# Patient Record
Sex: FEMALE | Race: WHITE | NOT HISPANIC OR LATINO | Employment: OTHER | URBAN - METROPOLITAN AREA
[De-identification: names, ages, dates, MRNs, and addresses within clinical notes are randomized per-mention and may not be internally consistent; named-entity substitution may affect disease eponyms.]

---

## 2017-01-19 ENCOUNTER — OFFICE VISIT - HEALTHEAST (OUTPATIENT)
Dept: RHEUMATOLOGY | Facility: CLINIC | Age: 82
End: 2017-01-19

## 2017-01-19 DIAGNOSIS — M13.0 POLYARTHROPATHY OR POLYARTHRITIS, HAND: ICD-10-CM

## 2017-01-19 DIAGNOSIS — M35.00 SICCA SYNDROME (H): ICD-10-CM

## 2017-01-19 DIAGNOSIS — R69 TAKING DRUG FOR CHRONIC DISEASE: ICD-10-CM

## 2017-01-19 ASSESSMENT — MIFFLIN-ST. JEOR: SCORE: 1020.51

## 2017-02-24 ENCOUNTER — COMMUNICATION - HEALTHEAST (OUTPATIENT)
Dept: ADMINISTRATIVE | Facility: CLINIC | Age: 82
End: 2017-02-24

## 2017-04-20 ENCOUNTER — OFFICE VISIT - HEALTHEAST (OUTPATIENT)
Dept: RHEUMATOLOGY | Facility: CLINIC | Age: 82
End: 2017-04-20

## 2017-04-20 DIAGNOSIS — M13.0 POLYARTHROPATHY OR POLYARTHRITIS, HAND: ICD-10-CM

## 2017-04-20 DIAGNOSIS — M35.00 SICCA SYNDROME (H): ICD-10-CM

## 2017-04-20 ASSESSMENT — MIFFLIN-ST. JEOR: SCORE: 1016.43

## 2017-05-31 ENCOUNTER — AMBULATORY - HEALTHEAST (OUTPATIENT)
Dept: CARDIOLOGY | Facility: CLINIC | Age: 82
End: 2017-05-31

## 2017-06-07 ENCOUNTER — OFFICE VISIT - HEALTHEAST (OUTPATIENT)
Dept: CARDIOLOGY | Facility: CLINIC | Age: 82
End: 2017-06-07

## 2017-06-07 ENCOUNTER — AMBULATORY - HEALTHEAST (OUTPATIENT)
Dept: CARDIOLOGY | Facility: CLINIC | Age: 82
End: 2017-06-07

## 2017-06-07 DIAGNOSIS — I25.10 CORONARY ARTERY DISEASE INVOLVING NATIVE CORONARY ARTERY OF NATIVE HEART, ANGINA PRESENCE UNSPECIFIED: ICD-10-CM

## 2017-06-07 DIAGNOSIS — I10 ESSENTIAL HYPERTENSION WITH GOAL BLOOD PRESSURE LESS THAN 130/85: ICD-10-CM

## 2017-06-07 DIAGNOSIS — Z95.2 S/P AVR: ICD-10-CM

## 2017-06-07 ASSESSMENT — MIFFLIN-ST. JEOR: SCORE: 1016.43

## 2017-06-08 ENCOUNTER — HOSPITAL ENCOUNTER (OUTPATIENT)
Dept: NUCLEAR MEDICINE | Facility: CLINIC | Age: 82
Discharge: HOME | End: 2017-06-08
Attending: INTERNAL MEDICINE

## 2017-06-08 ENCOUNTER — HOSPITAL ENCOUNTER (OUTPATIENT)
Dept: CARDIOLOGY | Facility: CLINIC | Age: 82
Discharge: HOME | End: 2017-06-08
Attending: INTERNAL MEDICINE

## 2017-06-08 DIAGNOSIS — I25.10 CORONARY ARTERY DISEASE INVOLVING NATIVE CORONARY ARTERY OF NATIVE HEART, ANGINA PRESENCE UNSPECIFIED: ICD-10-CM

## 2017-06-08 LAB
CV STRESS CURRENT BP HE: NORMAL
CV STRESS CURRENT HR HE: 105
CV STRESS CURRENT HR HE: 105
CV STRESS CURRENT HR HE: 106
CV STRESS CURRENT HR HE: 110
CV STRESS CURRENT HR HE: 117
CV STRESS CURRENT HR HE: 117
CV STRESS CURRENT HR HE: 118
CV STRESS CURRENT HR HE: 120
CV STRESS CURRENT HR HE: 120
CV STRESS CURRENT HR HE: 77
CV STRESS CURRENT HR HE: 77
CV STRESS CURRENT HR HE: 85
CV STRESS CURRENT HR HE: 85
CV STRESS CURRENT HR HE: 92
CV STRESS CURRENT HR HE: 92
CV STRESS CURRENT HR HE: 93
CV STRESS DEVIATION TIME HE: NORMAL
CV STRESS ECHO PERCENT HR HE: NORMAL
CV STRESS EXERCISE STAGE HE: NORMAL
CV STRESS FINAL RESTING BP HE: NORMAL
CV STRESS FINAL RESTING HR HE: 85
CV STRESS MAX HR HE: 121
CV STRESS MAX TREADMILL GRADE HE: 0
CV STRESS MAX TREADMILL SPEED HE: 0
CV STRESS PEAK DIA BP HE: NORMAL
CV STRESS PEAK SYS BP HE: NORMAL
CV STRESS PHASE HE: NORMAL
CV STRESS PROTOCOL HE: NORMAL
CV STRESS RESTING PT POSITION HE: NORMAL
CV STRESS ST DEVIATION AMOUNT HE: NORMAL
CV STRESS ST DEVIATION ELEVATION HE: NORMAL
CV STRESS ST EVELATION AMOUNT HE: NORMAL
CV STRESS TEST TYPE HE: NORMAL
CV STRESS TOTAL STAGE TIME MIN 1 HE: NORMAL
NUC STRESS EJECTION FRACTION: 59 %
STRESS ECHO BASELINE BP: NORMAL
STRESS ECHO BASELINE HR: 76
STRESS ECHO CALCULATED PERCENT HR: 92 %
STRESS ECHO LAST STRESS BP: NORMAL
STRESS ECHO LAST STRESS HR: 117

## 2017-08-24 ENCOUNTER — TRANSFERRED RECORDS (OUTPATIENT)
Dept: HEALTH INFORMATION MANAGEMENT | Facility: CLINIC | Age: 82
End: 2017-08-24

## 2017-08-31 ENCOUNTER — OFFICE VISIT - HEALTHEAST (OUTPATIENT)
Dept: RHEUMATOLOGY | Facility: CLINIC | Age: 82
End: 2017-08-31

## 2017-08-31 DIAGNOSIS — M13.0 POLYARTHROPATHY OR POLYARTHRITIS, HAND: ICD-10-CM

## 2017-08-31 DIAGNOSIS — M35.00 SICCA SYNDROME (H): ICD-10-CM

## 2017-08-31 ASSESSMENT — MIFFLIN-ST. JEOR: SCORE: 1016.43

## 2017-09-08 ENCOUNTER — HOME CARE/HOSPICE - HEALTHEAST (OUTPATIENT)
Dept: HOME HEALTH SERVICES | Facility: HOME HEALTH | Age: 82
End: 2017-09-08

## 2017-09-09 ENCOUNTER — HOME CARE/HOSPICE - HEALTHEAST (OUTPATIENT)
Dept: HOME HEALTH SERVICES | Facility: HOME HEALTH | Age: 82
End: 2017-09-09

## 2017-09-12 ENCOUNTER — HOME CARE/HOSPICE - HEALTHEAST (OUTPATIENT)
Dept: HOME HEALTH SERVICES | Facility: HOME HEALTH | Age: 82
End: 2017-09-12

## 2017-09-13 ENCOUNTER — HOME CARE/HOSPICE - HEALTHEAST (OUTPATIENT)
Dept: HOME HEALTH SERVICES | Facility: HOME HEALTH | Age: 82
End: 2017-09-13

## 2017-09-14 ENCOUNTER — HOME CARE/HOSPICE - HEALTHEAST (OUTPATIENT)
Dept: HOME HEALTH SERVICES | Facility: HOME HEALTH | Age: 82
End: 2017-09-14

## 2017-09-15 ENCOUNTER — HOME CARE/HOSPICE - HEALTHEAST (OUTPATIENT)
Dept: HOME HEALTH SERVICES | Facility: HOME HEALTH | Age: 82
End: 2017-09-15

## 2017-09-19 ENCOUNTER — HOME CARE/HOSPICE - HEALTHEAST (OUTPATIENT)
Dept: HOME HEALTH SERVICES | Facility: HOME HEALTH | Age: 82
End: 2017-09-19

## 2017-09-20 ENCOUNTER — HOME CARE/HOSPICE - HEALTHEAST (OUTPATIENT)
Dept: HOME HEALTH SERVICES | Facility: HOME HEALTH | Age: 82
End: 2017-09-20

## 2017-09-21 ENCOUNTER — HOME CARE/HOSPICE - HEALTHEAST (OUTPATIENT)
Dept: HOME HEALTH SERVICES | Facility: HOME HEALTH | Age: 82
End: 2017-09-21

## 2018-01-08 RX ORDER — FLUOROMETHOLONE 0.1 %
1 SUSPENSION, DROPS(FINAL DOSAGE FORM)(ML) OPHTHALMIC (EYE) DAILY
COMMUNITY

## 2018-01-08 RX ORDER — ALBUTEROL SULFATE 0.83 MG/ML
1 SOLUTION RESPIRATORY (INHALATION) 3 TIMES DAILY PRN
Status: ON HOLD | COMMUNITY
End: 2021-08-13

## 2018-01-08 RX ORDER — IPRATROPIUM BROMIDE AND ALBUTEROL SULFATE 2.5; .5 MG/3ML; MG/3ML
1 SOLUTION RESPIRATORY (INHALATION) 4 TIMES DAILY PRN
COMMUNITY
End: 2021-09-14

## 2018-01-08 RX ORDER — POLYETHYLENE GLYCOL 3350 17 G/17G
1 POWDER, FOR SOLUTION ORAL DAILY
COMMUNITY
End: 2022-12-13

## 2018-01-08 RX ORDER — CYCLOSPORINE 0.5 MG/ML
1 EMULSION OPHTHALMIC 2 TIMES DAILY
COMMUNITY

## 2018-01-08 RX ORDER — TRIAMCINOLONE ACETONIDE 1 MG/G
CREAM TOPICAL 2 TIMES DAILY PRN
Status: ON HOLD | COMMUNITY
End: 2021-08-13

## 2018-01-10 ENCOUNTER — HOSPITAL ENCOUNTER (INPATIENT)
Facility: CLINIC | Age: 83
LOS: 2 days | Discharge: HOME | DRG: 330 | End: 2018-01-12
Attending: COLON & RECTAL SURGERY | Admitting: COLON & RECTAL SURGERY
Payer: COMMERCIAL

## 2018-01-10 ENCOUNTER — SURGERY (OUTPATIENT)
Age: 83
End: 2018-01-10

## 2018-01-10 ENCOUNTER — ANESTHESIA EVENT (OUTPATIENT)
Dept: SURGERY | Facility: CLINIC | Age: 83
DRG: 330 | End: 2018-01-10
Payer: COMMERCIAL

## 2018-01-10 ENCOUNTER — ANESTHESIA (OUTPATIENT)
Dept: SURGERY | Facility: CLINIC | Age: 83
DRG: 330 | End: 2018-01-10
Payer: COMMERCIAL

## 2018-01-10 PROBLEM — K62.3 RECTAL PROLAPSE: Status: ACTIVE | Noted: 2018-01-10

## 2018-01-10 LAB
ANION GAP SERPL CALCULATED.3IONS-SCNC: 13 MMOL/L (ref 3–14)
BUN SERPL-MCNC: 16 MG/DL (ref 7–30)
CALCIUM SERPL-MCNC: 8.6 MG/DL (ref 8.5–10.1)
CHLORIDE SERPL-SCNC: 94 MMOL/L (ref 94–109)
CO2 SERPL-SCNC: 20 MMOL/L (ref 20–32)
CREAT SERPL-MCNC: 0.62 MG/DL (ref 0.52–1.04)
ERYTHROCYTE [DISTWIDTH] IN BLOOD BY AUTOMATED COUNT: 12.8 % (ref 10–15)
GFR SERPL CREATININE-BSD FRML MDRD: >90 ML/MIN/1.7M2
GLUCOSE SERPL-MCNC: 61 MG/DL (ref 70–99)
HCT VFR BLD AUTO: 32.1 % (ref 35–47)
HGB BLD-MCNC: 11.2 G/DL (ref 11.7–15.7)
MCH RBC QN AUTO: 31 PG (ref 26.5–33)
MCHC RBC AUTO-ENTMCNC: 34.9 G/DL (ref 31.5–36.5)
MCV RBC AUTO: 89 FL (ref 78–100)
PLATELET # BLD AUTO: 239 10E9/L (ref 150–450)
POTASSIUM SERPL-SCNC: 3.3 MMOL/L (ref 3.4–5.3)
RBC # BLD AUTO: 3.61 10E12/L (ref 3.8–5.2)
SODIUM SERPL-SCNC: 127 MMOL/L (ref 133–144)
WBC # BLD AUTO: 7.5 10E9/L (ref 4–11)

## 2018-01-10 PROCEDURE — 37000008 ZZH ANESTHESIA TECHNICAL FEE, 1ST 30 MIN: Performed by: COLON & RECTAL SURGERY

## 2018-01-10 PROCEDURE — 80048 BASIC METABOLIC PNL TOTAL CA: CPT | Performed by: COLON & RECTAL SURGERY

## 2018-01-10 PROCEDURE — 25000125 ZZHC RX 250: Performed by: NURSE ANESTHETIST, CERTIFIED REGISTERED

## 2018-01-10 PROCEDURE — 25000128 H RX IP 250 OP 636: Performed by: ANESTHESIOLOGY

## 2018-01-10 PROCEDURE — 71000012 ZZH RECOVERY PHASE 1 LEVEL 1 FIRST HR: Performed by: COLON & RECTAL SURGERY

## 2018-01-10 PROCEDURE — 25000132 ZZH RX MED GY IP 250 OP 250 PS 637: Performed by: COLON & RECTAL SURGERY

## 2018-01-10 PROCEDURE — 25000566 ZZH SEVOFLURANE, EA 15 MIN: Performed by: COLON & RECTAL SURGERY

## 2018-01-10 PROCEDURE — 36000067 ZZH SURGERY LEVEL 5 1ST 30 MIN: Performed by: COLON & RECTAL SURGERY

## 2018-01-10 PROCEDURE — 88307 TISSUE EXAM BY PATHOLOGIST: CPT | Mod: 26 | Performed by: COLON & RECTAL SURGERY

## 2018-01-10 PROCEDURE — 25000128 H RX IP 250 OP 636: Performed by: COLON & RECTAL SURGERY

## 2018-01-10 PROCEDURE — 85027 COMPLETE CBC AUTOMATED: CPT | Performed by: COLON & RECTAL SURGERY

## 2018-01-10 PROCEDURE — 40000169 ZZH STATISTIC PRE-PROCEDURE ASSESSMENT I: Performed by: COLON & RECTAL SURGERY

## 2018-01-10 PROCEDURE — 25800025 ZZH RX 258: Performed by: PHYSICIAN ASSISTANT

## 2018-01-10 PROCEDURE — 25000125 ZZHC RX 250: Performed by: ANESTHESIOLOGY

## 2018-01-10 PROCEDURE — 36000069 ZZH SURGERY LEVEL 5 EA 15 ADDTL MIN: Performed by: COLON & RECTAL SURGERY

## 2018-01-10 PROCEDURE — 0DBN0ZZ EXCISION OF SIGMOID COLON, OPEN APPROACH: ICD-10-PCS | Performed by: COLON & RECTAL SURGERY

## 2018-01-10 PROCEDURE — 25000125 ZZHC RX 250: Performed by: COLON & RECTAL SURGERY

## 2018-01-10 PROCEDURE — 12000007 ZZH R&B INTERMEDIATE

## 2018-01-10 PROCEDURE — 27210794 ZZH OR GENERAL SUPPLY STERILE: Performed by: COLON & RECTAL SURGERY

## 2018-01-10 PROCEDURE — 25000128 H RX IP 250 OP 636: Performed by: NURSE ANESTHETIST, CERTIFIED REGISTERED

## 2018-01-10 PROCEDURE — 0DBP0ZZ EXCISION OF RECTUM, OPEN APPROACH: ICD-10-PCS | Performed by: COLON & RECTAL SURGERY

## 2018-01-10 PROCEDURE — 88307 TISSUE EXAM BY PATHOLOGIST: CPT | Performed by: COLON & RECTAL SURGERY

## 2018-01-10 PROCEDURE — 36415 COLL VENOUS BLD VENIPUNCTURE: CPT | Performed by: COLON & RECTAL SURGERY

## 2018-01-10 PROCEDURE — 27210995 ZZH RX 272: Performed by: COLON & RECTAL SURGERY

## 2018-01-10 PROCEDURE — 25000128 H RX IP 250 OP 636: Performed by: PHYSICIAN ASSISTANT

## 2018-01-10 PROCEDURE — 37000009 ZZH ANESTHESIA TECHNICAL FEE, EACH ADDTL 15 MIN: Performed by: COLON & RECTAL SURGERY

## 2018-01-10 RX ORDER — HYDROMORPHONE HYDROCHLORIDE 1 MG/ML
.3-.5 INJECTION, SOLUTION INTRAMUSCULAR; INTRAVENOUS; SUBCUTANEOUS EVERY 5 MIN PRN
Status: DISCONTINUED | OUTPATIENT
Start: 2018-01-10 | End: 2018-01-10 | Stop reason: HOSPADM

## 2018-01-10 RX ORDER — LIDOCAINE 40 MG/G
CREAM TOPICAL
Status: DISCONTINUED | OUTPATIENT
Start: 2018-01-10 | End: 2018-01-12 | Stop reason: HOSPADM

## 2018-01-10 RX ORDER — FENTANYL CITRATE 50 UG/ML
INJECTION, SOLUTION INTRAMUSCULAR; INTRAVENOUS PRN
Status: DISCONTINUED | OUTPATIENT
Start: 2018-01-10 | End: 2018-01-10

## 2018-01-10 RX ORDER — MAGNESIUM HYDROXIDE 1200 MG/15ML
LIQUID ORAL PRN
Status: DISCONTINUED | OUTPATIENT
Start: 2018-01-10 | End: 2018-01-10 | Stop reason: HOSPADM

## 2018-01-10 RX ORDER — CITALOPRAM HYDROBROMIDE 20 MG/1
20 TABLET ORAL EVERY MORNING
Status: DISCONTINUED | OUTPATIENT
Start: 2018-01-11 | End: 2018-01-12 | Stop reason: HOSPADM

## 2018-01-10 RX ORDER — CYCLOSPORINE 0.5 MG/ML
1 EMULSION OPHTHALMIC 2 TIMES DAILY
Status: DISCONTINUED | OUTPATIENT
Start: 2018-01-10 | End: 2018-01-12 | Stop reason: HOSPADM

## 2018-01-10 RX ORDER — ONDANSETRON 2 MG/ML
4 INJECTION INTRAMUSCULAR; INTRAVENOUS EVERY 30 MIN PRN
Status: DISCONTINUED | OUTPATIENT
Start: 2018-01-10 | End: 2018-01-10 | Stop reason: HOSPADM

## 2018-01-10 RX ORDER — DESLORATADINE 5 MG/1
5 TABLET ORAL DAILY
Status: DISCONTINUED | OUTPATIENT
Start: 2018-01-11 | End: 2018-01-10 | Stop reason: CLARIF

## 2018-01-10 RX ORDER — FENTANYL CITRATE 50 UG/ML
25-50 INJECTION, SOLUTION INTRAMUSCULAR; INTRAVENOUS
Status: DISCONTINUED | OUTPATIENT
Start: 2018-01-10 | End: 2018-01-10 | Stop reason: HOSPADM

## 2018-01-10 RX ORDER — CALCIUM CARBONATE 500 MG/1
2 TABLET, CHEWABLE ORAL DAILY PRN
COMMUNITY
End: 2022-10-21

## 2018-01-10 RX ORDER — TIMOLOL MALEATE 5 MG/ML
1 SOLUTION/ DROPS OPHTHALMIC DAILY
COMMUNITY

## 2018-01-10 RX ORDER — SODIUM CHLORIDE, SODIUM LACTATE, POTASSIUM CHLORIDE, CALCIUM CHLORIDE 600; 310; 30; 20 MG/100ML; MG/100ML; MG/100ML; MG/100ML
INJECTION, SOLUTION INTRAVENOUS CONTINUOUS
Status: DISCONTINUED | OUTPATIENT
Start: 2018-01-10 | End: 2018-01-10 | Stop reason: HOSPADM

## 2018-01-10 RX ORDER — ACETAMINOPHEN 10 MG/ML
1000 INJECTION, SOLUTION INTRAVENOUS EVERY 6 HOURS
Status: COMPLETED | OUTPATIENT
Start: 2018-01-10 | End: 2018-01-11

## 2018-01-10 RX ORDER — LIDOCAINE HYDROCHLORIDE 20 MG/ML
INJECTION, SOLUTION INFILTRATION; PERINEURAL PRN
Status: DISCONTINUED | OUTPATIENT
Start: 2018-01-10 | End: 2018-01-10

## 2018-01-10 RX ORDER — ONDANSETRON 2 MG/ML
4 INJECTION INTRAMUSCULAR; INTRAVENOUS EVERY 6 HOURS PRN
Status: DISCONTINUED | OUTPATIENT
Start: 2018-01-10 | End: 2018-01-12 | Stop reason: HOSPADM

## 2018-01-10 RX ORDER — NALOXONE HYDROCHLORIDE 0.4 MG/ML
.1-.4 INJECTION, SOLUTION INTRAMUSCULAR; INTRAVENOUS; SUBCUTANEOUS
Status: DISCONTINUED | OUTPATIENT
Start: 2018-01-10 | End: 2018-01-10

## 2018-01-10 RX ORDER — DEXAMETHASONE SODIUM PHOSPHATE 4 MG/ML
INJECTION, SOLUTION INTRA-ARTICULAR; INTRALESIONAL; INTRAMUSCULAR; INTRAVENOUS; SOFT TISSUE PRN
Status: DISCONTINUED | OUTPATIENT
Start: 2018-01-10 | End: 2018-01-10

## 2018-01-10 RX ORDER — CIPROFLOXACIN 2 MG/ML
400 INJECTION, SOLUTION INTRAVENOUS SEE ADMIN INSTRUCTIONS
Status: DISCONTINUED | OUTPATIENT
Start: 2018-01-10 | End: 2018-01-10 | Stop reason: HOSPADM

## 2018-01-10 RX ORDER — LORATADINE 10 MG/1
10 TABLET ORAL DAILY
Status: DISCONTINUED | OUTPATIENT
Start: 2018-01-11 | End: 2018-01-12 | Stop reason: HOSPADM

## 2018-01-10 RX ORDER — ACETAMINOPHEN 325 MG/1
975 TABLET ORAL ONCE
Status: COMPLETED | OUTPATIENT
Start: 2018-01-10 | End: 2018-01-10

## 2018-01-10 RX ORDER — NEOSTIGMINE METHYLSULFATE 1 MG/ML
VIAL (ML) INJECTION PRN
Status: DISCONTINUED | OUTPATIENT
Start: 2018-01-10 | End: 2018-01-10

## 2018-01-10 RX ORDER — DESLORATADINE 5 MG/1
5 TABLET ORAL DAILY
Status: ON HOLD | COMMUNITY
End: 2021-08-13

## 2018-01-10 RX ORDER — METOPROLOL TARTRATE 1 MG/ML
5 INJECTION, SOLUTION INTRAVENOUS EVERY 6 HOURS
Status: DISCONTINUED | OUTPATIENT
Start: 2018-01-11 | End: 2018-01-12 | Stop reason: HOSPADM

## 2018-01-10 RX ORDER — GLYCOPYRROLATE 0.2 MG/ML
INJECTION, SOLUTION INTRAMUSCULAR; INTRAVENOUS PRN
Status: DISCONTINUED | OUTPATIENT
Start: 2018-01-10 | End: 2018-01-10

## 2018-01-10 RX ORDER — KETOROLAC TROMETHAMINE 15 MG/ML
15 INJECTION, SOLUTION INTRAMUSCULAR; INTRAVENOUS EVERY 6 HOURS PRN
Status: DISCONTINUED | OUTPATIENT
Start: 2018-01-10 | End: 2018-01-12 | Stop reason: HOSPADM

## 2018-01-10 RX ORDER — ONDANSETRON 2 MG/ML
INJECTION INTRAMUSCULAR; INTRAVENOUS PRN
Status: DISCONTINUED | OUTPATIENT
Start: 2018-01-10 | End: 2018-01-10

## 2018-01-10 RX ORDER — DEXTROSE MONOHYDRATE, SODIUM CHLORIDE, AND POTASSIUM CHLORIDE 50; 1.49; 4.5 G/1000ML; G/1000ML; G/1000ML
INJECTION, SOLUTION INTRAVENOUS CONTINUOUS
Status: DISCONTINUED | OUTPATIENT
Start: 2018-01-10 | End: 2018-01-12

## 2018-01-10 RX ORDER — PROPOFOL 10 MG/ML
INJECTION, EMULSION INTRAVENOUS PRN
Status: DISCONTINUED | OUTPATIENT
Start: 2018-01-10 | End: 2018-01-10

## 2018-01-10 RX ORDER — PROPOFOL 10 MG/ML
INJECTION, EMULSION INTRAVENOUS CONTINUOUS PRN
Status: DISCONTINUED | OUTPATIENT
Start: 2018-01-10 | End: 2018-01-10

## 2018-01-10 RX ORDER — FLUOROMETHOLONE 0.1 %
1 SUSPENSION, DROPS(FINAL DOSAGE FORM)(ML) OPHTHALMIC (EYE) DAILY
Status: DISCONTINUED | OUTPATIENT
Start: 2018-01-10 | End: 2018-01-12 | Stop reason: HOSPADM

## 2018-01-10 RX ORDER — CIPROFLOXACIN 2 MG/ML
400 INJECTION, SOLUTION INTRAVENOUS
Status: DISCONTINUED | OUTPATIENT
Start: 2018-01-10 | End: 2018-01-10 | Stop reason: HOSPADM

## 2018-01-10 RX ORDER — TIMOLOL MALEATE 5 MG/ML
1 SOLUTION/ DROPS OPHTHALMIC DAILY
Status: DISCONTINUED | OUTPATIENT
Start: 2018-01-10 | End: 2018-01-12 | Stop reason: HOSPADM

## 2018-01-10 RX ORDER — NALOXONE HYDROCHLORIDE 0.4 MG/ML
.1-.4 INJECTION, SOLUTION INTRAMUSCULAR; INTRAVENOUS; SUBCUTANEOUS
Status: DISCONTINUED | OUTPATIENT
Start: 2018-01-10 | End: 2018-01-12 | Stop reason: HOSPADM

## 2018-01-10 RX ORDER — EPHEDRINE SULFATE 50 MG/ML
INJECTION, SOLUTION INTRAMUSCULAR; INTRAVENOUS; SUBCUTANEOUS PRN
Status: DISCONTINUED | OUTPATIENT
Start: 2018-01-10 | End: 2018-01-10

## 2018-01-10 RX ORDER — LEVOTHYROXINE SODIUM 75 UG/1
75 TABLET ORAL
Status: DISCONTINUED | OUTPATIENT
Start: 2018-01-11 | End: 2018-01-12 | Stop reason: HOSPADM

## 2018-01-10 RX ORDER — ONDANSETRON 4 MG/1
4 TABLET, ORALLY DISINTEGRATING ORAL EVERY 30 MIN PRN
Status: DISCONTINUED | OUTPATIENT
Start: 2018-01-10 | End: 2018-01-10 | Stop reason: HOSPADM

## 2018-01-10 RX ADMIN — PHENYLEPHRINE HYDROCHLORIDE 50 MCG: 10 INJECTION INTRAVENOUS at 09:43

## 2018-01-10 RX ADMIN — FENTANYL CITRATE 100 MCG: 50 INJECTION, SOLUTION INTRAMUSCULAR; INTRAVENOUS at 07:39

## 2018-01-10 RX ADMIN — FENTANYL CITRATE 25 MCG: 50 INJECTION, SOLUTION INTRAMUSCULAR; INTRAVENOUS at 10:05

## 2018-01-10 RX ADMIN — PHENYLEPHRINE HYDROCHLORIDE 50 MCG: 10 INJECTION INTRAVENOUS at 08:21

## 2018-01-10 RX ADMIN — SODIUM CHLORIDE, POTASSIUM CHLORIDE, SODIUM LACTATE AND CALCIUM CHLORIDE: 600; 310; 30; 20 INJECTION, SOLUTION INTRAVENOUS at 07:04

## 2018-01-10 RX ADMIN — FLUOROMETHOLONE 1 DROP: 1 SUSPENSION/ DROPS OPHTHALMIC at 12:19

## 2018-01-10 RX ADMIN — POTASSIUM CHLORIDE, DEXTROSE MONOHYDRATE AND SODIUM CHLORIDE: 150; 5; 450 INJECTION, SOLUTION INTRAVENOUS at 11:53

## 2018-01-10 RX ADMIN — METRONIDAZOLE 500 MG: 500 INJECTION, SOLUTION INTRAVENOUS at 07:50

## 2018-01-10 RX ADMIN — SODIUM CHLORIDE 1000 ML: 900 IRRIGANT IRRIGATION at 08:39

## 2018-01-10 RX ADMIN — SODIUM CHLORIDE, POTASSIUM CHLORIDE, SODIUM LACTATE AND CALCIUM CHLORIDE: 600; 310; 30; 20 INJECTION, SOLUTION INTRAVENOUS at 08:40

## 2018-01-10 RX ADMIN — GLYCOPYRROLATE 0.6 MG: 0.2 INJECTION, SOLUTION INTRAMUSCULAR; INTRAVENOUS at 10:10

## 2018-01-10 RX ADMIN — LIDOCAINE HYDROCHLORIDE 100 MG: 20 INJECTION, SOLUTION INFILTRATION; PERINEURAL at 07:39

## 2018-01-10 RX ADMIN — PHENYLEPHRINE HYDROCHLORIDE 100 MCG: 10 INJECTION INTRAVENOUS at 08:00

## 2018-01-10 RX ADMIN — FENTANYL CITRATE 25 MCG: 50 INJECTION, SOLUTION INTRAMUSCULAR; INTRAVENOUS at 09:58

## 2018-01-10 RX ADMIN — Medication 2 LOZENGE: at 19:59

## 2018-01-10 RX ADMIN — ROCURONIUM BROMIDE 50 MG: 10 INJECTION INTRAVENOUS at 07:39

## 2018-01-10 RX ADMIN — TIMOLOL MALEATE 1 DROP: 5 SOLUTION/ DROPS OPHTHALMIC at 12:20

## 2018-01-10 RX ADMIN — FENTANYL CITRATE 50 MCG: 50 INJECTION, SOLUTION INTRAMUSCULAR; INTRAVENOUS at 08:55

## 2018-01-10 RX ADMIN — PROPOFOL 30 MCG/KG/MIN: 10 INJECTION, EMULSION INTRAVENOUS at 07:39

## 2018-01-10 RX ADMIN — LIDOCAINE HYDROCHLORIDE 10 ML: 10; .005 INJECTION, SOLUTION EPIDURAL; INFILTRATION; INTRACAUDAL; PERINEURAL at 08:19

## 2018-01-10 RX ADMIN — PHENYLEPHRINE HYDROCHLORIDE 100 MCG: 10 INJECTION INTRAVENOUS at 09:37

## 2018-01-10 RX ADMIN — CIPROFLOXACIN 400 MG: 2 INJECTION INTRAVENOUS at 08:00

## 2018-01-10 RX ADMIN — FENTANYL CITRATE 50 MCG: 50 INJECTION, SOLUTION INTRAMUSCULAR; INTRAVENOUS at 07:42

## 2018-01-10 RX ADMIN — Medication 5 MG: at 08:05

## 2018-01-10 RX ADMIN — PHENYLEPHRINE HYDROCHLORIDE 50 MCG: 10 INJECTION INTRAVENOUS at 08:29

## 2018-01-10 RX ADMIN — PROPOFOL 100 MG: 10 INJECTION, EMULSION INTRAVENOUS at 07:39

## 2018-01-10 RX ADMIN — NEOSTIGMINE METHYLSULFATE 3 MG: 1 INJECTION INTRAMUSCULAR; INTRAVENOUS; SUBCUTANEOUS at 10:10

## 2018-01-10 RX ADMIN — PHENYLEPHRINE HYDROCHLORIDE 50 MCG: 10 INJECTION INTRAVENOUS at 08:15

## 2018-01-10 RX ADMIN — ACETAMINOPHEN 975 MG: 325 TABLET, FILM COATED ORAL at 07:07

## 2018-01-10 RX ADMIN — LIDOCAINE HYDROCHLORIDE 0.2 ML: 10 INJECTION, SOLUTION EPIDURAL; INFILTRATION; INTRACAUDAL; PERINEURAL at 07:05

## 2018-01-10 RX ADMIN — PHENYLEPHRINE HYDROCHLORIDE 100 MCG: 10 INJECTION INTRAVENOUS at 08:06

## 2018-01-10 RX ADMIN — ACETAMINOPHEN 1000 MG: 10 INJECTION, SOLUTION INTRAVENOUS at 13:53

## 2018-01-10 RX ADMIN — DEXAMETHASONE SODIUM PHOSPHATE 4 MG: 4 INJECTION, SOLUTION INTRA-ARTICULAR; INTRALESIONAL; INTRAMUSCULAR; INTRAVENOUS; SOFT TISSUE at 07:39

## 2018-01-10 RX ADMIN — PHENYLEPHRINE HYDROCHLORIDE 50 MCG: 10 INJECTION INTRAVENOUS at 09:32

## 2018-01-10 RX ADMIN — PHENYLEPHRINE HYDROCHLORIDE 50 MCG: 10 INJECTION INTRAVENOUS at 08:47

## 2018-01-10 RX ADMIN — PHENYLEPHRINE HYDROCHLORIDE 50 MCG: 10 INJECTION INTRAVENOUS at 08:42

## 2018-01-10 RX ADMIN — ACETAMINOPHEN 1000 MG: 10 INJECTION, SOLUTION INTRAVENOUS at 19:28

## 2018-01-10 RX ADMIN — PHENYLEPHRINE HYDROCHLORIDE 50 MCG: 10 INJECTION INTRAVENOUS at 08:35

## 2018-01-10 RX ADMIN — PHENYLEPHRINE HYDROCHLORIDE 50 MCG: 10 INJECTION INTRAVENOUS at 08:26

## 2018-01-10 RX ADMIN — Medication 5 MG: at 07:58

## 2018-01-10 RX ADMIN — ONDANSETRON 4 MG: 2 INJECTION INTRAMUSCULAR; INTRAVENOUS at 07:39

## 2018-01-10 ASSESSMENT — COPD QUESTIONNAIRES: COPD: 1

## 2018-01-10 ASSESSMENT — ENCOUNTER SYMPTOMS: SEIZURES: 0

## 2018-01-10 NOTE — IP AVS SNAPSHOT
MRN:[id number]                      After Visit Summary   1/10/2018    Marla Dunn    MRN: [id number]           Thank you!     Thank you for choosing [facility] for your care. Our goal is always to provide you with excellent care. Hearing back from our patients is one way we can continue to improve our services. Please take a few minutes to complete the written survey that you may receive in the mail after you visit with us. Thank you!        Patient Information     Date Of Birth          5/22/1929        Designated Caregiver       Most Recent Value    Caregiver    Will someone help with your care after discharge? yes    Name of designated caregiver justin    Phone number of caregiver [phone]    Caregiver address [address]      About your hospital stay     You were admitted on:  January 10, 2018 You last received care in the:  [place] [address] [facility]    You were discharged on:  January 12, 2018        Reason for your hospital stay       The patient was in the hospital to have surgery for rectal prolapse                  Who to Call     For medical emergencies, please call 911.  For non-urgent questions about your medical care, please call your primary care provider or clinic, [phone]  For questions related to your surgery, please call your surgery clinic        Attending Provider     Provider Candelaria Marrero MD Colon and Rectal Surgery       Primary Care Provider Office Phone # Fax #    Sally Goyal [phone]      After Care Instructions     Activity       Your activity upon discharge: No heavy lifting or straining over 15-20 lbs for 6 weeks. No Driving while taking narcotic pain medications            Diet       Follow this diet upon discharge: Regular                  Follow-up Appointments     Follow-up and recommended labs and tests        Follow up in the office in 3-4 weeks with Dr. Anthony or at your  already scheduled post op visit. Call [phone] to schedule your appointment. Call the office at [phone] if you develop fever, uncontrolled pain, bleeding, nausea, vomiting, or constipation.                  Further instructions from your care team       Discharge Instructions following Abdominal Surgery  [facility]      Bowel Function  After removal of a portion of the intestines, it is normal for bowel movements to be erratic.  They are often looser and more frequent.  This condition generally resolves within a few weeks or it can be controlled with diet or medication.  Diet  In general, you may return to a well-balanced diet upon discharge.  Your doctor will let you know when to add extra fiber to your diet.  Be sure to drink plenty of fluids.  Incision  You should expect some discomfort in the area of your incision, particularly as you increase your activity.  If you notice an area of increasing redness or new drainage, please call your doctor.  You may shower as desired but refrain from tub baths or swimming until the incisions are fully healed.  Activity  Gradually increase your activity each day.  There are generally no restrictions on walking, climbing stairs, or riding in a car.  You can usually drive a car 7-10 days after your leave the hospital.  Do not drive while taking narcotic pain medications.  Ask your doctor regarding resumption of physical sexual activity; in most cases, you can resume sex after a few weeks.  Your physician will advise you about when to return to work.  It is preferable to have somebody stay with you at home until you are fully healed and able to resume a normal level of activity   Medications  You will be discharged with a prescription pain medication and any medications you were taking prior to surgery.  Do not drive while taking narcotic pain medications.  If you need additional medications or a refill, you must call your doctor  "during normal business hours.  It is the policy of [facility] to not refill pain medication after hours or on weekends because your chart is not available.  Follow-up  Typically, you will be asked to schedule a follow-up office visit 1 to 4 weeks after surgery.  If you have any questions related to follow-up, medications, or your condition, please call the office.      Call your surgeon if you have these signs or symptoms:  ([phone])    Problem with the incision, including increasing pain, swelling, redness, or drainage    Increasing abdominal pain    Uncontrolled nausea or vomiting    Fever or chills    Constipation  (no bowel movement for 3 days)    Diarrhea (more than 3 watery stools within 24 hours)    Bleeding from the rectum, wound, or stoma    Any questions or concerns    Pending Results     No orders found from 1/8/2018 to 1/11/2018.            Statement of Approval     Ordered          01/12/18 1012  I have reviewed and agree with all the recommendations and orders detailed in this document.  EFFECTIVE NOW     Approved and electronically signed by:  Kiana Morrison PA-C             Admission Information     Date & Time Provider Department Dept. Phone    1/10/2018 Candelaria Anthony MD [place] [address] [facility] [phone]      Your Vitals Were     Blood Pressure Pulse Temperature Respirations Height Weight    132/55 (BP Location: Right arm) 78 98.2  F (36.8  C) (Oral) 16 1.626 m (5' 4\") 58.3 kg (128 lb 7 oz)    Pulse Oximetry BMI (Body Mass Index)                98% 22.05 kg/m2          [employer] Information     [employer] lets you send messages to your doctor, view your test results, renew your prescriptions, schedule appointments and more. To sign up, go to [email or web address] . Click on \"Log in\" on the left side of the screen, which will take you to the Welcome page. Then click on \"Sign up Now\" on the right side of the page.     You will be " asked to enter the access code listed below, as well as some personal information. Please follow the directions to create your username and password.     Your access code is: [email or web address]  Expires: 2018 11:07 AM     Your access code will  in 90 days. If you need help or a new code, please call your [facility] clinic or [phone].        Care EveryWhere ID     This is your Care EveryWhere ID. This could be used by other organizations to access your [facility] medical records  [id number]        Equal Access to Services     [facility]: Hadii katelynn block hadagneso Sobrittny, waaxda luqadaha, qaybta kaalmada connor, cesar salazar . So [facility] [phone].    ATENCIÓN: Si habla español, tiene a hall disposición servicios gratuitos de asistencia lingüística. David al [phone].    We comply with applicable federal civil rights laws and [place] laws. We do not discriminate on the basis of race, color, national origin, age, disability, sex, sexual orientation, or gender identity.               Review of your medicines      CONTINUE these medicines which have NOT CHANGED        Dose / Directions    albuterol (2.5 MG/3ML) 0.083% neb solution        Dose:  1 vial   Take 1 vial by nebulization 3 times daily as needed   Refills:  0       ARANESP (ALB FREE) SURECLICK IJ        Inject as directed every 21 days -Receives at [facility] in [place]   Refills:  0       ASPIRIN PO        Dose:  81 mg   Take 81 mg by mouth every morning   Refills:  0       calcium carbonate 500 MG chewable tablet   Commonly known as:  TUMS        Dose:  2 chew tab   Take 2 chew tab by mouth daily as needed for heartburn   Refills:  0       CALTRATE 600+D PO        Dose:  1 tablet   Take 1 tablet by mouth daily   Refills:  0       CELEXA PO        Dose:  20 mg   Take 20 mg by mouth every morning   Refills:  0       CITRUCEL PO        Dose:  1 tablet   Take 1 tablet by mouth every morning   Refills:  0       COLACE PO         Dose:  100 mg   Take 100 mg by mouth 2 times daily   Refills:  0       * COMBIVENT RESPIMAT  MCG/ACT inhaler   Generic drug:  Ipratropium-Albuterol        Dose:  2 puff   Inhale 2 puffs into the lungs 4 times daily   Refills:  0       * DUONEB 0.5-2.5 (3) MG/3ML neb solution   Generic drug:  ipratropium - albuterol 0.5 mg/2.5 mg/3 mL        Dose:  1 vial   Take 1 vial by nebulization every 6 hours as needed for shortness of breath / dyspnea or wheezing   Refills:  0       desloratadine 5 MG tablet   Commonly known as:  CLARINEX        Dose:  5 mg   Take 5 mg by mouth daily   Refills:  0       fluorometholone 0.1 % ophthalmic susp   Commonly known as:  FML LIQUIFILM        Dose:  1 drop   Place 1 drop Into the left eye daily   Refills:  0       IBANDRONATE SODIUM PO        Dose:  150 mg   Take 150 mg by mouth every 30 days   Refills:  0       LASIX PO        Dose:  10 mg   Take 10 mg by mouth every morning (0.5 x 20 mg = 10 mg dose)   Refills:  0       LEVOXYL PO        Dose:  75 mcg   Take 75 mcg by mouth daily   Refills:  0       METAMUCIL FIBER PO        Dose:  0.25 tsp.   Take 0.25 tsp. by mouth every morning   Refills:  0       * METOPROLOL TARTRATE PO        Dose:  25 mg   Take 25 mg by mouth daily   Refills:  0       * METOPROLOL TARTRATE PO        Dose:  12.5 mg   Take 12.5 mg by mouth every evening (patient takes 0.5 X 25 mg = 12.5 mg dose)   Refills:  0       MIRALAX powder   Generic drug:  polyethylene glycol        Dose:  1 capful   Take 1 capful by mouth every morning   Refills:  0       NITROSTAT SL        Dose:  0.4 mg   Place 0.4 mg under the tongue daily as needed for chest pain   Refills:  0       NORVASC PO        Dose:  10 mg   Take 10 mg by mouth every morning   Refills:  0       * polyethylene glycol 0.4%- propylene glycol 0.3% 0.4-0.3 % Soln ophthalmic solution   Commonly known as:  SYSTANE ULTRA        Dose:  1 drop   Place 1 drop into both eyes daily   Refills:  0       *  polyethylene glycol 0.4%- propylene glycol 0.3% 0.4-0.3 % Soln ophthalmic solution   Commonly known as:  SYSTANE ULTRA        Dose:  1 drop   Place 1 drop into both eyes 2 times daily as needed for dry eyes   Refills:  0       PRESERVISION AREDS PO        Dose:  1 tablet   Take 1 tablet by mouth 2 times daily   Refills:  0       PROBIOTIC PO        Dose:  1 capsule   Take 1 capsule by mouth every morning   Refills:  0       RESTASIS 0.05 % ophthalmic emulsion   Generic drug:  cycloSPORINE        Dose:  1 drop   1 drop 2 times daily   Refills:  0       SULFACET-R EX        Externally apply topically daily as needed (to face)   Refills:  0       timolol 0.5 % ophthalmic solution   Commonly known as:  TIMOPTIC        Dose:  1 drop   Place 1 drop into the right eye daily   Refills:  0       triamcinolone 0.1 % cream   Commonly known as:  KENALOG        Apply topically 2 times daily as needed for irritation   Refills:  0       TYLENOL PO        Dose:  825 mg   Take 825 mg by mouth 2 times daily as needed for mild pain or fever (1x 325 mg + 1 x 500 mg = 825 mg dose)   Refills:  0       VITAMIN C PO        Dose:  500 mg   Take 500 mg by mouth every morning   Refills:  0       VITAMIN D (CHOLECALCIFEROL) PO        Dose:  2000 Units   Take 2,000 Units by mouth daily   Refills:  0       ZANTAC PO        Dose:  150 mg   Take 150 mg by mouth daily as needed for heartburn   Refills:  0       ZOCOR PO        Dose:  20 mg   Take 20 mg by mouth At Bedtime   Refills:  0       * Notice:  This list has 6 medication(s) that are the same as other medications prescribed for you. Read the directions carefully, and ask your doctor or other care provider to review them with you.             Protect others around you: Learn how to safely use, store and throw away your medicines at www.disposemymeds.org.             Medication List: This is a list of all your medications and when to take them. Check marks below indicate your daily home  schedule. Keep this list as a reference.      Medications           Morning Afternoon Evening Bedtime As Needed    albuterol (2.5 MG/3ML) 0.083% neb solution   Take 1 vial by nebulization 3 times daily as needed                                   ARANESP (ALB FREE) SURECLICK IJ   Inject as directed every 21 days -Receives at [facility] in [place]                                ASPIRIN PO   Take 81 mg by mouth every morning                                   calcium carbonate 500 MG chewable tablet   Commonly known as:  TUMS   Take 2 chew tab by mouth daily as needed for heartburn                                   CALTRATE 600+D PO   Take 1 tablet by mouth daily                                   CELEXA PO   Take 20 mg by mouth every morning   Last time this was given:  20 mg on 1/12/2018  9:48 AM                                   CITRUCEL PO   Take 1 tablet by mouth every morning                                   COLACE PO   Take 100 mg by mouth 2 times daily                                      * COMBIVENT RESPIMAT  MCG/ACT inhaler   Inhale 2 puffs into the lungs 4 times daily   Last time this was given:  2 puffs on 1/12/2018  9:53 AM   Generic drug:  Ipratropium-Albuterol                                            * DUONEB 0.5-2.5 (3) MG/3ML neb solution   Take 1 vial by nebulization every 6 hours as needed for shortness of breath / dyspnea or wheezing   Generic drug:  ipratropium - albuterol 0.5 mg/2.5 mg/3 mL                                   desloratadine 5 MG tablet   Commonly known as:  CLARINEX   Take 5 mg by mouth daily                                   fluorometholone 0.1 % ophthalmic susp   Commonly known as:  FML LIQUIFILM   Place 1 drop Into the left eye daily   Last time this was given:  1 drop on 1/12/2018  9:51 AM                                   IBANDRONATE SODIUM PO   Take 150 mg by mouth every 30 days                                LASIX PO   Take 10 mg by mouth every morning (0.5 x 20 mg =  10 mg dose)                                   LEVOXYL PO   Take 75 mcg by mouth daily   Last time this was given:  75 mcg on 1/12/2018  6:37 AM                                   METAMUCIL FIBER PO   Take 0.25 tsp. by mouth every morning                                   * METOPROLOL TARTRATE PO   Take 25 mg by mouth daily                                   * METOPROLOL TARTRATE PO   Take 12.5 mg by mouth every evening (patient takes 0.5 X 25 mg = 12.5 mg dose)                                   MIRALAX powder   Take 1 capful by mouth every morning   Generic drug:  polyethylene glycol                                   NITROSTAT SL   Place 0.4 mg under the tongue daily as needed for chest pain                                   NORVASC PO   Take 10 mg by mouth every morning                                   * polyethylene glycol 0.4%- propylene glycol 0.3% 0.4-0.3 % Soln ophthalmic solution   Commonly known as:  SYSTANE ULTRA   Place 1 drop into both eyes daily   Last time this was given:  1 drop on 1/12/2018  9:52 AM                                   * polyethylene glycol 0.4%- propylene glycol 0.3% 0.4-0.3 % Soln ophthalmic solution   Commonly known as:  SYSTANE ULTRA   Place 1 drop into both eyes 2 times daily as needed for dry eyes   Last time this was given:  1 drop on 1/12/2018  9:52 AM                                   PRESERVISION AREDS PO   Take 1 tablet by mouth 2 times daily                                      PROBIOTIC PO   Take 1 capsule by mouth every morning                                   RESTASIS 0.05 % ophthalmic emulsion   1 drop 2 times daily   Last time this was given:  1 drop on 1/12/2018  9:47 AM   Generic drug:  cycloSPORINE                                   SULFACET-R EX   Externally apply topically daily as needed (to face)                                   timolol 0.5 % ophthalmic solution   Commonly known as:  TIMOPTIC   Place 1 drop into the right eye daily   Last time this was given:  1  drop on 1/12/2018  9:52 AM                                   triamcinolone 0.1 % cream   Commonly known as:  KENALOG   Apply topically 2 times daily as needed for irritation                                   TYLENOL PO   Take 825 mg by mouth 2 times daily as needed for mild pain or fever (1x 325 mg + 1 x 500 mg = 825 mg dose)   Last time this was given:  975 mg on 1/10/2018  7:07 AM                                      VITAMIN C PO   Take 500 mg by mouth every morning                                   VITAMIN D (CHOLECALCIFEROL) PO   Take 2,000 Units by mouth daily                                   ZANTAC PO   Take 150 mg by mouth daily as needed for heartburn                                   ZOCOR PO   Take 20 mg by mouth At Bedtime                                   * Notice:  This list has 6 medication(s) that are the same as other medications prescribed for you. Read the directions carefully, and ask your doctor or other care provider to review them with you.

## 2018-01-10 NOTE — PROGRESS NOTES
Admission medication history interview status for the 1/10/2018  admission is complete. See EPIC admission navigator for prior to admission medications     Medication history source reliability:Good    Medication history interview source(s):Patient    Medication history resources (including written lists, pill bottles, clinic record):Patient sent in her list prior to surgery    Primary pharmacy.[facility]    Additional medication history information not noted on PTA med list :None    Time spent in this activity: 50 minutes    Prior to Admission medications    Medication Sig Last Dose Taking? Auth Provider   calcium carbonate (TUMS) 500 MG chewable tablet Take 2 chew tab by mouth daily as needed for heartburn 1/8/2018 at PRN Yes Reported, Patient   timolol (TIMOPTIC) 0.5 % ophthalmic solution Place 1 drop into the right eye daily 1/9/2018 at AM Yes Reported, Patient   polyethylene glycol 0.4%- propylene glycol 0.3% (SYSTANE ULTRA) 0.4-0.3 % SOLN ophthalmic solution Place 1 drop into both eyes daily 1/9/2018 at AM Yes Reported, Patient   polyethylene glycol 0.4%- propylene glycol 0.3% (SYSTANE ULTRA) 0.4-0.3 % SOLN ophthalmic solution Place 1 drop into both eyes 2 times daily as needed for dry eyes Past Week at PRN Yes Reported, Patient   desloratadine (CLARINEX) 5 MG tablet Take 5 mg by mouth daily 1/7/2018 at AM Yes Reported, Patient   Acetaminophen (TYLENOL PO) Take 825 mg by mouth 2 times daily as needed for mild pain or fever (1x 325 mg + 1 x 500 mg = 825 mg dose) 1/6/2018 at PM Yes Reported, Patient   albuterol (2.5 MG/3ML) 0.083% neb solution Take 1 vial by nebulization 3 times daily as needed  1/5/2018 at PRN Yes Reported, Patient   AmLODIPine Besylate (NORVASC PO) Take 10 mg by mouth every morning 1/9/2018 at AM Yes Reported, Patient   Ascorbic Acid (VITAMIN C PO) Take 500 mg by mouth every morning 1/7/2018 at AM Yes Reported, Patient   ASPIRIN PO Take 81 mg by mouth every morning  1/3/2018 at AM Yes Reported,  Patient   Citalopram Hydrobromide (CELEXA PO) Take 20 mg by mouth every morning 1/7/2018 at AM Yes Reported, Patient   cycloSPORINE (RESTASIS) 0.05 % ophthalmic emulsion 1 drop 2 times daily 1/9/2018 at PM Yes Reported, Patient   Darbepoetin Joseph-Polysorbate (ARANESP, ALB FREE, SURECLICK IJ) Inject as directed every 21 days -Receives at [facility] in [place] 12/27/2017 at Unknown time Yes Reported, Patient   Docusate Sodium (COLACE PO) Take 100 mg by mouth 2 times daily 1/7/2018 at PM Yes Reported, Patient   fluorometholone (FML LIQUIFILM) 0.1 % ophthalmic susp Place 1 drop Into the left eye daily  1/9/2018 at AM Yes Reported, Patient   Furosemide (LASIX PO) Take 10 mg by mouth every morning (0.5 x 20 mg = 10 mg dose) 1/7/2018 at AM Yes Reported, Patient   IBANDRONATE SODIUM PO Take 150 mg by mouth every 30 days 1/1/2018 at AM Yes Reported, Patient   Ipratropium-Albuterol (COMBIVENT RESPIMAT)  MCG/ACT inhaler Inhale 2 puffs into the lungs 4 times daily 1/9/2018 at AM Yes Reported, Patient   Levothyroxine Sodium (LEVOXYL PO) Take 75 mcg by mouth daily 1/7/2018 at AM Yes Reported, Patient   Methylcellulose, Laxative, (CITRUCEL PO) Take 1 tablet by mouth every morning 1/7/2018 at AM Yes Reported, Patient   ipratropium - albuterol 0.5 mg/2.5 mg/3 mL (DUONEB) 0.5-2.5 (3) MG/3ML neb solution Take 1 vial by nebulization every 6 hours as needed for shortness of breath / dyspnea or wheezing More than a Month at PRN Yes Reported, Patient   METOPROLOL TARTRATE PO Take 25 mg by mouth daily  1/9/2018 at AM Yes Reported, Patient   METOPROLOL TARTRATE PO Take 12.5 mg by mouth every evening (patient takes 0.5 X 25 mg = 12.5 mg dose) 1/9/2018 at PM Yes Reported, Patient   Multiple Vitamins-Minerals (PRESERVISION AREDS PO) Take 1 tablet by mouth 2 times daily 1/8/2018 at PM Yes Reported, Patient   polyethylene glycol (MIRALAX) powder Take 1 capful by mouth every morning 1/9/2018 at AM Yes Reported, Patient   Probiotic  Product (PROBIOTIC PO) Take 1 capsule by mouth every morning 1/7/2018 at AM Yes Reported, Patient   Psyllium (METAMUCIL FIBER PO) Take 0.25 tsp. by mouth every morning 1/7/2018 at AM Yes Reported, Patient   RaNITidine HCl (ZANTAC PO) Take 150 mg by mouth daily as needed for heartburn  More than a month at PRN Yes Reported, Patient   Simvastatin (ZOCOR PO) Take 20 mg by mouth At Bedtime 1/7/2018 at PM Yes Reported, Patient   VITAMIN D, CHOLECALCIFEROL, PO Take 2,000 Units by mouth daily  1/7/2018 at AM Yes Reported, Patient   triamcinolone (KENALOG) 0.1 % cream Apply topically 2 times daily as needed for irritation  More than a month at PRN Yes Reported, Patient   Calcium Carbonate-Vitamin D (CALTRATE 600+D PO) Take 1 tablet by mouth daily  1/7/2018 at AM Yes Reported, Patient   Nitroglycerin (NITROSTAT SL) Place 0.4 mg under the tongue daily as needed for chest pain More than a Month at PRN Yes Reported, Patient   Sulfacetamide Sodium-Sulfur (SULFACET-R EX) Externally apply topically daily as needed (to face)  More than a month at PRN Yes Reported, Patient

## 2018-01-10 NOTE — ANESTHESIA POSTPROCEDURE EVALUATION
Patient: Marla Dunn    Procedure(s):  PERINEAL RECTOSIGMOIDECTOMY  - Wound Class: IV-Dirty or Infected    Diagnosis:recto prolapse   Diagnosis Additional Information: No value filed.    Anesthesia Type:  General, ETT    Note:  Anesthesia Post Evaluation    Patient location during evaluation: PACU  Patient participation: Able to fully participate in evaluation  Level of consciousness: awake and alert  Pain management: satisfactory to patient  Airway patency: patent  Cardiovascular status: hemodynamically stable  Respiratory status: acceptable and unassisted  Hydration status: balanced  PONV: none     Anesthetic complications: None          Last vitals:  Vitals:    01/10/18 1155 01/10/18 1225 01/10/18 1325   BP: 121/54 121/50 114/58   Resp: 16 18 18   Temp:      SpO2: 96% 94% 100%         Electronically Signed By: Hamzah Pulido MD  January 10, 2018  2:24 PM

## 2018-01-10 NOTE — IP AVS SNAPSHOT
[place] [address] [facility]    [address] [place] [address]    Phone:  [phone]                                       After Visit Summary   1/10/2018    Marla Dunn    MRN: [id number]           After Visit Summary Signature Page     I have received my discharge instructions, and my questions have been answered. I have discussed any challenges I see with this plan with the nurse or doctor.    ..........................................................................................................................................  Patient/Patient Representative Signature      ..........................................................................................................................................  Patient Representative Print Name and Relationship to Patient    ..................................................               ................................................  Date                                            Time    ..........................................................................................................................................  Reviewed by Signature/Title    ...................................................              ..............................................  Date                                                            Time

## 2018-01-10 NOTE — OP NOTE
OPERATIVE REPORT      PREOPERATIVE DIAGNOSIS:   rectal prolapse.     POSTOPERATIVE DIAGNOSIS:  rectal prolapse.     OPERATION PERFORMED:  Perineal rectosigmoidectomy.     SURGEON:  Candelaria Anthony M.D.    ASSISTANT:  SANTA Alanis and Jose Becerra M.D.    ANESTHESIA:  General.     INDICATIONS:  The patient is a 59-year-old female with full thickness rectal prolapse.  This has been a problem for years and is worsening.  She has difficult with evacuation and leakage of stool.  She will now undergo a perineal rectosigmoidectomy for rectal prolapse .     OPERATIVE FINDINGS:  The patient's rectum was redundant but had a fairly intact anorectal ring.  Approximately 2-3 cm of rectum would be prolapsed.  The anal sphincter was patulous with moderate tone.  Approximately 27 inches of rectum and redundant sigmoid were removed.  The patient's sigmoid colon was redundant.     PROCEDURE IN DETAIL:  After informed consent was obtained, the patient was brought to the operating room where general anesthesia was induced without difficulty.  She was flipped into the well-padded prone jackknife position with the buttocks taped apart.  A Bush catheter was inserted.  Her perineum and vagina were sterilely prepped and draped in usual fashion.     The rectum was prolapsed out of the anus using multiple Allis clamps for a length of 2 to 3 cm.  A Lone Star retractor was inserted with sharp hooks in the perianal skin just outside the anoderm.  The rectum was infiltrated at its base 1 to 2 cm above the dentate line using 1% lidocaine with epinephrine circumferentially.  The rectum was incised at its base in the area of lidocaine infiltration 1.5 to 2 cm proximal to the dentate line using needle Bovie electrocautery full-thickness circumferentially.  Four quadrant 2-0 chromic sutures were placed into the mucosal cuff on the anoderm and tagged.  Circumferential dissection was then continued to the outside of the anorectal tube.  The  peritoneal cul-de-sac was easily identified and  from the rectum anteriorly.  Careful attention was made not to enter the vagina.  The cul-de-sac was then entered by incising the peritoneum using Bovie electrocautery.  The mesorectum was retracted superiorly.  The mesorectum was taken down and divided using the small hand-held LigaSure device working proximally.  A redundant sigmoid loop was present proximal to the rectum.  The mesorectum and sigmoid colonic mesentery were taken down using the Ligasure device straightening out a large sigmoid loop.  Approximately 27 inches of rectum and sigmoid colon were delivered through the anorectal ring.  The segments of mesorectum and sigmoid colonic mesentery were inspected for hemostasis, which was meticulous.     The peritoneum was closed anteriorly using a running 3-0 Vicryl suture.  A levatorplasty was not performed.     The Lone Star hooks were replaced into the anoderm cuff in preparation for the anastomosis.  The area of demarcation on the sigmoid colon was clearly identified, and the colon was divided proximally at this point in an area where the colon was pink and viable with excellent blood supply.  The colon was divided slowly placing the 4-quadrant 3-0 Vicryl sutures next to the chromic stitches into the rectum as it was divided.  The specimen measured 27 inches in length and was removed from the operative field and submitted for pathological analysis.     The anastomosis was then completed using many interrupted 3-0 Vicryl sutures using the 2-0 chromic 4-quadrant sutures as a guide.  The anastomosis was tension-free with excellent blood supply.  Hemostasis was meticulous.  The Lone Star retractor was removed allowing the anastomosis to retract into the anal canal.  A sterile gauze dressing was applied.    The patient tolerated the procedure well without complications.  Estimated blood loss was 10 mL.  I was present and scrubbed for the entire duration of  the operation.  The patient was returned to the supine position and extubated in the operating room.  She was brought to the recovery room in stable condition.    RIN TITUS MD

## 2018-01-10 NOTE — ANESTHESIA PREPROCEDURE EVALUATION
Anesthesia Evaluation     . Pt has had prior anesthetic.     No history of anesthetic complications          ROS/MED HX    ENT/Pulmonary:     (+)sleep apnea, COPD, uses CPAP , . .    Neurologic:      (-) seizures and CVA   Cardiovascular:     (+) Dyslipidemia, hypertension--CAD, -CABG-date: 2008, . : . . . :. valvular problems/murmurs s/p avr:. Previous cardiac testing date:results:Stress Testdate:6/2017 results:The pharmacologic nuclear stress test is negative for inducible   myocardial ischemia or infarction.    The left ventricular ejection fraction is 59%.    When compared to the images of 4/16/2014, there has been no significant   change. date: results: date: results:          METS/Exercise Tolerance:     Hematologic:         Musculoskeletal:         GI/Hepatic:        (-) GERD and liver disease   Renal/Genitourinary:      (-) renal disease   Endo:     (+) thyroid problem hypothyroidism, .   (-) Type II DM   Psychiatric:         Infectious Disease:         Malignancy:         Other:                     Physical Exam  Normal systems: cardiovascular, pulmonary and dental    Airway   Mallampati: II  TM distance: >3 FB  Neck ROM: full    Dental     Cardiovascular       Pulmonary                     Anesthesia Plan      History & Physical Review  History and physical reviewed and following examination; no interval change.    ASA Status:  3 .    NPO Status:  > 8 hours    Plan for General and ETT with Intravenous induction. Maintenance will be Balanced.    PONV prophylaxis:  Ondansetron (or other 5HT-3) and Dexamethasone or Solumedrol       Postoperative Care  Postoperative pain management:  IV analgesics.      Consents  Anesthetic plan, risks, benefits and alternatives discussed with:  Patient..            Procedure: Procedure(s):  RECTOSIGMOIDECTOMY PERINEAL  Preop diagnosis: recto prolapse     No Known Allergies  Past Medical History:   Diagnosis Date     CAD (coronary artery disease)      Chronic anemia       Chronic edema      COPD (chronic obstructive pulmonary disease) (H)      Depression      Heart murmur      HTN (hypertension)      Hypercholesterolemia      Hypothyroidism      Hypothyroidism      MI (myocardial infarction) 1985     Myelodysplastic syndrome (H)      OLEGARIO (obstructive sleep apnea)      Osteoporosis      Radicular low back pain      Elizabeth Agers syndrome      Sjogren's syndrome (H)      Spinal stenosis      Past Surgical History:   Procedure Laterality Date     aortic valve       APPENDECTOMY       AS TOTAL KNEE ARTHROPLASTY Right      BACK SURGERY  2004    spinal decompression     BONE MARROW BIOPSY  2004     breast biopsy       BREAST SURGERY  2001    biopsy     C CABG, ARTERY-VEIN, FOUR       cardiac angiogram       CARDIAC SURGERY       EYE SURGERY  2008    bilateral cataract repair      KYPHOPLASTY  2003    T12     REPAIR VALVE AORTIC  2009     THORACIC SURGERY  2003    T12 kyphoplasty     Prior to Admission medications    Medication Sig Start Date End Date Taking? Authorizing Provider   calcium carbonate (TUMS) 500 MG chewable tablet Take 2 chew tab by mouth daily as needed for heartburn   Yes Reported, Patient   timolol (TIMOPTIC) 0.5 % ophthalmic solution Place 1 drop into the right eye daily   Yes Reported, Patient   polyethylene glycol 0.4%- propylene glycol 0.3% (SYSTANE ULTRA) 0.4-0.3 % SOLN ophthalmic solution Place 1 drop into both eyes daily   Yes Reported, Patient   polyethylene glycol 0.4%- propylene glycol 0.3% (SYSTANE ULTRA) 0.4-0.3 % SOLN ophthalmic solution Place 1 drop into both eyes 2 times daily as needed for dry eyes   Yes Reported, Patient   desloratadine (CLARINEX) 5 MG tablet Take 5 mg by mouth daily   Yes Reported, Patient   Acetaminophen (TYLENOL PO) Take 825 mg by mouth 2 times daily as needed for mild pain or fever (1x 325 mg + 1 x 500 mg = 825 mg dose)   Yes Reported, Patient   albuterol (2.5 MG/3ML) 0.083% neb solution Take 1 vial by nebulization 3 times daily as needed     Yes Reported, Patient   AmLODIPine Besylate (NORVASC PO) Take 10 mg by mouth every morning   Yes Reported, Patient   Ascorbic Acid (VITAMIN C PO) Take 500 mg by mouth every morning   Yes Reported, Patient   ASPIRIN PO Take 81 mg by mouth every morning    Yes Reported, Patient   Citalopram Hydrobromide (CELEXA PO) Take 20 mg by mouth every morning   Yes Reported, Patient   cycloSPORINE (RESTASIS) 0.05 % ophthalmic emulsion 1 drop 2 times daily   Yes Reported, Patient   Darbepoetin Joseph-Polysorbate (ARANESP, ALB FREE, SURECLICK IJ) Inject as directed every 21 days -Receives at [facility] in [place]   Yes Reported, Patient   Docusate Sodium (COLACE PO) Take 100 mg by mouth 2 times daily   Yes Reported, Patient   fluorometholone (FML LIQUIFILM) 0.1 % ophthalmic susp Place 1 drop Into the left eye daily    Yes Reported, Patient   Furosemide (LASIX PO) Take 10 mg by mouth every morning (0.5 x 20 mg = 10 mg dose)   Yes Reported, Patient   IBANDRONATE SODIUM PO Take 150 mg by mouth every 30 days   Yes Reported, Patient   Ipratropium-Albuterol (COMBIVENT RESPIMAT)  MCG/ACT inhaler Inhale 2 puffs into the lungs 4 times daily   Yes Reported, Patient   Levothyroxine Sodium (LEVOXYL PO) Take 75 mcg by mouth daily   Yes Reported, Patient   Methylcellulose, Laxative, (CITRUCEL PO) Take 1 tablet by mouth every morning   Yes Reported, Patient   ipratropium - albuterol 0.5 mg/2.5 mg/3 mL (DUONEB) 0.5-2.5 (3) MG/3ML neb solution Take 1 vial by nebulization every 6 hours as needed for shortness of breath / dyspnea or wheezing   Yes Reported, Patient   METOPROLOL TARTRATE PO Take 25 mg by mouth daily    Yes Reported, Patient   METOPROLOL TARTRATE PO Take 12.5 mg by mouth every evening (patient takes 0.5 X 25 mg = 12.5 mg dose)   Yes Reported, Patient   Multiple Vitamins-Minerals (PRESERVISION AREDS PO) Take 1 tablet by mouth 2 times daily   Yes Reported, Patient   polyethylene glycol (MIRALAX) powder Take 1 capful by mouth  every morning   Yes Reported, Patient   Probiotic Product (PROBIOTIC PO) Take 1 capsule by mouth every morning   Yes Reported, Patient   Psyllium (METAMUCIL FIBER PO) Take 0.25 tsp. by mouth every morning   Yes Reported, Patient   RaNITidine HCl (ZANTAC PO) Take 150 mg by mouth daily as needed for heartburn    Yes Reported, Patient   Simvastatin (ZOCOR PO) Take 20 mg by mouth At Bedtime   Yes Reported, Patient   VITAMIN D, CHOLECALCIFEROL, PO Take 2,000 Units by mouth daily    Yes Reported, Patient   triamcinolone (KENALOG) 0.1 % cream Apply topically 2 times daily as needed for irritation    Yes Reported, Patient   Calcium Carbonate-Vitamin D (CALTRATE 600+D PO) Take 1 tablet by mouth daily    Yes Reported, Patient   Nitroglycerin (NITROSTAT SL) Place 0.4 mg under the tongue daily as needed for chest pain   Yes Reported, Patient   Sulfacetamide Sodium-Sulfur (SULFACET-R EX) Externally apply topically daily as needed (to face)    Yes Reported, Patient     Current Facility-Administered Medications Ordered in Epic   Medication Dose Route Frequency Last Rate Last Dose     ciprofloxacin (CIPRO) infusion 400 mg  400 mg Intravenous Pre-Op/Pre-procedure x 1 dose         ciprofloxacin (CIPRO) infusion 400 mg  400 mg Intravenous See Admin Instructions         metroNIDAZOLE (FLAGYL) infusion 500 mg  500 mg Intravenous Pre-Op/Pre-procedure x 1 dose         metroNIDAZOLE (FLAGYL) infusion 500 mg  500 mg Intravenous See Admin Instructions         acetaminophen (TYLENOL) tablet 975 mg  975 mg Oral Once         lidocaine 1 % 1 mL  1 mL Other Q1H PRN         lactated ringers infusion   Intravenous Continuous         No current [facility]-ordered outpatient prescriptions on file.     Wt Readings from Last 1 Encounters:   01/10/18 58.3 kg (128 lb 7 oz)     Temp Readings from Last 1 Encounters:   01/10/18 36.6  C (97.9  F) (Temporal)     BP Readings from Last 6 Encounters:   01/10/18 142/59     Pulse Readings from Last 4 Encounters:   No  data found for Pulse     Resp Readings from Last 1 Encounters:   01/10/18 18     SpO2 Readings from Last 1 Encounters:   01/10/18 99%     No results for input(s): NA, POTASSIUM, CHLORIDE, CO2, ANIONGAP, GLC, BUN, CR, PERICO in the last 15796 hours.  No results for input(s): WBC, HGB, PLT in the last 63408 hours.  No results for input(s): INR in the last 05203 hours.    Invalid input(s): APTT   RECENT LABS:   ECG:   ECHO:   CXR:                  .

## 2018-01-10 NOTE — BRIEF OP NOTE
[facility] Brief Operative Note    Pre-operative diagnosis: recto prolapse    Post-operative diagnosis Rectal prolapse   Procedure: Procedure(s):  PERINEAL RECTOSIGMOIDECTOMY  - Wound Class: IV-Dirty or Infected   Surgeon(s): Surgeon(s) and Role:     * Candelaria Anthony MD - Primary     * Kiana Morrison PA-C - Assisting  * Jose Becerra MD Fellow   Estimated blood loss: 10 mL    Specimens:   ID Type Source Tests Collected by Time Destination   A : Rectum and Sigmoid Colon Tissue Large Intestine, Sigmoid SURGICAL PATHOLOGY EXAM Candelaria Anthony MD 1/10/2018  9:45 AM       Findings: 27cm segment of redundant rectum/sigmoid removed. Hand-sewn colo-anal anastomosis.           IVF: 1700  UOP: 150mL  Condition on discharge from OR: Satisfactory    Jose Becerra MD   [facility]   [phone].        ADDENDUM:    PATIENT DATA  Indicate Y or N:  Home O2 No  Hemodialysis  No  Transplant patient  No  Cirrhosis  No  Steroids in last 30 days  No  Immunomodulators in last 30 days  No  Anticoagulation at time of surgery  No   List medication n/a  Prior abdominal surgery  No  Pelvic irradiation  No    Albumin within 30 days if known unknown   Hgb within 30 days if known 11.2   Hemoglobin   Date Value Ref Range Status   01/10/2018 11.2 (L) 11.7 - 15.7 g/dL Final   ]  Cr within 30 days if known 0.62   Creatinine   Date Value Ref Range Status   01/10/2018 0.62 0.52 - 1.04 mg/dL Final   ]  Body mass index is 22.05 kg/(m^2).      OR DATA  Emergent  No   <24 hours  No   <1 week  No  Bowel Prep No  Antibiotics  Yes  DVT prophylaxis    Heparin  Yes   SCD  Yes   None  No  Drain  No  ASA (1,2,3,4) 3  OR time (min) 123min  Stents  No  Transfuse >/= 2U  No  Anastomosis   Stapled  No   Handsewn  Yes  Leak Test    Positive  No   Negative  No   Not done  Yes

## 2018-01-10 NOTE — BRIEF OP NOTE
[facility] Brief Operative Note    Pre-operative diagnosis: Rectal prolapse   Post-operative diagnosis Rectal prolapse   Procedure: Procedure(s):  PERINEAL RECTOSIGMOIDECTOMY  - Wound Class: II-Clean Contaminated   Surgeon(s): Surgeon(s) and Role:     * Candelaria Anthony MD - Primary     * Kiana Morrison PA-C - Assisting   Estimated blood loss: 10 mL    Specimens:   ID Type Source Tests Collected by Time Destination   A : Rectum and Sigmoid Colon Tissue Large Intestine, Sigmoid SURGICAL PATHOLOGY EXAM Candelaria Anthony MD 1/10/2018  9:45 AM       Findings: Redundant sigmoid colon.  Specimen length 27 cm.      Candelaria Anthony MD  [facility]  Pager:  [phone]  Phone: [phone]  Fax: [phone]          ADDENDUM:    PATIENT DATA  Indicate Y or N:  Home O2 No  Hemodialysis  No  Transplant patient  No  Cirrhosis  No  Steroids in last 30 days  No  Immunomodulators in last 30 days  No  Anticoagulation at time of surgery  No   List medication na  Prior abdominal surgery  Yes  Pelvic irradiation  No    Albumin within 30 days if known unknown   Hgb within 30 days if known    Hemoglobin   Date Value Ref Range Status   01/10/2018 11.2 (L) 11.7 - 15.7 g/dL Final   ]  Cr within 30 days if known    Creatinine   Date Value Ref Range Status   01/10/2018 0.62 0.52 - 1.04 mg/dL Final   ]  Body mass index is 22.05 kg/(m^2).      OR DATA  Emergent  No   <24 hours  No   <1 week  No  Bowel Prep Yes  Antibiotics  Yes  DVT prophylaxis    Heparin  No   SCD  Yes   None  No  Drain  No  ASA (1,2,3,4) 2  OR time (min) 165  Stents  No  Transfuse >/= 2U  No  Anastomosis   Stapled  No   Handsewn  Yes  Leak Test    Positive  No   Negative  No   Not done  Yes

## 2018-01-10 NOTE — ANESTHESIA CARE TRANSFER NOTE
Patient: Marla Dunn    Procedure(s):  PERINEAL RECTOSIGMOIDECTOMY  - Wound Class: IV-Dirty or Infected    Diagnosis: recto prolapse   Diagnosis Additional Information: No value filed.    Anesthesia Type:   General, ETT     Note:  Airway :Face Mask and Oral Airway  Patient transferred to:PACU  Handoff Report: Identifed the Patient, Identified the Reponsible Provider, Reviewed the pertinent medical history, Discussed the surgical course, Reviewed Intra-OP anesthesia mangement and issues during anesthesia, Set expectations for post-procedure period and Allowed opportunity for questions and acknowledgement of understanding      Vitals: (Last set prior to Anesthesia Care Transfer)    CRNA VITALS  1/10/2018 0948 - 1/10/2018 1024      1/10/2018             Pulse: 119    SpO2: 99 %    Resp Rate (observed): (!)  5                Electronically Signed By: CASTRO Ji CRNA  January 10, 2018  10:24 AM

## 2018-01-11 LAB
ANION GAP SERPL CALCULATED.3IONS-SCNC: 7 MMOL/L (ref 3–14)
BUN SERPL-MCNC: 8 MG/DL (ref 7–30)
CALCIUM SERPL-MCNC: 8 MG/DL (ref 8.5–10.1)
CHLORIDE SERPL-SCNC: 103 MMOL/L (ref 94–109)
CO2 SERPL-SCNC: 23 MMOL/L (ref 20–32)
COPATH REPORT: NORMAL
CREAT SERPL-MCNC: 0.63 MG/DL (ref 0.52–1.04)
ERYTHROCYTE [DISTWIDTH] IN BLOOD BY AUTOMATED COUNT: 13 % (ref 10–15)
GFR SERPL CREATININE-BSD FRML MDRD: 89 ML/MIN/1.7M2
GLUCOSE BLDC GLUCOMTR-MCNC: 117 MG/DL (ref 70–99)
GLUCOSE SERPL-MCNC: 107 MG/DL (ref 70–99)
HCT VFR BLD AUTO: 29.1 % (ref 35–47)
HGB BLD-MCNC: 10.2 G/DL (ref 11.7–15.7)
MAGNESIUM SERPL-MCNC: 2.1 MG/DL (ref 1.6–2.3)
MCH RBC QN AUTO: 31.3 PG (ref 26.5–33)
MCHC RBC AUTO-ENTMCNC: 35.1 G/DL (ref 31.5–36.5)
MCV RBC AUTO: 89 FL (ref 78–100)
PHOSPHATE SERPL-MCNC: 2.2 MG/DL (ref 2.5–4.5)
PLATELET # BLD AUTO: 189 10E9/L (ref 150–450)
POTASSIUM SERPL-SCNC: 3.4 MMOL/L (ref 3.4–5.3)
RBC # BLD AUTO: 3.26 10E12/L (ref 3.8–5.2)
SODIUM SERPL-SCNC: 133 MMOL/L (ref 133–144)
WBC # BLD AUTO: 9.5 10E9/L (ref 4–11)

## 2018-01-11 PROCEDURE — 80048 BASIC METABOLIC PNL TOTAL CA: CPT | Performed by: PHYSICIAN ASSISTANT

## 2018-01-11 PROCEDURE — 25000132 ZZH RX MED GY IP 250 OP 250 PS 637

## 2018-01-11 PROCEDURE — 25800025 ZZH RX 258: Performed by: PHYSICIAN ASSISTANT

## 2018-01-11 PROCEDURE — 25000125 ZZHC RX 250: Performed by: PHYSICIAN ASSISTANT

## 2018-01-11 PROCEDURE — 25000128 H RX IP 250 OP 636: Performed by: PHYSICIAN ASSISTANT

## 2018-01-11 PROCEDURE — 84100 ASSAY OF PHOSPHORUS: CPT | Performed by: PHYSICIAN ASSISTANT

## 2018-01-11 PROCEDURE — 00000146 ZZHCL STATISTIC GLUCOSE BY METER IP

## 2018-01-11 PROCEDURE — 12000007 ZZH R&B INTERMEDIATE

## 2018-01-11 PROCEDURE — 36415 COLL VENOUS BLD VENIPUNCTURE: CPT | Performed by: PHYSICIAN ASSISTANT

## 2018-01-11 PROCEDURE — 85027 COMPLETE CBC AUTOMATED: CPT | Performed by: PHYSICIAN ASSISTANT

## 2018-01-11 PROCEDURE — 83735 ASSAY OF MAGNESIUM: CPT | Performed by: PHYSICIAN ASSISTANT

## 2018-01-11 PROCEDURE — 25000132 ZZH RX MED GY IP 250 OP 250 PS 637: Performed by: PHYSICIAN ASSISTANT

## 2018-01-11 RX ADMIN — ACETAMINOPHEN 1000 MG: 10 INJECTION, SOLUTION INTRAVENOUS at 13:19

## 2018-01-11 RX ADMIN — ENOXAPARIN SODIUM 40 MG: 40 INJECTION SUBCUTANEOUS at 08:49

## 2018-01-11 RX ADMIN — METOROPROLOL TARTRATE 5 MG: 5 INJECTION, SOLUTION INTRAVENOUS at 13:29

## 2018-01-11 RX ADMIN — CYCLOSPORINE 1 DROP: 0.5 EMULSION OPHTHALMIC at 20:03

## 2018-01-11 RX ADMIN — TIMOLOL MALEATE 1 DROP: 5 SOLUTION/ DROPS OPHTHALMIC at 10:16

## 2018-01-11 RX ADMIN — ACETAMINOPHEN 1000 MG: 10 INJECTION, SOLUTION INTRAVENOUS at 20:04

## 2018-01-11 RX ADMIN — POTASSIUM CHLORIDE, DEXTROSE MONOHYDRATE AND SODIUM CHLORIDE: 150; 5; 450 INJECTION, SOLUTION INTRAVENOUS at 01:31

## 2018-01-11 RX ADMIN — CITALOPRAM HYDROBROMIDE 20 MG: 20 TABLET ORAL at 08:46

## 2018-01-11 RX ADMIN — FLUOROMETHOLONE 1 DROP: 1 SUSPENSION/ DROPS OPHTHALMIC at 09:33

## 2018-01-11 RX ADMIN — METOROPROLOL TARTRATE 5 MG: 5 INJECTION, SOLUTION INTRAVENOUS at 20:05

## 2018-01-11 RX ADMIN — CYCLOSPORINE 1 DROP: 0.5 EMULSION OPHTHALMIC at 08:54

## 2018-01-11 RX ADMIN — ACETAMINOPHEN 1000 MG: 10 INJECTION, SOLUTION INTRAVENOUS at 06:51

## 2018-01-11 RX ADMIN — LORATADINE 10 MG: 10 TABLET ORAL at 08:46

## 2018-01-11 RX ADMIN — ACETAMINOPHEN 1000 MG: 10 INJECTION, SOLUTION INTRAVENOUS at 01:31

## 2018-01-11 RX ADMIN — LEVOTHYROXINE SODIUM 75 MCG: 75 TABLET ORAL at 06:51

## 2018-01-11 NOTE — PLAN OF CARE
Problem: Patient Care Overview  Goal: Plan of Care/Patient Progress Review  Outcome: No Change  A+Ox4, occasionally forgetful. VSS on 2L, capno. Did not want home CPAP on tonight. No rectal drainage noted. Denies pain. Hypo BS. -Gas. Bush D/C'd this AM, DTV. Tolerating clear liquids.

## 2018-01-11 NOTE — PLAN OF CARE
Problem: Patient Care Overview  Goal: Plan of Care/Patient Progress Review  A/OX4, occasional forgetfulness. BP slightly elevated, other VSS.No rectal drainage noted. BS hypo, no gas, babak clear liq diet well. Bush patent with adequate UOP.Denies pain.

## 2018-01-11 NOTE — PROGRESS NOTES
[facility]  PROGRESS NOTE    January 11, 2018  Post-op Day # 1    SUBJECTIVE:  The patient is doing well. She denies any nausea or vomiting. She has tolerating liquids without issues. Minimal to no pain. She denies flatus or bowel movements yet.     OBJECTIVE:  Temp:  [97.8  F (36.6  C)-98.3  F (36.8  C)] 97.8  F (36.6  C)  Pulse:  [70] 70  Heart Rate:  [] 80  Resp:  [16-28] 18  BP: (113-157)/(41-75) 131/53  SpO2:  [92 %-100 %] 97 %    Intake/Output Summary (Last 24 hours) at 01/11/18 0818  Last data filed at 01/11/18 0654   Gross per 24 hour   Intake             3996 ml   Output             3760 ml   Net              236 ml       GENERAL:  Awake, alert, no acute distress,   HEAD - Nomocephalic atraumatic  SCLERA anicteric  EXTREMITIES warm and well perfused  ABDOMEN:  Soft, mild tenderness to palpation, mildly-distended,       LABS:  Lab Results   Component Value Date    WBC 9.5 01/11/2018     Lab Results   Component Value Date    HGB 10.2 01/11/2018     Lab Results   Component Value Date    HCT 29.1 01/11/2018     Lab Results   Component Value Date     01/11/2018     Last Basic Metabolic Panel:  Lab Results   Component Value Date     01/11/2018      Lab Results   Component Value Date    POTASSIUM 3.4 01/11/2018     Lab Results   Component Value Date    CHLORIDE 103 01/11/2018     Lab Results   Component Value Date    PERICO 8.0 01/11/2018     Lab Results   Component Value Date    CO2 23 01/11/2018     Lab Results   Component Value Date    BUN 8 01/11/2018     Lab Results   Component Value Date    CR 0.63 01/11/2018     Lab Results   Component Value Date     01/11/2018       ASSESSMENT/PLAN:POD#1 perineal rectosigmoidectomy  1. Full liquids  2. Decrease IVF  3. Continue IV pain meds until clearly tolerating diet advancement  4. Await voiding  5. OOB, ambulate  6. Await return of bowel function  7. Lovenox for prophylaxis  8. Dispo: Anticipate d/c later today or  tomorrow    Kiana Morrison PA-C  [facility]  Phone: [phone]    January 11, 2018  9:15 a.m.  Patient seen and examined.  Complaining of midepigastric discomfort and burping.  No nausea.  No rectal pain.  Plan:  - as above  - plan for d/c to home on Friday  - discussed progress and plan with patient s daughter    Candelaria TRAY Anthony MD  [facility]  Pager:  [phone]  Phone: [phone]  Fax: [phone]

## 2018-01-11 NOTE — PLAN OF CARE
Problem: Patient Care Overview  Goal: Plan of Care/Patient Progress Review  Outcome: Improving  A/O. VSS on RA; slight HTN at times. Ambulating w/ assist of 1. LS diminished. No rectal drainage. Pain managed with scheduled ofirmev. +BS, -flatus. Tolerating full liquid diet. Voiding, incontinent at times.

## 2018-01-11 NOTE — PLAN OF CARE
Problem: Patient Care Overview  Goal: Plan of Care/Patient Progress Review  Outcome: No Change  Up from PACU at 1125, vs stable, denies nausea, denies pain, no rectal drainage, zayas with good urine output, forgetful at time.

## 2018-01-12 VITALS
HEIGHT: 64 IN | DIASTOLIC BLOOD PRESSURE: 55 MMHG | SYSTOLIC BLOOD PRESSURE: 132 MMHG | BODY MASS INDEX: 21.93 KG/M2 | WEIGHT: 128.44 LBS | RESPIRATION RATE: 16 BRPM | TEMPERATURE: 98.2 F | OXYGEN SATURATION: 98 % | HEART RATE: 78 BPM

## 2018-01-12 LAB — GLUCOSE BLDC GLUCOMTR-MCNC: 101 MG/DL (ref 70–99)

## 2018-01-12 PROCEDURE — 25000128 H RX IP 250 OP 636: Performed by: PHYSICIAN ASSISTANT

## 2018-01-12 PROCEDURE — 25000132 ZZH RX MED GY IP 250 OP 250 PS 637: Performed by: PHYSICIAN ASSISTANT

## 2018-01-12 PROCEDURE — 25000125 ZZHC RX 250: Performed by: PHYSICIAN ASSISTANT

## 2018-01-12 PROCEDURE — 00000146 ZZHCL STATISTIC GLUCOSE BY METER IP

## 2018-01-12 PROCEDURE — 25000132 ZZH RX MED GY IP 250 OP 250 PS 637

## 2018-01-12 RX ADMIN — METOROPROLOL TARTRATE 5 MG: 5 INJECTION, SOLUTION INTRAVENOUS at 09:56

## 2018-01-12 RX ADMIN — LORATADINE 10 MG: 10 TABLET ORAL at 09:48

## 2018-01-12 RX ADMIN — CYCLOSPORINE 1 DROP: 0.5 EMULSION OPHTHALMIC at 09:47

## 2018-01-12 RX ADMIN — TIMOLOL MALEATE 1 DROP: 5 SOLUTION/ DROPS OPHTHALMIC at 09:52

## 2018-01-12 RX ADMIN — METOROPROLOL TARTRATE 5 MG: 5 INJECTION, SOLUTION INTRAVENOUS at 01:29

## 2018-01-12 RX ADMIN — FLUOROMETHOLONE 1 DROP: 1 SUSPENSION/ DROPS OPHTHALMIC at 09:51

## 2018-01-12 RX ADMIN — CITALOPRAM HYDROBROMIDE 20 MG: 20 TABLET ORAL at 09:48

## 2018-01-12 RX ADMIN — LEVOTHYROXINE SODIUM 75 MCG: 75 TABLET ORAL at 06:37

## 2018-01-12 RX ADMIN — ENOXAPARIN SODIUM 40 MG: 40 INJECTION SUBCUTANEOUS at 09:56

## 2018-01-12 NOTE — DISCHARGE INSTRUCTIONS
Discharge Instructions following Abdominal Surgery  [facility]      Bowel Function  After removal of a portion of the intestines, it is normal for bowel movements to be erratic.  They are often looser and more frequent.  This condition generally resolves within a few weeks or it can be controlled with diet or medication.  Diet  In general, you may return to a well-balanced diet upon discharge.  Your doctor will let you know when to add extra fiber to your diet.  Be sure to drink plenty of fluids.  Incision  You should expect some discomfort in the area of your incision, particularly as you increase your activity.  If you notice an area of increasing redness or new drainage, please call your doctor.  You may shower as desired but refrain from tub baths or swimming until the incisions are fully healed.  Activity  Gradually increase your activity each day.  There are generally no restrictions on walking, climbing stairs, or riding in a car.  You can usually drive a car 7-10 days after your leave the hospital.  Do not drive while taking narcotic pain medications.  Ask your doctor regarding resumption of physical sexual activity; in most cases, you can resume sex after a few weeks.  Your physician will advise you about when to return to work.  It is preferable to have somebody stay with you at home until you are fully healed and able to resume a normal level of activity   Medications  You will be discharged with a prescription pain medication and any medications you were taking prior to surgery.  Do not drive while taking narcotic pain medications.  If you need additional medications or a refill, you must call your doctor during normal business hours.  It is the policy of [facility] to not refill pain medication after hours or on weekends because your chart is not available.  Follow-up  Typically, you will be asked to schedule a follow-up office visit 1  to 4 weeks after surgery.  If you have any questions related to follow-up, medications, or your condition, please call the office.      Call your surgeon if you have these signs or symptoms:  ([phone])    Problem with the incision, including increasing pain, swelling, redness, or drainage    Increasing abdominal pain    Uncontrolled nausea or vomiting    Fever or chills    Constipation  (no bowel movement for 3 days)    Diarrhea (more than 3 watery stools within 24 hours)    Bleeding from the rectum, wound, or stoma    Any questions or concerns

## 2018-01-12 NOTE — PROGRESS NOTES
[facility]  PROGRESS NOTE    January 12, 2018  Post-op Day # 2    SUBJECTIVE:  The patient is pass gas but still feels somewhat bloated. No bowel movement yet. She is tolerating full liquids and sick of cream of wheat. Minimal to no pain.     OBJECTIVE:  Temp:  [97.7  F (36.5  C)-98.7  F (37.1  C)] 98.6  F (37  C)  Pulse:  [68-91] 91  Heart Rate:  [67-85] 80  Resp:  [16-18] 16  BP: (125-150)/(48-65) 125/59  SpO2:  [96 %-100 %] 97 %    Intake/Output Summary (Last 24 hours) at 01/12/18 1010  Last data filed at 01/11/18 2000   Gross per 24 hour   Intake              845 ml   Output              100 ml   Net              745 ml       GENERAL:  Awake, alert, no acute distress,   HEAD - Nomocephalic atraumatic  SCLERA anicteric  EXTREMITIES warm and well perfused  ABDOMEN:  Soft, non tender, slightly-distended,     LABS:  Lab Results   Component Value Date    WBC 9.5 01/11/2018     Lab Results   Component Value Date    HGB 10.2 01/11/2018     Lab Results   Component Value Date    HCT 29.1 01/11/2018     Lab Results   Component Value Date     01/11/2018     Last Basic Metabolic Panel:  Lab Results   Component Value Date     01/11/2018      Lab Results   Component Value Date    POTASSIUM 3.4 01/11/2018     Lab Results   Component Value Date    CHLORIDE 103 01/11/2018     Lab Results   Component Value Date    PERICO 8.0 01/11/2018     Lab Results   Component Value Date    CO2 23 01/11/2018     Lab Results   Component Value Date    BUN 8 01/11/2018     Lab Results   Component Value Date    CR 0.63 01/11/2018     Lab Results   Component Value Date     01/11/2018       ASSESSMENT/PLAN:POD#2 perineal rectosigmoidectomy  1. Regular diet  2. D/c IVF  3. PO PRN pain meds  4. OOB, ambulate  5. Lovenox for prophylaxis  6. Dispo: Plan to d/c home this afternoon    Kiana Morrison PA-C  [facility]  Phone: [phone]

## 2018-01-12 NOTE — PLAN OF CARE
Problem: Patient Care Overview  Goal: Plan of Care/Patient Progress Review  A/OX4, occasional forgetfulness.VSS.No rectal drainage noted. +BS,+ gas, babak full liq diet well. Voiding adequately. Denies pain. Pt requesting O2 for comfort, uses CPAP at home. Ambulating in the hallway with A1.

## 2018-01-12 NOTE — PLAN OF CARE
Problem: Patient Care Overview  Goal: Plan of Care/Patient Progress Review  Outcome: No Change  Patient is A&O x4. High SBP (130s-150) but other VSS on 2 LPM O2. IVSL. BS audible and active in all quadrants, pt passing gas. LS clear. Pt voiding adequately in BR. Full liquid diet, tolerating well. Up with A1 with cane. Plan on possible discharge today.

## 2018-01-12 NOTE — PLAN OF CARE
Problem: Surgery Nonspecified (Adult)  Goal: Signs and Symptoms of Listed Potential Problems Will be Absent, Minimized or Managed (Surgery Nonspecified)  Signs and symptoms of listed potential problems will be absent, minimized or managed by discharge/transition of care (reference Surgery Nonspecified (Adult) C[facility]).   Outcome: Adequate for Discharge Date Met: 01/12/18  Pt discharged this afternoon after reviewed discharge instructions with patient. Pt denies pain and tolerating diet and activity. Passing flatus and reports no bowel movement.  Pt reports that a family member will be staying with her tonight. Family here to drive her home.

## 2018-01-12 NOTE — DISCHARGE SUMMARY
[facility] Discharge Summary      Marla Dunn MRN# [id number]   Age: 88 year old YOB: 1929     Date of Admission:  1/10/2018  Date of Discharge::  1/12/2018  2:41 PM  Admitting Physician:  Candelaria Anthony MD  Discharge Physician:  Candelaria Anthony MD     PCP:  Sally Goyal    Disposition: Patient discharged from [facility] to home in stable condition.        Primary Diagnosis:   Rectal prolapse         Discharge Medications:   Discharge Medication List as of 1/12/2018  1:42 PM      CONTINUE these medications which have NOT CHANGED    Details   calcium carbonate (TUMS) 500 MG chewable tablet Take 2 chew tab by mouth daily as needed for heartburn, Historical      timolol (TIMOPTIC) 0.5 % ophthalmic solution Place 1 drop into the right eye daily, Historical      !! polyethylene glycol 0.4%- propylene glycol 0.3% (SYSTANE ULTRA) 0.4-0.3 % SOLN ophthalmic solution Place 1 drop into both eyes daily, Historical      !! polyethylene glycol 0.4%- propylene glycol 0.3% (SYSTANE ULTRA) 0.4-0.3 % SOLN ophthalmic solution Place 1 drop into both eyes 2 times daily as needed for dry eyes, Historical      desloratadine (CLARINEX) 5 MG tablet Take 5 mg by mouth daily, Historical      Acetaminophen (TYLENOL PO) Take 825 mg by mouth 2 times daily as needed for mild pain or fever (1x 325 mg + 1 x 500 mg = 825 mg dose), Historical      albuterol (2.5 MG/3ML) 0.083% neb solution Take 1 vial by nebulization 3 times daily as needed , Historical      AmLODIPine Besylate (NORVASC PO) Take 10 mg by mouth every morning, Historical      Ascorbic Acid (VITAMIN C PO) Take 500 mg by mouth every morning, Historical      ASPIRIN PO Take 81 mg by mouth every morning , Historical      Citalopram Hydrobromide (CELEXA PO) Take 20 mg by mouth every morning, Historical      cycloSPORINE (RESTASIS) 0.05 % ophthalmic emulsion 1 drop 2 times daily, Historical      Darbepoetin Joseph-Polysorbate (ARANESP, ALB  FREE, SURECLICK IJ) Inject as directed every 21 days -Receives at [facility] in [place], Historical      Docusate Sodium (COLACE PO) Take 100 mg by mouth 2 times daily, Historical      fluorometholone (FML LIQUIFILM) 0.1 % ophthalmic susp Place 1 drop Into the left eye daily , Historical      Furosemide (LASIX PO) Take 10 mg by mouth every morning (0.5 x 20 mg = 10 mg dose), Historical      IBANDRONATE SODIUM PO Take 150 mg by mouth every 30 days, Historical      Ipratropium-Albuterol (COMBIVENT RESPIMAT)  MCG/ACT inhaler Inhale 2 puffs into the lungs 4 times daily, Historical      Levothyroxine Sodium (LEVOXYL PO) Take 75 mcg by mouth daily, Historical      Methylcellulose, Laxative, (CITRUCEL PO) Take 1 tablet by mouth every morning, Historical      ipratropium - albuterol 0.5 mg/2.5 mg/3 mL (DUONEB) 0.5-2.5 (3) MG/3ML neb solution Take 1 vial by nebulization every 6 hours as needed for shortness of breath / dyspnea or wheezing, Historical      !! METOPROLOL TARTRATE PO Take 25 mg by mouth daily , Historical      !! METOPROLOL TARTRATE PO Take 12.5 mg by mouth every evening (patient takes 0.5 X 25 mg = 12.5 mg dose), Historical      Multiple Vitamins-Minerals (PRESERVISION AREDS PO) Take 1 tablet by mouth 2 times daily, Historical      polyethylene glycol (MIRALAX) powder Take 1 capful by mouth every morning, Historical      Probiotic Product (PROBIOTIC PO) Take 1 capsule by mouth every morning, Historical      Psyllium (METAMUCIL FIBER PO) Take 0.25 tsp. by mouth every morning, Historical      RaNITidine HCl (ZANTAC PO) Take 150 mg by mouth daily as needed for heartburn , Historical      Simvastatin (ZOCOR PO) Take 20 mg by mouth At Bedtime, Historical      VITAMIN D, CHOLECALCIFEROL, PO Take 2,000 Units by mouth daily , Historical      triamcinolone (KENALOG) 0.1 % cream Apply topically 2 times daily as needed for irritation Historical      Calcium Carbonate-Vitamin D (CALTRATE 600+D PO) Take 1 tablet  by mouth daily , Historical      Nitroglycerin (NITROSTAT SL) Place 0.4 mg under the tongue daily as needed for chest pain, Historical      Sulfacetamide Sodium-Sulfur (SULFACET-R EX) Externally apply topically daily as needed (to face) , Historical       !! - Potential duplicate medications found. Please discuss with provider.                 Follow Up, Special Instructions:   Discharge diet: Regular   Discharge activity: No straining, lifting greater than 15-20lbs, or strenuous exercise for 6 weeks.    Discharge follow-up: Follow up with Dr. Anthony in 3-4 weeks   Wound care: Keep wound clean and dry              Procedures:   Procedure(s): Perineal rectosigmoidectomy       No other procedures performed during this admission            Consultations:   n/a          Brief Hospital Summary:   Patient is a 88 year old woman whom underwent perineal rectosigmoidectomy on 1/10/18 by Dr. Anthony.   There were no immediate complications during this procedure.    Please refer to the full operative summary for details.  The patient's hospital course was unremarkable. Diet was advanced with return of bowel function. Pain medications were transitioned from IV to oral eventually. At the time of discharge, she was voiding freely, tolerating diet, and pain was well controlled with oral pain medications.          Attestation:  I have reviewed today's vital signs, notes, medications, labs and imaging.    Bambi Covarrubias PA-C  [facility]          ADDENDUM:  Length of stay: 1 days  Indicate Y or N for the following:  UTI  No  C diff  No  PNA  No  SSI No  DVT No  PE  No  CVA No  MI No  Enterocutaneous fistula  No  Peripheral nerve injury  No  Abscess (not adjacent to anastomosis)  No  Leak No    Treated with:   Antibiotics N/A   Drain  N/A   Reoperation  N/A  Death within 30 days No  Reintubation  No  Reoperation  No   Procedure     FOR CANCER CASES: n/a

## 2018-09-12 ENCOUNTER — RECORDS - HEALTHEAST (OUTPATIENT)
Dept: ADMINISTRATIVE | Facility: OTHER | Age: 83
End: 2018-09-12

## 2018-09-28 ENCOUNTER — HOSPITAL ENCOUNTER (OUTPATIENT)
Dept: CARDIOLOGY | Facility: CLINIC | Age: 83
Discharge: HOME | End: 2018-09-28
Attending: FAMILY MEDICINE

## 2018-09-28 DIAGNOSIS — R06.00 DYSPNEA: ICD-10-CM

## 2018-09-28 LAB
AORTIC ROOT: 2.7 CM
AORTIC VALVE MEAN VELOCITY: 205 CM/S
AV DIMENSIONLESS INDEX VTI: 0.2
AV MEAN GRADIENT: 19 MMHG
AV PEAK GRADIENT: 32.7 MMHG
AV VALVE AREA: 0.6 CM2
AV VELOCITY RATIO: 0.2
BSA FOR ECHO PROCEDURE: 1.61 M2
CV BLOOD PRESSURE: NORMAL MMHG
CV ECHO HEIGHT: 63 IN
CV ECHO WEIGHT: 129 LBS
DOP CALC AO PEAK VEL: 286 CM/S
DOP CALC AO VTI: 68.4 CM
DOP CALC LVOT AREA: 2.54 CM2
DOP CALC LVOT DIAMETER: 1.8 CM
DOP CALC LVOT PEAK VEL: 69.6 CM/S
DOP CALC LVOT STROKE VOLUME: 40.4 CM3
DOP CALC MV VTI: 45 CM
DOP CALCLVOT PEAK VEL VTI: 15.9 CM
EJECTION FRACTION: 58 % (ref 55–75)
FRACTIONAL SHORTENING: 29.9 % (ref 28–44)
INTERVENTRICULAR SEPTUM IN END DIASTOLE: 1.19 CM (ref 0.6–0.9)
IVS/PW RATIO: 1.3
LA AREA 1: 18 CM2
LA AREA 2: 20.6 CM2
LEFT ATRIUM LENGTH: 5.71 CM
LEFT ATRIUM SIZE: 4.5 CM
LEFT ATRIUM VOLUME INDEX: 34.3 ML/M2
LEFT ATRIUM VOLUME: 55.2 ML
LEFT VENTRICLE CARDIAC INDEX: 2 L/MIN/M2
LEFT VENTRICLE CARDIAC OUTPUT: 3.3 L/MIN
LEFT VENTRICLE DIASTOLIC VOLUME INDEX: 35.4 CM3/M2 (ref 34–74)
LEFT VENTRICLE DIASTOLIC VOLUME: 57 CM3 (ref 46–106)
LEFT VENTRICLE HEART RATE: 81 BPM
LEFT VENTRICLE MASS INDEX: 93.8 G/M2
LEFT VENTRICLE SYSTOLIC VOLUME INDEX: 14.9 CM3/M2 (ref 11–31)
LEFT VENTRICLE SYSTOLIC VOLUME: 24 CM3 (ref 14–42)
LEFT VENTRICULAR INTERNAL DIMENSION IN DIASTOLE: 4.25 CM (ref 3.8–5.2)
LEFT VENTRICULAR INTERNAL DIMENSION IN SYSTOLE: 2.98 CM (ref 2.2–3.5)
LEFT VENTRICULAR MASS: 151.1 G
LEFT VENTRICULAR OUTFLOW TRACT MEAN GRADIENT: 1 MMHG
LEFT VENTRICULAR OUTFLOW TRACT MEAN VELOCITY: 48.3 CM/S
LEFT VENTRICULAR OUTFLOW TRACT PEAK GRADIENT: 2 MMHG
LEFT VENTRICULAR POSTERIOR WALL IN END DIASTOLE: 0.92 CM (ref 0.6–0.9)
LV STROKE VOLUME INDEX: 25.1 ML/M2
MITRAL REGURGITANT VELOCITY TIME INTEGRAL: 221 CM
MITRAL VALVE DECELERATION SLOPE: 4740 MM/S2
MITRAL VALVE E/A RATIO: 0.8
MITRAL VALVE MEAN INFLOW VELOCITY: 95.5 CM/S
MITRAL VALVE PEAK VELOCITY: 157 CM/S
MITRAL VALVE PRESSURE HALF-TIME: 84 MS
MR FLOW: 54 CM3
MR MEAN GRADIENT: 110 MMHG
MR MEAN VELOCITY: 507 CM/S
MR PEAK GRADIENT: 160.8 MMHG
MR PISA EROA: 0.2 CM2
MR PISA RADIUS: 0.8 CM
MR PISA VN NYQUIST: 38.5 CM/S
MV AREA VTI: 0.9 CM2
MV AVERAGE E/E' RATIO: 24.4 CM/S
MV DECELERATION TIME: 285 MS
MV E'TISSUE VEL-LAT: 6.14 CM/S
MV E'TISSUE VEL-MED: 4.78 CM/S
MV LATERAL E/E' RATIO: 21.7
MV MEAN GRADIENT: 4 MMHG
MV MEDIAL E/E' RATIO: 27.8
MV PEAK A VELOCITY: 161 CM/S
MV PEAK E VELOCITY: 133 CM/S
MV PEAK GRADIENT: 9.9 MMHG
MV REGURGITANT VOLUME: 53.9 CC
MV VALVE AREA BY CONTINUITY EQUATION: 0.9 CM2
MV VALVE AREA PRESSURE 1/2 METHOD: 2.6 CM2
NUC REST DIASTOLIC VOLUME INDEX: 2064 LBS
NUC REST SYSTOLIC VOLUME INDEX: 63 IN
PISA MR PEAK VEL: 634 CM/S
TRICUSPID VALVE ANULAR PLANE SYSTOLIC EXCURSION: 1.6 CM

## 2018-09-28 ASSESSMENT — MIFFLIN-ST. JEOR: SCORE: 964.27

## 2019-05-08 ENCOUNTER — AMBULATORY - HEALTHEAST (OUTPATIENT)
Dept: CARDIOLOGY | Facility: CLINIC | Age: 84
End: 2019-05-08

## 2019-05-08 ENCOUNTER — RECORDS - HEALTHEAST (OUTPATIENT)
Dept: ADMINISTRATIVE | Facility: OTHER | Age: 84
End: 2019-05-08

## 2019-05-14 ENCOUNTER — OFFICE VISIT - HEALTHEAST (OUTPATIENT)
Dept: CARDIOLOGY | Facility: CLINIC | Age: 84
End: 2019-05-14

## 2019-05-14 DIAGNOSIS — E78.5 HYPERLIPIDEMIA LDL GOAL <100: ICD-10-CM

## 2019-05-14 DIAGNOSIS — I10 ESSENTIAL HYPERTENSION: ICD-10-CM

## 2019-05-14 DIAGNOSIS — I25.10 ATHEROSCLEROSIS OF NATIVE CORONARY ARTERY OF NATIVE HEART WITHOUT ANGINA PECTORIS: ICD-10-CM

## 2019-05-14 DIAGNOSIS — Z95.2 S/P AVR (AORTIC VALVE REPLACEMENT): ICD-10-CM

## 2019-05-14 ASSESSMENT — MIFFLIN-ST. JEOR: SCORE: 914.37

## 2019-09-26 ENCOUNTER — RECORDS - HEALTHEAST (OUTPATIENT)
Dept: ADMINISTRATIVE | Facility: OTHER | Age: 84
End: 2019-09-26

## 2019-10-02 ENCOUNTER — HOSPITAL ENCOUNTER (OUTPATIENT)
Dept: CARDIOLOGY | Facility: CLINIC | Age: 84
Discharge: HOME | End: 2019-10-02
Attending: FAMILY MEDICINE

## 2019-10-02 DIAGNOSIS — R60.0 PERIPHERAL EDEMA: ICD-10-CM

## 2019-10-02 LAB
AORTIC ROOT: 3.1 CM
AORTIC VALVE MEAN VELOCITY: 205 CM/S
AV DIMENSIONLESS INDEX VTI: 0.3
AV MEAN GRADIENT: 19 MMHG
AV PEAK GRADIENT: 35.3 MMHG
AV VALVE AREA: 0.6 CM2
AV VELOCITY RATIO: 0.2
BSA FOR ECHO PROCEDURE: 1.54 M2
CV BLOOD PRESSURE: ABNORMAL MMHG
CV ECHO HEIGHT: 63 IN
CV ECHO WEIGHT: 118 LBS
DOP CALC AO PEAK VEL: 297 CM/S
DOP CALC AO VTI: 68 CM
DOP CALC LVOT AREA: 2.27 CM2
DOP CALC LVOT DIAMETER: 1.7 CM
DOP CALC LVOT PEAK VEL: 69.3 CM/S
DOP CALC LVOT STROKE VOLUME: 39 CM3
DOP CALC MV VTI: 55.3 CM
DOP CALCLVOT PEAK VEL VTI: 17.2 CM
EJECTION FRACTION: 64 % (ref 55–75)
FRACTIONAL SHORTENING: 31.6 % (ref 28–44)
INTERVENTRICULAR SEPTUM IN END DIASTOLE: 1.5 CM (ref 0.6–0.9)
IVS/PW RATIO: 1.2
LA AREA 1: 29.2 CM2
LA AREA 2: 12.1 CM2
LEFT ATRIUM LENGTH: 4.1 CM
LEFT ATRIUM SIZE: 4.9 CM
LEFT ATRIUM TO AORTIC ROOT RATIO: 1.58 NO UNITS
LEFT ATRIUM VOLUME INDEX: 47.6 ML/M2
LEFT ATRIUM VOLUME: 73.2 ML
LEFT VENTRICLE CARDIAC INDEX: 1.5 L/MIN/M2
LEFT VENTRICLE CARDIAC OUTPUT: 2.3 L/MIN
LEFT VENTRICLE DIASTOLIC VOLUME INDEX: 66.9 CM3/M2 (ref 29–61)
LEFT VENTRICLE DIASTOLIC VOLUME: 103 CM3 (ref 46–106)
LEFT VENTRICLE HEART RATE: 60 BPM
LEFT VENTRICLE MASS INDEX: 122.1 G/M2
LEFT VENTRICLE SYSTOLIC VOLUME INDEX: 24 CM3/M2 (ref 8–24)
LEFT VENTRICLE SYSTOLIC VOLUME: 37 CM3 (ref 14–42)
LEFT VENTRICULAR INTERNAL DIMENSION IN DIASTOLE: 3.73 CM (ref 3.8–5.2)
LEFT VENTRICULAR INTERNAL DIMENSION IN SYSTOLE: 2.55 CM (ref 2.2–3.5)
LEFT VENTRICULAR MASS: 188 G
LEFT VENTRICULAR OUTFLOW TRACT MEAN GRADIENT: 1 MMHG
LEFT VENTRICULAR OUTFLOW TRACT MEAN VELOCITY: 53.3 CM/S
LEFT VENTRICULAR OUTFLOW TRACT PEAK GRADIENT: 2 MMHG
LEFT VENTRICULAR POSTERIOR WALL IN END DIASTOLE: 1.29 CM (ref 0.6–0.9)
LV STROKE VOLUME INDEX: 25.3 ML/M2
MITRAL REGURGITANT VELOCITY TIME INTEGRAL: 158 CM
MITRAL VALVE DECELERATION SLOPE: 9710 MM/S2
MITRAL VALVE E/A RATIO: 2.1
MITRAL VALVE MEAN INFLOW VELOCITY: 98.5 CM/S
MITRAL VALVE PEAK VELOCITY: 210 CM/S
MITRAL VALVE PRESSURE HALF-TIME: 64 MS
MR FLOW: 36 CM3
MR MEAN GRADIENT: 63 MMHG
MR MEAN VELOCITY: 380 CM/S
MR PEAK GRADIENT: 104 MMHG
MR PISA EROA: 0.2 CM2
MR PISA RADIUS: 0.7 CM
MR PISA VN NYQUIST: 38.5 CM/S
MV AREA VTI: 0.71 CM2
MV AVERAGE E/E' RATIO: 27.7 CM/S
MV DECELERATION TIME: 243 MS
MV E'TISSUE VEL-LAT: 6.63 CM/S
MV E'TISSUE VEL-MED: 6.53 CM/S
MV LATERAL E/E' RATIO: 27.5
MV MEAN GRADIENT: 5 MMHG
MV MEDIAL E/E' RATIO: 27.9
MV PEAK A VELOCITY: 87.9 CM/S
MV PEAK E VELOCITY: 182 CM/S
MV PEAK GRADIENT: 17.6 MMHG
MV REGURGITANT VOLUME: 36.7 CC
MV VALVE AREA BY CONTINUITY EQUATION: 0.7 CM2
MV VALVE AREA PRESSURE 1/2 METHOD: 3.4 CM2
NUC REST DIASTOLIC VOLUME INDEX: 1888 LBS
NUC REST SYSTOLIC VOLUME INDEX: 63 IN
PISA MR PEAK VEL: 510 CM/S
PR MAX PG: 4 MMHG
PR PEAK VELOCITY: 96.6 CM/S
TRICUSPID REGURGITATION PEAK PRESSURE GRADIENT: 34.3 MMHG
TRICUSPID VALVE ANULAR PLANE SYSTOLIC EXCURSION: 1.2 CM
TRICUSPID VALVE PEAK REGURGITANT VELOCITY: 293 CM/S

## 2019-10-02 ASSESSMENT — MIFFLIN-ST. JEOR: SCORE: 914.37

## 2020-03-24 ENCOUNTER — OFFICE VISIT - HEALTHEAST (OUTPATIENT)
Dept: GERIATRICS | Facility: CLINIC | Age: 85
End: 2020-03-24

## 2020-03-24 DIAGNOSIS — E87.1 ACUTE HYPONATREMIA: ICD-10-CM

## 2020-03-24 DIAGNOSIS — M13.0 POLYARTHROPATHY OR POLYARTHRITIS, HAND: ICD-10-CM

## 2020-03-24 DIAGNOSIS — F32.A ANXIETY AND DEPRESSION: ICD-10-CM

## 2020-03-24 DIAGNOSIS — M62.81 GENERALIZED MUSCLE WEAKNESS: ICD-10-CM

## 2020-03-24 DIAGNOSIS — F41.9 ANXIETY AND DEPRESSION: ICD-10-CM

## 2020-03-24 DIAGNOSIS — N30.90 CYSTITIS: ICD-10-CM

## 2020-03-24 DIAGNOSIS — J44.9 CHRONIC OBSTRUCTIVE PULMONARY DISEASE, UNSPECIFIED COPD TYPE (H): ICD-10-CM

## 2020-03-25 ENCOUNTER — RECORDS - HEALTHEAST (OUTPATIENT)
Dept: LAB | Facility: CLINIC | Age: 85
End: 2020-03-25

## 2020-03-26 LAB
ANION GAP SERPL CALCULATED.3IONS-SCNC: 9 MMOL/L (ref 5–18)
BUN SERPL-MCNC: 13 MG/DL (ref 8–28)
CALCIUM SERPL-MCNC: 8.8 MG/DL (ref 8.5–10.5)
CHLORIDE BLD-SCNC: 94 MMOL/L (ref 98–107)
CO2 SERPL-SCNC: 24 MMOL/L (ref 22–31)
CREAT SERPL-MCNC: 0.6 MG/DL (ref 0.6–1.1)
GFR SERPL CREATININE-BSD FRML MDRD: >60 ML/MIN/1.73M2
GLUCOSE BLD-MCNC: 87 MG/DL (ref 70–125)
POTASSIUM BLD-SCNC: 4 MMOL/L (ref 3.5–5)
SODIUM SERPL-SCNC: 127 MMOL/L (ref 136–145)

## 2020-03-30 ENCOUNTER — RECORDS - HEALTHEAST (OUTPATIENT)
Dept: LAB | Facility: CLINIC | Age: 85
End: 2020-03-30

## 2020-03-31 ENCOUNTER — COMMUNICATION - HEALTHEAST (OUTPATIENT)
Dept: GERIATRICS | Facility: CLINIC | Age: 85
End: 2020-03-31

## 2020-03-31 LAB
ANION GAP SERPL CALCULATED.3IONS-SCNC: 9 MMOL/L (ref 5–18)
BUN SERPL-MCNC: 19 MG/DL (ref 8–28)
CALCIUM SERPL-MCNC: 8.9 MG/DL (ref 8.5–10.5)
CHLORIDE BLD-SCNC: 97 MMOL/L (ref 98–107)
CO2 SERPL-SCNC: 23 MMOL/L (ref 22–31)
CREAT SERPL-MCNC: 0.67 MG/DL (ref 0.6–1.1)
GFR SERPL CREATININE-BSD FRML MDRD: >60 ML/MIN/1.73M2
GLUCOSE BLD-MCNC: 84 MG/DL (ref 70–125)
POTASSIUM BLD-SCNC: 3.8 MMOL/L (ref 3.5–5)
SODIUM SERPL-SCNC: 129 MMOL/L (ref 136–145)

## 2020-04-06 ENCOUNTER — OFFICE VISIT - HEALTHEAST (OUTPATIENT)
Dept: GERIATRICS | Facility: CLINIC | Age: 85
End: 2020-04-06

## 2020-04-06 ENCOUNTER — RECORDS - HEALTHEAST (OUTPATIENT)
Dept: LAB | Facility: CLINIC | Age: 85
End: 2020-04-06

## 2020-04-06 DIAGNOSIS — F32.A ANXIETY AND DEPRESSION: ICD-10-CM

## 2020-04-06 DIAGNOSIS — F41.9 ANXIETY AND DEPRESSION: ICD-10-CM

## 2020-04-06 DIAGNOSIS — E87.1 ACUTE HYPONATREMIA: ICD-10-CM

## 2020-04-07 LAB
ANION GAP SERPL CALCULATED.3IONS-SCNC: 8 MMOL/L (ref 5–18)
BUN SERPL-MCNC: 14 MG/DL (ref 8–28)
CALCIUM SERPL-MCNC: 8.6 MG/DL (ref 8.5–10.5)
CHLORIDE BLD-SCNC: 100 MMOL/L (ref 98–107)
CO2 SERPL-SCNC: 26 MMOL/L (ref 22–31)
CREAT SERPL-MCNC: 0.66 MG/DL (ref 0.6–1.1)
GFR SERPL CREATININE-BSD FRML MDRD: >60 ML/MIN/1.73M2
GLUCOSE BLD-MCNC: 79 MG/DL (ref 70–125)
POTASSIUM BLD-SCNC: 3.8 MMOL/L (ref 3.5–5)
SODIUM SERPL-SCNC: 134 MMOL/L (ref 136–145)

## 2020-04-08 ENCOUNTER — OFFICE VISIT - HEALTHEAST (OUTPATIENT)
Dept: GERIATRICS | Facility: CLINIC | Age: 85
End: 2020-04-08

## 2020-04-08 DIAGNOSIS — E87.1 ACUTE HYPONATREMIA: ICD-10-CM

## 2020-04-08 DIAGNOSIS — J44.9 CHRONIC OBSTRUCTIVE PULMONARY DISEASE, UNSPECIFIED COPD TYPE (H): ICD-10-CM

## 2020-04-10 ENCOUNTER — AMBULATORY - HEALTHEAST (OUTPATIENT)
Dept: GERIATRICS | Facility: CLINIC | Age: 85
End: 2020-04-10

## 2020-05-14 ENCOUNTER — AMBULATORY - HEALTHEAST (OUTPATIENT)
Dept: OTHER | Facility: CLINIC | Age: 85
End: 2020-05-14

## 2020-05-14 ENCOUNTER — DOCUMENTATION ONLY (OUTPATIENT)
Dept: OTHER | Facility: CLINIC | Age: 85
End: 2020-05-14

## 2020-05-16 ENCOUNTER — RECORDS - HEALTHEAST (OUTPATIENT)
Dept: ADMINISTRATIVE | Facility: OTHER | Age: 85
End: 2020-05-16

## 2020-05-18 ENCOUNTER — HOME CARE/HOSPICE - HEALTHEAST (OUTPATIENT)
Dept: HOME HEALTH SERVICES | Facility: HOME HEALTH | Age: 85
End: 2020-05-18

## 2020-05-21 ENCOUNTER — RECORDS - HEALTHEAST (OUTPATIENT)
Dept: LAB | Facility: CLINIC | Age: 85
End: 2020-05-21

## 2020-05-21 LAB
ANION GAP SERPL CALCULATED.3IONS-SCNC: 8 MMOL/L (ref 5–18)
BUN SERPL-MCNC: 18 MG/DL (ref 8–28)
CALCIUM SERPL-MCNC: 8.9 MG/DL (ref 8.5–10.5)
CHLORIDE BLD-SCNC: 95 MMOL/L (ref 98–107)
CO2 SERPL-SCNC: 25 MMOL/L (ref 22–31)
CREAT SERPL-MCNC: 0.69 MG/DL (ref 0.6–1.1)
GFR SERPL CREATININE-BSD FRML MDRD: >60 ML/MIN/1.73M2
GLUCOSE BLD-MCNC: 96 MG/DL (ref 70–125)
POTASSIUM BLD-SCNC: 4.7 MMOL/L (ref 3.5–5)
SODIUM SERPL-SCNC: 128 MMOL/L (ref 136–145)

## 2020-05-26 ENCOUNTER — AMBULATORY - HEALTHEAST (OUTPATIENT)
Dept: CARDIOLOGY | Facility: CLINIC | Age: 85
End: 2020-05-26

## 2020-05-26 ENCOUNTER — RECORDS - HEALTHEAST (OUTPATIENT)
Dept: ADMINISTRATIVE | Facility: OTHER | Age: 85
End: 2020-05-26

## 2020-05-27 ENCOUNTER — OFFICE VISIT - HEALTHEAST (OUTPATIENT)
Dept: CARDIOLOGY | Facility: CLINIC | Age: 85
End: 2020-05-27

## 2020-05-27 DIAGNOSIS — I25.10 CORONARY ARTERY DISEASE DUE TO LIPID RICH PLAQUE: ICD-10-CM

## 2020-05-27 DIAGNOSIS — I50.31 ACUTE DIASTOLIC HEART FAILURE (H): ICD-10-CM

## 2020-05-27 DIAGNOSIS — I16.1 HYPERTENSIVE EMERGENCY: ICD-10-CM

## 2020-05-27 DIAGNOSIS — Z95.2 S/P AVR (AORTIC VALVE REPLACEMENT): ICD-10-CM

## 2020-05-27 DIAGNOSIS — I25.83 CORONARY ARTERY DISEASE DUE TO LIPID RICH PLAQUE: ICD-10-CM

## 2020-06-04 ENCOUNTER — RECORDS - HEALTHEAST (OUTPATIENT)
Dept: LAB | Facility: CLINIC | Age: 85
End: 2020-06-04

## 2020-06-04 LAB
ANION GAP SERPL CALCULATED.3IONS-SCNC: 7 MMOL/L (ref 5–18)
BUN SERPL-MCNC: 17 MG/DL (ref 8–28)
CALCIUM SERPL-MCNC: 8.9 MG/DL (ref 8.5–10.5)
CHLORIDE BLD-SCNC: 96 MMOL/L (ref 98–107)
CO2 SERPL-SCNC: 27 MMOL/L (ref 22–31)
CREAT SERPL-MCNC: 0.77 MG/DL (ref 0.6–1.1)
GFR SERPL CREATININE-BSD FRML MDRD: >60 ML/MIN/1.73M2
GLUCOSE BLD-MCNC: 89 MG/DL (ref 70–125)
POTASSIUM BLD-SCNC: 4.2 MMOL/L (ref 3.5–5)
SODIUM SERPL-SCNC: 130 MMOL/L (ref 136–145)

## 2020-07-13 ENCOUNTER — RECORDS - HEALTHEAST (OUTPATIENT)
Dept: LAB | Facility: CLINIC | Age: 85
End: 2020-07-13

## 2020-07-15 ENCOUNTER — RECORDS - HEALTHEAST (OUTPATIENT)
Dept: LAB | Facility: CLINIC | Age: 85
End: 2020-07-15

## 2020-07-16 LAB
ANION GAP SERPL CALCULATED.3IONS-SCNC: 8 MMOL/L (ref 5–18)
BUN SERPL-MCNC: 14 MG/DL (ref 8–28)
CALCIUM SERPL-MCNC: 8.4 MG/DL (ref 8.5–10.5)
CHLORIDE BLD-SCNC: 98 MMOL/L (ref 98–107)
CO2 SERPL-SCNC: 25 MMOL/L (ref 22–31)
CREAT SERPL-MCNC: 0.78 MG/DL (ref 0.6–1.1)
GFR SERPL CREATININE-BSD FRML MDRD: >60 ML/MIN/1.73M2
GLUCOSE BLD-MCNC: 93 MG/DL (ref 70–125)
POTASSIUM BLD-SCNC: 3.9 MMOL/L (ref 3.5–5)
SODIUM SERPL-SCNC: 131 MMOL/L (ref 136–145)

## 2020-07-22 LAB
SARS-COV-2 BY NAA - HISTORICAL: NOT DETECTED
SARS-COV-2 SOURCE - HISTORICAL: NORMAL

## 2020-09-10 ENCOUNTER — RECORDS - HEALTHEAST (OUTPATIENT)
Dept: LAB | Facility: CLINIC | Age: 85
End: 2020-09-10

## 2020-09-11 LAB
ANION GAP SERPL CALCULATED.3IONS-SCNC: 8 MMOL/L (ref 5–18)
BUN SERPL-MCNC: 11 MG/DL (ref 8–28)
CALCIUM SERPL-MCNC: 8.6 MG/DL (ref 8.5–10.5)
CHLORIDE BLD-SCNC: 98 MMOL/L (ref 98–107)
CO2 SERPL-SCNC: 25 MMOL/L (ref 22–31)
CREAT SERPL-MCNC: 0.67 MG/DL (ref 0.6–1.1)
GFR SERPL CREATININE-BSD FRML MDRD: >60 ML/MIN/1.73M2
GLUCOSE BLD-MCNC: 106 MG/DL (ref 70–125)
POTASSIUM BLD-SCNC: 3.5 MMOL/L (ref 3.5–5)
SODIUM SERPL-SCNC: 131 MMOL/L (ref 136–145)

## 2020-09-23 ENCOUNTER — RECORDS - HEALTHEAST (OUTPATIENT)
Dept: LAB | Facility: CLINIC | Age: 85
End: 2020-09-23

## 2020-09-24 LAB
ANION GAP SERPL CALCULATED.3IONS-SCNC: 9 MMOL/L (ref 5–18)
BUN SERPL-MCNC: 13 MG/DL (ref 8–28)
CALCIUM SERPL-MCNC: 8.5 MG/DL (ref 8.5–10.5)
CHLORIDE BLD-SCNC: 98 MMOL/L (ref 98–107)
CO2 SERPL-SCNC: 27 MMOL/L (ref 22–31)
CREAT SERPL-MCNC: 0.75 MG/DL (ref 0.6–1.1)
GFR SERPL CREATININE-BSD FRML MDRD: >60 ML/MIN/1.73M2
GLUCOSE BLD-MCNC: 61 MG/DL (ref 70–125)
POTASSIUM BLD-SCNC: 2.9 MMOL/L (ref 3.5–5)
SODIUM SERPL-SCNC: 134 MMOL/L (ref 136–145)

## 2020-09-29 ENCOUNTER — RECORDS - HEALTHEAST (OUTPATIENT)
Dept: ADMINISTRATIVE | Facility: OTHER | Age: 85
End: 2020-09-29

## 2020-10-01 ENCOUNTER — OFFICE VISIT - HEALTHEAST (OUTPATIENT)
Dept: CARDIOLOGY | Facility: CLINIC | Age: 85
End: 2020-10-01

## 2020-10-01 ENCOUNTER — COMMUNICATION - HEALTHEAST (OUTPATIENT)
Dept: TELEHEALTH | Facility: CLINIC | Age: 85
End: 2020-10-01

## 2020-10-01 DIAGNOSIS — I25.10 ATHEROSCLEROSIS OF NATIVE CORONARY ARTERY OF NATIVE HEART, ANGINA PRESENCE UNSPECIFIED: ICD-10-CM

## 2020-10-01 DIAGNOSIS — Z95.2 S/P AVR (AORTIC VALVE REPLACEMENT): ICD-10-CM

## 2020-10-01 DIAGNOSIS — I48.19 PERSISTENT ATRIAL FIBRILLATION (H): ICD-10-CM

## 2020-10-01 DIAGNOSIS — I50.31 ACUTE DIASTOLIC HEART FAILURE (H): ICD-10-CM

## 2020-10-01 DIAGNOSIS — I49.9 IRREGULAR CARDIAC RHYTHM: ICD-10-CM

## 2020-10-01 ASSESSMENT — MIFFLIN-ST. JEOR: SCORE: 934.78

## 2020-10-02 ENCOUNTER — RECORDS - HEALTHEAST (OUTPATIENT)
Dept: CARDIOLOGY | Facility: CLINIC | Age: 85
End: 2020-10-02

## 2020-10-02 ENCOUNTER — COMMUNICATION - HEALTHEAST (OUTPATIENT)
Dept: CARDIOLOGY | Facility: CLINIC | Age: 85
End: 2020-10-02

## 2020-10-02 DIAGNOSIS — I50.31 ACUTE DIASTOLIC HEART FAILURE (H): ICD-10-CM

## 2020-10-02 LAB
ATRIAL RATE - MUSE: 241 BPM
DIASTOLIC BLOOD PRESSURE - MUSE: NORMAL
INTERPRETATION ECG - MUSE: NORMAL
P AXIS - MUSE: NORMAL
PR INTERVAL - MUSE: NORMAL
QRS DURATION - MUSE: 98 MS
QT - MUSE: 354 MS
QTC - MUSE: 449 MS
R AXIS - MUSE: 24 DEGREES
SYSTOLIC BLOOD PRESSURE - MUSE: NORMAL
T AXIS - MUSE: 79 DEGREES
VENTRICULAR RATE- MUSE: 97 BPM

## 2020-10-05 ENCOUNTER — RECORDS - HEALTHEAST (OUTPATIENT)
Dept: LAB | Facility: CLINIC | Age: 85
End: 2020-10-05

## 2020-10-06 LAB
ANION GAP SERPL CALCULATED.3IONS-SCNC: 8 MMOL/L (ref 5–18)
BUN SERPL-MCNC: 14 MG/DL (ref 8–28)
CALCIUM SERPL-MCNC: 8.5 MG/DL (ref 8.5–10.5)
CHLORIDE BLD-SCNC: 97 MMOL/L (ref 98–107)
CO2 SERPL-SCNC: 28 MMOL/L (ref 22–31)
CREAT SERPL-MCNC: 0.78 MG/DL (ref 0.6–1.1)
GFR SERPL CREATININE-BSD FRML MDRD: >60 ML/MIN/1.73M2
GLUCOSE BLD-MCNC: 66 MG/DL (ref 70–125)
POTASSIUM BLD-SCNC: 4.2 MMOL/L (ref 3.5–5)
SODIUM SERPL-SCNC: 133 MMOL/L (ref 136–145)

## 2020-10-08 ENCOUNTER — HOSPITAL ENCOUNTER (OUTPATIENT)
Dept: CARDIOLOGY | Facility: HOSPITAL | Age: 85
Discharge: HOME | End: 2020-10-08
Attending: INTERNAL MEDICINE

## 2020-10-08 DIAGNOSIS — I48.19 PERSISTENT ATRIAL FIBRILLATION (H): ICD-10-CM

## 2020-10-29 ENCOUNTER — COMMUNICATION - HEALTHEAST (OUTPATIENT)
Dept: CARDIOLOGY | Facility: CLINIC | Age: 85
End: 2020-10-29

## 2020-10-30 ENCOUNTER — OFFICE VISIT - HEALTHEAST (OUTPATIENT)
Dept: CARDIOLOGY | Facility: CLINIC | Age: 85
End: 2020-10-30

## 2020-10-30 DIAGNOSIS — Z95.2 S/P AVR: ICD-10-CM

## 2020-10-30 DIAGNOSIS — I25.10 CORONARY ARTERY DISEASE INVOLVING NATIVE CORONARY ARTERY OF NATIVE HEART WITHOUT ANGINA PECTORIS: ICD-10-CM

## 2020-10-30 DIAGNOSIS — I50.32 CHRONIC DIASTOLIC HEART FAILURE (H): ICD-10-CM

## 2020-10-30 DIAGNOSIS — I48.19 PERSISTENT ATRIAL FIBRILLATION (H): ICD-10-CM

## 2020-10-30 DIAGNOSIS — Z95.1 S/P CABG (CORONARY ARTERY BYPASS GRAFT): ICD-10-CM

## 2020-10-30 ASSESSMENT — MIFFLIN-ST. JEOR: SCORE: 884.88

## 2020-11-04 ENCOUNTER — RECORDS - HEALTHEAST (OUTPATIENT)
Dept: LAB | Facility: CLINIC | Age: 85
End: 2020-11-04

## 2020-11-05 LAB
ANION GAP SERPL CALCULATED.3IONS-SCNC: 10 MMOL/L (ref 5–18)
BUN SERPL-MCNC: 29 MG/DL (ref 8–28)
CALCIUM SERPL-MCNC: 8.7 MG/DL (ref 8.5–10.5)
CHLORIDE BLD-SCNC: 95 MMOL/L (ref 98–107)
CO2 SERPL-SCNC: 24 MMOL/L (ref 22–31)
CREAT SERPL-MCNC: 1 MG/DL (ref 0.6–1.1)
GFR SERPL CREATININE-BSD FRML MDRD: 52 ML/MIN/1.73M2
GLUCOSE BLD-MCNC: 106 MG/DL (ref 70–125)
POTASSIUM BLD-SCNC: 4.1 MMOL/L (ref 3.5–5)
SODIUM SERPL-SCNC: 129 MMOL/L (ref 136–145)

## 2020-11-17 ENCOUNTER — RECORDS - HEALTHEAST (OUTPATIENT)
Dept: ADMINISTRATIVE | Facility: OTHER | Age: 85
End: 2020-11-17

## 2020-11-17 ENCOUNTER — COMMUNICATION - HEALTHEAST (OUTPATIENT)
Dept: CARDIOLOGY | Facility: CLINIC | Age: 85
End: 2020-11-17

## 2020-11-17 DIAGNOSIS — E87.1 HYPONATREMIA: ICD-10-CM

## 2020-11-17 DIAGNOSIS — I50.9 CONGESTIVE HEART FAILURE, SEVERE (H): ICD-10-CM

## 2020-11-19 ENCOUNTER — RECORDS - HEALTHEAST (OUTPATIENT)
Dept: ADMINISTRATIVE | Facility: OTHER | Age: 85
End: 2020-11-19

## 2020-12-01 ENCOUNTER — RECORDS - HEALTHEAST (OUTPATIENT)
Dept: ADMINISTRATIVE | Facility: OTHER | Age: 85
End: 2020-12-01

## 2020-12-14 ENCOUNTER — RECORDS - HEALTHEAST (OUTPATIENT)
Dept: LAB | Facility: CLINIC | Age: 85
End: 2020-12-14

## 2020-12-15 LAB
ANION GAP SERPL CALCULATED.3IONS-SCNC: 10 MMOL/L (ref 5–18)
BUN SERPL-MCNC: 24 MG/DL (ref 8–28)
CALCIUM SERPL-MCNC: 8.8 MG/DL (ref 8.5–10.5)
CHLORIDE BLD-SCNC: 91 MMOL/L (ref 98–107)
CO2 SERPL-SCNC: 28 MMOL/L (ref 22–31)
CREAT SERPL-MCNC: 0.8 MG/DL (ref 0.6–1.1)
GFR SERPL CREATININE-BSD FRML MDRD: >60 ML/MIN/1.73M2
GLUCOSE BLD-MCNC: 103 MG/DL (ref 70–125)
POTASSIUM BLD-SCNC: 3.8 MMOL/L (ref 3.5–5)
SODIUM SERPL-SCNC: 129 MMOL/L (ref 136–145)

## 2021-01-12 ENCOUNTER — RECORDS - HEALTHEAST (OUTPATIENT)
Dept: LAB | Facility: CLINIC | Age: 86
End: 2021-01-12

## 2021-01-13 LAB
ANION GAP SERPL CALCULATED.3IONS-SCNC: 10 MMOL/L (ref 5–18)
ANION GAP SERPL CALCULATED.3IONS-SCNC: 9 MMOL/L (ref 5–18)
ANION GAP SERPL CALCULATED.3IONS-SCNC: 9 MMOL/L (ref 5–18)
BUN SERPL-MCNC: 24 MG/DL (ref 8–28)
BUN SERPL-MCNC: 34 MG/DL (ref 8–28)
BUN SERPL-MCNC: 34 MG/DL (ref 8–28)
CALCIUM SERPL-MCNC: 8.8 MG/DL (ref 8.5–10.5)
CALCIUM SERPL-MCNC: 9 MG/DL (ref 8.5–10.5)
CALCIUM SERPL-MCNC: 9 MG/DL (ref 8.5–10.5)
CHLORIDE BLD-SCNC: 95 MMOL/L (ref 98–107)
CHLORIDE SERPLBLD-SCNC: 91 MMOL/L (ref 98–107)
CHLORIDE SERPLBLD-SCNC: 95 MMOL/L (ref 98–107)
CO2 SERPL-SCNC: 27 MMOL/L (ref 22–31)
CO2 SERPL-SCNC: 27 MMOL/L (ref 22–31)
CO2 SERPL-SCNC: 28 MMOL/L (ref 22–31)
CREAT SERPL-MCNC: 0.8 MG/DL (ref 0.6–1.1)
CREAT SERPL-MCNC: 0.98 MG/DL (ref 0.6–1.1)
CREAT SERPL-MCNC: 0.98 MG/DL (ref 0.6–1.1)
GFR SERPL CREATININE-BSD FRML MDRD: 53 ML/MIN/1.73M2
GFR SERPL CREATININE-BSD FRML MDRD: 53 ML/MIN/1.73M2
GFR SERPL CREATININE-BSD FRML MDRD: >60 ML/MIN/1.73M2
GLUCOSE BLD-MCNC: 79 MG/DL (ref 70–125)
GLUCOSE SERPL-MCNC: 103 MG/DL (ref 70–125)
GLUCOSE SERPL-MCNC: 79 MG/DL (ref 70–125)
POTASSIUM BLD-SCNC: 4.1 MMOL/L (ref 3.5–5)
POTASSIUM SERPL-SCNC: 3.8 MMOL/L (ref 3.5–5)
POTASSIUM SERPL-SCNC: 4.1 MMOL/L (ref 3.5–5)
SODIUM SERPL-SCNC: 129 MMOL/L (ref 136–145)
SODIUM SERPL-SCNC: 131 MMOL/L (ref 136–145)
SODIUM SERPL-SCNC: 131 MMOL/L (ref 136–145)

## 2021-02-03 ENCOUNTER — RECORDS - HEALTHEAST (OUTPATIENT)
Dept: LAB | Facility: CLINIC | Age: 86
End: 2021-02-03

## 2021-02-04 LAB
ANION GAP SERPL CALCULATED.3IONS-SCNC: 12 MMOL/L (ref 5–18)
ANION GAP SERPL CALCULATED.3IONS-SCNC: 12 MMOL/L (ref 5–18)
BUN SERPL-MCNC: 31 MG/DL (ref 8–28)
BUN SERPL-MCNC: 31 MG/DL (ref 8–28)
CALCIUM SERPL-MCNC: 8.5 MG/DL (ref 8.5–10.5)
CALCIUM SERPL-MCNC: 8.5 MG/DL (ref 8.5–10.5)
CHLORIDE BLD-SCNC: 92 MMOL/L (ref 98–107)
CHLORIDE SERPLBLD-SCNC: 92 MMOL/L (ref 98–107)
CO2 SERPL-SCNC: 25 MMOL/L (ref 22–31)
CO2 SERPL-SCNC: 25 MMOL/L (ref 22–31)
CREAT SERPL-MCNC: 0.91 MG/DL (ref 0.6–1.1)
CREAT SERPL-MCNC: 0.91 MG/DL (ref 0.6–1.1)
GFR SERPL CREATININE-BSD FRML MDRD: 58 ML/MIN/1.73M2
GFR SERPL CREATININE-BSD FRML MDRD: 58 ML/MIN/1.73M2
GLUCOSE BLD-MCNC: 101 MG/DL (ref 70–125)
GLUCOSE SERPL-MCNC: 101 MG/DL (ref 70–125)
POTASSIUM BLD-SCNC: 4.3 MMOL/L (ref 3.5–5)
POTASSIUM SERPL-SCNC: 4.3 MMOL/L (ref 3.5–5)
SODIUM SERPL-SCNC: 129 MMOL/L (ref 136–145)
SODIUM SERPL-SCNC: 129 MMOL/L (ref 136–145)

## 2021-02-08 ENCOUNTER — RECORDS - HEALTHEAST (OUTPATIENT)
Dept: LAB | Facility: CLINIC | Age: 86
End: 2021-02-08

## 2021-02-10 LAB
ANION GAP SERPL CALCULATED.3IONS-SCNC: 10 MMOL/L (ref 5–18)
BUN SERPL-MCNC: 43 MG/DL (ref 8–28)
CALCIUM SERPL-MCNC: 8.9 MG/DL (ref 8.5–10.5)
CHLORIDE BLD-SCNC: 99 MMOL/L (ref 98–107)
CO2 SERPL-SCNC: 24 MMOL/L (ref 22–31)
CREAT SERPL-MCNC: 1.01 MG/DL (ref 0.6–1.1)
GFR SERPL CREATININE-BSD FRML MDRD: 51 ML/MIN/1.73M2
GLUCOSE BLD-MCNC: 96 MG/DL (ref 70–125)
POTASSIUM BLD-SCNC: 3.8 MMOL/L (ref 3.5–5)
SODIUM SERPL-SCNC: 133 MMOL/L (ref 136–145)

## 2021-03-01 ENCOUNTER — RECORDS - HEALTHEAST (OUTPATIENT)
Dept: LAB | Facility: CLINIC | Age: 86
End: 2021-03-01

## 2021-03-02 LAB
ANION GAP SERPL CALCULATED.3IONS-SCNC: 10 MMOL/L (ref 5–18)
ANION GAP SERPL CALCULATED.3IONS-SCNC: 11 MMOL/L (ref 5–18)
ANION GAP SERPL CALCULATED.3IONS-SCNC: 11 MMOL/L (ref 5–18)
BUN SERPL-MCNC: 27 MG/DL (ref 8–28)
BUN SERPL-MCNC: 27 MG/DL (ref 8–28)
BUN SERPL-MCNC: 43 MG/DL (ref 8–28)
CALCIUM SERPL-MCNC: 8.9 MG/DL (ref 8.5–10.5)
CALCIUM SERPL-MCNC: 9 MG/DL (ref 8.5–10.5)
CALCIUM SERPL-MCNC: 9 MG/DL (ref 8.5–10.5)
CHLORIDE BLD-SCNC: 93 MMOL/L (ref 98–107)
CHLORIDE SERPLBLD-SCNC: 93 MMOL/L (ref 98–107)
CHLORIDE SERPLBLD-SCNC: 99 MMOL/L (ref 98–107)
CO2 SERPL-SCNC: 24 MMOL/L (ref 22–31)
CO2 SERPL-SCNC: 26 MMOL/L (ref 22–31)
CO2 SERPL-SCNC: 26 MMOL/L (ref 22–31)
CREAT SERPL-MCNC: 1 MG/DL (ref 0.6–1.1)
CREAT SERPL-MCNC: 1 MG/DL (ref 0.6–1.1)
CREAT SERPL-MCNC: 1.01 MG/DL (ref 0.6–1.1)
GFR SERPL CREATININE-BSD FRML MDRD: 51 ML/MIN/1.73M2
GFR SERPL CREATININE-BSD FRML MDRD: 52 ML/MIN/1.73M2
GFR SERPL CREATININE-BSD FRML MDRD: 52 ML/MIN/1.73M2
GLUCOSE BLD-MCNC: 89 MG/DL (ref 70–125)
GLUCOSE SERPL-MCNC: 89 MG/DL (ref 70–125)
GLUCOSE SERPL-MCNC: 96 MG/DL (ref 70–125)
HEMOGLOBIN: 10.8 G/DL (ref 11.7–15.7)
HGB BLD-MCNC: 10.8 G/DL (ref 12–16)
POTASSIUM BLD-SCNC: 3.4 MMOL/L (ref 3.5–5)
POTASSIUM SERPL-SCNC: 3.4 MMOL/L (ref 3.5–5)
POTASSIUM SERPL-SCNC: 3.8 MMOL/L (ref 3.5–5)
SODIUM SERPL-SCNC: 130 MMOL/L (ref 136–145)
SODIUM SERPL-SCNC: 130 MMOL/L (ref 136–145)
SODIUM SERPL-SCNC: 133 MMOL/L (ref 136–145)

## 2021-03-04 ENCOUNTER — RECORDS - HEALTHEAST (OUTPATIENT)
Dept: LAB | Facility: CLINIC | Age: 86
End: 2021-03-04

## 2021-03-04 LAB
SARS-COV-2 PCR COMMENT: NORMAL
SARS-COV-2 RNA SPEC QL NAA+PROBE: NEGATIVE
SARS-COV-2 VIRUS SPECIMEN SOURCE: NORMAL

## 2021-03-08 ENCOUNTER — RECORDS - HEALTHEAST (OUTPATIENT)
Dept: LAB | Facility: CLINIC | Age: 86
End: 2021-03-08

## 2021-03-09 ENCOUNTER — TRANSFERRED RECORDS (OUTPATIENT)
Dept: HEALTH INFORMATION MANAGEMENT | Facility: CLINIC | Age: 86
End: 2021-03-09

## 2021-03-09 LAB
ANION GAP SERPL CALCULATED.3IONS-SCNC: 11 MMOL/L (ref 5–18)
BUN SERPL-MCNC: 26 MG/DL (ref 8–28)
CALCIUM SERPL-MCNC: 9 MG/DL (ref 8.5–10.5)
CHLORIDE SERPLBLD-SCNC: 97 MMOL/L (ref 98–107)
CO2 SERPL-SCNC: 23 MMOL/L (ref 22–31)
CREAT SERPL-MCNC: 0.93 MG/DL (ref 0.6–1.1)
GFR SERPL CREATININE-BSD FRML MDRD: 57 ML/MIN/1.73M2
GLUCOSE SERPL-MCNC: 135 MG/DL (ref 70–125)
POTASSIUM SERPL-SCNC: 3.1 MMOL/L (ref 3.5–5)
SODIUM SERPL-SCNC: 131 MMOL/L (ref 136–145)

## 2021-03-10 LAB
ANION GAP SERPL CALCULATED.3IONS-SCNC: 11 MMOL/L (ref 5–18)
BUN SERPL-MCNC: 26 MG/DL (ref 8–28)
CALCIUM SERPL-MCNC: 9 MG/DL (ref 8.5–10.5)
CHLORIDE BLD-SCNC: 97 MMOL/L (ref 98–107)
CO2 SERPL-SCNC: 23 MMOL/L (ref 22–31)
CREAT SERPL-MCNC: 0.93 MG/DL (ref 0.6–1.1)
GFR SERPL CREATININE-BSD FRML MDRD: 57 ML/MIN/1.73M2
GLUCOSE BLD-MCNC: 135 MG/DL (ref 70–125)
POTASSIUM BLD-SCNC: 3.1 MMOL/L (ref 3.5–5)
SODIUM SERPL-SCNC: 131 MMOL/L (ref 136–145)

## 2021-03-23 ENCOUNTER — DOCUMENTATION ONLY (OUTPATIENT)
Dept: OTHER | Facility: CLINIC | Age: 86
End: 2021-03-23

## 2021-03-23 ENCOUNTER — RECORDS - HEALTHEAST (OUTPATIENT)
Dept: ADMINISTRATIVE | Facility: OTHER | Age: 86
End: 2021-03-23

## 2021-03-23 ENCOUNTER — AMBULATORY - HEALTHEAST (OUTPATIENT)
Dept: OTHER | Facility: CLINIC | Age: 86
End: 2021-03-23

## 2021-04-01 ENCOUNTER — RECORDS - HEALTHEAST (OUTPATIENT)
Dept: LAB | Facility: CLINIC | Age: 86
End: 2021-04-01

## 2021-04-29 ENCOUNTER — RECORDS - HEALTHEAST (OUTPATIENT)
Dept: LAB | Facility: CLINIC | Age: 86
End: 2021-04-29

## 2021-04-30 ENCOUNTER — RECORDS - HEALTHEAST (OUTPATIENT)
Dept: LAB | Facility: CLINIC | Age: 86
End: 2021-04-30

## 2021-05-03 LAB
ANION GAP SERPL CALCULATED.3IONS-SCNC: 9 MMOL/L (ref 5–18)
BUN SERPL-MCNC: 34 MG/DL (ref 8–28)
CALCIUM SERPL-MCNC: 9.3 MG/DL (ref 8.5–10.5)
CHLORIDE BLD-SCNC: 98 MMOL/L (ref 98–107)
CO2 SERPL-SCNC: 24 MMOL/L (ref 22–31)
CREAT SERPL-MCNC: 0.82 MG/DL (ref 0.6–1.1)
GFR SERPL CREATININE-BSD FRML MDRD: >60 ML/MIN/1.73M2
GLUCOSE BLD-MCNC: 98 MG/DL (ref 70–125)
POTASSIUM BLD-SCNC: 4.3 MMOL/L (ref 3.5–5)
SODIUM SERPL-SCNC: 131 MMOL/L (ref 136–145)

## 2021-05-27 ENCOUNTER — RECORDS - HEALTHEAST (OUTPATIENT)
Dept: LAB | Facility: CLINIC | Age: 86
End: 2021-05-27

## 2021-05-28 LAB
ANION GAP SERPL CALCULATED.3IONS-SCNC: 11 MMOL/L (ref 5–18)
BUN SERPL-MCNC: 32 MG/DL (ref 8–28)
CALCIUM SERPL-MCNC: 8.7 MG/DL (ref 8.5–10.5)
CHLORIDE BLD-SCNC: 98 MMOL/L (ref 98–107)
CO2 SERPL-SCNC: 23 MMOL/L (ref 22–31)
CREAT SERPL-MCNC: 0.88 MG/DL (ref 0.6–1.1)
GFR SERPL CREATININE-BSD FRML MDRD: 60 ML/MIN/1.73M2
GLUCOSE BLD-MCNC: 80 MG/DL (ref 70–125)
POTASSIUM BLD-SCNC: 3.7 MMOL/L (ref 3.5–5)
SODIUM SERPL-SCNC: 132 MMOL/L (ref 136–145)

## 2021-05-28 NOTE — PATIENT INSTRUCTIONS - HE
1. Continue current medications  2. Follow up in 1 year or sooner if new problems  3. Follow up with Dr. Goyal regarding bowel issues

## 2021-05-30 VITALS — WEIGHT: 137 LBS | HEIGHT: 64 IN | BODY MASS INDEX: 23.39 KG/M2

## 2021-05-30 VITALS — WEIGHT: 137.9 LBS | BODY MASS INDEX: 23.54 KG/M2 | HEIGHT: 64 IN

## 2021-05-31 VITALS — HEIGHT: 64 IN | BODY MASS INDEX: 23.39 KG/M2 | WEIGHT: 137 LBS

## 2021-06-02 VITALS — WEIGHT: 129 LBS | BODY MASS INDEX: 22.86 KG/M2 | HEIGHT: 63 IN

## 2021-06-03 ENCOUNTER — RECORDS - HEALTHEAST (OUTPATIENT)
Dept: ADMINISTRATIVE | Facility: CLINIC | Age: 86
End: 2021-06-03

## 2021-06-03 VITALS — HEIGHT: 63 IN | BODY MASS INDEX: 20.91 KG/M2 | WEIGHT: 118 LBS

## 2021-06-03 VITALS — BODY MASS INDEX: 20.91 KG/M2 | WEIGHT: 118 LBS | HEIGHT: 63 IN

## 2021-06-04 VITALS
TEMPERATURE: 98.1 F | HEART RATE: 68 BPM | BODY MASS INDEX: 19.74 KG/M2 | SYSTOLIC BLOOD PRESSURE: 125 MMHG | DIASTOLIC BLOOD PRESSURE: 67 MMHG | WEIGHT: 115 LBS

## 2021-06-04 VITALS
HEART RATE: 68 BPM | SYSTOLIC BLOOD PRESSURE: 136 MMHG | BODY MASS INDEX: 19.84 KG/M2 | DIASTOLIC BLOOD PRESSURE: 76 MMHG | WEIGHT: 115.6 LBS | TEMPERATURE: 98.9 F

## 2021-06-04 VITALS — BODY MASS INDEX: 17.51 KG/M2 | WEIGHT: 102 LBS

## 2021-06-05 VITALS
BODY MASS INDEX: 18.44 KG/M2 | HEIGHT: 64 IN | OXYGEN SATURATION: 96 % | DIASTOLIC BLOOD PRESSURE: 46 MMHG | SYSTOLIC BLOOD PRESSURE: 116 MMHG | WEIGHT: 108 LBS | HEART RATE: 70 BPM | RESPIRATION RATE: 16 BRPM

## 2021-06-05 VITALS
SYSTOLIC BLOOD PRESSURE: 120 MMHG | HEART RATE: 84 BPM | BODY MASS INDEX: 20.32 KG/M2 | DIASTOLIC BLOOD PRESSURE: 64 MMHG | HEIGHT: 64 IN | WEIGHT: 119 LBS | RESPIRATION RATE: 14 BRPM

## 2021-06-07 NOTE — PROGRESS NOTES
[facility] Admission note      Patient: Marla Dunn  MRN: [id number]  Date of Service: 3/24/2020      [facility] [[id number]]  Reason for Visit     Chief Complaint   Patient presents with     H & P     Follow-up on hospitalization hyponatremia and weight loss  Code Status     DNR    Assessment     -Acute hyponatremia admission sodium 119 discharge improved at 128  -Acute hypokalemia corrected in the hospital  -Abnormal UA cultures negative  -COPD  -History of hypertension on amlodipine and metoprolol  -Hyperlipidemia  -Underlying history of coronary artery disease with there is recent history of myocardial infarction  --History of polyarthritis with Sjogren syndrome  -History of anxiety with depression  -Ongoing history of weight loss with malnutrition concerns.  -Generalized weakness physical deconditioning with falls    Plan     Patient has been admitted to the TCU for strengthening and rehab.  Hyponatremia initially corrected with saline infusion.  Subsequently she has been discharged on salt tablets along with free water restriction.  Discharge sodium was stable at 138 baseline sodium is between 130 and 133.  Nephrology has recommended no work-up unless sodium becomes difficult to manage.  Outpatient follow-up with nephrology.  She had polyuria and suspected UTI antibiotics discontinued when cultures were negative.  Hypertension was managed adequately on medications.  Mood and behaviors have been stable.  No anxiety on Celexa.  She does give a history of profound weakness and increasing debilitation.  She lives alone in independent living apartment and is still driving.    In light of the fact that she has very little psychosocial support she does admit that she is eating poorly as she does not have any support or friends [employer] who could buy her groceries though she is still driving.  She is relying mainly on TV dinner and cereal.  She is eating twice a day only and has  minimal nutritional support  Nursing home has been requested to provide urgent  consult to see if they can help her with that issue.    Her biggest issue remains ability to go and get groceries in order to eat better.  She is not computer savvy at all.  Unfortunately she will not be able to order anything online though she retired from the [employer] she cannot use a smart phone even and does not possess 1.  She admits that she is losing weight because of this issue she does have a niece in the family who may be able to help but is working and is able to provide her limited support  We will monitor her intake and weights closely in the TCU.  In light of recent pandemic concerns to minimize nursing contact with the patient will DC her vitamin C, DC her vitamin D as well as her MVI  Lasix also discontinued prior to discharge  Total time 45 minutes with more than 30 minutes spent face-to-face talking to the patient reviewing discharge planning including concerns about weight loss and malnutrition concerns and poor nutritional support that the patient has.  This was reviewed with the patient in my note above    History     Patient is a very pleasant 89 y.o. female who is admitted to TCU  The hospital with generalized weakness.  She was noted to have significant physical deconditioning with history of falls and has been discharged to the TCU for strengthening and rehab.    Additional work-up revealed profound hyponatremia with admission sodium of 119.  She was admitted and given normal saline  .  Discharge sodium has corrected to 128 .  Nephrology has recommended continuation with her free water restriction with salt tablets twice daily.    She has underlying history of malnutrition with poor oral intake and declining weights.  She lives alone in independent living apartment still driving with very little psychosocial support.  She may need more services.    Mood and behaviors have been stable on her current  regimen of Celexa.  She also has hypertension blood pressures have been stable on her current regimen discharge to the TCU    Past Medical History     Active Ambulatory (Non-Hospital) Problems    Diagnosis     Cystitis     Dyslipidemia     Chronic obstructive pulmonary disease (H)     Generalized muscle weakness     Physical deconditioning     Acute bronchitis due to other specified organisms     Pneumonia     Sepsis (H)     Flu-like symptoms     Rectal prolapse     Hypokalemia     Metabolic acidosis     Generalized weakness     Acute hyponatremia     Pyelonephritis     Fever     Tachycardia     Hyponatremia     Carpal tunnel syndrome, right     Bilateral carpal tunnel syndrome     Sjogren's Syndrome     Osteoarthritis Of The Knee     Polyarthritis Of The Hand     Hyperlipidemia LDL goal <100     Anxiety and depression     Primary hypertension     Coronary Artery Disease     Aortic Stenosis     COPD exacerbation (H)     Past Medical History:   Diagnosis Date     Asthma      Bilateral carpal tunnel syndrome 8/27/2015     Chronic Obstructive Pulmonary Disease      Coronary artery disease      Family history of myocardial infarction      GERD (gastroesophageal reflux disease)      Heart disease      High cholesterol      Hyperlipidemia      Hypertension      Myocardial infarction (H) 1983     Polyarthritis Of The Hand      Rheumatoid arthritis (H)      Sjoegren syndrome      Sleep apnea, obstructive        Past Social History     Reviewed, and she  reports that she has never smoked. She has never used smokeless tobacco. She reports that she does not drink alcohol or use drugs.    Family History     Reviewed, and family history includes Cancer in her mother; Heart disease in her father.    Medication List   Post Discharge Medication Reconciliation Status: discharge medications reconciled and changed, per note/orders (see AVS)   Current Outpatient Medications on File Prior to Visit   Medication Sig Dispense Refill      acetaminophen (TYLENOL) 500 MG tablet Take 500-1,000 mg by mouth every 6 (six) hours as needed for pain.       albuterol (PROVENTIL) 2.5 mg /3 mL (0.083 %) nebulizer solution Take 2.5 mg by nebulization every 6 (six) hours as needed for wheezing or shortness of breath.       amLODIPine (NORVASC) 10 MG tablet Take 10 mg by mouth daily.       amoxicillin (AMOXIL) 500 MG capsule Take 2,000 mg by mouth as needed (prior to dental procedures).        ascorbic acid (ASCORBIC ACID WITH MILTON HIPS) 500 MG tablet Take 500 mg by mouth daily.       ASMANEX TWISTHALER 220 mcg/ actuation (60) inhaler Inhale 1 puff at bedtime.       calcium carbonate-vitamin D3 (CALCIUM 600 + D,3,) 600 mg(1,500mg) -200 unit per tablet Take 1 tablet by mouth daily.       calcium, as carbonate, (TUMS) 200 mg calcium (500 mg) chewable tablet Chew 1-2 tablets 3 (three) times a day as needed for heartburn.       cholecalciferol, vitamin D3, 1,000 unit tablet Take 2,000 Units by mouth daily.        citalopram (CELEXA) 20 MG tablet Take 20 mg by mouth daily.       cycloSPORINE (RESTASIS) 0.05 % ophthalmic emulsion Administer 1 drop into the left eye 2 (two) times a day.        cycloSPORINE (RESTASIS) 0.05 % ophthalmic emulsion Administer 1 drop to the right eye daily.       DARBEPOETIN MITCHELL IN POLYSORBAT (ARANESP, IN POLYSORBATE, INJ) Inject as directed see administration instructions. Gets every 3-4 weeks from [facility].       docusate sodium (COLACE) 100 MG capsule Take 100 mg by mouth 2 (two) times a day.       fluorometholone (FML) 0.1 % ophthalmic suspension Administer 1 drop into the left eye every morning.       ibandronate (BONIVA) 150 mg tablet Take 150 mg by mouth every 30 (thirty) days. Take in AM with glass of water prior to food, don't lie down for 30 minutes.       ipratropium-albuterol (COMBIVENT RESPIMAT)  mcg/actuation Aero inhaler Inhale 2 puffs 4 (four) times a day.        Lactobacillus rhamnosus GG (CULTURELLE) 10-15 Billion  cell capsule Take 1 capsule by mouth daily.       levothyroxine (SYNTHROID, LEVOTHROID) 88 MCG tablet Take 88 mcg by mouth daily.       metoprolol tartrate (LOPRESSOR) 25 MG tablet Take 25 mg by mouth 2 (two) times a day.        multivitamin therapeutic (THERAGRAN) tablet Take 1 tablet by mouth daily.  0     omeprazole (PRILOSEC) 20 MG capsule Take 20 mg by mouth daily before breakfast.       peg 400-propylene glycol (SYSTANE) 0.4-0.3 % Drop Administer 1 drop to both eyes 4 (four) times a day as needed (dry eyes).        polyethylene glycol (MIRALAX) 17 gram packet Take 17 g by mouth daily.        psyllium (METAMUCIL) Pack packet Take by mouth daily. Dose = ~1/2 tsp       sodium chloride 1 gram tablet Take 1 tablet (1 g total) by mouth 2 (two) times a day.  0     timolol maleate (TIMOPTIC) 0.25 % ophthalmic solution Administer 1 drop into the left eye daily before breakfast. Administer 10 minutes after FML drops       triamcinolone (KENALOG) 0.1 % cream Apply 1 application topically 2 (two) times a day as needed (rash).        VIT C/E/ZN/COPPR/LUTEIN/ZEAXAN (PRESERVISION AREDS 2 ORAL) Take 1 tablet by mouth 2 (two) times a day with meals.       [DISCONTINUED] furosemide (LASIX) 20 MG tablet Take 10 mg by mouth daily. (1/2 of 20 mg tab = 10 mg dose)       [DISCONTINUED] loratadine-pseudoephedrine (LORATADINE-PSEUDOEPHEDRINE) 5-120 mg Tb12 Take 1 tablet by mouth daily as needed.       Current Facility-Administered Medications on File Prior to Visit   Medication Dose Route Frequency Provider Last Rate Last Dose     [COMPLETED] potassium chloride packet 40 mEq (KLOR-CON)  40 mEq Oral Q4H Roxanna West MD   40 mEq at 03/23/20 1840     [DISCONTINUED] acetaminophen tablet 500 mg (TYLENOL)  500 mg Oral Q6H PRN Luis Aceves MD         [DISCONTINUED] albuterol inhaler 2 puff (PROAIR HFA;PROVENTIL HFA;VENTOLIN HFA)  2 puff Inhalation QID Luis Aceves MD   2 puff at 03/24/20 0848     [DISCONTINUED] albuterol nebulizer  solution 2.5 mg (PROVENTIL)  2.5 mg Nebulization Q6H PRN Luis Aceves MD         [DISCONTINUED] amLODIPine tablet 10 mg (NORVASC)  10 mg Oral DAILY Luis Aceves MD   10 mg at 03/24/20 0844     [DISCONTINUED] ascorbic acid (vitamin C) tablet 500 mg (VITAMIN C)  500 mg Oral DAILY Luis Aceves MD   500 mg at 03/24/20 0845     [DISCONTINUED] bisacodyL suppository 10 mg (DULCOLAX)  10 mg Rectal Daily PRN Luis Aceves MD         [DISCONTINUED] calcium (as carbonate) chewable tablet 200-400 mg (TUMS)  200-400 mg Oral TID PRN Luis Aceves MD         [DISCONTINUED] citalopram tablet 20 mg (celeXA)  20 mg Oral DAILY Luis Aceves MD   20 mg at 03/24/20 0845     [DISCONTINUED] cycloSPORINE 0.05 % ophthalmic emulsion 1 drop (RESTASIS)  1 drop Left Eye BID Luis Aceves MD   1 drop at 03/24/20 0846     [DISCONTINUED] cycloSPORINE 0.05 % ophthalmic emulsion 1 drop (RESTASIS)  1 drop Right Eye DAILY Luis Aceves MD   1 drop at 03/24/20 0846     [DISCONTINUED] docusate sodium capsule 100 mg (COLACE)  100 mg Oral BID Luis Aceves MD   100 mg at 03/24/20 0845     [DISCONTINUED] enoxaparin ANTICOAGULANT syringe 40 mg (LOVENOX)  40 mg Subcutaneous Q24H Luis Aceves MD   40 mg at 03/24/20 0100     [DISCONTINUED] fluorometholone 0.1 % ophthalmic suspension 1 drop (FML)  1 drop Left Eye QAM Luis Aceves MD   1 drop at 03/24/20 0847     [DISCONTINUED] ipratropium-albuteroL 0.5-2.5 mg/3 mL nebulizer solution 3 mL (DUO-NEB)  3 mL Nebulization Q4H PRN Luis Aceves MD         [DISCONTINUED] Lactobacillus rhamnosus GG 15 Billion cell 1 capsule (CULTURELLE)  1 capsule Oral BID with meals Luis Aceves MD   1 capsule at 03/24/20 0844     [DISCONTINUED] levothyroxine tablet 88 mcg (SYNTHROID, LEVOTHROID)  88 mcg Oral DAILY Luis Aceves MD   88 mcg at 03/24/20 0646     [DISCONTINUED] magnesium hydroxide suspension 30 mL (MILK OF MAG)  30 mL Oral Daily PRN Luis Aceves MD          [DISCONTINUED] metoprolol tartrate tablet 25 mg (LOPRESSOR)  25 mg Oral BID Jennie Almanzar MD   25 mg at 03/24/20 0845     [DISCONTINUED] mometasone 220 mcg/ actuation (14) inhaler 1 puff (ASMANEX)  1 puff Inhalation QHS Luis Aceves MD   1 puff at 03/23/20 2207     [DISCONTINUED] multivitamin therapeutic tablet 1 tablet  1 tablet Oral DAILY Luis Aceves MD   1 tablet at 03/24/20 0845     [DISCONTINUED] omeprazole capsule 20 mg (PriLOSEC)  20 mg Oral QAM (0630) Luis Aceves MD   20 mg at 03/24/20 0646     [DISCONTINUED] ondansetron injection 4 mg (ZOFRAN)  4 mg Intravenous Q4H PRN Luis Aceves MD         [DISCONTINUED] ondansetron tablet 8 mg (ZOFRAN)  8 mg Oral Q8H PRN Luis Aceves MD         [DISCONTINUED] peg 400-propylene glycol ophthalmic solution 1 drop (SYSTANE)  1 drop Both Eyes QID PRN Luis Aceves MD   1 drop at 03/21/20 1921     [DISCONTINUED] polyethylene glycol packet 17 g (MIRALAX)  17 g Oral DAILY Luis Aceves MD   17 g at 03/21/20 1017     [DISCONTINUED] psyllium 3.4 gram packet 1 packet (METAMUCIL)  1 packet Oral DAILY Roxanna West MD         [DISCONTINUED] psyllium packet 1 packet (METAMUCIL)  1 packet Oral DAILY Luis Aceves MD         [DISCONTINUED] sodium chloride flush 2.5 mL (NS)  2.5 mL Intravenous Line Care Luis Aceves MD   2.5 mL at 03/24/20 0104     [DISCONTINUED] sodium chloride tablet 1 g  1 g Oral BID Roxanna West MD   1 g at 03/24/20 0845     [DISCONTINUED] timolol maleate 0.25 % ophthalmic solution 1 drop (TIMOPTIC)  1 drop Left Eye QAM (0630) Luis Aceves MD   1 drop at 03/24/20 0647     [DISCONTINUED] tiotropium inhalation capsule 2 puff (SPIRIVA)  2 puff Inhalation DAILY Luis Aceves MD   2 puff at 03/24/20 0848     [DISCONTINUED] triamcinolone 0.1 % cream 1 application (KENALOG)  1 application Topical BID PRN Lius Aceves MD           Allergies     Allergies   Allergen Reactions     Cigarette Smoke Other (See Comments)      Hard to breathe.     Grass Pollen-Orchardgrass, Standard Other (See Comments)     Shortness of breath when lawn is just cut.       Review of Systems   A comprehensive review of 14 systems was done. Pertinent findings noted here and in history of present illness. All the rest negative.  Constitutional: Negative.  Negative for fever, chills, she hasactivity change, appetite change and fatigue.  reporting poor appetite and weight loss   HENT: Negative for congestion and facial swelling.    Eyes: Negative for photophobia, redness and visual disturbance.   Respiratory: Negative for cough and chest tightness.    Cardiovascular: Negative for chest pain, palpitations and leg swelling.   Gastrointestinal: Negative for nausea, diarrhea, constipation, blood in stool and abdominal distention.   Genitourinary: Negative.    Musculoskeletal: Negative.  Reporting weakness  Skin: Negative.    Neurological: Negative for dizziness, tremors, syncope, weakness, light-headedness and headaches.   Hematological: Does not bruise/bleed easily.   Psychiatric/Behavioral: Negative.        Physical Exam     Recent Vitals 3/24/2020   Height -   Weight -   BSA (m2) -   /67   Pulse 77   Temp 97.1   Temp src 1   SpO2 97   Some recent data might be hidden   WT 115LB    Constitutional: Oriented to person, place, and time and appears well-developed.  Frail and weak  HEENT:  Normocephalic and atraumatic.  Eyes: Conjunctivae and EOM are normal. Pupils are equal, round, and reactive to light. No discharge.  No scleral icterus. Nose normal. Mouth/Throat: Oropharynx is clear and moist. No oropharyngeal exudate.    NECK: Normal range of motion. Neck supple. No JVD present. No tracheal deviation present. No thyromegaly present.   CARDIOVASCULAR: Normal rate, regular rhythm and intact distal pulses.  Exam reveals no gallop and no friction rub.  Systolic murmur present.  PULMONARY: Effort normal and breath sounds normal. No respiratory distress.No  Wheezing or rales.  ABDOMEN: Soft. Bowel sounds are normal. No distension and no mass.  There is no tenderness. There is no rebound and no guarding. No HSM.  MUSCULOSKELETAL: Normal range of motion. No edema and no tenderness. Mild kyphosis, no tenderness.  LYMPH NODES: Has no cervical, supraclavicular, axillary and groin adenopathy.   NEUROLOGICAL: Alert and oriented to person, place, and time. No cranial nerve deficit.  Normal muscle tone. Coordination normal.   GENITOURINARY: Deferred exam.  SKIN: Skin is warm and dry. No rash noted. No erythema. No pallor.   EXTREMITIES: No cyanosis, no clubbing, no edema. No Deformity.  PSYCHIATRIC: Normal mood, affect and behavior.      Lab Results     Recent Results (from the past 240 hour(s))   ECG 12 lead nursing unit performed    Collection Time: 03/20/20  6:24 PM   Result Value Ref Range    SYSTOLIC BLOOD PRESSURE 187 mmHg    DIASTOLIC BLOOD PRESSURE 80 mmHg    VENTRICULAR RATE 83 BPM    ATRIAL RATE 83 BPM    P-R INTERVAL 150 ms    QRS DURATION 98 ms    Q-T INTERVAL 386 ms    QTC CALCULATION (BEZET) 453 ms    P Axis 90 degrees    R AXIS -8 degrees    T AXIS 46 degrees    MUSE DIAGNOSIS       Sinus rhythm with marked sinus arrhythmia  Left ventricular hypertrophy with repolarization abnormality  Abnormal ECG  When compared with ECG of 29-APR-2018 23:32,  Premature atrial complexes are no longer Present  ST more depressed Inferior leads  Confirmed by SEE ED PROVIDER NOTE FOR, ECG INTERPRETATION ([id number]),  ZANA WADSWORTH ([id number]) on 3/21/2020 7:13:12 AM     Comprehensive Metabolic Panel    Collection Time: 03/20/20  6:31 PM   Result Value Ref Range    Sodium 115 (LL) 136 - 145 mmol/L    Potassium 3.6 3.5 - 5.0 mmol/L    Chloride 85 (L) 98 - 107 mmol/L    CO2 21 (L) 22 - 31 mmol/L    Anion Gap, Calculation 9 5 - 18 mmol/L    Glucose 87 70 - 125 mg/dL    BUN 11 8 - 28 mg/dL    Creatinine 0.63 0.60 - 1.10 mg/dL    GFR MDRD Af Amer >60 >60 mL/min/1.73m2    GFR MDRD Non Af Amer  >60 >60 mL/min/1.73m2    Bilirubin, Total 0.7 0.0 - 1.0 mg/dL    Calcium 8.5 8.5 - 10.5 mg/dL    Protein, Total 7.0 6.0 - 8.0 g/dL    Albumin 3.5 3.5 - 5.0 g/dL    Alkaline Phosphatase 60 45 - 120 U/L    AST 25 0 - 40 U/L    ALT 12 0 - 45 U/L   Lipase    Collection Time: 03/20/20  6:31 PM   Result Value Ref Range    Lipase 47 0 - 52 U/L   Troponin I    Collection Time: 03/20/20  6:31 PM   Result Value Ref Range    Troponin I 0.02 0.00 - 0.29 ng/mL   BNP(B-type Natriuretic Peptide)    Collection Time: 03/20/20  6:31 PM   Result Value Ref Range     (H) 0 - 167 pg/mL   HM1 (CBC with Diff)    Collection Time: 03/20/20  6:31 PM   Result Value Ref Range    WBC 6.4 4.0 - 11.0 thou/uL    RBC 2.93 (L) 3.80 - 5.40 mill/uL    Hemoglobin 9.1 (L) 12.0 - 16.0 g/dL    Hematocrit 26.0 (L) 35.0 - 47.0 %    MCV 89 80 - 100 fL    MCH 31.1 27.0 - 34.0 pg    MCHC 35.0 32.0 - 36.0 g/dL    RDW 12.4 11.0 - 14.5 %    Platelets 281 140 - 440 thou/uL    MPV 9.0 8.5 - 12.5 fL    Neutrophils % 71 (H) 50 - 70 %    Lymphocytes % 13 (L) 20 - 40 %    Monocytes % 13 (H) 2 - 10 %    Eosinophils % 2 0 - 6 %    Basophils % 0 0 - 2 %    Neutrophils Absolute 4.6 2.0 - 7.7 thou/uL    Lymphocytes Absolute 0.8 0.8 - 4.4 thou/uL    Monocytes Absolute 0.9 0.0 - 0.9 thou/uL    Eosinophils Absolute 0.1 0.0 - 0.4 thou/uL    Basophils Absolute 0.0 0.0 - 0.2 thou/uL   Lactic Acid    Collection Time: 03/20/20  6:31 PM   Result Value Ref Range    Lactic Acid 0.7 0.5 - 2.2 mmol/L   Culture, Urine    Collection Time: 03/20/20  6:33 PM    Specimen: Urine, Catheter   Result Value Ref Range    Culture No Growth    Urinalysis-UC if Indicated    Collection Time: 03/20/20  6:33 PM   Result Value Ref Range    Color, UA Yellow Colorless, Yellow, Straw, Light Yellow    Clarity, UA Clear Clear    Glucose, UA Negative Negative    Bilirubin, UA Negative Negative    Ketones, UA 25 mg/dL (!) Negative    Specific Gravity, UA 1.005 1.001 - 1.030    Blood, UA Negative Negative     pH, UA 8.0 4.5 - 8.0    Protein, UA Negative Negative mg/dL    Urobilinogen, UA <2.0 E.U./dL <2.0 E.U./dL, 2.0 E.U./dL    Nitrite, UA Negative Negative    Leukocytes, UA Moderate (!) Negative    Bacteria, UA Many (!) None Seen hpf    RBC, UA 0-2 None Seen, 0-2 hpf    WBC, UA  (!) None Seen, 0-5 hpf    Squam Epithel, UA 10-25 (!) None Seen, 0-5 lpf   Influenza A/B Rapid Test    Collection Time: 03/20/20  6:38 PM    Specimen: Nasopharyngeal Swab; Nasopharyngeal (Inpt/ED) or Nasal Mucosa (Outpt)   Result Value Ref Range    Influenza  A, Rapid Antigen No Influenza A antigen detected No Influenza A antigen detected    Influenza B, Rapid Antigen No Influenza B antigen detected No Influenza B antigen detected   Sodium, Random Urine    Collection Time: 03/20/20  7:17 PM   Result Value Ref Range    Sodium, Urine 64 mmol/L   Osmolality, Random, Urine    Collection Time: 03/20/20  7:17 PM   Result Value Ref Range    Osmolality, Urine 308 300 - 900 mOsm/kg   Osmolality    Collection Time: 03/20/20  7:30 PM   Result Value Ref Range    Osmolality, Blood 253 (L) 270 - 300 mOsm/kg   Sodium    Collection Time: 03/20/20 11:31 PM   Result Value Ref Range    Sodium 119 (LL) 136 - 145 mmol/L   Sodium    Collection Time: 03/21/20  4:52 AM   Result Value Ref Range    Sodium 125 (L) 136 - 145 mmol/L   Sodium    Collection Time: 03/21/20  7:53 AM   Result Value Ref Range    Sodium 123 (L) 136 - 145 mmol/L   Sodium    Collection Time: 03/21/20 12:17 PM   Result Value Ref Range    Sodium 123 (L) 136 - 145 mmol/L   Sodium    Collection Time: 03/21/20  2:33 PM   Result Value Ref Range    Sodium 122 (L) 136 - 145 mmol/L   Sodium    Collection Time: 03/21/20  4:37 PM   Result Value Ref Range    Sodium 124 (L) 136 - 145 mmol/L   Sodium    Collection Time: 03/21/20  6:08 PM   Result Value Ref Range    Sodium 122 (L) 136 - 145 mmol/L   Sodium    Collection Time: 03/21/20  8:01 PM   Result Value Ref Range    Sodium 123 (L) 136 - 145 mmol/L    Sodium    Collection Time: 03/21/20 10:23 PM   Result Value Ref Range    Sodium 124 (L) 136 - 145 mmol/L   Sodium    Collection Time: 03/22/20 12:43 AM   Result Value Ref Range    Sodium 122 (L) 136 - 145 mmol/L   Sodium    Collection Time: 03/22/20  2:49 AM   Result Value Ref Range    Sodium 123 (L) 136 - 145 mmol/L   Sodium    Collection Time: 03/22/20  4:49 AM   Result Value Ref Range    Sodium 125 (L) 136 - 145 mmol/L   Platelet Count - every other day x 3    Collection Time: 03/22/20  4:49 AM   Result Value Ref Range    Platelets 281 140 - 440 thou/uL   Sodium    Collection Time: 03/22/20  6:30 AM   Result Value Ref Range    Sodium 126 (L) 136 - 145 mmol/L   TSH    Collection Time: 03/22/20  8:52 AM   Result Value Ref Range    TSH 1.17 0.30 - 5.00 uIU/mL   Sodium    Collection Time: 03/22/20  8:00 PM   Result Value Ref Range    Sodium 124 (L) 136 - 145 mmol/L   Renal Function Profile    Collection Time: 03/23/20  7:46 AM   Result Value Ref Range    Albumin 3.1 (L) 3.5 - 5.0 g/dL    Calcium 8.2 (L) 8.5 - 10.5 mg/dL    Phosphorus 3.5 2.5 - 4.5 mg/dL    Glucose 88 70 - 125 mg/dL    BUN 11 8 - 28 mg/dL    Creatinine 0.64 0.60 - 1.10 mg/dL    Sodium 126 (L) 136 - 145 mmol/L    Potassium 2.9 (L) 3.5 - 5.0 mmol/L    Chloride 93 (L) 98 - 107 mmol/L    CO2 23 22 - 31 mmol/L    Anion Gap, Calculation 10 5 - 18 mmol/L    GFR MDRD Af Amer >60 >60 mL/min/1.73m2    GFR MDRD Non Af Amer >60 >60 mL/min/1.73m2   Sodium    Collection Time: 03/23/20  7:46 AM   Result Value Ref Range    Sodium 126 (L) 136 - 145 mmol/L   Sodium    Collection Time: 03/23/20  7:49 PM   Result Value Ref Range    Sodium 125 (L) 136 - 145 mmol/L   Potassium    Collection Time: 03/23/20  8:55 PM   Result Value Ref Range    Potassium 5.3 (H) 3.5 - 5.0 mmol/L   Basic Metabolic Panel    Collection Time: 03/24/20  7:52 AM   Result Value Ref Range    Sodium 128 (L) 136 - 145 mmol/L    Potassium 4.6 3.5 - 5.0 mmol/L    Chloride 99 98 - 107 mmol/L    CO2 24  22 - 31 mmol/L    Anion Gap, Calculation 5 5 - 18 mmol/L    Glucose 91 70 - 125 mg/dL    Calcium 8.5 8.5 - 10.5 mg/dL    BUN 13 8 - 28 mg/dL    Creatinine 0.67 0.60 - 1.10 mg/dL    GFR MDRD Af Amer >60 >60 mL/min/1.73m2    GFR MDRD Non Af Amer >60 >60 mL/min/1.73m2   Platelet Count - every other day x 3    Collection Time: 03/24/20  7:52 AM   Result Value Ref Range    Platelets 268 140 - 440 thou/uL   Sodium    Collection Time: 03/24/20  7:52 AM   Result Value Ref Range    Sodium 128 (L) 136 - 145 mmol/L            Imaging Results     Xr Chest 1 View Portable    Result Date: 3/20/2020  EXAM: XR CHEST 1 VIEW PORTABLE LOCATION: [facility] DATE/TIME: 3/20/2020 7:05 PM INDICATION: fever COMPARISON: 09/18/2018     Poststernotomy changes with AVR. Mitral annular calcifications. Cardiomegaly has increased in the interval. No signs of failure or pneumonia. Prior vertebroplasty at T12. Severe degenerative changes in the right shoulder.    Ct Abdomen Pelvis Without Oral With Iv Contrast    Result Date: 3/20/2020  EXAM: CT ABDOMEN PELVIS WO ORAL W IV CONTRAST LOCATION: [facility] DATE/TIME: 3/20/2020 7:52 PM INDICATION: Abdominal pain, fever rlq abd pain COMPARISON: 06/17/2019 and older studies, chest CT 4/30/2018 and 11/19/2012 TECHNIQUE: CT scan of the abdomen and pelvis was performed following injection of IV contrast. Multiplanar reformats were obtained. Dose reduction techniques were used. CONTRAST: Iohexol (Omni) 100 mL FINDINGS: LOWER CHEST: A 5 mm slightly lobulated nodule in the right lower lobe is stable. Mitral annular calcifications. Multichamber cardiac enlargement. Mild pectus excavatum deformity. HEPATOBILIARY: Multiple liver cysts. PANCREAS: Normal. SPLEEN: Normal. ADRENAL GLANDS: Normal. KIDNEYS/BLADDER: Bilateral renal cysts. No calculi or obstructive changes. Extrarenal pelves bilaterally. Bladder is moderately distended. BOWEL: Redundant colon with large stool burden. Appendix not  identified. Small bowel loops are unremarkable. Trace ascites in the pelvis. LYMPH NODES: Normal. VASCULATURE: Extensive arterial calcifications. No aneurysm. PELVIC ORGANS: Normal. MUSCULOSKELETAL: Prior vertebroplasty at T12. Extensive degenerative changes throughout the lumbar spine. Mild lumbar rotoscoliosis.     1.  No abnormalities are seen to explain patient's symptoms. No bowel obstruction or inflammatory changes. 2.  Redundant colon with significant stool burden. 3.  Moderate bladder distention. 4.  Sliver of ascites in the pelvis. 5.  Liver and renal cysts noted. 6.  Multichamber cardiac enlargement. 7.  Stable right lower lobe pulmonary nodule.             JUANJO Griffiths

## 2021-06-07 NOTE — PROGRESS NOTES
"  [facility]      NAME:  Marla Dunn             :  1929    MRN: [id number]    CODE STATUS:  DNR    FACILITY: [facility] [[id number]]         CHIEF COMPLAIN/REASON FOR VISIT:  Chief Complaint   Patient presents with     Problem Visit     labs       HISTORY OF PRESENT ILLNESS: Marla Dunn is a 89 y.o. female being seen at the request of the patient. She is very anxious and wished to discuss labs. I explained her NA has actually improved. She comes from [facility] and per her EMR \" with a past medical history of COPD hypertension sjogren's syndrome presented with generalized weakness, who was admitted on 3/20/2020 for hyponatremia, sodium of 119, abnormal urinalysis. she was seen by nephrology.  She has acute on chronic hyponatremia with sodium usually around low 130s.  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  initially treated with gentle normal saline.  later on placed on sodium tablets, fluid restriction 1500 mL/day.  She had improvement in her sodium up to 128 at discharge.  Urine culture returned negative.  Antibiotics discontinued.\"  Her last NA was up to 129 and on NA tab daily. Very anxious, reassurance offered along with update on her labs.      Allergies   Allergen Reactions     Cigarette Smoke Other (See Comments)     Hard to breathe.     Grass Pollen-Orchardgrass, Standard Other (See Comments)     Shortness of breath when lawn is just cut.   :     Current Outpatient Medications   Medication Sig     acetaminophen (TYLENOL) 500 MG tablet Take 500-1,000 mg by mouth every 6 (six) hours as needed for pain.     albuterol (PROVENTIL) 2.5 mg /3 mL (0.083 %) nebulizer solution Take 2.5 mg by nebulization every 6 (six) hours as needed for wheezing or shortness of breath.     amLODIPine (NORVASC) 10 MG tablet Take 10 mg by mouth daily.     amoxicillin (AMOXIL) 500 MG capsule Take 2,000 mg by mouth as needed (prior to " dental procedures).      ascorbic acid (ASCORBIC ACID WITH MILTON HIPS) 500 MG tablet Take 500 mg by mouth daily.     ASMANEX TWISTHALER 220 mcg/ actuation (60) inhaler Inhale 1 puff at bedtime.     calcium carbonate-vitamin D3 (CALCIUM 600 + D,3,) 600 mg(1,500mg) -200 unit per tablet Take 1 tablet by mouth daily.     calcium, as carbonate, (TUMS) 200 mg calcium (500 mg) chewable tablet Chew 1-2 tablets 3 (three) times a day as needed for heartburn.     cholecalciferol, vitamin D3, 1,000 unit tablet Take 2,000 Units by mouth daily.      citalopram (CELEXA) 20 MG tablet Take 20 mg by mouth daily.     cycloSPORINE (RESTASIS) 0.05 % ophthalmic emulsion Administer 1 drop into the left eye 2 (two) times a day.      cycloSPORINE (RESTASIS) 0.05 % ophthalmic emulsion Administer 1 drop to the right eye daily.     DARBEPOETIN MITCHELL IN POLYSORBAT (ARANESP, IN POLYSORBATE, INJ) Inject as directed see administration instructions. Gets every 3-4 weeks from [facility].     docusate sodium (COLACE) 100 MG capsule Take 100 mg by mouth 2 (two) times a day.     fluorometholone (FML) 0.1 % ophthalmic suspension Administer 1 drop into the left eye every morning.     ibandronate (BONIVA) 150 mg tablet Take 150 mg by mouth every 30 (thirty) days. Take in AM with glass of water prior to food, don't lie down for 30 minutes.     ipratropium-albuterol (COMBIVENT RESPIMAT)  mcg/actuation Aero inhaler Inhale 2 puffs 4 (four) times a day.      Lactobacillus rhamnosus GG (CULTURELLE) 10-15 Billion cell capsule Take 1 capsule by mouth daily.     levothyroxine (SYNTHROID, LEVOTHROID) 88 MCG tablet Take 88 mcg by mouth daily.     metoprolol tartrate (LOPRESSOR) 25 MG tablet Take 25 mg by mouth 2 (two) times a day.      multivitamin therapeutic (THERAGRAN) tablet Take 1 tablet by mouth daily.     omeprazole (PRILOSEC) 20 MG capsule Take 20 mg by mouth daily before breakfast.     peg 400-propylene glycol (SYSTANE) 0.4-0.3 % Drop Administer 1 drop  to both eyes 4 (four) times a day as needed (dry eyes).      polyethylene glycol (MIRALAX) 17 gram packet Take 17 g by mouth daily.      psyllium (METAMUCIL) Pack packet Take by mouth daily. Dose = ~1/2 tsp     sodium chloride 1 gram tablet Take 1 tablet (1 g total) by mouth 2 (two) times a day.     timolol maleate (TIMOPTIC) 0.25 % ophthalmic solution Administer 1 drop into the left eye daily before breakfast. Administer 10 minutes after FML drops     triamcinolone (KENALOG) 0.1 % cream Apply 1 application topically 2 (two) times a day as needed (rash).      VIT C/E/ZN/COPPR/LUTEIN/ZEAXAN (PRESERVISION AREDS 2 ORAL) Take 1 tablet by mouth 2 (two) times a day with meals.         REVIEW OF SYSTEMS:    Currently, no fever, chills, or rigors. Does not have any visual or hearing problems. Denies any chest pain, headaches, palpitations, lightheadedness, dizziness, shortness of breath, or cough. Appetite is good. Denies any GERD symptoms. Denies any difficulty with swallowing, nausea, or vomiting.  Denies any abdominal pain, diarrhea or constipation. Denies any urinary symptoms. No insomnia. No active bleeding. No rash.       PHYSICAL EXAMINATION:  Vitals:    04/07/20 1143   BP: 136/76   Pulse: 68   Temp: 98.9  F (37.2  C)   Weight: 115 lb 9.6 oz (52.4 kg)         GENERAL: Awake, Alert, oriented x3, not in any form of acute distress, answers questions appropriately, follows simple commands, conversant  HEENT: Head is normocephalic with normal hair distribution. No evidence of trauma. Ears: No acute purulent discharge. Eyes: Conjunctivae pink with no scleral jaundice. Nose: Normal mucosa and septum. NECK: Supple with no cervical or supraclavicular lymphadenopathy. Trachea is midline.   CHEST: No tenderness or deformity, no crepitus  LUNG: Clear to auscultation with good chest expansion. There are no crackles or wheezes, normal AP diameter.  BACK: No kyphosis of the thoracic spine. Symmetric, no curvature, ROM normal, no  CVA tenderness, no spinal tenderness   CVS: There is good S1  S2, , rhythm is regular.  ABDOMEN: Globular and soft, nontender to palpation, non distended, no masses, no organomegaly, good bowel sounds, no rebound or guarding, no peritoneal signs.   EXTREMITIES: Atraumatic. Full range of motion on both upper and lower extremities, there is no tenderness to palpation, no pedal edema, no cyanosis or clubbing, no calf tenderness, normal cap refill, no joint swelling.  SKIN: Warm and dry, no erythema noted, no rashes or lesions.  NEUROLOGICAL: The patient is oriented to person, place and time. Strength and sensation are grossly intact. Face is symmetric.                    LABS:    Lab Results   Component Value Date    WBC 6.4 03/20/2020    HGB 9.1 (L) 03/20/2020    HCT 26.0 (L) 03/20/2020    MCV 89 03/20/2020     03/24/2020       Results for orders placed or performed in visit on 03/31/20   Basic Metabolic Panel   Result Value Ref Range    Sodium 129 (L) 136 - 145 mmol/L    Potassium 3.8 3.5 - 5.0 mmol/L    Chloride 97 (L) 98 - 107 mmol/L    CO2 23 22 - 31 mmol/L    Anion Gap, Calculation 9 5 - 18 mmol/L    Glucose 84 70 - 125 mg/dL    Calcium 8.9 8.5 - 10.5 mg/dL    BUN 19 8 - 28 mg/dL    Creatinine 0.67 0.60 - 1.10 mg/dL    GFR MDRD Af Amer >60 >60 mL/min/1.73m2    GFR MDRD Non Af Amer >60 >60 mL/min/1.73m2           No results found for: HGBA1C  No results found for: SVXYNEII35ZB  No results found for: CNXTJUWM10    ASSESSMENT/PLAN:  1. Acute hyponatremia    2. Anxiety and depression      1. Hyponatremia; On NA tab daily, NA was 129. We will obtain BMP in am.    2. Anxiety: Reassurance and counseling provided for pt. She is seen making progress in therapies and overall feeling well.      Electronically signed by:  Cherri Carroll CNP  This progress note was completed using Dragon software and there may be grammatical errors.

## 2021-06-07 NOTE — TELEPHONE ENCOUNTER
[facility] Nurse Triage Telephone Note      Provider: DIMITRI Mccloud  Facility: [facility]    Facility Type: TCU    Caller: Sarabjit  Call Back Number:  [phone]    Allergies: Cigarette smoke and Grass pollen-orchardgrass, standard    Reason for call: Nurse reporting BMP results.  Patient is currently on a 1500cc/day total fluid restriction and also receives sodium chloride 1g two times a day.       Verbal Order/Direction given by Provider: No new orders.      Provider giving order: DIMITRI Mccloud    Verbal order given to: Sarabjit Gary RN

## 2021-06-08 NOTE — PATIENT INSTRUCTIONS - HE
1.  Continue current medications as advised at discharge.  2.  Recommend follow-up with hematology in the next week  3.  Recommend follow-up with nephrology regarding salt tablets  4.  Recommend follow-up with me in 1 year or sooner if recurrent shortness of breath.

## 2021-06-08 NOTE — PROGRESS NOTES
ASSESSMENT AND PLAN:  Marla Dunn 87 y.o. female  lives by herself, never had children, has Sjogren's syndrome, manifested by sicca symptoms, positive JUAN, SSA SSB antibodies.  In the past this was on hydroxychloroquine.  That was discontinued because of the macular degeneration on moreover precautionary basis.  She wonders if she can go back on that again since having stopped her she's been more achy.  If possible she rather not take other immunosuppressant such as methotrexate as those would require her to come in regularly for the blood work.  I did speak with Dr. Matteo Carreno.  Will start her on hydroxychloroquine and low-dose 200 mg once daily.  She is also complaining of increasing pain in the left knee.  Like to try an injection therefore.  Pros and cons outlined.  40 mg of Kenalog injected into the left knee anterolateral approach.  In the past she has taken pilocarpine would like to try it again.  I was asked to return for follow-up in 3 months.  Diagnoses and all orders for this visit:    Sjogren's Syndrome  -     hydroxychloroquine (PLAQUENIL) 200 mg tablet; TAKE 1 TABLET (200 MG TOTAL) BY MOUTH DAILY.  Dispense: 90 tablet; Refill: 0  -     pilocarpine (SALAGEN) 5 MG tablet; Take 1 tablet (5 mg total) by mouth 3 (three) times a day.  Dispense: 90 tablet; Refill: 3    Taking drug for chronic disease    Osteoarthritis Of The Knee  -     triamcinolone acetonide 40 mg/mL injection 40 mg (KENALOG-40); Inject 1 mL (40 mg total) into the joint once.    Polyarthropathy or polyarthritis, hand  -     hydroxychloroquine (PLAQUENIL) 200 mg tablet; TAKE 1 TABLET (200 MG TOTAL) BY MOUTH DAILY.  Dispense: 90 tablet; Refill: 0          HISTORY OF PRESENTING ILLNESS:  Marla Dunn 87 y.o. is here for follow up of Sjogren's syndrome, polyarthritis, numbness tingling of her right hand.  She noted that in the pilocarpine while did provide her some relief from dryness of mouth cost her too much of an GI side  effects and she has stopped taking it.  She does not want to take it anymore.  She noted that her right hand has been more tingly and numb.  This is waking her up from sleep.  She ends up having to shake it.  When she is on the phone it also goes numb.  The left eye does not trouble her that way.  Furthermore since she has stopped taking hydroxychloroquine a few months ago she's generally more achy and stiff.  She wonders if she can go back on this.  The reason it was stopped was agitated because it was for preventative purposes as she been developing macular degeneration.  She has noted discomfort at the base of her right thumb.  This is so particularly when she opens up jars.  Further historical information, including ROS and limitation in   activities as noted in the multidimensional health assessment questionnaire scanned in the EMR and in the assessment and plan section.    ALLERGIES:Cigarette smoke and Grass pollen-orchardgrass, standard    PAST MEDICAL/ACTIVE PROBLEMS/MEDICATION/SOCIAL DATA  Past Medical History   Diagnosis Date     Asthma      Bilateral carpal tunnel syndrome 8/27/2015     Chronic Obstructive Pulmonary Disease      Created by Conversion      Coronary artery disease      Heart disease      Hyperlipidemia      Created by Conversion      Polyarthritis Of The Hand      Created by Conversion      Rheumatoid arthritis      Sjoegren syndrome      Sleep apnea      History   Smoking Status     Never Smoker   Smokeless Tobacco     Not on file     Patient Active Problem List   Diagnosis     Hyperlipidemia     Depression     Hypertension     Coronary Artery Disease     Aortic Stenosis     Chronic Obstructive Pulmonary Disease     Sjogren's Syndrome     Osteoarthritis Of The Knee     Polyarthritis Of The Hand     Taking drug for chronic disease     Bilateral carpal tunnel syndrome     Carpal tunnel syndrome, right     Pyelonephritis     Fever     Tachycardia     Hyponatremia     Current Outpatient  Prescriptions   Medication Sig Dispense Refill     acetaminophen (TYLENOL) 325 MG tablet Take 650 mg by mouth every 6 (six) hours as needed for pain.       amLODIPine (NORVASC) 10 MG tablet Take 10 mg by mouth daily.       amoxicillin (AMOXIL) 500 MG capsule 500 mg. Pre denta;       ascorbic acid (ASCORBIC ACID WITH MILTON HIPS) 500 MG tablet Take 500 mg by mouth daily.       aspirin 325 MG tablet Take 325 mg by mouth daily.       calcium carbonate (OS-PERICO) 600 mg (1,500 mg) tablet Take 600 mg by mouth 2 (two) times a day with meals.       cholecalciferol, vitamin D3, 1,000 unit tablet Take 1,000 Units by mouth daily.       citalopram (CELEXA) 20 MG tablet Take 20 mg by mouth daily.       cycloSPORINE (RESTASIS) 0.05 % ophthalmic emulsion Administer 1 drop to both eyes 2 (two) times a day.       desloratadine (CLARINEX) 5 mg tablet Take 5 mg by mouth daily as needed.       docusate sodium (COLACE) 100 MG capsule Take 100 mg by mouth 2 (two) times a day.       fluticasone (FLONASE) 50 mcg/actuation nasal spray 1 spray into each nostril daily as needed.        hydroxychloroquine (PLAQUENIL) 200 mg tablet Take by mouth daily.       hydroxychloroquine (PLAQUENIL) 200 mg tablet TAKE 1 TABLET (200 MG TOTAL) BY MOUTH DAILY. 30 tablet 0     ibandronate (BONIVA) 150 mg tablet Take 150 mg by mouth every 30 (thirty) days. Take in AM with glass of water prior to food, don't lie down for 30 minutes.       ipratropium-albuterol (COMBIVENT RESPIMAT)  mcg/actuation Aero inhaler Inhale 1 puff 4 (four) times a day as needed.        levothyroxine (SYNTHROID, LEVOTHROID) 75 MCG tablet Take 75 mcg by mouth daily.       loteprednol (LOTEMAX) 0.5 % ophthalmic suspension Administer 1 drop into the left eye every morning.        metoprolol tartrate (LOPRESSOR) 25 MG tablet Take 25 mg by mouth every morning. 1/2 tablet bid        metoprolol tartrate (LOPRESSOR) 25 MG tablet Take 12.5 mg by mouth every evening.       multivitamin  "therapeutic (THERAGRAN) tablet Take 1 tablet by mouth daily.  0     nitroglycerin (NITROSTAT) 0.4 MG SL tablet Place 0.4 mg under the tongue every 5 (five) minutes as needed for chest pain.       peg 400-propylene glycol (SYSTANE) 0.4-0.3 % Drop Administer 1 drop to the right eye 4 (four) times a day as needed.       polyethylene glycol (MIRALAX) 17 gram packet Take 17 g by mouth daily as needed.       psyllium (METAMUCIL) Pack packet Take 1 packet by mouth daily as needed.       simvastatin (ZOCOR) 20 MG tablet Take 20 mg by mouth bedtime.       timolol maleate (TIMOPTIC) 0.5 % ophthalmic solution Administer 1 drop into the left eye every morning.        triamcinolone (KENALOG) 0.1 % cream Apply 1 application topically 2 (two) times a day as needed.        No current facility-administered medications for this visit.          DETAILED EXAMINATION:  Vitals:    01/19/17 1341   BP: 110/64   Patient Site: Right Arm   Patient Position: Sitting   Cuff Size: Adult Regular   Pulse: 60   Weight: 137 lb 14.4 oz (62.6 kg)   Height: 5' 4\" (1.626 m)    Comfortable.  Alert oriented.  Eyes are without inflammatory changes.  Examination of both upper and lower extremities is performed for swollen & tender joints, range of motion, rash, weakness, discoloration, warmth, swelling.  The skin examined for nodules. The salient normal / abnormal findings are appended.   No appreciable synovitis in any of the palpable appendicular joints.    no parotid swelling.  She has squaring of the first CMC's bilaterally worse on the right side.  She has TKA on the right side the left knee is warm, JLT.    LAB / IMAGING DATA:  ALT   Date Value Ref Range Status   11/09/2016 19 0 - 45 U/L Final   05/11/2016 15 0 - 45 U/L Final   05/15/2014 21 <46 IU/L Final     ALBUMIN   Date Value Ref Range Status   11/09/2016 3.9 3.5 - 5.0 g/dL Final   05/11/2016 3.3 (L) 3.5 - 5.0 g/dL Final   10/03/2014 3.6 3.5 - 5.0 g/dL Final     CREATININE   Date Value Ref Range " Status   05/14/2016 0.57 (L) 0.60 - 1.10 mg/dL Final   05/13/2016 0.62 0.60 - 1.10 mg/dL Final   05/12/2016 0.67 0.60 - 1.10 mg/dL Final       WBC   Date Value Ref Range Status   05/13/2016 8.2 4.0 - 11.0 thou/uL Final   05/12/2016 11.4 (H) 4.0 - 11.0 thou/uL Final     HEMOGLOBIN   Date Value Ref Range Status   05/13/2016 12.0 12.0 - 16.0 g/dL Final   05/12/2016 11.6 (L) 12.0 - 16.0 g/dL Final   05/11/2016 11.5 (L) 12.0 - 16.0 g/dL Final     PLATELETS   Date Value Ref Range Status   05/13/2016 175 140 - 440 thou/uL Final   05/12/2016 161 140 - 440 thou/uL Final   05/11/2016 158 140 - 440 thou/uL Final       Lab Results   Component Value Date    RF 76 (H) 05/15/2014    SEDRATE 36 (H) 05/11/2016        a NICOLETTE the knee

## 2021-06-10 ENCOUNTER — RECORDS - HEALTHEAST (OUTPATIENT)
Dept: ADMINISTRATIVE | Facility: OTHER | Age: 86
End: 2021-06-10

## 2021-06-10 NOTE — PROGRESS NOTES
"ASSESSMENT AND PLAN:  Marla Dunn 87 y.o. female  lives by herself, never had children, has Sjogren's syndrome, manifested by sicca symptoms, positive JUAN, SSA SSB antibodies.  In the past this was on hydroxychloroquine.  That was discontinued because of the macular degeneration.  She reports that she was seen in ophthalmology after her visit here in January and was once again informed that the hydroxychloroquine is no good for her.  This is somewhat contrary to what my understanding was talking to her ophthalmologist.  The pilocarpine did not suit her she felt \"wacky\".  She does not want to take it.  Hands that as well as hydroxychloroquine is been discontinued.  Fortunately the knee injection worked very well for her.  She is aware of the management options of osteoarthritis.  Return for follow-up in 4 months.    Diagnoses and all orders for this visit:    Sjogren's Syndrome    Polyarthropathy or polyarthritis, hand    Osteoarthritis Of The Knee          HISTORY OF PRESENTING ILLNESS:  Marla Dunn 87 y.o.  is here for follow up of Sjogren's syndrome, polyarthritis,   She noted that in the pilocarpine while did provide her some relief from dryness of mouth cost her too much of an GI side effects plus it made her feel \"wacky\" she has stopped taking it.  She does not want to take it anymore.  She noted that her right hand has been more tingly and numb.  This is waking her up from sleep.  She ends up having to shake it.  When she is on the phone it also goes numb.  The left eye does not trouble her that way.  Furthermore since she has stopped taking hydroxychloroquine a few months ago she's generally more achy and stiff.  She wonders if she can go back on this.  The reason it was stopped was agitated because it was for preventative purposes as she been developing macular degeneration.  She has noted discomfort at the base of her right thumb.  This is so particularly when she opens up jars.  Further " historical information, including ROS and limitation in   activities as noted in the multidimensional health assessment questionnaire scanned in the EMR and in the assessment and plan section.    ALLERGIES:Cigarette smoke and Grass pollen-orchardgrass, standard    PAST MEDICAL/ACTIVE PROBLEMS/MEDICATION/SOCIAL DATA  Past Medical History:   Diagnosis Date     Asthma      Bilateral carpal tunnel syndrome 8/27/2015     Chronic Obstructive Pulmonary Disease     Created by Conversion      Coronary artery disease      Heart disease      Hyperlipidemia     Created by Conversion      Polyarthritis Of The Hand     Created by Conversion      Rheumatoid arthritis      Sjoegren syndrome      Sleep apnea      History   Smoking Status     Never Smoker   Smokeless Tobacco     Not on file     Patient Active Problem List   Diagnosis     Hyperlipidemia     Depression     Hypertension     Coronary Artery Disease     Aortic Stenosis     Chronic Obstructive Pulmonary Disease     Sjogren's Syndrome     Osteoarthritis Of The Knee     Polyarthritis Of The Hand     Taking drug for chronic disease     Bilateral carpal tunnel syndrome     Carpal tunnel syndrome, right     Pyelonephritis     Fever     Tachycardia     Hyponatremia     Current Outpatient Prescriptions   Medication Sig Dispense Refill     acetaminophen (TYLENOL) 325 MG tablet Take 650 mg by mouth every 6 (six) hours as needed for pain.       amLODIPine (NORVASC) 10 MG tablet Take 10 mg by mouth daily.       amoxicillin (AMOXIL) 500 MG capsule 500 mg. Pre denta;       ascorbic acid (ASCORBIC ACID WITH MILTON HIPS) 500 MG tablet Take 500 mg by mouth daily.       aspirin 325 MG tablet Take 325 mg by mouth daily.       calcium carbonate (OS-PERICO) 600 mg (1,500 mg) tablet Take 600 mg by mouth 2 (two) times a day with meals.       cholecalciferol, vitamin D3, 1,000 unit tablet Take 1,000 Units by mouth daily.       citalopram (CELEXA) 20 MG tablet Take 20 mg by mouth daily.        cycloSPORINE (RESTASIS) 0.05 % ophthalmic emulsion Administer 1 drop to both eyes 2 (two) times a day.       desloratadine (CLARINEX) 5 mg tablet Take 5 mg by mouth daily as needed.       docusate sodium (COLACE) 100 MG capsule Take 100 mg by mouth 2 (two) times a day.       fluticasone (FLONASE) 50 mcg/actuation nasal spray 1 spray into each nostril daily as needed.        ibandronate (BONIVA) 150 mg tablet Take 150 mg by mouth every 30 (thirty) days. Take in AM with glass of water prior to food, don't lie down for 30 minutes.       ipratropium-albuterol (COMBIVENT RESPIMAT)  mcg/actuation Aero inhaler Inhale 1 puff 4 (four) times a day as needed.        levothyroxine (SYNTHROID, LEVOTHROID) 75 MCG tablet Take 75 mcg by mouth daily.       loteprednol (LOTEMAX) 0.5 % ophthalmic suspension Administer 1 drop into the left eye every morning.        metoprolol tartrate (LOPRESSOR) 25 MG tablet Take 25 mg by mouth every morning. 1/2 tablet bid        metoprolol tartrate (LOPRESSOR) 25 MG tablet Take 12.5 mg by mouth every evening.       multivitamin therapeutic (THERAGRAN) tablet Take 1 tablet by mouth daily.  0     nitroglycerin (NITROSTAT) 0.4 MG SL tablet Place 0.4 mg under the tongue every 5 (five) minutes as needed for chest pain.       peg 400-propylene glycol (SYSTANE) 0.4-0.3 % Drop Administer 1 drop to the right eye 4 (four) times a day as needed.       polyethylene glycol (MIRALAX) 17 gram packet Take 17 g by mouth daily as needed.       psyllium (METAMUCIL) Pack packet Take 1 packet by mouth daily as needed.       simvastatin (ZOCOR) 20 MG tablet Take 20 mg by mouth bedtime.       timolol maleate (TIMOPTIC) 0.5 % ophthalmic solution Administer 1 drop into the left eye every morning.        triamcinolone (KENALOG) 0.1 % cream Apply 1 application topically 2 (two) times a day as needed.        hydroxychloroquine (PLAQUENIL) 200 mg tablet TAKE 1 TABLET (200 MG TOTAL) BY MOUTH DAILY. 90 tablet 0      "pilocarpine (SALAGEN) 5 MG tablet Take 1 tablet (5 mg total) by mouth 3 (three) times a day. 90 tablet 3     No current facility-administered medications for this visit.          DETAILED EXAMINATION  04/20/17  :  Vitals:    04/20/17 1441   BP: (!) 120/38   Pulse: 82   SpO2: 99%   Weight: 137 lb (62.1 kg)   Height: 5' 4\" (1.626 m)     Alert oriented. Head including the face is examined for malar rash, heliotropes, scarring, lupus pernio. Eyes examined for redness such as in episcleritis/scleritis, periorbital lesions.   Neck examined  for lymph nodes, range of motion Both upper and lower extremities (all four) examined for swollen, warm &/or  tender joints, range of motion, rash, muscle weakness, edema. The salient normal / abnormal findings are appended.   No appreciable synovitis in any of the palpable appendicular joints.    no parotid swelling.  She has squaring of the first CMC's bilaterally worse on the right side.  She has TKA on the right side the left knee is warm, JLT.       LAB / IMAGING DATA:  ALT   Date Value Ref Range Status   11/09/2016 19 0 - 45 U/L Final   05/11/2016 15 0 - 45 U/L Final   05/15/2014 21 <46 IU/L Final     Albumin   Date Value Ref Range Status   11/09/2016 3.9 3.5 - 5.0 g/dL Final   05/11/2016 3.3 (L) 3.5 - 5.0 g/dL Final   10/03/2014 3.6 3.5 - 5.0 g/dL Final     Creatinine   Date Value Ref Range Status   05/14/2016 0.57 (L) 0.60 - 1.10 mg/dL Final   05/13/2016 0.62 0.60 - 1.10 mg/dL Final   05/12/2016 0.67 0.60 - 1.10 mg/dL Final       WBC   Date Value Ref Range Status   05/13/2016 8.2 4.0 - 11.0 thou/uL Final   05/12/2016 11.4 (H) 4.0 - 11.0 thou/uL Final     Hemoglobin   Date Value Ref Range Status   05/13/2016 12.0 12.0 - 16.0 g/dL Final   05/12/2016 11.6 (L) 12.0 - 16.0 g/dL Final   05/11/2016 11.5 (L) 12.0 - 16.0 g/dL Final     Platelets   Date Value Ref Range Status   05/13/2016 175 140 - 440 thou/uL Final   05/12/2016 161 140 - 440 thou/uL Final   05/11/2016 158 140 - 440 " alonsoou/Sandy Final       Lab Results   Component Value Date    RF 76 (H) 05/15/2014    SEDRATE 36 (H) 05/11/2016        a NICOLETTE the knee

## 2021-06-11 NOTE — PATIENT INSTRUCTIONS - HE
1. Increase bumetadine to 1 tablet twice daily for today and tomorrow  2. Begin spironolactone 25 mg daily  3. Set up holter monitor to re-assess heart rates  4. Consider follow up with TRAY Lyn NP to discuss about Watchman device

## 2021-06-12 NOTE — PATIENT INSTRUCTIONS - HE
1. Continue current medications  2. Follow up with the AF NP to discuss Watchman device. If this can be a virtual visit, the patient and family would be appreciative.

## 2021-06-12 NOTE — TELEPHONE ENCOUNTER
----- Message -----  From: Roxi Ivy  Sent: 10/2/2020  10:58 AM CDT  To: Dania Álvarez RN    General phone call:    Caller: Zahiraonofre Deneen   Primary cardiologist: Jose Luis  Detailed reason for call: Nursing home is stating order is not signed for new medications they are needing this sent to them before 11:30 please sent to the pharmacy fax before 11:30 .    New or active symptoms? Patients breathing is really bad   Best phone number: Deneen [phone]  Best time to contact: anytime  Ok to leave a detailed message? Yes  Device? no    Additional Info: fax to pharmacy is [phone]   assisted living [phone]  if this cannot be done via fax to the pharmacy.  Kelvin would like rx sent to a [facility] on [address] in [place].  Please     --------------------------------------------------------------------------------  Signed rx sent again to pharm, and to assisted living.  valentina

## 2021-06-12 NOTE — PROGRESS NOTES
"ASSESSMENT AND PLAN:  Marla Dunn 88 y.o. female  lives by herself, never had children, has Sjogren's syndrome, manifested by sicca symptoms, positive JUAN, SSA SSB antibodies.  She is back on hydroxychloroquine.  She could not tolerate pilocarpine.  Some of the ocular symptoms are recommended follow-up with the ophthalmologist.  She has regular appointments with her dentist.  Recommended continuing hydroxychloroquine.  This is help her generalized achy and stiffness.  She follows up in ophthalmology for macular degeneration.  Management principles of osteoarthritis reviewed.  We will meet here in 4-6 months or sooner.       Diagnoses and all orders for this visit:    Sjogren's Syndrome  -     hydroxychloroquine (PLAQUENIL) 200 mg tablet; Take 1 tablet (200 mg total) by mouth 2 (two) times a day.  Dispense: 60 tablet; Refill: 6    Osteoarthritis Of The Knee    Polyarthritis Of The Hand          HISTORY OF PRESENTING ILLNESS:  Marla Dunn 88 y.o.  is here for follow up of Sjogren's syndrome, polyarthritis, she is back on hydroxychloroquine.  She noted that in the pilocarpine while did provide her some relief from dryness of mouth cost her too much of an GI side effects plus it made her feel \"wacky\" she has stopped taking it.  She does not want to take it anymore.  She noted that her right hand has been more tingly and numb.  This is waking her up from sleep.  She ends up having to shake it.  When she is on the phone it also goes numb.  The left eye does not trouble her that way.  Furthermore since she has stopped taking hydroxychloroquine a few months ago she's generally more achy and stiff.  She wonders if she can go back on this.  The reason it was stopped was agitated because it was for preventative purposes as she been developing macular degeneration.  She has noted discomfort at the base of her right thumb.  This is so particularly when she opens up jars, writing, or if she bumps into " something.  Further historical information, including ROS and limitation in   activities as noted in the multidimensional health assessment questionnaire scanned in the EMR and in the assessment and plan section.    ALLERGIES:Cigarette smoke and Grass pollen-orchardgrass, standard    PAST MEDICAL/ACTIVE PROBLEMS/MEDICATION/SOCIAL DATA  Past Medical History:   Diagnosis Date     Asthma      Bilateral carpal tunnel syndrome 8/27/2015     Chronic Obstructive Pulmonary Disease     Created by Conversion      Coronary artery disease      Family history of myocardial infarction     father 64     GERD (gastroesophageal reflux disease)      Heart disease      High cholesterol     dislipidemia     Hyperlipidemia     Created by Conversion      Hypertension      Myocardial infarction 1983     Polyarthritis Of The Hand     Created by Conversion      Rheumatoid arthritis      Sjoegren syndrome      Sleep apnea      Sleep apnea, obstructive      History   Smoking Status     Never Smoker   Smokeless Tobacco     Not on file     Patient Active Problem List   Diagnosis     Hyperlipidemia     Depression     Hypertension     Coronary Artery Disease     Aortic Stenosis     Chronic Obstructive Pulmonary Disease     Sjogren's Syndrome     Osteoarthritis Of The Knee     Polyarthritis Of The Hand     Bilateral carpal tunnel syndrome     Carpal tunnel syndrome, right     Pyelonephritis     Fever     Tachycardia     Hyponatremia     Current Outpatient Prescriptions   Medication Sig Dispense Refill     acetaminophen (TYLENOL) 325 MG tablet Take 650 mg by mouth every 6 (six) hours as needed for pain.       amLODIPine (NORVASC) 10 MG tablet Take 10 mg by mouth daily.       amoxicillin (AMOXIL) 500 MG capsule 500 mg. Pre denta;       ascorbic acid (ASCORBIC ACID WITH MILTON HIPS) 500 MG tablet Take 500 mg by mouth daily.       aspirin 325 MG tablet Take 325 mg by mouth daily.       calcium carbonate (OS-PERICO) 600 mg (1,500 mg) tablet Take 600 mg by  mouth 2 (two) times a day with meals.       cholecalciferol, vitamin D3, 1,000 unit tablet Take 1,000 Units by mouth daily.       citalopram (CELEXA) 20 MG tablet Take 20 mg by mouth daily.       cycloSPORINE (RESTASIS) 0.05 % ophthalmic emulsion Administer 1 drop to both eyes 2 (two) times a day.       desloratadine (CLARINEX) 5 mg tablet Take 5 mg by mouth daily as needed.       docusate sodium (COLACE) 100 MG capsule Take 100 mg by mouth 2 (two) times a day.       fluticasone (FLONASE) 50 mcg/actuation nasal spray 1 spray into each nostril daily as needed.        ibandronate (BONIVA) 150 mg tablet Take 150 mg by mouth every 30 (thirty) days. Take in AM with glass of water prior to food, don't lie down for 30 minutes.       ipratropium-albuterol (COMBIVENT RESPIMAT)  mcg/actuation Aero inhaler Inhale 1 puff 4 (four) times a day as needed.        levothyroxine (SYNTHROID, LEVOTHROID) 75 MCG tablet Take 75 mcg by mouth daily.       loteprednol (LOTEMAX) 0.5 % ophthalmic suspension Administer 1 drop into the left eye every morning.        metoprolol tartrate (LOPRESSOR) 25 MG tablet Take 12.5 mg by mouth every evening.       multivitamin therapeutic (THERAGRAN) tablet Take 1 tablet by mouth daily.  0     nitroglycerin (NITROSTAT) 0.4 MG SL tablet Place 0.4 mg under the tongue every 5 (five) minutes as needed for chest pain.       peg 400-propylene glycol (SYSTANE) 0.4-0.3 % Drop Administer 1 drop to the right eye 4 (four) times a day as needed.       polyethylene glycol (MIRALAX) 17 gram packet Take 17 g by mouth daily as needed.       psyllium (METAMUCIL) Pack packet Take 1 packet by mouth daily as needed.       simvastatin (ZOCOR) 20 MG tablet Take 20 mg by mouth bedtime.       timolol maleate (TIMOPTIC) 0.5 % ophthalmic solution Administer 1 drop into the left eye every morning.        triamcinolone (KENALOG) 0.1 % cream Apply 1 application topically 2 (two) times a day as needed.        No current  "facility-administered medications for this visit.          DETAILED EXAMINATION  08/31/17  :  Vitals:    08/31/17 0932   BP: 104/58   Patient Site: Right Arm   Patient Position: Sitting   Cuff Size: Adult Regular   Pulse: (!) 56   Weight: 137 lb (62.1 kg)   Height: 5' 4\" (1.626 m)     Alert oriented. Head including the face is examined for malar rash, heliotropes, scarring, lupus pernio. Eyes examined for redness such as in episcleritis/scleritis, periorbital lesions.   Neck examined  for lymph nodes, range of motion Both upper and lower extremities (all four) examined for swollen, warm &/or  tender joints, range of motion, rash, muscle weakness, edema. The salient normal / abnormal findings are appended.   No appreciable synovitis in any of the palpable appendicular joints.    no parotid swelling.  She has squaring of the first CMC's bilaterally worse on the right side.  She has TKA on the right side the left knee is warm, JLT.       LAB / IMAGING DATA:  ALT   Date Value Ref Range Status   11/09/2016 19 0 - 45 U/L Final   05/11/2016 15 0 - 45 U/L Final   05/15/2014 21 <46 IU/L Final     Albumin   Date Value Ref Range Status   11/09/2016 3.9 3.5 - 5.0 g/dL Final   05/11/2016 3.3 (L) 3.5 - 5.0 g/dL Final   10/03/2014 3.6 3.5 - 5.0 g/dL Final     Creatinine   Date Value Ref Range Status   05/14/2016 0.57 (L) 0.60 - 1.10 mg/dL Final   05/13/2016 0.62 0.60 - 1.10 mg/dL Final   05/12/2016 0.67 0.60 - 1.10 mg/dL Final       WBC   Date Value Ref Range Status   05/13/2016 8.2 4.0 - 11.0 thou/uL Final   05/12/2016 11.4 (H) 4.0 - 11.0 thou/uL Final     Hemoglobin   Date Value Ref Range Status   05/13/2016 12.0 12.0 - 16.0 g/dL Final   05/12/2016 11.6 (L) 12.0 - 16.0 g/dL Final   05/11/2016 11.5 (L) 12.0 - 16.0 g/dL Final     Platelets   Date Value Ref Range Status   05/13/2016 175 140 - 440 thou/uL Final   05/12/2016 161 140 - 440 thou/uL Final   05/11/2016 158 140 - 440 thou/uL Final       Lab Results   Component Value Date "    RF 76 (H) 05/15/2014    SEDRATE 36 (H) 05/11/2016        a NICOLETTE the knee

## 2021-06-12 NOTE — TELEPHONE ENCOUNTER
Per 10/1/20 KML note:   Acute on chronic diastolic heart failure, likely precipitated by a new onset atrial fibrillation.  Patient with complaints of increased shortness of breath, orthopnea and lower extremity edema.  At this point, have recommended that we increase her bumetanide to 1 mg twice daily both today and tomorrow, then decrease back to 1 mg daily.  Also recommended addition of spironolactone 25 mg daily as this will help to keep her potassium level higher.  She will be following up with nephrology next week who can recheck her basic metabolic profile.    Prescriptions printed and faxed to pharm, and faxed to assisted living.  -hugo

## 2021-06-12 NOTE — TELEPHONE ENCOUNTER
----- Message from Hortensia Ansari sent at 10/2/2020  9:00 AM CDT -----  Regarding: RADHA PT / RESEND RX REFILL DUE TO MEDICATION CHANGE  General phone call:    Caller: Pt's Deneen devries     Primary cardiologist: RADHA     Detailed reason for call: Deneen states that assisted living place is requesting for the rx refills of pt's medication changes be sent to them and pt's pharmacy. Medications are bumetanide (BUMEX) and spironolactone (ALDACTONE). Assisted living stated pt will not receive these refills if they are submitted later than noon today. Please fax requests to assisted living at [phone] and pt's pharmacy at [phone]. Pt's [facility] is requesting this to be completed asap as pt needs these medications in order to breath.     Best phone number: [phone], please call pt's [facility] for any questions.     Best time to contact: Anytime     Ok to leave a detailed message? Yes     Device? Yes     Additional Info:

## 2021-06-13 NOTE — TELEPHONE ENCOUNTER
"Return call to patient who stated she has been feeling \"pressure/pain in back for past 4-5 days\" that concerns her at night, in addition to feeling increased shortness of breath over past 1-2wks - patient denied wt gain (stable at 105#), BRETT, dizziness, CP, orthopnea.    Patient stated she was told by her physician that sx are not related to back fx and reported that her medications are dispensed by staff at assisted living facility.    Instructed patient to have facility nurse call back with medication list and physical assessment data so Dr. Amaya can be updated - patient verbalized understanding.  mg      "

## 2021-06-13 NOTE — TELEPHONE ENCOUNTER
Rec'd call from patient inquiring if Asst facility nurse had contacted clinic and if Dr. Amaya had provided any recommendation as yet.    Informed patient that update was rec'd from nurse Cheney and forwarded to Dr. Amaya but no new recommendations have been given at this time.    Patient stated her back pain is getting worse as the day progresses, increases with movement and occurs when she takes a deep breath - patient confirmed hx of arthritis and reported that she does PT exercises daily - patient continues to deny HF sx.    Explained to patient that if her sx are getting worse and she is having difficulty breathing, she should be evaluated in ED - patient offered many more questions/concerns before verbalizing understanding that she should contact her facility nurse and niece with update and allow them to decide if she needs to go to ED.  mg

## 2021-06-13 NOTE — TELEPHONE ENCOUNTER
----- Message from Sabina Granda sent at 11/16/2020  4:36 PM CST -----    Caller: Patient  Primary cardiologist: Dr. Amaya    Detailed reason for call: Patient stated that she is having trouble breathing and thinks it's water on the heart. She wants to know if there could be an adjustment in her medication. She seemed out of breath when we were talking    Best phone number: [phone]  Best time to contact: today  Ok to leave a detailed message? yes  Device? No    Additional Info:

## 2021-06-13 NOTE — TELEPHONE ENCOUNTER
Rec'd voicemail from [facility] stating patient is presently not in the building so physical assessment cannot be completed and patient is not on Lasix but is taking Spironolactone 25mg daily at noon and Torsemide 20mg in am - requested fax # for clinic so med list could be sent over.    Left message for Noni with fax # and requesting physical assessment be completed and included with med list when faxed to clinic.  mg

## 2021-06-13 NOTE — TELEPHONE ENCOUNTER
Rec'd faxed med list from [facility], in addition to VS and physical assessment of patient - records updated to reflect patient's present med list does not include Bumex or Lasix - does include Spironolactone and Torsemide.    Please review patient update and faxed update from nurse scanned under media - any new orders at this time?  mg

## 2021-06-13 NOTE — TELEPHONE ENCOUNTER
Phone call to patient - informed her of Dr. Amaya's response/recommendations - patient verbalized understanding that her nurse Connie would also be contacted with recommendations - patient reported that she has f/u phone visit with nephrologist 11-23-20.    Rec'd call from patient's niece Deneen - informed her of recommendations which would also be communicated to [facility] facility nurse - Deneen verbalized understanding, agreed to provide [facility] and provided fax # for nursing station.    Left message for Noni with Dr. Amaya's recommendations and that note would be faxed to [phone].  mg

## 2021-06-13 NOTE — TELEPHONE ENCOUNTER
Have her increase torsemide to 40 mg or 2 tablets daily.  She should be reevaluated if her symptoms do not improve.    RADHA

## 2021-06-13 NOTE — TELEPHONE ENCOUNTER
Rec'd voicemail from [facility] nurse (Noni) stating she was calling per patient's request.    Left message for nurse requesting call back with med list to confirm diuretic doses and physical assessment data to forward to Dr. Amaya.  mg

## 2021-06-13 NOTE — TELEPHONE ENCOUNTER
Rec'd fax form from Connie at [facility] requesting signature from Dr. Amaya for verbal order to increase Torsemide.    Order form signed and successfully faxed back to Connie.  mg

## 2021-06-20 NOTE — LETTER
"Letter by Cherri Carroll CNP at      Author: Cherri Carroll CNP Service: -- Author Type: --    Filed:  Encounter Date: 4/8/2020 Status: (Other)         Patient: Marla Dunn   MR Number: [id number]   YOB: 1929   Date of Visit: 4/8/2020     [facility]   Video Visit    Code Status: DNR    Marla Dunn is a 89 y.o. female who is being evaluated via a billable video visit.      The patient has been notified of following:     \"This video visit will be conducted via a call between you and your physician/provider. We have found that certain health care needs can be provided without the need for an in-person physical exam.  This service lets us provide the care you need with a video conversation.  If a prescription is necessary we can send it to the facility team.  If lab work is needed we can place an order through the facility team to have that test done at a later time.    If during the course of the call the physician/provider feels a video visit is not appropriate, you will not be charged for this service.\"     Physician/provider has received verbal consent for a Video Visit from the patient? Yes      Video Start Time:250pm    Chief Complaint/Reason for Visit:  Chief Complaint   Patient presents with   ? Discharge Summary       HPI:   [place] is a 89 y.o. female who is being sen for a face to face visit for an anticipated dc home on 4/9/20. She will have  services upon dc home. She comes from [facility] and per her EMR \" with a past medical history of COPD hypertension sjogren's syndrome presented with generalized weakness, who was admitted on 3/20/2020 for hyponatremia, sodium of 119, abnormal urinalysis. she was seen by nephrology.  She has acute on chronic hyponatremia with sodium usually around low 130s.  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  initially treated with gentle normal saline.  later on placed on sodium " tablets, fluid restriction 1500 mL/day.  She had improvement in her sodium up to 128 at discharge to 134 leaving the TCU.  Urine culture returned negative.     I have reviewed and updated the patient's Past Medical History, Social History, Family History and Medication List.    ALLERGIES  Cigarette smoke and Grass pollen-orchardgrass, standard    Review of Systems    Vitals:    04/09/20 1849   BP: 125/67   Pulse: 68   Temp: 98.1  F (36.7  C)   Weight: 115 lb (52.2 kg)         MEDICATION LIST:  Current Outpatient Medications   Medication Sig   ? acetaminophen (TYLENOL) 500 MG tablet Take 500-1,000 mg by mouth every 6 (six) hours as needed for pain.   ? albuterol (PROVENTIL) 2.5 mg /3 mL (0.083 %) nebulizer solution Take 2.5 mg by nebulization every 6 (six) hours as needed for wheezing or shortness of breath.   ? amLODIPine (NORVASC) 10 MG tablet Take 10 mg by mouth daily.   ? amoxicillin (AMOXIL) 500 MG capsule Take 2,000 mg by mouth as needed (prior to dental procedures).    ? ascorbic acid (ASCORBIC ACID WITH MILTON HIPS) 500 MG tablet Take 500 mg by mouth daily.   ? ASMANEX TWISTHALER 220 mcg/ actuation (60) inhaler Inhale 1 puff at bedtime.   ? calcium carbonate-vitamin D3 (CALCIUM 600 + D,3,) 600 mg(1,500mg) -200 unit per tablet Take 1 tablet by mouth daily.   ? calcium, as carbonate, (TUMS) 200 mg calcium (500 mg) chewable tablet Chew 1-2 tablets 3 (three) times a day as needed for heartburn.   ? cholecalciferol, vitamin D3, 1,000 unit tablet Take 2,000 Units by mouth daily.    ? citalopram (CELEXA) 20 MG tablet Take 20 mg by mouth daily.   ? cycloSPORINE (RESTASIS) 0.05 % ophthalmic emulsion Administer 1 drop into the left eye 2 (two) times a day.    ? cycloSPORINE (RESTASIS) 0.05 % ophthalmic emulsion Administer 1 drop to the right eye daily.   ? DARBEPOETIN MITCHELL IN POLYSORBAT (ARANESP, IN POLYSORBATE, INJ) Inject as directed see administration instructions. Gets every 3-4 weeks from [facility].   ? docusate  sodium (COLACE) 100 MG capsule Take 100 mg by mouth 2 (two) times a day.   ? fluorometholone (FML) 0.1 % ophthalmic suspension Administer 1 drop into the left eye every morning.   ? ibandronate (BONIVA) 150 mg tablet Take 150 mg by mouth every 30 (thirty) days. Take in AM with glass of water prior to food, don't lie down for 30 minutes.   ? ipratropium-albuterol (COMBIVENT RESPIMAT)  mcg/actuation Aero inhaler Inhale 2 puffs 4 (four) times a day.    ? Lactobacillus rhamnosus GG (CULTURELLE) 10-15 Billion cell capsule Take 1 capsule by mouth daily.   ? levothyroxine (SYNTHROID, LEVOTHROID) 88 MCG tablet Take 88 mcg by mouth daily.   ? metoprolol tartrate (LOPRESSOR) 25 MG tablet Take 25 mg by mouth 2 (two) times a day.    ? multivitamin therapeutic (THERAGRAN) tablet Take 1 tablet by mouth daily.   ? omeprazole (PRILOSEC) 20 MG capsule Take 20 mg by mouth daily before breakfast.   ? peg 400-propylene glycol (SYSTANE) 0.4-0.3 % Drop Administer 1 drop to both eyes 4 (four) times a day as needed (dry eyes).    ? polyethylene glycol (MIRALAX) 17 gram packet Take 17 g by mouth daily.    ? psyllium (METAMUCIL) Pack packet Take by mouth daily. Dose = ~1/2 tsp   ? sodium chloride 1 gram tablet Take 1 tablet (1 g total) by mouth 2 (two) times a day.   ? timolol maleate (TIMOPTIC) 0.25 % ophthalmic solution Administer 1 drop into the left eye daily before breakfast. Administer 10 minutes after FML drops   ? triamcinolone (KENALOG) 0.1 % cream Apply 1 application topically 2 (two) times a day as needed (rash).    ? VIT C/E/ZN/COPPR/LUTEIN/ZEAXAN (PRESERVISION AREDS 2 ORAL) Take 1 tablet by mouth 2 (two) times a day with meals.       Labs: Last NA was at 134, up from 128.      Assessment/Plan:    ICD-10-CM    1. Acute hyponatremia  E87.1    2. Chronic obstructive pulmonary disease, unspecified COPD type (H)  J44.9         MEDICAL EQUIPMENT NEEDS:  N/A    DISCHARGE PLAN/FACE TO FACE: I certify that this patient is under DrElla  Tania's care, seen by the NP, and had a face-to-face encounter that meets the physician face-to-face encounter requirements.  The encounter was in whole, or part related to the primary reason for home health.  The Patient is homebound due to: General deconditioned state due to hyponatremia and CHF and  it is taxing and it will take a considerable amount of effort for patient to leave the home.  She is dependent on others for transportation.  The patient is confined to her home and needs intermittent skilled nursing, PT,OT, RN, and HHA.  The patient has been under the care of Dr. Marin/NP and Dr. Marin  initiated the establishment of the plan of care.        Patient to be followed by home care for physical therapy to eval and treat for strengthening, balance, endurance, and safety with mobility, and ambulation.  Patient to be followed by home care for occupational therapy to eval and treat for strengthening, ADL needs, adaptive equipment, and safety.  Patient to be followed by home care for nursing services for medication set up and teaching, symptom and disease processes monitoring and education.    Patient to be followed by home care for home health aid services for bathing and ADL needs.  Planned discharge.  All therapy goals have been met.  Family will assist with discharge and transportation.        Patient will follow up with PCP within 7- days after discharge for medication mangagment and appropriate lab studies.            Date of Video Encounter: 4/8/20      PCP: Sally Goyal MD   Phone: [phone]   Fax: [phone]    Video-Visit Details    Type of service:  Video Visit    Video End Time (time video stopped): 315pm    Originating Location (pt. Location):[facility] [[id number]]    Distant Location (provider location):  [facility]     Mode of Communication:  Zoom Video Conference    Cherri Carroll CNP

## 2021-06-20 NOTE — LETTER
"Letter by Cherri Carroll CNP at      Author: Cherri Carroll CNP Service: -- Author Type: --    Filed:  Encounter Date: 2020 Status: (Other)         Patient: Marla Dunn   MR Number: [id number]   YOB: 1929   Date of Visit: 2020       [facility]      NAME:  Marla Dunn             :  1929    MRN: [id number]    CODE STATUS:  DNR    FACILITY: [facility] [[id number]]         CHIEF COMPLAIN/REASON FOR VISIT:  Chief Complaint   Patient presents with   ? Problem Visit     labs       HISTORY OF PRESENT ILLNESS: Marla Dunn is a 89 y.o. female being seen at the request of the patient. She is very anxious and wished to discuss labs. I explained her NA has actually improved. She comes from [facility] and per her EMR \" with a past medical history of COPD hypertension sjogren's syndrome presented with generalized weakness, who was admitted on 3/20/2020 for hyponatremia, sodium of 119, abnormal urinalysis. she was seen by nephrology.  She has acute on chronic hyponatremia with sodium usually around low 130s.  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  initially treated with gentle normal saline.  later on placed on sodium tablets, fluid restriction 1500 mL/day.  She had improvement in her sodium up to 128 at discharge.  Urine culture returned negative.  Antibiotics discontinued.\"  Her last NA was up to 129 and on NA tab daily. Very anxious, reassurance offered along with update on her labs.      Allergies   Allergen Reactions   ? Cigarette Smoke Other (See Comments)     Hard to breathe.   ? Grass Pollen-Orchardgrass, Standard Other (See Comments)     Shortness of breath when lawn is just cut.   :     Current Outpatient Medications   Medication Sig   ? acetaminophen (TYLENOL) 500 MG tablet Take 500-1,000 mg by mouth every 6 (six) hours as needed for pain.   ? albuterol (PROVENTIL) 2.5 mg /3 mL (0.083 %) " nebulizer solution Take 2.5 mg by nebulization every 6 (six) hours as needed for wheezing or shortness of breath.   ? amLODIPine (NORVASC) 10 MG tablet Take 10 mg by mouth daily.   ? amoxicillin (AMOXIL) 500 MG capsule Take 2,000 mg by mouth as needed (prior to dental procedures).    ? ascorbic acid (ASCORBIC ACID WITH MILTON HIPS) 500 MG tablet Take 500 mg by mouth daily.   ? ASMANEX TWISTHALER 220 mcg/ actuation (60) inhaler Inhale 1 puff at bedtime.   ? calcium carbonate-vitamin D3 (CALCIUM 600 + D,3,) 600 mg(1,500mg) -200 unit per tablet Take 1 tablet by mouth daily.   ? calcium, as carbonate, (TUMS) 200 mg calcium (500 mg) chewable tablet Chew 1-2 tablets 3 (three) times a day as needed for heartburn.   ? cholecalciferol, vitamin D3, 1,000 unit tablet Take 2,000 Units by mouth daily.    ? citalopram (CELEXA) 20 MG tablet Take 20 mg by mouth daily.   ? cycloSPORINE (RESTASIS) 0.05 % ophthalmic emulsion Administer 1 drop into the left eye 2 (two) times a day.    ? cycloSPORINE (RESTASIS) 0.05 % ophthalmic emulsion Administer 1 drop to the right eye daily.   ? DARBEPOETIN MITCHELL IN POLYSORBAT (ARANESP, IN POLYSORBATE, INJ) Inject as directed see administration instructions. Gets every 3-4 weeks from [facility].   ? docusate sodium (COLACE) 100 MG capsule Take 100 mg by mouth 2 (two) times a day.   ? fluorometholone (FML) 0.1 % ophthalmic suspension Administer 1 drop into the left eye every morning.   ? ibandronate (BONIVA) 150 mg tablet Take 150 mg by mouth every 30 (thirty) days. Take in AM with glass of water prior to food, don't lie down for 30 minutes.   ? ipratropium-albuterol (COMBIVENT RESPIMAT)  mcg/actuation Aero inhaler Inhale 2 puffs 4 (four) times a day.    ? Lactobacillus rhamnosus GG (CULTURELLE) 10-15 Billion cell capsule Take 1 capsule by mouth daily.   ? levothyroxine (SYNTHROID, LEVOTHROID) 88 MCG tablet Take 88 mcg by mouth daily.   ? metoprolol tartrate (LOPRESSOR) 25 MG tablet Take 25 mg by  mouth 2 (two) times a day.    ? multivitamin therapeutic (THERAGRAN) tablet Take 1 tablet by mouth daily.   ? omeprazole (PRILOSEC) 20 MG capsule Take 20 mg by mouth daily before breakfast.   ? peg 400-propylene glycol (SYSTANE) 0.4-0.3 % Drop Administer 1 drop to both eyes 4 (four) times a day as needed (dry eyes).    ? polyethylene glycol (MIRALAX) 17 gram packet Take 17 g by mouth daily.    ? psyllium (METAMUCIL) Pack packet Take by mouth daily. Dose = ~1/2 tsp   ? sodium chloride 1 gram tablet Take 1 tablet (1 g total) by mouth 2 (two) times a day.   ? timolol maleate (TIMOPTIC) 0.25 % ophthalmic solution Administer 1 drop into the left eye daily before breakfast. Administer 10 minutes after FML drops   ? triamcinolone (KENALOG) 0.1 % cream Apply 1 application topically 2 (two) times a day as needed (rash).    ? VIT C/E/ZN/COPPR/LUTEIN/ZEAXAN (PRESERVISION AREDS 2 ORAL) Take 1 tablet by mouth 2 (two) times a day with meals.         REVIEW OF SYSTEMS:    Currently, no fever, chills, or rigors. Does not have any visual or hearing problems. Denies any chest pain, headaches, palpitations, lightheadedness, dizziness, shortness of breath, or cough. Appetite is good. Denies any GERD symptoms. Denies any difficulty with swallowing, nausea, or vomiting.  Denies any abdominal pain, diarrhea or constipation. Denies any urinary symptoms. No insomnia. No active bleeding. No rash.       PHYSICAL EXAMINATION:  Vitals:    04/07/20 1143   BP: 136/76   Pulse: 68   Temp: 98.9  F (37.2  C)   Weight: 115 lb 9.6 oz (52.4 kg)         GENERAL: Awake, Alert, oriented x3, not in any form of acute distress, answers questions appropriately, follows simple commands, conversant  HEENT: Head is normocephalic with normal hair distribution. No evidence of trauma. Ears: No acute purulent discharge. Eyes: Conjunctivae pink with no scleral jaundice. Nose: Normal mucosa and septum. NECK: Supple with no cervical or supraclavicular lymphadenopathy.  Trachea is midline.   CHEST: No tenderness or deformity, no crepitus  LUNG: Clear to auscultation with good chest expansion. There are no crackles or wheezes, normal AP diameter.  BACK: No kyphosis of the thoracic spine. Symmetric, no curvature, ROM normal, no CVA tenderness, no spinal tenderness   CVS: There is good S1  S2, , rhythm is regular.  ABDOMEN: Globular and soft, nontender to palpation, non distended, no masses, no organomegaly, good bowel sounds, no rebound or guarding, no peritoneal signs.   EXTREMITIES: Atraumatic. Full range of motion on both upper and lower extremities, there is no tenderness to palpation, no pedal edema, no cyanosis or clubbing, no calf tenderness, normal cap refill, no joint swelling.  SKIN: Warm and dry, no erythema noted, no rashes or lesions.  NEUROLOGICAL: The patient is oriented to person, place and time. Strength and sensation are grossly intact. Face is symmetric.                    LABS:    Lab Results   Component Value Date    WBC 6.4 03/20/2020    HGB 9.1 (L) 03/20/2020    HCT 26.0 (L) 03/20/2020    MCV 89 03/20/2020     03/24/2020       Results for orders placed or performed in visit on 03/31/20   Basic Metabolic Panel   Result Value Ref Range    Sodium 129 (L) 136 - 145 mmol/L    Potassium 3.8 3.5 - 5.0 mmol/L    Chloride 97 (L) 98 - 107 mmol/L    CO2 23 22 - 31 mmol/L    Anion Gap, Calculation 9 5 - 18 mmol/L    Glucose 84 70 - 125 mg/dL    Calcium 8.9 8.5 - 10.5 mg/dL    BUN 19 8 - 28 mg/dL    Creatinine 0.67 0.60 - 1.10 mg/dL    GFR MDRD Af Amer >60 >60 mL/min/1.73m2    GFR MDRD Non Af Amer >60 >60 mL/min/1.73m2           No results found for: HGBA1C  No results found for: [id number]  No results found for: [id number]    ASSESSMENT/PLAN:  1. Acute hyponatremia    2. Anxiety and depression      1. Hyponatremia; On NA tab daily, NA was 129. We will obtain BMP in am.    2. Anxiety: Reassurance and counseling provided for pt. She is seen making progress in  therapies and overall feeling well.      Electronically signed by:  Cherri Carroll CNP  This progress note was completed using Dragon software and there may be grammatical errors.

## 2021-06-20 NOTE — LETTER
Letter by Ifrah Marin MBBS at      Author: Ifrah Marin MBBS Service: -- Author Type: --    Filed:  Encounter Date: 3/24/2020 Status: (Other)         Patient: Marla Dunn   MR Number: [id number]   YOB: 1929   Date of Visit: 3/24/2020       [facility] Admission note      Patient: Marla Dunn  MRN: [id number]  Date of Service: 3/24/2020      [facility] [[id number]]  Reason for Visit     Chief Complaint   Patient presents with   ? H & P     Follow-up on hospitalization hyponatremia and weight loss  Code Status     DNR    Assessment     -Acute hyponatremia admission sodium 119 discharge improved at 128  -Acute hypokalemia corrected in the hospital  -Abnormal UA cultures negative  -COPD  -History of hypertension on amlodipine and metoprolol  -Hyperlipidemia  -Underlying history of coronary artery disease with there is recent history of myocardial infarction  --History of polyarthritis with Sjogren syndrome  -History of anxiety with depression  -Ongoing history of weight loss with malnutrition concerns.  -Generalized weakness physical deconditioning with falls    Plan     Patient has been admitted to the TCU for strengthening and rehab.  Hyponatremia initially corrected with saline infusion.  Subsequently she has been discharged on salt tablets along with free water restriction.  Discharge sodium was stable at 138 baseline sodium is between 130 and 133.  Nephrology has recommended no work-up unless sodium becomes difficult to manage.  Outpatient follow-up with nephrology.  She had polyuria and suspected UTI antibiotics discontinued when cultures were negative.  Hypertension was managed adequately on medications.  Mood and behaviors have been stable.  No anxiety on Celexa.  She does give a history of profound weakness and increasing debilitation.  She lives alone in independent living apartment and is still driving.    In light of the fact that she has very little  psychosocial support she does admit that she is eating poorly as she does not have any support or friends [place] who could buy her groceries though she is still driving.  She is relying mainly on TV dinner and cereal.  She is eating twice a day only and has minimal nutritional support  Nursing home has been requested to provide urgent  consult to see if they can help her with that issue.    Her biggest issue remains ability to go and get groceries in order to eat better.  She is not computer savvy at all.  Unfortunately she will not be able to order anything online though she retired from the [employer] she cannot use a smart phone even and does not possess 1.  She admits that she is losing weight because of this issue she does have a niece in the family who may be able to help but is working and is able to provide her limited support  We will monitor her intake and weights closely in the TCU.  In light of recent pandemic concerns to minimize nursing contact with the patient will DC her vitamin C, DC her vitamin D as well as her MVI  Lasix also discontinued prior to discharge  Total time 45 minutes with more than 30 minutes spent face-to-face talking to the patient reviewing discharge planning including concerns about weight loss and malnutrition concerns and poor nutritional support that the patient has.  This was reviewed with the patient in my note above    History     Patient is a very pleasant 89 y.o. female who is admitted to TCU  The hospital with generalized weakness.  She was noted to have significant physical deconditioning with history of falls and has been discharged to the TCU for strengthening and rehab.    Additional work-up revealed profound hyponatremia with admission sodium of 119.  She was admitted and given normal saline  .  Discharge sodium has corrected to 128 .  Nephrology has recommended continuation with her free water restriction with salt tablets twice daily.    She has underlying  history of malnutrition with poor oral intake and declining weights.  She lives alone in independent living apartment still driving with very little psychosocial support.  She may need more services.    Mood and behaviors have been stable on her current regimen of Celexa.  She also has hypertension blood pressures have been stable on her current regimen discharge to the TCU    Past Medical History     Active Ambulatory (Non-Hospital) Problems    Diagnosis   ? Cystitis   ? Dyslipidemia   ? Chronic obstructive pulmonary disease (H)   ? Generalized muscle weakness   ? Physical deconditioning   ? Acute bronchitis due to other specified organisms   ? Pneumonia   ? Sepsis (H)   ? Flu-like symptoms   ? Rectal prolapse   ? Hypokalemia   ? Metabolic acidosis   ? Generalized weakness   ? Acute hyponatremia   ? Pyelonephritis   ? Fever   ? Tachycardia   ? Hyponatremia   ? Carpal tunnel syndrome, right   ? Bilateral carpal tunnel syndrome   ? Sjogren's Syndrome   ? Osteoarthritis Of The Knee   ? Polyarthritis Of The Hand   ? Hyperlipidemia LDL goal <100   ? Anxiety and depression   ? Primary hypertension   ? Coronary Artery Disease   ? Aortic Stenosis   ? COPD exacerbation (H)     Past Medical History:   Diagnosis Date   ? Asthma    ? Bilateral carpal tunnel syndrome 8/27/2015   ? Chronic Obstructive Pulmonary Disease    ? Coronary artery disease    ? Family history of myocardial infarction    ? GERD (gastroesophageal reflux disease)    ? Heart disease    ? High cholesterol    ? Hyperlipidemia    ? Hypertension    ? Myocardial infarction (H) 1983   ? Polyarthritis Of The Hand    ? Rheumatoid arthritis (H)    ? Sjoegren syndrome    ? Sleep apnea, obstructive        Past Social History     Reviewed, and she  reports that she has never smoked. She has never used smokeless tobacco. She reports that she does not drink alcohol or use drugs.    Family History     Reviewed, and family history includes Cancer in her mother; Heart  disease in her father.    Medication List   Post Discharge Medication Reconciliation Status: discharge medications reconciled and changed, per note/orders (see AVS)   Current Outpatient Medications on File Prior to Visit   Medication Sig Dispense Refill   ? acetaminophen (TYLENOL) 500 MG tablet Take 500-1,000 mg by mouth every 6 (six) hours as needed for pain.     ? albuterol (PROVENTIL) 2.5 mg /3 mL (0.083 %) nebulizer solution Take 2.5 mg by nebulization every 6 (six) hours as needed for wheezing or shortness of breath.     ? amLODIPine (NORVASC) 10 MG tablet Take 10 mg by mouth daily.     ? amoxicillin (AMOXIL) 500 MG capsule Take 2,000 mg by mouth as needed (prior to dental procedures).      ? ascorbic acid (ASCORBIC ACID WITH MILTON HIPS) 500 MG tablet Take 500 mg by mouth daily.     ? ASMANEX TWISTHALER 220 mcg/ actuation (60) inhaler Inhale 1 puff at bedtime.     ? calcium carbonate-vitamin D3 (CALCIUM 600 + D,3,) 600 mg(1,500mg) -200 unit per tablet Take 1 tablet by mouth daily.     ? calcium, as carbonate, (TUMS) 200 mg calcium (500 mg) chewable tablet Chew 1-2 tablets 3 (three) times a day as needed for heartburn.     ? cholecalciferol, vitamin D3, 1,000 unit tablet Take 2,000 Units by mouth daily.      ? citalopram (CELEXA) 20 MG tablet Take 20 mg by mouth daily.     ? cycloSPORINE (RESTASIS) 0.05 % ophthalmic emulsion Administer 1 drop into the left eye 2 (two) times a day.      ? cycloSPORINE (RESTASIS) 0.05 % ophthalmic emulsion Administer 1 drop to the right eye daily.     ? DARBEPOETIN MITCHELL IN POLYSORBAT (ARANESP, IN POLYSORBATE, INJ) Inject as directed see administration instructions. Gets every 3-4 weeks from [facility].     ? docusate sodium (COLACE) 100 MG capsule Take 100 mg by mouth 2 (two) times a day.     ? fluorometholone (FML) 0.1 % ophthalmic suspension Administer 1 drop into the left eye every morning.     ? ibandronate (BONIVA) 150 mg tablet Take 150 mg by mouth every 30 (thirty) days.  Take in AM with glass of water prior to food, don't lie down for 30 minutes.     ? ipratropium-albuterol (COMBIVENT RESPIMAT)  mcg/actuation Aero inhaler Inhale 2 puffs 4 (four) times a day.      ? Lactobacillus rhamnosus GG (CULTURELLE) 10-15 Billion cell capsule Take 1 capsule by mouth daily.     ? levothyroxine (SYNTHROID, LEVOTHROID) 88 MCG tablet Take 88 mcg by mouth daily.     ? metoprolol tartrate (LOPRESSOR) 25 MG tablet Take 25 mg by mouth 2 (two) times a day.      ? multivitamin therapeutic (THERAGRAN) tablet Take 1 tablet by mouth daily.  0   ? omeprazole (PRILOSEC) 20 MG capsule Take 20 mg by mouth daily before breakfast.     ? peg 400-propylene glycol (SYSTANE) 0.4-0.3 % Drop Administer 1 drop to both eyes 4 (four) times a day as needed (dry eyes).      ? polyethylene glycol (MIRALAX) 17 gram packet Take 17 g by mouth daily.      ? psyllium (METAMUCIL) Pack packet Take by mouth daily. Dose = ~1/2 tsp     ? sodium chloride 1 gram tablet Take 1 tablet (1 g total) by mouth 2 (two) times a day.  0   ? timolol maleate (TIMOPTIC) 0.25 % ophthalmic solution Administer 1 drop into the left eye daily before breakfast. Administer 10 minutes after FML drops     ? triamcinolone (KENALOG) 0.1 % cream Apply 1 application topically 2 (two) times a day as needed (rash).      ? VIT C/E/ZN/COPPR/LUTEIN/ZEAXAN (PRESERVISION AREDS 2 ORAL) Take 1 tablet by mouth 2 (two) times a day with meals.     ? [DISCONTINUED] furosemide (LASIX) 20 MG tablet Take 10 mg by mouth daily. (1/2 of 20 mg tab = 10 mg dose)     ? [DISCONTINUED] loratadine-pseudoephedrine (LORATADINE-PSEUDOEPHEDRINE) 5-120 mg Tb12 Take 1 tablet by mouth daily as needed.       Current Facility-Administered Medications on File Prior to Visit   Medication Dose Route Frequency Provider Last Rate Last Dose   ? [COMPLETED] potassium chloride packet 40 mEq (KLOR-CON)  40 mEq Oral Q4H Roxanna West MD   40 mEq at 03/23/20 1840   ? [DISCONTINUED] acetaminophen  tablet 500 mg (TYLENOL)  500 mg Oral Q6H PRN Luis Aceves MD       ? [DISCONTINUED] albuterol inhaler 2 puff (PROAIR HFA;PROVENTIL HFA;VENTOLIN HFA)  2 puff Inhalation QID Luis Aceves MD   2 puff at 03/24/20 0848   ? [DISCONTINUED] albuterol nebulizer solution 2.5 mg (PROVENTIL)  2.5 mg Nebulization Q6H PRN Luis Aceves MD       ? [DISCONTINUED] amLODIPine tablet 10 mg (NORVASC)  10 mg Oral DAILY Luis Aceves MD   10 mg at 03/24/20 0844   ? [DISCONTINUED] ascorbic acid (vitamin C) tablet 500 mg (VITAMIN C)  500 mg Oral DAILY Luis Aceves MD   500 mg at 03/24/20 0845   ? [DISCONTINUED] bisacodyL suppository 10 mg (DULCOLAX)  10 mg Rectal Daily PRN Luis Aceves MD       ? [DISCONTINUED] calcium (as carbonate) chewable tablet 200-400 mg (TUMS)  200-400 mg Oral TID PRN uLis Aceves MD       ? [DISCONTINUED] citalopram tablet 20 mg (celeXA)  20 mg Oral DAILY Luis Aceves MD   20 mg at 03/24/20 0845   ? [DISCONTINUED] cycloSPORINE 0.05 % ophthalmic emulsion 1 drop (RESTASIS)  1 drop Left Eye BID Luis Aceves MD   1 drop at 03/24/20 0846   ? [DISCONTINUED] cycloSPORINE 0.05 % ophthalmic emulsion 1 drop (RESTASIS)  1 drop Right Eye DAILY Luis Aceves MD   1 drop at 03/24/20 0846   ? [DISCONTINUED] docusate sodium capsule 100 mg (COLACE)  100 mg Oral BID Luis Aceves MD   100 mg at 03/24/20 0845   ? [DISCONTINUED] enoxaparin ANTICOAGULANT syringe 40 mg (LOVENOX)  40 mg Subcutaneous Q24H Luis Aceves MD   40 mg at 03/24/20 0100   ? [DISCONTINUED] fluorometholone 0.1 % ophthalmic suspension 1 drop (FML)  1 drop Left Eye QAM Luis Aceves MD   1 drop at 03/24/20 0847   ? [DISCONTINUED] ipratropium-albuteroL 0.5-2.5 mg/3 mL nebulizer solution 3 mL (DUO-NEB)  3 mL Nebulization Q4H PRN Luis Aceves MD       ? [DISCONTINUED] Lactobacillus rhamnosus GG 15 Billion cell 1 capsule (CULTURELLE)  1 capsule Oral BID with meals Luis Aceves MD   1 capsule at 03/24/20 0844   ?  [DISCONTINUED] levothyroxine tablet 88 mcg (SYNTHROID, LEVOTHROID)  88 mcg Oral DAILY Luis Aceves MD   88 mcg at 03/24/20 0646   ? [DISCONTINUED] magnesium hydroxide suspension 30 mL (MILK OF MAG)  30 mL Oral Daily PRN Luis Aceves MD       ? [DISCONTINUED] metoprolol tartrate tablet 25 mg (LOPRESSOR)  25 mg Oral BID Jennie Almanzar MD   25 mg at 03/24/20 0845   ? [DISCONTINUED] mometasone 220 mcg/ actuation (14) inhaler 1 puff (ASMANEX)  1 puff Inhalation QHS Luis Aceves MD   1 puff at 03/23/20 2207   ? [DISCONTINUED] multivitamin therapeutic tablet 1 tablet  1 tablet Oral DAILY Luis Aceves MD   1 tablet at 03/24/20 0845   ? [DISCONTINUED] omeprazole capsule 20 mg (PriLOSEC)  20 mg Oral QAM (0630) Luis Aceves MD   20 mg at 03/24/20 0646   ? [DISCONTINUED] ondansetron injection 4 mg (ZOFRAN)  4 mg Intravenous Q4H PRN Luis Aceves MD       ? [DISCONTINUED] ondansetron tablet 8 mg (ZOFRAN)  8 mg Oral Q8H PRN Luis Aceves MD       ? [DISCONTINUED] peg 400-propylene glycol ophthalmic solution 1 drop (SYSTANE)  1 drop Both Eyes QID PRN Luis Aceves MD   1 drop at 03/21/20 1921   ? [DISCONTINUED] polyethylene glycol packet 17 g (MIRALAX)  17 g Oral DAILY Luis Aceves MD   17 g at 03/21/20 1017   ? [DISCONTINUED] psyllium 3.4 gram packet 1 packet (METAMUCIL)  1 packet Oral DAILY Roxanna West MD       ? [DISCONTINUED] psyllium packet 1 packet (METAMUCIL)  1 packet Oral DAILY Luis Aceves MD       ? [DISCONTINUED] sodium chloride flush 2.5 mL (NS)  2.5 mL Intravenous Line Care Luis Aceves MD   2.5 mL at 03/24/20 0104   ? [DISCONTINUED] sodium chloride tablet 1 g  1 g Oral BID Roxanna West MD   1 g at 03/24/20 0845   ? [DISCONTINUED] timolol maleate 0.25 % ophthalmic solution 1 drop (TIMOPTIC)  1 drop Left Eye QAM (0630) Luis Aceves MD   1 drop at 03/24/20 0647   ? [DISCONTINUED] tiotropium inhalation capsule 2 puff (SPIRIVA)  2 puff Inhalation DAILY Ketan  MD Luis   2 puff at 03/24/20 0848   ? [DISCONTINUED] triamcinolone 0.1 % cream 1 application (KENALOG)  1 application Topical BID PRN Luis Aceves MD           Allergies     Allergies   Allergen Reactions   ? Cigarette Smoke Other (See Comments)     Hard to breathe.   ? Grass Pollen-Orchardgrass, Standard Other (See Comments)     Shortness of breath when lawn is just cut.       Review of Systems   A comprehensive review of 14 systems was done. Pertinent findings noted here and in history of present illness. All the rest negative.  Constitutional: Negative.  Negative for fever, chills, she hasactivity change, appetite change and fatigue.  reporting poor appetite and weight loss   HENT: Negative for congestion and facial swelling.    Eyes: Negative for photophobia, redness and visual disturbance.   Respiratory: Negative for cough and chest tightness.    Cardiovascular: Negative for chest pain, palpitations and leg swelling.   Gastrointestinal: Negative for nausea, diarrhea, constipation, blood in stool and abdominal distention.   Genitourinary: Negative.    Musculoskeletal: Negative.  Reporting weakness  Skin: Negative.    Neurological: Negative for dizziness, tremors, syncope, weakness, light-headedness and headaches.   Hematological: Does not bruise/bleed easily.   Psychiatric/Behavioral: Negative.        Physical Exam     Recent Vitals 3/24/2020   Height -   Weight -   BSA (m2) -   /67   Pulse 77   Temp 97.1   Temp src 1   SpO2 97   Some recent data might be hidden   WT 115LB    Constitutional: Oriented to person, place, and time and appears well-developed.  Frail and weak  HEENT:  Normocephalic and atraumatic.  Eyes: Conjunctivae and EOM are normal. Pupils are equal, round, and reactive to light. No discharge.  No scleral icterus. Nose normal. Mouth/Throat: Oropharynx is clear and moist. No oropharyngeal exudate.    NECK: Normal range of motion. Neck supple. No JVD present. No tracheal deviation  present. No thyromegaly present.   CARDIOVASCULAR: Normal rate, regular rhythm and intact distal pulses.  Exam reveals no gallop and no friction rub.  Systolic murmur present.  PULMONARY: Effort normal and breath sounds normal. No respiratory distress.No Wheezing or rales.  ABDOMEN: Soft. Bowel sounds are normal. No distension and no mass.  There is no tenderness. There is no rebound and no guarding. No HSM.  MUSCULOSKELETAL: Normal range of motion. No edema and no tenderness. Mild kyphosis, no tenderness.  LYMPH NODES: Has no cervical, supraclavicular, axillary and groin adenopathy.   NEUROLOGICAL: Alert and oriented to person, place, and time. No cranial nerve deficit.  Normal muscle tone. Coordination normal.   GENITOURINARY: Deferred exam.  SKIN: Skin is warm and dry. No rash noted. No erythema. No pallor.   EXTREMITIES: No cyanosis, no clubbing, no edema. No Deformity.  PSYCHIATRIC: Normal mood, affect and behavior.      Lab Results     Recent Results (from the past 240 hour(s))   ECG 12 lead nursing unit performed    Collection Time: 03/20/20  6:24 PM   Result Value Ref Range    SYSTOLIC BLOOD PRESSURE 187 mmHg    DIASTOLIC BLOOD PRESSURE 80 mmHg    VENTRICULAR RATE 83 BPM    ATRIAL RATE 83 BPM    P-R INTERVAL 150 ms    QRS DURATION 98 ms    Q-T INTERVAL 386 ms    QTC CALCULATION (BEZET) 453 ms    P Axis 90 degrees    R AXIS -8 degrees    T AXIS 46 degrees    MUSE DIAGNOSIS       Sinus rhythm with marked sinus arrhythmia  Left ventricular hypertrophy with repolarization abnormality  Abnormal ECG  When compared with ECG of 29-APR-2018 23:32,  Premature atrial complexes are no longer Present  ST more depressed Inferior leads  Confirmed by SEE ED PROVIDER NOTE FOR, ECG INTERPRETATION ([id number]),  ZANA WADSWORTH ([id number]) on 3/21/2020 7:13:12 AM     Comprehensive Metabolic Panel    Collection Time: 03/20/20  6:31 PM   Result Value Ref Range    Sodium 115 (LL) 136 - 145 mmol/L    Potassium 3.6 3.5 - 5.0 mmol/L     Chloride 85 (L) 98 - 107 mmol/L    CO2 21 (L) 22 - 31 mmol/L    Anion Gap, Calculation 9 5 - 18 mmol/L    Glucose 87 70 - 125 mg/dL    BUN 11 8 - 28 mg/dL    Creatinine 0.63 0.60 - 1.10 mg/dL    GFR MDRD Af Amer >60 >60 mL/min/1.73m2    GFR MDRD Non Af Amer >60 >60 mL/min/1.73m2    Bilirubin, Total 0.7 0.0 - 1.0 mg/dL    Calcium 8.5 8.5 - 10.5 mg/dL    Protein, Total 7.0 6.0 - 8.0 g/dL    Albumin 3.5 3.5 - 5.0 g/dL    Alkaline Phosphatase 60 45 - 120 U/L    AST 25 0 - 40 U/L    ALT 12 0 - 45 U/L   Lipase    Collection Time: 03/20/20  6:31 PM   Result Value Ref Range    Lipase 47 0 - 52 U/L   Troponin I    Collection Time: 03/20/20  6:31 PM   Result Value Ref Range    Troponin I 0.02 0.00 - 0.29 ng/mL   BNP(B-type Natriuretic Peptide)    Collection Time: 03/20/20  6:31 PM   Result Value Ref Range     (H) 0 - 167 pg/mL   HM1 (CBC with Diff)    Collection Time: 03/20/20  6:31 PM   Result Value Ref Range    WBC 6.4 4.0 - 11.0 thou/uL    RBC 2.93 (L) 3.80 - 5.40 mill/uL    Hemoglobin 9.1 (L) 12.0 - 16.0 g/dL    Hematocrit 26.0 (L) 35.0 - 47.0 %    MCV 89 80 - 100 fL    MCH 31.1 27.0 - 34.0 pg    MCHC 35.0 32.0 - 36.0 g/dL    RDW 12.4 11.0 - 14.5 %    Platelets 281 140 - 440 thou/uL    MPV 9.0 8.5 - 12.5 fL    Neutrophils % 71 (H) 50 - 70 %    Lymphocytes % 13 (L) 20 - 40 %    Monocytes % 13 (H) 2 - 10 %    Eosinophils % 2 0 - 6 %    Basophils % 0 0 - 2 %    Neutrophils Absolute 4.6 2.0 - 7.7 thou/uL    Lymphocytes Absolute 0.8 0.8 - 4.4 thou/uL    Monocytes Absolute 0.9 0.0 - 0.9 thou/uL    Eosinophils Absolute 0.1 0.0 - 0.4 thou/uL    Basophils Absolute 0.0 0.0 - 0.2 thou/uL   Lactic Acid    Collection Time: 03/20/20  6:31 PM   Result Value Ref Range    Lactic Acid 0.7 0.5 - 2.2 mmol/L   Culture, Urine    Collection Time: 03/20/20  6:33 PM    Specimen: Urine, Catheter   Result Value Ref Range    Culture No Growth    Urinalysis-UC if Indicated    Collection Time: 03/20/20  6:33 PM   Result Value Ref Range     Color, UA Yellow Colorless, Yellow, Straw, Light Yellow    Clarity, UA Clear Clear    Glucose, UA Negative Negative    Bilirubin, UA Negative Negative    Ketones, UA 25 mg/dL (!) Negative    Specific Gravity, UA 1.005 1.001 - 1.030    Blood, UA Negative Negative    pH, UA 8.0 4.5 - 8.0    Protein, UA Negative Negative mg/dL    Urobilinogen, UA <2.0 E.U./dL <2.0 E.U./dL, 2.0 E.U./dL    Nitrite, UA Negative Negative    Leukocytes, UA Moderate (!) Negative    Bacteria, UA Many (!) None Seen hpf    RBC, UA 0-2 None Seen, 0-2 hpf    WBC, UA  (!) None Seen, 0-5 hpf    Squam Epithel, UA 10-25 (!) None Seen, 0-5 lpf   Influenza A/B Rapid Test    Collection Time: 03/20/20  6:38 PM    Specimen: Nasopharyngeal Swab; Nasopharyngeal (Inpt/ED) or Nasal Mucosa (Outpt)   Result Value Ref Range    Influenza  A, Rapid Antigen No Influenza A antigen detected No Influenza A antigen detected    Influenza B, Rapid Antigen No Influenza B antigen detected No Influenza B antigen detected   Sodium, Random Urine    Collection Time: 03/20/20  7:17 PM   Result Value Ref Range    Sodium, Urine 64 mmol/L   Osmolality, Random, Urine    Collection Time: 03/20/20  7:17 PM   Result Value Ref Range    Osmolality, Urine 308 300 - 900 mOsm/kg   Osmolality    Collection Time: 03/20/20  7:30 PM   Result Value Ref Range    Osmolality, Blood 253 (L) 270 - 300 mOsm/kg   Sodium    Collection Time: 03/20/20 11:31 PM   Result Value Ref Range    Sodium 119 (LL) 136 - 145 mmol/L   Sodium    Collection Time: 03/21/20  4:52 AM   Result Value Ref Range    Sodium 125 (L) 136 - 145 mmol/L   Sodium    Collection Time: 03/21/20  7:53 AM   Result Value Ref Range    Sodium 123 (L) 136 - 145 mmol/L   Sodium    Collection Time: 03/21/20 12:17 PM   Result Value Ref Range    Sodium 123 (L) 136 - 145 mmol/L   Sodium    Collection Time: 03/21/20  2:33 PM   Result Value Ref Range    Sodium 122 (L) 136 - 145 mmol/L   Sodium    Collection Time: 03/21/20  4:37 PM   Result  Value Ref Range    Sodium 124 (L) 136 - 145 mmol/L   Sodium    Collection Time: 03/21/20  6:08 PM   Result Value Ref Range    Sodium 122 (L) 136 - 145 mmol/L   Sodium    Collection Time: 03/21/20  8:01 PM   Result Value Ref Range    Sodium 123 (L) 136 - 145 mmol/L   Sodium    Collection Time: 03/21/20 10:23 PM   Result Value Ref Range    Sodium 124 (L) 136 - 145 mmol/L   Sodium    Collection Time: 03/22/20 12:43 AM   Result Value Ref Range    Sodium 122 (L) 136 - 145 mmol/L   Sodium    Collection Time: 03/22/20  2:49 AM   Result Value Ref Range    Sodium 123 (L) 136 - 145 mmol/L   Sodium    Collection Time: 03/22/20  4:49 AM   Result Value Ref Range    Sodium 125 (L) 136 - 145 mmol/L   Platelet Count - every other day x 3    Collection Time: 03/22/20  4:49 AM   Result Value Ref Range    Platelets 281 140 - 440 thou/uL   Sodium    Collection Time: 03/22/20  6:30 AM   Result Value Ref Range    Sodium 126 (L) 136 - 145 mmol/L   TSH    Collection Time: 03/22/20  8:52 AM   Result Value Ref Range    TSH 1.17 0.30 - 5.00 uIU/mL   Sodium    Collection Time: 03/22/20  8:00 PM   Result Value Ref Range    Sodium 124 (L) 136 - 145 mmol/L   Renal Function Profile    Collection Time: 03/23/20  7:46 AM   Result Value Ref Range    Albumin 3.1 (L) 3.5 - 5.0 g/dL    Calcium 8.2 (L) 8.5 - 10.5 mg/dL    Phosphorus 3.5 2.5 - 4.5 mg/dL    Glucose 88 70 - 125 mg/dL    BUN 11 8 - 28 mg/dL    Creatinine 0.64 0.60 - 1.10 mg/dL    Sodium 126 (L) 136 - 145 mmol/L    Potassium 2.9 (L) 3.5 - 5.0 mmol/L    Chloride 93 (L) 98 - 107 mmol/L    CO2 23 22 - 31 mmol/L    Anion Gap, Calculation 10 5 - 18 mmol/L    GFR MDRD Af Amer >60 >60 mL/min/1.73m2    GFR MDRD Non Af Amer >60 >60 mL/min/1.73m2   Sodium    Collection Time: 03/23/20  7:46 AM   Result Value Ref Range    Sodium 126 (L) 136 - 145 mmol/L   Sodium    Collection Time: 03/23/20  7:49 PM   Result Value Ref Range    Sodium 125 (L) 136 - 145 mmol/L   Potassium    Collection Time: 03/23/20  8:55  PM   Result Value Ref Range    Potassium 5.3 (H) 3.5 - 5.0 mmol/L   Basic Metabolic Panel    Collection Time: 03/24/20  7:52 AM   Result Value Ref Range    Sodium 128 (L) 136 - 145 mmol/L    Potassium 4.6 3.5 - 5.0 mmol/L    Chloride 99 98 - 107 mmol/L    CO2 24 22 - 31 mmol/L    Anion Gap, Calculation 5 5 - 18 mmol/L    Glucose 91 70 - 125 mg/dL    Calcium 8.5 8.5 - 10.5 mg/dL    BUN 13 8 - 28 mg/dL    Creatinine 0.67 0.60 - 1.10 mg/dL    GFR MDRD Af Amer >60 >60 mL/min/1.73m2    GFR MDRD Non Af Amer >60 >60 mL/min/1.73m2   Platelet Count - every other day x 3    Collection Time: 03/24/20  7:52 AM   Result Value Ref Range    Platelets 268 140 - 440 thou/uL   Sodium    Collection Time: 03/24/20  7:52 AM   Result Value Ref Range    Sodium 128 (L) 136 - 145 mmol/L            Imaging Results     Xr Chest 1 View Portable    Result Date: 3/20/2020  EXAM: XR CHEST 1 VIEW PORTABLE LOCATION: [facility] DATE/TIME: 3/20/2020 7:05 PM INDICATION: fever COMPARISON: 09/18/2018     Poststernotomy changes with AVR. Mitral annular calcifications. Cardiomegaly has increased in the interval. No signs of failure or pneumonia. Prior vertebroplasty at T12. Severe degenerative changes in the right shoulder.    Ct Abdomen Pelvis Without Oral With Iv Contrast    Result Date: 3/20/2020  EXAM: CT ABDOMEN PELVIS WO ORAL W IV CONTRAST LOCATION: [facility] DATE/TIME: 3/20/2020 7:52 PM INDICATION: Abdominal pain, fever rlq abd pain COMPARISON: 06/17/2019 and older studies, chest CT 4/30/2018 and 11/19/2012 TECHNIQUE: CT scan of the abdomen and pelvis was performed following injection of IV contrast. Multiplanar reformats were obtained. Dose reduction techniques were used. CONTRAST: Iohexol (Omni) 100 mL FINDINGS: LOWER CHEST: A 5 mm slightly lobulated nodule in the right lower lobe is stable. Mitral annular calcifications. Multichamber cardiac enlargement. Mild pectus excavatum deformity. HEPATOBILIARY: Multiple liver cysts.  PANCREAS: Normal. SPLEEN: Normal. ADRENAL GLANDS: Normal. KIDNEYS/BLADDER: Bilateral renal cysts. No calculi or obstructive changes. Extrarenal pelves bilaterally. Bladder is moderately distended. BOWEL: Redundant colon with large stool burden. Appendix not identified. Small bowel loops are unremarkable. Trace ascites in the pelvis. LYMPH NODES: Normal. VASCULATURE: Extensive arterial calcifications. No aneurysm. PELVIC ORGANS: Normal. MUSCULOSKELETAL: Prior vertebroplasty at T12. Extensive degenerative changes throughout the lumbar spine. Mild lumbar rotoscoliosis.     1.  No abnormalities are seen to explain patient's symptoms. No bowel obstruction or inflammatory changes. 2.  Redundant colon with significant stool burden. 3.  Moderate bladder distention. 4.  Sliver of ascites in the pelvis. 5.  Liver and renal cysts noted. 6.  Multichamber cardiac enlargement. 7.  Stable right lower lobe pulmonary nodule.             JUANJO Griffiths

## 2021-06-22 ENCOUNTER — RECORDS - HEALTHEAST (OUTPATIENT)
Dept: ADMINISTRATIVE | Facility: OTHER | Age: 86
End: 2021-06-22

## 2021-06-22 ENCOUNTER — RECORDS - HEALTHEAST (OUTPATIENT)
Dept: CARDIOLOGY | Facility: CLINIC | Age: 86
End: 2021-06-22

## 2021-06-24 ENCOUNTER — OFFICE VISIT - HEALTHEAST (OUTPATIENT)
Dept: CARDIOLOGY | Facility: CLINIC | Age: 86
End: 2021-06-24

## 2021-06-24 DIAGNOSIS — R06.09 DYSPNEA ON EXERTION: ICD-10-CM

## 2021-06-24 DIAGNOSIS — I50.32 CHRONIC DIASTOLIC HEART FAILURE (H): ICD-10-CM

## 2021-06-24 DIAGNOSIS — Z95.2 S/P AVR: ICD-10-CM

## 2021-06-24 DIAGNOSIS — I34.0 NONRHEUMATIC MITRAL VALVE REGURGITATION: ICD-10-CM

## 2021-06-24 DIAGNOSIS — Z95.1 S/P CABG (CORONARY ARTERY BYPASS GRAFT): ICD-10-CM

## 2021-06-24 ASSESSMENT — MIFFLIN-ST. JEOR: SCORE: 891.69

## 2021-06-25 NOTE — PROGRESS NOTES
Progress Notes by Julia Amaya MD at 6/7/2017  9:50 AM     Author: Julia Amaya MD Service: -- Author Type: Physician    Filed: 6/7/2017  3:31 PM Encounter Date: 6/7/2017 Status: Signed    : Julia Amaya MD (Physician)           Click to link to [facility] HEART CARE NOTE    Thank you, Dr. Goyal, for asking the [facility] Care team to see Ms. Marla Dunn to evaluate preoperative risk prior to rectal surgery for prolapse.    Assessment/Recommendations   Assessment:    1.  Coronary artery disease, status post LIMA graft to ramus intermedius branch in 2009.  Patient denies any symptoms of exertional chest discomfort or dyspnea although her activity level is limited given her advanced age and arthritic issues.  I have suggested a nuclear stress study as it has been several years.  If this is unrevealing, patient can proceed with surgery.  2.  Aortic valve stenosis, status post bioprosthetic aortic valve replacement in 2009.  Echocardiogram in December 2016 demonstrate normal bioprosthetic valve function.  3.  Essential hypertension, well controlled  4.  Hyperlipidemia with excellent lipid profile in November 2016.  Continue current statin    Plan:  1.  Set up pharmacologic nuclear stress study.  If this is negative, patient can proceed with surgery as advised  2.  Plan echocardiogram in 1 year  3.  Tentative follow-up in 1 year       History of Present Illness    Ms. Marla Dunn is a 88 y.o. female with history of coronary artery disease and aortic valve stenosis, status post LIMA graft to ramus branch and bioprosthetic aortic valve replacement in 2009 at [facility] in [place], [place] who presents today for follow-up visit as well as preoperative assessment.  She reports no complaints of exertional chest discomfort or exertional dyspnea.  She does get short of breath after eating but believes this is related to some abdominal bloating.  She has been  diagnosed with rectal prolapse and has been advised to undergo surgery.  She does have a history of OLEGARIO and wears CPAP mask nightly without any issues.  She denies orthopnea, PND or lower extremity edema.    ECG (personally reviewed): No EKG today    ECHO (personally reviewed): Echocardiogram in December 2016 demonstrate normal LV systolic performance without wall motion abnormality and normal functioning of her bioprosthetic aortic valve      Physical Examination Review of Systems   Vitals:    06/07/17 0952   BP: 142/72   Pulse: 76   Resp: 18     Body mass index is 23.52 kg/(m^2).  Wt Readings from Last 3 Encounters:   06/07/17 137 lb (62.1 kg)   04/20/17 137 lb (62.1 kg)   01/19/17 137 lb 14.4 oz (62.6 kg)     General Appearance:   Awake, Alert, No acute distress.   HEENT:  No scleral icterus; the mucous membranes were pink and moist.   Neck: No cervical bruits or jugular venous distention    Chest: The spine was straight. The chest was symmetric.   Lungs:   Respirations unlabored; the lungs are clear to auscultation. No wheezing   Cardiovascular:    regular rhythm with occasional ectopic beats.  S1, S2 normal.  2/6 systolic flow murmur heard at the left upper sternal border.  No other murmurs identified.     Abdomen:  No organomegaly, masses, bruits, or tenderness. Bowels sounds are present   Extremities:  no peripheral edema bilaterally    Skin: No xanthelasma. Warm, Dry.   Musculoskeletal: No tenderness.   Neurologic: Mood and affect are appropriate.    General: WNL  Eyes: WNL  Ears/Nose/Throat: WNL  Lungs: WNL  Heart: WNL  Stomach: WNL  Bladder: WNL  Muscle/Joints: WNL  Skin: WNL  Nervous System: WNL  Mental Health: WNL     Blood: WNL     Medical History  Surgical History Family History Social History   Past Medical History:   Diagnosis Date   ? Asthma    ? Bilateral carpal tunnel syndrome 8/27/2015   ? Chronic Obstructive Pulmonary Disease     Created by Conversion    ? Coronary artery disease    ? Heart  disease    ? Hyperlipidemia     Created by Conversion    ? Polyarthritis Of The Hand     Created by Conversion    ? Rheumatoid arthritis    ? Sjoegren syndrome    ? Sleep apnea     Past Surgical History:   Procedure Laterality Date   ? APPENDECTOMY     ? BACK SURGERY      spinal stenosis   ? EYE SURGERY Bilateral     cornea transplant left eye & cataracts   ? [place] CABG, VEIN, SINGLE      Description: CABG (CABG);  Recorded: 03/09/2012;  Comments: Single vessel bypass graft to an obtuse marginal branch in May 2009   ? [place] RPLCMT PROST AORTIC VALVE OPEN XCP HOMOGRF/STENT      Description: Aortic Valve Replacement;  Recorded: 03/09/2012;  Comments: Aortic valve replacement    Family History   Problem Relation Age of Onset   ? Cancer Mother    ? Heart disease Father     Social History     Social History   ? Marital status:      Spouse name: N/A   ? Number of children: N/A   ? Years of education: N/A     Occupational History   ? Not on file.     Social History Main Topics   ? Smoking status: Never Smoker   ? Smokeless tobacco: Not on file   ? Alcohol use No      Comment: occasional glass of wine   ? Drug use: No   ? Sexual activity: Not on file     Other Topics Concern   ? Not on file     Social History Narrative          Medications  Allergies   Current Outpatient Prescriptions   Medication Sig Dispense Refill   ? acetaminophen (TYLENOL) 325 MG tablet Take 650 mg by mouth every 6 (six) hours as needed for pain.     ? amLODIPine (NORVASC) 10 MG tablet Take 10 mg by mouth daily.     ? amoxicillin (AMOXIL) 500 MG capsule 500 mg. Pre denta;     ? ascorbic acid (ASCORBIC ACID WITH MILTON HIPS) 500 MG tablet Take 500 mg by mouth daily.     ? aspirin 325 MG tablet Take 325 mg by mouth daily.     ? calcium carbonate (OS-PERICO) 600 mg (1,500 mg) tablet Take 600 mg by mouth 2 (two) times a day with meals.     ? cholecalciferol, vitamin D3, 1,000 unit tablet Take 1,000 Units by mouth daily.     ? citalopram (CELEXA) 20 MG tablet  Take 20 mg by mouth daily.     ? cycloSPORINE (RESTASIS) 0.05 % ophthalmic emulsion Administer 1 drop to both eyes 2 (two) times a day.     ? desloratadine (CLARINEX) 5 mg tablet Take 5 mg by mouth daily as needed.     ? docusate sodium (COLACE) 100 MG capsule Take 100 mg by mouth 2 (two) times a day.     ? fluticasone (FLONASE) 50 mcg/actuation nasal spray 1 spray into each nostril daily as needed.      ? ibandronate (BONIVA) 150 mg tablet Take 150 mg by mouth every 30 (thirty) days. Take in AM with glass of water prior to food, don't lie down for 30 minutes.     ? ipratropium-albuterol (COMBIVENT RESPIMAT)  mcg/actuation Aero inhaler Inhale 1 puff 4 (four) times a day as needed.      ? levothyroxine (SYNTHROID, LEVOTHROID) 75 MCG tablet Take 75 mcg by mouth daily.     ? loteprednol (LOTEMAX) 0.5 % ophthalmic suspension Administer 1 drop into the left eye every morning.      ? metoprolol tartrate (LOPRESSOR) 25 MG tablet Take 25 mg by mouth every morning. 1/2 tablet bid      ? multivitamin therapeutic (THERAGRAN) tablet Take 1 tablet by mouth daily.  0   ? nitroglycerin (NITROSTAT) 0.4 MG SL tablet Place 0.4 mg under the tongue every 5 (five) minutes as needed for chest pain.     ? peg 400-propylene glycol (SYSTANE) 0.4-0.3 % Drop Administer 1 drop to the right eye 4 (four) times a day as needed.     ? polyethylene glycol (MIRALAX) 17 gram packet Take 17 g by mouth daily as needed.     ? psyllium (METAMUCIL) Pack packet Take 1 packet by mouth daily as needed.     ? simvastatin (ZOCOR) 20 MG tablet Take 20 mg by mouth bedtime.     ? timolol maleate (TIMOPTIC) 0.5 % ophthalmic solution Administer 1 drop into the left eye every morning.      ? triamcinolone (KENALOG) 0.1 % cream Apply 1 application topically 2 (two) times a day as needed.      ? metoprolol tartrate (LOPRESSOR) 25 MG tablet Take 12.5 mg by mouth every evening.       No current facility-administered medications for this visit.       Allergies    Allergen Reactions   ? Cigarette Smoke Other (See Comments)     Hard to breathe.   ? Grass Pollen-Orchardgrass, Standard Other (See Comments)     Shortness of breath when lawn is just cut.         Lab Results    Chemistry/lipid CBC Cardiac Enzymes/BNP/TSH/INR   Lab Results   Component Value Date    CHOL 149 11/09/2016    HDL 55 11/09/2016    LDLCALC 84 11/09/2016    TRIG 48 11/09/2016    CREATININE 0.57 (L) 05/14/2016    BUN 9 05/14/2016    K 4.4 11/09/2016     11/09/2016     11/09/2016    CO2 27 11/09/2016    Lab Results   Component Value Date    WBC 8.2 05/13/2016    HGB 12.0 05/13/2016    HCT 35.6 05/13/2016    MCV 89 05/13/2016     05/13/2016    Lab Results   Component Value Date    CKTOTAL 178 05/15/2014    CKMB 5 06/26/2011    TROPONINI 0.07 05/11/2016     (H) 06/26/2011    TSH 2.48 05/14/2016

## 2021-06-27 NOTE — PROGRESS NOTES
Progress Notes by Julia Amaya MD at 5/14/2019 11:30 AM     Author: Julia Amaya MD Service: -- Author Type: Physician    Filed: 5/14/2019 11:49 AM Encounter Date: 5/14/2019 Status: Signed    : Julia Amaya MD (Physician)           Click to link to [facility] HEART CARE NOTE    Thank you, Dr. Goyal, for asking the [facility] Heart Care team to see Ms. Marla Dunn to follow-up on coronary artery disease and bioprosthetic aortic valve.    Assessment/Recommendations   Assessment:    1.  Coronary artery disease, status post LIMA graft to ramus intermedius branch in 2009.  Patient reports no symptoms of exertional chest discomfort despite walking a mile a day.  Her last stress test in 2017 was negative for ischemia.  At this point, continue with medical management and risk factor modification.  2.  With aortic valve stenosis, status post bioprosthetic aortic valve replacement in 2009.  Echocardiogram in September 2018 showed normal valve function and no change in gradient from her previous study in 2016.  3.  Essential hypertension, well controlled  4.  Hyperlipidemia, well treated.    Plan:  1.  Continue current medications  2.  Follow-up in 1 year or sooner if clinically indicated       History of Present Illness    Ms. Marla Dunn is a 89 y.o. female with history of aortic valve stenosis and coronary artery disease, status post LIMA graft to a ramus intermedius branch and bioprosthetic aortic valve replacement in 2009 at [facility] in [place], [place] who presents today for follow-up visit.  I last saw her nearly 2 years ago for preoperative assessment.  She subsequently underwent a nuclear stress study which was unremarkable and proceeded ahead with surgery without cardiac issues.  Today, she returns to the office telling me she has been doing well from a cardiac standpoint.  She denies any exertional chest discomfort but does have chronic exertional  dyspnea.  She continues to walk a mile a day without a recent decline.  She did undergo an echocardiogram in September 2018 demonstrating normal bioprosthetic aortic valve function.  No orthopnea, PND or lower extremity edema.    ECG (personally reviewed): No ECG today    Cardiac Imaging Studies (personally reviewed): No recent imaging     Physical Examination Review of Systems   Vitals:    05/14/19 1121   BP: 138/70   Pulse: 76   Resp: 16     Body mass index is 20.9 kg/m .  Wt Readings from Last 3 Encounters:   05/14/19 118 lb (53.5 kg)   09/28/18 129 lb (58.5 kg)   05/02/18 129 lb 1 oz (58.5 kg)     General Appearance:   Awake, Alert, No acute distress.   HEENT:  No scleral icterus; the mucous membranes were pink and moist.   Neck: No cervical bruits or jugular venous distention    Chest: The spine was straight. The chest was symmetric.   Lungs:   Respirations unlabored; the lungs are clear to auscultation. No wheezing   Cardiovascular:    Regular rate and rhythm with occasional ectopic beat.  S1, S2 normal.  2/6 mid peaking crescendo decrescendo systolic murmur heard at the left sternal border.   Abdomen:  No organomegaly, masses, bruits, or tenderness. Bowels sounds are present   Extremities:  No peripheral edema, clubbing or cyanosis.   Skin: No xanthelasma. Warm, Dry.   Musculoskeletal: No tenderness.   Neurologic: Mood and affect are appropriate.    General: WNL  Eyes: WNL  Ears/Nose/Throat: WNL  Lungs: WNL  Heart: WNL  Stomach: WNL  Bladder: WNL  Muscle/Joints: WNL  Skin: WNL  Nervous System: WNL  Mental Health: WNL     Blood: WNL     Medical History  Surgical History Family History Social History   Past Medical History:   Diagnosis Date   ? Asthma    ? Bilateral carpal tunnel syndrome 8/27/2015   ? Chronic Obstructive Pulmonary Disease     Created by Conversion    ? Coronary artery disease    ? Family history of myocardial infarction     father 64   ? GERD (gastroesophageal reflux disease)    ? Heart  disease    ? High cholesterol     dislipidemia   ? Hyperlipidemia     Created by Conversion    ? Hypertension    ? Myocardial infarction (H) 1983   ? Polyarthritis Of The Hand     Created by Conversion    ? Rheumatoid arthritis (H)    ? Sjoegren syndrome (H)    ? Sleep apnea, obstructive     Past Surgical History:   Procedure Laterality Date   ? AORTIC VALVE REPLACEMENT  2012   ? APPENDECTOMY     ? BACK SURGERY      spinal stenosis   ? CARDIAC CATHETERIZATION     ? CORONARY ARTERY BYPASS GRAFT  2009    LIMA to ramus intermedius   ? EYE SURGERY Bilateral     cornea transplant left eye & cataracts   ? [place] CABG, VEIN, SINGLE      Description: CABG (CABG);  Recorded: 03/09/2012;  Comments: Single vessel bypass graft to an obtuse marginal branch in May 2009   ? [place] RPLCMT PROST AORTIC VALVE OPEN XCP HOMOGRF/STENT      Description: Aortic Valve Replacement;  Recorded: 03/09/2012;  Comments: Aortic valve replacement   ? Rectosigmoidectomy perineal  01/2018    Family History   Problem Relation Age of Onset   ? Cancer Mother    ? Heart disease Father     Social History     Socioeconomic History   ? Marital status:      Spouse name: Not on file   ? Number of children: Not on file   ? Years of education: Not on file   ? Highest education level: Not on file   Occupational History   ? Not on file   Social Needs   ? Financial resource strain: Not on file   ? Food insecurity:     Worry: Not on file     Inability: Not on file   ? Transportation needs:     Medical: Not on file     Non-medical: Not on file   Tobacco Use   ? Smoking status: Never Smoker   ? Smokeless tobacco: Never Used   Substance and Sexual Activity   ? Alcohol use: No     Alcohol/week: 0.0 oz     Comment: occasional glass of wine   ? Drug use: No   ? Sexual activity: Not on file   Lifestyle   ? Physical activity:     Days per week: Not on file     Minutes per session: Not on file   ? Stress: Not on file   Relationships   ? Social connections:     Talks on  phone: Not on file     Gets together: Not on file     Attends Denominational service: Not on file     Active member of club or organization: Not on file     Attends meetings of clubs or organizations: Not on file     Relationship status: Not on file   ? Intimate partner violence:     Fear of current or ex partner: Not on file     Emotionally abused: Not on file     Physically abused: Not on file     Forced sexual activity: Not on file   Other Topics Concern   ? Not on file   Social History Narrative   ? Not on file          Medications  Allergies   Current Outpatient Medications   Medication Sig Dispense Refill   ? acetaminophen (TYLENOL) 500 MG tablet Take 500-1,000 mg by mouth every 6 (six) hours as needed for pain.     ? albuterol (PROVENTIL) 2.5 mg /3 mL (0.083 %) nebulizer solution Take 2.5 mg by nebulization every 6 (six) hours as needed for wheezing or shortness of breath.     ? amLODIPine (NORVASC) 10 MG tablet Take 10 mg by mouth daily.     ? amoxicillin (AMOXIL) 500 MG capsule Take 2,000 mg by mouth as needed (prior to dental procedures).      ? ascorbic acid (ASCORBIC ACID WITH MILTON HIPS) 500 MG tablet Take 500 mg by mouth daily.     ? aspirin 81 MG EC tablet Take 81 mg by mouth daily.     ? benzonatate (TESSALON) 100 MG capsule Take 1 capsule (100 mg total) by mouth 3 (three) times a day as needed for cough. 20 capsule 0   ? calcium carbonate-vitamin D3 (CALCIUM 600 + D,3,) 600 mg(1,500mg) -200 unit per tablet Take 1 tablet by mouth daily.     ? calcium, as carbonate, (TUMS) 200 mg calcium (500 mg) chewable tablet Chew 1-2 tablets 3 (three) times a day as needed for heartburn.     ? cholecalciferol, vitamin D3, 1,000 unit tablet Take 2,000 Units by mouth daily.      ? citalopram (CELEXA) 20 MG tablet Take 20 mg by mouth daily.     ? cycloSPORINE (RESTASIS) 0.05 % ophthalmic emulsion Administer 1 drop to both eyes 2 (two) times a day.     ? DARBEPOETIN MITCHELL IN POLYSORBAT (ARANESP, IN POLYSORBATE, INJ) Inject  as directed see administration instructions. Gets every 3-4 weeks from [facility].     ? desloratadine (CLARINEX) 5 mg tablet Take 5 mg by mouth daily.      ? docusate sodium (COLACE) 100 MG capsule Take 100 mg by mouth 2 (two) times a day.     ? fluorometholone (FML) 0.1 % ophthalmic suspension Administer 1 drop into the left eye every morning.     ? fluticasone (FLONASE) 50 mcg/actuation nasal spray 1 spray into each nostril daily as needed.      ? guaiFENesin (ROBITUSSIN) 100 mg/5 mL syrup Take 5 mL (100 mg total) by mouth every 6 (six) hours as needed for cough or congestion.  0   ? ibandronate (BONIVA) 150 mg tablet Take 150 mg by mouth every 30 (thirty) days. Take in AM with glass of water prior to food, don't lie down for 30 minutes.     ? ipratropium-albuterol (COMBIVENT RESPIMAT)  mcg/actuation Aero inhaler Inhale 2 puffs 4 (four) times a day as needed.      ? Lactobacillus rhamnosus GG (CULTURELLE) 10-15 Billion cell capsule Take 1 capsule by mouth daily.     ? levothyroxine (SYNTHROID, LEVOTHROID) 75 MCG tablet Take 75 mcg by mouth daily.     ? metoprolol tartrate (LOPRESSOR) 25 MG tablet Take 12.5 mg by mouth every evening. (1/2 of 25 mg tab = 12.5 mg dose)     ? metoprolol tartrate (LOPRESSOR) 25 MG tablet Take 25 mg by mouth every morning.     ? multivitamin therapeutic (THERAGRAN) tablet Take 1 tablet by mouth daily.  0   ? peg 400-propylene glycol (SYSTANE) 0.4-0.3 % Drop Administer 1 drop to both eyes 4 (four) times a day as needed (dry eyes).      ? polyethylene glycol (MIRALAX) 17 gram packet Take 17 g by mouth daily.      ? psyllium (METAMUCIL) Pack packet Take by mouth daily as needed. Dose = ~1/4 tsp     ? timolol maleate (TIMOPTIC) 0.5 % ophthalmic solution Administer 1 drop into the left eye every morning. Administer ~10 minutes after FML drops.     ? triamcinolone (KENALOG) 0.1 % cream Apply 1 application topically 2 (two) times a day as needed (rash).      ? VIT  C/E/ZN/COPPR/LUTEIN/ZEAXAN (PRESERVISION AREDS 2 ORAL) Take 1 tablet by mouth 2 (two) times a day with meals.     ? furosemide (LASIX) 20 MG tablet Take 10 mg by mouth daily. (1/2 of 20 mg tab = 10 mg dose)     ? peg 400-propylene glycol (SYSTANE) 0.4-0.3 % Drop Administer 1 drop to the right eye every morning.     ? predniSONE (DELTASONE) 10 mg tablet Take 3 tab QDay x 3 days then 2 tab QDay x 3 days then 1 tab QDay x 3 days then stop. 18 tablet 0   ? simvastatin (ZOCOR) 20 MG tablet Take 20 mg by mouth bedtime.       No current facility-administered medications for this visit.       Allergies   Allergen Reactions   ? Cigarette Smoke Other (See Comments)     Hard to breathe.   ? Grass Pollen-Orchardgrass, Standard Other (See Comments)     Shortness of breath when lawn is just cut.         Lab Results    Chemistry/lipid CBC Cardiac Enzymes/BNP/TSH/INR   Lab Results   Component Value Date    CHOL 149 11/09/2016    HDL 55 11/09/2016    LDLCALC 84 11/09/2016    TRIG 48 11/09/2016    CREATININE 0.62 05/02/2018    BUN 12 05/02/2018    K 4.0 05/02/2018     05/02/2018     05/02/2018    CO2 25 05/02/2018    Lab Results   Component Value Date    WBC 9.7 04/30/2018    HGB 10.5 (L) 04/30/2018    HCT 31.0 (L) 04/30/2018    MCV 89 04/30/2018     05/02/2018    Lab Results   Component Value Date    CKTOTAL 178 05/15/2014    CKMB 5 06/26/2011    TROPONINI 0.04 04/30/2018     (H) 04/29/2018    TSH 0.56 04/30/2018

## 2021-06-28 NOTE — PROGRESS NOTES
"Progress Notes by Cherri Carroll CNP at 4/8/2020  3:52 PM     Author: Cherri Carroll CNP Service: -- Author Type: Nurse Practitioner    Filed: 4/9/2020  7:12 PM Encounter Date: 4/8/2020 Status: Attested    : Cherri Carroll CNP (Nurse Practitioner) Cosigner: Ifrah Mairn MBBS at 4/10/2020 10:53 AM    Attestation signed by Ifrah Marin MBBS at 4/10/2020 10:53 AM    AGREE WITH [facility]   Video Visit    Code Status: DNR    Marla Dunn is a 89 y.o. female who is being evaluated via a billable video visit.      The patient has been notified of following:     \"This video visit will be conducted via a call between you and your physician/provider. We have found that certain health care needs can be provided without the need for an in-person physical exam.  This service lets us provide the care you need with a video conversation.  If a prescription is necessary we can send it to the facility team.  If lab work is needed we can place an order through the facility team to have that test done at a later time.    If during the course of the call the physician/provider feels a video visit is not appropriate, you will not be charged for this service.\"     Physician/provider has received verbal consent for a Video Visit from the patient? Yes      Video Start Time:250pm    Chief Complaint/Reason for Visit:  Chief Complaint   Patient presents with   ? Discharge Summary       HPI:   [place] is a 89 y.o. female who is being sen for a face to face visit for an anticipated dc home on 4/9/20. She will have  services upon dc home. She comes from [facility] and per her EMR \" with a past medical history of COPD hypertension sjogren's syndrome presented with generalized weakness, who was admitted on 3/20/2020 for hyponatremia, sodium of 119, abnormal urinalysis. she was seen by nephrology.  She has acute on chronic hyponatremia with sodium usually around low 130s.  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  initially treated with gentle normal saline.  later on placed on sodium tablets, fluid restriction 1500 mL/day.  She had improvement in her sodium up to 128 at discharge to 134 leaving the TCU.  Urine culture returned negative.     I have reviewed and updated the patient's Past Medical History, Social History, Family History and Medication List.    ALLERGIES  Cigarette smoke and Grass pollen-orchardgrass, standard    Review of Systems    Vitals:    04/09/20 1849   BP: 125/67   Pulse: 68   Temp: 98.1  F (36.7  C)   Weight: 115 lb (52.2 kg)         MEDICATION LIST:  Current Outpatient Medications   Medication Sig   ? acetaminophen (TYLENOL) 500 MG tablet Take 500-1,000 mg by mouth every 6 (six) hours as needed for pain.   ? albuterol (PROVENTIL) 2.5 mg /3 mL (0.083 %) nebulizer solution Take 2.5 mg by nebulization every 6 (six) hours as needed for wheezing or shortness of breath.   ? amLODIPine (NORVASC) 10 MG tablet Take 10 mg by mouth daily.   ? amoxicillin (AMOXIL) 500 MG capsule Take 2,000 mg by mouth as needed (prior to dental procedures).    ? ascorbic acid (ASCORBIC ACID WITH MILTON HIPS) 500 MG tablet Take 500 mg by mouth daily.   ? ASMANEX TWISTHALER 220 mcg/ actuation (60) inhaler Inhale 1 puff at bedtime.   ? calcium carbonate-vitamin D3 (CALCIUM 600 + D,3,) 600 mg(1,500mg) -200 unit per tablet Take 1 tablet by mouth daily.   ? calcium, as carbonate, (TUMS) 200 mg calcium (500 mg) chewable tablet Chew 1-2 tablets 3 (three) times a day as needed for heartburn.   ? cholecalciferol, vitamin D3, 1,000 unit tablet Take 2,000 Units by mouth daily.    ? citalopram (CELEXA) 20 MG tablet Take 20 mg by mouth daily.   ? cycloSPORINE (RESTASIS) 0.05 % ophthalmic emulsion Administer 1 drop into the left eye 2 (two) times a day.    ? cycloSPORINE (RESTASIS) 0.05 % ophthalmic emulsion Administer 1 drop to the right eye daily.   ? DARBEPOETIN MITCHELL IN  POLYSORBAT (ARANESP, IN POLYSORBATE, INJ) Inject as directed see administration instructions. Gets every 3-4 weeks from [facility].   ? docusate sodium (COLACE) 100 MG capsule Take 100 mg by mouth 2 (two) times a day.   ? fluorometholone (FML) 0.1 % ophthalmic suspension Administer 1 drop into the left eye every morning.   ? ibandronate (BONIVA) 150 mg tablet Take 150 mg by mouth every 30 (thirty) days. Take in AM with glass of water prior to food, don't lie down for 30 minutes.   ? ipratropium-albuterol (COMBIVENT RESPIMAT)  mcg/actuation Aero inhaler Inhale 2 puffs 4 (four) times a day.    ? Lactobacillus rhamnosus GG (CULTURELLE) 10-15 Billion cell capsule Take 1 capsule by mouth daily.   ? levothyroxine (SYNTHROID, LEVOTHROID) 88 MCG tablet Take 88 mcg by mouth daily.   ? metoprolol tartrate (LOPRESSOR) 25 MG tablet Take 25 mg by mouth 2 (two) times a day.    ? multivitamin therapeutic (THERAGRAN) tablet Take 1 tablet by mouth daily.   ? omeprazole (PRILOSEC) 20 MG capsule Take 20 mg by mouth daily before breakfast.   ? peg 400-propylene glycol (SYSTANE) 0.4-0.3 % Drop Administer 1 drop to both eyes 4 (four) times a day as needed (dry eyes).    ? polyethylene glycol (MIRALAX) 17 gram packet Take 17 g by mouth daily.    ? psyllium (METAMUCIL) Pack packet Take by mouth daily. Dose = ~1/2 tsp   ? sodium chloride 1 gram tablet Take 1 tablet (1 g total) by mouth 2 (two) times a day.   ? timolol maleate (TIMOPTIC) 0.25 % ophthalmic solution Administer 1 drop into the left eye daily before breakfast. Administer 10 minutes after FML drops   ? triamcinolone (KENALOG) 0.1 % cream Apply 1 application topically 2 (two) times a day as needed (rash).    ? VIT C/E/ZN/COPPR/LUTEIN/ZEAXAN (PRESERVISION AREDS 2 ORAL) Take 1 tablet by mouth 2 (two) times a day with meals.       Labs: Last NA was at 134, up from 128.      Assessment/Plan:    ICD-10-CM    1. Acute hyponatremia  E87.1    2. Chronic obstructive pulmonary  disease, unspecified COPD type (H)  J44.9         MEDICAL EQUIPMENT NEEDS:  N/A    DISCHARGE PLAN/FACE TO FACE: I certify that this patient is under Dr. Marin's care, seen by the NP, and had a face-to-face encounter that meets the physician face-to-face encounter requirements.  The encounter was in whole, or part related to the primary reason for home health.  The Patient is homebound due to: General deconditioned state due to hyponatremia and CHF and  it is taxing and it will take a considerable amount of effort for patient to leave the home.  She is dependent on others for transportation.  The patient is confined to her home and needs intermittent skilled nursing, PT,OT, RN, and HHA.  The patient has been under the care of Dr. Marin/NP and Dr. Marin  initiated the establishment of the plan of care.        Patient to be followed by home care for physical therapy to eval and treat for strengthening, balance, endurance, and safety with mobility, and ambulation.  Patient to be followed by home care for occupational therapy to eval and treat for strengthening, ADL needs, adaptive equipment, and safety.  Patient to be followed by home care for nursing services for medication set up and teaching, symptom and disease processes monitoring and education.    Patient to be followed by home care for home health aid services for bathing and ADL needs.  Planned discharge.  All therapy goals have been met.  Family will assist with discharge and transportation.        Patient will follow up with PCP within 7- days after discharge for medication mangagment and appropriate lab studies.            Date of Video Encounter: 4/8/20      PCP: Sally Goyal MD   Phone: [phone]   Fax: [phone]    Video-Visit Details    Type of service:  Video Visit    Video End Time (time video stopped): 315pm    Originating Location (pt. Location):[facility] [[id number]]    Distant Location (provider location):  [facility]     Mode of Communication:  Zoom Video Conference    Cherri Carroll, CNP

## 2021-06-29 ENCOUNTER — RECORDS - HEALTHEAST (OUTPATIENT)
Dept: LAB | Facility: CLINIC | Age: 86
End: 2021-06-29

## 2021-06-29 NOTE — PROGRESS NOTES
Progress Notes by Julia Amaya MD at 10/1/2020  3:10 PM     Author: Julia Amaya MD Service: -- Author Type: Physician    Filed: 10/1/2020  5:14 PM Encounter Date: 10/1/2020 Status: Signed    : Julia Amaya MD (Physician)           Thank you, Dr. Goyal, for asking the [facility] team to see Ms. Marla Dunn to follow-up on chronic diastolic heart failure and questionable bradycardia.    Assessment/Recommendations   Assessment:    1.  Acute on chronic diastolic heart failure, likely precipitated by a new onset atrial fibrillation.  Patient with complaints of increased shortness of breath, orthopnea and lower extremity edema.  At this point, have recommended that we increase her bumetanide to 1 mg twice daily both today and tomorrow, then decrease back to 1 mg daily.  Also recommended addition of spironolactone 25 mg daily as this will help to keep her potassium level higher.  She will be following up with nephrology next week who can recheck her basic metabolic profile.  2.  New onset atrial fibrillation, duration unknown.  Her rate today is at the upper limits of normal at 97.  Suspect she may need to have her dose of metoprolol increased back to 25 mg twice daily although given reports of low heart rate recently, will hold off on increasing it until a Holter monitor can be performed.  If that clearly shows elevated heart rates, then we will want to increase her metoprolol.  With regards to her stroke risk, she carries a [id number] score of 5 yielding a risk of 6.7 %/year.  This would favor initiation of anticoagulation although I am hesitant given her low hemoglobin and recent falls.  For that reason, will hold off on initiating anticoagulation therapy.  I did briefly talk with him about a watchman device and if they are interested, we will set up a follow-up visit in the A. fib clinic.  3.  Status post aortic valve replacement in 2009  4.  Coronary artery disease, status post CABG  in 2009.  Patient denying anginal symptoms.    Plan:  1.  Increase Bumex to 1 mg twice daily for the next 2 days then decrease back to usual dose  2.  Begin spironolactone 25 mg daily  3.  Follow-up with nephrology as planned  4.  Holter monitor to reassess heart rate with plan to follow-up in 1 month       History of Present Illness    Ms. Marla Dunn is a 89 y.o. female with history of coronary artery disease status post single-vessel coronary artery bypass grafting in 2009 along with aortic valve replacement, diastolic heart failure, recurrent anemia of unclear etiology and recurrent hyponatremia for which she follows with nephrology.  She was recently seen by her primary physician for symptoms of increased shortness of breath and orthopnea.  There was question of bradycardia although heart rate was documented at 60.  Laboratory studies did demonstrate an elevated BNP of 660.  Reportedly a chest x-ray was done showing evidence of congestive heart failure although I am unable to see the formal report.  No change was made in her diuretic therapy although her metoprolol was decreased from 25 mg twice daily to 12.5 mg twice daily.  She is now here for further recommendation.  She continues to note symptoms of shortness of breath and orthopnea.  Continues to have lower extremity edema which may be slightly worse.  Denies lakshmi PND.  No chest discomfort.    ECG (personally reviewed): ECG today demonstrates atrial fibrillation with ventricular rate of 97.  Possible anteroseptal infarct of unknown age.  When compared to ECG from May 2020, A. fib is new.    Cardiac Imaging Studies (personally reviewed): No new cardiac imaging     Physical Examination Review of Systems   Vitals:    10/01/20 1519   BP: 120/64   Pulse: 84   Resp: 14     Body mass index is 20.43 kg/m .  Wt Readings from Last 3 Encounters:   10/01/20 119 lb (54 kg)   05/27/20 102 lb (46.3 kg)   05/18/20 106 lb 9.6 oz (48.4 kg)     General Appearance:    Awake, Alert, No acute distress.   HEENT:  No scleral icterus; the mucous membranes were pink and moist.   Neck: No cervical bruits or jugular venous distention    Chest: The spine was straight. The chest was symmetric.   Lungs:   Respirations unlabored; the lungs are clear to auscultation. No wheezing   Cardiovascular:    Irregularly irregular rhythm.  S1, S2 normal.  No murmur or gallop.   Abdomen:  No organomegaly, masses, bruits, or tenderness. Bowels sounds are present   Extremities:  Both lower extremities are in Ace wraps today thigh.  2+ pitting edema noted bilaterally.   Skin: No xanthelasma. Warm, Dry.   Musculoskeletal: No tenderness.   Neurologic: Mood and affect are appropriate.                                              Medical History  Surgical History Family History Social History   Past Medical History:   Diagnosis Date   ? Asthma    ? Bilateral carpal tunnel syndrome 8/27/2015   ? Chronic Obstructive Pulmonary Disease     Created by Conversion    ? Congestive heart failure, severe (H) 5/13/2020   ? Coronary artery disease    ? Family history of myocardial infarction     father 64   ? GERD (gastroesophageal reflux disease)    ? Heart disease    ? High cholesterol     dislipidemia   ? Hyperlipidemia     Created by Conversion    ? Hypertension    ? Myocardial infarction (H) 1983   ? Polyarthritis Of The Hand     Created by Conversion    ? Rheumatoid arthritis (H)    ? Sjoegren syndrome    ? Sleep apnea, obstructive     Past Surgical History:   Procedure Laterality Date   ? AORTIC VALVE REPLACEMENT  2012   ? APPENDECTOMY     ? BACK SURGERY      spinal stenosis   ? CARDIAC CATHETERIZATION     ? CORONARY ARTERY BYPASS GRAFT  2009    LIMA to ramus intermedius   ? EYE SURGERY Bilateral     cornea transplant left eye & cataracts   ? OH CABG, VEIN, SINGLE      Description: CABG (CABG);  Recorded: 03/09/2012;  Comments: Single vessel bypass graft to an obtuse marginal branch in May 2009   ? OH RPLCMT PROST  AORTIC VALVE OPEN XCP HOMOGRF/STENT      Description: Aortic Valve Replacement;  Recorded: 03/09/2012;  Comments: Aortic valve replacement   ? Rectosigmoidectomy perineal  01/2018    Family History   Problem Relation Age of Onset   ? Cancer Mother    ? Heart disease Father     Social History     Socioeconomic History   ? Marital status:      Spouse name: Not on file   ? Number of children: Not on file   ? Years of education: Not on file   ? Highest education level: Not on file   Occupational History   ? Not on file   Social Needs   ? Financial resource strain: Not on file   ? Food insecurity     Worry: Not on file     Inability: Not on file   ? Transportation needs     Medical: Not on file     Non-medical: Not on file   Tobacco Use   ? Smoking status: Never Smoker   ? Smokeless tobacco: Never Used   Substance and Sexual Activity   ? Alcohol use: No     Alcohol/week: 0.0 standard drinks     Comment: occasional glass of wine   ? Drug use: No   ? Sexual activity: Not on file   Lifestyle   ? Physical activity     Days per week: Not on file     Minutes per session: Not on file   ? Stress: Not on file   Relationships   ? Social connections     Talks on phone: Not on file     Gets together: Not on file     Attends Taoism service: Not on file     Active member of club or organization: Not on file     Attends meetings of clubs or organizations: Not on file     Relationship status: Not on file   ? Intimate partner violence     Fear of current or ex partner: Not on file     Emotionally abused: Not on file     Physically abused: Not on file     Forced sexual activity: Not on file   Other Topics Concern   ? Not on file   Social History Narrative    .  passed away 20 years ago. No children. Use to volunteer at hospital in [facility]. Retired from HR at [employer]. Was living independently in her apartment in [place] prior to admission. Uses walker at baseline.           Medications  Allergies   Current  Outpatient Medications   Medication Sig Dispense Refill   ? albuterol (PROVENTIL) 2.5 mg /3 mL (0.083 %) nebulizer solution Take 2.5 mg by nebulization every 6 (six) hours as needed for wheezing or shortness of breath.     ? amLODIPine (NORVASC) 10 MG tablet Take 5 mg by mouth daily.      ? amoxicillin (AMOXIL) 500 MG capsule Take 2,000 mg by mouth as needed (prior to dental procedures).      ? ascorbic acid (ASCORBIC ACID WITH MILTON HIPS) 500 MG tablet Take 500 mg by mouth daily.     ? aspirin 81 MG EC tablet Take 81 mg by mouth daily.     ? bumetanide (BUMEX) 1 MG tablet Take 1 mg by mouth daily.     ? calcium carbonate-vitamin D3 (CALCIUM 600 + D,3,) 600 mg(1,500mg) -200 unit per tablet Take 1 tablet by mouth daily.     ? cephalexin (KEFLEX) 500 MG capsule Take 500 mg by mouth 4 (four) times a day.     ? cholecalciferol, vitamin D3, 1,000 unit tablet Take 2,000 Units by mouth daily.      ? citalopram (CELEXA) 20 MG tablet Take 10 mg by mouth daily.      ? cycloSPORINE (RESTASIS) 0.05 % ophthalmic emulsion Administer 1 drop to both eyes 2 (two) times a day.      ? DARBEPOETIN MITCHELL IN POLYSORBAT (ARANESP, IN POLYSORBATE, INJ) Inject as directed see administration instructions. Gets every 3-4 weeks from [facility].     ? docusate sodium (COLACE) 100 MG capsule Take 100 mg by mouth 2 (two) times a day.     ? fluorometholone (FML) 0.1 % ophthalmic suspension Administer 1 drop into the left eye every morning.     ? hydrocortisone 1 % cream Hemorrhoids  Use two times a day 30 g 0   ? ibandronate (BONIVA) 150 mg tablet Take 150 mg by mouth every 30 (thirty) days. Take in AM with glass of water prior to food, don't lie down for 30 minutes.     ? ipratropium-albuterol (COMBIVENT RESPIMAT)  mcg/actuation Aero inhaler Inhale 2 puffs 4 (four) times a day.      ? Lactobacillus rhamnosus GG (CULTURELLE) 10-15 Billion cell capsule Take 1 capsule by mouth daily.     ? levothyroxine (SYNTHROID, LEVOTHROID) 88 MCG tablet Take  88 mcg by mouth daily.     ? loratadine (CLARITIN) 10 mg tablet Take 10 mg by mouth daily.     ? metoprolol tartrate (LOPRESSOR) 25 MG tablet Take 25 mg by mouth 2 (two) times a day.      ? multivit-min-ferrous gluconate 9 mg iron/15 mL Liqd Take by mouth.     ? multivitamin therapeutic (THERAGRAN) tablet Take 1 tablet by mouth daily.  0   ? omeprazole (PRILOSEC) 20 MG capsule Take 20 mg by mouth daily before breakfast.     ? peg 400-propylene glycol (SYSTANE) 0.4-0.3 % Drop Administer 1 drop to both eyes 4 (four) times a day as needed (dry eyes).      ? polyethylene glycol (MIRALAX) 17 gram packet Take 17 g by mouth daily.      ? potassium chloride (K-DUR,KLOR-CON) 20 MEQ tablet 20 mEq 2 (two) times a day.      ? psyllium (METAMUCIL) Pack packet Take by mouth daily. Dose = ~1/4 tsp     ? sodium chloride 1 gram tablet Take 1 tablet (1 g total) by mouth 2 (two) times a day. (Patient taking differently: Take 2 g by mouth 2 (two) times a day. )  0   ? timolol maleate (TIMOPTIC) 0.25 % ophthalmic solution Administer 1 drop into the left eye daily before breakfast. Administer 10 minutes after FML drops     ? triamcinolone (KENALOG) 0.1 % cream Apply 1 application topically 2 (two) times a day as needed (rash).      ? VIT C/E/ZN/COPPR/LUTEIN/ZEAXAN (PRESERVISION AREDS 2 ORAL) Take 1 tablet by mouth 2 (two) times a day with meals.     ? acetaminophen (TYLENOL) 500 MG tablet Take 500-1,000 mg by mouth every 6 (six) hours as needed for pain.     ? albuterol (PROAIR HFA;PROVENTIL HFA;VENTOLIN HFA) 90 mcg/actuation inhaler Inhale 2 puffs every 6 (six) hours as needed for wheezing.     ? ASMANEX TWISTHALER 220 mcg/ actuation (60) inhaler Inhale 1 puff at bedtime.     ? calcium polycarbophiL (FIBERCON) 625 mg tablet Take 1 tablet (625 mg total) by mouth 2 (two) times a day.  0   ? furosemide (LASIX) 20 MG tablet Take 1 tablet (20 mg total) by mouth 2 (two) times a day at 9am and 6pm. 60 tablet 0   ? spironolactone (ALDACTONE) 25  MG tablet Take 1 tablet (25 mg total) by mouth daily. 30 tablet 6     No current facility-administered medications for this visit.       Allergies   Allergen Reactions   ? Cigarette Smoke Other (See Comments)     Hard to breathe.   ? Grass Pollen-Orchardgrass, Standard Other (See Comments)     Shortness of breath when lawn is just cut.         Lab Results    Chemistry/lipid CBC Cardiac Enzymes/BNP/TSH/INR   Lab Results   Component Value Date    CHOL 149 11/09/2016    HDL 55 11/09/2016    LDLCALC 84 11/09/2016    TRIG 48 11/09/2016    CREATININE 0.75 09/24/2020    BUN 13 09/24/2020    K 2.9 (L) 09/24/2020     (L) 09/24/2020    CL 98 09/24/2020    CO2 27 09/24/2020    Lab Results   Component Value Date    WBC 8.4 05/17/2020    HGB 8.8 (L) 05/18/2020    HCT 26.5 (L) 05/17/2020    MCV 91 05/17/2020     05/17/2020    Lab Results   Component Value Date    CKTOTAL 178 05/15/2014    CKMB 5 06/26/2011    TROPONINI 0.05 05/14/2020     (H) 05/13/2020    TSH 1.17 03/22/2020    INR 0.97 05/13/2020

## 2021-06-29 NOTE — PROGRESS NOTES
"Progress Notes by Julia Amaya MD at 5/27/2020 12:50 PM     Author: Julia Amaya MD Service: -- Author Type: Physician    Filed: 5/27/2020  2:58 PM Encounter Date: 5/27/2020 Status: Signed    : Julia Amaya MD (Physician)           The patient has been notified of following:     \"This telephone visit will be conducted via a call between you and your physician/provider. We have found that certain health care needs can be provided without the need for a physical exam.  This service lets us provide the care you need with a phone conversation.  If a prescription is necessary we can send it directly to your pharmacy.  If lab work is needed we can place an order for that and you can then stop by our lab to have the test done at a later time. If during the course of the call the physician/provider feels a telephone visit is not appropriate, you will not be charged for this service.\" Verbal consent has been obtained for this service by care team member:         HEART CARE PHONE ENCOUNTER        The patient has chosen to have the visit conducted as a telephone visit, to reduce risk of exposure given the current status of Coronavirus in our community. This telephone visit is being conducted via a call between the patient and physician/provider. Health care needs are being provided without a physical exam.     Assessment/Recommendations   Assessment:    1. Acute diastolic heart failure, likely secondary to hypertensive emergency.  Patient reports no weight gain since her recent hospitalization.  Denies any current symptoms of orthopnea, PND or lower extremity edema.  She was prescribed to continue on her salt tablets at 1 g twice daily (half her previous dose).  Recommended that she follow-up with nephrology who had prescribed these previously to treat hyponatremia.  2.  History of aortic valve replacement in 2009.  Recent echocardiogram demonstrated normal bioprosthetic valve function.  3.  Coronary artery disease, " status post CABG in 2009 with LIMA to ramus intermedius branch.  Patient currently denying any chest pain.    Plan:  1.  Continue current medications  2.  Follow-up with hematology regarding anemia and nephrology regarding salt tablets given recent episode of hypertensive emergency and acute congestive heart failure.  3.  Follow-up with cardiology in 1 year      I have reviewed the note as documented.  This accurately captures the substance of my conversation with the patient.    Total time of call between patient and provider was 24 minutes   Start Time: 1256  Stop Time: 1320       History of Present Illness/Subjective    Marla Dunn is a 89 y.o. female who is being evaluated via a billable telephone visit.      [place] has a history of coronary artery disease and aortic valve stenosis, status post single-vessel coronary artery bypass grafting and aortic valve replacement in 2009, essential hypertension, hyperlipidemia who was recently hospitalized for acute CHF exacerbation thought due to hypertensive emergency.  She was started on IV diuretics with marked improvement in symptoms.  Blood pressure improved.  She was seen in consultation by my colleague who recommended discontinuation of salt tablets although it appears she was ultimately discharged on half of her previous dosage.  She currently reports no complaints of shortness of breath at rest, orthopnea, PND or lower extremity edema.  She tries to do some walking although feels unsteady on her feet.  Weight has not changed since her discharge home.  She has yet to follow-up with hematology regarding her anemia.  She had been told to set up an appointment within 2 weeks of discharge.  I also encouraged her to follow-up with nephrology regarding the salt tablets given her history of acute congestive heart failure.    I have reviewed and updated the patient's Past Medical History, Social History, Family History and Medication List.     Physical Examination  not performed given phone encounter Review of Systems      Wt  102         ROS: Shortness of breath                                   Medical History  Surgical History Family History Social History   Past Medical History:   Diagnosis Date   ? Asthma    ? Bilateral carpal tunnel syndrome 8/27/2015   ? Chronic Obstructive Pulmonary Disease     Created by Conversion    ? Congestive heart failure, severe (H) 5/13/2020   ? Coronary artery disease    ? Family history of myocardial infarction     father 64   ? GERD (gastroesophageal reflux disease)    ? Heart disease    ? High cholesterol     dislipidemia   ? Hyperlipidemia     Created by Conversion    ? Hypertension    ? Myocardial infarction (H) 1983   ? Polyarthritis Of The Hand     Created by Conversion    ? Rheumatoid arthritis (H)    ? Sjoegren syndrome    ? Sleep apnea, obstructive     Past Surgical History:   Procedure Laterality Date   ? AORTIC VALVE REPLACEMENT  2012   ? APPENDECTOMY     ? BACK SURGERY      spinal stenosis   ? CARDIAC CATHETERIZATION     ? CORONARY ARTERY BYPASS GRAFT  2009    LIMA to ramus intermedius   ? EYE SURGERY Bilateral     cornea transplant left eye & cataracts   ? MO CABG, VEIN, SINGLE      Description: CABG (CABG);  Recorded: 03/09/2012;  Comments: Single vessel bypass graft to an obtuse marginal branch in May 2009   ? MO RPLCMT PROST AORTIC VALVE OPEN XCP HOMOGRF/STENT      Description: Aortic Valve Replacement;  Recorded: 03/09/2012;  Comments: Aortic valve replacement   ? Rectosigmoidectomy perineal  01/2018    Family History   Problem Relation Age of Onset   ? Cancer Mother    ? Heart disease Father     Social History     Socioeconomic History   ? Marital status:      Spouse name: Not on file   ? Number of children: Not on file   ? Years of education: Not on file   ? Highest education level: Not on file   Occupational History   ? Not on file   Social Needs   ? Financial resource strain: Not on file   ? Food insecurity      Worry: Not on file     Inability: Not on file   ? Transportation needs     Medical: Not on file     Non-medical: Not on file   Tobacco Use   ? Smoking status: Never Smoker   ? Smokeless tobacco: Never Used   Substance and Sexual Activity   ? Alcohol use: No     Alcohol/week: 0.0 standard drinks     Comment: occasional glass of wine   ? Drug use: No   ? Sexual activity: Not on file   Lifestyle   ? Physical activity     Days per week: Not on file     Minutes per session: Not on file   ? Stress: Not on file   Relationships   ? Social connections     Talks on phone: Not on file     Gets together: Not on file     Attends [employer] service: Not on file     Active member of club or organization: Not on file     Attends meetings of clubs or organizations: Not on file     Relationship status: Not on file   ? Intimate partner violence     Fear of current or ex partner: Not on file     Emotionally abused: Not on file     Physically abused: Not on file     Forced sexual activity: Not on file   Other Topics Concern   ? Not on file   Social History Narrative    .  passed away 20 years ago. No children. Use to volunteer at hospital in [facility]. Retired from HR at [employer]. Was living independently in her apartment in [place] prior to admission. Uses walker at baseline.           Medications  Allergies   Current Outpatient Medications   Medication Sig Dispense Refill   ? acetaminophen (TYLENOL) 500 MG tablet Take 500-1,000 mg by mouth every 6 (six) hours as needed for pain.     ? albuterol (PROAIR HFA;PROVENTIL HFA;VENTOLIN HFA) 90 mcg/actuation inhaler Inhale 2 puffs every 6 (six) hours as needed for wheezing.     ? albuterol (PROVENTIL) 2.5 mg /3 mL (0.083 %) nebulizer solution Take 2.5 mg by nebulization every 6 (six) hours as needed for wheezing or shortness of breath.     ? amLODIPine (NORVASC) 10 MG tablet Take 5 mg by mouth daily.      ? ascorbic acid (ASCORBIC ACID WITH MILTON HIPS) 500 MG tablet Take 500 mg by  mouth daily.     ? ASMANEX TWISTHALER 220 mcg/ actuation (60) inhaler Inhale 1 puff at bedtime.     ? calcium carbonate-vitamin D3 (CALCIUM 600 + D,3,) 600 mg(1,500mg) -200 unit per tablet Take 1 tablet by mouth daily.     ? calcium polycarbophiL (FIBERCON) 625 mg tablet Take 1 tablet (625 mg total) by mouth 2 (two) times a day.  0   ? cholecalciferol, vitamin D3, 1,000 unit tablet Take 2,000 Units by mouth daily.      ? citalopram (CELEXA) 20 MG tablet Take 20 mg by mouth daily.     ? cycloSPORINE (RESTASIS) 0.05 % ophthalmic emulsion Administer 1 drop to both eyes 2 (two) times a day.      ? DARBEPOETIN MITCHELL IN POLYSORBAT (ARANESP, IN POLYSORBATE, INJ) Inject as directed see administration instructions. Gets every 3-4 weeks from [facility].     ? docusate sodium (COLACE) 100 MG capsule Take 100 mg by mouth 2 (two) times a day.     ? fluorometholone (FML) 0.1 % ophthalmic suspension Administer 1 drop into the left eye every morning.     ? furosemide (LASIX) 20 MG tablet Take 1 tablet (20 mg total) by mouth 2 (two) times a day at 9am and 6pm. 60 tablet 0   ? hydrocortisone 1 % cream Hemorrhoids  Use two times a day 30 g 0   ? ibandronate (BONIVA) 150 mg tablet Take 150 mg by mouth every 30 (thirty) days. Take in AM with glass of water prior to food, don't lie down for 30 minutes.     ? ipratropium-albuterol (COMBIVENT RESPIMAT)  mcg/actuation Aero inhaler Inhale 2 puffs 4 (four) times a day.      ? Lactobacillus rhamnosus GG (CULTURELLE) 10-15 Billion cell capsule Take 1 capsule by mouth daily.     ? levothyroxine (SYNTHROID, LEVOTHROID) 88 MCG tablet Take 88 mcg by mouth daily.     ? metoprolol tartrate (LOPRESSOR) 25 MG tablet Take 25 mg by mouth 2 (two) times a day.      ? multivitamin therapeutic (THERAGRAN) tablet Take 1 tablet by mouth daily.  0   ? omeprazole (PRILOSEC) 20 MG capsule Take 20 mg by mouth daily before breakfast.     ? peg 400-propylene glycol (SYSTANE) 0.4-0.3 % Drop Administer 1 drop to  both eyes 4 (four) times a day as needed (dry eyes).      ? polyethylene glycol (MIRALAX) 17 gram packet Take 17 g by mouth daily.      ? psyllium (METAMUCIL) Pack packet Take by mouth daily. Dose = ~1/4 tsp     ? sodium chloride 1 gram tablet Take 1 tablet (1 g total) by mouth 2 (two) times a day. (Patient taking differently: Take 2 g by mouth 2 (two) times a day. )  0   ? timolol maleate (TIMOPTIC) 0.25 % ophthalmic solution Administer 1 drop into the left eye daily before breakfast. Administer 10 minutes after FML drops     ? triamcinolone (KENALOG) 0.1 % cream Apply 1 application topically 2 (two) times a day as needed (rash).      ? VIT C/E/ZN/COPPR/LUTEIN/ZEAXAN (PRESERVISION AREDS 2 ORAL) Take 1 tablet by mouth 2 (two) times a day with meals.     ? amoxicillin (AMOXIL) 500 MG capsule Take 2,000 mg by mouth as needed (prior to dental procedures).        No current facility-administered medications for this visit.     Allergies   Allergen Reactions   ? Cigarette Smoke Other (See Comments)     Hard to breathe.   ? Grass Pollen-Orchardgrass, Standard Other (See Comments)     Shortness of breath when lawn is just cut.         Lab Results    Chemistry/lipid CBC Cardiac Enzymes/BNP/TSH/INR   Lab Results   Component Value Date    CHOL 149 11/09/2016    HDL 55 11/09/2016    LDLCALC 84 11/09/2016    TRIG 48 11/09/2016    CREATININE 0.69 05/21/2020    BUN 18 05/21/2020    K 4.7 05/21/2020     (L) 05/21/2020    CL 95 (L) 05/21/2020    CO2 25 05/21/2020    Lab Results   Component Value Date    WBC 8.4 05/17/2020    HGB 8.8 (L) 05/18/2020    HCT 26.5 (L) 05/17/2020    MCV 91 05/17/2020     05/17/2020    Lab Results   Component Value Date    CKTOTAL 178 05/15/2014    CKMB 5 06/26/2011    TROPONINI 0.05 05/14/2020     (H) 05/13/2020    TSH 1.17 03/22/2020    INR 0.97 05/13/2020        Julia Amaya

## 2021-06-29 NOTE — PROGRESS NOTES
Progress Notes by Julia Amaya MD at 10/30/2020  3:10 PM     Author: Julia Amaya MD Service: -- Author Type: Physician    Filed: 10/30/2020  5:17 PM Encounter Date: 10/30/2020 Status: Signed    : Julia Amaya MD (Physician)           Thank you, Dr. Goyal, for asking the [facility] team to see Ms. Marla Dunn to follow-up on her chronic diastolic heart failure.    Assessment/Recommendations   Assessment:    1.  Chronic diastolic heart failure, currently well compensated.  When I saw her nearly 4 weeks ago, she was having a mild exacerbation likely precipitated by development of atrial fibrillation.  At that time, I suggested we double her Bumex for 2 days only as well as adding spironolactone 25 mg daily.  This resulted in a 11 pound diuresis with marked improvement in symptoms.  She has since been switched from Bumex to torsemide due to a rash but otherwise reports significant improvement in her breathing and resolution of her edema.  2.  Recent onset atrial fibrillation, duration unknown.  Holter monitor shows good heart rate control.  We elected not to start anticoagulation given her fall risk.  I did again discuss the option of a watchman device.  They are willing to set up an appointment to discuss further with AElla henry nurse practitioner.  3.  Status post AVR in 2009 with concomitant coronary artery bypass grafting.  Patient denies any chest pain although activity significantly limited.    Plan:  1.  Continue current medications  2.  Schedule consultation with TRAY henry nurse practitioner regarding watchman device  3.  Follow-up in 6 months       History of Present Illness    Ms. Marla Dunn is a 89 y.o. female with history of coronary artery disease and aortic valve disease status post coronary artery bypass grafting and aortic valve replacement in 2009, chronic diastolic heart failure with recent exacerbation, recurrent anemia of unclear etiology and recent onset of atrial  fibrillation who presents today for a follow-up visit.  Since I saw her at the beginning of October, she has diuresed nicely 11 pounds with the addition of spironolactone to her regimen.  This is also resulted in improvement in her sodium as well as potassium levels.  She currently states her breathing is markedly improved.  Denies orthopnea, PND.  Edema has significantly resolved.  Denies any lightheadedness or near syncope.    ECG (personally reviewed): No ECG today    Cardiac Imaging Studies (personally reviewed): No echocardiogram or other imaging study     Physical Examination Review of Systems   Vitals:    10/30/20 1522   BP: 116/46   Pulse: 70   Resp: 16   SpO2: 96%     Body mass index is 18.54 kg/m .  Wt Readings from Last 3 Encounters:   10/30/20 108 lb (49 kg)   10/01/20 119 lb (54 kg)   05/27/20 102 lb (46.3 kg)     General Appearance:   Awake, Alert, No acute distress.   HEENT:  No scleral icterus; the mucous membranes were pink and moist.   Neck: No cervical bruits or jugular venous distention    Chest: The spine was straight. The chest was symmetric.   Lungs:   Respirations unlabored; the lungs are clear to auscultation. No wheezing   Cardiovascular:    Irregularly irregular rhythm.  S1, S2 normal.  No murmur or gallop   Abdomen:  No organomegaly, masses, bruits, or tenderness. Bowels sounds are present   Extremities:  No peripheral edema bilaterally.   Skin: No xanthelasma. Warm, Dry.   Musculoskeletal: No tenderness.   Neurologic: Mood and affect are appropriate.    General: Weight Loss  Eyes: WNL  Ears/Nose/Throat: WNL  Lungs: Shortness of Breath  Heart: Shortness of Breath with activity  Stomach: Constipation, Diarrhea  Bladder: WNL  Muscle/Joints: WNL  Skin: Rash  Nervous System: WNL  Mental Health: WNL     Blood: WNL     Medical History  Surgical History Family History Social History   Past Medical History:   Diagnosis Date   ? Asthma    ? Bilateral carpal tunnel syndrome 8/27/2015   ? Chronic  Obstructive Pulmonary Disease     Created by Conversion    ? Congestive heart failure, severe (H) 5/13/2020   ? Coronary artery disease    ? Family history of myocardial infarction     father 64   ? GERD (gastroesophageal reflux disease)    ? Heart disease    ? High cholesterol     dislipidemia   ? Hyperlipidemia     Created by Conversion    ? Hypertension    ? Myocardial infarction (H) 1983   ? Polyarthritis Of The Hand     Created by Conversion    ? Rheumatoid arthritis (H)    ? Sjoegren syndrome    ? Sleep apnea, obstructive     Past Surgical History:   Procedure Laterality Date   ? AORTIC VALVE REPLACEMENT  2012   ? APPENDECTOMY     ? BACK SURGERY      spinal stenosis   ? CARDIAC CATHETERIZATION     ? CORONARY ARTERY BYPASS GRAFT  2009    LIMA to ramus intermedius   ? EYE SURGERY Bilateral     cornea transplant left eye & cataracts   ? MA CABG, VEIN, SINGLE      Description: CABG (CABG);  Recorded: 03/09/2012;  Comments: Single vessel bypass graft to an obtuse marginal branch in May 2009   ? MA RPLCMT PROST AORTIC VALVE OPEN XCP HOMOGRF/STENT      Description: Aortic Valve Replacement;  Recorded: 03/09/2012;  Comments: Aortic valve replacement   ? Rectosigmoidectomy perineal  01/2018    Family History   Problem Relation Age of Onset   ? Cancer Mother    ? Heart disease Father     Social History     Socioeconomic History   ? Marital status:      Spouse name: Not on file   ? Number of children: Not on file   ? Years of education: Not on file   ? Highest education level: Not on file   Occupational History   ? Not on file   Social Needs   ? Financial resource strain: Not on file   ? Food insecurity     Worry: Not on file     Inability: Not on file   ? Transportation needs     Medical: Not on file     Non-medical: Not on file   Tobacco Use   ? Smoking status: Never Smoker   ? Smokeless tobacco: Never Used   Substance and Sexual Activity   ? Alcohol use: No     Alcohol/week: 0.0 standard drinks     Comment:  occasional glass of wine   ? Drug use: No   ? Sexual activity: Not on file   Lifestyle   ? Physical activity     Days per week: Not on file     Minutes per session: Not on file   ? Stress: Not on file   Relationships   ? Social connections     Talks on phone: Not on file     Gets together: Not on file     Attends [employer] service: Not on file     Active member of club or organization: Not on file     Attends meetings of clubs or organizations: Not on file     Relationship status: Not on file   ? Intimate partner violence     Fear of current or ex partner: Not on file     Emotionally abused: Not on file     Physically abused: Not on file     Forced sexual activity: Not on file   Other Topics Concern   ? Not on file   Social History Narrative    .  passed away 20 years ago. No children. 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 Uses walker at baseline.           Medications  Allergies   Current Outpatient Medications   Medication Sig Dispense Refill   ? acetaminophen (TYLENOL) 500 MG tablet Take 500-1,000 mg by mouth every 6 (six) hours as needed for pain.     ? albuterol (PROAIR HFA;PROVENTIL HFA;VENTOLIN HFA) 90 mcg/actuation inhaler Inhale 2 puffs every 6 (six) hours as needed for wheezing.     ? albuterol (PROVENTIL) 2.5 mg /3 mL (0.083 %) nebulizer solution Take 2.5 mg by nebulization every 6 (six) hours as needed for wheezing or shortness of breath.     ? amLODIPine (NORVASC) 10 MG tablet Take 5 mg by mouth daily.      ? amoxicillin (AMOXIL) 500 MG capsule Take 2,000 mg by mouth as needed (prior to dental procedures).      ? ascorbic acid (ASCORBIC ACID WITH MILTON HIPS) 500 MG tablet Take 500 mg by mouth daily.     ? ASMANEX TWISTHALER 220 mcg/ actuation (60) inhaler Inhale 1 puff at bedtime.     ? aspirin 81 MG EC tablet Take 81 mg by mouth daily.     ? calcium carbonate-vitamin D3 (CALCIUM 600 +  D,3,) 600 mg(1,500mg) -200 unit per tablet Take 1 tablet by mouth daily.     ? calcium polycarbophiL (FIBERCON) 625 mg tablet Take 1 tablet (625 mg total) by mouth 2 (two) times a day.  0   ? cholecalciferol, vitamin D3, 1,000 unit tablet Take 2,000 Units by mouth daily.      ? citalopram (CELEXA) 20 MG tablet Take 10 mg by mouth daily.      ? cycloSPORINE (RESTASIS) 0.05 % ophthalmic emulsion Administer 1 drop to both eyes 2 (two) times a day.      ? DARBEPOETIN MITCHELL IN POLYSORBAT (ARANESP, IN POLYSORBATE, INJ) Inject as directed see administration instructions. Gets every 3-4 weeks from [facility].     ? desonide (DESOWEN) 0.05 % cream Apply 1 application topically 2 (two) times a day. Apply to face twice daily.     ? docusate sodium (COLACE) 100 MG capsule Take 100 mg by mouth 2 (two) times a day.     ? fluorometholone (FML) 0.1 % ophthalmic suspension Administer 1 drop into the left eye every morning.     ? furosemide (LASIX) 20 MG tablet Take 1 tablet (20 mg total) by mouth 2 (two) times a day at 9am and 6pm. 60 tablet 0   ? hydrocortisone 1 % cream Hemorrhoids  Use two times a day 30 g 0   ? ibandronate (BONIVA) 150 mg tablet Take 150 mg by mouth every 30 (thirty) days. Take in AM with glass of water prior to food, don't lie down for 30 minutes.     ? ipratropium-albuterol (COMBIVENT RESPIMAT)  mcg/actuation Aero inhaler Inhale 2 puffs 4 (four) times a day.      ? Lactobacillus rhamnosus GG (CULTURELLE) 10-15 Billion cell capsule Take 1 capsule by mouth daily.     ? levothyroxine (SYNTHROID, LEVOTHROID) 88 MCG tablet Take 88 mcg by mouth daily.     ? loratadine (CLARITIN) 10 mg tablet Take 20 mg by mouth daily.      ? metoprolol tartrate (LOPRESSOR) 25 MG tablet Take 25 mg by mouth 2 (two) times a day.      ? multivit-min-ferrous gluconate 9 mg iron/15 mL Liqd Take by mouth.     ? multivitamin therapeutic (THERAGRAN) tablet Take 1 tablet by mouth daily.  0   ? omeprazole (PRILOSEC) 20 MG capsule Take 20  mg by mouth daily before breakfast.     ? peg 400-propylene glycol (SYSTANE) 0.4-0.3 % Drop Administer 1 drop to both eyes 4 (four) times a day as needed (dry eyes).      ? polyethylene glycol (MIRALAX) 17 gram packet Take 17 g by mouth daily.      ? potassium chloride (K-DUR,KLOR-CON) 20 MEQ tablet 20 mEq 2 (two) times a day.      ? psyllium (METAMUCIL) Pack packet Take by mouth daily. Dose = ~1/4 tsp     ? sodium chloride 1 gram tablet Take 1 tablet (1 g total) by mouth 2 (two) times a day. (Patient taking differently: Take 2 g by mouth 2 (two) times a day. )  0   ? spironolactone (ALDACTONE) 25 MG tablet Take 1 tablet (25 mg total) by mouth daily. 30 tablet 6   ? timolol maleate (TIMOPTIC) 0.25 % ophthalmic solution Administer 1 drop into the left eye daily before breakfast. Administer 10 minutes after FML drops     ? torsemide (DEMADEX) 20 MG tablet Take 1 tablet by mouth daily.     ? triamcinolone (KENALOG) 0.1 % cream Apply 1 application topically 2 (two) times a day as needed (rash).      ? VIT C/E/ZN/COPPR/LUTEIN/ZEAXAN (PRESERVISION AREDS 2 ORAL) Take 1 tablet by mouth 2 (two) times a day with meals.     ? bumetanide (BUMEX) 1 MG tablet Take 1 tablet (1 mg total) by mouth 2 (two) times a day at 9am and 6pm for 2 days, THEN 1 tablet (1 mg total) daily. 94 tablet 0   ? cephalexin (KEFLEX) 500 MG capsule Take 500 mg by mouth 4 (four) times a day.       No current facility-administered medications for this visit.       Allergies   Allergen Reactions   ? Cigarette Smoke Other (See Comments)     Hard to breathe.   ? Grass Pollen-Orchardgrass, Standard Other (See Comments)     Shortness of breath when lawn is just cut.         Lab Results    Chemistry/lipid CBC Cardiac Enzymes/BNP/TSH/INR   Lab Results   Component Value Date    CHOL 149 11/09/2016    HDL 55 11/09/2016    LDLCALC 84 11/09/2016    TRIG 48 11/09/2016    CREATININE 0.78 10/06/2020    BUN 14 10/06/2020    K 4.2 10/06/2020     (L) 10/06/2020    CL  97 (L) 10/06/2020    CO2 28 10/06/2020    Lab Results   Component Value Date    WBC 8.4 05/17/2020    HGB 8.8 (L) 05/18/2020    HCT 26.5 (L) 05/17/2020    MCV 91 05/17/2020     05/17/2020    Lab Results   Component Value Date    CKTOTAL 178 05/15/2014    CKMB 5 06/26/2011    TROPONINI 0.05 05/14/2020     (H) 05/13/2020    TSH 1.17 03/22/2020    INR 0.97 05/13/2020                             oral

## 2021-06-30 ENCOUNTER — TRANSFERRED RECORDS (OUTPATIENT)
Dept: HEALTH INFORMATION MANAGEMENT | Facility: CLINIC | Age: 86
End: 2021-06-30

## 2021-06-30 LAB
ANION GAP SERPL CALCULATED.3IONS-SCNC: 10 MMOL/L (ref 5–18)
ANION GAP SERPL CALCULATED.3IONS-SCNC: 10 MMOL/L (ref 5–18)
BUN SERPL-MCNC: 37 MG/DL (ref 8–25)
BUN SERPL-MCNC: 37 MG/DL (ref 8–28)
CALCIUM SERPL-MCNC: 9.6 MG/DL (ref 8.5–10.5)
CALCIUM SERPL-MCNC: 9.6 MG/DL (ref 8.5–10.5)
CHLORIDE BLD-SCNC: 97 MMOL/L (ref 98–107)
CHLORIDE SERPLBLD-SCNC: 97 MMOL/L (ref 98–107)
CO2 SERPL-SCNC: 25 MMOL/L (ref 22–31)
CO2 SERPL-SCNC: 25 MMOL/L (ref 22–31)
CREAT SERPL-MCNC: 0.93 MG/DL (ref 0.6–1.1)
CREAT SERPL-MCNC: 0.93 MG/DL (ref 0.6–1.1)
GFR SERPL CREATININE-BSD FRML MDRD: 56 ML/MIN/1.73M2
GFR SERPL CREATININE-BSD FRML MDRD: 56 ML/MIN/1.73M2
GLUCOSE BLD-MCNC: 80 MG/DL (ref 70–125)
GLUCOSE SERPL-MCNC: 80 MG/DL (ref 70–125)
POTASSIUM BLD-SCNC: 3.8 MMOL/L (ref 3.5–5)
POTASSIUM SERPL-SCNC: 3.8 MMOL/L (ref 3.5–5)
SODIUM SERPL-SCNC: 132 MMOL/L (ref 136–145)
SODIUM SERPL-SCNC: 132 MMOL/L (ref 136–145)

## 2021-07-04 NOTE — PROGRESS NOTES
Progress Notes by Julia Amaya MD at 6/24/2021  3:10 PM     Author: Julia Amaya MD Service: -- Author Type: Physician    Filed: 6/24/2021  5:28 PM Encounter Date: 6/24/2021 Status: Signed    : Julia Amaya MD (Physician)           Thank you, Dr. Goyal, for asking the [facility] team to see Ms. Marla Dunn to follow-up on chronic diastolic heart failure, atrial fibrillation and valvular heart disease.    Assessment/Recommendations   Assessment:    1.  Chronic diastolic heart failure, fairly well compensated.  Weight has remained stable over the last 6 months.  Denies symptoms of orthopnea, PND or lower extremity edema.  We will continue current dose of diuretic.  2.  Chronic atrial fibrillation, rate borderline controlled.  She did wear a Holter monitor 6 months ago which suggested average heart rate of 83 bpm.  No change in AV zev blocking medication made at that time.  3.  History of coronary artery disease status post coronary artery bypass grafting.  She reports symptoms of exertional dyspnea but no accompanying chest discomfort.  I suspect her symptoms of exertional dyspnea are most likely multifactorial although cannot exclude an anginal equivalent.  Brought up the option of pursuing a stress study to further evaluate.  She would be interested in pursuing this to get a better sense as to the cause of her symptoms.  4.  Aortic valve stenosis status post bioprosthetic aortic valve replacement in 2009 along with coronary artery bypass grafting.  Her echocardiogram a year ago did suggest an increased gradient across the aortic valve from the prior study done a year earlier.  She also had evidence of mild to moderate mitral regurgitation.  Recommended an echocardiogram to follow-up on her valvular heart disease.    Plan:  1.  Continue current medications for now  2.  Schedule echocardiogram and pharmacologic nuclear stress test with further recommendations to follow       History  of Present Illness    Ms. Marla Dunn is a 89 y.o. female with history of coronary artery disease and aortic valve stenosis status post bioprosthetic aortic valve replacement and concomitant coronary artery bypass grafting in 2009, chronic diastolic heart failure, chronic atrial fibrillation and essential hypertension who presents to the office today for a follow-up visit.  Over the last 6 months, she has noted progressive symptoms of exertional dyspnea as well as fatigue.  Denies any associated chest, neck or arm discomfort.  Does continue to try to walk the halls of her assisted living building twice a day.  Denies any weight gain, orthopnea, PND or lower extremity edema since her visit 6 months ago.    ECG (personally reviewed): No ECG today    Cardiac Imaging Studies (personally reviewed): No recent imaging studies     Physical Examination Review of Systems   Vitals:    06/24/21 1518   BP: 106/50   Pulse: 84   Resp: 24     Body mass index is 18.8 kg/m .  Wt Readings from Last 3 Encounters:   06/24/21 109 lb 8 oz (49.7 kg)   10/30/20 108 lb (49 kg)   10/01/20 119 lb (54 kg)     General Appearance:   Awake, Alert, No acute distress.   HEENT:  No scleral icterus; the mucous membranes were pink and moist.   Neck: No cervical bruits or jugular venous distention    Chest: The spine was straight. The chest was symmetric.  Moderate kyphosis.   Lungs:   Respirations unlabored; scattered wheezes are noted throughout both lungs.  No rales or rhonchi.   Cardiovascular:    Irregularly irregular rhythm.  S1 normal, S2 prominent.  2/6 mid peaking crescendo decrescendo systolic murmur heard at the left upper sternal border.  2-3/6 holosystolic murmur heard from the lower left sternal border into the axilla.  No S3 gallop.   Abdomen:  No organomegaly, masses, bruits, or tenderness. Bowels sounds are present   Extremities:  No peripheral edema bilaterally.   Skin: No xanthelasma. Warm, Dry.   Musculoskeletal: No  tenderness.   Neurologic: Mood and affect are appropriate.    General: WNL  Eyes: WNL  Ears/Nose/Throat: WNL  Lungs: Shortness of Breath, Wheezing  Heart: Shortness of Breath with activity  Stomach: WNL  Bladder: WNL  Muscle/Joints: WNL  Skin: Rash  Nervous System: Daytime Sleepiness  Mental Health: WNL     Blood: WNL     Medical History  Surgical History Family History Social History   Past Medical History:   Diagnosis Date   ? Asthma    ? Bilateral carpal tunnel syndrome 8/27/2015   ? Chronic Obstructive Pulmonary Disease     Created by Conversion    ? Congestive heart failure, severe (H) 5/13/2020   ? Coronary artery disease    ? Family history of myocardial infarction     father 64   ? GERD (gastroesophageal reflux disease)    ? Heart disease    ? High cholesterol     dislipidemia   ? Hyperlipidemia     Created by Conversion    ? Hypertension    ? Myocardial infarction (H) 1983   ? Polyarthritis Of The Hand     Created by Conversion    ? Rheumatoid arthritis (H)    ? Sjoegren syndrome    ? Sleep apnea, obstructive     Past Surgical History:   Procedure Laterality Date   ? AORTIC VALVE REPLACEMENT  2012   ? APPENDECTOMY     ? BACK SURGERY      spinal stenosis   ? CARDIAC CATHETERIZATION     ? CORONARY ARTERY BYPASS GRAFT  2009    LIMA to ramus intermedius   ? EYE SURGERY Bilateral     cornea transplant left eye & cataracts   ? KS CABG, VEIN, SINGLE      Description: CABG (CABG);  Recorded: 03/09/2012;  Comments: Single vessel bypass graft to an obtuse marginal branch in May 2009   ? KS RPLCMT PROST AORTIC VALVE OPEN XCP HOMOGRF/STENT      Description: Aortic Valve Replacement;  Recorded: 03/09/2012;  Comments: Aortic valve replacement   ? Rectosigmoidectomy perineal  01/2018    Family History   Problem Relation Age of Onset   ? Cancer Mother    ? Heart disease Father     Social History     Socioeconomic History   ? Marital status:      Spouse name: Not on file   ? Number of children: Not on file   ? Years  of education: Not on file   ? Highest education level: Not on file   Occupational History   ? Not on file   Social Needs   ? Financial resource strain: Not on file   ? Food insecurity     Worry: Not on file     Inability: Not on file   ? Transportation needs     Medical: Not on file     Non-medical: Not on file   Tobacco Use   ? Smoking status: Never Smoker   ? Smokeless tobacco: Never Used   Substance and Sexual Activity   ? Alcohol use: No     Alcohol/week: 0.0 standard drinks     Comment: occasional glass of wine   ? Drug use: No   ? Sexual activity: Not on file   Lifestyle   ? Physical activity     Days per week: Not on file     Minutes per session: Not on file   ? Stress: Not on file   Relationships   ? Social connections     Talks on phone: Not on file     Gets together: Not on file     Attends Latter day service: Not on file     Active member of club or organization: Not on file     Attends meetings of clubs or organizations: Not on file     Relationship status: Not on file   ? Intimate partner violence     Fear of current or ex partner: Not on file     Emotionally abused: Not on file     Physically abused: Not on file     Forced sexual activity: Not on file   Other Topics Concern   ? Not on file   Social History Narrative    .  passed away 20 years ago. No children. 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 Was living independently in her apartment in [place] prior to admission. Uses walker at baseline.           Medications  Allergies   Current Outpatient Medications   Medication Sig Dispense Refill   ? acetaminophen (TYLENOL) 500 MG tablet Take 500-1,000 mg by mouth every 6 (six) hours as needed for pain.     ? albuterol (PROAIR HFA;PROVENTIL HFA;VENTOLIN HFA) 90 mcg/actuation inhaler Inhale 2 puffs every 6 (six) hours as needed for wheezing.     ? albuterol (PROVENTIL) 2.5 mg /3 mL (0.083 %) nebulizer solution Take 2.5 mg by nebulization every 6 (six) hours as  needed for wheezing or shortness of breath.     ? amLODIPine (NORVASC) 10 MG tablet Take 5 mg by mouth daily.      ? amoxicillin (AMOXIL) 500 MG capsule Take 2,000 mg by mouth as needed (prior to dental procedures).      ? ascorbic acid (ASCORBIC ACID WITH MILTON HIPS) 500 MG tablet Take 500 mg by mouth daily.     ? aspirin 81 MG EC tablet Take 81 mg by mouth daily.     ? calcium carbonate-vitamin D3 (CALCIUM 600 + D,3,) 600 mg(1,500mg) -200 unit per tablet Take 1 tablet by mouth daily.     ? calcium polycarbophiL (FIBERCON) 625 mg tablet Take 1 tablet (625 mg total) by mouth 2 (two) times a day.  0   ? cholecalciferol, vitD3,/vit K2 (D3 + K2 DOTS ORAL) Take 1 tablet by mouth.     ? cycloSPORINE (RESTASIS) 0.05 % ophthalmic emulsion Administer 1 drop to both eyes 2 (two) times a day.      ? desonide (DESOWEN) 0.05 % cream Apply 1 application topically 2 (two) times a day. Apply to face twice daily.     ? docusate sodium (COLACE) 100 MG capsule Take 100 mg by mouth 2 (two) times a day.     ? fluorometholone (FML) 0.1 % ophthalmic suspension Administer 1 drop into the left eye every morning.     ? hydrocortisone 1 % cream Hemorrhoids  Use two times a day 30 g 0   ? ibandronate (BONIVA) 150 mg tablet Take 150 mg by mouth every 30 (thirty) days. Take in AM with glass of water prior to food, don't lie down for 30 minutes.     ? ipratropium-albuterol (COMBIVENT RESPIMAT)  mcg/actuation Aero inhaler Inhale 2 puffs 4 (four) times a day.      ? ipratropium-albuteroL (COMBIVENT RESPIMAT)  mcg/actuation Mist inhaler Inhale 1 puff 4 (four) times a day.     ? Lactobacillus rhamnosus GG (CULTURELLE) 10-15 Billion cell capsule Take 1 capsule by mouth daily.     ? levothyroxine (SYNTHROID, LEVOTHROID) 88 MCG tablet Take 88 mcg by mouth daily.     ? loratadine (CLARITIN) 10 mg tablet Take 20 mg by mouth daily.      ? magnesium citrate solution Take 296 mL by mouth daily as needed.     ? metoprolol tartrate (LOPRESSOR) 25  MG tablet Take 12.5 mg by mouth 2 (two) times a day.     ? multivitamin therapeutic (THERAGRAN) tablet Take 1 tablet by mouth daily.  0   ? omeprazole (PRILOSEC) 20 MG capsule Take 20 mg by mouth 2 (two) times a day before meals.     ? peg 400-propylene glycol (SYSTANE) 0.4-0.3 % Drop Administer 1 drop to both eyes 4 (four) times a day as needed (dry eyes).      ? polyethylene glycol (MIRALAX) 17 gram packet Take 17 g by mouth daily.      ? psyllium (METAMUCIL) Pack packet Take by mouth daily. Dose = ~1/4 tsp     ? sodium chloride 1 gram tablet Take 1 tablet (1 g total) by mouth daily.  0   ? spironolactone (ALDACTONE) 25 MG tablet Take 1 tablet (25 mg total) by mouth daily. 30 tablet 6   ? timolol maleate (TIMOPTIC) 0.25 % ophthalmic solution Administer 1 drop into the left eye daily before breakfast. Administer 10 minutes after FML drops     ? torsemide (DEMADEX) 20 MG tablet Take 2 tablets (40 mg total) by mouth daily.  0   ? triamcinolone (KENALOG) 0.1 % cream Apply 1 application topically 2 (two) times a day as needed (rash).      ? VIT C/E/ZN/COPPR/LUTEIN/ZEAXAN (PRESERVISION AREDS 2 ORAL) Take 1 tablet by mouth 2 (two) times a day with meals.       No current facility-administered medications for this visit.       Allergies   Allergen Reactions   ? Cigarette Smoke Other (See Comments)     Hard to breathe.   ? Grass Pollen-Orchardgrass, Standard Other (See Comments)     Shortness of breath when lawn is just cut.   ? Grass Pollen-Red Top, Standard Other (See Comments)     Shortness of breath when lawn is just cut.   ? Bumetanide          Lab Results    Chemistry/lipid CBC Cardiac Enzymes/BNP/TSH/INR   Lab Results   Component Value Date    CHOL 149 11/09/2016    HDL 55 11/09/2016    LDLCALC 84 11/09/2016    TRIG 48 11/09/2016    CREATININE 0.88 05/28/2021    BUN 32 (H) 05/28/2021    K 3.7 05/28/2021     (L) 05/28/2021    CL 98 05/28/2021    CO2 23 05/28/2021    Lab Results   Component Value Date    WBC 8.4  05/17/2020    HGB 10.8 (L) 03/02/2021    HCT 26.5 (L) 05/17/2020    MCV 91 05/17/2020     05/17/2020    Lab Results   Component Value Date    CKTOTAL 178 05/15/2014    CKMB 5 06/26/2011    TROPONINI 0.05 05/14/2020     (H) 05/13/2020    TSH 1.17 03/22/2020    INR 0.97 05/13/2020        A total of 56 minutes was spent reviewing patient's medical records, obtaining history and performing examination, as well as discussing diagnoses/ recommendations with patient and answering all questions.

## 2021-07-06 VITALS
HEART RATE: 84 BPM | HEIGHT: 64 IN | RESPIRATION RATE: 24 BRPM | SYSTOLIC BLOOD PRESSURE: 106 MMHG | DIASTOLIC BLOOD PRESSURE: 50 MMHG | BODY MASS INDEX: 18.69 KG/M2 | WEIGHT: 109.5 LBS

## 2021-07-16 ENCOUNTER — HOSPITAL ENCOUNTER (OUTPATIENT)
Dept: NUCLEAR MEDICINE | Facility: CLINIC | Age: 86
End: 2021-07-16
Attending: INTERNAL MEDICINE
Payer: COMMERCIAL

## 2021-07-16 ENCOUNTER — HOSPITAL ENCOUNTER (OUTPATIENT)
Dept: CARDIOLOGY | Facility: CLINIC | Age: 86
End: 2021-07-16
Attending: INTERNAL MEDICINE
Payer: COMMERCIAL

## 2021-07-16 DIAGNOSIS — Z95.2 S/P AVR: ICD-10-CM

## 2021-07-16 DIAGNOSIS — R06.09 DOE (DYSPNEA ON EXERTION): Primary | ICD-10-CM

## 2021-07-16 DIAGNOSIS — I34.0 NONRHEUMATIC MITRAL VALVE REGURGITATION: ICD-10-CM

## 2021-07-16 DIAGNOSIS — I50.32 CHRONIC DIASTOLIC HEART FAILURE (H): ICD-10-CM

## 2021-07-16 DIAGNOSIS — Z95.1 S/P CABG (CORONARY ARTERY BYPASS GRAFT): ICD-10-CM

## 2021-07-16 LAB
CV STRESS CURRENT BP HE: NORMAL
CV STRESS CURRENT HR HE: 102
CV STRESS CURRENT HR HE: 108
CV STRESS CURRENT HR HE: 83
CV STRESS CURRENT HR HE: 84
CV STRESS CURRENT HR HE: 84
CV STRESS CURRENT HR HE: 86
CV STRESS CURRENT HR HE: 87
CV STRESS CURRENT HR HE: 87
CV STRESS CURRENT HR HE: 89
CV STRESS CURRENT HR HE: 90
CV STRESS CURRENT HR HE: 91
CV STRESS CURRENT HR HE: 92
CV STRESS CURRENT HR HE: 93
CV STRESS CURRENT HR HE: 95
CV STRESS DEVIATION TIME HE: NORMAL
CV STRESS ECHO PERCENT HR HE: NORMAL
CV STRESS EXERCISE STAGE HE: NORMAL
CV STRESS FINAL RESTING BP HE: NORMAL
CV STRESS FINAL RESTING HR HE: 87
CV STRESS MAX HR HE: 108
CV STRESS MAX TREADMILL GRADE HE: 0
CV STRESS MAX TREADMILL SPEED HE: 0
CV STRESS PEAK DIA BP HE: NORMAL
CV STRESS PEAK SYS BP HE: NORMAL
CV STRESS PHASE HE: NORMAL
CV STRESS PROTOCOL HE: NORMAL
CV STRESS RESTING PT POSITION HE: NORMAL
CV STRESS ST DEVIATION AMOUNT HE: NORMAL
CV STRESS ST DEVIATION ELEVATION HE: NORMAL
CV STRESS ST EVELATION AMOUNT HE: NORMAL
CV STRESS TEST TYPE HE: NORMAL
CV STRESS TOTAL STAGE TIME MIN 1 HE: NORMAL
LVEF ECHO: NORMAL
NUC STRESS EJECTION FRACTION: 57 %
RATE PRESSURE PRODUCT: NORMAL
STRESS ECHO BASELINE DIASTOLIC HE: 52
STRESS ECHO BASELINE HR: 83
STRESS ECHO BASELINE SYSTOLIC BP: 123
STRESS ECHO CALCULATED PERCENT HR: 84 %
STRESS ECHO LAST STRESS DIASTOLIC BP: 47
STRESS ECHO LAST STRESS HR: 93
STRESS ECHO LAST STRESS SYSTOLIC BP: 108
STRESS ECHO TARGET HR: 128

## 2021-07-16 PROCEDURE — 93306 TTE W/DOPPLER COMPLETE: CPT

## 2021-07-16 PROCEDURE — 250N000011 HC RX IP 250 OP 636: Performed by: INTERNAL MEDICINE

## 2021-07-16 PROCEDURE — 343N000001 HC RX 343: Performed by: INTERNAL MEDICINE

## 2021-07-16 PROCEDURE — 93018 CV STRESS TEST I&R ONLY: CPT | Performed by: INTERNAL MEDICINE

## 2021-07-16 PROCEDURE — 78452 HT MUSCLE IMAGE SPECT MULT: CPT | Mod: 26 | Performed by: INTERNAL MEDICINE

## 2021-07-16 PROCEDURE — A9500 TC99M SESTAMIBI: HCPCS | Performed by: INTERNAL MEDICINE

## 2021-07-16 PROCEDURE — 93017 CV STRESS TEST TRACING ONLY: CPT

## 2021-07-16 PROCEDURE — 78452 HT MUSCLE IMAGE SPECT MULT: CPT

## 2021-07-16 PROCEDURE — 93306 TTE W/DOPPLER COMPLETE: CPT | Mod: 26 | Performed by: GENERAL ACUTE CARE HOSPITAL

## 2021-07-16 RX ORDER — AMINOPHYLLINE 25 MG/ML
50 INJECTION, SOLUTION INTRAVENOUS
Status: DISCONTINUED | OUTPATIENT
Start: 2021-07-16 | End: 2021-07-16 | Stop reason: HOSPADM

## 2021-07-16 RX ORDER — REGADENOSON 0.08 MG/ML
0.4 INJECTION, SOLUTION INTRAVENOUS ONCE
Status: COMPLETED | OUTPATIENT
Start: 2021-07-16 | End: 2021-07-16

## 2021-07-16 RX ADMIN — Medication 8.7 MCI.: at 08:01

## 2021-07-16 RX ADMIN — Medication 31.4 MILLICURIE: at 11:34

## 2021-07-16 RX ADMIN — AMINOPHYLLINE 50 MG: 25 INJECTION, SOLUTION INTRAVENOUS at 08:57

## 2021-07-16 RX ADMIN — REGADENOSON 0.4 MG: 0.08 INJECTION, SOLUTION INTRAVENOUS at 08:53

## 2021-07-16 NOTE — PROGRESS NOTES
Pt here for follow up to CAD ischemic evaluation.  Pt Denies any chest pain at this time.  Increase PAZ with any activity.  Denies CP with PAZ.  Pt states increasing over the last couple of months.   S/P aortic valve replacement echo to follow to evaluate for increase AORTIC STENOSIS. Sitting Lexiscan nuclear stress test to be completed as ordered.  Jazzy De Jesus RN

## 2021-07-20 ENCOUNTER — TELEPHONE (OUTPATIENT)
Dept: CARDIOLOGY | Facility: CLINIC | Age: 86
End: 2021-07-20

## 2021-07-20 NOTE — TELEPHONE ENCOUNTER
Call back received from Deneen - she left a voicemail stating that pt and her discussed valve clinic appt and they are interested in hearing more to help her symptom of shortness of breath. Staff message sent to valve clinic pool to facilitate valve clinic appt. Left a message with Aileen indicating this had been done and to call writer if they don't hear anything by next week. -[facility]

## 2021-07-29 ENCOUNTER — LAB REQUISITION (OUTPATIENT)
Dept: LAB | Facility: CLINIC | Age: 86
End: 2021-07-29
Payer: COMMERCIAL

## 2021-07-29 DIAGNOSIS — E87.1 HYPO-OSMOLALITY AND HYPONATREMIA: ICD-10-CM

## 2021-07-29 DIAGNOSIS — I10 ESSENTIAL (PRIMARY) HYPERTENSION: ICD-10-CM

## 2021-07-30 LAB
ANION GAP SERPL CALCULATED.3IONS-SCNC: 8 MMOL/L (ref 5–18)
BUN SERPL-MCNC: 31 MG/DL (ref 8–28)
CALCIUM SERPL-MCNC: 9.7 MG/DL (ref 8.5–10.5)
CHLORIDE BLD-SCNC: 96 MMOL/L (ref 98–107)
CO2 SERPL-SCNC: 28 MMOL/L (ref 22–31)
CREAT SERPL-MCNC: 1.13 MG/DL (ref 0.6–1.1)
GFR SERPL CREATININE-BSD FRML MDRD: 42 ML/MIN/1.73M2
GLUCOSE BLD-MCNC: 85 MG/DL (ref 70–125)
POTASSIUM BLD-SCNC: 4.2 MMOL/L (ref 3.5–5)
SODIUM SERPL-SCNC: 132 MMOL/L (ref 136–145)

## 2021-07-30 PROCEDURE — P9604 ONE-WAY ALLOW PRORATED TRIP: HCPCS | Mod: ORL | Performed by: INTERNAL MEDICINE

## 2021-07-30 PROCEDURE — 36415 COLL VENOUS BLD VENIPUNCTURE: CPT | Mod: ORL | Performed by: INTERNAL MEDICINE

## 2021-07-30 PROCEDURE — 80048 BASIC METABOLIC PNL TOTAL CA: CPT | Mod: ORL | Performed by: INTERNAL MEDICINE

## 2021-08-12 ENCOUNTER — APPOINTMENT (OUTPATIENT)
Dept: RADIOLOGY | Facility: HOSPITAL | Age: 86
DRG: 481 | End: 2021-08-12
Attending: EMERGENCY MEDICINE
Payer: COMMERCIAL

## 2021-08-12 ENCOUNTER — HOSPITAL ENCOUNTER (INPATIENT)
Facility: HOSPITAL | Age: 86
LOS: 5 days | Discharge: NURSING, REHAB OR LONG-TERM CARE | DRG: 481 | End: 2021-08-17
Attending: EMERGENCY MEDICINE | Admitting: HOSPITALIST
Payer: COMMERCIAL

## 2021-08-12 DIAGNOSIS — I50.32 CHRONIC DIASTOLIC CONGESTIVE HEART FAILURE (H): Primary | ICD-10-CM

## 2021-08-12 DIAGNOSIS — S72.141A CLOSED DISPLACED INTERTROCHANTERIC FRACTURE OF RIGHT FEMUR, INITIAL ENCOUNTER (H): ICD-10-CM

## 2021-08-12 DIAGNOSIS — Z87.09 HISTORY OF ASTHMA: ICD-10-CM

## 2021-08-12 DIAGNOSIS — S72.141A INTERTROCHANTERIC FRACTURE OF RIGHT FEMUR, CLOSED, INITIAL ENCOUNTER (H): ICD-10-CM

## 2021-08-12 LAB
ABO/RH(D): NORMAL
ANION GAP SERPL CALCULATED.3IONS-SCNC: 9 MMOL/L (ref 5–18)
ANTIBODY SCREEN: NEGATIVE
APTT PPP: 33 SECONDS (ref 22–38)
BASOPHILS # BLD AUTO: 0.1 10E3/UL (ref 0–0.2)
BASOPHILS NFR BLD AUTO: 1 %
BUN SERPL-MCNC: 38 MG/DL (ref 8–28)
CALCIUM SERPL-MCNC: 9.3 MG/DL (ref 8.5–10.5)
CHLORIDE BLD-SCNC: 95 MMOL/L (ref 98–107)
CO2 SERPL-SCNC: 26 MMOL/L (ref 22–31)
CREAT SERPL-MCNC: 1.23 MG/DL (ref 0.6–1.1)
EOSINOPHIL # BLD AUTO: 0.3 10E3/UL (ref 0–0.7)
EOSINOPHIL NFR BLD AUTO: 4 %
ERYTHROCYTE [DISTWIDTH] IN BLOOD BY AUTOMATED COUNT: 15.9 % (ref 10–15)
GFR SERPL CREATININE-BSD FRML MDRD: 38 ML/MIN/1.73M2
GLUCOSE BLD-MCNC: 117 MG/DL (ref 70–125)
HCT VFR BLD AUTO: 30.3 % (ref 35–47)
HGB BLD-MCNC: 9.9 G/DL (ref 11.7–15.7)
IMM GRANULOCYTES # BLD: 0 10E3/UL
IMM GRANULOCYTES NFR BLD: 1 %
INR PPP: 1.1 (ref 0.85–1.15)
LYMPHOCYTES # BLD AUTO: 0.9 10E3/UL (ref 0.8–5.3)
LYMPHOCYTES NFR BLD AUTO: 11 %
MCH RBC QN AUTO: 28.9 PG (ref 26.5–33)
MCHC RBC AUTO-ENTMCNC: 32.7 G/DL (ref 31.5–36.5)
MCV RBC AUTO: 88 FL (ref 78–100)
MONOCYTES # BLD AUTO: 1 10E3/UL (ref 0–1.3)
MONOCYTES NFR BLD AUTO: 12 %
NEUTROPHILS # BLD AUTO: 5.8 10E3/UL (ref 1.6–8.3)
NEUTROPHILS NFR BLD AUTO: 71 %
NRBC # BLD AUTO: 0 10E3/UL
NRBC BLD AUTO-RTO: 0 /100
PLATELET # BLD AUTO: 296 10E3/UL (ref 150–450)
POTASSIUM BLD-SCNC: 3.7 MMOL/L (ref 3.5–5)
RBC # BLD AUTO: 3.43 10E6/UL (ref 3.8–5.2)
SODIUM SERPL-SCNC: 130 MMOL/L (ref 136–145)
SPECIMEN EXPIRATION DATE: NORMAL
WBC # BLD AUTO: 8 10E3/UL (ref 4–11)

## 2021-08-12 PROCEDURE — 250N000011 HC RX IP 250 OP 636: Performed by: EMERGENCY MEDICINE

## 2021-08-12 PROCEDURE — 99285 EMERGENCY DEPT VISIT HI MDM: CPT | Mod: 25

## 2021-08-12 PROCEDURE — C9803 HOPD COVID-19 SPEC COLLECT: HCPCS

## 2021-08-12 PROCEDURE — 85025 COMPLETE CBC W/AUTO DIFF WBC: CPT | Performed by: EMERGENCY MEDICINE

## 2021-08-12 PROCEDURE — 73502 X-RAY EXAM HIP UNI 2-3 VIEWS: CPT

## 2021-08-12 PROCEDURE — 80048 BASIC METABOLIC PNL TOTAL CA: CPT | Performed by: EMERGENCY MEDICINE

## 2021-08-12 PROCEDURE — 99223 1ST HOSP IP/OBS HIGH 75: CPT | Mod: AI | Performed by: HOSPITALIST

## 2021-08-12 PROCEDURE — 96374 THER/PROPH/DIAG INJ IV PUSH: CPT

## 2021-08-12 PROCEDURE — 120N000001 HC R&B MED SURG/OB

## 2021-08-12 PROCEDURE — 85730 THROMBOPLASTIN TIME PARTIAL: CPT | Performed by: EMERGENCY MEDICINE

## 2021-08-12 PROCEDURE — 86900 BLOOD TYPING SEROLOGIC ABO: CPT | Performed by: EMERGENCY MEDICINE

## 2021-08-12 PROCEDURE — 85610 PROTHROMBIN TIME: CPT | Performed by: EMERGENCY MEDICINE

## 2021-08-12 PROCEDURE — 96376 TX/PRO/DX INJ SAME DRUG ADON: CPT

## 2021-08-12 PROCEDURE — 87635 SARS-COV-2 COVID-19 AMP PRB: CPT | Performed by: EMERGENCY MEDICINE

## 2021-08-12 PROCEDURE — 36415 COLL VENOUS BLD VENIPUNCTURE: CPT | Performed by: EMERGENCY MEDICINE

## 2021-08-12 RX ORDER — FENTANYL CITRATE 50 UG/ML
50 INJECTION, SOLUTION INTRAMUSCULAR; INTRAVENOUS ONCE
Status: COMPLETED | OUTPATIENT
Start: 2021-08-12 | End: 2021-08-12

## 2021-08-12 RX ORDER — FENTANYL CITRATE 50 UG/ML
25 INJECTION, SOLUTION INTRAMUSCULAR; INTRAVENOUS
Status: DISCONTINUED | OUTPATIENT
Start: 2021-08-12 | End: 2021-08-13

## 2021-08-12 RX ADMIN — FENTANYL CITRATE 50 MCG: 50 INJECTION, SOLUTION INTRAMUSCULAR; INTRAVENOUS at 22:31

## 2021-08-12 RX ADMIN — FENTANYL CITRATE 25 MCG: 50 INJECTION, SOLUTION INTRAMUSCULAR; INTRAVENOUS at 21:17

## 2021-08-12 ASSESSMENT — ENCOUNTER SYMPTOMS
CONFUSION: 0
ABDOMINAL PAIN: 0
HEADACHES: 0
EYE REDNESS: 0
NECK STIFFNESS: 0
FEVER: 0
ARTHRALGIAS: 0
DIFFICULTY URINATING: 0
SHORTNESS OF BREATH: 0
COLOR CHANGE: 0

## 2021-08-13 ENCOUNTER — ANESTHESIA EVENT (OUTPATIENT)
Dept: SURGERY | Facility: HOSPITAL | Age: 86
DRG: 481 | End: 2021-08-13
Payer: COMMERCIAL

## 2021-08-13 ENCOUNTER — APPOINTMENT (OUTPATIENT)
Dept: RADIOLOGY | Facility: HOSPITAL | Age: 86
DRG: 481 | End: 2021-08-13
Attending: ORTHOPAEDIC SURGERY
Payer: COMMERCIAL

## 2021-08-13 ENCOUNTER — ANESTHESIA (OUTPATIENT)
Dept: SURGERY | Facility: HOSPITAL | Age: 86
DRG: 481 | End: 2021-08-13
Payer: COMMERCIAL

## 2021-08-13 PROBLEM — F32.A ANXIETY AND DEPRESSION: Status: ACTIVE | Noted: 2021-08-13

## 2021-08-13 PROBLEM — J96.90 RESPIRATORY FAILURE (H): Status: ACTIVE | Noted: 2020-05-13

## 2021-08-13 PROBLEM — F41.9 ANXIETY AND DEPRESSION: Status: ACTIVE | Noted: 2021-08-13

## 2021-08-13 PROBLEM — N17.9 ACUTE RENAL FAILURE SUPERIMPOSED ON STAGE 3 CHRONIC KIDNEY DISEASE, UNSPECIFIED ACUTE RENAL FAILURE TYPE, UNSPECIFIED WHETHER STAGE 3A OR 3B CKD (H): Status: ACTIVE | Noted: 2021-08-13

## 2021-08-13 PROBLEM — I50.1 PULMONARY EDEMA CARDIAC CAUSE (H): Status: ACTIVE | Noted: 2021-08-13

## 2021-08-13 PROBLEM — I10 HYPERTENSION: Status: ACTIVE | Noted: 2020-04-23

## 2021-08-13 PROBLEM — M13.0 POLYARTHROPATHY OR POLYARTHRITIS, HAND: Status: ACTIVE | Noted: 2021-08-13

## 2021-08-13 PROBLEM — E78.5 DYSLIPIDEMIA: Status: ACTIVE | Noted: 2020-03-20

## 2021-08-13 PROBLEM — E78.5 HYPERLIPIDEMIA LDL GOAL <100: Status: ACTIVE | Noted: 2021-08-13

## 2021-08-13 PROBLEM — J20.8 ACUTE BRONCHITIS DUE TO OTHER SPECIFIED ORGANISMS: Status: ACTIVE | Noted: 2018-05-02

## 2021-08-13 PROBLEM — R53.1 GENERALIZED WEAKNESS: Status: ACTIVE | Noted: 2021-08-13

## 2021-08-13 PROBLEM — R53.81 PHYSICAL DECONDITIONING: Status: ACTIVE | Noted: 2020-03-20

## 2021-08-13 PROBLEM — J44.1 COPD EXACERBATION (H): Status: ACTIVE | Noted: 2020-03-20

## 2021-08-13 PROBLEM — N18.30 ACUTE RENAL FAILURE SUPERIMPOSED ON STAGE 3 CHRONIC KIDNEY DISEASE, UNSPECIFIED ACUTE RENAL FAILURE TYPE, UNSPECIFIED WHETHER STAGE 3A OR 3B CKD (H): Status: ACTIVE | Noted: 2021-08-13

## 2021-08-13 PROBLEM — M62.81 GENERALIZED MUSCLE WEAKNESS: Status: ACTIVE | Noted: 2020-03-20

## 2021-08-13 PROBLEM — R50.9 FEVER: Status: ACTIVE | Noted: 2021-08-13

## 2021-08-13 PROBLEM — K59.00 CONSTIPATION, UNSPECIFIED: Status: ACTIVE | Noted: 2021-08-13

## 2021-08-13 PROBLEM — J18.9 PNEUMONIA: Status: ACTIVE | Noted: 2018-04-30

## 2021-08-13 PROBLEM — R00.0 TACHYCARDIA: Status: ACTIVE | Noted: 2021-08-13

## 2021-08-13 PROBLEM — R15.9 FECAL INCONTINENCE ALTERNATING WITH CONSTIPATION: Status: ACTIVE | Noted: 2021-08-13

## 2021-08-13 PROBLEM — R21 RASH OF BODY: Status: ACTIVE | Noted: 2020-10-08

## 2021-08-13 PROBLEM — E87.20 METABOLIC ACIDOSIS: Status: ACTIVE | Noted: 2021-08-13

## 2021-08-13 PROBLEM — K59.00 FECAL INCONTINENCE ALTERNATING WITH CONSTIPATION: Status: ACTIVE | Noted: 2021-08-13

## 2021-08-13 PROBLEM — A41.9 SEPSIS (H): Status: ACTIVE | Noted: 2021-08-13

## 2021-08-13 PROBLEM — R19.8 IRREGULAR BOWEL HABITS: Status: ACTIVE | Noted: 2021-08-13

## 2021-08-13 PROBLEM — I16.1 HYPERTENSIVE EMERGENCY: Status: ACTIVE | Noted: 2021-08-13

## 2021-08-13 PROBLEM — G47.30 SLEEP APNEA: Status: ACTIVE | Noted: 2021-08-13

## 2021-08-13 PROBLEM — D46.Z: Status: ACTIVE | Noted: 2020-11-23

## 2021-08-13 PROBLEM — R68.89 FLU-LIKE SYMPTOMS: Status: ACTIVE | Noted: 2021-08-13

## 2021-08-13 PROBLEM — I50.30 DIASTOLIC CHF (H): Status: ACTIVE | Noted: 2020-07-01

## 2021-08-13 PROBLEM — M35.00 SJOGREN'S SYNDROME (H): Status: ACTIVE | Noted: 2020-04-23

## 2021-08-13 PROBLEM — I38 HEART VALVE DISEASE: Status: ACTIVE | Noted: 2020-09-02

## 2021-08-13 LAB
ANION GAP SERPL CALCULATED.3IONS-SCNC: 10 MMOL/L (ref 5–18)
BNP SERPL-MCNC: 171 PG/ML (ref 0–167)
BUN SERPL-MCNC: 30 MG/DL (ref 8–28)
CALCIUM SERPL-MCNC: 9.3 MG/DL (ref 8.5–10.5)
CHLORIDE BLD-SCNC: 96 MMOL/L (ref 98–107)
CO2 SERPL-SCNC: 27 MMOL/L (ref 22–31)
CREAT SERPL-MCNC: 1.01 MG/DL (ref 0.6–1.1)
GFR SERPL CREATININE-BSD FRML MDRD: 48 ML/MIN/1.73M2
GLUCOSE BLD-MCNC: 131 MG/DL (ref 70–125)
POTASSIUM BLD-SCNC: 3.8 MMOL/L (ref 3.5–5)
SARS-COV-2 RNA RESP QL NAA+PROBE: NEGATIVE
SODIUM SERPL-SCNC: 133 MMOL/L (ref 136–145)

## 2021-08-13 PROCEDURE — 250N000009 HC RX 250: Performed by: NURSE ANESTHETIST, CERTIFIED REGISTERED

## 2021-08-13 PROCEDURE — 120N000001 HC R&B MED SURG/OB

## 2021-08-13 PROCEDURE — 250N000009 HC RX 250: Performed by: ANESTHESIOLOGY

## 2021-08-13 PROCEDURE — 710N000009 HC RECOVERY PHASE 1, LEVEL 1, PER MIN: Performed by: ORTHOPAEDIC SURGERY

## 2021-08-13 PROCEDURE — 360N000084 HC SURGERY LEVEL 4 W/ FLUORO, PER MIN: Performed by: ORTHOPAEDIC SURGERY

## 2021-08-13 PROCEDURE — 250N000011 HC RX IP 250 OP 636: Performed by: NURSE ANESTHETIST, CERTIFIED REGISTERED

## 2021-08-13 PROCEDURE — 258N000003 HC RX IP 258 OP 636: Performed by: ANESTHESIOLOGY

## 2021-08-13 PROCEDURE — 83880 ASSAY OF NATRIURETIC PEPTIDE: CPT | Performed by: HOSPITALIST

## 2021-08-13 PROCEDURE — 370N000017 HC ANESTHESIA TECHNICAL FEE, PER MIN: Performed by: ORTHOPAEDIC SURGERY

## 2021-08-13 PROCEDURE — 0QS606Z REPOSITION RIGHT UPPER FEMUR WITH INTRAMEDULLARY INTERNAL FIXATION DEVICE, OPEN APPROACH: ICD-10-PCS | Performed by: ORTHOPAEDIC SURGERY

## 2021-08-13 PROCEDURE — 80048 BASIC METABOLIC PNL TOTAL CA: CPT | Performed by: HOSPITALIST

## 2021-08-13 PROCEDURE — 272N000001 HC OR GENERAL SUPPLY STERILE: Performed by: ORTHOPAEDIC SURGERY

## 2021-08-13 PROCEDURE — 250N000009 HC RX 250: Performed by: ORTHOPAEDIC SURGERY

## 2021-08-13 PROCEDURE — C1713 ANCHOR/SCREW BN/BN,TIS/BN: HCPCS | Performed by: ORTHOPAEDIC SURGERY

## 2021-08-13 PROCEDURE — 999N000141 HC STATISTIC PRE-PROCEDURE NURSING ASSESSMENT: Performed by: ORTHOPAEDIC SURGERY

## 2021-08-13 PROCEDURE — 999N000180 XR SURGERY CARM FLUORO LESS THAN 5 MIN: Mod: TC,NC

## 2021-08-13 PROCEDURE — 250N000011 HC RX IP 250 OP 636: Performed by: GENERAL ACUTE CARE HOSPITAL

## 2021-08-13 PROCEDURE — 99232 SBSQ HOSP IP/OBS MODERATE 35: CPT | Performed by: HOSPITALIST

## 2021-08-13 PROCEDURE — 99222 1ST HOSP IP/OBS MODERATE 55: CPT | Performed by: GENERAL ACUTE CARE HOSPITAL

## 2021-08-13 PROCEDURE — 258N000003 HC RX IP 258 OP 636: Performed by: NURSE ANESTHETIST, CERTIFIED REGISTERED

## 2021-08-13 PROCEDURE — 999N000065 XR HIP PORTABLE RIGHT 2-3 VIEWS

## 2021-08-13 PROCEDURE — 250N000013 HC RX MED GY IP 250 OP 250 PS 637: Performed by: GENERAL ACUTE CARE HOSPITAL

## 2021-08-13 PROCEDURE — 250N000011 HC RX IP 250 OP 636: Performed by: PHYSICIAN ASSISTANT

## 2021-08-13 PROCEDURE — 250N000011 HC RX IP 250 OP 636: Performed by: HOSPITALIST

## 2021-08-13 PROCEDURE — 36415 COLL VENOUS BLD VENIPUNCTURE: CPT | Performed by: HOSPITALIST

## 2021-08-13 DEVICE — K-WIRE: Type: IMPLANTABLE DEVICE | Site: HIP | Status: FUNCTIONAL

## 2021-08-13 DEVICE — LAG SCREW, TI
Type: IMPLANTABLE DEVICE | Site: HIP | Status: FUNCTIONAL
Brand: GAMMA

## 2021-08-13 DEVICE — TROCHANTERIC NAIL KIT, TI
Type: IMPLANTABLE DEVICE | Site: HIP | Status: FUNCTIONAL
Brand: GAMMA

## 2021-08-13 DEVICE — K-WIRE, STERILE: Type: IMPLANTABLE DEVICE | Site: HIP | Status: FUNCTIONAL

## 2021-08-13 DEVICE — LOCKING SCREW, FULLY THREADED: Type: IMPLANTABLE DEVICE | Site: HIP | Status: FUNCTIONAL

## 2021-08-13 RX ORDER — MAGNESIUM HYDROXIDE 1200 MG/15ML
LIQUID ORAL PRN
Status: DISCONTINUED | OUTPATIENT
Start: 2021-08-13 | End: 2021-08-13 | Stop reason: HOSPADM

## 2021-08-13 RX ORDER — PROCHLORPERAZINE 25 MG
12.5 SUPPOSITORY, RECTAL RECTAL EVERY 12 HOURS PRN
Status: DISCONTINUED | OUTPATIENT
Start: 2021-08-13 | End: 2021-08-17 | Stop reason: HOSPADM

## 2021-08-13 RX ORDER — NALOXONE HYDROCHLORIDE 0.4 MG/ML
0.4 INJECTION, SOLUTION INTRAMUSCULAR; INTRAVENOUS; SUBCUTANEOUS
Status: DISCONTINUED | OUTPATIENT
Start: 2021-08-13 | End: 2021-08-13

## 2021-08-13 RX ORDER — ACETAMINOPHEN 325 MG/1
975 TABLET ORAL EVERY 8 HOURS
Status: DISCONTINUED | OUTPATIENT
Start: 2021-08-13 | End: 2021-08-13

## 2021-08-13 RX ORDER — ONDANSETRON 4 MG/1
4 TABLET, ORALLY DISINTEGRATING ORAL EVERY 6 HOURS PRN
Status: DISCONTINUED | OUTPATIENT
Start: 2021-08-13 | End: 2021-08-13

## 2021-08-13 RX ORDER — NALOXONE HYDROCHLORIDE 0.4 MG/ML
0.4 INJECTION, SOLUTION INTRAMUSCULAR; INTRAVENOUS; SUBCUTANEOUS
Status: DISCONTINUED | OUTPATIENT
Start: 2021-08-13 | End: 2021-08-17 | Stop reason: HOSPADM

## 2021-08-13 RX ORDER — HYDROMORPHONE HCL IN WATER/PF 6 MG/30 ML
0.2 PATIENT CONTROLLED ANALGESIA SYRINGE INTRAVENOUS
Status: DISCONTINUED | OUTPATIENT
Start: 2021-08-13 | End: 2021-08-13

## 2021-08-13 RX ORDER — ONDANSETRON 4 MG/1
4 TABLET, ORALLY DISINTEGRATING ORAL EVERY 30 MIN PRN
Status: DISCONTINUED | OUTPATIENT
Start: 2021-08-13 | End: 2021-08-13 | Stop reason: HOSPADM

## 2021-08-13 RX ORDER — CEFAZOLIN SODIUM 1 G/3ML
1 INJECTION, POWDER, FOR SOLUTION INTRAMUSCULAR; INTRAVENOUS EVERY 8 HOURS
Status: COMPLETED | OUTPATIENT
Start: 2021-08-14 | End: 2021-08-14

## 2021-08-13 RX ORDER — ASPIRIN 81 MG/1
81 TABLET ORAL 2 TIMES DAILY
Status: DISCONTINUED | OUTPATIENT
Start: 2021-08-14 | End: 2021-08-17 | Stop reason: HOSPADM

## 2021-08-13 RX ORDER — SPIRONOLACTONE 25 MG/1
25 TABLET ORAL DAILY
Status: DISCONTINUED | OUTPATIENT
Start: 2021-08-13 | End: 2021-08-14 | Stop reason: DRUGHIGH

## 2021-08-13 RX ORDER — LIDOCAINE 40 MG/G
CREAM TOPICAL
Status: DISCONTINUED | OUTPATIENT
Start: 2021-08-13 | End: 2021-08-13

## 2021-08-13 RX ORDER — ACETAMINOPHEN 325 MG/1
975 TABLET ORAL EVERY 8 HOURS
Status: DISPENSED | OUTPATIENT
Start: 2021-08-14 | End: 2021-08-16

## 2021-08-13 RX ORDER — FUROSEMIDE 10 MG/ML
40 INJECTION INTRAMUSCULAR; INTRAVENOUS ONCE
Status: COMPLETED | OUTPATIENT
Start: 2021-08-13 | End: 2021-08-13

## 2021-08-13 RX ORDER — SODIUM CHLORIDE, SODIUM LACTATE, POTASSIUM CHLORIDE, CALCIUM CHLORIDE 600; 310; 30; 20 MG/100ML; MG/100ML; MG/100ML; MG/100ML
INJECTION, SOLUTION INTRAVENOUS CONTINUOUS
Status: DISCONTINUED | OUTPATIENT
Start: 2021-08-14 | End: 2021-08-14

## 2021-08-13 RX ORDER — FENTANYL CITRATE 50 UG/ML
25 INJECTION, SOLUTION INTRAMUSCULAR; INTRAVENOUS EVERY 5 MIN PRN
Status: DISCONTINUED | OUTPATIENT
Start: 2021-08-13 | End: 2021-08-13 | Stop reason: HOSPADM

## 2021-08-13 RX ORDER — SUCRALFATE 1 G/1
1 TABLET ORAL 2 TIMES DAILY
Status: ON HOLD | COMMUNITY
End: 2022-09-10

## 2021-08-13 RX ORDER — ACETAMINOPHEN 325 MG/1
650 TABLET ORAL EVERY 4 HOURS PRN
Status: DISCONTINUED | OUTPATIENT
Start: 2021-08-16 | End: 2021-08-17 | Stop reason: HOSPADM

## 2021-08-13 RX ORDER — AMOXICILLIN 250 MG
1 CAPSULE ORAL 2 TIMES DAILY
Status: DISCONTINUED | OUTPATIENT
Start: 2021-08-14 | End: 2021-08-17 | Stop reason: HOSPADM

## 2021-08-13 RX ORDER — SODIUM CHLORIDE, SODIUM LACTATE, POTASSIUM CHLORIDE, CALCIUM CHLORIDE 600; 310; 30; 20 MG/100ML; MG/100ML; MG/100ML; MG/100ML
INJECTION, SOLUTION INTRAVENOUS CONTINUOUS
Status: DISCONTINUED | OUTPATIENT
Start: 2021-08-13 | End: 2021-08-13 | Stop reason: HOSPADM

## 2021-08-13 RX ORDER — LIDOCAINE 40 MG/G
CREAM TOPICAL
Status: DISCONTINUED | OUTPATIENT
Start: 2021-08-13 | End: 2021-08-17 | Stop reason: HOSPADM

## 2021-08-13 RX ORDER — IPRATROPIUM BROMIDE AND ALBUTEROL SULFATE 2.5; .5 MG/3ML; MG/3ML
3 SOLUTION RESPIRATORY (INHALATION) EVERY 4 HOURS PRN
Status: DISCONTINUED | OUTPATIENT
Start: 2021-08-13 | End: 2021-08-17 | Stop reason: HOSPADM

## 2021-08-13 RX ORDER — NALOXONE HYDROCHLORIDE 0.4 MG/ML
0.2 INJECTION, SOLUTION INTRAMUSCULAR; INTRAVENOUS; SUBCUTANEOUS
Status: DISCONTINUED | OUTPATIENT
Start: 2021-08-13 | End: 2021-08-17 | Stop reason: HOSPADM

## 2021-08-13 RX ORDER — NALOXONE HYDROCHLORIDE 0.4 MG/ML
.2-.4 INJECTION, SOLUTION INTRAMUSCULAR; INTRAVENOUS; SUBCUTANEOUS
Status: DISCONTINUED | OUTPATIENT
Start: 2021-08-13 | End: 2021-08-13

## 2021-08-13 RX ORDER — PROPOFOL 10 MG/ML
INJECTION, EMULSION INTRAVENOUS CONTINUOUS PRN
Status: DISCONTINUED | OUTPATIENT
Start: 2021-08-13 | End: 2021-08-13

## 2021-08-13 RX ORDER — ACETAMINOPHEN 325 MG/1
650 TABLET ORAL EVERY 4 HOURS PRN
Status: DISCONTINUED | OUTPATIENT
Start: 2021-08-16 | End: 2021-08-13

## 2021-08-13 RX ORDER — OXYCODONE HYDROCHLORIDE 5 MG/1
5 TABLET ORAL EVERY 4 HOURS PRN
Status: DISCONTINUED | OUTPATIENT
Start: 2021-08-13 | End: 2021-08-17 | Stop reason: HOSPADM

## 2021-08-13 RX ORDER — BUPIVACAINE HYDROCHLORIDE 5 MG/ML
INJECTION, SOLUTION EPIDURAL; INTRACAUDAL
Status: COMPLETED | OUTPATIENT
Start: 2021-08-13 | End: 2021-08-13

## 2021-08-13 RX ORDER — ONDANSETRON 2 MG/ML
4 INJECTION INTRAMUSCULAR; INTRAVENOUS EVERY 6 HOURS PRN
Status: DISCONTINUED | OUTPATIENT
Start: 2021-08-13 | End: 2021-08-17 | Stop reason: HOSPADM

## 2021-08-13 RX ORDER — PROCHLORPERAZINE MALEATE 5 MG
5 TABLET ORAL EVERY 6 HOURS PRN
Status: DISCONTINUED | OUTPATIENT
Start: 2021-08-13 | End: 2021-08-13

## 2021-08-13 RX ORDER — PROCHLORPERAZINE MALEATE 5 MG
5 TABLET ORAL EVERY 6 HOURS PRN
Status: DISCONTINUED | OUTPATIENT
Start: 2021-08-13 | End: 2021-08-17 | Stop reason: HOSPADM

## 2021-08-13 RX ORDER — FAMOTIDINE 20 MG/1
20 TABLET, FILM COATED ORAL
Status: ON HOLD | COMMUNITY
End: 2021-08-17

## 2021-08-13 RX ORDER — HYDROMORPHONE HCL IN WATER/PF 6 MG/30 ML
.2-.4 PATIENT CONTROLLED ANALGESIA SYRINGE INTRAVENOUS
Status: DISCONTINUED | OUTPATIENT
Start: 2021-08-13 | End: 2021-08-17

## 2021-08-13 RX ORDER — ONDANSETRON 4 MG/1
4 TABLET, ORALLY DISINTEGRATING ORAL EVERY 6 HOURS PRN
Status: DISCONTINUED | OUTPATIENT
Start: 2021-08-13 | End: 2021-08-17 | Stop reason: HOSPADM

## 2021-08-13 RX ORDER — NALOXONE HYDROCHLORIDE 0.4 MG/ML
0.2 INJECTION, SOLUTION INTRAMUSCULAR; INTRAVENOUS; SUBCUTANEOUS
Status: DISCONTINUED | OUTPATIENT
Start: 2021-08-13 | End: 2021-08-13

## 2021-08-13 RX ORDER — CEFAZOLIN SODIUM 2 G/100ML
2 INJECTION, SOLUTION INTRAVENOUS SEE ADMIN INSTRUCTIONS
Status: DISCONTINUED | OUTPATIENT
Start: 2021-08-13 | End: 2021-08-13 | Stop reason: HOSPADM

## 2021-08-13 RX ORDER — BISACODYL 10 MG
10 SUPPOSITORY, RECTAL RECTAL DAILY PRN
Status: DISCONTINUED | OUTPATIENT
Start: 2021-08-13 | End: 2021-08-17 | Stop reason: HOSPADM

## 2021-08-13 RX ORDER — NALBUPHINE HYDROCHLORIDE 10 MG/ML
2.5-5 INJECTION, SOLUTION INTRAMUSCULAR; INTRAVENOUS; SUBCUTANEOUS EVERY 6 HOURS PRN
Status: DISCONTINUED | OUTPATIENT
Start: 2021-08-13 | End: 2021-08-17 | Stop reason: HOSPADM

## 2021-08-13 RX ORDER — KETAMINE HYDROCHLORIDE 10 MG/ML
INJECTION INTRAMUSCULAR; INTRAVENOUS PRN
Status: DISCONTINUED | OUTPATIENT
Start: 2021-08-13 | End: 2021-08-13

## 2021-08-13 RX ORDER — ONDANSETRON 2 MG/ML
4 INJECTION INTRAMUSCULAR; INTRAVENOUS EVERY 30 MIN PRN
Status: DISCONTINUED | OUTPATIENT
Start: 2021-08-13 | End: 2021-08-13 | Stop reason: HOSPADM

## 2021-08-13 RX ORDER — LIDOCAINE 40 MG/G
CREAM TOPICAL
Status: DISCONTINUED | OUTPATIENT
Start: 2021-08-13 | End: 2021-08-13 | Stop reason: HOSPADM

## 2021-08-13 RX ORDER — HYDROMORPHONE HCL IN WATER/PF 6 MG/30 ML
0.2 PATIENT CONTROLLED ANALGESIA SYRINGE INTRAVENOUS
Status: DISCONTINUED | OUTPATIENT
Start: 2021-08-13 | End: 2021-08-17 | Stop reason: HOSPADM

## 2021-08-13 RX ORDER — HYDROMORPHONE HCL IN WATER/PF 6 MG/30 ML
0.2 PATIENT CONTROLLED ANALGESIA SYRINGE INTRAVENOUS EVERY 5 MIN PRN
Status: DISCONTINUED | OUTPATIENT
Start: 2021-08-13 | End: 2021-08-13 | Stop reason: HOSPADM

## 2021-08-13 RX ORDER — POLYETHYLENE GLYCOL 3350 17 G/17G
17 POWDER, FOR SOLUTION ORAL DAILY
Status: DISCONTINUED | OUTPATIENT
Start: 2021-08-14 | End: 2021-08-17 | Stop reason: HOSPADM

## 2021-08-13 RX ORDER — CEFAZOLIN SODIUM 2 G/100ML
2 INJECTION, SOLUTION INTRAVENOUS
Status: COMPLETED | OUTPATIENT
Start: 2021-08-13 | End: 2021-08-13

## 2021-08-13 RX ORDER — MIRTAZAPINE 7.5 MG/1
7.5 TABLET, FILM COATED ORAL AT BEDTIME
COMMUNITY
End: 2023-03-20

## 2021-08-13 RX ORDER — ONDANSETRON 2 MG/ML
4 INJECTION INTRAMUSCULAR; INTRAVENOUS EVERY 6 HOURS PRN
Status: DISCONTINUED | OUTPATIENT
Start: 2021-08-13 | End: 2021-08-13

## 2021-08-13 RX ORDER — PROPOFOL 10 MG/ML
INJECTION, EMULSION INTRAVENOUS PRN
Status: DISCONTINUED | OUTPATIENT
Start: 2021-08-13 | End: 2021-08-13

## 2021-08-13 RX ADMIN — HYDROMORPHONE HYDROCHLORIDE 0.2 MG: 0.2 INJECTION, SOLUTION INTRAMUSCULAR; INTRAVENOUS; SUBCUTANEOUS at 01:39

## 2021-08-13 RX ADMIN — PHENYLEPHRINE HYDROCHLORIDE 100 MCG: 10 INJECTION INTRAVENOUS at 21:46

## 2021-08-13 RX ADMIN — Medication 12.5 MG: at 10:00

## 2021-08-13 RX ADMIN — HYDROMORPHONE HYDROCHLORIDE 0.4 MG: 0.2 INJECTION, SOLUTION INTRAMUSCULAR; INTRAVENOUS; SUBCUTANEOUS at 05:41

## 2021-08-13 RX ADMIN — SODIUM CHLORIDE, POTASSIUM CHLORIDE, SODIUM LACTATE AND CALCIUM CHLORIDE 100 ML/HR: 600; 310; 30; 20 INJECTION, SOLUTION INTRAVENOUS at 19:45

## 2021-08-13 RX ADMIN — PHENYLEPHRINE HYDROCHLORIDE 0.2 MCG/KG/MIN: 10 INJECTION INTRAVENOUS at 21:39

## 2021-08-13 RX ADMIN — PHENYLEPHRINE HYDROCHLORIDE 100 MCG: 10 INJECTION INTRAVENOUS at 21:37

## 2021-08-13 RX ADMIN — PHENYLEPHRINE HYDROCHLORIDE 100 MCG: 10 INJECTION INTRAVENOUS at 21:29

## 2021-08-13 RX ADMIN — CEFAZOLIN SODIUM 2 G: 2 INJECTION, SOLUTION INTRAVENOUS at 21:19

## 2021-08-13 RX ADMIN — PHENYLEPHRINE HYDROCHLORIDE 100 MCG: 10 INJECTION INTRAVENOUS at 21:24

## 2021-08-13 RX ADMIN — PHENYLEPHRINE HYDROCHLORIDE 100 MCG: 10 INJECTION INTRAVENOUS at 22:01

## 2021-08-13 RX ADMIN — PHENYLEPHRINE HYDROCHLORIDE 100 MCG: 10 INJECTION INTRAVENOUS at 21:51

## 2021-08-13 RX ADMIN — BUPIVACAINE HYDROCHLORIDE 2.8 ML: 5 INJECTION, SOLUTION EPIDURAL; INTRACAUDAL; PERINEURAL at 21:08

## 2021-08-13 RX ADMIN — PROPOFOL 50 MCG/KG/MIN: 10 INJECTION, EMULSION INTRAVENOUS at 21:20

## 2021-08-13 RX ADMIN — KETAMINE HYDROCHLORIDE 25 MG: 10 INJECTION, SOLUTION INTRAMUSCULAR; INTRAVENOUS at 20:50

## 2021-08-13 RX ADMIN — FUROSEMIDE 40 MG: 10 INJECTION, SOLUTION INTRAVENOUS at 10:01

## 2021-08-13 RX ADMIN — HYDROMORPHONE HYDROCHLORIDE 0.4 MG: 0.2 INJECTION, SOLUTION INTRAMUSCULAR; INTRAVENOUS; SUBCUTANEOUS at 11:59

## 2021-08-13 RX ADMIN — SPIRONOLACTONE 25 MG: 25 TABLET, FILM COATED ORAL at 11:59

## 2021-08-13 RX ADMIN — PROPOFOL 20 MG: 10 INJECTION, EMULSION INTRAVENOUS at 21:16

## 2021-08-13 RX ADMIN — HYDROMORPHONE HYDROCHLORIDE 0.2 MG: 0.2 INJECTION, SOLUTION INTRAMUSCULAR; INTRAVENOUS; SUBCUTANEOUS at 02:25

## 2021-08-13 ASSESSMENT — ACTIVITIES OF DAILY LIVING (ADL)
FALL_HISTORY_WITHIN_LAST_SIX_MONTHS: YES
DIFFICULTY_COMMUNICATING: NO
WALKING_OR_CLIMBING_STAIRS_DIFFICULTY: YES
DOING_ERRANDS_INDEPENDENTLY_DIFFICULTY: YES
DEPENDENT_IADLS:: LAUNDRY;MEAL PREPARATION;MEDICATION MANAGEMENT;TRANSPORTATION
NUMBER_OF_TIMES_PATIENT_HAS_FALLEN_WITHIN_LAST_SIX_MONTHS: 1
DRESSING/BATHING_DIFFICULTY: NO
TOILETING_ISSUES: NO
EQUIPMENT_CURRENTLY_USED_AT_HOME: WALKER, ROLLING
DIFFICULTY_EATING/SWALLOWING: NO
CONCENTRATING,_REMEMBERING_OR_MAKING_DECISIONS_DIFFICULTY: NO
WEAR_GLASSES_OR_BLIND: YES

## 2021-08-13 ASSESSMENT — COPD QUESTIONNAIRES: COPD: 1

## 2021-08-13 NOTE — PROGRESS NOTES
Care Management Initial Consult    General Information  Assessment completed with: Patient, patient and pt's helgaonofre Gallagher  Type of CM/SW Visit: Initial Assessment    Primary Care Provider verified and updated as needed: Yes   Readmission within the last 30 days:           Advance Care Planning: Advance Care Planning Reviewed: present on chart          Communication Assessment  Patient's communication style: spoken language (English or Bilingual)    Hearing Difficulty or Deaf: hearing aides  Wear Glasses or Blind: yes    Cognitive  Cognitive/Neuro/Behavioral: WDL                      Living Environment:   People in home:       Current living Arrangements: assisted living ([facility])  Name of Facility: [facility]   Able to return to prior arrangements:         Family/Social Support:  Care provided by: other (see comments) ([facility] staff)  Provides care for:       Other (specify) (kayce Gallagher)          Description of Support System: Supportive         Current Resources:   Patient receiving home care services:       Community Resources:    Equipment currently used at home: walker, rolling  Supplies currently used at home:      Employment/Financial:  Employment Status:          Financial Concerns:             Lifestyle & Psychosocial Needs:  Social Determinants of Health     Tobacco Use: Low Risk      Smoking Tobacco Use: Never Smoker     Smokeless Tobacco Use: Never Used   Alcohol Use:      Frequency of Alcohol Consumption:      Average Number of Drinks:      Frequency of Binge Drinking:    Financial Resource Strain:      Difficulty of Paying Living Expenses:    Food Insecurity:      Worried About Running Out of Food in the Last Year:      Ran Out of Food in the Last Year:    Transportation Needs:      Lack of Transportation (Medical):      Lack of Transportation (Non-Medical):    Physical Activity:      Days of Exercise per Week:      Minutes of Exercise per  Session:    Stress:      Feeling of Stress :    Social Connections:      Frequency of Communication with Friends and Family:      Frequency of Social Gatherings with Friends and Family:      Attends [employer] Services:      Active Member of Clubs or Organizations:      Attends Club or Organization Meetings:      Marital Status:    Intimate Partner Violence:      Fear of Current or Ex-Partner:      Emotionally Abused:      Physically Abused:      Sexually Abused:    Depression:      PHQ-2 Score:    Housing Stability:      Unable to Pay for Housing in the Last Year:      Number of Places Lived in the Last Year:      Unstable Housing in the Last Year:        Functional Status:  Prior to admission patient needed assistance:   Dependent ADLs:: Bathing (assist with medications, laundry, bathing, meals)  Dependent IADLs:: Laundry, Meal Preparation, Medication Management, Transportation  Assesssment of Functional Status: Not at baseline with ADL Functioning, Not at baseline with mobility, Not at  functional baseline, Needs placement in a SNF/TCF for rehabilitation    Mental Health Status:          Chemical Dependency Status:                Values/Beliefs:  Spiritual, Cultural Beliefs, [employer] Practices, Values that affect care:                 Additional Information:  Met with patient and spoke with kayce Gallagher to introduce care management role, progression of care, and possible services at discharge- including but not limited to: no services, home care, TCU or other options. Patient resides at [facility]. Pt receives assistance with meals, medications, laundry. Shower. DME CPAP, walker, cane, hearing aides. Patient is scheduled for surgery tomorrow. Patient provided TCU list for choice, defers to kayce. Pt's kayce Gallagher requested referral be placed to [facility]. Hx pleasant stay with [facility] in past. Referral made. Discussed transportation options and costs.  Kelvin would like [facility] w/c for transport.    Beti Gambino RN

## 2021-08-13 NOTE — ED PROVIDER NOTES
EMERGENCY DEPARTMENT ENCOUNTER     NAME: Marla Dunn   AGE: 89 year old female   YOB: 1929   MRN: [id number]   EVALUATION DATE & TIME: 8/12/2021  9:02 PM   PCP: Ayana Parker     Chief Complaint   Patient presents with     Hip Pain     Fall   :    FINAL IMPRESSION       1. Closed displaced intertrochanteric fracture of right femur, initial encounter (H)           ED COURSE & MEDICAL DECISION MAKING      Pertinent Labs & Imaging studies reviewed. (See chart for details)   89 year old female  presents to the Emergency Department for evaluation of a mechanical fall where her leg gave out, with right hip pain. Initial Vitals Reviewed. Initial exam notable for uncomfortable appearing elderly female with a GCS of 15 but a shortened and externally rotated right lower extremity with tenderness over the right hip.  DP pulses and capillary refill of toes intact.    Differential i ncludes high suspicion for fracture versus dislocation of the hip.  X-ray confirms an intratrochanteric fracture.  She is needing multiple doses of IV fentanyl which is controlling her pain.  I discussed the case with [facility], who recommended making her n.p.o. at midnight for plans for operative management tomorrow if over time can be secured.  I have ordered a preoperative Covid test, preoperative labs, and discussed the case with hospitalist for admission.    9:11 PM I met with patient for initial interview and exam.   10:52 PM Spoke with Dr. Arvizu, hospitalist, about the case. They accepted the admission.        At the conclusion of the encounter I discussed the results of all of the tests and the disposition. The questions were answered. The patient or family acknowledged understanding and was agreeable with the care plan.         MEDICATIONS GIVEN IN THE EMERGENCY:   Medications   fentaNYL (PF) (SUBLIMAZE) injection 25 mcg (25 mcg Intravenous Given 8/12/21 2117)   fentaNYL (PF) (SUBLIMAZE) injection 50  mcg (50 mcg Intravenous Given 8/12/21 6088)      NEW PRESCRIPTIONS STARTED AT TODAY'S ER VISIT   New Prescriptions    No medications on file     ================================================================   HISTORY OF PRESENT ILLNESS       Patient information was obtained from: Patient    Use of Intrepreter: N/A   Marla Dunn is a 89 year old female with history of CAD, COPD, MI, osteoporosis, CHF, and rheumatoid arthritis who presents for evaluation of right hip and knee pain.    Patient reports she was walking with her walker when her legs gave out and she fell onto her right side. She denies any loss of consciousness, but is endorsing pain to her right knee and hip. She has no other complaints at this time.     ================================================================    REVIEW OF SYSTEMS       Review of Systems   Constitutional: Negative for fever.   HENT: Negative for congestion.    Eyes: Negative for redness.   Respiratory: Negative for shortness of breath.    Cardiovascular: Negative for chest pain.   Gastrointestinal: Negative for abdominal pain.   Genitourinary: Negative for difficulty urinating.   Musculoskeletal: Negative for arthralgias and neck stiffness.        Positive for right hip and knee pain.   Skin: Negative for color change.   Neurological: Negative for headaches.   Psychiatric/Behavioral: Negative for confusion.   All other systems reviewed and are negative.        PAST HISTORY     PAST MEDICAL HISTORY:   Past Medical History:   Diagnosis Date     Asthma      Bilateral carpal tunnel syndrome 8/27/2015     CAD (coronary artery disease)      Chronic airway obstruction, not elsewhere classified     Created by Conversion      Chronic anemia      Chronic edema      Congestive heart failure, severe (H) 5/13/2020     COPD (chronic obstructive pulmonary disease) (H)      Coronary artery disease      Depression      Family history of myocardial infarction     father 64     GERD  (gastroesophageal reflux disease)      Heart disease      Heart murmur      High cholesterol     dislipidemia     HTN (hypertension)      Hypercholesterolemia      Hypertension      Hypothyroidism      Hypothyroidism      MI (myocardial infarction) (H) 1985     Myelodysplastic syndrome (H)      Myocardial infarction (H) 1983     OLEGARIO (obstructive sleep apnea)      Osteoporosis      Other and unspecified hyperlipidemia     Created by Conversion      Radicular low back pain      Rheumatoid arthritis (H)      Elizabeth Agers syndrome      Sjoegren syndrome      Sjogren's syndrome (H)      Sleep apnea, obstructive      Spinal stenosis      Unspecified polyarthropathy or polyarthritis, hand     Created by Conversion       PAST SURGICAL HISTORY:   Past Surgical History:   Procedure Laterality Date     aortic valve       AORTIC VALVE REPLACEMENT  2012     APPENDECTOMY       APPENDECTOMY       AS TOTAL KNEE ARTHROPLASTY Right      BACK SURGERY  2004    spinal decompression     BACK SURGERY      spinal stenosis     BONE MARROW BIOPSY  2004     breast biopsy       BREAST SURGERY  2001    biopsy     BYPASS GRAFT ARTERY CORONARY  2009    LIMA to ramus intermedius     C CABG, ARTERY-VEIN, FOUR       C CABG, VEIN, SINGLE      Description: CABG (CABG);  Recorded: 03/09/2012;  Comments: Single vessel bypass graft to an obtuse marginal branch in May 2009     C REPLACE AORT VALV,PROSTH VALV      Description: Aortic Valve Replacement;  Recorded: 03/09/2012;  Comments: Aortic valve replacement     cardiac angiogram       CARDIAC CATHETERIZATION       CARDIAC SURGERY       EYE SURGERY  2008    bilateral cataract repair      EYE SURGERY Bilateral     cornea transplant left eye & cataracts     KYPHOPLASTY  2003    T12     OTHER SURGICAL HISTORY  01/2018    Rectosigmoidectomy perineal     RECTOSIGMOIDECTOMY PERINEAL N/A 1/10/2018    Procedure: RECTOSIGMOIDECTOMY PERINEAL;  PERINEAL RECTOSIGMOIDECTOMY ;  Surgeon: Candelaria Anthony MD;   Location: [facility]     REPAIR VALVE AORTIC  2009     THORACIC SURGERY  2003    T12 kyphoplasty      CURRENT MEDICATIONS:   Acetaminophen (TYLENOL PO)  albuterol (2.5 MG/3ML) 0.083% neb solution  albuterol (PROAIR HFA/PROVENTIL HFA/VENTOLIN HFA) 108 (90 Base) MCG/ACT inhaler  AmLODIPine Besylate (NORVASC PO)  Ascorbic Acid (VITAMIN C PO)  ASPIRIN PO  Calcium Carb-Cholecalciferol (CALCIUM+D3) 600-800 MG-UNIT TABS  calcium carbonate (TUMS) 500 MG chewable tablet  Calcium Carbonate-Vitamin D (CALTRATE 600+D PO)  cholecalciferol (D3 HIGH POTENCY) 125 MCG (5000 UT) CAPS  Citalopram Hydrobromide (CELEXA PO)  cycloSPORINE (RESTASIS) 0.05 % ophthalmic emulsion  Darbepoetin Joseph-Polysorbate (ARANESP, ALB FREE, SURECLICK IJ)  desloratadine (CLARINEX) 5 MG tablet  Docusate Sodium (COLACE PO)  fluorometholone (FML LIQUIFILM) 0.1 % ophthalmic susp  Furosemide (LASIX PO)  IBANDRONATE SODIUM PO  ipratropium - albuterol 0.5 mg/2.5 mg/3 mL (DUONEB) 0.5-2.5 (3) MG/3ML neb solution  Ipratropium-Albuterol (COMBIVENT RESPIMAT)  MCG/ACT inhaler  Levothyroxine Sodium (LEVOXYL PO)  Lidocaine (LIDOCARE) 4 % Patch  loratadine (CLARITIN) 10 MG tablet  magnesium citrate solution  Methylcellulose, Laxative, (CITRUCEL PO)  METOPROLOL TARTRATE PO  METOPROLOL TARTRATE PO  Multiple Vitamins-Minerals (CEROVITE PO)  Multiple Vitamins-Minerals (PRESERVISION AREDS PO)  Nitroglycerin (NITROSTAT SL)  omeprazole (PRILOSEC) 20 MG DR capsule  polyethylene glycol (MIRALAX) powder  polyethylene glycol 0.4%- propylene glycol 0.3% (SYSTANE ULTRA) 0.4-0.3 % SOLN ophthalmic solution  polyethylene glycol 0.4%- propylene glycol 0.3% (SYSTANE ULTRA) 0.4-0.3 % SOLN ophthalmic solution  Probiotic Product (PROBIOTIC PO)  Psyllium (METAMUCIL FIBER PO)  RaNITidine HCl (ZANTAC PO)  Simvastatin (ZOCOR PO)  sodium chloride 1 GM tablet  spironolactone (ALDACTONE) 50 MG tablet  Sulfacetamide Sodium-Sulfur (SULFACET-R EX)  timolol (TIMOPTIC) 0.5 % ophthalmic  solution  torsemide (DEMADEX) 20 MG tablet  traMADol (ULTRAM) 50 MG tablet  triamcinolone (KENALOG) 0.1 % cream  VITAMIN D, CHOLECALCIFEROL, PO      ALLERGIES:   Allergies   Allergen Reactions     Gramineae Pollens Other (See Comments)     Shortness of breath when lawn is just cut.     Smoke. Other (See Comments)     Hard to breathe.      FAMILY HISTORY:   Family History   Problem Relation Age of Onset     Family History Negative Mother      Cancer Mother      Heart Disease Father       SOCIAL HISTORY:   Social History     Socioeconomic History     Marital status:      Spouse name: Not on file     Number of children: Not on file     Years of education: Not on file     Highest education level: Not on file   Occupational History     Not on file   Tobacco Use     Smoking status: Never Smoker     Smokeless tobacco: Never Used   Substance and Sexual Activity     Alcohol use: No     Alcohol/week: 0.0 standard drinks     Comment: Alcoholic Drinks/day: occasional glass of wine     Drug use: No     Sexual activity: Not on file   Other Topics Concern     Not on file   Social History Narrative    .  passed away 20 years ago. No children. 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 Was living independently in her apartment in [place] prior to admission. Uses walker at baseline.      Social Determinants of Health     Financial Resource Strain:      Difficulty of Paying Living Expenses:    Food Insecurity:      Worried About Running Out of Food in the Last Year:      Ran Out of Food in the Last Year:    Transportation Needs:      Lack of Transportation (Medical):      Lack of Transportation (Non-Medical):    Physical Activity:      Days of Exercise per Week:      Minutes of Exercise per Session:    Stress:      Feeling of Stress :    Social Connections:      Frequency of Communication with Friends and Family:      Frequency of Social Gatherings with Friends and Family:      Attends  Congregation Services:      Active Member of Clubs or Organizations:      Attends Club or Organization Meetings:      Marital Status:    Intimate Partner Violence:      Fear of Current or Ex-Partner:      Emotionally Abused:      Physically Abused:      Sexually Abused:         VITALS  Patient Vitals for the past 24 hrs:   BP Temp Temp src Pulse Resp SpO2 Weight   08/12/21 2106 (!) 150/65 98.5  F (36.9  C) Oral 82 18 97 % 49.9 kg (110 lb)        ================================================================    PHYSICAL EXAM     VITAL SIGNS: BP (!) 150/65   Pulse 82   Temp 98.5  F (36.9  C) (Oral)   Resp 18   Wt 49.9 kg (110 lb)   SpO2 97%   BMI 18.88 kg/m     Constitutional:  Awake, elderly female, nontoxic but uncomfortable appearing  HENT:  Atraumatic, oropharynx without exudate or erythema, membranes moist  Lymph:  No adenopathy  Eyes: EOM intact, PERRL, no injection  Neck: Supple  Respiratory:  Clear to auscultation bilaterally, no wheezes or crackles   Cardiovascular:  Regular rate and rhythm, single S1 and S2   GI:  Soft, nontender, nondistended, no rebound or guarding   Musculoskeletal: Right lower extremity is shortened and externally rotated.  DP pulse and capillary refill of toes intact.  Tender to palpation over the lateral and anterior right hip  Skin:  Warm, dry  Neurologic:  Alert and oriented x3, no focal deficits noted       ================================================================  LAB       All pertinent labs reviewed and interpreted.   Labs Ordered and Resulted from Time of ED Arrival Up to the Time of Departure from the ED   BASIC METABOLIC PANEL - Abnormal; Notable for the following components:       Result Value    Sodium 130 (*)     Chloride 95 (*)     Urea Nitrogen 38 (*)     Creatinine 1.23 (*)     GFR Estimate 38 (*)     All other components within normal limits   CBC WITH PLATELETS AND DIFFERENTIAL - Abnormal; Notable for the following components:    RBC Count 3.43 (*)      Hemoglobin 9.9 (*)     Hematocrit 30.3 (*)     RDW 15.9 (*)     All other components within normal limits   INR - Normal   PARTIAL THROMBOPLASTIN TIME - Normal   CBC WITH PLATELETS & DIFFERENTIAL    Narrative:     The following orders were created for panel order CBC with platelets differential.  Procedure                               Abnormality         Status                     ---------                               -----------         ------                     CBC with platelets and d...[[id number]]  Abnormal            Final result                 Please view results for these tests on the individual orders.   SARS-COV2 (COVID-19) VIRUS RT-PCR   PERIPHERAL IV CATHETER   CALL   TYPE AND SCREEN, ADULT   COVID-19 VIRUS (CORONAVIRUS) BY PCR    Narrative:     The following orders were created for panel order Asymptomatic COVID-19 Virus (Coronavirus) by PCR.  Procedure                               Abnormality         Status                     ---------                               -----------         ------                     SARS-COV2 (COVID-19) Vir...[[id number]]                                                   Please view results for these tests on the individual orders.   ABO/RH TYPE AND SCREEN    Narrative:     The following orders were created for panel order ABO/Rh type and screen.  Procedure                               Abnormality         Status                     ---------                               -----------         ------                     Adult Type and Screen[[id number]]                            In process                   Please view results for these tests on the individual orders.        ===============================================================  RADIOLOGY       Reviewed all pertinent imaging. Please see official radiology report.   XR Pelvis and Hip Right 2 Views   Final Result   IMPRESSION: Acute mildly displaced right intertrochanteric fracture. No widening of the sacroiliac  joints or pubic symphysis. Atherosclerotic vascular calcifications. Moderate discogenic degenerative change of the visualized lumbosacral spine. 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            ================================================================  EKG         I have independently reviewed and interpreted the EKG(s) documented above.     ================================================================  PROCEDURES         I, Mohan Lexy, am serving as a scribe to document services personally performed by Dr. Fernandez based on my observation and the provider's statements to me. I, Trina Fernandez MD attest that Mohan Pugh is acting in a scribe capacity, has observed my performance of the services and has documented them in accordance with my direction.   Trina Fernandez M.D.   Emergency Medicine   [facility] EMERGENCY DEPARTMENT  [facility] [facility] [id number] [phone]  Dept: [phone]       Trina Fernandez MD  08/12/21 0355

## 2021-08-13 NOTE — PHARMACY-ADMISSION MEDICATION HISTORY
Pharmacy Note - Admission Medication History    Pertinent Provider Information:      ______________________________________________________________________    Prior To Admission (PTA) med list completed and updated in EMR.       PTA Med List   Medication Sig Last Dose     AmLODIPine Besylate (NORVASC PO) Take 5 mg by mouth every morning  8/12/2021 at Unknown time     Ascorbic Acid (VITAMIN C PO) Take 500 mg by mouth every morning 8/12/2021 at Unknown time     ASPIRIN PO Take 81 mg by mouth every morning  8/12/2021 at Unknown time     Calcium Carb-Cholecalciferol (CALCIUM+D3) 600-800 MG-UNIT TABS Take 1 tablet by mouth daily 8/12/2021 at Unknown time     cholecalciferol (D3 HIGH POTENCY) 125 MCG (5000 UT) CAPS Take 1 capsule by mouth daily 8/12/2021 at Unknown time     cycloSPORINE (RESTASIS) 0.05 % ophthalmic emulsion Place 1 drop into both eyes 2 times daily  8/12/2021 at Unknown time     fluorometholone (FML LIQUIFILM) 0.1 % ophthalmic susp Place 1 drop Into the left eye daily  8/12/2021 at Unknown time     IBANDRONATE SODIUM PO Take 150 mg by mouth every 30 days 8/2/2021 at Unknown time     Ipratropium-Albuterol (COMBIVENT RESPIMAT)  MCG/ACT inhaler Inhale 2 puffs into the lungs 4 times daily 8/12/2021 at Unknown time     Levothyroxine Sodium (LEVOXYL PO) Take 88 mcg by mouth daily  8/12/2021 at Unknown time     Lidocaine (LIDOCARE) 4 % Patch Place 1 patch onto the skin every 24 hours To prevent lidocaine toxicity, patient should be patch free for 12 hrs daily. Apply to left hip 8/11/2021 at Unknown time     loratadine (CLARITIN) 10 MG tablet Take 20 mg by mouth daily 8/12/2021 at Unknown time     METOPROLOL TARTRATE PO Take 25 mg by mouth 2 times daily  8/12/2021 at Unknown time     mirtazapine (REMERON) 7.5 MG tablet Take 7.5 mg by mouth At Bedtime 8/11/2021 at Unknown time     Multiple Vitamins-Minerals (CEROVITE PO) Take 1 tablet by mouth daily 8/12/2021 at Unknown time     Multiple Vitamins-Minerals  (PRESERVISION AREDS PO) Take 1 tablet by mouth 2 times daily 8/12/2021 at Unknown time     omeprazole (PRILOSEC) 20 MG DR capsule Take 20 mg by mouth 2 times daily 8/12/2021 at Unknown time     polyethylene glycol (MIRALAX) powder Take 1 capful by mouth every morning 8/10/2021     polyethylene glycol 0.4%- propylene glycol 0.3% (SYSTANE ULTRA) 0.4-0.3 % SOLN ophthalmic solution Place 1 drop into both eyes 4 times daily  8/12/2021 at Unknown time     Probiotic Product (PROBIOTIC PO) Take 1 capsule by mouth every morning 8/12/2021 at Unknown time     Psyllium (METAMUCIL FIBER PO) MIX 1 PACKET IN BEVERAGE OF CHOICE AND DRINK TWICE WEEKLY ON TUES AND THURS 8/10/2021 at Unknown time     sodium chloride 1 GM tablet Take 500 mg by mouth every other day  8/11/2021     spironolactone (ALDACTONE) 50 MG tablet Take 50 mg by mouth daily 8/12/2021 at Unknown time     sucralfate (CARAFATE) 1 GM tablet Take 1 g by mouth 2 times daily 8/12/2021 at Unknown time     timolol (TIMOPTIC) 0.5 % ophthalmic solution Place 1 drop Into the left eye daily  8/12/2021 at Unknown time     torsemide (DEMADEX) 20 MG tablet Take 40 mg by mouth daily 8/12/2021 at Unknown time       Information source(s): Facility ([facility]/) medication list/MAR  Method of interview communication: phone    Summary of Changes to PTA Med List  New: mirtazapine, sucralfate  Discontinued: citalopram, calcium duplicate, darbepoetin, desloratadine, furosemide,  simvastatin, nitroglycerin, metoprolol duplicate, sulfacetamide sodium, vitamin D duplicate, triamcinolone, albuterol neb solution  Changed: metoprolol dose and frequency    Patient was asked about OTC/herbal products specifically.  PTA med list reflects this.    In the past week, patient estimated taking medication this percent of the time:  greater than 90%.    Allergies were reviewed, assessed, and updated with the patient.      Patient did not bring any medications to the hospital and can't retrieve from home.  No multi-dose medications are available for use during hospital stay.     The information provided in this note is only as accurate as the sources available at the time of the update(s).    Thank you for the opportunity to participate in the care of this patient.    Bella Ayala [facility]  8/13/2021 1:41 PM

## 2021-08-13 NOTE — PLAN OF CARE
Problem: Adult Inpatient Plan of Care  Goal: Absence of Hospital-Acquired Illness or Injury  Intervention: Prevent Skin Injury  Recent Flowsheet Documentation  Taken 8/13/2021 0225 by Edilia Castro, RN  Body Position: supine   [place] is very painful with any movement. Right let is shortened and externally rotated. Obvious deformity at the hip joint. Able to turn briefly to get on and off bsc, but unable to tolerate laying on side. Hips shifted back and forth. Feet floated on pillows.  Problem: Pain Acute  Goal: Acceptable Pain Control and Functional Ability  Outcome: No Change  Intervention: Develop Pain Management Plan  Recent Flowsheet Documentation  Taken 8/13/2021 0225 by Edilia Castro, RN  Pain Management Interventions: medication (see MAR)  Taken 8/13/2021 0139 by Edilia Castro, RN  Pain Management Interventions: medication (see MAR)   Dilaudid iv x 2. Sleeping on recheck.

## 2021-08-13 NOTE — PLAN OF CARE
Problem: Adult Inpatient Plan of Care  Goal: Optimal Comfort and Wellbeing  Outcome: Improving     Problem: Pain Acute  Goal: Acceptable Pain Control and Functional Ability  Intervention: Develop Pain Management Plan  Recent Flowsheet Documentation  Taken 8/13/2021 1159 by Montez Guthrie RN  Pain Management Interventions: medication (see MAR)     Problem: Adult Inpatient Plan of Care  Goal: Absence of Hospital-Acquired Illness or Injury  Intervention: Identify and Manage Fall Risk  Recent Flowsheet Documentation  Taken 8/13/2021 1200 by Montez Guthrie RN  Safety Promotion/Fall Prevention:   bed alarm on   room door open  Taken 8/13/2021 0800 by Montez Guthrie RN  Safety Promotion/Fall Prevention:   bed alarm on   room door open   Patient received PRN Dilaudid for hip pain, deformity at hip noted. Currently NPO with surgery scheduled for 2130 tonight.      Problem: Pain Acute  Goal: Acceptable Pain Control and Functional Ability  Intervention: Develop Pain Management Plan  Recent Flowsheet Documentation  Taken 8/13/2021 1159 by Montez Guthrie RN  Pain Management Interventions: medication (see MAR)     Problem: Adult Inpatient Plan of Care  Goal: Absence of Hospital-Acquired Illness or Injury  Intervention: Identify and Manage Fall Risk  Recent Flowsheet Documentation  Taken 8/13/2021 1200 by Montez Guthrie RN  Safety Promotion/Fall Prevention:   bed alarm on   room door open  Taken 8/13/2021 0800 by Montez Guthrie RN  Safety Promotion/Fall Prevention:   bed alarm on   room door open     Problem: Adult Inpatient Plan of Care  Goal: Absence of Hospital-Acquired Illness or Injury  Intervention: Identify and Manage Fall Risk  Recent Flowsheet Documentation  Taken 8/13/2021 1200 by Montez Guthrie RN  Safety Promotion/Fall Prevention:   bed alarm on   room door open  Taken 8/13/2021 0800 by Montez Guthrie RN  Safety Promotion/Fall Prevention:   bed alarm on   room door open     Problem: Adult Inpatient  Plan of Care  Goal: Absence of Hospital-Acquired Illness or Injury  Intervention: Prevent Skin Injury  Recent Flowsheet Documentation  Taken 8/13/2021 1200 by Montez Guthrie, RN  Body Position: turned  Taken 8/13/2021 0800 by Montez Guthrie, RN  Body Position: turned     Problem: Pain Acute  Goal: Acceptable Pain Control and Functional Ability  Intervention: Develop Pain Management Plan  Recent Flowsheet Documentation  Taken 8/13/2021 1159 by Montez Guthrie, RN  Pain Management Interventions: medication (see MAR)

## 2021-08-13 NOTE — ED TRIAGE NOTES
"Pt reports she was walking with a walker and \"her legs gave out\". Pt fell backwards to the ground, landing on her R side tailbone/hip. Pt denies striking her head, denies LOC, Denies neck/back pain. Denies taking blood thinners. Pt denies hx of hip surgery/replacement in the past. Pt does have a hx of lumbar pain surgery. No other complaints.   "

## 2021-08-13 NOTE — H&P
[facility]    History and Physical - Hospitalist Service       Date of Admission:  8/12/2021    Assessment & Plan      Marla Dunn is a 89 year old female admitted on 8/12/2021. She lives in an assisted living facility and fell backwards while using her walker.  She has past medical history of coronary artery disease status post CABG in 2009, aortic stenosis status post valve replacement in 2009, moderate to severe mitral regurgitation, diastolic congestive heart failure, chronic atrial fibrillation, essential hypertension, OLEGARIO on CPAP and 3 L oxygen at night, COPD, asthma, Sjogren's.    1.  Acute mildly displaced right intertrochanteric fracture  Bedrest, pain control  SCDs for DVT prophylaxis  Patient is at high risk for perioperative complications due to age and comorbidities, will obtain cardiology consult for further recommendations  Orthopedic consult    2.  Acute kidney injury on chronic kidney disease stage III  Baseline creatinine 0.8-1  Likely related to diuretics  Monitor renal function and avoid nephrotoxins    3.  Coronary artery disease  History of CABG in 2009  Denies angina symptoms  Nuclear stress test negative on July 16, 2021    4.  Moderate severe mitral regurgitation  Echocardiogram showed severe mitral annular calcification with thickening and calcification of the mitral leaflets  Cardiology consulted for further recommendations    5.  Chronic diastolic congestive heart failure  Echocardiogram July 16, 2021 showed EF 60-65%, mildly reduced RV systolic function  No signs or symptoms of acute heart failure  Daily weights, monitor volume status    6.  Chronic hyponatremia  Stable at baseline sodium level  On sodium tablets PTA    7.  Chronic atrial fibrillation  Rate controlled on metoprolol  Onset in 2020 of unknown duration  Not on anticoagulation secondary to fall risk    8.  Hypothyroidism  On levothyroxine    9.  COPD  No symptoms of exacerbation  Continue  inhalers and as needed nebulizers    10.  Essential hypertension  Reconcile PTA medications and monitor blood pressure    11.  OLEGARIO  On CPAP with 3 L oxygen bleed  Keep saturation above 90%    12. Underweight, BMI 18.33  RD consult     Diet: NPO per Anesthesia Guidelines for Procedure/Surgery Except for: Meds    DVT Prophylaxis: Pneumatic Compression Devices  Bush Catheter: Not present  Central Lines: None  Code Status: No CPR- Pre-arrest intubation OK      Clinically Significant Risk Factors Present on Admission         # Hyponatremia: Na = 130 mmol/L (Ref range: 136 - 145 mmol/L) on admission, will monitor as appropriate      # Platelet Defect: home medication list includes an antiplatelet medication      Disposition Plan   Expected discharge: 3 days recommended to transitional care unit once Hip fracture is repaired.     The patient's care was discussed with the Patient.    Wojciech Arvizu MD  [facility]  Securely message with the Vocera Web Console (learn more here)  Text page via [employer] Paging/Directory      ______________________________________________________________________    Chief Complaint   Fall, hip pain    History is obtained from the patient, electronic health record and emergency department physician    History of Present Illness   Marla Dunn is a 89 year old female who was brought to the emergency department from assisted living facility after she fell.  She was using her walker when her right leg gave out and she fell backwards landed on her right buttock.  She denied loss of consciousness or head trauma.  She experienced pain and pushed the call alert button for help.  She had significant pain and required fentanyl for pain control.  Work-up showed a right hip fracture so she was admitted for repair.  Patient denies chest pain, shortness of breath, palpitations, dyspnea on exertion or orthopnea.  She did say gets a little winded when she walks fast using her  walker.  She has been followed at the cardiology clinic and last year atrial fibrillation was detected but she is not on anticoagulation due to fall risk.  She had a stress test in June 2021 that was negative for ischemia.  She also had an echocardiogram that showed moderate severe mitral regurgitation and tells me he has an appointment to discuss a plan about it.  She states her weight has been stable and denies edema.    Review of Systems    The 10 point Review of Systems is negative other than noted in the HPI or here.     Past Medical History    I have reviewed this patient's medical history and updated it with pertinent information if needed.   Past Medical History:   Diagnosis Date     Asthma      Bilateral carpal tunnel syndrome 8/27/2015     CAD (coronary artery disease)      Chronic airway obstruction, not elsewhere classified     Created by Conversion      Chronic anemia      Chronic edema      Congestive heart failure, severe (H) 5/13/2020     COPD (chronic obstructive pulmonary disease) (H)      Coronary artery disease      Depression      Family history of myocardial infarction     father 64     GERD (gastroesophageal reflux disease)      Heart disease      Heart murmur      High cholesterol     dislipidemia     HTN (hypertension)      Hypercholesterolemia      Hypertension      Hypothyroidism      Hypothyroidism      MI (myocardial infarction) (H) 1985     Myelodysplastic syndrome (H)      Myocardial infarction (H) 1983     OLEGARIO (obstructive sleep apnea)      Osteoporosis      Other and unspecified hyperlipidemia     Created by Conversion      Radicular low back pain      Rheumatoid arthritis (H)      Elizabeth Agers syndrome      Sjoegren syndrome      Sjogren's syndrome (H)      Sleep apnea, obstructive      Spinal stenosis      Unspecified polyarthropathy or polyarthritis, hand     Created by Conversion        Past Surgical History   I have reviewed this patient's surgical history and updated it with  pertinent information if needed.  Past Surgical History:   Procedure Laterality Date     aortic valve       AORTIC VALVE REPLACEMENT  2012     APPENDECTOMY       APPENDECTOMY       AS TOTAL KNEE ARTHROPLASTY Right      BACK SURGERY  2004    spinal decompression     BACK SURGERY      spinal stenosis     BONE MARROW BIOPSY  2004     breast biopsy       BREAST SURGERY  2001    biopsy     BYPASS GRAFT ARTERY CORONARY  2009    LIMA to ramus intermedius     C CABG, ARTERY-VEIN, FOUR       C CABG, VEIN, SINGLE      Description: CABG (CABG);  Recorded: 03/09/2012;  Comments: Single vessel bypass graft to an obtuse marginal branch in May 2009     C REPLACE AORT VALV,PROSTH VALV      Description: Aortic Valve Replacement;  Recorded: 03/09/2012;  Comments: Aortic valve replacement     cardiac angiogram       CARDIAC CATHETERIZATION       CARDIAC SURGERY       EYE SURGERY  2008    bilateral cataract repair      EYE SURGERY Bilateral     cornea transplant left eye & cataracts     KYPHOPLASTY  2003    T12     OTHER SURGICAL HISTORY  01/2018    Rectosigmoidectomy perineal     RECTOSIGMOIDECTOMY PERINEAL N/A 1/10/2018    Procedure: RECTOSIGMOIDECTOMY PERINEAL;  PERINEAL RECTOSIGMOIDECTOMY ;  Surgeon: Candelaria Anthony MD;  Location: [facility] OR     REPAIR VALVE AORTIC  2009     THORACIC SURGERY  2003    T12 kyphoplasty       Social History   I have reviewed this patient's social history and updated it with pertinent information if needed.  Social History     Tobacco Use     Smoking status: Never Smoker     Smokeless tobacco: Never Used   Substance Use Topics     Alcohol use: No     Alcohol/week: 0.0 standard drinks     Comment: Alcoholic Drinks/day: occasional glass of wine     Drug use: No       Family History   I have reviewed this patient's family history and updated it with pertinent information if needed.  Family History   Problem Relation Age of Onset     Family History Negative Mother      Cancer Mother      Heart Disease Father         Prior to Admission Medications   Prior to Admission Medications   Prescriptions Last Dose Informant Patient Reported? Taking?   ASPIRIN PO  Self Yes No   Sig: Take 81 mg by mouth every morning    Acetaminophen (TYLENOL PO)  Self Yes No   Sig: Take 1,000 mg by mouth every 6 hours as needed for mild pain    AmLODIPine Besylate (NORVASC PO)  Self Yes No   Sig: Take 5 mg by mouth every morning    Ascorbic Acid (VITAMIN C PO)  Self Yes No   Sig: Take 500 mg by mouth every morning   Calcium Carb-Cholecalciferol (CALCIUM+D3) 600-800 MG-UNIT TABS   Yes No   Sig: Take 1 tablet by mouth daily   Calcium Carbonate-Vitamin D (CALTRATE 600+D PO)  Self Yes No   Sig: Take 1 tablet by mouth daily    Citalopram Hydrobromide (CELEXA PO)  Self Yes No   Sig: Take 20 mg by mouth every morning   Darbepoetin Joseph-Polysorbate (ARANESP, ALB FREE, SURECLICK IJ)  Self Yes No   Sig: Inject as directed every 21 days -Receives at [facility] in [place]   Docusate Sodium (COLACE PO)  Self Yes No   Sig: Take 100 mg by mouth daily as needed    Furosemide (LASIX PO)  Self Yes No   Sig: Take 10 mg by mouth every morning (0.5 x 20 mg = 10 mg dose)   IBANDRONATE SODIUM PO  Self Yes No   Sig: Take 150 mg by mouth every 30 days   Ipratropium-Albuterol (COMBIVENT RESPIMAT)  MCG/ACT inhaler  Self Yes No   Sig: Inhale 2 puffs into the lungs 4 times daily   Levothyroxine Sodium (LEVOXYL PO)  Self Yes No   Sig: Take 88 mcg by mouth daily    Lidocaine (LIDOCARE) 4 % Patch   Yes No   Sig: Place 1 patch onto the skin every 24 hours To prevent lidocaine toxicity, patient should be patch free for 12 hrs daily. Apply to left hip   METOPROLOL TARTRATE PO  Self Yes No   Sig: Take 25 mg by mouth daily    METOPROLOL TARTRATE PO  Self Yes No   Sig: Take 12.5 mg by mouth 2 times daily    Methylcellulose, Laxative, (CITRUCEL PO)  Self Yes No   Sig: Take 1 tablet by mouth every morning   Multiple Vitamins-Minerals (CEROVITE PO)   Yes No   Sig: Take 1 tablet  by mouth daily   Multiple Vitamins-Minerals (PRESERVISION AREDS PO)  Self Yes No   Sig: Take 1 tablet by mouth 2 times daily   Nitroglycerin (NITROSTAT SL)  Self Yes No   Sig: Place 0.4 mg under the tongue daily as needed for chest pain   Probiotic Product (PROBIOTIC PO)  Self Yes No   Sig: Take 1 capsule by mouth every morning   Psyllium (METAMUCIL FIBER PO)  Self Yes No   Sig: MIX 1 PACKET IN BEVERAGE OF CHOICE AND DRINK TWICE WEEKLY ON TUES AND THURS   RaNITidine HCl (ZANTAC PO)  Self Yes No   Sig: Take 150 mg by mouth daily as needed for heartburn    Simvastatin (ZOCOR PO)  Self Yes No   Sig: Take 20 mg by mouth At Bedtime   Sulfacetamide Sodium-Sulfur (SULFACET-R EX)  Self Yes No   Sig: Externally apply topically daily as needed (to face)    VITAMIN D, CHOLECALCIFEROL, PO  Self Yes No   Sig: Take 2,000 Units by mouth daily    albuterol (2.5 MG/3ML) 0.083% neb solution  Self Yes No   Sig: Take 1 vial by nebulization 3 times daily as needed    albuterol (PROAIR HFA/PROVENTIL HFA/VENTOLIN HFA) 108 (90 Base) MCG/ACT inhaler   Yes No   Sig: Inhale 2 puffs into the lungs every 6 hours   calcium carbonate (TUMS) 500 MG chewable tablet  Self Yes No   Sig: Take 2 chew tab by mouth daily as needed for heartburn   cholecalciferol (D3 HIGH POTENCY) 125 MCG (5000 UT) CAPS   Yes No   Sig: Take 1 capsule by mouth daily   cycloSPORINE (RESTASIS) 0.05 % ophthalmic emulsion  Self Yes No   Sig: Place 1 drop into both eyes 2 times daily    desloratadine (CLARINEX) 5 MG tablet  Self Yes No   Sig: Take 5 mg by mouth daily   fluorometholone (FML LIQUIFILM) 0.1 % ophthalmic susp  Self Yes No   Sig: Place 1 drop Into the left eye daily    ipratropium - albuterol 0.5 mg/2.5 mg/3 mL (DUONEB) 0.5-2.5 (3) MG/3ML neb solution  Self Yes No   Sig: Take 1 vial by nebulization 4 times daily as needed for shortness of breath / dyspnea or wheezing    loratadine (CLARITIN) 10 MG tablet   Yes No   Sig: Take 20 mg by mouth daily   magnesium citrate  solution   Yes No   Sig: Take 150 mLs by mouth daily as needed for other   omeprazole (PRILOSEC) 20 MG DR capsule   Yes No   Sig: Take 20 mg by mouth 2 times daily   polyethylene glycol (MIRALAX) powder  Self Yes No   Sig: Take 1 capful by mouth every morning   polyethylene glycol 0.4%- propylene glycol 0.3% (SYSTANE ULTRA) 0.4-0.3 % SOLN ophthalmic solution  Self Yes No   Sig: Place 1 drop into both eyes daily   polyethylene glycol 0.4%- propylene glycol 0.3% (SYSTANE ULTRA) 0.4-0.3 % SOLN ophthalmic solution  Self Yes No   Sig: Place 1 drop into both eyes 4 times daily    sodium chloride 1 GM tablet   Yes No   Sig: Take 500 mg by mouth daily   spironolactone (ALDACTONE) 50 MG tablet   Yes No   Sig: Take 50 mg by mouth daily   timolol (TIMOPTIC) 0.5 % ophthalmic solution  Self Yes No   Sig: Place 1 drop Into the left eye daily    torsemide (DEMADEX) 20 MG tablet   Yes No   Sig: Take 40 mg by mouth daily   traMADol (ULTRAM) 50 MG tablet   Yes No   Sig: Take 25 mg by mouth every 6 hours as needed for severe pain   triamcinolone (KENALOG) 0.1 % cream  Self Yes No   Sig: Apply topically 2 times daily as needed for irritation       Facility-Administered Medications: None     Allergies   Allergies   Allergen Reactions     Gramineae Pollens Other (See Comments)     Shortness of breath when lawn is just cut.     Smoke. Other (See Comments)     Hard to breathe.       Physical Exam   Vital Signs: Temp: 98.1  F (36.7  C) Temp src: Oral BP: (!) 148/92 Pulse: 87   Resp: 18 SpO2: 100 % O2 Device: Nasal cannula with humidification Oxygen Delivery: 3 LPM  Weight: 106 lbs 12.8 oz    General: In no acute distress  HEENT: Normocephalic, atraumatic  Neck: Supraclavicular fat wasting  Heart: Valve click, murmur  Lungs: Bilateral air entry, no wheezing  Abdomen: Bowel sounds present, nondistended, nontender  Extremities: Nontender, no edema, right leg externally rotated  Skin: Warm, dry  Neuro: Awake, alert, oriented,  nonfocal  Psychiatric: Normal affect    Data   Data reviewed today: I reviewed all medications, new labs and imaging results over the last 24 hours. I personally reviewed no images or EKG's today.    Recent Labs   Lab 08/12/21 2122   WBC 8.0   HGB 9.9*   MCV 88      INR 1.10   *   POTASSIUM 3.7   CHLORIDE 95*   CO2 26   BUN 38*   CR 1.23*   ANIONGAP 9   PERICO 9.3        Recent Results (from the past 24 hour(s))   XR Pelvis and Hip Right 2 Views    Narrative    EXAM: XR PELVIS AND HIP RIGHT 2 VIEWS  LOCATION: [facility]  DATE/TIME: 8/12/2021 10:04 PM    INDICATION: Deformity.    COMPARISON: None.      Impression    IMPRESSION: Acute mildly displaced right intertrochanteric fracture. No widening of the sacroiliac joints or pubic symphysis. Atherosclerotic vascular calcifications. Moderate discogenic degenerative change of the visualized lumbosacral spine. 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

## 2021-08-13 NOTE — ED NOTES
Bed: JN-  Expected date: 8/12/21  Expected time: 8:54 PM  Means of arrival: Ambulance  Comments:  [facility]- 89F fall, right hip dislocation

## 2021-08-13 NOTE — CONSULTS
We have been asked to see Marla Dunn at [facility] by Dr. Wojciech Arvizu, for preoperative evaluation.    Impression and Plan     Assessment:  1. Preoperative cardiovascular examination prior to possible right hip surgery. She is at elevated, but not prohibitive, risk of perioperative major adverse cardiac events. She is not having anginal symptoms and has a recent stress test showing no ischemia. She does have significant mitral regurgitation, but regurgitant lesions pose less of a risk than stenotic lesions in the perioperative setting. Her bioprosthetic aortic valve appears to be functioning normally.  2. Chronic congestive heart failure with preserved left ventricular ejection fraction. NYHA class III, may be a bit fluid-overloaded currently.   3. Coronary artery disease status post coronary artery bypass grafting in 2009. Unknown anatomy. Recent nuclear stress test showed no ischemia.  4. Moderate-to-severe degenerative mitral regurgitation.  5. Persistent atrial fibrillation. [id number] score is at least 5, but she has a history of multiple falls.   6. Benign essential hypertension.  7. Hyperlipidemia.  8. Intertrochanteric fracture of the right femur    Plan:    Give 40 mg IV furosemide once, reassess need for additional diuresis postoperatively     No additional cardiac testing or treatment recommended prior to surgery, if the decision is made to proceed with surgical repair of her right hip fracture    Would resume home spironolactone, but can just use 25 mg once daily for now    Would continue her home metoprolol tartrate 12.5 mg twice daily perioperatively    Aspirin 81 mg postoperatively    The decision was previously made to not anticoagulate her for atrial fibrillation, due to multiple falls. A Watchman left atrial appendage closure device was previously discussed and she did express some interest in this    She has also previously expressed interest in being seen in [facility],  to discuss options for her mitral regurgitation      Primary Cardiologist: Dr. Julia Amaya    History of Present Illness      Ms. Marla Dunn is a 89 year old female with a history of persistent atrial fibrillation, HFpEF, severe aortic stenosis s/p bioprosthetic AVR in 2009, CAD s/p CABG in 2009, and moderate-to-severe mitral regurgitation admitted after a mechanical fall, leading to an intertrochanteric fracture of the right femur. She says she often loses her balance when using her walker, but normally can catch herself. She did not have palpitations, lightheadedness, or loss of consciousness.    She has stable mild exertional dyspnea and wears 3L oxygen with her CPAP. She denies chest pain, lower extremity swelling, palpitations, paroxysmal nocturnal dyspnea (PND), or orthopnea.    Review of Systems:  Further review of systems is otherwise negative/noncontributory (based on review of medical record (admission H&P) and 13 point review of systems reviewed. Pertinent positives noted).    Cardiac Diagnostics     ECG 10/1/2020: Personally reviewed and interpreted: atrial fibrillation, ventricular rate 97 bpm, anteroseptal Q waves    Holter monitor 10/8/2020 (report reviewed):  INTERPRETATION DATE:  10/13/2020     TEST DATE:  10/08/2020     INTERPRETATION:  Predominant rhythm is atrial fibrillation, which is present  throughout the monitoring period.  Ventricular response ranged from 50 beats per  minute to 138 beats per minute, averaging 83 beats per minute.  Average resting  ventricular response of 70 beats per minute.  Average ventricular response with  activity was 70 to 115 beats per minute.  There were no significant pauses.  A  nonspecific IVCD was noted throughout the monitoring period.  A moderate number of  ventricular premature beats were noted 1849 or 1.6% of all heartbeats.  There were  45 couplets and a single triplet.  The most common PVC morphology had an atypical  right bundle configuration and  inferior axis, suggesting left ventricular outflow  tract origin.     No diary was submitted.     CONCLUSION:  Persistent atrial fibrillation with controlled ventricular response.  A  moderate number of outflow tract premature ventricular contractions noted.    Most recent:  Echocardiogram 7/16/2021 (results reviewed):   1. Left ventricular size is normal. Moderate concentric increase in wall  thickness. Systolic function is normal. The estimated left ventricular  ejection fraction is 60-65%.  2. Right ventricular size is normal. Systolic function is mildly reduced.  3. Severe left atrial enlargement.  4. There is severe mitral annular calcification with thickening and  calcification of the mitral leaflets. There is moderate-to-severe, eccentric  anteriorly-directed mitral regurgitation. The mechansim of regurgitation is  likely due to fibrocalcific degeneration of the mitral leaflets. No  hemodynamically significant mitral stenosis.  5. A bioprosthetic aortic valve of uknown size or  is present. No  evidence of significant prosthetic stenosis with peak forward velocity 2.8  m/s, mean gradient 18 mm Hg, and dimensionless index 0.28. No significant  regurgitation.  6. There is mild pulmonary hypertension present with an estimated pulmonary  artery systolic pressure of 43 mm Hg.  7. Compared to the prior study dated 5/14/2020, the patient is now in atrial  fibrillation and the degree of mitral regurgitation may have worsened.    Cardiac Stress Testing 7/16/2021 (results reviewed):      The nuclear stress test is negative for inducible myocardial ischemia or infarction.     The left ventricular ejection fraction at stress is 57%.     A prior study was conducted on 6/18/2017.  No interval change noted.      Medical History  Surgical History Family History Social History   Past Medical History:   Diagnosis Date     Asthma      Bilateral carpal tunnel syndrome 8/27/2015     CAD (coronary artery disease)       Chronic airway obstruction, not elsewhere classified     Created by Conversion      Chronic anemia      Chronic edema      Congestive heart failure, severe (H) 5/13/2020     COPD (chronic obstructive pulmonary disease) (H)      Coronary artery disease      Depression      Family history of myocardial infarction     father 64     GERD (gastroesophageal reflux disease)      Heart disease      Heart murmur      High cholesterol     dislipidemia     HTN (hypertension)      Hypercholesterolemia      Hypertension      Hypothyroidism      Hypothyroidism      MI (myocardial infarction) (H) 1985     Myelodysplastic syndrome (H)      Myocardial infarction (H) 1983     OLEGARIO (obstructive sleep apnea)      Osteoporosis      Other and unspecified hyperlipidemia     Created by Conversion      Radicular low back pain      Rheumatoid arthritis (H)      Elizabeth Agers syndrome      Sjoegren syndrome      Sjogren's syndrome (H)      Sleep apnea, obstructive      Spinal stenosis      Unspecified polyarthropathy or polyarthritis, hand     Created by Conversion      Past Surgical History:   Procedure Laterality Date     aortic valve       AORTIC VALVE REPLACEMENT  2012     APPENDECTOMY       APPENDECTOMY       AS TOTAL KNEE ARTHROPLASTY Right      BACK SURGERY  2004    spinal decompression     BACK SURGERY      spinal stenosis     BONE MARROW BIOPSY  2004     breast biopsy       BREAST SURGERY  2001    biopsy     BYPASS GRAFT ARTERY CORONARY  2009    LIMA to ramus intermedius     C CABG, ARTERY-VEIN, FOUR       C CABG, VEIN, SINGLE      Description: CABG (CABG);  Recorded: 03/09/2012;  Comments: Single vessel bypass graft to an obtuse marginal branch in May 2009     C REPLACE AORT VALV,PROSTH VALV      Description: Aortic Valve Replacement;  Recorded: 03/09/2012;  Comments: Aortic valve replacement     cardiac angiogram       CARDIAC CATHETERIZATION       CARDIAC SURGERY       EYE SURGERY  2008    bilateral cataract repair      EYE SURGERY  Bilateral     cornea transplant left eye & cataracts     KYPHOPLASTY  2003    T12     OTHER SURGICAL HISTORY  01/2018    Rectosigmoidectomy perineal     RECTOSIGMOIDECTOMY PERINEAL N/A 1/10/2018    Procedure: RECTOSIGMOIDECTOMY PERINEAL;  PERINEAL RECTOSIGMOIDECTOMY ;  Surgeon: Candelaria Anthony MD;  Location: [facility] OR     REPAIR VALVE AORTIC  2009     THORACIC SURGERY  2003    T12 kyphoplasty     Family History   Problem Relation Age of Onset     Family History Negative Mother      Cancer Mother      Heart Disease Father            Social History     Socioeconomic History     Marital status:      Spouse name: Not on file     Number of children: Not on file     Years of education: Not on file     Highest education level: Not on file   Occupational History     Not on file   Tobacco Use     Smoking status: Never Smoker     Smokeless tobacco: Never Used   Substance and Sexual Activity     Alcohol use: No     Alcohol/week: 0.0 standard drinks     Comment: Alcoholic Drinks/day: occasional glass of wine     Drug use: No     Sexual activity: Not on file   Other Topics Concern     Not on file   Social History Narrative    .  passed away 20 years ago. No children. 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 Was living independently in her apartment in [place] prior to admission. Uses walker at baseline.      Social Determinants of Health     Financial Resource Strain:      Difficulty of Paying Living Expenses:    Food Insecurity:      Worried About Running Out of Food in the Last Year:      Ran Out of Food in the Last Year:    Transportation Needs:      Lack of Transportation (Medical):      Lack of Transportation (Non-Medical):    Physical Activity:      Days of Exercise per Week:      Minutes of Exercise per Session:    Stress:      Feeling of Stress :    Social Connections:      Frequency of Communication with Friends and Family:      Frequency of Social Gatherings with  Friends and Family:      Attends Mosque Services:      Active Member of Clubs or Organizations:      Attends Club or Organization Meetings:      Marital Status:    Intimate Partner Violence:      Fear of Current or Ex-Partner:      Emotionally Abused:      Physically Abused:      Sexually Abused:              Physical Examination   VITALS: /59 (BP Location: Right arm)   Pulse 87   Temp 97.8  F (36.6  C) (Oral)   Resp 18   Wt 48.4 kg (106 lb 12.8 oz)   SpO2 100%   BMI 18.33 kg/m    BMI: Body mass index is 18.33 kg/m .  Wt Readings from Last 3 Encounters:   08/13/21 48.4 kg (106 lb 12.8 oz)   06/24/21 49.7 kg (109 lb 8 oz)   10/30/20 49 kg (108 lb)       No intake or output data in the 24 hours ending 08/13/21 0838    General Appearance:  Alert, cooperative, no distress, appears stated age    Head:  Normocephalic, without obvious abnormality, atraumatic   Eyes:  PERRL, conjunctiva/corneas clear, EOM's intact   Ears:  Normal external ear canals bilaterally   Nose: Nares normal, septum midline, no drainage   Throat: Lips, mucosa, and tongue normal; teeth and gums normal   Neck: Supple, symmetrical, trachea midline, no adenopathy, no carotid bruit. JVP elevated at 12 cm H2O   Back:   Symmetric, no abnormal curvature, ROM normal   Lungs:   Clear to auscultation bilaterally, respirations unlabored   Chest Wall:  No tenderness or deformity   Heart:  Irregularly irregular, S1, S2 normal. 3/6 holosystolic murmur heard loudest at the apex. No rub or gallop.   Abdomen:   Soft, non-tender, bowel sounds active all four quadrants,  no masses, no organomegaly   Extremities: Extremities normal, atraumatic, no cyanosis or edema   Skin: Skin color, texture, turgor normal, no rashes or lesions   Psychiatric: Normal affect, pleasant and cooperative    Neurologic: Alert and oriented X 3, Moves all 4 extremities            Imaging      XR pelvis 8/12/2021 (report reviewed):  EXAM: XR PELVIS AND HIP RIGHT 2 VIEWS  LOCATION:  [facility]  DATE/TIME: 8/12/2021 10:04 PM     INDICATION: Deformity.     COMPARISON: None.                                                                      IMPRESSION: Acute mildly displaced right intertrochanteric fracture. No widening of the sacroiliac joints or pubic symphysis. Atherosclerotic vascular calcifications. Moderate discogenic degenerative change of the visualized lumbosacral spine. 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       Lab Results    Chemistry/lipid CBC Cardiac Enzymes/BNP/TSH/INR   Recent Labs   Lab Test 11/09/16  0952   CHOL 149   HDL 55   LDL 84   TRIG 48     Recent Labs   Lab Test 11/09/16  0952   LDL 84     Recent Labs   Lab Test 08/13/21  0552   *   POTASSIUM 3.8   CHLORIDE 96*   CO2 27   *   BUN 30*   CR 1.01   GFRESTIMATED 48*   PERICO 9.3     Recent Labs   Lab Test 08/13/21  0552 08/12/21 2122 07/30/21  0730   CR 1.01 1.23* 1.13*     Recent Labs   Lab Test 05/13/20  1205   A1C 5.2          Recent Labs   Lab Test 08/12/21 2122   WBC 8.0   HGB 9.9*   HCT 30.3*   MCV 88        Recent Labs   Lab Test 08/12/21 2122 03/02/21  0848 03/02/21  0000   HGB 9.9* 10.8* 10.8*    Recent Labs   Lab Test 05/14/20  0600 05/14/20  0016 05/13/20  1751   TROPONINI 0.05 0.06 0.07     Recent Labs   Lab Test 08/13/21  0552 05/13/20  0446 03/20/20  1831   * 536* 222*     Recent Labs   Lab Test 03/22/20  0852   TSH 1.17     Recent Labs   Lab Test 08/12/21 2122 05/13/20  0446   INR 1.10 0.97           Current Inpatient Scheduled Medications   Scheduled Meds:    acetaminophen  975 mg Oral Q8H     furosemide  40 mg Intravenous Once     sodium chloride (PF)  3 mL Intracatheter Q8H            Medications Prior to Admission   Prior to Admission medications    Medication Sig Start Date End Date Taking? Authorizing Provider   Acetaminophen (TYLENOL PO) Take 1,000 mg by mouth every 6 hours as needed for mild pain     Reported,  Patient   albuterol (2.5 MG/3ML) 0.083% neb solution Take 1 vial by nebulization 3 times daily as needed     Reported, Patient   albuterol (PROAIR HFA/PROVENTIL HFA/VENTOLIN HFA) 108 (90 Base) MCG/ACT inhaler Inhale 2 puffs into the lungs every 6 hours    Reported, Patient   AmLODIPine Besylate (NORVASC PO) Take 5 mg by mouth every morning     Reported, Patient   Ascorbic Acid (VITAMIN C PO) Take 500 mg by mouth every morning    Reported, Patient   ASPIRIN PO Take 81 mg by mouth every morning     Reported, Patient   Calcium Carb-Cholecalciferol (CALCIUM+D3) 600-800 MG-UNIT TABS Take 1 tablet by mouth daily    Reported, Patient   calcium carbonate (TUMS) 500 MG chewable tablet Take 2 chew tab by mouth daily as needed for heartburn    Reported, Patient   Calcium Carbonate-Vitamin D (CALTRATE 600+D PO) Take 1 tablet by mouth daily     Reported, Patient   cholecalciferol (D3 HIGH POTENCY) 125 MCG (5000 UT) CAPS Take 1 capsule by mouth daily    Reported, Patient   Citalopram Hydrobromide (CELEXA PO) Take 20 mg by mouth every morning    Reported, Patient   cycloSPORINE (RESTASIS) 0.05 % ophthalmic emulsion Place 1 drop into both eyes 2 times daily     Reported, Patient   Darbepoetin Joseph-Polysorbate (ARANESP, ALB FREE, SURECLICK IJ) Inject as directed every 21 days -Receives at [facility] in [place]    Reported, Patient   desloratadine (CLARINEX) 5 MG tablet Take 5 mg by mouth daily    Reported, Patient   Docusate Sodium (COLACE PO) Take 100 mg by mouth daily as needed     Reported, Patient   fluorometholone (FML LIQUIFILM) 0.1 % ophthalmic susp Place 1 drop Into the left eye daily     Reported, Patient   Furosemide (LASIX PO) Take 10 mg by mouth every morning (0.5 x 20 mg = 10 mg dose)    Reported, Patient   IBANDRONATE SODIUM PO Take 150 mg by mouth every 30 days    Reported, Patient   ipratropium - albuterol 0.5 mg/2.5 mg/3 mL (DUONEB) 0.5-2.5 (3) MG/3ML neb solution Take 1 vial by nebulization 4 times daily as  needed for shortness of breath / dyspnea or wheezing     Reported, Patient   Ipratropium-Albuterol (COMBIVENT RESPIMAT)  MCG/ACT inhaler Inhale 2 puffs into the lungs 4 times daily    Reported, Patient   Levothyroxine Sodium (LEVOXYL PO) Take 88 mcg by mouth daily     Reported, Patient   Lidocaine (LIDOCARE) 4 % Patch Place 1 patch onto the skin every 24 hours To prevent lidocaine toxicity, patient should be patch free for 12 hrs daily. Apply to left hip    Reported, Patient   loratadine (CLARITIN) 10 MG tablet Take 20 mg by mouth daily    Reported, Patient   magnesium citrate solution Take 150 mLs by mouth daily as needed for other    Reported, Patient   Methylcellulose, Laxative, (CITRUCEL PO) Take 1 tablet by mouth every morning    Reported, Patient   METOPROLOL TARTRATE PO Take 25 mg by mouth daily     Reported, Patient   METOPROLOL TARTRATE PO Take 12.5 mg by mouth 2 times daily     Reported, Patient   Multiple Vitamins-Minerals (CEROVITE PO) Take 1 tablet by mouth daily    Reported, Patient   Multiple Vitamins-Minerals (PRESERVISION AREDS PO) Take 1 tablet by mouth 2 times daily    Reported, Patient   Nitroglycerin (NITROSTAT SL) Place 0.4 mg under the tongue daily as needed for chest pain    Reported, Patient   omeprazole (PRILOSEC) 20 MG DR capsule Take 20 mg by mouth 2 times daily    Reported, Patient   polyethylene glycol (MIRALAX) powder Take 1 capful by mouth every morning    Reported, Patient   polyethylene glycol 0.4%- propylene glycol 0.3% (SYSTANE ULTRA) 0.4-0.3 % SOLN ophthalmic solution Place 1 drop into both eyes daily    Reported, Patient   polyethylene glycol 0.4%- propylene glycol 0.3% (SYSTANE ULTRA) 0.4-0.3 % SOLN ophthalmic solution Place 1 drop into both eyes 4 times daily     Reported, Patient   Probiotic Product (PROBIOTIC PO) Take 1 capsule by mouth every morning    Reported, Patient   Psyllium (METAMUCIL FIBER PO) MIX 1 PACKET IN BEVERAGE OF CHOICE AND DRINK TWICE WEEKLY ON TUES  AND THURS    Reported, Patient   RaNITidine HCl (ZANTAC PO) Take 150 mg by mouth daily as needed for heartburn     Reported, Patient   Simvastatin (ZOCOR PO) Take 20 mg by mouth At Bedtime    Reported, Patient   sodium chloride 1 GM tablet Take 500 mg by mouth daily    Reported, Patient   spironolactone (ALDACTONE) 50 MG tablet Take 50 mg by mouth daily    Reported, Patient   Sulfacetamide Sodium-Sulfur (SULFACET-R EX) Externally apply topically daily as needed (to face)     Reported, Patient   timolol (TIMOPTIC) 0.5 % ophthalmic solution Place 1 drop Into the left eye daily     Reported, Patient   torsemide (DEMADEX) 20 MG tablet Take 40 mg by mouth daily    Reported, Patient   traMADol (ULTRAM) 50 MG tablet Take 25 mg by mouth every 6 hours as needed for severe pain    Reported, Patient   triamcinolone (KENALOG) 0.1 % cream Apply topically 2 times daily as needed for irritation     Reported, Patient   VITAMIN D, CHOLECALCIFEROL, PO Take 2,000 Units by mouth daily     Reported, Patient          Eulalio Monique MD [facility]  Non-Invasive Cardiologist  [facility]  Pager [phone]

## 2021-08-13 NOTE — CONSULTS
ORTHOPEDIC CONSULTATION    Consultation  Marla Dunn,  1929, MRN [id number]    @hospital @  Closed displaced intertrochanteric fracture of right femur, initial encounter (H) [Z46.265Y]    PCP: Ayana Parker, [phone]   Code status:  No CPR- Pre-arrest intubation OK       Extended Emergency Contact Information  Primary Emergency Contact: Aileen Kovacs  Address: [address] [place] [address] [place] [address] [place]  Home Phone: [phone]  Relation: Niece  Secondary Emergency Contact: Esperanza Dunn  Address: [address] [place] [address] [place]  Home Phone: [phone]  Mobile Phone: [phone]  Relation: Sister-in-Law       IMPRESSION:  Right intertrochanteric hip fracture     PLAN:    - Plan for surgical fixation right hip tonight at 2130 by Dr. Castro  - Keep NPO  - Pain control as needed  - Hold all anticoagulation  - Ice to affected area  - Bedrest until fixation  - Cleared by medicine and cardiology    This patient was discussed with Dr. Castro, on-call surgeon for [facility] and they are in agreement with the above plan.     Thank you for including [facility] in the care of Marla Dunn. It has been a pleasure participating in her care.        CHIEF COMPLAINT: <principal problem not specified>    HISTORY OF PRESENT ILLNESS:  The patient is seen in orthopedic consultation at the request of Dr. Wojciech Arvizu.  The patient is a 89 year old female with PMHx significant for CKD, CAD, CHF, hyponatremia, a-fib, COPD, HTN and OLEGARIO.  She presented to the ER status post GLF at home.  She was putting on her robe and her legs went out, falling on the right hip.  Was unable to bear weight after falling.  X-rays show mildly displaced right intertrochanteric hip fracture.  Baseline anticoagulation is Aspirin.  Uses a walker at baseline.      ALLERGIES:   Review of patient's allergies indicates   Allergies   Allergen Reactions      Gramineae Pollens Other (See Comments)     Shortness of breath when lawn is just cut.     Smoke. Other (See Comments)     Hard to breathe.         MEDICATIONS UPON ADMISSION:  Medications were reviewed.  They include:   Medications Prior to Admission   Medication Sig Dispense Refill Last Dose     Acetaminophen (TYLENOL PO) Take 1,000 mg by mouth every 6 hours as needed for mild pain         albuterol (2.5 MG/3ML) 0.083% neb solution Take 1 vial by nebulization 3 times daily as needed         albuterol (PROAIR HFA/PROVENTIL HFA/VENTOLIN HFA) 108 (90 Base) MCG/ACT inhaler Inhale 2 puffs into the lungs every 6 hours        AmLODIPine Besylate (NORVASC PO) Take 5 mg by mouth every morning         Ascorbic Acid (VITAMIN C PO) Take 500 mg by mouth every morning        ASPIRIN PO Take 81 mg by mouth every morning         Calcium Carb-Cholecalciferol (CALCIUM+D3) 600-800 MG-UNIT TABS Take 1 tablet by mouth daily        calcium carbonate (TUMS) 500 MG chewable tablet Take 2 chew tab by mouth daily as needed for heartburn        Calcium Carbonate-Vitamin D (CALTRATE 600+D PO) Take 1 tablet by mouth daily         cholecalciferol (D3 HIGH POTENCY) 125 MCG (5000 UT) CAPS Take 1 capsule by mouth daily        Citalopram Hydrobromide (CELEXA PO) Take 20 mg by mouth every morning        cycloSPORINE (RESTASIS) 0.05 % ophthalmic emulsion Place 1 drop into both eyes 2 times daily         Darbepoetin Joseph-Polysorbate (ARANESP, ALB FREE, SURECLICK IJ) Inject as directed every 21 days -Receives at [facility] in [place]        desloratadine (CLARINEX) 5 MG tablet Take 5 mg by mouth daily        Docusate Sodium (COLACE PO) Take 100 mg by mouth daily as needed         fluorometholone (FML LIQUIFILM) 0.1 % ophthalmic susp Place 1 drop Into the left eye daily         Furosemide (LASIX PO) Take 10 mg by mouth every morning (0.5 x 20 mg = 10 mg dose)        IBANDRONATE SODIUM PO Take 150 mg by mouth every 30 days        ipratropium -  albuterol 0.5 mg/2.5 mg/3 mL (DUONEB) 0.5-2.5 (3) MG/3ML neb solution Take 1 vial by nebulization 4 times daily as needed for shortness of breath / dyspnea or wheezing         Ipratropium-Albuterol (COMBIVENT RESPIMAT)  MCG/ACT inhaler Inhale 2 puffs into the lungs 4 times daily        Levothyroxine Sodium (LEVOXYL PO) Take 88 mcg by mouth daily         Lidocaine (LIDOCARE) 4 % Patch Place 1 patch onto the skin every 24 hours To prevent lidocaine toxicity, patient should be patch free for 12 hrs daily. Apply to left hip        loratadine (CLARITIN) 10 MG tablet Take 20 mg by mouth daily        magnesium citrate solution Take 150 mLs by mouth daily as needed for other        Methylcellulose, Laxative, (CITRUCEL PO) Take 1 tablet by mouth every morning        METOPROLOL TARTRATE PO Take 25 mg by mouth daily         METOPROLOL TARTRATE PO Take 12.5 mg by mouth 2 times daily         Multiple Vitamins-Minerals (CEROVITE PO) Take 1 tablet by mouth daily        Multiple Vitamins-Minerals (PRESERVISION AREDS PO) Take 1 tablet by mouth 2 times daily        Nitroglycerin (NITROSTAT SL) Place 0.4 mg under the tongue daily as needed for chest pain        omeprazole (PRILOSEC) 20 MG DR capsule Take 20 mg by mouth 2 times daily        polyethylene glycol (MIRALAX) powder Take 1 capful by mouth every morning        polyethylene glycol 0.4%- propylene glycol 0.3% (SYSTANE ULTRA) 0.4-0.3 % SOLN ophthalmic solution Place 1 drop into both eyes daily        polyethylene glycol 0.4%- propylene glycol 0.3% (SYSTANE ULTRA) 0.4-0.3 % SOLN ophthalmic solution Place 1 drop into both eyes 4 times daily         Probiotic Product (PROBIOTIC PO) Take 1 capsule by mouth every morning        Psyllium (METAMUCIL FIBER PO) MIX 1 PACKET IN BEVERAGE OF CHOICE AND DRINK TWICE WEEKLY ON TUES AND THURS        RaNITidine HCl (ZANTAC PO) Take 150 mg by mouth daily as needed for heartburn         Simvastatin (ZOCOR PO) Take 20 mg by mouth At Bedtime         sodium chloride 1 GM tablet Take 500 mg by mouth daily        spironolactone (ALDACTONE) 50 MG tablet Take 50 mg by mouth daily        Sulfacetamide Sodium-Sulfur (SULFACET-R EX) Externally apply topically daily as needed (to face)         timolol (TIMOPTIC) 0.5 % ophthalmic solution Place 1 drop Into the left eye daily         torsemide (DEMADEX) 20 MG tablet Take 40 mg by mouth daily        traMADol (ULTRAM) 50 MG tablet Take 25 mg by mouth every 6 hours as needed for severe pain        triamcinolone (KENALOG) 0.1 % cream Apply topically 2 times daily as needed for irritation         VITAMIN D, CHOLECALCIFEROL, PO Take 2,000 Units by mouth daily             SOCIAL HISTORY:   she  reports that she has never smoked. She has never used smokeless tobacco. She reports that she does not drink alcohol and does not use drugs.      FAMILY HISTORY:  family history includes Cancer in her mother; Family History Negative in her mother; Heart Disease in her father.      REVIEW OF SYSTEMS:   Reviewed with patient. See HPI, otherwise negative.      PHYSICAL EXAMINATION:  Vitals: Temp:  [97.8  F (36.6  C)-98.5  F (36.9  C)] 97.8  F (36.6  C)  Pulse:  [77-95] 95  Resp:  [18] 18  BP: (112-150)/(56-92) 144/63  SpO2:  [88 %-100 %] 100 %  General: On examination, the patient is resting comfortably and NAD and oriented to person, place, time, and and general circumstances   Right hip:  No erythema, ecchymosis or swelling. Skin intact.  Hip in external rotation.  Diffuse tenderness throughout palpation.    Able to gently move right knee and wiggles all toes.  Intact ankle plantar/dorsiflexion.   Sensation grossly intact in right lower extremity.   Distal pulses intact and equal bilaterally.   Motor function intact.   Foot warm well perfused. Brisk capillary refill.   Contralateral side= Full range of motion, Negative joint instability findings, 5/5 motor groups about the joint, Non-tender.       RADIOGRAPHIC EVALUATION:    EXAM: XR  PELVIS AND HIP RIGHT 2 VIEWS  LOCATION: [facility]  DATE/TIME: 8/12/2021 10:04 PM     INDICATION: Deformity.     COMPARISON: None.                                                                      IMPRESSION: Acute mildly displaced right intertrochanteric fracture. No widening of the sacroiliac joints or pubic symphysis. Atherosclerotic vascular calcifications. Moderate discogenic degenerative change of the visualized lumbosacral spine. 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      PERTINENT LABS:  Lab Results: personally reviewed.   Lab Results   Component Value Date    WBC 8.0 08/12/2021    WBC 9.5 01/11/2018    HGB 9.9 08/12/2021    HGB 10.8 03/02/2021    HCT 30.3 08/12/2021    HCT 29.1 01/11/2018    MCV 88 08/12/2021    MCV 89 01/11/2018     08/12/2021     01/11/2018         Rita Durham PA-C, FERNANDO  Date: 8/13/2021  Time: 11:09 AM    CC1:   Kristen Pizano MD    CC2:   Ayana Parker

## 2021-08-13 NOTE — PROGRESS NOTES
Hospitalist Progress Note    Assessment/Plan    Active Problems:    Closed displaced intertrochanteric fracture of right femur, initial encounter (H)    Sleep apnea    Diastolic CHF (H)    Hyponatremia    Acute renal failure superimposed on stage 3 chronic kidney disease, unspecified acute renal failure type, unspecified whether stage 3a or 3b CKD (H)    89-year-old female with past medical history of coronary artery disease status post CABG, moderate to severe mitral regurgitation, aortic stenosis status post valve replacement in 2009, diastolic heart failure, chronic A. fib, obstructive sleep apnea on 3 L oxygen at night and CPAP, presented to the hospital for evaluation of right hip pain secondary to fall found to have mildly displaced right intertrochanteric fracture    Fall and displaced right intertrochanteric fracture  Traumatic,  Orthopedic surgery consulted, planning for surgical fixation of the right hip tonight at 9:30, continue pain control ice to the affected area,  Given her complex cardiac history, cardiology consulted for preoperative clearance, recommended to give a dose of Lasix, restart spironolactone, continue metoprolol, aspirin postoperatively, due to multiple falls the decision was made not to anticoagulate for A. fib in the past and cardiology discussed with her about watchman and she expressed interest in diet,     Acute kidney injury with CKD stage III  Slightly elevated creatinine repeat BMP showed improvement of creatinine to her baseline    Coronary artery disease  No chest pain at this time she has history of CABG in 2009 and nuclear stress test was negative last month    Moderate to severe mitral regurgitation  Echo showed severe mitral annular calcification with thickening of the mitral leaflets,  Cardiology discussed with the patient and she will be seen at the valve clinic to discuss options    Chronic A. Fib   on beta-blocker,  Not anticoagulated due to risk of fall cardiology  discussed with her about watchman device and she is interested to that    COPD  Resume inhalers and nebulizers,    OLEGARIO  Uses sleep apnea liters oxygen at night,    Chronic hyponatremia  Sodium level at baseline 133 this morning      Chronic diastolic heart failure  No evidence of volume overload, echo on July 16 showed EF 60-65% with mildly reduced RV function  Cardiology evaluated her ordered a dose of IV Lasix and   recommended to continue spironolactone 25 mg daily    CODE STATUS  No CPR, prearrest intubation okay      Barriers to Discharge: Awaiting surgery    Anticipated discharge date/Disposition: TCU in a few days    Subjective  Patient was seen this morning she was tearful because the surgeon told her she may not be able to get surgery done today and she has a family member who is busy tomorrow and able to come, provided emotional support, she denies chest pain or shortness of breath,    Objective    Vital signs in last 24 hours  Temp:  [97.8  F (36.6  C)-98.5  F (36.9  C)] 97.8  F (36.6  C)  Pulse:  [77-95] 95  Resp:  [18] 18  BP: (112-150)/(56-92) 144/63  SpO2:  [88 %-100 %] 100 % [unfilled] O2 Device: Nasal cannula    Weight:   [unfilled] Weight change:     Intake/Output last 3 shifts  No intake/output data recorded.  Body mass index is 18.33 kg/m .    Physical Exam:  General Appearance: Alert, oriented x3, in mild distress  HEENT: Normocephalic. No scleral icterus, . Mucous membranes moist.  Heart: :Regular rate and rhythm, normal S1 ,S2, No murmurs, no JVD, no pedal edema   Lungs: Diminished breath sounds bilaterally. No wheezing or crackles  Abdomen: Soft, non tender, no rebound or rigidity, non distended, bowel sounds present.  Extremity: Limited mobility of the right lower extremity secondary to pain    Pertinent Labs   Lab Results: personally reviewed.   Recent Labs   Lab 08/13/21 0552 08/12/21 2122   * 130*   CO2 27 26   BUN 30* 38*     Recent Labs   Lab 08/12/21 2122   WBC 8.0   HGB  9.9*   HCT 30.3*        No results for input(s): CKTOTAL, TROPONINI in the last 168 hours.    Invalid input(s): TROPONINT, CKMBINDEX  Invalid input(s): POCGLUFGR    Medications  Drug and lactation database from the [place] National Library of Medicine:  http://toxnet.nlm.nih.gov/cgi-bin/sis/htmlgen?LACT      Pertinent Radiology   Radiology Results: {Review ed x ray       Advanced Care Planning:  Discharge Planning discussed with patient  Total time with this patient is 25 min with 50% of time spent in examining the patient, reviewing records, discussing plan of care and counseling, 50% of time spent in coordination of care.  Care discussed and coordinated with RN,.  Orthopedic surgery team      Kristen Pizano MD  Internal Medicine Hospitalist  8/13/2021

## 2021-08-14 ENCOUNTER — APPOINTMENT (OUTPATIENT)
Dept: PHYSICAL THERAPY | Facility: HOSPITAL | Age: 86
DRG: 481 | End: 2021-08-14
Payer: COMMERCIAL

## 2021-08-14 ENCOUNTER — APPOINTMENT (OUTPATIENT)
Dept: OCCUPATIONAL THERAPY | Facility: HOSPITAL | Age: 86
DRG: 481 | End: 2021-08-14
Payer: COMMERCIAL

## 2021-08-14 LAB
GLUCOSE BLDC GLUCOMTR-MCNC: 103 MG/DL (ref 70–125)
HGB BLD-MCNC: 9 G/DL (ref 11.7–15.7)
HOLD SPECIMEN: NORMAL

## 2021-08-14 PROCEDURE — 250N000013 HC RX MED GY IP 250 OP 250 PS 637: Performed by: ANESTHESIOLOGY

## 2021-08-14 PROCEDURE — 250N000013 HC RX MED GY IP 250 OP 250 PS 637: Performed by: ORTHOPAEDIC SURGERY

## 2021-08-14 PROCEDURE — 97162 PT EVAL MOD COMPLEX 30 MIN: CPT | Mod: GP | Performed by: PHYSICAL THERAPIST

## 2021-08-14 PROCEDURE — 120N000001 HC R&B MED SURG/OB

## 2021-08-14 PROCEDURE — 85018 HEMOGLOBIN: CPT | Performed by: ORTHOPAEDIC SURGERY

## 2021-08-14 PROCEDURE — 250N000013 HC RX MED GY IP 250 OP 250 PS 637: Performed by: INTERNAL MEDICINE

## 2021-08-14 PROCEDURE — 97166 OT EVAL MOD COMPLEX 45 MIN: CPT | Mod: GO

## 2021-08-14 PROCEDURE — 250N000011 HC RX IP 250 OP 636: Performed by: HOSPITALIST

## 2021-08-14 PROCEDURE — 99232 SBSQ HOSP IP/OBS MODERATE 35: CPT | Performed by: GENERAL ACUTE CARE HOSPITAL

## 2021-08-14 PROCEDURE — 94660 CPAP INITIATION&MGMT: CPT

## 2021-08-14 PROCEDURE — 99233 SBSQ HOSP IP/OBS HIGH 50: CPT | Performed by: INTERNAL MEDICINE

## 2021-08-14 PROCEDURE — 97110 THERAPEUTIC EXERCISES: CPT | Mod: GP | Performed by: PHYSICAL THERAPIST

## 2021-08-14 PROCEDURE — 250N000013 HC RX MED GY IP 250 OP 250 PS 637: Performed by: GENERAL ACUTE CARE HOSPITAL

## 2021-08-14 PROCEDURE — 250N000009 HC RX 250: Performed by: INTERNAL MEDICINE

## 2021-08-14 PROCEDURE — 250N000011 HC RX IP 250 OP 636: Performed by: ORTHOPAEDIC SURGERY

## 2021-08-14 PROCEDURE — 258N000003 HC RX IP 258 OP 636: Performed by: ORTHOPAEDIC SURGERY

## 2021-08-14 PROCEDURE — 97535 SELF CARE MNGMENT TRAINING: CPT | Mod: GO

## 2021-08-14 PROCEDURE — 36415 COLL VENOUS BLD VENIPUNCTURE: CPT | Performed by: ORTHOPAEDIC SURGERY

## 2021-08-14 PROCEDURE — 250N000013 HC RX MED GY IP 250 OP 250 PS 637: Performed by: HOSPITALIST

## 2021-08-14 PROCEDURE — 97530 THERAPEUTIC ACTIVITIES: CPT | Mod: GP | Performed by: PHYSICAL THERAPIST

## 2021-08-14 RX ORDER — SPIRONOLACTONE 50 MG/1
50 TABLET, FILM COATED ORAL DAILY
Status: DISCONTINUED | OUTPATIENT
Start: 2021-08-14 | End: 2021-08-17 | Stop reason: HOSPADM

## 2021-08-14 RX ORDER — FLUOROMETHOLONE 0.1 %
1 SUSPENSION, DROPS(FINAL DOSAGE FORM)(ML) OPHTHALMIC (EYE) DAILY
Status: DISCONTINUED | OUTPATIENT
Start: 2021-08-14 | End: 2021-08-17 | Stop reason: HOSPADM

## 2021-08-14 RX ORDER — LEVOTHYROXINE SODIUM 88 UG/1
88 TABLET ORAL DAILY
Status: DISCONTINUED | OUTPATIENT
Start: 2021-08-14 | End: 2021-08-17 | Stop reason: HOSPADM

## 2021-08-14 RX ORDER — LORATADINE 10 MG/1
10 TABLET ORAL DAILY
Status: DISCONTINUED | OUTPATIENT
Start: 2021-08-14 | End: 2021-08-17 | Stop reason: HOSPADM

## 2021-08-14 RX ORDER — CALCIUM CARBONATE 500 MG/1
1000 TABLET, CHEWABLE ORAL DAILY PRN
Status: DISCONTINUED | OUTPATIENT
Start: 2021-08-14 | End: 2021-08-17 | Stop reason: HOSPADM

## 2021-08-14 RX ORDER — SUCRALFATE 1 G/1
1 TABLET ORAL 2 TIMES DAILY
Status: DISCONTINUED | OUTPATIENT
Start: 2021-08-14 | End: 2021-08-17 | Stop reason: HOSPADM

## 2021-08-14 RX ORDER — FUROSEMIDE 20 MG
20 TABLET ORAL DAILY
Status: DISCONTINUED | OUTPATIENT
Start: 2021-08-14 | End: 2021-08-17 | Stop reason: HOSPADM

## 2021-08-14 RX ORDER — ALBUTEROL SULFATE 90 UG/1
2 AEROSOL, METERED RESPIRATORY (INHALATION)
Status: DISCONTINUED | OUTPATIENT
Start: 2021-08-14 | End: 2021-08-14

## 2021-08-14 RX ORDER — CYCLOSPORINE 0.5 MG/ML
1 EMULSION OPHTHALMIC 2 TIMES DAILY
Status: DISCONTINUED | OUTPATIENT
Start: 2021-08-14 | End: 2021-08-17 | Stop reason: HOSPADM

## 2021-08-14 RX ORDER — TIMOLOL MALEATE 5 MG/ML
1 SOLUTION/ DROPS OPHTHALMIC DAILY
Status: DISCONTINUED | OUTPATIENT
Start: 2021-08-14 | End: 2021-08-17 | Stop reason: HOSPADM

## 2021-08-14 RX ADMIN — OXYCODONE HYDROCHLORIDE 5 MG: 5 TABLET ORAL at 07:01

## 2021-08-14 RX ADMIN — ACETAMINOPHEN 975 MG: 325 TABLET ORAL at 00:26

## 2021-08-14 RX ADMIN — ASPIRIN 81 MG: 81 TABLET, COATED ORAL at 08:45

## 2021-08-14 RX ADMIN — DOCUSATE SODIUM 50 MG AND SENNOSIDES 8.6 MG 1 TABLET: 8.6; 5 TABLET, FILM COATED ORAL at 00:26

## 2021-08-14 RX ADMIN — ACETAMINOPHEN 975 MG: 325 TABLET ORAL at 16:17

## 2021-08-14 RX ADMIN — SODIUM CHLORIDE, POTASSIUM CHLORIDE, SODIUM LACTATE AND CALCIUM CHLORIDE: 600; 310; 30; 20 INJECTION, SOLUTION INTRAVENOUS at 00:27

## 2021-08-14 RX ADMIN — DOCUSATE SODIUM 50 MG AND SENNOSIDES 8.6 MG 1 TABLET: 8.6; 5 TABLET, FILM COATED ORAL at 20:57

## 2021-08-14 RX ADMIN — SPIRONOLACTONE 25 MG: 25 TABLET, FILM COATED ORAL at 09:29

## 2021-08-14 RX ADMIN — CEFAZOLIN 1 G: 1 INJECTION, POWDER, FOR SOLUTION INTRAMUSCULAR; INTRAVENOUS at 05:36

## 2021-08-14 RX ADMIN — ASPIRIN 81 MG: 81 TABLET, COATED ORAL at 20:56

## 2021-08-14 RX ADMIN — ACETAMINOPHEN 975 MG: 325 TABLET ORAL at 08:45

## 2021-08-14 RX ADMIN — ALBUTEROL SULFATE 2 PUFF: 90 AEROSOL, METERED RESPIRATORY (INHALATION) at 14:53

## 2021-08-14 RX ADMIN — Medication 125 MCG: at 16:17

## 2021-08-14 RX ADMIN — IPRATROPIUM BROMIDE AND ALBUTEROL 2 PUFF: 20; 100 SPRAY, METERED RESPIRATORY (INHALATION) at 20:59

## 2021-08-14 RX ADMIN — POLYETHYLENE GLYCOL 3350 17 G: 17 POWDER, FOR SOLUTION ORAL at 08:44

## 2021-08-14 RX ADMIN — CYCLOSPORINE 1 DROP: 0.5 EMULSION OPHTHALMIC at 21:00

## 2021-08-14 RX ADMIN — POLYPROPYLENE GLYCOL 400, PROPYLENE GLYCOL 1 DROP: .4; .3 LIQUID OPHTHALMIC at 20:59

## 2021-08-14 RX ADMIN — DOCUSATE SODIUM 50 MG AND SENNOSIDES 8.6 MG 1 TABLET: 8.6; 5 TABLET, FILM COATED ORAL at 08:45

## 2021-08-14 RX ADMIN — SUCRALFATE 1 G: 1 TABLET ORAL at 20:56

## 2021-08-14 RX ADMIN — LORATADINE 10 MG: 10 TABLET ORAL at 18:42

## 2021-08-14 RX ADMIN — SODIUM CHLORIDE, POTASSIUM CHLORIDE, SODIUM LACTATE AND CALCIUM CHLORIDE: 600; 310; 30; 20 INJECTION, SOLUTION INTRAVENOUS at 05:36

## 2021-08-14 RX ADMIN — CEFAZOLIN 1 G: 1 INJECTION, POWDER, FOR SOLUTION INTRAMUSCULAR; INTRAVENOUS at 13:42

## 2021-08-14 RX ADMIN — FUROSEMIDE 20 MG: 20 TABLET ORAL at 13:36

## 2021-08-14 RX ADMIN — Medication 12.5 MG: at 08:45

## 2021-08-14 RX ADMIN — ASPIRIN 81 MG: 81 TABLET, COATED ORAL at 00:26

## 2021-08-14 RX ADMIN — HYDROMORPHONE HYDROCHLORIDE 0.2 MG: 0.2 INJECTION, SOLUTION INTRAMUSCULAR; INTRAVENOUS; SUBCUTANEOUS at 00:43

## 2021-08-14 NOTE — ANESTHESIA PREPROCEDURE EVALUATION
Anesthesia Pre-Procedure Evaluation    Patient: Marla Dunn   MRN: [id number] : 1929        Preoperative Diagnosis: Closed displaced intertrochanteric fracture of right femur, initial encounter (H) [S72.141A]   Procedure : Procedure(s):  INTERNAL FIXATION, FRACTURE, TROCHANTERIC, HIP, USING PINS OR RODS     Past Medical History:   Diagnosis Date     Asthma      Bilateral carpal tunnel syndrome 2015     CAD (coronary artery disease)      Chronic airway obstruction, not elsewhere classified     Created by Conversion      Chronic anemia      Chronic edema      Congestive heart failure, severe (H) 2020     COPD (chronic obstructive pulmonary disease) (H)      Coronary artery disease      Depression      Family history of myocardial infarction     father 64     GERD (gastroesophageal reflux disease)      Heart disease      Heart murmur      High cholesterol     dislipidemia     HTN (hypertension)      Hypercholesterolemia      Hypertension      Hypothyroidism      Hypothyroidism      MI (myocardial infarction) (H)      Myelodysplastic syndrome (H)      Myocardial infarction (H)      OLEGARIO (obstructive sleep apnea)      Osteoporosis      Other and unspecified hyperlipidemia     Created by Conversion      Radicular low back pain      Rheumatoid arthritis (H)      Elizabeth Agers syndrome      Sjoegren syndrome      Sjogren's syndrome (H)      Sleep apnea, obstructive      Spinal stenosis      Unspecified polyarthropathy or polyarthritis, hand     Created by Conversion       Past Surgical History:   Procedure Laterality Date     aortic valve       AORTIC VALVE REPLACEMENT       APPENDECTOMY       APPENDECTOMY       AS TOTAL KNEE ARTHROPLASTY Right      BACK SURGERY  2004    spinal decompression     BACK SURGERY      spinal stenosis     BONE MARROW BIOPSY       breast biopsy       BREAST SURGERY      biopsy     BYPASS GRAFT ARTERY CORONARY  2009    LIMA to ramus intermedius     C CABG,  ARTERY-VEIN, FOUR       C CABG, VEIN, SINGLE      Description: CABG (CABG);  Recorded: 03/09/2012;  Comments: Single vessel bypass graft to an obtuse marginal branch in May 2009     C REPLACE AORT VALV,PROSTH VALV      Description: Aortic Valve Replacement;  Recorded: 03/09/2012;  Comments: Aortic valve replacement     cardiac angiogram       CARDIAC CATHETERIZATION       CARDIAC SURGERY       EYE SURGERY  2008    bilateral cataract repair      EYE SURGERY Bilateral     cornea transplant left eye & cataracts     KYPHOPLASTY  2003    T12     OTHER SURGICAL HISTORY  01/2018    Rectosigmoidectomy perineal     RECTOSIGMOIDECTOMY PERINEAL N/A 1/10/2018    Procedure: RECTOSIGMOIDECTOMY PERINEAL;  PERINEAL RECTOSIGMOIDECTOMY ;  Surgeon: Candelaria Anthony MD;  Location: [facility]     REPAIR VALVE AORTIC  2009     THORACIC SURGERY  2003    T12 kyphoplasty      Allergies   Allergen Reactions     Gramineae Pollens Other (See Comments)     Shortness of breath when lawn is just cut.     Smoke. Other (See Comments)     Hard to breathe.      Social History     Tobacco Use     Smoking status: Never Smoker     Smokeless tobacco: Never Used   Substance Use Topics     Alcohol use: No     Alcohol/week: 0.0 standard drinks     Comment: Alcoholic Drinks/day: occasional glass of wine      Wt Readings from Last 1 Encounters:   08/13/21 48.4 kg (106 lb 12.8 oz)        Anesthesia Evaluation   Pt has had prior anesthetic.     No history of anesthetic complications       ROS/MED HX  ENT/Pulmonary:     (+) sleep apnea, asthma COPD, recent URI,     Neurologic:    (-) no CVA   Cardiovascular: Comment: ASA is only anticoagulant    (+) hypertension--CAD angina---CHF valvular problems/murmurs type: MR mod severe MR.  (-) taking anticoagulants/antiplatelets   METS/Exercise Tolerance:     Hematologic:       Musculoskeletal:       GI/Hepatic:     (+) GERD,     Renal/Genitourinary:     (+) renal disease,     Endo:     (+) thyroid problem,      Psychiatric/Substance Use:       Infectious Disease:       Malignancy:       Other:            Physical Exam    Airway        Mallampati: II   TM distance: > 3 FB   Neck ROM: full     Respiratory Devices and Support         Dental       (+) caps      Cardiovascular          Rhythm and rate: regular and normal     Pulmonary           breath sounds clear to auscultation           OUTSIDE LABS:  CBC:   Lab Results   Component Value Date    WBC 8.0 08/12/2021    WBC 8.4 05/17/2020    HGB 9.9 (L) 08/12/2021    HGB 10.8 (L) 03/02/2021    HCT 30.3 (L) 08/12/2021    HCT 26.5 (L) 05/17/2020     08/12/2021     05/17/2020     BMP:   Lab Results   Component Value Date     (L) 08/13/2021     (L) 08/12/2021    POTASSIUM 3.8 08/13/2021    POTASSIUM 3.7 08/12/2021    CHLORIDE 96 (L) 08/13/2021    CHLORIDE 95 (L) 08/12/2021    CO2 27 08/13/2021    CO2 26 08/12/2021    BUN 30 (H) 08/13/2021    BUN 38 (H) 08/12/2021    CR 1.01 08/13/2021    CR 1.23 (H) 08/12/2021     (H) 08/13/2021     08/12/2021     COAGS:   Lab Results   Component Value Date    PTT 33 08/12/2021    INR 1.10 08/12/2021     POC:   Lab Results   Component Value Date     (H) 01/12/2018     HEPATIC:   Lab Results   Component Value Date    ALBUMIN 3.7 05/13/2020    PROTTOTAL 7.6 05/13/2020    ALT 62 (H) 05/13/2020    AST 81 (H) 05/13/2020    ALKPHOS 84 05/13/2020    BILITOTAL 0.6 05/13/2020     OTHER:   Lab Results   Component Value Date    LACT 1.1 05/13/2020    A1C 5.2 05/13/2020    PERICO 9.3 08/13/2021    PHOS 4.0 05/18/2020    MAG 2.2 05/18/2020    LIPASE 47 03/20/2020    TSH 1.17 03/22/2020       Anesthesia Plan    ASA Status:  3      Anesthesia Type: Spinal.              Consents    Anesthesia Plan(s) and associated risks, benefits, and realistic alternatives discussed. Questions answered and patient/representative(s) expressed understanding.     - Discussed with:  Patient      - Patient is DNR/DNI Status: No          Postoperative Care            Comments:    GA as needed  Previous spine surgey            Irvin Crum MD

## 2021-08-14 NOTE — PLAN OF CARE
Problem: Adult Inpatient Plan of Care  Goal: Readiness for Transition of Care  Outcome: No Change     Problem: Pain Acute  Goal: Acceptable Pain Control and Functional Ability  Outcome: Improving   Patient assist of 2 transfer to chair.  Problem: Adult Inpatient Plan of Care  Goal: Absence of Hospital-Acquired Illness or Injury  Intervention: Prevent and Manage VTE (Venous Thromboembolism) Risk  Recent Flowsheet Documentation  Taken 8/14/2021 0800 by Montez Guthrie, RN  VTE Prevention/Management: pneumatic compression device     Problem: Adult Inpatient Plan of Care  Goal: Absence of Hospital-Acquired Illness or Injury  Intervention: Prevent Skin Injury  Recent Flowsheet Documentation  Taken 8/14/2021 0800 by Montez Guthrie, RN  Body Position: position maintained

## 2021-08-14 NOTE — PROGRESS NOTES
Orthopedic Progress Note      Assessment: 1 Day Post-Op  S/P Procedure(s):  INTERNAL FIXATION, FRACTURE, TROCHANTERIC, HIP, USING PINS OR RODS     Plan:   - Continue PT/OT  - Weightbearing status: WBAT  - Anticoagulation: ASA 81 PO BID in addition to SCDs, dashawn stockings and early ambulation.  - Discharge planning: Likely TCU, pending PT eval      Subjective:  Pain: moderate  Nausea, Vomiting:  No  Lightheadedness, Dizziness:  No  Neuro:  Patient denies new onset numbness or paresthesias    Patient resting in bed, uncomfortable with any movement of the right leg. Ice on incision.    Objective:  /58 (BP Location: Right arm)   Pulse 86   Temp 97.6  F (36.4  C) (Oral)   Resp 20   Wt 48.4 kg (106 lb 12.8 oz)   SpO2 100%   BMI 18.33 kg/m    The patient is A&Ox3. Appears comfortable.   Sensation is intact.  Dorsiflexion and plantar flexion is intact.  Dorsalis pedis pulse intact.  Calves are soft and non-tender. Negative Felicitas's.  The incision is covered. Dressing with minimal spotting.    Pertinent Labs   Lab Results: personally reviewed. Awaiting Hgb from this AM.  Lab Results   Component Value Date    INR 1.10 08/12/2021    INR 0.97 05/13/2020     Lab Results   Component Value Date    WBC 8.0 08/12/2021    HGB 9.9 (L) 08/12/2021    HCT 30.3 (L) 08/12/2021    MCV 88 08/12/2021     08/12/2021     Lab Results   Component Value Date     (L) 08/13/2021    CO2 27 08/13/2021         Report completed by:  Mauri Peñaloza DO  Date: 8/14/2021  Time: 7:05 AM

## 2021-08-14 NOTE — PROGRESS NOTES
Impression and Plan     Impression:   1. Chronic congestive heart failure with preserved left ventricular ejection fraction. NYHA class III, seems close to euvolemic but still on supplemental oxygen postoperatively.   2. Coronary artery disease status post coronary artery bypass grafting in 2009. Unknown anatomy. Recent nuclear stress test showed no ischemia.  3. Moderate-to-severe degenerative mitral regurgitation.  4. Persistent atrial fibrillation. [id number] score is at least 5, but she has a history of multiple falls.   5. Benign essential hypertension.  6. Hyperlipidemia.  7. Intertrochanteric fracture of the right femur status post open reduction internal fixation on 8/13/2021.    Plan:    Discontinue IV fluids    Resume maintenance diuretic therapy with a higher dose of oral furosemide 20 mg once daily    Continue spironolactone 25 mg once daily    Aspirin    Metoprolol tartrate 12.5 mg twice daily    The decision was previously made to not anticoagulate her for atrial fibrillation, due to multiple falls. A Watchman left atrial appendage closure device was previously discussed and she did express some interest in this    She has also previously expressed interest in being seen in [facility], to discuss options for her mitral regurgitation    Primary Cardiologist: Dr. Julia Amaya    Subjective     - had surgery on right hip last night  - denies chest pain or shortness of breath    Cardiac Diagnostics   ECG 10/1/2020: Personally reviewed and interpreted: atrial fibrillation, ventricular rate 97 bpm, anteroseptal Q waves    Holter monitor 10/8/2020 (report reviewed):  INTERPRETATION DATE:  10/13/2020     TEST DATE:  10/08/2020     INTERPRETATION:  Predominant rhythm is atrial fibrillation, which is present  throughout the monitoring period.  Ventricular response ranged from 50 beats per  minute to 138 beats per minute, averaging 83 beats per minute.  Average resting  ventricular response of 70 beats per  minute.  Average ventricular response with  activity was 70 to 115 beats per minute.  There were no significant pauses.  A  nonspecific IVCD was noted throughout the monitoring period.  A moderate number of  ventricular premature beats were noted 1849 or 1.6% of all heartbeats.  There were  45 couplets and a single triplet.  The most common PVC morphology had an atypical  right bundle configuration and inferior axis, suggesting left ventricular outflow  tract origin.     No diary was submitted.     CONCLUSION:  Persistent atrial fibrillation with controlled ventricular response.  A  moderate number of outflow tract premature ventricular contractions noted.     Most recent:  Echocardiogram 7/16/2021 (results reviewed):   1. Left ventricular size is normal. Moderate concentric increase in wall  thickness. Systolic function is normal. The estimated left ventricular  ejection fraction is 60-65%.  2. Right ventricular size is normal. Systolic function is mildly reduced.  3. Severe left atrial enlargement.  4. There is severe mitral annular calcification with thickening and  calcification of the mitral leaflets. There is moderate-to-severe, eccentric  anteriorly-directed mitral regurgitation. The mechansim of regurgitation is  likely due to fibrocalcific degeneration of the mitral leaflets. No  hemodynamically significant mitral stenosis.  5. A bioprosthetic aortic valve of uknown size or  is present. No  evidence of significant prosthetic stenosis with peak forward velocity 2.8  m/s, mean gradient 18 mm Hg, and dimensionless index 0.28. No significant  regurgitation.  6. There is mild pulmonary hypertension present with an estimated pulmonary  artery systolic pressure of 43 mm Hg.  7. Compared to the prior study dated 5/14/2020, the patient is now in atrial  fibrillation and the degree of mitral regurgitation may have worsened.     Cardiac Stress Testing 7/16/2021 (results reviewed):      The nuclear stress  test is negative for inducible myocardial ischemia or infarction.     The left ventricular ejection fraction at stress is 57%.     A prior study was conducted on 6/18/2017.  No interval change noted.    Physical Examination       /59 (BP Location: Right arm)   Pulse 81   Temp 97.9  F (36.6  C) (Oral)   Resp 18   Wt 48.4 kg (106 lb 12.8 oz)   SpO2 100%   BMI 18.33 kg/m          Wt Readings from Last 3 Encounters:   08/13/21 48.4 kg (106 lb 12.8 oz)   06/24/21 49.7 kg (109 lb 8 oz)   10/30/20 49 kg (108 lb)               Intake/Output Summary (Last 24 hours) at 8/14/2021 1056  Last data filed at 8/14/2021 0700  Gross per 24 hour   Intake --   Output 1100 ml   Net -1100 ml       General: pleasant female. No acute distress.   HENT: external ears normal. Nares patent. Mucous membranes moist.  Eyes: perrla, extraocular muscles intact. No scleral icterus.   Neck: No JVD  Lungs: diminished breath sounds  COR: regular rate and rhythm, No murmurs, rubs, or gallops  Abd: nondistended, BS present  Extrem: No edema           Lab Results   Lab Results   Component Value Date     08/13/2021     06/30/2021    CO2 27 08/13/2021    CO2 25 06/30/2021    BUN 30 08/13/2021    BUN 37 06/30/2021     Lab Results   Component Value Date    WBC 8.0 08/12/2021    WBC 9.5 01/11/2018    HGB 9.0 08/14/2021    HGB 10.8 03/02/2021    HCT 30.3 08/12/2021    HCT 29.1 01/11/2018    MCV 88 08/12/2021    MCV 89 01/11/2018     08/12/2021     01/11/2018     Lab Results   Component Value Date    CHOL 149 11/09/2016    TRIG 48 11/09/2016    HDL 55 11/09/2016     Lab Results   Component Value Date    INR 1.10 08/12/2021     Lab Results   Component Value Date     08/13/2021     Lab Results   Component Value Date    TROPONINI 0.05 05/14/2020    TROPONINI 0.06 05/14/2020    TROPONINI 0.07 05/13/2020     Lab Results   Component Value Date    TSH 1.17 03/22/2020           Current Inpatient Scheduled Medications    Scheduled Meds:    acetaminophen  975 mg Oral Q8H     aspirin  81 mg Oral BID     ceFAZolin  1 g Intravenous Q8H     furosemide  20 mg Oral Daily     metoprolol tartrate  12.5 mg Oral BID     polyethylene glycol  17 g Oral Daily     senna-docusate  1 tablet Oral BID     sodium chloride (PF)  3 mL Intracatheter Q8H     sodium chloride (PF)  3 mL Intracatheter Q8H     spironolactone  25 mg Oral Daily     Continuous Infusions:    - MEDICATION INSTRUCTIONS -              Medications Prior to Admission   Prior to Admission medications    Medication Sig Start Date End Date Taking? Authorizing Provider   AmLODIPine Besylate (NORVASC PO) Take 5 mg by mouth every morning    Yes Reported, Patient   Ascorbic Acid (VITAMIN C PO) Take 500 mg by mouth every morning   Yes Reported, Patient   ASPIRIN PO Take 81 mg by mouth every morning    Yes Reported, Patient   Calcium Carb-Cholecalciferol (CALCIUM+D3) 600-800 MG-UNIT TABS Take 1 tablet by mouth daily   Yes Reported, Patient   cholecalciferol (D3 HIGH POTENCY) 125 MCG (5000 UT) CAPS Take 1 capsule by mouth daily   Yes Reported, Patient   cycloSPORINE (RESTASIS) 0.05 % ophthalmic emulsion Place 1 drop into both eyes 2 times daily    Yes Reported, Patient   fluorometholone (FML LIQUIFILM) 0.1 % ophthalmic susp Place 1 drop Into the left eye daily    Yes Reported, Patient   IBANDRONATE SODIUM PO Take 150 mg by mouth every 30 days   Yes Reported, Patient   Ipratropium-Albuterol (COMBIVENT RESPIMAT)  MCG/ACT inhaler Inhale 2 puffs into the lungs 4 times daily   Yes Reported, Patient   Levothyroxine Sodium (LEVOXYL PO) Take 88 mcg by mouth daily    Yes Reported, Patient   Lidocaine (LIDOCARE) 4 % Patch Place 1 patch onto the skin every 24 hours To prevent lidocaine toxicity, patient should be patch free for 12 hrs daily. Apply to left hip   Yes Reported, Patient   loratadine (CLARITIN) 10 MG tablet Take 20 mg by mouth daily   Yes Reported, Patient   METOPROLOL TARTRATE PO Take  25 mg by mouth 2 times daily    Yes Reported, Patient   mirtazapine (REMERON) 7.5 MG tablet Take 7.5 mg by mouth At Bedtime   Yes Unknown, Entered By History   Multiple Vitamins-Minerals (CEROVITE PO) Take 1 tablet by mouth daily   Yes Reported, Patient   Multiple Vitamins-Minerals (PRESERVISION AREDS PO) Take 1 tablet by mouth 2 times daily   Yes Reported, Patient   omeprazole (PRILOSEC) 20 MG DR capsule Take 20 mg by mouth 2 times daily   Yes Reported, Patient   polyethylene glycol (MIRALAX) powder Take 1 capful by mouth every morning   Yes Reported, Patient   polyethylene glycol 0.4%- propylene glycol 0.3% (SYSTANE ULTRA) 0.4-0.3 % SOLN ophthalmic solution Place 1 drop into both eyes 4 times daily    Yes Reported, Patient   Probiotic Product (PROBIOTIC PO) Take 1 capsule by mouth every morning   Yes Reported, Patient   Psyllium (METAMUCIL FIBER PO) MIX 1 PACKET IN BEVERAGE OF CHOICE AND DRINK TWICE WEEKLY ON TUES AND THURS   Yes Reported, Patient   sodium chloride 1 GM tablet Take 500 mg by mouth every other day    Yes Reported, Patient   spironolactone (ALDACTONE) 50 MG tablet Take 50 mg by mouth daily   Yes Reported, Patient   sucralfate (CARAFATE) 1 GM tablet Take 1 g by mouth 2 times daily   Yes Unknown, Entered By History   timolol (TIMOPTIC) 0.5 % ophthalmic solution Place 1 drop Into the left eye daily    Yes Reported, Patient   torsemide (DEMADEX) 20 MG tablet Take 40 mg by mouth daily   Yes Reported, Patient   Acetaminophen (TYLENOL PO) Take 1,000 mg by mouth every 6 hours as needed for mild pain     Reported, Patient   albuterol (PROAIR HFA/PROVENTIL HFA/VENTOLIN HFA) 108 (90 Base) MCG/ACT inhaler Inhale 2 puffs into the lungs every 6 hours    Reported, Patient   calcium carbonate (TUMS) 500 MG chewable tablet Take 2 chew tab by mouth daily as needed for heartburn    Reported, Patient   Docusate Sodium (COLACE PO) Take 100 mg by mouth daily as needed     Reported, Patient   famotidine (PEPCID) 20 MG  tablet Take 20 mg by mouth nightly as needed    Unknown, Entered By History   ipratropium - albuterol 0.5 mg/2.5 mg/3 mL (DUONEB) 0.5-2.5 (3) MG/3ML neb solution Take 1 vial by nebulization 4 times daily as needed for shortness of breath / dyspnea or wheezing     Reported, Patient   magnesium citrate solution Take 150 mLs by mouth daily as needed for other    Reported, Patient   traMADol (ULTRAM) 50 MG tablet Take 25 mg by mouth every 6 hours as needed for severe pain    Reported, Patient          Eulalio Monique MD [facility]  Non-Invasive Cardiologist  [facility]  Pager [phone]

## 2021-08-14 NOTE — ANESTHESIA POSTPROCEDURE EVALUATION
Patient: Marla Dunn    Procedure(s):  INTERNAL FIXATION, FRACTURE, TROCHANTERIC, HIP, USING PINS OR RODS    Diagnosis:Closed displaced intertrochanteric fracture of right femur, initial encounter (H) [S72.141A]  Diagnosis Additional Information: No value filed.    Anesthesia Type:  Spinal    Note:  Disposition: Inpatient   Postop Pain Control: Uneventful            Sign Out: Well controlled pain   PONV: No   Neuro/Psych: Uneventful            Sign Out: Acceptable/Baseline neuro status   Airway/Respiratory: Uneventful            Sign Out: Acceptable/Baseline resp. status   CV/Hemodynamics: Uneventful            Sign Out: Acceptable CV status; No obvious hypovolemia; No obvious fluid overload   Other NRE: NONE   DID A NON-ROUTINE EVENT OCCUR? No           Last vitals:  Vitals Value Taken Time   /81 08/13/21 2315   Temp 37.8  C (100  F) 08/13/21 2221   Pulse 92 08/13/21 2325   Resp 22 08/13/21 2325   SpO2 100 % 08/13/21 2325   Vitals shown include unvalidated device data.    Electronically Signed By: Irvin Crum MD  August 13, 2021  11:26 PM

## 2021-08-14 NOTE — PROGRESS NOTES
"   08/14/21 0840   Quick Adds   Type of Visit Initial PT Evaluation   Living Environment   People in home alone   Current Living Arrangements assisted living   Home Accessibility no concerns   Self-Care   Equipment Currently Used at Home walker, rolling   Disability/Function   Hearing Difficulty or Deaf yes   Patient's preferred means of communication English speaker with hearing loss, no speech problems.   Wear Glasses or Blind yes   Vision Management glasses   Walking or Climbing Stairs Difficulty yes   Walking or Climbing Stairs ambulation difficulty, requires equipment   Dressing/Bathing Difficulty yes   Dressing/Bathing bathing difficulty, assistance 1 person   Toileting issues no   Doing Errands Independently Difficulty (such as shopping) yes   Fall history within last six months yes   Number of times patient has fallen within last six months 1   Change in Functional Status Since Onset of Current Illness/Injury yes   General Information   Onset of Illness/Injury or Date of Surgery 08/13/21   Referring Physician Darrel Castro MD   Patient/Family Therapy Goals Statement (PT) None stated.   Pertinent History of Current Problem (include personal factors and/or comorbidities that impact the POC) Pt s/p fall, s/p R hip ORIF   Existing Precautions/Restrictions fall;weight bearing   Weight-Bearing Status - RLE weight-bearing as tolerated   General Observations Pt requires encouragement to participate. Initially states she won't do anything today. Educated pt on importance of mobility. Agreeable to transfer to bedside chair.   Cognition   Affect/Mental Status (Cognition) confused   Cognitive Status Comments Pt mildly confused, states \"I'm in my own room, right?\". Also states \"I'm still at the hospital?\".   Strength   Manual Muscle Testing Quick Adds Deficits observed during functional mobility   Strength Comments Decreased strength s/p surgery on L LE.   Bed Mobility   Bed Mobility supine-sit   Supine-Sit " [place] (Bed Mobility) moderate assist (50% patient effort);verbal cues;nonverbal cues (demo/gesture)   Bed Mobility Limitations decreased ability to use legs for bridging/pushing   Impairments Contributing to Impaired Bed Mobility pain;decreased strength   Assistive Device (Bed Mobility) bed rails;draw sheet   Transfers   Transfers sit-stand transfer;bed-chair transfer   Impairments Contributing to Impaired Transfers pain;impaired balance;decreased strength   Bed-Chair Transfer   Bed-Chair [place] (Transfers) moderate assist (50% patient effort);maximum assist (25% patient effort);2 person assist;verbal cues   Assistive Device (Bed-Chair Transfers) walker, front-wheeled   Bed/Chair Transfer Comments Assist needed for walker management.   Sit-Stand Transfer   Sit-Stand [place] (Transfers) moderate assist (50% patient effort);maximum assist (25% patient effort);2 person assist   Assistive Device (Sit-Stand Transfers) walker, front-wheeled   Gait/Stairs (Locomotion)   [place] Level (Gait) moderate assist (50% patient effort);maximum assist (25% patient effort)   Assistive Device (Gait) walker, front-wheeled   Distance in Feet (Required for LE Total Joints) 3 steps bed > chair   Pattern (Gait) step-to   Deviations/Abnormal Patterns (Gait) antalgic;gait speed decreased;weight shifting decreased   Maintains Weight-bearing Status (Gait) able to maintain   Comment (Gait/Stairs) R knee buckling with WB.   Clinical Impression   Criteria for Skilled Therapeutic Intervention yes, treatment indicated   PT Diagnosis (PT) impaired functional mobility   Influenced by the following impairments pain, orthopedic restrictions, impaired balance, decreased strength, decreased endurance   Functional limitations due to impairments difficulty with transfers, ambulation   Clinical Presentation Evolving/Changing   Clinical Presentation Rationale Pt presents as medically diagnosed.   Clinical Decision Making (Complexity)  moderate complexity   Therapy Frequency (PT) 2x/day   Predicted Duration of Therapy Intervention (days/wks) 7 days   Planned Therapy Interventions (PT) balance training;bed mobility training;gait training;home exercise program;neuromuscular re-education;patient/family education;ROM (range of motion);stair training;strengthening;transfer training   Risk & Benefits of therapy have been explained evaluation/treatment results reviewed;participants voiced agreement with care plan;participants included;patient   PT Discharge Planning    PT Discharge Recommendation (DC Rec) Transitional Care Facility   PT Rationale for DC Rec Currently requiring mod to max assist of 2 for transfers.    Total Evaluation Time   Total Evaluation Time (Minutes) 15     Melania Delgado, PT  8/14/2021

## 2021-08-14 NOTE — OP NOTE
PATIENT:  Marla Dunn    DATE OF SURGERY:  8/12/2021 - 8/13/2021     PREOPERATIVE DIAGNOSIS: right intertrochanteric hip fracture    POSTOPERATIVE DIAGNOSIS: right intertrochanteric hip fracture    PROCEDURE: right open reduction internal fixation intertrochanteric hip fracture using intramedullary device    SURGEON: Darrel Castro MD.       ANESTHESIA: Spinal with sedation.     ESTIMATED BLOOD LOSS: 25 mL     INDICATION: Pleasant patient with history of a fall with resultant inability to bear weight. X-rays have shown a intertrochanteric hip fracture.  After discussion with the patient and family regarding risks and benefits of operative stabilization of this fracture they elected to proceed.    IMPLANTS:   1. short [place] gamma nail,130 degree  2. [place] lag screw, 100mm length      PROCEDURE: Once consent was obtained and operative site marked in preop holding   area, patient brought to operating room and anesthesia established without   difficulty.  The patient was then placed on the fracture table.  A well-padded peroneal post was utilized.  Both feet were placed in the traction boots and well padded.  Traction was applied to the affected extremity while the unaffected extremity was simply abducted to allow access for the C-arm.    Under C-arm guidance the fracture was reduced.  The hip and leg were then sterilely prepped and draped in the usual fashion.  Longitudinal incision was made over the greater trochanter.  A greater trochanteric starting point was established with the guidewire.  The entry reamer was then utilized to open the canal.  Measurements were taken and the gamma nail opened.  Reaming of the canal was  Required up to 12mm.  The nail was then passed without difficulty and seated well.    Second stab incision was made and the guide for the lag screw was placed up against the lateral femoral cortex.  Guide pin was placed up into the head tried to keep it low in the neck and in the  center of the head on the lateral view.  Measurement was taken and the triple reamer was utilized.  The lag screw was then opened and seated into the head with good purchase.  The set screw was then inserted from above and seated to allow sliding of the screw through the nail.  C-arm guidance was used throughout the procedure.    Attention was then turned to distal locking of the nail.  Third stab incision was utilized.  The guide and perfect circles techniques were utilized.  single Distal Locking screw was placed lateral to medial with good purchase.    Wounds were then copiously irrigated and closed using interrupted O and 2-O Vicryl sutures followed by staples in the skin.  Dressings were applied.  Patient tolerated procedure well and returned to the postop recovery in stable condition.      DARREL ROBERTS MD    CC1: Darrel Roberts

## 2021-08-14 NOTE — PROGRESS NOTES
08/14/21 1134   Quick Adds   Type of Visit Initial Occupational Therapy Evaluation   Living Environment   People in home alone   Current Living Arrangements assisted living   Home Accessibility no concerns   Self-Care   Usual Activity Tolerance good   Current Activity Tolerance moderate   Regular Exercise No   Equipment Currently Used at Home walker, rolling  (FWW)   Disability/Function   Hearing Difficulty or Deaf yes   Patient's preferred means of communication English speaker with hearing loss, no speech problems.   Describe hearing loss bilateral hearing loss   Wear Glasses or Blind yes   Vision Management glasses   Concentrating, Remembering or Making Decisions Difficulty no   Difficulty Communicating no   Difficulty Eating/Swallowing no   Walking or Climbing Stairs Difficulty yes   Walking or Climbing Stairs ambulation difficulty, requires equipment   Dressing/Bathing Difficulty yes   Dressing/Bathing bathing difficulty, assistance 1 person;dressing difficulty, assistance 1 person  (assist for shwr tfr sometimes, assist for stockings)   Toileting issues no   Doing Errands Independently Difficulty (such as shopping) yes   Fall history within last six months yes   Number of times patient has fallen within last six months 1   Change in Functional Status Since Onset of Current Illness/Injury yes   General Information   Onset of Illness/Injury or Date of Surgery 08/13/21   Referring Physician Gloria   Existing Precautions/Restrictions fall   Right Lower Extremity (Weight-bearing Status) weight-bearing as tolerated (WBAT)   Cognitive Status Examination   Orientation Status orientation to person, place and time   Affect/Mental Status (Cognitive) WNL   Follows Commands WNL   Safety Deficit awareness of need for assistance   Visual Perception   Visual Impairment/Limitations corrective lenses full-time   Sensory   Sensory Quick Adds Light touch   Sensation Light Touch, Rehab Eval   RUE mild impairment  (index and  middle finger per PLF)   Pain Assessment   Patient Currently in Pain Yes, see Vital Sign flowsheet  (5/10 R LE)   Posture   Posture not impaired   Posture Comments Cues for upright posture   Range of Motion Comprehensive   General Range of Motion upper extremity range of motion deficits identified   Comment, General Range of Motion Shldr flexion AROM/PROM limited to 90/100 flex   General Upper Extremity Assessment (Range of Motion)   Upper Extremity: Range of Motion shoulder, left: UE ROM;shoulder, right: UE ROM   Comment: Upper Extremity ROM See ROM comment above   Strength Comprehensive (MMT)   Comment, General Manual Muscle Testing (MMT) Assessment Shldrs limited, elbows, grasp WFL. Pt reports prior difficulty holding heavy silverware in R hand.    Muscle Tone Assessment   Muscle Tone Quick Adds No deficits were identified   Coordination   Upper Extremity Coordination No deficits were identified   Bed Mobility   Comment (Bed Mobility) Not assessed, pt up in chair   Transfers   Transfers transfer safety analysis;sit-stand transfer   Sit-Stand Transfer   Sit-Stand [place] (Transfers) minimum assist (75% patient effort);1 person to manage equipment   Assistive Device (Sit-Stand Transfers) walker, front-wheeled   Balance   Balance Assessment standing balance: static;standing balance: dynamic   Standing Balance: Static fair balance   Standing Balance: Dynamic poor balance   Lower Body Dressing Assessment   [place] Level (Lower Body Dressing) maximum assist (25% patient effort)   Comment (Lower Body Dressing) To don brief standing.    Clinical Impression   Criteria for Skilled Therapeutic Interventions Met (OT) yes;meets criteria;skilled treatment is necessary   OT Diagnosis decreased func abilites   OT Problem List-Impairments impacting ADL problems related to;activity tolerance impaired;balance;strength;pain   ADL comments/analysis Pt has decline in transfers, functional mobility, toileting, and  grooming/hygiene    Assessment of Occupational Performance 1-3 Performance Deficits   Planned Therapy Interventions (OT) ADL retraining;balance training;bed mobility training   Clinical Decision Making Complexity (OT) moderate complexity   Therapy Frequency (OT) 2x/day   Predicted Duration of Therapy 5   Risk & Benefits of therapy have been explained care plan/treatment goals reviewed;participants voiced agreement with care plan;patient   OT Discharge Planning    OT Discharge Recommendation (DC Rec) Transitional Care Facility   OT Rationale for DC Rec Pt has declined in ADL, functional mob function. Not safe to return to assisted living level of care due to risk of fall.

## 2021-08-14 NOTE — ANESTHESIA PROCEDURE NOTES
Intrathecal injection Procedure Note  Pre-Procedure   Staff -        Anesthesiologist:  Irvin Crum MD       Performed By: anesthesiologist       Location: OR       Pre-Anesthestic Checklist: patient identified, IV checked, risks and benefits discussed, informed consent, monitors and equipment checked, pre-op evaluation and at physician/surgeon's request  Timeout:       Correct Patient: Yes        Correct Procedure: Yes        Correct Site: Yes        Correct Position: Yes   Procedure Documentation  Procedure: intrathecal injection       Patient Position: sitting       Patient Prep/Sterile Barriers: sterile gloves, mask, patient draped       Skin prep: Chloraprep       Insertion Site: L3-4. (midline approach).       Needle Gauge: 24.        Needle Length (Inches): 4        Spinal Needle Type: Pencan       Introducer used       Introducer: 20 G       # of attempts: 1 and  # of redirects:  0    Assessment/Narrative         Paresthesias: No.       Sensory Level: T8       CSF fluid: clear.      Opening pressure was cmH2O while  Sitting.      Medication(s) Administered   0.5% Bupivacaine PF (Intrathecal), 2.8 mL  Medication Administration Time: 8/13/2021 9:08 PM

## 2021-08-14 NOTE — PLAN OF CARE
Problem: Adult Inpatient Plan of Care  Goal: Absence of Hospital-Acquired Illness or Injury  Intervention: Identify and Manage Fall Risk  Recent Flowsheet Documentation  Taken 8/14/2021 0020 by Delfina Jameson RN  Safety Promotion/Fall Prevention:   bed alarm on   fall prevention program maintained  Intervention: Prevent and Manage VTE (Venous Thromboembolism) Risk  Recent Flowsheet Documentation  Taken 8/14/2021 0020 by Delfina Jameson RN  VTE Prevention/Management: pneumatic compression device     Problem: Pain Acute  Goal: Acceptable Pain Control and Functional Ability  Intervention: Develop Pain Management Plan  Recent Flowsheet Documentation  Taken 8/14/2021 0701 by Delfina Jameson RN  Pain Management Interventions: medication (see MAR)  Taken 8/14/2021 0043 by Delfina Jameson RN  Pain Management Interventions: medication (see MAR)     Problem: Adult Inpatient Plan of Care  Goal: Optimal Comfort and Wellbeing  Outcome: Improving     Post surgical hip pain managed with scheduled Tylenol and PRN dilaudid and oxycodone. Cold pack applied to area for comfort. IVF Lactated Ringers 50cc/hr. No void. Stated she had urge to go but unable. Bladder scan 559cc. Bush placed; urinary output 600cc. Blood sugar 103.

## 2021-08-14 NOTE — ANESTHESIA CARE TRANSFER NOTE
Patient: Marla Dunn    Procedure(s):  INTERNAL FIXATION, FRACTURE, TROCHANTERIC, HIP, USING PINS OR RODS    Diagnosis: Closed displaced intertrochanteric fracture of right femur, initial encounter (H) [S72.141A]  Diagnosis Additional Information: No value filed.    Anesthesia Type:   Spinal     Note:    Oropharynx: oropharynx clear of all foreign objects  Level of Consciousness: awake  Oxygen Supplementation: nasal cannula    Independent Airway: airway patency satisfactory and stable  Dentition: dentition unchanged  Vital Signs Stable: post-procedure vital signs reviewed and stable  Report to RN Given: handoff report given  Patient transferred to: PACU    Handoff Report: Identifed the Patient, Identified the Reponsible Provider, Reviewed the pertinent medical history, Discussed the surgical course, Reviewed Intra-OP anesthesia mangement and issues during anesthesia, Set expectations for post-procedure period and Allowed opportunity for questions and acknowledgement of understanding      Vitals:  Vitals Value Taken Time   /56 08/13/21 2225   Temp 99    Pulse 89 08/13/21 2228   Resp 21 08/13/21 2228   SpO2 99 % 08/13/21 2228   Vitals shown include unvalidated device data.    Electronically Signed By: CASTRO Velez CRNA  August 13, 2021  10:29 PM

## 2021-08-14 NOTE — PLAN OF CARE
Problem: Adult Inpatient Plan of Care  Goal: Optimal Comfort and Wellbeing  8/13/2021 1244 by Montez Guthrie, RN  Outcome: Improving     Problem: Pain Acute  Goal: Acceptable Pain Control and Functional Ability  8/13/2021 1935 by Montez Guthrie, RN  Outcome: Improving     Problem: Adult Inpatient Plan of Care  Goal: Absence of Hospital-Acquired Illness or Injury  Intervention: Identify and Manage Fall Risk  Recent Flowsheet Documentation  Taken 8/13/2021 1929 by Montez Guthrie, RN  Safety Promotion/Fall Prevention:   bed alarm on   room door open  Taken 8/13/2021 1600 by Montez Guthrie, RN  Safety Promotion/Fall Prevention:   bed alarm on   room door open  Taken 8/13/2021 1200 by Montez Guthrie, RN  Safety Promotion/Fall Prevention:   bed alarm on   room door open  Taken 8/13/2021 0800 by Montez Guthrie, RN  Safety Promotion/Fall Prevention:   bed alarm on   room door open   Dilaudid effective in relieving pain, pre op checklist completed, CHG bath, assessment, pain, nares, clean gown,clean sheet, and foot pumps.

## 2021-08-14 NOTE — PROGRESS NOTES
Daily Progress Note        CODE STATUS:  Full Code    08/14/21  Assessment/Plan:    89-year-old female with past medical history of coronary artery disease status post CABG, moderate to severe mitral regurgitation, aortic stenosis status post valve replacement in 2009, diastolic heart failure, chronic A. fib, obstructive sleep apnea on 3 L oxygen at night and CPAP, presented to the hospital for evaluation of right hip pain secondary to fall found to have mildly displaced right intertrochanteric fracture     Fall and sustained displaced right intertrochanteric fracture;  --Status post ORIF using intramedullary device by [place] orthopedic provider, Dr. Castro on 8/13/2021  --Continue postoperative care, pain management and DVT prophylaxis per orthopedic team     Acute kidney injury with CKD stage III;  --Creatinine improving.  Avoid nephrotoxic medications  --Chronic hyponatremia, fairly stable  --Monitor labs closely    Acute blood loss anemia on chronic anemia;  --On admission hemoglobin 9.9, hemoglobin today 9.0.  Recheck hemoglobin in a.m.    Coronary artery disease  Chronic CHF with preserved EF;  --Per cardiology note, recent nuclear stress test showed no ischemia.  Currently denied chest pain  --Appreciate cardiology input, discontinued IV fluid, Lasix 20 mg daily, continue Aldactone, metoprolol dose with holding parameters  --Moderate to severe degenerative mitral regurgitation, cardiology on case     Chronic A. Fib  --Heart rate controlled.  Continue metoprolol.    --Per cardiology note not anticoagulated due to frequent falls.    Asthma/COPD  Not on exacerbation  --Resume inhalers and nebulizers, incentive spirometry, currently on 1 L oxygen postoperatively, wean off oxygen as able     DVT prophylaxis; on twice daily aspirin per surgery team    Disposition; anticipate TCU  Barrier to discharge; postop recovery, close monitoring of labs and vitals     LOS: 2 days     Subjective:  Interval History: Patient is  new to me.  Patient seen and examined. Notes, labs, imaging reports personally reviewed.  Patient lying in the bed during my visit, reported she was sitting in a chair most of her morning and during therapies.  Reported right hip pain is fairly controlled.  Denies short of breath or chest pain.  Patient reports history of asthma and taking schedule inhalers but not nebulizer on a scheduled basis.  Denied abdomen pain, nausea, vomiting.  Discussed with nursing staffs.  Patient declined me to call any family members, reported family is coming this evening    Review of Systems:   As mentioned in subjective.    Patient Active Problem List   Diagnosis     Rectal prolapse     Closed displaced intertrochanteric fracture of right femur, initial encounter (H)     Acute bronchitis due to other specified organisms     Anxiety and depression     Arteriosclerosis of coronary artery     Sleep apnea     Carpal tunnel syndrome, right     Constipation, unspecified     COPD exacerbation (H)     Diastolic CHF (H)     Dyslipidemia     Fecal incontinence alternating with constipation     Fever     Flu-like symptoms     Generalized weakness     Generalized muscle weakness     Heart valve disease     Hyperlipidemia LDL goal <100     Hypertension     Hypertensive emergency     Hypokalemia     Hyponatremia     Irregular bowel habits     Metabolic acidosis     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)     Polyarthropathy or polyarthritis, hand     Physical deconditioning     Pneumonia     Pulmonary edema cardiac cause (H)     Pyelonephritis     Rash of body     Respiratory failure (H)     Sepsis (H)     Sjogren's syndrome (H)     Tachycardia     Acute renal failure superimposed on stage 3 chronic kidney disease, unspecified acute renal failure type, unspecified whether stage 3a or 3b CKD (H)       Scheduled Meds:    acetaminophen  975 mg Oral Q8H     albuterol  2 puff Inhalation Q6H     aspirin  81 mg Oral BID     cycloSPORINE  1 drop Both  Eyes BID     fluorometholone  1 drop Left Eye Daily     furosemide  20 mg Oral Daily     metoprolol tartrate  12.5 mg Oral BID     polyethylene glycol  17 g Oral Daily     senna-docusate  1 tablet Oral BID     sodium chloride (PF)  3 mL Intracatheter Q8H     sodium chloride (PF)  3 mL Intracatheter Q8H     spironolactone  50 mg Oral Daily     sucralfate  1 g Oral BID     timolol maleate  1 drop Left Eye Daily     cholecalciferol  125 mcg Oral Daily     Continuous Infusions:    - MEDICATION INSTRUCTIONS -       PRN Meds:.[START ON 8/16/2021] acetaminophen, sore throat lozenge, bisacodyl, calcium carbonate, HOLD MEDICATION, HYDROmorphone, HYDROmorphone, ipratropium - albuterol 0.5 mg/2.5 mg/3 mL, lidocaine 4%, lidocaine (buffered or not buffered), magnesium hydroxide, - MEDICATION INSTRUCTIONS -, melatonin, nalbuphine, naloxone **OR** naloxone **OR** naloxone **OR** naloxone, ondansetron **OR** ondansetron, oxyCODONE IR, prochlorperazine **OR** prochlorperazine **OR** prochlorperazine, sodium chloride (PF), sodium chloride (PF), traMADol    Objective:  Vital signs in last 24 hours:  Temp:  [97.6  F (36.4  C)-100  F (37.8  C)] 97.6  F (36.4  C)  Pulse:  [] 77  Resp:  [16-31] 18  BP: ()/(42-87) 90/42  FiO2 (%):  [3 %] 3 %  SpO2:  [95 %-100 %] 100 %  Weight:   [unfilled]      Intake/Output Summary (Last 24 hours) at 8/14/2021 1701  Last data filed at 8/14/2021 1528  Gross per 24 hour   Intake --   Output 750 ml   Net -750 ml       Physical Exam:  General: Not in obvious distress.  HEENT: Normocephalic, supple neck  Chest: Decreased but clear to auscultation bilateral anteriorly, no wheezing  Heart: S1S2 normal, regular  Abdomen: Soft. NT, ND. Bowel sounds- active.  Extremities: No legs swelling, right hip dressing dry/clean/intact  Neuro: alert and awake, moves all extremities    Lab Results:(I have personally reviewed the results)    Recent Results (from the past 24 hour(s))   Glucose by meter    Collection  Time: 21  5:32 AM   Result Value Ref Range    GLUCOSE BY METER POCT 103 70 - 125 mg/dL   Hemoglobin    Collection Time: 21  6:44 AM   Result Value Ref Range    Hemoglobin 9.0 (L) 11.7 - 15.7 g/dL   Extra Red Top Tube    Collection Time: 21  6:44 AM   Result Value Ref Range    Hold Specimen JIC          Serum Glucose range:   Recent Labs   Lab 21  0532 2152 21    131* 117     ABG: No lab results found in last 7 days.  CBC:   Recent Labs   Lab 21  0644 21   WBC  --  8.0   HGB 9.0* 9.9*   HCT  --  30.3*   MCV  --  88   PLT  --  296   NEUTROPHIL  --  71   LYMPH  --  11   MONOCYTE  --  12   EOSINOPHIL  --  4     Chemistry:   Recent Labs   Lab 21   * 130*   POTASSIUM 3.8 3.7   CHLORIDE 96* 95*   CO2 27 26   BUN 30* 38*   CR 1.01 1.23*   GFRESTIMATED 48* 38*   PERICO 9.3 9.3     Coags:  Recent Labs   Lab 21   INR 1.10   PTT 33     Cardiac Markers:  No results for input(s): CKTOTAL, TROPONINI in the last 168 hours.     XR Pelvis and Hip Right 2 Views  Result Date: 2021  EXAM: XR PELVIS AND HIP RIGHT 2 VIEWS LOCATION: [facility] DATE/TIME: 2021 10:04 PM INDICATION: Deformity. COMPARISON: None.     IMPRESSION: Acute mildly displaced right intertrochanteric fracture. No widening of the sacroiliac joints or pubic symphysis. Atherosclerotic vascular calcifications. Moderate discogenic degenerative change of the visualized lumbosacral spine. 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    Echocardiogram Complete    Result Date: 2021  [id number]^LARRY^JEROD^[place] [address]  Name: ALF STEIN MRN: [id number] : 1929 Study Date: 2021 11:24 AM Age: 89 yrs Gender: Female Patient Location: [facility] Reason For Study: Reason for exam?->Atrial fibrillation Ordering Physician:  JEROD JUAREZ Referring Physician: JEROD JUAREZ Performed By: MARLENA  BSA: 1.5 m2 Height: 64 in Weight: 109 lb HR: 75 BP: 160/69 mmHg ______________________________________________________________________________ Procedure Complete Echo Adult. Adequate quality two-dimensional was performed and interpreted. Adequate quality color and spectral Doppler were performed and interpreted. Compared to the prior study dated 5/14/2020, there are changes as noted. ______________________________________________________________________________ Interpretation Summary  1. Left ventricular size is normal. Moderate concentric increase in wall thickness. Systolic function is normal. The estimated left ventricular ejection fraction is 60-65%. 2. Right ventricular size is normal. Systolic function is mildly reduced. 3. Severe left atrial enlargement. 4. There is severe mitral annular calcification with thickening and calcification of the mitral leaflets. There is moderate-to-severe, eccentric anteriorly-directed mitral regurgitation. The mechansim of regurgitation is likely due to fibrocalcific degeneration of the mitral leaflets. No hemodynamically significant mitral stenosis. 5. A bioprosthetic aortic valve of uknown size or  is present. No evidence of significant prosthetic stenosis with peak forward velocity 2.8 m/s, mean gradient 18 mm Hg, and dimensionless index 0.28. No significant regurgitation. 6. There is mild pulmonary hypertension present with an estimated pulmonary artery systolic pressure of 43 mm Hg. 7. Compared to the prior study dated 5/14/2020, the patient is now in atrial fibrillation and the degree of mitral regurgitation may have worsened. ______________________________________________________________________________ I      WMSI = 1.00     % Normal = 100  X - Cannot   0 -                      (2) - Mildly 2 -          Segments  Size Interpret    Hyperkinetic 1 - Normal  Hypokinetic  Hypokinetic  1-2      small                                                    7 -          3-5    moderate 3 - Akinetic 4 -          5 -         6 - Akinetic Dyskinetic   6-14    large              Dyskinetic   Aneurysmal  w/scar       w/scar       15-16   diffuse  Left Ventricle The left ventricle is normal in size. There is moderate concentric left ventricular hypertrophy. The left ventricular ejection fraction is normal. The visual ejection fraction is 60-65%. Diastolic function not assessed due to atrial fibrillation. No regional wall motion abnormalities noted.  Right Ventricle The right ventricle is normal size. The right ventricular systolic function is normal.  Atria The left atrium is severely dilated. The right atrium is moderately dilated.  Mitral Valve There is severe mitral annular calcification. The mitral valve leaflets are moderately thickened. There is moderately severe (3+) mitral regurgitation. The mitral regurgitant jet is eccentrically directed. The mechansim of regurgitation is likely due to fibrocalcific degeneration of the mitral leaflets. Calcified mitral apparatus causing mitral stenosis. The mean mitral valve gradient is 5.3 mmHg. Mildly increased inflow gradient across the mitral valve likely due to increased flow from mitral regurgitation.  Tricuspid Valve Tricuspid valve leaflets appear normal. There is mild (1+) tricuspid regurgitation. Mild (35-45mmHg) pulmonary hypertension is present. The right ventricular systolic pressure is elevated at 34.6 mmHg. There is no tricuspid stenosis.  Aortic Valve A bioprosthetic aortic valve of uknown size or  is present. No aortic regurgitation is present. No aortic stenosis is present. The mean AoV pressure gradient is 18.0 mmHg.  Pulmonic Valve The pulmonic valve is not well visualized. There is trace pulmonic valvular regurgitation. There is no pulmonic valvular stenosis.  Vessels The aorta is not well visualized. IVC diameter and respiratory changes fall  into an intermediate range suggesting an RA pressure of 8 mmHg.  Pericardium There is no pericardial effusion.  Rhythm The rhythm was atrial fibrillation.  ______________________________________________________________________________ MMode/2D Measurements & Calculations IVSd: 1.3 cm LVIDd: 3.8 cm LVIDs: 2.5 cm LVPWd: 1.3 cm FS: 34.2 % LV mass(C)d: 173.1 grams LV mass(C)dI: 114.5 grams/m2  LA dimension: 4.2 cm LVOT diam: 1.7 cm LVOT area: 2.3 cm2 LA Volume Indexed (AL/bp): 73.9 ml/m2  Time Measurements MM HR: 80.0 BPM  Doppler Measurements & Calculations MV E max gutierrez: 188.7 cm/sec MV max P.0 mmHg MV mean P.3 mmHg MV V2 VTI: 46.5 cm MVA(VTI): 0.81 cm2  MV dec time: 0.30 sec Ao V2 max: 279.7 cm/sec Ao max P.0 mmHg Ao V2 mean: 195.0 cm/sec Ao mean P.0 mmHg Ao V2 VTI: 65.0 cm NICOLETTE(I,D): 0.58 cm2 NICOLETTE(V,D): 0.63 cm2 LV V1 max P.4 mmHg LV V1 max: 77.7 cm/sec LV V1 VTI: 16.7 cm SV(LVOT): 37.9 ml SI(LVOT): 25.1 ml/m2 PA acc time: 0.12 sec PI end-d gutierrez: 103.0 cm/sec TR max gutierrez: 294.0 cm/sec TR max P.6 mmHg NICOLETTE Index (cm2/m2): 0.39 E/E' av.8 Lateral E/e': 27.3 Medial E/e': 52.3  ______________________________________________________________________________ Report approved by: Palmer Orellana 2021 04:17 PM       NM Lexiscan stress test    Result Date: 2021     The nuclear stress test is negative for inducible myocardial ischemia or infarction.    The left ventricular ejection fraction at stress is 57%.    A prior study was conducted on 2017.  No interval change noted.     XR Hip Port Right G/E 2 Views    Result Date: 2021  EXAM: XR HIP PORTABLE RIGHT 2-3 VIEWS LOCATION: [facility] DATE/TIME: 2021 8:58 PM INDICATION: hip surgery COMPARISON: 2021.     IMPRESSION: ORIF fixation of a right femoral fracture in progress. Please see operative report for further details. 6 images were submitted. Fluoroscopy time is 56 seconds.     XR Surgery YAYA  L/T 5 Min Fluoro    Result Date: 8/13/2021  This exam was marked as non-reportable because it will not be read by a radiologist or a [facility] non-radiologist provider.     Latest radiology report personally reviewed.    Note created using dragon voice recognition software so sounds alike errors may have escaped editing.    08/14/2021   JJ QUINONES MD  HOSPITALIST, [facility]  PAGER NO. [phone]

## 2021-08-14 NOTE — CONSULTS
"CLINICAL NUTRITION SERVICES - ASSESSMENT NOTE     Nutrition Prescription    RECOMMENDATIONS FOR MDs/PROVIDERS TO ORDER:  None at this time    Malnutrition Status:    Not noted    Recommendations already ordered by Registered Dietitian (RD):  Ensure enlive once per day       REASON FOR ASSESSMENT  Marla Dunn is a/an 89 year old female assessed by the dietitian for Provider Order - underweight  Pt lives in an assisted living facility and fell backwards while using her walker (She has acute mildly displaced right intertrochanteric fracture).  She has a past medical history of coronary artery disease status post CABG in 2009, aortic stenosis status post valve replacement in 2009, moderate to severe mitral regurgitation, diastolic congestive heart failure, chronic atrial fibrillation, essential hypertension, OLEGARIO on CPAP and 3L oxygen at night, COPD, asthma, Sjogren's    NUTRITION HISTORY  Pt states she follows an heart healthy type diet at home. She increased her intake of vegetables.  Does eat meat/eggs. She states every morning she has oatmeal and 1 egg. She takes 1 ensure per day but no more than 1. She states she used to weight 135 lb maybe in 2018-19. She has 3 16 oz bottles of water per day    CURRENT NUTRITION ORDERS  Diet: regular  Intake/Tolerance: po intake recorded 75% (1 meal recorded)  Pt states she hasn't had lunch yet-would like to order and wanted nutrition ambassador to get her order (called kitchen)    LABS  Labs reviewed: Na 133, K 3.8    MEDICATIONS  Medications reviewed: lasix, miralax, senna-docusate, carafate, vit D3 (125 mcg per day)    ANTHROPOMETRICS  Height: 5'4\"  Most Recent Weight: 48.4 kg (106 lb 12.8 oz)    IBW: 54.5 kg (120 lb)  BMI: Underweight BMI <18.5  Weight History:   Wt Readings from Last 15 Encounters:   08/13/21 48.4 kg (106 lb 12.8 oz)   06/24/21 49.7 kg (109 lb 8 oz)   10/30/20 49 kg (108 lb)   10/01/20 54 kg (119 lb)   05/27/20 46.3 kg (102 lb)   04/09/20 52.2 kg (115 " lb)   04/07/20 52.4 kg (115 lb 9.6 oz)   05/14/19 53.5 kg (118 lb)   01/10/18 58.3 kg (128 lb 7 oz)   08/31/17 62.1 kg (137 lb)   06/07/17 62.1 kg (137 lb)   04/20/17 62.1 kg (137 lb)   01/19/17 62.6 kg (137 lb 14.4 oz)   11/19/15 63.5 kg (140 lb)   08/27/15 63.5 kg (140 lb)   per above data, pt has lost ~3 lb in the past 2 months which is not clinically significant    Dosing Weight: 48.4 kg    ASSESSED NUTRITION NEEDS  Estimated Energy Needs: 4030-8264 kcals/day (30 - 35 kcals/kg )  Justification: Underweight  Estimated Protein Needs: 48-58 grams protein/day (1 - 1.2 grams of pro/kg)  Justification: Maintenance  Estimated Fluid Needs: 4086-0246 mL/day (25 - 30 mL/kg)   Justification: Maintenance (or per MD)    PHYSICAL FINDINGS  See malnutrition section below.  No abnormal nutrition-related physical findings observed.     MALNUTRITION  % Intake: No decreased intake noted  % Weight Loss: Weight loss does not meet criteria  Subcutaneous Fat Loss: Facial region:  mild  Muscle Loss: Thoracic region (clavicle, acromium bone, deltoid, trapezius, pectoral):  moderate  Fluid Accumulation/Edema: None noted  Malnutrition Diagnosis: Patient does not meet two of the established criteria necessary for diagnosing malnutrition    NUTRITION DIAGNOSIS  Underweight related to chronic illness as evidenced by BMI 18.33      INTERVENTIONS  Pt would like to receive 1 ensure per day (vanilla) will send with lunch meals daily    Goals  Patient to consume % of nutritionally adequate meals three times per day, or the equivalent with supplements/snacks.     Monitoring/Evaluation  Progress toward goals will be monitored and evaluated per protocol.

## 2021-08-15 ENCOUNTER — APPOINTMENT (OUTPATIENT)
Dept: OCCUPATIONAL THERAPY | Facility: HOSPITAL | Age: 86
DRG: 481 | End: 2021-08-15
Payer: COMMERCIAL

## 2021-08-15 ENCOUNTER — APPOINTMENT (OUTPATIENT)
Dept: PHYSICAL THERAPY | Facility: HOSPITAL | Age: 86
DRG: 481 | End: 2021-08-15
Payer: COMMERCIAL

## 2021-08-15 LAB
HGB BLD-MCNC: 8.2 G/DL (ref 11.7–15.7)
HOLD SPECIMEN: NORMAL

## 2021-08-15 PROCEDURE — 94660 CPAP INITIATION&MGMT: CPT

## 2021-08-15 PROCEDURE — 36415 COLL VENOUS BLD VENIPUNCTURE: CPT | Performed by: ORTHOPAEDIC SURGERY

## 2021-08-15 PROCEDURE — 85018 HEMOGLOBIN: CPT | Performed by: ORTHOPAEDIC SURGERY

## 2021-08-15 PROCEDURE — 250N000013 HC RX MED GY IP 250 OP 250 PS 637: Performed by: HOSPITALIST

## 2021-08-15 PROCEDURE — 99232 SBSQ HOSP IP/OBS MODERATE 35: CPT | Performed by: GENERAL ACUTE CARE HOSPITAL

## 2021-08-15 PROCEDURE — 250N000013 HC RX MED GY IP 250 OP 250 PS 637: Performed by: INTERNAL MEDICINE

## 2021-08-15 PROCEDURE — 97530 THERAPEUTIC ACTIVITIES: CPT | Mod: GP | Performed by: PHYSICAL THERAPIST

## 2021-08-15 PROCEDURE — 250N000013 HC RX MED GY IP 250 OP 250 PS 637: Performed by: GENERAL ACUTE CARE HOSPITAL

## 2021-08-15 PROCEDURE — 999N000157 HC STATISTIC RCP TIME EA 10 MIN

## 2021-08-15 PROCEDURE — 97110 THERAPEUTIC EXERCISES: CPT | Mod: GP | Performed by: PHYSICAL THERAPIST

## 2021-08-15 PROCEDURE — 250N000013 HC RX MED GY IP 250 OP 250 PS 637: Performed by: ORTHOPAEDIC SURGERY

## 2021-08-15 PROCEDURE — 97535 SELF CARE MNGMENT TRAINING: CPT | Mod: GO

## 2021-08-15 PROCEDURE — 250N000009 HC RX 250: Performed by: INTERNAL MEDICINE

## 2021-08-15 PROCEDURE — 120N000001 HC R&B MED SURG/OB

## 2021-08-15 PROCEDURE — 99233 SBSQ HOSP IP/OBS HIGH 50: CPT | Performed by: INTERNAL MEDICINE

## 2021-08-15 RX ADMIN — TIMOLOL MALEATE 1 DROP: 5 SOLUTION OPHTHALMIC at 08:15

## 2021-08-15 RX ADMIN — SUCRALFATE 1 G: 1 TABLET ORAL at 21:49

## 2021-08-15 RX ADMIN — POLYPROPYLENE GLYCOL 400, PROPYLENE GLYCOL 1 DROP: .4; .3 LIQUID OPHTHALMIC at 13:41

## 2021-08-15 RX ADMIN — SPIRONOLACTONE 50 MG: 50 TABLET ORAL at 08:14

## 2021-08-15 RX ADMIN — POLYPROPYLENE GLYCOL 400, PROPYLENE GLYCOL 1 DROP: .4; .3 LIQUID OPHTHALMIC at 08:17

## 2021-08-15 RX ADMIN — SUCRALFATE 1 G: 1 TABLET ORAL at 08:09

## 2021-08-15 RX ADMIN — LEVOTHYROXINE SODIUM 88 MCG: 0.09 TABLET ORAL at 07:07

## 2021-08-15 RX ADMIN — POLYETHYLENE GLYCOL 3350 17 G: 17 POWDER, FOR SOLUTION ORAL at 08:07

## 2021-08-15 RX ADMIN — ACETAMINOPHEN 975 MG: 325 TABLET ORAL at 00:56

## 2021-08-15 RX ADMIN — Medication 12.5 MG: at 21:48

## 2021-08-15 RX ADMIN — DOCUSATE SODIUM 50 MG AND SENNOSIDES 8.6 MG 1 TABLET: 8.6; 5 TABLET, FILM COATED ORAL at 21:49

## 2021-08-15 RX ADMIN — POLYPROPYLENE GLYCOL 400, PROPYLENE GLYCOL 1 DROP: .4; .3 LIQUID OPHTHALMIC at 18:24

## 2021-08-15 RX ADMIN — FUROSEMIDE 20 MG: 20 TABLET ORAL at 08:09

## 2021-08-15 RX ADMIN — DOCUSATE SODIUM 50 MG AND SENNOSIDES 8.6 MG 1 TABLET: 8.6; 5 TABLET, FILM COATED ORAL at 08:09

## 2021-08-15 RX ADMIN — CYCLOSPORINE 1 DROP: 0.5 EMULSION OPHTHALMIC at 08:55

## 2021-08-15 RX ADMIN — Medication 12.5 MG: at 08:09

## 2021-08-15 RX ADMIN — POLYPROPYLENE GLYCOL 400, PROPYLENE GLYCOL 1 DROP: .4; .3 LIQUID OPHTHALMIC at 21:52

## 2021-08-15 RX ADMIN — ACETAMINOPHEN 975 MG: 325 TABLET ORAL at 15:31

## 2021-08-15 RX ADMIN — IPRATROPIUM BROMIDE AND ALBUTEROL 2 PUFF: 20; 100 SPRAY, METERED RESPIRATORY (INHALATION) at 18:22

## 2021-08-15 RX ADMIN — Medication 25 MG: at 21:58

## 2021-08-15 RX ADMIN — IPRATROPIUM BROMIDE AND ALBUTEROL 2 PUFF: 20; 100 SPRAY, METERED RESPIRATORY (INHALATION) at 08:05

## 2021-08-15 RX ADMIN — ASPIRIN 81 MG: 81 TABLET, COATED ORAL at 08:09

## 2021-08-15 RX ADMIN — Medication 125 MCG: at 08:08

## 2021-08-15 RX ADMIN — LORATADINE 10 MG: 10 TABLET ORAL at 08:09

## 2021-08-15 RX ADMIN — ACETAMINOPHEN 975 MG: 325 TABLET ORAL at 08:08

## 2021-08-15 RX ADMIN — ASPIRIN 81 MG: 81 TABLET, COATED ORAL at 21:48

## 2021-08-15 RX ADMIN — FLUOROMETHOLONE 1 DROP: 1 SOLUTION/ DROPS OPHTHALMIC at 08:11

## 2021-08-15 RX ADMIN — IPRATROPIUM BROMIDE AND ALBUTEROL 2 PUFF: 20; 100 SPRAY, METERED RESPIRATORY (INHALATION) at 21:51

## 2021-08-15 RX ADMIN — IPRATROPIUM BROMIDE AND ALBUTEROL 2 PUFF: 20; 100 SPRAY, METERED RESPIRATORY (INHALATION) at 13:39

## 2021-08-15 NOTE — PLAN OF CARE
Problem: Pain Acute  Goal: Acceptable Pain Control and Functional Ability  Intervention: Develop Pain Management Plan  Recent Flowsheet Documentation  Taken 8/15/2021 0800 by Montez Guthrie, RN  Pain Management Interventions: (Patietn declined analgesics) --     Problem: Adult Inpatient Plan of Care  Goal: Absence of Hospital-Acquired Illness or Injury  Intervention: Identify and Manage Fall Risk  Recent Flowsheet Documentation  Taken 8/15/2021 0800 by Montez Guthrie, RN  Safety Promotion/Fall Prevention:    bed alarm on    chair alarm on     Problem: Adult Inpatient Plan of Care  Goal: Absence of Hospital-Acquired Illness or Injury  Intervention: Prevent Skin Injury  Recent Flowsheet Documentation  Taken 8/15/2021 0800 by Montez Guthrie, RN  Body Position: (up in chair) --   Patient uses call system appropriately, reposition Q2hrs. Surgical wound intact and no S/S of infection.  Patient c/o pain in groin area when lifting leg, numbness, Ortho surgery updated.

## 2021-08-15 NOTE — PROGRESS NOTES
Impression and Plan     Impression:   1. Chronic congestive heart failure with preserved left ventricular ejection fraction. NYHA class III, seems close to euvolemic but still on supplemental oxygen postoperatively.   2. Coronary artery disease status post coronary artery bypass grafting in 2009. Unknown anatomy. Recent nuclear stress test showed no ischemia.  3. Moderate-to-severe degenerative mitral regurgitation.  4. Persistent atrial fibrillation. [id number] score is at least 5, but she has a history of multiple falls.   5. Benign essential hypertension.  6. Hyperlipidemia.  7. Intertrochanteric fracture of the right femur status post open reduction internal fixation on 8/13/2021.    Plan:    Continue oral furosemide 20 mg once daily    Continue spironolactone    Aspirin    Metoprolol tartrate 12.5 mg twice daily    The decision was previously made to not anticoagulate her for atrial fibrillation, due to multiple falls. A Watchman left atrial appendage closure device was previously discussed and she did express some interest in this    She has also previously expressed interest in being seen in [facility], to discuss options for her mitral regurgitation    We will sign off at this time. Please do not hesitate to contact us with further questions or concerns. We will arrange for her to follow-up with Dr. Amaya in 6-8 weeks    Primary Cardiologist: Dr. Julia Amaya    Subjective     - no chest pain, palpitations, or shortness of breath    Cardiac Diagnostics     ECG 10/1/2020: Personally reviewed and interpreted: atrial fibrillation, ventricular rate 97 bpm, anteroseptal Q waves     Holter monitor 10/8/2020 (report reviewed):  INTERPRETATION DATE:  10/13/2020     TEST DATE:  10/08/2020     INTERPRETATION:  Predominant rhythm is atrial fibrillation, which is present  throughout the monitoring period.  Ventricular response ranged from 50 beats per  minute to 138 beats per minute, averaging 83 beats per  minute.  Average resting  ventricular response of 70 beats per minute.  Average ventricular response with  activity was 70 to 115 beats per minute.  There were no significant pauses.  A  nonspecific IVCD was noted throughout the monitoring period.  A moderate number of  ventricular premature beats were noted 1849 or 1.6% of all heartbeats.  There were  45 couplets and a single triplet.  The most common PVC morphology had an atypical  right bundle configuration and inferior axis, suggesting left ventricular outflow  tract origin.     No diary was submitted.     CONCLUSION:  Persistent atrial fibrillation with controlled ventricular response.  A  moderate number of outflow tract premature ventricular contractions noted.     Most recent:  Echocardiogram 7/16/2021 (results reviewed):   1. Left ventricular size is normal. Moderate concentric increase in wall  thickness. Systolic function is normal. The estimated left ventricular  ejection fraction is 60-65%.  2. Right ventricular size is normal. Systolic function is mildly reduced.  3. Severe left atrial enlargement.  4. There is severe mitral annular calcification with thickening and  calcification of the mitral leaflets. There is moderate-to-severe, eccentric  anteriorly-directed mitral regurgitation. The mechansim of regurgitation is  likely due to fibrocalcific degeneration of the mitral leaflets. No  hemodynamically significant mitral stenosis.  5. A bioprosthetic aortic valve of uknown size or  is present. No  evidence of significant prosthetic stenosis with peak forward velocity 2.8  m/s, mean gradient 18 mm Hg, and dimensionless index 0.28. No significant  regurgitation.  6. There is mild pulmonary hypertension present with an estimated pulmonary  artery systolic pressure of 43 mm Hg.  7. Compared to the prior study dated 5/14/2020, the patient is now in atrial  fibrillation and the degree of mitral regurgitation may have worsened.     Cardiac Stress  Testing 7/16/2021 (results reviewed):      The nuclear stress test is negative for inducible myocardial ischemia or infarction.     The left ventricular ejection fraction at stress is 57%.     A prior study was conducted on 6/18/2017.  No interval change noted.    Physical Examination       /57 (BP Location: Right arm)   Pulse 91   Temp 98.1  F (36.7  C) (Oral)   Resp 18   Wt 48.4 kg (106 lb 12.8 oz)   SpO2 90%   BMI 18.33 kg/m          Wt Readings from Last 3 Encounters:   08/13/21 48.4 kg (106 lb 12.8 oz)   06/24/21 49.7 kg (109 lb 8 oz)   10/30/20 49 kg (108 lb)               Intake/Output Summary (Last 24 hours) at 8/15/2021 1004  Last data filed at 8/15/2021 0921  Gross per 24 hour   Intake 240 ml   Output 800 ml   Net -560 ml       General: pleasant female. No acute distress.   HENT: external ears normal. Nares patent. Mucous membranes moist.  Eyes: perrla, extraocular muscles intact. No scleral icterus.   Neck: No JVD  Lungs: clear to auscultation  COR:  Irregularly irregular. 3/6 holosystolic murmur heard loudest at the apex. No rubs or gallops  Abd: nondistended, BS present  Extrem: No edema           Lab Results   Lab Results   Component Value Date     08/13/2021     06/30/2021    CO2 27 08/13/2021    CO2 25 06/30/2021    BUN 30 08/13/2021    BUN 37 06/30/2021     Lab Results   Component Value Date    WBC 8.0 08/12/2021    WBC 9.5 01/11/2018    HGB 8.2 08/15/2021    HGB 10.8 03/02/2021    HCT 30.3 08/12/2021    HCT 29.1 01/11/2018    MCV 88 08/12/2021    MCV 89 01/11/2018     08/12/2021     01/11/2018     Lab Results   Component Value Date    CHOL 149 11/09/2016    TRIG 48 11/09/2016    HDL 55 11/09/2016     Lab Results   Component Value Date    INR 1.10 08/12/2021     Lab Results   Component Value Date     08/13/2021     Lab Results   Component Value Date    TROPONINI 0.05 05/14/2020    TROPONINI 0.06 05/14/2020    TROPONINI 0.07 05/13/2020     Lab Results    Component Value Date    TSH 1.17 03/22/2020           Current Inpatient Scheduled Medications   Scheduled Meds:    acetaminophen  975 mg Oral Q8H     aspirin  81 mg Oral BID     cycloSPORINE  1 drop Both Eyes BID     fluorometholone  1 drop Left Eye Daily     furosemide  20 mg Oral Daily     ipratropium-albuterol  2 puff Inhalation 4x Daily     levothyroxine  88 mcg Oral Daily     loratadine  10 mg Oral Daily     metoprolol tartrate  12.5 mg Oral BID     polyethylene glycol  17 g Oral Daily     polyethylene glycol-propylene glycol  1 drop Both Eyes 4x Daily     senna-docusate  1 tablet Oral BID     sodium chloride (PF)  3 mL Intracatheter Q8H     sodium chloride (PF)  3 mL Intracatheter Q8H     spironolactone  50 mg Oral Daily     sucralfate  1 g Oral BID     timolol maleate  1 drop Left Eye Daily     cholecalciferol  125 mcg Oral Daily     Continuous Infusions:    - MEDICATION INSTRUCTIONS -              Medications Prior to Admission   Prior to Admission medications    Medication Sig Start Date End Date Taking? Authorizing Provider   AmLODIPine Besylate (NORVASC PO) Take 5 mg by mouth every morning    Yes Reported, Patient   Ascorbic Acid (VITAMIN C PO) Take 500 mg by mouth every morning   Yes Reported, Patient   ASPIRIN PO Take 81 mg by mouth every morning    Yes Reported, Patient   Calcium Carb-Cholecalciferol (CALCIUM+D3) 600-800 MG-UNIT TABS Take 1 tablet by mouth daily   Yes Reported, Patient   cholecalciferol (D3 HIGH POTENCY) 125 MCG (5000 UT) CAPS Take 1 capsule by mouth daily   Yes Reported, Patient   cycloSPORINE (RESTASIS) 0.05 % ophthalmic emulsion Place 1 drop into both eyes 2 times daily    Yes Reported, Patient   fluorometholone (FML LIQUIFILM) 0.1 % ophthalmic susp Place 1 drop Into the left eye daily    Yes Reported, Patient   IBANDRONATE SODIUM PO Take 150 mg by mouth every 30 days   Yes Reported, Patient   Ipratropium-Albuterol (COMBIVENT RESPIMAT)  MCG/ACT inhaler Inhale 2 puffs into  the lungs 4 times daily   Yes Reported, Patient   Levothyroxine Sodium (LEVOXYL PO) Take 88 mcg by mouth daily    Yes Reported, Patient   Lidocaine (LIDOCARE) 4 % Patch Place 1 patch onto the skin every 24 hours To prevent lidocaine toxicity, patient should be patch free for 12 hrs daily. Apply to left hip   Yes Reported, Patient   loratadine (CLARITIN) 10 MG tablet Take 20 mg by mouth daily   Yes Reported, Patient   METOPROLOL TARTRATE PO Take 25 mg by mouth 2 times daily    Yes Reported, Patient   mirtazapine (REMERON) 7.5 MG tablet Take 7.5 mg by mouth At Bedtime   Yes Unknown, Entered By History   Multiple Vitamins-Minerals (CEROVITE PO) Take 1 tablet by mouth daily   Yes Reported, Patient   Multiple Vitamins-Minerals (PRESERVISION AREDS PO) Take 1 tablet by mouth 2 times daily   Yes Reported, Patient   omeprazole (PRILOSEC) 20 MG DR capsule Take 20 mg by mouth 2 times daily   Yes Reported, Patient   polyethylene glycol (MIRALAX) powder Take 1 capful by mouth every morning   Yes Reported, Patient   polyethylene glycol 0.4%- propylene glycol 0.3% (SYSTANE ULTRA) 0.4-0.3 % SOLN ophthalmic solution Place 1 drop into both eyes 4 times daily    Yes Reported, Patient   Probiotic Product (PROBIOTIC PO) Take 1 capsule by mouth every morning   Yes Reported, Patient   Psyllium (METAMUCIL FIBER PO) MIX 1 PACKET IN BEVERAGE OF CHOICE AND DRINK TWICE WEEKLY ON TUES AND THURS   Yes Reported, Patient   sodium chloride 1 GM tablet Take 500 mg by mouth every other day    Yes Reported, Patient   spironolactone (ALDACTONE) 50 MG tablet Take 50 mg by mouth daily   Yes Reported, Patient   sucralfate (CARAFATE) 1 GM tablet Take 1 g by mouth 2 times daily   Yes Unknown, Entered By History   timolol (TIMOPTIC) 0.5 % ophthalmic solution Place 1 drop Into the left eye daily    Yes Reported, Patient   torsemide (DEMADEX) 20 MG tablet Take 40 mg by mouth daily   Yes Reported, Patient   Acetaminophen (TYLENOL PO) Take 1,000 mg by mouth  every 6 hours as needed for mild pain     Reported, Patient   albuterol (PROAIR HFA/PROVENTIL HFA/VENTOLIN HFA) 108 (90 Base) MCG/ACT inhaler Inhale 2 puffs into the lungs every 6 hours    Reported, Patient   calcium carbonate (TUMS) 500 MG chewable tablet Take 2 chew tab by mouth daily as needed for heartburn    Reported, Patient   Docusate Sodium (COLACE PO) Take 100 mg by mouth daily as needed     Reported, Patient   famotidine (PEPCID) 20 MG tablet Take 20 mg by mouth nightly as needed    Unknown, Entered By History   ipratropium - albuterol 0.5 mg/2.5 mg/3 mL (DUONEB) 0.5-2.5 (3) MG/3ML neb solution Take 1 vial by nebulization 4 times daily as needed for shortness of breath / dyspnea or wheezing     Reported, Patient   magnesium citrate solution Take 150 mLs by mouth daily as needed for other    Reported, Patient   traMADol (ULTRAM) 50 MG tablet Take 25 mg by mouth every 6 hours as needed for severe pain    Reported, Patient          Eulalio Monique MD [facility]  Non-Invasive Cardiologist  [facility]  Pager [phone]

## 2021-08-15 NOTE — PLAN OF CARE
Problem: Risk for Delirium  Goal: Optimal Coping  Outcome: Improving   Pt alert and oriented x4.   Problem: Pain Acute  Goal: Acceptable Pain Control and Functional Ability  Outcome: Improving   Pain manageable with scheduled tylenol. Bush in place.

## 2021-08-15 NOTE — PROGRESS NOTES
Ortho Progress Note      2 Days Post-Op     Procedure(s):  INTERNAL FIXATION, FRACTURE, TROCHANTERIC, HIP, USING PINS OR RODS       Subjective:  Pain increased today in right thigh. Reports not able to stand today, could yesterday    Chest pain, SOB:  No  Nausea, vomiting:  No  Lightheadedness, dizziness:  No  Neuro:  Patient denies numbness or paresthesias    Objective:  Vital signs in last 24 hours  Temp:  [98.1  F (36.7  C)-98.6  F (37  C)] 98.4  F (36.9  C)  Pulse:  [85-97] 90  Resp:  [18] 18  BP: ()/(46-60) 92/54  SpO2:  [90 %-100 %] 96 %    Wound status: covered   Circulation, motion and sensation: Intact and good dorsiflexion/plantarflexion  Swelling: mild   Hip motion not painful. Thigh tenderness      Pertinent Labs   Lab Results: personally reviewed.   Recent Labs   Lab 08/12/21  2122   INR 1.10     Recent Labs   Lab 08/15/21  0522   HGB 8.2*     No results for input(s): CREATININE in the last 168 hours.    Plan:   Continue PT/OT  DVT prophylaxis  Weightbearing status: WBAT  Will check xray of femur and fixation to ensure no change since surgery  Report completed by:  Darrel Castro MD   Date: 8/15/2021  Time: 3:17 PM

## 2021-08-15 NOTE — PROGRESS NOTES
Patient is using hospital CPAP unit with 3 lpm oxygen bleed in.      08/15/21 0215   Mode: CPAP/ BiPAP/ AVAPS/ AVAPS AE   CPAP/BiPAP/ AVAPS/ AVAPS AE Mode CPAP   CPAP/BiPAP/Settings   $BIPAP/CPAP Therapy one for intermittent (each treatment)   O2 Flow Rate (L/min) 3   Home O2/Equipment Set-Up   Pt Owned Device No  (hospital auto-cpap )

## 2021-08-15 NOTE — PROGRESS NOTES
Care Management Follow Up    Length of Stay (days): 3    Expected Discharge Date: 08/16/2021 pending TCU bed      Concerns to be Addressed:       Patient plan of care discussed at interdisciplinary rounds: Yes    Anticipated Discharge Disposition:       Anticipated Discharge Services:    Anticipated Discharge DME:      Patient/family educated on Medicare website which has current facility and service quality ratings:    Education Provided on the Discharge Plan:    Patient/Family in Agreement with the Plan:      Referrals Placed by CM/SW:    Private pay costs discussed: Not applicable    Additional Information:  TCU update:  Left message for Aileen in admissions with [facility] that pt is medically stable for discharge Monday 8/16. RNCM contact information provided, requested call back.    Beti Gambino RN

## 2021-08-15 NOTE — PROGRESS NOTES
Daily Progress Note        CODE STATUS:  No CPR- Pre-arrest intubation OK    08/14/21  Assessment/Plan:    89-year-old female with past medical history of coronary artery disease status post CABG, moderate to severe mitral regurgitation, aortic stenosis status post valve replacement in 2009, diastolic heart failure, chronic A. fib, obstructive sleep apnea on 3 L oxygen at night and CPAP, presented to the hospital for evaluation of right hip pain secondary to fall found to have mildly displaced right intertrochanteric fracture     Fall and sustained displaced right intertrochanteric fracture;  --Status post ORIF using intramedullary device by [facility]opedic provider, Dr. Castro on 8/13/2021  --Continue postoperative care, pain management and DVT prophylaxis per orthopedic team  --Continue scheduled Tylenol, try to avoid narcotics given her age and risk of delirium     Acute kidney injury with CKD stage III;  --Creatinine improving.  Avoid nephrotoxic medications  --Chronic hyponatremia, fairly stable  --Monitor labs intermittently    Acute blood loss anemia on chronic anemia;  --On admission hemoglobin 9.9-->9.0-->8.2.  --Recheck hemoglobin in a.m.    Coronary artery disease  Chronic CHF with preserved EF;  --Per cardiology note, recent nuclear stress test showed no ischemia.  Currently denied chest pain  --Discontinued IV fluid on 8/14 and started on Lasix 20 mg daily (takes torsemide at home), continue Aldactone, metoprolol dose with holding parameters.  --Appreciated cardiology input  --Moderate to severe degenerative mitral regurgitation, cardiology on case     Chronic A. Fib  --Heart rate controlled.  Continue metoprolol.    --Per cardiology note not anticoagulated due to frequent falls.    Essential hypertension;  --Continue home metoprolol, Aldactone.  On torsemide at home, cardiology started on Lasix 20 mg daily, may consider changing to home torsemide.  Home amlodipine on hold as blood pressure on lower  normal range    Asthma/COPD  Not on exacerbation  --Resumed inhalers and nebulizers, incentive spirometry, currently on 1 L oxygen postoperatively, wean off oxygen as able     DVT prophylaxis; on twice daily aspirin per surgery team    Disposition; anticipate TCU  Barrier to discharge; postop recovery, monitoring hemoglobin     LOS: 2 days     Subjective:  Interval History: Patient seen and examined. Notes, labs, imaging reports personally reviewed.  No acute issues reported overnight.  Patient denied feeling short of breath or chest pain.  Denied feeling dizziness when she stood up and do therapy this morning.  Patient however reported some numbness on lateral aspect of dorsum of foot, also reports difficulty lifting her left leg and while doing so experiencing groin pain, requested patient's nurse to discuss with orthopedic provider.   Hemoglobin slightly dropped but surgical incision site with no significant bruising or swelling.  Discussed with patient about removing Bush catheter and she likes to keep 1 more day.  Discussed with nursing staffs.  Patient declined me to call any family members, reported she is communicating with them, offered if any questions let us know.    Review of Systems:   As mentioned in subjective.    Patient Active Problem List   Diagnosis     Rectal prolapse     Closed displaced intertrochanteric fracture of right femur, initial encounter (H)     Acute bronchitis due to other specified organisms     Anxiety and depression     Arteriosclerosis of coronary artery     Sleep apnea     Carpal tunnel syndrome, right     Constipation, unspecified     COPD exacerbation (H)     Diastolic CHF (H)     Dyslipidemia     Fecal incontinence alternating with constipation     Fever     Flu-like symptoms     Generalized weakness     Generalized muscle weakness     Heart valve disease     Hyperlipidemia LDL goal <100     Hypertension     Hypertensive emergency     Hypokalemia     Hyponatremia      Irregular bowel habits     Metabolic acidosis     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)     Polyarthropathy or polyarthritis, hand     Physical deconditioning     Pneumonia     Pulmonary edema cardiac cause (H)     Pyelonephritis     Rash of body     Respiratory failure (H)     Sepsis (H)     Sjogren's syndrome (H)     Tachycardia     Acute renal failure superimposed on stage 3 chronic kidney disease, unspecified acute renal failure type, unspecified whether stage 3a or 3b CKD (H)       Scheduled Meds:    acetaminophen  975 mg Oral Q8H     aspirin  81 mg Oral BID     cycloSPORINE  1 drop Both Eyes BID     fluorometholone  1 drop Left Eye Daily     furosemide  20 mg Oral Daily     ipratropium-albuterol  2 puff Inhalation 4x Daily     levothyroxine  88 mcg Oral Daily     loratadine  10 mg Oral Daily     metoprolol tartrate  12.5 mg Oral BID     polyethylene glycol  17 g Oral Daily     polyethylene glycol-propylene glycol  1 drop Both Eyes 4x Daily     senna-docusate  1 tablet Oral BID     sodium chloride (PF)  3 mL Intracatheter Q8H     sodium chloride (PF)  3 mL Intracatheter Q8H     spironolactone  50 mg Oral Daily     sucralfate  1 g Oral BID     timolol maleate  1 drop Left Eye Daily     cholecalciferol  125 mcg Oral Daily     Continuous Infusions:    - MEDICATION INSTRUCTIONS -       PRN Meds:.[START ON 8/16/2021] acetaminophen, sore throat lozenge, bisacodyl, calcium carbonate, HOLD MEDICATION, HYDROmorphone, HYDROmorphone, ipratropium - albuterol 0.5 mg/2.5 mg/3 mL, lidocaine 4%, lidocaine (buffered or not buffered), magnesium hydroxide, - MEDICATION INSTRUCTIONS -, melatonin, nalbuphine, naloxone **OR** naloxone **OR** naloxone **OR** naloxone, ondansetron **OR** ondansetron, oxyCODONE IR, prochlorperazine **OR** prochlorperazine **OR** prochlorperazine, sodium chloride (PF), sodium chloride (PF), traMADol    Objective:  Vital signs in last 24 hours:  Temp:  [97.6  F (36.4  C)-98.6  F (37  C)] 98.1  F  (36.7  C)  Pulse:  [77-97] 91  Resp:  [18] 18  BP: ()/(42-60) 111/57  SpO2:  [90 %-100 %] 90 %  Weight:   [unfilled]      Intake/Output Summary (Last 24 hours) at 8/14/2021 1701  Last data filed at 8/14/2021 1528  Gross per 24 hour   Intake --   Output 750 ml   Net -750 ml       Physical Exam:  General: Not in obvious distress.  HEENT: Normocephalic, supple neck  Chest: Decreased but clear to auscultation bilateral anteriorly, no wheezing  Heart: S1S2 normal, regular  Abdomen: Soft. NT, ND. Bowel sounds- active.  Extremities: No legs swelling, right hip dressing dry/clean/intact  Neuro: alert and awake, moves all extremities    Lab Results:(I have personally reviewed the results)    Recent Results (from the past 24 hour(s))   Hemoglobin    Collection Time: 08/15/21  5:22 AM   Result Value Ref Range    Hemoglobin 8.2 (L) 11.7 - 15.7 g/dL   Extra Red Top Tube    Collection Time: 08/15/21  5:22 AM   Result Value Ref Range    Hold Specimen JIC          Serum Glucose range:   Recent Labs   Lab 08/14/21  0532 08/13/21  0552 08/12/21 2122    131* 117     ABG: No lab results found in last 7 days.  CBC:   Recent Labs   Lab 08/15/21  0522 08/14/21  0644 08/12/21 2122   WBC  --   --  8.0   HGB 8.2* 9.0* 9.9*   HCT  --   --  30.3*   MCV  --   --  88   PLT  --   --  296   NEUTROPHIL  --   --  71   LYMPH  --   --  11   MONOCYTE  --   --  12   EOSINOPHIL  --   --  4     Chemistry:   Recent Labs   Lab 08/13/21  0552 08/12/21 2122   * 130*   POTASSIUM 3.8 3.7   CHLORIDE 96* 95*   CO2 27 26   BUN 30* 38*   CR 1.01 1.23*   GFRESTIMATED 48* 38*   PERICO 9.3 9.3     Coags:  Recent Labs   Lab 08/12/21 2122   INR 1.10   PTT 33     Cardiac Markers:  No results for input(s): CKTOTAL, TROPONINI in the last 168 hours.     XR Pelvis and Hip Right 2 Views  Result Date: 8/12/2021  EXAM: XR PELVIS AND HIP RIGHT 2 VIEWS LOCATION: [facility] DATE/TIME: 8/12/2021 10:04 PM INDICATION: Deformity.  COMPARISON: None.     IMPRESSION: Acute mildly displaced right intertrochanteric fracture. No widening of the sacroiliac joints or pubic symphysis. Atherosclerotic vascular calcifications. Moderate discogenic degenerative change of the visualized lumbosacral spine. 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    Echocardiogram Complete    Result Date: 2021  [id number]^LARRY^JEROD^[place] [address]  Name: ALF STEIN MRN: [id number] : 1929 Study Date: 2021 11:24 AM Age: 89 yrs Gender: Female Patient [facility]: [facility] Reason For Study: Reason for exam?->Atrial fibrillation Ordering Physician: JEROD JUAREZ Referring Physician: JEROD JUAREZ Performed By: MARLENA  BSA: 1.5 m2 Height: 64 in Weight: 109 lb HR: 75 BP: 160/69 mmHg ______________________________________________________________________________ Procedure Complete Echo Adult. Adequate quality two-dimensional was performed and interpreted. Adequate quality color and spectral Doppler were performed and interpreted. Compared to the prior study dated 2020, there are changes as noted. ______________________________________________________________________________ Interpretation Summary  1. Left ventricular size is normal. Moderate concentric increase in wall thickness. Systolic function is normal. The estimated left ventricular ejection fraction is 60-65%. 2. Right ventricular size is normal. Systolic function is mildly reduced. 3. Severe left atrial enlargement. 4. There is severe mitral annular calcification with thickening and calcification of the mitral leaflets. There is moderate-to-severe, eccentric anteriorly-directed mitral regurgitation. The mechansim of regurgitation is likely due to fibrocalcific degeneration of the mitral leaflets. No hemodynamically significant mitral stenosis. 5. A bioprosthetic aortic valve of uknown size or   is present. No evidence of significant prosthetic stenosis with peak forward velocity 2.8 m/s, mean gradient 18 mm Hg, and dimensionless index 0.28. No significant regurgitation. 6. There is mild pulmonary hypertension present with an estimated pulmonary artery systolic pressure of 43 mm Hg. 7. Compared to the prior study dated 5/14/2020, the patient is now in atrial fibrillation and the degree of mitral regurgitation may have worsened. ______________________________________________________________________________ I      WMSI = 1.00     % Normal = 100  X - Cannot   0 -                      (2) - Mildly 2 -          Segments  Size Interpret    Hyperkinetic 1 - Normal  Hypokinetic  Hypokinetic  1-2     small                                                    7 -          3-5    moderate 3 - Akinetic 4 -          5 -         6 - Akinetic Dyskinetic   6-14    large              Dyskinetic   Aneurysmal  w/scar       w/scar       15-16   diffuse  Left Ventricle The left ventricle is normal in size. There is moderate concentric left ventricular hypertrophy. The left ventricular ejection fraction is normal. The visual ejection fraction is 60-65%. Diastolic function not assessed due to atrial fibrillation. No regional wall motion abnormalities noted.  Right Ventricle The right ventricle is normal size. The right ventricular systolic function is normal.  Atria The left atrium is severely dilated. The right atrium is moderately dilated.  Mitral Valve There is severe mitral annular calcification. The mitral valve leaflets are moderately thickened. There is moderately severe (3+) mitral regurgitation. The mitral regurgitant jet is eccentrically directed. The mechansim of regurgitation is likely due to fibrocalcific degeneration of the mitral leaflets. Calcified mitral apparatus causing mitral stenosis. The mean mitral valve gradient is 5.3 mmHg. Mildly increased inflow gradient across the mitral valve likely due to increased  flow from mitral regurgitation.  Tricuspid Valve Tricuspid valve leaflets appear normal. There is mild (1+) tricuspid regurgitation. Mild (35-45mmHg) pulmonary hypertension is present. The right ventricular systolic pressure is elevated at 34.6 mmHg. There is no tricuspid stenosis.  Aortic Valve A bioprosthetic aortic valve of uknown size or  is present. No aortic regurgitation is present. No aortic stenosis is present. The mean AoV pressure gradient is 18.0 mmHg.  Pulmonic Valve The pulmonic valve is not well visualized. There is trace pulmonic valvular regurgitation. There is no pulmonic valvular stenosis.  Vessels The aorta is not well visualized. IVC diameter and respiratory changes fall into an intermediate range suggesting an RA pressure of 8 mmHg.  Pericardium There is no pericardial effusion.  Rhythm The rhythm was atrial fibrillation.  ______________________________________________________________________________ MMode/2D Measurements & Calculations IVSd: 1.3 cm LVIDd: 3.8 cm LVIDs: 2.5 cm LVPWd: 1.3 cm FS: 34.2 % LV mass(C)d: 173.1 grams LV mass(C)dI: 114.5 grams/m2  LA dimension: 4.2 cm LVOT diam: 1.7 cm LVOT area: 2.3 cm2 LA Volume Indexed (AL/bp): 73.9 ml/m2  Time Measurements MM HR: 80.0 BPM  Doppler Measurements & Calculations MV E max gutierrez: 188.7 cm/sec MV max P.0 mmHg MV mean P.3 mmHg MV V2 VTI: 46.5 cm MVA(VTI): 0.81 cm2  MV dec time: 0.30 sec Ao V2 max: 279.7 cm/sec Ao max P.0 mmHg Ao V2 mean: 195.0 cm/sec Ao mean P.0 mmHg Ao V2 VTI: 65.0 cm NICOLETTE(I,D): 0.58 cm2 NICOLETTE(V,D): 0.63 cm2 LV V1 max P.4 mmHg LV V1 max: 77.7 cm/sec LV V1 VTI: 16.7 cm SV(LVOT): 37.9 ml SI(LVOT): 25.1 ml/m2 PA acc time: 0.12 sec PI end-d gutierrez: 103.0 cm/sec TR max gutierrez: 294.0 cm/sec TR max P.6 mmHg NICOLETTE Index (cm2/m2): 0.39 E/E' av.8 Lateral E/e': 27.3 Medial E/e': 52.3  ______________________________________________________________________________ Report approved by: Palmer Orellana  07/16/2021 04:17 PM       NM Lexiscan stress test    Result Date: 7/16/2021     The nuclear stress test is negative for inducible myocardial ischemia or infarction.    The left ventricular ejection fraction at stress is 57%.    A prior study was conducted on 6/18/2017.  No interval change noted.     XR Hip Port Right G/E 2 Views    Result Date: 8/14/2021  EXAM: XR HIP PORTABLE RIGHT 2-3 VIEWS LOCATION: [facility] DATE/TIME: 8/13/2021 8:58 PM INDICATION: hip surgery COMPARISON: 8/12/2021.     IMPRESSION: ORIF fixation of a right femoral fracture in progress. Please see operative report for further details. 6 images were submitted. Fluoroscopy time is 56 seconds.     XR Surgery YAYA L/T 5 Min Fluoro    Result Date: 8/13/2021  This exam was marked as non-reportable because it will not be read by a radiologist or a [facility] non-radiologist provider.     Latest radiology report personally reviewed.    Note created using dragon voice recognition software so sounds alike errors may have escaped editing.    08/15/2021   JJ QUINONES MD  HOSPITALIST, [facility]  PAGER NO. [phone]

## 2021-08-15 NOTE — PLAN OF CARE
Problem: Adult Inpatient Plan of Care  Goal: Plan of Care Review  Outcome: Improving   Pt A&O x4.  Pt wore cPap all night.  Repositioned every 2 hours.  Bush patent and draining.  Scheduled Tylenol adequate for pain.  Right hip incisional site appears to have no signs of infection.  CMS intact.  Will continue to monitor.

## 2021-08-16 ENCOUNTER — APPOINTMENT (OUTPATIENT)
Dept: OCCUPATIONAL THERAPY | Facility: HOSPITAL | Age: 86
DRG: 481 | End: 2021-08-16
Payer: COMMERCIAL

## 2021-08-16 ENCOUNTER — APPOINTMENT (OUTPATIENT)
Dept: PHYSICAL THERAPY | Facility: HOSPITAL | Age: 86
DRG: 481 | End: 2021-08-16
Payer: COMMERCIAL

## 2021-08-16 ENCOUNTER — APPOINTMENT (OUTPATIENT)
Dept: RADIOLOGY | Facility: HOSPITAL | Age: 86
DRG: 481 | End: 2021-08-16
Attending: ORTHOPAEDIC SURGERY
Payer: COMMERCIAL

## 2021-08-16 LAB
ANION GAP SERPL CALCULATED.3IONS-SCNC: 8 MMOL/L (ref 5–18)
BUN SERPL-MCNC: 28 MG/DL (ref 8–28)
CALCIUM SERPL-MCNC: 8.7 MG/DL (ref 8.5–10.5)
CHLORIDE BLD-SCNC: 97 MMOL/L (ref 98–107)
CO2 SERPL-SCNC: 26 MMOL/L (ref 22–31)
CREAT SERPL-MCNC: 0.75 MG/DL (ref 0.6–1.1)
GFR SERPL CREATININE-BSD FRML MDRD: 69 ML/MIN/1.73M2
GLUCOSE BLD-MCNC: 96 MG/DL (ref 70–125)
HGB BLD-MCNC: 8.2 G/DL (ref 11.7–15.7)
POTASSIUM BLD-SCNC: 4 MMOL/L (ref 3.5–5)
POTASSIUM BLD-SCNC: 4 MMOL/L (ref 3.5–5)
SARS-COV-2 RNA RESP QL NAA+PROBE: NEGATIVE
SODIUM SERPL-SCNC: 131 MMOL/L (ref 136–145)

## 2021-08-16 PROCEDURE — 97116 GAIT TRAINING THERAPY: CPT | Mod: GP

## 2021-08-16 PROCEDURE — 97110 THERAPEUTIC EXERCISES: CPT | Mod: GP

## 2021-08-16 PROCEDURE — 87635 SARS-COV-2 COVID-19 AMP PRB: CPT | Performed by: INTERNAL MEDICINE

## 2021-08-16 PROCEDURE — 84132 ASSAY OF SERUM POTASSIUM: CPT | Performed by: INTERNAL MEDICINE

## 2021-08-16 PROCEDURE — 73552 X-RAY EXAM OF FEMUR 2/>: CPT | Mod: RT

## 2021-08-16 PROCEDURE — 250N000013 HC RX MED GY IP 250 OP 250 PS 637: Performed by: ORTHOPAEDIC SURGERY

## 2021-08-16 PROCEDURE — 97110 THERAPEUTIC EXERCISES: CPT | Mod: GO | Performed by: OCCUPATIONAL THERAPIST

## 2021-08-16 PROCEDURE — 85018 HEMOGLOBIN: CPT | Performed by: INTERNAL MEDICINE

## 2021-08-16 PROCEDURE — 250N000013 HC RX MED GY IP 250 OP 250 PS 637: Performed by: GENERAL ACUTE CARE HOSPITAL

## 2021-08-16 PROCEDURE — 36415 COLL VENOUS BLD VENIPUNCTURE: CPT | Performed by: INTERNAL MEDICINE

## 2021-08-16 PROCEDURE — 94660 CPAP INITIATION&MGMT: CPT

## 2021-08-16 PROCEDURE — 97530 THERAPEUTIC ACTIVITIES: CPT | Mod: GO | Performed by: OCCUPATIONAL THERAPIST

## 2021-08-16 PROCEDURE — 250N000013 HC RX MED GY IP 250 OP 250 PS 637: Performed by: HOSPITALIST

## 2021-08-16 PROCEDURE — 80048 BASIC METABOLIC PNL TOTAL CA: CPT | Performed by: INTERNAL MEDICINE

## 2021-08-16 PROCEDURE — 120N000001 HC R&B MED SURG/OB

## 2021-08-16 PROCEDURE — 99232 SBSQ HOSP IP/OBS MODERATE 35: CPT | Performed by: INTERNAL MEDICINE

## 2021-08-16 PROCEDURE — 250N000013 HC RX MED GY IP 250 OP 250 PS 637: Performed by: INTERNAL MEDICINE

## 2021-08-16 RX ORDER — PANTOPRAZOLE SODIUM 20 MG/1
40 TABLET, DELAYED RELEASE ORAL
Status: DISCONTINUED | OUTPATIENT
Start: 2021-08-16 | End: 2021-08-17 | Stop reason: HOSPADM

## 2021-08-16 RX ORDER — METOPROLOL TARTRATE 25 MG/1
25 TABLET, FILM COATED ORAL 2 TIMES DAILY
Status: DISCONTINUED | OUTPATIENT
Start: 2021-08-16 | End: 2021-08-17 | Stop reason: HOSPADM

## 2021-08-16 RX ADMIN — LORATADINE 10 MG: 10 TABLET ORAL at 08:01

## 2021-08-16 RX ADMIN — DOCUSATE SODIUM 50 MG AND SENNOSIDES 8.6 MG 1 TABLET: 8.6; 5 TABLET, FILM COATED ORAL at 20:40

## 2021-08-16 RX ADMIN — POLYPROPYLENE GLYCOL 400, PROPYLENE GLYCOL 1 DROP: .4; .3 LIQUID OPHTHALMIC at 16:33

## 2021-08-16 RX ADMIN — CYCLOSPORINE 1 DROP: 0.5 EMULSION OPHTHALMIC at 08:01

## 2021-08-16 RX ADMIN — POLYPROPYLENE GLYCOL 400, PROPYLENE GLYCOL 1 DROP: .4; .3 LIQUID OPHTHALMIC at 08:05

## 2021-08-16 RX ADMIN — ASPIRIN 81 MG: 81 TABLET, COATED ORAL at 20:40

## 2021-08-16 RX ADMIN — IPRATROPIUM BROMIDE AND ALBUTEROL 2 PUFF: 20; 100 SPRAY, METERED RESPIRATORY (INHALATION) at 12:31

## 2021-08-16 RX ADMIN — LEVOTHYROXINE SODIUM 88 MCG: 0.09 TABLET ORAL at 05:44

## 2021-08-16 RX ADMIN — POLYPROPYLENE GLYCOL 400, PROPYLENE GLYCOL 1 DROP: .4; .3 LIQUID OPHTHALMIC at 20:43

## 2021-08-16 RX ADMIN — ASPIRIN 81 MG: 81 TABLET, COATED ORAL at 08:00

## 2021-08-16 RX ADMIN — TIMOLOL MALEATE 1 DROP: 5 SOLUTION OPHTHALMIC at 08:06

## 2021-08-16 RX ADMIN — IPRATROPIUM BROMIDE AND ALBUTEROL 2 PUFF: 20; 100 SPRAY, METERED RESPIRATORY (INHALATION) at 20:39

## 2021-08-16 RX ADMIN — ACETAMINOPHEN 975 MG: 325 TABLET ORAL at 16:32

## 2021-08-16 RX ADMIN — POLYETHYLENE GLYCOL 3350 17 G: 17 POWDER, FOR SOLUTION ORAL at 08:01

## 2021-08-16 RX ADMIN — CYCLOSPORINE 1 DROP: 0.5 EMULSION OPHTHALMIC at 20:40

## 2021-08-16 RX ADMIN — SUCRALFATE 1 G: 1 TABLET ORAL at 08:10

## 2021-08-16 RX ADMIN — Medication 125 MCG: at 08:01

## 2021-08-16 RX ADMIN — Medication 12.5 MG: at 08:00

## 2021-08-16 RX ADMIN — DOCUSATE SODIUM 50 MG AND SENNOSIDES 8.6 MG 1 TABLET: 8.6; 5 TABLET, FILM COATED ORAL at 08:00

## 2021-08-16 RX ADMIN — IPRATROPIUM BROMIDE AND ALBUTEROL 2 PUFF: 20; 100 SPRAY, METERED RESPIRATORY (INHALATION) at 08:06

## 2021-08-16 RX ADMIN — POLYPROPYLENE GLYCOL 400, PROPYLENE GLYCOL 1 DROP: .4; .3 LIQUID OPHTHALMIC at 12:32

## 2021-08-16 RX ADMIN — FLUOROMETHOLONE 1 DROP: 1 SOLUTION/ DROPS OPHTHALMIC at 08:05

## 2021-08-16 RX ADMIN — OMEPRAZOLE 20 MG: 20 CAPSULE, DELAYED RELEASE ORAL at 08:10

## 2021-08-16 RX ADMIN — SUCRALFATE 1 G: 1 TABLET ORAL at 20:40

## 2021-08-16 RX ADMIN — FUROSEMIDE 20 MG: 20 TABLET ORAL at 08:00

## 2021-08-16 RX ADMIN — ACETAMINOPHEN 650 MG: 325 TABLET ORAL at 23:56

## 2021-08-16 RX ADMIN — METOPROLOL TARTRATE 25 MG: 25 TABLET, FILM COATED ORAL at 20:40

## 2021-08-16 RX ADMIN — SPIRONOLACTONE 50 MG: 50 TABLET ORAL at 08:00

## 2021-08-16 RX ADMIN — ACETAMINOPHEN 975 MG: 325 TABLET ORAL at 08:00

## 2021-08-16 RX ADMIN — IPRATROPIUM BROMIDE AND ALBUTEROL 2 PUFF: 20; 100 SPRAY, METERED RESPIRATORY (INHALATION) at 16:32

## 2021-08-16 NOTE — PLAN OF CARE
Problem: Risk for Delirium  Goal: Improved Sleep  Outcome: No Change     Problem: Pain Acute  Goal: Acceptable Pain Control and Functional Ability  Outcome: No Change       Problem: Adult Inpatient Plan of Care  Goal: Absence of Hospital-Acquired Illness or Injury  Intervention: Prevent and Manage VTE (Venous Thromboembolism) Risk  Recent Flowsheet Documentation  Taken 8/16/2021 0032 by Delfina Jameson RN  VTE Prevention/Management: pneumatic compression device       CPAP worn entire night for OLEGARIO. Pt stated she didn't sleep much. Had eyes closed every time staff did hourly rounding. Declined scheduled Tylenol and offer for PRN pain medication. Stated pain noticeable with movement but manageable at rest. Right hip dressing clean, dry and intact. Wore bilateral pneumatic compression devices.

## 2021-08-16 NOTE — PROGRESS NOTES
Care Management Follow Up    Length of Stay (days): 4    Expected Discharge Date:  08/17/2021     Concerns to be Addressed:  Discharge Planning:  Skilled Nursing Facility - TCU:  Accepting facility     Patient plan of care discussed at interdisciplinary rounds: Yes    Anticipated Discharge Disposition:  Skilled Nursing Facility - TCU     Anticipated Discharge Services:  TCU   Anticipated Discharge DME:  [facility]    Patient/family educated on Medicare website which has current facility and service quality ratings:  Yes   Education Provided on the Discharge Plan:  Yes   Patient/Family in Agreement with the Plan:  Yes    Referrals Placed by CM/SW:   [facility]  Private pay costs discussed: transportation costs    Additional Information:  Discharge Plan is TCU.    Referral previously sent to [facility].    This writer spoke with [facility] Admissions, Yaquelin, who accepted the patient for 08/17/2021 pending 08/16/2021 COVID-19 Negative Test result.    [employer] Wheelchair transportation scheduled for 08/17/2021 at 1045; the patient agrees to the Private Pay Transport cost.    This writer informed the patient of the above Discharge Plan, with which she agrees.    Care Management to follow.      Yaquelin Stewart CM

## 2021-08-16 NOTE — PROGRESS NOTES
Orthopedic Progress Note      Assessment: 3 Days Post-Op  S/P Procedure(s):  INTERNAL FIXATION, FRACTURE, TROCHANTERIC, HIP, USING PINS OR RODS    Plan:   -XR right hip looks stable, confirmed with Dr. Castro.   - Continue PT/OT  - Weightbearing status: WBAT  - Anticoagulation: ASA BID in addition to SCDs, dashawn stockings and early ambulation.  - Discharge planning: Continue pain management with Tylenol. Waiting placement for TCU, otherwise orthopedically stable for discharge.     Subjective:  Pain: moderate  Nausea, Vomiting:  no  Lightheadedness, Dizziness:  no  Neuro:  Patient denies new onset numbness or paresthesias    Patient states the right hip is sore, rating pain at a 6/10, worse with movement. She has been ambulating with therapy today and she is asking to get up more today. Confirmed post-op xrays look stable with Dr. Castro.     Objective:  /66 (BP Location: Right arm)   Pulse 98   Temp 98.8  F (37.1  C) (Axillary)   Resp 18   Wt 48.4 kg (106 lb 12.8 oz)   SpO2 98%   BMI 18.33 kg/m    The patient is A&Ox3. Appears comfortable.   Sensation is intact.  Dorsiflexion and plantar flexion is intact.  Appropriate flexion extension of knee.   5/5 strength LEs.   Dorsalis pedis pulse intact.  Calves are soft and non-tender. Negative Felicitas's.  The incision is covered. Dressing C/D/I.    No drain     Pertinent Labs   Lab Results: personally reviewed.   Lab Results   Component Value Date    INR 1.10 08/12/2021    INR 0.97 05/13/2020     Lab Results   Component Value Date    WBC 8.0 08/12/2021    HGB 8.2 (L) 08/16/2021    HCT 30.3 (L) 08/12/2021    MCV 88 08/12/2021     08/12/2021     Lab Results   Component Value Date     (L) 08/16/2021    CO2 26 08/16/2021         Report completed by:  Farzana Rodriguez PA-C, FERNANDO  Date: 8/16/2021  Time: 1:43 PM

## 2021-08-16 NOTE — PLAN OF CARE
Problem: Risk for Delirium  Goal: Optimal Coping  Outcome: No Change   Patient alert oriented x 4, able to express needs, uses call light appropriately.  Problem: Adult Inpatient Plan of Care  Goal: Optimal Comfort and Wellbeing  Outcome: Improving     Problem: Pain Acute  Goal: Acceptable Pain Control and Functional Ability  8/16/2021 0957 by Montez Guthrie, RN  Outcome: No Change   Decreased pain, managed by scheduled tylenol.  Problem: Adult Inpatient Plan of Care  Goal: Absence of Hospital-Acquired Illness or Injury  Intervention: Identify and Manage Fall Risk  Recent Flowsheet Documentation  Taken 8/16/2021 1600 by Montez Guthrie, RN  Safety Promotion/Fall Prevention:   room organization consistent   room door open  Taken 8/16/2021 1200 by Montez Guthrie, RN  Safety Promotion/Fall Prevention:   room organization consistent   room door open  Taken 8/16/2021 0800 by Montez Guthrie, RN  Safety Promotion/Fall Prevention:   room organization consistent   room door open   Patient uses all light appropriately, no attempt to self transfer

## 2021-08-16 NOTE — PLAN OF CARE
Problem: Pain Acute  Goal: Acceptable Pain Control and Functional Ability  8/15/2021 1924 by Montez Guthrie RN  Outcome: Improving     Problem: Adult Inpatient Plan of Care  Goal: Readiness for Transition of Care  8/15/2021 1924 by Montez Guthrie RN  Outcome: Adequate for Discharge  8/15/2021 1209 by Montez Guthrie RN  Outcome: Improving     Problem: Adult Inpatient Plan of Care  Goal: Absence of Hospital-Acquired Illness or Injury  Intervention: Identify and Manage Fall Risk  Recent Flowsheet Documentation  Taken 8/15/2021 1600 by Montez Guthrie RN  Safety Promotion/Fall Prevention:   bed alarm on   chair alarm on  Taken 8/15/2021 0800 by Montez Guthrie RN  Safety Promotion/Fall Prevention:   bed alarm on   chair alarm on  Intervention: Prevent Skin Injury  Recent Flowsheet Documentation  Taken 8/15/2021 1600 by Montez Guthrie, RN  Body Position: (up in chair) --  Taken 8/15/2021 0800 by Montez Guthrie RN  Body Position: (up in chair) --   Patient surgical wound intact, dressing changed. Orto updated about pain in groin area, Xray has been ordered. Patient is assist of 2 transfer, alert oriented x 4, able to express needs.

## 2021-08-16 NOTE — PROGRESS NOTES
Daily Progress Note        CODE STATUS:  No CPR- Pre-arrest intubation OK    08/14/21  Assessment/Plan:    89-year-old female with past medical history of coronary artery disease status post CABG, moderate to severe mitral regurgitation, aortic stenosis status post valve replacement in 2009, diastolic heart failure, chronic A. fib, obstructive sleep apnea on 3 L oxygen at night and CPAP, presented to the hospital for evaluation of right hip pain secondary to fall found to have mildly displaced right intertrochanteric fracture     Fall and sustained displaced right intertrochanteric fracture;  --Status post ORIF using intramedullary device by [place] orthopedic provider, Dr. Castro on 8/13/2021  --Continue postoperative care, pain management and DVT prophylaxis per orthopedic team  --Continue scheduled Tylenol, try to avoid narcotics given her age and risk of delirium  --Orthopedic team ordered x-ray and pending     Acute kidney injury with CKD stage III;  --Creatinine improved.  Avoid nephrotoxic medications  --Chronic hyponatremia, fairly stable  --Monitor labs intermittently    Acute blood loss anemia on chronic anemia;  --On admission hemoglobin 9.9-->9.0-->8.2-->8.2.  --Did not appreciate significant swelling or bruises around surgical site.  --Monitor hemoglobin intermittently    Coronary artery disease  Chronic CHF with preserved EF;  --Per cardiology note, recent nuclear stress test showed no ischemia.  Currently denied chest pain  --Discontinued IV fluid on 8/14 and started on Lasix 20 mg daily (takes torsemide at home), continue Aldactone, metoprolol dose with holding parameters.  --Appreciated cardiology input and the signed off  --Moderate to severe degenerative mitral regurgitation, cardiology on case     Chronic A. Fib  --Heart rate controlled.  Continue metoprolol.    --Per cardiology note not anticoagulated due to frequent falls.    Essential hypertension;  --Continue home metoprolol, Aldactone.  Blood  pressure trending up, increase home metoprolol to home dose.  Will hold amlodipine for now.  Monitor vitals closely.    Asthma/COPD  Not on exacerbation  --Resumed inhalers and nebulizers, incentive spirometry, currently on 1 L oxygen postoperatively, wean off oxygen as able     DVT prophylaxis; on twice daily aspirin per surgery team    Disposition; anticipate TCU  Barrier to discharge; postop recovery     LOS: 2 days     Subjective:  Interval History: Patient seen and examined. Notes, labs, imaging reports personally reviewed.  Patient sitting in a chair, reported her mobility from bed to chair better than yesterday, reported numbness on right foot improved, not much groin pain and able to move leg better than yesterday.  Patient denied short of breath, dizziness when moving.  Denied other concerns but worried about discharge plan, discussed in detail that we will see how she does with the therapy and how x-ray looks and possibly plan for TCU discharge tomorrow.  After getting permission from patient discussed with patient's niece, updated medical condition and answered all her questions to best of my knowledge.  Discussed with patient and will discontinue Bush catheter.    Review of Systems:   As mentioned in subjective.    Patient Active Problem List   Diagnosis     Rectal prolapse     Closed displaced intertrochanteric fracture of right femur, initial encounter (H)     Acute bronchitis due to other specified organisms     Anxiety and depression     Arteriosclerosis of coronary artery     Sleep apnea     Carpal tunnel syndrome, right     Constipation, unspecified     COPD exacerbation (H)     Diastolic CHF (H)     Dyslipidemia     Fecal incontinence alternating with constipation     Fever     Flu-like symptoms     Generalized weakness     Generalized muscle weakness     Heart valve disease     Hyperlipidemia LDL goal <100     Hypertension     Hypertensive emergency     Hypokalemia     Hyponatremia     Irregular  bowel habits     Metabolic acidosis     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)     Polyarthropathy or polyarthritis, hand     Physical deconditioning     Pneumonia     Pulmonary edema cardiac cause (H)     Pyelonephritis     Rash of body     Respiratory failure (H)     Sepsis (H)     Sjogren's syndrome (H)     Tachycardia     Acute renal failure superimposed on stage 3 chronic kidney disease, unspecified acute renal failure type, unspecified whether stage 3a or 3b CKD (H)       Scheduled Meds:    acetaminophen  975 mg Oral Q8H     aspirin  81 mg Oral BID     cycloSPORINE  1 drop Both Eyes BID     fluorometholone  1 drop Left Eye Daily     furosemide  20 mg Oral Daily     ipratropium-albuterol  2 puff Inhalation 4x Daily     levothyroxine  88 mcg Oral Daily     loratadine  10 mg Oral Daily     metoprolol tartrate  12.5 mg Oral BID     pantoprazole  40 mg Oral QAM AC     polyethylene glycol  17 g Oral Daily     polyethylene glycol-propylene glycol  1 drop Both Eyes 4x Daily     senna-docusate  1 tablet Oral BID     sodium chloride (PF)  3 mL Intracatheter Q8H     spironolactone  50 mg Oral Daily     sucralfate  1 g Oral BID     timolol maleate  1 drop Left Eye Daily     cholecalciferol  125 mcg Oral Daily     Continuous Infusions:    - MEDICATION INSTRUCTIONS -       PRN Meds:.acetaminophen, sore throat lozenge, bisacodyl, calcium carbonate, HOLD MEDICATION, HYDROmorphone, HYDROmorphone, ipratropium - albuterol 0.5 mg/2.5 mg/3 mL, lidocaine 4%, lidocaine (buffered or not buffered), magnesium hydroxide, - MEDICATION INSTRUCTIONS -, melatonin, nalbuphine, naloxone **OR** naloxone **OR** naloxone **OR** naloxone, ondansetron **OR** ondansetron, oxyCODONE IR, prochlorperazine **OR** prochlorperazine **OR** prochlorperazine, sodium chloride (PF), sodium chloride (PF), traMADol    Objective:  Vital signs in last 24 hours:  Temp:  [97.6  F (36.4  C)-98.8  F (37.1  C)] 98.8  F (37.1  C)  Pulse:  [] 98  Resp:  [18-20]  18  BP: ()/(53-66) 130/66  SpO2:  [93 %-99 %] 98 %  Weight:   [unfilled]      Intake/Output Summary (Last 24 hours) at 8/14/2021 1701  Last data filed at 8/14/2021 1528  Gross per 24 hour   Intake --   Output 750 ml   Net -750 ml       Physical Exam:  General: Not in obvious distress.  HEENT: Normocephalic, supple neck  Chest: Decreased but clear to auscultation bilateral anteriorly, no wheezing  Heart: S1S2 normal, regular  Abdomen: Soft. NT, ND. Bowel sounds- active.  Extremities: No legs swelling, right hip dressing dry/clean/intact  Neuro: alert and awake, moves all extremities    Lab Results:(I have personally reviewed the results)    Recent Results (from the past 24 hour(s))   Hemoglobin    Collection Time: 08/16/21  5:39 AM   Result Value Ref Range    Hemoglobin 8.2 (L) 11.7 - 15.7 g/dL   Potassium    Collection Time: 08/16/21  5:39 AM   Result Value Ref Range    Potassium 4.0 3.5 - 5.0 mmol/L   Basic metabolic panel    Collection Time: 08/16/21  5:39 AM   Result Value Ref Range    Sodium 131 (L) 136 - 145 mmol/L    Potassium 4.0 3.5 - 5.0 mmol/L    Chloride 97 (L) 98 - 107 mmol/L    Carbon Dioxide (CO2) 26 22 - 31 mmol/L    Anion Gap 8 5 - 18 mmol/L    Urea Nitrogen 28 8 - 28 mg/dL    Creatinine 0.75 0.60 - 1.10 mg/dL    Calcium 8.7 8.5 - 10.5 mg/dL    Glucose 96 70 - 125 mg/dL    GFR Estimate 69 >60 mL/min/1.73m2         Serum Glucose range:   Recent Labs   Lab 08/16/21  0539 08/14/21  0532 08/13/21  0552 08/12/21 2122   GLC 96 103 131* 117     ABG: No lab results found in last 7 days.  CBC:   Recent Labs   Lab 08/16/21  0539 08/15/21  0522 08/14/21  0644 08/12/21 2122   WBC  --   --   --  8.0   HGB 8.2* 8.2* 9.0* 9.9*   HCT  --   --   --  30.3*   MCV  --   --   --  88   PLT  --   --   --  296   NEUTROPHIL  --   --   --  71   LYMPH  --   --   --  11   MONOCYTE  --   --   --  12   EOSINOPHIL  --   --   --  4     Chemistry:   Recent Labs   Lab 08/16/21  0539 08/13/21  0552 08/12/21  6592   *  133* 130*   POTASSIUM 4.0  4.0 3.8 3.7   CHLORIDE 97* 96* 95*   CO2 26 27 26   BUN 28 30* 38*   CR 0.75 1.01 1.23*   GFRESTIMATED 69 48* 38*   PERICO 8.7 9.3 9.3     Coags:  Recent Labs   Lab 21   INR 1.10   PTT 33     Cardiac Markers:  No results for input(s): CKTOTAL, TROPONINI in the last 168 hours.     XR Pelvis and Hip Right 2 Views  Result Date: 2021  EXAM: XR PELVIS AND HIP RIGHT 2 VIEWS LOCATION: [facility] DATE/TIME: 2021 10:04 PM INDICATION: Deformity. COMPARISON: None.     IMPRESSION: Acute mildly displaced right intertrochanteric fracture. No widening of the sacroiliac joints or pubic symphysis. Atherosclerotic vascular calcifications. Moderate discogenic degenerative change of the visualized lumbosacral spine. 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    Echocardiogram Complete    Result Date: 2021  [id number]^LARRY^JEROD^[place] [address]  Name: ALF STEIN MRN: [id number] : 1929 Study Date: 2021 11:24 AM Age: 89 yrs Gender: Female Patient Location: [facility] Reason For Study: Reason for exam?->Atrial fibrillation Ordering Physician: JEROD JUAREZ Referring Physician: JEROD JUAREZ Performed By: MARLENA  BSA: 1.5 m2 Height: 64 in Weight: 109 lb HR: 75 BP: 160/69 mmHg ______________________________________________________________________________ Procedure Complete Echo Adult. Adequate quality two-dimensional was performed and interpreted. Adequate quality color and spectral Doppler were performed and interpreted. Compared to the prior study dated 2020, there are changes as noted. ______________________________________________________________________________ Interpretation Summary  1. Left ventricular size is normal. Moderate concentric increase in wall thickness. Systolic function is normal. The estimated left ventricular ejection fraction is  60-65%. 2. Right ventricular size is normal. Systolic function is mildly reduced. 3. Severe left atrial enlargement. 4. There is severe mitral annular calcification with thickening and calcification of the mitral leaflets. There is moderate-to-severe, eccentric anteriorly-directed mitral regurgitation. The mechansim of regurgitation is likely due to fibrocalcific degeneration of the mitral leaflets. No hemodynamically significant mitral stenosis. 5. A bioprosthetic aortic valve of uknown size or  is present. No evidence of significant prosthetic stenosis with peak forward velocity 2.8 m/s, mean gradient 18 mm Hg, and dimensionless index 0.28. No significant regurgitation. 6. There is mild pulmonary hypertension present with an estimated pulmonary artery systolic pressure of 43 mm Hg. 7. Compared to the prior study dated 5/14/2020, the patient is now in atrial fibrillation and the degree of mitral regurgitation may have worsened. ______________________________________________________________________________ I      WMSI = 1.00     % Normal = 100  X - Cannot   0 -                      (2) - Mildly 2 -          Segments  Size Interpret    Hyperkinetic 1 - Normal  Hypokinetic  Hypokinetic  1-2     small                                                    7 -          3-5    moderate 3 - Akinetic 4 -          5 -         6 - Akinetic Dyskinetic   6-14    large              Dyskinetic   Aneurysmal  w/scar       w/scar       15-16   diffuse  Left Ventricle The left ventricle is normal in size. There is moderate concentric left ventricular hypertrophy. The left ventricular ejection fraction is normal. The visual ejection fraction is 60-65%. Diastolic function not assessed due to atrial fibrillation. No regional wall motion abnormalities noted.  Right Ventricle The right ventricle is normal size. The right ventricular systolic function is normal.  Atria The left atrium is severely dilated. The right atrium is  moderately dilated.  Mitral Valve There is severe mitral annular calcification. The mitral valve leaflets are moderately thickened. There is moderately severe (3+) mitral regurgitation. The mitral regurgitant jet is eccentrically directed. The mechansim of regurgitation is likely due to fibrocalcific degeneration of the mitral leaflets. Calcified mitral apparatus causing mitral stenosis. The mean mitral valve gradient is 5.3 mmHg. Mildly increased inflow gradient across the mitral valve likely due to increased flow from mitral regurgitation.  Tricuspid Valve Tricuspid valve leaflets appear normal. There is mild (1+) tricuspid regurgitation. Mild (35-45mmHg) pulmonary hypertension is present. The right ventricular systolic pressure is elevated at 34.6 mmHg. There is no tricuspid stenosis.  Aortic Valve A bioprosthetic aortic valve of uknown size or  is present. No aortic regurgitation is present. No aortic stenosis is present. The mean AoV pressure gradient is 18.0 mmHg.  Pulmonic Valve The pulmonic valve is not well visualized. There is trace pulmonic valvular regurgitation. There is no pulmonic valvular stenosis.  Vessels The aorta is not well visualized. IVC diameter and respiratory changes fall into an intermediate range suggesting an RA pressure of 8 mmHg.  Pericardium There is no pericardial effusion.  Rhythm The rhythm was atrial fibrillation.  ______________________________________________________________________________ MMode/2D Measurements & Calculations IVSd: 1.3 cm LVIDd: 3.8 cm LVIDs: 2.5 cm LVPWd: 1.3 cm FS: 34.2 % LV mass(C)d: 173.1 grams LV mass(C)dI: 114.5 grams/m2  LA dimension: 4.2 cm LVOT diam: 1.7 cm LVOT area: 2.3 cm2 LA Volume Indexed (AL/bp): 73.9 ml/m2  Time Measurements MM HR: 80.0 BPM  Doppler Measurements & Calculations MV E max gutierrez: 188.7 cm/sec MV max P.0 mmHg MV mean P.3 mmHg MV V2 VTI: 46.5 cm MVA(VTI): 0.81 cm2  MV dec time: 0.30 sec Ao V2 max: 279.7 cm/sec Ao  max P.0 mmHg Ao V2 mean: 195.0 cm/sec Ao mean P.0 mmHg Ao V2 VTI: 65.0 cm NICOLETTE(I,D): 0.58 cm2 NICOLETTE(V,D): 0.63 cm2 LV V1 max P.4 mmHg LV V1 max: 77.7 cm/sec LV V1 VTI: 16.7 cm SV(LVOT): 37.9 ml SI(LVOT): 25.1 ml/m2 PA acc time: 0.12 sec PI end-d gutierrez: 103.0 cm/sec TR max gutierrez: 294.0 cm/sec TR max P.6 mmHg NICOLETTE Index (cm2/m2): 0.39 E/E' av.8 Lateral E/e': 27.3 Medial E/e': 52.3  ______________________________________________________________________________ Report approved by: Palmer Orellana 2021 04:17 PM       NM Lexiscan stress test    Result Date: 2021     The nuclear stress test is negative for inducible myocardial ischemia or infarction.    The left ventricular ejection fraction at stress is 57%.    A prior study was conducted on 2017.  No interval change noted.     XR Hip Port Right G/E 2 Views    Result Date: 2021  EXAM: XR HIP PORTABLE RIGHT 2-3 VIEWS LOCATION: [facility] DATE/TIME: 2021 8:58 PM INDICATION: hip surgery COMPARISON: 2021.     IMPRESSION: ORIF fixation of a right femoral fracture in progress. Please see operative report for further details. 6 images were submitted. Fluoroscopy time is 56 seconds.     XR Surgery YAYA L/T 5 Min Fluoro    Result Date: 2021  This exam was marked as non-reportable because it will not be read by a radiologist or a [place] non-radiologist provider.     Latest radiology report personally reviewed.    Note created using dragon voice recognition software so sounds alike errors may have escaped editing.    2021   JJ QUINONES MD  HOSPITALIST, [facility]  PAGER NO. [phone]

## 2021-08-16 NOTE — PLAN OF CARE
Problem: Risk for Delirium  Goal: Optimal Coping  Outcome: No Change     Problem: Adult Inpatient Plan of Care  Goal: Absence of Hospital-Acquired Illness or Injury  Intervention: Identify and Manage Fall Risk  Recent Flowsheet Documentation  Taken 8/16/2021 0800 by Montez Guthrie, RN  Safety Promotion/Fall Prevention:   room organization consistent   room door open. Patient uses call light appropriately.     Problem: Adult Inpatient Plan of Care  Goal: Absence of Hospital-Acquired Illness or Injury  Intervention: Prevent and Manage VTE (Venous Thromboembolism) Risk  Recent Flowsheet Documentation  Taken 8/16/2021 0800 by Montez Guthrie, RN  VTE Prevention/Management: pneumatic compression device

## 2021-08-17 ENCOUNTER — APPOINTMENT (OUTPATIENT)
Dept: PHYSICAL THERAPY | Facility: HOSPITAL | Age: 86
DRG: 481 | End: 2021-08-17
Payer: COMMERCIAL

## 2021-08-17 VITALS
SYSTOLIC BLOOD PRESSURE: 110 MMHG | WEIGHT: 106.8 LBS | BODY MASS INDEX: 18.33 KG/M2 | OXYGEN SATURATION: 92 % | TEMPERATURE: 98.4 F | DIASTOLIC BLOOD PRESSURE: 54 MMHG | RESPIRATION RATE: 18 BRPM | HEART RATE: 84 BPM

## 2021-08-17 PROCEDURE — 250N000013 HC RX MED GY IP 250 OP 250 PS 637: Performed by: ORTHOPAEDIC SURGERY

## 2021-08-17 PROCEDURE — 250N000013 HC RX MED GY IP 250 OP 250 PS 637: Performed by: INTERNAL MEDICINE

## 2021-08-17 PROCEDURE — 97110 THERAPEUTIC EXERCISES: CPT | Mod: GP

## 2021-08-17 PROCEDURE — 999N000157 HC STATISTIC RCP TIME EA 10 MIN

## 2021-08-17 PROCEDURE — 94660 CPAP INITIATION&MGMT: CPT

## 2021-08-17 PROCEDURE — 250N000013 HC RX MED GY IP 250 OP 250 PS 637: Performed by: HOSPITALIST

## 2021-08-17 PROCEDURE — 97530 THERAPEUTIC ACTIVITIES: CPT | Mod: GP

## 2021-08-17 PROCEDURE — 250N000013 HC RX MED GY IP 250 OP 250 PS 637: Performed by: GENERAL ACUTE CARE HOSPITAL

## 2021-08-17 PROCEDURE — 99239 HOSP IP/OBS DSCHRG MGMT >30: CPT | Performed by: INTERNAL MEDICINE

## 2021-08-17 RX ORDER — LORATADINE 10 MG/1
10 TABLET ORAL DAILY
DISCHARGE
Start: 2021-08-17 | End: 2023-05-11

## 2021-08-17 RX ORDER — FUROSEMIDE 20 MG
20 TABLET ORAL DAILY
DISCHARGE
Start: 2021-08-17 | End: 2021-08-24 | Stop reason: SINTOL

## 2021-08-17 RX ADMIN — SUCRALFATE 1 G: 1 TABLET ORAL at 08:13

## 2021-08-17 RX ADMIN — PANTOPRAZOLE SODIUM 40 MG: 20 TABLET, DELAYED RELEASE ORAL at 08:13

## 2021-08-17 RX ADMIN — FLUOROMETHOLONE 1 DROP: 1 SOLUTION/ DROPS OPHTHALMIC at 08:17

## 2021-08-17 RX ADMIN — FUROSEMIDE 20 MG: 20 TABLET ORAL at 08:13

## 2021-08-17 RX ADMIN — POLYETHYLENE GLYCOL 3350 17 G: 17 POWDER, FOR SOLUTION ORAL at 08:13

## 2021-08-17 RX ADMIN — TIMOLOL MALEATE 1 DROP: 5 SOLUTION OPHTHALMIC at 08:19

## 2021-08-17 RX ADMIN — POLYPROPYLENE GLYCOL 400, PROPYLENE GLYCOL 1 DROP: .4; .3 LIQUID OPHTHALMIC at 08:18

## 2021-08-17 RX ADMIN — DOCUSATE SODIUM 50 MG AND SENNOSIDES 8.6 MG 1 TABLET: 8.6; 5 TABLET, FILM COATED ORAL at 08:13

## 2021-08-17 RX ADMIN — Medication 125 MCG: at 08:13

## 2021-08-17 RX ADMIN — METOPROLOL TARTRATE 25 MG: 25 TABLET, FILM COATED ORAL at 08:13

## 2021-08-17 RX ADMIN — LORATADINE 10 MG: 10 TABLET ORAL at 08:12

## 2021-08-17 RX ADMIN — IPRATROPIUM BROMIDE AND ALBUTEROL 2 PUFF: 20; 100 SPRAY, METERED RESPIRATORY (INHALATION) at 08:10

## 2021-08-17 RX ADMIN — LEVOTHYROXINE SODIUM 88 MCG: 0.09 TABLET ORAL at 06:28

## 2021-08-17 RX ADMIN — ASPIRIN 81 MG: 81 TABLET, COATED ORAL at 08:12

## 2021-08-17 RX ADMIN — SPIRONOLACTONE 50 MG: 50 TABLET ORAL at 08:12

## 2021-08-17 RX ADMIN — CYCLOSPORINE 1 DROP: 0.5 EMULSION OPHTHALMIC at 08:11

## 2021-08-17 NOTE — PROGRESS NOTES
Patient discharged with belongings to a TCU, [facility] transport, VSS. Paperwork given, family updated

## 2021-08-17 NOTE — PROGRESS NOTES
Care Management Discharge Note    Discharge Date: 08/17/2021       Discharge Disposition:  TCU    Discharge Services:  TCU    Discharge DME:  No new DME    Discharge Transportation:      Private pay costs discussed:  yes    PAS Confirmation Code:  PAS [id number]  Patient/family educated on Medicare website which has current facility and service quality ratings:  yes    Education Provided on the Discharge Plan: yes    Persons Notified of Discharge Plans: yes  Patient/Family in Agreement with the Plan:  yes    Handoff Referral Completed:  yes    Additional Information:  Met with patient for discharge planning. Patient verbalizes reluctance to discharge. Reviewed benefit of TCU and therapy. Patient notified of right to appeal discharge. Updated patient's niece Aileen per patient's request. Aileen states agreement to discharge plan. [facility] at 1045. Confirmed with admissions at [facility] .       Maria Esther Gracia RN

## 2021-08-17 NOTE — PLAN OF CARE
Problem: Pain Acute  Goal: Acceptable Pain Control and Functional Ability  Outcome: Improving     POD 4 right hip pain rated 6/10. Received PRN Tylenol for pain/discomfort. Pt asleep with reassessment. Dressing clean, dry and intact to area. Able to turn and reposition in bed with assist of 1.

## 2021-08-17 NOTE — DISCHARGE SUMMARY
[facility] MEDICINE  DISCHARGE SUMMARY     Primary Care Physician: Ayana Parker  Admission Date: 8/12/2021   Discharge Provider: Chava CLIFFORD MD Discharge Date: 8/17/2021   Diet:   Active Diet and Nourishment Order   Procedures     Snacks/Supplements Adult: Ensure Enlive; With Meals     Advance Diet as Tolerated: Regular Diet Adult     Discharge Instruction - Regular Diet Adult       Code Status: No CPR- Pre-arrest intubation OK   Activity: DCACTIVITY: Activity as tolerated        Condition at Discharge: Good     REASON FOR PRESENTATION(See Admission Note for Details)   Fall and sustained right femur fracture.  Please refer to H&P for details    PRINCIPAL & ACTIVE DISCHARGE DIAGNOSES     Active Problems:    Closed displaced intertrochanteric fracture of right femur, initial encounter (H)    Sleep apnea    Diastolic CHF (H)    Hyponatremia    Acute renal failure superimposed on stage 3 chronic kidney disease, unspecified acute renal failure type, unspecified whether stage 3a or 3b CKD (H)      PENDING LABS     Unresulted Labs Ordered in the Past 30 Days of this Admission     No orders found from 7/13/2021 to 8/13/2021.            PROCEDURES ( this hospitalization only)      Procedure(s):  INTERNAL FIXATION, FRACTURE, TROCHANTERIC, HIP, USING PINS OR RODS    RECOMMENDATIONS TO OUTPATIENT PROVIDER FOR F/U VISIT     Follow-up Appointments     Follow Up Care      Please follow-up with Dr. Castro/Timothy Meyers or Tigre Alejandra PA-C in 2   weeks at [facility]. Call our scheduling line at [phone] to   make an appointment if you do not already have one scheduled.         Follow Up and recommended labs and tests      Follow up with [facility] physician.  The following labs/tests are   recommended: Advised to check CBC, BMP, magnesium in 3 to 5 days.  Follow up with primary care provider in in a week after discharge from TCU  Follow up with [facility] provider as  recommended                 DISPOSITION     Skilled Nursing Facility    SUMMARY OF HOSPITAL COURSE:      89-year-old female with past medical history of coronary artery disease status post CABG, moderate to severe mitral regurgitation, aortic stenosis status post valve replacement in 2009, diastolic heart failure, chronic A. fib, obstructive sleep apnea on 3 L oxygen at night and CPAP, presented to the hospital for evaluation of right hip pain secondary to fall found to have mildly displaced right intertrochanteric fracture     Fall and sustained displaced right intertrochanteric fracture;  --Status post ORIF using intramedullary device by [place] orthopedic provider, Dr. Castro on 8/13/2021  --Continue postoperative care, pain management and DVT prophylaxis per orthopedic team  --Continue scheduled Tylenol, try to avoid narcotics given her age and risk of delirium  --Orthopedic team ordered x-ray and pending     Acute kidney injury with CKD stage III;  --Creatinine improved.  Avoid nephrotoxic medications  --Chronic hyponatremia, fairly stable  --Monitor labs intermittently     Acute blood loss anemia on chronic anemia;  --On admission hemoglobin 9.9-->9.0-->8.2-->8.2.  --Did not appreciate significant swelling or bruises around surgical site.  --Monitor hemoglobin intermittently     Coronary artery disease  Chronic CHF with preserved EF;  --Per cardiology note, recent nuclear stress test showed no ischemia.  Currently denied chest pain  --Discontinued IV fluid on 8/14 and cardiology started on Lasix 20 mg daily, continue Aldactone, metoprolol   --Appreciated cardiology input and the signed off.  Reported they will arrange outpatient follow-up with primary cardiologist, Dr. Young in 6 to 8 weeks.    Chronic A. Fib  --Heart rate controlled.  Continue home dose metoprolol.    --Per cardiology note not anticoagulated due to frequent falls.     Essential hypertension;  --Continue home metoprolol, Aldactone, Lasix.   Discontinued home amlodipine as blood pressure well controlled with other medications     Asthma/COPD  Not on exacerbation  --Resumed inhalers and nebulizers, incentive spirometry.  --History of OLEGARIO on CPAP, continue     DVT prophylaxis; on twice daily aspirin per orthopedic team    Discharge Medications with Med changes:     Current Discharge Medication List      START taking these medications    Details   furosemide (LASIX) 20 MG tablet Take 1 tablet (20 mg) by mouth daily    Associated Diagnoses: Chronic diastolic congestive heart failure (H)         CONTINUE these medications which have CHANGED    Details   aspirin (ASA) 81 MG EC tablet Take 1 tablet (81 mg) by mouth 2 times daily    Associated Diagnoses: Intertrochanteric fracture of right femur, closed, initial encounter (H)      loratadine (CLARITIN) 10 MG tablet Take 1 tablet (10 mg) by mouth daily    Associated Diagnoses: History of asthma         CONTINUE these medications which have NOT CHANGED    Details   Ascorbic Acid (VITAMIN C PO) Take 500 mg by mouth every morning      Calcium Carb-Cholecalciferol (CALCIUM+D3) 600-800 MG-UNIT TABS Take 1 tablet by mouth daily      cholecalciferol (D3 HIGH POTENCY) 125 MCG (5000 UT) CAPS Take 1 capsule by mouth daily      cycloSPORINE (RESTASIS) 0.05 % ophthalmic emulsion Place 1 drop into both eyes 2 times daily       fluorometholone (FML LIQUIFILM) 0.1 % ophthalmic susp Place 1 drop Into the left eye daily       IBANDRONATE SODIUM PO Take 150 mg by mouth every 30 days      Ipratropium-Albuterol (COMBIVENT RESPIMAT)  MCG/ACT inhaler Inhale 2 puffs into the lungs 4 times daily      Levothyroxine Sodium (LEVOXYL PO) Take 88 mcg by mouth daily       METOPROLOL TARTRATE PO Take 25 mg by mouth 2 times daily       mirtazapine (REMERON) 7.5 MG tablet Take 7.5 mg by mouth At Bedtime      Multiple Vitamins-Minerals (CEROVITE PO) Take 1 tablet by mouth daily      Multiple Vitamins-Minerals (PRESERVISION AREDS PO) Take 1  tablet by mouth 2 times daily      omeprazole (PRILOSEC) 20 MG DR capsule Take 20 mg by mouth 2 times daily      polyethylene glycol (MIRALAX) powder Take 1 capful by mouth every morning      polyethylene glycol 0.4%- propylene glycol 0.3% (SYSTANE ULTRA) 0.4-0.3 % SOLN ophthalmic solution Place 1 drop into both eyes 4 times daily       Probiotic Product (PROBIOTIC PO) Take 1 capsule by mouth every morning      Psyllium (METAMUCIL FIBER PO) MIX 1 PACKET IN BEVERAGE OF CHOICE AND DRINK TWICE WEEKLY ON TUES AND THURS      sodium chloride 1 GM tablet Take 500 mg by mouth every other day       spironolactone (ALDACTONE) 50 MG tablet Take 50 mg by mouth daily      sucralfate (CARAFATE) 1 GM tablet Take 1 g by mouth 2 times daily      timolol (TIMOPTIC) 0.5 % ophthalmic solution Place 1 drop Into the left eye daily       Acetaminophen (TYLENOL PO) Take 1,000 mg by mouth every 6 hours as needed for mild pain       albuterol (PROAIR HFA/PROVENTIL HFA/VENTOLIN HFA) 108 (90 Base) MCG/ACT inhaler Inhale 2 puffs into the lungs every 6 hours    Comments: Pharmacy may dispense brand covered by insurance (Proair, or proventil or ventolin or generic albuterol inhaler)      calcium carbonate (TUMS) 500 MG chewable tablet Take 2 chew tab by mouth daily as needed for heartburn      ipratropium - albuterol 0.5 mg/2.5 mg/3 mL (DUONEB) 0.5-2.5 (3) MG/3ML neb solution Take 1 vial by nebulization 4 times daily as needed for shortness of breath / dyspnea or wheezing       magnesium citrate solution Take 150 mLs by mouth daily as needed for other         STOP taking these medications       AmLODIPine Besylate (NORVASC PO) Comments:   Reason for Stopping:         Docusate Sodium (COLACE PO) Comments:   Reason for Stopping:         famotidine (PEPCID) 20 MG tablet Comments:   Reason for Stopping:         Lidocaine (LIDOCARE) 4 % Patch Comments:   Reason for Stopping:         torsemide (DEMADEX) 20 MG tablet Comments:   Reason for Stopping:          traMADol (ULTRAM) 50 MG tablet Comments:   Reason for Stopping:                     Rationale for medication changes:      Cardiology started on Lasix at home torsemide discontinued.  Patient already on a twice daily PPI and as needed famotidine discontinued.  Postoperative pain management done by orthopedic provider and home tramadol discontinued.        Consults       ORTHOPEDIC SURGERY IP CONSULT  RESPIRATORY CARE IP CONSULT  CARE MANAGEMENT / SOCIAL WORK IP CONSULT  CARDIOLOGY IP CONSULT  SOCIAL WORK IP CONSULT  NUTRITION SERVICES ADULT IP CONSULT  PHYSICAL THERAPY ADULT IP CONSULT  OCCUPATIONAL THERAPY ADULT IP CONSULT  PHYSICAL THERAPY ADULT IP CONSULT  OCCUPATIONAL THERAPY ADULT IP CONSULT    Immunizations given this encounter     Most Recent Immunizations   Administered Date(s) Administered     COVID-19,PF,Moderna 02/25/2021     Influenza (High Dose) 3 valent vaccine 10/01/2019     Influenza, Quad, High Dose, Pf, 65yr + 09/29/2020     Pneumo Conj 13-V (2010&after) 03/25/2020     Pneumococcal 23 valent 06/18/2018     TDAP Vaccine (Adacel) 04/15/2014           Anticoagulation Information      Recent INR results:   Recent Labs   Lab 08/12/21  2122   INR 1.10       SIGNIFICANT IMAGING FINDINGS     Results for orders placed or performed during the hospital encounter of 08/12/21   XR Pelvis and Hip Right 2 Views    Impression    IMPRESSION: Acute mildly displaced right intertrochanteric fracture. No widening of the sacroiliac joints or pubic symphysis. Atherosclerotic vascular calcifications. Moderate discogenic degenerative change of the visualized lumbosacral spine. 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   XR Hip Port Right G/E 2 Views    Impression    IMPRESSION: ORIF fixation of a right femoral fracture in progress. Please see operative report for further details. 6 images were submitted. Fluoroscopy time is 56 seconds.    XR Femur Right 2 Views    Impression     "IMPRESSION: Interval intramedullary nail and interlocking femoral neck lag screw fixation of the previous intertrochanteric proximal femur fracture. Alignment is near-anatomic, there is mild residual displacement at the main fracture site. There is a   separate unfixed displaced lesser trochanteric fragment. Postoperative gas in the soft tissues. Adjacent soft tissue swelling.    Diffuse bony demineralization. Knee arthroplasty. Moderate degenerative arthritis right hip joint.       SIGNIFICANT LABORATORY FINDINGS     Most Recent 3 CBC's:  Recent Labs   Lab Test 08/16/21  0539 08/15/21  0522 08/14/21  0644 08/12/21 2122 05/18/20  0527 05/17/20  0605 05/15/20  0451   WBC  --   --   --  8.0  --  8.4 7.2   HGB 8.2* 8.2* 9.0* 9.9*   < > 9.0* 8.7*   MCV  --   --   --  88  --  91 92   PLT  --   --   --  296  --  282 255  255    < > = values in this interval not displayed.     Most Recent 3 BMP's:  Recent Labs   Lab Test 08/16/21  0539 08/14/21  0532 08/13/21  0552 08/12/21 2122   *  --  133* 130*   POTASSIUM 4.0  4.0  --  3.8 3.7   CHLORIDE 97*  --  96* 95*   CO2 26  --  27 26   BUN 28  --  30* 38*   CR 0.75  --  1.01 1.23*   ANIONGAP 8  --  10 9   PERICO 8.7  --  9.3 9.3   GLC 96 103 131* 117           Discharge Orders        Care Coordination Referral      Reason for your hospital stay    Right hip fracture     When to call - Contact Surgeon Team    You may experience symptoms that require follow-up before your scheduled appointment. Refer to the \"Stoplight Tool\" for instructions on when to contact your Surgeon Team if you are concerned about pain control, blood clots, constipation, or if you are unable to urinate.     When to call - Reach out to Urgent Care    If you are not able to reach your Surgeon Team and you need immediate care, go to the Orthopedic Walk-in Clinic or Urgent Care at your Surgeon's office.  Do NOT go to the Emergency Room unless you have shortness of breath, chest pain, or other signs of a " medical emergency.     When to call - Reasons to Call 911    Call 911 immediately if you experience sudden-onset chest pain, arm weakness/numbness, slurred speech, or shortness of breath     Discharge Instruction - Breathing exercises    Perform breathing exercises using your Incentive Spirometer 10 times per hour while awake for 2 weeks.     Symptoms - Fever Management    A low grade fever can be expected after surgery.  Use acetaminophen (TYLENOL) as needed for fever management.  Contact your Surgeon Team if you have a fever greater than 101.5 F, chills, and/or night sweats.     Symptoms - Constipation management    Constipation (hard, dry bowel movements) is expected after surgery due to the combination of being less active, the anesthetic, and the opioid pain medication.  You can do the following to help reduce constipation:  ~  FLUIDS:  Drink clear liquids (water or Gatorade), or juice (apple/prune).  ~  DIET:  Eat a fiber rich diet.    ~  ACTIVITY:  Get up and move around several times a day.  Increase your activity as you are able.  MEDICATIONS:  Reduce the risk of constipation by starting medications before you are constipated.  You can take Miralax   (1 packet as directed) and/or a stool softener (Senokot 1-2 tablets 1-2 times a day).  If you already have constipation and these medications are not working, you can get magnesium citrate and use as directed.  If you continue to have constipation you can try an over the counter suppository or enema.  Call your Surgeon Team if it has been greater than 3 days since your last bowel movement.     Symptoms - Reduced Urine Output    Changes in the amount of fluids you drank before and after surgery may result in problems urinating.  It is important to stay well-hydrated after surgery and drink plenty of water. If it has been greater than 8 hours since you have urinated despite drinking plenty of water, call your Surgeon Team.     Activity - Exercises to prevent  blood clots    Unless otherwise directed by your Surgeon team, perform the following exercises at least three times per day for the first four weeks after surgery to prevent blood clots in your legs: 1) Point and flex your feet (Ankle Pumps), 2) Move your ankle around in big circles, 3) Wiggle your toes, 4) Walk, even for short distances, several times a day, will help decrease the risk of blood clots.     Comfort and Pain Management - Pain after Surgery    Pain after surgery is normal and expected.  You will have some amount of pain for several weeks after surgery.  Your pain will improve with time.  There are several things you can do to help reduce your pain including: rest, ice, elevation, and using pain medications as needed. Contact your Surgeon Team if you have pain that persists or worsens after surgery despite rest, ice, elevation, and taking your medication(s) as prescribed. Contact your Surgeon Team if you have new numbness, tingling, or weakness in your operative extremity.     Comfort and Pain Management - Swelling after Surgery    Swelling and/or bruising of the surgical extremity is common and may persist for several months after surgery. In addition to frequent icing and elevation, gentle compressive support with an ACE wrap or tubigrip may help with swelling. Apply compression regularly, removing at least twice daily to perform skin checks. Contact your Surgeon Team if your swelling increases and is NOT associated with an increase in your activity level, or if your swelling increases and is associated with redness and pain.     Comfort and Pain Management - Cold therapy    Ice can be used to control swelling and discomfort after surgery. Place a thin towel over your operative site and apply the ice pack overtop. Leave ice pack in place for 20 minutes, then remove for 20 minutes. Repeat this 20 minutes on/20 minutes off routine as often as tolerated.     Medication Instructions - Acetaminophen  "(TYLENOL) Instructions    You were discharged with acetaminophen (TYLENOL) for pain management after surgery. Acetaminophen most effectively manages pain symptoms when it is taken on a schedule without missing doses (every four, six, or eight hours). Your Provider will prescribe a safe daily dose between 3000 - 4000 mg.  Do NOT exceed this daily dose. Most patients use acetaminophen for pain control for the first four weeks after surgery.  You can wean from this medication as your pain decreases.     Follow Up Care    Please follow-up with Dr. Castro/Timothy Meyers or Tigre Alejandra PA-C in 2 weeks at [facility]. Call our scheduling line at [phone] to make an appointment if you do not already have one scheduled.     Medication instructions -  Anticoagulation - aspirin    Take the aspirin  81 mg twice daily as prescribed for a total of four weeks after surgery.  This is given to help minimize your risk of blood clot.     Comfort and Pain Management - LOWER Extremity Elevation    Swelling is expected for several months after surgery. This type of swelling is usually associated with gravity and activity, and can be improved with elevation.   The best way to do this is to get your \"toes above your nose\" by laying down and placing several pillows lengthwise under your calf and heel. When elevating your leg keep your knee completely straight. Perform this elevation as often as possible especially for the first two weeks after surgery.     Medication Instructions - Opioids - Tapering Instructions    In the first three days following surgery, your symptoms may warrant use of the narcotic pain medication every four to six hours as prescribed. This is normal. As your pain symptoms improve, focus your efforts on decreasing (tapering) use of narcotic medications. The most successful tapering strategy is to first, decrease the number of tablets you take every 4-6 hours to the minimum prescribed. Then, increase the " "amount of time between doses.  For example:  First, taper to   or 1 tablet every 4-6 hours.  Then, taper to   or 1 tablet every 6-8 hours.  Then, taper to   or 1 tablet every 8-10 hours.  Then, taper to   or 1 tablet every 10-12 hours.  Then, taper to   or 1 tablet at bedtime.  The bedtime dose can help with comfort during sleep and is typically the last dose to be discontinued after surgery.     Activity - Total Hip Arthroplasty    Refer to the [facility] \"Your Guide to Total Joint Replacement\" for recommendations on activities and Exercises.     Return to Driving    Return to driving - Driving is NOT permitted until directed by your provider. Under no circumstance are you permitted to drive while using narcotic pain medications.     Dressing / Wound Care - Wound    You have a clean dressing on your surgical wound. Dressing change instructions as follows: change your dressing daily until your follow-up appointment. Contact your Surgeon Team if you have increased redness, warmth around the surgical wound, and/or drainage from the surgical wound.     Dressing / Wound Care - NO Tub Bathing    Tub bathing, swimming, or any other activities that will cause your incision to be submerged in water should be avoided until allowed by your Surgeon.     Weight bearing as tolerated    Weight bearing as tolerated on your operative extremity.     Dressing / Wound care - Shower with wound/dressing covered    You must COVER your dressing or incision with saran wrap (or any other non-permeable covering) to allow the incision to remain dry while showering.  You may shower after surgery as long as the surgical wound stays dry. Continue to cover your dressing or incision for showering until your first office visit.  You are strictly prohibited from soaking   or submerging the surgical wound underwater.     General info for SNF    Length of Stay Estimate: Short Term Care: Estimated # of Days <30  Condition at Discharge: " Improving  Level of care:skilled   Rehabilitation Potential: Good  Admission H&P remains valid and up-to-date: Yes  Recent Chemotherapy: N/A  Use Nursing Home Standing Orders: Yes     Mantoux instructions    Give two-step Mantoux (PPD) Per Facility Policy Yes     Follow Up and recommended labs and tests    Follow up with skilled nursing physician.  The following labs/tests are recommended: Advised to check CBC, BMP, magnesium in 3 to 5 days.  Follow up with primary care provider in in a week after discharge from TCU  Follow up with [place] orthopedic provider as recommended     Reason for your hospital stay    Patient admitted after a fall, found to have right hip fracture underwent surgery and discharging to TCU for rehab.     Intake and output    Every shift     Daily weights    Call Provider for weight gain of more than 2 pounds per day or 5 pounds per week.     Weight bearing status    Activity  level and weightbearing as recommended by orthopedic provider     No CPR- Pre-arrest intubation OK     Physical Therapy Adult Consult    Evaluate and treat as clinically indicated.    Reason: Status post hip surgery     Occupational Therapy Adult Consult    Evaluate and treat as clinically indicated.    Reason: Status post hip surgery     Fall precautions     [facility]ane DME    DME Documentation: Describe the reason for need to support medical necessity: Impaired gait status post hip surgery. I, the undersigned, certify that the above prescribed supplies are medically necessary for this patient and is both reasonable and necessary in reference to accepted standards of medical practice in the treatment of this patient's condition and is not prescribed as a convenience.     [facility] D[facility]    : DME Documentation: Describe the reason for need to support medical necessity: Impaired gait status post hip surgery. I, the undersigned, certify that the above prescribed supplies are medically necessary for this patient and is both reasonable and  necessary in reference to accepted standards of medical practice in the treatment of this patient's condition and is not prescribed as a convenience.     Discharge Instruction - Regular Diet Adult    Low-salt and fat diet       Examination   Physical Exam   Temp:  [97.5  F (36.4  C)-98.4  F (36.9  C)] 98.4  F (36.9  C)  Pulse:  [80-94] 84  Resp:  [16-18] 18  BP: (104-125)/(52-88) 110/54  SpO2:  [92 %-98 %] 92 %  Wt Readings from Last 1 Encounters:   08/13/21 48.4 kg (106 lb 12.8 oz)     Patient seen and examined.  Notes, labs, imaging report personally reviewed.  No acute issues reported overnight.  Patient denied new concerns or complaints.  However, patient reported that she is not moving as much and requesting 1 more day stay in the hospital.  Patient has been saying this for last 2 days.  Orthopedic team and  therapy team cleared patient for TCU discharge.  Cardiology already signed off.  No active medical issues at this time.  I do not see active medical issues to keep her in the hospital and did explain that she will get more therapy at TCU.  However, I did tell patient that she has right to appeal for discharge.  Discussed with patient's niece who is in agreement with discharge plan.  Discussed with nursing staff.  Discussed with therapy team.  Discussed with care manager/.    General: Not in obvious distress.  HEENT: Normocephalic, supple neck  Chest: Decreased but clear to auscultation bilateral anteriorly, no wheezing  Heart: S1S2 normal, regular  Abdomen: Soft. NT, ND. Bowel sounds- active.  Extremities: No legs swelling  Neuro: alert and awake, moves all extremities    Please see EMR for more detailed significant labs, imaging, consultant notes etc.    Chava GOMEZ MD, personally saw the patient today and spent greater than 30 minutes discharging this patient.    Chava CLIFFORD MD  [facility]    CC:Ayana Parker

## 2021-08-17 NOTE — PLAN OF CARE
Occupational Therapy Discharge Summary    Reason for therapy discharge:    Discharged to transitional care facility.    Progress towards therapy goal(s). See goals on Care Plan in [facility] electronic health record for goal details.  Goals partially met.  Barriers to achieving goals:   discharge from facility.    Therapy recommendation(s):    Continued therapy is recommended.  Rationale/Recommendations:  to increase endurance, strength, and balance required for ADL indep.    Sandra Horne, OTR/L

## 2021-08-18 NOTE — PROGRESS NOTES
[facility] GERIATRIC SERVICES       Patient Marla Dunn  MRN: [id number]    [facility]    Reason for Visit     Chief Complaint   Patient presents with     Hospital F/U       Code Status     DNR only    Assessment     Acute and severe hyponatremia with a recheck sodium now dropped down to 125 on recheck again 125.  Persistent hypotension with low blood pressures  Anemia with a recheck hemoglobin of 8.6  Displaced right intertrochanteric femur fracture status post ORIF on 8/13/2021  MARIA DEL ROSARIO /CKD stage III  ABLA  Pain management  Chronic A. Fib  Hypertension  Asthma/COPD  Generalized weakness    Plan     Pt is admitted to TCU for strengthening and rehab.  Pt admitted to TCU after right hip surgery due to mildly displaced intertrochanteric femur fracture post fall  Date of procedure-8/13/2021  Continue with incisional cares  WBAT  Follow-up with orthopedics as scheduled  Cont PT / OT for strengthening/ gait retraining  Pain management optimized  Tylenol scheduled 1 g every 8 hours  Continue narcotics prn-  Advised aggressive icing  On asa bid for dvt prophylaxis  Duration to be determined by orthopedics  Tubigrip stockings recommended for management of swelling of the lower extremities   -minimal swelling noted each recheck is 8.6.  Or cough position  She had acute blood loss anemia hemoglobin was stable at 8.2 at discharge.  Also has chronic A. fib.  Adequately rate controlled with metoprolol.  No anticoagulation due to history of frequent falls.  CHF was felt to be stable and she continues on Lasix.as well as spirinolactone  TORSEMIDE was stopped  Outpatient follow-up with cardiology.  She had a recent nuclear stress test which showed no ischemia  Was on IV Lasix in the hospital.  Patient seen today for follow-up regarding persistent and severe hyponatremia.  Recheck sodium has dropped to 125 from her discharge of 134.  This is a critical drop.  Recheck again was done today and it is still low at 125.  At this  point we are recommending that the patient go to the hospital for evaluation.  Patient got quite upset and stated that she was not ready to go back to the hospital.  Her niece was contacted update provided.  Subsequently another phone call made to the niece at her request.  Care plan reviewed.  Please plans to contact nephrology and will send her on to to the hospital only if nephrology agrees.  In the meantime due to persistent hypotension and hyponatremia will increase her sodium to 1 g daily.  Education provided about free water restriction.  She had 3 glasses of plain water sitting in her room and education provided about fluid restriction versus free water restriction  Discontinue her spironolactone and start her on spironolactone 25 mg daily.  Discontinue her Lasix 20 mg daily has appears to be euvolemic.  Recheck BMP closely.  Change loratadine to as needed to avoid polypharmacy.  Follow-up on nephrology recommendation.  If patient continues to have persistent hypotension and hyponatremia she will need to go to the ER unless she decides not to her and her nephrology agrees.  Staff will update me on that concern  Niece in agreement  Pt advised to discuss other concerns re bedhold with     History     Patient is a very pleasant 89 year old female who is admitted to TCU  Patient presented to the hospital after she fell.  She was found to have mildly displaced right intertrochanteric femur fracture.  Orthopedics was consulted and she underwent surgical repair on 8/13/2021.  Postoperatively pain management optimized using Tylenol.  She has been discharged to the TCU.  She had acute renal failure with chronic hyponatremia and will require monitoring of her labs.  Patient also developed blood loss anemia hemoglobin stabilized at 8.2.  She will require monitoring.  She also has chronic atrial fibrillation with CHF and continues with her home regimen of Lasix and rate controlled with metoprolol.  She will  follow-up with cardiology.  Not on any anticoagulation due to history of frequent falls  Hypertension was felt to be controlled her home regimen of amlodipine has been discontinued  bp are low in tcu    Past Medical & Surgical History     PAST MEDICAL HISTORY:   Past Medical History:   Diagnosis Date     Asthma      Bilateral carpal tunnel syndrome 8/27/2015     CAD (coronary artery disease)      Chronic airway obstruction, not elsewhere classified     Created by Conversion      Chronic anemia      Chronic edema      Congestive heart failure, severe (H) 5/13/2020     COPD (chronic obstructive pulmonary disease) (H)      Coronary artery disease      Depression      Family history of myocardial infarction     father 64     GERD (gastroesophageal reflux disease)      Heart disease      Heart murmur      High cholesterol     dislipidemia     HTN (hypertension)      Hypercholesterolemia      Hypertension      Hypothyroidism      Hypothyroidism      MI (myocardial infarction) (H) 1985     Myelodysplastic syndrome (H)      Myocardial infarction (H) 1983     OLEGARIO (obstructive sleep apnea)      Osteoporosis      Other and unspecified hyperlipidemia     Created by Conversion      Radicular low back pain      Rheumatoid arthritis (H)      Elizabeth Agers syndrome      Sjoegren syndrome      Sjogren's syndrome (H)      Sleep apnea, obstructive      Spinal stenosis      Unspecified polyarthropathy or polyarthritis, hand     Created by Conversion       PAST SURGICAL HISTORY:   has a past surgical history that includes cardiac angiogram; back surgery (2004); TOTAL KNEE ARTHROPLASTY (Right); appendectomy; breast biopsy; Kyphoplasty (2003); Eye surgery (2008); Cardiac surgery; CABG, ARTERY-VEIN, FOUR; Repair valve aortic (2009); Breast surgery (2001); Thoracic surgery (2003); bone marrow biopsy (2004); aortic valve; Rectosigmoidectomy perineal (N/A, 1/10/2018); CABG, VEIN, SINGLE; REPLACE AORT VALV,PROSTH VALV; appendectomy; Eye surgery  (Bilateral); back surgery; Cardiac catheterization; aortic valve replacement (2012); Bypass graft artery coronary (2009); other surgical history (01/2018); and Open reduction internal fixation hip nailing (Right, 8/13/2021).      Past Social History     Reviewed,  reports that she has never smoked. She has never used smokeless tobacco. She reports that she does not drink alcohol and does not use drugs.    Family History     Reviewed, and family history includes Cancer in her mother; Family History Negative in her mother; Heart Disease in her father.    Medication List   Post Discharge Medication Reconciliation Status: Post Discharge Medication Reconciliation Status: discharge medications reconciled and changed, per note/orders.  Current Outpatient Medications   Medication     Acetaminophen (TYLENOL PO)     albuterol (PROAIR HFA/PROVENTIL HFA/VENTOLIN HFA) 108 (90 Base) MCG/ACT inhaler     Ascorbic Acid (VITAMIN C PO)     aspirin (ASA) 81 MG EC tablet     Calcium Carb-Cholecalciferol (CALCIUM+D3) 600-800 MG-UNIT TABS     calcium carbonate (TUMS) 500 MG chewable tablet     cholecalciferol (D3 HIGH POTENCY) 125 MCG (5000 UT) CAPS     cycloSPORINE (RESTASIS) 0.05 % ophthalmic emulsion     fluorometholone (FML LIQUIFILM) 0.1 % ophthalmic susp     furosemide (LASIX) 20 MG tablet     IBANDRONATE SODIUM PO     ipratropium - albuterol 0.5 mg/2.5 mg/3 mL (DUONEB) 0.5-2.5 (3) MG/3ML neb solution     Ipratropium-Albuterol (COMBIVENT RESPIMAT)  MCG/ACT inhaler     Levothyroxine Sodium (LEVOXYL PO)     loratadine (CLARITIN) 10 MG tablet     magnesium citrate solution     METOPROLOL TARTRATE PO     mirtazapine (REMERON) 7.5 MG tablet     Multiple Vitamins-Minerals (CEROVITE PO)     Multiple Vitamins-Minerals (PRESERVISION AREDS PO)     omeprazole (PRILOSEC) 20 MG DR capsule     polyethylene glycol (MIRALAX) powder     polyethylene glycol 0.4%- propylene glycol 0.3% (SYSTANE ULTRA) 0.4-0.3 % SOLN ophthalmic solution      "Probiotic Product (PROBIOTIC PO)     Psyllium (METAMUCIL FIBER PO)     sodium chloride 1 GM tablet     spironolactone (ALDACTONE) 50 MG tablet     sucralfate (CARAFATE) 1 GM tablet     timolol (TIMOPTIC) 0.5 % ophthalmic solution     No current facility-administered medications for this visit.       Allergies     Allergies   Allergen Reactions     Gramineae Pollens Other (See Comments)     Shortness of breath when lawn is just cut.     Smoke. Other (See Comments)     Hard to breathe.       Review of Systems   A comprehensive review of 14 systems was done. Pertinent findings noted here and in history of present illness. All the rest negative.  Constitutional: Negative.  Negative for fever, chills, she has  activity change, appetite change and fatigue.   HENT: Negative for congestion and facial swelling.    Eyes: Negative for photophobia, redness and visual disturbance.   Respiratory: Negative for cough and chest tightness.    Cardiovascular: Negative for chest pain, palpitations and leg swelling.   Gastrointestinal: Negative for nausea, diarrhea, constipation, blood in stool and abdominal distention.   Genitourinary: Negative.    Musculoskeletal: Negative.  Weak and cannot move her rt leg  Skin: Negative.    Neurological: Negative for dizziness, tremors, syncope, weakness, light-headedness and headaches.   Hematological: Does not bruise/bleed easily.   Psychiatric/Behavioral: Negative.  Anxious and does not want to go to the hospital      Physical Exam     Blood pressure 125/52, pulse 81, temperature 97.2  F (36.2  C), resp. rate 18, height 1.626 m (5' 4\"), weight 49.4 kg (108 lb 12.8 oz), SpO2 96 %.    Constitutional: Oriented to person, place, and time and appears well-developed.   Frail and weak  HEENT:  Normocephalic and atraumatic.  Eyes: Conjunctivae and EOM are normal. Pupils are equal, round, and reactive to light. No discharge.  No scleral icterus. Nose normal. Mouth/Throat: Oropharynx is clear and moist. No " oropharyngeal exudate.    NECK: Normal range of motion. Neck supple. No JVD present. No tracheal deviation present. No thyromegaly present.   CARDIOVASCULAR: Normal rate, regular rhythm and intact distal pulses.  Exam reveals no gallop and no friction rub.  Systolic murmur present.  PULMONARY: Effort normal and breath sounds normal. No respiratory distress.No Wheezing or rales.  ABDOMEN: Soft. bowel sounds are normal. No distension and no mass.  There is no tenderness. There is no rebound and no guarding. No HSM.  MUSCULOSKELETAL: Normal range of motion. No edema and no tenderness. Mild kyphosis, no tenderness.  Rt hip incision is intact  LYMPH NODES: Has no cervical, supraclavicular, axillary and groin adenopathy.   NEUROLOGICAL: Alert and oriented to person, place, and time. No cranial nerve deficit.  Normal muscle tone. Coordination normal.   GENITOURINARY: Deferred exam.  SKIN: Skin is warm and dry. No rash noted. No erythema. No pallor.   EXTREMITIES: No cyanosis, no clubbing, no edema. No Deformity.  PSYCHIATRIC: Normal mood, affect and behavior.      Lab Results     Recent Results (from the past 240 hour(s))   INR    Collection Time: 08/12/21  9:22 PM   Result Value Ref Range    INR 1.10 0.85 - 1.15   Partial thromboplastin time    Collection Time: 08/12/21  9:22 PM   Result Value Ref Range    aPTT 33 22 - 38 Seconds   Basic metabolic panel    Collection Time: 08/12/21  9:22 PM   Result Value Ref Range    Sodium 130 (L) 136 - 145 mmol/L    Potassium 3.7 3.5 - 5.0 mmol/L    Chloride 95 (L) 98 - 107 mmol/L    Carbon Dioxide (CO2) 26 22 - 31 mmol/L    Anion Gap 9 5 - 18 mmol/L    Urea Nitrogen 38 (H) 8 - 28 mg/dL    Creatinine 1.23 (H) 0.60 - 1.10 mg/dL    Calcium 9.3 8.5 - 10.5 mg/dL    Glucose 117 70 - 125 mg/dL    GFR Estimate 38 (L) >60 mL/min/1.73m2   CBC with platelets and differential    Collection Time: 08/12/21  9:22 PM   Result Value Ref Range    WBC Count 8.0 4.0 - 11.0 10e3/uL    RBC Count 3.43 (L)  3.80 - 5.20 10e6/uL    Hemoglobin 9.9 (L) 11.7 - 15.7 g/dL    Hematocrit 30.3 (L) 35.0 - 47.0 %    MCV 88 78 - 100 fL    MCH 28.9 26.5 - 33.0 pg    MCHC 32.7 31.5 - 36.5 g/dL    RDW 15.9 (H) 10.0 - 15.0 %    Platelet Count 296 150 - 450 10e3/uL    % Neutrophils 71 %    % Lymphocytes 11 %    % Monocytes 12 %    % Eosinophils 4 %    % Basophils 1 %    % Immature Granulocytes 1 %    NRBCs per 100 WBC 0 <1 /100    Absolute Neutrophils 5.8 1.6 - 8.3 10e3/uL    Absolute Lymphocytes 0.9 0.8 - 5.3 10e3/uL    Absolute Monocytes 1.0 0.0 - 1.3 10e3/uL    Absolute Eosinophils 0.3 0.0 - 0.7 10e3/uL    Absolute Basophils 0.1 0.0 - 0.2 10e3/uL    Absolute Immature Granulocytes 0.0 <=0.0 10e3/uL    Absolute NRBCs 0.0 10e3/uL   Adult Type and Screen    Collection Time: 08/12/21  9:22 PM   Result Value Ref Range    ABO/RH(D) A POS     Antibody Screen Negative Negative    SPECIMEN EXPIRATION DATE [id number]    SARS-COV2 (COVID-19) Virus RT-PCR    Collection Time: 08/12/21 10:59 PM    Specimen: Nasopharyngeal; Swab   Result Value Ref Range    SARS CoV2 PCR Negative Negative   Basic metabolic panel    Collection Time: 08/13/21  5:52 AM   Result Value Ref Range    Sodium 133 (L) 136 - 145 mmol/L    Potassium 3.8 3.5 - 5.0 mmol/L    Chloride 96 (L) 98 - 107 mmol/L    Carbon Dioxide (CO2) 27 22 - 31 mmol/L    Anion Gap 10 5 - 18 mmol/L    Urea Nitrogen 30 (H) 8 - 28 mg/dL    Creatinine 1.01 0.60 - 1.10 mg/dL    Calcium 9.3 8.5 - 10.5 mg/dL    Glucose 131 (H) 70 - 125 mg/dL    GFR Estimate 48 (L) >60 mL/min/1.73m2   B-Type Natriuretic Peptide ([facility] Only)    Collection Time: 08/13/21  5:52 AM   Result Value Ref Range     (H) 0 - 167 pg/mL   Glucose by meter    Collection Time: 08/14/21  5:32 AM   Result Value Ref Range    GLUCOSE BY METER POCT 103 70 - 125 mg/dL   Hemoglobin    Collection Time: 08/14/21  6:44 AM   Result Value Ref Range    Hemoglobin 9.0 (L) 11.7 - 15.7 g/dL   Extra Red Top Tube    Collection Time: 08/14/21   6:44 AM   Result Value Ref Range    Hold Specimen JIC    Hemoglobin    Collection Time: 08/15/21  5:22 AM   Result Value Ref Range    Hemoglobin 8.2 (L) 11.7 - 15.7 g/dL   Extra Red Top Tube    Collection Time: 08/15/21  5:22 AM   Result Value Ref Range    Hold Specimen JIC    Hemoglobin    Collection Time: 08/16/21  5:39 AM   Result Value Ref Range    Hemoglobin 8.2 (L) 11.7 - 15.7 g/dL   Potassium    Collection Time: 08/16/21  5:39 AM   Result Value Ref Range    Potassium 4.0 3.5 - 5.0 mmol/L   Basic metabolic panel    Collection Time: 08/16/21  5:39 AM   Result Value Ref Range    Sodium 131 (L) 136 - 145 mmol/L    Potassium 4.0 3.5 - 5.0 mmol/L    Chloride 97 (L) 98 - 107 mmol/L    Carbon Dioxide (CO2) 26 22 - 31 mmol/L    Anion Gap 8 5 - 18 mmol/L    Urea Nitrogen 28 8 - 28 mg/dL    Creatinine 0.75 0.60 - 1.10 mg/dL    Calcium 8.7 8.5 - 10.5 mg/dL    Glucose 96 70 - 125 mg/dL    GFR Estimate 69 >60 mL/min/1.73m2   Asymptomatic COVID-19 Virus (Coronavirus) by PCR Nasopharyngeal    Collection Time: 08/16/21  4:18 PM    Specimen: Nasopharyngeal; Swab   Result Value Ref Range    SARS CoV2 PCR Negative Negative             Imaging Results     XR Pelvis and Hip Right 2 Views    Result Date: 8/12/2021  EXAM: XR PELVIS AND HIP RIGHT 2 VIEWS LOCATION: [facility] DATE/TIME: 8/12/2021 10:04 PM INDICATION: Deformity. COMPARISON: None.     IMPRESSION: Acute mildly displaced right intertrochanteric fracture. No widening of the sacroiliac joints or pubic symphysis. Atherosclerotic vascular calcifications. Moderate discogenic degenerative change of the visualized lumbosacral spine. 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    XR Femur Right 2 Views    Result Date: 8/16/2021  EXAM: XR FEMUR RIGHT 2 VIEW LOCATION: [facility] DATE/TIME: 8/15/2021 6:00 PM INDICATION: evaluate fracture fixation COMPARISON: 8/12/2021     IMPRESSION: Interval intramedullary  nail and interlocking femoral neck lag screw fixation of the previous intertrochanteric proximal femur fracture. Alignment is near-anatomic, there is mild residual displacement at the main fracture site. There is a separate unfixed displaced lesser trochanteric fragment. Postoperative gas in the soft tissues. Adjacent soft tissue swelling. Diffuse bony demineralization. Knee arthroplasty. Moderate degenerative arthritis right hip joint.    XR Hip Port Right G/E 2 Views    Result Date: 8/14/2021  EXAM: XR HIP PORTABLE RIGHT 2-3 VIEWS LOCATION: [facility] DATE/TIME: 8/13/2021 8:58 PM INDICATION: hip surgery COMPARISON: 8/12/2021.     IMPRESSION: ORIF fixation of a right femoral fracture in progress. Please see operative report for further details. 6 images were submitted. Fluoroscopy time is 56 seconds.     XR Surgery YAYA L/T 5 Min Fluoro    Result Date: 8/13/2021  This exam was marked as non-reportable because it will not be read by a radiologist or a [place] non-radiologist provider.           Electronically signed by  JUANJO Griffiths

## 2021-08-19 ENCOUNTER — LAB REQUISITION (OUTPATIENT)
Dept: LAB | Facility: CLINIC | Age: 86
End: 2021-08-19
Payer: COMMERCIAL

## 2021-08-19 DIAGNOSIS — E78.5 HYPERLIPIDEMIA, UNSPECIFIED: ICD-10-CM

## 2021-08-19 DIAGNOSIS — S72.144D NONDISPLACED INTERTROCHANTERIC FRACTURE OF RIGHT FEMUR, SUBSEQUENT ENCOUNTER FOR CLOSED FRACTURE WITH ROUTINE HEALING: ICD-10-CM

## 2021-08-20 ENCOUNTER — TELEPHONE (OUTPATIENT)
Dept: GERIATRICS | Facility: CLINIC | Age: 86
End: 2021-08-20

## 2021-08-20 LAB
ANION GAP SERPL CALCULATED.3IONS-SCNC: 5 MMOL/L (ref 5–18)
BUN SERPL-MCNC: 21 MG/DL (ref 8–28)
CALCIUM SERPL-MCNC: 9.4 MG/DL (ref 8.5–10.5)
CHLORIDE BLD-SCNC: 103 MMOL/L (ref 98–107)
CO2 SERPL-SCNC: 26 MMOL/L (ref 22–31)
CREAT SERPL-MCNC: 0.74 MG/DL (ref 0.6–1.1)
ERYTHROCYTE [DISTWIDTH] IN BLOOD BY AUTOMATED COUNT: 16.6 % (ref 10–15)
GFR SERPL CREATININE-BSD FRML MDRD: 71 ML/MIN/1.73M2
GLUCOSE BLD-MCNC: 74 MG/DL (ref 70–125)
HCT VFR BLD AUTO: 27.7 % (ref 35–47)
HGB BLD-MCNC: 8.6 G/DL (ref 11.7–15.7)
MAGNESIUM SERPL-MCNC: 2.3 MG/DL (ref 1.8–2.6)
MCH RBC QN AUTO: 27.6 PG (ref 26.5–33)
MCHC RBC AUTO-ENTMCNC: 31 G/DL (ref 31.5–36.5)
MCV RBC AUTO: 89 FL (ref 78–100)
PLATELET # BLD AUTO: 354 10E3/UL (ref 150–450)
POTASSIUM BLD-SCNC: 5.1 MMOL/L (ref 3.5–5)
RBC # BLD AUTO: 3.12 10E6/UL (ref 3.8–5.2)
SODIUM SERPL-SCNC: 134 MMOL/L (ref 136–145)
WBC # BLD AUTO: 7.3 10E3/UL (ref 4–11)

## 2021-08-20 PROCEDURE — 85027 COMPLETE CBC AUTOMATED: CPT | Performed by: FAMILY MEDICINE

## 2021-08-20 PROCEDURE — 36415 COLL VENOUS BLD VENIPUNCTURE: CPT | Mod: ORL | Performed by: FAMILY MEDICINE

## 2021-08-20 PROCEDURE — P9604 ONE-WAY ALLOW PRORATED TRIP: HCPCS | Mod: ORL | Performed by: FAMILY MEDICINE

## 2021-08-20 PROCEDURE — 83735 ASSAY OF MAGNESIUM: CPT | Mod: ORL | Performed by: FAMILY MEDICINE

## 2021-08-20 PROCEDURE — 80048 BASIC METABOLIC PNL TOTAL CA: CPT | Mod: ORL | Performed by: FAMILY MEDICINE

## 2021-08-20 NOTE — TELEPHONE ENCOUNTER
[facility] Nurse Triage Telephone Note    Provider: DIMITRI Guillen  Facility: [facility]  Facility Type:  TCU    Caller: Clari  Call Back Number: [phone]    Allergies:    Allergies   Allergen Reactions     Gramineae Pollens Other (See Comments)     Shortness of breath when lawn is just cut.     Smoke. Other (See Comments)     Hard to breathe.        Reason for call: Mag 2.3, WBC 7.3, Hgb 8.6 (8.2), Plt 354,  (131, 135) on NA tab, K 5.1 (4.0) not on K suppl, is on Spironolactone 50mg every day & Lasix 20mg every day.    Verbal Order/Direction given by Provider: Decrease Spironolactone to 25mg every day. Recheck BMP 8/23.    Provider giving Order:  DIMITRI Guillen    Verbal Order given to: Clari Escobar RN

## 2021-08-22 ENCOUNTER — LAB REQUISITION (OUTPATIENT)
Dept: LAB | Facility: CLINIC | Age: 86
End: 2021-08-22
Payer: COMMERCIAL

## 2021-08-22 DIAGNOSIS — J44.9 CHRONIC OBSTRUCTIVE PULMONARY DISEASE, UNSPECIFIED (H): ICD-10-CM

## 2021-08-23 ENCOUNTER — LAB REQUISITION (OUTPATIENT)
Dept: LAB | Facility: CLINIC | Age: 86
End: 2021-08-23
Payer: COMMERCIAL

## 2021-08-23 ENCOUNTER — TELEPHONE (OUTPATIENT)
Dept: GERIATRICS | Facility: CLINIC | Age: 86
End: 2021-08-23

## 2021-08-23 DIAGNOSIS — J44.9 CHRONIC OBSTRUCTIVE PULMONARY DISEASE, UNSPECIFIED (H): ICD-10-CM

## 2021-08-23 LAB
ANION GAP SERPL CALCULATED.3IONS-SCNC: 7 MMOL/L (ref 5–18)
BUN SERPL-MCNC: 16 MG/DL (ref 8–28)
CALCIUM SERPL-MCNC: 9.2 MG/DL (ref 8.5–10.5)
CHLORIDE BLD-SCNC: 96 MMOL/L (ref 98–107)
CO2 SERPL-SCNC: 22 MMOL/L (ref 22–31)
CREAT SERPL-MCNC: 0.68 MG/DL (ref 0.6–1.1)
GFR SERPL CREATININE-BSD FRML MDRD: 76 ML/MIN/1.73M2
GLUCOSE BLD-MCNC: 82 MG/DL (ref 70–125)
POTASSIUM BLD-SCNC: 4.4 MMOL/L (ref 3.5–5)
SODIUM SERPL-SCNC: 125 MMOL/L (ref 136–145)

## 2021-08-23 PROCEDURE — 86481 TB AG RESPONSE T-CELL SUSP: CPT

## 2021-08-23 PROCEDURE — 36415 COLL VENOUS BLD VENIPUNCTURE: CPT

## 2021-08-23 PROCEDURE — P9604 ONE-WAY ALLOW PRORATED TRIP: HCPCS

## 2021-08-23 PROCEDURE — 80048 BASIC METABOLIC PNL TOTAL CA: CPT

## 2021-08-23 NOTE — TELEPHONE ENCOUNTER
[facility] Nurse Triage Telephone Note    Provider: Ifrah Marin MD  Facility: [facility]  Facility Type:  TCU    Caller: Kristen   Call Back Number: [phone]    Allergies:    Allergies   Allergen Reactions     Gramineae Pollens Other (See Comments)     Shortness of breath when lawn is just cut.     Smoke. Other (See Comments)     Hard to breathe.        Reason for call:   Currently on Sodium Chloride 500mg every other day, lasix 20mg every day and spironolactone 25mg every day.     Verbal Order/Direction given by Provider: Change sodium chloride to 500mg every day, place pt on a 1.5L free water restriction, [facility] 8/24    Provider giving Order:  Ifrah Marin MD    Verbal Order given to: Kristen Gallegos RN

## 2021-08-24 ENCOUNTER — LAB REQUISITION (OUTPATIENT)
Dept: LAB | Facility: CLINIC | Age: 86
End: 2021-08-24
Payer: COMMERCIAL

## 2021-08-24 ENCOUNTER — TRANSITIONAL CARE UNIT VISIT (OUTPATIENT)
Dept: GERIATRICS | Facility: CLINIC | Age: 86
End: 2021-08-24
Payer: COMMERCIAL

## 2021-08-24 ENCOUNTER — TELEPHONE (OUTPATIENT)
Dept: GERIATRICS | Facility: CLINIC | Age: 86
End: 2021-08-24

## 2021-08-24 VITALS
BODY MASS INDEX: 18.57 KG/M2 | DIASTOLIC BLOOD PRESSURE: 52 MMHG | RESPIRATION RATE: 18 BRPM | HEART RATE: 81 BPM | SYSTOLIC BLOOD PRESSURE: 125 MMHG | OXYGEN SATURATION: 96 % | HEIGHT: 64 IN | TEMPERATURE: 97.2 F | WEIGHT: 108.8 LBS

## 2021-08-24 DIAGNOSIS — I10 BENIGN ESSENTIAL HYPERTENSION: ICD-10-CM

## 2021-08-24 DIAGNOSIS — Z20.828 CONTACT WITH AND (SUSPECTED) EXPOSURE TO OTHER VIRAL COMMUNICABLE DISEASES: ICD-10-CM

## 2021-08-24 DIAGNOSIS — I48.20 CHRONIC ATRIAL FIBRILLATION (H): ICD-10-CM

## 2021-08-24 DIAGNOSIS — S72.144D CLOSED NONDISPLACED INTERTROCHANTERIC FRACTURE OF RIGHT FEMUR WITH ROUTINE HEALING, SUBSEQUENT ENCOUNTER: Primary | ICD-10-CM

## 2021-08-24 DIAGNOSIS — D62 ANEMIA DUE TO BLOOD LOSS, ACUTE: ICD-10-CM

## 2021-08-24 LAB
ANION GAP SERPL CALCULATED.3IONS-SCNC: 6 MMOL/L (ref 5–18)
BUN SERPL-MCNC: 15 MG/DL (ref 8–28)
CALCIUM SERPL-MCNC: 8.9 MG/DL (ref 8.5–10.5)
CHLORIDE BLD-SCNC: 96 MMOL/L (ref 98–107)
CO2 SERPL-SCNC: 23 MMOL/L (ref 22–31)
CREAT SERPL-MCNC: 0.65 MG/DL (ref 0.6–1.1)
GAMMA INTERFERON BACKGROUND BLD IA-ACNC: 0.02 IU/ML
GFR SERPL CREATININE-BSD FRML MDRD: 77 ML/MIN/1.73M2
GLUCOSE BLD-MCNC: 82 MG/DL (ref 70–125)
M TB IFN-G BLD-IMP: NEGATIVE
M TB IFN-G CD4+ BCKGRND COR BLD-ACNC: 0.95 IU/ML
MITOGEN IGNF BCKGRD COR BLD-ACNC: 0 IU/ML
MITOGEN IGNF BCKGRD COR BLD-ACNC: 0 IU/ML
POTASSIUM BLD-SCNC: 4.2 MMOL/L (ref 3.5–5)
QUANTIFERON MITOGEN: 0.97 IU/ML
QUANTIFERON NIL TUBE: 0.02 IU/ML
QUANTIFERON TB1 TUBE: 0.02 IU/ML
QUANTIFERON TB2 TUBE: 0.02
SODIUM SERPL-SCNC: 125 MMOL/L (ref 136–145)

## 2021-08-24 PROCEDURE — P9604 ONE-WAY ALLOW PRORATED TRIP: HCPCS | Performed by: FAMILY MEDICINE

## 2021-08-24 PROCEDURE — 99306 1ST NF CARE HIGH MDM 50: CPT | Performed by: FAMILY MEDICINE

## 2021-08-24 PROCEDURE — U0005 INFEC AGEN DETEC AMPLI PROBE: HCPCS | Performed by: FAMILY MEDICINE

## 2021-08-24 PROCEDURE — 36415 COLL VENOUS BLD VENIPUNCTURE: CPT | Performed by: FAMILY MEDICINE

## 2021-08-24 PROCEDURE — 80048 BASIC METABOLIC PNL TOTAL CA: CPT | Performed by: FAMILY MEDICINE

## 2021-08-24 RX ORDER — TORSEMIDE 20 MG/1
20 TABLET ORAL DAILY
COMMUNITY
End: 2022-12-23

## 2021-08-24 ASSESSMENT — MIFFLIN-ST. JEOR: SCORE: 888.51

## 2021-08-24 NOTE — TELEPHONE ENCOUNTER
[facility] Nurse Triage Telephone Note    Provider: Ifrah Marin MD  Facility: [facility]   Facility Type:  TCU    Caller: Nanette  Call Back Number: [phone]    Allergies   Allergen Reactions     Gramineae Pollens Other (See Comments)     ORCHARDGRASS. Shortness of breath when lawn is just cut.     Smoke. Other (See Comments)     Hard to breathe.       Reason for call: Nephrology has written a new order for pt to be on Torsemide 20mg every day. Asking if this is ok with PCP.    Verbal Order/Direction given by Provider: OK to start Torsemide 20mg every day.     Provider giving Order:  Ifrah Marin MD    Verbal Order given to: Nanette Jimenez RN

## 2021-08-24 NOTE — LETTER
8/24/2021        RE: Marla Dunn  [address]  [facility] [address]        [facility]       Patient Marla Dunn  MRN: [id number]    [facility]    Reason for Visit     Chief Complaint   Patient presents with     Hospital F/U       Code Status     DNR only    Assessment     Acute and severe hyponatremia with a recheck sodium now dropped down to 125 on recheck again 125.  Persistent hypotension with low blood pressures  Anemia with a recheck hemoglobin of 8.6  Displaced right intertrochanteric femur fracture status post ORIF on 8/13/2021  MARIA DEL ROSARIO /CKD stage III  ABLA  Pain management  Chronic A. Fib  Hypertension  Asthma/COPD  Generalized weakness    Plan     Pt is admitted to TCU for strengthening and rehab.  Pt admitted to TCU after right hip surgery due to mildly displaced intertrochanteric femur fracture post fall  Date of procedure-8/13/2021  Continue with incisional cares  WBAT  Follow-up with orthopedics as scheduled  Cont PT / OT for strengthening/ gait retraining  Pain management optimized  Tylenol scheduled 1 g every 8 hours  Continue narcotics prn-  Advised aggressive icing  On asa bid for dvt prophylaxis  Duration to be determined by orthopedics  Tubigrip stockings recommended for management of swelling of the lower extremities   -minimal swelling noted each recheck is 8.6.  Or cough position  She had acute blood loss anemia hemoglobin was stable at 8.2 at discharge.  Also has chronic A. fib.  Adequately rate controlled with metoprolol.  No anticoagulation due to history of frequent falls.  CHF was felt to be stable and she continues on Lasix.as well as spirinolactone  TORSEMIDE was stopped  Outpatient follow-up with cardiology.  She had a recent nuclear stress test which showed no ischemia  Was on IV Lasix in the hospital.  Patient seen today for follow-up regarding persistent and severe hyponatremia.  Recheck sodium has dropped to 125 from her discharge of  134.  This is a critical drop.  Recheck again was done today and it is still low at 125.  At this point we are recommending that the patient go to the hospital for evaluation.  Patient got quite upset and stated that she was not ready to go back to the hospital.  Her niece was contacted update provided.  Subsequently another phone call made to the niece at her request.  Care plan reviewed.  Please plans to contact nephrology and will send her on to to the hospital only if nephrology agrees.  In the meantime due to persistent hypotension and hyponatremia will increase her sodium to 1 g daily.  Education provided about free water restriction.  She had 3 glasses of plain water sitting in her room and education provided about fluid restriction versus free water restriction  Discontinue her spironolactone and start her on spironolactone 25 mg daily.  Discontinue her Lasix 20 mg daily has appears to be euvolemic.  Recheck BMP closely.  Change loratadine to as needed to avoid polypharmacy.  Follow-up on nephrology recommendation.  If patient continues to have persistent hypotension and hyponatremia she will need to go to the ER unless she decides not to her and her nephrology agrees.  Staff will update me on that concern  Niece in agreement  Pt advised to discuss other concerns re bedhold with     History     Patient is a very pleasant 89 year old female who is admitted to TCU  Patient presented to the hospital after she fell.  She was found to have mildly displaced right intertrochanteric femur fracture.  Orthopedics was consulted and she underwent surgical repair on 8/13/2021.  Postoperatively pain management optimized using Tylenol.  She has been discharged to the TCU.  She had acute renal failure with chronic hyponatremia and will require monitoring of her labs.  Patient also developed blood loss anemia hemoglobin stabilized at 8.2.  She will require monitoring.  She also has chronic atrial fibrillation  with CHF and continues with her home regimen of Lasix and rate controlled with metoprolol.  She will follow-up with cardiology.  Not on any anticoagulation due to history of frequent falls  Hypertension was felt to be controlled her home regimen of amlodipine has been discontinued  bp are low in tcu    Past Medical & Surgical History     PAST MEDICAL HISTORY:   Past Medical History:   Diagnosis Date     Asthma      Bilateral carpal tunnel syndrome 8/27/2015     CAD (coronary artery disease)      Chronic airway obstruction, not elsewhere classified     Created by Conversion      Chronic anemia      Chronic edema      Congestive heart failure, severe (H) 5/13/2020     COPD (chronic obstructive pulmonary disease) (H)      Coronary artery disease      Depression      Family history of myocardial infarction     father 64     GERD (gastroesophageal reflux disease)      Heart disease      Heart murmur      High cholesterol     dislipidemia     HTN (hypertension)      Hypercholesterolemia      Hypertension      Hypothyroidism      Hypothyroidism      MI (myocardial infarction) (H) 1985     Myelodysplastic syndrome (H)      Myocardial infarction (H) 1983     OLEGARIO (obstructive sleep apnea)      Osteoporosis      Other and unspecified hyperlipidemia     Created by Conversion      Radicular low back pain      Rheumatoid arthritis (H)      Elizabeth Agers syndrome      Sjoegren syndrome      Sjogren's syndrome (H)      Sleep apnea, obstructive      Spinal stenosis      Unspecified polyarthropathy or polyarthritis, hand     Created by Conversion       PAST SURGICAL HISTORY:   has a past surgical history that includes cardiac angiogram; back surgery (2004); TOTAL KNEE ARTHROPLASTY (Right); appendectomy; breast biopsy; Kyphoplasty (2003); Eye surgery (2008); Cardiac surgery; CABG, ARTERY-VEIN, FOUR; Repair valve aortic (2009); Breast surgery (2001); Thoracic surgery (2003); bone marrow biopsy (2004); aortic valve; Rectosigmoidectomy  perineal (N/A, 1/10/2018); CABG, VEIN, SINGLE; REPLACE AORT VALV,PROSTH VALV; appendectomy; Eye surgery (Bilateral); back surgery; Cardiac catheterization; aortic valve replacement (2012); Bypass graft artery coronary (2009); other surgical history (01/2018); and Open reduction internal fixation hip nailing (Right, 8/13/2021).      Past Social History     Reviewed,  reports that she has never smoked. She has never used smokeless tobacco. She reports that she does not drink alcohol and does not use drugs.    Family History     Reviewed, and family history includes Cancer in her mother; Family History Negative in her mother; Heart Disease in her father.    Medication List   Post Discharge Medication Reconciliation Status: Post Discharge Medication Reconciliation Status: discharge medications reconciled and changed, per note/orders.  Current Outpatient Medications   Medication     Acetaminophen (TYLENOL PO)     albuterol (PROAIR HFA/PROVENTIL HFA/VENTOLIN HFA) 108 (90 Base) MCG/ACT inhaler     Ascorbic Acid (VITAMIN C PO)     aspirin (ASA) 81 MG EC tablet     Calcium Carb-Cholecalciferol (CALCIUM+D3) 600-800 MG-UNIT TABS     calcium carbonate (TUMS) 500 MG chewable tablet     cholecalciferol (D3 HIGH POTENCY) 125 MCG (5000 UT) CAPS     cycloSPORINE (RESTASIS) 0.05 % ophthalmic emulsion     fluorometholone (FML LIQUIFILM) 0.1 % ophthalmic susp     furosemide (LASIX) 20 MG tablet     IBANDRONATE SODIUM PO     ipratropium - albuterol 0.5 mg/2.5 mg/3 mL (DUONEB) 0.5-2.5 (3) MG/3ML neb solution     Ipratropium-Albuterol (COMBIVENT RESPIMAT)  MCG/ACT inhaler     Levothyroxine Sodium (LEVOXYL PO)     loratadine (CLARITIN) 10 MG tablet     magnesium citrate solution     METOPROLOL TARTRATE PO     mirtazapine (REMERON) 7.5 MG tablet     Multiple Vitamins-Minerals (CEROVITE PO)     Multiple Vitamins-Minerals (PRESERVISION AREDS PO)     omeprazole (PRILOSEC) 20 MG DR capsule     polyethylene glycol (MIRALAX) powder      "polyethylene glycol 0.4%- propylene glycol 0.3% (SYSTANE ULTRA) 0.4-0.3 % SOLN ophthalmic solution     Probiotic Product (PROBIOTIC PO)     Psyllium (METAMUCIL FIBER PO)     sodium chloride 1 GM tablet     spironolactone (ALDACTONE) 50 MG tablet     sucralfate (CARAFATE) 1 GM tablet     timolol (TIMOPTIC) 0.5 % ophthalmic solution     No current facility-administered medications for this visit.       Allergies     Allergies   Allergen Reactions     Gramineae Pollens Other (See Comments)     Shortness of breath when lawn is just cut.     Smoke. Other (See Comments)     Hard to breathe.       Review of Systems   A comprehensive review of 14 systems was done. Pertinent findings noted here and in history of present illness. All the rest negative.  Constitutional: Negative.  Negative for fever, chills, she has  activity change, appetite change and fatigue.   HENT: Negative for congestion and facial swelling.    Eyes: Negative for photophobia, redness and visual disturbance.   Respiratory: Negative for cough and chest tightness.    Cardiovascular: Negative for chest pain, palpitations and leg swelling.   Gastrointestinal: Negative for nausea, diarrhea, constipation, blood in stool and abdominal distention.   Genitourinary: Negative.    Musculoskeletal: Negative.  Weak and cannot move her rt leg  Skin: Negative.    Neurological: Negative for dizziness, tremors, syncope, weakness, light-headedness and headaches.   Hematological: Does not bruise/bleed easily.   Psychiatric/Behavioral: Negative.  Anxious and does not want to go to the hospital      Physical Exam     Blood pressure 125/52, pulse 81, temperature 97.2  F (36.2  C), resp. rate 18, height 1.626 m (5' 4\"), weight 49.4 kg (108 lb 12.8 oz), SpO2 96 %.    Constitutional: Oriented to person, place, and time and appears well-developed.   Frail and weak  HEENT:  Normocephalic and atraumatic.  Eyes: Conjunctivae and EOM are normal. Pupils are equal, round, and reactive to " light. No discharge.  No scleral icterus. Nose normal. Mouth/Throat: Oropharynx is clear and moist. No oropharyngeal exudate.    NECK: Normal range of motion. Neck supple. No JVD present. No tracheal deviation present. No thyromegaly present.   CARDIOVASCULAR: Normal rate, regular rhythm and intact distal pulses.  Exam reveals no gallop and no friction rub.  Systolic murmur present.  PULMONARY: Effort normal and breath sounds normal. No respiratory distress.No Wheezing or rales.  ABDOMEN: Soft. bowel sounds are normal. No distension and no mass.  There is no tenderness. There is no rebound and no guarding. No HSM.  MUSCULOSKELETAL: Normal range of motion. No edema and no tenderness. Mild kyphosis, no tenderness.  Rt hip incision is intact  LYMPH NODES: Has no cervical, supraclavicular, axillary and groin adenopathy.   NEUROLOGICAL: Alert and oriented to person, place, and time. No cranial nerve deficit.  Normal muscle tone. Coordination normal.   GENITOURINARY: Deferred exam.  SKIN: Skin is warm and dry. No rash noted. No erythema. No pallor.   EXTREMITIES: No cyanosis, no clubbing, no edema. No Deformity.  PSYCHIATRIC: Normal mood, affect and behavior.      Lab Results     Recent Results (from the past 240 hour(s))   INR    Collection Time: 08/12/21  9:22 PM   Result Value Ref Range    INR 1.10 0.85 - 1.15   Partial thromboplastin time    Collection Time: 08/12/21  9:22 PM   Result Value Ref Range    aPTT 33 22 - 38 Seconds   Basic metabolic panel    Collection Time: 08/12/21  9:22 PM   Result Value Ref Range    Sodium 130 (L) 136 - 145 mmol/L    Potassium 3.7 3.5 - 5.0 mmol/L    Chloride 95 (L) 98 - 107 mmol/L    Carbon Dioxide (CO2) 26 22 - 31 mmol/L    Anion Gap 9 5 - 18 mmol/L    Urea Nitrogen 38 (H) 8 - 28 mg/dL    Creatinine 1.23 (H) 0.60 - 1.10 mg/dL    Calcium 9.3 8.5 - 10.5 mg/dL    Glucose 117 70 - 125 mg/dL    GFR Estimate 38 (L) >60 mL/min/1.73m2   CBC with platelets and differential    Collection Time:  08/12/21  9:22 PM   Result Value Ref Range    WBC Count 8.0 4.0 - 11.0 10e3/uL    RBC Count 3.43 (L) 3.80 - 5.20 10e6/uL    Hemoglobin 9.9 (L) 11.7 - 15.7 g/dL    Hematocrit 30.3 (L) 35.0 - 47.0 %    MCV 88 78 - 100 fL    MCH 28.9 26.5 - 33.0 pg    MCHC 32.7 31.5 - 36.5 g/dL    RDW 15.9 (H) 10.0 - 15.0 %    Platelet Count 296 150 - 450 10e3/uL    % Neutrophils 71 %    % Lymphocytes 11 %    % Monocytes 12 %    % Eosinophils 4 %    % Basophils 1 %    % Immature Granulocytes 1 %    NRBCs per 100 WBC 0 <1 /100    Absolute Neutrophils 5.8 1.6 - 8.3 10e3/uL    Absolute Lymphocytes 0.9 0.8 - 5.3 10e3/uL    Absolute Monocytes 1.0 0.0 - 1.3 10e3/uL    Absolute Eosinophils 0.3 0.0 - 0.7 10e3/uL    Absolute Basophils 0.1 0.0 - 0.2 10e3/uL    Absolute Immature Granulocytes 0.0 <=0.0 10e3/uL    Absolute NRBCs 0.0 10e3/uL   Adult Type and Screen    Collection Time: 08/12/21  9:22 PM   Result Value Ref Range    ABO/RH(D) A POS     Antibody Screen Negative Negative    SPECIMEN EXPIRATION DATE [id number]    SARS-COV2 (COVID-19) Virus RT-PCR    Collection Time: 08/12/21 10:59 PM    Specimen: Nasopharyngeal; Swab   Result Value Ref Range    SARS CoV2 PCR Negative Negative   Basic metabolic panel    Collection Time: 08/13/21  5:52 AM   Result Value Ref Range    Sodium 133 (L) 136 - 145 mmol/L    Potassium 3.8 3.5 - 5.0 mmol/L    Chloride 96 (L) 98 - 107 mmol/L    Carbon Dioxide (CO2) 27 22 - 31 mmol/L    Anion Gap 10 5 - 18 mmol/L    Urea Nitrogen 30 (H) 8 - 28 mg/dL    Creatinine 1.01 0.60 - 1.10 mg/dL    Calcium 9.3 8.5 - 10.5 mg/dL    Glucose 131 (H) 70 - 125 mg/dL    GFR Estimate 48 (L) >60 mL/min/1.73m2   B-Type Natriuretic Peptide ([facility] Only)    Collection Time: 08/13/21  5:52 AM   Result Value Ref Range     (H) 0 - 167 pg/mL   Glucose by meter    Collection Time: 08/14/21  5:32 AM   Result Value Ref Range    GLUCOSE BY METER POCT 103 70 - 125 mg/dL   Hemoglobin    Collection Time: 08/14/21  6:44 AM   Result Value  Ref Range    Hemoglobin 9.0 (L) 11.7 - 15.7 g/dL   Extra Red Top Tube    Collection Time: 08/14/21  6:44 AM   Result Value Ref Range    Hold Specimen JIC    Hemoglobin    Collection Time: 08/15/21  5:22 AM   Result Value Ref Range    Hemoglobin 8.2 (L) 11.7 - 15.7 g/dL   Extra Red Top Tube    Collection Time: 08/15/21  5:22 AM   Result Value Ref Range    Hold Specimen JIC    Hemoglobin    Collection Time: 08/16/21  5:39 AM   Result Value Ref Range    Hemoglobin 8.2 (L) 11.7 - 15.7 g/dL   Potassium    Collection Time: 08/16/21  5:39 AM   Result Value Ref Range    Potassium 4.0 3.5 - 5.0 mmol/L   Basic metabolic panel    Collection Time: 08/16/21  5:39 AM   Result Value Ref Range    Sodium 131 (L) 136 - 145 mmol/L    Potassium 4.0 3.5 - 5.0 mmol/L    Chloride 97 (L) 98 - 107 mmol/L    Carbon Dioxide (CO2) 26 22 - 31 mmol/L    Anion Gap 8 5 - 18 mmol/L    Urea Nitrogen 28 8 - 28 mg/dL    Creatinine 0.75 0.60 - 1.10 mg/dL    Calcium 8.7 8.5 - 10.5 mg/dL    Glucose 96 70 - 125 mg/dL    GFR Estimate 69 >60 mL/min/1.73m2   Asymptomatic COVID-19 Virus (Coronavirus) by PCR Nasopharyngeal    Collection Time: 08/16/21  4:18 PM    Specimen: Nasopharyngeal; Swab   Result Value Ref Range    SARS CoV2 PCR Negative Negative             Imaging Results     XR Pelvis and Hip Right 2 Views    Result Date: 8/12/2021  EXAM: XR PELVIS AND HIP RIGHT 2 VIEWS LOCATION: [facility] DATE/TIME: 8/12/2021 10:04 PM INDICATION: Deformity. COMPARISON: None.     IMPRESSION: Acute mildly displaced right intertrochanteric fracture. No widening of the sacroiliac joints or pubic symphysis. Atherosclerotic vascular calcifications. Moderate discogenic degenerative change of the visualized lumbosacral spine. 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    XR Femur Right 2 Views    Result Date: 8/16/2021  EXAM: XR FEMUR RIGHT 2 VIEW LOCATION: [facility] DATE/TIME: 8/15/2021 6:00 PM  INDICATION: evaluate fracture fixation COMPARISON: 8/12/2021     IMPRESSION: Interval intramedullary nail and interlocking femoral neck lag screw fixation of the previous intertrochanteric proximal femur fracture. Alignment is near-anatomic, there is mild residual displacement at the main fracture site. There is a separate unfixed displaced lesser trochanteric fragment. Postoperative gas in the soft tissues. Adjacent soft tissue swelling. Diffuse bony demineralization. Knee arthroplasty. Moderate degenerative arthritis right hip joint.    XR Hip Port Right G/E 2 Views    Result Date: 8/14/2021  EXAM: XR HIP PORTABLE RIGHT 2-3 VIEWS LOCATION: [facility] DATE/TIME: 8/13/2021 8:58 PM INDICATION: hip surgery COMPARISON: 8/12/2021.     IMPRESSION: ORIF fixation of a right femoral fracture in progress. Please see operative report for further details. 6 images were submitted. Fluoroscopy time is 56 seconds.     XR Surgery YAYA L/T 5 Min Fluoro    Result Date: 8/13/2021  This exam was marked as non-reportable because it will not be read by a radiologist or a [place] non-radiologist provider.           Electronically signed by  JUANJO Griffiths                                 Sincerely,        JUANJO Griffiths

## 2021-08-25 ENCOUNTER — LAB REQUISITION (OUTPATIENT)
Dept: LAB | Facility: CLINIC | Age: 86
End: 2021-08-25
Payer: COMMERCIAL

## 2021-08-25 DIAGNOSIS — E87.6 HYPOKALEMIA: ICD-10-CM

## 2021-08-26 ENCOUNTER — TELEPHONE (OUTPATIENT)
Dept: GERIATRICS | Facility: CLINIC | Age: 86
End: 2021-08-26

## 2021-08-26 LAB
SARS-COV-2 RNA RESP QL NAA+PROBE: NEGATIVE
SODIUM SERPL-SCNC: 129 MMOL/L (ref 136–145)

## 2021-08-26 PROCEDURE — P9604 ONE-WAY ALLOW PRORATED TRIP: HCPCS

## 2021-08-26 PROCEDURE — 84295 ASSAY OF SERUM SODIUM: CPT

## 2021-08-26 PROCEDURE — 36415 COLL VENOUS BLD VENIPUNCTURE: CPT

## 2021-08-26 NOTE — TELEPHONE ENCOUNTER
[facility] Nurse Triage Telephone Note    Provider: Ifrah Marin MD  Facility: [facility]   Facility Type:  TCU    Caller: Minna  Call Back Number: [phone]    Allergies   Allergen Reactions     Gramineae Pollens Other (See Comments)     ORCHARDGRASS. Shortness of breath when lawn is just cut.     Smoke. Other (See Comments)     Hard to breathe.       Reason for call: Pt had sodium today of 129. Previously was 125 on 8/24. On a 1200cc fluid restriction per Nephrology. Started Torsemide 20mg every day on 8/24. Currently on Spironolactone 25mg every day and Sodium 1g every day.   BP 98/50, HR 84.     Verbal Order/Direction given by Provider: Recheck sodium on 8/31/21    Provider giving Order:  Ifrah Marin MD    Verbal Order given to: Denia Jimenez RN

## 2021-08-29 ENCOUNTER — LAB REQUISITION (OUTPATIENT)
Dept: LAB | Facility: CLINIC | Age: 86
End: 2021-08-29
Payer: COMMERCIAL

## 2021-08-29 DIAGNOSIS — I10 ESSENTIAL (PRIMARY) HYPERTENSION: ICD-10-CM

## 2021-08-29 DIAGNOSIS — E87.1 HYPO-OSMOLALITY AND HYPONATREMIA: ICD-10-CM

## 2021-08-31 ENCOUNTER — LAB REQUISITION (OUTPATIENT)
Dept: LAB | Facility: CLINIC | Age: 86
End: 2021-08-31
Payer: COMMERCIAL

## 2021-08-31 DIAGNOSIS — Z11.1 ENCOUNTER FOR SCREENING FOR RESPIRATORY TUBERCULOSIS: ICD-10-CM

## 2021-09-01 ENCOUNTER — LAB REQUISITION (OUTPATIENT)
Dept: LAB | Facility: CLINIC | Age: 86
End: 2021-09-01
Payer: COMMERCIAL

## 2021-09-01 DIAGNOSIS — E87.1 HYPO-OSMOLALITY AND HYPONATREMIA: ICD-10-CM

## 2021-09-01 DIAGNOSIS — I34.0 MITRAL REGURGITATION: Primary | ICD-10-CM

## 2021-09-01 PROCEDURE — 36415 COLL VENOUS BLD VENIPUNCTURE: CPT | Performed by: FAMILY MEDICINE

## 2021-09-01 PROCEDURE — 86481 TB AG RESPONSE T-CELL SUSP: CPT | Performed by: FAMILY MEDICINE

## 2021-09-01 PROCEDURE — P9604 ONE-WAY ALLOW PRORATED TRIP: HCPCS | Performed by: FAMILY MEDICINE

## 2021-09-02 ENCOUNTER — OFFICE VISIT (OUTPATIENT)
Dept: CARDIOLOGY | Facility: CLINIC | Age: 86
End: 2021-09-02

## 2021-09-02 ENCOUNTER — ALLIED HEALTH/NURSE VISIT (OUTPATIENT)
Dept: CARDIOLOGY | Facility: CLINIC | Age: 86
End: 2021-09-02

## 2021-09-02 ENCOUNTER — LAB (OUTPATIENT)
Dept: CARDIOLOGY | Facility: CLINIC | Age: 86
End: 2021-09-02
Payer: COMMERCIAL

## 2021-09-02 VITALS
HEART RATE: 72 BPM | WEIGHT: 107 LBS | SYSTOLIC BLOOD PRESSURE: 92 MMHG | BODY MASS INDEX: 17.83 KG/M2 | RESPIRATION RATE: 14 BRPM | DIASTOLIC BLOOD PRESSURE: 50 MMHG | HEIGHT: 65 IN

## 2021-09-02 DIAGNOSIS — I48.21 PERMANENT ATRIAL FIBRILLATION (H): ICD-10-CM

## 2021-09-02 DIAGNOSIS — I34.0 MITRAL REGURGITATION: Primary | ICD-10-CM

## 2021-09-02 DIAGNOSIS — I34.0 MITRAL REGURGITATION: ICD-10-CM

## 2021-09-02 DIAGNOSIS — I34.0 NONRHEUMATIC MITRAL VALVE REGURGITATION: Primary | ICD-10-CM

## 2021-09-02 DIAGNOSIS — Z95.2 S/P AVR (AORTIC VALVE REPLACEMENT): ICD-10-CM

## 2021-09-02 LAB
ALBUMIN SERPL-MCNC: 3.1 G/DL (ref 3.5–5)
ALP SERPL-CCNC: 167 U/L (ref 45–120)
ALT SERPL W P-5'-P-CCNC: 18 U/L (ref 0–45)
ANION GAP SERPL CALCULATED.3IONS-SCNC: 9 MMOL/L (ref 5–18)
AST SERPL W P-5'-P-CCNC: 23 U/L (ref 0–40)
ATRIAL RATE - MUSE: 102 BPM
BILIRUB SERPL-MCNC: 0.4 MG/DL (ref 0–1)
BUN SERPL-MCNC: 33 MG/DL (ref 8–28)
CALCIUM SERPL-MCNC: 9.6 MG/DL (ref 8.5–10.5)
CHLORIDE BLD-SCNC: 96 MMOL/L (ref 98–107)
CO2 SERPL-SCNC: 28 MMOL/L (ref 22–31)
CREAT SERPL-MCNC: 0.82 MG/DL (ref 0.6–1.1)
DIASTOLIC BLOOD PRESSURE - MUSE: NORMAL MMHG
ERYTHROCYTE [DISTWIDTH] IN BLOOD BY AUTOMATED COUNT: 17.7 % (ref 10–15)
GAMMA INTERFERON BACKGROUND BLD IA-ACNC: 0.01 IU/ML
GFR SERPL CREATININE-BSD FRML MDRD: 62 ML/MIN/1.73M2
GLUCOSE BLD-MCNC: 116 MG/DL (ref 70–125)
HCT VFR BLD AUTO: 30 % (ref 35–47)
HGB BLD-MCNC: 9.4 G/DL (ref 11.7–15.7)
INTERPRETATION ECG - MUSE: NORMAL
M TB IFN-G BLD-IMP: NEGATIVE
M TB IFN-G CD4+ BCKGRND COR BLD-ACNC: 1.62 IU/ML
MCH RBC QN AUTO: 28.1 PG (ref 26.5–33)
MCHC RBC AUTO-ENTMCNC: 31.3 G/DL (ref 31.5–36.5)
MCV RBC AUTO: 90 FL (ref 78–100)
MITOGEN IGNF BCKGRD COR BLD-ACNC: 0.01 IU/ML
MITOGEN IGNF BCKGRD COR BLD-ACNC: 0.01 IU/ML
P AXIS - MUSE: NORMAL DEGREES
PLATELET # BLD AUTO: 357 10E3/UL (ref 150–450)
POTASSIUM BLD-SCNC: 4.9 MMOL/L (ref 3.5–5)
PR INTERVAL - MUSE: NORMAL MS
PROT SERPL-MCNC: 7.3 G/DL (ref 6–8)
QRS DURATION - MUSE: 100 MS
QT - MUSE: 358 MS
QTC - MUSE: 426 MS
QUANTIFERON MITOGEN: 1.63 IU/ML
QUANTIFERON NIL TUBE: 0.01 IU/ML
QUANTIFERON TB1 TUBE: 0.02 IU/ML
QUANTIFERON TB2 TUBE: 0.02
R AXIS - MUSE: -28 DEGREES
RBC # BLD AUTO: 3.34 10E6/UL (ref 3.8–5.2)
SODIUM SERPL-SCNC: 133 MMOL/L (ref 136–145)
SODIUM SERPL-SCNC: 133 MMOL/L (ref 136–145)
SYSTOLIC BLOOD PRESSURE - MUSE: NORMAL MMHG
T AXIS - MUSE: 60 DEGREES
VENTRICULAR RATE- MUSE: 85 BPM
WBC # BLD AUTO: 6.4 10E3/UL (ref 4–11)

## 2021-09-02 PROCEDURE — 84295 ASSAY OF SERUM SODIUM: CPT | Performed by: FAMILY MEDICINE

## 2021-09-02 PROCEDURE — 85027 COMPLETE CBC AUTOMATED: CPT

## 2021-09-02 PROCEDURE — 36415 COLL VENOUS BLD VENIPUNCTURE: CPT | Performed by: FAMILY MEDICINE

## 2021-09-02 PROCEDURE — 99204 OFFICE O/P NEW MOD 45 MIN: CPT | Performed by: INTERNAL MEDICINE

## 2021-09-02 PROCEDURE — 93000 ELECTROCARDIOGRAM COMPLETE: CPT | Performed by: INTERNAL MEDICINE

## 2021-09-02 PROCEDURE — 99207 PR NO CHARGE NURSE ONLY: CPT

## 2021-09-02 PROCEDURE — 36415 COLL VENOUS BLD VENIPUNCTURE: CPT

## 2021-09-02 PROCEDURE — P9604 ONE-WAY ALLOW PRORATED TRIP: HCPCS | Performed by: FAMILY MEDICINE

## 2021-09-02 PROCEDURE — 80053 COMPREHEN METABOLIC PANEL: CPT

## 2021-09-02 ASSESSMENT — MIFFLIN-ST. JEOR: SCORE: 888.29

## 2021-09-02 NOTE — LETTER
9/2/2021    Ayana Ballesteroslauren, APRN CNP  [address]  [facility] [address]    RE: Marla Dunn       Dear Colleague,    I had the pleasure of seeing Marla Dunn in the [facility].      HEART CARE ENCOUNTER CONSULTATON NOTE      [facility]  [phone]      Assessment/Recommendations   Assessment/Plan:    Moderate to severe mitral valve regurgitation: Ms Dunn's recent echocardiogram was reviewed, and given the thickened, calcified and restricted mitral valve leaflets, she would not be a good candidate for transcatheter mitral valve repair with MitraClip, especially given her baseline gradient of 5 to 6 mmHg, which is likely to increase significantly with a Clip.  Since she would not be a candidate for redo surgery as well, recommendation at this time would be ongoing medical management for her mitral valve regurgitation with afterload reduction and optimizing her volume status.    Chronic atrial fibrillation: [id number] score of 6, but not currently on anticoagulation because of gait instability as well as her recent fall.  The option of left atrial appendage occlusion has been brought up with her in the past, and both her and her niece are interested in pursuing this.  It was recommended that she complete her physical therapy and rehabilitation, and could be seen in the A. fib clinic for consideration of a Watchman implant at that time.  She does understand that she will need to be on anticoagulation for at least a few weeks around the time of her implant, and therefore would be better if this was done when she has regained her strength and is ambulating better.    Status post surgical aortic valve replacement in 2009: Recent echo showing normal function of her aortic bioprosthesis.    Thank you for the opportunity to participate in the care of Ms. Dunn.  Please do not hesitate to call with any questions or  concerns regarding her cardiovascular status.       History of Present Illness/Subjective    HPI: Marla Dunn is a pleasant 89 year old female, referred by Dr. Amaya for evaluation of treatment options for moderate to severe mitral regurgitation.    Ms Dunn has moderate to severe mitral regurgitation, chronic atrial fibrillation, coronary artery disease status post single-vessel bypass surgery with a vein graft to the OM in 2009 with concomitant aortic valve replacement with a bioprosthesis, status post recent fall resulting in an intertrochanteric fracture of the right femur status post open reduction and internal fixation of August 13, 2021, now recovering in TCU.    She had an echocardiogram in July 2021 which showed a moderate to severe mitral regurgitation, and a valve clinic referral was initiated then, but due to her fall and subsequent hospitalization it was delayed until now.  Her echocardiogram was reviewed and shows normal left ventricular size and systolic function with an ejection fraction of 60 to 65%.  The mitral annulus and leaflets were noted to be calcified, with leaflet thickening as well.  There was moderate to severe eccentric anteriorly directed mitral regurgitation secondary to fibrocalcific degeneration of the leaflets.  The mean gradient across the mitral valve was 5 to 6 mmHg at a heart rate of 75 bpm.  There was mild pulmonary hypertension with PA pressures in the 40s mmHg.  Her aortic bioprosthesis was noted to have normal function with a mean gradient of 18 mmHg and no significant regurgitation.    Ms Dunn states that she has not had significant shortness of breath or chest pain, but does acknowledge fatigue and tiredness when she tries to push herself.  She is currently in the process of getting physical therapy and rehabilitation following her recent fall and hip surgery, with the hopes of returning back to her assisted living apartment.         Physical Examination   "Review of Systems   Vitals: BP 92/50 (BP Location: Left arm, Patient Position: Sitting, Cuff Size: Adult Small)   Pulse 72   Resp 14   Ht 1.638 m (5' 4.5\")   Wt 48.5 kg (107 lb)   BMI 18.08 kg/m    BMI= Body mass index is 18.08 kg/m .  Wt Readings from Last 3 Encounters:   09/02/21 48.5 kg (107 lb)   08/24/21 49.4 kg (108 lb 12.8 oz)   08/13/21 48.4 kg (106 lb 12.8 oz)       General Appearance:   no distress, normal body habitus, upright.   ENT/Mouth: membranes moist, no nasal discharge or bleeding gums.  Normal head shape, no evidence of injury or laceration.     EYES:  no scleral icterus, normal conjunctivae   Neck: no evidence of thyromegaly.  Supple   Chest/Lungs:   No audible wheezing equal chest wall expansion. Non labored breathing.  No cough.   Cardiovascular:   No evidence of elevated jugular venous pressure.  No evidence of pitting edema bilaterally    Abdomen:  no evidence of abdominal distention. No observe juandice.     Extremities: no cyanosis or clubbing noted.    Skin: no xanthelasma, normal skin color. No evidence of facial lacerations.      Neurologic: Normal arm motion bilateral, no tremors.  No evidence of focal defect.       Psychiatric: alert and oriented x3, calm        Please refer above for cardiac ROS details.        Medical History  Surgical History Family History Social History   Past Medical History:   Diagnosis Date     Asthma      Bilateral carpal tunnel syndrome 8/27/2015     CAD (coronary artery disease)      Chronic airway obstruction, not elsewhere classified     Created by Conversion      Chronic anemia      Chronic edema      Congestive heart failure, severe (H) 5/13/2020     COPD (chronic obstructive pulmonary disease) (H)      Coronary artery disease      Depression      Family history of myocardial infarction     father 64     GERD (gastroesophageal reflux disease)      Heart disease      Heart murmur      High cholesterol     dislipidemia     HTN (hypertension)      " Hypercholesterolemia      Hypertension      Hypothyroidism      Hypothyroidism      MI (myocardial infarction) (H) 1985     Myelodysplastic syndrome (H)      Myocardial infarction (H) 1983     OLEGARIO (obstructive sleep apnea)      Osteoporosis      Other and unspecified hyperlipidemia     Created by Conversion      Radicular low back pain      Rheumatoid arthritis (H)      Elizabeth Agers syndrome      Sjoegren syndrome      Sjogren's syndrome (H)      Sleep apnea, obstructive      Spinal stenosis      Unspecified polyarthropathy or polyarthritis, hand     Created by Conversion      Past Surgical History:   Procedure Laterality Date     aortic valve       AORTIC VALVE REPLACEMENT  2012     APPENDECTOMY       APPENDECTOMY       AS TOTAL KNEE ARTHROPLASTY Right      BACK SURGERY  2004    spinal decompression     BACK SURGERY      spinal stenosis     BONE MARROW BIOPSY  2004     breast biopsy       BREAST SURGERY  2001    biopsy     BYPASS GRAFT ARTERY CORONARY  2009    LIMA to ramus intermedius     C CABG, ARTERY-VEIN, FOUR       C CABG, VEIN, SINGLE      Description: CABG (CABG);  Recorded: 03/09/2012;  Comments: Single vessel bypass graft to an obtuse marginal branch in May 2009     C REPLACE AORT VALV,PROSTH VALV      Description: Aortic Valve Replacement;  Recorded: 03/09/2012;  Comments: Aortic valve replacement     cardiac angiogram       CARDIAC CATHETERIZATION       CARDIAC SURGERY       EYE SURGERY  2008    bilateral cataract repair      EYE SURGERY Bilateral     cornea transplant left eye & cataracts     KYPHOPLASTY  2003    T12     OPEN REDUCTION INTERNAL FIXATION HIP NAILING Right 8/13/2021    Procedure: INTERNAL FIXATION, FRACTURE, TROCHANTERIC, HIP, USING PINS OR RODS;  Surgeon: Darrel Castro MD;  [place] [facility] [place]     OTHER SURGICAL HISTORY  01/2018    Rectosigmoidectomy perineal     RECTOSIGMOIDECTOMY PERINEAL N/A 1/10/2018    Procedure: RECTOSIGMOIDECTOMY PERINEAL;  PERINEAL RECTOSIGMOIDECTOMY  ;  Surgeon: Candelaria Anthony MD;  Location: [facility] OR     REPAIR VALVE AORTIC  2009     THORACIC SURGERY  2003    T12 kyphoplasty     Family History   Problem Relation Age of Onset     Family History Negative Mother      Cancer Mother      Heart Disease Father         Social History     Socioeconomic History     Marital status:      Spouse name: Not on file     Number of children: Not on file     Years of education: Not on file     Highest education level: Not on file   Occupational History     Not on file   Tobacco Use     Smoking status: Never Smoker     Smokeless tobacco: Never Used   Substance and Sexual Activity     Alcohol use: No     Alcohol/week: 0.0 standard drinks     Comment: Alcoholic Drinks/day: occasional glass of wine     Drug use: No     Sexual activity: Not on file   Other Topics Concern     Not on file   Social History Narrative    .  passed away 20 years ago. No children. 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 Retired from [employer] at [employer]. Was living independently in her apartment in [place] prior to admission. Uses walker at baseline.      Social Determinants of Health     Financial Resource Strain:      Difficulty of Paying Living Expenses:    Food Insecurity:      Worried About Running Out of Food in the Last Year:      Ran Out of Food in the Last Year:    Transportation Needs:      Lack of Transportation (Medical):      Lack of Transportation (Non-Medical):    Physical Activity:      Days of Exercise per Week:      Minutes of Exercise per Session:    Stress:      Feeling of Stress :    Social Connections:      Frequency of Communication with Friends and Family:      Frequency of Social Gatherings with Friends and Family:      Attends Jain Services:      Active Member of Clubs or Organizations:      Attends Club or Organization Meetings:      Marital Status:    Intimate Partner Violence:      Fear of Current or Ex-Partner:      Emotionally Abused:      Physically  Abused:      Sexually Abused:            Medications  Allergies   Current Outpatient Medications   Medication Sig Dispense Refill     Acetaminophen (TYLENOL PO) Take 1,000 mg by mouth every 6 hours as needed for mild pain        albuterol (PROAIR HFA/PROVENTIL HFA/VENTOLIN HFA) 108 (90 Base) MCG/ACT inhaler Inhale 2 puffs into the lungs every 6 hours       Ascorbic Acid (VITAMIN C PO) Take 500 mg by mouth every morning       aspirin (ASA) 81 MG EC tablet Take 1 tablet (81 mg) by mouth 2 times daily       Calcium Carb-Cholecalciferol (CALCIUM+D3) 600-800 MG-UNIT TABS Take 1 tablet by mouth daily       calcium carbonate (TUMS) 500 MG chewable tablet Take 2 chew tab by mouth daily as needed for heartburn       cholecalciferol (D3 HIGH POTENCY) 125 MCG (5000 UT) CAPS Take 1 capsule by mouth daily       cycloSPORINE (RESTASIS) 0.05 % ophthalmic emulsion Place 1 drop into both eyes 2 times daily        fluorometholone (FML LIQUIFILM) 0.1 % ophthalmic susp Place 1 drop Into the left eye daily        IBANDRONATE SODIUM PO Take 150 mg by mouth every 30 days       ipratropium - albuterol 0.5 mg/2.5 mg/3 mL (DUONEB) 0.5-2.5 (3) MG/3ML neb solution Take 1 vial by nebulization 4 times daily as needed for shortness of breath / dyspnea or wheezing        Ipratropium-Albuterol (COMBIVENT RESPIMAT)  MCG/ACT inhaler Inhale 2 puffs into the lungs 4 times daily       Levothyroxine Sodium (LEVOXYL PO) Take 88 mcg by mouth daily        loratadine (CLARITIN) 10 MG tablet Take 1 tablet (10 mg) by mouth daily (Patient taking differently: Take 10 mg by mouth daily as needed )       magnesium citrate solution Take 150 mLs by mouth daily as needed for other       METOPROLOL TARTRATE PO Take 25 mg by mouth 2 times daily        mirtazapine (REMERON) 7.5 MG tablet Take 7.5 mg by mouth At Bedtime       Multiple Vitamins-Minerals (CEROVITE PO) Take 1 tablet by mouth daily       Multiple Vitamins-Minerals (PRESERVISION AREDS PO) Take 1 tablet  by mouth 2 times daily       omeprazole (PRILOSEC) 20 MG DR capsule Take 20 mg by mouth 2 times daily       polyethylene glycol (MIRALAX) powder Take 1 capful by mouth every morning       polyethylene glycol 0.4%- propylene glycol 0.3% (SYSTANE ULTRA) 0.4-0.3 % SOLN ophthalmic solution Place 1 drop into both eyes 4 times daily        Probiotic Product (PROBIOTIC PO) Take 1 capsule by mouth every morning       Psyllium (METAMUCIL FIBER PO) MIX 1 PACKET IN BEVERAGE OF CHOICE AND DRINK TWICE WEEKLY ON TUES AND THURS       sodium chloride 1 GM tablet Take 1 g by mouth daily        spironolactone (ALDACTONE) 50 MG tablet Take 25 mg by mouth daily        sucralfate (CARAFATE) 1 GM tablet Take 1 g by mouth 2 times daily       timolol (TIMOPTIC) 0.5 % ophthalmic solution Place 1 drop Into the left eye daily        torsemide (DEMADEX) 20 MG tablet Take 20 mg by mouth daily         Allergies   Allergen Reactions     Gramineae Pollens Other (See Comments)     ORCHARDGRASS. Shortness of breath when lawn is just cut.     Smoke. Other (See Comments)     Hard to breathe.          Lab Results    Chemistry/lipid CBC Cardiac Enzymes/BNP/TSH/INR   Recent Labs   Lab Test 11/09/16  0952   CHOL 149   HDL 55   LDL 84   TRIG 48     Recent Labs   Lab Test 11/09/16  0952   LDL 84     Recent Labs   Lab Test 09/02/21  0600 08/24/21  0611   * 125*   POTASSIUM  --  4.2   CHLORIDE  --  96*   CO2  --  23   GLC  --  82   BUN  --  15   CR  --  0.65   GFRESTIMATED  --  77   PERICO  --  8.9     Recent Labs   Lab Test 08/24/21  0611 08/23/21  1109 08/20/21  0623   CR 0.65 0.68 0.74     Recent Labs   Lab Test 05/13/20  1205   A1C 5.2          Recent Labs   Lab Test 08/20/21  0623   WBC 7.3   HGB 8.6*   HCT 27.7*   MCV 89        Recent Labs   Lab Test 08/20/21  0623 08/16/21  0539 08/15/21  0522   HGB 8.6* 8.2* 8.2*    Recent Labs   Lab Test 05/14/20  0600 05/14/20  0016 05/13/20  1751   TROPONINI 0.05 0.06 0.07     Recent Labs   Lab Test  08/13/21  0552 05/13/20  0446 03/20/20  1831   * 536* 222*     Recent Labs   Lab Test 03/22/20  0852   TSH 1.17     Recent Labs   Lab Test 08/12/21 2122 05/13/20  0446   INR 1.10 0.97        Milton Pang MD                                          Thank you for allowing me to participate in the care of your patient.      Sincerely,     Milton Pang MD     [facility]  cc:   No referring provider defined for this encounter.

## 2021-09-02 NOTE — PROGRESS NOTES
HEART CARE ENCOUNTER CONSULTATON MICHAEL      [facility]  [phone]      Assessment/Recommendations   Assessment/Plan:    Moderate to severe mitral valve regurgitation: Ms Dunn's recent echocardiogram was reviewed, and given the thickened, calcified and restricted mitral valve leaflets, she would not be a good candidate for transcatheter mitral valve repair with MitraClip, especially given her baseline gradient of 5 to 6 mmHg, which is likely to increase significantly with a Clip.  Since she would not be a candidate for redo surgery as well, recommendation at this time would be ongoing medical management for her mitral valve regurgitation with afterload reduction and optimizing her volume status.    Chronic atrial fibrillation: [id number] score of 6, but not currently on anticoagulation because of gait instability as well as her recent fall.  The option of left atrial appendage occlusion has been brought up with her in the past, and both her and her niece are interested in pursuing this.  It was recommended that she complete her physical therapy and rehabilitation, and could be seen in the A. fib clinic for consideration of a Watchman implant at that time.  She does understand that she will need to be on anticoagulation for at least a few weeks around the time of her implant, and therefore would be better if this was done when she has regained her strength and is ambulating better.    Status post surgical aortic valve replacement in 2009: Recent echo showing normal function of her aortic bioprosthesis.    Thank you for the opportunity to participate in the care of Ms. Dunn.  Please do not hesitate to call with any questions or concerns regarding her cardiovascular status.       History of Present Illness/Subjective    HPI: Marla Dunn is a pleasant 89 year old female, referred by Dr. Amaya for evaluation of treatment options for moderate to severe mitral regurgitation.    Ms  "Mona has moderate to severe mitral regurgitation, chronic atrial fibrillation, coronary artery disease status post single-vessel bypass surgery with a vein graft to the OM in 2009 with concomitant aortic valve replacement with a bioprosthesis, status post recent fall resulting in an intertrochanteric fracture of the right femur status post open reduction and internal fixation of August 13, 2021, now recovering in TCU.    She had an echocardiogram in July 2021 which showed a moderate to severe mitral regurgitation, and a valve clinic referral was initiated then, but due to her fall and subsequent hospitalization it was delayed until now.  Her echocardiogram was reviewed and shows normal left ventricular size and systolic function with an ejection fraction of 60 to 65%.  The mitral annulus and leaflets were noted to be calcified, with leaflet thickening as well.  There was moderate to severe eccentric anteriorly directed mitral regurgitation secondary to fibrocalcific degeneration of the leaflets.  The mean gradient across the mitral valve was 5 to 6 mmHg at a heart rate of 75 bpm.  There was mild pulmonary hypertension with PA pressures in the 40s mmHg.  Her aortic bioprosthesis was noted to have normal function with a mean gradient of 18 mmHg and no significant regurgitation.    Ms Dunn states that she has not had significant shortness of breath or chest pain, but does acknowledge fatigue and tiredness when she tries to push herself.  She is currently in the process of getting physical therapy and rehabilitation following her recent fall and hip surgery, with the hopes of returning back to her assisted living apartment.         Physical Examination  Review of Systems   Vitals: BP 92/50 (BP Location: Left arm, Patient Position: Sitting, Cuff Size: Adult Small)   Pulse 72   Resp 14   Ht 1.638 m (5' 4.5\")   Wt 48.5 kg (107 lb)   BMI 18.08 kg/m    BMI= Body mass index is 18.08 kg/m .  Wt Readings from Last " 3 Encounters:   09/02/21 48.5 kg (107 lb)   08/24/21 49.4 kg (108 lb 12.8 oz)   08/13/21 48.4 kg (106 lb 12.8 oz)       General Appearance:   no distress, normal body habitus, upright.   ENT/Mouth: membranes moist, no nasal discharge or bleeding gums.  Normal head shape, no evidence of injury or laceration.     EYES:  no scleral icterus, normal conjunctivae   Neck: no evidence of thyromegaly.  Supple   Chest/Lungs:   No audible wheezing equal chest wall expansion. Non labored breathing.  No cough.   Cardiovascular:   No evidence of elevated jugular venous pressure.  No evidence of pitting edema bilaterally    Abdomen:  no evidence of abdominal distention. No observe juandice.     Extremities: no cyanosis or clubbing noted.    Skin: no xanthelasma, normal skin color. No evidence of facial lacerations.      Neurologic: Normal arm motion bilateral, no tremors.  No evidence of focal defect.       Psychiatric: alert and oriented x3, calm        Please refer above for cardiac ROS details.        Medical History  Surgical History Family History Social History   Past Medical History:   Diagnosis Date     Asthma      Bilateral carpal tunnel syndrome 8/27/2015     CAD (coronary artery disease)      Chronic airway obstruction, not elsewhere classified     Created by Conversion      Chronic anemia      Chronic edema      Congestive heart failure, severe (H) 5/13/2020     COPD (chronic obstructive pulmonary disease) (H)      Coronary artery disease      Depression      Family history of myocardial infarction     father 64     GERD (gastroesophageal reflux disease)      Heart disease      Heart murmur      High cholesterol     dislipidemia     HTN (hypertension)      Hypercholesterolemia      Hypertension      Hypothyroidism      Hypothyroidism      MI (myocardial infarction) (H) 1985     Myelodysplastic syndrome (H)      Myocardial infarction (H) 1983     OLEGARIO (obstructive sleep apnea)      Osteoporosis      Other and unspecified  hyperlipidemia     Created by Conversion      Radicular low back pain      Rheumatoid arthritis (H)      Elizabeth Agers syndrome      Sjoegren syndrome      Sjogren's syndrome (H)      Sleep apnea, obstructive      Spinal stenosis      Unspecified polyarthropathy or polyarthritis, hand     Created by Conversion      Past Surgical History:   Procedure Laterality Date     aortic valve       AORTIC VALVE REPLACEMENT  2012     APPENDECTOMY       APPENDECTOMY       AS TOTAL KNEE ARTHROPLASTY Right      BACK SURGERY  2004    spinal decompression     BACK SURGERY      spinal stenosis     BONE MARROW BIOPSY  2004     breast biopsy       BREAST SURGERY  2001    biopsy     BYPASS GRAFT ARTERY CORONARY  2009    LIMA to ramus intermedius     C CABG, ARTERY-VEIN, FOUR       C CABG, VEIN, SINGLE      Description: CABG (CABG);  Recorded: 03/09/2012;  Comments: Single vessel bypass graft to an obtuse marginal branch in May 2009     C REPLACE AORT VALV,PROSTH VALV      Description: Aortic Valve Replacement;  Recorded: 03/09/2012;  Comments: Aortic valve replacement     cardiac angiogram       CARDIAC CATHETERIZATION       CARDIAC SURGERY       EYE SURGERY  2008    bilateral cataract repair      EYE SURGERY Bilateral     cornea transplant left eye & cataracts     KYPHOPLASTY  2003    T12     OPEN REDUCTION INTERNAL FIXATION HIP NAILING Right 8/13/2021    Procedure: INTERNAL FIXATION, FRACTURE, TROCHANTERIC, HIP, USING PINS OR RODS;  Surgeon: Darrel Castro MD;  Location: [facility] OR     OTHER SURGICAL HISTORY  01/2018    Rectosigmoidectomy perineal     RECTOSIGMOIDECTOMY PERINEAL N/A 1/10/2018    Procedure: RECTOSIGMOIDECTOMY PERINEAL;  PERINEAL RECTOSIGMOIDECTOMY ;  Surgeon: Candelaria Anthony MD;  Location:  OR     REPAIR VALVE AORTIC  2009     THORACIC SURGERY  2003    T12 kyphoplasty     Family History   Problem Relation Age of Onset     Family History Negative Mother      Cancer Mother      Heart Disease Father          Social History     Socioeconomic History     Marital status:      Spouse name: Not on file     Number of children: Not on file     Years of education: Not on file     Highest education level: Not on file   Occupational History     Not on file   Tobacco Use     Smoking status: Never Smoker     Smokeless tobacco: Never Used   Substance and Sexual Activity     Alcohol use: No     Alcohol/week: 0.0 standard drinks     Comment: Alcoholic Drinks/day: occasional glass of wine     Drug use: No     Sexual activity: Not on file   Other Topics Concern     Not on file   Social History Narrative    .  passed away 20 years ago. No children. 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 Was living independently in her apartment in [place] prior to admission. Uses walker at baseline.      Social Determinants of Health     Financial Resource Strain:      Difficulty of Paying Living Expenses:    Food Insecurity:      Worried About Running Out of Food in the Last Year:      Ran Out of Food in the Last Year:    Transportation Needs:      Lack of Transportation (Medical):      Lack of Transportation (Non-Medical):    Physical Activity:      Days of Exercise per Week:      Minutes of Exercise per Session:    Stress:      Feeling of Stress :    Social Connections:      Frequency of Communication with Friends and Family:      Frequency of Social Gatherings with Friends and Family:      Attends Restorationism Services:      Active Member of Clubs or Organizations:      Attends Club or Organization Meetings:      Marital Status:    Intimate Partner Violence:      Fear of Current or Ex-Partner:      Emotionally Abused:      Physically Abused:      Sexually Abused:            Medications  Allergies   Current Outpatient Medications   Medication Sig Dispense Refill     Acetaminophen (TYLENOL PO) Take 1,000 mg by mouth every 6 hours as needed for mild pain        albuterol (PROAIR HFA/PROVENTIL HFA/VENTOLIN  HFA) 108 (90 Base) MCG/ACT inhaler Inhale 2 puffs into the lungs every 6 hours       Ascorbic Acid (VITAMIN C PO) Take 500 mg by mouth every morning       aspirin (ASA) 81 MG EC tablet Take 1 tablet (81 mg) by mouth 2 times daily       Calcium Carb-Cholecalciferol (CALCIUM+D3) 600-800 MG-UNIT TABS Take 1 tablet by mouth daily       calcium carbonate (TUMS) 500 MG chewable tablet Take 2 chew tab by mouth daily as needed for heartburn       cholecalciferol (D3 HIGH POTENCY) 125 MCG (5000 UT) CAPS Take 1 capsule by mouth daily       cycloSPORINE (RESTASIS) 0.05 % ophthalmic emulsion Place 1 drop into both eyes 2 times daily        fluorometholone (FML LIQUIFILM) 0.1 % ophthalmic susp Place 1 drop Into the left eye daily        IBANDRONATE SODIUM PO Take 150 mg by mouth every 30 days       ipratropium - albuterol 0.5 mg/2.5 mg/3 mL (DUONEB) 0.5-2.5 (3) MG/3ML neb solution Take 1 vial by nebulization 4 times daily as needed for shortness of breath / dyspnea or wheezing        Ipratropium-Albuterol (COMBIVENT RESPIMAT)  MCG/ACT inhaler Inhale 2 puffs into the lungs 4 times daily       Levothyroxine Sodium (LEVOXYL PO) Take 88 mcg by mouth daily        loratadine (CLARITIN) 10 MG tablet Take 1 tablet (10 mg) by mouth daily (Patient taking differently: Take 10 mg by mouth daily as needed )       magnesium citrate solution Take 150 mLs by mouth daily as needed for other       METOPROLOL TARTRATE PO Take 25 mg by mouth 2 times daily        mirtazapine (REMERON) 7.5 MG tablet Take 7.5 mg by mouth At Bedtime       Multiple Vitamins-Minerals (CEROVITE PO) Take 1 tablet by mouth daily       Multiple Vitamins-Minerals (PRESERVISION AREDS PO) Take 1 tablet by mouth 2 times daily       omeprazole (PRILOSEC) 20 MG DR capsule Take 20 mg by mouth 2 times daily       polyethylene glycol (MIRALAX) powder Take 1 capful by mouth every morning       polyethylene glycol 0.4%- propylene glycol 0.3% (SYSTANE ULTRA) 0.4-0.3 % SOLN  ophthalmic solution Place 1 drop into both eyes 4 times daily        Probiotic Product (PROBIOTIC PO) Take 1 capsule by mouth every morning       Psyllium (METAMUCIL FIBER PO) MIX 1 PACKET IN BEVERAGE OF CHOICE AND DRINK TWICE WEEKLY ON TUES AND THURS       sodium chloride 1 GM tablet Take 1 g by mouth daily        spironolactone (ALDACTONE) 50 MG tablet Take 25 mg by mouth daily        sucralfate (CARAFATE) 1 GM tablet Take 1 g by mouth 2 times daily       timolol (TIMOPTIC) 0.5 % ophthalmic solution Place 1 drop Into the left eye daily        torsemide (DEMADEX) 20 MG tablet Take 20 mg by mouth daily         Allergies   Allergen Reactions     Gramineae Pollens Other (See Comments)     ORCHARDGRASS. Shortness of breath when lawn is just cut.     Smoke. Other (See Comments)     Hard to breathe.          Lab Results    Chemistry/lipid CBC Cardiac Enzymes/BNP/TSH/INR   Recent Labs   Lab Test 11/09/16  0952   CHOL 149   HDL 55   LDL 84   TRIG 48     Recent Labs   Lab Test 11/09/16  0952   LDL 84     Recent Labs   Lab Test 09/02/21  0600 08/24/21  0611   * 125*   POTASSIUM  --  4.2   CHLORIDE  --  96*   CO2  --  23   GLC  --  82   BUN  --  15   CR  --  0.65   GFRESTIMATED  --  77   PERICO  --  8.9     Recent Labs   Lab Test 08/24/21  0611 08/23/21  1109 08/20/21  0623   CR 0.65 0.68 0.74     Recent Labs   Lab Test 05/13/20  1205   A1C 5.2          Recent Labs   Lab Test 08/20/21  0623   WBC 7.3   HGB 8.6*   HCT 27.7*   MCV 89        Recent Labs   Lab Test 08/20/21  0623 08/16/21  0539 08/15/21  0522   HGB 8.6* 8.2* 8.2*    Recent Labs   Lab Test 05/14/20  0600 05/14/20  0016 05/13/20  1751   TROPONINI 0.05 0.06 0.07     Recent Labs   Lab Test 08/13/21  0552 05/13/20  0446 03/20/20  1831   * 536* 222*     Recent Labs   Lab Test 03/22/20  0852   TSH 1.17     Recent Labs   Lab Test 08/12/21  2122 05/13/20  0446   INR 1.10 0.97        Milton Pang MD

## 2021-09-02 NOTE — NURSING NOTE
"  Valve Clinic - Mitral Regurgitation  (See consult notes from Dr. Pang)    Referring provider: Dr. Amaya    [id number] (date completed 9/2/21), scanned into chart    Serum albumin (date completed 9/2/21): pending  +Hayward index (date completed 9/2/21): 4/6    frailty score: 1      Pt currently in TCU, recovering from recent fall and rt hip surgery in August. Pt states that \"recovery is slow\" but she is looking forward to returning to her assisted living apartment. Pt is walker dependent at baseline.     PMH: MI/PCI in 1998, CABGx1 with tissue AVR (21mm Mitroflow) in 2009 in [place], COPD, OLEGARIO-Cpap, AF not on anticoag d/t Falls and h/o GIB, OA, GERD, hypothyroid, MDS followed by oncologist Dr Ferrer monthly for arenesp infusions     Social: Pt is , no children. Niece Deneen is present during valve clinic consult.      TTE date 7/16/21  EF 60-65 %  3+ MR with severe Ca++  MG 5.3        Plan: See consult note by Dr. Pang. Plan for medical management for MS. Continue PT/OT to gain strength back from right hip surgery, and consider LAAO in the future.     No further questions at this time. Patient has my direct contact information and was encouraged to call with questions or concerns.           Phillip Valiente RN  [facility]  [facility]  Phone: [phone]  Fax: [phone]      "

## 2021-09-14 ENCOUNTER — DISCHARGE SUMMARY NURSING HOME (OUTPATIENT)
Dept: GERIATRICS | Facility: CLINIC | Age: 86
End: 2021-09-14
Payer: COMMERCIAL

## 2021-09-14 VITALS
SYSTOLIC BLOOD PRESSURE: 125 MMHG | TEMPERATURE: 96.6 F | RESPIRATION RATE: 20 BRPM | HEART RATE: 89 BPM | DIASTOLIC BLOOD PRESSURE: 75 MMHG | WEIGHT: 110.8 LBS | BODY MASS INDEX: 18.92 KG/M2 | OXYGEN SATURATION: 95 % | HEIGHT: 64 IN

## 2021-09-14 DIAGNOSIS — S72.144D CLOSED NONDISPLACED INTERTROCHANTERIC FRACTURE OF RIGHT FEMUR WITH ROUTINE HEALING, SUBSEQUENT ENCOUNTER: Primary | ICD-10-CM

## 2021-09-14 DIAGNOSIS — J44.9 CHRONIC OBSTRUCTIVE PULMONARY DISEASE, UNSPECIFIED COPD TYPE (H): ICD-10-CM

## 2021-09-14 PROCEDURE — 99316 NF DSCHRG MGMT 30 MIN+: CPT | Performed by: NURSE PRACTITIONER

## 2021-09-14 ASSESSMENT — MIFFLIN-ST. JEOR: SCORE: 897.59

## 2021-09-14 NOTE — PROGRESS NOTES
[facility]    Chief Complaint   Patient presents with     Discharge Summary Nursing Home     [facility] Medical Record Number:  [id number]  Place of Service where encounter took place:  [facility] (TCU) [[id number]]  Code Status:  No CPR- Pre-arrest intubation OK     HISTORY:      HPI:  Marla Dunn  is 89 year old (5/22/1929) undergoing physical and occupational therapy. She is  with past medical history of coronary artery disease status post CABG, moderate to severe mitral regurgitation, aortic stenosis status post valve replacement in 2009, diastolic heart failure, chronic A. fib, obstructive sleep apnea on 3 L oxygen at night and CPAP, presented to the hospital for evaluation of right hip pain secondary to fall found to have mildly displaced right intertrochanteric fracture     Fall and sustained displaced right intertrochanteric fracture;  --Status post ORIF using intramedullary device by [facility] orthopedic provider, Dr. Castro on 8/13/2021    Today she is seen to review vital signs, labs, follow-up right intertrochanteric hip fracture, and a face-to-face for discharge.  Patient will discharge to [facility] assisted living facility on 9/15/2021 with current medications and treatment.  She will have home care services PT OT.  She denied chest pain shortness of breath cough congestion constipation or diarrhea.  She has with a heart valve and has a murmur.  Her pain is controlled and her vital signs are stable.  Her last hemoglobin is 9.4.    ALLERGIES:Gramineae pollens and Smoke.    PAST MEDICAL HISTORY:   Past Medical History:   Diagnosis Date     Asthma      Bilateral carpal tunnel syndrome 8/27/2015     CAD (coronary artery disease)      Chronic airway obstruction, not elsewhere classified     Created by Conversion      Chronic anemia      Chronic edema      Congestive heart failure, severe (H) 5/13/2020     COPD (chronic obstructive pulmonary disease) (H)       Coronary artery disease      Depression      Family history of myocardial infarction     father 64     GERD (gastroesophageal reflux disease)      Heart disease      Heart murmur      High cholesterol     dislipidemia     HTN (hypertension)      Hypercholesterolemia      Hypertension      Hypothyroidism      Hypothyroidism      MI (myocardial infarction) (H) 1985     Myelodysplastic syndrome (H)      Myocardial infarction (H) 1983     OLEGARIO (obstructive sleep apnea)      Osteoporosis      Other and unspecified hyperlipidemia     Created by Conversion      Radicular low back pain      Rheumatoid arthritis (H)      Elizabeth Agers syndrome      Sjoegren syndrome      Sjogren's syndrome (H)      Sleep apnea, obstructive      Spinal stenosis      Unspecified polyarthropathy or polyarthritis, hand     Created by Conversion        PAST SURGICAL HISTORY:   has a past surgical history that includes cardiac angiogram; back surgery (2004); TOTAL KNEE ARTHROPLASTY (Right); appendectomy; breast biopsy; Kyphoplasty (2003); Eye surgery (2008); Cardiac surgery; CABG, ARTERY-VEIN, FOUR; Repair valve aortic (2009); Breast surgery (2001); Thoracic surgery (2003); bone marrow biopsy (2004); aortic valve; Rectosigmoidectomy perineal (N/A, 1/10/2018); CABG, VEIN, SINGLE; REPLACE AORT VALV,PROSTH VALV; appendectomy; Eye surgery (Bilateral); back surgery; Cardiac catheterization; aortic valve replacement (2012); Bypass graft artery coronary (2009); other surgical history (01/2018); and Open reduction internal fixation hip nailing (Right, 8/13/2021).    FAMILY HISTORY: family history includes Cancer in her mother; Family History Negative in her mother; Heart Disease in her father.    SOCIAL HISTORY:  reports that she has never smoked. She has never used smokeless tobacco. She reports that she does not drink alcohol and does not use drugs.    ROS:  Constitutional: Negative for activity change, appetite change, fatigue and fever.   HENT: Negative  "for congestion.    Respiratory: Negative for cough, shortness of breath and wheezing.    Cardiovascular: Negative for chest pain and leg swelling.   Gastrointestinal: Negative for abdominal distention, abdominal pain, constipation, diarrhea and nausea.   Genitourinary: Negative for dysuria.   Musculoskeletal: Negative for arthralgia. Negative for back pain.   Skin: Negative for color change and wound.   Neurological: Negative for dizziness.   Psychiatric/Behavioral: Negative for agitation, behavioral problems and confusion.     Physical Exam:  Constitutional:       Appearance: Patient is well-developed.   HENT:      Head: Normocephalic.   Eyes:      Conjunctiva/sclera: Conjunctivae normal.   Neck:      Musculoskeletal: Normal range of motion.   Cardiovascular:      Rate and Rhythm: Normal rate and regular rhythm.      Heart sounds: Normal heart sounds.   Patient with a heart valve and a murmur  Pulmonary:      Effort: No respiratory distress.      Breath sounds: Normal breath sounds. No wheezing or rales.   Abdominal:      General: Bowel sounds are normal. There is no distension.      Palpations: Abdomen is soft.      Tenderness: There is no abdominal tenderness.   Musculoskeletal:       Normal range of motion.    Healed surgical incision right hip  Skin:General:        Skin is warm.   Neurological:         Mental Status: Patient is alert and oriented to person, place, and time.   Psychiatric:         Behavior: Behavior normal.     Vitals:  /75   Pulse 89   Temp (!) 96.6  F (35.9  C)   Resp 20   Ht 1.626 m (5' 4\")   Wt 50.3 kg (110 lb 12.8 oz)   SpO2 95%   BMI 19.02 kg/m    Body mass index is 19.02 kg/m .      MEDICATIONS:     Review of your medicines          Accurate as of September 14, 2021  9:04 AM. If you have any questions, ask your nurse or doctor.            CONTINUE these medicines which may have CHANGED, or have new prescriptions. If we are uncertain of the size of tablets/capsules you have at " home, strength may be listed as something that might have changed.      Dose / Directions   Combivent Respimat  MCG/ACT inhaler  This may have changed: Another medication with the same name was removed. Continue taking this medication, and follow the directions you see here.  Generic drug: ipratropium-albuterol      Dose: 2 puff  Inhale 2 puffs into the lungs 4 times daily  Refills: 0     loratadine 10 MG tablet  Commonly known as: CLARITIN  This may have changed:     when to take this    reasons to take this  Used for: History of asthma      Dose: 10 mg  Take 1 tablet (10 mg) by mouth daily  Refills: 0        CONTINUE these medicines which have NOT CHANGED      Dose / Directions   albuterol 108 (90 Base) MCG/ACT inhaler  Commonly known as: PROAIR HFA/PROVENTIL HFA/VENTOLIN HFA      Dose: 2 puff  Inhale 2 puffs into the lungs every 6 hours  Refills: 0     aspirin 81 MG EC tablet  Commonly known as: ASA  Indication: VTE Prophylaxis  Used for: Intertrochanteric fracture of right femur, closed, initial encounter (H)      Dose: 81 mg  Take 1 tablet (81 mg) by mouth 2 times daily  Refills: 0     calcium carbonate 500 MG chewable tablet  Commonly known as: TUMS      Dose: 2 chew tab  Take 2 chew tab by mouth daily as needed for heartburn  Refills: 0     Calcium+D3 600-800 MG-UNIT Tabs  Generic drug: Calcium Carb-Cholecalciferol      Dose: 1 tablet  Take 1 tablet by mouth daily  Refills: 0     D3 High Potency 125 MCG (5000 UT) Caps  Generic drug: cholecalciferol      Dose: 1 capsule  Take 1 capsule by mouth daily  Refills: 0     fluorometholone 0.1 % ophthalmic suspension  Commonly known as: FML LIQUIFILM      Dose: 1 drop  Place 1 drop Into the left eye daily  Refills: 0     IBANDRONATE SODIUM PO      Dose: 150 mg  Take 150 mg by mouth every 30 days  Refills: 0     LEVOXYL PO      Dose: 88 mcg  Take 88 mcg by mouth daily  Refills: 0     magnesium citrate solution      Dose: 150 mL  Take 150 mLs by mouth daily as  needed for other  Refills: 0     METAMUCIL FIBER PO      MIX 1 PACKET IN BEVERAGE OF CHOICE AND DRINK TWICE WEEKLY ON TUES AND THURS  Refills: 0     METOPROLOL TARTRATE PO      Dose: 25 mg  Take 25 mg by mouth 2 times daily  Refills: 0     MiraLax 17 GM/Dose powder  Generic drug: polyethylene glycol      Dose: 1 capful  Take 1 capful by mouth every morning  Refills: 0     mirtazapine 7.5 MG tablet  Commonly known as: REMERON      Dose: 7.5 mg  Take 7.5 mg by mouth At Bedtime  Refills: 0     omeprazole 20 MG DR capsule  Commonly known as: priLOSEC      Dose: 20 mg  Take 20 mg by mouth 2 times daily  Refills: 0     polyethylene glycol-propylene glycol 0.4-0.3 % Soln ophthalmic solution  Commonly known as: SYSTANE ULTRA      Dose: 1 drop  Place 1 drop into both eyes 4 times daily  Refills: 0     * PRESERVISION AREDS PO      Dose: 1 tablet  Take 1 tablet by mouth 2 times daily  Refills: 0     * CEROVITE PO      Dose: 1 tablet  Take 1 tablet by mouth daily  Refills: 0     PROBIOTIC PO      Dose: 1 capsule  Take 1 capsule by mouth every morning  Refills: 0     Restasis 0.05 % ophthalmic emulsion  Generic drug: cycloSPORINE      Dose: 1 drop  Place 1 drop into both eyes 2 times daily  Refills: 0     sodium chloride 1 GM tablet      Dose: 1 g  Take 1 g by mouth daily  Refills: 0     spironolactone 50 MG tablet  Commonly known as: ALDACTONE      Dose: 25 mg  Take 25 mg by mouth daily  Refills: 0     sucralfate 1 GM tablet  Commonly known as: CARAFATE      Dose: 1 g  Take 1 g by mouth 2 times daily  Refills: 0     timolol maleate 0.5 % ophthalmic solution  Commonly known as: TIMOPTIC      Dose: 1 drop  Place 1 drop Into the left eye daily  Refills: 0     torsemide 20 MG tablet  Commonly known as: DEMADEX      Dose: 20 mg  Take 20 mg by mouth daily  Refills: 0     TYLENOL PO      Dose: 1,000 mg  Take 1,000 mg by mouth every 6 hours as needed for mild pain  Refills: 0     VITAMIN C PO      Dose: 500 mg  Take 500 mg by mouth  every morning  Refills: 0         * This list has 2 medication(s) that are the same as other medications prescribed for you. Read the directions carefully, and ask your doctor or other care provider to review them with you.                 DISCHARGE DIAGNOSIS:  Encounter Diagnoses   Name Primary?     Closed nondisplaced intertrochanteric fracture of right femur with routine healing, subsequent encounter Yes     Chronic obstructive pulmonary disease, unspecified COPD type (H)        Face to Face for wheelchair done on 9/14/21  Marla Dunn, is a 89 year old with a dx of Sicca syndrome and fracture of the right femur. Patient has mobility limitations significantly impairing her ability to participate in mobility-related activities of daily living such as toileting, dressing, grooming, and bathing in customary locations in the home. Mobility limitation cannot be sufficiently resolved by us of an appropriately fitted cane or walker. Patient is able to safely use a manual wheelchair. Patient s functional mobility deficit can be sufficiently resolved by the use of a manual wheelchair. This device is medically necessary for her to complete her ADL s.         DISCHARGE PLAN/FACE TO FACE:  I certify that services are/were furnished while this patient was under the care of a physician and that a physician or an allowed non-physician practitioner (NPP), had a face-to-face encounter that meets the physician face-to-face encounter requirements. The encounter was in whole, or in part, related to the primary reason for home health. The patient is confined to his/her home and needs intermittent skilled nursing, physical therapy, speech-language pathology, or the continued need for occupational therapy. A plan of care has been established by a physician and is periodically reviewed by a physician.  Date of Face-to-Face Encounter: 9/14/2021    I certify that, based on my findings, the following services are medically necessary  home health services: PT OT    My clinical findings support the need for the above skilled services because: PT OT for continued strength and endurance following a right intertrochanteric hip fracture    This patient is homebound because: She is deconditioned easily fatigued following recent right hip surgery    The patient is, or has been, under my care and I have initiated the establishment of the plan of care. This patient will be followed by a physician who will periodically review the plan of care.    Schedule follow up visit with primary care provider within 7 days to reestablish care.    Electronically signed by: Bing Kingston CNP

## 2021-09-14 NOTE — LETTER
9/14/2021        RE: Marla Dunn  [address]  [facility] [address]        [facility]    Chief Complaint   Patient presents with     Discharge Summary Nursing Home     [facility] Medical Record Number:  [id number]  Place of Service where encounter took place:  [facility] (TCU) [[id number]]  Code Status:  No CPR- Pre-arrest intubation OK     HISTORY:      HPI:  Marla Dunn  is 89 year old (5/22/1929) undergoing physical and occupational therapy. She is  with past medical history of coronary artery disease status post CABG, moderate to severe mitral regurgitation, aortic stenosis status post valve replacement in 2009, diastolic heart failure, chronic A. fib, obstructive sleep apnea on 3 L oxygen at night and CPAP, presented to the hospital for evaluation of right hip pain secondary to fall found to have mildly displaced right intertrochanteric fracture     Fall and sustained displaced right intertrochanteric fracture;  --Status post ORIF using intramedullary device by [facility] orthopedic provider, Dr. Castro on 8/13/2021    Today she is seen to review vital signs, labs, follow-up right intertrochanteric hip fracture, and a face-to-face for discharge.  Patient will discharge to [facility] assisted living facility on 9/15/2021 with current medications and treatment.  She will have home care services PT OT.  She denied chest pain shortness of breath cough congestion constipation or diarrhea.  She has with a heart valve and has a murmur.  Her pain is controlled and her vital signs are stable.  Her last hemoglobin is 9.4.    ALLERGIES:Gramineae pollens and Smoke.    PAST MEDICAL HISTORY:   Past Medical History:   Diagnosis Date     Asthma      Bilateral carpal tunnel syndrome 8/27/2015     CAD (coronary artery disease)      Chronic airway obstruction, not elsewhere classified     Created by Conversion      Chronic anemia      Chronic edema      Congestive  heart failure, severe (H) 5/13/2020     COPD (chronic obstructive pulmonary disease) (H)      Coronary artery disease      Depression      Family history of myocardial infarction     father 64     GERD (gastroesophageal reflux disease)      Heart disease      Heart murmur      High cholesterol     dislipidemia     HTN (hypertension)      Hypercholesterolemia      Hypertension      Hypothyroidism      Hypothyroidism      MI (myocardial infarction) (H) 1985     Myelodysplastic syndrome (H)      Myocardial infarction (H) 1983     OLEGARIO (obstructive sleep apnea)      Osteoporosis      Other and unspecified hyperlipidemia     Created by Conversion      Radicular low back pain      Rheumatoid arthritis (H)      Elizabeth Agers syndrome      Sjoegren syndrome      Sjogren's syndrome (H)      Sleep apnea, obstructive      Spinal stenosis      Unspecified polyarthropathy or polyarthritis, hand     Created by Conversion        PAST SURGICAL HISTORY:   has a past surgical history that includes cardiac angiogram; back surgery (2004); TOTAL KNEE ARTHROPLASTY (Right); appendectomy; breast biopsy; Kyphoplasty (2003); Eye surgery (2008); Cardiac surgery; CABG, ARTERY-VEIN, FOUR; Repair valve aortic (2009); Breast surgery (2001); Thoracic surgery (2003); bone marrow biopsy (2004); aortic valve; Rectosigmoidectomy perineal (N/A, 1/10/2018); CABG, VEIN, SINGLE; REPLACE AORT VALV,PROSTH VALV; appendectomy; Eye surgery (Bilateral); back surgery; Cardiac catheterization; aortic valve replacement (2012); Bypass graft artery coronary (2009); other surgical history (01/2018); and Open reduction internal fixation hip nailing (Right, 8/13/2021).    FAMILY HISTORY: family history includes Cancer in her mother; Family History Negative in her mother; Heart Disease in her father.    SOCIAL HISTORY:  reports that she has never smoked. She has never used smokeless tobacco. She reports that she does not drink alcohol and does not use  "drugs.    ROS:  Constitutional: Negative for activity change, appetite change, fatigue and fever.   HENT: Negative for congestion.    Respiratory: Negative for cough, shortness of breath and wheezing.    Cardiovascular: Negative for chest pain and leg swelling.   Gastrointestinal: Negative for abdominal distention, abdominal pain, constipation, diarrhea and nausea.   Genitourinary: Negative for dysuria.   Musculoskeletal: Negative for arthralgia. Negative for back pain.   Skin: Negative for color change and wound.   Neurological: Negative for dizziness.   Psychiatric/Behavioral: Negative for agitation, behavioral problems and confusion.     Physical Exam:  Constitutional:       Appearance: Patient is well-developed.   HENT:      Head: Normocephalic.   Eyes:      Conjunctiva/sclera: Conjunctivae normal.   Neck:      Musculoskeletal: Normal range of motion.   Cardiovascular:      Rate and Rhythm: Normal rate and regular rhythm.      Heart sounds: Normal heart sounds.   Patient with a heart valve and a murmur  Pulmonary:      Effort: No respiratory distress.      Breath sounds: Normal breath sounds. No wheezing or rales.   Abdominal:      General: Bowel sounds are normal. There is no distension.      Palpations: Abdomen is soft.      Tenderness: There is no abdominal tenderness.   Musculoskeletal:       Normal range of motion.    Healed surgical incision right hip  Skin:General:        Skin is warm.   Neurological:         Mental Status: Patient is alert and oriented to person, place, and time.   Psychiatric:         Behavior: Behavior normal.     Vitals:  /75   Pulse 89   Temp (!) 96.6  F (35.9  C)   Resp 20   Ht 1.626 m (5' 4\")   Wt 50.3 kg (110 lb 12.8 oz)   SpO2 95%   BMI 19.02 kg/m    Body mass index is 19.02 kg/m .      MEDICATIONS:     Review of your medicines          Accurate as of September 14, 2021  9:04 AM. If you have any questions, ask your nurse or doctor.            CONTINUE these medicines " which may have CHANGED, or have new prescriptions. If we are uncertain of the size of tablets/capsules you have at home, strength may be listed as something that might have changed.      Dose / Directions   Combivent Respimat  MCG/ACT inhaler  This may have changed: Another medication with the same name was removed. Continue taking this medication, and follow the directions you see here.  Generic drug: ipratropium-albuterol      Dose: 2 puff  Inhale 2 puffs into the lungs 4 times daily  Refills: 0     loratadine 10 MG tablet  Commonly known as: CLARITIN  This may have changed:     when to take this    reasons to take this  Used for: History of asthma      Dose: 10 mg  Take 1 tablet (10 mg) by mouth daily  Refills: 0        CONTINUE these medicines which have NOT CHANGED      Dose / Directions   albuterol 108 (90 Base) MCG/ACT inhaler  Commonly known as: PROAIR HFA/PROVENTIL HFA/VENTOLIN HFA      Dose: 2 puff  Inhale 2 puffs into the lungs every 6 hours  Refills: 0     aspirin 81 MG EC tablet  Commonly known as: ASA  Indication: VTE Prophylaxis  Used for: Intertrochanteric fracture of right femur, closed, initial encounter (H)      Dose: 81 mg  Take 1 tablet (81 mg) by mouth 2 times daily  Refills: 0     calcium carbonate 500 MG chewable tablet  Commonly known as: TUMS      Dose: 2 chew tab  Take 2 chew tab by mouth daily as needed for heartburn  Refills: 0     Calcium+D3 600-800 MG-UNIT Tabs  Generic drug: Calcium Carb-Cholecalciferol      Dose: 1 tablet  Take 1 tablet by mouth daily  Refills: 0     D3 High Potency 125 MCG (5000 UT) Caps  Generic drug: cholecalciferol      Dose: 1 capsule  Take 1 capsule by mouth daily  Refills: 0     fluorometholone 0.1 % ophthalmic suspension  Commonly known as: FML LIQUIFILM      Dose: 1 drop  Place 1 drop Into the left eye daily  Refills: 0     IBANDRONATE SODIUM PO      Dose: 150 mg  Take 150 mg by mouth every 30 days  Refills: 0     LEVOXYL PO      Dose: 88 mcg  Take 88  mcg by mouth daily  Refills: 0     magnesium citrate solution      Dose: 150 mL  Take 150 mLs by mouth daily as needed for other  Refills: 0     METAMUCIL FIBER PO      MIX 1 PACKET IN BEVERAGE OF CHOICE AND DRINK TWICE WEEKLY ON TUES AND THURS  Refills: 0     METOPROLOL TARTRATE PO      Dose: 25 mg  Take 25 mg by mouth 2 times daily  Refills: 0     MiraLax 17 GM/Dose powder  Generic drug: polyethylene glycol      Dose: 1 capful  Take 1 capful by mouth every morning  Refills: 0     mirtazapine 7.5 MG tablet  Commonly known as: REMERON      Dose: 7.5 mg  Take 7.5 mg by mouth At Bedtime  Refills: 0     omeprazole 20 MG DR capsule  Commonly known as: priLOSEC      Dose: 20 mg  Take 20 mg by mouth 2 times daily  Refills: 0     polyethylene glycol-propylene glycol 0.4-0.3 % Soln ophthalmic solution  Commonly known as: SYSTANE ULTRA      Dose: 1 drop  Place 1 drop into both eyes 4 times daily  Refills: 0     * PRESERVISION AREDS PO      Dose: 1 tablet  Take 1 tablet by mouth 2 times daily  Refills: 0     * CEROVITE PO      Dose: 1 tablet  Take 1 tablet by mouth daily  Refills: 0     PROBIOTIC PO      Dose: 1 capsule  Take 1 capsule by mouth every morning  Refills: 0     Restasis 0.05 % ophthalmic emulsion  Generic drug: cycloSPORINE      Dose: 1 drop  Place 1 drop into both eyes 2 times daily  Refills: 0     sodium chloride 1 GM tablet      Dose: 1 g  Take 1 g by mouth daily  Refills: 0     spironolactone 50 MG tablet  Commonly known as: ALDACTONE      Dose: 25 mg  Take 25 mg by mouth daily  Refills: 0     sucralfate 1 GM tablet  Commonly known as: CARAFATE      Dose: 1 g  Take 1 g by mouth 2 times daily  Refills: 0     timolol maleate 0.5 % ophthalmic solution  Commonly known as: TIMOPTIC      Dose: 1 drop  Place 1 drop Into the left eye daily  Refills: 0     torsemide 20 MG tablet  Commonly known as: DEMADEX      Dose: 20 mg  Take 20 mg by mouth daily  Refills: 0     TYLENOL PO      Dose: 1,000 mg  Take 1,000 mg by  mouth every 6 hours as needed for mild pain  Refills: 0     VITAMIN C PO      Dose: 500 mg  Take 500 mg by mouth every morning  Refills: 0         * This list has 2 medication(s) that are the same as other medications prescribed for you. Read the directions carefully, and ask your doctor or other care provider to review them with you.                 DISCHARGE DIAGNOSIS:  Encounter Diagnoses   Name Primary?     Closed nondisplaced intertrochanteric fracture of right femur with routine healing, subsequent encounter Yes     Chronic obstructive pulmonary disease, unspecified COPD type (H)        Face to Face for wheelchair done on 9/14/21  Marla Dunn, is a 89 year old with a dx of Sicca syndrome and fracture of the right femur. Patient has mobility limitations significantly impairing her ability to participate in mobility-related activities of daily living such as toileting, dressing, grooming, and bathing in customary locations in the home. Mobility limitation cannot be sufficiently resolved by us of an appropriately fitted cane or walker. Patient is able to safely use a manual wheelchair. Patient s functional mobility deficit can be sufficiently resolved by the use of a manual wheelchair. This device is medically necessary for her to complete her ADL s.         DISCHARGE PLAN/FACE TO FACE:  I certify that services are/were furnished while this patient was under the care of a physician and that a physician or an allowed non-physician practitioner (NPP), had a face-to-face encounter that meets the physician face-to-face encounter requirements. The encounter was in whole, or in part, related to the primary reason for home health. The patient is confined to his/her home and needs intermittent skilled nursing, physical therapy, speech-language pathology, or the continued need for occupational therapy. A plan of care has been established by a physician and is periodically reviewed by a physician.  Date of  Face-to-Face Encounter: 9/14/2021    I certify that, based on my findings, the following services are medically necessary home health services: PT OT    My clinical findings support the need for the above skilled services because: PT OT for continued strength and endurance following a right intertrochanteric hip fracture    This patient is homebound because: She is deconditioned easily fatigued following recent right hip surgery    The patient is, or has been, under my care and I have initiated the establishment of the plan of care. This patient will be followed by a physician who will periodically review the plan of care.    Schedule follow up visit with primary care provider within 7 days to reestablish care.    Electronically signed by: Bing Kingston CNP          Sincerely,        Bing Kingston CNP

## 2021-09-27 ENCOUNTER — LAB REQUISITION (OUTPATIENT)
Dept: LAB | Facility: CLINIC | Age: 86
End: 2021-09-27
Payer: COMMERCIAL

## 2021-09-27 DIAGNOSIS — E87.1 HYPO-OSMOLALITY AND HYPONATREMIA: ICD-10-CM

## 2021-09-27 DIAGNOSIS — I10 ESSENTIAL (PRIMARY) HYPERTENSION: ICD-10-CM

## 2021-09-28 LAB
ANION GAP SERPL CALCULATED.3IONS-SCNC: 13 MMOL/L (ref 5–18)
BUN SERPL-MCNC: 24 MG/DL (ref 8–28)
CALCIUM SERPL-MCNC: 9.1 MG/DL (ref 8.5–10.5)
CHLORIDE BLD-SCNC: 97 MMOL/L (ref 98–107)
CO2 SERPL-SCNC: 25 MMOL/L (ref 22–31)
CREAT SERPL-MCNC: 0.8 MG/DL (ref 0.6–1.1)
GFR SERPL CREATININE-BSD FRML MDRD: 64 ML/MIN/1.73M2
GLUCOSE BLD-MCNC: 85 MG/DL (ref 70–125)
POTASSIUM BLD-SCNC: 4.3 MMOL/L (ref 3.5–5)
SODIUM SERPL-SCNC: 135 MMOL/L (ref 136–145)

## 2021-09-28 PROCEDURE — P9604 ONE-WAY ALLOW PRORATED TRIP: HCPCS | Mod: ORL | Performed by: INTERNAL MEDICINE

## 2021-09-28 PROCEDURE — 80048 BASIC METABOLIC PNL TOTAL CA: CPT | Mod: ORL | Performed by: INTERNAL MEDICINE

## 2021-09-28 PROCEDURE — 36415 COLL VENOUS BLD VENIPUNCTURE: CPT | Mod: ORL | Performed by: INTERNAL MEDICINE

## 2021-09-29 ENCOUNTER — LAB REQUISITION (OUTPATIENT)
Dept: LAB | Facility: CLINIC | Age: 86
End: 2021-09-29
Payer: COMMERCIAL

## 2021-09-29 DIAGNOSIS — E87.1 HYPO-OSMOLALITY AND HYPONATREMIA: ICD-10-CM

## 2021-09-29 DIAGNOSIS — I10 ESSENTIAL (PRIMARY) HYPERTENSION: ICD-10-CM

## 2022-01-01 ENCOUNTER — LAB REQUISITION (OUTPATIENT)
Dept: LAB | Facility: CLINIC | Age: 87
End: 2022-01-01
Payer: COMMERCIAL

## 2022-01-01 DIAGNOSIS — N18.31 CHRONIC KIDNEY DISEASE, STAGE 3A (H): ICD-10-CM

## 2022-01-01 DIAGNOSIS — E87.1 HYPO-OSMOLALITY AND HYPONATREMIA: ICD-10-CM

## 2022-01-04 LAB
ANION GAP SERPL CALCULATED.3IONS-SCNC: 11 MMOL/L (ref 5–18)
BUN SERPL-MCNC: 24 MG/DL (ref 8–28)
CALCIUM SERPL-MCNC: 9.9 MG/DL (ref 8.5–10.5)
CHLORIDE BLD-SCNC: 94 MMOL/L (ref 98–107)
CO2 SERPL-SCNC: 28 MMOL/L (ref 22–31)
CREAT SERPL-MCNC: 0.92 MG/DL (ref 0.6–1.1)
GFR SERPL CREATININE-BSD FRML MDRD: 58 ML/MIN/1.73M2
GLUCOSE BLD-MCNC: 61 MG/DL (ref 70–125)
POTASSIUM BLD-SCNC: 4 MMOL/L (ref 3.5–5)
SODIUM SERPL-SCNC: 133 MMOL/L (ref 136–145)

## 2022-01-04 PROCEDURE — 36415 COLL VENOUS BLD VENIPUNCTURE: CPT | Mod: ORL | Performed by: INTERNAL MEDICINE

## 2022-01-04 PROCEDURE — 80048 BASIC METABOLIC PNL TOTAL CA: CPT | Mod: ORL | Performed by: INTERNAL MEDICINE

## 2022-01-04 PROCEDURE — P9604 ONE-WAY ALLOW PRORATED TRIP: HCPCS | Mod: ORL | Performed by: INTERNAL MEDICINE

## 2022-02-27 ENCOUNTER — LAB REQUISITION (OUTPATIENT)
Dept: LAB | Facility: CLINIC | Age: 87
End: 2022-02-27
Payer: COMMERCIAL

## 2022-02-27 DIAGNOSIS — N18.31 CHRONIC KIDNEY DISEASE, STAGE 3A (H): ICD-10-CM

## 2022-02-27 DIAGNOSIS — E87.1 HYPO-OSMOLALITY AND HYPONATREMIA: ICD-10-CM

## 2022-03-01 LAB
ANION GAP SERPL CALCULATED.3IONS-SCNC: 14 MMOL/L (ref 5–18)
BUN SERPL-MCNC: 45 MG/DL (ref 8–28)
CALCIUM SERPL-MCNC: 10 MG/DL (ref 8.5–10.5)
CHLORIDE BLD-SCNC: 94 MMOL/L (ref 98–107)
CO2 SERPL-SCNC: 27 MMOL/L (ref 22–31)
CREAT SERPL-MCNC: 1.06 MG/DL (ref 0.6–1.1)
GFR SERPL CREATININE-BSD FRML MDRD: 49 ML/MIN/1.73M2
GLUCOSE BLD-MCNC: 115 MG/DL (ref 70–125)
POTASSIUM BLD-SCNC: 4.2 MMOL/L (ref 3.5–5)
SODIUM SERPL-SCNC: 135 MMOL/L (ref 136–145)

## 2022-03-01 PROCEDURE — 80048 BASIC METABOLIC PNL TOTAL CA: CPT | Mod: ORL | Performed by: INTERNAL MEDICINE

## 2022-03-01 PROCEDURE — 36415 COLL VENOUS BLD VENIPUNCTURE: CPT | Mod: ORL | Performed by: INTERNAL MEDICINE

## 2022-03-01 PROCEDURE — P9604 ONE-WAY ALLOW PRORATED TRIP: HCPCS | Mod: ORL | Performed by: INTERNAL MEDICINE

## 2022-03-30 ENCOUNTER — LAB REQUISITION (OUTPATIENT)
Dept: LAB | Facility: CLINIC | Age: 87
End: 2022-03-30
Payer: COMMERCIAL

## 2022-03-30 DIAGNOSIS — Z11.59 ENCOUNTER FOR SCREENING FOR OTHER VIRAL DISEASES: ICD-10-CM

## 2022-03-31 PROCEDURE — U0005 INFEC AGEN DETEC AMPLI PROBE: HCPCS | Mod: ORL | Performed by: FAMILY MEDICINE

## 2022-04-01 LAB — SARS-COV-2 RNA RESP QL NAA+PROBE: NEGATIVE

## 2022-04-07 ENCOUNTER — LAB REQUISITION (OUTPATIENT)
Dept: LAB | Facility: CLINIC | Age: 87
End: 2022-04-07
Payer: COMMERCIAL

## 2022-04-07 DIAGNOSIS — Z11.59 ENCOUNTER FOR SCREENING FOR OTHER VIRAL DISEASES: ICD-10-CM

## 2022-04-08 PROCEDURE — U0003 INFECTIOUS AGENT DETECTION BY NUCLEIC ACID (DNA OR RNA); SEVERE ACUTE RESPIRATORY SYNDROME CORONAVIRUS 2 (SARS-COV-2) (CORONAVIRUS DISEASE [COVID-19]), AMPLIFIED PROBE TECHNIQUE, MAKING USE OF HIGH THROUGHPUT TECHNOLOGIES AS DESCRIBED BY CMS-2020-01-R: HCPCS | Mod: ORL | Performed by: FAMILY MEDICINE

## 2022-04-09 LAB — SARS-COV-2 RNA RESP QL NAA+PROBE: NEGATIVE

## 2022-05-01 ENCOUNTER — LAB REQUISITION (OUTPATIENT)
Dept: LAB | Facility: CLINIC | Age: 87
End: 2022-05-01
Payer: COMMERCIAL

## 2022-05-01 DIAGNOSIS — E87.1 HYPO-OSMOLALITY AND HYPONATREMIA: ICD-10-CM

## 2022-05-01 DIAGNOSIS — N18.31 CHRONIC KIDNEY DISEASE, STAGE 3A (H): ICD-10-CM

## 2022-05-05 NOTE — TELEPHONE ENCOUNTER
Wellness Screening Tool  Symptom Screening:  Do you have one of the following NEW symptoms:    Fever (subjective or >100.0)?  No    A new cough?  No    Shortness of breath?  No     Chills? No     New loss of taste or smell? No     Generalized body aches? No     New persistent headache? No     New sore throat? No     Nausea, vomiting, or diarrhea?  No    Within the past 2 weeks, have you been exposed to someone with a known positive illness below:    COVID-19 (known or suspected)?  No    Chicken pox?  No    Mealses?  No    Pertussis?  No    Patient notified of visitor policy- They may have one person accompany them to their appointment, but they will need to wear a mask and will be screened upon arrival for symptoms: Yes  Pt informed to wear a mask: Yes  Pt notified if they develop any symptoms listed above, prior to their appointment, they are to call the clinic directly at [phone] for further instructions.  Yes  Patient's appointment status: Patient will be seen in clinic as scheduled on 10/30        (4) walks frequently

## 2022-05-09 LAB
ANION GAP SERPL CALCULATED.3IONS-SCNC: 12 MMOL/L (ref 5–18)
BUN SERPL-MCNC: 30 MG/DL (ref 8–28)
CALCIUM SERPL-MCNC: 9.4 MG/DL (ref 8.5–10.5)
CHLORIDE BLD-SCNC: 90 MMOL/L (ref 98–107)
CO2 SERPL-SCNC: 27 MMOL/L (ref 22–31)
CREAT SERPL-MCNC: 0.87 MG/DL (ref 0.6–1.1)
GFR SERPL CREATININE-BSD FRML MDRD: 62 ML/MIN/1.73M2
GLUCOSE BLD-MCNC: 73 MG/DL (ref 70–125)
POTASSIUM BLD-SCNC: 4 MMOL/L (ref 3.5–5)
SODIUM SERPL-SCNC: 129 MMOL/L (ref 136–145)

## 2022-05-09 PROCEDURE — 80048 BASIC METABOLIC PNL TOTAL CA: CPT | Mod: ORL | Performed by: INTERNAL MEDICINE

## 2022-05-09 PROCEDURE — P9604 ONE-WAY ALLOW PRORATED TRIP: HCPCS | Mod: ORL | Performed by: INTERNAL MEDICINE

## 2022-05-09 PROCEDURE — 36415 COLL VENOUS BLD VENIPUNCTURE: CPT | Mod: ORL | Performed by: INTERNAL MEDICINE

## 2022-05-17 ENCOUNTER — LAB REQUISITION (OUTPATIENT)
Dept: LAB | Facility: CLINIC | Age: 87
End: 2022-05-17
Payer: COMMERCIAL

## 2022-05-17 DIAGNOSIS — Z11.59 ENCOUNTER FOR SCREENING FOR OTHER VIRAL DISEASES: ICD-10-CM

## 2022-05-17 PROCEDURE — U0005 INFEC AGEN DETEC AMPLI PROBE: HCPCS | Mod: ORL | Performed by: INTERNAL MEDICINE

## 2022-05-18 LAB — SARS-COV-2 RNA RESP QL NAA+PROBE: POSITIVE

## 2022-05-31 ENCOUNTER — LAB REQUISITION (OUTPATIENT)
Dept: LAB | Facility: CLINIC | Age: 87
End: 2022-05-31
Payer: COMMERCIAL

## 2022-05-31 DIAGNOSIS — Z11.59 ENCOUNTER FOR SCREENING FOR OTHER VIRAL DISEASES: ICD-10-CM

## 2022-06-16 ENCOUNTER — LAB REQUISITION (OUTPATIENT)
Dept: LAB | Facility: CLINIC | Age: 87
End: 2022-06-16
Payer: COMMERCIAL

## 2022-06-16 DIAGNOSIS — N18.31 CHRONIC KIDNEY DISEASE, STAGE 3A (H): ICD-10-CM

## 2022-06-20 LAB
ANION GAP SERPL CALCULATED.3IONS-SCNC: 10 MMOL/L (ref 5–18)
BUN SERPL-MCNC: 29 MG/DL (ref 8–28)
CALCIUM SERPL-MCNC: 9.7 MG/DL (ref 8.5–10.5)
CHLORIDE BLD-SCNC: 92 MMOL/L (ref 98–107)
CO2 SERPL-SCNC: 29 MMOL/L (ref 22–31)
CREAT SERPL-MCNC: 0.95 MG/DL (ref 0.6–1.1)
GFR SERPL CREATININE-BSD FRML MDRD: 56 ML/MIN/1.73M2
GLUCOSE BLD-MCNC: 88 MG/DL (ref 70–125)
POTASSIUM BLD-SCNC: 4.2 MMOL/L (ref 3.5–5)
SODIUM SERPL-SCNC: 131 MMOL/L (ref 136–145)

## 2022-06-20 PROCEDURE — P9604 ONE-WAY ALLOW PRORATED TRIP: HCPCS | Mod: ORL | Performed by: INTERNAL MEDICINE

## 2022-06-20 PROCEDURE — 36415 COLL VENOUS BLD VENIPUNCTURE: CPT | Mod: ORL | Performed by: INTERNAL MEDICINE

## 2022-06-20 PROCEDURE — 80048 BASIC METABOLIC PNL TOTAL CA: CPT | Mod: ORL | Performed by: INTERNAL MEDICINE

## 2022-06-30 ENCOUNTER — LAB REQUISITION (OUTPATIENT)
Dept: LAB | Facility: CLINIC | Age: 87
End: 2022-06-30
Payer: COMMERCIAL

## 2022-06-30 DIAGNOSIS — E87.1 HYPO-OSMOLALITY AND HYPONATREMIA: ICD-10-CM

## 2022-06-30 DIAGNOSIS — N18.31 CHRONIC KIDNEY DISEASE, STAGE 3A (H): ICD-10-CM

## 2022-07-01 ENCOUNTER — LAB REQUISITION (OUTPATIENT)
Dept: LAB | Facility: CLINIC | Age: 87
End: 2022-07-01
Payer: COMMERCIAL

## 2022-07-01 DIAGNOSIS — N18.31 CHRONIC KIDNEY DISEASE, STAGE 3A (H): ICD-10-CM

## 2022-07-01 DIAGNOSIS — E87.1 HYPO-OSMOLALITY AND HYPONATREMIA: ICD-10-CM

## 2022-07-04 LAB
ANION GAP SERPL CALCULATED.3IONS-SCNC: 13 MMOL/L (ref 7–15)
BUN SERPL-MCNC: 24.1 MG/DL (ref 8–23)
CALCIUM SERPL-MCNC: 9.6 MG/DL (ref 8.2–9.6)
CHLORIDE SERPL-SCNC: 90 MMOL/L (ref 98–107)
CREAT SERPL-MCNC: 1.04 MG/DL (ref 0.51–0.95)
DEPRECATED HCO3 PLAS-SCNC: 27 MMOL/L (ref 22–29)
GFR SERPL CREATININE-BSD FRML MDRD: 50 ML/MIN/1.73M2
GLUCOSE SERPL-MCNC: 73 MG/DL (ref 70–99)
POTASSIUM SERPL-SCNC: 4.2 MMOL/L (ref 3.4–5.3)
SODIUM SERPL-SCNC: 130 MMOL/L (ref 136–145)

## 2022-07-04 PROCEDURE — 80048 BASIC METABOLIC PNL TOTAL CA: CPT | Mod: ORL | Performed by: INTERNAL MEDICINE

## 2022-07-04 PROCEDURE — 36415 COLL VENOUS BLD VENIPUNCTURE: CPT | Mod: ORL | Performed by: INTERNAL MEDICINE

## 2022-07-25 ENCOUNTER — OFFICE VISIT (OUTPATIENT)
Dept: CARDIOLOGY | Facility: CLINIC | Age: 87
End: 2022-07-25
Payer: COMMERCIAL

## 2022-07-25 VITALS
HEART RATE: 68 BPM | DIASTOLIC BLOOD PRESSURE: 62 MMHG | RESPIRATION RATE: 16 BRPM | BODY MASS INDEX: 18.02 KG/M2 | WEIGHT: 105 LBS | SYSTOLIC BLOOD PRESSURE: 130 MMHG

## 2022-07-25 DIAGNOSIS — I25.10 CORONARY ARTERY DISEASE INVOLVING NATIVE CORONARY ARTERY OF NATIVE HEART, UNSPECIFIED WHETHER ANGINA PRESENT: Primary | ICD-10-CM

## 2022-07-25 DIAGNOSIS — R06.09 DOE (DYSPNEA ON EXERTION): ICD-10-CM

## 2022-07-25 DIAGNOSIS — I48.21 PERMANENT ATRIAL FIBRILLATION (H): ICD-10-CM

## 2022-07-25 DIAGNOSIS — Z95.2 S/P AVR (AORTIC VALVE REPLACEMENT): ICD-10-CM

## 2022-07-25 DIAGNOSIS — I34.0 NONRHEUMATIC MITRAL VALVE REGURGITATION: ICD-10-CM

## 2022-07-25 PROCEDURE — 99214 OFFICE O/P EST MOD 30 MIN: CPT | Performed by: INTERNAL MEDICINE

## 2022-07-25 RX ORDER — MIRTAZAPINE 7.5 MG/1
1 TABLET, FILM COATED ORAL
COMMUNITY
End: 2022-09-05

## 2022-07-25 RX ORDER — MAGNESIUM CARB/ALUMINUM HYDROX 105-160MG
TABLET,CHEWABLE ORAL
COMMUNITY
End: 2022-09-05

## 2022-07-25 RX ORDER — FAMOTIDINE 20 MG/1
1 TABLET, FILM COATED ORAL
COMMUNITY
Start: 2021-06-28 | End: 2022-09-05

## 2022-07-25 RX ORDER — LIDOCAINE 4 G/G
PATCH TOPICAL
COMMUNITY
End: 2022-09-05

## 2022-07-25 RX ORDER — TRAMADOL HYDROCHLORIDE 50 MG/1
1 TABLET ORAL PRN
COMMUNITY
End: 2022-09-05

## 2022-07-25 NOTE — PROGRESS NOTES
Thank you, Dr. Goyal, for asking the [facility] team to see Ms. Marla Dunn to follow-up on coronary artery disease, valvular heart disease, chronic atrial fibrillation.    Assessment/Recommendations   Assessment:    1.  Chronic atrial fibrillation, rate controlled.  Patient remains off anticoagulation due to frequent falls.  She had been offered the option of undergoing Watchman device implant although upon hearing that this would require approximately 12 weeks of anticoagulation, she and her family elected not to pursue it.  At this point we will continue current AV zev blocking medication to control rate.  2.  Coronary artery disease, status post coronary artery bypass grafting in 2009 at the time of aortic valve replacement.  Nuclear stress test 1 year ago demonstrated no evidence of ischemia despite reports of exertional dyspnea.  Her symptoms of exertional dyspnea have remained unchanged.  Denies any associated chest discomfort.  At this point recommend continuation of medical therapy and observation.  3.  Aortic valve stenosis status post bioprosthetic aortic valve replacement in 2009.  Echocardiogram done last year demonstrates normal prosthetic valve function.  4.  Mild to moderate mitral valve stenosis with moderate to severe mitral regurgitation.  Patient was seen by Dr. Pang in our valve clinic who felt she was not a candidate for a MitraClip due to baseline stenosis which would increase with MitraClip placement.  At this point he recommended continuation of medical therapy.  She reports intermittent shortness of breath but denies orthopnea, PND or lower extremity edema.    Plan:  1.  Continue current medication  2.  Echocardiogram to follow-up on valve disease and LV function  3.  Tentative follow-up in 1 year or sooner if change in symptoms       History of Present Illness    Ms. Marla Dunn is a 89 year old female with history of coronary artery disease status  post coronary artery bypass grafting, aortic valve stenosis status post bioprosthetic aortic valve replacement in 2009 in conjunction with bypass surgery, mild to moderate mitral valve stenosis with moderate to severe mitral regurgitation and chronic atrial fibrillation who presents today for a follow-up visit.  She reports no significant change in symptoms over the past year.  Continues to experience episodes of shortness of breath both with activity as well as at rest.  Denies orthopnea, PND or lower extremity edema.  Also reports no palpitations, lightheadedness or near syncope.  Continues to do an elliptical  for half an hour every day as well as to walk for hallways in her building on a daily basis.  Denies any associated chest discomfort with this activity.  Has had occasional falls, due to missteps.  This may be due to her peripheral neuropathy.    ECG (personally reviewed): No ECG today    Cardiac Imaging Studies (personally reviewed): No new cardiac imaging     Physical Examination Review of Systems   /62 (BP Location: Left arm, Patient Position: Sitting, Cuff Size: Adult Regular)   Pulse 68   Resp 16   Wt 47.6 kg (105 lb)   BMI 18.02 kg/m    Body mass index is 18.02 kg/m .  Wt Readings from Last 3 Encounters:   07/25/22 47.6 kg (105 lb)   09/14/21 50.3 kg (110 lb 12.8 oz)   09/02/21 48.5 kg (107 lb)     General Appearance:   Awake, Alert, No acute distress.   HEENT:  No scleral icterus; the mucous membranes were pink and moist.   Neck: No cervical bruits or jugular venous distention    Chest: The spine was straight. The chest was symmetric.   Lungs:   Respirations unlabored; the lungs are clear to auscultation. No wheezing   Cardiovascular:    Irregularly irregular rhythm.  S1, S2 normal.  2-3/6 holosystolic murmur heard from the lower left sternal border into the axilla.  No S3 gallop.   Abdomen:  No organomegaly, masses, bruits, or tenderness. Bowels sounds are present   Extremities:  No  peripheral edema bilaterally.   Skin: No xanthelasma. Warm, Dry.   Musculoskeletal: No tenderness.   Neurologic: Mood and affect are appropriate.    Enc Vitals  BP: 130/62  Pulse: 68  Resp: 16  Weight: 47.6 kg (105 lb)                                         Medical History  Surgical History Family History Social History   Past Medical History:   Diagnosis Date     Asthma      Bilateral carpal tunnel syndrome 8/27/2015     CAD (coronary artery disease)      Chronic airway obstruction, not elsewhere classified     Created by Conversion      Chronic anemia      Chronic edema      Congestive heart failure, severe (H) 5/13/2020     COPD (chronic obstructive pulmonary disease) (H)      Coronary artery disease      Depression      Family history of myocardial infarction     father 64     GERD (gastroesophageal reflux disease)      Heart disease      Heart murmur      High cholesterol     dislipidemia     HTN (hypertension)      Hypercholesterolemia      Hypertension      Hypothyroidism      Hypothyroidism      MI (myocardial infarction) (H) 1985     Myelodysplastic syndrome (H)      Myocardial infarction (H) 1983     OLEGARIO (obstructive sleep apnea)      Osteoporosis      Other and unspecified hyperlipidemia     Created by Conversion      Radicular low back pain      Rheumatoid arthritis (H)      Elizabeth Agers syndrome      Sjoegren syndrome      Sjogren's syndrome (H)      Sleep apnea, obstructive      Spinal stenosis      Unspecified polyarthropathy or polyarthritis, hand     Created by Conversion     Past Surgical History:   Procedure Laterality Date     aortic valve       AORTIC VALVE REPLACEMENT  2012     APPENDECTOMY       APPENDECTOMY       AS TOTAL KNEE ARTHROPLASTY Right      BACK SURGERY  2004    spinal decompression     BACK SURGERY      spinal stenosis     BONE MARROW BIOPSY  2004     breast biopsy       BREAST SURGERY  2001    biopsy     BYPASS GRAFT ARTERY CORONARY  2009    LIMA to ramus intermedius     cardiac  angiogram       CARDIAC CATHETERIZATION       CARDIAC SURGERY       EYE SURGERY  2008    bilateral cataract repair      EYE SURGERY Bilateral     cornea transplant left eye & cataracts     KYPHOPLASTY  2003    T12     OPEN REDUCTION INTERNAL FIXATION HIP NAILING Right 8/13/2021    Procedure: INTERNAL FIXATION, FRACTURE, TROCHANTERIC, HIP, USING PINS OR RODS;  Surgeon: Darrel Castro MD;  Location: [facility] OR     OTHER SURGICAL HISTORY  01/2018    Rectosigmoidectomy perineal     RECTOSIGMOIDECTOMY PERINEAL N/A 1/10/2018    Procedure: RECTOSIGMOIDECTOMY PERINEAL;  PERINEAL RECTOSIGMOIDECTOMY ;  Surgeon: Candelaria Anthony MD;  Location: [facility] OR     REPAIR VALVE AORTIC  2009     THORACIC SURGERY  2003    T12 kyphoplasty     ZZC CABG, ARTERY-VEIN, FOUR       ZZC CABG, VEIN, SINGLE      Description: CABG (CABG);  Recorded: 03/09/2012;  Comments: Single vessel bypass graft to an obtuse marginal branch in May 2009     ZZC REPLACE AORT VALV,PROSTH VALV      Description: Aortic Valve Replacement;  Recorded: 03/09/2012;  Comments: Aortic valve replacement    Family History   Problem Relation Age of Onset     Family History Negative Mother      Cancer Mother      Heart Disease Father     Social History     Socioeconomic History     Marital status:      Spouse name: Not on file     Number of children: Not on file     Years of education: Not on file     Highest education level: Not on file   Occupational History     Not on file   Tobacco Use     Smoking status: Never Smoker     Smokeless tobacco: Never Used   Substance and Sexual Activity     Alcohol use: No     Alcohol/week: 0.0 standard drinks     Comment: Alcoholic Drinks/day: occasional glass of wine     Drug use: No     Sexual activity: Not on file   Other Topics Concern     Not on file   Social History Narrative    .  passed away 20 years ago. No children. 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 Retired from [employer] at [employer]. Was living  independently in her apartment in [place] prior to admission. Uses walker at baseline.      Social Determinants of Health     Financial Resource Strain: Not on file   Food Insecurity: Not on file   Transportation Needs: Not on file   Physical Activity: Not on file   Stress: Not on file   Social Connections: Not on file   Intimate Partner Violence: Not on file   Housing Stability: Not on file          Medications  Allergies   Current Outpatient Medications   Medication Sig Dispense Refill     Acetaminophen (TYLENOL PO) Take 1,000 mg by mouth every 6 hours as needed for mild pain        albuterol (PROAIR HFA/PROVENTIL HFA/VENTOLIN HFA) 108 (90 Base) MCG/ACT inhaler Inhale 2 puffs into the lungs every 6 hours       Ascorbic Acid (VITAMIN C PO) Take 500 mg by mouth every morning       aspirin (ASA) 81 MG EC tablet Take 1 tablet (81 mg) by mouth 2 times daily       Calcium Carb-Cholecalciferol (CALCIUM+D3) 600-800 MG-UNIT TABS Take 1 tablet by mouth daily       calcium carbonate (TUMS) 500 MG chewable tablet Take 2 chew tab by mouth daily as needed for heartburn       cholecalciferol (D3 HIGH POTENCY) 125 MCG (5000 UT) CAPS Take 1 capsule by mouth daily       cycloSPORINE (RESTASIS) 0.05 % ophthalmic emulsion Place 1 drop into both eyes 2 times daily        famotidine (PEPCID) 20 MG tablet Take 1 tablet by mouth       fluorometholone (FML LIQUIFILM) 0.1 % ophthalmic susp Place 1 drop Into the left eye daily        IBANDRONATE SODIUM PO Take 150 mg by mouth every 30 days       ipratropium-albuterol (COMBIVENT RESPIMAT)  MCG/ACT inhaler Inhale 2 puffs into the lungs 4 times daily       Levothyroxine Sodium (LEVOXYL PO) Take 88 mcg by mouth daily        Lidocaine (LIDOCARE) 4 % Patch APPLY 1 PATCH TOPICALLY TO LEFT HIP ONCE DAILY (ON FOR 12 HOURS, OFF FOR 12 HOURS)       loratadine (CLARITIN) 10 MG tablet Take 1 tablet (10 mg) by mouth daily (Patient taking differently: Take 10 mg by mouth daily as needed)        magnesium citrate 1.745 GM/30ML solution 150 ml       magnesium citrate solution Take 150 mLs by mouth daily as needed for other       METOPROLOL TARTRATE PO Take 25 mg by mouth 2 times daily        mirtazapine (REMERON) 7.5 MG tablet Take 1 tablet by mouth       mirtazapine (REMERON) 7.5 MG tablet Take 7.5 mg by mouth At Bedtime       Multiple Vitamins-Minerals (CEROVITE PO) Take 1 tablet by mouth daily       Multiple Vitamins-Minerals (PRESERVISION AREDS PO) Take 1 tablet by mouth 2 times daily       Nirmatrelvir & Ritonavir (PAXLOVID) 10 x 150 MG & 10 x 100MG TBPK See Admin Instructions       omeprazole (PRILOSEC) 20 MG DR capsule Take 20 mg by mouth 2 times daily       OXYGEN-HELIUM IN        polyethylene glycol (MIRALAX) 17 GM/Dose powder Take 1 capful by mouth every morning       polyethylene glycol 0.4%- propylene glycol 0.3% (SYSTANE ULTRA) 0.4-0.3 % SOLN ophthalmic solution Place 1 drop into both eyes 4 times daily       Probiotic Product (PROBIOTIC PO) Take 1 capsule by mouth every morning       Psyllium (METAMUCIL FIBER PO) MIX 1 PACKET IN BEVERAGE OF CHOICE AND DRINK TWICE WEEKLY ON TUES AND THURS       Skin Protectants, Misc. (EUCERIN) cream See Admin Instructions       sodium chloride 1 GM tablet Take 1 g by mouth daily        spironolactone (ALDACTONE) 50 MG tablet Take 25 mg by mouth daily        sucralfate (CARAFATE) 1 GM tablet Take 1 g by mouth 2 times daily       timolol (TIMOPTIC) 0.5 % ophthalmic solution Place 1 drop Into the left eye daily        torsemide (DEMADEX) 20 MG tablet Take 20 mg by mouth daily       traMADol (ULTRAM) 50 MG tablet Take 1 tablet by mouth as needed        Allergies   Allergen Reactions     Gramineae Pollens Other (See Comments)     ORCHARDGRASS. Shortness of breath when lawn is just cut.     Smoke. Other (See Comments)     Hard to breathe.     Pollen Extract      Other reaction(s): Unknown         Lab Results    Chemistry/lipid CBC Cardiac Enzymes/BNP/TSH/INR   Recent  Labs   Lab Test 07/04/22  1029 01/10/18  0627 11/09/16  0952   TRIG  --   --  48   LDL  --   --  84   BUN 24.1*   < >  --    *   < >  --    CO2 27   < >  --     < > = values in this interval not displayed.    Recent Labs   Lab Test 09/02/21  1052   WBC 6.4   HGB 9.4*   HCT 30.0*   MCV 90       Recent Labs   Lab Test 08/13/21  0552 08/12/21  2122 05/14/20  0600 05/13/20  0446 03/22/20  0852   TROPONINI  --   --  0.05   < >  --    *  --   --    < >  --    TSH  --   --   --   --  1.17   INR  --  1.10  --    < >  --     < > = values in this interval not displayed.        A total of 50 minutes was spent reviewing patient's medical records, obtaining history and performing examination, as well as discussing diagnoses/ recommendations with patient and answering all questions.

## 2022-07-25 NOTE — LETTER
Date:July 26, 2022      Patient was self referred, no letter generated. Do not send.        [facility] Health Information

## 2022-07-25 NOTE — LETTER
7/25/2022    No primary care provider on file.  No primary provider on file.    RE: Marla Dunn       Dear Colleague,     I had the pleasure of seeing Marla Dunn in the [facility].      Thank you, Dr. Goyal, for asking the [facility] team to see Ms. Marla Dunn to follow-up on coronary artery disease, valvular heart disease, chronic atrial fibrillation.    Assessment/Recommendations   Assessment:    1.  Chronic atrial fibrillation, rate controlled.  Patient remains off anticoagulation due to frequent falls.  She had been offered the option of undergoing Watchman device implant although upon hearing that this would require approximately 12 weeks of anticoagulation, she and her family elected not to pursue it.  At this point we will continue current AV zev blocking medication to control rate.  2.  Coronary artery disease, status post coronary artery bypass grafting in 2009 at the time of aortic valve replacement.  Nuclear stress test 1 year ago demonstrated no evidence of ischemia despite reports of exertional dyspnea.  Her symptoms of exertional dyspnea have remained unchanged.  Denies any associated chest discomfort.  At this point recommend continuation of medical therapy and observation.  3.  Aortic valve stenosis status post bioprosthetic aortic valve replacement in 2009.  Echocardiogram done last year demonstrates normal prosthetic valve function.  4.  Mild to moderate mitral valve stenosis with moderate to severe mitral regurgitation.  Patient was seen by Dr. Pang in our valve clinic who felt she was not a candidate for a MitraClip due to baseline stenosis which would increase with MitraClip placement.  At this point he recommended continuation of medical therapy.  She reports intermittent shortness of breath but denies orthopnea, PND or lower extremity edema.    Plan:  1.  Continue current medication  2.  Echocardiogram to follow-up on valve disease  and LV function  3.  Tentative follow-up in 1 year or sooner if change in symptoms       History of Present Illness    Ms. Marla Dunn is a 89 year old female with history of coronary artery disease status post coronary artery bypass grafting, aortic valve stenosis status post bioprosthetic aortic valve replacement in 2009 in conjunction with bypass surgery, mild to moderate mitral valve stenosis with moderate to severe mitral regurgitation and chronic atrial fibrillation who presents today for a follow-up visit.  She reports no significant change in symptoms over the past year.  Continues to experience episodes of shortness of breath both with activity as well as at rest.  Denies orthopnea, PND or lower extremity edema.  Also reports no palpitations, lightheadedness or near syncope.  Continues to do an elliptical  for half an hour every day as well as to walk for hallways in her building on a daily basis.  Denies any associated chest discomfort with this activity.  Has had occasional falls, due to missteps.  This may be due to her peripheral neuropathy.    ECG (personally reviewed): No ECG today    Cardiac Imaging Studies (personally reviewed): No new cardiac imaging     Physical Examination Review of Systems   /62 (BP Location: Left arm, Patient Position: Sitting, Cuff Size: Adult Regular)   Pulse 68   Resp 16   Wt 47.6 kg (105 lb)   BMI 18.02 kg/m    Body mass index is 18.02 kg/m .  Wt Readings from Last 3 Encounters:   07/25/22 47.6 kg (105 lb)   09/14/21 50.3 kg (110 lb 12.8 oz)   09/02/21 48.5 kg (107 lb)     General Appearance:   Awake, Alert, No acute distress.   HEENT:  No scleral icterus; the mucous membranes were pink and moist.   Neck: No cervical bruits or jugular venous distention    Chest: The spine was straight. The chest was symmetric.   Lungs:   Respirations unlabored; the lungs are clear to auscultation. No wheezing   Cardiovascular:    Irregularly irregular rhythm.  S1, S2  normal.  2-3/6 holosystolic murmur heard from the lower left sternal border into the axilla.  No S3 gallop.   Abdomen:  No organomegaly, masses, bruits, or tenderness. Bowels sounds are present   Extremities:  No peripheral edema bilaterally.   Skin: No xanthelasma. Warm, Dry.   Musculoskeletal: No tenderness.   Neurologic: Mood and affect are appropriate.    Enc Vitals  BP: 130/62  Pulse: 68  Resp: 16  Weight: 47.6 kg (105 lb)                                         Medical History  Surgical History Family History Social History   Past Medical History:   Diagnosis Date     Asthma      Bilateral carpal tunnel syndrome 8/27/2015     CAD (coronary artery disease)      Chronic airway obstruction, not elsewhere classified     Created by Conversion      Chronic anemia      Chronic edema      Congestive heart failure, severe (H) 5/13/2020     COPD (chronic obstructive pulmonary disease) (H)      Coronary artery disease      Depression      Family history of myocardial infarction     father 64     GERD (gastroesophageal reflux disease)      Heart disease      Heart murmur      High cholesterol     dislipidemia     HTN (hypertension)      Hypercholesterolemia      Hypertension      Hypothyroidism      Hypothyroidism      MI (myocardial infarction) (H) 1985     Myelodysplastic syndrome (H)      Myocardial infarction (H) 1983     OLEGARIO (obstructive sleep apnea)      Osteoporosis      Other and unspecified hyperlipidemia     Created by Conversion      Radicular low back pain      Rheumatoid arthritis (H)      Elizabeth Agers syndrome      Sjoegren syndrome      Sjogren's syndrome (H)      Sleep apnea, obstructive      Spinal stenosis      Unspecified polyarthropathy or polyarthritis, hand     Created by Conversion     Past Surgical History:   Procedure Laterality Date     aortic valve       AORTIC VALVE REPLACEMENT  2012     APPENDECTOMY       APPENDECTOMY       AS TOTAL KNEE ARTHROPLASTY Right      BACK SURGERY  2004    spinal  decompression     BACK SURGERY      spinal stenosis     BONE MARROW BIOPSY  2004     breast biopsy       BREAST SURGERY  2001    biopsy     BYPASS GRAFT ARTERY CORONARY  2009    LIMA to ramus intermedius     cardiac angiogram       CARDIAC CATHETERIZATION       CARDIAC SURGERY       EYE SURGERY  2008    bilateral cataract repair      EYE SURGERY Bilateral     cornea transplant left eye & cataracts     KYPHOPLASTY  2003    T12     OPEN REDUCTION INTERNAL FIXATION HIP NAILING Right 8/13/2021    Procedure: INTERNAL FIXATION, FRACTURE, TROCHANTERIC, HIP, USING PINS OR RODS;  Surgeon: Darrel Castro MD;  Location: [facility] [place] OR     OTHER SURGICAL HISTORY  01/2018    Rectosigmoidectomy perineal     RECTOSIGMOIDECTOMY PERINEAL N/A 1/10/2018    Procedure: RECTOSIGMOIDECTOMY PERINEAL;  PERINEAL RECTOSIGMOIDECTOMY ;  Surgeon: Candelaria Anthony MD;  Location: [facility] OR     REPAIR VALVE AORTIC  2009     THORACIC SURGERY  2003    T12 kyphoplasty     ZZC CABG, ARTERY-VEIN, FOUR       ZZC CABG, VEIN, SINGLE      Description: CABG (CABG);  Recorded: 03/09/2012;  Comments: Single vessel bypass graft to an obtuse marginal branch in May 2009     ZZC REPLACE AORT VALV,PROSTH VALV      Description: Aortic Valve Replacement;  Recorded: 03/09/2012;  Comments: Aortic valve replacement    Family History   Problem Relation Age of Onset     Family History Negative Mother      Cancer Mother      Heart Disease Father     Social History     Socioeconomic History     Marital status:      Spouse name: Not on file     Number of children: Not on file     Years of education: Not on file     Highest education level: Not on file   Occupational History     Not on file   Tobacco Use     Smoking status: Never Smoker     Smokeless tobacco: Never Used   Substance and Sexual Activity     Alcohol use: No     Alcohol/week: 0.0 standard drinks     Comment: Alcoholic Drinks/day: occasional glass of wine     Drug use: No     Sexual activity: Not on  file   Other Topics Concern     Not on file   Social History Narrative    .  passed away 20 years ago. No children. Use to volunteer at hospital in [facility]. Retired from HR at [employer]. Was living independently in her apartment in [place] prior to admission. Uses walker at baseline.      Social Determinants of Health     Financial Resource Strain: Not on file   Food Insecurity: Not on file   Transportation Needs: Not on file   Physical Activity: Not on file   Stress: Not on file   Social Connections: Not on file   Intimate Partner Violence: Not on file   Housing Stability: Not on file          Medications  Allergies   Current Outpatient Medications   Medication Sig Dispense Refill     Acetaminophen (TYLENOL PO) Take 1,000 mg by mouth every 6 hours as needed for mild pain        albuterol (PROAIR HFA/PROVENTIL HFA/VENTOLIN HFA) 108 (90 Base) MCG/ACT inhaler Inhale 2 puffs into the lungs every 6 hours       Ascorbic Acid (VITAMIN C PO) Take 500 mg by mouth every morning       aspirin (ASA) 81 MG EC tablet Take 1 tablet (81 mg) by mouth 2 times daily       Calcium Carb-Cholecalciferol (CALCIUM+D3) 600-800 MG-UNIT TABS Take 1 tablet by mouth daily       calcium carbonate (TUMS) 500 MG chewable tablet Take 2 chew tab by mouth daily as needed for heartburn       cholecalciferol (D3 HIGH POTENCY) 125 MCG (5000 UT) CAPS Take 1 capsule by mouth daily       cycloSPORINE (RESTASIS) 0.05 % ophthalmic emulsion Place 1 drop into both eyes 2 times daily        famotidine (PEPCID) 20 MG tablet Take 1 tablet by mouth       fluorometholone (FML LIQUIFILM) 0.1 % ophthalmic susp Place 1 drop Into the left eye daily        IBANDRONATE SODIUM PO Take 150 mg by mouth every 30 days       ipratropium-albuterol (COMBIVENT RESPIMAT)  MCG/ACT inhaler Inhale 2 puffs into the lungs 4 times daily       Levothyroxine Sodium (LEVOXYL PO) Take 88 mcg by mouth daily        Lidocaine (LIDOCARE) 4 % Patch APPLY 1 PATCH TOPICALLY TO  LEFT HIP ONCE DAILY (ON FOR 12 HOURS, OFF FOR 12 HOURS)       loratadine (CLARITIN) 10 MG tablet Take 1 tablet (10 mg) by mouth daily (Patient taking differently: Take 10 mg by mouth daily as needed)       magnesium citrate 1.745 GM/30ML solution 150 ml       magnesium citrate solution Take 150 mLs by mouth daily as needed for other       METOPROLOL TARTRATE PO Take 25 mg by mouth 2 times daily        mirtazapine (REMERON) 7.5 MG tablet Take 1 tablet by mouth       mirtazapine (REMERON) 7.5 MG tablet Take 7.5 mg by mouth At Bedtime       Multiple Vitamins-Minerals (CEROVITE PO) Take 1 tablet by mouth daily       Multiple Vitamins-Minerals (PRESERVISION AREDS PO) Take 1 tablet by mouth 2 times daily       Nirmatrelvir & Ritonavir (PAXLOVID) 10 x 150 MG & 10 x 100MG TBPK See Admin Instructions       omeprazole (PRILOSEC) 20 MG DR capsule Take 20 mg by mouth 2 times daily       OXYGEN-HELIUM IN        polyethylene glycol (MIRALAX) 17 GM/Dose powder Take 1 capful by mouth every morning       polyethylene glycol 0.4%- propylene glycol 0.3% (SYSTANE ULTRA) 0.4-0.3 % SOLN ophthalmic solution Place 1 drop into both eyes 4 times daily       Probiotic Product (PROBIOTIC PO) Take 1 capsule by mouth every morning       Psyllium (METAMUCIL FIBER PO) MIX 1 PACKET IN BEVERAGE OF CHOICE AND DRINK TWICE WEEKLY ON TUES AND THURS       Skin Protectants, Misc. (EUCERIN) cream See Admin Instructions       sodium chloride 1 GM tablet Take 1 g by mouth daily        spironolactone (ALDACTONE) 50 MG tablet Take 25 mg by mouth daily        sucralfate (CARAFATE) 1 GM tablet Take 1 g by mouth 2 times daily       timolol (TIMOPTIC) 0.5 % ophthalmic solution Place 1 drop Into the left eye daily        torsemide (DEMADEX) 20 MG tablet Take 20 mg by mouth daily       traMADol (ULTRAM) 50 MG tablet Take 1 tablet by mouth as needed        Allergies   Allergen Reactions     Gramineae Pollens Other (See Comments)     ORCHARDGRASS. Shortness of breath  when lawn is just cut.     Smoke. Other (See Comments)     Hard to breathe.     Pollen Extract      Other reaction(s): Unknown         Lab Results    Chemistry/lipid CBC Cardiac Enzymes/BNP/TSH/INR   Recent Labs   Lab Test 07/04/22  1029 01/10/18  0627 11/09/16  0952   TRIG  --   --  48   LDL  --   --  84   BUN 24.1*   < >  --    *   < >  --    CO2 27   < >  --     < > = values in this interval not displayed.    Recent Labs   Lab Test 09/02/21  1052   WBC 6.4   HGB 9.4*   HCT 30.0*   MCV 90       Recent Labs   Lab Test 08/13/21  0552 08/12/21  2122 05/14/20  0600 05/13/20  0446 03/22/20  0852   TROPONINI  --   --  0.05   < >  --    *  --   --    < >  --    TSH  --   --   --   --  1.17   INR  --  1.10  --    < >  --     < > = values in this interval not displayed.        A total of 50 minutes was spent reviewing patient's medical records, obtaining history and performing examination, as well as discussing diagnoses/ recommendations with patient and answering all questions.                          Thank you for allowing me to participate in the care of your patient.      Sincerely,     Julia Amaya MD     [facility]  cc:   Referred Self, MD  No address on file

## 2022-08-12 ENCOUNTER — HOSPITAL ENCOUNTER (OUTPATIENT)
Dept: CARDIOLOGY | Facility: HOSPITAL | Age: 87
Discharge: HOME | End: 2022-08-12
Attending: INTERNAL MEDICINE | Admitting: INTERNAL MEDICINE
Payer: COMMERCIAL

## 2022-08-12 DIAGNOSIS — R06.09 DOE (DYSPNEA ON EXERTION): ICD-10-CM

## 2022-08-12 DIAGNOSIS — I25.10 CORONARY ARTERY DISEASE INVOLVING NATIVE CORONARY ARTERY OF NATIVE HEART, UNSPECIFIED WHETHER ANGINA PRESENT: Primary | ICD-10-CM

## 2022-08-12 DIAGNOSIS — Z95.2 S/P AVR (AORTIC VALVE REPLACEMENT): ICD-10-CM

## 2022-08-12 DIAGNOSIS — I25.10 CORONARY ARTERY DISEASE INVOLVING NATIVE CORONARY ARTERY OF NATIVE HEART, UNSPECIFIED WHETHER ANGINA PRESENT: ICD-10-CM

## 2022-08-12 DIAGNOSIS — I48.21 PERMANENT ATRIAL FIBRILLATION (H): ICD-10-CM

## 2022-08-12 DIAGNOSIS — I34.0 NONRHEUMATIC MITRAL VALVE REGURGITATION: ICD-10-CM

## 2022-08-12 LAB — LVEF ECHO: NORMAL

## 2022-08-12 PROCEDURE — 93306 TTE W/DOPPLER COMPLETE: CPT | Mod: 26 | Performed by: INTERNAL MEDICINE

## 2022-08-12 PROCEDURE — 93306 TTE W/DOPPLER COMPLETE: CPT

## 2022-08-29 ENCOUNTER — LAB REQUISITION (OUTPATIENT)
Dept: LAB | Facility: CLINIC | Age: 87
End: 2022-08-29
Payer: COMMERCIAL

## 2022-08-29 DIAGNOSIS — E87.1 HYPO-OSMOLALITY AND HYPONATREMIA: ICD-10-CM

## 2022-08-29 DIAGNOSIS — N18.31 CHRONIC KIDNEY DISEASE, STAGE 3A (H): ICD-10-CM

## 2022-09-05 ENCOUNTER — APPOINTMENT (OUTPATIENT)
Dept: CT IMAGING | Facility: HOSPITAL | Age: 87
DRG: 872 | End: 2022-09-05
Attending: EMERGENCY MEDICINE
Payer: COMMERCIAL

## 2022-09-05 ENCOUNTER — HOSPITAL ENCOUNTER (INPATIENT)
Facility: HOSPITAL | Age: 87
LOS: 4 days | Discharge: NURSING, REHAB OR LONG-TERM CARE | DRG: 872 | End: 2022-09-10
Attending: EMERGENCY MEDICINE | Admitting: INTERNAL MEDICINE
Payer: COMMERCIAL

## 2022-09-05 DIAGNOSIS — R11.2 NON-INTRACTABLE VOMITING WITH NAUSEA, UNSPECIFIED VOMITING TYPE: ICD-10-CM

## 2022-09-05 DIAGNOSIS — N30.00 ACUTE CYSTITIS WITHOUT HEMATURIA: ICD-10-CM

## 2022-09-05 DIAGNOSIS — R79.89 ELEVATED TROPONIN: ICD-10-CM

## 2022-09-05 DIAGNOSIS — I48.91 ATRIAL FIBRILLATION WITH RAPID VENTRICULAR RESPONSE (H): ICD-10-CM

## 2022-09-05 DIAGNOSIS — R10.84 ABDOMINAL PAIN, GENERALIZED: ICD-10-CM

## 2022-09-05 LAB
ALBUMIN SERPL-MCNC: 3.1 G/DL (ref 3.5–5)
ALP SERPL-CCNC: 95 U/L (ref 45–120)
ALT SERPL W P-5'-P-CCNC: 13 U/L (ref 0–45)
ANION GAP SERPL CALCULATED.3IONS-SCNC: 10 MMOL/L (ref 5–18)
ANION GAP SERPL CALCULATED.3IONS-SCNC: 11 MMOL/L (ref 7–15)
APTT PPP: 38 SECONDS (ref 22–38)
AST SERPL W P-5'-P-CCNC: 22 U/L (ref 0–40)
BASE EXCESS BLDV CALC-SCNC: 6.2 MMOL/L
BASOPHILS # BLD AUTO: 0 10E3/UL (ref 0–0.2)
BASOPHILS NFR BLD AUTO: 0 %
BILIRUB SERPL-MCNC: 0.8 MG/DL (ref 0–1)
BUN SERPL-MCNC: 35 MG/DL (ref 8–28)
BUN SERPL-MCNC: 35.7 MG/DL (ref 8–23)
CALCIUM SERPL-MCNC: 8.7 MG/DL (ref 8.5–10.5)
CALCIUM SERPL-MCNC: 9.3 MG/DL (ref 8.2–9.6)
CHLORIDE BLD-SCNC: 88 MMOL/L (ref 98–107)
CHLORIDE SERPL-SCNC: 86 MMOL/L (ref 98–107)
CO2 SERPL-SCNC: 27 MMOL/L (ref 22–31)
CREAT SERPL-MCNC: 1.17 MG/DL (ref 0.51–0.95)
CREAT SERPL-MCNC: 1.18 MG/DL (ref 0.6–1.1)
DEPRECATED HCO3 PLAS-SCNC: 27 MMOL/L (ref 22–29)
EOSINOPHIL # BLD AUTO: 0 10E3/UL (ref 0–0.7)
EOSINOPHIL NFR BLD AUTO: 0 %
ERYTHROCYTE [DISTWIDTH] IN BLOOD BY AUTOMATED COUNT: 15 % (ref 10–15)
GFR SERPL CREATININE-BSD FRML MDRD: 43 ML/MIN/1.73M2
GFR SERPL CREATININE-BSD FRML MDRD: 43 ML/MIN/1.73M2
GLUCOSE BLD-MCNC: 111 MG/DL (ref 70–125)
GLUCOSE SERPL-MCNC: 61 MG/DL (ref 70–99)
HCO3 BLDV-SCNC: 29 MMOL/L (ref 24–30)
HCT VFR BLD AUTO: 29.8 % (ref 35–47)
HGB BLD-MCNC: 10.2 G/DL (ref 11.7–15.7)
IMM GRANULOCYTES # BLD: 0.2 10E3/UL
IMM GRANULOCYTES NFR BLD: 1 %
INR PPP: 1.23 (ref 0.85–1.15)
LACTATE SERPL-SCNC: 1.2 MMOL/L (ref 0.7–2)
LIPASE SERPL-CCNC: 24 U/L (ref 0–52)
LYMPHOCYTES # BLD AUTO: 0.5 10E3/UL (ref 0.8–5.3)
LYMPHOCYTES NFR BLD AUTO: 3 %
MAGNESIUM SERPL-MCNC: 1.9 MG/DL (ref 1.8–2.6)
MCH RBC QN AUTO: 29.9 PG (ref 26.5–33)
MCHC RBC AUTO-ENTMCNC: 34.2 G/DL (ref 31.5–36.5)
MCV RBC AUTO: 87 FL (ref 78–100)
MONOCYTES # BLD AUTO: 1.3 10E3/UL (ref 0–1.3)
MONOCYTES NFR BLD AUTO: 8 %
NEUTROPHILS # BLD AUTO: 13.6 10E3/UL (ref 1.6–8.3)
NEUTROPHILS NFR BLD AUTO: 88 %
NRBC # BLD AUTO: 0 10E3/UL
NRBC BLD AUTO-RTO: 0 /100
OXYHGB MFR BLDV: 53.7 % (ref 70–75)
PCO2 BLDV: 43 MM HG (ref 35–50)
PH BLDV: 7.45 [PH] (ref 7.35–7.45)
PLATELET # BLD AUTO: 185 10E3/UL (ref 150–450)
PO2 BLDV: 30 MM HG (ref 25–47)
POTASSIUM BLD-SCNC: 3.4 MMOL/L (ref 3.5–5)
POTASSIUM SERPL-SCNC: 4.1 MMOL/L (ref 3.4–5.3)
PROT SERPL-MCNC: 7.1 G/DL (ref 6–8)
RBC # BLD AUTO: 3.41 10E6/UL (ref 3.8–5.2)
SAO2 % BLDV: 54.7 % (ref 70–75)
SODIUM SERPL-SCNC: 124 MMOL/L (ref 136–145)
SODIUM SERPL-SCNC: 125 MMOL/L (ref 136–145)
TROPONIN I SERPL-MCNC: 0.64 NG/ML (ref 0–0.29)
WBC # BLD AUTO: 15.4 10E3/UL (ref 4–11)

## 2022-09-05 PROCEDURE — 96367 TX/PROPH/DG ADDL SEQ IV INF: CPT

## 2022-09-05 PROCEDURE — 250N000011 HC RX IP 250 OP 636: Performed by: EMERGENCY MEDICINE

## 2022-09-05 PROCEDURE — 74174 CTA ABD&PLVS W/CONTRAST: CPT

## 2022-09-05 PROCEDURE — 99285 EMERGENCY DEPT VISIT HI MDM: CPT | Mod: 25

## 2022-09-05 PROCEDURE — 96375 TX/PRO/DX INJ NEW DRUG ADDON: CPT

## 2022-09-05 PROCEDURE — 51702 INSERT TEMP BLADDER CATH: CPT

## 2022-09-05 PROCEDURE — C9803 HOPD COVID-19 SPEC COLLECT: HCPCS

## 2022-09-05 PROCEDURE — 82805 BLOOD GASES W/O2 SATURATION: CPT | Performed by: EMERGENCY MEDICINE

## 2022-09-05 PROCEDURE — 87186 SC STD MICRODIL/AGAR DIL: CPT | Performed by: EMERGENCY MEDICINE

## 2022-09-05 PROCEDURE — 85610 PROTHROMBIN TIME: CPT | Performed by: EMERGENCY MEDICINE

## 2022-09-05 PROCEDURE — 258N000003 HC RX IP 258 OP 636: Performed by: EMERGENCY MEDICINE

## 2022-09-05 PROCEDURE — 80053 COMPREHEN METABOLIC PANEL: CPT | Mod: ORL | Performed by: INTERNAL MEDICINE

## 2022-09-05 PROCEDURE — 36415 COLL VENOUS BLD VENIPUNCTURE: CPT | Performed by: EMERGENCY MEDICINE

## 2022-09-05 PROCEDURE — 85730 THROMBOPLASTIN TIME PARTIAL: CPT | Performed by: EMERGENCY MEDICINE

## 2022-09-05 PROCEDURE — 83605 ASSAY OF LACTIC ACID: CPT | Performed by: EMERGENCY MEDICINE

## 2022-09-05 PROCEDURE — 85025 COMPLETE CBC W/AUTO DIFF WBC: CPT | Performed by: EMERGENCY MEDICINE

## 2022-09-05 PROCEDURE — 93005 ELECTROCARDIOGRAM TRACING: CPT | Performed by: EMERGENCY MEDICINE

## 2022-09-05 PROCEDURE — 96365 THER/PROPH/DIAG IV INF INIT: CPT

## 2022-09-05 PROCEDURE — P9603 ONE-WAY ALLOW PRORATED MILES: HCPCS | Mod: ORL | Performed by: INTERNAL MEDICINE

## 2022-09-05 PROCEDURE — 96361 HYDRATE IV INFUSION ADD-ON: CPT

## 2022-09-05 PROCEDURE — 83735 ASSAY OF MAGNESIUM: CPT | Performed by: EMERGENCY MEDICINE

## 2022-09-05 PROCEDURE — 83690 ASSAY OF LIPASE: CPT | Performed by: EMERGENCY MEDICINE

## 2022-09-05 PROCEDURE — 81001 URINALYSIS AUTO W/SCOPE: CPT | Performed by: EMERGENCY MEDICINE

## 2022-09-05 PROCEDURE — 84484 ASSAY OF TROPONIN QUANT: CPT | Performed by: EMERGENCY MEDICINE

## 2022-09-05 PROCEDURE — 36415 COLL VENOUS BLD VENIPUNCTURE: CPT | Mod: ORL | Performed by: INTERNAL MEDICINE

## 2022-09-05 RX ORDER — CHLORAL HYDRATE 500 MG
1 CAPSULE ORAL DAILY
COMMUNITY
End: 2023-03-14

## 2022-09-05 RX ORDER — IOPAMIDOL 755 MG/ML
75 INJECTION, SOLUTION INTRAVASCULAR ONCE
Status: COMPLETED | OUTPATIENT
Start: 2022-09-05 | End: 2022-09-05

## 2022-09-05 RX ORDER — POTASSIUM CHLORIDE 7.45 MG/ML
10 INJECTION INTRAVENOUS ONCE
Status: COMPLETED | OUTPATIENT
Start: 2022-09-06 | End: 2022-09-06

## 2022-09-05 RX ORDER — HYDROMORPHONE HYDROCHLORIDE 1 MG/ML
0.2 INJECTION, SOLUTION INTRAMUSCULAR; INTRAVENOUS; SUBCUTANEOUS
Status: DISCONTINUED | OUTPATIENT
Start: 2022-09-05 | End: 2022-09-10 | Stop reason: HOSPADM

## 2022-09-05 RX ORDER — ONDANSETRON 2 MG/ML
4 INJECTION INTRAMUSCULAR; INTRAVENOUS EVERY 30 MIN PRN
Status: DISCONTINUED | OUTPATIENT
Start: 2022-09-05 | End: 2022-09-10 | Stop reason: HOSPADM

## 2022-09-05 RX ADMIN — IOPAMIDOL 75 ML: 755 INJECTION, SOLUTION INTRAVENOUS at 22:35

## 2022-09-05 RX ADMIN — POTASSIUM CHLORIDE 10 MEQ: 7.46 INJECTION, SOLUTION INTRAVENOUS at 23:46

## 2022-09-05 RX ADMIN — SODIUM CHLORIDE 500 ML: 9 INJECTION, SOLUTION INTRAVENOUS at 23:47

## 2022-09-05 RX ADMIN — SODIUM CHLORIDE 500 ML: 9 INJECTION, SOLUTION INTRAVENOUS at 21:38

## 2022-09-05 ASSESSMENT — ENCOUNTER SYMPTOMS
CONSTIPATION: 1
VOMITING: 0
ABDOMINAL PAIN: 1
COUGH: 1
FEVER: 1
NAUSEA: 1
FATIGUE: 1

## 2022-09-05 ASSESSMENT — ACTIVITIES OF DAILY LIVING (ADL): ADLS_ACUITY_SCORE: 35

## 2022-09-06 ENCOUNTER — APPOINTMENT (OUTPATIENT)
Dept: CARDIOLOGY | Facility: HOSPITAL | Age: 87
DRG: 872 | End: 2022-09-06
Attending: INTERNAL MEDICINE
Payer: COMMERCIAL

## 2022-09-06 PROBLEM — N39.0 SEPSIS SECONDARY TO UTI (H): Status: ACTIVE | Noted: 2021-08-13

## 2022-09-06 PROBLEM — I48.91 RAPID ATRIAL FIBRILLATION (H): Status: ACTIVE | Noted: 2022-09-06

## 2022-09-06 PROBLEM — R53.1 GENERALIZED WEAKNESS: Status: RESOLVED | Noted: 2021-08-13 | Resolved: 2022-09-06

## 2022-09-06 PROBLEM — I48.91 ATRIAL FIBRILLATION WITH RAPID VENTRICULAR RESPONSE (H): Status: ACTIVE | Noted: 2022-09-06

## 2022-09-06 PROBLEM — N39.0 URINARY TRACT INFECTION: Status: ACTIVE | Noted: 2022-09-06

## 2022-09-06 PROBLEM — N30.00 ACUTE CYSTITIS WITHOUT HEMATURIA: Status: ACTIVE | Noted: 2022-09-06

## 2022-09-06 PROBLEM — R79.89 ELEVATED TROPONIN: Status: ACTIVE | Noted: 2022-09-06

## 2022-09-06 PROBLEM — R11.2 NON-INTRACTABLE VOMITING WITH NAUSEA, UNSPECIFIED VOMITING TYPE: Status: ACTIVE | Noted: 2022-09-06

## 2022-09-06 PROBLEM — R68.89 FLU-LIKE SYMPTOMS: Status: RESOLVED | Noted: 2021-08-13 | Resolved: 2022-09-06

## 2022-09-06 PROBLEM — I24.89 DEMAND ISCHEMIA OF MYOCARDIUM (H): Status: ACTIVE | Noted: 2022-09-06

## 2022-09-06 PROBLEM — R10.84 ABDOMINAL PAIN, GENERALIZED: Status: ACTIVE | Noted: 2022-09-06

## 2022-09-06 LAB
ALBUMIN SERPL-MCNC: 2.8 G/DL (ref 3.5–5)
ALBUMIN UR-MCNC: 100 MG/DL
ALP SERPL-CCNC: 85 U/L (ref 45–120)
ALT SERPL W P-5'-P-CCNC: 15 U/L (ref 0–45)
ANION GAP SERPL CALCULATED.3IONS-SCNC: 11 MMOL/L (ref 5–18)
APPEARANCE UR: ABNORMAL
APTT PPP: 72 SECONDS (ref 22–38)
AST SERPL W P-5'-P-CCNC: 25 U/L (ref 0–40)
ATRIAL RATE - MUSE: 102 BPM
BACTERIA #/AREA URNS HPF: ABNORMAL /HPF
BILIRUB SERPL-MCNC: 0.5 MG/DL (ref 0–1)
BILIRUB UR QL STRIP: NEGATIVE
BUN SERPL-MCNC: 32 MG/DL (ref 8–28)
CALCIUM SERPL-MCNC: 8.3 MG/DL (ref 8.5–10.5)
CHLORIDE BLD-SCNC: 97 MMOL/L (ref 98–107)
CO2 SERPL-SCNC: 21 MMOL/L (ref 22–31)
COLOR UR AUTO: YELLOW
CREAT SERPL-MCNC: 1.28 MG/DL (ref 0.6–1.1)
DIASTOLIC BLOOD PRESSURE - MUSE: 54 MMHG
ERYTHROCYTE [DISTWIDTH] IN BLOOD BY AUTOMATED COUNT: 15.4 % (ref 10–15)
GFR SERPL CREATININE-BSD FRML MDRD: 39 ML/MIN/1.73M2
GLUCOSE BLD-MCNC: 74 MG/DL (ref 70–125)
GLUCOSE UR STRIP-MCNC: NEGATIVE MG/DL
HCT VFR BLD AUTO: 28.6 % (ref 35–47)
HGB BLD-MCNC: 9.7 G/DL (ref 11.7–15.7)
HGB UR QL STRIP: ABNORMAL
HOLD SPECIMEN: NORMAL
INTERPRETATION ECG - MUSE: NORMAL
KETONES UR STRIP-MCNC: NEGATIVE MG/DL
LACTATE SERPL-SCNC: 1.7 MMOL/L (ref 0.7–2)
LEUKOCYTE ESTERASE UR QL STRIP: ABNORMAL
LVEF ECHO: NORMAL
MCH RBC QN AUTO: 30.4 PG (ref 26.5–33)
MCHC RBC AUTO-ENTMCNC: 33.9 G/DL (ref 31.5–36.5)
MCV RBC AUTO: 90 FL (ref 78–100)
MUCOUS THREADS #/AREA URNS LPF: PRESENT /LPF
NITRATE UR QL: NEGATIVE
P AXIS - MUSE: NORMAL DEGREES
PH UR STRIP: 7 [PH] (ref 5–7)
PLATELET # BLD AUTO: 159 10E3/UL (ref 150–450)
POTASSIUM BLD-SCNC: 3.5 MMOL/L (ref 3.5–5)
PR INTERVAL - MUSE: NORMAL MS
PROT SERPL-MCNC: 6.3 G/DL (ref 6–8)
QRS DURATION - MUSE: 106 MS
QT - MUSE: 364 MS
QTC - MUSE: 483 MS
R AXIS - MUSE: -70 DEGREES
RBC # BLD AUTO: 3.19 10E6/UL (ref 3.8–5.2)
RBC URINE: 3 /HPF
SARS-COV-2 RNA RESP QL NAA+PROBE: NEGATIVE
SODIUM SERPL-SCNC: 129 MMOL/L (ref 136–145)
SP GR UR STRIP: 1.02 (ref 1–1.03)
SYSTOLIC BLOOD PRESSURE - MUSE: 98 MMHG
T AXIS - MUSE: 85 DEGREES
TROPONIN I SERPL-MCNC: 0.74 NG/ML (ref 0–0.29)
TROPONIN I SERPL-MCNC: 0.79 NG/ML (ref 0–0.29)
TROPONIN I SERPL-MCNC: 1.83 NG/ML (ref 0–0.29)
UROBILINOGEN UR STRIP-MCNC: <2 MG/DL
VENTRICULAR RATE- MUSE: 106 BPM
WBC # BLD AUTO: 22.9 10E3/UL (ref 4–11)
WBC URINE: 167 /HPF

## 2022-09-06 PROCEDURE — 250N000013 HC RX MED GY IP 250 OP 250 PS 637: Performed by: INTERNAL MEDICINE

## 2022-09-06 PROCEDURE — 84484 ASSAY OF TROPONIN QUANT: CPT | Performed by: INTERNAL MEDICINE

## 2022-09-06 PROCEDURE — 36415 COLL VENOUS BLD VENIPUNCTURE: CPT | Performed by: STUDENT IN AN ORGANIZED HEALTH CARE EDUCATION/TRAINING PROGRAM

## 2022-09-06 PROCEDURE — 258N000003 HC RX IP 258 OP 636: Performed by: INTERNAL MEDICINE

## 2022-09-06 PROCEDURE — 99207 PR NO BILLABLE SERVICE THIS VISIT: CPT | Performed by: INTERNAL MEDICINE

## 2022-09-06 PROCEDURE — 36415 COLL VENOUS BLD VENIPUNCTURE: CPT | Performed by: INTERNAL MEDICINE

## 2022-09-06 PROCEDURE — 80053 COMPREHEN METABOLIC PANEL: CPT | Performed by: INTERNAL MEDICINE

## 2022-09-06 PROCEDURE — 255N000002 HC RX 255 OP 636: Performed by: INTERNAL MEDICINE

## 2022-09-06 PROCEDURE — 93308 TTE F-UP OR LMTD: CPT | Mod: 26 | Performed by: INTERNAL MEDICINE

## 2022-09-06 PROCEDURE — 99222 1ST HOSP IP/OBS MODERATE 55: CPT | Performed by: INTERNAL MEDICINE

## 2022-09-06 PROCEDURE — 250N000009 HC RX 250: Performed by: EMERGENCY MEDICINE

## 2022-09-06 PROCEDURE — 94640 AIRWAY INHALATION TREATMENT: CPT

## 2022-09-06 PROCEDURE — 83605 ASSAY OF LACTIC ACID: CPT | Performed by: STUDENT IN AN ORGANIZED HEALTH CARE EDUCATION/TRAINING PROGRAM

## 2022-09-06 PROCEDURE — 93321 DOPPLER ECHO F-UP/LMTD STD: CPT | Mod: 26 | Performed by: INTERNAL MEDICINE

## 2022-09-06 PROCEDURE — 36415 COLL VENOUS BLD VENIPUNCTURE: CPT | Performed by: EMERGENCY MEDICINE

## 2022-09-06 PROCEDURE — 93321 DOPPLER ECHO F-UP/LMTD STD: CPT

## 2022-09-06 PROCEDURE — U0005 INFEC AGEN DETEC AMPLI PROBE: HCPCS | Performed by: EMERGENCY MEDICINE

## 2022-09-06 PROCEDURE — 84484 ASSAY OF TROPONIN QUANT: CPT | Performed by: EMERGENCY MEDICINE

## 2022-09-06 PROCEDURE — 99223 1ST HOSP IP/OBS HIGH 75: CPT | Mod: AI | Performed by: INTERNAL MEDICINE

## 2022-09-06 PROCEDURE — 85027 COMPLETE CBC AUTOMATED: CPT | Performed by: INTERNAL MEDICINE

## 2022-09-06 PROCEDURE — 250N000011 HC RX IP 250 OP 636: Performed by: EMERGENCY MEDICINE

## 2022-09-06 PROCEDURE — 250N000011 HC RX IP 250 OP 636: Performed by: INTERNAL MEDICINE

## 2022-09-06 PROCEDURE — 85730 THROMBOPLASTIN TIME PARTIAL: CPT | Performed by: INTERNAL MEDICINE

## 2022-09-06 PROCEDURE — 250N000009 HC RX 250: Performed by: INTERNAL MEDICINE

## 2022-09-06 PROCEDURE — 94640 AIRWAY INHALATION TREATMENT: CPT | Mod: 76

## 2022-09-06 PROCEDURE — 210N000001 HC R&B IMCU HEART CARE

## 2022-09-06 RX ORDER — MIRTAZAPINE 7.5 MG/1
7.5 TABLET, FILM COATED ORAL AT BEDTIME
Status: DISCONTINUED | OUTPATIENT
Start: 2022-09-06 | End: 2022-09-10 | Stop reason: HOSPADM

## 2022-09-06 RX ORDER — ONDANSETRON 4 MG/1
4 TABLET, ORALLY DISINTEGRATING ORAL EVERY 6 HOURS PRN
Status: DISCONTINUED | OUTPATIENT
Start: 2022-09-06 | End: 2022-09-10 | Stop reason: HOSPADM

## 2022-09-06 RX ORDER — METOPROLOL TARTRATE 25 MG/1
25 TABLET, FILM COATED ORAL 2 TIMES DAILY
Status: DISCONTINUED | OUTPATIENT
Start: 2022-09-06 | End: 2022-09-08

## 2022-09-06 RX ORDER — HEPARIN SODIUM 10000 [USP'U]/100ML
0-5000 INJECTION, SOLUTION INTRAVENOUS CONTINUOUS
Status: DISPENSED | OUTPATIENT
Start: 2022-09-06 | End: 2022-09-08

## 2022-09-06 RX ORDER — ALBUTEROL SULFATE 90 UG/1
2 AEROSOL, METERED RESPIRATORY (INHALATION) EVERY 4 HOURS PRN
Status: DISCONTINUED | OUTPATIENT
Start: 2022-09-06 | End: 2022-09-10 | Stop reason: HOSPADM

## 2022-09-06 RX ORDER — CARBOXYMETHYLCELLULOSE SODIUM 10 MG/ML
1 GEL OPHTHALMIC 4 TIMES DAILY
Status: DISCONTINUED | OUTPATIENT
Start: 2022-09-06 | End: 2022-09-10 | Stop reason: HOSPADM

## 2022-09-06 RX ORDER — IPRATROPIUM BROMIDE AND ALBUTEROL SULFATE 2.5; .5 MG/3ML; MG/3ML
3 SOLUTION RESPIRATORY (INHALATION)
Status: DISCONTINUED | OUTPATIENT
Start: 2022-09-06 | End: 2022-09-10 | Stop reason: HOSPADM

## 2022-09-06 RX ORDER — CEFTRIAXONE 2 G/1
2 INJECTION, POWDER, FOR SOLUTION INTRAMUSCULAR; INTRAVENOUS EVERY 24 HOURS
Status: DISCONTINUED | OUTPATIENT
Start: 2022-09-06 | End: 2022-09-06

## 2022-09-06 RX ORDER — PROCHLORPERAZINE 25 MG
12.5 SUPPOSITORY, RECTAL RECTAL EVERY 12 HOURS PRN
Status: DISCONTINUED | OUTPATIENT
Start: 2022-09-06 | End: 2022-09-10 | Stop reason: HOSPADM

## 2022-09-06 RX ORDER — CEFTRIAXONE 2 G/1
2 INJECTION, POWDER, FOR SOLUTION INTRAMUSCULAR; INTRAVENOUS ONCE
Status: COMPLETED | OUTPATIENT
Start: 2022-09-06 | End: 2022-09-06

## 2022-09-06 RX ORDER — PIPERACILLIN SODIUM, TAZOBACTAM SODIUM 3; .375 G/15ML; G/15ML
3.38 INJECTION, POWDER, LYOPHILIZED, FOR SOLUTION INTRAVENOUS ONCE
Status: COMPLETED | OUTPATIENT
Start: 2022-09-06 | End: 2022-09-06

## 2022-09-06 RX ORDER — HEPARIN SODIUM 5000 [USP'U]/.5ML
5000 INJECTION, SOLUTION INTRAVENOUS; SUBCUTANEOUS EVERY 12 HOURS
Status: DISCONTINUED | OUTPATIENT
Start: 2022-09-06 | End: 2022-09-06

## 2022-09-06 RX ORDER — SUCRALFATE 1 G/1
1 TABLET ORAL 2 TIMES DAILY
Status: DISCONTINUED | OUTPATIENT
Start: 2022-09-06 | End: 2022-09-08

## 2022-09-06 RX ORDER — ONDANSETRON 2 MG/ML
4 INJECTION INTRAMUSCULAR; INTRAVENOUS EVERY 6 HOURS PRN
Status: DISCONTINUED | OUTPATIENT
Start: 2022-09-06 | End: 2022-09-10 | Stop reason: HOSPADM

## 2022-09-06 RX ORDER — HEPARIN SODIUM 10000 [USP'U]/100ML
0-5000 INJECTION, SOLUTION INTRAVENOUS CONTINUOUS
Status: DISCONTINUED | OUTPATIENT
Start: 2022-09-06 | End: 2022-09-06

## 2022-09-06 RX ORDER — BISACODYL 10 MG
10 SUPPOSITORY, RECTAL RECTAL DAILY PRN
Status: DISCONTINUED | OUTPATIENT
Start: 2022-09-06 | End: 2022-09-10 | Stop reason: HOSPADM

## 2022-09-06 RX ORDER — TORSEMIDE 20 MG/1
40 TABLET ORAL DAILY
Status: DISCONTINUED | OUTPATIENT
Start: 2022-09-06 | End: 2022-09-08

## 2022-09-06 RX ORDER — METOPROLOL TARTRATE 25 MG/1
25 TABLET, FILM COATED ORAL ONCE
Status: DISCONTINUED | OUTPATIENT
Start: 2022-09-06 | End: 2022-09-06

## 2022-09-06 RX ORDER — PROCHLORPERAZINE MALEATE 5 MG
5 TABLET ORAL EVERY 6 HOURS PRN
Status: DISCONTINUED | OUTPATIENT
Start: 2022-09-06 | End: 2022-09-10 | Stop reason: HOSPADM

## 2022-09-06 RX ORDER — LIDOCAINE 40 MG/G
CREAM TOPICAL
Status: DISCONTINUED | OUTPATIENT
Start: 2022-09-06 | End: 2022-09-10 | Stop reason: HOSPADM

## 2022-09-06 RX ORDER — SODIUM CHLORIDE AND POTASSIUM CHLORIDE 150; 900 MG/100ML; MG/100ML
INJECTION, SOLUTION INTRAVENOUS CONTINUOUS
Status: DISCONTINUED | OUTPATIENT
Start: 2022-09-06 | End: 2022-09-08

## 2022-09-06 RX ORDER — CALCIUM CARBONATE 500 MG/1
1000 TABLET, CHEWABLE ORAL DAILY PRN
Status: DISCONTINUED | OUTPATIENT
Start: 2022-09-06 | End: 2022-09-10 | Stop reason: HOSPADM

## 2022-09-06 RX ORDER — ACETAMINOPHEN 325 MG/1
975 TABLET ORAL 3 TIMES DAILY
Status: DISCONTINUED | OUTPATIENT
Start: 2022-09-06 | End: 2022-09-10 | Stop reason: HOSPADM

## 2022-09-06 RX ORDER — SPIRONOLACTONE 25 MG/1
25 TABLET ORAL DAILY
Status: DISCONTINUED | OUTPATIENT
Start: 2022-09-06 | End: 2022-09-10 | Stop reason: HOSPADM

## 2022-09-06 RX ORDER — POLYETHYLENE GLYCOL 3350 17 G/17G
17 POWDER, FOR SOLUTION ORAL DAILY
Status: DISCONTINUED | OUTPATIENT
Start: 2022-09-06 | End: 2022-09-10 | Stop reason: HOSPADM

## 2022-09-06 RX ORDER — PANTOPRAZOLE SODIUM 20 MG/1
40 TABLET, DELAYED RELEASE ORAL 2 TIMES DAILY
Status: DISCONTINUED | OUTPATIENT
Start: 2022-09-06 | End: 2022-09-10 | Stop reason: HOSPADM

## 2022-09-06 RX ORDER — PIPERACILLIN SODIUM, TAZOBACTAM SODIUM 3; .375 G/15ML; G/15ML
3.38 INJECTION, POWDER, LYOPHILIZED, FOR SOLUTION INTRAVENOUS EVERY 8 HOURS
Status: DISCONTINUED | OUTPATIENT
Start: 2022-09-06 | End: 2022-09-06

## 2022-09-06 RX ORDER — CYCLOSPORINE 0.5 MG/ML
1 EMULSION OPHTHALMIC 2 TIMES DAILY
Status: DISCONTINUED | OUTPATIENT
Start: 2022-09-06 | End: 2022-09-06 | Stop reason: CLARIF

## 2022-09-06 RX ORDER — ASPIRIN 81 MG/1
81 TABLET ORAL 2 TIMES DAILY
Status: DISCONTINUED | OUTPATIENT
Start: 2022-09-06 | End: 2022-09-10 | Stop reason: HOSPADM

## 2022-09-06 RX ORDER — TIMOLOL MALEATE 5 MG/ML
1 SOLUTION/ DROPS OPHTHALMIC DAILY
Status: DISCONTINUED | OUTPATIENT
Start: 2022-09-06 | End: 2022-09-10 | Stop reason: HOSPADM

## 2022-09-06 RX ORDER — METOPROLOL TARTRATE 1 MG/ML
5 INJECTION, SOLUTION INTRAVENOUS ONCE
Status: COMPLETED | OUTPATIENT
Start: 2022-09-06 | End: 2022-09-06

## 2022-09-06 RX ORDER — LEVOTHYROXINE SODIUM 88 UG/1
88 TABLET ORAL DAILY
Status: DISCONTINUED | OUTPATIENT
Start: 2022-09-06 | End: 2022-09-10 | Stop reason: HOSPADM

## 2022-09-06 RX ORDER — FLUOROMETHOLONE 0.1 %
1 SUSPENSION, DROPS(FINAL DOSAGE FORM)(ML) OPHTHALMIC (EYE) DAILY
Status: DISCONTINUED | OUTPATIENT
Start: 2022-09-06 | End: 2022-09-10 | Stop reason: HOSPADM

## 2022-09-06 RX ORDER — PIPERACILLIN SODIUM, TAZOBACTAM SODIUM 3; .375 G/15ML; G/15ML
3.38 INJECTION, POWDER, LYOPHILIZED, FOR SOLUTION INTRAVENOUS EVERY 12 HOURS
Status: DISCONTINUED | OUTPATIENT
Start: 2022-09-07 | End: 2022-09-07

## 2022-09-06 RX ADMIN — SODIUM CHLORIDE 500 ML: 9 INJECTION, SOLUTION INTRAVENOUS at 12:37

## 2022-09-06 RX ADMIN — PERFLUTREN 2 ML: 6.52 INJECTION, SUSPENSION INTRAVENOUS at 13:45

## 2022-09-06 RX ADMIN — PANTOPRAZOLE SODIUM 40 MG: 20 TABLET, DELAYED RELEASE ORAL at 20:50

## 2022-09-06 RX ADMIN — CARBOXYMETHYLCELLULOSE SODIUM 1 DROP: 10 GEL OPHTHALMIC at 20:51

## 2022-09-06 RX ADMIN — Medication 125 MCG: at 08:14

## 2022-09-06 RX ADMIN — IPRATROPIUM BROMIDE AND ALBUTEROL SULFATE 3 ML: 2.5; .5 SOLUTION RESPIRATORY (INHALATION) at 11:52

## 2022-09-06 RX ADMIN — ONDANSETRON 4 MG: 2 INJECTION INTRAMUSCULAR; INTRAVENOUS at 01:02

## 2022-09-06 RX ADMIN — CARBOXYMETHYLCELLULOSE SODIUM 1 DROP: 10 GEL OPHTHALMIC at 08:01

## 2022-09-06 RX ADMIN — TIMOLOL MALEATE 1 DROP: 5 SOLUTION/ DROPS OPHTHALMIC at 08:03

## 2022-09-06 RX ADMIN — CARBOXYMETHYLCELLULOSE SODIUM 1 DROP: 10 GEL OPHTHALMIC at 11:46

## 2022-09-06 RX ADMIN — Medication 81 MG: at 20:50

## 2022-09-06 RX ADMIN — ACETAMINOPHEN 975 MG: 325 TABLET ORAL at 03:10

## 2022-09-06 RX ADMIN — ACETAMINOPHEN 975 MG: 325 TABLET ORAL at 13:25

## 2022-09-06 RX ADMIN — Medication 81 MG: at 08:15

## 2022-09-06 RX ADMIN — ACETAMINOPHEN 975 MG: 325 TABLET ORAL at 20:50

## 2022-09-06 RX ADMIN — MIRTAZAPINE 7.5 MG: 7.5 TABLET, FILM COATED ORAL at 04:28

## 2022-09-06 RX ADMIN — POLYETHYLENE GLYCOL 3350 17 G: 17 POWDER, FOR SOLUTION ORAL at 08:17

## 2022-09-06 RX ADMIN — SPIRONOLACTONE 25 MG: 25 TABLET, FILM COATED ORAL at 08:14

## 2022-09-06 RX ADMIN — SUCRALFATE 1 G: 1 TABLET ORAL at 20:50

## 2022-09-06 RX ADMIN — HEPARIN SODIUM 5000 UNITS: 10000 INJECTION, SOLUTION INTRAVENOUS; SUBCUTANEOUS at 07:58

## 2022-09-06 RX ADMIN — MIRTAZAPINE 7.5 MG: 7.5 TABLET, FILM COATED ORAL at 22:18

## 2022-09-06 RX ADMIN — HEPARIN SODIUM AND DEXTROSE 550 UNITS/HR: 10000; 5 INJECTION INTRAVENOUS at 12:57

## 2022-09-06 RX ADMIN — PIPERACILLIN AND TAZOBACTAM 3.38 G: 3; .375 INJECTION, POWDER, LYOPHILIZED, FOR SOLUTION INTRAVENOUS at 13:33

## 2022-09-06 RX ADMIN — CARBOXYMETHYLCELLULOSE SODIUM 1 DROP: 10 GEL OPHTHALMIC at 17:10

## 2022-09-06 RX ADMIN — METOPROLOL TARTRATE 5 MG: 5 INJECTION INTRAVENOUS at 01:13

## 2022-09-06 RX ADMIN — POTASSIUM CHLORIDE AND SODIUM CHLORIDE: 900; 150 INJECTION, SOLUTION INTRAVENOUS at 03:02

## 2022-09-06 RX ADMIN — SODIUM CHLORIDE TAB 1 GM 0.5 G: 1 TAB at 08:14

## 2022-09-06 RX ADMIN — METOPROLOL TARTRATE 25 MG: 25 TABLET, FILM COATED ORAL at 03:10

## 2022-09-06 RX ADMIN — PANTOPRAZOLE SODIUM 40 MG: 20 TABLET, DELAYED RELEASE ORAL at 08:14

## 2022-09-06 RX ADMIN — FLUOROMETHOLONE 1 DROP: 1 SOLUTION/ DROPS OPHTHALMIC at 08:02

## 2022-09-06 RX ADMIN — CEFTRIAXONE SODIUM 2 G: 2 INJECTION, POWDER, FOR SOLUTION INTRAMUSCULAR; INTRAVENOUS at 01:03

## 2022-09-06 RX ADMIN — IPRATROPIUM BROMIDE AND ALBUTEROL SULFATE 3 ML: 2.5; .5 SOLUTION RESPIRATORY (INHALATION) at 08:06

## 2022-09-06 RX ADMIN — SUCRALFATE 1 G: 1 TABLET ORAL at 08:15

## 2022-09-06 RX ADMIN — LEVOTHYROXINE SODIUM 88 MCG: 88 TABLET ORAL at 06:47

## 2022-09-06 RX ADMIN — IPRATROPIUM BROMIDE AND ALBUTEROL SULFATE 3 ML: 2.5; .5 SOLUTION RESPIRATORY (INHALATION) at 20:28

## 2022-09-06 RX ADMIN — IPRATROPIUM BROMIDE AND ALBUTEROL SULFATE 3 ML: 2.5; .5 SOLUTION RESPIRATORY (INHALATION) at 15:16

## 2022-09-06 RX ADMIN — ACETAMINOPHEN 975 MG: 325 TABLET ORAL at 08:14

## 2022-09-06 RX ADMIN — CALCIUM CARBONATE-VITAMIN D TAB 500 MG-200 UNIT 1 TABLET: 500-200 TAB at 08:14

## 2022-09-06 ASSESSMENT — ACTIVITIES OF DAILY LIVING (ADL)
ADLS_ACUITY_SCORE: 35
ADLS_ACUITY_SCORE: 35
DOING_ERRANDS_INDEPENDENTLY_DIFFICULTY: OTHER (SEE COMMENTS)
TRANSFERRING: 1-->ASSISTANCE (EQUIPMENT/PERSON) NEEDED (NOT DEVELOPMENTALLY APPROPRIATE)
TOILETING_ISSUES: NO
FALL_HISTORY_WITHIN_LAST_SIX_MONTHS: YES
TRANSFERRING: 1-->ASSISTANCE (EQUIPMENT/PERSON) NEEDED
CONCENTRATING,_REMEMBERING_OR_MAKING_DECISIONS_DIFFICULTY: NO
DRESSING/BATHING_DIFFICULTY: NO
ADLS_ACUITY_SCORE: 35
ADLS_ACUITY_SCORE: 37
ADLS_ACUITY_SCORE: 35
ADLS_ACUITY_SCORE: 35
ADLS_ACUITY_SCORE: 37
ADLS_ACUITY_SCORE: 37
EQUIPMENT_CURRENTLY_USED_AT_HOME: WALKER, ROLLING
CHANGE_IN_FUNCTIONAL_STATUS_SINCE_ONSET_OF_CURRENT_ILLNESS/INJURY: NO
ADLS_ACUITY_SCORE: 35
WEAR_GLASSES_OR_BLIND: NO
ADLS_ACUITY_SCORE: 35
ADLS_ACUITY_SCORE: 35
NUMBER_OF_TIMES_PATIENT_HAS_FALLEN_WITHIN_LAST_SIX_MONTHS: 1
WALKING_OR_CLIMBING_STAIRS_DIFFICULTY: YES
ADLS_ACUITY_SCORE: 35
DIFFICULTY_EATING/SWALLOWING: NO
WALKING_OR_CLIMBING_STAIRS: AMBULATION DIFFICULTY, REQUIRES EQUIPMENT
DEPENDENT_IADLS:: CLEANING;COOKING;SHOPPING;MEAL PREPARATION;MEDICATION MANAGEMENT;MONEY MANAGEMENT;TRANSPORTATION

## 2022-09-06 NOTE — PROGRESS NOTES
Patient seen by Dr. Roberts this morning.  Ordered serial troponins which are twice the initial value.  Latest troponin is 1.83.  Ordered heparin GTT, echocardiogram and cardiology consult.  Will hold off on spironolactone due to sepsis.  Labs ordered and show improvement in sodium level although creatinine is slightly worse.  White cell count is elevated at 22.9.  Blood pressure is  low and therefore will order normal saline bolus 500 mL.  Change Rocephin to Zosyn.  Prognosis is guarded given advanced age, elevated troponin and white cell count    Addendum Jose David Frazier MD, Hospitalist,09/06/22,2:10 PM  Nurse informed me that Family requested nephrology consult. Ordered the same.

## 2022-09-06 NOTE — ED TRIAGE NOTES
Complaints of generalized ABD pain. Tender to palpitation. Increased weakness and unable to transfer self in assisted living home. Fever 101 also reported by EMS.     Triage Assessment     Row Name 09/05/22 2116       Triage Assessment (Adult)    Airway WDL WDL       Respiratory WDL    Respiratory WDL WDL       Skin Circulation/Temperature WDL    Skin Circulation/Temperature WDL WDL       Cardiac WDL    Cardiac WDL WDL       Peripheral/Neurovascular WDL    Peripheral Neurovascular WDL WDL       Cognitive/Neuro/Behavioral WDL    Cognitive/Neuro/Behavioral WDL WDL

## 2022-09-06 NOTE — PROVIDER NOTIFICATION
BP still low at 88/46 (received bolus in the early afternoon). [place] Dr. Chacon notified at 1815.

## 2022-09-06 NOTE — PLAN OF CARE
Patient remains on 2L NC Sats high 90's. B.S exp wheezes. Scheduled duoneb x3 given via mask.  Patient states that she uses 3L oxygen at night with her CPAP. She is on room air during the day.      Steve Donahue, RT

## 2022-09-06 NOTE — H&P
[facility]    History and Physical - Hospitalist Service       Date of Admission:  9/5/2022    Assessment & Plan      Marla Dunn is a 89 year old female AL resident, admitted on 9/5/2022.    Elevated troponin, initial and 0.64.  Likely demand ischemia from rapid A. fib and sepsis.  ED provider discussed with on-call cardiology, Dr. Ortiz recommended trending troponin, no heparinization unless troponin doubles.  -Serial troponin, telemetry, start heparin drip if 3H trop X2 times from initial.  On recheck hemoglobin 0.74.  -Rate control of A. fib, correction of dehydration.    Sepsis due to UTI.  UC pending.  WBC is 15.4, rapid A. fib, fever 101, normal lactate.  -Empiric ceftriaxone, s/p 1 L NS bolus, maintenance IVF overnight, follow-up UC, BC.  -Monitor for signs of severe sepsis.    Atrial fibrillation, with mild RVR.  's in ED.  Are likely due to sepsis, with component of dehydration.  Not on anticoagulation due to fall risk.  -Continue PTA dose of Lopressor.  S/p 5 mg IV metoprolol in ED and will continue as needed.    Hyponatremia, chronic.  Baseline sodium around 130, was 125 on admission.  Likely hypovolemic, with BUN of 35.  -S/p 1 L NS bolus.    Hypokalemia, mild, 3.4.  Normal magnesium 1.9.  -Monitoring and replacing.    Chronic diastolic CHF, on torsemide.  BNP is slightly elevated 171, which appears to be her baseline echo on 8/12/2022- EF 55-60%, moderate to severe mitral regurgitation, moderate mitral stenosis-unchanged from 1 year ago.  Appears euvolemic on exam.  Holding torsemide for now, given signs of dehydration.  .  Insomnia, on Remeron.  GERD-on Protonix.  Constipation-on MiraLAX.     Diet:  Regular  DVT Prophylaxis: Enoxaparin (Lovenox) SQ  Bush Catheter: PRESENT, indication: Retention  Central Lines: None  Cardiac Monitoring: None  Code Status:   DNR/DNI, okay for prearrest intubation.    Clinically Significant Risk Factors Present on Admission         #  "Hyponatremia: Na = 125 mmol/L (Ref range: 136 - 145 mmol/L) on admission, will monitor as appropriate     # Hypoalbuminemia: Albumin = 3.1 g/dL (Ref range: 3.5 - 5.0 g/dL) on admission, will monitor as appropriate   # Coagulation Defect: INR = 1.23 (Ref range: 0.85 - 1.15) and/or PTT = 38 Seconds (Ref range: 22 - 38 Seconds) on admission, will monitor for bleeding      # Cachexia: Estimated body mass index is 17.78 kg/m  as calculated from the following:    Height as of this encounter: 1.626 m (5' 4\").    Weight as of this encounter: 47 kg (103 lb 9.6 oz).      Disposition Plan   { TIP  Expected discharge date has passed or is blank! Click here to update expected discharge date and refresh note (tipsheet)       The patient's care was discussed with the Bedside Nurse, Patient and Patient's Family kayce HERNÁNDEZ.    Fatimah Roberts MD  Hospitalist [facility]  Securely message with the Vocera Web Console (learn more here)  Text page via [facility] Paging/Directory     ______________________________________________________________________    Chief Complaint   Abdominal pain, fever, diffuse aches, diffuse abdominal pain, nausea without vomiting    History is obtained from the patient, electronic health record and emergency department physician    History of Present Illness   Marla Dunn is a 89 year old female with PMH of atrial fibrillation, not anticoagulated due to fall risk, mitral stenosis and regurgitation, diastolic CHF, COPD, assisted-living resident, presented to ED for above-mentioned symptoms.  Onset of generalized abdominal pain and fatigue 1 day.  She was not able to get out of bed due to her symptoms.  Reportedly fever 101 and ruled, by EMS.  Patient's hard of hearing without hearing aids and very poor historian.  She denies chest pain, cough, dyspnea at rest.    Review of Systems    The 10 point Review of Systems is negative other than noted in the HPI.    Past Medical " History    I have reviewed this patient's medical history and updated it with pertinent information if needed.   Past Medical History:   Diagnosis Date     Asthma      Bilateral carpal tunnel syndrome 8/27/2015     CAD (coronary artery disease)      Chronic airway obstruction, not elsewhere classified     Created by Conversion      Chronic anemia      Chronic edema      Congestive heart failure, severe (H) 5/13/2020     COPD (chronic obstructive pulmonary disease) (H)      Coronary artery disease      Depression      Family history of myocardial infarction     father 64     GERD (gastroesophageal reflux disease)      Heart disease      Heart murmur      High cholesterol     dislipidemia     HTN (hypertension)      Hypercholesterolemia      Hypertension      Hypothyroidism      Hypothyroidism      MI (myocardial infarction) (H) 1985     Myelodysplastic syndrome (H)      Myocardial infarction (H) 1983     OLEGARIO (obstructive sleep apnea)      Osteoporosis      Other and unspecified hyperlipidemia     Created by Conversion      Radicular low back pain      Rheumatoid arthritis (H)      Elizabeth Agers syndrome      Sjoegren syndrome      Sjogren's syndrome (H)      Sleep apnea, obstructive      Spinal stenosis      Unspecified polyarthropathy or polyarthritis, hand     Created by Conversion      Past Surgical History   I have reviewed this patient's surgical history and updated it with pertinent information if needed.  Past Surgical History:   Procedure Laterality Date     aortic valve       AORTIC VALVE REPLACEMENT  2012     APPENDECTOMY       APPENDECTOMY       AS TOTAL KNEE ARTHROPLASTY Right      BACK SURGERY  2004    spinal decompression     BACK SURGERY      spinal stenosis     BONE MARROW BIOPSY  2004     breast biopsy       BREAST SURGERY  2001    biopsy     BYPASS GRAFT ARTERY CORONARY  2009    LIMA to ramus intermedius     cardiac angiogram       CARDIAC CATHETERIZATION       CARDIAC SURGERY       EYE SURGERY  2008     bilateral cataract repair      EYE SURGERY Bilateral     cornea transplant left eye & cataracts     KYPHOPLASTY  2003    T12     OPEN REDUCTION INTERNAL FIXATION HIP NAILING Right 8/13/2021    Procedure: INTERNAL FIXATION, FRACTURE, TROCHANTERIC, HIP, USING PINS OR RODS;  Surgeon: Darrel Castro MD;  Location: [facility] OR     OTHER SURGICAL HISTORY  01/2018    Rectosigmoidectomy perineal     RECTOSIGMOIDECTOMY PERINEAL N/A 1/10/2018    Procedure: RECTOSIGMOIDECTOMY PERINEAL;  PERINEAL RECTOSIGMOIDECTOMY ;  Surgeon: Candelaria Anthony MD;  Location: [facility] OR     REPAIR VALVE AORTIC  2009     THORACIC SURGERY  2003    T12 kyphoplasty     ZZC CABG, ARTERY-VEIN, FOUR       ZZC CABG, VEIN, SINGLE      Description: CABG (CABG);  Recorded: 03/09/2012;  Comments: Single vessel bypass graft to an obtuse marginal branch in May 2009     ZZC REPLACE AORT VALV,PROSTH VALV      Description: Aortic Valve Replacement;  Recorded: 03/09/2012;  Comments: Aortic valve replacement       Social History   I have reviewed this patient's social history and updated it with pertinent information if needed.  Social History     Tobacco Use     Smoking status: Never Smoker     Smokeless tobacco: Never Used   Substance Use Topics     Alcohol use: No     Alcohol/week: 0.0 standard drinks     Comment: Alcoholic Drinks/day: occasional glass of wine     Drug use: No     Family History   I have reviewed this patient's family history and updated it with pertinent information if needed.  Family History   Problem Relation Age of Onset     Family History Negative Mother      Cancer Mother      Heart Disease Father      Prior to Admission Medications   Prior to Admission Medications   Prescriptions Last Dose Informant Patient Reported? Taking?   Acetaminophen (TYLENOL PO)  Self Yes Yes   Sig: Take 1,000 mg by mouth every 6 hours as needed for mild pain    Ascorbic Acid (VITAMIN C PO) 9/5/2022 at Unknown time Self Yes Yes   Sig: Take 500 mg by  mouth every morning   Calcium Carb-Cholecalciferol (CALCIUM+D3) 600-800 MG-UNIT TABS 9/5/2022 at Unknown time  Yes Yes   Sig: Take 1 tablet by mouth daily   IBANDRONATE SODIUM PO Past Month at Unknown time Self Yes Yes   Sig: Take 150 mg by mouth every 30 days   Levothyroxine Sodium (LEVOXYL PO) 9/5/2022 at Unknown time Self Yes Yes   Sig: Take 88 mcg by mouth daily    METOPROLOL TARTRATE PO 9/5/2022 at x1 Self Yes Yes   Sig: Take 25 mg by mouth 2 times daily    Multiple Vitamins-Minerals (CEROVITE PO) 9/5/2022 at Unknown time  Yes Yes   Sig: Take 1 tablet by mouth daily   Multiple Vitamins-Minerals (PRESERVISION AREDS PO) 9/5/2022 at x1 Self Yes Yes   Sig: Take 1 tablet by mouth 2 times daily   OXYGEN-HELIUM IN   Yes No   Probiotic Product (PROBIOTIC PO) 9/5/2022 at Unknown time Self Yes Yes   Sig: Take 1 capsule by mouth every morning   Psyllium (METAMUCIL FIBER PO)  Self Yes Yes   Sig: Take 1 packet by mouth daily as needed   albuterol (PROAIR HFA/PROVENTIL HFA/VENTOLIN HFA) 108 (90 Base) MCG/ACT inhaler 9/5/2022 at x3  Yes Yes   Sig: Inhale 2 puffs into the lungs every 6 hours   aspirin (ASA) 81 MG EC tablet 9/5/2022 at x1  No Yes   Sig: Take 1 tablet (81 mg) by mouth 2 times daily   calcium carbonate (TUMS) 500 MG chewable tablet  Self Yes Yes   Sig: Take 2 chew tab by mouth daily as needed for heartburn   cholecalciferol (D3 HIGH POTENCY) 125 MCG (5000 UT) CAPS 9/5/2022 at Unknown time  Yes Yes   Sig: Take 1 capsule by mouth daily   cycloSPORINE (RESTASIS) 0.05 % ophthalmic emulsion 9/5/2022 at x1 Self Yes Yes   Sig: Place 1 drop into both eyes 2 times daily    fish oil-omega-3 fatty acids 1000 MG capsule 9/5/2022 at Unknown time  Yes Yes   Sig: Take 1 g by mouth daily   fluorometholone (FML LIQUIFILM) 0.1 % ophthalmic susp 9/5/2022 at Unknown time Self Yes Yes   Sig: Place 1 drop Into the left eye daily    ipratropium-albuterol (COMBIVENT RESPIMAT)  MCG/ACT inhaler 9/5/2022 at x3 Self Yes Yes   Sig:  Inhale 2 puffs into the lungs 4 times daily   loratadine (CLARITIN) 10 MG tablet   No Yes   Sig: Take 1 tablet (10 mg) by mouth daily   Patient taking differently: Take 10 mg by mouth daily as needed   magnesium citrate solution   Yes Yes   Sig: Take 150 mLs by mouth daily as needed for other   mirtazapine (REMERON) 7.5 MG tablet 9/4/2022 at Unknown time  Yes Yes   Sig: Take 7.5 mg by mouth At Bedtime   omeprazole (PRILOSEC) 20 MG DR capsule 9/5/2022 at x1  Yes Yes   Sig: Take 20 mg by mouth 2 times daily   polyethylene glycol (MIRALAX) 17 GM/Dose powder  Self Yes Yes   Sig: Take 1 capful by mouth daily as needed   polyethylene glycol 0.4%- propylene glycol 0.3% (SYSTANE ULTRA) 0.4-0.3 % SOLN ophthalmic solution 9/5/2022 at x3 Self Yes Yes   Sig: Place 1 drop into both eyes 4 times daily   sodium chloride 1 GM tablet 9/5/2022 at Unknown time  Yes Yes   Sig: Take 500 mg by mouth daily   spironolactone (ALDACTONE) 50 MG tablet 9/5/2022 at Unknown time  Yes Yes   Sig: Take 25 mg by mouth daily    sucralfate (CARAFATE) 1 GM tablet 9/5/2022 at x1  Yes Yes   Sig: Take 1 g by mouth 2 times daily   timolol (TIMOPTIC) 0.5 % ophthalmic solution 9/5/2022 at Unknown time Self Yes Yes   Sig: Place 1 drop Into the left eye daily    torsemide (DEMADEX) 20 MG tablet 9/5/2022 at Unknown time  Yes Yes   Sig: Take 40 mg by mouth daily      Facility-Administered Medications: None     Allergies   Allergies   Allergen Reactions     Gramineae Pollens Other (See Comments)     ORCHARDGRASS. Shortness of breath when lawn is just cut.     Smoke. Other (See Comments)     Hard to breathe.     Pollen Extract      Other reaction(s): Unknown     Physical Exam   Vital Signs: Temp: 98.4  F (36.9  C) Temp src: Oral BP: 112/57 Pulse: 111   Resp: 25 SpO2: 92 % O2 Device: None (Room air)    Weight: 103 lbs 9.6 oz    General: Alert and oriented x 1. Not in obvious distress.  Very hard of hearing.  HEENT: NC, AT. Neck- supple, No JVP elevation,  lymphadenopathy or thyromegaly. Trachea-central.  Chest: Clear to auscultation bilaterally.  Heart: S1S2 irregregular, tachycardia in 110s.  Precordial harsh murmur  Abdomen: Soft, distended, tender in periumbilical area, hypoactive bowel sounds.  Back: No spine tenderness. No CVA tenderness.  Extremities: No leg swelling. Peripheral pulses 2+ bilaterally.  Neuro: Cranial nerves 1-12 grossly normal. No focal neurological deficit    Data   Data reviewed today: I reviewed all medications, new labs and imaging results over the last 24 hours. I personally reviewed    Recent Labs   Lab 09/05/22 2134 09/05/22  1610   WBC 15.4*  --    HGB 10.2*  --    MCV 87  --      --    INR 1.23*  --    * 124*   POTASSIUM 3.4* 4.1   CHLORIDE 88* 86*   CO2 27 27   BUN 35* 35.7*   CR 1.18* 1.17*   ANIONGAP 10 11   PERICO 8.7 9.3    61*   ALBUMIN 3.1*  --    PROTTOTAL 7.1  --    BILITOTAL 0.8  --    ALKPHOS 95  --    ALT 13  --    AST 22  --    LIPASE 24  --      15.4 (H)    \    10.2 (L)    /    185   N 88    L N/A    125 (L)    88 (L)    35 (H) /   ------------------------------------ 111   ALT 13   AST 22   AP 95   ALB 3.1 (L)   Ca 8.7  3.4 (L)    27    1.18 (H) \    % RETIC N/A    LDH N/A  Troponin N/A    BNP N/A    CK N/A  INR 1.23 (H)   PTT 38    D-dimer N/A    Fibrinogen N/A    Antithrombin N/A  Ferritin N/A  CRP N/A    IL-6 N/A  Recent Results (from the past 24 hour(s))   CTA Abdomen Pelvis with Contrast    Narrative    EXAM: CTA ABDOMEN PELVIS WITH CONTRAST  LOCATION: [facility]  DATE/TIME: 9/5/2022 10:38 PM    INDICATION: diffuse abd pain, distended loop central, h o a fib not on thinners, h o surgeries.  COMPARISON: 9/21/2020  TECHNIQUE: CT angiogram abdomen pelvis during arterial phase of injection of IV contrast. 2D and 3D MIP reconstructions were performed by the CT technologist. Dose reduction techniques were used.  CONTRAST: isovue 370 75ml    FINDINGS:  ANGIOGRAM  ABDOMEN/PELVIS: Normal caliber aorta, no dissection. Very heavy calcific atherosclerotic plaque present diffusely. Severe stenosis origin celiac artery but the vessel remains patent with apparent normal perfusion liver and spleen. Moderate   stenosis at origin of SMA but normal branch pattern of mesenteric jejunal branches. Inferior mesenteric artery appears patent. 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 Iliac arteries   all patent and normal in caliber.    LOWER CHEST: Negative.    HEPATOBILIARY: Hepatic cysts unchanged. There appears to be layering sludge within gallbladder but no wall thickening.    PANCREAS: Normal.    SPLEEN: Negative.    ADRENAL GLANDS: Normal.    KIDNEYS/BLADDER: Heterogeneous, delayed enhancement right mid cortex may relate to differential timing of contrast delivery through an aberrant right renal artery. No definite hydronephrosis. Benign left renal cyst unchanged.    BOWEL: No obstruction or inflammatory change. No definite bowel perfusion abnormality.    LYMPH NODES: Normal.    PELVIC ORGANS: No adnexal lesions. No free fluid. Bladder unremarkable.    MUSCULOSKELETAL: Degenerative changes throughout lumbar spine. Previous right hip ORIF. Prior T12 vertebroplasty.      Impression    IMPRESSION:  1.  No definite etiology for symptoms evident, no acute vascular abnormality identified.  2.  Very heavy diffuse atherosclerotic plaque. 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 Moderate stenosis involving the SMA but normal branch pattern of mesenteric arteries.  3.  Region of slightly delayed perfusion right kidney likely relate to the supply from main renal artery and a separate aberrant right renal artery.

## 2022-09-06 NOTE — ED NOTES
Bed: JNED-10  Expected date: 9/5/22  Expected time: 8:55 PM  Means of arrival: Ambulance  Comments:  [facility]  89 F, weakness, fever, tachy

## 2022-09-06 NOTE — ED PROVIDER NOTES
NAME: Marla Dunn  AGE: 89 year old female  YOB: 1929  MRN: [id number]  EVALUATION DATE & TIME: 2022  9:09 PM    PCP: Sally Goyal    ED PROVIDER: Nir Regan M.D.      Chief Complaint   Patient presents with     Abdominal Pain     fev     Fever     FINAL IMPRESSION:  1. Atrial fibrillation with rapid ventricular response (H)    2. Abdominal pain, generalized    3. Non-intractable vomiting with nausea, unspecified vomiting type    4. Elevated troponin    5. Acute cystitis without hematuria      MEDICAL DECISION MAKIN:31 PM I met with the patient, obtained history, performed an initial exam, and discussed options and plan for diagnostics and treatment here in the ED.   11:53 PM I spoke with the patient about plan for admission along with her niece who is her POA.  12:19 AM  [place] [facility] [place]  Pertinent Labs & Imaging studies reviewed. (See chart for details)     Patient was clinically assessed and consented to treatment. After assessment, medical decision making and workup were discussed with the patient. The patient was agreeable to plan for testing, workup, and treatment.    Marla Dunn is a 89 year old female who presents with abdominal pain and fever.   Differential diagnosis includes but not limited to urine tract infection, gastroenteritis, colitis, gastritis, mesenteric ischemia, atrial fibrillation, acute coronary syndrome, small bowel obstruction.  Patient with a history of atrial fibrillation and is not currently on anticoagulation.  Patient is confirmed on atrial fibrillation with slight RVR but does not note any palpitations.  She denies any chest pain but does have some middle abdominal pain along with nausea and vomiting.  Nausea vomiting could be atypical symptoms for cardiac.  Troponin and labs were taken.  CBC showed stable hemoglobin but leukocytosis.  Metabolic panel did show some electrolyte abnormalities which will be repleted  along with slight elevation creatinine and fluids will be given.  Patient plan for CT of the abdomen given the pain and bloating of the abdomen with possible clot.  Patient denied any urinary symptoms or flank pain at this time.  Pain medication was given and antinausea medications and patient was feeling better.  With fluids her heart rate did come down in the 90s but still in atrial fibrillation.  Troponin returned slightly elevated 0.6 for which I did speak with cardiology for and they did not recommend heparin at this time.  They would recommend repeat troponin and if it doubles would recommend heparin at that time however feel this is likely demand ischemia given the illness.  CTA returned with no acute findings but did have distended bladder.  Discussion with patient and she reports no feelings of needing to urinate but some discomfort in the lower abdomen and periumbilical.  Bush catheter was placed with almost 600 mL of fluid patient pain almost completely gone now.  Urine was cloudy and suspect UTI.  Patient's fever improved now and patient tolerating small bolus of fluids as well as electrolyte repletion.  Urine returned positive for infection and ceftriaxone will be started.  Additionally urine and blood cultures were obtained and patient will require admission.  I spoke with hospitalist for admission and will add on repeat troponin as well as admission to cardiac telemetry.    0 minutes of critical care time    MEDICATIONS GIVEN IN THE EMERGENCY:  Medications   ondansetron (ZOFRAN) injection 4 mg (4 mg Intravenous Given 9/6/22 0102)   HYDROmorphone (PF) (DILAUDID) injection 0.2 mg (has no administration in time range)   0.9% sodium chloride + KCl 20 mEq/L infusion ( Intravenous New Bag 9/6/22 0302)   acetaminophen (TYLENOL) tablet 975 mg (975 mg Oral Given 9/6/22 0310)   aspirin EC tablet 81 mg (has no administration in time range)   calcium carbonate-vitamin D (OS-PERICO with D) per tablet 1 tablet (has  no administration in time range)   calcium carbonate (TUMS) chewable tablet 1,000 mg (has no administration in time range)   Vitamin D3 (CHOLECALCIFEROL) tablet 125 mcg (has no administration in time range)   fluorometholone (FML LIQUIFILM) 0.1 % ophthalmic susp 1 drop (has no administration in time range)   ipratropium - albuterol 0.5 mg/2.5 mg/3 mL (DUONEB) neb solution 3 mL (0 mLs Nebulization Hold 9/6/22 0240)   levothyroxine (SYNTHROID/LEVOTHROID) tablet 88 mcg (has no administration in time range)   metoprolol tartrate (LOPRESSOR) tablet 25 mg (25 mg Oral Given 9/6/22 0310)   mirtazapine (REMERON) tablet TABS 7.5 mg (has no administration in time range)   pantoprazole (PROTONIX) EC tablet 40 mg (has no administration in time range)   polyethylene glycol (MIRALAX) Packet 17 g (has no administration in time range)   sodium chloride half-tab 0.5 g (has no administration in time range)   polyethylene glycol-propylene glycol (SYSTANE ULTRA) ophthalmic solution 1 drop (has no administration in time range)   spironolactone (ALDACTONE) tablet 25 mg (has no administration in time range)   timolol maleate (TIMOPTIC) 0.5 % ophthalmic solution 1 drop (has no administration in time range)   sucralfate (CARAFATE) tablet 1 g (has no administration in time range)   torsemide (DEMADEX) tablet 40 mg (has no administration in time range)   cefTRIAXone (ROCEPHIN) 2 g vial to attach to  ml bag for ADULTS or NS 50 ml bag for PEDS (has no administration in time range)   lidocaine 1 % 0.1-1 mL (has no administration in time range)   lidocaine (LMX4) cream (has no administration in time range)   sodium chloride (PF) 0.9% PF flush 3 mL (3 mLs Intracatheter Given 9/6/22 0302)   sodium chloride (PF) 0.9% PF flush 3 mL (has no administration in time range)   melatonin tablet 1 mg (has no administration in time range)   heparin ANTICOAGULANT injection 5,000 Units (has no administration in time range)   bisacodyl (DULCOLAX)  suppository 10 mg (has no administration in time range)   ondansetron (ZOFRAN ODT) ODT tab 4 mg (has no administration in time range)     Or   ondansetron (ZOFRAN) injection 4 mg (has no administration in time range)   prochlorperazine (COMPAZINE) injection 5 mg (has no administration in time range)     Or   prochlorperazine (COMPAZINE) tablet 5 mg (has no administration in time range)     Or   prochlorperazine (COMPAZINE) suppository 12.5 mg (has no administration in time range)   albuterol (PROVENTIL HFA/VENTOLIN HFA) inhaler (has no administration in time range)   0.9% sodium chloride BOLUS (0 mLs Intravenous Stopped 9/5/22 2312)   iopamidol (ISOVUE-370) solution 75 mL (75 mLs Intravenous Given 9/5/22 2235)   0.9% sodium chloride BOLUS (0 mLs Intravenous Stopped 9/6/22 0049)   potassium chloride 10 mEq in 100 mL sterile water intermittent infusion (premix) (0 mEq Intravenous Stopped 9/6/22 0049)   cefTRIAXone (ROCEPHIN) 2 g vial to attach to  ml bag for ADULTS or NS 50 ml bag for PEDS (0 g Intravenous Stopped 9/6/22 0201)   metoprolol (LOPRESSOR) injection 5 mg (5 mg Intravenous Given 9/6/22 0113)       NEW PRESCRIPTIONS STARTED AT TODAY'S ER VISIT:  New Prescriptions    No medications on file          =================================================================    HPI    Patient information was obtained from: Patient and EMS    Use of : N/A       Marla Dunn is a 89 year old female with a past medical history of COPD. MI, and HTN, who presents, via EMS, for evaluation of abdominal pain and constipation.     The patient reports generalized abdominal pain and fatigue over the course of today.  She reportedly could not get out of her bed at assisted living which she normally is able to do.  She reportedly had a fever of 101 en route for EMS.  She reports a cough due to having a dry mouth. She does note nausea without vomiting and does note some constipation.  She does report having  abdominal surgeries in the past.  She denies any other complaints at this time.      REVIEW OF SYSTEMS   Review of Systems   Constitutional: Positive for fatigue and fever.   Respiratory: Positive for cough.    Gastrointestinal: Positive for abdominal pain, constipation and nausea. Negative for vomiting.   All other systems reviewed and are negative.     PAST MEDICAL HISTORY:  Past Medical History:   Diagnosis Date     Asthma      Bilateral carpal tunnel syndrome 8/27/2015     CAD (coronary artery disease)      Chronic airway obstruction, not elsewhere classified     Created by Conversion      Chronic anemia      Chronic edema      Congestive heart failure, severe (H) 5/13/2020     COPD (chronic obstructive pulmonary disease) (H)      Coronary artery disease      Depression      Family history of myocardial infarction     father 64     GERD (gastroesophageal reflux disease)      Heart disease      Heart murmur      High cholesterol     dislipidemia     HTN (hypertension)      Hypercholesterolemia      Hypertension      Hypothyroidism      Hypothyroidism      MI (myocardial infarction) (H) 1985     Myelodysplastic syndrome (H)      Myocardial infarction (H) 1983     OLEGARIO (obstructive sleep apnea)      Osteoporosis      Other and unspecified hyperlipidemia     Created by Conversion      Radicular low back pain      Rheumatoid arthritis (H)      Elizabeth Agers syndrome      Sjoegren syndrome      Sjogren's syndrome (H)      Sleep apnea, obstructive      Spinal stenosis      Unspecified polyarthropathy or polyarthritis, hand     Created by Conversion        PAST SURGICAL HISTORY:  Past Surgical History:   Procedure Laterality Date     aortic valve       AORTIC VALVE REPLACEMENT  2012     APPENDECTOMY       APPENDECTOMY       AS TOTAL KNEE ARTHROPLASTY Right      BACK SURGERY  2004    spinal decompression     BACK SURGERY      spinal stenosis     BONE MARROW BIOPSY  2004     breast biopsy       BREAST SURGERY  2001    biopsy      BYPASS GRAFT ARTERY CORONARY  2009    LIMA to ramus intermedius     cardiac angiogram       CARDIAC CATHETERIZATION       CARDIAC SURGERY       EYE SURGERY  2008    bilateral cataract repair      EYE SURGERY Bilateral     cornea transplant left eye & cataracts     KYPHOPLASTY  2003    T12     OPEN REDUCTION INTERNAL FIXATION HIP NAILING Right 8/13/2021    Procedure: INTERNAL FIXATION, FRACTURE, TROCHANTERIC, HIP, USING PINS OR RODS;  Surgeon: Darrel Castro MD;  Location: [facility]     OTHER SURGICAL HISTORY  01/2018    Rectosigmoidectomy perineal     RECTOSIGMOIDECTOMY PERINEAL N/A 1/10/2018    Procedure: RECTOSIGMOIDECTOMY PERINEAL;  PERINEAL RECTOSIGMOIDECTOMY ;  Surgeon: Candelaria Anthony MD;  Location: [facility] OR     REPAIR VALVE AORTIC  2009     THORACIC SURGERY  2003    T12 kyphoplasty     ZC CABG, ARTERY-VEIN, FOUR       [email or web address] CABG, VEIN, SINGLE      Description: CABG (CABG);  Recorded: 03/09/2012;  Comments: Single vessel bypass graft to an obtuse marginal branch in May 2009     ZZC REPLACE AORT VALV,PROSTH VALV      Description: Aortic Valve Replacement;  Recorded: 03/09/2012;  Comments: Aortic valve replacement       CURRENT MEDICATIONS:      Current Facility-Administered Medications:      0.9% sodium chloride + KCl 20 mEq/L infusion, , Intravenous, Continuous, Fatimah Roberts MD, Last Rate: 75 mL/hr at 09/06/22 0302, New Bag at 09/06/22 0302     acetaminophen (TYLENOL) tablet 975 mg, 975 mg, Oral, TID, Fatimah Roberts MD, 975 mg at 09/06/22 0310     albuterol (PROVENTIL HFA/VENTOLIN HFA) inhaler, 2 puff, Inhalation, Q4H PRN, Fatimah Roberts MD     aspirin EC tablet 81 mg, 81 mg, Oral, BID, Fatimah Roberts MD     bisacodyl (DULCOLAX) suppository 10 mg, 10 mg, Rectal, Daily PRN, Fatimah Roberts MD     calcium carbonate (TUMS) chewable tablet 1,000 mg, 1,000 mg, Oral, Daily PRN, Fatimah Roberts MD     calcium carbonate-vitamin D (OS-PERICO with D) per tablet 1 tablet, 1  tablet, Oral, Daily, Fatimah Roberts MD     cefTRIAXone (ROCEPHIN) 2 g vial to attach to  ml bag for ADULTS or NS 50 ml bag for PEDS, 2 g, Intravenous, Q24H, Fatimah Roberts MD     fluorometholone (FML LIQUIFILM) 0.1 % ophthalmic susp 1 drop, 1 drop, Left Eye, Daily, Fatimah Roberts MD     heparin ANTICOAGULANT injection 5,000 Units, 5,000 Units, Subcutaneous, Q12H, Fatimah Roberts MD     HYDROmorphone (PF) (DILAUDID) injection 0.2 mg, 0.2 mg, Intravenous, Once PRN, Nir Regan MD     ipratropium - albuterol 0.5 mg/2.5 mg/3 mL (DUONEB) neb solution 3 mL, 3 mL, Nebulization, 4x daily, Fatimah Roberts MD     levothyroxine (SYNTHROID/LEVOTHROID) tablet 88 mcg, 88 mcg, Oral, Daily, Fatimah Roberts MD     lidocaine (LMX4) cream, , Topical, Q1H PRN, Fatimah Roberts MD     lidocaine 1 % 0.1-1 mL, 0.1-1 mL, Other, Q1H PRN, Fatimah Roberts MD     melatonin tablet 1 mg, 1 mg, Oral, At Bedtime PRN, Fatimah Roberts MD     metoprolol tartrate (LOPRESSOR) tablet 25 mg, 25 mg, Oral, BID, Fatimah Roberts MD, 25 mg at 09/06/22 0310     mirtazapine (REMERON) tablet TABS 7.5 mg, 7.5 mg, Oral, At Bedtime, Fatimah Roberts MD     ondansetron (ZOFRAN ODT) ODT tab 4 mg, 4 mg, Oral, Q6H PRN **OR** ondansetron (ZOFRAN) injection 4 mg, 4 mg, Intravenous, Q6H PRN, Fatimah Roberts MD     ondansetron (ZOFRAN) injection 4 mg, 4 mg, Intravenous, Q30 Min PRN, Nir Regan MD, 4 mg at 09/06/22 0102     pantoprazole (PROTONIX) EC tablet 40 mg, 40 mg, Oral, BID, Fatimah Roberts MD     polyethylene glycol (MIRALAX) Packet 17 g, 17 g, Oral, Daily, Fatimah Roberts MD     polyethylene glycol-propylene glycol (SYSTANE ULTRA) ophthalmic solution 1 drop, 1 drop, Both Eyes, 4x Daily, Fatimah Roberts MD     prochlorperazine (COMPAZINE) injection 5 mg, 5 mg, Intravenous, Q6H PRN **OR** prochlorperazine (COMPAZINE) tablet 5 mg, 5 mg, Oral, Q6H PRN **OR**  prochlorperazine (COMPAZINE) suppository 12.5 mg, 12.5 mg, Rectal, Q12H PRN, Fatimah Roberts MD     sodium chloride (PF) 0.9% PF flush 3 mL, 3 mL, Intracatheter, Q8H, Fatimah Roberts MD, 3 mL at 09/06/22 0302     sodium chloride (PF) 0.9% PF flush 3 mL, 3 mL, Intracatheter, q1 min prn, Fatimah Roberts MD     sodium chloride half-tab 0.5 g, 0.5 g, Oral, Daily, Fatimah Roberts MD     spironolactone (ALDACTONE) tablet 25 mg, 25 mg, Oral, Daily, Fatimah Roberts MD     sucralfate (CARAFATE) tablet 1 g, 1 g, Oral, BID, Fatimah Roberts MD     timolol maleate (TIMOPTIC) 0.5 % ophthalmic solution 1 drop, 1 drop, Left Eye, Daily, Fatimah Roberts MD     torsemide (DEMADEX) tablet 40 mg, 40 mg, Oral, Daily, Fatimah Roberts MD     Vitamin D3 (CHOLECALCIFEROL) tablet 125 mcg, 125 mcg, Oral, Daily, Fatimah Roberts MD    Current Outpatient Medications:      Acetaminophen (TYLENOL PO), Take 1,000 mg by mouth every 6 hours as needed for mild pain , Disp: , Rfl:      albuterol (PROAIR HFA/PROVENTIL HFA/VENTOLIN HFA) 108 (90 Base) MCG/ACT inhaler, Inhale 2 puffs into the lungs every 6 hours, Disp: , Rfl:      Ascorbic Acid (VITAMIN C PO), Take 500 mg by mouth every morning, Disp: , Rfl:      aspirin (ASA) 81 MG EC tablet, Take 1 tablet (81 mg) by mouth 2 times daily, Disp: , Rfl:      Calcium Carb-Cholecalciferol (CALCIUM+D3) 600-800 MG-UNIT TABS, Take 1 tablet by mouth daily, Disp: , Rfl:      calcium carbonate (TUMS) 500 MG chewable tablet, Take 2 chew tab by mouth daily as needed for heartburn, Disp: , Rfl:      cholecalciferol (D3 HIGH POTENCY) 125 MCG (5000 UT) CAPS, Take 1 capsule by mouth daily, Disp: , Rfl:      cycloSPORINE (RESTASIS) 0.05 % ophthalmic emulsion, Place 1 drop into both eyes 2 times daily , Disp: , Rfl:      fish oil-omega-3 fatty acids 1000 MG capsule, Take 1 g by mouth daily, Disp: , Rfl:      fluorometholone (FML LIQUIFILM) 0.1 % ophthalmic susp, Place 1 drop Into the left  eye daily , Disp: , Rfl:      IBANDRONATE SODIUM PO, Take 150 mg by mouth every 30 days, Disp: , Rfl:      ipratropium-albuterol (COMBIVENT RESPIMAT)  MCG/ACT inhaler, Inhale 2 puffs into the lungs 4 times daily, Disp: , Rfl:      Levothyroxine Sodium (LEVOXYL PO), Take 88 mcg by mouth daily , Disp: , Rfl:      loratadine (CLARITIN) 10 MG tablet, Take 1 tablet (10 mg) by mouth daily (Patient taking differently: Take 10 mg by mouth daily as needed), Disp: , Rfl:      magnesium citrate solution, Take 150 mLs by mouth daily as needed for other, Disp: , Rfl:      METOPROLOL TARTRATE PO, Take 25 mg by mouth 2 times daily , Disp: , Rfl:      mirtazapine (REMERON) 7.5 MG tablet, Take 7.5 mg by mouth At Bedtime, Disp: , Rfl:      Multiple Vitamins-Minerals (CEROVITE PO), Take 1 tablet by mouth daily, Disp: , Rfl:      Multiple Vitamins-Minerals (PRESERVISION AREDS PO), Take 1 tablet by mouth 2 times daily, Disp: , Rfl:      omeprazole (PRILOSEC) 20 MG DR capsule, Take 20 mg by mouth 2 times daily, Disp: , Rfl:      polyethylene glycol (MIRALAX) 17 GM/Dose powder, Take 1 capful by mouth daily as needed, Disp: , Rfl:      polyethylene glycol 0.4%- propylene glycol 0.3% (SYSTANE ULTRA) 0.4-0.3 % SOLN ophthalmic solution, Place 1 drop into both eyes 4 times daily, Disp: , Rfl:      Probiotic Product (PROBIOTIC PO), Take 1 capsule by mouth every morning, Disp: , Rfl:      Psyllium (METAMUCIL FIBER PO), Take 1 packet by mouth daily as needed, Disp: , Rfl:      sodium chloride 1 GM tablet, Take 500 mg by mouth daily, Disp: , Rfl:      spironolactone (ALDACTONE) 50 MG tablet, Take 25 mg by mouth daily , Disp: , Rfl:      sucralfate (CARAFATE) 1 GM tablet, Take 1 g by mouth 2 times daily, Disp: , Rfl:      timolol (TIMOPTIC) 0.5 % ophthalmic solution, Place 1 drop Into the left eye daily , Disp: , Rfl:      torsemide (DEMADEX) 20 MG tablet, Take 40 mg by mouth daily, Disp: , Rfl:      OXYGEN-HELIUM IN, , Disp: , Rfl:  "    ALLERGIES:  Allergies   Allergen Reactions     Gramineae Pollens Other (See Comments)     ORCHARDGRASS. Shortness of breath when lawn is just cut.     Smoke. Other (See Comments)     Hard to breathe.     Pollen Extract      Other reaction(s): Unknown       FAMILY HISTORY:  Family History   Problem Relation Age of Onset     Family History Negative Mother      Cancer Mother      Heart Disease Father        SOCIAL HISTORY:   Social History     Socioeconomic History     Marital status:    Tobacco Use     Smoking status: Never Smoker     Smokeless tobacco: Never Used   Substance and Sexual Activity     Alcohol use: No     Alcohol/week: 0.0 standard drinks     Comment: Alcoholic Drinks/day: occasional glass of wine     Drug use: No   Social History Narrative    .  passed away 20 years ago. No children. 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 Was living independently in her apartment in [place] prior to admission. Uses walker at baseline.        PHYSICAL EXAM:    Vitals: /65   Pulse (!) 130   Temp 98.4  F (36.9  C)   Resp (!) 39   Ht 1.626 m (5' 4\")   Wt 47 kg (103 lb 9.6 oz)   SpO2 96%   BMI 17.78 kg/m     Physical Exam  Vitals and nursing note reviewed.   Constitutional:       General: She is not in acute distress.     Appearance: She is well-developed and normal weight. She is ill-appearing. She is not toxic-appearing or diaphoretic.   HENT:      Head: Normocephalic.      Mouth/Throat:      Comments: Dry mucous membranes  Eyes:      General: No scleral icterus.     Extraocular Movements: Extraocular movements intact.      Pupils: Pupils are equal, round, and reactive to light.   Cardiovascular:      Rate and Rhythm: Tachycardia present. Rhythm irregular.      Heart sounds: Normal heart sounds.   Pulmonary:      Effort: Pulmonary effort is normal. No respiratory distress.      Breath sounds: Normal breath sounds. No rhonchi.   Chest:      Chest wall: No " tenderness.   Abdominal:      General: There is distension (Distended loop of bowel and mid abdomen.).      Palpations: Abdomen is soft.      Tenderness: There is abdominal tenderness in the periumbilical area and suprapubic area. There is no right CVA tenderness, left CVA tenderness, guarding or rebound.      Hernia: No hernia is present.   Skin:     General: Skin is warm and dry.      Coloration: Skin is not cyanotic or pale.   Neurological:      General: No focal deficit present.      Mental Status: She is alert and oriented to person, place, and time.   Psychiatric:         Behavior: Behavior normal.           LAB:  All pertinent labs reviewed and interpreted.  Labs Ordered and Resulted from Time of ED Arrival to Time of ED Departure   COMPREHENSIVE METABOLIC PANEL - Abnormal       Result Value    Sodium 125 (*)     Potassium 3.4 (*)     Chloride 88 (*)     Carbon Dioxide (CO2) 27      Anion Gap 10      Urea Nitrogen 35 (*)     Creatinine 1.18 (*)     Calcium 8.7      Glucose 111      Alkaline Phosphatase 95      AST 22      ALT 13      Protein Total 7.1      Albumin 3.1 (*)     Bilirubin Total 0.8      GFR Estimate 43 (*)    TROPONIN I - Abnormal    Troponin I 0.64 (*)    INR - Abnormal    INR 1.23 (*)    BLOOD GAS VENOUS - Abnormal    pH Venous 7.45      pCO2 Venous 43      pO2 Venous 30      Bicarbonate Venous 29      Base Excess/Deficit (+/-) 6.2      Oxyhemoglobin Venous 53.7 (*)     O2 Sat, Venous 54.7 (*)    CBC WITH PLATELETS AND DIFFERENTIAL - Abnormal    WBC Count 15.4 (*)     RBC Count 3.41 (*)     Hemoglobin 10.2 (*)     Hematocrit 29.8 (*)     MCV 87      MCH 29.9      MCHC 34.2      RDW 15.0      Platelet Count 185      % Neutrophils 88      % Lymphocytes 3      % Monocytes 8      % Eosinophils 0      % Basophils 0      % Immature Granulocytes 1      NRBCs per 100 WBC 0      Absolute Neutrophils 13.6 (*)     Absolute Lymphocytes 0.5 (*)     Absolute Monocytes 1.3      Absolute Eosinophils 0.0       Absolute Basophils 0.0      Absolute Immature Granulocytes 0.2      Absolute NRBCs 0.0     ROUTINE UA WITH MICROSCOPIC REFLEX TO CULTURE - Abnormal    Color Urine Yellow      Appearance Urine Turbid (*)     Glucose Urine Negative      Bilirubin Urine Negative      Ketones Urine Negative      Specific Gravity Urine 1.016      Blood Urine 0.2 mg/dL (*)     pH Urine 7.0      Protein Albumin Urine 100  (*)     Urobilinogen Urine <2.0      Nitrite Urine Negative      Leukocyte Esterase Urine 500 Nito/uL (*)     Bacteria Urine Few (*)     Mucus Urine Present (*)     RBC Urine 3 (*)     WBC Urine 167 (*)    TROPONIN I - Abnormal    Troponin I 0.74 (*)    LACTIC ACID WHOLE BLOOD - Normal    Lactic Acid 1.2     MAGNESIUM - Normal    Magnesium 1.9     PARTIAL THROMBOPLASTIN TIME - Normal    aPTT 38     LIPASE - Normal    Lipase 24     COVID-19 VIRUS (CORONAVIRUS) BY PCR - Normal    SARS CoV2 PCR Negative     URINE CULTURE       RADIOLOGY:  CTA Abdomen Pelvis with Contrast   Final Result   IMPRESSION:   1.  No definite etiology for symptoms evident, no acute vascular abnormality identified.   2.  Very heavy diffuse atherosclerotic plaque. 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 Moderate stenosis involving the SMA but normal branch pattern of mesenteric arteries.   3.  Region of slightly delayed perfusion right kidney likely relate to the supply from main renal artery and a separate aberrant right renal artery.          EKG:   Performed at: 9:21 PM on September 5, 2022.  Impression: Atrial fibrillation with rapid ventricular spots, left axis deviation, incomplete right bundle branch block, no acute ST elevation ischemia but some ST depression in lateral leads which could be related to rate or possibly cardiac strain.  Rate: 106 bpm  Rhythm: Atrial fibrillation with RVR  QRS Interval: 106 ms  QTc Interval: 483 ms  Comparison: Comparison prior EKG shows  incomplete bundle branch block now and again slight depressions in lateral leads but no acute ST elevations.  I have independently reviewed and interpreted the EKG(s) documented above.     PROCEDURES:   Procedures       I, Irvin Suarez, am serving as a scribe to document services personally performed by Dr. Nir Regan  based on my observation and the provider's statements to me. I, Nir Regan MD attest that Irvin Suarez is acting in a scribe capacity, has observed my performance of the services and has documented them in accordance with my direction.      Nir Regan M.D.  Emergency Medicine  [facility] EMERGENCY DEPARTMENT  [facility] [id number] [phone]  Dept: [phone]      Nir Regan MD  09/06/22 6055

## 2022-09-06 NOTE — CONSULTS
Care Management Initial Consult    General Information  Assessment completed with: Patient, Care Team Member, Dago  Type of CM/SW Visit: Initial Assessment    Primary Care Provider verified and updated as needed: No   Readmission within the last 30 days:           Advance Care Planning: Advance Care Planning Reviewed: present on chart          Communication Assessment  Patient's communication style: spoken language (English or Bilingual)             Cognitive  Cognitive/Neuro/Behavioral: WDL                      Living Environment:   People in home: alone     Current living Arrangements: assisted living      Able to return to prior arrangements: yes       Family/Social Support:  Care provided by: self ([facility] staff)  Provides care for: no one     Facility resident(s)/Staff ([facility])          Description of Support System: Supportive, Involved         Current Resources:   Patient receiving home care services: No     Community Resources: Transportation Services  Equipment currently used at home: walker, rolling (cpap)  Supplies currently used at home: Incontinence Supplies, Oxygen Tubing/Supplies    Employment/Financial:  Employment Status:          Financial Concerns:             Lifestyle & Psychosocial Needs:  Social Determinants of Health     Tobacco Use: Low Risk      Smoking Tobacco Use: Never Smoker     Smokeless Tobacco Use: Never Used   Alcohol Use: Not on file   Financial Resource Strain: Not on file   Food Insecurity: Not on file   Transportation Needs: Not on file   Physical Activity: Not on file   Stress: Not on file   Social Connections: Not on file   Intimate Partner Violence: Not on file   Depression: Not on file   Housing Stability: Not on file       Functional Status:  Prior to admission patient needed assistance:   Dependent ADLs:: Ambulation-walker, Bathing  Dependent IADLs:: Cleaning, Cooking, Shopping, Meal Preparation, Medication Management, Money Management, Transportation          Additional  Information:    Assessment completed with patient and with MACEY RN.  Patient lives in [facility]. She receives assistance with showers, medications management, meals and housekeeping. She ambulates independently with a walker. Patient has O2 she uses at night with her cpap. For transportation she uses [employer] or her niece provides rides. She states her niece is her main family contact.     [facility]  [phone]  Fax [phone]      Final discharge plan pending progression and recommendations.       Maria Esther Gracia, RN

## 2022-09-06 NOTE — PHARMACY-ADMISSION MEDICATION HISTORY
Pharmacy Note - Admission Medication History    Pertinent Provider Information: None     ______________________________________________________________________    Prior To Admission (PTA) med list completed and updated in EMR.       PTA Med List   Medication Sig Last Dose     Acetaminophen (TYLENOL PO) Take 1,000 mg by mouth every 6 hours as needed for mild pain       albuterol (PROAIR HFA/PROVENTIL HFA/VENTOLIN HFA) 108 (90 Base) MCG/ACT inhaler Inhale 2 puffs into the lungs every 6 hours 9/5/2022 at x3     Ascorbic Acid (VITAMIN C PO) Take 500 mg by mouth every morning 9/5/2022 at Unknown time     aspirin (ASA) 81 MG EC tablet Take 1 tablet (81 mg) by mouth 2 times daily 9/5/2022 at x1     Calcium Carb-Cholecalciferol (CALCIUM+D3) 600-800 MG-UNIT TABS Take 1 tablet by mouth daily 9/5/2022 at Unknown time     calcium carbonate (TUMS) 500 MG chewable tablet Take 2 chew tab by mouth daily as needed for heartburn      cholecalciferol (D3 HIGH POTENCY) 125 MCG (5000 UT) CAPS Take 1 capsule by mouth daily 9/5/2022 at Unknown time     cycloSPORINE (RESTASIS) 0.05 % ophthalmic emulsion Place 1 drop into both eyes 2 times daily  9/5/2022 at x1     fish oil-omega-3 fatty acids 1000 MG capsule Take 1 g by mouth daily 9/5/2022 at Unknown time     fluorometholone (FML LIQUIFILM) 0.1 % ophthalmic susp Place 1 drop Into the left eye daily  9/5/2022 at Unknown time     IBANDRONATE SODIUM PO Take 150 mg by mouth every 30 days Past Month at Unknown time     ipratropium-albuterol (COMBIVENT RESPIMAT)  MCG/ACT inhaler Inhale 2 puffs into the lungs 4 times daily 9/5/2022 at x3     Levothyroxine Sodium (LEVOXYL PO) Take 88 mcg by mouth daily  9/5/2022 at Unknown time     loratadine (CLARITIN) 10 MG tablet Take 1 tablet (10 mg) by mouth daily (Patient taking differently: Take 10 mg by mouth daily as needed)      magnesium citrate solution Take 150 mLs by mouth daily as needed for other      METOPROLOL TARTRATE PO Take 25 mg by  mouth 2 times daily  9/5/2022 at x1     mirtazapine (REMERON) 7.5 MG tablet Take 7.5 mg by mouth At Bedtime 9/4/2022 at Unknown time     Multiple Vitamins-Minerals (CEROVITE PO) Take 1 tablet by mouth daily 9/5/2022 at Unknown time     Multiple Vitamins-Minerals (PRESERVISION AREDS PO) Take 1 tablet by mouth 2 times daily 9/5/2022 at x1     omeprazole (PRILOSEC) 20 MG DR capsule Take 20 mg by mouth 2 times daily 9/5/2022 at x1     polyethylene glycol (MIRALAX) 17 GM/Dose powder Take 1 capful by mouth daily as needed      polyethylene glycol 0.4%- propylene glycol 0.3% (SYSTANE ULTRA) 0.4-0.3 % SOLN ophthalmic solution Place 1 drop into both eyes 4 times daily 9/5/2022 at x3     Probiotic Product (PROBIOTIC PO) Take 1 capsule by mouth every morning 9/5/2022 at Unknown time     Psyllium (METAMUCIL FIBER PO) Take 1 packet by mouth daily as needed      sodium chloride 1 GM tablet Take 500 mg by mouth daily 9/5/2022 at Unknown time     spironolactone (ALDACTONE) 50 MG tablet Take 25 mg by mouth daily  9/5/2022 at Unknown time     sucralfate (CARAFATE) 1 GM tablet Take 1 g by mouth 2 times daily 9/5/2022 at x1     timolol (TIMOPTIC) 0.5 % ophthalmic solution Place 1 drop Into the left eye daily  9/5/2022 at Unknown time     torsemide (DEMADEX) 20 MG tablet Take 40 mg by mouth daily 9/5/2022 at Unknown time       Information source(s): Facility ([facility]/) medication list/MAR  Method of interview communication: phone    Summary of Changes to PTA Med List  New: Fish oil  Discontinued: Paxlovid, tramadol, famotidine, lidocaine patch, mirtazapine  Changed: Sodium chloride 1g to 500mg daily, torsemide 20mg to 40mg daily    Patient was asked about OTC/herbal products specifically.  PTA med list reflects this.    In the past week, patient estimated taking medication this percent of the time:  greater than 90%.    Allergies were reviewed, assessed, and updated with the patient.      Patient did not bring any medications to the  hospital and can't retrieve from home. No multi-dose medications are available for use during hospital stay.     The information provided in this note is only as accurate as the sources available at the time of the update(s).    Thank you for the opportunity to participate in the care of this patient.    Carley Horn, [facility]  9/5/2022 10:12 PM

## 2022-09-06 NOTE — CONSULTS
Cardiology Consult Note    Thank you, Dr. Frazier, for asking the [facility] team to see Marla Dunn in consultation at [facility] to evaluate elevated troponin.      Assessment:   1.  Elevated troponin, possibly due to demand ischemia in the setting of urinary tract infection and sepsis.  Echocardiogram demonstrates normal LV function without wall motion abnormality.  Cannot entirely exclude progression of her coronary artery disease although she denies any recent exertional chest discomfort.  At this point would favor medical management.  Agree with IV heparin for 24 to 48 hours.  2.  Coronary artery disease status post coronary artery bypass grafting in 2009 at the time of aortic valve replacement.  Nuclear stress study 1 year ago negative for ischemia despite reports of exertional dyspnea which had not changed at the time of her annual visit in July 2022.  3.  Aortic valve stenosis status post bioprosthetic aortic valve replacement in 2009 with echocardiogram showing normal bioprosthetic valve function  4.  Mitral stenosis and regurgitation, stable.  Echocardiogram today demonstrates no interval change from prior echo study.  Continue with medical management.     Plan:   1.  Agree with IV heparin drip for 24 to 48 hours  2.  No indication to pursue angiography at this time.  3.  IV antibiotics to treat urinary tract infection    Primary cardiologist: Dr. Julia Amaya     Current History:   Ms. Marla Dunn is a 89 year old female with history of coronary artery disease and aortic valve stenosis, status post coronary artery bypass grafting and bioprosthetic aortic valve replacement in 2009, chronic atrial fibrillation currently not on anticoagulation due to frequent falls, mild to moderate mitral valve stenosis with moderate mitral regurgitation on medical management who presented to the ED for evaluation of abdominal pain, fever nausea and vomiting.  Ultimately found to  have evidence of urinary tract infection and has since been started on IV antibiotics.  Her ECG at the time of presentation demonstrated atrial fibrillation with rapid ventricular response but without acute ST elevation or depression.  Was noted to have borderline elevated troponin which has since increased prompting cardiac consult.  At the time of my visit with her, she denies any complaints of exertional chest discomfort.  She has had chronic exertional dyspnea for more than a year.  Nuclear stress study 1 year ago was negative for ischemia or infarction.  Medical management has been pursued.    Past Medical History:     Past Medical History:   Diagnosis Date     Asthma      Bilateral carpal tunnel syndrome 8/27/2015     CAD (coronary artery disease)      Chronic airway obstruction, not elsewhere classified     Created by Conversion      Chronic anemia      Chronic edema      Congestive heart failure, severe (H) 5/13/2020     COPD (chronic obstructive pulmonary disease) (H)      Coronary artery disease      Depression      Family history of myocardial infarction     father 64     GERD (gastroesophageal reflux disease)      Heart disease      Heart murmur      High cholesterol     dislipidemia     HTN (hypertension)      Hypercholesterolemia      Hypertension      Hypothyroidism      Hypothyroidism      MI (myocardial infarction) (H) 1985     Myelodysplastic syndrome (H)      Myocardial infarction (H) 1983     OLEGARIO (obstructive sleep apnea)      Osteoporosis      Other and unspecified hyperlipidemia     Created by Conversion      Radicular low back pain      Rheumatoid arthritis (H)      Elizabeth Agers syndrome      Sjoegren syndrome      Sjogren's syndrome (H)      Sleep apnea, obstructive      Spinal stenosis      Unspecified polyarthropathy or polyarthritis, hand     Created by Conversion        Past Surgical History:     Past Surgical History:   Procedure Laterality Date     aortic valve       AORTIC VALVE  REPLACEMENT  2012     APPENDECTOMY       APPENDECTOMY       AS TOTAL KNEE ARTHROPLASTY Right      BACK SURGERY  2004    spinal decompression     BACK SURGERY      spinal stenosis     BONE MARROW BIOPSY  2004     breast biopsy       BREAST SURGERY  2001    biopsy     BYPASS GRAFT ARTERY CORONARY  2009    LIMA to ramus intermedius     cardiac angiogram       CARDIAC CATHETERIZATION       CARDIAC SURGERY       EYE SURGERY  2008    bilateral cataract repair      EYE SURGERY Bilateral     cornea transplant left eye & cataracts     KYPHOPLASTY  2003    T12     OPEN REDUCTION INTERNAL FIXATION HIP NAILING Right 8/13/2021    Procedure: INTERNAL FIXATION, FRACTURE, TROCHANTERIC, HIP, USING PINS OR RODS;  Surgeon: Darrel Castro MD;  Location: [facility] OR     OTHER SURGICAL HISTORY  01/2018    Rectosigmoidectomy perineal     RECTOSIGMOIDECTOMY PERINEAL N/A 1/10/2018    Procedure: RECTOSIGMOIDECTOMY PERINEAL;  PERINEAL RECTOSIGMOIDECTOMY ;  Surgeon: Candelaria Anthony MD;  Location: [facility] OR     REPAIR VALVE AORTIC  2009     THORACIC SURGERY  2003    T12 kyphoplasty     ZZC CABG, ARTERY-VEIN, FOUR       ZZC CABG, VEIN, SINGLE      Description: CABG (CABG);  Recorded: 03/09/2012;  Comments: Single vessel bypass graft to an obtuse marginal branch in May 2009     ZZC REPLACE AORT VALV,PROSTH VALV      Description: Aortic Valve Replacement;  Recorded: 03/09/2012;  Comments: Aortic valve replacement       Family History:     Family History   Problem Relation Age of Onset     Family History Negative Mother      Cancer Mother      Heart Disease Father        Social History:    reports that she has never smoked. She has never used smokeless tobacco. She reports that she does not drink alcohol and does not use drugs.    Meds:     Current Facility-Administered Medications:      0.9% sodium chloride + KCl 20 mEq/L infusion, , Intravenous, Continuous, Fatimah Roberts MD, Last Rate: 75 mL/hr at 09/06/22 1512, Rate Verify at  09/06/22 1512     acetaminophen (TYLENOL) tablet 975 mg, 975 mg, Oral, TID, Fatimah Roberts MD, 975 mg at 09/06/22 1325     albuterol (PROVENTIL HFA/VENTOLIN HFA) inhaler, 2 puff, Inhalation, Q4H PRN, Fatimah Roberts MD     aspirin EC tablet 81 mg, 81 mg, Oral, BID, Fatimah Roberts MD, 81 mg at 09/06/22 0815     bisacodyl (DULCOLAX) suppository 10 mg, 10 mg, Rectal, Daily PRN, Fatimah Roberts MD     calcium carbonate (TUMS) chewable tablet 1,000 mg, 1,000 mg, Oral, Daily PRN, Fatimah Roberts MD     calcium carbonate-vitamin D (OS-PERICO with D) per tablet 1 tablet, 1 tablet, Oral, Daily, Fatimah Roberts MD, 1 tablet at 09/06/22 0814     carboxymethylcellulose PF (REFRESH LIQUIGEL) 1 % ophthalmic gel 1 drop, 1 drop, Both Eyes, 4x Daily, Fatimah Roberts MD, 1 drop at 09/06/22 1146     fluorometholone (FML LIQUIFILM) 0.1 % ophthalmic susp 1 drop, 1 drop, Left Eye, Daily, Fatimah Roberts MD, 1 drop at 09/06/22 0802     heparin infusion 25,000 units in D5W 250 mL ANTICOAGULANT, 0-5,000 Units/hr, Intravenous, Continuous, Jose David Frazier MD, Last Rate: 5.5 mL/hr at 09/06/22 1459, 550 Units/hr at 09/06/22 1459     HYDROmorphone (PF) (DILAUDID) injection 0.2 mg, 0.2 mg, Intravenous, Once PRN, Nir Regan MD     ipratropium - albuterol 0.5 mg/2.5 mg/3 mL (DUONEB) neb solution 3 mL, 3 mL, Nebulization, 4x daily, Fatimah Roberts MD, 3 mL at 09/06/22 1152     levothyroxine (SYNTHROID/LEVOTHROID) tablet 88 mcg, 88 mcg, Oral, Daily, Fatimah Roberts MD, 88 mcg at 09/06/22 0647     lidocaine (LMX4) cream, , Topical, Q1H PRN, Fatimah Roberts MD     lidocaine 1 % 0.1-1 mL, 0.1-1 mL, Other, Q1H PRN, Fatimah Roberts MD     melatonin tablet 1 mg, 1 mg, Oral, At Bedtime PRN, Fatimah Roberts MD     metoprolol tartrate (LOPRESSOR) tablet 25 mg, 25 mg, Oral, BID, Fatimah Roberts MD, 25 mg at 09/06/22 0310     mirtazapine (REMERON) tablet TABS 7.5 mg, 7.5 mg, Oral, At  Bedtime, Fatimah Roberts MD, 7.5 mg at 09/06/22 0428     ondansetron (ZOFRAN ODT) ODT tab 4 mg, 4 mg, Oral, Q6H PRN **OR** ondansetron (ZOFRAN) injection 4 mg, 4 mg, Intravenous, Q6H PRN, Fatimah Roberts MD     ondansetron (ZOFRAN) injection 4 mg, 4 mg, Intravenous, Q30 Min PRN, Nir Regan MD, 4 mg at 09/06/22 0102     pantoprazole (PROTONIX) EC tablet 40 mg, 40 mg, Oral, BID, Fatimah Roberts MD, 40 mg at 09/06/22 0814     [COMPLETED] piperacillin-tazobactam (ZOSYN) 3.375 g vial to attach to  mL bag, 3.375 g, Intravenous, Once, Stopped at 09/06/22 1405 **FOLLOWED BY** [START ON 9/7/2022] piperacillin-tazobactam (ZOSYN) 3.375 g vial to attach to  mL bag, 3.375 g, Intravenous, Q12H, Jose David Frazier MD     polyethylene glycol (MIRALAX) Packet 17 g, 17 g, Oral, Daily, Fatimah Roberts MD, 17 g at 09/06/22 0817     prochlorperazine (COMPAZINE) injection 5 mg, 5 mg, Intravenous, Q6H PRN **OR** prochlorperazine (COMPAZINE) tablet 5 mg, 5 mg, Oral, Q6H PRN **OR** prochlorperazine (COMPAZINE) suppository 12.5 mg, 12.5 mg, Rectal, Q12H PRN, Fatimah Roberts MD     sodium chloride (PF) 0.9% PF flush 3 mL, 3 mL, Intracatheter, Q8H, Fatimah Roberts MD, 3 mL at 09/06/22 0302     sodium chloride (PF) 0.9% PF flush 3 mL, 3 mL, Intracatheter, q1 min prn, Fatimah Roberts MD     sodium chloride half-tab 0.5 g, 0.5 g, Oral, Daily, Fatimah Roberts MD, 0.5 g at 09/06/22 0814     [Held by provider] spironolactone (ALDACTONE) tablet 25 mg, 25 mg, Oral, Daily, Fatimah Roberts MD, 25 mg at 09/06/22 0814     sucralfate (CARAFATE) tablet 1 g, 1 g, Oral, BID, Fatimah Roberts MD, 1 g at 09/06/22 0815     timolol maleate (TIMOPTIC) 0.5 % ophthalmic solution 1 drop, 1 drop, Left Eye, Daily, Fatimah Roberts MD, 1 drop at 09/06/22 0803     [Held by provider] torsemide (DEMADEX) tablet 40 mg, 40 mg, Oral, Daily, Fatimah Roberts MD     Vitamin D3 (CHOLECALCIFEROL) tablet 125  mcg, 125 mcg, Oral, Daily, Fatimah Roberts MD, 125 mcg at 09/06/22 0814    Current Outpatient Medications:      Acetaminophen (TYLENOL PO), Take 1,000 mg by mouth every 6 hours as needed for mild pain , Disp: , Rfl:      albuterol (PROAIR HFA/PROVENTIL HFA/VENTOLIN HFA) 108 (90 Base) MCG/ACT inhaler, Inhale 2 puffs into the lungs every 6 hours, Disp: , Rfl:      Ascorbic Acid (VITAMIN C PO), Take 500 mg by mouth every morning, Disp: , Rfl:      aspirin (ASA) 81 MG EC tablet, Take 1 tablet (81 mg) by mouth 2 times daily, Disp: , Rfl:      Calcium Carb-Cholecalciferol (CALCIUM+D3) 600-800 MG-UNIT TABS, Take 1 tablet by mouth daily, Disp: , Rfl:      calcium carbonate (TUMS) 500 MG chewable tablet, Take 2 chew tab by mouth daily as needed for heartburn, Disp: , Rfl:      cholecalciferol (D3 HIGH POTENCY) 125 MCG (5000 UT) CAPS, Take 1 capsule by mouth daily, Disp: , Rfl:      cycloSPORINE (RESTASIS) 0.05 % ophthalmic emulsion, Place 1 drop into both eyes 2 times daily , Disp: , Rfl:      fish oil-omega-3 fatty acids 1000 MG capsule, Take 1 g by mouth daily, Disp: , Rfl:      fluorometholone (FML LIQUIFILM) 0.1 % ophthalmic susp, Place 1 drop Into the left eye daily , Disp: , Rfl:      IBANDRONATE SODIUM PO, Take 150 mg by mouth every 30 days, Disp: , Rfl:      ipratropium-albuterol (COMBIVENT RESPIMAT)  MCG/ACT inhaler, Inhale 2 puffs into the lungs 4 times daily, Disp: , Rfl:      Levothyroxine Sodium (LEVOXYL PO), Take 88 mcg by mouth daily , Disp: , Rfl:      loratadine (CLARITIN) 10 MG tablet, Take 1 tablet (10 mg) by mouth daily (Patient taking differently: Take 10 mg by mouth daily as needed), Disp: , Rfl:      magnesium citrate solution, Take 150 mLs by mouth daily as needed for other, Disp: , Rfl:      METOPROLOL TARTRATE PO, Take 25 mg by mouth 2 times daily , Disp: , Rfl:      mirtazapine (REMERON) 7.5 MG tablet, Take 7.5 mg by mouth At Bedtime, Disp: , Rfl:      Multiple Vitamins-Minerals (CEROVITE  PO), Take 1 tablet by mouth daily, Disp: , Rfl:      Multiple Vitamins-Minerals (PRESERVISION AREDS PO), Take 1 tablet by mouth 2 times daily, Disp: , Rfl:      omeprazole (PRILOSEC) 20 MG DR capsule, Take 20 mg by mouth 2 times daily, Disp: , Rfl:      polyethylene glycol (MIRALAX) 17 GM/Dose powder, Take 1 capful by mouth daily as needed, Disp: , Rfl:      polyethylene glycol 0.4%- propylene glycol 0.3% (SYSTANE ULTRA) 0.4-0.3 % SOLN ophthalmic solution, Place 1 drop into both eyes 4 times daily, Disp: , Rfl:      Probiotic Product (PROBIOTIC PO), Take 1 capsule by mouth every morning, Disp: , Rfl:      Psyllium (METAMUCIL FIBER PO), Take 1 packet by mouth daily as needed, Disp: , Rfl:      sodium chloride 1 GM tablet, Take 500 mg by mouth daily, Disp: , Rfl:      spironolactone (ALDACTONE) 50 MG tablet, Take 25 mg by mouth daily , Disp: , Rfl:      sucralfate (CARAFATE) 1 GM tablet, Take 1 g by mouth 2 times daily, Disp: , Rfl:      timolol (TIMOPTIC) 0.5 % ophthalmic solution, Place 1 drop Into the left eye daily , Disp: , Rfl:      torsemide (DEMADEX) 20 MG tablet, Take 40 mg by mouth daily, Disp: , Rfl:      OXYGEN-HELIUM IN, , Disp: , Rfl:     acetaminophen  975 mg Oral TID     aspirin  81 mg Oral BID     calcium carbonate-vitamin D  1 tablet Oral Daily     carboxymethylcellulose PF  1 drop Both Eyes 4x Daily     fluorometholone  1 drop Left Eye Daily     ipratropium - albuterol 0.5 mg/2.5 mg/3 mL  3 mL Nebulization 4x daily     levothyroxine  88 mcg Oral Daily     metoprolol tartrate  25 mg Oral BID     mirtazapine  7.5 mg Oral At Bedtime     pantoprazole  40 mg Oral BID     [START ON 9/7/2022] piperacillin-tazobactam  3.375 g Intravenous Q12H     polyethylene glycol  17 g Oral Daily     sodium chloride (PF)  3 mL Intracatheter Q8H     sodium chloride  0.5 g Oral Daily     [Held by provider] spironolactone  25 mg Oral Daily     sucralfate  1 g Oral BID     timolol maleate  1 drop Left Eye Daily     [Held by  "provider] torsemide  40 mg Oral Daily     Vitamin D3  125 mcg Oral Daily       Allergies:   Gramineae pollens, Smoke., and Pollen extract    Review of Systems:   A 12 point comprehensive review of systems was negative except as noted in the current history.    Objective:      Physical Exam  Body mass index is 17.78 kg/m .  BP (!) 83/53   Pulse 72   Temp 98  F (36.7  C) (Axillary)   Resp 19   Ht 1.626 m (5' 4\")   Wt 47 kg (103 lb 9.6 oz)   SpO2 99%   BMI 17.78 kg/m      General Appearance:    Well-developed, frail-appearing elderly female in no acute distress   HEENT:   Normocephalic, atraumatic.  Sclera nonicteric.  Mucous membranes moist.   Neck:  No jugular venous distention or carotid bruits   Chest:  Symmetric with normal AP diameter   Lungs:    Clear to auscultation bilaterally.   Cardiovascular:    Irregularly irregular rhythm.  S1, S2 normal.  2/6 holosystolic murmur heard at the left sternal border radiating to the apex and into the axilla.   Abdomen:   Soft, nondistended, nontender   Extremities:  No peripheral edema, clubbing or cyanosis.  Mild erythema over both shins noted   Skin:  No rash or lesions   Neurologic:  Alert and oriented x3.  No gross focal deficits.             Cardiographics (personally reviewed)  ECG on admission demonstrates atrial fibrillation with mildly rapid ventricular response, left axis deviation, incomplete right bundle branch block, possible anteroseptal infarct of unknown age.  Incomplete right bundle branch block pattern new when compared to prior ECG.    Imaging (personally reviewed)  Procedure  Limited Echo Adult. Definity (NDC #51712-096) given intravenously.  ______________________________________________________________________________  Interpretation Summary     Left ventricular function is normal.The ejection fraction is 55-60%.  Right ventrical function, chamber size, wall motion, and thickness are normal.  The left atrium is moderately dilated.  There is " moderate mitral stenosis.  There is a bioprosthetic aortic valve.  The gradient is normal for this prosthetic aortic valve.  IVC diameter >2.1 cm collapsing <50% with sniff suggests a high RA pressure  estimated at 15 mmHg or greater.    Lab Review (personally reviewed all results)  Recent Labs   Lab Test 11/09/16  0952   CHOL 149   HDL 55   LDL 84   TRIG 48     Recent Labs   Lab Test 11/09/16  0952   LDL 84     Recent Labs   Lab Test 09/06/22  0850   *   POTASSIUM 3.5   CHLORIDE 97*   CO2 21*   GLC 74   BUN 32*   CR 1.28*   GFRESTIMATED 39*   PERICO 8.3*     Recent Labs   Lab Test 09/06/22  0850 09/05/22  2134 09/05/22  1610   CR 1.28* 1.18* 1.17*     Recent Labs   Lab Test 05/13/20  1205   A1C 5.2          Recent Labs   Lab Test 09/06/22  0850   WBC 22.9*   HGB 9.7*   HCT 28.6*   MCV 90        Recent Labs   Lab Test 09/06/22  0850 09/05/22  2134 09/02/21  1052   HGB 9.7* 10.2* 9.4*    Recent Labs   Lab Test 09/06/22  0850 09/06/22  0102 09/05/22  2134   TROPONINI 1.83* 0.74* 0.64*     Recent Labs   Lab Test 08/13/21  0552 05/13/20  0446 03/20/20  1831   * 536* 222*     Recent Labs   Lab Test 03/22/20  0852   TSH 1.17     Recent Labs   Lab Test 09/05/22  2134 08/12/21  2122 05/13/20  0446   INR 1.23* 1.10 0.97

## 2022-09-06 NOTE — ED NOTES
I fed pt. About half of her oatmeal with brown sugar and about half of her milk. Patient cold not tolerate any more than that. She asked me to stop.

## 2022-09-07 ENCOUNTER — APPOINTMENT (OUTPATIENT)
Dept: PHYSICAL THERAPY | Facility: HOSPITAL | Age: 87
DRG: 872 | End: 2022-09-07
Attending: INTERNAL MEDICINE
Payer: COMMERCIAL

## 2022-09-07 ENCOUNTER — APPOINTMENT (OUTPATIENT)
Dept: OCCUPATIONAL THERAPY | Facility: HOSPITAL | Age: 87
DRG: 872 | End: 2022-09-07
Attending: INTERNAL MEDICINE
Payer: COMMERCIAL

## 2022-09-07 LAB
ALBUMIN SERPL-MCNC: 2.3 G/DL (ref 3.5–5)
ALP SERPL-CCNC: 81 U/L (ref 45–120)
ALT SERPL W P-5'-P-CCNC: 15 U/L (ref 0–45)
ANION GAP SERPL CALCULATED.3IONS-SCNC: 9 MMOL/L (ref 5–18)
APTT PPP: 65 SECONDS (ref 22–38)
AST SERPL W P-5'-P-CCNC: 24 U/L (ref 0–40)
BACTERIA UR CULT: ABNORMAL
BASOPHILS # BLD AUTO: 0.1 10E3/UL (ref 0–0.2)
BASOPHILS NFR BLD AUTO: 0 %
BILIRUB SERPL-MCNC: 0.4 MG/DL (ref 0–1)
BNP SERPL-MCNC: 377 PG/ML (ref 0–167)
BUN SERPL-MCNC: 35 MG/DL (ref 8–28)
CALCIUM SERPL-MCNC: 8.1 MG/DL (ref 8.5–10.5)
CHLORIDE BLD-SCNC: 101 MMOL/L (ref 98–107)
CO2 SERPL-SCNC: 18 MMOL/L (ref 22–31)
CREAT SERPL-MCNC: 1.34 MG/DL (ref 0.6–1.1)
EOSINOPHIL # BLD AUTO: 0.1 10E3/UL (ref 0–0.7)
EOSINOPHIL NFR BLD AUTO: 1 %
ERYTHROCYTE [DISTWIDTH] IN BLOOD BY AUTOMATED COUNT: 15.6 % (ref 10–15)
GFR SERPL CREATININE-BSD FRML MDRD: 37 ML/MIN/1.73M2
GLUCOSE BLD-MCNC: 79 MG/DL (ref 70–125)
HCT VFR BLD AUTO: 27.6 % (ref 35–47)
HGB BLD-MCNC: 9.4 G/DL (ref 11.7–15.7)
IMM GRANULOCYTES # BLD: 0.4 10E3/UL
IMM GRANULOCYTES NFR BLD: 2 %
LYMPHOCYTES # BLD AUTO: 0.5 10E3/UL (ref 0.8–5.3)
LYMPHOCYTES NFR BLD AUTO: 3 %
MAGNESIUM SERPL-MCNC: 1.9 MG/DL (ref 1.8–2.6)
MCH RBC QN AUTO: 30.3 PG (ref 26.5–33)
MCHC RBC AUTO-ENTMCNC: 34.1 G/DL (ref 31.5–36.5)
MCV RBC AUTO: 89 FL (ref 78–100)
MONOCYTES # BLD AUTO: 0.6 10E3/UL (ref 0–1.3)
MONOCYTES NFR BLD AUTO: 4 %
NEUTROPHILS # BLD AUTO: 15.3 10E3/UL (ref 1.6–8.3)
NEUTROPHILS NFR BLD AUTO: 90 %
NRBC # BLD AUTO: 0 10E3/UL
NRBC BLD AUTO-RTO: 0 /100
PLATELET # BLD AUTO: 133 10E3/UL (ref 150–450)
POTASSIUM BLD-SCNC: 4.1 MMOL/L (ref 3.5–5)
PROT SERPL-MCNC: 5.7 G/DL (ref 6–8)
RBC # BLD AUTO: 3.1 10E6/UL (ref 3.8–5.2)
SODIUM SERPL-SCNC: 128 MMOL/L (ref 136–145)
WBC # BLD AUTO: 16.5 10E3/UL (ref 4–11)

## 2022-09-07 PROCEDURE — 83880 ASSAY OF NATRIURETIC PEPTIDE: CPT | Performed by: INTERNAL MEDICINE

## 2022-09-07 PROCEDURE — 94640 AIRWAY INHALATION TREATMENT: CPT

## 2022-09-07 PROCEDURE — 36415 COLL VENOUS BLD VENIPUNCTURE: CPT | Performed by: INTERNAL MEDICINE

## 2022-09-07 PROCEDURE — 85730 THROMBOPLASTIN TIME PARTIAL: CPT | Performed by: INTERNAL MEDICINE

## 2022-09-07 PROCEDURE — 250N000013 HC RX MED GY IP 250 OP 250 PS 637: Performed by: INTERNAL MEDICINE

## 2022-09-07 PROCEDURE — 97166 OT EVAL MOD COMPLEX 45 MIN: CPT | Mod: GO

## 2022-09-07 PROCEDURE — 250N000011 HC RX IP 250 OP 636: Performed by: INTERNAL MEDICINE

## 2022-09-07 PROCEDURE — 85025 COMPLETE CBC W/AUTO DIFF WBC: CPT | Performed by: INTERNAL MEDICINE

## 2022-09-07 PROCEDURE — 97162 PT EVAL MOD COMPLEX 30 MIN: CPT | Mod: GP

## 2022-09-07 PROCEDURE — 250N000011 HC RX IP 250 OP 636: Performed by: HOSPITALIST

## 2022-09-07 PROCEDURE — 83735 ASSAY OF MAGNESIUM: CPT | Performed by: INTERNAL MEDICINE

## 2022-09-07 PROCEDURE — 97110 THERAPEUTIC EXERCISES: CPT | Mod: GO

## 2022-09-07 PROCEDURE — 94640 AIRWAY INHALATION TREATMENT: CPT | Mod: 76

## 2022-09-07 PROCEDURE — 210N000001 HC R&B IMCU HEART CARE

## 2022-09-07 PROCEDURE — 80053 COMPREHEN METABOLIC PANEL: CPT | Performed by: INTERNAL MEDICINE

## 2022-09-07 PROCEDURE — 250N000009 HC RX 250: Performed by: INTERNAL MEDICINE

## 2022-09-07 PROCEDURE — 99232 SBSQ HOSP IP/OBS MODERATE 35: CPT | Performed by: INTERNAL MEDICINE

## 2022-09-07 PROCEDURE — 999N000157 HC STATISTIC RCP TIME EA 10 MIN

## 2022-09-07 PROCEDURE — 97530 THERAPEUTIC ACTIVITIES: CPT | Mod: GP

## 2022-09-07 PROCEDURE — 99222 1ST HOSP IP/OBS MODERATE 55: CPT | Performed by: INTERNAL MEDICINE

## 2022-09-07 PROCEDURE — 99232 SBSQ HOSP IP/OBS MODERATE 35: CPT | Performed by: HOSPITALIST

## 2022-09-07 RX ORDER — PIPERACILLIN SODIUM, TAZOBACTAM SODIUM 3; .375 G/15ML; G/15ML
3.38 INJECTION, POWDER, LYOPHILIZED, FOR SOLUTION INTRAVENOUS EVERY 8 HOURS
Status: DISCONTINUED | OUTPATIENT
Start: 2022-09-07 | End: 2022-09-08

## 2022-09-07 RX ADMIN — IPRATROPIUM BROMIDE AND ALBUTEROL SULFATE 3 ML: 2.5; .5 SOLUTION RESPIRATORY (INHALATION) at 08:00

## 2022-09-07 RX ADMIN — Medication 81 MG: at 19:07

## 2022-09-07 RX ADMIN — CARBOXYMETHYLCELLULOSE SODIUM 1 DROP: 10 GEL OPHTHALMIC at 11:12

## 2022-09-07 RX ADMIN — PIPERACILLIN AND TAZOBACTAM 3.38 G: 3; .375 INJECTION, POWDER, LYOPHILIZED, FOR SOLUTION INTRAVENOUS at 19:05

## 2022-09-07 RX ADMIN — SUCRALFATE 1 G: 1 TABLET ORAL at 19:06

## 2022-09-07 RX ADMIN — SODIUM CHLORIDE TAB 1 GM 0.5 G: 1 TAB at 07:53

## 2022-09-07 RX ADMIN — CARBOXYMETHYLCELLULOSE SODIUM 1 DROP: 10 GEL OPHTHALMIC at 19:06

## 2022-09-07 RX ADMIN — CALCIUM CARBONATE-VITAMIN D TAB 500 MG-200 UNIT 1 TABLET: 500-200 TAB at 07:53

## 2022-09-07 RX ADMIN — PIPERACILLIN AND TAZOBACTAM 3.38 G: 3; .375 INJECTION, POWDER, LYOPHILIZED, FOR SOLUTION INTRAVENOUS at 00:10

## 2022-09-07 RX ADMIN — IPRATROPIUM BROMIDE AND ALBUTEROL SULFATE 3 ML: 2.5; .5 SOLUTION RESPIRATORY (INHALATION) at 12:51

## 2022-09-07 RX ADMIN — FLUOROMETHOLONE 1 DROP: 1 SOLUTION/ DROPS OPHTHALMIC at 07:52

## 2022-09-07 RX ADMIN — PIPERACILLIN AND TAZOBACTAM 3.38 G: 3; .375 INJECTION, POWDER, LYOPHILIZED, FOR SOLUTION INTRAVENOUS at 11:11

## 2022-09-07 RX ADMIN — IPRATROPIUM BROMIDE AND ALBUTEROL SULFATE 3 ML: 2.5; .5 SOLUTION RESPIRATORY (INHALATION) at 16:05

## 2022-09-07 RX ADMIN — ACETAMINOPHEN 975 MG: 325 TABLET ORAL at 13:39

## 2022-09-07 RX ADMIN — PANTOPRAZOLE SODIUM 40 MG: 20 TABLET, DELAYED RELEASE ORAL at 07:52

## 2022-09-07 RX ADMIN — POTASSIUM CHLORIDE AND SODIUM CHLORIDE: 900; 150 INJECTION, SOLUTION INTRAVENOUS at 12:41

## 2022-09-07 RX ADMIN — ACETAMINOPHEN 975 MG: 325 TABLET ORAL at 07:52

## 2022-09-07 RX ADMIN — LEVOTHYROXINE SODIUM 88 MCG: 88 TABLET ORAL at 06:28

## 2022-09-07 RX ADMIN — MIRTAZAPINE 7.5 MG: 7.5 TABLET, FILM COATED ORAL at 21:21

## 2022-09-07 RX ADMIN — SUCRALFATE 1 G: 1 TABLET ORAL at 07:53

## 2022-09-07 RX ADMIN — POTASSIUM CHLORIDE AND SODIUM CHLORIDE 125 ML/HR: 900; 150 INJECTION, SOLUTION INTRAVENOUS at 03:10

## 2022-09-07 RX ADMIN — CARBOXYMETHYLCELLULOSE SODIUM 1 DROP: 10 GEL OPHTHALMIC at 16:38

## 2022-09-07 RX ADMIN — CARBOXYMETHYLCELLULOSE SODIUM 1 DROP: 10 GEL OPHTHALMIC at 07:52

## 2022-09-07 RX ADMIN — METOPROLOL TARTRATE 25 MG: 25 TABLET, FILM COATED ORAL at 07:53

## 2022-09-07 RX ADMIN — Medication 125 MCG: at 07:53

## 2022-09-07 RX ADMIN — TIMOLOL MALEATE 1 DROP: 5 SOLUTION/ DROPS OPHTHALMIC at 07:52

## 2022-09-07 RX ADMIN — PANTOPRAZOLE SODIUM 40 MG: 20 TABLET, DELAYED RELEASE ORAL at 19:07

## 2022-09-07 RX ADMIN — IPRATROPIUM BROMIDE AND ALBUTEROL SULFATE 3 ML: 2.5; .5 SOLUTION RESPIRATORY (INHALATION) at 19:46

## 2022-09-07 RX ADMIN — Medication 81 MG: at 07:53

## 2022-09-07 RX ADMIN — ACETAMINOPHEN 975 MG: 325 TABLET ORAL at 19:07

## 2022-09-07 RX ADMIN — METOPROLOL TARTRATE 25 MG: 25 TABLET, FILM COATED ORAL at 19:06

## 2022-09-07 ASSESSMENT — ACTIVITIES OF DAILY LIVING (ADL)
ADLS_ACUITY_SCORE: 29
ADLS_ACUITY_SCORE: 26
ADLS_ACUITY_SCORE: 32
ADLS_ACUITY_SCORE: 30
ADLS_ACUITY_SCORE: 29
ADLS_ACUITY_SCORE: 26
ADLS_ACUITY_SCORE: 30
ADLS_ACUITY_SCORE: 26
ADLS_ACUITY_SCORE: 26
ADLS_ACUITY_SCORE: 32
ADLS_ACUITY_SCORE: 26
ADLS_ACUITY_SCORE: 32

## 2022-09-07 NOTE — PROGRESS NOTES
09/07/22 1022   Quick Adds   Type of Visit Initial Occupational Therapy Evaluation       Present no   Living Environment   People in Home alone   Current Living Arrangements assisted living   Home Accessibility no concerns   Self-Care   Current Activity Tolerance fair   Equipment Currently Used at Home walker, rolling   Fall history within last six months yes   Number of times patient has fallen within last six months 1   Activity/Exercise/Self-Care Comment Pt has assist with bathing and IADLs.  Pt is independent with toileting and dressing   General Information   Onset of Illness/Injury or Date of Surgery 09/05/22   Referring Physician Armando   Existing Precautions/Restrictions fall   Cognitive Status Examination   Cognitive Status Comments appears grossly intact   Visual Perception   Visual Impairment/Limitations WFL   Sensory   Sensory Quick Adds No deficits were identified   Range of Motion Comprehensive   General Range of Motion no range of motion deficits identified   Strength Comprehensive (MMT)   Comment, General Manual Muscle Testing (MMT) Assessment Generalized weakness B UE's, R greater than L.   Coordination   Upper Extremity Coordination No deficits were identified   Bed Mobility   Comment (Bed Mobility) Min A   Transfers   Transfer Comments Min A of 2   Balance   Balance Comments Min A of 2 for standing   Activities of Daily Living   BADL Assessment/Intervention   (Min A to complete hair care)   Clinical Impression   Criteria for Skilled Therapeutic Interventions Met (OT) Yes, treatment indicated   OT Diagnosis Impaired ADL independence   OT Problem List-Impairments impacting ADL activity tolerance impaired;balance;strength;mobility   Assessment of Occupational Performance 3-5 Performance Deficits   Planned Therapy Interventions (OT) ADL retraining;bed mobility training;balance training;strengthening;transfer training   Clinical Decision Making Complexity (OT) moderate  complexity   Risk & Benefits of therapy have been explained evaluation/treatment results reviewed;participants included;patient   Clinical Impression Comments Pt seen bedside for OT eval and treatment  Pt demonstrates decreased independence with ADLs and mobiltiy due to weakness and deminished activity tolerance.  OT to continue to address.  TCU vs home with increased assist pending progress.   OT Discharge Planning   OT Discharge Recommendation (DC Rec) Transitional Care Facility;home with assist   OT Rationale for DC Rec Pt needs A of 2 for sit to stand currently.   Total Evaluation Time (Minutes)   Total Evaluation Time (Minutes) 10   OT Goals   Therapy Frequency (OT) Daily   OT Predicted Duration/Target Date for Goal Attainment 09/13/22   OT Goals Lower Body Dressing;Transfers;Toilet Transfer/Toileting;OT Goal 1   OT: Lower Body Dressing Supervision/stand-by assist   OT: Transfer Modified independent   OT: Toilet Transfer/Toileting Modified independent   OT: Goal 1 Pt will tolerate 10 minutes UE ex to increase I iwth ADLs

## 2022-09-07 NOTE — PROGRESS NOTES
"ALBERTO called patient's niece Deneen ([phone]) to talk about discharge options. Deneen asked that ALBERTO talk to patient first and then let Deneen know what patient decides.    ALBERTO talked with patient who stated she would like to go to a TCU. Patient remembers going to [facility] in the past. ALBERTO called Deneen to update her. Deneen said that patient did not like [facility] when she went there but \"she never remembers that she didn't like it. ALBERTO emailed TCU list to Deneen who will look over it and get back to ALBERTO. Deneen said that she prefers patient goes to a place near [place] but does not want [facility] [place].     Chiara Leonard    "

## 2022-09-07 NOTE — PROGRESS NOTES
"Hospitalist Progress Note        CODE STATUS:  No CPR- Pre-arrest intubation OK    Assessment/Plan:  Marla Dunn is a 89 year old female with PMH of atrial fibrillation, not anticoagulated due to fall risk, mitral stenosis and regurgitation, diastolic CHF, COPD, assisted-living resident, presented to ED with fever, diffuse aches, diffuse abdominal pain, nausea without vomiting and admitted for sepsis due to UTI.    Sepsis due to E. Coli UTI  - Clinically improved  - WBC trending down. Afebrile. Hypotensive yesterday but now HD stable  - Continue Zosyn pending sensitivity report.     Elevated troponin  - Likely demand ischemia from rapid A. fib and sepsis.  - Cardiology consulted & following; rec'd hep gtt for 24-48 hrs  - Rate control of A. fib, correction of dehydration.     Acute Kidney Injury  - In setting of UTI  - Continue to hold PTA diuretic and monitor    Atrial fibrillation, with mild RVR  - Rapid rates due to sepsis & dehydration   - Not on anticoagulation due to fall risk.  - Continue PTA dose of Lopressor     Hyponatremia, chronic  - Baseline sodium around 130, was 125 on admission. Likely Hypervolemic.  - Given 1 L NS in ED     Hypokalemia  - Replaced     Chronic diastolic CHF  - Not in acute exacerbation   - Continue holding torsemide for now, given signs of dehydration.  .  Insomnia  - Cont Remeron    GERD  - Cont Protonix    Constipation  - Cont  Miralax       DVT Prophylaxis: On heparin gtt as above    Disposition/Barrier to discharge: Not medically stable for discharge. TCU vs home with home PT    Subjective:  Interval History:   Patient seen and examined.   Reports feeling improved. States \"my vitals are stable\"  Denies further abdominal pain. Had oatmeal for breakfast.     Review of Systems:   As mentioned in subjective.    Patient Active Problem List   Diagnosis     Rectal prolapse     Closed displaced intertrochanteric fracture of right femur, initial encounter (H)     Acute bronchitis " due to other specified organisms     Anxiety and depression     Arteriosclerosis of coronary artery     Sleep apnea     Carpal tunnel syndrome, right     Constipation, unspecified     COPD exacerbation (H)     Diastolic CHF (H)     Dyslipidemia     Fecal incontinence alternating with constipation     Fever     Generalized muscle weakness     Heart valve disease     Hyperlipidemia LDL goal <100     Hypertension     Hypertensive emergency     Hypokalemia     Hyponatremia     Irregular bowel habits     Metabolic acidosis     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)     Polyarthropathy or polyarthritis, hand     Physical deconditioning     Pneumonia     Pulmonary edema cardiac cause (H)     Pyelonephritis     Rash of body     Respiratory failure (H)     Sepsis secondary to UTI (H)     Sjogren's syndrome (H)     Tachycardia     Acute renal failure superimposed on stage 3 chronic kidney disease, unspecified acute renal failure type, unspecified whether stage 3a or 3b CKD (H)     Urinary tract infection     Rapid atrial fibrillation (H)     Demand ischemia of myocardium (H)     Abdominal pain, generalized     Elevated troponin     Atrial fibrillation with rapid ventricular response (H)     Non-intractable vomiting with nausea, unspecified vomiting type     Acute cystitis without hematuria       Scheduled Meds:    acetaminophen  975 mg Oral TID     aspirin  81 mg Oral BID     calcium carbonate-vitamin D  1 tablet Oral Daily     carboxymethylcellulose PF  1 drop Both Eyes 4x Daily     fluorometholone  1 drop Left Eye Daily     ipratropium - albuterol 0.5 mg/2.5 mg/3 mL  3 mL Nebulization 4x daily     levothyroxine  88 mcg Oral Daily     metoprolol tartrate  25 mg Oral BID     mirtazapine  7.5 mg Oral At Bedtime     pantoprazole  40 mg Oral BID     piperacillin-tazobactam  3.375 g Intravenous Q8H     polyethylene glycol  17 g Oral Daily     sodium chloride (PF)  3 mL Intracatheter Q8H     sodium chloride  0.5 g Oral Daily      [Held by provider] spironolactone  25 mg Oral Daily     sucralfate  1 g Oral BID     timolol maleate  1 drop Left Eye Daily     [Held by provider] torsemide  40 mg Oral Daily     Vitamin D3  125 mcg Oral Daily     Continuous Infusions:    0.9% sodium chloride + KCl 20 mEq/L 125 mL/hr (09/07/22 0310)     heparin 550 Units/hr (09/07/22 0626)     PRN Meds:.albuterol, bisacodyl, calcium carbonate, HYDROmorphone, lidocaine 4%, lidocaine (buffered or not buffered), melatonin, ondansetron **OR** ondansetron, ondansetron, prochlorperazine **OR** prochlorperazine **OR** prochlorperazine, sodium chloride (PF)    Objective:  Vital signs in last 24 hours:  Temp:  [97.7  F (36.5  C)-98.6  F (37  C)] 98.5  F (36.9  C)  Pulse:  [] 92  Resp:  [16-22] 18  BP: ()/(42-76) 120/76  FiO2 (%):  [32 %] 32 %  SpO2:  [97 %-100 %] 100 %        Intake/Output Summary (Last 24 hours) at 9/7/2022 1228  Last data filed at 9/7/2022 0753  Gross per 24 hour   Intake 1698 ml   Output 650 ml   Net 1048 ml       Physical Exam:  General: In NAD  HEENT: NCAT & EOMI  CV: Irregularly irregular rhythm, normal rate  Lungs: CTAB  Abdomen: Soft, NT, ND, +BS  Extremities: No LE edema b/l  Neuro: AAOx3.   Psych: Normal Affect    Lab Results:    Recent Results (from the past 24 hour(s))   Lactic acid whole blood STAT    Collection Time: 09/06/22 12:34 PM   Result Value Ref Range    Lactic Acid 1.7 0.7 - 2.0 mmol/L   Echocardiogram Limited    Collection Time: 09/06/22  1:55 PM   Result Value Ref Range    LVEF  55-60%    Troponin I    Collection Time: 09/06/22  8:01 PM   Result Value Ref Range    Troponin I 0.79 (HH) 0.00 - 0.29 ng/mL   Partial thromboplastin time    Collection Time: 09/06/22  8:01 PM   Result Value Ref Range    aPTT 72 (H) 22 - 38 Seconds   Extra Purple Top Tube    Collection Time: 09/06/22  8:01 PM   Result Value Ref Range    Hold Specimen JIC    Partial thromboplastin time    Collection Time: 09/07/22  4:35 AM   Result Value Ref  Range    aPTT 65 (H) 22 - 38 Seconds   B-Type Natriuretic Peptide ([facility] Only)    Collection Time: 09/07/22  4:35 AM   Result Value Ref Range     (H) 0 - 167 pg/mL   Comprehensive metabolic panel    Collection Time: 09/07/22  4:35 AM   Result Value Ref Range    Sodium 128 (L) 136 - 145 mmol/L    Potassium 4.1 3.5 - 5.0 mmol/L    Chloride 101 98 - 107 mmol/L    Carbon Dioxide (CO2) 18 (L) 22 - 31 mmol/L    Anion Gap 9 5 - 18 mmol/L    Urea Nitrogen 35 (H) 8 - 28 mg/dL    Creatinine 1.34 (H) 0.60 - 1.10 mg/dL    Calcium 8.1 (L) 8.5 - 10.5 mg/dL    Glucose 79 70 - 125 mg/dL    Alkaline Phosphatase 81 45 - 120 U/L    AST 24 0 - 40 U/L    ALT 15 0 - 45 U/L    Protein Total 5.7 (L) 6.0 - 8.0 g/dL    Albumin 2.3 (L) 3.5 - 5.0 g/dL    Bilirubin Total 0.4 0.0 - 1.0 mg/dL    GFR Estimate 37 (L) >60 mL/min/1.73m2   Magnesium    Collection Time: 09/07/22  4:35 AM   Result Value Ref Range    Magnesium 1.9 1.8 - 2.6 mg/dL   CBC with platelets and differential    Collection Time: 09/07/22  4:35 AM   Result Value Ref Range    WBC Count 16.5 (H) 4.0 - 11.0 10e3/uL    RBC Count 3.10 (L) 3.80 - 5.20 10e6/uL    Hemoglobin 9.4 (L) 11.7 - 15.7 g/dL    Hematocrit 27.6 (L) 35.0 - 47.0 %    MCV 89 78 - 100 fL    MCH 30.3 26.5 - 33.0 pg    MCHC 34.1 31.5 - 36.5 g/dL    RDW 15.6 (H) 10.0 - 15.0 %    Platelet Count 133 (L) 150 - 450 10e3/uL    % Neutrophils 90 %    % Lymphocytes 3 %    % Monocytes 4 %    % Eosinophils 1 %    % Basophils 0 %    % Immature Granulocytes 2 %    NRBCs per 100 WBC 0 <1 /100    Absolute Neutrophils 15.3 (H) 1.6 - 8.3 10e3/uL    Absolute Lymphocytes 0.5 (L) 0.8 - 5.3 10e3/uL    Absolute Monocytes 0.6 0.0 - 1.3 10e3/uL    Absolute Eosinophils 0.1 0.0 - 0.7 10e3/uL    Absolute Basophils 0.1 0.0 - 0.2 10e3/uL    Absolute Immature Granulocytes 0.4 <=0.4 10e3/uL    Absolute NRBCs 0.0 10e3/uL       Serum Glucose range:   Recent Labs   Lab 09/07/22  0435 09/06/22  0850 09/05/22  2134 09/05/22  1610   GLC 79 74 111  61*     ABG: No lab results found in last 7 days.  CBC:   Recent Labs   Lab 09/07/22 0435 09/06/22 0850 09/05/22 2134   WBC 16.5* 22.9* 15.4*   HGB 9.4* 9.7* 10.2*   HCT 27.6* 28.6* 29.8*   MCV 89 90 87   * 159 185   NEUTROPHIL 90  --  88   LYMPH 3  --  3   MONOCYTE 4  --  8   EOSINOPHIL 1  --  0     Chemistry:   Recent Labs   Lab 09/07/22 0435 09/06/22 0850 09/05/22 2134   * 129* 125*   POTASSIUM 4.1 3.5 3.4*   CHLORIDE 101 97* 88*   CO2 18* 21* 27   BUN 35* 32* 35*   CR 1.34* 1.28* 1.18*   GFRESTIMATED 37* 39* 43*   PERICO 8.1* 8.3* 8.7   MAG 1.9  --  1.9   PROTTOTAL 5.7* 6.3 7.1   ALBUMIN 2.3* 2.8* 3.1*   AST 24 25 22   ALT 15 15 13   ALKPHOS 81 85 95   BILITOTAL 0.4 0.5 0.8     Coags:  Recent Labs   Lab 09/07/22 0435 09/06/22 2001 09/05/22 2134   INR  --   --  1.23*   PTT 65* 72* 38     Cardiac Markers:  Recent Labs   Lab 09/06/22 2001   TROPONINI 0.79*                  09/07/2022   Vivi Amtao MD  Hospitalist  Pager: [phone]

## 2022-09-07 NOTE — CONSULTS
RENAL CONSULT NOTE    REQUESTING PHYSICIAN: Dr Roberts    REASON FOR CONSULT:MARIA DEL ROSARIO    Consults      ASSESSMENT/PLAN:  Pt with  sepsis due to EColi UTI on abx    Hypotension improved with volume resuscitation. Probably has narrow euvolemic window and concerned she may be getting overloaded. Will slow IVF and consider resuming some torsemide soon.    MARIA DEL ROSARIO due to hemodynamics, on CKD 3 baseline creat 1.   +UTI on abx, zayas draining.   Lytes ok.     Anemia follow.     Hx of CAD AVF and had Afib and ACS on admit thought due to demand ischemia.     HPI:elderly woman came to ER with fever and sob.   Ecoli UTI with sepsis and low bp, has gotten 2500 ml ivf yesterday, urine output in 400-500cc/shift range.   Diuretics on hold.   On abx  On  ml/hr  Taking little po, c/o very dry painful throat, needs assistance with eating and drinking.   No dysuria.   C/o SOB     REVIEW OF SYSTEMS:  COMPLETE REVIEW OF SYSTEMS: completely reviewed and neg other than as above.       Past Medical History:   Diagnosis Date     Asthma      Bilateral carpal tunnel syndrome 8/27/2015     CAD (coronary artery disease)      Chronic airway obstruction, not elsewhere classified     Created by Conversion      Chronic anemia      Chronic edema      Congestive heart failure, severe (H) 5/13/2020     COPD (chronic obstructive pulmonary disease) (H)      Coronary artery disease      Depression      Family history of myocardial infarction     father 64     GERD (gastroesophageal reflux disease)      Heart disease      Heart murmur      High cholesterol     dislipidemia     HTN (hypertension)      Hypercholesterolemia      Hypertension      Hypothyroidism      Hypothyroidism      MI (myocardial infarction) (H) 1985     Myelodysplastic syndrome (H)      Myocardial infarction (H) 1983     OLEGARIO (obstructive sleep apnea)      Osteoporosis      Other and unspecified hyperlipidemia     Created by Conversion      Radicular low back pain      Rheumatoid  arthritis (H)      Elizabeth Agers syndrome      Sjoegren syndrome      Sjogren's syndrome (H)      Sleep apnea, obstructive      Spinal stenosis      Unspecified polyarthropathy or polyarthritis, hand     Created by Conversion           ALLERGIES/SENSITIVITIES:  Allergies   Allergen Reactions     Gramineae Pollens Other (See Comments)     ORCHARDGRASS. Shortness of breath when lawn is just cut.     Smoke. Other (See Comments)     Hard to breathe.     Pollen Extract      Other reaction(s): Unknown          PHYSICAL EXAM:  Physical Exam   Temp: 98.5  F (36.9  C) Temp src: Oral BP: 120/76 Pulse: 94   Resp: 18 SpO2: 98 % O2 Device: Nasal cannula Oxygen Delivery: 3 LPM  Vitals:    09/05/22 2113 09/06/22 2256 09/07/22 0313   Weight: 47 kg (103 lb 9.6 oz) 50.7 kg (111 lb 12.8 oz) 51.9 kg (114 lb 6.4 oz)     Vital Signs with Ranges  Temp:  [97.7  F (36.5  C)-98.6  F (37  C)] 98.5  F (36.9  C)  Pulse:  [] 94  Resp:  [16-22] 18  BP: ()/(42-76) 120/76  FiO2 (%):  [32 %] 32 %  SpO2:  [97 %-100 %] 98 %  I/O last 3 completed shifts:  In: 1458 [P.O.:360; I.V.:498; IV Piggyback:600]  Out: 650 [Urine:650]         Patient Vitals for the past 72 hrs:   Weight   09/07/22 0313 51.9 kg (114 lb 6.4 oz)   09/06/22 2256 50.7 kg (111 lb 12.8 oz)   09/05/22 2113 47 kg (103 lb 9.6 oz)   [unfilled]    General - A & O x 3, mild dyspnea   HEENT - PERRLA, no scleral icterus very dry mouth  Neck -  no JVD  Respiratory - lungs decreased bases and few crackles L base.   Cardiovascular - AP RRR  +syst murmur  Abdomen - soft, BS present, non tender no mass. +zayas.  Extremities - well perfused, no edema  Integumentary - intact, good turgor, no rash/lesions  Neurologic -weak   Psych: anxious  :dark urine     Laboratory:     Recent Labs   Lab 09/07/22  0435 09/06/22  0850 09/05/22  2134   WBC 16.5* 22.9* 15.4*   RBC 3.10* 3.19* 3.41*   HGB 9.4* 9.7* 10.2*   HCT 27.6* 28.6* 29.8*   * 159 185       Basic Metabolic Panel:  Recent Labs   Lab  09/07/22 0435 09/06/22  0850 09/05/22  2134 09/05/22  1610   * 129* 125* 124*   POTASSIUM 4.1 3.5 3.4* 4.1   CHLORIDE 101 97* 88* 86*   CO2 18* 21* 27 27   BUN 35* 32* 35* 35.7*   CR 1.34* 1.28* 1.18* 1.17*   GLC 79 74 111 61*   PERICO 8.1* 8.3* 8.7 9.3       INR  Recent Labs   Lab 09/05/22 2134   INR 1.23*       Recent Labs   Lab Test 09/07/22 0435 09/06/22  0850   POTASSIUM 4.1 3.5   CHLORIDE 101 97*   BUN 35* 32*      Recent Labs   Lab Test 09/07/22 0435 09/06/22  0850 09/05/22  2331 09/02/21  1052 05/13/20  0502   ALBUMIN 2.3* 2.8*  --    < >  --    BILITOTAL 0.4 0.5  --    < >  --    ALT 15 15  --    < >  --    AST 24 25  --    < >  --    PROTEIN  --   --  100 *  --  30 mg/dL*    < > = values in this interval not displayed.       Personally reviewed today's laboratory studies      Thank you for involving us in the care of this patient. We will continue to follow along with you.      Jennifer Hilario MD   [facility]  [phone]

## 2022-09-07 NOTE — PLAN OF CARE
Problem: Infection Progression (Sepsis/Septic Shock)  Goal: Absence of Infection Signs and Symptoms  Outcome: Ongoing, Progressing     Patient on Piperacillin Q 8 hrs. Feeling better today.    Problem: UTI (Urinary Tract Infection)  Goal: Improved Infection Symptoms  Outcome: Ongoing, Progressing     Patient has Bush catheter in place for urinary retention related to UTI.    Problem: Dysrhythmia  Goal: Normalized Cardiac Rhythm  Outcome: Ongoing, Progressing   Goal Outcome Evaluation:      Patient remains in A-Fib with rate control @ 80's-90's

## 2022-09-07 NOTE — PROGRESS NOTES
"RESPIRATORY CARE NOTE     Patient Name: Marla Dunn  Today's Date: 9/6/2022     Pt continues to receive Duo nebs QID, BS clear/diminished with increased aeration post treatment. Pt currently on 2- 3 lpm via nasal cannula.     BP (!) 88/48 (BP Location: Right arm)   Pulse 92   Temp 97.7  F (36.5  C) (Oral)   Resp 20   Ht 1.626 m (5' 4\")   Wt 50.7 kg (111 lb 12.8 oz)   SpO2 100%   BMI 19.19 kg/m      Ivory Pereyra, RT      "

## 2022-09-07 NOTE — PLAN OF CARE
Problem: Infection Progression (Sepsis/Septic Shock)  Goal: Absence of Infection Signs and Symptoms  Outcome: Ongoing, Progressing  Intervention: Promote Recovery  Recent Flowsheet Documentation  Taken 9/7/2022 0313 by Essence Narayan, RN  Activity Management: activity adjusted per tolerance  Taken 9/7/2022 0024 by Essence Narayan, RN  Activity Management: activity adjusted per tolerance     Goal Outcome Evaluation:         Vitals stable. Afebrile. On 2L of oxygen. Getting IV antibiotics. Has zayas in place. Maintenance IV infusing as well as heparin drip. PTT within goal range, recheck next morning. Patient anxious. Complaint of painful throat when swallowing.

## 2022-09-07 NOTE — PROGRESS NOTES
Per lab blood sample for PTT was received and is being processed. Lab was contacted at approximately 2100.

## 2022-09-07 NOTE — PROGRESS NOTES
SPIRITUAL HEALTH SERVICES Progress Note      Saw pt Marla Dunn per admission screening.    Illness Narrative - [place] shared about her medical condition, stating that she has been having challenges with her kidneys, heart, and shortness of breath    Distress - Ongoing health challenges, otherwise she stated she is doing okay today.     Coping - She shared about her family history. She has four  siblings. She has a niece that is a strong source of support for her. Patient comes from [employer] background and derives meaning, purpose, and comfort from prer[employer]. She would welcome  visit when able. Patient welcomed prayer and expressed appreciation for the visit.      Plan - A  will continue to visit as able or per request by patient/family/staff.      Roldan Puente MDiv, [facility]  Lead Staff , [facility]  [phone]

## 2022-09-07 NOTE — PROGRESS NOTES
Cardiology Progress Note    Assessment/Plan:  1.  Elevated troponin, possibly due to demand ischemia in the setting of urinary tract infection and sepsis.  Echocardiogram yesterday again demonstrated normal LV function without wall motion abnormality.  Off heparin anticoagulation.  Denies any chest pain.  States her breathing feels better.  2.  MARIA DEL ROSARIO likely secondary to urosepsis; continue antibiotics  3.  Aortic valve stenosis status post bioprosthetic aortic valve replacement in 2009 in conjunction with coronary artery bypass grafting.  Echocardiogram shows normal bioprosthetic valve function.  4.  Mitral valve disease, stable  5.  UTI with urosepsis, improving with IV antibiotics.    Primary cardiologist: Dr. Amaya    Subjective:  Ongoing dyspnea.  No fever, chills, nausea or vomiting.  Patient reports poorly localized abdominal pain.  Mild tachycardia this morning from 110s to 120s, with improvement to 80s to 90s today.  Fluctuating blood pressures from 90s to 120s over 50s to 80s.  Torsemide and spironolactone are being held.  Volume up of 2700 mls since admission.  BNP this morning 377, up from 171 1 year ago.      acetaminophen  975 mg Oral TID     aspirin  81 mg Oral BID     calcium carbonate-vitamin D  1 tablet Oral Daily     carboxymethylcellulose PF  1 drop Both Eyes 4x Daily     fluorometholone  1 drop Left Eye Daily     ipratropium - albuterol 0.5 mg/2.5 mg/3 mL  3 mL Nebulization 4x daily     levothyroxine  88 mcg Oral Daily     metoprolol tartrate  25 mg Oral BID     mirtazapine  7.5 mg Oral At Bedtime     pantoprazole  40 mg Oral BID     piperacillin-tazobactam  3.375 g Intravenous Q8H     polyethylene glycol  17 g Oral Daily     sodium chloride (PF)  3 mL Intracatheter Q8H     sodium chloride  0.5 g Oral Daily     [Held by provider] spironolactone  25 mg Oral Daily     sucralfate  1 g Oral BID     timolol maleate  1 drop Left Eye Daily     [Held by provider] torsemide  40 mg Oral Daily     Vitamin  D3  125 mcg Oral Daily         Objective:   Vital signs in last 24 hours:  Temp:  [97.7  F (36.5  C)-98.6  F (37  C)] 98.5  F (36.9  C)  Pulse:  [] 92  Resp:  [16-22] 18  BP: ()/(42-76) 120/76  FiO2 (%):  [32 %] 32 %  SpO2:  [97 %-100 %] 100 %  Weight:        Review of Systems:  Denies fever, chills, nausea, vomiting.  Endorses diffuse abdominal pain.    Physical Exam:  General appearance: alert, pleasant, cooperative, no distress  Neck: no JVD   Lungs: Modestly increased work of breathing. Faint end expiratory wheeze; no crackles appreciated  Heart: Irregularly, irregular rhythm; auscultation difficult due to labored breathing  Extremities: No lower extremity edema    Cardiographics (personally reviewed):   Telemetry demonstrates atrial fibrillation with predominantly controlled ventricular response    Imaging (personally reviewed):   No new results since last note    Lab Results (personally reviewed):     Recent Labs   Lab Test 11/09/16  0952   CHOL 149   HDL 55   LDL 84   TRIG 48     Recent Labs   Lab Test 11/09/16  0952   LDL 84     Recent Labs   Lab Test 09/07/22  0435   *   POTASSIUM 4.1   CHLORIDE 101   CO2 18*   GLC 79   BUN 35*   CR 1.34*   GFRESTIMATED 37*   PERICO 8.1*     Recent Labs   Lab Test 09/07/22 0435 09/06/22  0850 09/05/22  2134   CR 1.34* 1.28* 1.18*     Recent Labs   Lab Test 05/13/20  1205   A1C 5.2          Recent Labs   Lab Test 09/07/22  0435   WBC 16.5*   HGB 9.4*   HCT 27.6*   MCV 89   *     Recent Labs   Lab Test 09/07/22 0435 09/06/22  0850 09/05/22  2134   HGB 9.4* 9.7* 10.2*    Recent Labs   Lab Test 09/06/22 2001 09/06/22  0850 09/06/22  0102   TROPONINI 0.79* 1.83* 0.74*     Recent Labs   Lab Test 09/07/22 0435 08/13/21  0552 05/13/20  0446   * 171* 536*     Recent Labs   Lab Test 03/22/20  0852   TSH 1.17     Recent Labs   Lab Test 09/05/22  2134 08/12/21  2122 05/13/20  0446   INR 1.23* 1.10 0.97          KANNAN VELÁZQUEZ, MS-4  [facility]  [facility]    Discussed case with medical student.  Patient personally interviewed and examined.  Agree with findings and recommendations above.

## 2022-09-07 NOTE — PROGRESS NOTES
Binta wanted to know if she can get an appointment with Nani sometime soon. She states she has something wrong with her eye and she is having issues with her back. She states she has history of scoliosis.    RESPIRATORY CARE NOTE     Patient Name: Marla Dunn  Today's Date: 9/7/2022       Pt remains on 3 LNC and stable respiratory wise. BS: good aerations auscultated post neb tx and diminished @ bases. Pt does have fair productive cough. RT will continue current plan of care and monitor closely.     Delilah peñaloza RRT

## 2022-09-07 NOTE — PLAN OF CARE
Problem: Plan of Care - These are the overarching goals to be used throughout the patient stay.    Goal: Optimal Comfort and Wellbeing  Outcome: Ongoing, Progressing     BP 88/46 at 1800 (reports dizziness when sitting up). [facility] was notified and potassium chloride was increased to 125 ml/hr. BP now 95/50. A-fib remains at controlled rate 80-90.    PTT 72 at 2001; recheck at 0430 tomorrow per protocol. Heparin drip remains at 550 Units/hr.    A&Ox4, although partially oriented to situation (understands she has a UTI). [place]. Consumed half of sandwich and a few grapes. Denies pain.

## 2022-09-07 NOTE — PROGRESS NOTES
09/07/22 1019   Quick Adds   Type of Visit Initial PT Evaluation       Present no   Living Environment   People in Home alone   Current Living Arrangements assisted living   Home Accessibility no concerns  (elevator)   Self-Care   Regular Exercise Yes   Activity/Exercise Type   (walking and Nustep 20minutes/day)   Equipment Currently Used at Home walker, rolling  (uses all times)   Fall history within last six months yes   Number of times patient has fallen within last six months 1   Activity/Exercise/Self-Care Comment A with bathing and IADLS, can dress herself, but sometimes gets help with shoes   General Information   Onset of Illness/Injury or Date of Surgery 09/05/22   Referring Physician Dr. Roberts   Patient/Family Therapy Goals Statement (PT) none stated   Pertinent History of Current Problem (include personal factors and/or comorbidities that impact the POC) admit fever, weakness, sepsis due to UTI, pt has h/o A-fib, CADs/p [id number], CODP, CHF, MI, OLEGARIO   Existing Precautions/Restrictions other (see comments)  (pt orders for activity bed rest with bathroom priv.)   General Observations pt in bed, reports feeling very weak   Cognition   Affect/Mental Status (Cognition) WFL   Cognitive Status Comments pt [place] and hearing aides not present for pt.   Pain Assessment   Patient Currently in Pain   (pt reports discomfort in abdomin)   Range of Motion (ROM)   ROM Comment BLEs WFL increased effort to move RLE   Strength (Manual Muscle Testing)   Strength Comments BLES WFL LLE stronger then RLE   Bed Mobility   Impairments Contributing to Impaired Bed Mobility decreased strength   Assistive Device (Bed Mobility) other (see comments);bed rails;draw sheet  (HOB elevated)   Comment, (Bed Mobility) pt min/mod Ax1 to sit EOB, Pt SBA to return to supine position, HR varying from    Transfers   Transfer Safety Concerns Noted decreased weight-shifting ability   Impairments Contributing to  Impaired Transfers decreased strength   Comment, (Transfers) sit<>stand arm in arm minAx2,  during stand   Gait/Stairs (Locomotion)   Distance in Feet (Required for LE Total Joints) pt unable to take steps , stood   Balance   Balance Comments unsteady   Clinical Impression   Criteria for Skilled Therapeutic Intervention Yes, treatment indicated   PT Diagnosis (PT) decreased mobility   Influenced by the following impairments weakness, fatigue, HR , decreased endurnce and balance   Functional limitations due to impairments bed mob, transfers, gait   Clinical Presentation (PT Evaluation Complexity) Stable/Uncomplicated   Clinical Presentation Rationale presents as medically diagnosed   Clinical Decision Making (Complexity) moderate complexity   Planned Therapy Interventions (PT) bed mobility training;gait training;transfer training;strengthening   Anticipated Equipment Needs at Discharge (PT)   (pt has FWW at home)   Risk & Benefits of therapy have been explained evaluation/treatment results reviewed   PT Discharge Planning   PT Discharge Recommendation (DC Rec) Transitional Care Facility;home with assist;home with home care physical therapy   PT Rationale for DC Rec at this time limit mobility due to fatigue, weakness, HR, pt would need A all mobility to return home and home PT   Total Evaluation Time   Total Evaluation Time (Minutes) 12   Physical Therapy Goals   PT Frequency Daily   PT Predicted Duration/Target Date for Goal Attainment 09/14/22   PT Goals Bed Mobility;Transfers;Gait   PT: Bed Mobility Modified independent;Supine to/from sit;Rolling   PT: Transfers Supervision/stand-by assist;Sit to/from stand;Bed to/from chair;Assistive device   PT: Gait 150 feet;Rolling walker  (CGA)

## 2022-09-08 LAB
ANION GAP SERPL CALCULATED.3IONS-SCNC: 7 MMOL/L (ref 5–18)
APTT PPP: 54 SECONDS (ref 22–38)
APTT PPP: 87 SECONDS (ref 22–38)
BUN SERPL-MCNC: 33 MG/DL (ref 8–28)
CALCIUM SERPL-MCNC: 8.6 MG/DL (ref 8.5–10.5)
CHLORIDE BLD-SCNC: 106 MMOL/L (ref 98–107)
CO2 SERPL-SCNC: 18 MMOL/L (ref 22–31)
CREAT SERPL-MCNC: 1.28 MG/DL (ref 0.6–1.1)
GFR SERPL CREATININE-BSD FRML MDRD: 39 ML/MIN/1.73M2
GLUCOSE BLD-MCNC: 89 MG/DL (ref 70–125)
LACTATE SERPL-SCNC: 1.2 MMOL/L (ref 0.7–2)
MAGNESIUM SERPL-MCNC: 2.3 MG/DL (ref 1.8–2.6)
POTASSIUM BLD-SCNC: 4.6 MMOL/L (ref 3.5–5)
POTASSIUM BLD-SCNC: 4.7 MMOL/L (ref 3.5–5)
SODIUM SERPL-SCNC: 131 MMOL/L (ref 136–145)

## 2022-09-08 PROCEDURE — 250N000011 HC RX IP 250 OP 636: Performed by: INTERNAL MEDICINE

## 2022-09-08 PROCEDURE — 36415 COLL VENOUS BLD VENIPUNCTURE: CPT | Performed by: HOSPITALIST

## 2022-09-08 PROCEDURE — 84132 ASSAY OF SERUM POTASSIUM: CPT | Performed by: HOSPITALIST

## 2022-09-08 PROCEDURE — 99232 SBSQ HOSP IP/OBS MODERATE 35: CPT | Performed by: HOSPITALIST

## 2022-09-08 PROCEDURE — 94660 CPAP INITIATION&MGMT: CPT

## 2022-09-08 PROCEDURE — 250N000013 HC RX MED GY IP 250 OP 250 PS 637: Performed by: INTERNAL MEDICINE

## 2022-09-08 PROCEDURE — 85730 THROMBOPLASTIN TIME PARTIAL: CPT | Performed by: HOSPITALIST

## 2022-09-08 PROCEDURE — 999N000157 HC STATISTIC RCP TIME EA 10 MIN

## 2022-09-08 PROCEDURE — 82310 ASSAY OF CALCIUM: CPT | Performed by: HOSPITALIST

## 2022-09-08 PROCEDURE — 36415 COLL VENOUS BLD VENIPUNCTURE: CPT | Performed by: INTERNAL MEDICINE

## 2022-09-08 PROCEDURE — 94640 AIRWAY INHALATION TREATMENT: CPT | Mod: 76

## 2022-09-08 PROCEDURE — 250N000011 HC RX IP 250 OP 636: Performed by: HOSPITALIST

## 2022-09-08 PROCEDURE — 250N000009 HC RX 250: Performed by: INTERNAL MEDICINE

## 2022-09-08 PROCEDURE — 85730 THROMBOPLASTIN TIME PARTIAL: CPT | Performed by: INTERNAL MEDICINE

## 2022-09-08 PROCEDURE — 99232 SBSQ HOSP IP/OBS MODERATE 35: CPT | Performed by: INTERNAL MEDICINE

## 2022-09-08 PROCEDURE — 250N000013 HC RX MED GY IP 250 OP 250 PS 637: Performed by: HOSPITALIST

## 2022-09-08 PROCEDURE — 83605 ASSAY OF LACTIC ACID: CPT | Performed by: HOSPITALIST

## 2022-09-08 PROCEDURE — 94640 AIRWAY INHALATION TREATMENT: CPT

## 2022-09-08 PROCEDURE — 83735 ASSAY OF MAGNESIUM: CPT | Performed by: HOSPITALIST

## 2022-09-08 PROCEDURE — 210N000001 HC R&B IMCU HEART CARE

## 2022-09-08 RX ORDER — METOPROLOL TARTRATE 25 MG/1
25 TABLET, FILM COATED ORAL 2 TIMES DAILY
Status: DISCONTINUED | OUTPATIENT
Start: 2022-09-08 | End: 2022-09-10 | Stop reason: HOSPADM

## 2022-09-08 RX ORDER — TORSEMIDE 20 MG/1
40 TABLET ORAL DAILY
Status: DISCONTINUED | OUTPATIENT
Start: 2022-09-08 | End: 2022-09-10

## 2022-09-08 RX ORDER — METOPROLOL TARTRATE 25 MG/1
50 TABLET, FILM COATED ORAL 2 TIMES DAILY
Status: DISCONTINUED | OUTPATIENT
Start: 2022-09-08 | End: 2022-09-08

## 2022-09-08 RX ORDER — CIPROFLOXACIN 500 MG/1
500 TABLET, FILM COATED ORAL
Status: DISCONTINUED | OUTPATIENT
Start: 2022-09-08 | End: 2022-09-09

## 2022-09-08 RX ORDER — TORSEMIDE 20 MG/1
20 TABLET ORAL DAILY
Status: DISCONTINUED | OUTPATIENT
Start: 2022-09-08 | End: 2022-09-08

## 2022-09-08 RX ORDER — METOPROLOL TARTRATE 25 MG/1
25 TABLET, FILM COATED ORAL ONCE
Status: COMPLETED | OUTPATIENT
Start: 2022-09-08 | End: 2022-09-08

## 2022-09-08 RX ADMIN — CARBOXYMETHYLCELLULOSE SODIUM 1 DROP: 10 GEL OPHTHALMIC at 09:05

## 2022-09-08 RX ADMIN — LEVOTHYROXINE SODIUM 88 MCG: 88 TABLET ORAL at 05:36

## 2022-09-08 RX ADMIN — Medication 81 MG: at 19:48

## 2022-09-08 RX ADMIN — FLUOROMETHOLONE 1 DROP: 1 SOLUTION/ DROPS OPHTHALMIC at 09:04

## 2022-09-08 RX ADMIN — PIPERACILLIN AND TAZOBACTAM 3.38 G: 3; .375 INJECTION, POWDER, LYOPHILIZED, FOR SOLUTION INTRAVENOUS at 02:32

## 2022-09-08 RX ADMIN — CIPROFLOXACIN 500 MG: 500 TABLET, FILM COATED ORAL at 17:54

## 2022-09-08 RX ADMIN — Medication 125 MCG: at 09:02

## 2022-09-08 RX ADMIN — CARBOXYMETHYLCELLULOSE SODIUM 1 DROP: 10 GEL OPHTHALMIC at 13:00

## 2022-09-08 RX ADMIN — IPRATROPIUM BROMIDE AND ALBUTEROL SULFATE 3 ML: 2.5; .5 SOLUTION RESPIRATORY (INHALATION) at 20:38

## 2022-09-08 RX ADMIN — METOPROLOL TARTRATE 25 MG: 25 TABLET, FILM COATED ORAL at 09:03

## 2022-09-08 RX ADMIN — Medication 81 MG: at 09:02

## 2022-09-08 RX ADMIN — PANTOPRAZOLE SODIUM 40 MG: 20 TABLET, DELAYED RELEASE ORAL at 09:03

## 2022-09-08 RX ADMIN — IPRATROPIUM BROMIDE AND ALBUTEROL SULFATE 3 ML: 2.5; .5 SOLUTION RESPIRATORY (INHALATION) at 15:15

## 2022-09-08 RX ADMIN — CARBOXYMETHYLCELLULOSE SODIUM 1 DROP: 10 GEL OPHTHALMIC at 19:58

## 2022-09-08 RX ADMIN — ACETAMINOPHEN 975 MG: 325 TABLET ORAL at 09:02

## 2022-09-08 RX ADMIN — SUCRALFATE 1 G: 1 TABLET ORAL at 09:03

## 2022-09-08 RX ADMIN — POTASSIUM CHLORIDE AND SODIUM CHLORIDE 50 ML/HR: 900; 150 INJECTION, SOLUTION INTRAVENOUS at 05:38

## 2022-09-08 RX ADMIN — PANTOPRAZOLE SODIUM 40 MG: 20 TABLET, DELAYED RELEASE ORAL at 19:49

## 2022-09-08 RX ADMIN — TIMOLOL MALEATE 1 DROP: 5 SOLUTION/ DROPS OPHTHALMIC at 09:04

## 2022-09-08 RX ADMIN — METOPROLOL TARTRATE 25 MG: 25 TABLET, FILM COATED ORAL at 10:47

## 2022-09-08 RX ADMIN — HEPARIN SODIUM AND DEXTROSE 550 UNITS/HR: 10000; 5 INJECTION INTRAVENOUS at 05:37

## 2022-09-08 RX ADMIN — METOPROLOL TARTRATE 25 MG: 25 TABLET, FILM COATED ORAL at 19:48

## 2022-09-08 RX ADMIN — TORSEMIDE 40 MG: 20 TABLET ORAL at 14:51

## 2022-09-08 RX ADMIN — IPRATROPIUM BROMIDE AND ALBUTEROL SULFATE 3 ML: 2.5; .5 SOLUTION RESPIRATORY (INHALATION) at 10:28

## 2022-09-08 RX ADMIN — CALCIUM CARBONATE-VITAMIN D TAB 500 MG-200 UNIT 1 TABLET: 500-200 TAB at 09:03

## 2022-09-08 RX ADMIN — SODIUM CHLORIDE TAB 1 GM 0.5 G: 1 TAB at 09:03

## 2022-09-08 RX ADMIN — ACETAMINOPHEN 975 MG: 325 TABLET ORAL at 14:46

## 2022-09-08 RX ADMIN — PIPERACILLIN AND TAZOBACTAM 3.38 G: 3; .375 INJECTION, POWDER, LYOPHILIZED, FOR SOLUTION INTRAVENOUS at 10:47

## 2022-09-08 RX ADMIN — MIRTAZAPINE 7.5 MG: 7.5 TABLET, FILM COATED ORAL at 21:04

## 2022-09-08 ASSESSMENT — ACTIVITIES OF DAILY LIVING (ADL)
ADLS_ACUITY_SCORE: 29
ADLS_ACUITY_SCORE: 32
ADLS_ACUITY_SCORE: 29
ADLS_ACUITY_SCORE: 31
ADLS_ACUITY_SCORE: 31
ADLS_ACUITY_SCORE: 32
ADLS_ACUITY_SCORE: 29
ADLS_ACUITY_SCORE: 31
ADLS_ACUITY_SCORE: 31
ADLS_ACUITY_SCORE: 32

## 2022-09-08 NOTE — PROGRESS NOTES
Respiratory Therapy Note    Patient is receiving QID Duoneb. Patient is on 2 L NC, SpO2 95%. Pt requested to be placed on CPAP today, and has been on and off for most of the day, stating the machine helps with breathing. Barrier placed between mask and skin d/t some redness on bridge of nose.     Pre-treatment HR 90, RR 18, Breath sounds diminished. Post-treatment HR 82, RR 18 and breath sounds unchanged. Continue to encourage deep breathing and coughing techniques.     Ember Mercado, RT

## 2022-09-08 NOTE — PLAN OF CARE
Problem: UTI (Urinary Tract Infection)  Goal: Improved Infection Symptoms  Outcome: Ongoing, Progressing     Problem: Dysrhythmia  Goal: Normalized Cardiac Rhythm  Outcome: Ongoing, Progressing    Outcome Evaluation:    Getting IV antibiotics. Bush intact. Mild tachypnea. Afib. HR mainly 90's but will intermittently goes up to 100's to 110's. Lungs diminished. On 2L of oxygen. Maintenance IVF as well as heparin drip infusing.

## 2022-09-08 NOTE — PROGRESS NOTES
"Hospitalist Progress Note        CODE STATUS:  No CPR- Pre-arrest intubation OK    Assessment/Plan:  Marla Dunn is a 89 year old female with PMH of atrial fibrillation, not anticoagulated due to fall risk, mitral stenosis and regurgitation, diastolic CHF, COPD, assisted-living resident, presented to ED with fever, diffuse aches, diffuse abdominal pain, nausea without vomiting and admitted for sepsis due to UTI.    Sepsis due to E. Coli UTI  - Clinically improved  - WBC trending down. Afebrile.   - Start Cipro, renally dosed.    Elevated troponin  - Likely demand ischemia from rapid A. fib and sepsis.  - Cardiology consulted & following; rec'd hep gtt for 24-48 hrs     Acute Kidney Injury  - In setting of UTI    Atrial fibrillation, with mild RVR  - Rapid rates up in setting of sepsis  - Not on anticoagulation due to fall risk.  - Continue PTA dose of Lopressor     Hyponatremia, chronic  - Baseline sodium around 130, was 125 on admission.   - Given 1 L NS in ED and then maintenance fluid. Stop IVF.    Hypokalemia  - Replaced     Chronic diastolic CHF  - Not in acute exacerbation   - Torsemide to be started per nephrology. IVF stopped this morning to prevent fluid overload.  .  Insomnia  - Cont Remeron    GERD  - Cont Protonix    Constipation  - Cont  Miralax       DVT Prophylaxis: On heparin gtt as above    Disposition/Barrier to discharge: Not medically stable for discharge. TCU vs home with home PT    Subjective:  Interval History:   Patient seen and examined.   Reports feeling improved. States \"my vitals are stable\"  Denies further abdominal pain. Had oatmeal for breakfast.     Review of Systems:   As mentioned in subjective.    Patient Active Problem List   Diagnosis     Rectal prolapse     Closed displaced intertrochanteric fracture of right femur, initial encounter (H)     Acute bronchitis due to other specified organisms     Anxiety and depression     Arteriosclerosis of coronary artery     Sleep apnea "     Carpal tunnel syndrome, right     Constipation, unspecified     COPD exacerbation (H)     Diastolic CHF (H)     Dyslipidemia     Fecal incontinence alternating with constipation     Fever     Generalized muscle weakness     Heart valve disease     Hyperlipidemia LDL goal <100     Hypertension     Hypertensive emergency     Hypokalemia     Hyponatremia     Irregular bowel habits     Metabolic acidosis     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)     Polyarthropathy or polyarthritis, hand     Physical deconditioning     Pneumonia     Pulmonary edema cardiac cause (H)     Pyelonephritis     Rash of body     Respiratory failure (H)     Sepsis secondary to UTI (H)     Sjogren's syndrome (H)     Tachycardia     Acute renal failure superimposed on stage 3 chronic kidney disease, unspecified acute renal failure type, unspecified whether stage 3a or 3b CKD (H)     Urinary tract infection     Rapid atrial fibrillation (H)     Demand ischemia of myocardium (H)     Abdominal pain, generalized     Elevated troponin     Atrial fibrillation with rapid ventricular response (H)     Non-intractable vomiting with nausea, unspecified vomiting type     Acute cystitis without hematuria       Scheduled Meds:    acetaminophen  975 mg Oral TID     aspirin  81 mg Oral BID     calcium carbonate-vitamin D  1 tablet Oral Daily     carboxymethylcellulose PF  1 drop Both Eyes 4x Daily     fluorometholone  1 drop Left Eye Daily     ipratropium - albuterol 0.5 mg/2.5 mg/3 mL  3 mL Nebulization 4x daily     levothyroxine  88 mcg Oral Daily     metoprolol tartrate  25 mg Oral BID     mirtazapine  7.5 mg Oral At Bedtime     pantoprazole  40 mg Oral BID     piperacillin-tazobactam  3.375 g Intravenous Q8H     polyethylene glycol  17 g Oral Daily     sodium chloride (PF)  3 mL Intracatheter Q8H     sodium chloride  0.5 g Oral Daily     [Held by provider] spironolactone  25 mg Oral Daily     sucralfate  1 g Oral BID     timolol maleate  1 drop Left  Eye Daily     [Held by provider] torsemide  40 mg Oral Daily     Vitamin D3  125 mcg Oral Daily     Continuous Infusions:    0.9% sodium chloride + KCl 20 mEq/L 50 mL/hr (09/08/22 0538)     heparin 700 Units/hr (09/08/22 0543)     PRN Meds:.albuterol, bisacodyl, calcium carbonate, HYDROmorphone, lidocaine 4%, lidocaine (buffered or not buffered), melatonin, ondansetron **OR** ondansetron, ondansetron, prochlorperazine **OR** prochlorperazine **OR** prochlorperazine, sodium chloride (PF)    Objective:  Vital signs in last 24 hours:  Temp:  [97.4  F (36.3  C)-98.5  F (36.9  C)] 98.1  F (36.7  C)  Pulse:  [] 113  Resp:  [18-22] 20  BP: (101-150)/(56-67) 150/67  FiO2 (%):  [32 %] 32 %  SpO2:  [95 %-100 %] 99 %        Intake/Output Summary (Last 24 hours) at 9/7/2022 1228  Last data filed at 9/7/2022 0753  Gross per 24 hour   Intake 1698 ml   Output 650 ml   Net 1048 ml       Physical Exam:  General: In NAD  HEENT: NCAT & EOMI  CV: Irregularly irregular rhythm, normal rate  Lungs: CTAB  Abdomen: Soft, NT, ND, +BS  Extremities: No LE edema b/l  Neuro: AAOx3.   Psych: Normal Affect    Lab Results:    Recent Results (from the past 24 hour(s))   Partial thromboplastin time    Collection Time: 09/08/22  4:54 AM   Result Value Ref Range    aPTT 54 (H) 22 - 38 Seconds   Magnesium    Collection Time: 09/08/22  4:54 AM   Result Value Ref Range    Magnesium 2.3 1.8 - 2.6 mg/dL   Potassium    Collection Time: 09/08/22  4:54 AM   Result Value Ref Range    Potassium 4.6 3.5 - 5.0 mmol/L       Serum Glucose range:   Recent Labs   Lab 09/07/22  0435 09/06/22  0850 09/05/22  2134 09/05/22  1610   GLC 79 74 111 61*     ABG: No lab results found in last 7 days.  CBC:   Recent Labs   Lab 09/07/22  0435 09/06/22  0850 09/05/22  2134   WBC 16.5* 22.9* 15.4*   HGB 9.4* 9.7* 10.2*   HCT 27.6* 28.6* 29.8*   MCV 89 90 87   * 159 185   NEUTROPHIL 90  --  88   LYMPH 3  --  3   MONOCYTE 4  --  8   EOSINOPHIL 1  --  0     Chemistry:    Recent Labs   Lab 09/08/22 0454 09/07/22 0435 09/06/22 0850 09/05/22 2134   NA  --  128* 129* 125*   POTASSIUM 4.6 4.1 3.5 3.4*   CHLORIDE  --  101 97* 88*   CO2  --  18* 21* 27   BUN  --  35* 32* 35*   CR  --  1.34* 1.28* 1.18*   GFRESTIMATED  --  37* 39* 43*   PERICO  --  8.1* 8.3* 8.7   MAG 2.3 1.9  --  1.9   PROTTOTAL  --  5.7* 6.3 7.1   ALBUMIN  --  2.3* 2.8* 3.1*   AST  --  24 25 22   ALT  --  15 15 13   ALKPHOS  --  81 85 95   BILITOTAL  --  0.4 0.5 0.8     Coags:  Recent Labs   Lab 09/08/22 0454 09/07/22 0435 09/06/22 2001 09/05/22 2134   INR  --   --   --  1.23*   PTT 54* 65* 72* 38     Cardiac Markers:  Recent Labs   Lab 09/06/22 2001   TROPONINI 0.79*                  09/08/2022   Vivi Amato MD  Hospitalist  Pager: [phone]

## 2022-09-08 NOTE — PROGRESS NOTES
Pt requested nebulizer treatment as well as CPAP. Pt states using CPAP at home at night. Placed pt on hospital auto CPAP machine, pt states breathing feels better. Will continue to monitor.     Ember Mercado, RT

## 2022-09-08 NOTE — PROGRESS NOTES
Care Management Follow Up    Length of Stay (days): 2    Expected Discharge Date: 09/09/2022     Concerns to be Addressed:       Patient plan of care discussed at interdisciplinary rounds: Yes    Anticipated Discharge Disposition:  TCU     Anticipated Discharge Services:  TCU  Anticipated Discharge DME:      Patient/family educated on Medicare website which has current facility and service quality ratings:  yes  Education Provided on the Discharge Plan:  Yes  Patient/Family in Agreement with the Plan:  Yes    Referrals Placed by CM/SW:  [facility]  Private pay costs discussed: Not applicable    Additional Information:  [facility] MD anticipates discharge possibly Saturday. SW notified patient's niece, Deneen.       Chiara Leonard

## 2022-09-08 NOTE — PROGRESS NOTES
Cardiology Progress Note    Assessment/Plan:  1.  Elevated troponin, possibly due to demand ischemia in the setting of urinary tract infection and sepsis.  Echocardiogram again demonstrated normal LV function without wall motion abnormality.  Denies any chest pain.    2.  MARIA DEL ROSARIO likely secondary to urosepsis; continue antibiotics  3.  Aortic valve stenosis status post bioprosthetic aortic valve replacement in 2009 in conjunction with coronary artery bypass grafting.  Echocardiogram shows normal bioprosthetic valve function.  4.  Mitral valve disease, stable  5.  UTI with urosepsis, improving with IV antibiotics.  Blood pressure is improving although patient is still on maintenance fluids with spironolactone and torsemide held.  Await nephrology on plan as to when to resume torsemide.    Primary cardiologist: Dr. Amaya    Subjective:    Ongoing dyspnea, fatigue, and malaise despite CPAP, though albuterol does help some.  Still on maintenance fluids with potassium.  Still mildly tachycardic and afebrile.  Blood pressures are up from the low 100s/high 50s yesterday to mid 130s/mid 60s today. No fever, chills, nausea or vomiting.  Diffuse abdominal/pelvic pain.  Torsemide and spironolactone still being held.  Volume up 2300 mls since admission but down 400 from yesterday.       acetaminophen  975 mg Oral TID     aspirin  81 mg Oral BID     calcium carbonate-vitamin D  1 tablet Oral Daily     carboxymethylcellulose PF  1 drop Both Eyes 4x Daily     fluorometholone  1 drop Left Eye Daily     ipratropium - albuterol 0.5 mg/2.5 mg/3 mL  3 mL Nebulization 4x daily     levothyroxine  88 mcg Oral Daily     metoprolol tartrate  50 mg Oral BID     mirtazapine  7.5 mg Oral At Bedtime     pantoprazole  40 mg Oral BID     piperacillin-tazobactam  3.375 g Intravenous Q8H     polyethylene glycol  17 g Oral Daily     sodium chloride (PF)  3 mL Intracatheter Q8H     sodium chloride  0.5 g Oral Daily     [Held by provider]  spironolactone  25 mg Oral Daily     sucralfate  1 g Oral BID     timolol maleate  1 drop Left Eye Daily     [Held by provider] torsemide  40 mg Oral Daily     Vitamin D3  125 mcg Oral Daily         Objective:   Vital signs in last 24 hours:  Temp:  [97.4  F (36.3  C)-98.5  F (36.9  C)] 98.1  F (36.7  C)  Pulse:  [] 113  Resp:  [18-22] 20  BP: (101-150)/(56-67) 150/67  SpO2:  [95 %-100 %] 99 %  Weight:        Review of Systems:  Denies fever, chills, nausea, vomiting.  Endorses diffuse abdominal pain.    Physical Exam:  General appearance: alert, pleasant, cooperative, no acute distress  Neck: no JVD   Lungs: Modestly increased work of breathing.  Clear to auscultation bilaterally.  Heart: Irregularly, irregular rhythm; auscultation difficult due to labored breathing with CPAP  Extremities: No lower extremity edema.  Bruises on hands and forearms.  Lower extremity varicose veins and bilateral stasis dermatitis.  All extremities warm and well-perfused.    Cardiographics (personally reviewed):   Telemetry demonstrates atrial fibrillation with predominantly controlled ventricular response    Imaging (personally reviewed):   No new results since last note    Lab Results (personally reviewed):     Recent Labs   Lab Test 11/09/16  0952   CHOL 149   HDL 55   LDL 84   TRIG 48     Recent Labs   Lab Test 11/09/16  0952   LDL 84     Recent Labs   Lab Test 09/07/22  0435   *   POTASSIUM 4.1   CHLORIDE 101   CO2 18*   GLC 79   BUN 35*   CR 1.34*   GFRESTIMATED 37*   PERICO 8.1*     Recent Labs   Lab Test 09/07/22 0435 09/06/22  0850 09/05/22  2134   CR 1.34* 1.28* 1.18*     Recent Labs   Lab Test 05/13/20  1205   A1C 5.2          Recent Labs   Lab Test 09/07/22 0435   WBC 16.5*   HGB 9.4*   HCT 27.6*   MCV 89   *     Recent Labs   Lab Test 09/07/22 0435 09/06/22  0850 09/05/22  2134   HGB 9.4* 9.7* 10.2*    Recent Labs   Lab Test 09/06/22 2001 09/06/22  0850 09/06/22  0102   TROPONINI 0.79* 1.83* 0.74*      Recent Labs   Lab Test 09/07/22  0435 08/13/21  0552 05/13/20  0446   * 171* 536*     Recent Labs   Lab Test 03/22/20  0852   TSH 1.17     Recent Labs   Lab Test 09/05/22  2134 08/12/21 2122 05/13/20  0446   INR 1.23* 1.10 0.97          KANNAN VELÁZQUEZ, MS-4  [facility] [employer]    Discussed case with medical student.  Patient personally interviewed and examined.  Agree with findings and recommendations above.

## 2022-09-08 NOTE — PLAN OF CARE
Problem: Risk for Delirium  Goal: Optimal Coping  Outcome: Ongoing, Not Progressing  Intervention: Optimize Psychosocial Adjustment to Delirium  Recent Flowsheet Documentation  Taken 9/8/2022 0830 by Yaritza Tatum RN  Supportive Measures:   active listening utilized   positive reinforcement provided   verbalization of feelings encouraged   Goal Outcome Evaluation:      Pt at times anxious, teary, and demanding.  Often vague with her complaints.  Therapeutic communication techniques utilized to calm and reassure her, with intermittent success.        Problem: Dysrhythmia  Goal: Normalized Cardiac Rhythm  Outcome: Ongoing, Progressing   Improved heart rate after metoprolol today.  Remains in a-fib.      Problem: COPD (Chronic Obstructive Pulmonary Disease) Comorbidity  Goal: Maintenance of COPD Symptom Control  Outcome: Ongoing, Progressing  Intervention: Maintain COPD-Symptom Control  Recent Flowsheet Documentation  Taken 9/8/2022 0830 by Yaritza Tatum RN  Supportive Measures:   active listening utilized   positive reinforcement provided   verbalization of feelings encouraged  Medication Review/Management: medications reviewed     Problem: Obstructive Sleep Apnea Risk or Actual Comorbidity Management  Goal: Unobstructed Breathing During Sleep  Outcome: Ongoing, Progressing     Slept for several hours today with C-PAP on and resistant to wake up and take mask off for meds.

## 2022-09-08 NOTE — PLAN OF CARE
Problem: Risk for Delirium  Goal: Improved Attention and Thought Clarity  Outcome: Ongoing, Progressing     Problem: UTI (Urinary Tract Infection)  Goal: Improved Infection Symptoms  Outcome: Ongoing, Progressing     A&O x4. C/O 5/10 generalized pain on the right abdominal/side area. The pt only wanted her scheduled tylenol for the pain- which brought her pain down to 0/10. Tele is A-fib. Hep. gtt Running at 550 units/hr.

## 2022-09-08 NOTE — PROGRESS NOTES
"Pt received Duoneb as schedule, BS clear and diminished in the bases. Pt resting in bed, denies shortness of breath. RT will follow.     /57 (BP Location: Right arm, Patient Position: Semi-Carpio's, Cuff Size: Adult Small)   Pulse 86   Temp 98.1  F (36.7  C) (Oral)   Resp 18   Ht 1.626 m (5' 4\")   Wt 51.9 kg (114 lb 6.4 oz)   SpO2 95%   BMI 19.64 kg/m    "

## 2022-09-08 NOTE — PROGRESS NOTES
RENAL NOTE    REQUESTING PHYSICIAN: Dr Roberts    REASON FOR CONSULT:MARIA DEL ROSARIO      ASSESSMENT/PLAN:  Pt with  sepsis due to EColi UTI on abx    Hypotension improved with volume resuscitation. Probably has narrow euvolemic window and concerned she may be getting overloaded. Stop ivf, resume torsemide today. Lets wait a day on spironolactone given creat still up and K 4.7 today.     MARIA DEL ROSARIO due to hemodynamics, on CKD 3 baseline creat 1.   +UTI on abx, zayas draining.   Lytes ok.   Creat slightly better and good urine output now.     Anemia follow.     Hx of CAD AVF and had Afib and ACS on admit thought due to demand ischemia.     HPI:elderly woman came to ER with fever and sob.   Ecoli UTI with sepsis and low bp, has gotten 2500 ml ivf yesterday, urine output in 400-500cc/shift range.   Diuretics on hold.   On abx  On  ml/hr  Taking little po, c/o very dry painful throat, needs assistance with eating and drinking.   No dysuria.   C/o SOB     REVIEW OF SYSTEMS:  COMPLETE REVIEW OF SYSTEMS: completely reviewed and neg other than as above.       Past Medical History:   Diagnosis Date     Asthma      Bilateral carpal tunnel syndrome 8/27/2015     CAD (coronary artery disease)      Chronic airway obstruction, not elsewhere classified     Created by Conversion      Chronic anemia      Chronic edema      Congestive heart failure, severe (H) 5/13/2020     COPD (chronic obstructive pulmonary disease) (H)      Coronary artery disease      Depression      Family history of myocardial infarction     father 64     GERD (gastroesophageal reflux disease)      Heart disease      Heart murmur      High cholesterol     dislipidemia     HTN (hypertension)      Hypercholesterolemia      Hypertension      Hypothyroidism      Hypothyroidism      MI (myocardial infarction) (H) 1985     Myelodysplastic syndrome (H)      Myocardial infarction (H) 1983     OLEGARIO (obstructive sleep apnea)      Osteoporosis      Other and unspecified  hyperlipidemia     Created by Conversion      Radicular low back pain      Rheumatoid arthritis (H)      Elizabeth Agers syndrome      Sjoegren syndrome      Sjogren's syndrome (H)      Sleep apnea, obstructive      Spinal stenosis      Unspecified polyarthropathy or polyarthritis, hand     Created by Conversion           ALLERGIES/SENSITIVITIES:  Allergies   Allergen Reactions     Gramineae Pollens Other (See Comments)     ORCHARDGRASS. Shortness of breath when lawn is just cut.     Smoke. Other (See Comments)     Hard to breathe.     Pollen Extract      Other reaction(s): Unknown          PHYSICAL EXAM:  Physical Exam   Temp: 97.5  F (36.4  C) Temp src: Axillary BP: 122/60 Pulse: 92   Resp: 18 SpO2: 100 % O2 Device: BiPAP/CPAP Oxygen Delivery: 2 LPM  Vitals:    09/06/22 2256 09/07/22 0313 09/08/22 0438   Weight: 50.7 kg (111 lb 12.8 oz) 51.9 kg (114 lb 6.4 oz) 52.2 kg (115 lb)     Vital Signs with Ranges  Temp:  [97.4  F (36.3  C)-98.1  F (36.7  C)] 97.5  F (36.4  C)  Pulse:  [] 92  Resp:  [18-22] 18  BP: (101-150)/(56-67) 122/60  SpO2:  [95 %-100 %] 100 %  I/O last 3 completed shifts:  In: 939 [P.O.:560; I.V.:379]  Out: 1100 [Urine:1100]         Patient Vitals for the past 72 hrs:   Weight   09/08/22 0438 52.2 kg (115 lb)   09/07/22 0313 51.9 kg (114 lb 6.4 oz)   09/06/22 2256 50.7 kg (111 lb 12.8 oz)   09/05/22 2113 47 kg (103 lb 9.6 oz)   [unfilled]    General - A & O x 3, mild dyspnea   HEENT - PERRLA, no scleral icterus very dry mouth  Neck -  no JVD  Respiratory - lungs decreased bases and few crackles L base.   Cardiovascular - AP RRR  +syst murmur  Abdomen - soft, BS present, non tender no mass. +zayas.  Extremities - well perfused, no edema  Integumentary - intact, good turgor, no rash/lesions  Neurologic -weak   Psych: anxious  :dark urine     Laboratory:     Recent Labs   Lab 09/07/22  0435 09/06/22  0850 09/05/22  2134   WBC 16.5* 22.9* 15.4*   RBC 3.10* 3.19* 3.41*   HGB 9.4* 9.7* 10.2*   HCT 27.6*  28.6* 29.8*   * 159 185       Basic Metabolic Panel:  Recent Labs   Lab 09/08/22  0454 09/07/22  0435 09/06/22  0850 09/05/22  2134 09/05/22  1610   * 128* 129* 125* 124*   POTASSIUM 4.7  4.6 4.1 3.5 3.4* 4.1   CHLORIDE 106 101 97* 88* 86*   CO2 18* 18* 21* 27 27   BUN 33* 35* 32* 35* 35.7*   CR 1.28* 1.34* 1.28* 1.18* 1.17*   GLC 89 79 74 111 61*   PERICO 8.6 8.1* 8.3* 8.7 9.3       INR  Recent Labs   Lab 09/05/22  2134   INR 1.23*       Recent Labs   Lab Test 09/07/22  0435 09/06/22  0850   POTASSIUM 4.1 3.5   CHLORIDE 101 97*   BUN 35* 32*      Recent Labs   Lab Test 09/07/22  0435 09/06/22  0850 09/05/22  2331 09/02/21  1052 05/13/20  0502   ALBUMIN 2.3* 2.8*  --    < >  --    BILITOTAL 0.4 0.5  --    < >  --    ALT 15 15  --    < >  --    AST 24 25  --    < >  --    PROTEIN  --   --  100 *  --  30 mg/dL*    < > = values in this interval not displayed.       Personally reviewed today's laboratory studies      Thank you for involving us in the care of this patient. We will continue to follow along with you.      Jennifer Hilario MD   [facility]  [phone]

## 2022-09-09 ENCOUNTER — MEDICAL CORRESPONDENCE (OUTPATIENT)
Dept: HEALTH INFORMATION MANAGEMENT | Facility: CLINIC | Age: 87
End: 2022-09-09

## 2022-09-09 ENCOUNTER — APPOINTMENT (OUTPATIENT)
Dept: OCCUPATIONAL THERAPY | Facility: HOSPITAL | Age: 87
DRG: 872 | End: 2022-09-09
Payer: COMMERCIAL

## 2022-09-09 LAB
ANION GAP SERPL CALCULATED.3IONS-SCNC: 9 MMOL/L (ref 5–18)
ATRIAL RATE - MUSE: 197 BPM
BASOPHILS # BLD AUTO: 0 10E3/UL (ref 0–0.2)
BASOPHILS NFR BLD AUTO: 0 %
BUN SERPL-MCNC: 27 MG/DL (ref 8–28)
CALCIUM SERPL-MCNC: 8.6 MG/DL (ref 8.5–10.5)
CHLORIDE BLD-SCNC: 106 MMOL/L (ref 98–107)
CO2 SERPL-SCNC: 17 MMOL/L (ref 22–31)
CREAT SERPL-MCNC: 1.19 MG/DL (ref 0.6–1.1)
DIASTOLIC BLOOD PRESSURE - MUSE: NORMAL MMHG
EOSINOPHIL # BLD AUTO: 0.2 10E3/UL (ref 0–0.7)
EOSINOPHIL NFR BLD AUTO: 2 %
ERYTHROCYTE [DISTWIDTH] IN BLOOD BY AUTOMATED COUNT: 16.3 % (ref 10–15)
GFR SERPL CREATININE-BSD FRML MDRD: 42 ML/MIN/1.73M2
GLUCOSE BLD-MCNC: 76 MG/DL (ref 70–125)
HCT VFR BLD AUTO: 30.4 % (ref 35–47)
HGB BLD-MCNC: 9.9 G/DL (ref 11.7–15.7)
IMM GRANULOCYTES # BLD: 0.2 10E3/UL
IMM GRANULOCYTES NFR BLD: 2 %
INTERPRETATION ECG - MUSE: NORMAL
LYMPHOCYTES # BLD AUTO: 0.7 10E3/UL (ref 0.8–5.3)
LYMPHOCYTES NFR BLD AUTO: 7 %
MAGNESIUM SERPL-MCNC: 2 MG/DL (ref 1.8–2.6)
MCH RBC QN AUTO: 30 PG (ref 26.5–33)
MCHC RBC AUTO-ENTMCNC: 32.6 G/DL (ref 31.5–36.5)
MCV RBC AUTO: 92 FL (ref 78–100)
MONOCYTES # BLD AUTO: 1 10E3/UL (ref 0–1.3)
MONOCYTES NFR BLD AUTO: 9 %
NEUTROPHILS # BLD AUTO: 8.7 10E3/UL (ref 1.6–8.3)
NEUTROPHILS NFR BLD AUTO: 80 %
NRBC # BLD AUTO: 0 10E3/UL
NRBC BLD AUTO-RTO: 0 /100
P AXIS - MUSE: NORMAL DEGREES
PLATELET # BLD AUTO: 179 10E3/UL (ref 150–450)
POTASSIUM BLD-SCNC: 4.3 MMOL/L (ref 3.5–5)
PR INTERVAL - MUSE: NORMAL MS
QRS DURATION - MUSE: 106 MS
QT - MUSE: 344 MS
QTC - MUSE: 439 MS
R AXIS - MUSE: -24 DEGREES
RBC # BLD AUTO: 3.3 10E6/UL (ref 3.8–5.2)
SODIUM SERPL-SCNC: 132 MMOL/L (ref 136–145)
SYSTOLIC BLOOD PRESSURE - MUSE: NORMAL MMHG
T AXIS - MUSE: 55 DEGREES
VENTRICULAR RATE- MUSE: 98 BPM
WBC # BLD AUTO: 10.7 10E3/UL (ref 4–11)

## 2022-09-09 PROCEDURE — 250N000009 HC RX 250: Performed by: INTERNAL MEDICINE

## 2022-09-09 PROCEDURE — 99232 SBSQ HOSP IP/OBS MODERATE 35: CPT | Performed by: INTERNAL MEDICINE

## 2022-09-09 PROCEDURE — 93010 ELECTROCARDIOGRAM REPORT: CPT | Performed by: INTERNAL MEDICINE

## 2022-09-09 PROCEDURE — 999N000157 HC STATISTIC RCP TIME EA 10 MIN

## 2022-09-09 PROCEDURE — 99232 SBSQ HOSP IP/OBS MODERATE 35: CPT | Performed by: HOSPITALIST

## 2022-09-09 PROCEDURE — 210N000001 HC R&B IMCU HEART CARE

## 2022-09-09 PROCEDURE — 85004 AUTOMATED DIFF WBC COUNT: CPT | Performed by: HOSPITALIST

## 2022-09-09 PROCEDURE — 250N000013 HC RX MED GY IP 250 OP 250 PS 637: Performed by: INTERNAL MEDICINE

## 2022-09-09 PROCEDURE — 93005 ELECTROCARDIOGRAM TRACING: CPT

## 2022-09-09 PROCEDURE — 94640 AIRWAY INHALATION TREATMENT: CPT

## 2022-09-09 PROCEDURE — 80048 BASIC METABOLIC PNL TOTAL CA: CPT | Performed by: INTERNAL MEDICINE

## 2022-09-09 PROCEDURE — 250N000011 HC RX IP 250 OP 636: Performed by: HOSPITALIST

## 2022-09-09 PROCEDURE — 94640 AIRWAY INHALATION TREATMENT: CPT | Mod: 76

## 2022-09-09 PROCEDURE — 97535 SELF CARE MNGMENT TRAINING: CPT | Mod: GO

## 2022-09-09 PROCEDURE — 94660 CPAP INITIATION&MGMT: CPT

## 2022-09-09 PROCEDURE — 97110 THERAPEUTIC EXERCISES: CPT | Mod: GO

## 2022-09-09 PROCEDURE — 250N000013 HC RX MED GY IP 250 OP 250 PS 637: Performed by: HOSPITALIST

## 2022-09-09 PROCEDURE — 36415 COLL VENOUS BLD VENIPUNCTURE: CPT | Performed by: INTERNAL MEDICINE

## 2022-09-09 PROCEDURE — 83735 ASSAY OF MAGNESIUM: CPT | Performed by: HOSPITALIST

## 2022-09-09 RX ORDER — HEPARIN SODIUM 5000 [USP'U]/.5ML
5000 INJECTION, SOLUTION INTRAVENOUS; SUBCUTANEOUS EVERY 8 HOURS
Status: DISCONTINUED | OUTPATIENT
Start: 2022-09-09 | End: 2022-09-10 | Stop reason: HOSPADM

## 2022-09-09 RX ORDER — CIPROFLOXACIN 500 MG/1
500 TABLET, FILM COATED ORAL DAILY
Status: DISCONTINUED | OUTPATIENT
Start: 2022-09-10 | End: 2022-09-10 | Stop reason: HOSPADM

## 2022-09-09 RX ORDER — CEFTRIAXONE 1 G/1
1 INJECTION, POWDER, FOR SOLUTION INTRAMUSCULAR; INTRAVENOUS EVERY 24 HOURS
Status: DISCONTINUED | OUTPATIENT
Start: 2022-09-09 | End: 2022-09-09

## 2022-09-09 RX ADMIN — CARBOXYMETHYLCELLULOSE SODIUM 1 DROP: 10 GEL OPHTHALMIC at 20:58

## 2022-09-09 RX ADMIN — ANTACID TABLETS 1000 MG: 500 TABLET, CHEWABLE ORAL at 13:59

## 2022-09-09 RX ADMIN — FLUOROMETHOLONE 1 DROP: 1 SOLUTION/ DROPS OPHTHALMIC at 08:38

## 2022-09-09 RX ADMIN — CARBOXYMETHYLCELLULOSE SODIUM 1 DROP: 10 GEL OPHTHALMIC at 16:09

## 2022-09-09 RX ADMIN — CEFTRIAXONE SODIUM 1 G: 1 INJECTION, POWDER, FOR SOLUTION INTRAMUSCULAR; INTRAVENOUS at 08:38

## 2022-09-09 RX ADMIN — TIMOLOL MALEATE 1 DROP: 5 SOLUTION/ DROPS OPHTHALMIC at 08:38

## 2022-09-09 RX ADMIN — METOPROLOL TARTRATE 25 MG: 25 TABLET, FILM COATED ORAL at 08:36

## 2022-09-09 RX ADMIN — PANTOPRAZOLE SODIUM 40 MG: 20 TABLET, DELAYED RELEASE ORAL at 20:58

## 2022-09-09 RX ADMIN — Medication 81 MG: at 08:36

## 2022-09-09 RX ADMIN — Medication 81 MG: at 20:58

## 2022-09-09 RX ADMIN — ACETAMINOPHEN 975 MG: 325 TABLET ORAL at 20:57

## 2022-09-09 RX ADMIN — IPRATROPIUM BROMIDE AND ALBUTEROL SULFATE 3 ML: 2.5; .5 SOLUTION RESPIRATORY (INHALATION) at 11:19

## 2022-09-09 RX ADMIN — Medication 125 MCG: at 08:36

## 2022-09-09 RX ADMIN — SODIUM CHLORIDE TAB 1 GM 0.5 G: 1 TAB at 08:36

## 2022-09-09 RX ADMIN — ACETAMINOPHEN 975 MG: 325 TABLET ORAL at 13:39

## 2022-09-09 RX ADMIN — PANTOPRAZOLE SODIUM 40 MG: 20 TABLET, DELAYED RELEASE ORAL at 08:36

## 2022-09-09 RX ADMIN — CIPROFLOXACIN 500 MG: 500 TABLET, FILM COATED ORAL at 06:06

## 2022-09-09 RX ADMIN — HEPARIN SODIUM 5000 UNITS: 10000 INJECTION, SOLUTION INTRAVENOUS; SUBCUTANEOUS at 16:09

## 2022-09-09 RX ADMIN — CARBOXYMETHYLCELLULOSE SODIUM 1 DROP: 10 GEL OPHTHALMIC at 12:32

## 2022-09-09 RX ADMIN — ACETAMINOPHEN 975 MG: 325 TABLET ORAL at 08:36

## 2022-09-09 RX ADMIN — POLYETHYLENE GLYCOL 3350 17 G: 17 POWDER, FOR SOLUTION ORAL at 08:38

## 2022-09-09 RX ADMIN — TORSEMIDE 40 MG: 20 TABLET ORAL at 08:36

## 2022-09-09 RX ADMIN — IPRATROPIUM BROMIDE AND ALBUTEROL SULFATE 3 ML: 2.5; .5 SOLUTION RESPIRATORY (INHALATION) at 08:04

## 2022-09-09 RX ADMIN — LEVOTHYROXINE SODIUM 88 MCG: 88 TABLET ORAL at 06:07

## 2022-09-09 RX ADMIN — CARBOXYMETHYLCELLULOSE SODIUM 1 DROP: 10 GEL OPHTHALMIC at 08:36

## 2022-09-09 RX ADMIN — METOPROLOL TARTRATE 25 MG: 25 TABLET, FILM COATED ORAL at 20:58

## 2022-09-09 RX ADMIN — MIRTAZAPINE 7.5 MG: 7.5 TABLET, FILM COATED ORAL at 21:02

## 2022-09-09 ASSESSMENT — ACTIVITIES OF DAILY LIVING (ADL)
ADLS_ACUITY_SCORE: 32

## 2022-09-09 NOTE — PROGRESS NOTES
Patient on CPAP +2lpm O2 bleed in for bedtime. Will come off to nasal cannula/oxymask during the daytime. Duoneb was given as scheduled. Tolerated well. RT will continue to monitor.

## 2022-09-09 NOTE — PLAN OF CARE
Problem: Risk for Delirium  Goal: Improved Sleep  Outcome: Ongoing, Progressing     Problem: UTI (Urinary Tract Infection)  Goal: Improved Infection Symptoms  Outcome: Ongoing, Progressing     Problem: Obstructive Sleep Apnea Risk or Actual Comorbidity Management  Goal: Unobstructed Breathing During Sleep  Outcome: Ongoing, Progressing     Goal Outcome Evaluation:         Patient slept good tonight. CPAP on with 2L of oxygen bled into it. On oral antibiotics now. Bush intact.

## 2022-09-09 NOTE — PLAN OF CARE
Problem: Risk for Delirium  Goal: Optimal Coping  Outcome: Ongoing, Progressing     Problem: Infection Progression (Sepsis/Septic Shock)  Goal: Absence of Infection Signs and Symptoms  Outcome: Ongoing, Progressing     Problem: Dysrhythmia  Goal: Normalized Cardiac Rhythm  Outcome: Ongoing, Progressing     Problem: COPD (Chronic Obstructive Pulmonary Disease) Comorbidity  Goal: Maintenance of COPD Symptom Control  Intervention: Maintain COPD-Symptom Control  Recent Flowsheet Documentation  Taken 9/9/2022 1304 by Dania Juares, RN  Medication Review/Management: medications reviewed  Taken 9/9/2022 0900 by Dania Juares, RN  Medication Review/Management: medications reviewed   Goal Outcome Evaluation:    Pt c/o having to burp after taking her meds this morning.  Gave pop and pt burped and said she felt better.  Gave tylenol at 1400.  Right after pt c/o sternum pain that wrapped around to her back that was a tight band 10/10 pain.  EKG ordered and tums were given. Telemetry afib.  Hospitalist wanted cardiology informed.  Texted cardiology.  Pt felt better after taking tums.  Bush output 300.  Ate well.

## 2022-09-09 NOTE — PLAN OF CARE
Problem: Risk for Delirium  Goal: Improved Sleep  Outcome: Ongoing, Progressing     Problem: Obstructive Sleep Apnea Risk or Actual Comorbidity Management  Goal: Unobstructed Breathing During Sleep  Outcome: Ongoing, Progressing     Problem: Infection Progression (Sepsis/Septic Shock)  Goal: Absence of Infection Signs and Symptoms  Outcome: Ongoing, Progressing         Goal Outcome Evaluation:  Pt was disoriented to place and time upon waking up from a nap, can be easily reoriented. Pt is pleasant becomes anxious when not using hospital CPAP. Sp02 98% at 1L 02 NC during meals. A Fib, with PVC's. Rate controlled. Ate. BPA fired p for Sepsis d/t WBC. Lactic Acid- 1.2. BP stable. Bush intact. PO Ciprofloxacin started. Declined to take scheduled Tylenol d/t no c/o pain.

## 2022-09-09 NOTE — PROGRESS NOTES
Respiratory Therapy Note    Patient is receiving QID Duoneb. Patient is on RA, SpO2 95%. Pre-treatment HR 78, RR 20, Breath sounds diminished. Post-treatment HR 72, RR 20 and breath sounds unchanged. Cough is strong and NP. Continue to encourage deep breathing and coughing techniques.     Ember Loberg, RT

## 2022-09-09 NOTE — PROGRESS NOTES
Care Management Follow Up    Length of Stay (days): 3    Expected Discharge Date: 09/10/2022     Concerns to be Addressed:       Patient plan of care discussed at interdisciplinary rounds: Yes    Anticipated Discharge Disposition:  [facility]     Anticipated Discharge Services:  TCU  Anticipated Discharge DME:      Patient/family educated on Medicare website which has current facility and service quality ratings:  Yes  Education Provided on the Discharge Plan:  Yes  Patient/Family in Agreement with the Plan:  Yes    Referrals Placed by CM/SW:  [facility]  Private pay costs discussed: Not applicable    Additional Information:  ALBERTO received a voicemail and email from patient's niece Deneen ([phone]) she said that they are anticipating patient to discharge on Saturday but was wondering if it could be pushed until Sunday. Deneen has mandatory training on Saturday and said Sunday would work better so she could bring patient her things and help her make the transition. Deneen stated that she understands if this is not a possibility but just wanted to let SW know.     11:13 AM  MD states that patient will be ready for discharge tomorrow. ALBERTO notified patient and Deneen. [facility]BERTO confirmed with [facility] that they can take patient tomorrow. [facility] let SW know that the number to call tomorrow is [phone] and the fax is [phone]. [facility] w/c transport set up for tomorrow 9/10 at 10:30.    Chiara Leonard

## 2022-09-09 NOTE — PROGRESS NOTES
Hospitalist Progress Note        CODE STATUS:  No CPR- Pre-arrest intubation OK    Assessment/Plan:  Marla Dunn is a 89 year old female with PMH of atrial fibrillation, not anticoagulated due to fall risk, mitral stenosis and regurgitation, diastolic CHF, COPD, assisted-living resident, presented to ED with fever, diffuse aches, diffuse abdominal pain, nausea without vomiting and admitted for sepsis due to UTI.    Sepsis due to E. Coli UTI  - Clinically improved  - WBC trending down. Afebrile.   - Continue Cipro, renally dosed.    Elevated troponin  - Likely demand ischemia from rapid A. fib and sepsis.  - Cardiology consulted & following; rec'd hep gtt for 24-48 hrs     Acute Kidney Injury  - In setting of UTI. Cr improving.    Atrial fibrillation, with mild RVR  - Rates improved.  - Not on anticoagulation due to fall risk.  - Continue PTA dose of Lopressor     Hyponatremia, chronic  - Baseline sodium around 130, was 125 on admission.     Hypokalemia  - Replaced     Chronic diastolic CHF  - Not in acute exacerbation   - Torsemide 40 mg daily resumed yesterday. Resume spironolactone.  .  Insomnia  - Cont Remeron    GERD  - Cont Protonix    Constipation  - Cont  Miralax       DVT Prophylaxis: Heparin     Disposition/Barrier to discharge: To TCU tomorrow morning    Subjective:  Interval History:   Patient seen and examined.   Feels much better today. Respiratory status stable.  Denies abdominal pain. Tolerated breakfast with no issue.    Review of Systems:   As mentioned in subjective.    Patient Active Problem List   Diagnosis     Rectal prolapse     Closed displaced intertrochanteric fracture of right femur, initial encounter (H)     Acute bronchitis due to other specified organisms     Anxiety and depression     Arteriosclerosis of coronary artery     Sleep apnea     Carpal tunnel syndrome, right     Constipation, unspecified     COPD exacerbation (H)     Diastolic CHF (H)     Dyslipidemia     Fecal  incontinence alternating with constipation     Fever     Generalized muscle weakness     Heart valve disease     Hyperlipidemia LDL goal <100     Hypertension     Hypertensive emergency     Hypokalemia     Hyponatremia     Irregular bowel habits     Metabolic acidosis     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)     Polyarthropathy or polyarthritis, hand     Physical deconditioning     Pneumonia     Pulmonary edema cardiac cause (H)     Pyelonephritis     Rash of body     Respiratory failure (H)     Sepsis secondary to UTI (H)     Sjogren's syndrome (H)     Tachycardia     Acute renal failure superimposed on stage 3 chronic kidney disease, unspecified acute renal failure type, unspecified whether stage 3a or 3b CKD (H)     Urinary tract infection     Rapid atrial fibrillation (H)     Demand ischemia of myocardium (H)     Abdominal pain, generalized     Elevated troponin     Atrial fibrillation with rapid ventricular response (H)     Non-intractable vomiting with nausea, unspecified vomiting type     Acute cystitis without hematuria       Scheduled Meds:    acetaminophen  975 mg Oral TID     aspirin  81 mg Oral BID     [Held by provider] calcium carbonate-vitamin D  1 tablet Oral Daily     carboxymethylcellulose PF  1 drop Both Eyes 4x Daily     cefTRIAXone  1 g Intravenous Q24H     fluorometholone  1 drop Left Eye Daily     ipratropium - albuterol 0.5 mg/2.5 mg/3 mL  3 mL Nebulization 4x daily     levothyroxine  88 mcg Oral Daily     metoprolol tartrate  25 mg Oral BID     mirtazapine  7.5 mg Oral At Bedtime     pantoprazole  40 mg Oral BID     polyethylene glycol  17 g Oral Daily     sodium chloride (PF)  3 mL Intracatheter Q8H     sodium chloride  0.5 g Oral Daily     [Held by provider] spironolactone  25 mg Oral Daily     timolol maleate  1 drop Left Eye Daily     torsemide  40 mg Oral Daily     Vitamin D3  125 mcg Oral Daily     Continuous Infusions:    PRN Meds:.albuterol, bisacodyl, calcium carbonate,  HYDROmorphone, lidocaine 4%, lidocaine (buffered or not buffered), melatonin, ondansetron **OR** ondansetron, ondansetron, prochlorperazine **OR** prochlorperazine **OR** prochlorperazine, sodium chloride (PF)    Objective:  Vital signs in last 24 hours:  Temp:  [97.5  F (36.4  C)-98.2  F (36.8  C)] 97.8  F (36.6  C)  Pulse:  [] 97  Resp:  [18-24] 24  BP: (116-134)/(56-66) 134/66  SpO2:  [91 %-100 %] 97 %        Physical Exam:  General: In NAD  HEENT: NCAT & EOMI  CV: Irregularly irregular rhythm, normal rate  Lungs: CTAB  Abdomen: Soft, NT, ND, +BS  Extremities: No LE edema b/l  Neuro: AAOx3.   Psych: Normal Affect    Lab Results:    Recent Results (from the past 24 hour(s))   Partial thromboplastin time    Collection Time: 09/08/22 11:54 AM   Result Value Ref Range    aPTT 87 (H) 22 - 38 Seconds   Lactic Acid STAT    Collection Time: 09/08/22  8:18 PM   Result Value Ref Range    Lactic Acid 1.2 0.7 - 2.0 mmol/L   Magnesium    Collection Time: 09/09/22  5:13 AM   Result Value Ref Range    Magnesium 2.0 1.8 - 2.6 mg/dL   Basic metabolic panel    Collection Time: 09/09/22  5:13 AM   Result Value Ref Range    Sodium 132 (L) 136 - 145 mmol/L    Potassium 4.3 3.5 - 5.0 mmol/L    Chloride 106 98 - 107 mmol/L    Carbon Dioxide (CO2) 17 (L) 22 - 31 mmol/L    Anion Gap 9 5 - 18 mmol/L    Urea Nitrogen 27 8 - 28 mg/dL    Creatinine 1.19 (H) 0.60 - 1.10 mg/dL    Calcium 8.6 8.5 - 10.5 mg/dL    Glucose 76 70 - 125 mg/dL    GFR Estimate 42 (L) >60 mL/min/1.73m2   CBC with platelets and differential    Collection Time: 09/09/22  5:13 AM   Result Value Ref Range    WBC Count 10.7 4.0 - 11.0 10e3/uL    RBC Count 3.30 (L) 3.80 - 5.20 10e6/uL    Hemoglobin 9.9 (L) 11.7 - 15.7 g/dL    Hematocrit 30.4 (L) 35.0 - 47.0 %    MCV 92 78 - 100 fL    MCH 30.0 26.5 - 33.0 pg    MCHC 32.6 31.5 - 36.5 g/dL    RDW 16.3 (H) 10.0 - 15.0 %    Platelet Count 179 150 - 450 10e3/uL    % Neutrophils 80 %    % Lymphocytes 7 %    % Monocytes 9 %     % Eosinophils 2 %    % Basophils 0 %    % Immature Granulocytes 2 %    NRBCs per 100 WBC 0 <1 /100    Absolute Neutrophils 8.7 (H) 1.6 - 8.3 10e3/uL    Absolute Lymphocytes 0.7 (L) 0.8 - 5.3 10e3/uL    Absolute Monocytes 1.0 0.0 - 1.3 10e3/uL    Absolute Eosinophils 0.2 0.0 - 0.7 10e3/uL    Absolute Basophils 0.0 0.0 - 0.2 10e3/uL    Absolute Immature Granulocytes 0.2 <=0.4 10e3/uL    Absolute NRBCs 0.0 10e3/uL       Serum Glucose range:   Recent Labs   Lab 09/09/22  0513 09/08/22  0454 09/07/22  0435 09/06/22  0850   GLC 76 89 79 74     ABG: No lab results found in last 7 days.  CBC:   Recent Labs   Lab 09/09/22 0513 09/07/22  0435 09/06/22  0850 09/05/22  2134   WBC 10.7 16.5* 22.9* 15.4*   HGB 9.9* 9.4* 9.7* 10.2*   HCT 30.4* 27.6* 28.6* 29.8*   MCV 92 89 90 87    133* 159 185   NEUTROPHIL 80 90  --  88   LYMPH 7 3  --  3   MONOCYTE 9 4  --  8   EOSINOPHIL 2 1  --  0     Chemistry:   Recent Labs   Lab 09/09/22 0513 09/08/22  0454 09/07/22  0435 09/06/22  0850 09/05/22  2134   * 131* 128* 129* 125*   POTASSIUM 4.3 4.7  4.6 4.1 3.5 3.4*   CHLORIDE 106 106 101 97* 88*   CO2 17* 18* 18* 21* 27   BUN 27 33* 35* 32* 35*   CR 1.19* 1.28* 1.34* 1.28* 1.18*   GFRESTIMATED 42* 39* 37* 39* 43*   PERICO 8.6 8.6 8.1* 8.3* 8.7   MAG 2.0 2.3 1.9  --  1.9   PROTTOTAL  --   --  5.7* 6.3 7.1   ALBUMIN  --   --  2.3* 2.8* 3.1*   AST  --   --  24 25 22   ALT  --   --  15 15 13   ALKPHOS  --   --  81 85 95   BILITOTAL  --   --  0.4 0.5 0.8     Coags:  Recent Labs   Lab 09/08/22  1154 09/08/22  0454 09/07/22  0435 09/06/22 2001 09/05/22  2134   INR  --   --   --   --  1.23*   PTT 87* 54* 65*   < > 38    < > = values in this interval not displayed.     Cardiac Markers:  Recent Labs   Lab 09/06/22 2001   TROPONINI 0.79*                  09/09/2022   Vivi Amato MD  Hospitalist  Pager: [phone]

## 2022-09-09 NOTE — PROGRESS NOTES
RENAL NOTE    REQUESTING PHYSICIAN: Dr Roberts    REASON FOR CONSULT:MARIA DEL ROSARIO      ASSESSMENT/PLAN:  Pt with  sepsis due to EColi UTI on abx and improving.     Hypotension improved with volume resuscitation.     Volume status, appears has narrow euvolemic window and I think she got a bit fluid overloaded with resuscitation. Resumed torsemide and good urine and appears much better today. Ok with me to resume spironolactone. Continue torsemide 40 mg/day.     MARIA DEL ROSARIO due to hemodynamics, on CKD 3 baseline creat 1. Creat better today. Lytes ok, k normal   +UTI on abx, zayas draining.     Anemia follow. Stable in 10 range, not on any iron/NASEEM    Hx of CAD AVF and had Afib and ACS on admit thought due to demand ischemia.     HPI:elderly woman came to ER with fever and sob.   Ecoli UTI with sepsis and low bp     Today up in chair off bipap and feels breathing is comfortable/ normal  Eating ok  Anxious about going home too early, planned for tomorrow.  Zayas.     REVIEW OF SYSTEMS:  COMPLETE REVIEW OF SYSTEMS: completely reviewed and neg other than as above.       Past Medical History:   Diagnosis Date     Asthma      Bilateral carpal tunnel syndrome 8/27/2015     CAD (coronary artery disease)      Chronic airway obstruction, not elsewhere classified     Created by Conversion      Chronic anemia      Chronic edema      Congestive heart failure, severe (H) 5/13/2020     COPD (chronic obstructive pulmonary disease) (H)      Coronary artery disease      Depression      Family history of myocardial infarction     father 64     GERD (gastroesophageal reflux disease)      Heart disease      Heart murmur      High cholesterol     dislipidemia     HTN (hypertension)      Hypercholesterolemia      Hypertension      Hypothyroidism      Hypothyroidism      MI (myocardial infarction) (H) 1985     Myelodysplastic syndrome (H)      Myocardial infarction (H) 1983     OLEGARIO (obstructive sleep apnea)      Osteoporosis      Other and unspecified  hyperlipidemia     Created by Conversion      Radicular low back pain      Rheumatoid arthritis (H)      Elizabeth Agers syndrome      Sjoegren syndrome      Sjogren's syndrome (H)      Sleep apnea, obstructive      Spinal stenosis      Unspecified polyarthropathy or polyarthritis, hand     Created by Conversion           ALLERGIES/SENSITIVITIES:  Allergies   Allergen Reactions     Gramineae Pollens Other (See Comments)     ORCHARDGRASS. Shortness of breath when lawn is just cut.     Smoke. Other (See Comments)     Hard to breathe.     Pollen Extract      Other reaction(s): Unknown          PHYSICAL EXAM:  Physical Exam   Temp: 98  F (36.7  C) Temp src: Oral BP: 115/58 Pulse: 96   Resp: 24 SpO2: 96 % O2 Device: None (Room air) Oxygen Delivery: 2 LPM  Vitals:    09/07/22 0313 09/08/22 0438 09/09/22 0603   Weight: 51.9 kg (114 lb 6.4 oz) 52.2 kg (115 lb) 52.6 kg (115 lb 14.4 oz)     Vital Signs with Ranges  Temp:  [97.8  F (36.6  C)-98.2  F (36.8  C)] 98  F (36.7  C)  Pulse:  [] 96  Resp:  [20-24] 24  BP: (115-134)/(56-66) 115/58  SpO2:  [91 %-100 %] 96 %  I/O last 3 completed shifts:  In: 850 [P.O.:320; I.V.:530]  Out: 1875 [Urine:1875]         Patient Vitals for the past 72 hrs:   Weight   09/09/22 0603 52.6 kg (115 lb 14.4 oz)   09/08/22 0438 52.2 kg (115 lb)   09/07/22 0313 51.9 kg (114 lb 6.4 oz)   09/06/22 2256 50.7 kg (111 lb 12.8 oz)   [unfilled]    General - A & O x 3, NAD  HEENT - AT NC  Neck -  no JVD  Respiratory - lungs clearer  Cardiovascular - AP RRR  +syst murmur  Abdomen - soft, BS present, non tender no mass. +zayas.  Extremities - well perfused, no edema  Integumentary - intact, good turgor, no rash/lesions  Neurologic -weak   Psych: calm  :dark urine     Laboratory:     Recent Labs   Lab 09/09/22  0513 09/07/22  0435 09/06/22  0850 09/05/22  2134   WBC 10.7 16.5* 22.9* 15.4*   RBC 3.30* 3.10* 3.19* 3.41*   HGB 9.9* 9.4* 9.7* 10.2*   HCT 30.4* 27.6* 28.6* 29.8*    133* 159 185       Basic  Metabolic Panel:  Recent Labs   Lab 09/09/22  0513 09/08/22  0454 09/07/22  0435 09/06/22  0850 09/05/22  2134 09/05/22  1610   * 131* 128* 129* 125* 124*   POTASSIUM 4.3 4.7  4.6 4.1 3.5 3.4* 4.1   CHLORIDE 106 106 101 97* 88* 86*   CO2 17* 18* 18* 21* 27 27   BUN 27 33* 35* 32* 35* 35.7*   CR 1.19* 1.28* 1.34* 1.28* 1.18* 1.17*   GLC 76 89 79 74 111 61*   PERICO 8.6 8.6 8.1* 8.3* 8.7 9.3       INR  Recent Labs   Lab 09/05/22  2134   INR 1.23*       Recent Labs   Lab Test 09/07/22  0435 09/06/22  0850   POTASSIUM 4.1 3.5   CHLORIDE 101 97*   BUN 35* 32*      Recent Labs   Lab Test 09/07/22  0435 09/06/22  0850 09/05/22  2331 09/02/21  1052 05/13/20  0502   ALBUMIN 2.3* 2.8*  --    < >  --    BILITOTAL 0.4 0.5  --    < >  --    ALT 15 15  --    < >  --    AST 24 25  --    < >  --    PROTEIN  --   --  100 *  --  30 mg/dL*    < > = values in this interval not displayed.       Personally reviewed today's laboratory studies      Thank you for involving us in the care of this patient. We will continue to follow along with you.      Jennifer Hilario MD   [facility]  [phone]

## 2022-09-09 NOTE — PROGRESS NOTES
Cardiology Progress Note    Assessment/Plan:  1.  Elevated troponin, possibly due to demand ischemia in the setting of urinary tract infection and sepsis.  Echocardiogram again demonstrated normal LV function without wall motion abnormality.  Denies any chest pain.    2.  MARIA DEL ROSARIO likely secondary to urosepsis; continue antibiotics  3.  Aortic valve stenosis status post bioprosthetic aortic valve replacement in 2009 in conjunction with coronary artery bypass grafting.  Echocardiogram shows normal bioprosthetic valve function.  4.  Mitral valve disease, mild, stable  5.  UTI with urosepsis, improving with IV antibiotics.  Torsemide resumed per nephrology.    Primary cardiologist: Dr. Amaya    Subjective:    Feeling better today.  Vital signs stable and within normal limits.  Good sats on room air though with ongoing dyspnea that patient says is at her usual baseline.  Sitting up today; combed her own hair and was eating on her own.  Appears euvolemic.      acetaminophen  975 mg Oral TID     aspirin  81 mg Oral BID     [Held by provider] calcium carbonate-vitamin D  1 tablet Oral Daily     carboxymethylcellulose PF  1 drop Both Eyes 4x Daily     cefTRIAXone  1 g Intravenous Q24H     fluorometholone  1 drop Left Eye Daily     ipratropium - albuterol 0.5 mg/2.5 mg/3 mL  3 mL Nebulization 4x daily     levothyroxine  88 mcg Oral Daily     metoprolol tartrate  25 mg Oral BID     mirtazapine  7.5 mg Oral At Bedtime     pantoprazole  40 mg Oral BID     polyethylene glycol  17 g Oral Daily     sodium chloride (PF)  3 mL Intracatheter Q8H     sodium chloride  0.5 g Oral Daily     [Held by provider] spironolactone  25 mg Oral Daily     timolol maleate  1 drop Left Eye Daily     torsemide  40 mg Oral Daily     Vitamin D3  125 mcg Oral Daily         Objective:   Vital signs in last 24 hours:  Temp:  [97.5  F (36.4  C)-98.2  F (36.8  C)] 98  F (36.7  C)  Pulse:  [] 96  Resp:  [18-24] 24  BP: (115-134)/(56-66) 115/58  SpO2:   [91 %-100 %] 96 %  Weight:        Review of Systems:  Denies chest discomfort, abdominal/pelvic pain, nausea    Physical Exam:  General appearance: alert, pleasant, cooperative, no acute distress  Neck: no JVD   Lungs: Labored breathing on room air; speaks in short sentences.  Few bibasilar crackles.  Heart: Irregularly irregular rhythm; holosystolic murmur in mitral area  Extremities: No lower extremity edema.  Bruises on hands and forearms.  Lower extremity varicose veins and bilateral stasis dermatitis.  All extremities warm and well-perfused.    Cardiographics (personally reviewed):   Telemetry demonstrates atrial fibrillation with predominantly controlled ventricular response    Imaging (personally reviewed):   No new results since last note    Lab Results (personally reviewed):     Recent Labs   Lab Test 11/09/16  0952   CHOL 149   HDL 55   LDL 84   TRIG 48     Recent Labs   Lab Test 11/09/16  0952   LDL 84     Recent Labs   Lab Test 09/07/22  0435   *   POTASSIUM 4.1   CHLORIDE 101   CO2 18*   GLC 79   BUN 35*   CR 1.34*   GFRESTIMATED 37*   PERICO 8.1*     Recent Labs   Lab Test 09/07/22  0435 09/06/22  0850 09/05/22  2134   CR 1.34* 1.28* 1.18*     Recent Labs   Lab Test 05/13/20  1205   A1C 5.2          Recent Labs   Lab Test 09/07/22  0435   WBC 16.5*   HGB 9.4*   HCT 27.6*   MCV 89   *     Recent Labs   Lab Test 09/07/22  0435 09/06/22  0850 09/05/22  2134   HGB 9.4* 9.7* 10.2*    Recent Labs   Lab Test 09/06/22 2001 09/06/22  0850 09/06/22  0102   TROPONINI 0.79* 1.83* 0.74*     Recent Labs   Lab Test 09/07/22  0435 08/13/21  0552 05/13/20  0446   * 171* 536*     Recent Labs   Lab Test 03/22/20  0852   TSH 1.17     Recent Labs   Lab Test 09/05/22  2134 08/12/21  2122 05/13/20  0446   INR 1.23* 1.10 0.97          KANNAN VELÁZQUEZ, MS-4  [employer]    Discussed case with medical student.  Patient personally interviewed and examined.  Agree with findings  and recommendations above.

## 2022-09-10 VITALS
DIASTOLIC BLOOD PRESSURE: 62 MMHG | HEIGHT: 64 IN | OXYGEN SATURATION: 95 % | SYSTOLIC BLOOD PRESSURE: 130 MMHG | HEART RATE: 98 BPM | BODY MASS INDEX: 19.39 KG/M2 | WEIGHT: 113.6 LBS | RESPIRATION RATE: 20 BRPM | TEMPERATURE: 98 F

## 2022-09-10 LAB
ANION GAP SERPL CALCULATED.3IONS-SCNC: 9 MMOL/L (ref 5–18)
BUN SERPL-MCNC: 26 MG/DL (ref 8–28)
CALCIUM SERPL-MCNC: 8.8 MG/DL (ref 8.5–10.5)
CHLORIDE BLD-SCNC: 103 MMOL/L (ref 98–107)
CO2 SERPL-SCNC: 18 MMOL/L (ref 22–31)
CREAT SERPL-MCNC: 1.13 MG/DL (ref 0.6–1.1)
GFR SERPL CREATININE-BSD FRML MDRD: 45 ML/MIN/1.73M2
GLUCOSE BLD-MCNC: 82 MG/DL (ref 70–125)
MAGNESIUM SERPL-MCNC: 1.8 MG/DL (ref 1.8–2.6)
POTASSIUM BLD-SCNC: 3.8 MMOL/L (ref 3.5–5)
POTASSIUM BLD-SCNC: 3.8 MMOL/L (ref 3.5–5)
SODIUM SERPL-SCNC: 130 MMOL/L (ref 136–145)

## 2022-09-10 PROCEDURE — 99239 HOSP IP/OBS DSCHRG MGMT >30: CPT | Performed by: HOSPITALIST

## 2022-09-10 PROCEDURE — 94640 AIRWAY INHALATION TREATMENT: CPT

## 2022-09-10 PROCEDURE — 250N000013 HC RX MED GY IP 250 OP 250 PS 637: Performed by: HOSPITALIST

## 2022-09-10 PROCEDURE — 80048 BASIC METABOLIC PNL TOTAL CA: CPT | Performed by: INTERNAL MEDICINE

## 2022-09-10 PROCEDURE — 250N000013 HC RX MED GY IP 250 OP 250 PS 637: Performed by: INTERNAL MEDICINE

## 2022-09-10 PROCEDURE — 94660 CPAP INITIATION&MGMT: CPT

## 2022-09-10 PROCEDURE — 250N000011 HC RX IP 250 OP 636: Performed by: HOSPITALIST

## 2022-09-10 PROCEDURE — 999N000157 HC STATISTIC RCP TIME EA 10 MIN

## 2022-09-10 PROCEDURE — 250N000009 HC RX 250: Performed by: INTERNAL MEDICINE

## 2022-09-10 PROCEDURE — 83735 ASSAY OF MAGNESIUM: CPT | Performed by: HOSPITALIST

## 2022-09-10 PROCEDURE — 36415 COLL VENOUS BLD VENIPUNCTURE: CPT | Performed by: HOSPITALIST

## 2022-09-10 PROCEDURE — 99232 SBSQ HOSP IP/OBS MODERATE 35: CPT | Performed by: INTERNAL MEDICINE

## 2022-09-10 PROCEDURE — 84132 ASSAY OF SERUM POTASSIUM: CPT | Performed by: HOSPITALIST

## 2022-09-10 RX ORDER — ACETAMINOPHEN 325 MG/1
975 TABLET ORAL EVERY 8 HOURS PRN
COMMUNITY
Start: 2022-09-10 | End: 2024-09-24

## 2022-09-10 RX ORDER — TORSEMIDE 20 MG/1
20 TABLET ORAL DAILY
Status: DISCONTINUED | OUTPATIENT
Start: 2022-09-10 | End: 2022-09-10 | Stop reason: HOSPADM

## 2022-09-10 RX ORDER — CIPROFLOXACIN 500 MG/1
500 TABLET, FILM COATED ORAL DAILY
COMMUNITY
Start: 2022-09-10 | End: 2022-09-14

## 2022-09-10 RX ADMIN — LEVOTHYROXINE SODIUM 88 MCG: 88 TABLET ORAL at 05:18

## 2022-09-10 RX ADMIN — SPIRONOLACTONE 25 MG: 25 TABLET, FILM COATED ORAL at 09:05

## 2022-09-10 RX ADMIN — HEPARIN SODIUM 5000 UNITS: 10000 INJECTION, SOLUTION INTRAVENOUS; SUBCUTANEOUS at 01:21

## 2022-09-10 RX ADMIN — FLUOROMETHOLONE 1 DROP: 1 SOLUTION/ DROPS OPHTHALMIC at 09:09

## 2022-09-10 RX ADMIN — METOPROLOL TARTRATE 25 MG: 25 TABLET, FILM COATED ORAL at 09:08

## 2022-09-10 RX ADMIN — IPRATROPIUM BROMIDE AND ALBUTEROL SULFATE 3 ML: 2.5; .5 SOLUTION RESPIRATORY (INHALATION) at 08:13

## 2022-09-10 RX ADMIN — HEPARIN SODIUM 5000 UNITS: 10000 INJECTION, SOLUTION INTRAVENOUS; SUBCUTANEOUS at 09:08

## 2022-09-10 RX ADMIN — TORSEMIDE 20 MG: 20 TABLET ORAL at 09:05

## 2022-09-10 RX ADMIN — CIPROFLOXACIN 500 MG: 500 TABLET, FILM COATED ORAL at 09:07

## 2022-09-10 RX ADMIN — CARBOXYMETHYLCELLULOSE SODIUM 1 DROP: 10 GEL OPHTHALMIC at 09:08

## 2022-09-10 RX ADMIN — PANTOPRAZOLE SODIUM 40 MG: 20 TABLET, DELAYED RELEASE ORAL at 09:05

## 2022-09-10 RX ADMIN — Medication 81 MG: at 09:08

## 2022-09-10 RX ADMIN — Medication 125 MCG: at 09:05

## 2022-09-10 RX ADMIN — SODIUM CHLORIDE TAB 1 GM 0.5 G: 1 TAB at 09:05

## 2022-09-10 RX ADMIN — ACETAMINOPHEN 975 MG: 325 TABLET ORAL at 09:07

## 2022-09-10 RX ADMIN — TIMOLOL MALEATE 1 DROP: 5 SOLUTION/ DROPS OPHTHALMIC at 09:09

## 2022-09-10 ASSESSMENT — ACTIVITIES OF DAILY LIVING (ADL)
ADLS_ACUITY_SCORE: 32

## 2022-09-10 NOTE — PROGRESS NOTES
Care Management Discharge Note    Discharge Date: 09/10/2022       Discharge Disposition: Transitional Care    Discharge Services: None    Discharge DME: None    Discharge Transportation: agency ([employer] [facility])    Private pay costs discussed: Not applicable    PAS Confirmation Code: [id number] ([id number])  Patient/family educated on Medicare website which has current facility and service quality ratings: yes    Education Provided on the Discharge Plan:  Yes, per team  Persons Notified of Discharge Plans: Patient, nurse, [facility], TCU staff  Patient/Family in Agreement with the Plan: yes    Handoff Referral Completed: Yes    Additional Information:  Plan to discharge to [facility] today via wheelchair at 10:30 AM. Previous care manager confirmed plan with patient and niece. Spoke with weekend staff at [phone] and the fax is [phone], and confirmed plan for discharge today, they are awaiting patient/orders. Spoke with [facility] who faxed orders. Met with patient in room who is tearful about discharge today as she feels she is urinating too much; per RN her Diuretic dose was decreased. Completed PAS and gave to [facility].         Sally Evans, RN

## 2022-09-10 NOTE — PLAN OF CARE
Physical Therapy Discharge Summary    Reason for therapy discharge:    Discharged to transitional care facility.    Progress towards therapy goal(s). See goals on Care Plan in [facility] electronic health record for goal details.  Goals partially met.  Barriers to achieving goals:   discharge from facility.    Therapy recommendation(s):    Continued therapy is recommended.  Rationale/Recommendations:  at TCU.

## 2022-09-10 NOTE — PLAN OF CARE
Problem: Plan of Care - These are the overarching goals to be used throughout the patient stay.    Goal: Plan of Care Review/Shift Note  Description: The Plan of Care Review/Shift note should be completed every shift.  The Outcome Evaluation is a brief statement about your assessment that the patient is improving, declining, or no change.  This information will be displayed automatically on your shift note.  Outcome: Ongoing, Progressing   Goal Outcome Evaluation:      pt is alert and oriented x 3. But forgetful. Patient on CPAP +2lpm O2 bleed in for bedtime., Breath sounds diminished. Denies pain. Will continue to monitor pt.

## 2022-09-10 NOTE — DISCHARGE SUMMARY
[facility] MEDICINE  DISCHARGE SUMMARY     Primary Care Physician: Sally Goyal  Admission Date: 9/5/2022   Discharge Provider: Vivi Amato MD Discharge Date: 9/10/2022   Diet: Low sodium   Code Status: Prior   Activity: DCACTIVITY: Activity as tolerated        Condition at Discharge: Stable     REASON FOR PRESENTATION(See Admission Note for Details)   Abdominal Pain      PRINCIPAL & ACTIVE DISCHARGE DIAGNOSES     Principal Problem:    Sepsis secondary to UTI (H)  Active Problems:    Generalized muscle weakness    Hypertension    Hypokalemia    Hyponatremia    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)    Acute renal failure superimposed on stage 3 chronic kidney disease, unspecified acute renal failure type, unspecified whether stage 3a or 3b CKD (H)    Urinary tract infection    Rapid atrial fibrillation (H)    Demand ischemia of myocardium (H)    Abdominal pain, generalized    Elevated troponin    Atrial fibrillation with rapid ventricular response (H)    Non-intractable vomiting with nausea, unspecified vomiting type    Acute cystitis without hematuria      PENDING LABS     Unresulted Labs Ordered in the Past 30 Days of this Admission     No orders found from 8/6/2022 to 9/6/2022.            PROCEDURES ( this hospitalization only)          RECOMMENDATIONS TO OUTPATIENT PROVIDER FOR F/U VISIT     Follow-up Appointments     Follow Up and recommended labs and tests      Follow up with TCU physician.     Follow up with primary care provider within 1 week of discharge from TCU.                   DISPOSITION     Skilled Nursing Facility    SUMMARY OF HOSPITAL COURSE:      Marla Dunn is a 89 year old female with PMH of atrial fibrillation, not anticoagulated due to fall risk, mitral stenosis and regurgitation, diastolic CHF, COPD, assisted-living resident, presented to ED with fever, diffuse aches, diffuse abdominal pain, nausea without vomiting and admitted for  sepsis due to UTI.     Sepsis due to E. Coli UTI  - Clinically improvede.   - Continue Cipro, renally dosed. Last dose 9/12/22.     Elevated troponin  - Likely demand ischemia from rapid A. fib and sepsis.  - Cardiology consulted & following; rec'd hep gtt for 24-48 hrs.     Acute Kidney Injury  - In setting of UTI. Cr trending down.     Atrial fibrillation, with mild RVR  - Rates improved. Elevated initially in the setting of sepsis.  - Not on anticoagulation due to fall risk.  - Continue PTA dose of Lopressor     Hyponatremia, chronic  - Baseline sodium around 130, was 125 on admission.      Hypokalemia  - Replaced     Chronic diastolic CHF  - Not in acute exacerbation   - Continue torsemide & spironolactone which were both previously held and restarted 9/8.      Discharge Medications with Med changes:     Discharge Medication List as of 9/10/2022 10:17 AM      START taking these medications    Details   ciprofloxacin (CIPRO) 500 MG tablet Take 1 tablet (500 mg) by mouth daily Last dose on Monday, September 12, 2022, HistoricalLast dose on Monday, September 12, 2022         CONTINUE these medications which have CHANGED    Details   acetaminophen (TYLENOL) 325 MG tablet Take 3 tablets (975 mg) by mouth every 8 hours as needed for mild pain, Historical         CONTINUE these medications which have NOT CHANGED    Details   albuterol (PROAIR HFA/PROVENTIL HFA/VENTOLIN HFA) 108 (90 Base) MCG/ACT inhaler Inhale 2 puffs into the lungs every 6 hours, HistoricalPharmacy may dispense brand covered by insurance (Proair, or proventil or ventolin or generic albuterol inhaler)      Ascorbic Acid (VITAMIN C PO) Take 500 mg by mouth every morning, Historical      aspirin (ASA) 81 MG EC tablet Take 1 tablet (81 mg) by mouth 2 times daily, OTC      Calcium Carb-Cholecalciferol (CALCIUM+D3) 600-800 MG-UNIT TABS Take 1 tablet by mouth daily, Historical      calcium carbonate (TUMS) 500 MG chewable tablet Take 2 chew tab by mouth  daily as needed for heartburn, Historical      cholecalciferol (D3 HIGH POTENCY) 125 MCG (5000 UT) CAPS Take 1 capsule by mouth daily, Historical      cycloSPORINE (RESTASIS) 0.05 % ophthalmic emulsion Place 1 drop into both eyes 2 times daily , Historical      fish oil-omega-3 fatty acids 1000 MG capsule Take 1 g by mouth daily, Historical      fluorometholone (FML LIQUIFILM) 0.1 % ophthalmic susp Place 1 drop Into the left eye daily , Historical      IBANDRONATE SODIUM PO Take 150 mg by mouth every 30 days, Historical      ipratropium-albuterol (COMBIVENT RESPIMAT)  MCG/ACT inhaler Inhale 2 puffs into the lungs 4 times daily, Historical      Levothyroxine Sodium (LEVOXYL PO) Take 88 mcg by mouth daily , Historical      loratadine (CLARITIN) 10 MG tablet Take 1 tablet (10 mg) by mouth daily, Transitional      METOPROLOL TARTRATE PO Take 25 mg by mouth 2 times daily , Historical      mirtazapine (REMERON) 7.5 MG tablet Take 7.5 mg by mouth At Bedtime, Historical      Multiple Vitamins-Minerals (PRESERVISION AREDS PO) Take 1 tablet by mouth 2 times daily, Historical      omeprazole (PRILOSEC) 20 MG DR capsule Take 20 mg by mouth 2 times daily, Historical      polyethylene glycol (MIRALAX) 17 GM/Dose powder Take 1 capful by mouth daily as needed, Historical      polyethylene glycol 0.4%- propylene glycol 0.3% (SYSTANE ULTRA) 0.4-0.3 % SOLN ophthalmic solution Place 1 drop into both eyes 4 times daily, Historical      Probiotic Product (PROBIOTIC PO) Take 1 capsule by mouth every morning, Historical      sodium chloride 1 GM tablet Take 500 mg by mouth daily, Historical      spironolactone (ALDACTONE) 50 MG tablet Take 25 mg by mouth daily , Historical      timolol (TIMOPTIC) 0.5 % ophthalmic solution Place 1 drop Into the left eye daily , Historical      torsemide (DEMADEX) 20 MG tablet Take 40 mg by mouth daily, Historical      OXYGEN-HELIUM IN Historical         STOP taking these medications       magnesium  citrate solution Comments:   Reason for Stopping:         Multiple Vitamins-Minerals (CEROVITE PO) Comments:   Reason for Stopping:         Psyllium (METAMUCIL FIBER PO) Comments:   Reason for Stopping:         sucralfate (CARAFATE) 1 GM tablet Comments:   Reason for Stopping:                   Consults   - Cardiology  - Nephrology      Immunizations given this encounter     Most Recent Immunizations   Administered Date(s) Administered     COVID-19,PF,Moderna 02/25/2021     Influenza (High Dose) 3 valent vaccine 10/01/2019     Influenza, Quad, High Dose, Pf, 65yr+ (Fluzone HD) 09/29/2020     Pneumo Conj 13-V (2010&after) 03/25/2020     Pneumococcal 23 valent 06/18/2018     TDAP Vaccine (Adacel) 04/15/2014           Anticoagulation Information      Recent INR results:   Recent Labs   Lab 09/05/22  2134   INR 1.23*         SIGNIFICANT IMAGING FINDINGS     Results for orders placed or performed during the hospital encounter of 09/05/22   CTA Abdomen Pelvis with Contrast    Impression    IMPRESSION:  1.  No definite etiology for symptoms evident, no acute vascular abnormality identified.  2.  Very heavy diffuse atherosclerotic plaque. 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 Moderate stenosis involving the SMA but normal branch pattern of mesenteric arteries.  3.  Region of slightly delayed perfusion right kidney likely relate to the supply from main renal artery and a separate aberrant right renal artery.   Echocardiogram Limited   Result Value Ref Range    LVEF  55-60%        SIGNIFICANT LABORATORY FINDINGS     Most Recent 3 CBC's:Recent Labs   Lab Test 09/09/22  0513 09/07/22  0435 09/06/22  0850   WBC 10.7 16.5* 22.9*   HGB 9.9* 9.4* 9.7*   MCV 92 89 90    133* 159     Most Recent 3 BMP's:Recent Labs   Lab Test 09/10/22  0452 09/09/22  0513 09/08/22  0454   * 132* 131*   POTASSIUM 3.8  3.8 4.3 4.7  4.6   CHLORIDE 103 106 106    CO2 18* 17* 18*   BUN 26 27 33*   CR 1.13* 1.19* 1.28*   ANIONGAP 9 9 7   PERICO 8.8 8.6 8.6   GLC 82 76 89         Discharge Orders        General info for SNF    Length of Stay Estimate: Short Term Care: Estimated # of Days <30  Condition at Discharge: Improving  Level of care:skilled   Rehabilitation Potential: Excellent  Admission H&P remains valid and up-to-date: Yes  Recent Chemotherapy: N/A  Use Nursing Home Standing Orders: Yes     Follow Up and recommended labs and tests    Follow up with TCU physician.     Follow up with primary care provider within 1 week of discharge from TCU.     Mantoux instructions    Give two-step Mantoux (PPD) Per Facility Policy Yes     Reason for your hospital stay    Sepsis due to Urinary Tract Infection     Activity - Up ad josie     Physical Therapy Adult Consult    Evaluate and treat as clinically indicated.     Occupational Therapy Adult Consult    Evaluate and treat as clinically indicated.     Diet    Follow this diet upon discharge:   Regular diet       Examination   General: In NAD  HEENT: NCAT & EOMI  CV: Irregularly irregular rhythm, normal rate  Lungs: CTAB  Abdomen: Soft, NT, ND, +BS  Extremities: No LE edema b/l  Neuro: AAOx3.   Psych: Normal Affect    Please see EMR for more detailed significant labs, imaging, consultant notes etc.    IVivi MD, personally saw the patient today and spent greater than 30 minutes discharging this patient.    Vivi Amato MD  [facility]    CC:Sally Goyal

## 2022-09-10 NOTE — PLAN OF CARE
Problem: Infection Progression (Sepsis/Septic Shock)  Goal: Absence of Infection Signs and Symptoms  Outcome: Ongoing, Progressing     Problem: Plan of Care - These are the overarching goals to be used throughout the patient stay.    Goal: Optimal Comfort and Wellbeing  Intervention: Monitor Pain and Promote Comfort  Recent Flowsheet Documentation  Taken 9/9/2022 2057 by Luz Shah RN  Pain Management Interventions: medication (see MAR)     Problem: COPD (Chronic Obstructive Pulmonary Disease) Comorbidity  Goal: Maintenance of COPD Symptom Control  Intervention: Maintain COPD-Symptom Control  Recent Flowsheet Documentation  Taken 9/9/2022 2100 by Luz Shah RN  Medication Review/Management: medications reviewed  Taken 9/9/2022 1615 by Luz Shah RN  Medication Review/Management: medications reviewed   Goal Outcome Evaluation:      Pt is alert and oriented x 3, but forgetful at times. Lung sounds clear, diminished, has SOB with activity. SpO2 ranges 92 to 96% on RA. BiPap with O2 at 3 LPM placed at HS, SpO2 100 %. HR in the 90's at rest, low 100's with activity, Afib with frequent PVC's. Bush was removed at 1610 H, she voided 500 ml , PVR showed zero. Purewick in place. Will continue to monitor.

## 2022-09-10 NOTE — PROGRESS NOTES
Occupational Therapy Discharge Summary    Reason for therapy discharge:    Discharged to transitional care facility.    Progress towards therapy goal(s). See goals on Care Plan in [facility] electronic health record for goal details.  Goals partially met.  Barriers to achieving goals:   limited tolerance for therapy.Discharged from facility    Therapy recommendation(s):    Continued therapy is recommended.  Rationale/Recommendations:  Pt will continue to progress toward ADL goals to enable safe d/c to least restrictive environment. .

## 2022-09-10 NOTE — PROGRESS NOTES
"    RENAL NOTE    REQUESTING PHYSICIAN: Dr Roberts    REASON FOR CONSULT:MARIA DEL ROSARIO      ASSESSMENT/PLAN:  Pt with  sepsis due to EColi UTI on abx and improving.     Hypotension improved with volume resuscitation.     Volume status, appears has narrow euvolemic window and I think she got a bit fluid overloaded with resuscitation. Resumed torsemide and good urine and appears much better today. Ok with me to resume spironolactone.   Still brisk urine, will cut back to torsemide 20 mg/day to avoid getting too dry.     MARIA DEL ROSARIO due to hemodynamics, on CKD 3 baseline creat 1. Creat better today. '    Hyponatremia mild and improved from admit.     Anemia follow. Stable in 10 range, not on any iron/NASEEM    Hx of CAD AVF and had Afib and ACS on admit thought due to demand ischemia.     No specific renal clinic f/u planned but happy to see if needed.       HPI:elderly woman came to ER with fever and sob.   Ecoli UTI with sepsis and low bp     Weepy Says she's voiding too much and all the time and very up with this and prospect of transfer today.   Eating well   No more sob  Denies dysuria, just voiding \"too much\"     REVIEW OF SYSTEMS:  COMPLETE REVIEW OF SYSTEMS: completely reviewed and neg other than as above.       Past Medical History:   Diagnosis Date     Asthma      Bilateral carpal tunnel syndrome 8/27/2015     CAD (coronary artery disease)      Chronic airway obstruction, not elsewhere classified     Created by Conversion      Chronic anemia      Chronic edema      Congestive heart failure, severe (H) 5/13/2020     COPD (chronic obstructive pulmonary disease) (H)      Coronary artery disease      Depression      Family history of myocardial infarction     father 64     GERD (gastroesophageal reflux disease)      Heart disease      Heart murmur      High cholesterol     dislipidemia     HTN (hypertension)      Hypercholesterolemia      Hypertension      Hypothyroidism      Hypothyroidism      MI (myocardial infarction) (H) 1985 "     Myelodysplastic syndrome (H)      Myocardial infarction (H) 1983     OLEGARIO (obstructive sleep apnea)      Osteoporosis      Other and unspecified hyperlipidemia     Created by Conversion      Radicular low back pain      Rheumatoid arthritis (H)      Elizabeth Agers syndrome      Sjoegren syndrome      Sjogren's syndrome (H)      Sleep apnea, obstructive      Spinal stenosis      Unspecified polyarthropathy or polyarthritis, hand     Created by Conversion           ALLERGIES/SENSITIVITIES:  Allergies   Allergen Reactions     Gramineae Pollens Other (See Comments)     ORCHARDGRASS. Shortness of breath when lawn is just cut.     Smoke. Other (See Comments)     Hard to breathe.     Pollen Extract      Other reaction(s): Unknown          PHYSICAL EXAM:  Physical Exam   Temp: 98  F (36.7  C) Temp src: Oral BP: 130/62 Pulse: 98   Resp: 20 SpO2: 95 % O2 Device: None (Room air) Oxygen Delivery: 3 LPM  Vitals:    09/08/22 0438 09/09/22 0603 09/10/22 0548   Weight: 52.2 kg (115 lb) 52.6 kg (115 lb 14.4 oz) 51.5 kg (113 lb 9.6 oz)     Vital Signs with Ranges  Temp:  [97.8  F (36.6  C)-98.6  F (37  C)] 98  F (36.7  C)  Pulse:  [81-98] 98  Resp:  [18-20] 20  BP: (103-154)/(55-75) 130/62  SpO2:  [95 %-100 %] 95 %  I/O last 3 completed shifts:  In: 786 [P.O.:780; I.V.:6]  Out: 2400 [Urine:2400]         Patient Vitals for the past 72 hrs:   Weight   09/10/22 0548 51.5 kg (113 lb 9.6 oz)   09/09/22 0603 52.6 kg (115 lb 14.4 oz)   09/08/22 0438 52.2 kg (115 lb)   [unfilled]    General - A & O x 3, NAD  HEENT - AT NC  Neck -  no JVD  Respiratory - lungs clearer  Cardiovascular - AP RRR  +syst murmur  Abdomen - soft, BS present, non tender no mass.   Extremities - well perfused, no edema  Integumentary - intact, good turgor, no rash/lesions  Neurologic -weak   Psych: calm  :dark urine     Laboratory:     Recent Labs   Lab 09/09/22  0513 09/07/22  0435 09/06/22  0850 09/05/22  2134   WBC 10.7 16.5* 22.9* 15.4*   RBC 3.30* 3.10* 3.19*  3.41*   HGB 9.9* 9.4* 9.7* 10.2*   HCT 30.4* 27.6* 28.6* 29.8*    133* 159 185       Basic Metabolic Panel:  Recent Labs   Lab 09/10/22  0452 09/09/22  0513 09/08/22  0454 09/07/22  0435 09/06/22  0850 09/05/22  2134   * 132* 131* 128* 129* 125*   POTASSIUM 3.8  3.8 4.3 4.7  4.6 4.1 3.5 3.4*   CHLORIDE 103 106 106 101 97* 88*   CO2 18* 17* 18* 18* 21* 27   BUN 26 27 33* 35* 32* 35*   CR 1.13* 1.19* 1.28* 1.34* 1.28* 1.18*   GLC 82 76 89 79 74 111   PERICO 8.8 8.6 8.6 8.1* 8.3* 8.7       INR  Recent Labs   Lab 09/05/22  2134   INR 1.23*       Recent Labs   Lab Test 09/07/22  0435 09/06/22  0850   POTASSIUM 4.1 3.5   CHLORIDE 101 97*   BUN 35* 32*      Recent Labs   Lab Test 09/07/22  0435 09/06/22  0850 09/05/22  2331 09/02/21  1052 05/13/20  0502   ALBUMIN 2.3* 2.8*  --    < >  --    BILITOTAL 0.4 0.5  --    < >  --    ALT 15 15  --    < >  --    AST 24 25  --    < >  --    PROTEIN  --   --  100 *  --  30 mg/dL*    < > = values in this interval not displayed.       Personally reviewed today's laboratory studies      Thank you for involving us in the care of this patient. We will continue to follow along with you.      Jennifer Hilario MD   [facility]  [phone]

## 2022-09-12 ENCOUNTER — PATIENT OUTREACH (OUTPATIENT)
Dept: CARE COORDINATION | Facility: CLINIC | Age: 87
End: 2022-09-12

## 2022-09-12 NOTE — PROGRESS NOTES
[facility] Care [facility] [place]    Background: Transitional Care Management program auto-identified and prompting a chart review by [facility] team.    Assessment: Upon chart review, [facility] Team member will cancel/close this episode of Transitional Care Management program due to reason below:    Patient has discharged to a Memory Care, Nursing Home, Assisted Living or Group Home where patient is receiving on-site support with their daily cares, including support with hospital follow up plan.     Skilled Nursing Facility    Plan: Transitional Care Management episode closed per reason above.      LEOPOLDO Larios  [facility] Care [facility]    *Connected Care Resource Team does NOT follow patient ongoing. Referrals are identified based on internal discharge reports and the outreach is to ensure patient has an understanding of their discharge instructions.

## 2022-09-13 VITALS
RESPIRATION RATE: 18 BRPM | SYSTOLIC BLOOD PRESSURE: 102 MMHG | HEART RATE: 88 BPM | OXYGEN SATURATION: 96 % | BODY MASS INDEX: 19.84 KG/M2 | DIASTOLIC BLOOD PRESSURE: 62 MMHG | TEMPERATURE: 98 F | WEIGHT: 115.6 LBS

## 2022-09-14 ENCOUNTER — TRANSITIONAL CARE UNIT VISIT (OUTPATIENT)
Dept: GERIATRICS | Facility: CLINIC | Age: 87
End: 2022-09-14
Payer: COMMERCIAL

## 2022-09-14 DIAGNOSIS — I10 HYPERTENSION, UNSPECIFIED TYPE: ICD-10-CM

## 2022-09-14 DIAGNOSIS — K59.00 CONSTIPATION, UNSPECIFIED CONSTIPATION TYPE: Primary | ICD-10-CM

## 2022-09-14 DIAGNOSIS — R19.8 IRREGULAR BOWEL HABITS: ICD-10-CM

## 2022-09-14 DIAGNOSIS — I48.91 ATRIAL FIBRILLATION WITH RAPID VENTRICULAR RESPONSE (H): ICD-10-CM

## 2022-09-14 PROCEDURE — 99310 SBSQ NF CARE HIGH MDM 45: CPT | Performed by: FAMILY MEDICINE

## 2022-09-14 NOTE — LETTER
9/14/2022        RE: Marla Dunn  [address]  [facility] [address]        [facility] GERIATRIC SERVICES    Facility:   [facility] ([facility]) [[id number]]   Code Status: DNR/DNI      HPI:   [place]ia is a 89 year old female who was hospitalized September 5, 2022 through September 9, 2022 secondary to presenting from her assisted living facility with fever and diffuse aches along with diffuse abdominal pain and nausea without vomiting admitted for sepsis due to urinary tract infection.  She was treated with ciprofloxacin.  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  Her ejection fraction 55-60%.  Her blood work on September 6 did show white cell count of 22.9 and hemoglobin 9.7 rechecked again on September 9 of white cell count 10.7 and hemoglobin 9.9.  Does have a history of hyponatremia last sodium level on September 10 was 130 with potassium 3.8, BUN 26 and creatinine 1.13  She was diagnosed with sepsis secondary to UTI she was given empiric ceftriaxone along with IV fluids.  Does have a history of hyponatremia as well as hypokalemia and hermagnesium level is normal at 1.9.  Other problems include insomnia to which she is on Remeron GERD to which she is on Protonix and constipation for which she is on MiraLAX.  She is now currently in the transitional care unit to which we had the opportunity to talk about the roles and the goals of the transitional care unit.  She did live in [place] near the [facility] for many years recently moved up.  About 10 years ago currently living in [place].  Currently her systolic blood pressures ranging between 102-130 and her weight 115 pounds on September 13.  She has been afebrile and also on room air.  She does complain of not  having a bowel movement for 4 to 5 days she does have MiraLAX as needed has not been given at home she is on both MiraLAX and senna so we will put the senna S twice daily MiraLAX and since she has not had a bowel movement for the last few days she would like a bisacodyl suppository so we will start that today as well.  I had a chance to go over her blood work and due to her history of CKD and low sodium we will recheck her sodium level next week on September 20.  She did meet with dietary yesterday.  Had a chance to talk to dietitian as well.  I also talked to her about her albuterol inhaler as well as her Combivent inhaler and they are both given at the same time so there is extra albuterol given but she states that what she takes at home so we will keep it the same for now.  For the hyponatremia again she is also on sodium chloride tablet 1 g once daily.    Past Medical History:  Past Medical History:   Diagnosis Date     Asthma      Bilateral carpal tunnel syndrome 8/27/2015     CAD (coronary artery disease)      Chronic airway obstruction, not elsewhere classified     Created by Conversion      Chronic anemia      Chronic edema      Congestive heart failure, severe (H) 5/13/2020     COPD (chronic obstructive pulmonary disease) (H)      Coronary artery disease      Depression      Family history of myocardial infarction     father 64     GERD (gastroesophageal reflux disease)      Heart disease      Heart murmur      High cholesterol     dislipidemia     HTN (hypertension)      Hypercholesterolemia      Hypertension      Hypothyroidism      Hypothyroidism      MI (myocardial infarction) (H) 1985     Myelodysplastic syndrome (H)      Myocardial infarction (H) 1983     OLEGARIO (obstructive sleep apnea)      Osteoporosis      Other and unspecified hyperlipidemia     Created by Conversion      Radicular low back pain      Rheumatoid arthritis (H)      Elizabeth Agers syndrome      Sjoegren syndrome      Sjogren's syndrome (H)       Sleep apnea, obstructive      Spinal stenosis      Unspecified polyarthropathy or polyarthritis, hand     Created by Conversion            Surgical History:  Past Surgical History:   Procedure Laterality Date     aortic valve       AORTIC VALVE REPLACEMENT  2012     APPENDECTOMY       APPENDECTOMY       AS TOTAL KNEE ARTHROPLASTY Right      BACK SURGERY  2004    spinal decompression     BACK SURGERY      spinal stenosis     BONE MARROW BIOPSY  2004     breast biopsy       BREAST SURGERY  2001    biopsy     BYPASS GRAFT ARTERY CORONARY  2009    LIMA to ramus intermedius     cardiac angiogram       CARDIAC CATHETERIZATION       CARDIAC SURGERY       EYE SURGERY  2008    bilateral cataract repair      EYE SURGERY Bilateral     cornea transplant left eye & cataracts     KYPHOPLASTY  2003    T12     OPEN REDUCTION INTERNAL FIXATION HIP NAILING Right 8/13/2021    Procedure: INTERNAL FIXATION, FRACTURE, TROCHANTERIC, HIP, USING PINS OR RODS;  Surgeon: Darrel Castro MD;  Location: [facility] OR     OTHER SURGICAL HISTORY  01/2018    Rectosigmoidectomy perineal     RECTOSIGMOIDECTOMY PERINEAL N/A 1/10/2018    Procedure: RECTOSIGMOIDECTOMY PERINEAL;  PERINEAL RECTOSIGMOIDECTOMY ;  Surgeon: Candelaria Anthony MD;  Location: [facility] OR     REPAIR VALVE AORTIC  2009     THORACIC SURGERY  2003    T12 kyphoplasty     ZZC CABG, ARTERY-VEIN, FOUR       ZZC CABG, VEIN, SINGLE      Description: CABG (CABG);  Recorded: 03/09/2012;  Comments: Single vessel bypass graft to an obtuse marginal branch in May 2009     ZZC REPLACE AORT VALV,PROSTH VALV      Description: Aortic Valve Replacement;  Recorded: 03/09/2012;  Comments: Aortic valve replacement       Family History:   Family History   Problem Relation Age of Onset     Family History Negative Mother      Cancer Mother      Heart Disease Father        Social History:    Social History     Socioeconomic History     Marital status:    Tobacco Use     Smoking status:  Never Smoker     Smokeless tobacco: Never Used   Substance and Sexual Activity     Alcohol use: No     Alcohol/week: 0.0 standard drinks     Comment: Alcoholic Drinks/day: occasional glass of wine     Drug use: No   Social History Narrative    .  passed away 20 years ago. No children. 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 Was living independently in her apartment in [place] prior to admission. Uses walker at baseline.         REVIEW OF SYSTEM:  She currently denies any new symptoms of fever cough or cold sore throat postnasal drip allergies coughing shortness of breath dyspnea chest pain dizziness vertigo nausea vomiting diarrhea dysuria frequency or urgency.    PHYSICAL EXAM:   Pleasant female in no acute distress.  Head is normocephalic.  Conjunctiva is pink and sclerae is clear.  Neck is supple without adenopathy.  Lung sounds are clear throughout.  Cardiovascular does have an irregularity and aVR.  No lower extremity edema.  Gastrointestinal thinly built nontender with positive bowel sounds.  Musculoskeletal generalized weakness throughout.  Psychiatric: Pleasant affect.    Vitals:    09/13/22 2331   BP: 102/62   Pulse: 88   Resp: 18   Temp: 98  F (36.7  C)   SpO2: 96%   Weight: 52.4 kg (115 lb 9.6 oz)         Medication List:  Current Outpatient Medications   Medication Sig     acetaminophen (TYLENOL) 325 MG tablet Take 3 tablets (975 mg) by mouth every 8 hours as needed for mild pain     albuterol (PROAIR HFA/PROVENTIL HFA/VENTOLIN HFA) 108 (90 Base) MCG/ACT inhaler Inhale 2 puffs into the lungs every 6 hours     Ascorbic Acid (VITAMIN C PO) Take 500 mg by mouth every morning     aspirin (ASA) 81 MG EC tablet Take 1 tablet (81 mg) by mouth 2 times daily     Calcium Carb-Cholecalciferol (CALCIUM+D3) 600-800 MG-UNIT TABS Take 1 tablet by mouth daily     calcium carbonate (TUMS) 500 MG chewable tablet Take 2 chew tab by mouth daily as needed for heartburn      cholecalciferol (D3 HIGH POTENCY) 125 MCG (5000 UT) CAPS Take 1 capsule by mouth daily     cycloSPORINE (RESTASIS) 0.05 % ophthalmic emulsion Place 1 drop into both eyes 2 times daily      fish oil-omega-3 fatty acids 1000 MG capsule Take 1 g by mouth daily     fluorometholone (FML LIQUIFILM) 0.1 % ophthalmic susp Place 1 drop Into the left eye daily      IBANDRONATE SODIUM PO Take 150 mg by mouth every 30 days     ipratropium-albuterol (COMBIVENT RESPIMAT)  MCG/ACT inhaler Inhale 2 puffs into the lungs 4 times daily     Levothyroxine Sodium (LEVOXYL PO) Take 88 mcg by mouth daily      loratadine (CLARITIN) 10 MG tablet Take 1 tablet (10 mg) by mouth daily (Patient taking differently: Take 10 mg by mouth daily as needed)     METOPROLOL TARTRATE PO Take 25 mg by mouth 2 times daily      mirtazapine (REMERON) 7.5 MG tablet Take 7.5 mg by mouth At Bedtime     Multiple Vitamins-Minerals (PRESERVISION AREDS PO) Take 1 tablet by mouth 2 times daily     omeprazole (PRILOSEC) 20 MG DR capsule Take 20 mg by mouth 2 times daily     OXYGEN-HELIUM IN      polyethylene glycol (MIRALAX) 17 GM/Dose powder Take 1 capful by mouth daily     polyethylene glycol 0.4%- propylene glycol 0.3% (SYSTANE ULTRA) 0.4-0.3 % SOLN ophthalmic solution Place 1 drop into both eyes 4 times daily     Probiotic Product (PROBIOTIC PO) Take 1 capsule by mouth every morning     sodium chloride 1 GM tablet Take 500 mg by mouth daily     spironolactone (ALDACTONE) 50 MG tablet Take 25 mg by mouth daily      timolol (TIMOPTIC) 0.5 % ophthalmic solution Place 1 drop Into the left eye daily      torsemide (DEMADEX) 20 MG tablet Take 40 mg by mouth daily     No current facility-administered medications for this visit.        Labs:  Last Comprehensive Metabolic Panel:  Sodium   Date Value Ref Range Status   09/10/2022 130 (L) 136 - 145 mmol/L Final   06/30/2021 132 (L) 136 - 145 mmol/L Final     Potassium   Date Value Ref Range Status   09/10/2022 3.8 3.5  - 5.0 mmol/L Final   09/10/2022 3.8 3.5 - 5.0 mmol/L Final   06/30/2021 3.8 3.5 - 5.0 mmol/L Final     Chloride   Date Value Ref Range Status   09/10/2022 103 98 - 107 mmol/L Final   06/30/2021 97 (L) 98 - 107 mmol/L Final     Carbon Dioxide   Date Value Ref Range Status   06/30/2021 25 22 - 31 mmol/L Final     Carbon Dioxide (CO2)   Date Value Ref Range Status   09/10/2022 18 (L) 22 - 31 mmol/L Final     Anion Gap   Date Value Ref Range Status   09/10/2022 9 5 - 18 mmol/L Final   06/30/2021 10 5 - 18 mmol/L Final     Glucose   Date Value Ref Range Status   09/10/2022 82 70 - 125 mg/dL Final   06/30/2021 80 70 - 125 mg/dL Final     Urea Nitrogen   Date Value Ref Range Status   09/10/2022 26 8 - 28 mg/dL Final   06/30/2021 37 (H) 8 - 25 mg/dL Final     Creatinine   Date Value Ref Range Status   09/10/2022 1.13 (H) 0.60 - 1.10 mg/dL Final   06/30/2021 0.93 0.60 - 1.10 mg/dL Final     GFR Estimate   Date Value Ref Range Status   09/10/2022 45 (L) >60 mL/min/1.73m2 Final     Comment:     Effective December 21, 2021 eGFRcr in adults is calculated using the 2021 CKD-EPI creatinine equation which includes age and gender (Tran linn al., [place], DOI: 10.1056/EZBIqo1501769)   06/30/2021 56 (L) >60 mL/min/1.73m2 Final   06/30/2021 56 (L) >60 ml/min/1.73m2 Final     Calcium   Date Value Ref Range Status   09/10/2022 8.8 8.5 - 10.5 mg/dL Final   06/30/2021 9.6 8.5 - 10.5 mg/dL Final     CBC RESULTS: Recent Labs   Lab Test 09/09/22  0513   WBC 10.7   RBC 3.30*   HGB 9.9*   HCT 30.4*   MCV 92   MCH 30.0   MCHC 32.6   RDW 16.3*        TSH   Date Value Ref Range Status   03/22/2020 1.17 0.30 - 5.00 uIU/mL Final         Assessment:   (K59.00) Constipation, unspecified constipation type  (primary encounter diagnosis)  Comment: Adding in now MiraLAX and senna as  Plan: She also wants a suppository today    (R19.8) Irregular bowel habits  Comment: History of constipation  Plan: Continue to monitor    (I10) Hypertension, unspecified  type  Comment: Stable  Plan: Continue to monitor    (I48.91) Atrial fibrillation with rapid ventricular response (H)  Comment: Stable  Plan: Continue to monitor      PLAN:    She will attempt the therapy process we also talked about nutrition as well as meeting with the dietitian yesterday.  She feels her appetite to be pretty good.  We will recheck her BMP again next week on the 20th.  Adding MiraLAX and senna S twice daily along with suppository today.  We talked about her rehabilitation component along with keeping staff updated with how she is overall doing.  Went over her previous records as well as her hospitalization stay.    For documentation purposes total visit 40 minutes which over 50% was spent with the patient going over her chronic medical conditions, recent hospitalization, laboratory studies, upcoming laboratory studies, nutrition, constipation, pain management and rehabilitation component.      Electronically signed by: Samuel Rodriguez NP          Sincerely,        Samuel Rodriguez NP

## 2022-09-14 NOTE — PROGRESS NOTES
[facility] GERIATRIC SERVICES    Facility:   [facility] ([facility]) [[id number]]   Code Status: DNR/DNI      HPI:   Georgia is a 89 year old female who was hospitalized September 5, 2022 through September 9, 2022 secondary to presenting from her assisted living facility with fever and diffuse aches along with diffuse abdominal pain and nausea without vomiting admitted for sepsis due to urinary tract infection.  She was treated with ciprofloxacin.  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  Her ejection fraction 55-60%.  Her blood work on September 6 did show white cell count of 22.9 and hemoglobin 9.7 rechecked again on September 9 of white cell count 10.7 and hemoglobin 9.9.  Does have a history of hyponatremia last sodium level on September 10 was 130 with potassium 3.8, BUN 26 and creatinine 1.13  She was diagnosed with sepsis secondary to UTI she was given empiric ceftriaxone along with IV fluids.  Does have a history of hyponatremia as well as hypokalemia and hermagnesium level is normal at 1.9.  Other problems include insomnia to which she is on Remeron GERD to which she is on Protonix and constipation for which she is on MiraLAX.  She is now currently in the transitional care unit to which we had the opportunity to talk about the roles and the goals of the transitional care unit.  She did live in [place] near the [facility] for many years recently moved up.  About 10 years ago currently living in [place].  Currently her systolic blood pressures ranging between 102-130 and her weight 115 pounds on September 13.  She has been afebrile and also on room air.  She does complain of not having a bowel movement for 4 to 5 days she does have MiraLAX as needed has not been given at home she is  on both MiraLAX and senna so we will put the senna S twice daily MiraLAX and since she has not had a bowel movement for the last few days she would like a bisacodyl suppository so we will start that today as well.  I had a chance to go over her blood work and due to her history of CKD and low sodium we will recheck her sodium level next week on September 20.  She did meet with dietary yesterday.  Had a chance to talk to dietitian as well.  I also talked to her about her albuterol inhaler as well as her Combivent inhaler and they are both given at the same time so there is extra albuterol given but she states that what she takes at home so we will keep it the same for now.  For the hyponatremia again she is also on sodium chloride tablet 1 g once daily.    Past Medical History:  Past Medical History:   Diagnosis Date     Asthma      Bilateral carpal tunnel syndrome 8/27/2015     CAD (coronary artery disease)      Chronic airway obstruction, not elsewhere classified     Created by Conversion      Chronic anemia      Chronic edema      Congestive heart failure, severe (H) 5/13/2020     COPD (chronic obstructive pulmonary disease) (H)      Coronary artery disease      Depression      Family history of myocardial infarction     father 64     GERD (gastroesophageal reflux disease)      Heart disease      Heart murmur      High cholesterol     dislipidemia     HTN (hypertension)      Hypercholesterolemia      Hypertension      Hypothyroidism      Hypothyroidism      MI (myocardial infarction) (H) 1985     Myelodysplastic syndrome (H)      Myocardial infarction (H) 1983     OLEGARIO (obstructive sleep apnea)      Osteoporosis      Other and unspecified hyperlipidemia     Created by Conversion      Radicular low back pain      Rheumatoid arthritis (H)      Elizabeth Agers syndrome      Sjoegren syndrome      Sjogren's syndrome (H)      Sleep apnea, obstructive      Spinal stenosis      Unspecified polyarthropathy or polyarthritis,  hand     Created by Conversion            Surgical History:  Past Surgical History:   Procedure Laterality Date     aortic valve       AORTIC VALVE REPLACEMENT  2012     APPENDECTOMY       APPENDECTOMY       AS TOTAL KNEE ARTHROPLASTY Right      BACK SURGERY  2004    spinal decompression     BACK SURGERY      spinal stenosis     BONE MARROW BIOPSY  2004     breast biopsy       BREAST SURGERY  2001    biopsy     BYPASS GRAFT ARTERY CORONARY  2009    LIMA to ramus intermedius     cardiac angiogram       CARDIAC CATHETERIZATION       CARDIAC SURGERY       EYE SURGERY  2008    bilateral cataract repair      EYE SURGERY Bilateral     cornea transplant left eye & cataracts     KYPHOPLASTY  2003    T12     OPEN REDUCTION INTERNAL FIXATION HIP NAILING Right 8/13/2021    Procedure: INTERNAL FIXATION, FRACTURE, TROCHANTERIC, HIP, USING PINS OR RODS;  Surgeon: Darrel Castro MD;  Location: [facility] OR     OTHER SURGICAL HISTORY  01/2018    Rectosigmoidectomy perineal     RECTOSIGMOIDECTOMY PERINEAL N/A 1/10/2018    Procedure: RECTOSIGMOIDECTOMY PERINEAL;  PERINEAL RECTOSIGMOIDECTOMY ;  Surgeon: Candelaria Anthony MD;  Location: [facility] OR     REPAIR VALVE AORTIC  2009     THORACIC SURGERY  2003    T12 kyphoplasty     ZZC CABG, ARTERY-VEIN, FOUR       ZZC CABG, VEIN, SINGLE      Description: CABG (CABG);  Recorded: 03/09/2012;  Comments: Single vessel bypass graft to an obtuse marginal branch in May 2009     ZZC REPLACE AORT VALV,PROSTH VALV      Description: Aortic Valve Replacement;  Recorded: 03/09/2012;  Comments: Aortic valve replacement       Family History:   Family History   Problem Relation Age of Onset     Family History Negative Mother      Cancer Mother      Heart Disease Father        Social History:    Social History     Socioeconomic History     Marital status:    Tobacco Use     Smoking status: Never Smoker     Smokeless tobacco: Never Used   Substance and Sexual Activity     Alcohol use: No      Alcohol/week: 0.0 standard drinks     Comment: Alcoholic Drinks/day: occasional glass of wine     Drug use: No   Social History Narrative    .  passed away 20 years ago. No children. 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 Was living independently in her apartment in [place] prior to admission. Uses walker at baseline.         REVIEW OF SYSTEM:  She currently denies any new symptoms of fever cough or cold sore throat postnasal drip allergies coughing shortness of breath dyspnea chest pain dizziness vertigo nausea vomiting diarrhea dysuria frequency or urgency.    PHYSICAL EXAM:   Pleasant female in no acute distress.  Head is normocephalic.  Conjunctiva is pink and sclerae is clear.  Neck is supple without adenopathy.  Lung sounds are clear throughout.  Cardiovascular does have an irregularity and aVR.  No lower extremity edema.  Gastrointestinal thinly built nontender with positive bowel sounds.  Musculoskeletal generalized weakness throughout.  Psychiatric: Pleasant affect.    Vitals:    09/13/22 2331   BP: 102/62   Pulse: 88   Resp: 18   Temp: 98  F (36.7  C)   SpO2: 96%   Weight: 52.4 kg (115 lb 9.6 oz)         Medication List:  Current Outpatient Medications   Medication Sig     acetaminophen (TYLENOL) 325 MG tablet Take 3 tablets (975 mg) by mouth every 8 hours as needed for mild pain     albuterol (PROAIR HFA/PROVENTIL HFA/VENTOLIN HFA) 108 (90 Base) MCG/ACT inhaler Inhale 2 puffs into the lungs every 6 hours     Ascorbic Acid (VITAMIN C PO) Take 500 mg by mouth every morning     aspirin (ASA) 81 MG EC tablet Take 1 tablet (81 mg) by mouth 2 times daily     Calcium Carb-Cholecalciferol (CALCIUM+D3) 600-800 MG-UNIT TABS Take 1 tablet by mouth daily     calcium carbonate (TUMS) 500 MG chewable tablet Take 2 chew tab by mouth daily as needed for heartburn     cholecalciferol (D3 HIGH POTENCY) 125 MCG (5000 UT) CAPS Take 1 capsule by mouth daily     cycloSPORINE  (RESTASIS) 0.05 % ophthalmic emulsion Place 1 drop into both eyes 2 times daily      fish oil-omega-3 fatty acids 1000 MG capsule Take 1 g by mouth daily     fluorometholone (FML LIQUIFILM) 0.1 % ophthalmic susp Place 1 drop Into the left eye daily      IBANDRONATE SODIUM PO Take 150 mg by mouth every 30 days     ipratropium-albuterol (COMBIVENT RESPIMAT)  MCG/ACT inhaler Inhale 2 puffs into the lungs 4 times daily     Levothyroxine Sodium (LEVOXYL PO) Take 88 mcg by mouth daily      loratadine (CLARITIN) 10 MG tablet Take 1 tablet (10 mg) by mouth daily (Patient taking differently: Take 10 mg by mouth daily as needed)     METOPROLOL TARTRATE PO Take 25 mg by mouth 2 times daily      mirtazapine (REMERON) 7.5 MG tablet Take 7.5 mg by mouth At Bedtime     Multiple Vitamins-Minerals (PRESERVISION AREDS PO) Take 1 tablet by mouth 2 times daily     omeprazole (PRILOSEC) 20 MG DR capsule Take 20 mg by mouth 2 times daily     OXYGEN-HELIUM IN      polyethylene glycol (MIRALAX) 17 GM/Dose powder Take 1 capful by mouth daily     polyethylene glycol 0.4%- propylene glycol 0.3% (SYSTANE ULTRA) 0.4-0.3 % SOLN ophthalmic solution Place 1 drop into both eyes 4 times daily     Probiotic Product (PROBIOTIC PO) Take 1 capsule by mouth every morning     sodium chloride 1 GM tablet Take 500 mg by mouth daily     spironolactone (ALDACTONE) 50 MG tablet Take 25 mg by mouth daily      timolol (TIMOPTIC) 0.5 % ophthalmic solution Place 1 drop Into the left eye daily      torsemide (DEMADEX) 20 MG tablet Take 40 mg by mouth daily     No current facility-administered medications for this visit.        Labs:  Last Comprehensive Metabolic Panel:  Sodium   Date Value Ref Range Status   09/10/2022 130 (L) 136 - 145 mmol/L Final   06/30/2021 132 (L) 136 - 145 mmol/L Final     Potassium   Date Value Ref Range Status   09/10/2022 3.8 3.5 - 5.0 mmol/L Final   09/10/2022 3.8 3.5 - 5.0 mmol/L Final   06/30/2021 3.8 3.5 - 5.0 mmol/L Final      Chloride   Date Value Ref Range Status   09/10/2022 103 98 - 107 mmol/L Final   06/30/2021 97 (L) 98 - 107 mmol/L Final     Carbon Dioxide   Date Value Ref Range Status   06/30/2021 25 22 - 31 mmol/L Final     Carbon Dioxide (CO2)   Date Value Ref Range Status   09/10/2022 18 (L) 22 - 31 mmol/L Final     Anion Gap   Date Value Ref Range Status   09/10/2022 9 5 - 18 mmol/L Final   06/30/2021 10 5 - 18 mmol/L Final     Glucose   Date Value Ref Range Status   09/10/2022 82 70 - 125 mg/dL Final   06/30/2021 80 70 - 125 mg/dL Final     Urea Nitrogen   Date Value Ref Range Status   09/10/2022 26 8 - 28 mg/dL Final   06/30/2021 37 (H) 8 - 25 mg/dL Final     Creatinine   Date Value Ref Range Status   09/10/2022 1.13 (H) 0.60 - 1.10 mg/dL Final   06/30/2021 0.93 0.60 - 1.10 mg/dL Final     GFR Estimate   Date Value Ref Range Status   09/10/2022 45 (L) >60 mL/min/1.73m2 Final     Comment:     Effective December 21, 2021 eGFRcr in adults is calculated using the 2021 CKD-EPI creatinine equation which includes age and gender (Tran et al., NEJ, DOI: 10.1056/KTZZzm1586778)   06/30/2021 56 (L) >60 mL/min/1.73m2 Final   06/30/2021 56 (L) >60 ml/min/1.73m2 Final     Calcium   Date Value Ref Range Status   09/10/2022 8.8 8.5 - 10.5 mg/dL Final   06/30/2021 9.6 8.5 - 10.5 mg/dL Final     CBC RESULTS: Recent Labs   Lab Test 09/09/22  0513   WBC 10.7   RBC 3.30*   HGB 9.9*   HCT 30.4*   MCV 92   MCH 30.0   MCHC 32.6   RDW 16.3*        TSH   Date Value Ref Range Status   03/22/2020 1.17 0.30 - 5.00 uIU/mL Final         Assessment:   (K59.00) Constipation, unspecified constipation type  (primary encounter diagnosis)  Comment: Adding in now MiraLAX and senna as  Plan: She also wants a suppository today    (R19.8) Irregular bowel habits  Comment: History of constipation  Plan: Continue to monitor    (I10) Hypertension, unspecified type  Comment: Stable  Plan: Continue to monitor    (I48.91) Atrial fibrillation with rapid  ventricular response (H)  Comment: Stable  Plan: Continue to monitor      PLAN:    She will attempt the therapy process we also talked about nutrition as well as meeting with the dietitian yesterday.  She feels her appetite to be pretty good.  We will recheck her BMP again next week on the 20th.  Adding MiraLAX and senna S twice daily along with suppository today.  We talked about her rehabilitation component along with keeping staff updated with how she is overall doing.  Went over her previous records as well as her hospitalization stay.    For documentation purposes total visit 40 minutes which over 50% was spent with the patient going over her chronic medical conditions, recent hospitalization, laboratory studies, upcoming laboratory studies, nutrition, constipation, pain management and rehabilitation component.      Electronically signed by: Samuel Rodriguez NP

## 2022-09-18 ENCOUNTER — LAB REQUISITION (OUTPATIENT)
Dept: LAB | Facility: CLINIC | Age: 87
End: 2022-09-18
Payer: COMMERCIAL

## 2022-09-18 DIAGNOSIS — N18.9 CHRONIC KIDNEY DISEASE, UNSPECIFIED: ICD-10-CM

## 2022-09-20 ENCOUNTER — DOCUMENTATION ONLY (OUTPATIENT)
Dept: OTHER | Facility: CLINIC | Age: 87
End: 2022-09-20

## 2022-09-20 ENCOUNTER — TELEPHONE (OUTPATIENT)
Dept: GERIATRICS | Facility: CLINIC | Age: 87
End: 2022-09-20

## 2022-09-20 VITALS
SYSTOLIC BLOOD PRESSURE: 166 MMHG | TEMPERATURE: 97.2 F | DIASTOLIC BLOOD PRESSURE: 76 MMHG | OXYGEN SATURATION: 96 % | HEART RATE: 71 BPM | RESPIRATION RATE: 20 BRPM

## 2022-09-20 LAB
ANION GAP SERPL CALCULATED.3IONS-SCNC: 10 MMOL/L (ref 7–15)
BUN SERPL-MCNC: 27.2 MG/DL (ref 8–23)
CALCIUM SERPL-MCNC: 9.2 MG/DL (ref 8.2–9.6)
CHLORIDE SERPL-SCNC: 89 MMOL/L (ref 98–107)
CREAT SERPL-MCNC: 1.02 MG/DL (ref 0.51–0.95)
DEPRECATED HCO3 PLAS-SCNC: 29 MMOL/L (ref 22–29)
GFR SERPL CREATININE-BSD FRML MDRD: 51 ML/MIN/1.73M2
GLUCOSE SERPL-MCNC: 70 MG/DL (ref 70–99)
POTASSIUM SERPL-SCNC: 4.6 MMOL/L (ref 3.4–5.3)
SODIUM SERPL-SCNC: 128 MMOL/L (ref 136–145)

## 2022-09-20 PROCEDURE — 82374 ASSAY BLOOD CARBON DIOXIDE: CPT | Performed by: FAMILY MEDICINE

## 2022-09-20 PROCEDURE — P9604 ONE-WAY ALLOW PRORATED TRIP: HCPCS | Performed by: FAMILY MEDICINE

## 2022-09-20 PROCEDURE — 36415 COLL VENOUS BLD VENIPUNCTURE: CPT | Performed by: FAMILY MEDICINE

## 2022-09-20 NOTE — TELEPHONE ENCOUNTER
[facility] Triage Nurse Telephone Encounter    Provider: STERLING Stone  Facility: [facility]  Facility Type:  TCU    Caller: Ana   Call Back Number: [phone]    Allergies:    Allergies   Allergen Reactions     Gramineae Pollens Other (See Comments)     ORCHARDGRASS. Shortness of breath when lawn is just cut.     Smoke. Other (See Comments)     Hard to breathe.     Pollen Extract      Other reaction(s): Unknown        Reason for call: not on a fluid restriction, on sodium chloride 500mg every day       Verbal Order/Direction given by Provider: NNO, NP to address when there tomorrow.     Provider giving Order:  STERLING Stone    Verbal Order given to: Ana Gallegos RN

## 2022-09-21 ENCOUNTER — TRANSITIONAL CARE UNIT VISIT (OUTPATIENT)
Dept: GERIATRICS | Facility: CLINIC | Age: 87
End: 2022-09-21
Payer: COMMERCIAL

## 2022-09-21 DIAGNOSIS — I10 HYPERTENSION, UNSPECIFIED TYPE: ICD-10-CM

## 2022-09-21 DIAGNOSIS — J44.1 COPD EXACERBATION (H): ICD-10-CM

## 2022-09-21 DIAGNOSIS — I48.91 ATRIAL FIBRILLATION WITH RAPID VENTRICULAR RESPONSE (H): ICD-10-CM

## 2022-09-21 DIAGNOSIS — K59.00 CONSTIPATION, UNSPECIFIED CONSTIPATION TYPE: Primary | ICD-10-CM

## 2022-09-21 PROCEDURE — 99310 SBSQ NF CARE HIGH MDM 45: CPT | Performed by: FAMILY MEDICINE

## 2022-09-21 NOTE — LETTER
9/21/2022        RE: Marla Dunn  [address]  [facility] [address]        [facility]    Facility:   [facility]) [[id number]]   Code Status: DNR/DNI      CHIEF COMPLAINT/REASON FOR VISIT:  Chief Complaint   Patient presents with     RECHECK       HISTORY:      HPI: Georgia is a 89 year old female who I had the pleasure of revisiting with once again today not only secondary to her hospitalization September 5 through September 9, 2022 secondary presenting from her assisted living facility with fever and diffuse aches along with abdominal discomfort nausea and vomiting and sepsis secondary to urinary tract infection as well as today's discussion of her laboratory studies, sodium, blood pressures, nutrition, diuretics and rehabilitation.  Regarding the rehabilitation process she does feel that her energy is slowly improving.  Last week we did add in the MiraLAX and senna S twice daily she like to keep it the same her bowels are now regular.  For the COPD she is on both albuterol and Combivent she is aware that albuterol is on both of them but she like to keep that the same.  She has been on that at home as well.  Systolic blood pressures ranging 102-137 she has been afebrile and also on room air with a weight of 96 pounds in comparison to September 17 100 pounds.  Met with dietary getting extra nutrient supplements.  On September 20 her sodium level came back at 128 does have a history of hyponatremia she is on 500 mg of sodium chloride once daily will now increase it to 1 g twice daily as well as a free water restriction at 1200 cc a day recheck her sodium level again next week on October 3.  Also decrease the Demadex to 20 mg rather than 40 mg in the morning her spironolactone is at 25 mg and her potassium is at 4.6.  There is no lower extremity edema her lungs have remained clear.  Does have an irregularity.  No increase in depression or anxiety would like to  keep the Remeron the same at 7.5 mg.    Past Medical History:   Diagnosis Date     Asthma      Bilateral carpal tunnel syndrome 8/27/2015     CAD (coronary artery disease)      Chronic airway obstruction, not elsewhere classified     Created by Conversion      Chronic anemia      Chronic edema      Congestive heart failure, severe (H) 5/13/2020     COPD (chronic obstructive pulmonary disease) (H)      Coronary artery disease      Depression      Family history of myocardial infarction     father 64     GERD (gastroesophageal reflux disease)      Heart disease      Heart murmur      High cholesterol     dislipidemia     HTN (hypertension)      Hypercholesterolemia      Hypertension      Hypothyroidism      Hypothyroidism      MI (myocardial infarction) (H) 1985     Myelodysplastic syndrome (H)      Myocardial infarction (H) 1983     OLEGARIO (obstructive sleep apnea)      Osteoporosis      Other and unspecified hyperlipidemia     Created by Conversion      Radicular low back pain      Rheumatoid arthritis (H)      Elizabeth Agers syndrome      Sjoegren syndrome      Sjogren's syndrome (H)      Sleep apnea, obstructive      Spinal stenosis      Unspecified polyarthropathy or polyarthritis, hand     Created by Conversion             Family History   Problem Relation Age of Onset     Family History Negative Mother      Cancer Mother      Heart Disease Father       Social History     Socioeconomic History     Marital status:    Tobacco Use     Smoking status: Never Smoker     Smokeless tobacco: Never Used   Substance and Sexual Activity     Alcohol use: No     Alcohol/week: 0.0 standard drinks     Comment: Alcoholic Drinks/day: occasional glass of wine     Drug use: No   Social History Narrative    .  passed away 20 years ago. No children. 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 Uses walker at baseline.        No new changes to review of systems or physical exam since our last visit on September 14  REVIEW OF SYSTEM:  She currently denies any new symptoms of fever cough or cold sore throat postnasal drip allergies coughing shortness of breath dyspnea chest pain dizziness vertigo nausea vomiting diarrhea dysuria frequency or urgency.       PHYSICAL EXAM:   Pleasant female in no acute distress.  Head is normocephalic.  .  Neck is supple without adenopathy.  Lung sounds are clear throughout.  Cardiovascular does have an irregularity and aVR.  No lower extremity edema.  Gastrointestinal thinly built nontender with positive bowel sounds.  Musculoskeletal generalized weakness throughout.  Psychiatric: Pleasant affect.    Current Outpatient Medications:      acetaminophen (TYLENOL) 325 MG tablet, Take 3 tablets (975 mg) by mouth every 8 hours as needed for mild pain, Disp: , Rfl:      albuterol (PROAIR HFA/PROVENTIL HFA/VENTOLIN HFA) 108 (90 Base) MCG/ACT inhaler, Inhale 2 puffs into the lungs every 6 hours, Disp: , Rfl:      Ascorbic Acid (VITAMIN C PO), Take 500 mg by mouth every morning, Disp: , Rfl:      aspirin (ASA) 81 MG EC tablet, Take 1 tablet (81 mg) by mouth 2 times daily, Disp: , Rfl:      Calcium Carb-Cholecalciferol (CALCIUM+D3) 600-800 MG-UNIT TABS, Take 1 tablet by mouth daily, Disp: , Rfl:      calcium carbonate (TUMS) 500 MG chewable tablet, Take 2 chew tab by mouth daily as needed for heartburn, Disp: , Rfl:      cholecalciferol (D3 HIGH POTENCY) 125 MCG (5000 UT) CAPS, Take 1 capsule by mouth daily, Disp: , Rfl:      cycloSPORINE (RESTASIS) 0.05 % ophthalmic emulsion, Place 1 drop into both eyes 2 times daily , Disp: , Rfl:      fish oil-omega-3 fatty acids 1000 MG capsule, Take 1 g by mouth daily, Disp: , Rfl:      fluorometholone (FML LIQUIFILM) 0.1 % ophthalmic susp, Place 1 drop Into the left eye daily , Disp: , Rfl:      IBANDRONATE SODIUM PO, Take 150 mg by mouth every 30 days, Disp: , Rfl:       ipratropium-albuterol (COMBIVENT RESPIMAT)  MCG/ACT inhaler, Inhale 2 puffs into the lungs 4 times daily, Disp: , Rfl:      Levothyroxine Sodium (LEVOXYL PO), Take 88 mcg by mouth daily , Disp: , Rfl:      loratadine (CLARITIN) 10 MG tablet, Take 1 tablet (10 mg) by mouth daily (Patient taking differently: Take 10 mg by mouth daily as needed), Disp: , Rfl:      METOPROLOL TARTRATE PO, Take 25 mg by mouth 2 times daily , Disp: , Rfl:      mirtazapine (REMERON) 7.5 MG tablet, Take 7.5 mg by mouth At Bedtime, Disp: , Rfl:      Multiple Vitamins-Minerals (PRESERVISION AREDS PO), Take 1 tablet by mouth 2 times daily, Disp: , Rfl:      omeprazole (PRILOSEC) 20 MG DR capsule, Take 20 mg by mouth 2 times daily, Disp: , Rfl:      OXYGEN-HELIUM IN, , Disp: , Rfl:      polyethylene glycol (MIRALAX) 17 GM/Dose powder, Take 1 capful by mouth daily, Disp: , Rfl:      polyethylene glycol 0.4%- propylene glycol 0.3% (SYSTANE ULTRA) 0.4-0.3 % SOLN ophthalmic solution, Place 1 drop into both eyes 4 times daily, Disp: , Rfl:      Probiotic Product (PROBIOTIC PO), Take 1 capsule by mouth every morning, Disp: , Rfl:      sodium chloride 1 GM tablet, Take 1 g by mouth 2 times daily, Disp: , Rfl:      spironolactone (ALDACTONE) 50 MG tablet, Take 25 mg by mouth daily , Disp: , Rfl:      timolol (TIMOPTIC) 0.5 % ophthalmic solution, Place 1 drop Into the left eye daily , Disp: , Rfl:      torsemide (DEMADEX) 20 MG tablet, Take 20 mg by mouth daily, Disp: , Rfl:        BP (!) 166/76   Pulse 71   Temp 97.2  F (36.2  C)   Resp 20   SpO2 96%     LABS:   Last Comprehensive Metabolic Panel:  Sodium   Date Value Ref Range Status   09/20/2022 128 (L) 136 - 145 mmol/L Final   06/30/2021 132 (L) 136 - 145 mmol/L Final     Potassium   Date Value Ref Range Status   09/20/2022 4.6 3.4 - 5.3 mmol/L Final   09/10/2022 3.8 3.5 - 5.0 mmol/L Final   09/10/2022 3.8 3.5 - 5.0 mmol/L Final   06/30/2021 3.8 3.5 - 5.0 mmol/L Final     Chloride   Date  Value Ref Range Status   09/20/2022 89 (L) 98 - 107 mmol/L Final   09/10/2022 103 98 - 107 mmol/L Final   06/30/2021 97 (L) 98 - 107 mmol/L Final     Carbon Dioxide   Date Value Ref Range Status   06/30/2021 25 22 - 31 mmol/L Final     Carbon Dioxide (CO2)   Date Value Ref Range Status   09/20/2022 29 22 - 29 mmol/L Final   09/10/2022 18 (L) 22 - 31 mmol/L Final     Anion Gap   Date Value Ref Range Status   09/20/2022 10 7 - 15 mmol/L Final   09/10/2022 9 5 - 18 mmol/L Final   06/30/2021 10 5 - 18 mmol/L Final     Glucose   Date Value Ref Range Status   09/20/2022 70 70 - 99 mg/dL Final   09/10/2022 82 70 - 125 mg/dL Final   06/30/2021 80 70 - 125 mg/dL Final     Urea Nitrogen   Date Value Ref Range Status   09/20/2022 27.2 (H) 8.0 - 23.0 mg/dL Final   09/10/2022 26 8 - 28 mg/dL Final   06/30/2021 37 (H) 8 - 25 mg/dL Final     Creatinine   Date Value Ref Range Status   09/20/2022 1.02 (H) 0.51 - 0.95 mg/dL Final   06/30/2021 0.93 0.60 - 1.10 mg/dL Final     GFR Estimate   Date Value Ref Range Status   09/20/2022 51 (L) >60 mL/min/1.73m2 Final     Comment:     Effective December 21, 2021 eGFRcr in adults is calculated using the 2021 CKD-EPI creatinine equation which includes age and gender (Tran et al., NEJM, DOI: 10.1056/WNLLjm8875834)   06/30/2021 56 (L) >60 mL/min/1.73m2 Final   06/30/2021 56 (L) >60 ml/min/1.73m2 Final     Calcium   Date Value Ref Range Status   09/20/2022 9.2 8.2 - 9.6 mg/dL Final   06/30/2021 9.6 8.5 - 10.5 mg/dL Final     CBC RESULTS: Recent Labs   Lab Test 09/09/22  0513   WBC 10.7   RBC 3.30*   HGB 9.9*   HCT 30.4*   MCV 92   MCH 30.0   MCHC 32.6   RDW 16.3*              ASSESSMENT:    Encounter Diagnoses   Name Primary?     Constipation, unspecified constipation type Yes     COPD exacerbation (H)      Hypertension, unspecified type      Atrial fibrillation with rapid ventricular response (H)        PLAN:    Decreasing her Demadex to 20 mg rather than 40 mg continue with  spironolactone 25 mg also put her on a 1200 cc water restriction per day.  Also increase his sodium chloride to 1 g twice daily recheck the sodium level again on October 3.  Continue to work with the therapy department.  Try to encourage nutrition and extra nutrient supplements.  Talked about her COPD issues.  Keep the bowel medications the same at this time.  She feels her energy level to be improving does not feel there is any depression or anxiety.  She did not have any other questions.    For documentation purposes total visit 36 minutes which over 50% was spent with the patient going over her chronic medical conditions, sodium level, laboratory studies, fluid restriction, nutrition, weight, and the rehabilitation component        Electronically signed by: Samuel Rodriguez NP          Sincerely,        Samuel Rodriguez NP

## 2022-09-21 NOTE — PROGRESS NOTES
[facility] GERIATRIC SERVICES    Facility:   [facility] ([facility]) [[id number]]   Code Status: DNR/DNI      CHIEF COMPLAINT/REASON FOR VISIT:  Chief Complaint   Patient presents with     RECHECK       HISTORY:      HPI: Georgia is a 89 year old female who I had the pleasure of revisiting with once again today not only secondary to her hospitalization September 5 through September 9, 2022 secondary presenting from her assisted living facility with fever and diffuse aches along with abdominal discomfort nausea and vomiting and sepsis secondary to urinary tract infection as well as today's discussion of her laboratory studies, sodium, blood pressures, nutrition, diuretics and rehabilitation.  Regarding the rehabilitation process she does feel that her energy is slowly improving.  Last week we did add in the MiraLAX and senna S twice daily she like to keep it the same her bowels are now regular.  For the COPD she is on both albuterol and Combivent she is aware that albuterol is on both of them but she like to keep that the same.  She has been on that at home as well.  Systolic blood pressures ranging 102-137 she has been afebrile and also on room air with a weight of 96 pounds in comparison to September 17 100 pounds.  Met with dietary getting extra nutrient supplements.  On September 20 her sodium level came back at 128 does have a history of hyponatremia she is on 500 mg of sodium chloride once daily will now increase it to 1 g twice daily as well as a free water restriction at 1200 cc a day recheck her sodium level again next week on October 3.  Also decrease the Demadex to 20 mg rather than 40 mg in the morning her spironolactone is at 25 mg and her potassium is at 4.6.  There is no lower extremity edema her lungs have remained clear.  Does have an irregularity.  No increase in depression or anxiety would like to keep the Remeron the same at 7.5 mg.    Past Medical History:   Diagnosis Date     Asthma       Bilateral carpal tunnel syndrome 8/27/2015     CAD (coronary artery disease)      Chronic airway obstruction, not elsewhere classified     Created by Conversion      Chronic anemia      Chronic edema      Congestive heart failure, severe (H) 5/13/2020     COPD (chronic obstructive pulmonary disease) (H)      Coronary artery disease      Depression      Family history of myocardial infarction     father 64     GERD (gastroesophageal reflux disease)      Heart disease      Heart murmur      High cholesterol     dislipidemia     HTN (hypertension)      Hypercholesterolemia      Hypertension      Hypothyroidism      Hypothyroidism      MI (myocardial infarction) (H) 1985     Myelodysplastic syndrome (H)      Myocardial infarction (H) 1983     OLEGARIO (obstructive sleep apnea)      Osteoporosis      Other and unspecified hyperlipidemia     Created by Conversion      Radicular low back pain      Rheumatoid arthritis (H)      Elizabeth Agers syndrome      Sjoegren syndrome      Sjogren's syndrome (H)      Sleep apnea, obstructive      Spinal stenosis      Unspecified polyarthropathy or polyarthritis, hand     Created by Conversion             Family History   Problem Relation Age of Onset     Family History Negative Mother      Cancer Mother      Heart Disease Father       Social History     Socioeconomic History     Marital status:    Tobacco Use     Smoking status: Never Smoker     Smokeless tobacco: Never Used   Substance and Sexual Activity     Alcohol use: No     Alcohol/week: 0.0 standard drinks     Comment: Alcoholic Drinks/day: occasional glass of wine     Drug use: No   Social History Narrative    .  passed away 20 years ago. No children. 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 Was living independently in her apartment in [place] prior to admission. Uses walker at baseline.       No new changes to review of systems or physical exam since our last visit on September  14  REVIEW OF SYSTEM:  She currently denies any new symptoms of fever cough or cold sore throat postnasal drip allergies coughing shortness of breath dyspnea chest pain dizziness vertigo nausea vomiting diarrhea dysuria frequency or urgency.       PHYSICAL EXAM:   Pleasant female in no acute distress.  Head is normocephalic.  .  Neck is supple without adenopathy.  Lung sounds are clear throughout.  Cardiovascular does have an irregularity and aVR.  No lower extremity edema.  Gastrointestinal thinly built nontender with positive bowel sounds.  Musculoskeletal generalized weakness throughout.  Psychiatric: Pleasant affect.    Current Outpatient Medications:      acetaminophen (TYLENOL) 325 MG tablet, Take 3 tablets (975 mg) by mouth every 8 hours as needed for mild pain, Disp: , Rfl:      albuterol (PROAIR HFA/PROVENTIL HFA/VENTOLIN HFA) 108 (90 Base) MCG/ACT inhaler, Inhale 2 puffs into the lungs every 6 hours, Disp: , Rfl:      Ascorbic Acid (VITAMIN C PO), Take 500 mg by mouth every morning, Disp: , Rfl:      aspirin (ASA) 81 MG EC tablet, Take 1 tablet (81 mg) by mouth 2 times daily, Disp: , Rfl:      Calcium Carb-Cholecalciferol (CALCIUM+D3) 600-800 MG-UNIT TABS, Take 1 tablet by mouth daily, Disp: , Rfl:      calcium carbonate (TUMS) 500 MG chewable tablet, Take 2 chew tab by mouth daily as needed for heartburn, Disp: , Rfl:      cholecalciferol (D3 HIGH POTENCY) 125 MCG (5000 UT) CAPS, Take 1 capsule by mouth daily, Disp: , Rfl:      cycloSPORINE (RESTASIS) 0.05 % ophthalmic emulsion, Place 1 drop into both eyes 2 times daily , Disp: , Rfl:      fish oil-omega-3 fatty acids 1000 MG capsule, Take 1 g by mouth daily, Disp: , Rfl:      fluorometholone (FML LIQUIFILM) 0.1 % ophthalmic susp, Place 1 drop Into the left eye daily , Disp: , Rfl:      IBANDRONATE SODIUM PO, Take 150 mg by mouth every 30 days, Disp: , Rfl:      ipratropium-albuterol (COMBIVENT RESPIMAT)  MCG/ACT inhaler, Inhale 2 puffs into the  lungs 4 times daily, Disp: , Rfl:      Levothyroxine Sodium (LEVOXYL PO), Take 88 mcg by mouth daily , Disp: , Rfl:      loratadine (CLARITIN) 10 MG tablet, Take 1 tablet (10 mg) by mouth daily (Patient taking differently: Take 10 mg by mouth daily as needed), Disp: , Rfl:      METOPROLOL TARTRATE PO, Take 25 mg by mouth 2 times daily , Disp: , Rfl:      mirtazapine (REMERON) 7.5 MG tablet, Take 7.5 mg by mouth At Bedtime, Disp: , Rfl:      Multiple Vitamins-Minerals (PRESERVISION AREDS PO), Take 1 tablet by mouth 2 times daily, Disp: , Rfl:      omeprazole (PRILOSEC) 20 MG DR capsule, Take 20 mg by mouth 2 times daily, Disp: , Rfl:      OXYGEN-HELIUM IN, , Disp: , Rfl:      polyethylene glycol (MIRALAX) 17 GM/Dose powder, Take 1 capful by mouth daily, Disp: , Rfl:      polyethylene glycol 0.4%- propylene glycol 0.3% (SYSTANE ULTRA) 0.4-0.3 % SOLN ophthalmic solution, Place 1 drop into both eyes 4 times daily, Disp: , Rfl:      Probiotic Product (PROBIOTIC PO), Take 1 capsule by mouth every morning, Disp: , Rfl:      sodium chloride 1 GM tablet, Take 1 g by mouth 2 times daily, Disp: , Rfl:      spironolactone (ALDACTONE) 50 MG tablet, Take 25 mg by mouth daily , Disp: , Rfl:      timolol (TIMOPTIC) 0.5 % ophthalmic solution, Place 1 drop Into the left eye daily , Disp: , Rfl:      torsemide (DEMADEX) 20 MG tablet, Take 20 mg by mouth daily, Disp: , Rfl:        BP (!) 166/76   Pulse 71   Temp 97.2  F (36.2  C)   Resp 20   SpO2 96%     LABS:   Last Comprehensive Metabolic Panel:  Sodium   Date Value Ref Range Status   09/20/2022 128 (L) 136 - 145 mmol/L Final   06/30/2021 132 (L) 136 - 145 mmol/L Final     Potassium   Date Value Ref Range Status   09/20/2022 4.6 3.4 - 5.3 mmol/L Final   09/10/2022 3.8 3.5 - 5.0 mmol/L Final   09/10/2022 3.8 3.5 - 5.0 mmol/L Final   06/30/2021 3.8 3.5 - 5.0 mmol/L Final     Chloride   Date Value Ref Range Status   09/20/2022 89 (L) 98 - 107 mmol/L Final   09/10/2022 103 98 - 107  mmol/L Final   06/30/2021 97 (L) 98 - 107 mmol/L Final     Carbon Dioxide   Date Value Ref Range Status   06/30/2021 25 22 - 31 mmol/L Final     Carbon Dioxide (CO2)   Date Value Ref Range Status   09/20/2022 29 22 - 29 mmol/L Final   09/10/2022 18 (L) 22 - 31 mmol/L Final     Anion Gap   Date Value Ref Range Status   09/20/2022 10 7 - 15 mmol/L Final   09/10/2022 9 5 - 18 mmol/L Final   06/30/2021 10 5 - 18 mmol/L Final     Glucose   Date Value Ref Range Status   09/20/2022 70 70 - 99 mg/dL Final   09/10/2022 82 70 - 125 mg/dL Final   06/30/2021 80 70 - 125 mg/dL Final     Urea Nitrogen   Date Value Ref Range Status   09/20/2022 27.2 (H) 8.0 - 23.0 mg/dL Final   09/10/2022 26 8 - 28 mg/dL Final   06/30/2021 37 (H) 8 - 25 mg/dL Final     Creatinine   Date Value Ref Range Status   09/20/2022 1.02 (H) 0.51 - 0.95 mg/dL Final   06/30/2021 0.93 0.60 - 1.10 mg/dL Final     GFR Estimate   Date Value Ref Range Status   09/20/2022 51 (L) >60 mL/min/1.73m2 Final     Comment:     Effective December 21, 2021 eGFRcr in adults is calculated using the 2021 CKD-EPI creatinine equation which includes age and gender (Tran linn al., NE, DOI: 10.1056/VOGGuj5250250)   06/30/2021 56 (L) >60 mL/min/1.73m2 Final   06/30/2021 56 (L) >60 ml/min/1.73m2 Final     Calcium   Date Value Ref Range Status   09/20/2022 9.2 8.2 - 9.6 mg/dL Final   06/30/2021 9.6 8.5 - 10.5 mg/dL Final     CBC RESULTS: Recent Labs   Lab Test 09/09/22  0513   WBC 10.7   RBC 3.30*   HGB 9.9*   HCT 30.4*   MCV 92   MCH 30.0   MCHC 32.6   RDW 16.3*              ASSESSMENT:    Encounter Diagnoses   Name Primary?     Constipation, unspecified constipation type Yes     COPD exacerbation (H)      Hypertension, unspecified type      Atrial fibrillation with rapid ventricular response (H)        PLAN:    Decreasing her Demadex to 20 mg rather than 40 mg continue with spironolactone 25 mg also put her on a 1200 cc water restriction per day.  Also increase his sodium  chloride to 1 g twice daily recheck the sodium level again on October 3.  Continue to work with the therapy department.  Try to encourage nutrition and extra nutrient supplements.  Talked about her COPD issues.  Keep the bowel medications the same at this time.  She feels her energy level to be improving does not feel there is any depression or anxiety.  She did not have any other questions.    For documentation purposes total visit 36 minutes which over 50% was spent with the patient going over her chronic medical conditions, sodium level, laboratory studies, fluid restriction, nutrition, weight, and the rehabilitation component        Electronically signed by: Samuel Rodriguez NP

## 2022-09-27 VITALS
HEART RATE: 65 BPM | BODY MASS INDEX: 16.56 KG/M2 | SYSTOLIC BLOOD PRESSURE: 118 MMHG | WEIGHT: 97 LBS | DIASTOLIC BLOOD PRESSURE: 67 MMHG | OXYGEN SATURATION: 95 % | TEMPERATURE: 98.2 F | RESPIRATION RATE: 18 BRPM | HEIGHT: 64 IN

## 2022-09-28 ENCOUNTER — LAB REQUISITION (OUTPATIENT)
Dept: LAB | Facility: CLINIC | Age: 87
End: 2022-09-28
Payer: COMMERCIAL

## 2022-09-28 ENCOUNTER — TRANSITIONAL CARE UNIT VISIT (OUTPATIENT)
Dept: GERIATRICS | Facility: CLINIC | Age: 87
End: 2022-09-28
Payer: COMMERCIAL

## 2022-09-28 ENCOUNTER — LAB REQUISITION (OUTPATIENT)
Dept: LAB | Facility: CLINIC | Age: 87
End: 2022-09-28

## 2022-09-28 DIAGNOSIS — E87.1 HYPONATREMIA: Primary | ICD-10-CM

## 2022-09-28 DIAGNOSIS — R30.0 DYSURIA: ICD-10-CM

## 2022-09-28 DIAGNOSIS — I10 HYPERTENSION, UNSPECIFIED TYPE: ICD-10-CM

## 2022-09-28 DIAGNOSIS — E87.1 HYPO-OSMOLALITY AND HYPONATREMIA: ICD-10-CM

## 2022-09-28 DIAGNOSIS — I50.32 CHRONIC DIASTOLIC CONGESTIVE HEART FAILURE (H): ICD-10-CM

## 2022-09-28 LAB
ALBUMIN UR-MCNC: NEGATIVE MG/DL
APPEARANCE UR: CLEAR
BILIRUB UR QL STRIP: NEGATIVE
COLOR UR AUTO: ABNORMAL
GLUCOSE UR STRIP-MCNC: NEGATIVE MG/DL
HGB UR QL STRIP: NEGATIVE
KETONES UR STRIP-MCNC: NEGATIVE MG/DL
LEUKOCYTE ESTERASE UR QL STRIP: ABNORMAL
MUCOUS THREADS #/AREA URNS LPF: PRESENT /LPF
NITRATE UR QL: NEGATIVE
PH UR STRIP: 6 [PH] (ref 5–7)
RBC URINE: 1 /HPF
SP GR UR STRIP: 1.01 (ref 1–1.03)
SQUAMOUS EPITHELIAL: <1 /HPF
UROBILINOGEN UR STRIP-MCNC: NORMAL MG/DL
WBC URINE: 4 /HPF

## 2022-09-28 PROCEDURE — 81001 URINALYSIS AUTO W/SCOPE: CPT | Performed by: FAMILY MEDICINE

## 2022-09-28 PROCEDURE — 99316 NF DSCHRG MGMT 30 MIN+: CPT | Performed by: FAMILY MEDICINE

## 2022-09-28 PROCEDURE — 87086 URINE CULTURE/COLONY COUNT: CPT | Performed by: FAMILY MEDICINE

## 2022-09-28 NOTE — PROGRESS NOTES
[facility] GERIATRIC SERVICES    Facility:   [facility] ([facility]) [[id number]]   Code Status: DNR/DNI      CHIEF COMPLAINT/REASON FOR VISIT:  Chief Complaint   Patient presents with     Discharge Summary Nursing Home       HISTORY:      HPI: Georgia is a 89 year old female who was hospitalized September 5, 2022 through September 9, 2022 secondary to presenting from her assisted living facility with fever and diffuse aches along with diffuse abdominal pain and nausea without vomiting admitted for sepsis due to urinary tract infection.  She was treated with ciprofloxacin.  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  Her ejection fraction 55-60%.  Her blood work on September 6 did show white cell count of 22.9 and hemoglobin 9.7 rechecked again on September 9 of white cell count 10.7 and hemoglobin 9.9.  Does have a history of hyponatremia last sodium level on September 10 was 130 with potassium 3.8, BUN 26 and creatinine 1.13  She was diagnosed with sepsis secondary to UTI she was given empiric ceftriaxone along with IV fluids.  Does have a history of hyponatremia as well as hypokalemia and hermagnesium level is normal at 1.9.  Other problems include insomnia to which she is on Remeron GERD to which she is on Protonix and constipation for which she is on MiraLAX.  She has been able to participate with the rehabilitation services and up to this point has achieved therapy recommendations and now she will be discharging to her home or the assisted living facility with services with an approximated discharge date of September 30, 2022.  During her stay she has been normotensive and afebrile and also on room air.  Her weight 97 pounds back on September 20 96 pounds.  Her appetite has been good she feels like  she is getting good nutrition.  During her stay we did address her sodium levels she is on a 1200 cc fluid restriction per day dietary was also involved.  We are rechecking her sodium level again tomorrow and the Demadex was decreased to 20 mg daily rather than 40 mg she also is on spironolactone and then also she is on sodium chloride tablets 1 g twice daily.  She wants to change her Combivent inhaler 1 puff 4 times daily rather than 2 puffs 4 times daily she is also aware of the albuterol component.  I am also can order up a wheelchair.  She is having some questionable urinary frequency otherwise asymptomatic.  Both her and the niece would like to recheck her urinalysis we will do a straight cath urinalysis.  Otherwise her pain is also well managed.  She has been a delight to get to know does have a wonderful sense of humor.  Past Medical History:   Diagnosis Date     Asthma      Bilateral carpal tunnel syndrome 8/27/2015     CAD (coronary artery disease)      Chronic airway obstruction, not elsewhere classified     Created by Conversion      Chronic anemia      Chronic edema      Congestive heart failure, severe (H) 5/13/2020     COPD (chronic obstructive pulmonary disease) (H)      Coronary artery disease      Depression      Family history of myocardial infarction     father 64     GERD (gastroesophageal reflux disease)      Heart disease      Heart murmur      High cholesterol     dislipidemia     HTN (hypertension)      Hypercholesterolemia      Hypertension      Hypothyroidism      Hypothyroidism      MI (myocardial infarction) (H) 1985     Myelodysplastic syndrome (H)      Myocardial infarction (H) 1983     OLEGARIO (obstructive sleep apnea)      Osteoporosis      Other and unspecified hyperlipidemia     Created by Conversion      Radicular low back pain      Rheumatoid arthritis (H)      Elizabeth Agers syndrome      Sjoegren syndrome      Sjogren's syndrome (H)      Sleep apnea, obstructive      Spinal stenosis       Unspecified polyarthropathy or polyarthritis, hand     Created by Conversion             Family History   Problem Relation Age of Onset     Family History Negative Mother      Cancer Mother      Heart Disease Father       Social History     Socioeconomic History     Marital status:    Tobacco Use     Smoking status: Never Smoker     Smokeless tobacco: Never Used   Substance and Sexual Activity     Alcohol use: No     Alcohol/week: 0.0 standard drinks     Comment: Alcoholic Drinks/day: occasional glass of wine     Drug use: No   Social History Narrative    .  passed away 20 years ago. No children. 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 Was living independently in her apartment in [place] prior to admission. Uses walker at baseline.         REVIEW OF SYSTEM:  She currently denies any new symptoms of fever cough or cold sore throat postnasal drip allergies coughing shortness of breath dyspnea chest pain dizziness vertigo nausea vomiting diarrhea dysuria frequency or urgency.    PHYSICAL EXAM:   Pleasant female in no acute distress.  Head is normocephalic.  .  Neck is supple without adenopathy.  Lung sounds are clear throughout.  Cardiovascular does have an irregularity and aVR.  No lower extremity edema.  Gastrointestinal thinly built nontender with positive bowel sounds.  Musculoskeletal generalized weakness throughout.  Psychiatric: Pleasant affect.    Current Outpatient Medications:      acetaminophen (TYLENOL) 325 MG tablet, Take 3 tablets (975 mg) by mouth every 8 hours as needed for mild pain, Disp: , Rfl:      albuterol (PROAIR HFA/PROVENTIL HFA/VENTOLIN HFA) 108 (90 Base) MCG/ACT inhaler, Inhale 2 puffs into the lungs every 6 hours, Disp: , Rfl:      Ascorbic Acid (VITAMIN C PO), Take 500 mg by mouth every morning, Disp: , Rfl:      aspirin (ASA) 81 MG EC tablet, Take 1 tablet (81 mg) by mouth 2 times daily, Disp: , Rfl:      Calcium Carb-Cholecalciferol  (CALCIUM+D3) 600-800 MG-UNIT TABS, Take 1 tablet by mouth daily, Disp: , Rfl:      calcium carbonate (TUMS) 500 MG chewable tablet, Take 2 chew tab by mouth daily as needed for heartburn, Disp: , Rfl:      cholecalciferol (D3 HIGH POTENCY) 125 MCG (5000 UT) CAPS, Take 1 capsule by mouth daily, Disp: , Rfl:      cycloSPORINE (RESTASIS) 0.05 % ophthalmic emulsion, Place 1 drop into both eyes 2 times daily , Disp: , Rfl:      fish oil-omega-3 fatty acids 1000 MG capsule, Take 1 g by mouth daily, Disp: , Rfl:      fluorometholone (FML LIQUIFILM) 0.1 % ophthalmic susp, Place 1 drop Into the left eye daily , Disp: , Rfl:      IBANDRONATE SODIUM PO, Take 150 mg by mouth every 30 days, Disp: , Rfl:      ipratropium-albuterol (COMBIVENT RESPIMAT)  MCG/ACT inhaler, Inhale 1 puff into the lungs 4 times daily, Disp: , Rfl:      Levothyroxine Sodium (LEVOXYL PO), Take 88 mcg by mouth daily , Disp: , Rfl:      loratadine (CLARITIN) 10 MG tablet, Take 1 tablet (10 mg) by mouth daily (Patient taking differently: Take 10 mg by mouth daily as needed), Disp: , Rfl:      METOPROLOL TARTRATE PO, Take 25 mg by mouth 2 times daily , Disp: , Rfl:      mirtazapine (REMERON) 7.5 MG tablet, Take 7.5 mg by mouth At Bedtime, Disp: , Rfl:      Multiple Vitamins-Minerals (PRESERVISION AREDS PO), Take 1 tablet by mouth 2 times daily, Disp: , Rfl:      omeprazole (PRILOSEC) 20 MG DR capsule, Take 20 mg by mouth 2 times daily, Disp: , Rfl:      OXYGEN-HELIUM IN, , Disp: , Rfl:      polyethylene glycol (MIRALAX) 17 GM/Dose powder, Take 1 capful by mouth daily, Disp: , Rfl:      polyethylene glycol 0.4%- propylene glycol 0.3% (SYSTANE ULTRA) 0.4-0.3 % SOLN ophthalmic solution, Place 1 drop into both eyes 4 times daily, Disp: , Rfl:      Probiotic Product (PROBIOTIC PO), Take 1 capsule by mouth every morning, Disp: , Rfl:      sodium chloride 1 GM tablet, Take 1 g by mouth 2 times daily, Disp: , Rfl:      spironolactone (ALDACTONE) 50 MG tablet,  "Take 25 mg by mouth daily , Disp: , Rfl:      timolol (TIMOPTIC) 0.5 % ophthalmic solution, Place 1 drop Into the left eye daily , Disp: , Rfl:      torsemide (DEMADEX) 20 MG tablet, Take 20 mg by mouth daily, Disp: , Rfl:     /67   Pulse 65   Temp 98.2  F (36.8  C)   Resp 18   Ht 1.626 m (5' 4\")   Wt 44 kg (97 lb)   SpO2 95%   BMI 16.65 kg/m        LABS:   Last Comprehensive Metabolic Panel:  Sodium   Date Value Ref Range Status   09/20/2022 128 (L) 136 - 145 mmol/L Final   06/30/2021 132 (L) 136 - 145 mmol/L Final     Potassium   Date Value Ref Range Status   09/20/2022 4.6 3.4 - 5.3 mmol/L Final   09/10/2022 3.8 3.5 - 5.0 mmol/L Final   09/10/2022 3.8 3.5 - 5.0 mmol/L Final   06/30/2021 3.8 3.5 - 5.0 mmol/L Final     Chloride   Date Value Ref Range Status   09/20/2022 89 (L) 98 - 107 mmol/L Final   09/10/2022 103 98 - 107 mmol/L Final   06/30/2021 97 (L) 98 - 107 mmol/L Final     Carbon Dioxide   Date Value Ref Range Status   06/30/2021 25 22 - 31 mmol/L Final     Carbon Dioxide (CO2)   Date Value Ref Range Status   09/20/2022 29 22 - 29 mmol/L Final   09/10/2022 18 (L) 22 - 31 mmol/L Final     Anion Gap   Date Value Ref Range Status   09/20/2022 10 7 - 15 mmol/L Final   09/10/2022 9 5 - 18 mmol/L Final   06/30/2021 10 5 - 18 mmol/L Final     Glucose   Date Value Ref Range Status   09/20/2022 70 70 - 99 mg/dL Final   09/10/2022 82 70 - 125 mg/dL Final   06/30/2021 80 70 - 125 mg/dL Final     Urea Nitrogen   Date Value Ref Range Status   09/20/2022 27.2 (H) 8.0 - 23.0 mg/dL Final   09/10/2022 26 8 - 28 mg/dL Final   06/30/2021 37 (H) 8 - 25 mg/dL Final     Creatinine   Date Value Ref Range Status   09/20/2022 1.02 (H) 0.51 - 0.95 mg/dL Final   06/30/2021 0.93 0.60 - 1.10 mg/dL Final     GFR Estimate   Date Value Ref Range Status   09/20/2022 51 (L) >60 mL/min/1.73m2 Final     Comment:     Effective December 21, 2021 eGFRcr in adults is calculated using the 2021 CKD-EPI creatinine equation which " includes age and gender (Tran linn al., NEJ, DOI: 10.1056/BLOPru6484355)   06/30/2021 56 (L) >60 mL/min/1.73m2 Final   06/30/2021 56 (L) >60 ml/min/1.73m2 Final     Calcium   Date Value Ref Range Status   09/20/2022 9.2 8.2 - 9.6 mg/dL Final   06/30/2021 9.6 8.5 - 10.5 mg/dL Final     CBC RESULTS: Recent Labs   Lab Test 09/09/22  0513   WBC 10.7   RBC 3.30*   HGB 9.9*   HCT 30.4*   MCV 92   MCH 30.0   MCHC 32.6   RDW 16.3*            ASSESSMENT:    Encounter Diagnoses   Name Primary?     Hyponatremia Yes     Dysuria      Hypertension, unspecified type      Chronic diastolic congestive heart failure (H)        MEDICAL EQUIPMENT NEEDS:  She will require a standard wheelchair with a seat cushion and standard foot rests seat width of 18 inches seat depth of 16 inches and a Martinez height.  She will require the wheelchair for lifetime due to her recent hospitalization for E. coli sepsis along with generalized weakness.  She will require standard manual wheelchair because she has impairing ability to participate in MR ADLs such as toileting, feeding, dressing, grooming and bathing in the customary locations in the home.  The mobility mentation cannot be sufficiently resolved with the use of a cane or walker due to generalized core weakness and safety.  She or the caregiver is able to safely use the manual wheelchair.  Her functional mobility deficit will be sufficiently resolved by the use of a manual wheelchair.  She will need to have me height due to short stature and the feet need to reach the floor for propulsion.  She is currently standby with assist ambulates about 75 feet but she does need assistance with ADLs.  Her height 64 inches with a weight of 96 pounds.    DISCHARGE PLAN/FACE TO FACE:  I certify that services are/were furnished while this patient was under the care of a physician and that a physician or an allowed non-physician practitioner (NPP), had a face-to-face encounter that meets the physician  face-to-face encounter requirements. The encounter was in whole, or in part, related to the primary reason for home health. The patient is confined to his/her home and needs intermittent skilled nursing, physical therapy, speech-language pathology, or the continued need for occupational therapy. A plan of care has been established by a physician and is periodically reviewed by a physician.  Date of Face-to-Face Encounter: September 28, 2022    I certify that, based on my findings, the following services are medically necessary home health services: She will be discharging to her assisted living facility with current medications as well as receiving physical and occupational therapy with an approximated discharge date of September 30    My clinical findings support the need for the above skilled services because: (Please write a brief narrative summary that describes what the RN, PT, SLP, or other services will be doing in the home. A list of diagnoses in this section does not meet the CMS requirements.)  She will require the skilled services secondary to generalized weakness and core weakness along with her recent hospitalization but also continuation of the rehabilitation process and home safety    This patient is homebound because: (Please write a brief narrative summary describing the functional limitations as to why this patient is homebound and specifically what makes this patient homebound.)  Secondary to chronic medical conditions including her recent hospitalization but also continuation of the rehabilitation process as well as home safety measures and encouragement.    The patient is, or has been, under my care and I have initiated the establishment of the plan of care. This patient will be followed by a physician who will periodically review the plan of care.    Schedule follow up visit with primary care provider within 7 days to reestablish care.  She will follow-up with the primary care doctor regarding  medication management and any future laboratory studies.  Rechecking the sodium level again tomorrow on the 29th again she is on a fluid restriction along with recent decrease edema Dex at 20 mg and taking sodium chloride tablets 1 g twice daily.  Otherwise she did not have any other concerns or questions except for the urinary frequency and redoing a straight cath urinalysis today.  The results should be back before she discharges on Friday.  She did not have any other questions.    Discharge coordination care greater than 30 minutes    Electronically signed by: Samuel Rodriguez NP

## 2022-09-28 NOTE — LETTER
9/28/2022        RE: Marla Dunn  [address]  [facility] [address]        [facility]    Facility:   [facility] ([facility]) [[id number]]   Code Status: DNR/DNI      CHIEF COMPLAINT/REASON FOR VISIT:  Chief Complaint   Patient presents with     Discharge Summary Nursing Home       HISTORY:      HPI: Georgia is a 89 year old female who was hospitalized September 5, 2022 through September 9, 2022 secondary to presenting from her assisted living facility with fever and diffuse aches along with diffuse abdominal pain and nausea without vomiting admitted for sepsis due to urinary tract infection.  She was treated with ciprofloxacin.  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  Her ejection fraction 55-60%.  Her blood work on September 6 did show white cell count of 22.9 and hemoglobin 9.7 rechecked again on September 9 of white cell count 10.7 and hemoglobin 9.9.  Does have a history of hyponatremia last sodium level on September 10 was 130 with potassium 3.8, BUN 26 and creatinine 1.13  She was diagnosed with sepsis secondary to UTI she was given empiric ceftriaxone along with IV fluids.  Does have a history of hyponatremia as well as hypokalemia and hermagnesium level is normal at 1.9.  Other problems include insomnia to which she is on Remeron GERD to which she is on Protonix and constipation for which she is on MiraLAX.  She has been able to participate with the rehabilitation services and up to this point has achieved therapy recommendations and now she will be discharging to her home or the assisted living facility with services with an approximated discharge date of September 30, 2022.  During her stay she has been normotensive and afebrile and also on room  air.  Her weight 97 pounds back on September 20 96 pounds.  Her appetite has been good she feels like she is getting good nutrition.  During her stay we did address her sodium levels she is on a 1200 cc fluid restriction per day dietary was also involved.  We are rechecking her sodium level again tomorrow and the Demadex was decreased to 20 mg daily rather than 40 mg she also is on spironolactone and then also she is on sodium chloride tablets 1 g twice daily.  She wants to change her Combivent inhaler 1 puff 4 times daily rather than 2 puffs 4 times daily she is also aware of the albuterol component.  I am also can order up a wheelchair.  She is having some questionable urinary frequency otherwise asymptomatic.  Both her and the niece would like to recheck her urinalysis we will do a straight cath urinalysis.  Otherwise her pain is also well managed.  She has been a delight to get to know does have a wonderful sense of humor.  Past Medical History:   Diagnosis Date     Asthma      Bilateral carpal tunnel syndrome 8/27/2015     CAD (coronary artery disease)      Chronic airway obstruction, not elsewhere classified     Created by Conversion      Chronic anemia      Chronic edema      Congestive heart failure, severe (H) 5/13/2020     COPD (chronic obstructive pulmonary disease) (H)      Coronary artery disease      Depression      Family history of myocardial infarction     father 64     GERD (gastroesophageal reflux disease)      Heart disease      Heart murmur      High cholesterol     dislipidemia     HTN (hypertension)      Hypercholesterolemia      Hypertension      Hypothyroidism      Hypothyroidism      MI (myocardial infarction) (H) 1985     Myelodysplastic syndrome (H)      Myocardial infarction (H) 1983     OLEGARIO (obstructive sleep apnea)      Osteoporosis      Other and unspecified hyperlipidemia     Created by Conversion      Radicular low back pain      Rheumatoid arthritis (H)      Elizabeth Agers syndrome       Sjoegren syndrome      Sjogren's syndrome (H)      Sleep apnea, obstructive      Spinal stenosis      Unspecified polyarthropathy or polyarthritis, hand     Created by Conversion             Family History   Problem Relation Age of Onset     Family History Negative Mother      Cancer Mother      Heart Disease Father       Social History     Socioeconomic History     Marital status:    Tobacco Use     Smoking status: Never Smoker     Smokeless tobacco: Never Used   Substance and Sexual Activity     Alcohol use: No     Alcohol/week: 0.0 standard drinks     Comment: Alcoholic Drinks/day: occasional glass of wine     Drug use: No   Social History Narrative    .  passed away 20 years ago. No children. 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 Was living independently in her apartment in [place] prior to admission. Uses walker at baseline.         REVIEW OF SYSTEM:  She currently denies any new symptoms of fever cough or cold sore throat postnasal drip allergies coughing shortness of breath dyspnea chest pain dizziness vertigo nausea vomiting diarrhea dysuria frequency or urgency.    PHYSICAL EXAM:   Pleasant female in no acute distress.  Head is normocephalic.  .  Neck is supple without adenopathy.  Lung sounds are clear throughout.  Cardiovascular does have an irregularity and aVR.  No lower extremity edema.  Gastrointestinal thinly built nontender with positive bowel sounds.  Musculoskeletal generalized weakness throughout.  Psychiatric: Pleasant affect.    Current Outpatient Medications:      acetaminophen (TYLENOL) 325 MG tablet, Take 3 tablets (975 mg) by mouth every 8 hours as needed for mild pain, Disp: , Rfl:      albuterol (PROAIR HFA/PROVENTIL HFA/VENTOLIN HFA) 108 (90 Base) MCG/ACT inhaler, Inhale 2 puffs into the lungs every 6 hours, Disp: , Rfl:      Ascorbic Acid (VITAMIN C PO), Take 500 mg by mouth every morning, Disp: , Rfl:      aspirin (ASA) 81 MG EC  tablet, Take 1 tablet (81 mg) by mouth 2 times daily, Disp: , Rfl:      Calcium Carb-Cholecalciferol (CALCIUM+D3) 600-800 MG-UNIT TABS, Take 1 tablet by mouth daily, Disp: , Rfl:      calcium carbonate (TUMS) 500 MG chewable tablet, Take 2 chew tab by mouth daily as needed for heartburn, Disp: , Rfl:      cholecalciferol (D3 HIGH POTENCY) 125 MCG (5000 UT) CAPS, Take 1 capsule by mouth daily, Disp: , Rfl:      cycloSPORINE (RESTASIS) 0.05 % ophthalmic emulsion, Place 1 drop into both eyes 2 times daily , Disp: , Rfl:      fish oil-omega-3 fatty acids 1000 MG capsule, Take 1 g by mouth daily, Disp: , Rfl:      fluorometholone (FML LIQUIFILM) 0.1 % ophthalmic susp, Place 1 drop Into the left eye daily , Disp: , Rfl:      IBANDRONATE SODIUM PO, Take 150 mg by mouth every 30 days, Disp: , Rfl:      ipratropium-albuterol (COMBIVENT RESPIMAT)  MCG/ACT inhaler, Inhale 1 puff into the lungs 4 times daily, Disp: , Rfl:      Levothyroxine Sodium (LEVOXYL PO), Take 88 mcg by mouth daily , Disp: , Rfl:      loratadine (CLARITIN) 10 MG tablet, Take 1 tablet (10 mg) by mouth daily (Patient taking differently: Take 10 mg by mouth daily as needed), Disp: , Rfl:      METOPROLOL TARTRATE PO, Take 25 mg by mouth 2 times daily , Disp: , Rfl:      mirtazapine (REMERON) 7.5 MG tablet, Take 7.5 mg by mouth At Bedtime, Disp: , Rfl:      Multiple Vitamins-Minerals (PRESERVISION AREDS PO), Take 1 tablet by mouth 2 times daily, Disp: , Rfl:      omeprazole (PRILOSEC) 20 MG DR capsule, Take 20 mg by mouth 2 times daily, Disp: , Rfl:      OXYGEN-HELIUM IN, , Disp: , Rfl:      polyethylene glycol (MIRALAX) 17 GM/Dose powder, Take 1 capful by mouth daily, Disp: , Rfl:      polyethylene glycol 0.4%- propylene glycol 0.3% (SYSTANE ULTRA) 0.4-0.3 % SOLN ophthalmic solution, Place 1 drop into both eyes 4 times daily, Disp: , Rfl:      Probiotic Product (PROBIOTIC PO), Take 1 capsule by mouth every morning, Disp: , Rfl:      sodium chloride 1 GM  "tablet, Take 1 g by mouth 2 times daily, Disp: , Rfl:      spironolactone (ALDACTONE) 50 MG tablet, Take 25 mg by mouth daily , Disp: , Rfl:      timolol (TIMOPTIC) 0.5 % ophthalmic solution, Place 1 drop Into the left eye daily , Disp: , Rfl:      torsemide (DEMADEX) 20 MG tablet, Take 20 mg by mouth daily, Disp: , Rfl:     /67   Pulse 65   Temp 98.2  F (36.8  C)   Resp 18   Ht 1.626 m (5' 4\")   Wt 44 kg (97 lb)   SpO2 95%   BMI 16.65 kg/m        LABS:   Last Comprehensive Metabolic Panel:  Sodium   Date Value Ref Range Status   09/20/2022 128 (L) 136 - 145 mmol/L Final   06/30/2021 132 (L) 136 - 145 mmol/L Final     Potassium   Date Value Ref Range Status   09/20/2022 4.6 3.4 - 5.3 mmol/L Final   09/10/2022 3.8 3.5 - 5.0 mmol/L Final   09/10/2022 3.8 3.5 - 5.0 mmol/L Final   06/30/2021 3.8 3.5 - 5.0 mmol/L Final     Chloride   Date Value Ref Range Status   09/20/2022 89 (L) 98 - 107 mmol/L Final   09/10/2022 103 98 - 107 mmol/L Final   06/30/2021 97 (L) 98 - 107 mmol/L Final     Carbon Dioxide   Date Value Ref Range Status   06/30/2021 25 22 - 31 mmol/L Final     Carbon Dioxide (CO2)   Date Value Ref Range Status   09/20/2022 29 22 - 29 mmol/L Final   09/10/2022 18 (L) 22 - 31 mmol/L Final     Anion Gap   Date Value Ref Range Status   09/20/2022 10 7 - 15 mmol/L Final   09/10/2022 9 5 - 18 mmol/L Final   06/30/2021 10 5 - 18 mmol/L Final     Glucose   Date Value Ref Range Status   09/20/2022 70 70 - 99 mg/dL Final   09/10/2022 82 70 - 125 mg/dL Final   06/30/2021 80 70 - 125 mg/dL Final     Urea Nitrogen   Date Value Ref Range Status   09/20/2022 27.2 (H) 8.0 - 23.0 mg/dL Final   09/10/2022 26 8 - 28 mg/dL Final   06/30/2021 37 (H) 8 - 25 mg/dL Final     Creatinine   Date Value Ref Range Status   09/20/2022 1.02 (H) 0.51 - 0.95 mg/dL Final   06/30/2021 0.93 0.60 - 1.10 mg/dL Final     GFR Estimate   Date Value Ref Range Status   09/20/2022 51 (L) >60 mL/min/1.73m2 Final     Comment:     Effective " December 21, 2021 eGFRcr in adults is calculated using the 2021 CKD-EPI creatinine equation which includes age and gender (Tran linn al., [place], DOI: 10.1056/UKIXrx0183865)   06/30/2021 56 (L) >60 mL/min/1.73m2 Final   06/30/2021 56 (L) >60 ml/min/1.73m2 Final     Calcium   Date Value Ref Range Status   09/20/2022 9.2 8.2 - 9.6 mg/dL Final   06/30/2021 9.6 8.5 - 10.5 mg/dL Final     CBC RESULTS: Recent Labs   Lab Test 09/09/22  0513   WBC 10.7   RBC 3.30*   HGB 9.9*   HCT 30.4*   MCV 92   MCH 30.0   MCHC 32.6   RDW 16.3*            ASSESSMENT:    Encounter Diagnoses   Name Primary?     Hyponatremia Yes     Dysuria      Hypertension, unspecified type      Chronic diastolic congestive heart failure (H)        MEDICAL EQUIPMENT NEEDS:  She will require a standard wheelchair with a seat cushion and standard foot rests seat width of 18 inches seat depth of 16 inches and a Martinez height.  She will require the wheelchair for lifetime due to her recent hospitalization for E. coli sepsis along with generalized weakness.  She will require standard manual wheelchair because she has impairing ability to participate in MR ADLs such as toileting, feeding, dressing, grooming and bathing in the customary locations in the home.  The mobility mentation cannot be sufficiently resolved with the use of a cane or walker due to generalized core weakness and safety.  She or the caregiver is able to safely use the manual wheelchair.  Her functional mobility deficit will be sufficiently resolved by the use of a manual wheelchair.  She will need to have me height due to short stature and the feet need to reach the floor for propulsion.  She is currently standby with assist ambulates about 75 feet but she does need assistance with ADLs.  Her height 64 inches with a weight of 96 pounds.    DISCHARGE PLAN/FACE TO FACE:  I certify that services are/were furnished while this patient was under the care of a physician and that a physician or an  allowed non-physician practitioner (NPP), had a face-to-face encounter that meets the physician face-to-face encounter requirements. The encounter was in whole, or in part, related to the primary reason for home health. The patient is confined to his/her home and needs intermittent skilled nursing, physical therapy, speech-language pathology, or the continued need for occupational therapy. A plan of care has been established by a physician and is periodically reviewed by a physician.  Date of Face-to-Face Encounter: September 28, 2022    I certify that, based on my findings, the following services are medically necessary home health services: She will be discharging to her assisted living facility with current medications as well as receiving physical and occupational therapy with an approximated discharge date of September 30    My clinical findings support the need for the above skilled services because: (Please write a brief narrative summary that describes what the RN, PT, SLP, or other services will be doing in the home. A list of diagnoses in this section does not meet the CMS requirements.)  She will require the skilled services secondary to generalized weakness and core weakness along with her recent hospitalization but also continuation of the rehabilitation process and home safety    This patient is homebound because: (Please write a brief narrative summary describing the functional limitations as to why this patient is homebound and specifically what makes this patient homebound.)  Secondary to chronic medical conditions including her recent hospitalization but also continuation of the rehabilitation process as well as home safety measures and encouragement.    The patient is, or has been, under my care and I have initiated the establishment of the plan of care. This patient will be followed by a physician who will periodically review the plan of care.    Schedule follow up visit with primary care  provider within 7 days to reestablish care.  She will follow-up with the primary care doctor regarding medication management and any future laboratory studies.  Rechecking the sodium level again tomorrow on the 29th again she is on a fluid restriction along with recent decrease edema Dex at 20 mg and taking sodium chloride tablets 1 g twice daily.  Otherwise she did not have any other concerns or questions except for the urinary frequency and redoing a straight cath urinalysis today.  The results should be back before she discharges on Friday.  She did not have any other questions.    Discharge coordination care greater than 30 minutes    Electronically signed by: Samuel Rodriguez NP          Sincerely,        Samuel Rodriguez NP

## 2022-09-29 LAB — SODIUM SERPL-SCNC: 131 MMOL/L (ref 136–145)

## 2022-09-29 PROCEDURE — P9604 ONE-WAY ALLOW PRORATED TRIP: HCPCS | Performed by: FAMILY MEDICINE

## 2022-09-29 PROCEDURE — 36415 COLL VENOUS BLD VENIPUNCTURE: CPT | Performed by: FAMILY MEDICINE

## 2022-09-29 PROCEDURE — 84295 ASSAY OF SERUM SODIUM: CPT | Performed by: FAMILY MEDICINE

## 2022-09-30 LAB — BACTERIA UR CULT: NO GROWTH

## 2022-10-21 ENCOUNTER — ASSISTED LIVING VISIT (OUTPATIENT)
Dept: GERIATRICS | Facility: CLINIC | Age: 87
End: 2022-10-21
Payer: COMMERCIAL

## 2022-10-21 VITALS
SYSTOLIC BLOOD PRESSURE: 100 MMHG | HEART RATE: 97 BPM | RESPIRATION RATE: 18 BRPM | OXYGEN SATURATION: 98 % | TEMPERATURE: 97.7 F | DIASTOLIC BLOOD PRESSURE: 52 MMHG

## 2022-10-21 DIAGNOSIS — G47.33 OBSTRUCTIVE SLEEP APNEA SYNDROME: ICD-10-CM

## 2022-10-21 DIAGNOSIS — J44.9 CHRONIC OBSTRUCTIVE PULMONARY DISEASE, UNSPECIFIED COPD TYPE (H): ICD-10-CM

## 2022-10-21 DIAGNOSIS — K59.00 CONSTIPATION, UNSPECIFIED CONSTIPATION TYPE: ICD-10-CM

## 2022-10-21 DIAGNOSIS — I48.20 CHRONIC ATRIAL FIBRILLATION (H): ICD-10-CM

## 2022-10-21 DIAGNOSIS — E87.1 HYPONATREMIA: ICD-10-CM

## 2022-10-21 DIAGNOSIS — R12 HEARTBURN: ICD-10-CM

## 2022-10-21 DIAGNOSIS — N18.31 STAGE 3A CHRONIC KIDNEY DISEASE (H): ICD-10-CM

## 2022-10-21 DIAGNOSIS — I50.32 CHRONIC DIASTOLIC HEART FAILURE (H): Primary | ICD-10-CM

## 2022-10-21 DIAGNOSIS — E03.9 HYPOTHYROIDISM, UNSPECIFIED TYPE: ICD-10-CM

## 2022-10-21 DIAGNOSIS — E78.5 DYSLIPIDEMIA: ICD-10-CM

## 2022-10-21 DIAGNOSIS — Z53.09 CONTRAINDICATION TO ANTICOAGULATION THERAPY: ICD-10-CM

## 2022-10-21 DIAGNOSIS — I10 HYPERTENSION, UNSPECIFIED TYPE: ICD-10-CM

## 2022-10-21 DIAGNOSIS — R53.81 PHYSICAL DECONDITIONING: ICD-10-CM

## 2022-10-21 DIAGNOSIS — M35.00 SJOGREN'S SYNDROME, WITH UNSPECIFIED ORGAN INVOLVEMENT (H): ICD-10-CM

## 2022-10-21 NOTE — PROGRESS NOTES
[facility] GERIATRICS  INITIAL VISIT NOTE  October 21, 2022      PRIMARY CARE PROVIDER AND CLINIC:  Gabrielle Wallace [address] [facility] [address]    [facility] Medical Record Number:  [id number]  Place of Service where encounter took place:  [facility] ([facility]) [[id number]]    Chief Complaint   Patient presents with     Establish Care       HPI:    Marla Dunn is a 89 year old  (5/22/1929) female was admitted to the above facility from  [facility]. Hospital stay 9/5/22 through 9/10/22 where they were admitted for sepsis secondary to UTI, then she was admitted to [facility] and was discharged on 9/30/22 and returned back to [facility] where she has lived for 2 years.    History obtained from: facility chart records, facility staff, patient report and [facility] chart review.    Diagnoses       Codes Comments    Chronic diastolic heart failure (H)    -  Primary I50.32     Stage 3a chronic kidney disease (H)     N18.31     Contraindication to anticoagulation therapy     Z53.09     Chronic atrial fibrillation (H)     I48.20     Obstructive sleep apnea syndrome     G47.33     Chronic obstructive pulmonary disease, unspecified COPD type (H)     J44.9     Hyponatremia     E87.1     Hypertension, unspecified type     I10     Sjogren's syndrome, with unspecified organ involvement (H)     M35.00     Physical deconditioning     R53.81     Heartburn     R12     Constipation, unspecified constipation type     K59.00           Georgia has a PMH of atrial fibrillation (not on anticoagulation due to fall risk), mitral stenosis and regurgitation (not a candidate for surgery), diastolic CHF, COPD, who resides in assisted living facility.     Brief Hospital Course: She was admitted from 9/5/22 to 9/10/22 to [facility] for sepsis secondary to UTI due to E. Coli. She was renally  dosed with cipro and received her last dose on 9/12/22. While in the hospital she had elevated troponin's which was likely demand ischemia from rapid Afib and sepsis. Cardiology was consulted and recommended a heparin drip for 24-48 hrs. She has a history of chronic hyponatremia with a baseline of 130. Sepsis and acute kidney injury resolved and she was then admitted to [facility].     TCU Course: She was admitted to [facility] from 9/10/22 to 9/30/22. Georgia participated in rehab services and achieved therapy recommendations and was deemed to discharge to [facility] in [facility] with services. During her stay she was normotensive and afebrile and on room air. She maintained her weight and appetite was good. Her sodium levels continued to be an issue an a fluid restriction of 1200ml was placed and sodium tablets.     She is visited today in her apartment and was currently in the restroom and using the wheelchair to manually mobilize. Georgia is in good spirits, alert and orientated, endorses SOB with exertion, she wears a CPAP at night, dry lips and buccal mucosa for which she uses hydrocortisone and ketoconazole creams mixed together. Denies any chest pain, nausea, vomiting, diarrhea, or constipation.    Georgia has a niece named Deneen who is her point of contact, makes her medical appointments and also takes her to them.     She has [place] oncology appointment monthly, flu shot on 11/2, in addition to nephrologist on 11/8, and ophthalmalgist on 11/18 for her macular degeneration.    Georgia participates in PT/OT from [facility].     Blood pressures range from 100-125/50-80s per facility reports.     CODE STATUS/ADVANCE DIRECTIVES: DNR / DNI  Did confirm this with her today as she has a POLST hanging on the front of her refrigerator.    ALLERGIES:  Allergies   Allergen Reactions     Gramineae Pollens Other (See Comments)     ORCHARDGRASS. Shortness of  breath when lawn is just cut.     Smoke. Other (See Comments)     Hard to breathe.     Pollen Extract      Other reaction(s): Unknown       PAST MEDICAL HISTORY:   Past Medical History:   Diagnosis Date     Asthma      Bilateral carpal tunnel syndrome 08/27/2015     CAD (coronary artery disease)      Chronic airway obstruction, not elsewhere classified     Created by Conversion      Chronic anemia      Chronic edema      Congestive heart failure, severe (H) 05/13/2020     COPD (chronic obstructive pulmonary disease) (H)      Coronary artery disease      Depression      GERD (gastroesophageal reflux disease)      Heart disease      Heart murmur      High cholesterol     dislipidemia     HTN (hypertension)      Hypercholesterolemia      Hypertension      Hypothyroidism      Hypothyroidism      MI (myocardial infarction) (H) 1985     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     Myelodysplastic syndrome (H)      Myocardial infarction (H) 1983     OLEGARIO (obstructive sleep apnea)      Osteoporosis      Other and unspecified hyperlipidemia     Created by Conversion      Radicular low back pain      Rheumatoid arthritis (H)      Elizabeth Agers syndrome      Sjoegren syndrome      Sjogren's syndrome (H)      Sleep apnea, obstructive      Spinal stenosis      Unspecified polyarthropathy or polyarthritis, hand     Created by Conversion        PAST SURGICAL HISTORY:   Past Surgical History:   Procedure Laterality Date     aortic valve       AORTIC VALVE REPLACEMENT  2012     APPENDECTOMY       APPENDECTOMY       AS TOTAL KNEE ARTHROPLASTY Right      BACK SURGERY  2004    spinal decompression     BACK SURGERY      spinal stenosis     BONE MARROW BIOPSY  2004     breast biopsy       BREAST SURGERY  2001    biopsy     BYPASS GRAFT ARTERY CORONARY  2009    LIMA to ramus intermedius     cardiac angiogram       CARDIAC CATHETERIZATION       CARDIAC SURGERY       EYE SURGERY  2008    bilateral cataract repair      EYE SURGERY  Bilateral     cornea transplant left eye & cataracts     KYPHOPLASTY      T12     OPEN REDUCTION INTERNAL FIXATION HIP NAILING Right 2021    Procedure: INTERNAL FIXATION, FRACTURE, TROCHANTERIC, HIP, USING PINS OR RODS;  Surgeon: Darrel Castro MD;  Location: [facility] Main OR     OTHER SURGICAL HISTORY  2018    Rectosigmoidectomy perineal     RECTOSIGMOIDECTOMY PERINEAL N/A 1/10/2018    Procedure: RECTOSIGMOIDECTOMY PERINEAL;  PERINEAL RECTOSIGMOIDECTOMY ;  Surgeon: Candelaria Anthony MD;  Location: [facility] OR     REPAIR VALVE AORTIC  2009     THORACIC SURGERY      T12 kyphoplasty     ZZC CABG, ARTERY-VEIN, FOUR       ZZC CABG, VEIN, SINGLE      Description: CABG (CABG);  Recorded: 2012;  Comments: Single vessel bypass graft to an obtuse marginal branch in May 2009     ZZC REPLACE AORT VALV,PROSTH VALV      Description: Aortic Valve Replacement;  Recorded: 2012;  Comments: Aortic valve replacement       FAMILY HISTORY:   Family History   Problem Relation Age of Onset     Cancer Mother         ovarian cancer;  in her 50s     Family History Negative Mother      Heart Disease Father      Cancer Brother      Cancer Sister      Georgia reports that her mother  of ovarian cancer in her 50s and her father  of a MI in his 70s.    SOCIAL HISTORY:   Patient's living condition: lives in an assisted living facility    MEDICATIONS  Post Discharge Medication Reconciliation Status: discharge medications reconciled, continue medications without change.  Current Outpatient Medications   Medication Sig Dispense Refill     acetaminophen (TYLENOL) 325 MG tablet Take 3 tablets (975 mg) by mouth every 8 hours as needed for mild pain       albuterol (PROAIR HFA/PROVENTIL HFA/VENTOLIN HFA) 108 (90 Base) MCG/ACT inhaler Inhale 2 puffs into the lungs every 6 hours       Ascorbic Acid (VITAMIN C PO) Take 500 mg by mouth every morning       aspirin (ASA) 81 MG EC tablet Take 1 tablet (81 mg) by mouth 2  times daily       Calcium Carb-Cholecalciferol (CALCIUM+D3) 600-800 MG-UNIT TABS Take 1 tablet by mouth daily       calcium carbonate (TUMS) 500 MG chewable tablet Take 2 chew tab by mouth daily as needed for heartburn       cholecalciferol (D3 HIGH POTENCY) 125 MCG (5000 UT) CAPS Take 1 capsule by mouth daily       cycloSPORINE (RESTASIS) 0.05 % ophthalmic emulsion Place 1 drop into both eyes 2 times daily        fish oil-omega-3 fatty acids 1000 MG capsule Take 1 g by mouth daily       fluorometholone (FML LIQUIFILM) 0.1 % ophthalmic susp Place 1 drop Into the left eye daily        IBANDRONATE SODIUM PO Take 150 mg by mouth every 30 days       ipratropium-albuterol (COMBIVENT RESPIMAT)  MCG/ACT inhaler Inhale 1 puff into the lungs 4 times daily       Levothyroxine Sodium (LEVOXYL PO) Take 88 mcg by mouth daily        loratadine (CLARITIN) 10 MG tablet Take 1 tablet (10 mg) by mouth daily (Patient taking differently: Take 10 mg by mouth daily as needed)       METOPROLOL TARTRATE PO Take 25 mg by mouth 2 times daily        mirtazapine (REMERON) 7.5 MG tablet Take 7.5 mg by mouth At Bedtime       Multiple Vitamins-Minerals (PRESERVISION AREDS PO) Take 1 tablet by mouth 2 times daily       omeprazole (PRILOSEC) 20 MG DR capsule Take 20 mg by mouth 2 times daily       OXYGEN-HELIUM IN        polyethylene glycol (MIRALAX) 17 GM/Dose powder Take 1 capful by mouth daily       polyethylene glycol 0.4%- propylene glycol 0.3% (SYSTANE ULTRA) 0.4-0.3 % SOLN ophthalmic solution Place 1 drop into both eyes 4 times daily       Probiotic Product (PROBIOTIC PO) Take 1 capsule by mouth every morning       sodium chloride 1 GM tablet Take 1 g by mouth 2 times daily       spironolactone (ALDACTONE) 50 MG tablet Take 25 mg by mouth daily        timolol (TIMOPTIC) 0.5 % ophthalmic solution Place 1 drop Into the left eye daily        torsemide (DEMADEX) 20 MG tablet Take 20 mg by mouth daily       ROS:  10 point ROS neg other than  the symptoms noted above in the HPI.    PHYSICAL EXAM:  /52   Pulse 97   Temp 97.7  F (36.5  C)   Resp 18   SpO2 98%   Exam:  Constitutional: alert, no distress, cooperative and smiling  Head: Normocephalic. No masses, lesions, tenderness or abnormalities, wearing glasses   Cardiovascular: positive findings: irregular rhythm, trace pedal edema.   Respiratory: Lungs clear  Gastrointestinal: Abdomen soft, non-tender. BS normal. No masses, organomegaly.  Continent of bowel  : Continent of bladder  Musculoskeletal: no gross deformities noted and able to move all extremities; uses walker to ambulate; uses manual wheelchair to get places fast  Skin: hyperpigmentation - bilatera lower legs; dry skin  Neurologic: Gait normal. Reflexes normal and symmetric. Sensation grossly WNL.  Psychiatric: mentation appears normal and affect normal/bright.  Good historian     LABORATORY/IMAGING DATA:  Reviewed as per [facility] and/or [facility]  Lab Results   Component Value Date    WBC 10.7 09/09/2022    HGB 9.9 (L) 09/09/2022    HCT 30.4 (L) 09/09/2022     09/09/2022     (L) 09/29/2022    POTASSIUM 4.6 09/20/2022    CHLORIDE 89 (L) 09/20/2022    CO2 29 09/20/2022    BUN 27.2 (H) 09/20/2022    CR 1.02 (H) 09/20/2022    GLC 70 09/20/2022    AST 24 09/07/2022    ALT 15 09/07/2022    ALKPHOS 81 09/07/2022    BILITOTAL 0.4 09/07/2022    INR 1.23 (H) 09/05/2022     Lab Requisition on 09/29/2022   Component Date Value Ref Range Status     Sodium 09/29/2022 131 (L)  136 - 145 mmol/L Final     ASSESSMENT/PLAN:  (I50.32) Chronic diastolic heart failure (H)  (primary encounter diagnosis)  (N18.31)  Stage 3a Chronic Kidney Disease  Comment: Spironolactone 25 mg PO daily and torsemide  20 mg PO daily are used to control chronic diastolic heart failure. Weights are not monitored regularly.  Last weight in June and she was 105 lbs.      (I48.20) Chronic atrial fibrillation (H)  (Z53.09) Contraindication to  anticoagulation  Comment:  Chronic atrial fibrillation is not anticoagulated due to fall risk. Georgia does take low dose daily asa. Rate is controlled with metoprolol tartrate 25 mg PO BID.     (J44.9) Chronic obstructive pulmonary disease, unspecified COPD type (H)  (G47.33) Obstructive sleep apnea syndrome.  Comment:   Georgia states she does have SOB with exertion, she uses albuterol and combivent inhalers. Georgia also has a CPAP she uses at night for sleep apnea. Lungs are clear to ausculation, no cough, continue medications as prescribed.     (I10) Hypertension, unspecified type  Comment: blood pressures are not taken on a regular basis.  What is seen is within normal limits.  Does take a beta blocker for her Atrial Fib but helps with B/Ps.    (E87.1) Hyponatremia  Comment:  Georgia has a nephrology appointment on 11/8/22. She is currently on a 1200 ml fluid restriction. Last sodium level was checked on 9/29 and was 131. Per note reviews her baselines odium levels are around 130. The chronic hyponatremia is not thought to be SIADH or cerebral sal wasting. Her medications include sodium chloride tablets twice daily. Will follow nephrology recommendations.    (M35.00)  Sjogrens, unspecified type  Comments:  Does have known ocular complications in which she is unable to get shots for her MD as she has the dry kind.  Does take Restatis and other eye drops.    (R53.81) Physical deconditioning  Comment:   Georgia uses the walker to ambulate independently the majority of the time. She does not have an escort. Georgia reports that she uses the manual wheelchair when she needs to get somewhere fast. She is seeing PT/OT from [facility]. Continue current plan.     (R12) Heartburn  (K59.00) Constipation, unspecified constipation type  Comment:   Medication reconciliation completed. These diagnoses were added for medications that reconciled.     (E78.5)  Dyslipidemia  Comment:  Does take Fish Oil but has been  some time since last lipid panel done.  Will check on on 10/31/22 when NP is back to work and see for self    (E03.9) Hypothyroidism  Comment:  Does take a thyroid replacement medication at 88mcg daily.  Will check a TSH on 10/31/22 as well.        Orders:   1. Order TSH lab draw on 10/31 for hypothyroidism.   2. Order Lipid panel lab drawn on 10/31 for hyperlipidemia.     Total time with a new patient visit in the assisted livin minutes including discussions about the POC and care coordination with the patient, caregiver and review of chart. Greater than 50% of total time spent with counseling and coordinating care due to complicated and multiple health issues current and past.    Electronically signed by:  Jessenia Riley, Student NP    Electronically signed by Gabrielle Wallace RN, CNP      I was present with the medical student/advanced practice registered nurse, Lorena Riley NP student, who participated in the service and in the documentation of this note. I have verified the history and personally performed the physical exam and medical decision making. I agree with the assessment and plan of care as documented in the note.

## 2022-10-21 NOTE — LETTER
10/21/2022        RE: Marla Dunn  [address]  [facility] [address]        [facility] GERIATRICS  INITIAL VISIT NOTE  October 21, 2022      PRIMARY CARE PROVIDER AND CLINIC:  Gabrielle Wallace [address] / [place] [address]    [facility] Medical Record Number:  [id number]  Place of Service where encounter took place:  [facility] [[id number]]    Chief Complaint   Patient presents with     Establish Care       HPI:    Marla Dunn is a 89 year old  (5/22/1929) female was admitted to the above facility from  [facility]. Hospital stay 9/5/22 through 9/10/22 where they were admitted for sepsis secondary to UTI, then she was admitted to [facility] and was discharged on 9/30/22 and returned back to [facility] where she has lived for 2 years.    History obtained from: facility chart records, facility staff, patient report and [facility] chart review.    Diagnoses       Codes Comments    Chronic diastolic heart failure (H)    -  Primary I50.32     Stage 3a chronic kidney disease (H)     N18.31     Contraindication to anticoagulation therapy     Z53.09     Chronic atrial fibrillation (H)     I48.20     Obstructive sleep apnea syndrome     G47.33     Chronic obstructive pulmonary disease, unspecified COPD type (H)     J44.9     Hyponatremia     E87.1     Hypertension, unspecified type     I10     Sjogren's syndrome, with unspecified organ involvement (H)     M35.00     Physical deconditioning     R53.81     Heartburn     R12     Constipation, unspecified constipation type     K59.00           Georgia has a PMH of atrial fibrillation (not on anticoagulation due to fall risk), mitral stenosis and regurgitation (not a candidate for surgery), diastolic CHF, COPD, who resides in assisted living facility.     Brief Hospital Course: She was admitted  from 9/5/22 to 9/10/22 to [facility] for sepsis secondary to UTI due to E. Coli. She was renally dosed with cipro and received her last dose on 9/12/22. While in the hospital she had elevated troponin's which was likely demand ischemia from rapid Afib and sepsis. Cardiology was consulted and recommended a heparin drip for 24-48 hrs. She has a history of chronic hyponatremia with a baseline of 130. Sepsis and acute kidney injury resolved and she was then admitted to [facility].     TCU Course: She was admitted to [facility] from 9/10/22 to 9/30/22. Georgia participated in rehab services and achieved therapy recommendations and was deemed to discharge to hospitals in [facility] with services. During her stay she was normotensive and afebrile and on room air. She maintained her weight and appetite was good. Her sodium levels continued to be an issue an a fluid restriction of 1200ml was placed and sodium tablets.     She is visited today in her apartment and was currently in the restroom and using the wheelchair to manually mobilize. Georgia is in good spirits, alert and orientated, endorses SOB with exertion, she wears a CPAP at night, dry lips and buccal mucosa for which she uses hydrocortisone and ketoconazole creams mixed together. Denies any chest pain, nausea, vomiting, diarrhea, or constipation.    Georgia has a niece named Deneen who is her point of contact, makes her medical appointments and also takes her to them.     She has [place] oncology appointment monthly, flu shot on 11/2, in addition to nephrologist on 11/8, and ophthalmalgist on 11/18 for her macular degeneration.    Georgia participates in PT/OT from [facility].     Blood pressures range from 100-125/50-80s per facility reports.     CODE STATUS/ADVANCE DIRECTIVES: DNR / DNI  Did confirm this with her today as she has a POLST hanging on the front of her  refrigerator.    ALLERGIES:  Allergies   Allergen Reactions     Gramineae Pollens Other (See Comments)     ORCHARDGRASS. Shortness of breath when lawn is just cut.     Smoke. Other (See Comments)     Hard to breathe.     Pollen Extract      Other reaction(s): Unknown       PAST MEDICAL HISTORY:   Past Medical History:   Diagnosis Date     Asthma      Bilateral carpal tunnel syndrome 08/27/2015     CAD (coronary artery disease)      Chronic airway obstruction, not elsewhere classified     Created by Conversion      Chronic anemia      Chronic edema      Congestive heart failure, severe (H) 05/13/2020     COPD (chronic obstructive pulmonary disease) (H)      Coronary artery disease      Depression      GERD (gastroesophageal reflux disease)      Heart disease      Heart murmur      High cholesterol     dislipidemia     HTN (hypertension)      Hypercholesterolemia      Hypertension      Hypothyroidism      Hypothyroidism      MI (myocardial infarction) (H) 1985     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     Myelodysplastic syndrome (H)      Myocardial infarction (H) 1983     OLEGARIO (obstructive sleep apnea)      Osteoporosis      Other and unspecified hyperlipidemia     Created by Conversion      Radicular low back pain      Rheumatoid arthritis (H)      Elizabeth Agers syndrome      Sjoegren syndrome      Sjogren's syndrome (H)      Sleep apnea, obstructive      Spinal stenosis      Unspecified polyarthropathy or polyarthritis, hand     Created by Conversion        PAST SURGICAL HISTORY:   Past Surgical History:   Procedure Laterality Date     aortic valve       AORTIC VALVE REPLACEMENT  2012     APPENDECTOMY       APPENDECTOMY       AS TOTAL KNEE ARTHROPLASTY Right      BACK SURGERY  2004    spinal decompression     BACK SURGERY      spinal stenosis     BONE MARROW BIOPSY  2004     breast biopsy       BREAST SURGERY  2001    biopsy     BYPASS GRAFT ARTERY CORONARY  2009    LIMA to ramus intermedius     cardiac  angiogram       CARDIAC CATHETERIZATION       CARDIAC SURGERY       EYE SURGERY      bilateral cataract repair      EYE SURGERY Bilateral     cornea transplant left eye & cataracts     KYPHOPLASTY      T12     OPEN REDUCTION INTERNAL FIXATION HIP NAILING Right 2021    Procedure: INTERNAL FIXATION, FRACTURE, TROCHANTERIC, HIP, USING PINS OR RODS;  Surgeon: Darrel Castro MD;  Location[place] [facility] [place]     OTHER SURGICAL HISTORY  2018    Rectosigmoidectomy perineal     RECTOSIGMOIDECTOMY PERINEAL N/A 1/10/2018    Procedure: RECTOSIGMOIDECTOMY PERINEAL;  PERINEAL RECTOSIGMOIDECTOMY ;  Surgeon: Candelaria Anthony MD;  Location: [facility] OR     REPAIR VALVE AORTIC  2009     THORACIC SURGERY      T12 kyphoplasty     ZZC CABG, ARTERY-VEIN, FOUR       ZZC CABG, VEIN, SINGLE      Description: CABG (CABG);  Recorded: 2012;  Comments: Single vessel bypass graft to an obtuse marginal branch in May 2009     ZZC REPLACE AORT VALV,PROSTH VALV      Description: Aortic Valve Replacement;  Recorded: 2012;  Comments: Aortic valve replacement       FAMILY HISTORY:   Family History   Problem Relation Age of Onset     Cancer Mother         ovarian cancer;  in her 50s     Family History Negative Mother      Heart Disease Father      Cancer Brother      Cancer Sister      Georgia reports that her mother  of ovarian cancer in her 50s and her father  of a MI in his 70s.    SOCIAL HISTORY:   Patient's living condition: lives in an assisted living facility    MEDICATIONS  Post Discharge Medication Reconciliation Status: discharge medications reconciled, continue medications without change.  Current Outpatient Medications   Medication Sig Dispense Refill     acetaminophen (TYLENOL) 325 MG tablet Take 3 tablets (975 mg) by mouth every 8 hours as needed for mild pain       albuterol (PROAIR HFA/PROVENTIL HFA/VENTOLIN HFA) 108 (90 Base) MCG/ACT inhaler Inhale 2 puffs into the lungs every 6 hours        Ascorbic Acid (VITAMIN C PO) Take 500 mg by mouth every morning       aspirin (ASA) 81 MG EC tablet Take 1 tablet (81 mg) by mouth 2 times daily       Calcium Carb-Cholecalciferol (CALCIUM+D3) 600-800 MG-UNIT TABS Take 1 tablet by mouth daily       calcium carbonate (TUMS) 500 MG chewable tablet Take 2 chew tab by mouth daily as needed for heartburn       cholecalciferol (D3 HIGH POTENCY) 125 MCG (5000 UT) CAPS Take 1 capsule by mouth daily       cycloSPORINE (RESTASIS) 0.05 % ophthalmic emulsion Place 1 drop into both eyes 2 times daily        fish oil-omega-3 fatty acids 1000 MG capsule Take 1 g by mouth daily       fluorometholone (FML LIQUIFILM) 0.1 % ophthalmic susp Place 1 drop Into the left eye daily        IBANDRONATE SODIUM PO Take 150 mg by mouth every 30 days       ipratropium-albuterol (COMBIVENT RESPIMAT)  MCG/ACT inhaler Inhale 1 puff into the lungs 4 times daily       Levothyroxine Sodium (LEVOXYL PO) Take 88 mcg by mouth daily        loratadine (CLARITIN) 10 MG tablet Take 1 tablet (10 mg) by mouth daily (Patient taking differently: Take 10 mg by mouth daily as needed)       METOPROLOL TARTRATE PO Take 25 mg by mouth 2 times daily        mirtazapine (REMERON) 7.5 MG tablet Take 7.5 mg by mouth At Bedtime       Multiple Vitamins-Minerals (PRESERVISION AREDS PO) Take 1 tablet by mouth 2 times daily       omeprazole (PRILOSEC) 20 MG DR capsule Take 20 mg by mouth 2 times daily       OXYGEN-HELIUM IN        polyethylene glycol (MIRALAX) 17 GM/Dose powder Take 1 capful by mouth daily       polyethylene glycol 0.4%- propylene glycol 0.3% (SYSTANE ULTRA) 0.4-0.3 % SOLN ophthalmic solution Place 1 drop into both eyes 4 times daily       Probiotic Product (PROBIOTIC PO) Take 1 capsule by mouth every morning       sodium chloride 1 GM tablet Take 1 g by mouth 2 times daily       spironolactone (ALDACTONE) 50 MG tablet Take 25 mg by mouth daily        timolol (TIMOPTIC) 0.5 % ophthalmic solution Place 1  drop Into the left eye daily        torsemide (DEMADEX) 20 MG tablet Take 20 mg by mouth daily       ROS:  10 point ROS neg other than the symptoms noted above in the HPI.    PHYSICAL EXAM:  /52   Pulse 97   Temp 97.7  F (36.5  C)   Resp 18   SpO2 98%   Exam:  Constitutional: alert, no distress, cooperative and smiling  Head: Normocephalic. No masses, lesions, tenderness or abnormalities, wearing glasses   Cardiovascular: positive findings: irregular rhythm, trace pedal edema.   Respiratory: Lungs clear  Gastrointestinal: Abdomen soft, non-tender. BS normal. No masses, organomegaly.  Continent of bowel  : Continent of bladder  Musculoskeletal: no gross deformities noted and able to move all extremities; uses walker to ambulate; uses manual wheelchair to get places fast  Skin: hyperpigmentation - bilatera lower legs; dry skin  Neurologic: Gait normal. Reflexes normal and symmetric. Sensation grossly WNL.  Psychiatric: mentation appears normal and affect normal/bright.  Good historian     LABORATORY/IMAGING DATA:  Reviewed as per [facility] and/or [facility]  Lab Results   Component Value Date    WBC 10.7 09/09/2022    HGB 9.9 (L) 09/09/2022    HCT 30.4 (L) 09/09/2022     09/09/2022     (L) 09/29/2022    POTASSIUM 4.6 09/20/2022    CHLORIDE 89 (L) 09/20/2022    CO2 29 09/20/2022    BUN 27.2 (H) 09/20/2022    CR 1.02 (H) 09/20/2022    GLC 70 09/20/2022    AST 24 09/07/2022    ALT 15 09/07/2022    ALKPHOS 81 09/07/2022    BILITOTAL 0.4 09/07/2022    INR 1.23 (H) 09/05/2022     Lab Requisition on 09/29/2022   Component Date Value Ref Range Status     Sodium 09/29/2022 131 (L)  136 - 145 mmol/L Final     ASSESSMENT/PLAN:  (I50.32) Chronic diastolic heart failure (H)  (primary encounter diagnosis)  (N18.31)  Stage 3a Chronic Kidney Disease  Comment: Spironolactone 25 mg PO daily and torsemide  20 mg PO daily are used to control chronic diastolic heart failure. Weights are not monitored regularly.   Last weight in June and she was 105 lbs.      (I48.20) Chronic atrial fibrillation (H)  (Z53.09) Contraindication to anticoagulation  Comment:  Chronic atrial fibrillation is not anticoagulated due to fall risk. Georgia does take low dose daily asa. Rate is controlled with metoprolol tartrate 25 mg PO BID.     (J44.9) Chronic obstructive pulmonary disease, unspecified COPD type (H)  (G47.33) Obstructive sleep apnea syndrome.  Comment:   Georgia states she does have SOB with exertion, she uses albuterol and combivent inhalers. Georgia also has a CPAP she uses at night for sleep apnea. Lungs are clear to ausculation, no cough, continue medications as prescribed.     (I10) Hypertension, unspecified type  Comment: blood pressures are not taken on a regular basis.  What is seen is within normal limits.  Does take a beta blocker for her Atrial Fib but helps with B/Ps.    (E87.1) Hyponatremia  Comment:  Georgia has a nephrology appointment on 11/8/22. She is currently on a 1200 ml fluid restriction. Last sodium level was checked on 9/29 and was 131. Per note reviews her baselines odium levels are around 130. The chronic hyponatremia is not thought to be SIADH or cerebral sal wasting. Her medications include sodium chloride tablets twice daily. Will follow nephrology recommendations.    (M35.00)  Sjogrens, unspecified type  Comments:  Does have known ocular complications in which she is unable to get shots for her MD as she has the dry kind.  Does take Restatis and other eye drops.    (R53.81) Physical deconditioning  Comment:   Georgia uses the walker to ambulate independently the majority of the time. She does not have an escort. Georgia reports that she uses the manual wheelchair when she needs to get somewhere fast. She is seeing PT/OT from [facility]. Continue current plan.     (R12) Heartburn  (K59.00) Constipation, unspecified constipation type  Comment:   Medication reconciliation completed. These  diagnoses were added for medications that reconciled.     (E78.5)  Dyslipidemia  Comment:  Does take Fish Oil but has been some time since last lipid panel done.  Will check on on 10/31/22 when NP is back to work and see for self    (E03.9) Hypothyroidism  Comment:  Does take a thyroid replacement medication at 88mcg daily.  Will check a TSH on 10/31/22 as well.        Orders:   1. Order TSH lab draw on 10/31 for hypothyroidism.   2. Order Lipid panel lab drawn on 10/31 for hyperlipidemia.     Total time with a new patient visit in the assisted livin minutes including discussions about the POC and care coordination with the patient, caregiver and review of chart. Greater than 50% of total time spent with counseling and coordinating care due to complicated and multiple health issues current and past.    Electronically signed by:  Jessenia Riley, Student NP    Electronically signed by Gabrielle Wallace RN, CNP      I was present with the medical student/advanced practice registered nurse, Lorena Riley NP student, who participated in the service and in the documentation of this note. I have verified the history and personally performed the physical exam and medical decision making. I agree with the assessment and plan of care as documented in the note.               Sincerely,        CASTRO Hazel CNP

## 2022-10-23 PROBLEM — I16.1 HYPERTENSIVE EMERGENCY: Status: RESOLVED | Noted: 2021-08-13 | Resolved: 2022-10-23

## 2022-10-23 PROBLEM — N18.30 ACUTE RENAL FAILURE SUPERIMPOSED ON STAGE 3 CHRONIC KIDNEY DISEASE, UNSPECIFIED ACUTE RENAL FAILURE TYPE, UNSPECIFIED WHETHER STAGE 3A OR 3B CKD (H): Status: RESOLVED | Noted: 2021-08-13 | Resolved: 2022-10-23

## 2022-10-23 PROBLEM — J96.90 RESPIRATORY FAILURE (H): Status: RESOLVED | Noted: 2020-05-13 | Resolved: 2022-10-23

## 2022-10-23 PROBLEM — Z53.09 CONTRAINDICATION TO ANTICOAGULATION THERAPY: Status: ACTIVE | Noted: 2022-10-23

## 2022-10-23 PROBLEM — A41.9 SEPSIS SECONDARY TO UTI (H): Status: RESOLVED | Noted: 2021-08-13 | Resolved: 2022-10-23

## 2022-10-23 PROBLEM — S72.141A CLOSED DISPLACED INTERTROCHANTERIC FRACTURE OF RIGHT FEMUR, INITIAL ENCOUNTER (H): Status: RESOLVED | Noted: 2021-08-12 | Resolved: 2022-10-23

## 2022-10-23 PROBLEM — Z82.49 FAMILY HISTORY OF MYOCARDIAL INFARCTION: Status: ACTIVE | Noted: 2022-10-23

## 2022-10-23 PROBLEM — R10.84 ABDOMINAL PAIN, GENERALIZED: Status: RESOLVED | Noted: 2022-09-06 | Resolved: 2022-10-23

## 2022-10-23 PROBLEM — J20.8 ACUTE BRONCHITIS DUE TO OTHER SPECIFIED ORGANISMS: Status: RESOLVED | Noted: 2018-05-02 | Resolved: 2022-10-23

## 2022-10-23 PROBLEM — E87.20 METABOLIC ACIDOSIS: Status: RESOLVED | Noted: 2021-08-13 | Resolved: 2022-10-23

## 2022-10-23 PROBLEM — N39.0 SEPSIS SECONDARY TO UTI (H): Status: RESOLVED | Noted: 2021-08-13 | Resolved: 2022-10-23

## 2022-10-23 PROBLEM — N17.9 ACUTE RENAL FAILURE SUPERIMPOSED ON STAGE 3 CHRONIC KIDNEY DISEASE, UNSPECIFIED ACUTE RENAL FAILURE TYPE, UNSPECIFIED WHETHER STAGE 3A OR 3B CKD (H): Status: RESOLVED | Noted: 2021-08-13 | Resolved: 2022-10-23

## 2022-10-23 PROBLEM — N30.00 ACUTE CYSTITIS WITHOUT HEMATURIA: Status: RESOLVED | Noted: 2022-09-06 | Resolved: 2022-10-23

## 2022-10-23 PROBLEM — J44.1 COPD EXACERBATION (H): Status: RESOLVED | Noted: 2020-03-20 | Resolved: 2022-10-23

## 2022-10-23 PROBLEM — D46.Z: Status: RESOLVED | Noted: 2020-11-23 | Resolved: 2022-10-23

## 2022-10-23 RX ORDER — AMOXICILLIN 250 MG
1 CAPSULE ORAL 2 TIMES DAILY PRN
Start: 2022-10-23 | End: 2023-07-18

## 2022-10-23 RX ORDER — CALCIUM CARBONATE 500 MG/1
2 TABLET, CHEWABLE ORAL 2 TIMES DAILY
Start: 2022-10-23 | End: 2023-05-11

## 2022-10-23 RX ORDER — BISACODYL 10 MG
10 SUPPOSITORY, RECTAL RECTAL DAILY PRN
Start: 2022-10-23 | End: 2022-12-02

## 2022-10-25 ENCOUNTER — LAB REQUISITION (OUTPATIENT)
Dept: LAB | Facility: CLINIC | Age: 87
End: 2022-10-25
Payer: COMMERCIAL

## 2022-10-25 DIAGNOSIS — E03.9 HYPOTHYROIDISM, UNSPECIFIED: ICD-10-CM

## 2022-10-25 DIAGNOSIS — E78.41 ELEVATED LIPOPROTEIN(A): ICD-10-CM

## 2022-10-25 DIAGNOSIS — E87.1 HYPO-OSMOLALITY AND HYPONATREMIA: ICD-10-CM

## 2022-10-25 DIAGNOSIS — N18.31 CHRONIC KIDNEY DISEASE, STAGE 3A (H): ICD-10-CM

## 2022-10-27 ENCOUNTER — LAB REQUISITION (OUTPATIENT)
Dept: LAB | Facility: CLINIC | Age: 87
End: 2022-10-27
Payer: COMMERCIAL

## 2022-10-27 DIAGNOSIS — E87.1 HYPO-OSMOLALITY AND HYPONATREMIA: ICD-10-CM

## 2022-10-27 DIAGNOSIS — N18.31 CHRONIC KIDNEY DISEASE, STAGE 3A (H): ICD-10-CM

## 2022-10-31 LAB
ANION GAP SERPL CALCULATED.3IONS-SCNC: 17 MMOL/L (ref 7–15)
BUN SERPL-MCNC: 19.3 MG/DL (ref 8–23)
CALCIUM SERPL-MCNC: 9.3 MG/DL (ref 8.2–9.6)
CHLORIDE SERPL-SCNC: 99 MMOL/L (ref 98–107)
CHOLEST SERPL-MCNC: 187 MG/DL
CREAT SERPL-MCNC: 0.82 MG/DL (ref 0.51–0.95)
DEPRECATED HCO3 PLAS-SCNC: 20 MMOL/L (ref 22–29)
GFR SERPL CREATININE-BSD FRML MDRD: 66 ML/MIN/1.73M2
GLUCOSE SERPL-MCNC: 74 MG/DL (ref 70–99)
HDLC SERPL-MCNC: 55 MG/DL
LDLC SERPL CALC-MCNC: 117 MG/DL
NONHDLC SERPL-MCNC: 132 MG/DL
POTASSIUM SERPL-SCNC: 4.6 MMOL/L (ref 3.4–5.3)
SODIUM SERPL-SCNC: 136 MMOL/L (ref 136–145)
T4 FREE SERPL-MCNC: 1.53 NG/DL (ref 0.9–1.7)
TRIGL SERPL-MCNC: 77 MG/DL
TSH SERPL DL<=0.005 MIU/L-ACNC: 2.73 UIU/ML (ref 0.3–4.2)

## 2022-10-31 PROCEDURE — 80048 BASIC METABOLIC PNL TOTAL CA: CPT | Mod: ORL | Performed by: INTERNAL MEDICINE

## 2022-10-31 PROCEDURE — 84439 ASSAY OF FREE THYROXINE: CPT | Mod: ORL | Performed by: NURSE PRACTITIONER

## 2022-10-31 PROCEDURE — P9603 ONE-WAY ALLOW PRORATED MILES: HCPCS | Mod: ORL | Performed by: INTERNAL MEDICINE

## 2022-10-31 PROCEDURE — 36415 COLL VENOUS BLD VENIPUNCTURE: CPT | Mod: ORL | Performed by: INTERNAL MEDICINE

## 2022-10-31 PROCEDURE — 80061 LIPID PANEL: CPT | Mod: ORL | Performed by: NURSE PRACTITIONER

## 2022-10-31 PROCEDURE — 84443 ASSAY THYROID STIM HORMONE: CPT | Mod: ORL | Performed by: NURSE PRACTITIONER

## 2022-11-07 LAB
ANION GAP SERPL CALCULATED.3IONS-SCNC: 13 MMOL/L (ref 7–15)
BUN SERPL-MCNC: 26.8 MG/DL (ref 8–23)
CALCIUM SERPL-MCNC: 8.7 MG/DL (ref 8.2–9.6)
CHLORIDE SERPL-SCNC: 98 MMOL/L (ref 98–107)
CREAT SERPL-MCNC: 0.88 MG/DL (ref 0.51–0.95)
DEPRECATED HCO3 PLAS-SCNC: 22 MMOL/L (ref 22–29)
GFR SERPL CREATININE-BSD FRML MDRD: 61 ML/MIN/1.73M2
GLUCOSE SERPL-MCNC: 78 MG/DL (ref 70–99)
POTASSIUM SERPL-SCNC: 4.2 MMOL/L (ref 3.4–5.3)
SODIUM SERPL-SCNC: 133 MMOL/L (ref 136–145)

## 2022-11-07 PROCEDURE — 80048 BASIC METABOLIC PNL TOTAL CA: CPT | Mod: ORL | Performed by: INTERNAL MEDICINE

## 2022-11-07 PROCEDURE — P9604 ONE-WAY ALLOW PRORATED TRIP: HCPCS | Mod: ORL | Performed by: INTERNAL MEDICINE

## 2022-11-07 PROCEDURE — 36415 COLL VENOUS BLD VENIPUNCTURE: CPT | Mod: ORL | Performed by: INTERNAL MEDICINE

## 2022-11-08 ENCOUNTER — PATIENT OUTREACH (OUTPATIENT)
Dept: GERIATRIC MEDICINE | Facility: CLINIC | Age: 87
End: 2022-11-08

## 2022-11-08 NOTE — LETTER
November 8, 2022      [facility]  [address]   [facility] [address]      Dear Georgia:    Matthew, my name is Akila Lu MA, LSW. You are eligible for [facility] Case Management program through your enrollment in [employer]. We think you may benefit from this program. I am writing to invite you to be in our Case Management program.     The following are a few things the Case Management program can help you with:    Select or change your primary care doctor or primary care clinic    Find a specialist, if needed, near your home    Receive preventive care, such as flu shots    Join programs offered by [facility] that interest you, like wellness programs    As your , I will do the following to enroll you in the Case Management Program:    Schedule a telephone call with you to answer any questions you may have about case management    Conduct an assessment by phone to identify needs case management can help you with    Develop a care plan to address those needs    Help you obtain available care and resources as needed    I will call you soon to discuss your interest in this program and your health care needs.    Being in the Case Management program is voluntary and offered to you at no cost. If you accept being in the Case Management program, you can stop any time by calling me at [phone].    Sincerely,      Akila Lu MA, LSW    E-mail: [email or web address]  Phone: [phone]      [facility]    [id number]                                     (01/2020)          [address] [place], [place] [address] [address]  [phone]  fax [phone]  [facility]

## 2022-11-08 NOTE — PROGRESS NOTES
[facility] Care Coordination Contact    Member became effective with [facility] on 11/1/22 with [employer].     Welcome letter sent to kayce [employer].     Sally Gordon  Care Management Specialist   [facility]   [phone]

## 2022-11-11 ENCOUNTER — ASSISTED LIVING VISIT (OUTPATIENT)
Dept: GERIATRICS | Facility: CLINIC | Age: 87
End: 2022-11-11
Payer: COMMERCIAL

## 2022-11-11 DIAGNOSIS — E87.1 HYPONATREMIA: ICD-10-CM

## 2022-11-11 DIAGNOSIS — I50.32 CHRONIC DIASTOLIC HEART FAILURE (H): Primary | ICD-10-CM

## 2022-11-11 DIAGNOSIS — D68.9 COAGULATION DEFECT, UNSPECIFIED (H): ICD-10-CM

## 2022-11-11 DIAGNOSIS — R39.15 URINARY URGENCY: ICD-10-CM

## 2022-11-11 DIAGNOSIS — I48.20 CHRONIC ATRIAL FIBRILLATION (H): ICD-10-CM

## 2022-11-11 DIAGNOSIS — Z53.09 CONTRAINDICATION TO ANTICOAGULATION THERAPY: ICD-10-CM

## 2022-11-11 DIAGNOSIS — M62.81 GENERALIZED MUSCLE WEAKNESS: ICD-10-CM

## 2022-11-11 DIAGNOSIS — N18.31 STAGE 3A CHRONIC KIDNEY DISEASE (H): ICD-10-CM

## 2022-11-11 NOTE — PROGRESS NOTES
"[facility] GERIATRICS  REGULATORY VISIT  November 11, 2022      [facility] Medical Record Number:  [id number]  Place of Service where encounter took place:  [facility] ([facility]) [[id number]]    Chief Complaint   Patient presents with     FVP Care Coordination - Health Plan or Product Change     RECHECK       HPI:    Marla Dunn is a 89 year old  (5/22/1929), who is being seen today for a federally mandated E/M visit. HPI information obtained from: facility chart records, patient report and [facility] chart review.    Today's concern is:    Diagnoses       Codes Comments    Chronic diastolic heart failure (H)    -  Primary I50.32     Stage 3a chronic kidney disease (H)     N18.31     Chronic atrial fibrillation (H)     I48.20     Contraindication to anticoagulation therapy     Z53.09     Coagulation defect, unspecified (H)     D68.9     Hyponatremia     E87.1     Generalized muscle weakness     M62.81         Came to see Georgia today as she had a change with her insurance and is on [employer] now.      Found Georgia in her apartment and she had a little time to visit before she was going down to see [employer].    Once she figured out whom this NP was, she apologized for her \"short\" behaviors.  Georgia has only seen this NP once last month and so did not expect her to remember.     Georgia was at the nephrologist office this week and stated she had an adjustment with her Sodium tablet.  Now only has to take it daily.  Her niece called this NP from there to get the spelling of the last name as the provider wanted to send something about the visit presumed.    Otherwise no real complaints.  Knows her water restriction.  Therapies still works with her and strengthening.    ALLERGIES:    Allergies   Allergen Reactions     Gramineae Pollens Other (See Comments)     ORCHARDGRASS. Shortness of breath when lawn is just cut.     Smoke. Other (See " Comments)     Hard to breathe.     Pollen Extract      Other reaction(s): Unknown        Past Medical, Surgical, Family and Social History: Reviewed and updated in EPIC.    MEDICATIONS:  Post Discharge Medication Reconciliation Status: patient was not discharged from an inpatient facility or TCU. Medications reconciled today    Current Outpatient Medications   Medication Sig Dispense Refill     sodium chloride 1 GM tablet Take 1 tablet (1 g) by mouth every morning       acetaminophen (TYLENOL) 325 MG tablet Take 3 tablets (975 mg) by mouth every 8 hours as needed for mild pain       albuterol (PROAIR HFA/PROVENTIL HFA/VENTOLIN HFA) 108 (90 Base) MCG/ACT inhaler Inhale 2 puffs into the lungs every 6 hours       Ascorbic Acid (VITAMIN C PO) Take 500 mg by mouth every morning       ASPIRIN LOW DOSE 81 MG EC tablet 1 TABLET ORALLY 2 TIMES DAILY (DX: PROPHYLAXIS) 56 tablet 0     bisacodyl (DULCOLAX) 10 MG suppository Place 1 suppository (10 mg) rectally daily as needed for constipation       Calcium Carb-Cholecalciferol (CALCIUM+D3) 600-800 MG-UNIT TABS Take 1 tablet by mouth daily       calcium carbonate (TUMS) 500 MG chewable tablet Take 2 tablets (1,000 mg) by mouth 2 times daily       cholecalciferol (D3 HIGH POTENCY) 125 MCG (5000 UT) CAPS Take 1 capsule by mouth daily       cycloSPORINE (RESTASIS) 0.05 % ophthalmic emulsion Place 1 drop into both eyes 2 times daily        fish oil-omega-3 fatty acids 1000 MG capsule Take 1 g by mouth daily       fluorometholone (FML LIQUIFILM) 0.1 % ophthalmic susp Place 1 drop Into the left eye daily        IBANDRONATE SODIUM PO Take 150 mg by mouth every 30 days       ipratropium-albuterol (COMBIVENT RESPIMAT)  MCG/ACT inhaler Inhale 1 puff into the lungs 4 times daily       Levothyroxine Sodium (LEVOXYL PO) Take 88 mcg by mouth daily        loratadine (CLARITIN) 10 MG tablet Take 1 tablet (10 mg) by mouth daily (Patient taking differently: Take 10 mg by mouth daily as  needed)       METOPROLOL TARTRATE PO Take 25 mg by mouth 2 times daily        mirtazapine (REMERON) 7.5 MG tablet Take 7.5 mg by mouth At Bedtime       Multiple Vitamins-Minerals (PRESERVISION AREDS PO) Take 1 tablet by mouth 2 times daily       omeprazole (PRILOSEC) 20 MG DR capsule Take 20 mg by mouth 2 times daily       OXYGEN-HELIUM IN        polyethylene glycol (MIRALAX) 17 GM/Dose powder Take 1 capful by mouth daily       polyethylene glycol 0.4%- propylene glycol 0.3% (SYSTANE ULTRA) 0.4-0.3 % SOLN ophthalmic solution Place 1 drop into both eyes 4 times daily       Probiotic Product (PROBIOTIC PO) Take 1 capsule by mouth every morning       senna-docusate (SENOKOT-S/PERICOLACE) 8.6-50 MG tablet Take 1 tablet by mouth 2 times daily as needed for constipation       spironolactone (ALDACTONE) 50 MG tablet Take 25 mg by mouth daily        timolol (TIMOPTIC) 0.5 % ophthalmic solution Place 1 drop Into the left eye daily        torsemide (DEMADEX) 20 MG tablet Take 20 mg by mouth daily         The health plan new enrollment has happened. I have reviewed the  MDS, the preventative needs,  and facility care plan. The level of care is appropriate. I have reviewed the code status/advanced directives.     REVIEW OF SYSTEMS:  4 point ROS neg other than the symptoms noted above in the HPI.   She did state she has feelings of a UTI with some retention, urgency and little produced.    PHYSICAL EXAM:  There were no vitals taken for this visit. no recent vitals or weight  Alert and oriented x3.  May have some minor forgetfulness.  Pleasant demeanor.  Skin is frail, pale and warm to touch.  Propelling self around in wheelchair in her apartment.    Heart rate regular/irregular and slightly tachy  Lungs are clear.  No oxygen.  Abdomen is flat, soft and non-tender.      Component      Latest Ref Rng & Units 10/31/2022 11/7/2022   Sodium      136 - 145 mmol/L 136 133 (L)   Potassium      3.4 - 5.3 mmol/L 4.6 4.2   Chloride      98  - 107 mmol/L 99 98   Carbon Dioxide (CO2)      22 - 29 mmol/L 20 (L) 22   Anion Gap      7 - 15 mmol/L 17 (H) 13   Urea Nitrogen      8.0 - 23.0 mg/dL 19.3 26.8 (H)   Creatinine      0.51 - 0.95 mg/dL 0.82 0.88   Calcium      8.2 - 9.6 mg/dL 9.3 8.7   Glucose      70 - 99 mg/dL 74 78   GFR Estimate      >60 mL/min/1.73m2 66 61     LABS[facility]IMAGING: [facility]    ASSESSMENT / PLAN:  (I50.32) Chronic diastolic heart failure (H)  (primary encounter diagnosis)  (N18.31) Stage 3a chronic kidney disease (H)  Comment: managed with Torsemide 20mg po daily.  Two most recent BMP's show her K+ within limits.  No acute symptoms.  No acute edema.  No changes.    (I48.20) Chronic atrial fibrillation (H)  (Z53.09) Contraindication to anticoagulation therapy  (D68.9) Coagulation defect, unspecified (H)  Comment: due to risk of falls, is NOT on coumadin.  In her recent hospital visit, the MD whom did the H & P commented about the Coagulation defect and saw that the INR that was done was slightly above the normal range value.  No other comment.  It is not like calculations or INRs drawn regularly here.  As always monitor for bleeding.  Remains on Baby ASA 81mg daily.    No change in plan of care.      (E87.1) Hyponatremia  Comment: Georgia did note she had a reduction in the sodium chloride orders from 1GM twice a day down to just morning time now.  Georgia was able to rattle off how much fluids she is allowed in a 24 hour period.  Her Nephrologist thought she was doing well and so lowered the NaCl.  No further labs ordered at this time.  Plan: sodium chloride 1 GM tablet    (M62.81) Generalized muscle weakness  Comment: continues to work with home care therapy as see it on the big calendar.  If it helps to be independent and reduce falls, not troubles with continuing services.    (R39.15)  Urinary urgency  Comment:  Niece wanted to have her urine tested monthly if possible due to Georgia's hx of UTIs and  sometimes may not catch if she has one.  Did not suggest this as may not always find one and then when not scheduled to collect a sample she may have the symptoms.  Asked her about her symptoms today and Georgia noted that she feels like she has urgency.  Offered to write an order to have her urine tested.  She was fine with this.    Orders:  UA/UC due to frequency and retention feeling    Electronically signed by  CASTRO Hazel CNP

## 2022-11-11 NOTE — LETTER
"    11/11/2022        RE: Marla Dunn  [place]  [address]  [place] [address]        [facility] GERIATRICS  REGULATORY VISIT  November 11, 2022      [facility] Medical Record Number:  [id number]  Place of Service where encounter took place:  [facility] [place] ([facility]) [[id number]]    Chief Complaint   Patient presents with     FVP Care Coordination - Health Plan or Product Change     RECHECK       HPI:    Marla Dunn is a 89 year old  (5/22/1929), who is being seen today for a federally mandated E/M visit. HPI information obtained from: facility chart records, patient report and [facility] chart review.    Today's concern is:    Diagnoses       Codes Comments    Chronic diastolic heart failure (H)    -  Primary I50.32     Stage 3a chronic kidney disease (H)     N18.31     Chronic atrial fibrillation (H)     I48.20     Contraindication to anticoagulation therapy     Z53.09     Coagulation defect, unspecified (H)     D68.9     Hyponatremia     E87.1     Generalized muscle weakness     M62.81         Came to see Georgia today as she had a change with her insurance and is on [employer] now.      Found Georgia in her apartment and she had a little time to visit before she was going down to see [employer].    Once she figured out whom this NP was, she apologized for her \"short\" behaviors.  Georgia has only seen this NP once last month and so did not expect her to remember.     Georgia was at the nephrologist office this week and stated she had an adjustment with her Sodium tablet.  Now only has to take it daily.  Her niece called this NP from there to get the spelling of the last name as the provider wanted to send something about the visit presumed.    Otherwise no real complaints.  Knows her water restriction.  Therapies still works with her and strengthening.    ALLERGIES:    Allergies   Allergen Reactions "     Gramineae Pollens Other (See Comments)     ORCHARDGRASS. Shortness of breath when lawn is just cut.     Smoke. Other (See Comments)     Hard to breathe.     Pollen Extract      Other reaction(s): Unknown        Past Medical, Surgical, Family and Social History: Reviewed and updated in EPIC.    MEDICATIONS:  Post Discharge Medication Reconciliation Status: patient was not discharged from an inpatient facility or TCU. Medications reconciled today    Current Outpatient Medications   Medication Sig Dispense Refill     sodium chloride 1 GM tablet Take 1 tablet (1 g) by mouth every morning       acetaminophen (TYLENOL) 325 MG tablet Take 3 tablets (975 mg) by mouth every 8 hours as needed for mild pain       albuterol (PROAIR HFA/PROVENTIL HFA/VENTOLIN HFA) 108 (90 Base) MCG/ACT inhaler Inhale 2 puffs into the lungs every 6 hours       Ascorbic Acid (VITAMIN C PO) Take 500 mg by mouth every morning       ASPIRIN LOW DOSE 81 MG EC tablet 1 TABLET ORALLY 2 TIMES DAILY (DX: PROPHYLAXIS) 56 tablet 0     bisacodyl (DULCOLAX) 10 MG suppository Place 1 suppository (10 mg) rectally daily as needed for constipation       Calcium Carb-Cholecalciferol (CALCIUM+D3) 600-800 MG-UNIT TABS Take 1 tablet by mouth daily       calcium carbonate (TUMS) 500 MG chewable tablet Take 2 tablets (1,000 mg) by mouth 2 times daily       cholecalciferol (D3 HIGH POTENCY) 125 MCG (5000 UT) CAPS Take 1 capsule by mouth daily       cycloSPORINE (RESTASIS) 0.05 % ophthalmic emulsion Place 1 drop into both eyes 2 times daily        fish oil-omega-3 fatty acids 1000 MG capsule Take 1 g by mouth daily       fluorometholone (FML LIQUIFILM) 0.1 % ophthalmic susp Place 1 drop Into the left eye daily        IBANDRONATE SODIUM PO Take 150 mg by mouth every 30 days       ipratropium-albuterol (COMBIVENT RESPIMAT)  MCG/ACT inhaler Inhale 1 puff into the lungs 4 times daily       Levothyroxine Sodium (LEVOXYL PO) Take 88 mcg by mouth daily         loratadine (CLARITIN) 10 MG tablet Take 1 tablet (10 mg) by mouth daily (Patient taking differently: Take 10 mg by mouth daily as needed)       METOPROLOL TARTRATE PO Take 25 mg by mouth 2 times daily        mirtazapine (REMERON) 7.5 MG tablet Take 7.5 mg by mouth At Bedtime       Multiple Vitamins-Minerals (PRESERVISION AREDS PO) Take 1 tablet by mouth 2 times daily       omeprazole (PRILOSEC) 20 MG DR capsule Take 20 mg by mouth 2 times daily       OXYGEN-HELIUM IN        polyethylene glycol (MIRALAX) 17 GM/Dose powder Take 1 capful by mouth daily       polyethylene glycol 0.4%- propylene glycol 0.3% (SYSTANE ULTRA) 0.4-0.3 % SOLN ophthalmic solution Place 1 drop into both eyes 4 times daily       Probiotic Product (PROBIOTIC PO) Take 1 capsule by mouth every morning       senna-docusate (SENOKOT-S/PERICOLACE) 8.6-50 MG tablet Take 1 tablet by mouth 2 times daily as needed for constipation       spironolactone (ALDACTONE) 50 MG tablet Take 25 mg by mouth daily        timolol (TIMOPTIC) 0.5 % ophthalmic solution Place 1 drop Into the left eye daily        torsemide (DEMADEX) 20 MG tablet Take 20 mg by mouth daily         The health plan new enrollment has happened. I have reviewed the  MDS, the preventative needs,  and facility care plan. The level of care is appropriate. I have reviewed the code status/advanced directives.     REVIEW OF SYSTEMS:  4 point ROS neg other than the symptoms noted above in the HPI.   She did state she has feelings of a UTI with some retention, urgency and little produced.    PHYSICAL EXAM:  There were no vitals taken for this visit. no recent vitals or weight  Alert and oriented x3.  May have some minor forgetfulness.  Pleasant demeanor.  Skin is frail, pale and warm to touch.  Propelling self around in wheelchair in her apartment.    Heart rate regular/irregular and slightly tachy  Lungs are clear.  No oxygen.  Abdomen is flat, soft and non-tender.      Component      Latest Ref Rng &  Units 10/31/2022 11/7/2022   Sodium      136 - 145 mmol/L 136 133 (L)   Potassium      3.4 - 5.3 mmol/L 4.6 4.2   Chloride      98 - 107 mmol/L 99 98   Carbon Dioxide (CO2)      22 - 29 mmol/L 20 (L) 22   Anion Gap      7 - 15 mmol/L 17 (H) 13   Urea Nitrogen      8.0 - 23.0 mg/dL 19.3 26.8 (H)   Creatinine      0.51 - 0.95 mg/dL 0.82 0.88   Calcium      8.2 - 9.6 mg/dL 9.3 8.7   Glucose      70 - 99 mg/dL 74 78   GFR Estimate      >60 mL/min/1.73m2 66 61     LABS/IMAGING: [facility] Epic [facility]    ASSESSMENT / PLAN:  (I50.32) Chronic diastolic heart failure (H)  (primary encounter diagnosis)  (N18.31) Stage 3a chronic kidney disease (H)  Comment: managed with Torsemide 20mg po daily.  Two most recent BMP's show her K+ within limits.  No acute symptoms.  No acute edema.  No changes.    (I48.20) Chronic atrial fibrillation (H)  (Z53.09) Contraindication to anticoagulation therapy  (D68.9) Coagulation defect, unspecified (H)  Comment: due to risk of falls, is NOT on coumadin.  In her recent hospital visit, the MD whom did the H & P commented about the Coagulation defect and saw that the INR that was done was slightly above the normal range value.  No other comment.  It is not like calculations or INRs drawn regularly here.  As always monitor for bleeding.  Remains on Baby ASA 81mg daily.    No change in plan of care.      (E87.1) Hyponatremia  Comment: Georgia did note she had a reduction in the sodium chloride orders from 1GM twice a day down to just morning time now.  Georgia was able to rattle off how much fluids she is allowed in a 24 hour period.  Her Nephrologist thought she was doing well and so lowered the NaCl.  No further labs ordered at this time.  Plan: sodium chloride 1 GM tablet    (M62.81) Generalized muscle weakness  Comment: continues to work with home care therapy as see it on the big calendar.  If it helps to be independent and reduce falls, not troubles with continuing  services.    (R39.15)  Urinary urgency  Comment:  Niece wanted to have her urine tested monthly if possible due to Georgia's hx of UTIs and sometimes may not catch if she has one.  Did not suggest this as may not always find one and then when not scheduled to collect a sample she may have the symptoms.  Asked her about her symptoms today and Georgia noted that she feels like she has urgency.  Offered to write an order to have her urine tested.  She was fine with this.    Orders:  UA/UC due to frequency and retention feeling    Electronically signed by  CASTRO Hazel CNP           Sincerely,        CASTRO Hazel CNP

## 2022-11-12 ENCOUNTER — LAB REQUISITION (OUTPATIENT)
Dept: LAB | Facility: CLINIC | Age: 87
End: 2022-11-12
Payer: COMMERCIAL

## 2022-11-12 DIAGNOSIS — R35.0 FREQUENCY OF MICTURITION: ICD-10-CM

## 2022-11-12 LAB
ALBUMIN UR-MCNC: NEGATIVE MG/DL
APPEARANCE UR: CLEAR
BILIRUB UR QL STRIP: NEGATIVE
COLOR UR AUTO: ABNORMAL
GLUCOSE UR STRIP-MCNC: NEGATIVE MG/DL
HGB UR QL STRIP: NEGATIVE
HYALINE CASTS: 3 /LPF
KETONES UR STRIP-MCNC: NEGATIVE MG/DL
LEUKOCYTE ESTERASE UR QL STRIP: NEGATIVE
NITRATE UR QL: NEGATIVE
PH UR STRIP: 6 [PH] (ref 5–7)
RBC URINE: 1 /HPF
SP GR UR STRIP: 1.01 (ref 1–1.03)
UROBILINOGEN UR STRIP-MCNC: NORMAL MG/DL
WBC URINE: <1 /HPF

## 2022-11-12 PROCEDURE — 81001 URINALYSIS AUTO W/SCOPE: CPT | Mod: ORL | Performed by: NURSE PRACTITIONER

## 2022-11-15 PROBLEM — D68.9 COAGULATION DEFECT, UNSPECIFIED (H): Status: ACTIVE | Noted: 2022-11-15

## 2022-11-15 RX ORDER — SODIUM CHLORIDE 1 G/1
1 TABLET ORAL EVERY MORNING
Start: 2022-11-09 | End: 2022-11-29

## 2022-11-20 DIAGNOSIS — S72.141A INTERTROCHANTERIC FRACTURE OF RIGHT FEMUR, CLOSED, INITIAL ENCOUNTER (H): ICD-10-CM

## 2022-11-21 RX ORDER — ASPIRIN 81 MG/1
TABLET, COATED ORAL
Qty: 56 TABLET | Refills: 11 | Status: SHIPPED | OUTPATIENT
Start: 2022-11-21 | End: 2023-09-25

## 2022-11-24 ENCOUNTER — LAB REQUISITION (OUTPATIENT)
Dept: LAB | Facility: CLINIC | Age: 87
End: 2022-11-24
Payer: COMMERCIAL

## 2022-11-24 LAB
ALBUMIN UR-MCNC: NEGATIVE MG/DL
APPEARANCE UR: CLEAR
BILIRUB UR QL STRIP: NEGATIVE
COLOR UR AUTO: ABNORMAL
GLUCOSE UR STRIP-MCNC: NEGATIVE MG/DL
HGB UR QL STRIP: NEGATIVE
HYALINE CASTS: 1 /LPF
KETONES UR STRIP-MCNC: NEGATIVE MG/DL
LEUKOCYTE ESTERASE UR QL STRIP: NEGATIVE
MUCOUS THREADS #/AREA URNS LPF: PRESENT /LPF
NITRATE UR QL: NEGATIVE
PH UR STRIP: 6 [PH] (ref 5–7)
RBC URINE: <1 /HPF
SP GR UR STRIP: 1.01 (ref 1–1.03)
SQUAMOUS EPITHELIAL: <1 /HPF
TRANSITIONAL EPI: <1 /HPF
UROBILINOGEN UR STRIP-MCNC: NORMAL MG/DL
WBC URINE: 1 /HPF

## 2022-11-24 PROCEDURE — 81001 URINALYSIS AUTO W/SCOPE: CPT | Mod: ORL | Performed by: NURSE PRACTITIONER

## 2022-11-25 ENCOUNTER — ASSISTED LIVING VISIT (OUTPATIENT)
Dept: GERIATRICS | Facility: CLINIC | Age: 87
End: 2022-11-25
Payer: COMMERCIAL

## 2022-11-25 DIAGNOSIS — N18.31 STAGE 3A CHRONIC KIDNEY DISEASE (H): ICD-10-CM

## 2022-11-25 DIAGNOSIS — I50.32 CHRONIC DIASTOLIC CONGESTIVE HEART FAILURE (H): Primary | ICD-10-CM

## 2022-11-25 DIAGNOSIS — M62.81 GENERALIZED MUSCLE WEAKNESS: ICD-10-CM

## 2022-11-25 DIAGNOSIS — K59.00 CONSTIPATION, UNSPECIFIED CONSTIPATION TYPE: ICD-10-CM

## 2022-11-25 DIAGNOSIS — R19.8 IRREGULAR BOWEL HABITS: ICD-10-CM

## 2022-11-25 DIAGNOSIS — G47.00 INSOMNIA, UNSPECIFIED TYPE: ICD-10-CM

## 2022-11-25 NOTE — LETTER
"    11/25/2022        RE: Marla Dunn  [place]  [address]  [facility] [address]        [facility] GERIATRICS  ACUTE/EPISODIC VISIT    [facility] Medical Record Number:  [id number]  Place of Service where encounter took place:  [facility] [[id number]]    Chief Complaint   Patient presents with     RECHECK       HPI:    Marla Dunn is a 89 year old  (5/22/1929), who is being seen today for an episodic care visit.  HPI information obtained from: facility chart records, facility staff, patient report and [facility] chart review.    Today's concern is:  Diagnoses       Codes Comments    Chronic diastolic congestive heart failure (H)    -  Primary I50.32     Stage 3a chronic kidney disease (H)     N18.31     Irregular bowel habits     R19.8     Constipation, unspecified constipation type     K59.00     Generalized muscle weakness     M62.81     Insomnia, unspecified type     G47.00         [place] is visited in her room. She is sitting her wheelchair. She is wearing her bathrobe and states \"I'm not doing that great, but I'm better than yesterday\". She describes intermittent abd aches. Constipation despite drinking Miralax today. She reports no urination overnight which is not typical for her, however a UA was completed and was negative.She has been drinking pedialyte per her nieces encouragement. Difficulty falling and staying asleep.    Afebrile, denies SOB, chest pain, nausea, vomiting, diarrhea, or other pain.     ALLERGIES:    Allergies   Allergen Reactions     Gramineae Pollens Other (See Comments)     ORCHARDGRASS. Shortness of breath when lawn is just cut.     Smoke. Other (See Comments)     Hard to breathe.     Pollen Extract      Other reaction(s): Unknown        MEDICATIONS:  patient was not discharged from an inpatient facility or TCU. Medication reconciliation completed today.     Current Outpatient " Medications   Medication Sig Dispense Refill     acetaminophen (TYLENOL) 325 MG tablet Take 3 tablets (975 mg) by mouth every 8 hours as needed for mild pain       albuterol (PROAIR HFA/PROVENTIL HFA/VENTOLIN HFA) 108 (90 Base) MCG/ACT inhaler Inhale 2 puffs into the lungs every 6 hours       Ascorbic Acid (VITAMIN C PO) Take 500 mg by mouth every morning       ASPIRIN LOW DOSE 81 MG EC tablet 1 TABLET ORALLY 2 TIMES DAILY (DX: PROPHYLAXIS) 56 tablet 11     bisacodyl (DULCOLAX) 10 MG suppository Place 1 suppository (10 mg) rectally daily as needed for constipation       Calcium Carb-Cholecalciferol (CALCIUM+D3) 600-800 MG-UNIT TABS Take 1 tablet by mouth daily       calcium carbonate (TUMS) 500 MG chewable tablet Take 2 tablets (1,000 mg) by mouth 2 times daily       cholecalciferol (D3 HIGH POTENCY) 125 MCG (5000 UT) CAPS Take 1 capsule by mouth daily       cycloSPORINE (RESTASIS) 0.05 % ophthalmic emulsion Place 1 drop into both eyes 2 times daily        fish oil-omega-3 fatty acids 1000 MG capsule Take 1 g by mouth daily       fluorometholone (FML LIQUIFILM) 0.1 % ophthalmic susp Place 1 drop Into the left eye daily        IBANDRONATE SODIUM PO Take 150 mg by mouth every 30 days       ipratropium-albuterol (COMBIVENT RESPIMAT)  MCG/ACT inhaler Inhale 1 puff into the lungs 4 times daily       Levothyroxine Sodium (LEVOXYL PO) Take 88 mcg by mouth daily        loratadine (CLARITIN) 10 MG tablet Take 1 tablet (10 mg) by mouth daily (Patient taking differently: Take 10 mg by mouth daily as needed)       METOPROLOL TARTRATE PO Take 25 mg by mouth 2 times daily        mirtazapine (REMERON) 7.5 MG tablet Take 7.5 mg by mouth At Bedtime       Multiple Vitamins-Minerals (PRESERVISION AREDS PO) Take 1 tablet by mouth 2 times daily       omeprazole (PRILOSEC) 20 MG DR capsule Take 20 mg by mouth 2 times daily       OXYGEN-HELIUM IN        polyethylene glycol (MIRALAX) 17 GM/Dose powder Take 1 capful by mouth daily        polyethylene glycol 0.4%- propylene glycol 0.3% (SYSTANE ULTRA) 0.4-0.3 % SOLN ophthalmic solution Place 1 drop into both eyes 4 times daily       Probiotic Product (PROBIOTIC PO) Take 1 capsule by mouth every morning       senna-docusate (SENOKOT-S/PERICOLACE) 8.6-50 MG tablet Take 1 tablet by mouth 2 times daily as needed for constipation       sodium chloride 1 GM tablet Take 1 tablet (1 g) by mouth every morning       spironolactone (ALDACTONE) 50 MG tablet Take 25 mg by mouth daily        timolol (TIMOPTIC) 0.5 % ophthalmic solution Place 1 drop Into the left eye daily        torsemide (DEMADEX) 20 MG tablet Take 20 mg by mouth daily       REVIEW OF SYSTEMS:  Review of Systems   Constitutional: Positive for malaise/fatigue.   HENT: Negative.    Eyes: Negative.    Respiratory: Negative.    Cardiovascular: Negative.    Gastrointestinal: Positive for constipation.   Genitourinary: Negative.    Musculoskeletal: Negative.    Skin: Negative.    Neurological: Negative.    Endo/Heme/Allergies: Negative.    Psychiatric/Behavioral: The patient has insomnia.       PHYSICAL EXAM:  BP (!) 149/61   Pulse 76   Temp (!) 96  F (35.6  C)   Resp 18   Physical Exam  HENT:      Head: Normocephalic and atraumatic.      Nose: Nose normal.      Mouth/Throat:      Mouth: Mucous membranes are moist.      Pharynx: Oropharynx is clear.   Eyes:      General: Vision grossly intact.   Cardiovascular:      Rate and Rhythm: Rhythm irregular.      Heart sounds: Murmur heard.   Pulmonary:      Effort: Pulmonary effort is normal.      Breath sounds: Normal breath sounds.   Abdominal:      Palpations: Abdomen is soft.   Skin:     General: Skin is warm and dry.   Neurological:      General: No focal deficit present.      Mental Status: She is alert.   Psychiatric:         Attention and Perception: Attention normal.         Speech: Speech normal.         Behavior: Behavior is cooperative.     UA RESULTS:  Lab Test 11/24/22  1550   COLOR Light  Yellow   APPEARANCE Clear   URINEGLC Negative   URINEBILI Negative   URINEKETONE Negative   SG 1.009   UBLD Negative   URINEPH 6.0   PROTEIN Negative   UROBILINOGEN  --    NITRITE Negative   LEUKEST Negative   RBCU <1   WBCU 1       ASSESSMENT / PLAN:  (I50.32) Chronic diastolic congestive heart failure (H)  (primary encounter diagnosis)  (N18.31) Stage 3a chronic kidney disease (H)  Comment:   Currently controlled, continue medications as prescribed. Seen by nephrology on 11/22.   -continue torsemide 20 mg and spironolactone 25 mg  -goal is to wean off sodium tablets  -q2-3 month BMP  -high protein intake  -keep fluid restriction 32 ounces  -return to nephrology clinic in 4 months    (R19.8) Irregular bowel habits  (K59.00) Constipation, unspecified constipation type  (M62.81) Generalized muscle weakness  Comment:   Generalized overall feeling of not feeling well. Suspect possible viral infection. UA negative. Will order CBC on 11/28 and BMP to evaluate for infection and electrolytes. History of irregular bowel habits, now with constipation.   -continue bowel medications, colace, dulcolax, magnesium citrate, metamucil, miralax  -encourage rest and hydration    (G47.00) Insomnia, unspecified type  Comment:   Plan to start melatonin 5 mg PO at bedtime and encourage sleep hygiene.   [place]ia does not believe she is able to stay asleep.  She wakes up and not able to get back asleep.      Orders:  1. Order BMP/CBC on 11/28 for HTN  2. Start melatonin 5 mg at bedtime for insomnia    Electronically signed by  Jessenia Riley , student NP    Electronically signed by Gabrielle Wallace RN, CNP    I was present with the medical student/advanced practice registered nurse, Jessenia Riley, NP student, who participated in the service and in the documentation of this note. I have verified the history and personally performed the physical exam and medical decision making. I agree with the assessment and plan of care as  documented in the note.                 Sincerely,        CASTRO Hazel CNP

## 2022-11-25 NOTE — PROGRESS NOTES
"[facility] GERIATRICS  ACUTE/EPISODIC VISIT    [facility] Medical Record Number:  [id number]  Place of Service where encounter took place:  [facility] [[id number]]    Chief Complaint   Patient presents with     RECHECK       HPI:    Marla Dunn is a 89 year old  (5/22/1929), who is being seen today for an episodic care visit.  HPI information obtained from: facility chart records, facility staff, patient report and [facility] chart review.    Today's concern is:  Diagnoses       Codes Comments    Chronic diastolic congestive heart failure (H)    -  Primary I50.32     Stage 3a chronic kidney disease (H)     N18.31     Irregular bowel habits     R19.8     Constipation, unspecified constipation type     K59.00     Generalized muscle weakness     M62.81     Insomnia, unspecified type     G47.00         [place]ia is visited in her room. She is sitting her wheelchair. She is wearing her bathrobe and states \"I'm not doing that great, but I'm better than yesterday\". She describes intermittent abd aches. Constipation despite drinking Miralax today. She reports no urination overnight which is not typical for her, however a UA was completed and was negative.She has been drinking pedialyte per her nieces encouragement. Difficulty falling and staying asleep.    Afebrile, denies SOB, chest pain, nausea, vomiting, diarrhea, or other pain.     ALLERGIES:    Allergies   Allergen Reactions     Gramineae Pollens Other (See Comments)     ORCHARDGRASS. Shortness of breath when lawn is just cut.     Smoke. Other (See Comments)     Hard to breathe.     Pollen Extract      Other reaction(s): Unknown        MEDICATIONS:  patient was not discharged from an inpatient facility or TCU. Medication reconciliation completed today.     Current Outpatient Medications   Medication Sig Dispense Refill     acetaminophen (TYLENOL) 325 MG tablet Take 3 tablets (975 mg) by mouth every 8 " hours as needed for mild pain       albuterol (PROAIR HFA/PROVENTIL HFA/VENTOLIN HFA) 108 (90 Base) MCG/ACT inhaler Inhale 2 puffs into the lungs every 6 hours       Ascorbic Acid (VITAMIN C PO) Take 500 mg by mouth every morning       ASPIRIN LOW DOSE 81 MG EC tablet 1 TABLET ORALLY 2 TIMES DAILY (DX: PROPHYLAXIS) 56 tablet 11     bisacodyl (DULCOLAX) 10 MG suppository Place 1 suppository (10 mg) rectally daily as needed for constipation       Calcium Carb-Cholecalciferol (CALCIUM+D3) 600-800 MG-UNIT TABS Take 1 tablet by mouth daily       calcium carbonate (TUMS) 500 MG chewable tablet Take 2 tablets (1,000 mg) by mouth 2 times daily       cholecalciferol (D3 HIGH POTENCY) 125 MCG (5000 UT) CAPS Take 1 capsule by mouth daily       cycloSPORINE (RESTASIS) 0.05 % ophthalmic emulsion Place 1 drop into both eyes 2 times daily        fish oil-omega-3 fatty acids 1000 MG capsule Take 1 g by mouth daily       fluorometholone (FML LIQUIFILM) 0.1 % ophthalmic susp Place 1 drop Into the left eye daily        IBANDRONATE SODIUM PO Take 150 mg by mouth every 30 days       ipratropium-albuterol (COMBIVENT RESPIMAT)  MCG/ACT inhaler Inhale 1 puff into the lungs 4 times daily       Levothyroxine Sodium (LEVOXYL PO) Take 88 mcg by mouth daily        loratadine (CLARITIN) 10 MG tablet Take 1 tablet (10 mg) by mouth daily (Patient taking differently: Take 10 mg by mouth daily as needed)       METOPROLOL TARTRATE PO Take 25 mg by mouth 2 times daily        mirtazapine (REMERON) 7.5 MG tablet Take 7.5 mg by mouth At Bedtime       Multiple Vitamins-Minerals (PRESERVISION AREDS PO) Take 1 tablet by mouth 2 times daily       omeprazole (PRILOSEC) 20 MG DR capsule Take 20 mg by mouth 2 times daily       OXYGEN-HELIUM IN        polyethylene glycol (MIRALAX) 17 GM/Dose powder Take 1 capful by mouth daily       polyethylene glycol 0.4%- propylene glycol 0.3% (SYSTANE ULTRA) 0.4-0.3 % SOLN ophthalmic solution Place 1 drop into both  eyes 4 times daily       Probiotic Product (PROBIOTIC PO) Take 1 capsule by mouth every morning       senna-docusate (SENOKOT-S/PERICOLACE) 8.6-50 MG tablet Take 1 tablet by mouth 2 times daily as needed for constipation       sodium chloride 1 GM tablet Take 1 tablet (1 g) by mouth every morning       spironolactone (ALDACTONE) 50 MG tablet Take 25 mg by mouth daily        timolol (TIMOPTIC) 0.5 % ophthalmic solution Place 1 drop Into the left eye daily        torsemide (DEMADEX) 20 MG tablet Take 20 mg by mouth daily       REVIEW OF SYSTEMS:  Review of Systems   Constitutional: Positive for malaise/fatigue.   HENT: Negative.    Eyes: Negative.    Respiratory: Negative.    Cardiovascular: Negative.    Gastrointestinal: Positive for constipation.   Genitourinary: Negative.    Musculoskeletal: Negative.    Skin: Negative.    Neurological: Negative.    Endo/Heme/Allergies: Negative.    Psychiatric/Behavioral: The patient has insomnia.       PHYSICAL EXAM:  BP (!) 149/61   Pulse 76   Temp (!) 96  F (35.6  C)   Resp 18   Physical Exam  HENT:      Head: Normocephalic and atraumatic.      Nose: Nose normal.      Mouth/Throat:      Mouth: Mucous membranes are moist.      Pharynx: Oropharynx is clear.   Eyes:      General: Vision grossly intact.   Cardiovascular:      Rate and Rhythm: Rhythm irregular.      Heart sounds: Murmur heard.   Pulmonary:      Effort: Pulmonary effort is normal.      Breath sounds: Normal breath sounds.   Abdominal:      Palpations: Abdomen is soft.   Skin:     General: Skin is warm and dry.   Neurological:      General: No focal deficit present.      Mental Status: She is alert.   Psychiatric:         Attention and Perception: Attention normal.         Speech: Speech normal.         Behavior: Behavior is cooperative.     UA RESULTS:  Lab Test 11/24/22  1550   COLOR Light Yellow   APPEARANCE Clear   URINEGLC Negative   URINEBILI Negative   URINEKETONE Negative   SG 1.009   UBLD Negative    URINEPH 6.0   PROTEIN Negative   UROBILINOGEN  --    NITRITE Negative   LEUKEST Negative   RBCU <1   WBCU 1       ASSESSMENT / PLAN:  (I50.32) Chronic diastolic congestive heart failure (H)  (primary encounter diagnosis)  (N18.31) Stage 3a chronic kidney disease (H)  Comment:   Currently controlled, continue medications as prescribed. Seen by nephrology on 11/22.   -continue torsemide 20 mg and spironolactone 25 mg  -goal is to wean off sodium tablets  -q2-3 month BMP  -high protein intake  -keep fluid restriction 32 ounces  -return to nephrology clinic in 4 months    (R19.8) Irregular bowel habits  (K59.00) Constipation, unspecified constipation type  (M62.81) Generalized muscle weakness  Comment:   Generalized overall feeling of not feeling well. Suspect possible viral infection. UA negative. Will order CBC on 11/28 and BMP to evaluate for infection and electrolytes. History of irregular bowel habits, now with constipation.   -continue bowel medications, colace, dulcolax, magnesium citrate, metamucil, miralax  -encourage rest and hydration    (G47.00) Insomnia, unspecified type  Comment:   Plan to start melatonin 5 mg PO at bedtime and encourage sleep hygiene.   [place] does not believe she is able to stay asleep.  She wakes up and not able to get back asleep.      Orders:  1. Order BMP/CBC on 11/28 for HTN  2. Start melatonin 5 mg at bedtime for insomnia    Electronically signed by  Jessenia Riley , student NP    Electronically signed by Gabrielle Wallace RN, CNP    I was present with the medical student/advanced practice registered nurse, Jessenia Riley, JANESSA student, who participated in the service and in the documentation of this note. I have verified the history and personally performed the physical exam and medical decision making. I agree with the assessment and plan of care as documented in the note.

## 2022-11-26 ENCOUNTER — LAB REQUISITION (OUTPATIENT)
Dept: LAB | Facility: CLINIC | Age: 87
End: 2022-11-26
Payer: COMMERCIAL

## 2022-11-26 DIAGNOSIS — I10 ESSENTIAL (PRIMARY) HYPERTENSION: ICD-10-CM

## 2022-11-26 ASSESSMENT — VISUAL ACUITY: OU: 1

## 2022-11-26 ASSESSMENT — ENCOUNTER SYMPTOMS
RESPIRATORY NEGATIVE: 1
CONSTIPATION: 1
EYES NEGATIVE: 1
CARDIOVASCULAR NEGATIVE: 1
MUSCULOSKELETAL NEGATIVE: 1
INSOMNIA: 1
NEUROLOGICAL NEGATIVE: 1

## 2022-11-27 VITALS
DIASTOLIC BLOOD PRESSURE: 61 MMHG | SYSTOLIC BLOOD PRESSURE: 149 MMHG | TEMPERATURE: 96 F | HEART RATE: 76 BPM | RESPIRATION RATE: 18 BRPM

## 2022-11-28 ENCOUNTER — TELEPHONE (OUTPATIENT)
Dept: GERIATRICS | Facility: CLINIC | Age: 87
End: 2022-11-28

## 2022-11-28 ENCOUNTER — LAB REQUISITION (OUTPATIENT)
Dept: LAB | Facility: CLINIC | Age: 87
End: 2022-11-28
Payer: COMMERCIAL

## 2022-11-28 DIAGNOSIS — Z11.59 ENCOUNTER FOR SCREENING FOR OTHER VIRAL DISEASES: ICD-10-CM

## 2022-11-28 LAB
ERYTHROCYTE [DISTWIDTH] IN BLOOD BY AUTOMATED COUNT: 17.4 % (ref 10–15)
HCT VFR BLD AUTO: 39.3 % (ref 35–47)
HGB BLD-MCNC: 12.7 G/DL (ref 11.7–15.7)
MCH RBC QN AUTO: 31.8 PG (ref 26.5–33)
MCHC RBC AUTO-ENTMCNC: 32.3 G/DL (ref 31.5–36.5)
MCV RBC AUTO: 98 FL (ref 78–100)
PLATELET # BLD AUTO: 269 10E3/UL (ref 150–450)
RBC # BLD AUTO: 4 10E6/UL (ref 3.8–5.2)
WBC # BLD AUTO: 7.9 10E3/UL (ref 4–11)

## 2022-11-28 PROCEDURE — 80048 BASIC METABOLIC PNL TOTAL CA: CPT | Mod: ORL | Performed by: NURSE PRACTITIONER

## 2022-11-28 PROCEDURE — 85027 COMPLETE CBC AUTOMATED: CPT | Mod: ORL | Performed by: NURSE PRACTITIONER

## 2022-11-28 PROCEDURE — P9603 ONE-WAY ALLOW PRORATED MILES: HCPCS | Mod: ORL | Performed by: NURSE PRACTITIONER

## 2022-11-28 PROCEDURE — 36415 COLL VENOUS BLD VENIPUNCTURE: CPT | Mod: ORL | Performed by: NURSE PRACTITIONER

## 2022-11-28 NOTE — TELEPHONE ENCOUNTER
Staff updated NP that lab came late today and INR would not be here til later.      Staff used their machine and got a reading of 3.8 where last week it was 4.5 on 11/23/22.  Sounded like the [facility] that was following her wanted lab to draw the INR today.  Only collected today was CBC and BMP    Orders:  Hold coumadin today  INR tomorrow for A fib      Electronically signed by Gabrielle Wallace RN, CNP

## 2022-11-29 DIAGNOSIS — E87.1 HYPONATREMIA: ICD-10-CM

## 2022-11-29 LAB
ANION GAP SERPL CALCULATED.3IONS-SCNC: 18 MMOL/L (ref 7–15)
BUN SERPL-MCNC: 22.5 MG/DL (ref 8–23)
CALCIUM SERPL-MCNC: 9 MG/DL (ref 8.2–9.6)
CHLORIDE SERPL-SCNC: 89 MMOL/L (ref 98–107)
CREAT SERPL-MCNC: 0.84 MG/DL (ref 0.51–0.95)
DEPRECATED HCO3 PLAS-SCNC: 20 MMOL/L (ref 22–29)
GFR SERPL CREATININE-BSD FRML MDRD: 64 ML/MIN/1.73M2
GLUCOSE SERPL-MCNC: 92 MG/DL (ref 70–99)
POTASSIUM SERPL-SCNC: 3.6 MMOL/L (ref 3.4–5.3)
SODIUM SERPL-SCNC: 127 MMOL/L (ref 136–145)

## 2022-11-29 PROCEDURE — U0005 INFEC AGEN DETEC AMPLI PROBE: HCPCS | Mod: ORL

## 2022-11-29 RX ORDER — SODIUM CHLORIDE 1 G/1
1 TABLET ORAL 2 TIMES DAILY
Start: 2022-11-29 | End: 2022-12-19

## 2022-11-29 NOTE — CONFIDENTIAL NOTE
Residents Na level came back at 127.  Has not been feeling well.  Her nephrologist has been adjusting her Na pill to remove her from it hopefully.    Today increased the dose back to 1GM po BID and BMP next Monday.    Will update the niece.    CASTRO Hazel CNP

## 2022-11-30 ENCOUNTER — TELEPHONE (OUTPATIENT)
Dept: GERIATRICS | Facility: CLINIC | Age: 87
End: 2022-11-30

## 2022-11-30 DIAGNOSIS — J45.20 MILD INTERMITTENT ASTHMA WITHOUT COMPLICATION: Primary | ICD-10-CM

## 2022-11-30 LAB — SARS-COV-2 RNA RESP QL NAA+PROBE: NEGATIVE

## 2022-11-30 RX ORDER — IPRATROPIUM BROMIDE AND ALBUTEROL 20; 100 UG/1; UG/1
SPRAY, METERED RESPIRATORY (INHALATION)
Qty: 4 G | Refills: 11 | Status: SHIPPED | OUTPATIENT
Start: 2022-11-30 | End: 2023-11-17

## 2022-11-30 NOTE — PROGRESS NOTES
[facility] [employer] Chart review      chart   No triggering events at this time   CM will follow-up as needed     Akila Lu MA [facility] Care Coordinator   [phone] - [facility] cell phone   [phone] - work fax

## 2022-11-30 NOTE — TELEPHONE ENCOUNTER
Placed a call to Dr. Siddiqui's office and left a VM with the RN and explained the medication change with Sodium Chloride 1GM twice a day as her Na was 127 this past Monday and will repeat a Na level on Monday.  Left phone number in case of any questions.    Electronically signed by Gabrielle Wallace RN, CNP

## 2022-12-02 ENCOUNTER — MEDICAL CORRESPONDENCE (OUTPATIENT)
Dept: HEALTH INFORMATION MANAGEMENT | Facility: CLINIC | Age: 87
End: 2022-12-02

## 2022-12-02 ENCOUNTER — TELEPHONE (OUTPATIENT)
Dept: GERIATRICS | Facility: CLINIC | Age: 87
End: 2022-12-02

## 2022-12-02 ENCOUNTER — ASSISTED LIVING VISIT (OUTPATIENT)
Dept: GERIATRICS | Facility: CLINIC | Age: 87
End: 2022-12-02
Payer: COMMERCIAL

## 2022-12-02 ENCOUNTER — LAB REQUISITION (OUTPATIENT)
Dept: LAB | Facility: CLINIC | Age: 87
End: 2022-12-02
Payer: COMMERCIAL

## 2022-12-02 VITALS
DIASTOLIC BLOOD PRESSURE: 53 MMHG | TEMPERATURE: 97.1 F | RESPIRATION RATE: 18 BRPM | SYSTOLIC BLOOD PRESSURE: 98 MMHG | HEART RATE: 60 BPM | OXYGEN SATURATION: 95 %

## 2022-12-02 DIAGNOSIS — M13.0 POLYARTHROPATHY OR POLYARTHRITIS, HAND: ICD-10-CM

## 2022-12-02 DIAGNOSIS — R07.81 RIB PAIN ON RIGHT SIDE: ICD-10-CM

## 2022-12-02 DIAGNOSIS — N18.31 STAGE 3A CHRONIC KIDNEY DISEASE (H): ICD-10-CM

## 2022-12-02 DIAGNOSIS — S00.93XA TRAUMATIC HEMATOMA OF HEAD, INITIAL ENCOUNTER: ICD-10-CM

## 2022-12-02 DIAGNOSIS — M62.81 GENERALIZED MUSCLE WEAKNESS: ICD-10-CM

## 2022-12-02 DIAGNOSIS — I50.32 CHRONIC DIASTOLIC HEART FAILURE (H): ICD-10-CM

## 2022-12-02 DIAGNOSIS — Z91.81 PERSONAL HISTORY OF FALL: ICD-10-CM

## 2022-12-02 DIAGNOSIS — E87.1 HYPONATREMIA: Primary | ICD-10-CM

## 2022-12-02 DIAGNOSIS — J44.9 CHRONIC OBSTRUCTIVE PULMONARY DISEASE, UNSPECIFIED COPD TYPE (H): ICD-10-CM

## 2022-12-02 DIAGNOSIS — E87.1 HYPO-OSMOLALITY AND HYPONATREMIA: ICD-10-CM

## 2022-12-02 NOTE — PROGRESS NOTES
[facility] GERIATRICS  ACUTE/EPISODIC VISIT    [facility] Medical Record Number:  [id number]  Place of Service where encounter took place:  [facility] ([facility]) [[id number]]    Chief Complaint   Patient presents with     RECHECK       HPI:    Marla Dunn is a 89 year old  (5/22/1929), who is being seen today for an episodic care visit.  HPI information obtained from: facility chart records, facility staff, patient report and [facility] chart review.    Today's concern is:    Diagnoses       Codes Comments    Hyponatremia    -  Primary E87.1     Stage 3a chronic kidney disease (H)     N18.31     Polyarthropathy or polyarthritis, hand     M13.0     Chronic obstructive pulmonary disease, unspecified COPD type (H)     J44.9     Chronic diastolic heart failure (H)     I50.32     Generalized muscle weakness     M62.81     Personal history of fall     Z91.81     Traumatic hematoma of head, initial encounter     S00.93XA     Rib pain on right side     R07.81         Came to see Georgia for several reasons.  This past Monday did labs as she was not feeling well last week.  Found her Sodium level at 127.  Has been in process of having her Sodium tabs decreased by her Nephrologist as goal would be to have her off them.  Now instead this NP increased back to 1GM twice a day.  Left message for niece and asked to leave a message for the Nephrologist.  Left VM for his nurse.    Today wanted to talk about her labs, what next, and overall how she was doing.  Georgia had a fall last Saturday and has a small hematoma on left temple area that was in healing phase and blue/green in color, but still puffy in appearance.    The other reason of seeing Georgia is her need to a custom wheelchair to help with her positioning so that she is straight with looking forward versus turned slightly to the left.  See at bottom for wheelchair statement.      Georgia denied headache but kept  looking at the window and would squint.  She did not want to have the blinds closed slightly as she wanted it to be gabbie out so she could enjoy the warmth.  Her other complaint is that she is cold all the time.  She has the temp at 77 degrees but seemed comfortable in her apartment.  She is petite in stature, more sedentary lifestyle, TSH was checked as well as HgB.      ALLERGIES:    Allergies   Allergen Reactions     Gramineae Pollens Other (See Comments)     ORCHARDGRASS. Shortness of breath when lawn is just cut.     Smoke. Other (See Comments)     Hard to breathe.     Pollen Extract      Other reaction(s): Unknown        MEDICATIONS:  Post Discharge Medication Reconciliation Status: patient was not discharged from an inpatient facility or TCU. Medications reconciled today    Current Outpatient Medications   Medication Sig Dispense Refill     acetaminophen (TYLENOL) 325 MG tablet Take 3 tablets (975 mg) by mouth every 8 hours as needed for mild pain       albuterol (PROAIR HFA/PROVENTIL HFA/VENTOLIN HFA) 108 (90 Base) MCG/ACT inhaler Inhale 2 puffs into the lungs every 6 hours       Ascorbic Acid (VITAMIN C PO) Take 500 mg by mouth every morning       ASPIRIN LOW DOSE 81 MG EC tablet 1 TABLET ORALLY 2 TIMES DAILY (DX: PROPHYLAXIS) 56 tablet 11     Calcium Carb-Cholecalciferol (CALCIUM+D3) 600-800 MG-UNIT TABS Take 1 tablet by mouth daily       calcium carbonate (TUMS) 500 MG chewable tablet Take 2 tablets (1,000 mg) by mouth 2 times daily       cholecalciferol (D3 HIGH POTENCY) 125 MCG (5000 UT) CAPS Take 1 capsule by mouth daily       COMBIVENT RESPIMAT  MCG/ACT inhaler INHALE 1 PUFF INTO THE LUNGS  4 TIMES DAILY 4 g 11     cycloSPORINE (RESTASIS) 0.05 % ophthalmic emulsion Place 1 drop into both eyes 2 times daily        fish oil-omega-3 fatty acids 1000 MG capsule Take 1 g by mouth daily       fluorometholone (FML LIQUIFILM) 0.1 % ophthalmic susp Place 1 drop Into the left eye daily        IBANDRONATE  SODIUM PO Take 150 mg by mouth every 30 days       Levothyroxine Sodium (LEVOXYL PO) Take 88 mcg by mouth daily        loratadine (CLARITIN) 10 MG tablet Take 1 tablet (10 mg) by mouth daily (Patient taking differently: Take 10 mg by mouth daily as needed)       melatonin 5 MG tablet 1 TABLET ORALLY AT BEDTIME (DX: INSOMNIA) 30 tablet 11     METOPROLOL TARTRATE PO Take 25 mg by mouth 2 times daily        mirtazapine (REMERON) 7.5 MG tablet Take 7.5 mg by mouth At Bedtime       Multiple Vitamins-Minerals (PRESERVISION AREDS PO) Take 1 tablet by mouth 2 times daily       omeprazole (PRILOSEC) 20 MG DR capsule Take 20 mg by mouth 2 times daily       OXYGEN-HELIUM IN        polyethylene glycol (MIRALAX) 17 GM/Dose powder Take 1 capful by mouth daily       polyethylene glycol 0.4%- propylene glycol 0.3% (SYSTANE ULTRA) 0.4-0.3 % SOLN ophthalmic solution Place 1 drop into both eyes 4 times daily       Probiotic Product (PROBIOTIC PO) Take 1 capsule by mouth every morning       senna-docusate (SENOKOT-S/PERICOLACE) 8.6-50 MG tablet Take 1 tablet by mouth 2 times daily as needed for constipation       sodium chloride 1 GM tablet Take 1 tablet (1 g) by mouth 2 times daily       spironolactone (ALDACTONE) 50 MG tablet Take 25 mg by mouth daily        timolol (TIMOPTIC) 0.5 % ophthalmic solution Place 1 drop Into the left eye daily        torsemide (DEMADEX) 20 MG tablet Take 20 mg by mouth daily         REVIEW OF SYSTEMS:  4 point ROS neg other than the symptoms noted above in the HPI.  No chest pain, no shortness of breath but intermittent sharp pain by rib cage right side.      PHYSICAL EXAM:  BP 98/53   Pulse 60   Temp 97.1  F (36.2  C)   Resp 18   SpO2 95%   Alert and pleasant and oriented x3.  Irregular heart rate with grade 3/6 murmur - systolic  Lungs clear.    No edema in legs.  Dry and flaky.  Skin was pink, frail.    Can ambulate with walker but choose the w/c.    Was on the phone at start of visit making  arrangements for her shower next Monday.  She admitted she will take the shower on her own if they can not accommodate her.  She explained how she would move around to get in shower.        ASSESSMENT / PLAN:  (E87.1) Hyponatremia  (primary encounter diagnosis)  (N18.31) Stage 3a chronic kidney disease (H)  Comment: explained to Georgia of her lab results.  She was sad that she had a low Sodium.  She knows her fluid restriction.    Did tell her that lab to be done again Monday AM to see what her Na level will be.  Place a call to kayce and left ELISE so she is up to date about visit.    (M13.0) Polyarthropathy or polyarthritis, hand  Comment: goal of a new wheelchair would be for positioning correctly but also the arm rests appropriate so she can propel the wheelchair.  Both hands have arthritis present     (J44.9) Chronic obstructive pulmonary disease, unspecified COPD type (H)  Comment: no acute issues seen at this time.  Takes a Combivent inhaler 4x day and has a PRN albuterol inhaler as a rescue.  No changes.    (I50.32) Chronic diastolic heart failure (H)  Comment: is on Spironolactone 25mg po daily and Torsemide 20mg po daily.  Monitoring her kidney function regularly.  BMP on Monday    (M62.81) Generalized muscle weakness  Comment: spoke with the therapist today during the visit as she called earlier and information was taken in order to do a F2F for a custom wheelchair and send over the visit to the vendor.  Dx for Generalized muscle weakness, COPD, heart failure, polyarthropathy, hx of fall.    (Z91.81) Personal history of fall  (S00.93XA) Traumatic hematoma of head, initial encounter  (R07.81) Rib pain on right side  Comment: denies dizziness but the way Georgia holds her head and facial expressions can see it may be tender.  Offered a CXR with rib detail for her.  She thought she had to go out for the visit and no she does not and so she is willing to have the CXR done.  She knows there is nothing that will  change the plan of care but give information if she has a rib fracture from her fall or bruised.  Pain could be from pneumonia as well since she not been feeling well lately.    1.  CXR AP and lateral view with right rib detail.      Orders:  1. CXR AP and lateral view with rib detail of right side due to fall and rib pain  2.  Custom 16 x 18 light weight paige wheelchair with adjustable back, standard foot rests, desk arm rests and custom seat cushion due to general muscle weakness, polyarthritis of hands, personal fall, COPD and heart failure.      Electronically signed by  CASTRO Hazel CNP           Face to Face and Medical Necessity Statement for DME Provider visit    Demographic Information on Marla Dunn:  Gender: female  : 1929  [address]   [place] [address]  [phone] (home)     Medical Record: [id number]  Social Security Number: [id number]  Primary Care Provider: Gabrielle Wallace  Insurance: Payor: [facility] / Plan: [employer] MEDICARE / Product Type: HMO /     HPI:   Marla Dunn is a 89 year old  (1929), who is being seen today for a [place] [facility] medical necessity statement for DME included. This patient requires the following:  DME Ordered and Medical Necessity Statement     Georgia is a 89 year old female with chronic conditions that includes polyarthritis of hands, hx of fall, general muscle weakness, CHF, and COPD.  She resides in an assisted living facility in her own apartment that gives her ample room to move from room to room with a wheelchair that she is willing to propel or staff/family can help her with long distance.  Georgia has adequate upper body strength and the mental capability to propel the wheelchair.    A wheelchair would help her to be more independent with toileting, bathing, and grooming.  Georgia does have a walker that she will use in her  apartment but with her endurance, weakness and balance, she is not able to go far without needing to sit down.  She does go down for meals in the facility.  Standard foot rests are adequate to have her feet up and not touching the floor.  Wheelchair Documentation  Size: 16 x18 lightweight paige wheelchair  Corresponding cushion: Yes: custom cushion  Standard foot rests: Yes  Elevating leg rests: No  Arm rests: Yes: desk arm  Lap tray: No  Dose the patient use oxygen? No   Is the patient able to propel wheelchair? Yes  And is there someone who can? Staff orfamily  1. The patient has mobility limitations that impairs their ability to participate in one or more mobility related activities: Toileting, Grooming and Bathing.  The wheelchair is suitable and necessary for use in the patient's home.  2. The patient's mobility limitations cannot be safely resolved by using a cane/walker:Yes    Reason why a cane or walker will not meet the patient's needs. (ie: balance, tolerance, level of assistance) endurance, balance,   3. The patients home has adequate access to use a manual wheelchair:Yes  4. The use of a manual wheelchair on a regular basis will improve the patients ability to participate in mobility related ADL's at home:Yes  5. The patient is willing to use a manual wheelchair at home:Yes  6. The patient has adequate upper body strength and the mental capability to safely use a manual wheelchair and/or has a caregiver that is able to assist: Yes  7. Does the patient have a lower extremity injury or edema?Yes  Reason for Type of Wheelchair  Patient weight: 105 lbs 0 oz        Pt needing above DME with expected length of need of 99  lifetime due to medical necessity associated with following diagnosis:     Hyponatremia  Stage 3a chronic kidney disease (H)  Polyarthropathy or polyarthritis, hand  Chronic obstructive pulmonary disease, unspecified COPD type (H)  Chronic diastolic heart failure (H)  Generalized muscle  weakness  Personal history of fall  Traumatic hematoma of head, initial encounter  Rib pain on right side      PMH   has a past medical history of Asthma, Bilateral carpal tunnel syndrome (08/27/2015), CAD (coronary artery disease), Chronic airway obstruction, not elsewhere classified, Chronic anemia, Chronic edema, Congestive heart failure, severe (H) (05/13/2020), COPD (chronic obstructive pulmonary disease) (H), Coronary artery disease, Depression, GERD (gastroesophageal reflux disease), Heart disease, Heart murmur, High cholesterol, HTN (hypertension), Hypercholesterolemia, Hypertension, Hypertensive emergency (8/13/2021), Hypothyroidism, Hypothyroidism, MI (myocardial infarction) (H) (1985), 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) (11/23/2020), Myelodysplastic syndrome (H), Myocardial infarction (H) (1983), OLEGARIO (obstructive sleep apnea), Osteoporosis, Other and unspecified hyperlipidemia, Pyelonephritis (5/11/2016), Radicular low back pain, Rheumatoid arthritis (H), Elizabeth Agers syndrome, Sjoegren syndrome, Sjogren's syndrome (H), Sleep apnea, obstructive, Spinal stenosis, and Unspecified polyarthropathy or polyarthritis, hand.        ASSESSMENT/PLAN:  1. Hyponatremia    2. Stage 3a chronic kidney disease (H)    3. Polyarthropathy or polyarthritis, hand    4. Chronic obstructive pulmonary disease, unspecified COPD type (H)    5. Chronic diastolic heart failure (H)    6. Generalized muscle weakness    7. Personal history of fall    8. Traumatic hematoma of head, initial encounter    9. Rib pain on right side        Orders:  1. 16 x18 lightweight paige wheelchair with standard foot rests,desk armrest, adjustable back and custom cushion due to polyarthritis of hands, general muscle weakness, COPD, CHF and hx of fall    ELECTRONICALLY SIGNED BY BELLA CERTIFIED PROVIDER:  CASTRO Hazel CNP   NPI: [id number]  [facility]  [address] [place] [address] [id number]  [place] [address]    Fax:   [phone]

## 2022-12-02 NOTE — LETTER
12/2/2022        RE: Marla Bonilla [facility]  [address]  [facility] [address]        No notes on file      Sincerely,        CASTRO Hazel CNP

## 2022-12-05 ENCOUNTER — LAB REQUISITION (OUTPATIENT)
Dept: LAB | Facility: CLINIC | Age: 87
End: 2022-12-05
Payer: COMMERCIAL

## 2022-12-05 DIAGNOSIS — Z11.59 ENCOUNTER FOR SCREENING FOR OTHER VIRAL DISEASES: ICD-10-CM

## 2022-12-05 LAB
ANION GAP SERPL CALCULATED.3IONS-SCNC: 16 MMOL/L (ref 7–15)
BUN SERPL-MCNC: 13.6 MG/DL (ref 8–23)
CALCIUM SERPL-MCNC: 8.9 MG/DL (ref 8.2–9.6)
CHLORIDE SERPL-SCNC: 93 MMOL/L (ref 98–107)
CREAT SERPL-MCNC: 0.71 MG/DL (ref 0.51–0.95)
DEPRECATED HCO3 PLAS-SCNC: 21 MMOL/L (ref 22–29)
GFR SERPL CREATININE-BSD FRML MDRD: 79 ML/MIN/1.73M2
GLUCOSE SERPL-MCNC: 70 MG/DL (ref 70–99)
POTASSIUM SERPL-SCNC: 3.5 MMOL/L (ref 3.4–5.3)
SODIUM SERPL-SCNC: 130 MMOL/L (ref 136–145)

## 2022-12-05 PROCEDURE — 36415 COLL VENOUS BLD VENIPUNCTURE: CPT | Mod: ORL | Performed by: NURSE PRACTITIONER

## 2022-12-05 PROCEDURE — P9604 ONE-WAY ALLOW PRORATED TRIP: HCPCS | Mod: ORL | Performed by: NURSE PRACTITIONER

## 2022-12-05 PROCEDURE — 80048 BASIC METABOLIC PNL TOTAL CA: CPT | Mod: ORL | Performed by: NURSE PRACTITIONER

## 2022-12-06 PROCEDURE — U0003 INFECTIOUS AGENT DETECTION BY NUCLEIC ACID (DNA OR RNA); SEVERE ACUTE RESPIRATORY SYNDROME CORONAVIRUS 2 (SARS-COV-2) (CORONAVIRUS DISEASE [COVID-19]), AMPLIFIED PROBE TECHNIQUE, MAKING USE OF HIGH THROUGHPUT TECHNOLOGIES AS DESCRIBED BY CMS-2020-01-R: HCPCS | Mod: ORL

## 2022-12-07 LAB — SARS-COV-2 RNA RESP QL NAA+PROBE: NEGATIVE

## 2022-12-09 ENCOUNTER — ASSISTED LIVING VISIT (OUTPATIENT)
Dept: GERIATRICS | Facility: CLINIC | Age: 87
End: 2022-12-09
Payer: COMMERCIAL

## 2022-12-09 DIAGNOSIS — I50.32 CHRONIC DIASTOLIC HEART FAILURE (H): ICD-10-CM

## 2022-12-09 DIAGNOSIS — I48.20 CHRONIC ATRIAL FIBRILLATION (H): ICD-10-CM

## 2022-12-09 DIAGNOSIS — N18.31 STAGE 3A CHRONIC KIDNEY DISEASE (H): ICD-10-CM

## 2022-12-09 DIAGNOSIS — E87.1 HYPONATREMIA: Primary | ICD-10-CM

## 2022-12-09 DIAGNOSIS — M62.81 GENERALIZED MUSCLE WEAKNESS: ICD-10-CM

## 2022-12-09 NOTE — LETTER
12/9/2022        RE: Marla Dunn  [place]  [address]  [place] [facility] [address]        [facility] GERIATRICS  ACUTE/EPISODIC VISIT    [facility] Medical Record Number:  [id number]  Place of Service where encounter took place:  [facility] [[id number]]    Chief Complaint   Patient presents with     RECHECK       HPI:    Marla Dunn is a 89 year old  (5/22/1929), who is being seen today for an episodic care visit.  HPI information obtained from: facility chart records, facility staff, patient report and [facility] chart review.    Today's concern is:    Diagnoses       Codes Comments    Hyponatremia    -  Primary E87.1     Stage 3a chronic kidney disease (H)     N18.31     Chronic diastolic heart failure (H)     I50.32     Generalized muscle weakness     M62.81     Chronic atrial fibrillation (H)     I48.20         Following up with Georgia today with her labs that were done on 12/5/22.  At the end of November this NP had increased her Na tab back to twice a day due to her level below 130.  Georgia's niece did ask that the Nephrologist be told and so left a message on the nurse's VM.  Did not hear anything back.    Georgia was pleased to hear of her Na level being at 130 now.  In no acute distress today.  If anything she may be a little anxious.  Georgia continues to work with OT for muscle weakness and attempting to get her a proper w/c as well.      ALLERGIES:    Allergies   Allergen Reactions     Gramineae Pollens Other (See Comments)     ORCHARDGRASS. Shortness of breath when lawn is just cut.     Smoke. Other (See Comments)     Hard to breathe.     Pollen Extract      Other reaction(s): Unknown        MEDICATIONS:  Post Discharge Medication Reconciliation Status: patient was not discharged from an inpatient facility or TCU. Medications reviewed today    Current Outpatient Medications   Medication Sig Dispense  Refill     acetaminophen (TYLENOL) 325 MG tablet Take 3 tablets (975 mg) by mouth every 8 hours as needed for mild pain       albuterol (PROAIR HFA/PROVENTIL HFA/VENTOLIN HFA) 108 (90 Base) MCG/ACT inhaler Inhale 2 puffs into the lungs every 6 hours       Ascorbic Acid (VITAMIN C PO) Take 500 mg by mouth every morning       ASPIRIN LOW DOSE 81 MG EC tablet 1 TABLET ORALLY 2 TIMES DAILY (DX: PROPHYLAXIS) 56 tablet 11     Calcium Carb-Cholecalciferol (CALCIUM+D3) 600-800 MG-UNIT TABS Take 1 tablet by mouth daily       calcium carbonate (TUMS) 500 MG chewable tablet Take 2 tablets (1,000 mg) by mouth 2 times daily       cholecalciferol (D3 HIGH POTENCY) 125 MCG (5000 UT) CAPS Take 1 capsule by mouth daily       COMBIVENT RESPIMAT  MCG/ACT inhaler INHALE 1 PUFF INTO THE LUNGS  4 TIMES DAILY 4 g 11     cycloSPORINE (RESTASIS) 0.05 % ophthalmic emulsion Place 1 drop into both eyes 2 times daily        fish oil-omega-3 fatty acids 1000 MG capsule Take 1 g by mouth daily       fluorometholone (FML LIQUIFILM) 0.1 % ophthalmic susp Place 1 drop Into the left eye daily        IBANDRONATE SODIUM PO Take 150 mg by mouth every 30 days       Levothyroxine Sodium (LEVOXYL PO) Take 88 mcg by mouth daily        loratadine (CLARITIN) 10 MG tablet Take 1 tablet (10 mg) by mouth daily (Patient taking differently: Take 10 mg by mouth daily as needed)       melatonin 5 MG tablet 1 TABLET ORALLY AT BEDTIME (DX: INSOMNIA) 30 tablet 11     METOPROLOL TARTRATE PO Take 25 mg by mouth 2 times daily        mirtazapine (REMERON) 7.5 MG tablet Take 7.5 mg by mouth At Bedtime       Multiple Vitamins-Minerals (PRESERVISION AREDS PO) Take 1 tablet by mouth 2 times daily       omeprazole (PRILOSEC) 20 MG DR capsule Take 20 mg by mouth 2 times daily       OXYGEN-HELIUM IN        polyethylene glycol (MIRALAX) 17 GM/Dose powder Take 1 capful by mouth daily       polyethylene glycol 0.4%- propylene glycol 0.3% (SYSTANE ULTRA) 0.4-0.3 % SOLN  ophthalmic solution Place 1 drop into both eyes 4 times daily       Probiotic Product (PROBIOTIC PO) Take 1 capsule by mouth every morning       senna-docusate (SENOKOT-S/PERICOLACE) 8.6-50 MG tablet Take 1 tablet by mouth 2 times daily as needed for constipation       sodium chloride 1 GM tablet Take 1 tablet (1 g) by mouth 2 times daily       spironolactone (ALDACTONE) 50 MG tablet Take 25 mg by mouth daily        timolol (TIMOPTIC) 0.5 % ophthalmic solution Place 1 drop Into the left eye daily        torsemide (DEMADEX) 20 MG tablet Take 20 mg by mouth daily         REVIEW OF SYSTEMS:  4 point ROS neg other than the symptoms noted above in the HPI.  No chest pain.  No acute shortness of breath.    PHYSICAL EXAM:  There were no vitals taken for this visit.  Alert and sitting up in a w/c as she has two in her apartment.    Petite frail female neatly groomed and does most of her own cares.  Oriented x3.    Heart rate regular and strong with S1 and S2 heard  Lungs are clear.  No edema.  Abdomen is flat, soft and non-tender.  Has a scab on left medial leg.  No signs of infection.    Component      Latest Ref Rng & Units 11/28/2022 12/5/2022   Sodium      136 - 145 mmol/L 127 (L) 130 (L)   Potassium      3.4 - 5.3 mmol/L 3.6 3.5   Chloride      98 - 107 mmol/L 89 (L) 93 (L)   Carbon Dioxide (CO2)      22 - 29 mmol/L 20 (L) 21 (L)   Anion Gap      7 - 15 mmol/L 18 (H) 16 (H)   Urea Nitrogen      8.0 - 23.0 mg/dL 22.5 13.6   Creatinine      0.51 - 0.95 mg/dL 0.84 0.71   Calcium      8.2 - 9.6 mg/dL 9.0 8.9   Glucose      70 - 99 mg/dL 92 70   GFR Estimate      >60 mL/min/1.73m2 64 79     ASSESSMENT / PLAN:  (E87.1) Hyponatremia  (primary encounter diagnosis)  (N18.31) Stage 3a chronic kidney disease (H)  (I50.32) Chronic diastolic heart failure (H)  Comment: do know that the Nephrologist is trying to get her down on the Na tabs.  Now her labs more stable with BID NaCl 1G.  Remains on spironolactone and Torsemide.  1.   Will recheck a BMP again on 12/19/22 and come see Georgia that Friday.      (M62.81) Generalized muscle weakness  Comment: working with OT.  See a note that they are working on posture/spine support when in the recliner.  Wheelchair fitting on 12/12/22 planned.      (I48.20) Chronic atrial fibrillation (H)  Comment: managed with metoprolol 25mg twice a day.  Recent pulses done have been in a range of 's.   No concerns today.      Orders:  BMP on 12/19/22 for hyponatremia    Electronically signed by  CASTRO Hazel CNP             Sincerely,        CASTRO Hazel CNP

## 2022-12-09 NOTE — PROGRESS NOTES
[facility] GERIATRICS  ACUTE/EPISODIC VISIT    [facility] Medical Record Number:  [id number]  Place of Service where encounter took place:  [facility] [[id number]]    Chief Complaint   Patient presents with     RECHECK       HPI:    Marla Dunn is a 89 year old  (5/22/1929), who is being seen today for an episodic care visit.  HPI information obtained from: facility chart records, facility staff, patient report and [facility] chart review.    Today's concern is:    Diagnoses       Codes Comments    Hyponatremia    -  Primary E87.1     Stage 3a chronic kidney disease (H)     N18.31     Chronic diastolic heart failure (H)     I50.32     Generalized muscle weakness     M62.81     Chronic atrial fibrillation (H)     I48.20         Following up with [place] today with her labs that were done on 12/5/22.  At the end of November this NP had increased her Na tab back to twice a day due to her level below 130.  Georgia's niece did ask that the Nephrologist be told and so left a message on the nurse's VM.  Did not hear anything back.    Georgia was pleased to hear of her Na level being at 130 now.  In no acute distress today.  If anything she may be a little anxious.  Georgia continues to work with OT for muscle weakness and attempting to get her a proper w/c as well.      ALLERGIES:    Allergies   Allergen Reactions     Gramineae Pollens Other (See Comments)     ORCHARDGRASS. Shortness of breath when lawn is just cut.     Smoke. Other (See Comments)     Hard to breathe.     Pollen Extract      Other reaction(s): Unknown        MEDICATIONS:  Post Discharge Medication Reconciliation Status: patient was not discharged from an inpatient facility or TCU. Medications reviewed today    Current Outpatient Medications   Medication Sig Dispense Refill     acetaminophen (TYLENOL) 325 MG tablet Take 3 tablets (975 mg) by mouth every 8 hours as needed for mild pain        albuterol (PROAIR HFA/PROVENTIL HFA/VENTOLIN HFA) 108 (90 Base) MCG/ACT inhaler Inhale 2 puffs into the lungs every 6 hours       Ascorbic Acid (VITAMIN C PO) Take 500 mg by mouth every morning       ASPIRIN LOW DOSE 81 MG EC tablet 1 TABLET ORALLY 2 TIMES DAILY (DX: PROPHYLAXIS) 56 tablet 11     Calcium Carb-Cholecalciferol (CALCIUM+D3) 600-800 MG-UNIT TABS Take 1 tablet by mouth daily       calcium carbonate (TUMS) 500 MG chewable tablet Take 2 tablets (1,000 mg) by mouth 2 times daily       cholecalciferol (D3 HIGH POTENCY) 125 MCG (5000 UT) CAPS Take 1 capsule by mouth daily       COMBIVENT RESPIMAT  MCG/ACT inhaler INHALE 1 PUFF INTO THE LUNGS  4 TIMES DAILY 4 g 11     cycloSPORINE (RESTASIS) 0.05 % ophthalmic emulsion Place 1 drop into both eyes 2 times daily        fish oil-omega-3 fatty acids 1000 MG capsule Take 1 g by mouth daily       fluorometholone (FML LIQUIFILM) 0.1 % ophthalmic susp Place 1 drop Into the left eye daily        IBANDRONATE SODIUM PO Take 150 mg by mouth every 30 days       Levothyroxine Sodium (LEVOXYL PO) Take 88 mcg by mouth daily        loratadine (CLARITIN) 10 MG tablet Take 1 tablet (10 mg) by mouth daily (Patient taking differently: Take 10 mg by mouth daily as needed)       melatonin 5 MG tablet 1 TABLET ORALLY AT BEDTIME (DX: INSOMNIA) 30 tablet 11     METOPROLOL TARTRATE PO Take 25 mg by mouth 2 times daily        mirtazapine (REMERON) 7.5 MG tablet Take 7.5 mg by mouth At Bedtime       Multiple Vitamins-Minerals (PRESERVISION AREDS PO) Take 1 tablet by mouth 2 times daily       omeprazole (PRILOSEC) 20 MG DR capsule Take 20 mg by mouth 2 times daily       OXYGEN-HELIUM IN        polyethylene glycol (MIRALAX) 17 GM/Dose powder Take 1 capful by mouth daily       polyethylene glycol 0.4%- propylene glycol 0.3% (SYSTANE ULTRA) 0.4-0.3 % SOLN ophthalmic solution Place 1 drop into both eyes 4 times daily       Probiotic Product (PROBIOTIC PO) Take 1 capsule by mouth every  morning       senna-docusate (SENOKOT-S/PERICOLACE) 8.6-50 MG tablet Take 1 tablet by mouth 2 times daily as needed for constipation       sodium chloride 1 GM tablet Take 1 tablet (1 g) by mouth 2 times daily       spironolactone (ALDACTONE) 50 MG tablet Take 25 mg by mouth daily        timolol (TIMOPTIC) 0.5 % ophthalmic solution Place 1 drop Into the left eye daily        torsemide (DEMADEX) 20 MG tablet Take 20 mg by mouth daily         REVIEW OF SYSTEMS:  4 point ROS neg other than the symptoms noted above in the HPI.  No chest pain.  No acute shortness of breath.    PHYSICAL EXAM:  There were no vitals taken for this visit.  Alert and sitting up in a w/c as she has two in her apartment.    Petite frail female neatly groomed and does most of her own cares.  Oriented x3.    Heart rate regular and strong with S1 and S2 heard  Lungs are clear.  No edema.  Abdomen is flat, soft and non-tender.  Has a scab on left medial leg.  No signs of infection.    Component      Latest Ref Rng & Units 11/28/2022 12/5/2022   Sodium      136 - 145 mmol/L 127 (L) 130 (L)   Potassium      3.4 - 5.3 mmol/L 3.6 3.5   Chloride      98 - 107 mmol/L 89 (L) 93 (L)   Carbon Dioxide (CO2)      22 - 29 mmol/L 20 (L) 21 (L)   Anion Gap      7 - 15 mmol/L 18 (H) 16 (H)   Urea Nitrogen      8.0 - 23.0 mg/dL 22.5 13.6   Creatinine      0.51 - 0.95 mg/dL 0.84 0.71   Calcium      8.2 - 9.6 mg/dL 9.0 8.9   Glucose      70 - 99 mg/dL 92 70   GFR Estimate      >60 mL/min/1.73m2 64 79     ASSESSMENT / PLAN:  (E87.1) Hyponatremia  (primary encounter diagnosis)  (N18.31) Stage 3a chronic kidney disease (H)  (I50.32) Chronic diastolic heart failure (H)  Comment: do know that the Nephrologist is trying to get her down on the Na tabs.  Now her labs more stable with BID NaCl 1G.  Remains on spironolactone and Torsemide.  1.  Will recheck a BMP again on 12/19/22 and come see Georgia that Friday.      (M62.81) Generalized muscle weakness  Comment: working  with OT.  See a note that they are working on posture/spine support when in the recliner.  Wheelchair fitting on 12/12/22 planned.      (I48.20) Chronic atrial fibrillation (H)  Comment: managed with metoprolol 25mg twice a day.  Recent pulses done have been in a range of 's.   No concerns today.      Orders:  BMP on 12/19/22 for hyponatremia    Electronically signed by  CASTRO Hazel CNP

## 2022-12-12 DIAGNOSIS — K59.00 CONSTIPATION, UNSPECIFIED CONSTIPATION TYPE: Primary | ICD-10-CM

## 2022-12-13 RX ORDER — POLYETHYLENE GLYCOL 3350 17 G/17G
POWDER ORAL
Qty: 510 G | Refills: 10 | Status: SHIPPED | OUTPATIENT
Start: 2022-12-13 | End: 2023-02-13

## 2022-12-15 ENCOUNTER — LAB REQUISITION (OUTPATIENT)
Dept: LAB | Facility: CLINIC | Age: 87
End: 2022-12-15
Payer: COMMERCIAL

## 2022-12-15 DIAGNOSIS — E87.1 HYPO-OSMOLALITY AND HYPONATREMIA: ICD-10-CM

## 2022-12-19 DIAGNOSIS — E87.1 HYPONATREMIA: ICD-10-CM

## 2022-12-19 LAB
ANION GAP SERPL CALCULATED.3IONS-SCNC: 14 MMOL/L (ref 7–15)
BUN SERPL-MCNC: 13.9 MG/DL (ref 8–23)
CALCIUM SERPL-MCNC: 9.2 MG/DL (ref 8.2–9.6)
CHLORIDE SERPL-SCNC: 88 MMOL/L (ref 98–107)
CREAT SERPL-MCNC: 0.78 MG/DL (ref 0.51–0.95)
DEPRECATED HCO3 PLAS-SCNC: 24 MMOL/L (ref 22–29)
GFR SERPL CREATININE-BSD FRML MDRD: 70 ML/MIN/1.73M2
GLUCOSE SERPL-MCNC: 91 MG/DL (ref 70–99)
POTASSIUM SERPL-SCNC: 3.3 MMOL/L (ref 3.4–5.3)
SODIUM SERPL-SCNC: 126 MMOL/L (ref 136–145)

## 2022-12-19 PROCEDURE — 36415 COLL VENOUS BLD VENIPUNCTURE: CPT | Mod: ORL | Performed by: NURSE PRACTITIONER

## 2022-12-19 PROCEDURE — P9604 ONE-WAY ALLOW PRORATED TRIP: HCPCS | Mod: ORL | Performed by: NURSE PRACTITIONER

## 2022-12-19 PROCEDURE — 80048 BASIC METABOLIC PNL TOTAL CA: CPT | Mod: ORL | Performed by: NURSE PRACTITIONER

## 2022-12-19 RX ORDER — SODIUM CHLORIDE 1 G/1
TABLET ORAL
Qty: 60 TABLET | Refills: 11 | Status: SHIPPED | OUTPATIENT
Start: 2022-12-19 | End: 2023-01-17

## 2022-12-20 ENCOUNTER — LAB REQUISITION (OUTPATIENT)
Dept: LAB | Facility: CLINIC | Age: 87
End: 2022-12-20
Payer: COMMERCIAL

## 2022-12-20 ENCOUNTER — TELEPHONE (OUTPATIENT)
Dept: GERIATRICS | Facility: CLINIC | Age: 87
End: 2022-12-20

## 2022-12-20 DIAGNOSIS — R35.0 FREQUENCY OF MICTURITION: ICD-10-CM

## 2022-12-20 LAB
ALBUMIN UR-MCNC: NEGATIVE MG/DL
APPEARANCE UR: ABNORMAL
BILIRUB UR QL STRIP: NEGATIVE
COLOR UR AUTO: ABNORMAL
GLUCOSE UR STRIP-MCNC: NEGATIVE MG/DL
HGB UR QL STRIP: NEGATIVE
KETONES UR STRIP-MCNC: NEGATIVE MG/DL
LEUKOCYTE ESTERASE UR QL STRIP: ABNORMAL
NITRATE UR QL: NEGATIVE
PH UR STRIP: 6.5 [PH] (ref 5–7)
RBC URINE: 2 /HPF
SP GR UR STRIP: 1.01 (ref 1–1.03)
SQUAMOUS EPITHELIAL: <1 /HPF
TRANSITIONAL EPI: <1 /HPF
UROBILINOGEN UR STRIP-MCNC: NORMAL MG/DL
WBC URINE: 178 /HPF

## 2022-12-20 PROCEDURE — 87086 URINE CULTURE/COLONY COUNT: CPT | Mod: ORL | Performed by: NURSE PRACTITIONER

## 2022-12-20 PROCEDURE — 81001 URINALYSIS AUTO W/SCOPE: CPT | Mod: ORL | Performed by: NURSE PRACTITIONER

## 2022-12-20 NOTE — TELEPHONE ENCOUNTER
"Received a TC from the physical therapist whom sees Georgia and wanted to give an update on her status that she was not really looking too good.  Breathing was different.  Felt weaker and stated to the PT that her oncology appointment did not go well.    Thanked her for the call as she did not alert the nurse so this NP called nursing and asked them to go see Georgia.    Nursing reported back they could hear some wheezing with breathing.    B/P 118/62  P = 83  R = 18  T = 98.7 and O2 sats 97% RA    Eyes look tired.  Labs done yesterday and came back last night    Orders:  CXR AP and lateral view for wheezing  K+ 10meq po daily for hypokalemia of 3.3  Increase Na tab 1G po TID for hyponatremia of 126  BMP on 12/26/22.    Asked nursing to call kayce and the nephrologist office to update about the salt tabs.    Nursing did ask Georgia if she wanted to go to the hospital and Georgia's response was \"I will be fine\".    Continue to monitor.  Will be seeing her Friday.    Gabrielle Wallace, CASTRO CNP    "

## 2022-12-21 ENCOUNTER — LAB REQUISITION (OUTPATIENT)
Dept: LAB | Facility: CLINIC | Age: 87
End: 2022-12-21
Payer: COMMERCIAL

## 2022-12-21 DIAGNOSIS — E87.1 HYPO-OSMOLALITY AND HYPONATREMIA: ICD-10-CM

## 2022-12-22 LAB — BACTERIA UR CULT: ABNORMAL

## 2022-12-23 ENCOUNTER — TELEPHONE (OUTPATIENT)
Dept: GERIATRICS | Facility: CLINIC | Age: 87
End: 2022-12-23

## 2022-12-23 DIAGNOSIS — I50.32 CHRONIC DIASTOLIC CONGESTIVE HEART FAILURE (H): ICD-10-CM

## 2022-12-23 DIAGNOSIS — E87.6 HYPOKALEMIA: Primary | ICD-10-CM

## 2022-12-23 DIAGNOSIS — N30.00 ACUTE CYSTITIS WITHOUT HEMATURIA: Primary | ICD-10-CM

## 2022-12-23 DIAGNOSIS — K59.01 SLOW TRANSIT CONSTIPATION: Primary | ICD-10-CM

## 2022-12-23 RX ORDER — CEPHALEXIN 500 MG/1
500 CAPSULE ORAL 3 TIMES DAILY
Qty: 21 CAPSULE | Refills: 0 | Status: SHIPPED | OUTPATIENT
Start: 2022-12-23 | End: 2022-12-30

## 2022-12-23 RX ORDER — BISACODYL 5 MG/1
5 TABLET, DELAYED RELEASE ORAL DAILY PRN
Start: 2022-12-23 | End: 2023-01-16

## 2022-12-23 RX ORDER — TORSEMIDE 20 MG/1
TABLET ORAL
Start: 2022-12-23 | End: 2022-12-26

## 2022-12-23 RX ORDER — POTASSIUM CHLORIDE 750 MG/1
TABLET, EXTENDED RELEASE ORAL
Qty: 30 TABLET | Refills: 11 | Status: SHIPPED | OUTPATIENT
Start: 2022-12-23 | End: 2023-11-16

## 2022-12-23 NOTE — TELEPHONE ENCOUNTER
Received a call from [place] today as she was very upset and felt manipulated.  Sorted out what was going on.  She was told she had to be on the ABX of Keflex for 7 more days.  She already took 3 days worth.    This NP looked it up and it was true and this NP forgot about it.  Typically don't start an ABX before the UC comes back but she was ill.  Informed her what occurred and so this NP will fix the mistake.  She will be on Keflex for a total of 7 days.      Then the next item was about the Miralax.  Did not understand her in the sense of she is getting to much or what.  Asked her if she has had a BM lately and she said it was 3-4 days ago.  So explained that if she told them she needed a BM, they will give her something as needed and the Miralax has a daily dose and a daily PRN dose.    Georgia mentioned she had some dulcolax tabs in her apartment.  Asked if she knew the expiration date and she said it did not say.  Informed her they are pretty powerful.  She can take one a day as needed.  She was going to take one tonight before bed.   Did inform her that an order will be written for nursing and to keep at bedside so she might get new medication.    Will see her Monday.    Did call her niece and spoke with her as well to let her know of the changes.  Aileen feels she is depressed.  Did talk about that for a bit.  May start seeing Georgia weekly as Georgia has alluded to enjoying the visits.    Dr. Siddiqui did lower her Na tab to twice a day and increased the Torsemide for Georgia.      Electronically signed by Gabrielle Wallace RN, CNP

## 2022-12-25 DIAGNOSIS — I50.32 CHRONIC DIASTOLIC CONGESTIVE HEART FAILURE (H): ICD-10-CM

## 2022-12-26 ENCOUNTER — ASSISTED LIVING VISIT (OUTPATIENT)
Dept: GERIATRICS | Facility: CLINIC | Age: 87
End: 2022-12-26
Payer: COMMERCIAL

## 2022-12-26 DIAGNOSIS — N30.00 ACUTE CYSTITIS WITHOUT HEMATURIA: ICD-10-CM

## 2022-12-26 DIAGNOSIS — R54 FRAIL ELDERLY: ICD-10-CM

## 2022-12-26 DIAGNOSIS — N18.31 STAGE 3A CHRONIC KIDNEY DISEASE (H): ICD-10-CM

## 2022-12-26 DIAGNOSIS — K59.01 SLOW TRANSIT CONSTIPATION: Primary | ICD-10-CM

## 2022-12-26 DIAGNOSIS — E87.1 HYPONATREMIA: ICD-10-CM

## 2022-12-26 LAB
ANION GAP SERPL CALCULATED.3IONS-SCNC: 16 MMOL/L (ref 7–15)
BUN SERPL-MCNC: 12.6 MG/DL (ref 8–23)
CALCIUM SERPL-MCNC: 9.3 MG/DL (ref 8.2–9.6)
CHLORIDE SERPL-SCNC: 89 MMOL/L (ref 98–107)
CREAT SERPL-MCNC: 0.75 MG/DL (ref 0.51–0.95)
DEPRECATED HCO3 PLAS-SCNC: 20 MMOL/L (ref 22–29)
GFR SERPL CREATININE-BSD FRML MDRD: 74 ML/MIN/1.73M2
GLUCOSE SERPL-MCNC: 107 MG/DL (ref 70–99)
POTASSIUM SERPL-SCNC: 3.8 MMOL/L (ref 3.4–5.3)
SODIUM SERPL-SCNC: 125 MMOL/L (ref 136–145)

## 2022-12-26 PROCEDURE — 80048 BASIC METABOLIC PNL TOTAL CA: CPT | Mod: ORL | Performed by: NURSE PRACTITIONER

## 2022-12-26 PROCEDURE — 36415 COLL VENOUS BLD VENIPUNCTURE: CPT | Mod: ORL | Performed by: NURSE PRACTITIONER

## 2022-12-26 PROCEDURE — P9604 ONE-WAY ALLOW PRORATED TRIP: HCPCS | Mod: ORL | Performed by: NURSE PRACTITIONER

## 2022-12-26 RX ORDER — TORSEMIDE 20 MG/1
TABLET ORAL
Qty: 45 TABLET | Refills: 11 | Status: SHIPPED | OUTPATIENT
Start: 2022-12-26 | End: 2023-02-14 | Stop reason: DRUGHIGH

## 2022-12-26 NOTE — LETTER
12/26/2022        RE: Marla Dunn  [place]  [address]  [place] [address]        [facility] GERIATRICS  ACUTE/EPISODIC VISIT    [facility] Medical Record Number:  [id number]  Place of Service where encounter took place:  [facility] [place] [[id number]]    Chief Complaint   Patient presents with     RECHECK       HPI:    Marla Dunn is a 89 year old  (5/22/1929), who is being seen today for an episodic care visit.  HPI information obtained from: facility chart records, facility staff, patient report and [facility] chart review.    Today's concern is:    Diagnoses       Codes Comments    Slow transit constipation    -  Primary K59.01     Acute cystitis without hematuria     N30.00     Hyponatremia     E87.1     Stage 3a chronic kidney disease (H)     N18.31     Frail elderly     R54         Came to see Georgia today to follow-up with her constipation, UTI, and lab work that was done today.      From Georgia sitting in her big recliner.  She had just taken a nap and typically sets a timer so she knows when to get up.  Georgia has struggled with her goals since last week.  Had ordered bisacodyl tablets that she would have at bedside.  She stated that her supply was gone and staff had not brought up a supply that was ordered.      Laboratory came later in the morning and so it will be tonight by the time results come back.  Depending on the results we will have nursing update Georgia's nephrology clinic.  That clinic has been adjusting her NaCL tablets.  Trying to lower them and see if she can just get by with fluid restriction which Georgia is pretty good on following how much she can take on a daily basis.      ALLERGIES:    Allergies   Allergen Reactions     Gramineae Pollens Other (See Comments)     ORCHARDGRASS. Shortness of breath when lawn is just cut.     Smoke. Other (See Comments)     Hard to breathe.      Pollen Extract      Other reaction(s): Unknown        MEDICATIONS:  Post Discharge Medication Reconciliation Status: patient was not discharged from an inpatient facility or TCU. Medications reviewed today.    Current Outpatient Medications   Medication Sig Dispense Refill     acetaminophen (TYLENOL) 325 MG tablet Take 3 tablets (975 mg) by mouth every 8 hours as needed for mild pain       albuterol (PROAIR HFA/PROVENTIL HFA/VENTOLIN HFA) 108 (90 Base) MCG/ACT inhaler Inhale 2 puffs into the lungs every 6 hours       Ascorbic Acid (VITAMIN C PO) Take 500 mg by mouth every morning       ASPIRIN LOW DOSE 81 MG EC tablet 1 TABLET ORALLY 2 TIMES DAILY (DX: PROPHYLAXIS) 56 tablet 11     bisacodyl (DULCOLAX) 5 MG EC tablet Take 1 tablet (5 mg) by mouth daily as needed for constipation       Calcium Carb-Cholecalciferol (CALCIUM+D3) 600-800 MG-UNIT TABS Take 1 tablet by mouth daily       calcium carbonate (TUMS) 500 MG chewable tablet Take 2 tablets (1,000 mg) by mouth 2 times daily       cephALEXin (KEFLEX) 500 MG capsule Take 1 capsule (500 mg) by mouth 3 times daily for 7 days 21 capsule 0     cholecalciferol (D3 HIGH POTENCY) 125 MCG (5000 UT) CAPS Take 1 capsule by mouth daily       COMBIVENT RESPIMAT  MCG/ACT inhaler INHALE 1 PUFF INTO THE LUNGS  4 TIMES DAILY 4 g 11     cycloSPORINE (RESTASIS) 0.05 % ophthalmic emulsion Place 1 drop into both eyes 2 times daily        fish oil-omega-3 fatty acids 1000 MG capsule Take 1 g by mouth daily       fluorometholone (FML LIQUIFILM) 0.1 % ophthalmic susp Place 1 drop Into the left eye daily        IBANDRONATE SODIUM PO Take 150 mg by mouth every 30 days       Levothyroxine Sodium (LEVOXYL PO) Take 88 mcg by mouth daily        loratadine (CLARITIN) 10 MG tablet Take 1 tablet (10 mg) by mouth daily (Patient taking differently: Take 10 mg by mouth daily as needed)       melatonin 5 MG tablet 1 TABLET ORALLY AT BEDTIME (DX: INSOMNIA) 30 tablet 11     METOPROLOL TARTRATE PO  Take 25 mg by mouth 2 times daily        mirtazapine (REMERON) 7.5 MG tablet Take 7.5 mg by mouth At Bedtime       Multiple Vitamins-Minerals (PRESERVISION AREDS PO) Take 1 tablet by mouth 2 times daily       omeprazole (PRILOSEC) 20 MG DR capsule Take 20 mg by mouth 2 times daily       OXYGEN-HELIUM IN        polyethylene glycol 0.4%- propylene glycol 0.3% (SYSTANE ULTRA) 0.4-0.3 % SOLN ophthalmic solution Place 1 drop into both eyes 4 times daily       Polyethylene Glycol 3350 (PEG 3350) 17 GM/SCOOP POWD MIX 1 CAPFUL (17 GMS) IN 8 OUNCES WATER AND DRINK ORALLY DAILY (DX: CONSTIPATION);MIX 1 CAPFUL (17 GMS) IN 8 OUNCES OF JUICE OR WATER AND DRINK ORALLY DAILY AS NEEDED (DX: CONSTIPATION) 510 g 10     potassium chloride ER (K-TAB/KLOR-CON) 10 MEQ CR tablet 1 TABLET ORALLY DAILY 30 tablet 11     Probiotic Product (PROBIOTIC PO) Take 1 capsule by mouth every morning       senna-docusate (SENOKOT-S/PERICOLACE) 8.6-50 MG tablet Take 1 tablet by mouth 2 times daily as needed for constipation       sodium chloride 1 GM tablet 1 TABLET ORALLY 2 TIMES DAILY 60 tablet 11     spironolactone (ALDACTONE) 50 MG tablet Take 25 mg by mouth daily        timolol (TIMOPTIC) 0.5 % ophthalmic solution Place 1 drop Into the left eye daily        torsemide (DEMADEX) 20 MG tablet 1 & 1/2 TABLETS (30MG) ORALLY DAILY 45 tablet 11         REVIEW OF SYSTEMS:  4 point ROS neg other than the symptoms noted above in the HPI.      PHYSICAL EXAM:  There were no vitals taken for this visit.  No recent vitals.  Petite, frail female in the recliner.  Alert and oriented x3.  She appears apprehensive in talking about her health.  Georgia does wear glasses.  Some minor hearing difficulties but does well in a quiet environment.  Skin is pale dry thin and intact.  Heart rate regular/irregular.  No edema  Lungs clear to auscultation.  Does not wear oxygen  Abdomen is flat soft nontender.  Bowel sounds are present.  Does have a four-wheel walker but prefers  to use the wheelchair to move around with.  She does talk about getting herself to use the walker more as her niece prefers her to use the walker when they go to appointments.  Easier to fold up the walker to get into the car    Component      Latest Ref Rng & Units 12/19/2022   Sodium      136 - 145 mmol/L 126 (L)   Potassium      3.4 - 5.3 mmol/L 3.3 (L)   Chloride      98 - 107 mmol/L 88 (L)   Carbon Dioxide (CO2)      22 - 29 mmol/L 24   Anion Gap      7 - 15 mmol/L 14   Urea Nitrogen      8.0 - 23.0 mg/dL 13.9   Creatinine      0.51 - 0.95 mg/dL 0.78   Calcium      8.2 - 9.6 mg/dL 9.2   Glucose      70 - 99 mg/dL 91   GFR Estimate      >60 mL/min/1.73m2 70     ASSESSMENT / PLAN:  (K59.01) Slow transit constipation  (primary encounter diagnosis)  Comment: When this nurse practitioner returned to the nurses office, ask that the Dulcolax tablets.  For sorting through a shipment of medications and stated that it Phill new order.  They will bring her tablets up to her later on.    Will continue with Miralax 17gm po daily in fluids.  The Dulcolax tabs are PRN only.      (N30.00) Acute cystitis without hematuria  Comment:  last week Georgia call this nurse practitioner very upset about her ABX order.  This NP forgot that Keflex was already started for 3 days and when the UC came back, this NP ordered 7 days of Keflex.  She was not happy with that order.  Figured it out and apologized to her that this NP forgot that 3 days of keflex was already ordered.  [place] is on 7 days worth of Keflex 500mg po TID. no other acute issues.  Did speak with Phill devries last week as well and explained what happened with the Keflex.    (E87.1) Hyponatremia  (N18.31) Stage 3a chronic kidney disease (H)  Comment: on 12/22, Dr. Siddiqui's office backed down the NaCl 1GM tablets to BID and increased her Torsemide to 30mg daily.  Lab comes later in the day now and so results will not be back till probably 8 PM tonight.  Depending on the  results, the nephrologist  Clinic via fax.    (R54) Frail elderly  Comment: Georgia has many health issues and is frail.  She is ready to pass on when it is time.  Did speak with the niece about seeing Georgia on a weekly basis just because of everything going on with her kidneys, constipation, just her overall mental wellbeing.  Niece was in agreement with this plan.  Georgia may be to stoic to ask but she has said she misses the NP when not around.    Orders:  Staff to bring up the Dulcolax tablets when arrived from pharmacy    Electronically signed by  CASTRO Hazel CNP               Sincerely,        CASTRO Hazel CNP

## 2022-12-26 NOTE — PROGRESS NOTES
[facility] GERIATRICS  ACUTE/EPISODIC VISIT    [facility] Medical Record Number:  [id number]  Place of Service where encounter took place:  [facility] ([facility]) [[id number]]    Chief Complaint   Patient presents with     RECHECK       HPI:    Marla Dunn is a 89 year old  (5/22/1929), who is being seen today for an episodic care visit.  HPI information obtained from: facility chart records, facility staff, patient report and [facility] chart review.    Today's concern is:    Diagnoses       Codes Comments    Slow transit constipation    -  Primary K59.01     Acute cystitis without hematuria     N30.00     Hyponatremia     E87.1     Stage 3a chronic kidney disease (H)     N18.31     Frail elderly     R54         Came to see [place] today to follow-up with her constipation, UTI, and lab work that was done today.      From Georgia sitting in her big recliner.  She had just taken a nap and typically sets a timer so she knows when to get up.  Georgia has struggled with her goals since last week.  Had ordered bisacodyl tablets that she would have at bedside.  She stated that her supply was gone and staff had not brought up a supply that was ordered.      Laboratory came later in the morning and so it will be tonight by the time results come back.  Depending on the results we will have nursing update Georgia's nephrology clinic.  That clinic has been adjusting her NaCL tablets.  Trying to lower them and see if she can just get by with fluid restriction which Georgia is pretty good on following how much she can take on a daily basis.      ALLERGIES:    Allergies   Allergen Reactions     Gramineae Pollens Other (See Comments)     ORCHARDGRASS. Shortness of breath when lawn is just cut.     Smoke. Other (See Comments)     Hard to breathe.     Pollen Extract      Other reaction(s): Unknown        MEDICATIONS:  Post Discharge Medication Reconciliation Status: patient  was not discharged from an inpatient facility or TCU. Medications reviewed today.    Current Outpatient Medications   Medication Sig Dispense Refill     acetaminophen (TYLENOL) 325 MG tablet Take 3 tablets (975 mg) by mouth every 8 hours as needed for mild pain       albuterol (PROAIR HFA/PROVENTIL HFA/VENTOLIN HFA) 108 (90 Base) MCG/ACT inhaler Inhale 2 puffs into the lungs every 6 hours       Ascorbic Acid (VITAMIN C PO) Take 500 mg by mouth every morning       ASPIRIN LOW DOSE 81 MG EC tablet 1 TABLET ORALLY 2 TIMES DAILY (DX: PROPHYLAXIS) 56 tablet 11     bisacodyl (DULCOLAX) 5 MG EC tablet Take 1 tablet (5 mg) by mouth daily as needed for constipation       Calcium Carb-Cholecalciferol (CALCIUM+D3) 600-800 MG-UNIT TABS Take 1 tablet by mouth daily       calcium carbonate (TUMS) 500 MG chewable tablet Take 2 tablets (1,000 mg) by mouth 2 times daily       cephALEXin (KEFLEX) 500 MG capsule Take 1 capsule (500 mg) by mouth 3 times daily for 7 days 21 capsule 0     cholecalciferol (D3 HIGH POTENCY) 125 MCG (5000 UT) CAPS Take 1 capsule by mouth daily       COMBIVENT RESPIMAT  MCG/ACT inhaler INHALE 1 PUFF INTO THE LUNGS  4 TIMES DAILY 4 g 11     cycloSPORINE (RESTASIS) 0.05 % ophthalmic emulsion Place 1 drop into both eyes 2 times daily        fish oil-omega-3 fatty acids 1000 MG capsule Take 1 g by mouth daily       fluorometholone (FML LIQUIFILM) 0.1 % ophthalmic susp Place 1 drop Into the left eye daily        IBANDRONATE SODIUM PO Take 150 mg by mouth every 30 days       Levothyroxine Sodium (LEVOXYL PO) Take 88 mcg by mouth daily        loratadine (CLARITIN) 10 MG tablet Take 1 tablet (10 mg) by mouth daily (Patient taking differently: Take 10 mg by mouth daily as needed)       melatonin 5 MG tablet 1 TABLET ORALLY AT BEDTIME (DX: INSOMNIA) 30 tablet 11     METOPROLOL TARTRATE PO Take 25 mg by mouth 2 times daily        mirtazapine (REMERON) 7.5 MG tablet Take 7.5 mg by mouth At Bedtime       Multiple  Vitamins-Minerals (PRESERVISION AREDS PO) Take 1 tablet by mouth 2 times daily       omeprazole (PRILOSEC) 20 MG DR capsule Take 20 mg by mouth 2 times daily       OXYGEN-HELIUM IN        polyethylene glycol 0.4%- propylene glycol 0.3% (SYSTANE ULTRA) 0.4-0.3 % SOLN ophthalmic solution Place 1 drop into both eyes 4 times daily       Polyethylene Glycol 3350 (PEG 3350) 17 GM/SCOOP POWD MIX 1 CAPFUL (17 GMS) IN 8 OUNCES WATER AND DRINK ORALLY DAILY (DX: CONSTIPATION);MIX 1 CAPFUL (17 GMS) IN 8 OUNCES OF JUICE OR WATER AND DRINK ORALLY DAILY AS NEEDED (DX: CONSTIPATION) 510 g 10     potassium chloride ER (K-TAB/KLOR-CON) 10 MEQ CR tablet 1 TABLET ORALLY DAILY 30 tablet 11     Probiotic Product (PROBIOTIC PO) Take 1 capsule by mouth every morning       senna-docusate (SENOKOT-S/PERICOLACE) 8.6-50 MG tablet Take 1 tablet by mouth 2 times daily as needed for constipation       sodium chloride 1 GM tablet 1 TABLET ORALLY 2 TIMES DAILY 60 tablet 11     spironolactone (ALDACTONE) 50 MG tablet Take 25 mg by mouth daily        timolol (TIMOPTIC) 0.5 % ophthalmic solution Place 1 drop Into the left eye daily        torsemide (DEMADEX) 20 MG tablet 1 & 1/2 TABLETS (30MG) ORALLY DAILY 45 tablet 11         REVIEW OF SYSTEMS:  4 point ROS neg other than the symptoms noted above in the HPI.      PHYSICAL EXAM:  There were no vitals taken for this visit.  No recent vitals.  Petite, frail female in the recliner.  Alert and oriented x3.  She appears apprehensive in talking about her health.  [place]ia does wear glasses.  Some minor hearing difficulties but does well in a quiet environment.  Skin is pale dry thin and intact.  Heart rate regular/irregular.  No edema  Lungs clear to auscultation.  Does not wear oxygen  Abdomen is flat soft nontender.  Bowel sounds are present.  Does have a four-wheel walker but prefers to use the wheelchair to move around with.  She does talk about getting herself to use the walker more as her niece prefers  her to use the walker when they go to appointments.  Easier to fold up the walker to get into the car    Component      Latest Ref Rng & Units 12/19/2022   Sodium      136 - 145 mmol/L 126 (L)   Potassium      3.4 - 5.3 mmol/L 3.3 (L)   Chloride      98 - 107 mmol/L 88 (L)   Carbon Dioxide (CO2)      22 - 29 mmol/L 24   Anion Gap      7 - 15 mmol/L 14   Urea Nitrogen      8.0 - 23.0 mg/dL 13.9   Creatinine      0.51 - 0.95 mg/dL 0.78   Calcium      8.2 - 9.6 mg/dL 9.2   Glucose      70 - 99 mg/dL 91   GFR Estimate      >60 mL/min/1.73m2 70     ASSESSMENT / PLAN:  (K59.01) Slow transit constipation  (primary encounter diagnosis)  Comment: When this nurse practitioner returned to the nurses office, ask that the Dulcolax tablets.  For sorting through a shipment of medications and stated that it Phill new order.  They will bring her tablets up to her later on.    Will continue with Miralax 17gm po daily in fluids.  The Dulcolax tabs are PRN only.      (N30.00) Acute cystitis without hematuria  Comment:  last week Georgia call this nurse practitioner very upset about her ABX order.  This NP forgot that Keflex was already started for 3 days and when the UC came back, this NP ordered 7 days of Keflex.  She was not happy with that order.  Figured it out and apologized to her that this NP forgot that 3 days of keflex was already ordered.  [place] is on 7 days worth of Keflex 500mg po TID. no other acute issues.  Did speak with Phill devries last week as well and explained what happened with the Keflex.    (E87.1) Hyponatremia  (N18.31) Stage 3a chronic kidney disease (H)  Comment: on 12/22, Dr. Siddiqui's office backed down the NaCl 1GM tablets to BID and increased her Torsemide to 30mg daily.  Lab comes later in the day now and so results will not be back till probably 8 PM tonight.  Depending on the results, the nephrologist  Clinic via fax.    (R54) Frail elderly  Comment: Georgia has many health issues and is frail.  She  is ready to pass on when it is time.  Did speak with the niece about seeing Georgia on a weekly basis just because of everything going on with her kidneys, constipation, just her overall mental wellbeing.  Niece was in agreement with this plan.  Georgia may be to stoic to ask but she has said she misses the NP when not around.    Orders:  Staff to bring up the Dulcolax tablets when arrived from pharmacy    Electronically signed by  CASTRO Hazel CNP

## 2022-12-28 ENCOUNTER — LAB REQUISITION (OUTPATIENT)
Dept: LAB | Facility: CLINIC | Age: 87
End: 2022-12-28
Payer: COMMERCIAL

## 2022-12-28 DIAGNOSIS — E87.1 HYPO-OSMOLALITY AND HYPONATREMIA: ICD-10-CM

## 2022-12-29 ENCOUNTER — LAB REQUISITION (OUTPATIENT)
Dept: LAB | Facility: CLINIC | Age: 87
End: 2022-12-29
Payer: COMMERCIAL

## 2022-12-29 LAB
OSMOLALITY UR: 261 MMOL/KG (ref 100–1200)
POTASSIUM UR-SCNC: 19.4 MMOL/L
SODIUM UR-SCNC: 66 MMOL/L

## 2022-12-29 PROCEDURE — 84300 ASSAY OF URINE SODIUM: CPT | Mod: ORL | Performed by: PHYSICIAN ASSISTANT

## 2022-12-29 PROCEDURE — 84133 ASSAY OF URINE POTASSIUM: CPT | Mod: ORL | Performed by: PHYSICIAN ASSISTANT

## 2022-12-29 PROCEDURE — 83935 ASSAY OF URINE OSMOLALITY: CPT | Mod: ORL | Performed by: PHYSICIAN ASSISTANT

## 2022-12-30 ENCOUNTER — TELEPHONE (OUTPATIENT)
Dept: GERIATRICS | Facility: CLINIC | Age: 87
End: 2022-12-30

## 2022-12-30 NOTE — TELEPHONE ENCOUNTER
Georgia called and was somewhat besides herself as she has been having multiple stools over the last few days.  She was talking about a white powder - Miralax - and not going to take it anymore.  She has been on Miralax for some time and as of the visit on Monday  With her she was constipated.      She did start new medication from the Nephrology office and this NP needed to look it up in the retirement's chart.  Found that she was started on Urea 15gm twice a day.      Called Georgia back to let her know of the medication and tried to tell her this NP did not order this.  It came from the Nephrology clinic this week due to her Na level being at 125.  She asked for this NP to call Dr. Siddiqui's nurse as she was expecting Georgia to call back.    Called Priscilla and spoke about what transpired this week as Dr. Siddiqui out of the clinic.    Going to discontinue the miralax.  BMP on Monday.    The nurse was going to send a note through to the ordering provider of the Urea 15gm    Placed a call back to [place] to let her know of the conversation.  Will discontinue the Miralax orders.  Make sure a BMP is ordered for Monday.    Georgia hopes there is a stop date on the Urea and informed her that there is not one in the [facility] chart.  Will see what labs bring on Monday.      Electronically signed by Gabrielle Wallace RN, CNP

## 2022-12-31 ENCOUNTER — LAB REQUISITION (OUTPATIENT)
Dept: LAB | Facility: CLINIC | Age: 87
End: 2022-12-31
Payer: COMMERCIAL

## 2022-12-31 DIAGNOSIS — E87.1 HYPO-OSMOLALITY AND HYPONATREMIA: ICD-10-CM

## 2023-01-02 LAB
ANION GAP SERPL CALCULATED.3IONS-SCNC: 15 MMOL/L (ref 7–15)
BUN SERPL-MCNC: 41.4 MG/DL (ref 8–23)
CALCIUM SERPL-MCNC: 10 MG/DL (ref 8.2–9.6)
CHLORIDE SERPL-SCNC: 90 MMOL/L (ref 98–107)
CREAT SERPL-MCNC: 0.77 MG/DL (ref 0.51–0.95)
DEPRECATED HCO3 PLAS-SCNC: 25 MMOL/L (ref 22–29)
GFR SERPL CREATININE-BSD FRML MDRD: 72 ML/MIN/1.73M2
GLUCOSE SERPL-MCNC: 108 MG/DL (ref 70–99)
OSMOLALITY SERPL: 296 MMOL/KG (ref 280–301)
POTASSIUM SERPL-SCNC: 3.4 MMOL/L (ref 3.4–5.3)
SODIUM SERPL-SCNC: 130 MMOL/L (ref 136–145)

## 2023-01-02 PROCEDURE — P9603 ONE-WAY ALLOW PRORATED MILES: HCPCS | Mod: ORL | Performed by: PHYSICIAN ASSISTANT

## 2023-01-02 PROCEDURE — 36415 COLL VENOUS BLD VENIPUNCTURE: CPT | Mod: ORL | Performed by: PHYSICIAN ASSISTANT

## 2023-01-02 PROCEDURE — 83930 ASSAY OF BLOOD OSMOLALITY: CPT | Mod: ORL | Performed by: PHYSICIAN ASSISTANT

## 2023-01-02 PROCEDURE — 80048 BASIC METABOLIC PNL TOTAL CA: CPT | Mod: ORL | Performed by: NURSE PRACTITIONER

## 2023-01-05 ENCOUNTER — LAB REQUISITION (OUTPATIENT)
Dept: LAB | Facility: CLINIC | Age: 88
End: 2023-01-05
Payer: COMMERCIAL

## 2023-01-05 DIAGNOSIS — E87.1 HYPO-OSMOLALITY AND HYPONATREMIA: ICD-10-CM

## 2023-01-06 ENCOUNTER — ASSISTED LIVING VISIT (OUTPATIENT)
Dept: GERIATRICS | Facility: CLINIC | Age: 88
End: 2023-01-06
Payer: COMMERCIAL

## 2023-01-06 DIAGNOSIS — E87.1 HYPONATREMIA: Primary | ICD-10-CM

## 2023-01-06 DIAGNOSIS — N18.31 CHRONIC KIDNEY DISEASE, STAGE 3A (H): ICD-10-CM

## 2023-01-06 DIAGNOSIS — D68.9 COAGULATION DEFECT, UNSPECIFIED (H): ICD-10-CM

## 2023-01-06 DIAGNOSIS — I50.32 CHRONIC DIASTOLIC HEART FAILURE (H): ICD-10-CM

## 2023-01-06 DIAGNOSIS — R54 FRAIL ELDERLY: ICD-10-CM

## 2023-01-06 DIAGNOSIS — F32.A ANXIETY AND DEPRESSION: ICD-10-CM

## 2023-01-06 DIAGNOSIS — I48.91 ATRIAL FIBRILLATION WITH RAPID VENTRICULAR RESPONSE (H): ICD-10-CM

## 2023-01-06 DIAGNOSIS — F41.9 ANXIETY AND DEPRESSION: ICD-10-CM

## 2023-01-06 PROCEDURE — 99349 HOME/RES VST EST MOD MDM 40: CPT | Performed by: NURSE PRACTITIONER

## 2023-01-06 NOTE — PROGRESS NOTES
[facility] GERIATRICS  ACUTE/EPISODIC VISIT    [facility] Medical Record Number:  [id number]  Place of Service where encounter took place:  [facility] [place] [facility] [[id number]]    Chief Complaint   Patient presents with     RECHECK       HPI:    Marla Dunn is a 89 year old  (5/22/1929), who is being seen today for an episodic care visit.  HPI information obtained from: facility chart records, facility staff, patient report and [facility] chart review.    Today's concern is:    Diagnoses       Codes Comments    Hyponatremia    -  Primary E87.1     Chronic kidney disease, stage 3a (H)     N18.31     Atrial fibrillation with rapid ventricular response (H)     I48.91     Coagulation defect, unspecified (H)     D68.9     Chronic diastolic heart failure (H)     I50.32     Anxiety and depression     F41.9, F32.A     Frail elderly     R54         Came to see Georgia today and how she has been doing. She appreciates a visit as she feels she is going through changes with her kidneys and worries about her medications.  She often states she is not afraid to pass away.  She is in her 90's and has outlived many she knows but still in contact with friends from [place].    Reviewed the nurses charting.  Occasional falling noted.  See today after lunch she had a unwitnessed fall reported to nursing from the aide. Georgia did not say anything.      ALLERGIES:    Allergies   Allergen Reactions     Gramineae Pollens Other (See Comments)     ORCHARDGRASS. Shortness of breath when lawn is just cut.     Smoke. Other (See Comments)     Hard to breathe.     Pollen Extract      Other reaction(s): Unknown        MEDICATIONS:  Post Discharge Medication Reconciliation Status: patient was not discharged from an inpatient facility or TCU. Medications reconciled today    Current Outpatient Medications   Medication Sig Dispense Refill     acetaminophen (TYLENOL) 325 MG tablet Take 3  tablets (975 mg) by mouth every 8 hours as needed for mild pain       albuterol (PROAIR HFA/PROVENTIL HFA/VENTOLIN HFA) 108 (90 Base) MCG/ACT inhaler Inhale 2 puffs into the lungs every 6 hours       Ascorbic Acid (VITAMIN C PO) Take 500 mg by mouth every morning       ASPIRIN LOW DOSE 81 MG EC tablet 1 TABLET ORALLY 2 TIMES DAILY (DX: PROPHYLAXIS) 56 tablet 11     bisacodyl (DULCOLAX) 5 MG EC tablet Take 1 tablet (5 mg) by mouth daily as needed for constipation       Calcium Carb-Cholecalciferol (CALCIUM+D3) 600-800 MG-UNIT TABS Take 1 tablet by mouth daily       calcium carbonate (TUMS) 500 MG chewable tablet Take 2 tablets (1,000 mg) by mouth 2 times daily       cholecalciferol (D3 HIGH POTENCY) 125 MCG (5000 UT) CAPS Take 1 capsule by mouth daily       COMBIVENT RESPIMAT  MCG/ACT inhaler INHALE 1 PUFF INTO THE LUNGS  4 TIMES DAILY 4 g 11     cycloSPORINE (RESTASIS) 0.05 % ophthalmic emulsion Place 1 drop into both eyes 2 times daily        fish oil-omega-3 fatty acids 1000 MG capsule Take 1 g by mouth daily       fluorometholone (FML LIQUIFILM) 0.1 % ophthalmic susp Place 1 drop Into the left eye daily        IBANDRONATE SODIUM PO Take 150 mg by mouth every 30 days       Levothyroxine Sodium (LEVOXYL PO) Take 88 mcg by mouth daily        loratadine (CLARITIN) 10 MG tablet Take 1 tablet (10 mg) by mouth daily (Patient taking differently: Take 10 mg by mouth daily as needed)       melatonin 5 MG tablet 1 TABLET ORALLY AT BEDTIME (DX: INSOMNIA) 30 tablet 11     METOPROLOL TARTRATE PO Take 25 mg by mouth 2 times daily        mirtazapine (REMERON) 7.5 MG tablet Take 7.5 mg by mouth At Bedtime       Multiple Vitamins-Minerals (PRESERVISION AREDS PO) Take 1 tablet by mouth 2 times daily       omeprazole (PRILOSEC) 20 MG DR capsule Take 20 mg by mouth 2 times daily       OXYGEN-HELIUM IN        polyethylene glycol 0.4%- propylene glycol 0.3% (SYSTANE ULTRA) 0.4-0.3 % SOLN ophthalmic solution Place 1 drop into both  eyes 4 times daily       Polyethylene Glycol 3350 (PEG 3350) 17 GM/SCOOP POWD MIX 1 CAPFUL (17 GMS) IN 8 OUNCES WATER AND DRINK ORALLY DAILY (DX: CONSTIPATION);MIX 1 CAPFUL (17 GMS) IN 8 OUNCES OF JUICE OR WATER AND DRINK ORALLY DAILY AS NEEDED (DX: CONSTIPATION) 510 g 10     potassium chloride ER (K-TAB/KLOR-CON) 10 MEQ CR tablet 1 TABLET ORALLY DAILY 30 tablet 11     Probiotic Product (PROBIOTIC PO) Take 1 capsule by mouth every morning       senna-docusate (SENOKOT-S/PERICOLACE) 8.6-50 MG tablet Take 1 tablet by mouth 2 times daily as needed for constipation       sodium chloride 1 GM tablet 1 TABLET ORALLY 2 TIMES DAILY 60 tablet 11     spironolactone (ALDACTONE) 50 MG tablet Take 25 mg by mouth daily        timolol (TIMOPTIC) 0.5 % ophthalmic solution Place 1 drop Into the left eye daily       Urea 15GM 1 packet daily       torsemide (DEMADEX) 20 MG tablet 1 & 1/2 TABLETS (30MG) ORALLY DAILY 45 tablet 11         REVIEW OF SYSTEMS:  4 point ROS neg other than the symptoms noted above in the HPI.  No chest pain. No acute shortness of breath.    PHYSICAL EXAM:  /63   Pulse 90   Resp 20   Petite female sitting up in recliner.  Allowed her to stay there and sat next to her.  She will nap in the afternoon and sets an alarm.  Skin is pink/pale, warm and dry.  Teeth are not in the best repair.  Heart rate is regular/irregular and strong.  No edema in lower legs.    No respiratory issues.  No use of oxygen.  Lungs are clear.    Abdomen is flat, soft and non-tender.   Has full ROM in extremities.  No focal deficits.    Is able to ambulate with a walker. Moves self in her wheelchair which she prefers.  She knows she needs to keep ambulating as it is easier on her niece to take her to appointments.    Always as a cane outside her door to apartment.      Component      Latest Ref Rng 1/2/2023  9:13 AM   Sodium      136 - 145 mmol/L 130 (L)    Potassium      3.4 - 5.3 mmol/L 3.4    Chloride      98 - 107 mmol/L 90  (L)    Carbon Dioxide (CO2)      22 - 29 mmol/L 25    Anion Gap      7 - 15 mmol/L 15    Urea Nitrogen      8.0 - 23.0 mg/dL 41.4 (H)    Creatinine      0.51 - 0.95 mg/dL 0.77    Calcium      8.2 - 9.6 mg/dL 10.0 (H)    Glucose      70 - 99 mg/dL 108 (H)    GFR Estimate      >60 mL/min/1.73m2 72    Osmolality      280 - 301 mmol/kg 296       Legend:  (L) Low  (H) High      ASSESSMENT / PLAN:  (E87.1) Hyponatremia  (primary encounter diagnosis)  (N18.31) Chronic kidney disease, stage 3a (H)  Comment: keeping an eye on her lab values as he nephrology team figures what will help her maintain a decent Na level.    Currently on Ur-na 15GM powder daily that started on 1/4/23, NaCl 1GM twice a day, Torsemide 30mg daily and has Spironolactone 25mg daily.   Currently tapering her down from the Urea powder.  Hard for for her to take but trying to maintain her Na level.      (I48.91) Atrial fibrillation with rapid ventricular response (H)  (D68.9) Coagulation defect, unspecified (H)  Comment: currently on a ASA 81mg daily.  Unable to be on Coumadin due to hx of falls and currently still falls. No acute symptoms of palpitations.  No acute bleeding.  Does see hematology/oncology for her anemia.    (I50.32) Chronic diastolic heart failure (H)  Comment: currently takingDemadex 30mg daily, K+ supplement, and Spironolactone 25mg po daily.  BMPs monitored regularly due to her Hyponatremia.  Do not feel she has any acute issues with her heart failure of late.  This Torsemide dose was started 12/22/22 by her Nephrologist.  Part due to her Na level.      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  Georgia sees different specialist and keeps it all straight.  She does become anxious thinking about her current treatment for her low sodium.  Sometimes she will call this NP if confused.    Remains on Remeron  7.5mg at HS and Melatonin 5mg at HS to help with sleep,appetite, and sadness. No changes.      Orders:  No new orders today    Electronically signed by  CASTRO Hazel CNP

## 2023-01-06 NOTE — LETTER
1/6/2023        RE: Marla Dunn  C/o Deneen Dunn  [address] [facility] [place] [address]        [facility] GERIATRICS  ACUTE/EPISODIC VISIT    [facility] Medical Record Number:  [id number]  Place of Service where encounter took place:  [facility] [[id number]]    Chief Complaint   Patient presents with     RECHECK       HPI:    Marla Dunn is a 89 year old  (5/22/1929), who is being seen today for an episodic care visit.  HPI information obtained from: facility chart records, facility staff, patient report and [facility] chart review.    Today's concern is:    Diagnoses       Codes Comments    Hyponatremia    -  Primary E87.1     Chronic kidney disease, stage 3a (H)     N18.31     Atrial fibrillation with rapid ventricular response (H)     I48.91     Coagulation defect, unspecified (H)     D68.9     Chronic diastolic heart failure (H)     I50.32     Anxiety and depression     F41.9, F32.A     Frail elderly     R54         Came to see Georgia today and how she has been doing. She appreciates a visit as she feels she is going through changes with her kidneys and worries about her medications.  She often states she is not afraid to pass away.  She is in her 90's and has outlived many she knows but still in contact with friends from [place].    Reviewed the nurses charting.  Occasional falling noted.  See today after lunch she had a unwitnessed fall reported to nursing from the aide. Georgia did not say anything.      ALLERGIES:    Allergies   Allergen Reactions     Gramineae Pollens Other (See Comments)     ORCHARDGRASS. Shortness of breath when lawn is just cut.     Smoke. Other (See Comments)     Hard to breathe.     Pollen Extract      Other reaction(s): Unknown        MEDICATIONS:  Post Discharge Medication Reconciliation Status: patient was not discharged from an inpatient facility or TCU. Medications reconciled today    Current  Outpatient Medications   Medication Sig Dispense Refill     acetaminophen (TYLENOL) 325 MG tablet Take 3 tablets (975 mg) by mouth every 8 hours as needed for mild pain       albuterol (PROAIR HFA/PROVENTIL HFA/VENTOLIN HFA) 108 (90 Base) MCG/ACT inhaler Inhale 2 puffs into the lungs every 6 hours       Ascorbic Acid (VITAMIN C PO) Take 500 mg by mouth every morning       ASPIRIN LOW DOSE 81 MG EC tablet 1 TABLET ORALLY 2 TIMES DAILY (DX: PROPHYLAXIS) 56 tablet 11     bisacodyl (DULCOLAX) 5 MG EC tablet Take 1 tablet (5 mg) by mouth daily as needed for constipation       Calcium Carb-Cholecalciferol (CALCIUM+D3) 600-800 MG-UNIT TABS Take 1 tablet by mouth daily       calcium carbonate (TUMS) 500 MG chewable tablet Take 2 tablets (1,000 mg) by mouth 2 times daily       cholecalciferol (D3 HIGH POTENCY) 125 MCG (5000 UT) CAPS Take 1 capsule by mouth daily       COMBIVENT RESPIMAT  MCG/ACT inhaler INHALE 1 PUFF INTO THE LUNGS  4 TIMES DAILY 4 g 11     cycloSPORINE (RESTASIS) 0.05 % ophthalmic emulsion Place 1 drop into both eyes 2 times daily        fish oil-omega-3 fatty acids 1000 MG capsule Take 1 g by mouth daily       fluorometholone (FML LIQUIFILM) 0.1 % ophthalmic susp Place 1 drop Into the left eye daily        IBANDRONATE SODIUM PO Take 150 mg by mouth every 30 days       Levothyroxine Sodium (LEVOXYL PO) Take 88 mcg by mouth daily        loratadine (CLARITIN) 10 MG tablet Take 1 tablet (10 mg) by mouth daily (Patient taking differently: Take 10 mg by mouth daily as needed)       melatonin 5 MG tablet 1 TABLET ORALLY AT BEDTIME (DX: INSOMNIA) 30 tablet 11     METOPROLOL TARTRATE PO Take 25 mg by mouth 2 times daily        mirtazapine (REMERON) 7.5 MG tablet Take 7.5 mg by mouth At Bedtime       Multiple Vitamins-Minerals (PRESERVISION AREDS PO) Take 1 tablet by mouth 2 times daily       omeprazole (PRILOSEC) 20 MG DR capsule Take 20 mg by mouth 2 times daily       OXYGEN-HELIUM IN        polyethylene  glycol 0.4%- propylene glycol 0.3% (SYSTANE ULTRA) 0.4-0.3 % SOLN ophthalmic solution Place 1 drop into both eyes 4 times daily       Polyethylene Glycol 3350 (PEG 3350) 17 GM/SCOOP POWD MIX 1 CAPFUL (17 GMS) IN 8 OUNCES WATER AND DRINK ORALLY DAILY (DX: CONSTIPATION);MIX 1 CAPFUL (17 GMS) IN 8 OUNCES OF JUICE OR WATER AND DRINK ORALLY DAILY AS NEEDED (DX: CONSTIPATION) 510 g 10     potassium chloride ER (K-TAB/KLOR-CON) 10 MEQ CR tablet 1 TABLET ORALLY DAILY 30 tablet 11     Probiotic Product (PROBIOTIC PO) Take 1 capsule by mouth every morning       senna-docusate (SENOKOT-S/PERICOLACE) 8.6-50 MG tablet Take 1 tablet by mouth 2 times daily as needed for constipation       sodium chloride 1 GM tablet 1 TABLET ORALLY 2 TIMES DAILY 60 tablet 11     spironolactone (ALDACTONE) 50 MG tablet Take 25 mg by mouth daily        timolol (TIMOPTIC) 0.5 % ophthalmic solution Place 1 drop Into the left eye daily       Urea 15GM 1 packet daily       torsemide (DEMADEX) 20 MG tablet 1 & 1/2 TABLETS (30MG) ORALLY DAILY 45 tablet 11         REVIEW OF SYSTEMS:  4 point ROS neg other than the symptoms noted above in the HPI.  No chest pain. No acute shortness of breath.    PHYSICAL EXAM:  /63   Pulse 90   Resp 20   Petite female sitting up in recliner.  Allowed her to stay there and sat next to her.  She will nap in the afternoon and sets an alarm.  Skin is pink/pale, warm and dry.  Teeth are not in the best repair.  Heart rate is regular/irregular and strong.  No edema in lower legs.    No respiratory issues.  No use of oxygen.  Lungs are clear.    Abdomen is flat, soft and non-tender.   Has full ROM in extremities.  No focal deficits.    Is able to ambulate with a walker. Moves self in her wheelchair which she prefers.  She knows she needs to keep ambulating as it is easier on her niece to take her to appointments.    Always as a cane outside her door to apartment.      Component      Latest Ref Rng 1/2/2023  9:13 AM    Sodium      136 - 145 mmol/L 130 (L)    Potassium      3.4 - 5.3 mmol/L 3.4    Chloride      98 - 107 mmol/L 90 (L)    Carbon Dioxide (CO2)      22 - 29 mmol/L 25    Anion Gap      7 - 15 mmol/L 15    Urea Nitrogen      8.0 - 23.0 mg/dL 41.4 (H)    Creatinine      0.51 - 0.95 mg/dL 0.77    Calcium      8.2 - 9.6 mg/dL 10.0 (H)    Glucose      70 - 99 mg/dL 108 (H)    GFR Estimate      >60 mL/min/1.73m2 72    Osmolality      280 - 301 mmol/kg 296       Legend:  (L) Low  (H) High      ASSESSMENT / PLAN:  (E87.1) Hyponatremia  (primary encounter diagnosis)  (N18.31) Chronic kidney disease, stage 3a (H)  Comment: keeping an eye on her lab values as he nephrology team figures what will help her maintain a decent Na level.    Currently on Ur-na 15GM powder daily that started on 1/4/23, NaCl 1GM twice a day, Torsemide 30mg daily and has Spironolactone 25mg daily.   Currently tapering her down from the Urea powder.  Hard for for her to take but trying to maintain her Na level.      (I48.91) Atrial fibrillation with rapid ventricular response (H)  (D68.9) Coagulation defect, unspecified (H)  Comment: currently on a ASA 81mg daily.  Unable to be on Coumadin due to hx of falls and currently still falls. No acute symptoms of palpitations.  No acute bleeding.  Does see hematology/oncology for her anemia.    (I50.32) Chronic diastolic heart failure (H)  Comment: currently takingDemadex 30mg daily, K+ supplement, and Spironolactone 25mg po daily.  BMPs monitored regularly due to her Hyponatremia.  Do not feel she has any acute issues with her heart failure of late.  This Torsemide dose was started 12/22/22 by her Nephrologist.  Part due to her Na level.      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  Georgia sees different specialist and keeps it all straight.  She does become anxious thinking about  her current treatment for her low sodium.  Sometimes she will call this NP if confused.    Remains on Remeron 7.5mg at HS and Melatonin 5mg at HS to help with sleep,appetite, and sadness. No changes.      Orders:  No new orders today    Electronically signed by  CASTRO Hazel CNP             Sincerely,        CASTRO Hazel CNP

## 2023-01-06 NOTE — LETTER
1/6/2023        RE: Marla Bonilla [facility]  [address]  [place] [address]        No notes on file      Sincerely,        CASTRO Hazel CNP

## 2023-01-09 LAB
ANION GAP SERPL CALCULATED.3IONS-SCNC: 16 MMOL/L (ref 7–15)
BUN SERPL-MCNC: 68.1 MG/DL (ref 8–23)
CALCIUM SERPL-MCNC: 10.2 MG/DL (ref 8.2–9.6)
CHLORIDE SERPL-SCNC: 89 MMOL/L (ref 98–107)
CREAT SERPL-MCNC: 0.8 MG/DL (ref 0.51–0.95)
DEPRECATED HCO3 PLAS-SCNC: 27 MMOL/L (ref 22–29)
GFR SERPL CREATININE-BSD FRML MDRD: 68 ML/MIN/1.73M2
GLUCOSE SERPL-MCNC: 67 MG/DL (ref 70–99)
POTASSIUM SERPL-SCNC: 4.9 MMOL/L (ref 3.4–5.3)
PROLACTIN SERPL 3RD IS-MCNC: 17 NG/ML (ref 5–23)
SODIUM SERPL-SCNC: 132 MMOL/L (ref 136–145)

## 2023-01-09 PROCEDURE — 36415 COLL VENOUS BLD VENIPUNCTURE: CPT | Mod: ORL | Performed by: NURSE PRACTITIONER

## 2023-01-09 PROCEDURE — 80048 BASIC METABOLIC PNL TOTAL CA: CPT | Mod: ORL | Performed by: NURSE PRACTITIONER

## 2023-01-09 PROCEDURE — P9603 ONE-WAY ALLOW PRORATED MILES: HCPCS | Mod: ORL | Performed by: NURSE PRACTITIONER

## 2023-01-09 PROCEDURE — 84146 ASSAY OF PROLACTIN: CPT | Mod: ORL | Performed by: NURSE PRACTITIONER

## 2023-01-11 ENCOUNTER — TELEPHONE (OUTPATIENT)
Dept: GERIATRICS | Facility: CLINIC | Age: 88
End: 2023-01-11

## 2023-01-11 DIAGNOSIS — G47.00 INSOMNIA, UNSPECIFIED TYPE: ICD-10-CM

## 2023-01-11 RX ORDER — LANOLIN ALCOHOL/MO/W.PET/CERES
CREAM (GRAM) TOPICAL
Qty: 30 TABLET | Refills: 11 | Status: SHIPPED | OUTPATIENT
Start: 2023-01-11 | End: 2023-12-18

## 2023-01-11 NOTE — TELEPHONE ENCOUNTER
Resident called and asking for her melatonin to be restated.  This NP told her that the Melatonin was not removed by this NP.    Make sure she was not talking about Miralax.  No the sleeping pill.  She only sleeps like an hour a time.    Will look and restart it if not on list.    Opened up her MAR at the facility and Melatonin 5mg po at HS was still on her list.  Never discontinued.    Sent an order to increase dose to 8mg po at HS.  Can take a 5mg and a 3mg tab.    Did call the facility to let them know of the call received and order that was going to be sent.    Electronically signed by Gabrielle Wallace RN, CNP

## 2023-01-14 DIAGNOSIS — K59.01 SLOW TRANSIT CONSTIPATION: ICD-10-CM

## 2023-01-16 DIAGNOSIS — E87.1 HYPONATREMIA: ICD-10-CM

## 2023-01-16 RX ORDER — BISACODYL 5 MG
TABLET, DELAYED RELEASE (ENTERIC COATED) ORAL
Qty: 30 TABLET | Refills: 10 | Status: SHIPPED | OUTPATIENT
Start: 2023-01-16 | End: 2024-04-04

## 2023-01-17 RX ORDER — SODIUM CHLORIDE 1 G/1
TABLET ORAL
Qty: 56 TABLET | Refills: 11 | Status: SHIPPED | OUTPATIENT
Start: 2023-01-17 | End: 2023-11-14

## 2023-01-18 ENCOUNTER — LAB REQUISITION (OUTPATIENT)
Dept: LAB | Facility: CLINIC | Age: 88
End: 2023-01-18
Payer: COMMERCIAL

## 2023-01-18 DIAGNOSIS — E87.1 HYPO-OSMOLALITY AND HYPONATREMIA: ICD-10-CM

## 2023-01-20 ENCOUNTER — ASSISTED LIVING VISIT (OUTPATIENT)
Dept: GERIATRICS | Facility: CLINIC | Age: 88
End: 2023-01-20
Payer: COMMERCIAL

## 2023-01-20 DIAGNOSIS — M35.00 SJOGREN'S SYNDROME, WITH UNSPECIFIED ORGAN INVOLVEMENT (H): ICD-10-CM

## 2023-01-20 DIAGNOSIS — J44.9 CHRONIC OBSTRUCTIVE PULMONARY DISEASE, UNSPECIFIED COPD TYPE (H): ICD-10-CM

## 2023-01-20 DIAGNOSIS — G47.9 SLEEP DISTURBANCES: ICD-10-CM

## 2023-01-20 DIAGNOSIS — F32.A ANXIETY AND DEPRESSION: ICD-10-CM

## 2023-01-20 DIAGNOSIS — N18.31 CHRONIC KIDNEY DISEASE, STAGE 3A (H): ICD-10-CM

## 2023-01-20 DIAGNOSIS — F41.9 ANXIETY AND DEPRESSION: ICD-10-CM

## 2023-01-20 DIAGNOSIS — E87.1 HYPONATREMIA: Primary | ICD-10-CM

## 2023-01-20 PROCEDURE — 99349 HOME/RES VST EST MOD MDM 40: CPT | Performed by: NURSE PRACTITIONER

## 2023-01-20 NOTE — PROGRESS NOTES
[facility] GERIATRICS  ACUTE/EPISODIC VISIT    [facility] Medical Record Number:  [id number]  Place of Service where encounter took place:  [facility] [[id number]]    Chief Complaint   Patient presents with     RECHECK       HPI:    Marla Dunn is a 89 year old  (5/22/1929), who is being seen today for an episodic care visit.  HPI information obtained from: facility chart records, facility staff, patient report and [facility] chart review.    Today's concern is:    Diagnoses       Codes Comments    Hyponatremia    -  Primary E87.1     Chronic kidney disease, stage 3a (H)     N18.31     Chronic obstructive pulmonary disease, unspecified COPD type (H)     J44.9     Sjogren's syndrome, with unspecified organ involvement (H)     M35.00     Sleep disturbances     G47.9     Anxiety and depression     F41.9, F32.A         Came to see Georgia today and how she is doing as ongoing issues with her sodium level.  Working with Nephrology clinic for monitoring her sodium levels.      Georgia brought up about her CPAP machine and sometimes refusing it.  Georgia likes to put it on at her pace.   Appears the on call supervisor was notified of her refusal and O2 sats in the 90's.  That night the aide was instructed to check O2 sats 12M and 4am and below 90% then call back. Otherwise, note was going to be sent to the nursing staff of the call.  Got the sense Georgia would be in trouble for not wearing it all the time.  She understands the consequences.   Her choice if she does not wear it.  Staff just need to record it.    Georgia had called this NP within the last two weeks all upset as she was not sleeping well. At that time just added a 3mg Melatonin to her 5mg already.  That has helped her out.       ALLERGIES:    Allergies   Allergen Reactions     Gramineae Pollens Other (See Comments)     ORCHARDGRASS. Shortness of breath when lawn is just cut.     Smoke.  Other (See Comments)     Hard to breathe.     Pollen Extract      Other reaction(s): Unknown        MEDICATIONS:  Post Discharge Medication Reconciliation Status: patient was not discharged from an inpatient facility or TCU.     Current Outpatient Medications   Medication Sig Dispense Refill     acetaminophen (TYLENOL) 325 MG tablet Take 3 tablets (975 mg) by mouth every 8 hours as needed for mild pain       albuterol (PROAIR HFA/PROVENTIL HFA/VENTOLIN HFA) 108 (90 Base) MCG/ACT inhaler Inhale 2 puffs into the lungs every 6 hours       Ascorbic Acid (VITAMIN C PO) Take 500 mg by mouth every morning       ASPIRIN LOW DOSE 81 MG EC tablet 1 TABLET ORALLY 2 TIMES DAILY (DX: PROPHYLAXIS) 56 tablet 11     bisacodyl (DULCOLAX) 5 MG EC tablet 1 TABLET ORALLY DAILY AS NEEDED (MAY KEEP AT BEDSIDE) 30 tablet 10     Calcium Carb-Cholecalciferol (CALCIUM+D3) 600-800 MG-UNIT TABS Take 1 tablet by mouth daily       calcium carbonate (TUMS) 500 MG chewable tablet Take 2 tablets (1,000 mg) by mouth 2 times daily       cholecalciferol (D3 HIGH POTENCY) 125 MCG (5000 UT) CAPS Take 1 capsule by mouth daily       COMBIVENT RESPIMAT  MCG/ACT inhaler INHALE 1 PUFF INTO THE LUNGS  4 TIMES DAILY 4 g 11     cycloSPORINE (RESTASIS) 0.05 % ophthalmic emulsion Place 1 drop into both eyes 2 times daily        fish oil-omega-3 fatty acids 1000 MG capsule Take 1 g by mouth daily       fluorometholone (FML LIQUIFILM) 0.1 % ophthalmic susp Place 1 drop Into the left eye daily        IBANDRONATE SODIUM PO Take 150 mg by mouth every 30 days       Levothyroxine Sodium (LEVOXYL PO) Take 88 mcg by mouth daily        loratadine (CLARITIN) 10 MG tablet Take 1 tablet (10 mg) by mouth daily (Patient taking differently: Take 10 mg by mouth daily as needed)       melatonin 3 MG tablet 3mg to be given with 5mg = 8mg at HS 30 tablet 11     melatonin 5 MG tablet 5mg to be given with 3mg tab = 8mg at HS 30 tablet 11     METOPROLOL TARTRATE PO Take 25 mg by  mouth 2 times daily        mirtazapine (REMERON) 7.5 MG tablet Take 7.5 mg by mouth At Bedtime       Multiple Vitamins-Minerals (PRESERVISION AREDS PO) Take 1 tablet by mouth 2 times daily       omeprazole (PRILOSEC) 20 MG DR capsule Take 20 mg by mouth 2 times daily       OXYGEN-HELIUM IN        polyethylene glycol 0.4%- propylene glycol 0.3% (SYSTANE ULTRA) 0.4-0.3 % SOLN ophthalmic solution Place 1 drop into both eyes 4 times daily       Polyethylene Glycol 3350 (PEG 3350) 17 GM/SCOOP POWD MIX 1 CAPFUL (17 GMS) IN 8 OUNCES WATER AND DRINK ORALLY DAILY (DX: CONSTIPATION);MIX 1 CAPFUL (17 GMS) IN 8 OUNCES OF JUICE OR WATER AND DRINK ORALLY DAILY AS NEEDED (DX: CONSTIPATION) 510 g 10     potassium chloride ER (K-TAB/KLOR-CON) 10 MEQ CR tablet 1 TABLET ORALLY DAILY 30 tablet 11     Probiotic Product (PROBIOTIC PO) Take 1 capsule by mouth every morning       senna-docusate (SENOKOT-S/PERICOLACE) 8.6-50 MG tablet Take 1 tablet by mouth 2 times daily as needed for constipation       sodium chloride 1 GM tablet 1 TABLET ORALLY 2 TIMES DAILY 56 tablet 11     spironolactone (ALDACTONE) 50 MG tablet Take 25 mg by mouth daily        timolol (TIMOPTIC) 0.5 % ophthalmic solution Place 1 drop Into the left eye daily       Urea powder 15gm 1 packet every other day       torsemide (DEMADEX) 20 MG tablet 1 & 1/2 TABLETS (30MG) ORALLY DAILY 45 tablet 11         REVIEW OF SYSTEMS:  4 point ROS neg other than the symptoms noted above in the HPI.     PHYSICAL EXAM:  /62   Pulse 99   Temp 98.2  F (36.8  C)   Resp 18   Petite female sitting in her wheelchair coming out of her bathroom.  Propels herself with mostly her feet.   In no acute distress but she is frail.  Alert and oriented x3 and cognitively she keeps her health matters straight.  Lungs are clear.  Does not use oxygen.  Heart rate regular and strong. S1 and S2 heard.  Abdomen is flat and non-tender.  Bowels are moving.    Can ambulate short distances with her walker  "but uses the w/c most of the time.    Skin is pale/pink, warm and dry.      Mood is calm.  Enjoys talking about other things too than her health.  She will call this NP in a panic and besides herself.  Typically responds well if she knows things are taken care of.    Component      Latest Ref Rng 1/9/2023  10:46 AM   Sodium      136 - 145 mmol/L 132 (L)    Potassium      3.4 - 5.3 mmol/L 4.9    Chloride      98 - 107 mmol/L 89 (L)    Carbon Dioxide (CO2)      22 - 29 mmol/L 27    Anion Gap      7 - 15 mmol/L 16 (H)    Urea Nitrogen      8.0 - 23.0 mg/dL 68.1 (H)    Creatinine      0.51 - 0.95 mg/dL 0.80    Calcium      8.2 - 9.6 mg/dL 10.2 (H)    Glucose      70 - 99 mg/dL 67 (L)    GFR Estimate      >60 mL/min/1.73m2 68         ASSESSMENT / PLAN:  (E87.1) Hyponatremia  (primary encounter diagnosis)  (N18.31) Chronic kidney disease, stage 3a (H)  Comment: currently Georgia is taking NaCl 1 GM twice a day and Ure-na 15gm 1 packet every other day. (trying to lower the amount of times she has to take this as hard for her to take this)  Is on Torsemide 30mg daily and takes Spironolactone.   Next BMP ordered is 1/23/23.  Typically ask staff to send results to Nephrology so that medication adjustments can be made.      (J44.9) Chronic obstructive pulmonary disease, unspecified COPD type (H)  Comment: will have some SOB with exertion and takes scheduled Albuterol and combivent inhalers.  Overall, well managed.  No oxygen used.  Has the CPAP at night that she will refuse at times.   Staff need to know it is started and mask is placed on properly as it is considered a \"medication/treatment\".  Continue to talk about this with nursing and what can be acceptable.    (M35.00) Sjogren's syndrome, with unspecified organ involvement (H)  Comment: does take 4 types of eye drops for Glaucoma and dry eyes.  Typically does not complain of her eyes.  Wears corrective lenses.  Continue to monitor for any other organ " involvement.    (G47.9) Sleep disturbances  (F41.9,  F32.A) Anxiety and depression  Comment: currently taking Melatonin 8mg po at night and Remeron 7.5mg po at HS.  No changes.  Georgia has many health issues and specialists she sees.  Overall feels she is handling it well.  She does not call this NP often but when she does, this NP will listen and try to figure out what needs to be done or changed.        Orders:  No new orders    Electronically signed by  CASTRO Hazel CNP

## 2023-01-20 NOTE — LETTER
1/20/2023        RE: Marla Dunn  C/o Deneen Dunn  [address]  [facility] [address]        [facility] GERIATRICS  ACUTE/EPISODIC VISIT    [facility] Medical Record Number:  [id number]  Place of Service where encounter took place:  [facility] [[id number]]    Chief Complaint   Patient presents with     RECHECK       HPI:    Marla Dunn is a 89 year old  (5/22/1929), who is being seen today for an episodic care visit.  HPI information obtained from: facility chart records, facility staff, patient report and [facility] chart review.    Today's concern is:    Diagnoses       Codes Comments    Hyponatremia    -  Primary E87.1     Chronic kidney disease, stage 3a (H)     N18.31     Chronic obstructive pulmonary disease, unspecified COPD type (H)     J44.9     Sjogren's syndrome, with unspecified organ involvement (H)     M35.00     Sleep disturbances     G47.9     Anxiety and depression     F41.9, F32.A         Came to see Georgia today and how she is doing as ongoing issues with her sodium level.  Working with Nephrology clinic for monitoring her sodium levels.      Georgia brought up about her CPAP machine and sometimes refusing it.  Georgia likes to put it on at her pace.   Appears the on call supervisor was notified of her refusal and O2 sats in the 90's.  That night the aide was instructed to check O2 sats 12M and 4am and below 90% then call back. Otherwise, note was going to be sent to the nursing staff of the call.  Got the sense Georgia would be in trouble for not wearing it all the time.  She understands the consequences.   Her choice if she does not wear it.  Staff just need to record it.    Georgia had called this NP within the last two weeks all upset as she was not sleeping well. At that time just added a 3mg Melatonin to her 5mg already.  That has helped her out.       ALLERGIES:    Allergies   Allergen Reactions      Gramineae Pollens Other (See Comments)     ORCHARDGRASS. Shortness of breath when lawn is just cut.     Smoke. Other (See Comments)     Hard to breathe.     Pollen Extract      Other reaction(s): Unknown        MEDICATIONS:  Post Discharge Medication Reconciliation Status: patient was not discharged from an inpatient facility or TCU.     Current Outpatient Medications   Medication Sig Dispense Refill     acetaminophen (TYLENOL) 325 MG tablet Take 3 tablets (975 mg) by mouth every 8 hours as needed for mild pain       albuterol (PROAIR HFA/PROVENTIL HFA/VENTOLIN HFA) 108 (90 Base) MCG/ACT inhaler Inhale 2 puffs into the lungs every 6 hours       Ascorbic Acid (VITAMIN C PO) Take 500 mg by mouth every morning       ASPIRIN LOW DOSE 81 MG EC tablet 1 TABLET ORALLY 2 TIMES DAILY (DX: PROPHYLAXIS) 56 tablet 11     bisacodyl (DULCOLAX) 5 MG EC tablet 1 TABLET ORALLY DAILY AS NEEDED (MAY KEEP AT BEDSIDE) 30 tablet 10     Calcium Carb-Cholecalciferol (CALCIUM+D3) 600-800 MG-UNIT TABS Take 1 tablet by mouth daily       calcium carbonate (TUMS) 500 MG chewable tablet Take 2 tablets (1,000 mg) by mouth 2 times daily       cholecalciferol (D3 HIGH POTENCY) 125 MCG (5000 UT) CAPS Take 1 capsule by mouth daily       COMBIVENT RESPIMAT  MCG/ACT inhaler INHALE 1 PUFF INTO THE LUNGS  4 TIMES DAILY 4 g 11     cycloSPORINE (RESTASIS) 0.05 % ophthalmic emulsion Place 1 drop into both eyes 2 times daily        fish oil-omega-3 fatty acids 1000 MG capsule Take 1 g by mouth daily       fluorometholone (FML LIQUIFILM) 0.1 % ophthalmic susp Place 1 drop Into the left eye daily        IBANDRONATE SODIUM PO Take 150 mg by mouth every 30 days       Levothyroxine Sodium (LEVOXYL PO) Take 88 mcg by mouth daily        loratadine (CLARITIN) 10 MG tablet Take 1 tablet (10 mg) by mouth daily (Patient taking differently: Take 10 mg by mouth daily as needed)       melatonin 3 MG tablet 3mg to be given with 5mg = 8mg at HS 30 tablet 11      melatonin 5 MG tablet 5mg to be given with 3mg tab = 8mg at HS 30 tablet 11     METOPROLOL TARTRATE PO Take 25 mg by mouth 2 times daily        mirtazapine (REMERON) 7.5 MG tablet Take 7.5 mg by mouth At Bedtime       Multiple Vitamins-Minerals (PRESERVISION AREDS PO) Take 1 tablet by mouth 2 times daily       omeprazole (PRILOSEC) 20 MG DR capsule Take 20 mg by mouth 2 times daily       OXYGEN-HELIUM IN        polyethylene glycol 0.4%- propylene glycol 0.3% (SYSTANE ULTRA) 0.4-0.3 % SOLN ophthalmic solution Place 1 drop into both eyes 4 times daily       Polyethylene Glycol 3350 (PEG 3350) 17 GM/SCOOP POWD MIX 1 CAPFUL (17 GMS) IN 8 OUNCES WATER AND DRINK ORALLY DAILY (DX: CONSTIPATION);MIX 1 CAPFUL (17 GMS) IN 8 OUNCES OF JUICE OR WATER AND DRINK ORALLY DAILY AS NEEDED (DX: CONSTIPATION) 510 g 10     potassium chloride ER (K-TAB/KLOR-CON) 10 MEQ CR tablet 1 TABLET ORALLY DAILY 30 tablet 11     Probiotic Product (PROBIOTIC PO) Take 1 capsule by mouth every morning       senna-docusate (SENOKOT-S/PERICOLACE) 8.6-50 MG tablet Take 1 tablet by mouth 2 times daily as needed for constipation       sodium chloride 1 GM tablet 1 TABLET ORALLY 2 TIMES DAILY 56 tablet 11     spironolactone (ALDACTONE) 50 MG tablet Take 25 mg by mouth daily        timolol (TIMOPTIC) 0.5 % ophthalmic solution Place 1 drop Into the left eye daily       Urea powder 15gm 1 packet every other day       torsemide (DEMADEX) 20 MG tablet 1 & 1/2 TABLETS (30MG) ORALLY DAILY 45 tablet 11         REVIEW OF SYSTEMS:  4 point ROS neg other than the symptoms noted above in the HPI.     PHYSICAL EXAM:  /62   Pulse 99   Temp 98.2  F (36.8  C)   Resp 18   Petite female sitting in her wheelchair coming out of her bathroom.  Propels herself with mostly her feet.   In no acute distress but she is frail.  Alert and oriented x3 and cognitively she keeps her health matters straight.  Lungs are clear.  Does not use oxygen.  Heart rate regular and strong.  "S1 and S2 heard.  Abdomen is flat and non-tender.  Bowels are moving.    Can ambulate short distances with her walker but uses the w/c most of the time.    Skin is pale/pink, warm and dry.      Mood is calm.  Enjoys talking about other things too than her health.  She will call this NP in a panic and besides herself.  Typically responds well if she knows things are taken care of.    Component      Latest Ref Rng 1/9/2023  10:46 AM   Sodium      136 - 145 mmol/L 132 (L)    Potassium      3.4 - 5.3 mmol/L 4.9    Chloride      98 - 107 mmol/L 89 (L)    Carbon Dioxide (CO2)      22 - 29 mmol/L 27    Anion Gap      7 - 15 mmol/L 16 (H)    Urea Nitrogen      8.0 - 23.0 mg/dL 68.1 (H)    Creatinine      0.51 - 0.95 mg/dL 0.80    Calcium      8.2 - 9.6 mg/dL 10.2 (H)    Glucose      70 - 99 mg/dL 67 (L)    GFR Estimate      >60 mL/min/1.73m2 68         ASSESSMENT / PLAN:  (E87.1) Hyponatremia  (primary encounter diagnosis)  (N18.31) Chronic kidney disease, stage 3a (H)  Comment: currently Georgia is taking NaCl 1 GM twice a day and Ure-na 15gm 1 packet every other day. (trying to lower the amount of times she has to take this as hard for her to take this)  Is on Torsemide 30mg daily and takes Spironolactone.   Next BMP ordered is 1/23/23.  Typically ask staff to send results to Nephrology so that medication adjustments can be made.      (J44.9) Chronic obstructive pulmonary disease, unspecified COPD type (H)  Comment: will have some SOB with exertion and takes scheduled Albuterol and combivent inhalers.  Overall, well managed.  No oxygen used.  Has the CPAP at night that she will refuse at times.   Staff need to know it is started and mask is placed on properly as it is considered a \"medication/treatment\".  Continue to talk about this with nursing and what can be acceptable.    (M35.00) Sjogren's syndrome, with unspecified organ involvement (H)  Comment: does take 4 types of eye drops for Glaucoma and dry eyes.  Typically " does not complain of her eyes.  Wears corrective lenses.  Continue to monitor for any other organ involvement.    (G47.9) Sleep disturbances  (F41.9,  F32.A) Anxiety and depression  Comment: currently taking Melatonin 8mg po at night and Remeron 7.5mg po at HS.  No changes.  Georgia has many health issues and specialists she sees.  Overall feels she is handling it well.  She does not call this NP often but when she does, this NP will listen and try to figure out what needs to be done or changed.        Orders:  No new orders    Electronically signed by  CASTRO Hazel CNP             Sincerely,        CASTRO Hazel CNP

## 2023-01-20 NOTE — LETTER
1/20/2023        RE: Marla Dunn  C/o Deneen Dunn  [address] [place] [id number]        No notes on file      Sincerely,        CASTRO Hazel CNP

## 2023-01-23 LAB
ANION GAP SERPL CALCULATED.3IONS-SCNC: 17 MMOL/L (ref 7–15)
BUN SERPL-MCNC: 37.8 MG/DL (ref 8–23)
CALCIUM SERPL-MCNC: 10.5 MG/DL (ref 8.2–9.6)
CHLORIDE SERPL-SCNC: 87 MMOL/L (ref 98–107)
CREAT SERPL-MCNC: 0.9 MG/DL (ref 0.51–0.95)
DEPRECATED HCO3 PLAS-SCNC: 23 MMOL/L (ref 22–29)
GFR SERPL CREATININE-BSD FRML MDRD: 59 ML/MIN/1.73M2
GLUCOSE SERPL-MCNC: 121 MG/DL (ref 70–99)
POTASSIUM SERPL-SCNC: 4.5 MMOL/L (ref 3.4–5.3)
SODIUM SERPL-SCNC: 127 MMOL/L (ref 136–145)

## 2023-01-23 PROCEDURE — 80048 BASIC METABOLIC PNL TOTAL CA: CPT | Mod: ORL | Performed by: INTERNAL MEDICINE

## 2023-01-23 PROCEDURE — 36415 COLL VENOUS BLD VENIPUNCTURE: CPT | Mod: ORL | Performed by: INTERNAL MEDICINE

## 2023-01-23 PROCEDURE — P9604 ONE-WAY ALLOW PRORATED TRIP: HCPCS | Mod: ORL | Performed by: INTERNAL MEDICINE

## 2023-01-26 ENCOUNTER — LAB REQUISITION (OUTPATIENT)
Dept: LAB | Facility: CLINIC | Age: 88
End: 2023-01-26
Payer: COMMERCIAL

## 2023-01-26 DIAGNOSIS — E87.1 HYPO-OSMOLALITY AND HYPONATREMIA: ICD-10-CM

## 2023-01-27 ENCOUNTER — ASSISTED LIVING VISIT (OUTPATIENT)
Dept: GERIATRICS | Facility: CLINIC | Age: 88
End: 2023-01-27
Payer: COMMERCIAL

## 2023-01-27 VITALS
HEART RATE: 99 BPM | TEMPERATURE: 98.2 F | RESPIRATION RATE: 18 BRPM | SYSTOLIC BLOOD PRESSURE: 114 MMHG | DIASTOLIC BLOOD PRESSURE: 61 MMHG | OXYGEN SATURATION: 94 %

## 2023-01-27 DIAGNOSIS — I50.32 CHRONIC DIASTOLIC HEART FAILURE (H): ICD-10-CM

## 2023-01-27 DIAGNOSIS — N18.31 CHRONIC KIDNEY DISEASE, STAGE 3A (H): ICD-10-CM

## 2023-01-27 DIAGNOSIS — R54 FRAIL ELDERLY: ICD-10-CM

## 2023-01-27 DIAGNOSIS — E87.1 HYPONATREMIA: Primary | ICD-10-CM

## 2023-01-27 PROCEDURE — 99349 HOME/RES VST EST MOD MDM 40: CPT | Performed by: NURSE PRACTITIONER

## 2023-01-27 NOTE — LETTER
1/27/2023        RE: Marla Dunn  C/o Deneen Dunn  [address] [place] [id number]        No notes on file      Sincerely,        CASTRO Hazel CNP

## 2023-01-27 NOTE — PROGRESS NOTES
[facility] GERIATRICS  ACUTE/EPISODIC VISIT    [facility] Medical Record Number:  [id number]  Place of Service where encounter took place:  [facility] [[id number]]    Chief Complaint   Patient presents with     RECHECK       HPI:    Marla Dunn is a 89 year old  (5/22/1929), who is being seen today for an episodic care visit.  HPI information obtained from: facility chart records, facility staff, patient report and [facility] chart review.    Today's concern is:    Diagnoses       Codes Comments    Hyponatremia    -  Primary E87.1     Chronic kidney disease, stage 3a (H)     N18.31     Chronic diastolic heart failure (H)     I50.32     Frail elderly     R54         Came to Georgia today is she appreciates the check-in from this nurse practitioner.  Georgia is pretty frail, but physically she looks good, but on paper is a different story.  Most recently staff received orders from her nephrologist to increase the Ure-Na 1 packet back to daily from every other day.  Below is her BMPs for comparison.  Between this nurse practitioner and nephrologist office, her kidney functions are followed fairly closely.    Georgia is sitting up in her wheelchair which she propels around with her feet mainly and then her hands.  She is in no acute distress.  Neatly groomed.  Alert and oriented x3.  She is able to keep track of who comes in and out of her apartment like therapies, the aides, and this nurse practitioner.  Her niece helps manage her medical appointments in which she does see a few specialty doctors.    ALLERGIES:    Allergies   Allergen Reactions     Gramineae Pollens Other (See Comments)     ORCHARDGRASS. Shortness of breath when lawn is just cut.     Smoke. Other (See Comments)     Hard to breathe.     Pollen Extract      Other reaction(s): Unknown        MEDICATIONS:  Post Discharge Medication Reconciliation Status: patient was not discharged from an  inpatient facility or TCU.     Current Outpatient Medications   Medication Sig Dispense Refill     acetaminophen (TYLENOL) 325 MG tablet Take 3 tablets (975 mg) by mouth every 8 hours as needed for mild pain       albuterol (PROAIR HFA/PROVENTIL HFA/VENTOLIN HFA) 108 (90 Base) MCG/ACT inhaler Inhale 2 puffs into the lungs every 6 hours       Ascorbic Acid (VITAMIN C PO) Take 500 mg by mouth every morning       ASPIRIN LOW DOSE 81 MG EC tablet 1 TABLET ORALLY 2 TIMES DAILY (DX: PROPHYLAXIS) 56 tablet 11     bisacodyl (DULCOLAX) 5 MG EC tablet 1 TABLET ORALLY DAILY AS NEEDED (MAY KEEP AT BEDSIDE) 30 tablet 10     Calcium Carb-Cholecalciferol (CALCIUM+D3) 600-800 MG-UNIT TABS Take 1 tablet by mouth daily       calcium carbonate (TUMS) 500 MG chewable tablet Take 2 tablets (1,000 mg) by mouth 2 times daily       cholecalciferol (D3 HIGH POTENCY) 125 MCG (5000 UT) CAPS Take 1 capsule by mouth daily       COMBIVENT RESPIMAT  MCG/ACT inhaler INHALE 1 PUFF INTO THE LUNGS  4 TIMES DAILY 4 g 11     cycloSPORINE (RESTASIS) 0.05 % ophthalmic emulsion Place 1 drop into both eyes 2 times daily        fish oil-omega-3 fatty acids 1000 MG capsule Take 1 g by mouth daily       fluorometholone (FML LIQUIFILM) 0.1 % ophthalmic susp Place 1 drop Into the left eye daily        IBANDRONATE SODIUM PO Take 150 mg by mouth every 30 days       Levothyroxine Sodium (LEVOXYL PO) Take 88 mcg by mouth daily        loratadine (CLARITIN) 10 MG tablet Take 1 tablet (10 mg) by mouth daily (Patient taking differently: Take 10 mg by mouth daily as needed)       melatonin 3 MG tablet 3mg to be given with 5mg = 8mg at HS 30 tablet 11     melatonin 5 MG tablet 5mg to be given with 3mg tab = 8mg at HS 30 tablet 11     METOPROLOL TARTRATE PO Take 25 mg by mouth 2 times daily        mirtazapine (REMERON) 7.5 MG tablet Take 7.5 mg by mouth At Bedtime       Multiple Vitamins-Minerals (PRESERVISION AREDS PO) Take 1 tablet by mouth 2 times daily        omeprazole (PRILOSEC) 20 MG DR capsule Take 20 mg by mouth 2 times daily       OXYGEN-HELIUM IN        polyethylene glycol 0.4%- propylene glycol 0.3% (SYSTANE ULTRA) 0.4-0.3 % SOLN ophthalmic solution Place 1 drop into both eyes 4 times daily       Polyethylene Glycol 3350 (PEG 3350) 17 GM/SCOOP POWD MIX 1 CAPFUL (17 GMS) IN 8 OUNCES WATER AND DRINK ORALLY DAILY (DX: CONSTIPATION);MIX 1 CAPFUL (17 GMS) IN 8 OUNCES OF JUICE OR WATER AND DRINK ORALLY DAILY AS NEEDED (DX: CONSTIPATION) 510 g 10     potassium chloride ER (K-TAB/KLOR-CON) 10 MEQ CR tablet 1 TABLET ORALLY DAILY 30 tablet 11     Probiotic Product (PROBIOTIC PO) Take 1 capsule by mouth every morning       senna-docusate (SENOKOT-S/PERICOLACE) 8.6-50 MG tablet Take 1 tablet by mouth 2 times daily as needed for constipation       sodium chloride 1 GM tablet 1 TABLET ORALLY 2 TIMES DAILY 56 tablet 11     spironolactone (ALDACTONE) 50 MG tablet Take 25 mg by mouth daily        timolol (TIMOPTIC) 0.5 % ophthalmic solution Place 1 drop Into the left eye daily       Ure-Na 15g 1 packet daily       torsemide (DEMADEX) 20 MG tablet 1 & 1/2 TABLETS (30MG) ORALLY DAILY 45 tablet 11       REVIEW OF SYSTEMS:  4 point ROS neg other than the symptoms noted above in the HPI.  Georgia has no chest pain or acute shortness of breath.  Mentally she worries about her sodium level and the different medications she has to take to keep it elevated.    PHYSICAL EXAM:  /61   Pulse 99   Temp 98.2  F (36.8  C)   Resp 18   SpO2 94%   Georgia's skin is pale/pink, thin, warm to touch.  Georgia does wear corrective lenses.  She is able to see fairly small print.  She does manage a lot of her affairs with the help of her niece.  Teeth are in poor repair.  She will talk about certain foods that she cannot eat because of the texture and very that she had read off her teeth.  Able to hear normal conversation in a quiet environment  Heart rate is regular/irregular.  No edema in her  lower extremities  Lungs are clear.  Does not have any coughing and no use of oxygen.  Abdomen is flat nontender with active bowel sounds.    Component      Latest Ref Rng 1/9/2023  10:46 AM 1/23/2023  9:13 AM   Sodium      136 - 145 mmol/L 132 (L)  127 (L)    Potassium      3.4 - 5.3 mmol/L 4.9  4.5    Chloride      98 - 107 mmol/L 89 (L)  87 (L)    Carbon Dioxide (CO2)      22 - 29 mmol/L 27  23    Anion Gap      7 - 15 mmol/L 16 (H)  17 (H)    Urea Nitrogen      8.0 - 23.0 mg/dL 68.1 (H)  37.8 (H)    Creatinine      0.51 - 0.95 mg/dL 0.80  0.90    Calcium      8.2 - 9.6 mg/dL 10.2 (H)  10.5 (H)    Glucose      70 - 99 mg/dL 67 (L)  121 (H)    GFR Estimate      >60 mL/min/1.73m2 68  59 (L)           ASSESSMENT / PLAN:  (E87.1) Hyponatremia  (primary encounter diagnosis)  (N18.31) Chronic kidney disease, stage 3a (H)  Comment: Georgia has a long standing relationship with her nephrologist.  They help manage her hyponatremia by doing adjustments with her medications.  Currently she is taking Ure-Na 15gm packet once a day.  She also has spironolactone 25 mg daily and Demadex 30 mg daily.  She will have a BMP coming up on 1/30/2023    (I50.32) Chronic diastolic heart failure (H)  Comment: Georgia is in no acute exacerbation.  As noted she is on spironolactone and Demadex which do get adjusted from her nephrologist.  No acute symptoms at this time nor does she display any edema in her lower extremities.  Georgia does monitor her weight as she has a scale in her apartment.  She is trying to gain weight and so focuses on heavy calorie foods.  No recent weight in the nurses notes    (R54) Frail elderly  Comment: This nurse practitioner will attempt to see her on a weekly basis as when Georgia speaks about enjoying the visits and reported voice, get the sense that she needs some reassurance that she is doing okay and have someone to talk to about her health.  Will attempt to see her as often as possible depending on her  doctor appointments and how heavy this nurse practitioner caseload is for the day.  Staff to help her in the aspects.  She tries hard to keep up her mobility as her niece likes her to be able to ambulate when to go to appointments versus relying on the wheelchair.      Orders:  No new orders from this NP today    Electronically signed by  CASTRO Hazel CNP

## 2023-01-27 NOTE — LETTER
1/27/2023        RE: Marla Dunn  C/o Deneen Dunn  [address]  [facility] [place] [address]        [facility] GERIATRICS  ACUTE/EPISODIC VISIT    [facility] Medical Record Number:  [id number]  Place of Service where encounter took place:  [facility] [[id number]]    Chief Complaint   Patient presents with     RECHECK       HPI:    Marla Dunn is a 89 year old  (5/22/1929), who is being seen today for an episodic care visit.  HPI information obtained from: facility chart records, facility staff, patient report and [facility] chart review.    Today's concern is:    Diagnoses       Codes Comments    Hyponatremia    -  Primary E87.1     Chronic kidney disease, stage 3a (H)     N18.31     Chronic diastolic heart failure (H)     I50.32     Frail elderly     R54         Came to [place] today is she appreciates the check-in from this nurse practitioner.  Georgia is pretty frail, but physically she looks good, but on paper is a different story.  Most recently staff received orders from her nephrologist to increase the Ure-Na 1 packet back to daily from every other day.  Below is her BMPs for comparison.  Between this nurse practitioner and nephrologist office, her kidney functions are followed fairly closely.    Georgia is sitting up in her wheelchair which she propels around with her feet mainly and then her hands.  She is in no acute distress.  Neatly groomed.  Alert and oriented x3.  She is able to keep track of who comes in and out of her apartment like therapies, the aides, and this nurse practitioner.  Her niece helps manage her medical appointments in which she does see a few specialty doctors.    ALLERGIES:    Allergies   Allergen Reactions     Gramineae Pollens Other (See Comments)     ORCHARDGRASS. Shortness of breath when lawn is just cut.     Smoke. Other (See Comments)     Hard to breathe.     Pollen Extract      Other reaction(s):  Unknown        MEDICATIONS:  Post Discharge Medication Reconciliation Status: patient was not discharged from an inpatient facility or TCU.     Current Outpatient Medications   Medication Sig Dispense Refill     acetaminophen (TYLENOL) 325 MG tablet Take 3 tablets (975 mg) by mouth every 8 hours as needed for mild pain       albuterol (PROAIR HFA/PROVENTIL HFA/VENTOLIN HFA) 108 (90 Base) MCG/ACT inhaler Inhale 2 puffs into the lungs every 6 hours       Ascorbic Acid (VITAMIN C PO) Take 500 mg by mouth every morning       ASPIRIN LOW DOSE 81 MG EC tablet 1 TABLET ORALLY 2 TIMES DAILY (DX: PROPHYLAXIS) 56 tablet 11     bisacodyl (DULCOLAX) 5 MG EC tablet 1 TABLET ORALLY DAILY AS NEEDED (MAY KEEP AT BEDSIDE) 30 tablet 10     Calcium Carb-Cholecalciferol (CALCIUM+D3) 600-800 MG-UNIT TABS Take 1 tablet by mouth daily       calcium carbonate (TUMS) 500 MG chewable tablet Take 2 tablets (1,000 mg) by mouth 2 times daily       cholecalciferol (D3 HIGH POTENCY) 125 MCG (5000 UT) CAPS Take 1 capsule by mouth daily       COMBIVENT RESPIMAT  MCG/ACT inhaler INHALE 1 PUFF INTO THE LUNGS  4 TIMES DAILY 4 g 11     cycloSPORINE (RESTASIS) 0.05 % ophthalmic emulsion Place 1 drop into both eyes 2 times daily        fish oil-omega-3 fatty acids 1000 MG capsule Take 1 g by mouth daily       fluorometholone (FML LIQUIFILM) 0.1 % ophthalmic susp Place 1 drop Into the left eye daily        IBANDRONATE SODIUM PO Take 150 mg by mouth every 30 days       Levothyroxine Sodium (LEVOXYL PO) Take 88 mcg by mouth daily        loratadine (CLARITIN) 10 MG tablet Take 1 tablet (10 mg) by mouth daily (Patient taking differently: Take 10 mg by mouth daily as needed)       melatonin 3 MG tablet 3mg to be given with 5mg = 8mg at HS 30 tablet 11     melatonin 5 MG tablet 5mg to be given with 3mg tab = 8mg at HS 30 tablet 11     METOPROLOL TARTRATE PO Take 25 mg by mouth 2 times daily        mirtazapine (REMERON) 7.5 MG tablet Take 7.5 mg by mouth  At Bedtime       Multiple Vitamins-Minerals (PRESERVISION AREDS PO) Take 1 tablet by mouth 2 times daily       omeprazole (PRILOSEC) 20 MG DR capsule Take 20 mg by mouth 2 times daily       OXYGEN-HELIUM IN        polyethylene glycol 0.4%- propylene glycol 0.3% (SYSTANE ULTRA) 0.4-0.3 % SOLN ophthalmic solution Place 1 drop into both eyes 4 times daily       Polyethylene Glycol 3350 (PEG 3350) 17 GM/SCOOP POWD MIX 1 CAPFUL (17 GMS) IN 8 OUNCES WATER AND DRINK ORALLY DAILY (DX: CONSTIPATION);MIX 1 CAPFUL (17 GMS) IN 8 OUNCES OF JUICE OR WATER AND DRINK ORALLY DAILY AS NEEDED (DX: CONSTIPATION) 510 g 10     potassium chloride ER (K-TAB/KLOR-CON) 10 MEQ CR tablet 1 TABLET ORALLY DAILY 30 tablet 11     Probiotic Product (PROBIOTIC PO) Take 1 capsule by mouth every morning       senna-docusate (SENOKOT-S/PERICOLACE) 8.6-50 MG tablet Take 1 tablet by mouth 2 times daily as needed for constipation       sodium chloride 1 GM tablet 1 TABLET ORALLY 2 TIMES DAILY 56 tablet 11     spironolactone (ALDACTONE) 50 MG tablet Take 25 mg by mouth daily        timolol (TIMOPTIC) 0.5 % ophthalmic solution Place 1 drop Into the left eye daily       Ure-Na 15g 1 packet daily       torsemide (DEMADEX) 20 MG tablet 1 & 1/2 TABLETS (30MG) ORALLY DAILY 45 tablet 11       REVIEW OF SYSTEMS:  4 point ROS neg other than the symptoms noted above in the HPI.  Georgia has no chest pain or acute shortness of breath.  Mentally she worries about her sodium level and the different medications she has to take to keep it elevated.    PHYSICAL EXAM:  /61   Pulse 99   Temp 98.2  F (36.8  C)   Resp 18   SpO2 94%   Georgia's skin is pale/pink, thin, warm to touch.  Georgia does wear corrective lenses.  She is able to see fairly small print.  She does manage a lot of her affairs with the help of her niece.  Teeth are in poor repair.  She will talk about certain foods that she cannot eat because of the texture and very that she had read off her  teeth.  Able to hear normal conversation in a quiet environment  Heart rate is regular/irregular.  No edema in her lower extremities  Lungs are clear.  Does not have any coughing and no use of oxygen.  Abdomen is flat nontender with active bowel sounds.    Component      Latest Ref Rng 1/9/2023  10:46 AM 1/23/2023  9:13 AM   Sodium      136 - 145 mmol/L 132 (L)  127 (L)    Potassium      3.4 - 5.3 mmol/L 4.9  4.5    Chloride      98 - 107 mmol/L 89 (L)  87 (L)    Carbon Dioxide (CO2)      22 - 29 mmol/L 27  23    Anion Gap      7 - 15 mmol/L 16 (H)  17 (H)    Urea Nitrogen      8.0 - 23.0 mg/dL 68.1 (H)  37.8 (H)    Creatinine      0.51 - 0.95 mg/dL 0.80  0.90    Calcium      8.2 - 9.6 mg/dL 10.2 (H)  10.5 (H)    Glucose      70 - 99 mg/dL 67 (L)  121 (H)    GFR Estimate      >60 mL/min/1.73m2 68  59 (L)           ASSESSMENT / PLAN:  (E87.1) Hyponatremia  (primary encounter diagnosis)  (N18.31) Chronic kidney disease, stage 3a (H)  Comment: Georgia has a long standing relationship with her nephrologist.  They help manage her hyponatremia by doing adjustments with her medications.  Currently she is taking Ure-Na 15gm packet once a day.  She also has spironolactone 25 mg daily and Demadex 30 mg daily.  She will have a BMP coming up on 1/30/2023    (I50.32) Chronic diastolic heart failure (H)  Comment: Georgia is in no acute exacerbation.  As noted she is on spironolactone and Demadex which do get adjusted from her nephrologist.  No acute symptoms at this time nor does she display any edema in her lower extremities.  Georgia does monitor her weight as she has a scale in her apartment.  She is trying to gain weight and so focuses on heavy calorie foods.  No recent weight in the nurses notes    (R54) Frail elderly  Comment: This nurse practitioner will attempt to see her on a weekly basis as when Georgia speaks about enjoying the visits and reported voice, get the sense that she needs some reassurance that she is doing  okay and have someone to talk to about her health.  Will attempt to see her as often as possible depending on her doctor appointments and how heavy this nurse practitioner caseload is for the day.  Staff to help her in the aspects.  She tries hard to keep up her mobility as her niece likes her to be able to ambulate when to go to appointments versus relying on the wheelchair.      Orders:  No new orders from this NP today    Electronically signed by  CASTRO Hazel CNP           Sincerely,        CASTRO Hazel CNP

## 2023-01-30 LAB
ANION GAP SERPL CALCULATED.3IONS-SCNC: 12 MMOL/L (ref 7–15)
BUN SERPL-MCNC: 74.3 MG/DL (ref 8–23)
CALCIUM SERPL-MCNC: 10.1 MG/DL (ref 8.2–9.6)
CHLORIDE SERPL-SCNC: 90 MMOL/L (ref 98–107)
CREAT SERPL-MCNC: 0.89 MG/DL (ref 0.51–0.95)
DEPRECATED HCO3 PLAS-SCNC: 27 MMOL/L (ref 22–29)
GFR SERPL CREATININE-BSD FRML MDRD: 60 ML/MIN/1.73M2
GLUCOSE SERPL-MCNC: 86 MG/DL (ref 70–99)
POTASSIUM SERPL-SCNC: 4.3 MMOL/L (ref 3.4–5.3)
SODIUM SERPL-SCNC: 129 MMOL/L (ref 136–145)

## 2023-01-30 PROCEDURE — P9604 ONE-WAY ALLOW PRORATED TRIP: HCPCS | Mod: ORL | Performed by: INTERNAL MEDICINE

## 2023-01-30 PROCEDURE — 36415 COLL VENOUS BLD VENIPUNCTURE: CPT | Mod: ORL | Performed by: INTERNAL MEDICINE

## 2023-01-30 PROCEDURE — 80048 BASIC METABOLIC PNL TOTAL CA: CPT | Mod: ORL | Performed by: INTERNAL MEDICINE

## 2023-02-02 ENCOUNTER — LAB REQUISITION (OUTPATIENT)
Dept: LAB | Facility: CLINIC | Age: 88
End: 2023-02-02
Payer: COMMERCIAL

## 2023-02-02 DIAGNOSIS — E87.1 HYPO-OSMOLALITY AND HYPONATREMIA: ICD-10-CM

## 2023-02-06 LAB
ANION GAP SERPL CALCULATED.3IONS-SCNC: 12 MMOL/L (ref 7–15)
BUN SERPL-MCNC: 71.5 MG/DL (ref 8–23)
CALCIUM SERPL-MCNC: 9.4 MG/DL (ref 8.2–9.6)
CHLORIDE SERPL-SCNC: 90 MMOL/L (ref 98–107)
CREAT SERPL-MCNC: 1 MG/DL (ref 0.51–0.95)
DEPRECATED HCO3 PLAS-SCNC: 27 MMOL/L (ref 22–29)
GFR SERPL CREATININE-BSD FRML MDRD: 52 ML/MIN/1.73M2
GLUCOSE SERPL-MCNC: 111 MG/DL (ref 70–99)
POTASSIUM SERPL-SCNC: 4.6 MMOL/L (ref 3.4–5.3)
SODIUM SERPL-SCNC: 129 MMOL/L (ref 136–145)

## 2023-02-06 PROCEDURE — 80048 BASIC METABOLIC PNL TOTAL CA: CPT | Mod: ORL | Performed by: INTERNAL MEDICINE

## 2023-02-06 PROCEDURE — P9604 ONE-WAY ALLOW PRORATED TRIP: HCPCS | Mod: ORL | Performed by: INTERNAL MEDICINE

## 2023-02-06 PROCEDURE — 36415 COLL VENOUS BLD VENIPUNCTURE: CPT | Mod: ORL | Performed by: INTERNAL MEDICINE

## 2023-02-12 DIAGNOSIS — E87.1 HYPONATREMIA: Primary | ICD-10-CM

## 2023-02-12 DIAGNOSIS — K59.00 CONSTIPATION, UNSPECIFIED CONSTIPATION TYPE: ICD-10-CM

## 2023-02-13 RX ORDER — POLYETHYLENE GLYCOL 3350 17 G/17G
POWDER ORAL
Qty: 510 G | Refills: 10 | Status: SHIPPED | OUTPATIENT
Start: 2023-02-13 | End: 2024-01-17

## 2023-02-13 RX ORDER — ALUMINUM HYDROXIDE, MAGNESIUM HYDROXIDE, DIMETHICONE 400; 400; 40 MG/10ML; MG/10ML; MG/10ML
SUSPENSION ORAL
Qty: 355 ML | Refills: 11 | Status: SHIPPED | OUTPATIENT
Start: 2023-02-13 | End: 2023-06-12

## 2023-02-14 ENCOUNTER — DOCUMENTATION ONLY (OUTPATIENT)
Dept: GERIATRICS | Facility: CLINIC | Age: 88
End: 2023-02-14
Payer: COMMERCIAL

## 2023-02-14 DIAGNOSIS — I50.32 CHRONIC DIASTOLIC CONGESTIVE HEART FAILURE (H): ICD-10-CM

## 2023-02-14 DIAGNOSIS — E03.9 HYPOTHYROIDISM, UNSPECIFIED TYPE: ICD-10-CM

## 2023-02-14 DIAGNOSIS — E44.0 MODERATE PROTEIN-CALORIE MALNUTRITION (H): Primary | ICD-10-CM

## 2023-02-14 DIAGNOSIS — K21.00 GASTROESOPHAGEAL REFLUX DISEASE WITH ESOPHAGITIS WITHOUT HEMORRHAGE: ICD-10-CM

## 2023-02-14 RX ORDER — LEVOTHYROXINE SODIUM 88 UG/1
TABLET ORAL
Qty: 31 TABLET | Refills: 11 | Status: SHIPPED | OUTPATIENT
Start: 2023-02-14 | End: 2024-02-09

## 2023-02-14 RX ORDER — TORSEMIDE 20 MG/1
TABLET ORAL
Qty: 31 TABLET | Refills: 11 | Status: SHIPPED | OUTPATIENT
Start: 2023-02-14 | End: 2023-05-11

## 2023-02-14 RX ORDER — ASCORBIC ACID 500 MG
TABLET ORAL
Qty: 31 TABLET | Refills: 11 | Status: SHIPPED | OUTPATIENT
Start: 2023-02-14 | End: 2024-02-09

## 2023-02-14 RX ORDER — TORSEMIDE 20 MG/1
20 TABLET ORAL DAILY
COMMUNITY
End: 2023-05-11

## 2023-02-17 ENCOUNTER — ASSISTED LIVING VISIT (OUTPATIENT)
Dept: GERIATRICS | Facility: CLINIC | Age: 88
End: 2023-02-17
Payer: COMMERCIAL

## 2023-02-17 VITALS
HEART RATE: 99 BPM | DIASTOLIC BLOOD PRESSURE: 61 MMHG | OXYGEN SATURATION: 94 % | RESPIRATION RATE: 18 BRPM | TEMPERATURE: 98.2 F | SYSTOLIC BLOOD PRESSURE: 114 MMHG

## 2023-02-17 DIAGNOSIS — I48.91 ATRIAL FIBRILLATION WITH RAPID VENTRICULAR RESPONSE (H): ICD-10-CM

## 2023-02-17 DIAGNOSIS — N18.31 CHRONIC KIDNEY DISEASE, STAGE 3A (H): ICD-10-CM

## 2023-02-17 DIAGNOSIS — K59.01 SLOW TRANSIT CONSTIPATION: ICD-10-CM

## 2023-02-17 DIAGNOSIS — E87.1 HYPONATREMIA: Primary | ICD-10-CM

## 2023-02-17 PROCEDURE — 99349 HOME/RES VST EST MOD MDM 40: CPT | Performed by: NURSE PRACTITIONER

## 2023-02-17 NOTE — LETTER
2/17/2023        RE: Marla Dunn  C/o Deneen Dunn  [address] [address]        [facility] GERIATRICS  ACUTE/EPISODIC VISIT    [facility]SHAD [facility] Medical Record Number:  [id number]  Place of Service where encounter took place:  [facility] [[id number]]    Chief Complaint   Patient presents with     RECHECK       HPI:    Marla Dunn is a 89 year old  (5/22/1929), who is being seen today for an episodic care visit.  HPI information obtained from: facility chart records, facility staff, patient report and [facility] chart review.    Today's concern is:    Diagnoses       Codes Comments    Hyponatremia    -  Primary E87.1     Chronic kidney disease, stage 3a (H)     N18.31     Atrial fibrillation with rapid ventricular response (H)     I48.91     Slow transit constipation     K59.01         Came to see Georgia today and how she has been doing.  Yesterday she had medication changes from her nephrologist office to help manager her hyponatremia.  The Maalox and Urea were discontinued and she was started on Demeclocycline 150mg po BID with labs ordered on 2/27/23.    Do see her Torsemide was changed to 20mg daily earlier this month.      Otherwise no new concerns from nursing.  Georgia just likes to talk and update this NP of her health issues.  She battles with her weight as she is trying to gain weight.    ALLERGIES:    Allergies   Allergen Reactions     Gramineae Pollens Other (See Comments)     ORCHARDGRASS. Shortness of breath when lawn is just cut.     Smoke. Other (See Comments)     Hard to breathe.     Pollen Extract      Other reaction(s): Unknown        MEDICATIONS:  Post Discharge Medication Reconciliation Status: patient was not discharged from an inpatient facility or TCU.     Current Outpatient Medications   Medication Sig Dispense Refill     acetaminophen (TYLENOL) 325 MG tablet Take 3 tablets (975 mg) by mouth every 8  hours as needed for mild pain       albuterol (PROAIR HFA/PROVENTIL HFA/VENTOLIN HFA) 108 (90 Base) MCG/ACT inhaler Inhale 2 puffs into the lungs every 6 hours       Ascorbic Acid (VITAMIN C PO) Take 500 mg by mouth every morning       ASPIRIN LOW DOSE 81 MG EC tablet 1 TABLET ORALLY 2 TIMES DAILY (DX: PROPHYLAXIS) 56 tablet 11     bisacodyl (DULCOLAX) 5 MG EC tablet 1 TABLET ORALLY DAILY AS NEEDED (MAY KEEP AT BEDSIDE) 30 tablet 10     Calcium Carb-Cholecalciferol (CALCIUM+D3) 600-800 MG-UNIT TABS Take 1 tablet by mouth daily       calcium carbonate (TUMS) 500 MG chewable tablet Take 2 tablets (1,000 mg) by mouth 2 times daily       cholecalciferol (D3 HIGH POTENCY) 125 MCG (5000 UT) CAPS Take 1 capsule by mouth daily       COMBIVENT RESPIMAT  MCG/ACT inhaler INHALE 1 PUFF INTO THE LUNGS  4 TIMES DAILY 4 g 11     cycloSPORINE (RESTASIS) 0.05 % ophthalmic emulsion Place 1 drop into both eyes 2 times daily       Demeclocycline 150mg 150mg by mouth twice a day       fish oil-omega-3 fatty acids 1000 MG capsule Take 1 g by mouth daily       fluorometholone (FML LIQUIFILM) 0.1 % ophthalmic susp Place 1 drop Into the left eye daily        IBANDRONATE SODIUM PO Take 150 mg by mouth every 30 days       levothyroxine (SYNTHROID/LEVOTHROID) 88 MCG tablet 1 TABLET ORALLY EVERY MORNING ON AN EMPTY STOMACH 31 tablet 11     Levothyroxine Sodium (LEVOXYL PO) Take 88 mcg by mouth daily        loratadine (CLARITIN) 10 MG tablet Take 1 tablet (10 mg) by mouth daily (Patient taking differently: Take 10 mg by mouth daily as needed)       melatonin 3 MG tablet 3mg to be given with 5mg = 8mg at HS 30 tablet 11     melatonin 5 MG tablet 5mg to be given with 3mg tab = 8mg at HS 30 tablet 11     METOPROLOL TARTRATE PO Take 25 mg by mouth 2 times daily        mirtazapine (REMERON) 7.5 MG tablet Take 7.5 mg by mouth At Bedtime       Multiple Vitamins-Minerals (PRESERVISION AREDS PO) Take 1 tablet by mouth 2 times daily       omeprazole  (PRILOSEC) 20 MG DR capsule 1 CAPSULE ORALLY 2 TIMES DAILY (DX: GASTROESOPHAGEAL REFLUX DISEASE) 62 capsule 11     OXYGEN-HELIUM IN        polyethylene glycol 0.4%- propylene glycol 0.3% (SYSTANE ULTRA) 0.4-0.3 % SOLN ophthalmic solution Place 1 drop into both eyes 4 times daily       Polyethylene Glycol 3350 (PEG 3350) 17 GM/SCOOP POWD MIX 1 CAPFUL (17 GMS) IN 8 OUNCES OF JUICE OR WATER AND DRINK ORALLY DAILY AS NEEDED (DX: CONSTIPATION) 510 g 10     potassium chloride ER (K-TAB/KLOR-CON) 10 MEQ CR tablet 1 TABLET ORALLY DAILY 30 tablet 11     Probiotic Product (PROBIOTIC PO) Take 1 capsule by mouth every morning       senna-docusate (SENOKOT-S/PERICOLACE) 8.6-50 MG tablet Take 1 tablet by mouth 2 times daily as needed for constipation       sodium chloride 1 GM tablet 1 TABLET ORALLY 2 TIMES DAILY 56 tablet 11     spironolactone (ALDACTONE) 50 MG tablet Take 25 mg by mouth daily        timolol (TIMOPTIC) 0.5 % ophthalmic solution Place 1 drop Into the left eye daily        torsemide (DEMADEX) 20 MG tablet Take 20 mg by mouth daily       vitamin C (ASCORBIC ACID) 500 MG tablet 1 TABLET ORALLY DAILY (DX: SUPPLEMENT) 31 tablet 11       REVIEW OF SYSTEMS:  4 point ROS neg other than the symptoms noted above in the HPI. No c/o of chest pain or acute shortness of breath.    PHYSICAL EXAM:  /61   Pulse 99   Temp 98.2  F (36.8  C)   Resp 18   SpO2 94%   Petite female in a wheelchair as she is in process of getting a new one fitted for her with OT.  Alert and oriented x3.  Skin is pink/pale, warm to touch and frail.    Heart rate regular/irregular and strong.  No edema in lower legs.  Able to see her veins on the tops of her feet.  Lungs are diminished.  No coughing noted.  Abdomen is flat, soft and non-tender.    Ambulates with a 2WW around her apartment for exercise.  Propels self in manual wheelchair.    Component      Latest Ref Rng 1/30/2023  10:58 AM 2/6/2023  9:17 AM   Sodium      136 - 145 mmol/L 129 (L)   129 (L)    Potassium      3.4 - 5.3 mmol/L 4.3  4.6    Chloride      98 - 107 mmol/L 90 (L)  90 (L)    Carbon Dioxide (CO2)      22 - 29 mmol/L 27  27    Anion Gap      7 - 15 mmol/L 12  12    Urea Nitrogen      8.0 - 23.0 mg/dL 74.3 (H)  71.5 (H)    Creatinine      0.51 - 0.95 mg/dL 0.89  1.00 (H)    Calcium      8.2 - 9.6 mg/dL 10.1 (H)  9.4    Glucose      70 - 99 mg/dL 86  111 (H)    GFR Estimate      >60 mL/min/1.73m2 60 (L)  52 (L)         ASSESSMENT / PLAN:  (E87.1) Hyponatremia  (primary encounter diagnosis)  (N18.31) Chronic kidney disease, stage 3a (H)  Comment: at this time, this NP is no trying to help with her Na level.  Staff send the lab results to the clinic per their request and this NP's request.  Orders sent to the nursing staff to help manage her Na levels.  Right night trying to find medicine to help the level but also she can tolerate.  Do know she did not like the Urea medication.      (I48.91) Atrial fibrillation with rapid ventricular response (H)  Comment: is on a Baby ASA 81mg daily.  High risk for falls/bleeding and so managed with baby ASA.  No complaints of racing heart.    (K59.01) Slow transit constipation  Comment: managed with Senna-S 1 tab twice a day, PRN Dulcolax tabs at her bedside, and PRN Miralax.  She knows when she needs extra medicine to help her with stools.  She is on a lot of medications that can influence her bowels.      Orders:  No new orders from this NP    Electronically signed by  CASTRO Hazel CNP           Sincerely,        CASTRO Hazel CNP

## 2023-02-17 NOTE — PROGRESS NOTES
[facility]AD [facility] GERIATRICS  ACUTE/EPISODIC VISIT    [facility] Medical Record Number:  [id number]  Place of Service where encounter took place:  [facility] [[id number]]    Chief Complaint   Patient presents with     RECHECK       HPI:    Marla Dunn is a 89 year old  (5/22/1929), who is being seen today for an episodic care visit.  HPI information obtained from: facility chart records, facility staff, patient report and [facility] chart review.    Today's concern is:    Diagnoses       Codes Comments    Hyponatremia    -  Primary E87.1     Chronic kidney disease, stage 3a (H)     N18.31     Atrial fibrillation with rapid ventricular response (H)     I48.91     Slow transit constipation     K59.01         Came to see Georgia today and how she has been doing.  Yesterday she had medication changes from her nephrologist office to help manager her hyponatremia.  The Maalox and Urea were discontinued and she was started on Demeclocycline 150mg po BID with labs ordered on 2/27/23.    Do see her Torsemide was changed to 20mg daily earlier this month.      Otherwise no new concerns from nursing.  Georgia just likes to talk and update this NP of her health issues.  She battles with her weight as she is trying to gain weight.    ALLERGIES:    Allergies   Allergen Reactions     Gramineae Pollens Other (See Comments)     ORCHARDGRASS. Shortness of breath when lawn is just cut.     Smoke. Other (See Comments)     Hard to breathe.     Pollen Extract      Other reaction(s): Unknown        MEDICATIONS:  Post Discharge Medication Reconciliation Status: patient was not discharged from an inpatient facility or TCU.     Current Outpatient Medications   Medication Sig Dispense Refill     acetaminophen (TYLENOL) 325 MG tablet Take 3 tablets (975 mg) by mouth every 8 hours as needed for mild pain       albuterol (PROAIR HFA/PROVENTIL HFA/VENTOLIN HFA) 108 (90 Base) MCG/ACT  inhaler Inhale 2 puffs into the lungs every 6 hours       Ascorbic Acid (VITAMIN C PO) Take 500 mg by mouth every morning       ASPIRIN LOW DOSE 81 MG EC tablet 1 TABLET ORALLY 2 TIMES DAILY (DX: PROPHYLAXIS) 56 tablet 11     bisacodyl (DULCOLAX) 5 MG EC tablet 1 TABLET ORALLY DAILY AS NEEDED (MAY KEEP AT BEDSIDE) 30 tablet 10     Calcium Carb-Cholecalciferol (CALCIUM+D3) 600-800 MG-UNIT TABS Take 1 tablet by mouth daily       calcium carbonate (TUMS) 500 MG chewable tablet Take 2 tablets (1,000 mg) by mouth 2 times daily       cholecalciferol (D3 HIGH POTENCY) 125 MCG (5000 UT) CAPS Take 1 capsule by mouth daily       COMBIVENT RESPIMAT  MCG/ACT inhaler INHALE 1 PUFF INTO THE LUNGS  4 TIMES DAILY 4 g 11     cycloSPORINE (RESTASIS) 0.05 % ophthalmic emulsion Place 1 drop into both eyes 2 times daily       Demeclocycline 150mg 150mg by mouth twice a day       fish oil-omega-3 fatty acids 1000 MG capsule Take 1 g by mouth daily       fluorometholone (FML LIQUIFILM) 0.1 % ophthalmic susp Place 1 drop Into the left eye daily        IBANDRONATE SODIUM PO Take 150 mg by mouth every 30 days       levothyroxine (SYNTHROID/LEVOTHROID) 88 MCG tablet 1 TABLET ORALLY EVERY MORNING ON AN EMPTY STOMACH 31 tablet 11     Levothyroxine Sodium (LEVOXYL PO) Take 88 mcg by mouth daily        loratadine (CLARITIN) 10 MG tablet Take 1 tablet (10 mg) by mouth daily (Patient taking differently: Take 10 mg by mouth daily as needed)       melatonin 3 MG tablet 3mg to be given with 5mg = 8mg at HS 30 tablet 11     melatonin 5 MG tablet 5mg to be given with 3mg tab = 8mg at HS 30 tablet 11     METOPROLOL TARTRATE PO Take 25 mg by mouth 2 times daily        mirtazapine (REMERON) 7.5 MG tablet Take 7.5 mg by mouth At Bedtime       Multiple Vitamins-Minerals (PRESERVISION AREDS PO) Take 1 tablet by mouth 2 times daily       omeprazole (PRILOSEC) 20 MG DR capsule 1 CAPSULE ORALLY 2 TIMES DAILY (DX: GASTROESOPHAGEAL REFLUX DISEASE) 62 capsule  11     OXYGEN-HELIUM IN        polyethylene glycol 0.4%- propylene glycol 0.3% (SYSTANE ULTRA) 0.4-0.3 % SOLN ophthalmic solution Place 1 drop into both eyes 4 times daily       Polyethylene Glycol 3350 (PEG 3350) 17 GM/SCOOP POWD MIX 1 CAPFUL (17 GMS) IN 8 OUNCES OF JUICE OR WATER AND DRINK ORALLY DAILY AS NEEDED (DX: CONSTIPATION) 510 g 10     potassium chloride ER (K-TAB/KLOR-CON) 10 MEQ CR tablet 1 TABLET ORALLY DAILY 30 tablet 11     Probiotic Product (PROBIOTIC PO) Take 1 capsule by mouth every morning       senna-docusate (SENOKOT-S/PERICOLACE) 8.6-50 MG tablet Take 1 tablet by mouth 2 times daily as needed for constipation       sodium chloride 1 GM tablet 1 TABLET ORALLY 2 TIMES DAILY 56 tablet 11     spironolactone (ALDACTONE) 50 MG tablet Take 25 mg by mouth daily        timolol (TIMOPTIC) 0.5 % ophthalmic solution Place 1 drop Into the left eye daily        torsemide (DEMADEX) 20 MG tablet Take 20 mg by mouth daily       vitamin C (ASCORBIC ACID) 500 MG tablet 1 TABLET ORALLY DAILY (DX: SUPPLEMENT) 31 tablet 11       REVIEW OF SYSTEMS:  4 point ROS neg other than the symptoms noted above in the HPI. No c/o of chest pain or acute shortness of breath.    PHYSICAL EXAM:  /61   Pulse 99   Temp 98.2  F (36.8  C)   Resp 18   SpO2 94%   Petite female in a wheelchair as she is in process of getting a new one fitted for her with OT.  Alert and oriented x3.  Skin is pink/pale, warm to touch and frail.    Heart rate regular/irregular and strong.  No edema in lower legs.  Able to see her veins on the tops of her feet.  Lungs are diminished.  No coughing noted.  Abdomen is flat, soft and non-tender.    Ambulates with a 2WW around her apartment for exercise.  Propels self in manual wheelchair.    Component      Latest Ref Rng 1/30/2023  10:58 AM 2/6/2023  9:17 AM   Sodium      136 - 145 mmol/L 129 (L)  129 (L)    Potassium      3.4 - 5.3 mmol/L 4.3  4.6    Chloride      98 - 107 mmol/L 90 (L)  90 (L)     Carbon Dioxide (CO2)      22 - 29 mmol/L 27  27    Anion Gap      7 - 15 mmol/L 12  12    Urea Nitrogen      8.0 - 23.0 mg/dL 74.3 (H)  71.5 (H)    Creatinine      0.51 - 0.95 mg/dL 0.89  1.00 (H)    Calcium      8.2 - 9.6 mg/dL 10.1 (H)  9.4    Glucose      70 - 99 mg/dL 86  111 (H)    GFR Estimate      >60 mL/min/1.73m2 60 (L)  52 (L)         ASSESSMENT / PLAN:  (E87.1) Hyponatremia  (primary encounter diagnosis)  (N18.31) Chronic kidney disease, stage 3a (H)  Comment: at this time, this NP is no trying to help with her Na level.  Staff send the lab results to the clinic per their request and this NP's request.  Orders sent to the nursing staff to help manage her Na levels.  Right night trying to find medicine to help the level but also she can tolerate.  Do know she did not like the Urea medication.      (I48.91) Atrial fibrillation with rapid ventricular response (H)  Comment: is on a Baby ASA 81mg daily.  High risk for falls/bleeding and so managed with baby ASA.  No complaints of racing heart.    (K59.01) Slow transit constipation  Comment: managed with Senna-S 1 tab twice a day, PRN Dulcolax tabs at her bedside, and PRN Miralax.  She knows when she needs extra medicine to help her with stools.  She is on a lot of medications that can influence her bowels.      Orders:  No new orders from this NP    Electronically signed by  CASTRO Hazel CNP

## 2023-02-17 NOTE — LETTER
2/17/2023        RE: Marla Dunn  C/o Deneen Dunn  [address] [place] [id number]        No notes on file      Sincerely,        CASTRO Hazel CNP

## 2023-02-23 ENCOUNTER — LAB REQUISITION (OUTPATIENT)
Dept: LAB | Facility: CLINIC | Age: 88
End: 2023-02-23
Payer: COMMERCIAL

## 2023-02-23 DIAGNOSIS — E87.1 HYPO-OSMOLALITY AND HYPONATREMIA: ICD-10-CM

## 2023-02-24 DIAGNOSIS — E87.1 HYPONATREMIA: Primary | ICD-10-CM

## 2023-02-24 RX ORDER — UREA 15 G
POWDER IN PACKET (EA) ORAL
Qty: 30 EACH | Refills: 11 | Status: SHIPPED | OUTPATIENT
Start: 2023-02-24 | End: 2023-05-11

## 2023-02-27 LAB
ANION GAP SERPL CALCULATED.3IONS-SCNC: 13 MMOL/L (ref 7–15)
BUN SERPL-MCNC: 63.4 MG/DL (ref 8–23)
CALCIUM SERPL-MCNC: 9.6 MG/DL (ref 8.2–9.6)
CHLORIDE SERPL-SCNC: 94 MMOL/L (ref 98–107)
CREAT SERPL-MCNC: 1.01 MG/DL (ref 0.51–0.95)
DEPRECATED HCO3 PLAS-SCNC: 27 MMOL/L (ref 22–29)
GFR SERPL CREATININE-BSD FRML MDRD: 52 ML/MIN/1.73M2
GLUCOSE SERPL-MCNC: 85 MG/DL (ref 70–99)
POTASSIUM SERPL-SCNC: 4.4 MMOL/L (ref 3.4–5.3)
SODIUM SERPL-SCNC: 134 MMOL/L (ref 136–145)

## 2023-02-27 PROCEDURE — 36415 COLL VENOUS BLD VENIPUNCTURE: CPT | Mod: ORL | Performed by: INTERNAL MEDICINE

## 2023-02-27 PROCEDURE — 80048 BASIC METABOLIC PNL TOTAL CA: CPT | Mod: ORL | Performed by: INTERNAL MEDICINE

## 2023-02-27 PROCEDURE — P9604 ONE-WAY ALLOW PRORATED TRIP: HCPCS | Mod: ORL | Performed by: INTERNAL MEDICINE

## 2023-03-02 ENCOUNTER — LAB REQUISITION (OUTPATIENT)
Dept: LAB | Facility: CLINIC | Age: 88
End: 2023-03-02
Payer: COMMERCIAL

## 2023-03-02 DIAGNOSIS — I50.9 HEART FAILURE, UNSPECIFIED (H): ICD-10-CM

## 2023-03-06 LAB
ANION GAP SERPL CALCULATED.3IONS-SCNC: 18 MMOL/L (ref 7–15)
BUN SERPL-MCNC: 36.4 MG/DL (ref 8–23)
CALCIUM SERPL-MCNC: 9.7 MG/DL (ref 8.2–9.6)
CHLORIDE SERPL-SCNC: 94 MMOL/L (ref 98–107)
CREAT SERPL-MCNC: 0.96 MG/DL (ref 0.51–0.95)
DEPRECATED HCO3 PLAS-SCNC: 20 MMOL/L (ref 22–29)
GFR SERPL CREATININE-BSD FRML MDRD: 55 ML/MIN/1.73M2
GLUCOSE SERPL-MCNC: 74 MG/DL (ref 70–99)
POTASSIUM SERPL-SCNC: 4.4 MMOL/L (ref 3.4–5.3)
SODIUM SERPL-SCNC: 132 MMOL/L (ref 136–145)

## 2023-03-06 PROCEDURE — 80048 BASIC METABOLIC PNL TOTAL CA: CPT | Mod: ORL | Performed by: INTERNAL MEDICINE

## 2023-03-06 PROCEDURE — 36415 COLL VENOUS BLD VENIPUNCTURE: CPT | Mod: ORL | Performed by: INTERNAL MEDICINE

## 2023-03-06 PROCEDURE — P9603 ONE-WAY ALLOW PRORATED MILES: HCPCS | Mod: ORL | Performed by: INTERNAL MEDICINE

## 2023-03-13 ENCOUNTER — ASSISTED LIVING VISIT (OUTPATIENT)
Dept: GERIATRICS | Facility: CLINIC | Age: 88
End: 2023-03-13
Payer: COMMERCIAL

## 2023-03-13 VITALS
TEMPERATURE: 98.2 F | SYSTOLIC BLOOD PRESSURE: 114 MMHG | BODY MASS INDEX: 18.06 KG/M2 | OXYGEN SATURATION: 94 % | RESPIRATION RATE: 18 BRPM | DIASTOLIC BLOOD PRESSURE: 61 MMHG | HEART RATE: 99 BPM | WEIGHT: 105.2 LBS

## 2023-03-13 DIAGNOSIS — I50.32 CHRONIC DIASTOLIC HEART FAILURE (H): ICD-10-CM

## 2023-03-13 DIAGNOSIS — N18.31 CHRONIC KIDNEY DISEASE, STAGE 3A (H): ICD-10-CM

## 2023-03-13 DIAGNOSIS — F41.9 ANXIETY AND DEPRESSION: ICD-10-CM

## 2023-03-13 DIAGNOSIS — K59.00 CONSTIPATION, UNSPECIFIED CONSTIPATION TYPE: ICD-10-CM

## 2023-03-13 DIAGNOSIS — F32.A ANXIETY AND DEPRESSION: ICD-10-CM

## 2023-03-13 DIAGNOSIS — J44.9 CHRONIC OBSTRUCTIVE PULMONARY DISEASE, UNSPECIFIED COPD TYPE (H): ICD-10-CM

## 2023-03-13 DIAGNOSIS — E87.1 HYPONATREMIA: Primary | ICD-10-CM

## 2023-03-13 PROCEDURE — 99349 HOME/RES VST EST MOD MDM 40: CPT | Performed by: NURSE PRACTITIONER

## 2023-03-13 NOTE — PROGRESS NOTES
[facility]  ACUTE/EPISODIC VISIT    [facility] Medical Record Number:  [id number]  Place of Service where encounter took place:  [facility] [place] ([facility]) [[id number]]    Chief Complaint   Patient presents with    RECHECK       HPI:    Marla Dunn is a 89 year old  (5/22/1929), who is being seen today for an episodic care visit.  HPI information obtained from: facility chart records, facility staff, patient report, and [facility] chart review.    Today's concern is:    Diagnoses         Codes Comments    Hyponatremia    -  Primary E87.1     Chronic kidney disease, stage 3a (H)     N18.31     Chronic diastolic heart failure (H)     I50.32     Chronic obstructive pulmonary disease, unspecified COPD type (H)     J44.9     Anxiety and depression     F41.9, F32.A     Constipation, unspecified constipation type     K59.00           Came to see [place] today and found her in her apartment after lunch.  One of the aides came in to give her the inhaler that she takes for her COPD.  It appears that Georgia does take it correctly.    Nephrology continues to monitor [place]'s kidney function regularly and adjust medications to try to help with stabilizing her sodium level.  Georgia does not receive the Ure-na any more as hard for her to take and now since middle of February she has been taking demeclocycline 150mg BID and sodium tabs with success.  Able to keep her sodium level over 130 now.    Beyond that, Georgia enjoys having a visit from this nurse practitioner as she feels that clinic keeps things in line with her health.  Still working with therapies on getting a wheelchair that properly fits her      ALLERGIES:    Allergies   Allergen Reactions    Gramineae Pollens Other (See Comments)     ORCHARDGRASS. Shortness of breath when lawn is just cut.    Smoke. Other (See Comments)     Hard to breathe.    Pollen Extract      Other reaction(s): Unknown         MEDICATIONS:  Post Discharge Medication Reconciliation Status: patient was not discharged from an inpatient facility or TCU. Medications reviewed today    Current Outpatient Medications   Medication Sig Dispense Refill    acetaminophen (TYLENOL) 325 MG tablet Take 3 tablets (975 mg) by mouth every 8 hours as needed for mild pain      albuterol (PROAIR HFA/PROVENTIL HFA/VENTOLIN HFA) 108 (90 Base) MCG/ACT inhaler Inhale 2 puffs into the lungs every 6 hours      ANTACID 400-400-40 MG/10ML SUSP suspension MIX WITH URE-NA AS DIRECTED DAILY 355 mL 11    Ascorbic Acid (VITAMIN C PO) Take 500 mg by mouth every morning      ASPIRIN LOW DOSE 81 MG EC tablet 1 TABLET ORALLY 2 TIMES DAILY (DX: PROPHYLAXIS) 56 tablet 11    bisacodyl (DULCOLAX) 5 MG EC tablet 1 TABLET ORALLY DAILY AS NEEDED (MAY KEEP AT BEDSIDE) 30 tablet 10    Calcium Carb-Cholecalciferol (CALCIUM+D3) 600-800 MG-UNIT TABS Take 1 tablet by mouth daily      calcium carbonate (TUMS) 500 MG chewable tablet Take 2 tablets (1,000 mg) by mouth 2 times daily      cholecalciferol (D3 HIGH POTENCY) 125 MCG (5000 UT) CAPS Take 1 capsule by mouth daily      COMBIVENT RESPIMAT  MCG/ACT inhaler INHALE 1 PUFF INTO THE LUNGS  4 TIMES DAILY 4 g 11    cycloSPORINE (RESTASIS) 0.05 % ophthalmic emulsion Place 1 drop into both eyes 2 times daily       Demeclocycline 150mg 150mg by mouth twice a day      fish oil-omega-3 fatty acids 1000 MG capsule Take 1 g by mouth daily      fluorometholone (FML LIQUIFILM) 0.1 % ophthalmic susp Place 1 drop Into the left eye daily       IBANDRONATE SODIUM PO Take 150 mg by mouth every 30 days      levothyroxine (SYNTHROID/LEVOTHROID) 88 MCG tablet 1 TABLET ORALLY EVERY MORNING ON AN EMPTY STOMACH 31 tablet 11    loratadine (CLARITIN) 10 MG tablet Take 1 tablet (10 mg) by mouth daily       melatonin 3 MG tablet 3mg to be given with 5mg = 8mg at HS 30 tablet 11    melatonin 5 MG tablet 5mg to be given with 3mg tab = 8mg at HS 30 tablet 11     METOPROLOL TARTRATE PO Take 25 mg by mouth 2 times daily       mirtazapine (REMERON) 7.5 MG tablet Take 7.5 mg by mouth At Bedtime      Multiple Vitamins-Minerals (PRESERVISION AREDS PO) Take 1 tablet by mouth 2 times daily      omeprazole (PRILOSEC) 20 MG DR capsule 1 CAPSULE ORALLY 2 TIMES DAILY (DX: GASTROESOPHAGEAL REFLUX DISEASE) 62 capsule 11    OXYGEN-HELIUM IN       polyethylene glycol 0.4%- propylene glycol 0.3% (SYSTANE ULTRA) 0.4-0.3 % SOLN ophthalmic solution Place 1 drop into both eyes 4 times daily      Polyethylene Glycol 3350 (PEG 3350) 17 GM/SCOOP POWD MIX 1 CAPFUL (17 GMS) IN 8 OUNCES OF JUICE OR WATER AND DRINK ORALLY DAILY AS NEEDED (DX: CONSTIPATION) 510 g 10    potassium chloride ER (K-TAB/KLOR-CON) 10 MEQ CR tablet 1 TABLET ORALLY DAILY 30 tablet 11    Probiotic Product (PROBIOTIC PO) Take 1 capsule by mouth every morning      senna-docusate (SENOKOT-S/PERICOLACE) 8.6-50 MG tablet Take 1 tablet by mouth 2 times daily       sodium chloride 1 GM tablet 1 TABLET ORALLY 2 TIMES DAILY 56 tablet 11    spironolactone (ALDACTONE) 50 MG tablet Take 25 mg by mouth daily       timolol (TIMOPTIC) 0.5 % ophthalmic solution Place 1 drop Into the left eye daily       torsemide (DEMADEX) 20 MG tablet 1 TABLET ORALLY DAILY 31 tablet 11    vitamin C (ASCORBIC ACID) 500 MG tablet 1 TABLET ORALLY DAILY (DX: SUPPLEMENT) 31 tablet 11       REVIEW OF SYSTEMS:  4 point ROS neg other than the symptoms noted above in the HPI.  Denies chest pain, SOB, stomach troubles.    PHYSICAL EXAM:  /61   Pulse 99   Temp 98.2  F (36.8  C)   Resp 18   Wt 47.7 kg (105 lb 3.2 oz)   SpO2 94%   BMI 18.06 kg/m    Georgia is a petite female, is sitting up in a wheelchair and propels mainly with her feet and will use her hands.  She is in no acute distress.  Did take her inhalers without trouble.    Skin is pink/pale, thin, warm, dry and no open areas  Heart rate is regular/irregular with S1 and S2 heard  Lungs are clear to  auscultation with good expansion.  Abdomen is flat soft and nontender.  She monitors her bowel movements for timeliness.    Can ambulate with a 2WW walker in which she does in her apartment mostly.  The wheelchair she likes to use when she is out of her apartment for activities or mealtimes.  Occasionally will see her downstairs in passing    Component      Latest Ref Rng 2/27/2023  8:16 AM 3/6/2023  12:30 PM   Sodium      136 - 145 mmol/L 134 (L)  132 (L)    Potassium      3.4 - 5.3 mmol/L 4.4  4.4    Chloride      98 - 107 mmol/L 94 (L)  94 (L)    Carbon Dioxide (CO2)      22 - 29 mmol/L 27  20 (L)    Anion Gap      7 - 15 mmol/L 13  18 (H)    Urea Nitrogen      8.0 - 23.0 mg/dL 63.4 (H)  36.4 (H)    Creatinine      0.51 - 0.95 mg/dL 1.01 (H)  0.96 (H)    Calcium      8.2 - 9.6 mg/dL 9.6  9.7 (H)    Glucose      70 - 99 mg/dL 85  74    GFR Estimate      >60 mL/min/1.73m2 52 (L)  55 (L)         ASSESSMENT / PLAN:  (E87.1) Hyponatremia  (primary encounter diagnosis)  (N18.31) Chronic kidney disease, stage 3a (H)  Comment: Nephrology is monitoring her lab values and giving staff orders of when to do the next BMP.  This NP just follows when the labs are done and will jump in if there are critical things going on.  Even though the antibiotic that she is taking to help with her sodium level is expensive, she understands that it is one of the easiest things to take to help keep her sodium level above 130.  Remains on sodium chloride 1 g twice a day    (I50.32) Chronic diastolic heart failure (H)  Comment: Currently Georgia is on torsemide 20 mg daily as well as spironolactone 25 mg daily.  Georgia has received the spironolactone since September 2021 and her torsemide is the one that gets adjusted due to her hyponatremia.  Overall she has not had any acute exacerbations of her heart failure.  No edema present in her lower extremities    (J44.9) Chronic obstructive pulmonary disease, unspecified COPD type (H)  Comment:  Georgia has not displayed any acute respiratory issues.  She continues with her inhalers.  She also does have a CPAP at night that she will talk about with this nurse practitioner.  She seems to be pretty compliant with using the CPAP but does have times where she will take it off.    (F41.9,  F32.A) Anxiety and depression   Comment: [place] does receive Remeron 7.5 mg at bedtime and she does take melatonin 8 mg as well to help sleep.  Do lead Georgia talk about her health and her family as there is only a few left that her elder and have their own issues as well.  Do not ever see her cry but have seen her anxious because she does not want to get things confused.    (K59.00) Constipation, unspecified constipation type  Comment: Just fixing Phill ramirez S order in the computer as she takes 1 tablet twice a day and not as needed.  No changes  Plan: senna-docusate (SENOKOT-S/PERICOLACE) 8.6-50 MG        tablet     Orders:  No New Orders    Electronically signed by  CASTRO Hazel CNP

## 2023-03-13 NOTE — LETTER
3/13/2023        RE: Marla Dunn  C/o Deneen Dunn  [address] [place] [id number]        No notes on file      Sincerely,        CASTRO Hazel CNP

## 2023-03-13 NOTE — LETTER
3/13/2023        RE: Marla Dunn  C/o Deneen Dunn  [address]  [facility] [address]        [facility] GERIATRICS  ACUTE/EPISODIC VISIT    [facility] Medical Record Number:  [id number]  Place of Service where encounter took place:  [facility] [place] ([facility] [[id number]]    Chief Complaint   Patient presents with     RECHECK       HPI:    Marla Dunn is a 89 year old  (5/22/1929), who is being seen today for an episodic care visit.  HPI information obtained from: facility chart records, facility staff, patient report, and [facility] chart review.    Today's concern is:    Diagnoses         Codes Comments    Hyponatremia    -  Primary E87.1     Chronic kidney disease, stage 3a (H)     N18.31     Chronic diastolic heart failure (H)     I50.32     Chronic obstructive pulmonary disease, unspecified COPD type (H)     J44.9     Anxiety and depression     F41.9, F32.A     Constipation, unspecified constipation type     K59.00           Came to see [place] today and found her in her apartment after lunch.  One of the aides came in to give her the inhaler that she takes for her COPD.  It appears that Georgia does take it correctly.    Nephrology continues to monitor [place]'s kidney function regularly and adjust medications to try to help with stabilizing her sodium level.  Georgia does not receive the Ure-na any more as hard for her to take and now since middle of February she has been taking demeclocycline 150mg BID and sodium tabs with success.  Able to keep her sodium level over 130 now.    Beyond that, Georgia enjoys having a visit from this nurse practitioner as she feels that clinic keeps things in line with her health.  Still working with therapies on getting a wheelchair that properly fits her      ALLERGIES:    Allergies   Allergen Reactions     Gramineae Pollens Other (See Comments)     ORCHARDGRASS. Shortness of breath when lawn is just cut.      Smoke. Other (See Comments)     Hard to breathe.     Pollen Extract      Other reaction(s): Unknown        MEDICATIONS:  Post Discharge Medication Reconciliation Status: patient was not discharged from an inpatient facility or TCU. Medications reviewed today    Current Outpatient Medications   Medication Sig Dispense Refill     acetaminophen (TYLENOL) 325 MG tablet Take 3 tablets (975 mg) by mouth every 8 hours as needed for mild pain       albuterol (PROAIR HFA/PROVENTIL HFA/VENTOLIN HFA) 108 (90 Base) MCG/ACT inhaler Inhale 2 puffs into the lungs every 6 hours       ANTACID 400-400-40 MG/10ML SUSP suspension MIX WITH URE-NA AS DIRECTED DAILY 355 mL 11     Ascorbic Acid (VITAMIN C PO) Take 500 mg by mouth every morning       ASPIRIN LOW DOSE 81 MG EC tablet 1 TABLET ORALLY 2 TIMES DAILY (DX: PROPHYLAXIS) 56 tablet 11     bisacodyl (DULCOLAX) 5 MG EC tablet 1 TABLET ORALLY DAILY AS NEEDED (MAY KEEP AT BEDSIDE) 30 tablet 10     Calcium Carb-Cholecalciferol (CALCIUM+D3) 600-800 MG-UNIT TABS Take 1 tablet by mouth daily       calcium carbonate (TUMS) 500 MG chewable tablet Take 2 tablets (1,000 mg) by mouth 2 times daily       cholecalciferol (D3 HIGH POTENCY) 125 MCG (5000 UT) CAPS Take 1 capsule by mouth daily       COMBIVENT RESPIMAT  MCG/ACT inhaler INHALE 1 PUFF INTO THE LUNGS  4 TIMES DAILY 4 g 11     cycloSPORINE (RESTASIS) 0.05 % ophthalmic emulsion Place 1 drop into both eyes 2 times daily       Demeclocycline 150mg 150mg by mouth twice a day       fish oil-omega-3 fatty acids 1000 MG capsule Take 1 g by mouth daily       fluorometholone (FML LIQUIFILM) 0.1 % ophthalmic susp Place 1 drop Into the left eye daily        IBANDRONATE SODIUM PO Take 150 mg by mouth every 30 days       levothyroxine (SYNTHROID/LEVOTHROID) 88 MCG tablet 1 TABLET ORALLY EVERY MORNING ON AN EMPTY STOMACH 31 tablet 11     loratadine (CLARITIN) 10 MG tablet Take 1 tablet (10 mg) by mouth daily        melatonin 3 MG tablet 3mg  to be given with 5mg = 8mg at HS 30 tablet 11     melatonin 5 MG tablet 5mg to be given with 3mg tab = 8mg at HS 30 tablet 11     METOPROLOL TARTRATE PO Take 25 mg by mouth 2 times daily        mirtazapine (REMERON) 7.5 MG tablet Take 7.5 mg by mouth At Bedtime       Multiple Vitamins-Minerals (PRESERVISION AREDS PO) Take 1 tablet by mouth 2 times daily       omeprazole (PRILOSEC) 20 MG DR capsule 1 CAPSULE ORALLY 2 TIMES DAILY (DX: GASTROESOPHAGEAL REFLUX DISEASE) 62 capsule 11     OXYGEN-HELIUM IN        polyethylene glycol 0.4%- propylene glycol 0.3% (SYSTANE ULTRA) 0.4-0.3 % SOLN ophthalmic solution Place 1 drop into both eyes 4 times daily       Polyethylene Glycol 3350 (PEG 3350) 17 GM/SCOOP POWD MIX 1 CAPFUL (17 GMS) IN 8 OUNCES OF JUICE OR WATER AND DRINK ORALLY DAILY AS NEEDED (DX: CONSTIPATION) 510 g 10     potassium chloride ER (K-TAB/KLOR-CON) 10 MEQ CR tablet 1 TABLET ORALLY DAILY 30 tablet 11     Probiotic Product (PROBIOTIC PO) Take 1 capsule by mouth every morning       senna-docusate (SENOKOT-S/PERICOLACE) 8.6-50 MG tablet Take 1 tablet by mouth 2 times daily        sodium chloride 1 GM tablet 1 TABLET ORALLY 2 TIMES DAILY 56 tablet 11     spironolactone (ALDACTONE) 50 MG tablet Take 25 mg by mouth daily        timolol (TIMOPTIC) 0.5 % ophthalmic solution Place 1 drop Into the left eye daily        torsemide (DEMADEX) 20 MG tablet 1 TABLET ORALLY DAILY 31 tablet 11     vitamin C (ASCORBIC ACID) 500 MG tablet 1 TABLET ORALLY DAILY (DX: SUPPLEMENT) 31 tablet 11       REVIEW OF SYSTEMS:  4 point ROS neg other than the symptoms noted above in the HPI.  Denies chest pain, SOB, stomach troubles.    PHYSICAL EXAM:  /61   Pulse 99   Temp 98.2  F (36.8  C)   Resp 18   Wt 47.7 kg (105 lb 3.2 oz)   SpO2 94%   BMI 18.06 kg/m    Georgia is a petite female, is sitting up in a wheelchair and propels mainly with her feet and will use her hands.  She is in no acute distress.  Did take her inhalers  without trouble.    Skin is pink/pale, thin, warm, dry and no open areas  Heart rate is regular/irregular with S1 and S2 heard  Lungs are clear to auscultation with good expansion.  Abdomen is flat soft and nontender.  She monitors her bowel movements for timeliness.    Can ambulate with a 2WW walker in which she does in her apartment mostly.  The wheelchair she likes to use when she is out of her apartment for activities or mealtimes.  Occasionally will see her downstairs in passing    Component      Latest Ref Rng 2/27/2023  8:16 AM 3/6/2023  12:30 PM   Sodium      136 - 145 mmol/L 134 (L)  132 (L)    Potassium      3.4 - 5.3 mmol/L 4.4  4.4    Chloride      98 - 107 mmol/L 94 (L)  94 (L)    Carbon Dioxide (CO2)      22 - 29 mmol/L 27  20 (L)    Anion Gap      7 - 15 mmol/L 13  18 (H)    Urea Nitrogen      8.0 - 23.0 mg/dL 63.4 (H)  36.4 (H)    Creatinine      0.51 - 0.95 mg/dL 1.01 (H)  0.96 (H)    Calcium      8.2 - 9.6 mg/dL 9.6  9.7 (H)    Glucose      70 - 99 mg/dL 85  74    GFR Estimate      >60 mL/min/1.73m2 52 (L)  55 (L)         ASSESSMENT / PLAN:  (E87.1) Hyponatremia  (primary encounter diagnosis)  (N18.31) Chronic kidney disease, stage 3a (H)  Comment: Nephrology is monitoring her lab values and giving staff orders of when to do the next BMP.  This NP just follows when the labs are done and will jump in if there are critical things going on.  Even though the antibiotic that she is taking to help with her sodium level is expensive, she understands that it is one of the easiest things to take to help keep her sodium level above 130.  Remains on sodium chloride 1 g twice a day    (I50.32) Chronic diastolic heart failure (H)  Comment: Currently [place] is on torsemide 20 mg daily as well as spironolactone 25 mg daily.  Georgia has received the spironolactone since September 2021 and her torsemide is the one that gets adjusted due to her hyponatremia.  Overall she has not had any acute exacerbations of her  heart failure.  No edema present in her lower extremities    (J44.9) Chronic obstructive pulmonary disease, unspecified COPD type (H)  Comment: [place] has not displayed any acute respiratory issues.  She continues with her inhalers.  She also does have a CPAP at night that she will talk about with this nurse practitioner.  She seems to be pretty compliant with using the CPAP but does have times where she will take it off.    (F41.9,  F32.A) Anxiety and depression   Comment: Georgia does receive Remeron 7.5 mg at bedtime and she does take melatonin 8 mg as well to help sleep.  Do lead Georgia talk about her health and her family as there is only a few left that her elder and have their own issues as well.  Do not ever see her cry but have seen her anxious because she does not want to get things confused.    (K59.00) Constipation, unspecified constipation type  Comment: Just fixing Phill ramirez S order in the computer as she takes 1 tablet twice a day and not as needed.  No changes  Plan: senna-docusate (SENOKOT-S/PERICOLACE) 8.6-50 MG        tablet     Orders:  No New Orders    Electronically signed by  CASTRO Hazel CNP           Sincerely,        CASTRO Hazel CNP

## 2023-03-14 DIAGNOSIS — E78.5 DYSLIPIDEMIA: Primary | ICD-10-CM

## 2023-03-14 DIAGNOSIS — Z78.9 TAKES DIETARY SUPPLEMENTS: Primary | ICD-10-CM

## 2023-03-14 RX ORDER — ANTIOX #8/OM3/DHA/EPA/LUT/ZEAX 250-2.5 MG
CAPSULE ORAL
Qty: 56 CAPSULE | Refills: 11 | Status: SHIPPED | OUTPATIENT
Start: 2023-03-14 | End: 2024-02-09

## 2023-03-14 RX ORDER — CHLORAL HYDRATE 500 MG
CAPSULE ORAL
Qty: 28 CAPSULE | Refills: 11 | Status: SHIPPED | OUTPATIENT
Start: 2023-03-14 | End: 2024-02-09

## 2023-03-14 RX ORDER — CALCIUM CARBONATE/VITAMIN D3 600 MG-20
TABLET ORAL
Qty: 28 TABLET | Refills: 11 | Status: SHIPPED | OUTPATIENT
Start: 2023-03-14 | End: 2023-09-05

## 2023-03-16 ENCOUNTER — LAB REQUISITION (OUTPATIENT)
Dept: LAB | Facility: CLINIC | Age: 88
End: 2023-03-16
Payer: COMMERCIAL

## 2023-03-16 DIAGNOSIS — I50.9 HEART FAILURE, UNSPECIFIED (H): ICD-10-CM

## 2023-03-20 DIAGNOSIS — R15.9 FECAL INCONTINENCE ALTERNATING WITH CONSTIPATION: ICD-10-CM

## 2023-03-20 DIAGNOSIS — F32.A ANXIETY AND DEPRESSION: Primary | ICD-10-CM

## 2023-03-20 DIAGNOSIS — F41.9 ANXIETY AND DEPRESSION: Primary | ICD-10-CM

## 2023-03-20 DIAGNOSIS — K59.00 FECAL INCONTINENCE ALTERNATING WITH CONSTIPATION: ICD-10-CM

## 2023-03-20 LAB
ANION GAP SERPL CALCULATED.3IONS-SCNC: 13 MMOL/L (ref 7–15)
BUN SERPL-MCNC: 26.1 MG/DL (ref 8–23)
CALCIUM SERPL-MCNC: 9.4 MG/DL (ref 8.2–9.6)
CHLORIDE SERPL-SCNC: 94 MMOL/L (ref 98–107)
CREAT SERPL-MCNC: 0.82 MG/DL (ref 0.51–0.95)
DEPRECATED HCO3 PLAS-SCNC: 23 MMOL/L (ref 22–29)
GFR SERPL CREATININE-BSD FRML MDRD: 66 ML/MIN/1.73M2
GLUCOSE SERPL-MCNC: 59 MG/DL (ref 70–99)
POTASSIUM SERPL-SCNC: 4.5 MMOL/L (ref 3.4–5.3)
SODIUM SERPL-SCNC: 130 MMOL/L (ref 136–145)

## 2023-03-20 PROCEDURE — 36415 COLL VENOUS BLD VENIPUNCTURE: CPT | Mod: ORL | Performed by: INTERNAL MEDICINE

## 2023-03-20 PROCEDURE — 80048 BASIC METABOLIC PNL TOTAL CA: CPT | Mod: ORL | Performed by: INTERNAL MEDICINE

## 2023-03-20 PROCEDURE — P9603 ONE-WAY ALLOW PRORATED MILES: HCPCS | Mod: ORL | Performed by: INTERNAL MEDICINE

## 2023-03-20 RX ORDER — BACITRACIN ZINC AND POLYMYXIN B SULFATE 500; 10000 [USP'U]/G; [USP'U]/G
OINTMENT TOPICAL
Qty: 28 CAPSULE | Refills: 11 | Status: SHIPPED | OUTPATIENT
Start: 2023-03-20 | End: 2024-02-09

## 2023-03-20 RX ORDER — MIRTAZAPINE 7.5 MG/1
TABLET, FILM COATED ORAL
Qty: 28 TABLET | Refills: 11 | Status: SHIPPED | OUTPATIENT
Start: 2023-03-20 | End: 2024-02-09

## 2023-03-30 ENCOUNTER — LAB REQUISITION (OUTPATIENT)
Dept: LAB | Facility: CLINIC | Age: 88
End: 2023-03-30
Payer: COMMERCIAL

## 2023-03-30 DIAGNOSIS — I50.9 HEART FAILURE, UNSPECIFIED (H): ICD-10-CM

## 2023-04-03 LAB
ANION GAP SERPL CALCULATED.3IONS-SCNC: 16 MMOL/L (ref 7–15)
BUN SERPL-MCNC: 39.4 MG/DL (ref 8–23)
CALCIUM SERPL-MCNC: 9.6 MG/DL (ref 8.2–9.6)
CHLORIDE SERPL-SCNC: 93 MMOL/L (ref 98–107)
CREAT SERPL-MCNC: 0.95 MG/DL (ref 0.51–0.95)
DEPRECATED HCO3 PLAS-SCNC: 21 MMOL/L (ref 22–29)
GFR SERPL CREATININE-BSD FRML MDRD: 56 ML/MIN/1.73M2
GLUCOSE SERPL-MCNC: 84 MG/DL (ref 70–99)
POTASSIUM SERPL-SCNC: 4.2 MMOL/L (ref 3.4–5.3)
SODIUM SERPL-SCNC: 130 MMOL/L (ref 136–145)

## 2023-04-03 PROCEDURE — 80048 BASIC METABOLIC PNL TOTAL CA: CPT | Mod: ORL | Performed by: INTERNAL MEDICINE

## 2023-04-03 PROCEDURE — P9604 ONE-WAY ALLOW PRORATED TRIP: HCPCS | Mod: ORL | Performed by: INTERNAL MEDICINE

## 2023-04-03 PROCEDURE — 36415 COLL VENOUS BLD VENIPUNCTURE: CPT | Mod: ORL | Performed by: INTERNAL MEDICINE

## 2023-04-07 ENCOUNTER — ASSISTED LIVING VISIT (OUTPATIENT)
Dept: GERIATRICS | Facility: CLINIC | Age: 88
End: 2023-04-07
Payer: COMMERCIAL

## 2023-04-07 VITALS
OXYGEN SATURATION: 94 % | HEART RATE: 99 BPM | RESPIRATION RATE: 18 BRPM | SYSTOLIC BLOOD PRESSURE: 114 MMHG | TEMPERATURE: 98.2 F | DIASTOLIC BLOOD PRESSURE: 61 MMHG

## 2023-04-07 DIAGNOSIS — Z53.9 ERRONEOUS ENCOUNTER--DISREGARD: Primary | ICD-10-CM

## 2023-04-07 NOTE — LETTER
4/7/2023        RE: Marla Dunn  C/o Deneen Dunn  [address] [place] [id number]        No notes on file      Sincerely,        CASTRO Hazel CNP

## 2023-04-21 ENCOUNTER — ASSISTED LIVING VISIT (OUTPATIENT)
Dept: GERIATRICS | Facility: CLINIC | Age: 88
End: 2023-04-21
Payer: COMMERCIAL

## 2023-04-21 VITALS
HEART RATE: 99 BPM | SYSTOLIC BLOOD PRESSURE: 114 MMHG | OXYGEN SATURATION: 94 % | BODY MASS INDEX: 18.06 KG/M2 | TEMPERATURE: 98.2 F | RESPIRATION RATE: 18 BRPM | DIASTOLIC BLOOD PRESSURE: 61 MMHG | WEIGHT: 105.2 LBS

## 2023-04-21 DIAGNOSIS — R54 FRAIL ELDERLY: ICD-10-CM

## 2023-04-21 DIAGNOSIS — I50.32 CHRONIC DIASTOLIC HEART FAILURE (H): Primary | ICD-10-CM

## 2023-04-21 DIAGNOSIS — K59.01 SLOW TRANSIT CONSTIPATION: ICD-10-CM

## 2023-04-21 DIAGNOSIS — I48.91 ATRIAL FIBRILLATION WITH RAPID VENTRICULAR RESPONSE (H): ICD-10-CM

## 2023-04-21 DIAGNOSIS — E87.1 HYPONATREMIA: ICD-10-CM

## 2023-04-21 DIAGNOSIS — N18.31 CHRONIC KIDNEY DISEASE, STAGE 3A (H): ICD-10-CM

## 2023-04-21 PROCEDURE — 99349 HOME/RES VST EST MOD MDM 40: CPT | Performed by: NURSE PRACTITIONER

## 2023-04-21 NOTE — LETTER
4/21/2023        RE: Marla Dunn  C/o Deneen Dunn  [address] [place] [id number]        No notes on file      Sincerely,        CASTRO Hazel CNP

## 2023-04-21 NOTE — LETTER
4/21/2023        RE: Marla Dunn  C/o Deneen Dunn  [address] [facility] [address]        [facility] GERIATRICS  ACUTE/EPISODIC VISIT    [facility] Medical Record Number:  [id number]  Place of Service where encounter took place:  [facility] ([facility]) [[id number]]    Chief Complaint   Patient presents with     RECHECK       HPI:    Marla Dunn is a 89 year old  (5/22/1929), who is being seen today for an episodic care visit.  HPI information obtained from: facility chart records, facility staff, patient report, [facility] chart review, and Care Everywhere [facility] chart review.    Today's concern is:    Diagnoses         Codes Comments    Chronic diastolic heart failure (H)    -  Primary I50.32     Chronic kidney disease, stage 3a (H)     N18.31     Hyponatremia     E87.1     Atrial fibrillation with rapid ventricular response (H)     I48.91     Slow transit constipation     K59.01     Frail elderly     R54           Came to see Georgia today and how she has been doing in general.  She is a frail female with many health concerns/diagnoses but she manages within her apartment.      Today Georgia is up in her wheelchair that she propels herself around the facility.  She will use all 4 extremities.    Continues to keep all her appointments straight as she sees nephrology, hematology for her how Sodium and then anemia.    ALLERGIES:    Allergies   Allergen Reactions     Gramineae Pollens Other (See Comments)     ORCHARDGRASS. Shortness of breath when lawn is just cut.     Smoke. Other (See Comments)     Hard to breathe.     Pollen Extract      Other reaction(s): Unknown        MEDICATIONS:  Post Discharge Medication Reconciliation Status: patient was not discharged from an inpatient facility or TCU.     Current Outpatient Medications   Medication Sig Dispense Refill     acetaminophen (TYLENOL) 325 MG tablet Take 3 tablets (975 mg) by mouth every 8 hours as needed for mild pain        albuterol (PROAIR HFA/PROVENTIL HFA/VENTOLIN HFA) 108 (90 Base) MCG/ACT inhaler Inhale 2 puffs into the lungs every 6 hours       ANTACID 400-400-40 MG/10ML SUSP suspension MIX WITH URE-NA AS DIRECTED DAILY 355 mL 11     Ascorbic Acid (VITAMIN C PO) Take 500 mg by mouth every morning       ASPIRIN LOW DOSE 81 MG EC tablet 1 TABLET ORALLY 2 TIMES DAILY (DX: PROPHYLAXIS) 56 tablet 11     Bacillus Coagulans-Inulin (PROBIOTIC FORMULA) 1-250 BILLION-MG CAPS 1 CAPSULE ORALLY DAILY 28 capsule 11     bisacodyl (DULCOLAX) 5 MG EC tablet 1 TABLET ORALLY DAILY AS NEEDED (MAY KEEP AT BEDSIDE) 30 tablet 10     calcium carbonate (TUMS) 500 MG chewable tablet Take 2 tablets (1,000 mg) by mouth 2 times daily       CALCIUM+D3 600-20 MG-MCG TABS 1 TABLET ORALLY DAILY WITH A MEAL 28 tablet 11     cholecalciferol (VITAMIN D3) 125 mcg (5000 units) capsule 1 CAPSULE ORALLY DAILY (DX: OSTEOPOROSIS) 28 capsule 11     COMBIVENT RESPIMAT  MCG/ACT inhaler INHALE 1 PUFF INTO THE LUNGS  4 TIMES DAILY 4 g 11     cycloSPORINE (RESTASIS) 0.05 % ophthalmic emulsion Place 1 drop into both eyes 2 times daily        fish oil-omega-3 fatty acids 1000 MG capsule 1 CAPSULE ORALLY DAILY 28 capsule 11     fluorometholone (FML LIQUIFILM) 0.1 % ophthalmic susp Place 1 drop Into the left eye daily        IBANDRONATE SODIUM PO Take 150 mg by mouth every 30 days       levothyroxine (SYNTHROID/LEVOTHROID) 88 MCG tablet 1 TABLET ORALLY EVERY MORNING ON AN EMPTY STOMACH 31 tablet 11     loratadine (CLARITIN) 10 MG tablet Take 1 tablet (10 mg) by mouth daily        melatonin 3 MG tablet 3mg to be given with 5mg = 8mg at HS 30 tablet 11     melatonin 5 MG tablet 5mg to be given with 3mg tab = 8mg at HS 30 tablet 11     METOPROLOL TARTRATE PO Take 25 mg by mouth 2 times daily        mirtazapine (REMERON) 7.5 MG tablet 1 TABLET ORALLY AT BEDTIME 28 tablet 11     Multiple Vitamins-Minerals (PRESERVISION AREDS 2) CAPS 1 CAPSULE ORALLY 2 TIMES DAILY 56  capsule 11     omeprazole (PRILOSEC) 20 MG DR capsule 1 CAPSULE ORALLY 2 TIMES DAILY (DX: GASTROESOPHAGEAL REFLUX DISEASE) 62 capsule 11     OXYGEN-HELIUM IN        polyethylene glycol 0.4%- propylene glycol 0.3% (SYSTANE ULTRA) 0.4-0.3 % SOLN ophthalmic solution Place 1 drop into both eyes 4 times daily       Polyethylene Glycol 3350 (PEG 3350) 17 GM/SCOOP POWD MIX 1 CAPFUL (17 GMS) IN 8 OUNCES OF JUICE OR WATER AND DRINK ORALLY DAILY AS NEEDED (DX: CONSTIPATION) 510 g 10     potassium chloride ER (K-TAB/KLOR-CON) 10 MEQ CR tablet 1 TABLET ORALLY DAILY 30 tablet 11     Probiotic Product (PROBIOTIC PO) Take 1 capsule by mouth every morning       senna-docusate (SENOKOT-S/PERICOLACE) 8.6-50 MG tablet Take 1 tablet by mouth 2 times daily as needed for constipation       sodium chloride 1 GM tablet 1 TABLET ORALLY 2 TIMES DAILY 56 tablet 11     spironolactone (ALDACTONE) 50 MG tablet Take 25 mg by mouth daily        timolol (TIMOPTIC) 0.5 % ophthalmic solution Place 1 drop Into the left eye daily        torsemide (DEMADEX) 20 MG tablet 1 TABLET ORALLY DAILY 31 tablet 11     torsemide (DEMADEX) 20 MG tablet Take 20 mg by mouth daily       URE-NA 15 g PACK packet 1 PACKET (15GM) ORALLY DAILY 30 each 11     vitamin C (ASCORBIC ACID) 500 MG tablet 1 TABLET ORALLY DAILY (DX: SUPPLEMENT) 31 tablet 11     REVIEW OF SYSTEMS:  4 point ROS neg other than the symptoms noted above in the HPI.  Denied chest pain.  No significant SOB.  Monitors her bowels.      PHYSICAL EXAM:  /61   Pulse 99   Temp 98.2  F (36.8  C)   Resp 18   Wt 47.7 kg (105 lb 3.2 oz)   SpO2 94%   BMI 18.06 kg/m    Frail woman with poor teeth and at times seems to have a lisp as she talks.  We talk about not being able to eat a sandwich and she so wants to eat one.  Heart rate regular and strong today.  No edema.  Can see her veins in legs.  Occasion red/bruise over the veins.    Lungs are clear.  Has a CPAP at night but otherwise does not use oxygen  during the daytime.  Abdomen is soft flat and nontender.  Decreased bowel sounds.  No joint enlargement seen.  She is able to ambulate with a 2 wheeled walker and practices around her apartment as she often states her niece does not want her to lose that ability by taking her out into the community.  Skin is pink dry warm and thin appearance.    Component      Latest Ref Rng 4/3/2023  9:52 AM   Sodium      136 - 145 mmol/L 130 (L)    Potassium      3.4 - 5.3 mmol/L 4.2    Chloride      98 - 107 mmol/L 93 (L)    Carbon Dioxide (CO2)      22 - 29 mmol/L 21 (L)    Anion Gap      7 - 15 mmol/L 16 (H)    Urea Nitrogen      8.0 - 23.0 mg/dL 39.4 (H)    Creatinine      0.51 - 0.95 mg/dL 0.95    Calcium      8.2 - 9.6 mg/dL 9.6    Glucose      70 - 99 mg/dL 84    GFR Estimate      >60 mL/min/1.73m2 56 (L)           ASSESSMENT / PLAN:  (I50.32) Chronic diastolic heart failure (H)  (primary encounter diagnosis)  (N18.31) Chronic kidney disease, stage 3a (H)  Comment: Georgia is managed with torsemide 20 mg daily as well as spironolactone 25 mg daily.  She does take a potassium replacement as well.  This nurse practitioner does see the labs that are ordered.  Do coordinate with her nephrologist clinic for any further orders as more specifically I was looking at her sodium level.  No acute exacerbations of heart failure.    (E87.1) Hyponatremia  Comment: We will be having another BMP done at the beginning of May.  Has not shown any acute symptoms due to having a lower sodium level.  Remains on sodium chloride 1 g twice a day and Demeclocycline 150 mg twice a day.  Georgia does note that this medication is expensive but it also is helping with her sodium level.      (I48.91) Atrial fibrillation with rapid ventricular response (H)  Comment: Is managed with aspirin 81 mg daily.  No other blood thinners as risk for falls as well as she bruises very easily.  Her rate is controlled at this time.    (K59.01) Slow transit  constipation  Comment: Georgia is monitoring her constipation all the time.  Senna S1 tablet twice a day and MiraLAX daily as needed.  No concerns today.  Did tell Georgia if there are any issues to let nursing know.    (R54) Frail elderly  Comment: Often Georgia talks about she is ready to be taken by the Lord but knows it is not her time to get.  Like most older folks she does think that she is one of the last in her family and is ready.  Family is very involved in her cares and helps her get to any appointments or make arrangements for her.  Georgia manages in her environment but will check her frequently because of her frailty.    Orders:  No new orders    Electronically signed by  CASTRO Hazel CNP           Sincerely,        CASTRO Hazel CNP

## 2023-04-21 NOTE — PROGRESS NOTES
[facility] GERIATRICS  ACUTE/EPISODIC VISIT    [facility] Medical Record Number:  [id number]  Place of Service where encounter took place:  [facility]) [[id number]]    Chief Complaint   Patient presents with    RECHECK       HPI:    Marla Dunn is a 89 year old  (5/22/1929), who is being seen today for an episodic care visit.  HPI information obtained from: facility chart records, facility staff, patient report, [facility] chart review, and Care Everywhere [facility] chart review.    Today's concern is:    Diagnoses         Codes Comments    Chronic diastolic heart failure (H)    -  Primary I50.32     Chronic kidney disease, stage 3a (H)     N18.31     Hyponatremia     E87.1     Atrial fibrillation with rapid ventricular response (H)     I48.91     Slow transit constipation     K59.01     Frail elderly     R54           Came to see Georgia today and how she has been doing in general.  She is a frail female with many health concerns/diagnoses but she manages within her apartment.      Today Georgia is up in her wheelchair that she propels herself around the facility.  She will use all 4 extremities.    Continues to keep all her appointments straight as she sees nephrology, hematology for her how Sodium and then anemia.    ALLERGIES:    Allergies   Allergen Reactions    Gramineae Pollens Other (See Comments)     ORCHARDGRASS. Shortness of breath when lawn is just cut.    Smoke. Other (See Comments)     Hard to breathe.    Pollen Extract      Other reaction(s): Unknown        MEDICATIONS:  Post Discharge Medication Reconciliation Status: patient was not discharged from an inpatient facility or TCU.     Current Outpatient Medications   Medication Sig Dispense Refill    acetaminophen (TYLENOL) 325 MG tablet Take 3 tablets (975 mg) by mouth every 8 hours as needed for mild pain      albuterol (PROAIR HFA/PROVENTIL HFA/VENTOLIN HFA) 108 (90 Base) MCG/ACT inhaler Inhale 2 puffs into the lungs  every 6 hours      ANTACID 400-400-40 MG/10ML SUSP suspension MIX WITH URE-NA AS DIRECTED DAILY 355 mL 11    Ascorbic Acid (VITAMIN C PO) Take 500 mg by mouth every morning      ASPIRIN LOW DOSE 81 MG EC tablet 1 TABLET ORALLY 2 TIMES DAILY (DX: PROPHYLAXIS) 56 tablet 11    Bacillus Coagulans-Inulin (PROBIOTIC FORMULA) 1-250 BILLION-MG CAPS 1 CAPSULE ORALLY DAILY 28 capsule 11    bisacodyl (DULCOLAX) 5 MG EC tablet 1 TABLET ORALLY DAILY AS NEEDED (MAY KEEP AT BEDSIDE) 30 tablet 10    calcium carbonate (TUMS) 500 MG chewable tablet Take 2 tablets (1,000 mg) by mouth 2 times daily      CALCIUM+D3 600-20 MG-MCG TABS 1 TABLET ORALLY DAILY WITH A MEAL 28 tablet 11    cholecalciferol (VITAMIN D3) 125 mcg (5000 units) capsule 1 CAPSULE ORALLY DAILY (DX: OSTEOPOROSIS) 28 capsule 11    COMBIVENT RESPIMAT  MCG/ACT inhaler INHALE 1 PUFF INTO THE LUNGS  4 TIMES DAILY 4 g 11    cycloSPORINE (RESTASIS) 0.05 % ophthalmic emulsion Place 1 drop into both eyes 2 times daily       fish oil-omega-3 fatty acids 1000 MG capsule 1 CAPSULE ORALLY DAILY 28 capsule 11    fluorometholone (FML LIQUIFILM) 0.1 % ophthalmic susp Place 1 drop Into the left eye daily       IBANDRONATE SODIUM PO Take 150 mg by mouth every 30 days      levothyroxine (SYNTHROID/LEVOTHROID) 88 MCG tablet 1 TABLET ORALLY EVERY MORNING ON AN EMPTY STOMACH 31 tablet 11    loratadine (CLARITIN) 10 MG tablet Take 1 tablet (10 mg) by mouth daily       melatonin 3 MG tablet 3mg to be given with 5mg = 8mg at HS 30 tablet 11    melatonin 5 MG tablet 5mg to be given with 3mg tab = 8mg at HS 30 tablet 11    METOPROLOL TARTRATE PO Take 25 mg by mouth 2 times daily       mirtazapine (REMERON) 7.5 MG tablet 1 TABLET ORALLY AT BEDTIME 28 tablet 11    Multiple Vitamins-Minerals (PRESERVISION AREDS 2) CAPS 1 CAPSULE ORALLY 2 TIMES DAILY 56 capsule 11    omeprazole (PRILOSEC) 20 MG DR capsule 1 CAPSULE ORALLY 2 TIMES DAILY (DX: GASTROESOPHAGEAL REFLUX DISEASE) 62 capsule 11     OXYGEN-HELIUM IN       polyethylene glycol 0.4%- propylene glycol 0.3% (SYSTANE ULTRA) 0.4-0.3 % SOLN ophthalmic solution Place 1 drop into both eyes 4 times daily      Polyethylene Glycol 3350 (PEG 3350) 17 GM/SCOOP POWD MIX 1 CAPFUL (17 GMS) IN 8 OUNCES OF JUICE OR WATER AND DRINK ORALLY DAILY AS NEEDED (DX: CONSTIPATION) 510 g 10    potassium chloride ER (K-TAB/KLOR-CON) 10 MEQ CR tablet 1 TABLET ORALLY DAILY 30 tablet 11    Probiotic Product (PROBIOTIC PO) Take 1 capsule by mouth every morning      senna-docusate (SENOKOT-S/PERICOLACE) 8.6-50 MG tablet Take 1 tablet by mouth 2 times daily as needed for constipation      sodium chloride 1 GM tablet 1 TABLET ORALLY 2 TIMES DAILY 56 tablet 11    spironolactone (ALDACTONE) 50 MG tablet Take 25 mg by mouth daily       timolol (TIMOPTIC) 0.5 % ophthalmic solution Place 1 drop Into the left eye daily       torsemide (DEMADEX) 20 MG tablet 1 TABLET ORALLY DAILY 31 tablet 11    torsemide (DEMADEX) 20 MG tablet Take 20 mg by mouth daily      URE-NA 15 g PACK packet 1 PACKET (15GM) ORALLY DAILY 30 each 11    vitamin C (ASCORBIC ACID) 500 MG tablet 1 TABLET ORALLY DAILY (DX: SUPPLEMENT) 31 tablet 11     REVIEW OF SYSTEMS:  4 point ROS neg other than the symptoms noted above in the HPI.  Denied chest pain.  No significant SOB.  Monitors her bowels.      PHYSICAL EXAM:  /61   Pulse 99   Temp 98.2  F (36.8  C)   Resp 18   Wt 47.7 kg (105 lb 3.2 oz)   SpO2 94%   BMI 18.06 kg/m    Frail woman with poor teeth and at times seems to have a lisp as she talks.  We talk about not being able to eat a sandwich and she so wants to eat one.  Heart rate regular and strong today.  No edema.  Can see her veins in legs.  Occasion red/bruise over the veins.    Lungs are clear.  Has a CPAP at night but otherwise does not use oxygen during the daytime.  Abdomen is soft flat and nontender.  Decreased bowel sounds.  No joint enlargement seen.  She is able to ambulate with a 2 wheeled  walker and practices around her apartment as she often states her niece does not want her to lose that ability by taking her out into the community.  Skin is pink dry warm and thin appearance.    Component      Latest Ref Rng 4/3/2023  9:52 AM   Sodium      136 - 145 mmol/L 130 (L)    Potassium      3.4 - 5.3 mmol/L 4.2    Chloride      98 - 107 mmol/L 93 (L)    Carbon Dioxide (CO2)      22 - 29 mmol/L 21 (L)    Anion Gap      7 - 15 mmol/L 16 (H)    Urea Nitrogen      8.0 - 23.0 mg/dL 39.4 (H)    Creatinine      0.51 - 0.95 mg/dL 0.95    Calcium      8.2 - 9.6 mg/dL 9.6    Glucose      70 - 99 mg/dL 84    GFR Estimate      >60 mL/min/1.73m2 56 (L)           ASSESSMENT / PLAN:  (I50.32) Chronic diastolic heart failure (H)  (primary encounter diagnosis)  (N18.31) Chronic kidney disease, stage 3a (H)  Comment: Georgia is managed with torsemide 20 mg daily as well as spironolactone 25 mg daily.  She does take a potassium replacement as well.  This nurse practitioner does see the labs that are ordered.  Do coordinate with her nephrologist clinic for any further orders as more specifically I was looking at her sodium level.  No acute exacerbations of heart failure.    (E87.1) Hyponatremia  Comment: We will be having another BMP done at the beginning of May.  Has not shown any acute symptoms due to having a lower sodium level.  Remains on sodium chloride 1 g twice a day and Demeclocycline 150 mg twice a day.  Georgia does note that this medication is expensive but it also is helping with her sodium level.      (I48.91) Atrial fibrillation with rapid ventricular response (H)  Comment: Is managed with aspirin 81 mg daily.  No other blood thinners as risk for falls as well as she bruises very easily.  Her rate is controlled at this time.    (K59.01) Slow transit constipation  Comment: Georgia is monitoring her constipation all the time.  Senna S1 tablet twice a day and MiraLAX daily as needed.  No concerns today.  Did tell  Georgia if there are any issues to let nursing know.    (R54) Frail elderly  Comment: Often Georgia talks about she is ready to be taken by the Lord but knows it is not her time to get.  Like most older folks she does think that she is one of the last in her family and is ready.  Family is very involved in her cares and helps her get to any appointments or make arrangements for her.  Georgia manages in her environment but will check her frequently because of her frailty.    Orders:  No new orders    Electronically signed by  CASTRO Hazel CNP

## 2023-04-27 ENCOUNTER — LAB REQUISITION (OUTPATIENT)
Dept: LAB | Facility: CLINIC | Age: 88
End: 2023-04-27
Payer: COMMERCIAL

## 2023-04-27 DIAGNOSIS — I50.9 HEART FAILURE, UNSPECIFIED (H): ICD-10-CM

## 2023-05-01 VITALS — SYSTOLIC BLOOD PRESSURE: 115 MMHG | HEART RATE: 90 BPM | DIASTOLIC BLOOD PRESSURE: 63 MMHG | RESPIRATION RATE: 20 BRPM

## 2023-05-01 PROBLEM — N18.31 CHRONIC KIDNEY DISEASE, STAGE 3A (H): Status: ACTIVE | Noted: 2023-05-01

## 2023-05-01 LAB
ANION GAP SERPL CALCULATED.3IONS-SCNC: 10 MMOL/L (ref 7–15)
BUN SERPL-MCNC: 32.1 MG/DL (ref 8–23)
CALCIUM SERPL-MCNC: 9.2 MG/DL (ref 8.2–9.6)
CHLORIDE SERPL-SCNC: 96 MMOL/L (ref 98–107)
CREAT SERPL-MCNC: 0.88 MG/DL (ref 0.51–0.95)
DEPRECATED HCO3 PLAS-SCNC: 21 MMOL/L (ref 22–29)
GFR SERPL CREATININE-BSD FRML MDRD: 61 ML/MIN/1.73M2
GLUCOSE SERPL-MCNC: 107 MG/DL (ref 70–99)
POTASSIUM SERPL-SCNC: 4.1 MMOL/L (ref 3.4–5.3)
SODIUM SERPL-SCNC: 127 MMOL/L (ref 136–145)

## 2023-05-01 PROCEDURE — P9604 ONE-WAY ALLOW PRORATED TRIP: HCPCS | Mod: ORL | Performed by: INTERNAL MEDICINE

## 2023-05-01 PROCEDURE — 80048 BASIC METABOLIC PNL TOTAL CA: CPT | Mod: ORL | Performed by: INTERNAL MEDICINE

## 2023-05-01 PROCEDURE — 36415 COLL VENOUS BLD VENIPUNCTURE: CPT | Mod: ORL | Performed by: INTERNAL MEDICINE

## 2023-05-03 VITALS
DIASTOLIC BLOOD PRESSURE: 62 MMHG | TEMPERATURE: 98.2 F | HEART RATE: 99 BPM | RESPIRATION RATE: 18 BRPM | SYSTOLIC BLOOD PRESSURE: 114 MMHG

## 2023-05-04 ENCOUNTER — LAB REQUISITION (OUTPATIENT)
Dept: LAB | Facility: CLINIC | Age: 88
End: 2023-05-04
Payer: COMMERCIAL

## 2023-05-04 DIAGNOSIS — R82.998 OTHER ABNORMAL FINDINGS IN URINE: ICD-10-CM

## 2023-05-04 DIAGNOSIS — E87.1 HYPO-OSMOLALITY AND HYPONATREMIA: ICD-10-CM

## 2023-05-04 LAB
OSMOLALITY UR: 224 MMOL/KG (ref 100–1200)
SODIUM UR-SCNC: 49 MMOL/L

## 2023-05-04 PROCEDURE — 84300 ASSAY OF URINE SODIUM: CPT | Mod: ORL | Performed by: INTERNAL MEDICINE

## 2023-05-04 PROCEDURE — 83935 ASSAY OF URINE OSMOLALITY: CPT | Mod: ORL | Performed by: INTERNAL MEDICINE

## 2023-05-05 ENCOUNTER — LAB REQUISITION (OUTPATIENT)
Dept: LAB | Facility: CLINIC | Age: 88
End: 2023-05-05
Payer: COMMERCIAL

## 2023-05-05 DIAGNOSIS — I50.9 HEART FAILURE, UNSPECIFIED (H): ICD-10-CM

## 2023-05-08 LAB
ANION GAP SERPL CALCULATED.3IONS-SCNC: 14 MMOL/L (ref 7–15)
BUN SERPL-MCNC: 30.7 MG/DL (ref 8–23)
CALCIUM SERPL-MCNC: 9.1 MG/DL (ref 8.2–9.6)
CHLORIDE SERPL-SCNC: 94 MMOL/L (ref 98–107)
CREAT SERPL-MCNC: 0.97 MG/DL (ref 0.51–0.95)
DEPRECATED HCO3 PLAS-SCNC: 20 MMOL/L (ref 22–29)
GFR SERPL CREATININE-BSD FRML MDRD: 54 ML/MIN/1.73M2
GLUCOSE SERPL-MCNC: 109 MG/DL (ref 70–99)
POTASSIUM SERPL-SCNC: 4.1 MMOL/L (ref 3.4–5.3)
SODIUM SERPL-SCNC: 128 MMOL/L (ref 136–145)

## 2023-05-08 PROCEDURE — 80048 BASIC METABOLIC PNL TOTAL CA: CPT | Mod: ORL | Performed by: INTERNAL MEDICINE

## 2023-05-08 PROCEDURE — P9603 ONE-WAY ALLOW PRORATED MILES: HCPCS | Mod: ORL | Performed by: INTERNAL MEDICINE

## 2023-05-08 PROCEDURE — 36415 COLL VENOUS BLD VENIPUNCTURE: CPT | Mod: ORL | Performed by: INTERNAL MEDICINE

## 2023-05-11 ENCOUNTER — ASSISTED LIVING VISIT (OUTPATIENT)
Dept: GERIATRICS | Facility: CLINIC | Age: 88
End: 2023-05-11
Payer: COMMERCIAL

## 2023-05-11 ENCOUNTER — LAB REQUISITION (OUTPATIENT)
Dept: LAB | Facility: CLINIC | Age: 88
End: 2023-05-11
Payer: COMMERCIAL

## 2023-05-11 VITALS
RESPIRATION RATE: 18 BRPM | OXYGEN SATURATION: 94 % | DIASTOLIC BLOOD PRESSURE: 61 MMHG | TEMPERATURE: 98.2 F | BODY MASS INDEX: 18.06 KG/M2 | HEART RATE: 99 BPM | SYSTOLIC BLOOD PRESSURE: 114 MMHG | WEIGHT: 105.2 LBS

## 2023-05-11 DIAGNOSIS — J44.9 CHRONIC OBSTRUCTIVE PULMONARY DISEASE, UNSPECIFIED COPD TYPE (H): ICD-10-CM

## 2023-05-11 DIAGNOSIS — R82.998 OTHER ABNORMAL FINDINGS IN URINE: ICD-10-CM

## 2023-05-11 DIAGNOSIS — E87.1 HYPONATREMIA: ICD-10-CM

## 2023-05-11 DIAGNOSIS — E87.1 HYPO-OSMOLALITY AND HYPONATREMIA: ICD-10-CM

## 2023-05-11 DIAGNOSIS — I38 HEART VALVE DISEASE: ICD-10-CM

## 2023-05-11 DIAGNOSIS — K08.9 POOR DENTITION: Primary | ICD-10-CM

## 2023-05-11 DIAGNOSIS — I78.1 SPIDER VEINS: ICD-10-CM

## 2023-05-11 DIAGNOSIS — R12 HEARTBURN: ICD-10-CM

## 2023-05-11 DIAGNOSIS — I50.32 CHRONIC DIASTOLIC CONGESTIVE HEART FAILURE (H): ICD-10-CM

## 2023-05-11 LAB
OSMOLALITY UR: 178 MMOL/KG (ref 100–1200)
SODIUM UR-SCNC: 37 MMOL/L

## 2023-05-11 PROCEDURE — 83935 ASSAY OF URINE OSMOLALITY: CPT | Mod: ORL | Performed by: INTERNAL MEDICINE

## 2023-05-11 PROCEDURE — 99349 HOME/RES VST EST MOD MDM 40: CPT | Performed by: NURSE PRACTITIONER

## 2023-05-11 PROCEDURE — 84300 ASSAY OF URINE SODIUM: CPT | Mod: ORL | Performed by: INTERNAL MEDICINE

## 2023-05-11 RX ORDER — AMOXICILLIN 500 MG/1
2000 CAPSULE ORAL PRN
Start: 2023-05-11 | End: 2023-06-15

## 2023-05-11 RX ORDER — TORSEMIDE 20 MG/1
TABLET ORAL
Qty: 31 TABLET | Refills: 11
Start: 2023-05-03 | End: 2024-04-09

## 2023-05-11 RX ORDER — LORATADINE 10 MG/1
10 TABLET ORAL DAILY
Start: 2023-05-11 | End: 2023-07-26

## 2023-05-11 RX ORDER — DEMECLOCYCLINE HYDROCHLORIDE 150 MG/1
150 TABLET, FILM COATED ORAL 2 TIMES DAILY
Start: 2023-02-15 | End: 2024-01-17

## 2023-05-11 RX ORDER — CALCIUM CARBONATE 500 MG/1
2 TABLET, CHEWABLE ORAL 2 TIMES DAILY PRN
Start: 2022-09-22 | End: 2023-09-28

## 2023-05-11 NOTE — LETTER
5/11/2023        RE: Marla Dunn  C/o Deneen Dunn  [address] [facility] [address]        [facility] GERIATRICS  ACUTE/EPISODIC VISIT    [facility] Medical Record Number:  [id number]  Place of Service where encounter took place:  [facility] [[id number]]    Chief Complaint   Patient presents with     RECHECK       HPI:    Marla Dunn is a 89 year old  (5/22/1929), who is being seen today for an episodic care visit.  HPI information obtained from: facility chart records, facility staff and patient report.    Today's concern is:    Diagnoses       Codes Comments    Poor dentition    -  Primary K08.9     Heart valve disease     I38     Hyponatremia     E87.1     Chronic diastolic congestive heart failure (H)     I50.32     Chronic obstructive pulmonary disease, unspecified COPD type (H)     J44.9     Spider veins     I78.1     Heartburn     R12         Had received a text this week from Phill devries inquiring about her on its lower extremities and the veins that appear to possibly of popped and bled at the skin surface.  She is wondering if possibly she needs to see a dermatologist, had seen her legs before but told the niece that we will visit [place] again this week.    [place] was coming out of the dining room in which she was self-propelling her wheelchair and she came over to where this nurse practitioner was sitting and stated she has a note upstairs that she needed to give..me   Within about 15 minutes to go upstairs to see [place].  She stated that she is getting dental work done due to her poor teeth repair.  Her niece left a note stating she needed a faxed copy of medications to the dental office and then antibiotic ordered for prior to dental work due to heart valve disease.  [place] is not able to chew very much.  She is going to have her lower teeth extracted and then a denture made.  She is looking forward to being able to have a sandwich which she  has not been able to eat in a very long time.  She has a few teeth on the bottom and they are in poor repair    [place] also had to submit urine sample today.  Told her it was not ordered from this nurse practitioner and most likely from her nephrologist who is monitoring her for the hyponatremia.  [place] gets monthly labs done note from her nephrologist and occasionally has to submit a urine for osmolarity.  Updated her medication list in [facility] as the nephrologist office does do changes to her medications to help balance her sodium level.      ALLERGIES:    Allergies   Allergen Reactions     Gramineae Pollens Other (See Comments)     ORCHARDGRASS. Shortness of breath when lawn is just cut.     Smoke. Other (See Comments)     Hard to breathe.     Pollen Extract      Other reaction(s): Unknown        MEDICATIONS:  Post Discharge Medication Reconciliation Status: patient was not discharged from an inpatient facility or TCU.     Current Outpatient Medications   Medication Sig Dispense Refill     amoxicillin (AMOXIL) 500 MG capsule Take 4 capsules (2,000 mg) by mouth as needed (prior to dental appointment)       calcium carbonate (TUMS) 500 MG chewable tablet Take 2 tablets (1,000 mg) by mouth 2 times daily as needed for heartburn       demeclocycline (DECLOMYCIN) 150 MG tablet Take 1 tablet (150 mg) by mouth 2 times daily       loratadine (CLARITIN) 10 MG tablet Take 1 tablet (10 mg) by mouth daily       torsemide (DEMADEX) 20 MG tablet 1.5 TABLET (30mg) ORALLY DAILY 31 tablet 11     acetaminophen (TYLENOL) 325 MG tablet Take 3 tablets (975 mg) by mouth every 8 hours as needed for mild pain       albuterol (PROAIR HFA/PROVENTIL HFA/VENTOLIN HFA) 108 (90 Base) MCG/ACT inhaler Inhale 2 puffs into the lungs every 6 hours       ANTACID 400-400-40 MG/10ML SUSP suspension MIX WITH URE-NA AS DIRECTED DAILY 355 mL 11     Ascorbic Acid (VITAMIN C PO) Take 500 mg by mouth every morning       ASPIRIN LOW DOSE 81 MG EC tablet 1  TABLET ORALLY 2 TIMES DAILY (DX: PROPHYLAXIS) 56 tablet 11     Bacillus Coagulans-Inulin (PROBIOTIC FORMULA) 1-250 BILLION-MG CAPS 1 CAPSULE ORALLY DAILY 28 capsule 11     bisacodyl (DULCOLAX) 5 MG EC tablet 1 TABLET ORALLY DAILY AS NEEDED (MAY KEEP AT BEDSIDE) 30 tablet 10     CALCIUM+D3 600-20 MG-MCG TABS 1 TABLET ORALLY DAILY WITH A MEAL 28 tablet 11     cholecalciferol (VITAMIN D3) 125 mcg (5000 units) capsule 1 CAPSULE ORALLY DAILY (DX: OSTEOPOROSIS) 28 capsule 11     COMBIVENT RESPIMAT  MCG/ACT inhaler INHALE 1 PUFF INTO THE LUNGS  4 TIMES DAILY 4 g 11     cycloSPORINE (RESTASIS) 0.05 % ophthalmic emulsion Place 1 drop into both eyes 2 times daily        fish oil-omega-3 fatty acids 1000 MG capsule 1 CAPSULE ORALLY DAILY 28 capsule 11     fluorometholone (FML LIQUIFILM) 0.1 % ophthalmic susp Place 1 drop Into the left eye daily        IBANDRONATE SODIUM PO Take 150 mg by mouth every 30 days       levothyroxine (SYNTHROID/LEVOTHROID) 88 MCG tablet 1 TABLET ORALLY EVERY MORNING ON AN EMPTY STOMACH 31 tablet 11     melatonin 3 MG tablet 3mg to be given with 5mg = 8mg at HS 30 tablet 11     melatonin 5 MG tablet 5mg to be given with 3mg tab = 8mg at HS 30 tablet 11     METOPROLOL TARTRATE PO Take 25 mg by mouth 2 times daily        mirtazapine (REMERON) 7.5 MG tablet 1 TABLET ORALLY AT BEDTIME 28 tablet 11     Multiple Vitamins-Minerals (PRESERVISION AREDS 2) CAPS 1 CAPSULE ORALLY 2 TIMES DAILY 56 capsule 11     Multiple Vitamins-Minerals (PRESERVISION AREDS PO) Take 1 tablet by mouth 2 times daily       omeprazole (PRILOSEC) 20 MG DR capsule 1 CAPSULE ORALLY 2 TIMES DAILY (DX: GASTROESOPHAGEAL REFLUX DISEASE) 62 capsule 11     OXYGEN-HELIUM IN        polyethylene glycol 0.4%- propylene glycol 0.3% (SYSTANE ULTRA) 0.4-0.3 % SOLN ophthalmic solution Place 1 drop into both eyes 4 times daily       Polyethylene Glycol 3350 (PEG 3350) 17 GM/SCOOP POWD MIX 1 CAPFUL (17 GMS) IN 8 OUNCES OF JUICE OR WATER AND DRINK  ORALLY DAILY AS NEEDED (DX: CONSTIPATION) 510 g 10     potassium chloride ER (K-TAB/KLOR-CON) 10 MEQ CR tablet 1 TABLET ORALLY DAILY 30 tablet 11     Probiotic Product (PROBIOTIC PO) Take 1 capsule by mouth every morning       senna-docusate (SENOKOT-S/PERICOLACE) 8.6-50 MG tablet Take 1 tablet by mouth 2 times daily as needed for constipation       sodium chloride 1 GM tablet 1 TABLET ORALLY 2 TIMES DAILY 56 tablet 11     spironolactone (ALDACTONE) 50 MG tablet Take 25 mg by mouth daily        timolol (TIMOPTIC) 0.5 % ophthalmic solution Place 1 drop Into the left eye daily        vitamin C (ASCORBIC ACID) 500 MG tablet 1 TABLET ORALLY DAILY (DX: SUPPLEMENT) 31 tablet 11       REVIEW OF SYSTEMS:  4 point ROS neg other than the symptoms noted above in the HPI.  Denied any chest pain or acute shortness of breath.  During the visit, Phill rios came in to give her her eyedrops and inhalers.    PHYSICAL EXAM:  /61   Pulse 99   Temp 98.2  F (36.8  C)   Resp 18   Wt 47.7 kg (105 lb 3.2 oz)   SpO2 94%   BMI 18.06 kg/m    [place] is a petite female who is now in her new manual wheelchair that she is waited a long time for.  It is light weight and has proper positioning with a back and seat cushion.  Occupational Therapy will do 1 more visit next week just to make sure that there are no issues with her new wheelchair.  [place] is alert and oriented x3 and is always is very neatly groomed.  She is trying to gain weight and so she does monitor her weights closely.  No current weight in her nursing chart but do believe that she is right under 100 pounds or right above.    Skin is typically pale dry and warm.  She is wearing jeans but easily able to lift up her pant legs in order to look at her spider veins that she has bilaterally.  1 spot do question if it could be a spider varicose vein but in general it just looks like she may have bumped her legs images bruise easily right over those veins.  Her skin is frail  but no open areas on her lower legs.    Heart rate regular/irregular.  No pitting edema in her lower extremities  Lungs are clear with good auscultation  Abdomen is flat soft and nontender.  Bowels are regular    [place] can ambulate with a walker and does go down to do bike exercises as she knows her niece likes her to be able to use a walker versus taking a wheelchair places.      ASSESSMENT / PLAN:  (K08.9) Poor dentition  (primary encounter diagnosis)  (I38) Heart valve disease  Comment: Happy that [place] is good to be able to get fitted for dentures after her bottom teeth are removed.  She is actually seeing her niece's dental office in [place] spoke very highly of them.  [place] is aware that amoxicillin 2 g as needed will be available for her since she stated that is what she used to take because of something.  When looking at her diagnosis she does have a history of heart valve disease.  Plan: amoxicillin (AMOXIL) 500 MG capsule    (E87.1) Hyponatremia  Comment: [place] is on Declomycin 150 mg twice a day as well as sodium chloride 1 g twice a day as well.  She did give a urine sample today and nursing called up to her to let her know that it was ordered from her nephrologist.  [place] just wanted to know who and why it was ordered that is all.  Did have labs last week and noted that her sodium teeters from the high 120s to the low 130s  Plan: demeclocycline (DECLOMYCIN) 150 MG tablet    (I50.32) Chronic diastolic congestive heart failure (H)  Comment: When updating the medication list in epic, did see that on 3 May her Demadex was changed to 30 mg on a daily basis.  Still is taking spironolactone 25 mg daily for the hyponatremia.  No signs of heart failure at this time.  The diuretics are intended to help out her low sodium  Plan: torsemide (DEMADEX) 20 MG tablet    (J44.9) Chronic obstructive pulmonary disease, unspecified COPD type (H)  Comment: [place] does get Claritin on a daily basis.  She did have 2  inhalers today that she took after lunch.  Does have a CPAP machine that she utilizes most of the time.  Otherwise does not use any chronic oxygen.  Plan: loratadine (CLARITIN) 10 MG tablet    (I78.1) Spider veins  Comment: Spoke with [place] about her legs and attempted to show her some pictures off of [employer] to give her an idea of what to call the veins but also they appear that they have busted in her tiny little bruises scattered on her morel areas.  [place] has a history of cancer and that is why she is concerned if she needs to go to the dermatologist for her to be tested.  Do not feel that that is the case but she can always go to be reassured if it makes her feel better.  Did return a text to Phill devries to explain the visit and thoughts about the spider veins.  If [place] wants to see a dermatologist that is totally fine since she kind of alluded that it might make her feel better    (R12) Heartburn  Comment: Just updating epic that [place] does take a calcium tablet daily but then is able to have Tums 500 mg 2 tablets twice a day as needed for heartburn.  [place] also receives omeprazole 20 mg twice a day.  Plan: calcium carbonate (TUMS) 500 MG chewable tablet      Orders:  1.  Did hand the note from Phill devries to the nurse and asked her to do the #1 task on the sheet which is faxing a current medication list over to the dental office.  2.  This nurse practitioner wrote up orders for amoxicillin 2 g by mouth as needed prior to dental exams.    Electronically signed by  CASTRO Hazel CNP         Sincerely,        CASTRO Hazel CNP

## 2023-05-11 NOTE — PROGRESS NOTES
[facility] GERIATRICS  ACUTE/EPISODIC VISIT    [facility] Medical Record Number:  [id number]  Place of Service where encounter took place:  [facility] [[id number]]    Chief Complaint   Patient presents with     RECHECK       HPI:    Marla Dunn is a 89 year old  (5/22/1929), who is being seen today for an episodic care visit.  HPI information obtained from: facility chart records, facility staff and patient report.    Today's concern is:    Diagnoses       Codes Comments    Poor dentition    -  Primary K08.9     Heart valve disease     I38     Hyponatremia     E87.1     Chronic diastolic congestive heart failure (H)     I50.32     Chronic obstructive pulmonary disease, unspecified COPD type (H)     J44.9     Spider veins     I78.1     Heartburn     R12         Had received a text this week from Phill devries inquiring about her on its lower extremities and the veins that appear to possibly of popped and bled at the skin surface.  She is wondering if possibly she needs to see a dermatologist, had seen her legs before but told the niece that we will visit [place] again this week.    [place] was coming out of the dining room in which she was self-propelling her wheelchair and she came over to where this nurse practitioner was sitting and stated she has a note upstairs that she needed to give..me   Within about 15 minutes to go upstairs to see [place].  She stated that she is getting dental work done due to her poor teeth repair.  Her niece left a note stating she needed a faxed copy of medications to the dental office and then antibiotic ordered for prior to dental work due to heart valve disease.  [place] is not able to chew very much.  She is going to have her lower teeth extracted and then a denture made.  She is looking forward to being able to have a sandwich which she has not been able to eat in a very long time.  She has a few teeth on the bottom and they are in poor  repair    [place] also had to submit urine sample today.  Told her it was not ordered from this nurse practitioner and most likely from her nephrologist who is monitoring her for the hyponatremia.  [place] gets monthly labs done note from her nephrologist and occasionally has to submit a urine for osmolarity.  Updated her medication list in [facility] as the nephrologist office does do changes to her medications to help balance her sodium level.      ALLERGIES:    Allergies   Allergen Reactions     Gramineae Pollens Other (See Comments)     ORCHARDGRASS. Shortness of breath when lawn is just cut.     Smoke. Other (See Comments)     Hard to breathe.     Pollen Extract      Other reaction(s): Unknown        MEDICATIONS:  Post Discharge Medication Reconciliation Status: patient was not discharged from an inpatient facility or TCU.     Current Outpatient Medications   Medication Sig Dispense Refill     amoxicillin (AMOXIL) 500 MG capsule Take 4 capsules (2,000 mg) by mouth as needed (prior to dental appointment)       calcium carbonate (TUMS) 500 MG chewable tablet Take 2 tablets (1,000 mg) by mouth 2 times daily as needed for heartburn       demeclocycline (DECLOMYCIN) 150 MG tablet Take 1 tablet (150 mg) by mouth 2 times daily       loratadine (CLARITIN) 10 MG tablet Take 1 tablet (10 mg) by mouth daily       torsemide (DEMADEX) 20 MG tablet 1.5 TABLET (30mg) ORALLY DAILY 31 tablet 11     acetaminophen (TYLENOL) 325 MG tablet Take 3 tablets (975 mg) by mouth every 8 hours as needed for mild pain       albuterol (PROAIR HFA/PROVENTIL HFA/VENTOLIN HFA) 108 (90 Base) MCG/ACT inhaler Inhale 2 puffs into the lungs every 6 hours       ANTACID 400-400-40 MG/10ML SUSP suspension MIX WITH URE-NA AS DIRECTED DAILY 355 mL 11     Ascorbic Acid (VITAMIN C PO) Take 500 mg by mouth every morning       ASPIRIN LOW DOSE 81 MG EC tablet 1 TABLET ORALLY 2 TIMES DAILY (DX: PROPHYLAXIS) 56 tablet 11     Bacillus Coagulans-Inulin (PROBIOTIC  FORMULA) 1-250 BILLION-MG CAPS 1 CAPSULE ORALLY DAILY 28 capsule 11     bisacodyl (DULCOLAX) 5 MG EC tablet 1 TABLET ORALLY DAILY AS NEEDED (MAY KEEP AT BEDSIDE) 30 tablet 10     CALCIUM+D3 600-20 MG-MCG TABS 1 TABLET ORALLY DAILY WITH A MEAL 28 tablet 11     cholecalciferol (VITAMIN D3) 125 mcg (5000 units) capsule 1 CAPSULE ORALLY DAILY (DX: OSTEOPOROSIS) 28 capsule 11     COMBIVENT RESPIMAT  MCG/ACT inhaler INHALE 1 PUFF INTO THE LUNGS  4 TIMES DAILY 4 g 11     cycloSPORINE (RESTASIS) 0.05 % ophthalmic emulsion Place 1 drop into both eyes 2 times daily        fish oil-omega-3 fatty acids 1000 MG capsule 1 CAPSULE ORALLY DAILY 28 capsule 11     fluorometholone (FML LIQUIFILM) 0.1 % ophthalmic susp Place 1 drop Into the left eye daily        IBANDRONATE SODIUM PO Take 150 mg by mouth every 30 days       levothyroxine (SYNTHROID/LEVOTHROID) 88 MCG tablet 1 TABLET ORALLY EVERY MORNING ON AN EMPTY STOMACH 31 tablet 11     melatonin 3 MG tablet 3mg to be given with 5mg = 8mg at HS 30 tablet 11     melatonin 5 MG tablet 5mg to be given with 3mg tab = 8mg at HS 30 tablet 11     METOPROLOL TARTRATE PO Take 25 mg by mouth 2 times daily        mirtazapine (REMERON) 7.5 MG tablet 1 TABLET ORALLY AT BEDTIME 28 tablet 11     Multiple Vitamins-Minerals (PRESERVISION AREDS 2) CAPS 1 CAPSULE ORALLY 2 TIMES DAILY 56 capsule 11     Multiple Vitamins-Minerals (PRESERVISION AREDS PO) Take 1 tablet by mouth 2 times daily       omeprazole (PRILOSEC) 20 MG DR capsule 1 CAPSULE ORALLY 2 TIMES DAILY (DX: GASTROESOPHAGEAL REFLUX DISEASE) 62 capsule 11     OXYGEN-HELIUM IN        polyethylene glycol 0.4%- propylene glycol 0.3% (SYSTANE ULTRA) 0.4-0.3 % SOLN ophthalmic solution Place 1 drop into both eyes 4 times daily       Polyethylene Glycol 3350 (PEG 3350) 17 GM/SCOOP POWD MIX 1 CAPFUL (17 GMS) IN 8 OUNCES OF JUICE OR WATER AND DRINK ORALLY DAILY AS NEEDED (DX: CONSTIPATION) 510 g 10     potassium chloride ER (K-TAB/KLOR-CON) 10  MEQ CR tablet 1 TABLET ORALLY DAILY 30 tablet 11     Probiotic Product (PROBIOTIC PO) Take 1 capsule by mouth every morning       senna-docusate (SENOKOT-S/PERICOLACE) 8.6-50 MG tablet Take 1 tablet by mouth 2 times daily as needed for constipation       sodium chloride 1 GM tablet 1 TABLET ORALLY 2 TIMES DAILY 56 tablet 11     spironolactone (ALDACTONE) 50 MG tablet Take 25 mg by mouth daily        timolol (TIMOPTIC) 0.5 % ophthalmic solution Place 1 drop Into the left eye daily        vitamin C (ASCORBIC ACID) 500 MG tablet 1 TABLET ORALLY DAILY (DX: SUPPLEMENT) 31 tablet 11       REVIEW OF SYSTEMS:  4 point ROS neg other than the symptoms noted above in the HPI.  Denied any chest pain or acute shortness of breath.  During the visit, Phill rios came in to give her her eyedrops and inhalers.    PHYSICAL EXAM:  /61   Pulse 99   Temp 98.2  F (36.8  C)   Resp 18   Wt 47.7 kg (105 lb 3.2 oz)   SpO2 94%   BMI 18.06 kg/m    [place] is a petite female who is now in her new manual wheelchair that she is waited a long time for.  It is light weight and has proper positioning with a back and seat cushion.  Occupational Therapy will do 1 more visit next week just to make sure that there are no issues with her new wheelchair.  [place] is alert and oriented x3 and is always is very neatly groomed.  She is trying to gain weight and so she does monitor her weights closely.  No current weight in her nursing chart but do believe that she is right under 100 pounds or right above.    Skin is typically pale dry and warm.  She is wearing jeans but easily able to lift up her pant legs in order to look at her spider veins that she has bilaterally.  1 spot do question if it could be a spider varicose vein but in general it just looks like she may have bumped her legs images bruise easily right over those veins.  Her skin is frail but no open areas on her lower legs.    Heart rate regular/irregular.  No pitting edema in her  lower extremities  Lungs are clear with good auscultation  Abdomen is flat soft and nontender.  Bowels are regular    [place] can ambulate with a walker and does go down to do bike exercises as she knows her niece likes her to be able to use a walker versus taking a wheelchair places.      ASSESSMENT / PLAN:  (K08.9) Poor dentition  (primary encounter diagnosis)  (I38) Heart valve disease  Comment: Happy that [place] is good to be able to get fitted for dentures after her bottom teeth are removed.  She is actually seeing her niece's dental office in [place] spoke very highly of them.  [place] is aware that amoxicillin 2 g as needed will be available for her since she stated that is what she used to take because of something.  When looking at her diagnosis she does have a history of heart valve disease.  Plan: amoxicillin (AMOXIL) 500 MG capsule    (E87.1) Hyponatremia  Comment: [place] is on Declomycin 150 mg twice a day as well as sodium chloride 1 g twice a day as well.  She did give a urine sample today and nursing called up to her to let her know that it was ordered from her nephrologist.  [place] just wanted to know who and why it was ordered that is all.  Did have labs last week and noted that her sodium teeters from the high 120s to the low 130s  Plan: demeclocycline (DECLOMYCIN) 150 MG tablet    (I50.32) Chronic diastolic congestive heart failure (H)  Comment: When updating the medication list in epic, did see that on 3 May her Demadex was changed to 30 mg on a daily basis.  Still is taking spironolactone 25 mg daily for the hyponatremia.  No signs of heart failure at this time.  The diuretics are intended to help out her low sodium  Plan: torsemide (DEMADEX) 20 MG tablet    (J44.9) Chronic obstructive pulmonary disease, unspecified COPD type (H)  Comment: [place] does get Claritin on a daily basis.  She did have 2 inhalers today that she took after lunch.  Does have a CPAP machine that she utilizes most of  the time.  Otherwise does not use any chronic oxygen.  Plan: loratadine (CLARITIN) 10 MG tablet    (I78.1) Spider veins  Comment: Spoke with [place] about her legs and attempted to show her some pictures off of [employer] to give her an idea of what to call the veins but also they appear that they have busted in her tiny little bruises scattered on her morel areas.  [place] has a history of cancer and that is why she is concerned if she needs to go to the dermatologist for her to be tested.  Do not feel that that is the case but she can always go to be reassured if it makes her feel better.  Did return a text to Phill devries to explain the visit and thoughts about the spider veins.  If [place] wants to see a dermatologist that is totally fine since she kind of alluded that it might make her feel better    (R12) Heartburn  Comment: Just updating epic that [place] does take a calcium tablet daily but then is able to have Tums 500 mg 2 tablets twice a day as needed for heartburn.  [place] also receives omeprazole 20 mg twice a day.  Plan: calcium carbonate (TUMS) 500 MG chewable tablet      Orders:  1.  Did hand the note from Phill devries to the nurse and asked her to do the #1 task on the sheet which is faxing a current medication list over to the dental office.  2.  This nurse practitioner wrote up orders for amoxicillin 2 g by mouth as needed prior to dental exams.    Electronically signed by  CASTRO Hazel CNP

## 2023-05-13 ENCOUNTER — LAB REQUISITION (OUTPATIENT)
Dept: LAB | Facility: CLINIC | Age: 88
End: 2023-05-13
Payer: COMMERCIAL

## 2023-05-13 DIAGNOSIS — I50.9 HEART FAILURE, UNSPECIFIED (H): ICD-10-CM

## 2023-05-15 LAB
ANION GAP SERPL CALCULATED.3IONS-SCNC: 14 MMOL/L (ref 7–15)
BUN SERPL-MCNC: 24.7 MG/DL (ref 8–23)
CALCIUM SERPL-MCNC: 8.9 MG/DL (ref 8.2–9.6)
CHLORIDE SERPL-SCNC: 95 MMOL/L (ref 98–107)
CREAT SERPL-MCNC: 0.87 MG/DL (ref 0.51–0.95)
DEPRECATED HCO3 PLAS-SCNC: 22 MMOL/L (ref 22–29)
GFR SERPL CREATININE-BSD FRML MDRD: 62 ML/MIN/1.73M2
GLUCOSE SERPL-MCNC: 57 MG/DL (ref 70–99)
POTASSIUM SERPL-SCNC: 4.3 MMOL/L (ref 3.4–5.3)
SODIUM SERPL-SCNC: 131 MMOL/L (ref 136–145)

## 2023-05-15 PROCEDURE — P9604 ONE-WAY ALLOW PRORATED TRIP: HCPCS | Mod: ORL | Performed by: INTERNAL MEDICINE

## 2023-05-15 PROCEDURE — 80048 BASIC METABOLIC PNL TOTAL CA: CPT | Mod: ORL | Performed by: INTERNAL MEDICINE

## 2023-05-15 PROCEDURE — 36415 COLL VENOUS BLD VENIPUNCTURE: CPT | Mod: ORL | Performed by: INTERNAL MEDICINE

## 2023-05-18 ENCOUNTER — TRANSFERRED RECORDS (OUTPATIENT)
Dept: HEALTH INFORMATION MANAGEMENT | Facility: CLINIC | Age: 88
End: 2023-05-18
Payer: COMMERCIAL

## 2023-05-30 ENCOUNTER — PATIENT OUTREACH (OUTPATIENT)
Dept: GERIATRIC MEDICINE | Facility: CLINIC | Age: 88
End: 2023-05-30

## 2023-05-30 ENCOUNTER — ASSISTED LIVING VISIT (OUTPATIENT)
Dept: GERIATRICS | Facility: CLINIC | Age: 88
End: 2023-05-30
Payer: COMMERCIAL

## 2023-05-30 VITALS
DIASTOLIC BLOOD PRESSURE: 61 MMHG | RESPIRATION RATE: 18 BRPM | HEART RATE: 99 BPM | WEIGHT: 105.2 LBS | SYSTOLIC BLOOD PRESSURE: 114 MMHG | BODY MASS INDEX: 18.06 KG/M2 | TEMPERATURE: 98.2 F | OXYGEN SATURATION: 94 %

## 2023-05-30 DIAGNOSIS — K08.9 POOR DENTITION: ICD-10-CM

## 2023-05-30 DIAGNOSIS — J44.9 CHRONIC OBSTRUCTIVE PULMONARY DISEASE, UNSPECIFIED COPD TYPE (H): ICD-10-CM

## 2023-05-30 DIAGNOSIS — I50.32 CHRONIC DIASTOLIC CONGESTIVE HEART FAILURE (H): Primary | ICD-10-CM

## 2023-05-30 DIAGNOSIS — N18.31 STAGE 3A CHRONIC KIDNEY DISEASE (H): ICD-10-CM

## 2023-05-30 DIAGNOSIS — E87.1 HYPONATREMIA: ICD-10-CM

## 2023-05-30 DIAGNOSIS — R54 FRAIL ELDERLY: ICD-10-CM

## 2023-05-30 PROCEDURE — 99349 HOME/RES VST EST MOD MDM 40: CPT | Performed by: NURSE PRACTITIONER

## 2023-05-30 NOTE — PROGRESS NOTES
[facility] GERIATRICS  ACUTE/EPISODIC VISIT    [facility] Medical Record Number:  [id number]  Place of Service where encounter took place:  [facility]) [[id number]]    Chief Complaint   Patient presents with     RECHECK       HPI:    Marla Dunn is a 89 year old  (5/22/1929), who is being seen today for an episodic care visit.  HPI information obtained from: facility chart records, facility staff, patient report and [facility] chart review.    Today's concern is:    Diagnoses       Codes Comments    Chronic diastolic congestive heart failure (H)    -  Primary I50.32     Stage 3a chronic kidney disease (H)     N18.31     Hyponatremia     E87.1     Chronic obstructive pulmonary disease, unspecified COPD type (H)     J44.9     Frail elderly     R54     Poor dentition     K08.9         Came to see Georgia today to follow-up on how she has been doing.  Last week she saw this nurse practitioner and asked for refill of Dulcolax tablets for constipation.  She keeps them in her apartment and judges when she needs to take them.    Georgia stated this Friday she is in going to oncology for possible injection for her anemia pending on how her labs look  Otherwise Georgia she is monitor her weight as she is trying to put on a few pounds.  Seems like her health issues have settled down now.    ALLERGIES:    Allergies   Allergen Reactions     Gramineae Pollens Other (See Comments)     ORCHARDGRASS. Shortness of breath when lawn is just cut.     Smoke. Other (See Comments)     Hard to breathe.     Pollen Extract      Other reaction(s): Unknown        MEDICATIONS:  Post Discharge Medication Reconciliation Status: patient was not discharged from an inpatient facility or TCU. Medications reconciled.     Current Outpatient Medications   Medication Sig Dispense Refill     acetaminophen (TYLENOL) 325 MG tablet Take 3 tablets (975 mg) by mouth every 8 hours as needed for mild pain       albuterol  (PROAIR HFA/PROVENTIL HFA/VENTOLIN HFA) 108 (90 Base) MCG/ACT inhaler Inhale 2 puffs into the lungs every 6 hours       amoxicillin (AMOXIL) 500 MG capsule Take 4 capsules (2,000 mg) by mouth as needed (prior to dental appointment)       ANTACID 400-400-40 MG/10ML SUSP suspension MIX WITH URE-NA AS DIRECTED DAILY 355 mL 11     Ascorbic Acid (VITAMIN C PO) Take 500 mg by mouth every morning       ASPIRIN LOW DOSE 81 MG EC tablet 1 TABLET ORALLY 2 TIMES DAILY (DX: PROPHYLAXIS) 56 tablet 11     Bacillus Coagulans-Inulin (PROBIOTIC FORMULA) 1-250 BILLION-MG CAPS 1 CAPSULE ORALLY DAILY 28 capsule 11     bisacodyl (DULCOLAX) 5 MG EC tablet 1 TABLET ORALLY DAILY AS NEEDED (MAY KEEP AT BEDSIDE) 30 tablet 10     calcium carbonate (TUMS) 500 MG chewable tablet Take 2 tablets (1,000 mg) by mouth 2 times daily as needed for heartburn       CALCIUM+D3 600-20 MG-MCG TABS 1 TABLET ORALLY DAILY WITH A MEAL 28 tablet 11     cholecalciferol (VITAMIN D3) 125 mcg (5000 units) capsule 1 CAPSULE ORALLY DAILY (DX: OSTEOPOROSIS) 28 capsule 11     COMBIVENT RESPIMAT  MCG/ACT inhaler INHALE 1 PUFF INTO THE LUNGS  4 TIMES DAILY 4 g 11     cycloSPORINE (RESTASIS) 0.05 % ophthalmic emulsion Place 1 drop into both eyes 2 times daily        demeclocycline (DECLOMYCIN) 150 MG tablet Take 1 tablet (150 mg) by mouth 2 times daily       fish oil-omega-3 fatty acids 1000 MG capsule 1 CAPSULE ORALLY DAILY 28 capsule 11     fluorometholone (FML LIQUIFILM) 0.1 % ophthalmic susp Place 1 drop Into the left eye daily        IBANDRONATE SODIUM PO Take 150 mg by mouth every 30 days       levothyroxine (SYNTHROID/LEVOTHROID) 88 MCG tablet 1 TABLET ORALLY EVERY MORNING ON AN EMPTY STOMACH 31 tablet 11     loratadine (CLARITIN) 10 MG tablet Take 1 tablet (10 mg) by mouth daily       melatonin 3 MG tablet 3mg to be given with 5mg = 8mg at HS 30 tablet 11     melatonin 5 MG tablet 5mg to be given with 3mg tab = 8mg at HS 30 tablet 11     METOPROLOL TARTRATE  PO Take 25 mg by mouth 2 times daily        mirtazapine (REMERON) 7.5 MG tablet 1 TABLET ORALLY AT BEDTIME 28 tablet 11     Multiple Vitamins-Minerals (PRESERVISION AREDS 2) CAPS 1 CAPSULE ORALLY 2 TIMES DAILY 56 capsule 11     Multiple Vitamins-Minerals (PRESERVISION AREDS PO) Take 1 tablet by mouth 2 times daily       omeprazole (PRILOSEC) 20 MG DR capsule 1 CAPSULE ORALLY 2 TIMES DAILY (DX: GASTROESOPHAGEAL REFLUX DISEASE) 62 capsule 11     OXYGEN-HELIUM IN        polyethylene glycol 0.4%- propylene glycol 0.3% (SYSTANE ULTRA) 0.4-0.3 % SOLN ophthalmic solution Place 1 drop into both eyes 4 times daily       Polyethylene Glycol 3350 (PEG 3350) 17 GM/SCOOP POWD MIX 1 CAPFUL (17 GMS) IN 8 OUNCES OF JUICE OR WATER AND DRINK ORALLY DAILY AS NEEDED (DX: CONSTIPATION) 510 g 10     potassium chloride ER (K-TAB/KLOR-CON) 10 MEQ CR tablet 1 TABLET ORALLY DAILY 30 tablet 11     Probiotic Product (PROBIOTIC PO) Take 1 capsule by mouth every morning       senna-docusate (SENOKOT-S/PERICOLACE) 8.6-50 MG tablet Take 1 tablet by mouth 2 times daily as needed for constipation       sodium chloride 1 GM tablet 1 TABLET ORALLY 2 TIMES DAILY 56 tablet 11     spironolactone (ALDACTONE) 50 MG tablet Take 25 mg by mouth daily        timolol (TIMOPTIC) 0.5 % ophthalmic solution Place 1 drop Into the left eye daily        torsemide (DEMADEX) 20 MG tablet 1.5 TABLET (30mg) ORALLY DAILY 31 tablet 11     vitamin C (ASCORBIC ACID) 500 MG tablet 1 TABLET ORALLY DAILY (DX: SUPPLEMENT) 31 tablet 11         REVIEW OF SYSTEMS:  4 point ROS neg other than the symptoms noted above in the HPI.   No chest pain, no acute shortness of breath.    PHYSICAL EXAM:  /61   Pulse 99   Temp 98.2  F (36.8  C)   Resp 18   Wt 47.7 kg (105 lb 3.2 oz)   SpO2 94%   BMI 18.06 kg/m    Georgia is a petite female sitting in a wheelchair that has been properly fitted for her where she can propel with her arms or her feet.  Georgia does wear glasses and is  able to manage within her environment.  She does a few teeth that are brittle.  She has started the process with the dentist able to get either a bridge or dentures so she probably  Heart rate regular/irregular.  No edema in feet.  Lungs are clear.  Voice is clear and strong.  No coughing.    Skin is pink, frail, and warm to touch.  She did lift up her pant leg in order to look at some bruising but she had various spots particularly on the lower right leg.  She says they are improving in which this NP would agree that they are fading.   Georgia reports that her weight is at 90 pounds.  She uses a half of scale in her apartment.  Typically her weight stays between 87 and 91 pounds    Component      Latest Ref Rng 5/15/2023  10:10 AM   Sodium      136 - 145 mmol/L 131 (L)    Potassium      3.4 - 5.3 mmol/L 4.3    Chloride      98 - 107 mmol/L 95 (L)    Carbon Dioxide (CO2)      22 - 29 mmol/L 22    Anion Gap      7 - 15 mmol/L 14    Urea Nitrogen      8.0 - 23.0 mg/dL 24.7 (H)    Creatinine      0.51 - 0.95 mg/dL 0.87    Calcium      8.2 - 9.6 mg/dL 8.9    Glucose      70 - 99 mg/dL 57 (L)    GFR Estimate      >60 mL/min/1.73m2 62           ASSESSMENT / PLAN:  (I50.32) Chronic diastolic congestive heart failure (H)  (primary encounter diagnosis)  (N18.31) Stage 3a chronic kidney disease (H)  Comment: Currently Georgia receives Demadex 30 mg daily and potassium 10meq daily.  Recent labs this month show her K+ level is stable.  No changes.  No acute symptoms.    (E87.1) Hyponatremia  Comment: ongoing and being monitored by her Nephrologist office.  Next labs will be mid June.  Georgia does receive spironolactone 25 mg daily, Demadex 30 mill daily, sodium chloride tablet 1 g twice a day, and Demeclocycline 150mg BID.  Since adding the Demeclocycline, her Na level has been more appropriate.  She is feeling good and no complaints.    (J44.9) Chronic obstructive pulmonary disease, unspecified COPD type (H)  Comment: stable  with the use of her inhalers. Has a CPAP at night she uses.  No acute concerns.  Likes to use the w/c for mobility as she does not get so out of breath and tired with walking even though she continues to ambulate in a [place] in her apartment for practice.    (R54) Frail elderly  (K08.9) Poor dentition  Comment: Georgia continue to monitor her weight as she does try to take an extra calories.  She does make comments that if she did have better teeth she actually could enjoyed eating a sandwich once again.  Right now just waiting on her granddaughter to assist in making further appointments      Orders:  No new orders today.    Electronically signed by  CASTRO Hazel CNP

## 2023-05-30 NOTE — LETTER
5/30/2023        RE: Marla Dunn  C/o Deneen Dunn  [address] [place] [address]        [facility] GERIATRICS  ACUTE/EPISODIC VISIT    [facility] Medical Record Number:  [id number]  Place of Service where encounter took place:  [facility] [place] ([facility]) [[id number]]    Chief Complaint   Patient presents with     RECHECK       HPI:    Marla Dunn is a 89 year old  (5/22/1929), who is being seen today for an episodic care visit.  HPI information obtained from: facility chart records, facility staff, patient report and [facility] chart review.    Today's concern is:    Diagnoses       Codes Comments    Chronic diastolic congestive heart failure (H)    -  Primary I50.32     Stage 3a chronic kidney disease (H)     N18.31     Hyponatremia     E87.1     Chronic obstructive pulmonary disease, unspecified COPD type (H)     J44.9     Frail elderly     R54     Poor dentition     K08.9         Came to see Georgia today to follow-up on how she has been doing.  Last week she saw this nurse practitioner and asked for refill of Dulcolax tablets for constipation.  She keeps them in her apartment and judges when she needs to take them.    Georgia stated this Friday she is in going to oncology for possible injection for her anemia pending on how her labs look  Otherwise Georgia she is monitor her weight as she is trying to put on a few pounds.  Seems like her health issues have settled down now.    ALLERGIES:    Allergies   Allergen Reactions     Gramineae Pollens Other (See Comments)     ORCHARDGRASS. Shortness of breath when lawn is just cut.     Smoke. Other (See Comments)     Hard to breathe.     Pollen Extract      Other reaction(s): Unknown        MEDICATIONS:  Post Discharge Medication Reconciliation Status: patient was not discharged from an inpatient facility or TCU. Medications reconciled.     Current Outpatient Medications   Medication Sig Dispense Refill     acetaminophen  (TYLENOL) 325 MG tablet Take 3 tablets (975 mg) by mouth every 8 hours as needed for mild pain       albuterol (PROAIR HFA/PROVENTIL HFA/VENTOLIN HFA) 108 (90 Base) MCG/ACT inhaler Inhale 2 puffs into the lungs every 6 hours       amoxicillin (AMOXIL) 500 MG capsule Take 4 capsules (2,000 mg) by mouth as needed (prior to dental appointment)       ANTACID 400-400-40 MG/10ML SUSP suspension MIX WITH URE-NA AS DIRECTED DAILY 355 mL 11     Ascorbic Acid (VITAMIN C PO) Take 500 mg by mouth every morning       ASPIRIN LOW DOSE 81 MG EC tablet 1 TABLET ORALLY 2 TIMES DAILY (DX: PROPHYLAXIS) 56 tablet 11     Bacillus Coagulans-Inulin (PROBIOTIC FORMULA) 1-250 BILLION-MG CAPS 1 CAPSULE ORALLY DAILY 28 capsule 11     bisacodyl (DULCOLAX) 5 MG EC tablet 1 TABLET ORALLY DAILY AS NEEDED (MAY KEEP AT BEDSIDE) 30 tablet 10     calcium carbonate (TUMS) 500 MG chewable tablet Take 2 tablets (1,000 mg) by mouth 2 times daily as needed for heartburn       CALCIUM+D3 600-20 MG-MCG TABS 1 TABLET ORALLY DAILY WITH A MEAL 28 tablet 11     cholecalciferol (VITAMIN D3) 125 mcg (5000 units) capsule 1 CAPSULE ORALLY DAILY (DX: OSTEOPOROSIS) 28 capsule 11     COMBIVENT RESPIMAT  MCG/ACT inhaler INHALE 1 PUFF INTO THE LUNGS  4 TIMES DAILY 4 g 11     cycloSPORINE (RESTASIS) 0.05 % ophthalmic emulsion Place 1 drop into both eyes 2 times daily        demeclocycline (DECLOMYCIN) 150 MG tablet Take 1 tablet (150 mg) by mouth 2 times daily       fish oil-omega-3 fatty acids 1000 MG capsule 1 CAPSULE ORALLY DAILY 28 capsule 11     fluorometholone (FML LIQUIFILM) 0.1 % ophthalmic susp Place 1 drop Into the left eye daily        IBANDRONATE SODIUM PO Take 150 mg by mouth every 30 days       levothyroxine (SYNTHROID/LEVOTHROID) 88 MCG tablet 1 TABLET ORALLY EVERY MORNING ON AN EMPTY STOMACH 31 tablet 11     loratadine (CLARITIN) 10 MG tablet Take 1 tablet (10 mg) by mouth daily       melatonin 3 MG tablet 3mg to be given with 5mg = 8mg at HS 30  tablet 11     melatonin 5 MG tablet 5mg to be given with 3mg tab = 8mg at HS 30 tablet 11     METOPROLOL TARTRATE PO Take 25 mg by mouth 2 times daily        mirtazapine (REMERON) 7.5 MG tablet 1 TABLET ORALLY AT BEDTIME 28 tablet 11     Multiple Vitamins-Minerals (PRESERVISION AREDS 2) CAPS 1 CAPSULE ORALLY 2 TIMES DAILY 56 capsule 11     Multiple Vitamins-Minerals (PRESERVISION AREDS PO) Take 1 tablet by mouth 2 times daily       omeprazole (PRILOSEC) 20 MG DR capsule 1 CAPSULE ORALLY 2 TIMES DAILY (DX: GASTROESOPHAGEAL REFLUX DISEASE) 62 capsule 11     OXYGEN-HELIUM IN        polyethylene glycol 0.4%- propylene glycol 0.3% (SYSTANE ULTRA) 0.4-0.3 % SOLN ophthalmic solution Place 1 drop into both eyes 4 times daily       Polyethylene Glycol 3350 (PEG 3350) 17 GM/SCOOP POWD MIX 1 CAPFUL (17 GMS) IN 8 OUNCES OF JUICE OR WATER AND DRINK ORALLY DAILY AS NEEDED (DX: CONSTIPATION) 510 g 10     potassium chloride ER (K-TAB/KLOR-CON) 10 MEQ CR tablet 1 TABLET ORALLY DAILY 30 tablet 11     Probiotic Product (PROBIOTIC PO) Take 1 capsule by mouth every morning       senna-docusate (SENOKOT-S/PERICOLACE) 8.6-50 MG tablet Take 1 tablet by mouth 2 times daily as needed for constipation       sodium chloride 1 GM tablet 1 TABLET ORALLY 2 TIMES DAILY 56 tablet 11     spironolactone (ALDACTONE) 50 MG tablet Take 25 mg by mouth daily        timolol (TIMOPTIC) 0.5 % ophthalmic solution Place 1 drop Into the left eye daily        torsemide (DEMADEX) 20 MG tablet 1.5 TABLET (30mg) ORALLY DAILY 31 tablet 11     vitamin C (ASCORBIC ACID) 500 MG tablet 1 TABLET ORALLY DAILY (DX: SUPPLEMENT) 31 tablet 11         REVIEW OF SYSTEMS:  4 point ROS neg other than the symptoms noted above in the HPI.   No chest pain, no acute shortness of breath.    PHYSICAL EXAM:  /61   Pulse 99   Temp 98.2  F (36.8  C)   Resp 18   Wt 47.7 kg (105 lb 3.2 oz)   SpO2 94%   BMI 18.06 kg/m    Georgia is a petite female sitting in a wheelchair that has  been properly fitted for her where she can propel with her arms or her feet.  Georgia does wear glasses and is able to manage within her environment.  She does a few teeth that are brittle.  She has started the process with the dentist able to get either a bridge or dentures so she probably  Heart rate regular/irregular.  No edema in feet.  Lungs are clear.  Voice is clear and strong.  No coughing.    Skin is pink, frail, and warm to touch.  She did lift up her pant leg in order to look at some bruising but she had various spots particularly on the lower right leg.  She says they are improving in which this NP would agree that they are fading.   Georgia reports that her weight is at 90 pounds.  She uses a half of scale in her apartment.  Typically her weight stays between 87 and 91 pounds    Component      Latest Ref Rng 5/15/2023  10:10 AM   Sodium      136 - 145 mmol/L 131 (L)    Potassium      3.4 - 5.3 mmol/L 4.3    Chloride      98 - 107 mmol/L 95 (L)    Carbon Dioxide (CO2)      22 - 29 mmol/L 22    Anion Gap      7 - 15 mmol/L 14    Urea Nitrogen      8.0 - 23.0 mg/dL 24.7 (H)    Creatinine      0.51 - 0.95 mg/dL 0.87    Calcium      8.2 - 9.6 mg/dL 8.9    Glucose      70 - 99 mg/dL 57 (L)    GFR Estimate      >60 mL/min/1.73m2 62           ASSESSMENT / PLAN:  (I50.32) Chronic diastolic congestive heart failure (H)  (primary encounter diagnosis)  (N18.31) Stage 3a chronic kidney disease (H)  Comment: Currently Georgia receives Demadex 30 mg daily and potassium 10meq daily.  Recent labs this month show her K+ level is stable.  No changes.  No acute symptoms.    (E87.1) Hyponatremia  Comment: ongoing and being monitored by her Nephrologist office.  Next labs will be mid June.  Georgia does receive spironolactone 25 mg daily, Demadex 30 mill daily, sodium chloride tablet 1 g twice a day, and Demeclocycline 150mg BID.  Since adding the Demeclocycline, her Na level has been more appropriate.  She is feeling good  and no complaints.    (J44.9) Chronic obstructive pulmonary disease, unspecified COPD type (H)  Comment: stable with the use of her inhalers. Has a CPAP at night she uses.  No acute concerns.  Likes to use the w/c for mobility as she does not get so out of breath and tired with walking even though she continues to ambulate in a [place] in her apartment for practice.    (R54) Frail elderly  (K08.9) Poor dentition  Comment: Georgia continue to monitor her weight as she does try to take an extra calories.  She does make comments that if she did have better teeth she actually could enjoyed eating a sandwich once again.  Right now just waiting on her granddaughter to assist in making further appointments      Orders:  No new orders today.    Electronically signed by  CASTRO Hazel CNP         Sincerely,        CASTRO Hazel CNP

## 2023-06-01 ENCOUNTER — LAB REQUISITION (OUTPATIENT)
Dept: LAB | Facility: CLINIC | Age: 88
End: 2023-06-01
Payer: COMMERCIAL

## 2023-06-01 DIAGNOSIS — I50.9 HEART FAILURE, UNSPECIFIED (H): ICD-10-CM

## 2023-06-05 LAB
ANION GAP SERPL CALCULATED.3IONS-SCNC: 11 MMOL/L (ref 7–15)
BUN SERPL-MCNC: 33 MG/DL (ref 8–23)
CALCIUM SERPL-MCNC: 9.4 MG/DL (ref 8.2–9.6)
CHLORIDE SERPL-SCNC: 96 MMOL/L (ref 98–107)
CREAT SERPL-MCNC: 0.89 MG/DL (ref 0.51–0.95)
DEPRECATED HCO3 PLAS-SCNC: 23 MMOL/L (ref 22–29)
GFR SERPL CREATININE-BSD FRML MDRD: 60 ML/MIN/1.73M2
GLUCOSE SERPL-MCNC: 60 MG/DL (ref 70–99)
POTASSIUM SERPL-SCNC: 4 MMOL/L (ref 3.4–5.3)
SODIUM SERPL-SCNC: 130 MMOL/L (ref 136–145)

## 2023-06-05 PROCEDURE — 36415 COLL VENOUS BLD VENIPUNCTURE: CPT | Mod: ORL | Performed by: INTERNAL MEDICINE

## 2023-06-05 PROCEDURE — 80048 BASIC METABOLIC PNL TOTAL CA: CPT | Mod: ORL | Performed by: INTERNAL MEDICINE

## 2023-06-05 PROCEDURE — P9604 ONE-WAY ALLOW PRORATED TRIP: HCPCS | Mod: ORL | Performed by: INTERNAL MEDICINE

## 2023-06-13 ENCOUNTER — ASSISTED LIVING VISIT (OUTPATIENT)
Dept: GERIATRICS | Facility: CLINIC | Age: 88
End: 2023-06-13
Payer: COMMERCIAL

## 2023-06-13 VITALS
BODY MASS INDEX: 18.06 KG/M2 | OXYGEN SATURATION: 94 % | RESPIRATION RATE: 18 BRPM | WEIGHT: 105.2 LBS | HEART RATE: 99 BPM | SYSTOLIC BLOOD PRESSURE: 114 MMHG | DIASTOLIC BLOOD PRESSURE: 61 MMHG | TEMPERATURE: 98.2 F

## 2023-06-13 DIAGNOSIS — K08.9 POOR DENTITION: ICD-10-CM

## 2023-06-13 DIAGNOSIS — I48.91 ATRIAL FIBRILLATION WITH RAPID VENTRICULAR RESPONSE (H): Primary | ICD-10-CM

## 2023-06-13 DIAGNOSIS — D68.9 COAGULATION DEFECT, UNSPECIFIED (H): ICD-10-CM

## 2023-06-13 DIAGNOSIS — K59.01 SLOW TRANSIT CONSTIPATION: ICD-10-CM

## 2023-06-13 DIAGNOSIS — I78.1 SPIDER VEINS: ICD-10-CM

## 2023-06-13 PROCEDURE — 99349 HOME/RES VST EST MOD MDM 40: CPT | Performed by: NURSE PRACTITIONER

## 2023-06-13 NOTE — PROGRESS NOTES
[facility] GERIATRICS  ACUTE/EPISODIC VISIT    [facility] Medical Record Number:  [id number]  Place of Service where encounter took place:  [facility] ([facility]) [[id number]]    Chief Complaint   Patient presents with     RECHECK       HPI:    Marla Dunn is a 89 year old  (5/22/1929), who is being seen today for an episodic care visit.  HPI information obtained from: facility chart records, facility staff, patient report and [facility] chart review.    Today's concern is:    Diagnoses       Codes Comments    Atrial fibrillation with rapid ventricular response (H)    -  Primary I48.91     Coagulation defect, unspecified (H)     D68.9     Spider veins     I78.1     Poor dentition     K08.9     Slow transit constipation     K59.01         Came to see Georgia today per her request.  Nursing sent a message to this nurse practitioner that she wanted a visit the next time this NP was in the building.  Yesterday, received a text from Georgia's niece with very specific directions for Georgia on Wednesday that she needed to be given her antibiotic before her dentist appointment in the morning and then prove via prescription sent with her for the dentist to see.  Nursing set that all up for her and Georgia knows that tomorrow around 9:15 in the morning she should be getting her 2 g of amoxicillin.  She is not sure what the dentist is going to do, but she has a few teeth that are in very poor repair and do believe the goal is that she should be getting dentures.    Georgia also wanted this nurse practitioner take a peek at her lower extremities due to having small bruised areas over her veins.  She kept asking at different visits if she should see the dermatologist for this and assured her that they typically will resolve on their own.  Good news for the front of her legs that the spots have resolved but then she showed this nurse practitioner the back of her right calf where there is like a diffused  pink-bluish area that almost looks like it will turn into a bruise.  She denied any falls but she suspects that the way she lays in bed and moves her leg this discoloration may be from that.    ALLERGIES:    Allergies   Allergen Reactions     Gramineae Pollens Other (See Comments)     ORCHARDGRASS. Shortness of breath when lawn is just cut.     Smoke. Other (See Comments)     Hard to breathe.     Pollen Extract      Other reaction(s): Unknown        MEDICATIONS:  Post Discharge Medication Reconciliation Status: patient was not discharged from an inpatient facility or TCU.     Current Outpatient Medications   Medication Sig Dispense Refill     acetaminophen (TYLENOL) 325 MG tablet Take 3 tablets (975 mg) by mouth every 8 hours as needed for mild pain       albuterol (PROAIR HFA/PROVENTIL HFA/VENTOLIN HFA) 108 (90 Base) MCG/ACT inhaler Inhale 2 puffs into the lungs every 6 hours       amoxicillin (AMOXIL) 500 MG capsule 4 CAPSULES (2000MG ) ORALLY AS NEEDED ONE HOUR PRIOR TO DENTAL APPOINTMENT 4 capsule 5     Ascorbic Acid (VITAMIN C PO) Take 500 mg by mouth every morning       ASPIRIN LOW DOSE 81 MG EC tablet 1 TABLET ORALLY 2 TIMES DAILY (DX: PROPHYLAXIS) 56 tablet 11     Bacillus Coagulans-Inulin (PROBIOTIC FORMULA) 1-250 BILLION-MG CAPS 1 CAPSULE ORALLY DAILY 28 capsule 11     bisacodyl (DULCOLAX) 5 MG EC tablet 1 TABLET ORALLY DAILY AS NEEDED (MAY KEEP AT BEDSIDE) 30 tablet 10     calcium carbonate (TUMS) 500 MG chewable tablet Take 2 tablets (1,000 mg) by mouth 2 times daily as needed for heartburn       CALCIUM+D3 600-20 MG-MCG TABS 1 TABLET ORALLY DAILY WITH A MEAL 28 tablet 11     cholecalciferol (VITAMIN D3) 125 mcg (5000 units) capsule 1 CAPSULE ORALLY DAILY (DX: OSTEOPOROSIS) 28 capsule 11     COMBIVENT RESPIMAT  MCG/ACT inhaler INHALE 1 PUFF INTO THE LUNGS  4 TIMES DAILY 4 g 11     cycloSPORINE (RESTASIS) 0.05 % ophthalmic emulsion Place 1 drop into both eyes 2 times daily        demeclocycline  (DECLOMYCIN) 150 MG tablet Take 1 tablet (150 mg) by mouth 2 times daily       fish oil-omega-3 fatty acids 1000 MG capsule 1 CAPSULE ORALLY DAILY 28 capsule 11     fluorometholone (FML LIQUIFILM) 0.1 % ophthalmic susp Place 1 drop Into the left eye daily        IBANDRONATE SODIUM PO Take 150 mg by mouth every 30 days       levothyroxine (SYNTHROID/LEVOTHROID) 88 MCG tablet 1 TABLET ORALLY EVERY MORNING ON AN EMPTY STOMACH 31 tablet 11     loratadine (CLARITIN) 10 MG tablet Take 1 tablet (10 mg) by mouth daily       melatonin 3 MG tablet 3mg to be given with 5mg = 8mg at HS 30 tablet 11     melatonin 5 MG tablet 5mg to be given with 3mg tab = 8mg at HS 30 tablet 11     METOPROLOL TARTRATE PO Take 25 mg by mouth 2 times daily        mirtazapine (REMERON) 7.5 MG tablet 1 TABLET ORALLY AT BEDTIME 28 tablet 11     Multiple Vitamins-Minerals (PRESERVISION AREDS 2) CAPS 1 CAPSULE ORALLY 2 TIMES DAILY 56 capsule 11     omeprazole (PRILOSEC) 20 MG DR capsule 1 CAPSULE ORALLY 2 TIMES DAILY (DX: GASTROESOPHAGEAL REFLUX DISEASE) 62 capsule 11     OXYGEN-HELIUM IN        polyethylene glycol 0.4%- propylene glycol 0.3% (SYSTANE ULTRA) 0.4-0.3 % SOLN ophthalmic solution Place 1 drop into both eyes 4 times daily       Polyethylene Glycol 3350 (PEG 3350) 17 GM/SCOOP POWD MIX 1 CAPFUL (17 GMS) IN 8 OUNCES OF JUICE OR WATER AND DRINK ORALLY DAILY AS NEEDED (DX: CONSTIPATION) 510 g 10     potassium chloride ER (K-TAB/KLOR-CON) 10 MEQ CR tablet 1 TABLET ORALLY DAILY 30 tablet 11     Probiotic Product (PROBIOTIC PO) Take 1 capsule by mouth every morning       senna-docusate (SENOKOT-S/PERICOLACE) 8.6-50 MG tablet Take 1 tablet by mouth 2 times daily as needed for constipation       sodium chloride 1 GM tablet 1 TABLET ORALLY 2 TIMES DAILY 56 tablet 11     spironolactone (ALDACTONE) 50 MG tablet Take 25 mg by mouth daily        timolol (TIMOPTIC) 0.5 % ophthalmic solution Place 1 drop Into the left eye daily        torsemide (DEMADEX) 20  MG tablet 1.5 TABLET (30mg) ORALLY DAILY 31 tablet 11     vitamin C (ASCORBIC ACID) 500 MG tablet 1 TABLET ORALLY DAILY (DX: SUPPLEMENT) 31 tablet 11         REVIEW OF SYSTEMS:  4 point ROS neg other than the symptoms noted above in the HPI.  No chest pain, no rectal bleeding, no acute SOB.    PHYSICAL EXAM:  /61   Pulse 99   Temp 98.2  F (36.8  C)   Resp 18   Wt 47.7 kg (105 lb 3.2 oz)   SpO2 94%   BMI 18.06 kg/m    Petite female whom was in the bathroom, finished up and then propelled self out of the bathroom.  She had a hair appointment she was getting ready to go down too.  Georgia is alert and oriented x3.  Pleasant and positive mood.  Heart rate is regular irregular with S1 and S2 heard.  No edema in her lower extremities.  Do see her veins in both of her lower legs with the back of her right calf area starting with light reddish-blue discoloration.  Lungs are clear with good expansion.    Abdomen is flat, soft and nontender.  She self determines when she wants to take Bisacodyl tablets that she has in her possession.  Does ambulate with a walker and does practice daily doing this but otherwise self propels her wheelchair with her hands in her feet.      Component      Latest Ref Rng 5/15/2023  10:10 AM 6/5/2023  11:17 AM   Sodium      136 - 145 mmol/L 131 (L)  130 (L)    Potassium      3.4 - 5.3 mmol/L 4.3  4.0    Chloride      98 - 107 mmol/L 95 (L)  96 (L)    Carbon Dioxide (CO2)      22 - 29 mmol/L 22  23    Anion Gap      7 - 15 mmol/L 14  11    Urea Nitrogen      8.0 - 23.0 mg/dL 24.7 (H)  33.0 (H)    Creatinine      0.51 - 0.95 mg/dL 0.87  0.89    Calcium      8.2 - 9.6 mg/dL 8.9  9.4    Glucose      70 - 99 mg/dL 57 (L)  60 (L)    GFR Estimate      >60 mL/min/1.73m2 62  60 (L)           ASSESSMENT / PLAN:  (I48.91) Atrial fibrillation with rapid ventricular response (H)  (primary encounter diagnosis)  (D68.9) Coagulation defect, unspecified (H)  (I78.1) Spider veins  Comment: - remains on  ASA 81mg po daily.  Stable.  The broken veins on her legs are new but resolved with time.  Her skin is frail and able to see her veins clearly in her lower legs.  Now she has the discoloration to the back of her right calf and do think that with rubbing that area over time, it has changed colors.  Continue to ask bleeding questions of her at visits.  If she has teeth pulled will need to remember to hold the ASA.      (K08.9) Poor dentition  Comment: will go tomorrow to the dentist.  All is set up per request of niece.  Georgia tries hard to gain weight but she knows that with her poor teeth she cannot chew certain things.  She is looking forward to being able to have dentures in the future if it is possible so she can have a sandwich which she has not had in years.    (K59.01) Slow transit constipation  Comment: Georgia self regulates her bowels.  She does receive scheduled senna S twice a day and then has as needed MiraLAX.  The bisacodyl tablets are in her possession which she likes to choose if she takes it typically before bedtime      Orders:  No new orders    Electronically signed by  CASTRO Hazel CNP

## 2023-06-13 NOTE — LETTER
6/13/2023        RE: Marla Dunn  C/o Deneen Dunn  [address] [place] [address]        [facility] GERIATRICS  ACUTE/EPISODIC VISIT    [facility] Medical Record Number:  [id number]  Place of Service where encounter took place:  [facility] [place] ([facility]) [[id number]]    Chief Complaint   Patient presents with     RECHECK       HPI:    Marla Dunn is a 89 year old  (5/22/1929), who is being seen today for an episodic care visit.  HPI information obtained from: facility chart records, facility staff, patient report and [facility] chart review.    Today's concern is:    Diagnoses       Codes Comments    Atrial fibrillation with rapid ventricular response (H)    -  Primary I48.91     Coagulation defect, unspecified (H)     D68.9     Spider veins     I78.1     Poor dentition     K08.9     Slow transit constipation     K59.01         Came to see Georgia today per her request.  Nursing sent a message to this nurse practitioner that she wanted a visit the next time this NP was in the building.  Yesterday, received a text from Georgia's niece with very specific directions for Georgia on Wednesday that she needed to be given her antibiotic before her dentist appointment in the morning and then prove via prescription sent with her for the dentist to see.  Nursing set that all up for her and Georgia knows that tomorrow around 9:15 in the morning she should be getting her 2 g of amoxicillin.  She is not sure what the dentist is going to do, but she has a few teeth that are in very poor repair and do believe the goal is that she should be getting dentures.    Georgia also wanted this nurse practitioner take a peek at her lower extremities due to having small bruised areas over her veins.  She kept asking at different visits if she should see the dermatologist for this and assured her that they typically will resolve on their own.  Good news for the front of her legs that the spots have  resolved but then she showed this nurse practitioner the back of her right calf where there is like a diffused pink-bluish area that almost looks like it will turn into a bruise.  She denied any falls but she suspects that the way she lays in bed and moves her leg this discoloration may be from that.    ALLERGIES:    Allergies   Allergen Reactions     Gramineae Pollens Other (See Comments)     ORCHARDGRASS. Shortness of breath when lawn is just cut.     Smoke. Other (See Comments)     Hard to breathe.     Pollen Extract      Other reaction(s): Unknown        MEDICATIONS:  Post Discharge Medication Reconciliation Status: patient was not discharged from an inpatient facility or TCU.     Current Outpatient Medications   Medication Sig Dispense Refill     acetaminophen (TYLENOL) 325 MG tablet Take 3 tablets (975 mg) by mouth every 8 hours as needed for mild pain       albuterol (PROAIR HFA/PROVENTIL HFA/VENTOLIN HFA) 108 (90 Base) MCG/ACT inhaler Inhale 2 puffs into the lungs every 6 hours       amoxicillin (AMOXIL) 500 MG capsule 4 CAPSULES (2000MG ) ORALLY AS NEEDED ONE HOUR PRIOR TO DENTAL APPOINTMENT 4 capsule 5     Ascorbic Acid (VITAMIN C PO) Take 500 mg by mouth every morning       ASPIRIN LOW DOSE 81 MG EC tablet 1 TABLET ORALLY 2 TIMES DAILY (DX: PROPHYLAXIS) 56 tablet 11     Bacillus Coagulans-Inulin (PROBIOTIC FORMULA) 1-250 BILLION-MG CAPS 1 CAPSULE ORALLY DAILY 28 capsule 11     bisacodyl (DULCOLAX) 5 MG EC tablet 1 TABLET ORALLY DAILY AS NEEDED (MAY KEEP AT BEDSIDE) 30 tablet 10     calcium carbonate (TUMS) 500 MG chewable tablet Take 2 tablets (1,000 mg) by mouth 2 times daily as needed for heartburn       CALCIUM+D3 600-20 MG-MCG TABS 1 TABLET ORALLY DAILY WITH A MEAL 28 tablet 11     cholecalciferol (VITAMIN D3) 125 mcg (5000 units) capsule 1 CAPSULE ORALLY DAILY (DX: OSTEOPOROSIS) 28 capsule 11     COMBIVENT RESPIMAT  MCG/ACT inhaler INHALE 1 PUFF INTO THE LUNGS  4 TIMES DAILY 4 g 11      cycloSPORINE (RESTASIS) 0.05 % ophthalmic emulsion Place 1 drop into both eyes 2 times daily        demeclocycline (DECLOMYCIN) 150 MG tablet Take 1 tablet (150 mg) by mouth 2 times daily       fish oil-omega-3 fatty acids 1000 MG capsule 1 CAPSULE ORALLY DAILY 28 capsule 11     fluorometholone (FML LIQUIFILM) 0.1 % ophthalmic susp Place 1 drop Into the left eye daily        IBANDRONATE SODIUM PO Take 150 mg by mouth every 30 days       levothyroxine (SYNTHROID/LEVOTHROID) 88 MCG tablet 1 TABLET ORALLY EVERY MORNING ON AN EMPTY STOMACH 31 tablet 11     loratadine (CLARITIN) 10 MG tablet Take 1 tablet (10 mg) by mouth daily       melatonin 3 MG tablet 3mg to be given with 5mg = 8mg at HS 30 tablet 11     melatonin 5 MG tablet 5mg to be given with 3mg tab = 8mg at HS 30 tablet 11     METOPROLOL TARTRATE PO Take 25 mg by mouth 2 times daily        mirtazapine (REMERON) 7.5 MG tablet 1 TABLET ORALLY AT BEDTIME 28 tablet 11     Multiple Vitamins-Minerals (PRESERVISION AREDS 2) CAPS 1 CAPSULE ORALLY 2 TIMES DAILY 56 capsule 11     omeprazole (PRILOSEC) 20 MG DR capsule 1 CAPSULE ORALLY 2 TIMES DAILY (DX: GASTROESOPHAGEAL REFLUX DISEASE) 62 capsule 11     OXYGEN-HELIUM IN        polyethylene glycol 0.4%- propylene glycol 0.3% (SYSTANE ULTRA) 0.4-0.3 % SOLN ophthalmic solution Place 1 drop into both eyes 4 times daily       Polyethylene Glycol 3350 (PEG 3350) 17 GM/SCOOP POWD MIX 1 CAPFUL (17 GMS) IN 8 OUNCES OF JUICE OR WATER AND DRINK ORALLY DAILY AS NEEDED (DX: CONSTIPATION) 510 g 10     potassium chloride ER (K-TAB/KLOR-CON) 10 MEQ CR tablet 1 TABLET ORALLY DAILY 30 tablet 11     Probiotic Product (PROBIOTIC PO) Take 1 capsule by mouth every morning       senna-docusate (SENOKOT-S/PERICOLACE) 8.6-50 MG tablet Take 1 tablet by mouth 2 times daily as needed for constipation       sodium chloride 1 GM tablet 1 TABLET ORALLY 2 TIMES DAILY 56 tablet 11     spironolactone (ALDACTONE) 50 MG tablet Take 25 mg by mouth daily         timolol (TIMOPTIC) 0.5 % ophthalmic solution Place 1 drop Into the left eye daily        torsemide (DEMADEX) 20 MG tablet 1.5 TABLET (30mg) ORALLY DAILY 31 tablet 11     vitamin C (ASCORBIC ACID) 500 MG tablet 1 TABLET ORALLY DAILY (DX: SUPPLEMENT) 31 tablet 11         REVIEW OF SYSTEMS:  4 point ROS neg other than the symptoms noted above in the HPI.  No chest pain, no rectal bleeding, no acute SOB.    PHYSICAL EXAM:  /61   Pulse 99   Temp 98.2  F (36.8  C)   Resp 18   Wt 47.7 kg (105 lb 3.2 oz)   SpO2 94%   BMI 18.06 kg/m    Petite female whom was in the bathroom, finished up and then propelled self out of the bathroom.  She had a hair appointment she was getting ready to go down too.  Georgia is alert and oriented x3.  Pleasant and positive mood.  Heart rate is regular irregular with S1 and S2 heard.  No edema in her lower extremities.  Do see her veins in both of her lower legs with the back of her right calf area starting with light reddish-blue discoloration.  Lungs are clear with good expansion.    Abdomen is flat, soft and nontender.  She self determines when she wants to take Bisacodyl tablets that she has in her possession.  Does ambulate with a walker and does practice daily doing this but otherwise self propels her wheelchair with her hands in her feet.      Component      Latest Ref Rng 5/15/2023  10:10 AM 6/5/2023  11:17 AM   Sodium      136 - 145 mmol/L 131 (L)  130 (L)    Potassium      3.4 - 5.3 mmol/L 4.3  4.0    Chloride      98 - 107 mmol/L 95 (L)  96 (L)    Carbon Dioxide (CO2)      22 - 29 mmol/L 22  23    Anion Gap      7 - 15 mmol/L 14  11    Urea Nitrogen      8.0 - 23.0 mg/dL 24.7 (H)  33.0 (H)    Creatinine      0.51 - 0.95 mg/dL 0.87  0.89    Calcium      8.2 - 9.6 mg/dL 8.9  9.4    Glucose      70 - 99 mg/dL 57 (L)  60 (L)    GFR Estimate      >60 mL/min/1.73m2 62  60 (L)           ASSESSMENT / PLAN:  (I48.91) Atrial fibrillation with rapid ventricular response (H)   (primary encounter diagnosis)  (D68.9) Coagulation defect, unspecified (H)  (I78.1) Spider veins  Comment: - remains on ASA 81mg po daily.  Stable.  The broken veins on her legs are new but resolved with time.  Her skin is frail and able to see her veins clearly in her lower legs.  Now she has the discoloration to the back of her right calf and do think that with rubbing that area over time, it has changed colors.  Continue to ask bleeding questions of her at visits.  If she has teeth pulled will need to remember to hold the ASA.      (K08.9) Poor dentition  Comment: will go tomorrow to the dentist.  All is set up per request of niece.  Georgia tries hard to gain weight but she knows that with her poor teeth she cannot chew certain things.  She is looking forward to being able to have dentures in the future if it is possible so she can have a sandwich which she has not had in years.    (K59.01) Slow transit constipation  Comment: Georgia self regulates her bowels.  She does receive scheduled senna S twice a day and then has as needed MiraLAX.  The bisacodyl tablets are in her possession which she likes to choose if she takes it typically before bedtime      Orders:  No new orders    Electronically signed by  CASTRO Hazel CNP         Sincerely,        CASTRO Hazel CNP

## 2023-06-15 DIAGNOSIS — I38 HEART VALVE DISEASE: ICD-10-CM

## 2023-06-15 RX ORDER — AMOXICILLIN 500 MG/1
CAPSULE ORAL
Qty: 4 CAPSULE | Refills: 5 | Status: SHIPPED | OUTPATIENT
Start: 2023-06-15

## 2023-06-29 ENCOUNTER — LAB REQUISITION (OUTPATIENT)
Dept: LAB | Facility: CLINIC | Age: 88
End: 2023-06-29
Payer: COMMERCIAL

## 2023-06-29 DIAGNOSIS — I50.9 HEART FAILURE, UNSPECIFIED (H): ICD-10-CM

## 2023-06-30 ENCOUNTER — TRANSFERRED RECORDS (OUTPATIENT)
Dept: HEALTH INFORMATION MANAGEMENT | Facility: CLINIC | Age: 88
End: 2023-06-30
Payer: COMMERCIAL

## 2023-07-03 LAB
ANION GAP SERPL CALCULATED.3IONS-SCNC: 15 MMOL/L (ref 7–15)
BUN SERPL-MCNC: 31.3 MG/DL (ref 8–23)
CALCIUM SERPL-MCNC: 9.5 MG/DL (ref 8.2–9.6)
CHLORIDE SERPL-SCNC: 92 MMOL/L (ref 98–107)
CREAT SERPL-MCNC: 0.97 MG/DL (ref 0.51–0.95)
DEPRECATED HCO3 PLAS-SCNC: 25 MMOL/L (ref 22–29)
GFR SERPL CREATININE-BSD FRML MDRD: 54 ML/MIN/1.73M2
GLUCOSE SERPL-MCNC: 31 MG/DL (ref 70–99)
POTASSIUM SERPL-SCNC: 4.2 MMOL/L (ref 3.4–5.3)
SODIUM SERPL-SCNC: 132 MMOL/L (ref 136–145)

## 2023-07-03 PROCEDURE — 80048 BASIC METABOLIC PNL TOTAL CA: CPT | Mod: ORL | Performed by: INTERNAL MEDICINE

## 2023-07-03 PROCEDURE — 36415 COLL VENOUS BLD VENIPUNCTURE: CPT | Mod: ORL | Performed by: INTERNAL MEDICINE

## 2023-07-03 PROCEDURE — P9603 ONE-WAY ALLOW PRORATED MILES: HCPCS | Mod: ORL | Performed by: INTERNAL MEDICINE

## 2023-07-04 ENCOUNTER — TELEPHONE (OUTPATIENT)
Dept: GERIATRICS | Facility: CLINIC | Age: 88
End: 2023-07-04
Payer: COMMERCIAL

## 2023-07-04 NOTE — TELEPHONE ENCOUNTER
Component      Latest Ref Rng 7/3/2023  10:25 AM   Sodium      136 - 145 mmol/L 132 (L)    Potassium      3.4 - 5.3 mmol/L 4.2    Chloride      98 - 107 mmol/L 92 (L)    Carbon Dioxide (CO2)      22 - 29 mmol/L 25    Anion Gap      7 - 15 mmol/L 15    Urea Nitrogen      8.0 - 23.0 mg/dL 31.3 (H)    Creatinine      0.51 - 0.95 mg/dL 0.97 (H)    Calcium      8.2 - 9.6 mg/dL 9.5    Glucose      70 - 99 mg/dL 31 (LL)    GFR Estimate      >60 mL/min/1.73m2 54 (L)        labs returned yesterday with a critical glucose level.  Do question the labs machine at the lab as others came back with critical value.      Called the facility and asked nursing to check a random sugar today and came back with a 148 this morning.      No new orders.      CASTRO Hazel CNP

## 2023-07-18 RX ORDER — AMOXICILLIN 250 MG
1 CAPSULE ORAL 2 TIMES DAILY
Start: 2022-09-22 | End: 2023-08-24

## 2023-08-04 ENCOUNTER — LAB REQUISITION (OUTPATIENT)
Dept: LAB | Facility: CLINIC | Age: 88
End: 2023-08-04
Payer: COMMERCIAL

## 2023-08-04 DIAGNOSIS — I50.9 HEART FAILURE, UNSPECIFIED (H): ICD-10-CM

## 2023-08-07 LAB
ANION GAP SERPL CALCULATED.3IONS-SCNC: 10 MMOL/L (ref 7–15)
BUN SERPL-MCNC: 26.2 MG/DL (ref 8–23)
CALCIUM SERPL-MCNC: 9.6 MG/DL (ref 8.2–9.6)
CHLORIDE SERPL-SCNC: 94 MMOL/L (ref 98–107)
CREAT SERPL-MCNC: 0.88 MG/DL (ref 0.51–0.95)
DEPRECATED HCO3 PLAS-SCNC: 26 MMOL/L (ref 22–29)
GFR SERPL CREATININE-BSD FRML MDRD: 61 ML/MIN/1.73M2
GLUCOSE SERPL-MCNC: 90 MG/DL (ref 70–99)
POTASSIUM SERPL-SCNC: 4.3 MMOL/L (ref 3.4–5.3)
SODIUM SERPL-SCNC: 130 MMOL/L (ref 136–145)

## 2023-08-07 PROCEDURE — 80048 BASIC METABOLIC PNL TOTAL CA: CPT | Mod: ORL | Performed by: INTERNAL MEDICINE

## 2023-08-07 PROCEDURE — P9603 ONE-WAY ALLOW PRORATED MILES: HCPCS | Mod: ORL | Performed by: INTERNAL MEDICINE

## 2023-08-07 PROCEDURE — 36415 COLL VENOUS BLD VENIPUNCTURE: CPT | Mod: ORL | Performed by: INTERNAL MEDICINE

## 2023-08-22 ENCOUNTER — ASSISTED LIVING VISIT (OUTPATIENT)
Dept: GERIATRICS | Facility: CLINIC | Age: 88
End: 2023-08-22
Payer: COMMERCIAL

## 2023-08-22 VITALS
DIASTOLIC BLOOD PRESSURE: 61 MMHG | SYSTOLIC BLOOD PRESSURE: 114 MMHG | OXYGEN SATURATION: 94 % | BODY MASS INDEX: 18.06 KG/M2 | WEIGHT: 105.2 LBS | HEART RATE: 99 BPM | TEMPERATURE: 98.2 F | RESPIRATION RATE: 18 BRPM

## 2023-08-22 DIAGNOSIS — I50.32 CHRONIC DIASTOLIC HEART FAILURE (H): Primary | ICD-10-CM

## 2023-08-22 DIAGNOSIS — I48.91 ATRIAL FIBRILLATION WITH RAPID VENTRICULAR RESPONSE (H): ICD-10-CM

## 2023-08-22 DIAGNOSIS — F32.A ANXIETY AND DEPRESSION: ICD-10-CM

## 2023-08-22 DIAGNOSIS — F41.9 ANXIETY AND DEPRESSION: ICD-10-CM

## 2023-08-22 DIAGNOSIS — E87.1 HYPONATREMIA: ICD-10-CM

## 2023-08-22 DIAGNOSIS — N18.31 STAGE 3A CHRONIC KIDNEY DISEASE (H): ICD-10-CM

## 2023-08-22 DIAGNOSIS — C06.9 SQUAMOUS CELL CARCINOMA OF ORAL CAVITY (H): ICD-10-CM

## 2023-08-22 PROCEDURE — 99349 HOME/RES VST EST MOD MDM 40: CPT | Performed by: NURSE PRACTITIONER

## 2023-08-22 NOTE — LETTER
8/22/2023        RE: Marla Dunn  C/o Deneen Dunn  [address] [address]        [facility] GERIATRICS  ACUTE/EPISODIC VISIT    [facility] Medical Record Number:  [id number]  Place of Service where encounter took place:  [facility] [place] ([facility]) [[id number]]    Chief Complaint   Patient presents with     RECHECK       HPI:    Marla Dunn is a 89 year old  (5/22/1929), who is being seen today for an episodic care visit.  HPI information obtained from: facility chart records, facility staff, patient report, and Care Everywhere Epic chart review.    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  She announced she was having oral surgery beginning of September and needs to have a Pre-op done.  Can easily do that here.  Do not feel she needs to see Cardiology for clearance.  Is already followed by Hematology and Nephrology for anemia and low sodium levels respectively.    Due to only having lab once a week here, will need to order labs now and then see her closer to the surgery date for her H & P.    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  She was told this and she is ok with it.  With the teeth removed and possible dentures, she was going to be able to eat a sandwich like she has been dreaming of doing.  One with lunch meat.  Now she will figure out a different tactic.      Georgia just appreciates having frequent visits so that she knows she is being looked in on.  She can voice her needs without issues.  Her niece takes  her to her appointments.        ALLERGIES:    Allergies   Allergen Reactions     Gramineae Pollens Other (See Comments)     ORCHARDGRASS. Shortness of breath when lawn is just cut.     Smoke. Other (See Comments)     Hard to breathe.     Pollen Extract      Other reaction(s): Unknown        MEDICATIONS:  Post Discharge Medication Reconciliation Status: patient was not discharged from an inpatient facility or TCU.     Current Outpatient Medications   Medication Sig Dispense Refill     acetaminophen (TYLENOL) 325 MG tablet Take 3 tablets (975 mg) by mouth every 8 hours as needed for mild pain       albuterol (PROAIR HFA/PROVENTIL HFA/VENTOLIN HFA) 108 (90 Base) MCG/ACT inhaler Inhale 2 puffs into the lungs every 6 hours       amoxicillin (AMOXIL) 500 MG capsule 4 CAPSULES (2000MG ) ORALLY AS NEEDED ONE HOUR PRIOR TO DENTAL APPOINTMENT 4 capsule 5     Ascorbic Acid (VITAMIN C PO) Take 500 mg by mouth every morning       ASPIRIN LOW DOSE 81 MG EC tablet 1 TABLET ORALLY 2 TIMES DAILY (DX: PROPHYLAXIS) 56 tablet 11     Bacillus Coagulans-Inulin (PROBIOTIC FORMULA) 1-250 BILLION-MG CAPS 1 CAPSULE ORALLY DAILY 28 capsule 11     bisacodyl (DULCOLAX) 5 MG EC tablet 1 TABLET ORALLY DAILY AS NEEDED (MAY KEEP AT BEDSIDE) 30 tablet 10     calcium carbonate (TUMS) 500 MG chewable tablet Take 2 tablets (1,000 mg) by mouth 2 times daily as needed for heartburn       CALCIUM+D3 600-20 MG-MCG TABS 1 TABLET ORALLY DAILY WITH A MEAL 28 tablet 11     cholecalciferol (VITAMIN D3) 125 mcg (5000 units) capsule 1 CAPSULE ORALLY DAILY (DX: OSTEOPOROSIS) 28 capsule 11     COMBIVENT RESPIMAT  MCG/ACT inhaler INHALE 1 PUFF INTO THE LUNGS  4 TIMES DAILY 4 g 11     cycloSPORINE (RESTASIS) 0.05 % ophthalmic emulsion Place 1 drop into both eyes 2 times daily        demeclocycline (DECLOMYCIN) 150 MG tablet Take 1 tablet (150 mg) by mouth 2 times daily       fish oil-omega-3 fatty acids 1000 MG capsule 1 CAPSULE ORALLY DAILY 28 capsule 11      fluorometholone (FML LIQUIFILM) 0.1 % ophthalmic susp Place 1 drop Into the left eye daily        IBANDRONATE SODIUM PO Take 150 mg by mouth every 30 days       levothyroxine (SYNTHROID/LEVOTHROID) 88 MCG tablet 1 TABLET ORALLY EVERY MORNING ON AN EMPTY STOMACH 31 tablet 11     loratadine (CLARITIN) 10 MG tablet 1 TABLET ORALLY DAILY (DX: ASTHMA) 28 tablet 11     melatonin 3 MG tablet 3mg to be given with 5mg = 8mg at HS 30 tablet 11     melatonin 5 MG tablet 5mg to be given with 3mg tab = 8mg at HS 30 tablet 11     METOPROLOL TARTRATE PO Take 25 mg by mouth 2 times daily        mirtazapine (REMERON) 7.5 MG tablet 1 TABLET ORALLY AT BEDTIME 28 tablet 11     Multiple Vitamins-Minerals (PRESERVISION AREDS 2) CAPS 1 CAPSULE ORALLY 2 TIMES DAILY 56 capsule 11     omeprazole (PRILOSEC) 20 MG DR capsule 1 CAPSULE ORALLY 2 TIMES DAILY (DX: GASTROESOPHAGEAL REFLUX DISEASE) 62 capsule 11     OXYGEN-HELIUM IN        polyethylene glycol 0.4%- propylene glycol 0.3% (SYSTANE ULTRA) 0.4-0.3 % SOLN ophthalmic solution Place 1 drop into both eyes 4 times daily       Polyethylene Glycol 3350 (PEG 3350) 17 GM/SCOOP POWD MIX 1 CAPFUL (17 GMS) IN 8 OUNCES OF JUICE OR WATER AND DRINK ORALLY DAILY AS NEEDED (DX: CONSTIPATION) 510 g 10     potassium chloride ER (K-TAB/KLOR-CON) 10 MEQ CR tablet 1 TABLET ORALLY DAILY 30 tablet 11     Probiotic Product (PROBIOTIC PO) Take 1 capsule by mouth every morning       senna-docusate (SENOKOT-S/PERICOLACE) 8.6-50 MG tablet Take 1 tablet by mouth 2 times daily       sodium chloride 1 GM tablet 1 TABLET ORALLY 2 TIMES DAILY 56 tablet 11     spironolactone (ALDACTONE) 25 MG tablet 1 TABLET ORALLY DAILY (DX: EDEMA) ** HAZARDOUS MED: WEAR DOUBLE NITRILE GLOVES** 28 tablet 11     spironolactone (ALDACTONE) 50 MG tablet Take 25 mg by mouth daily        timolol (TIMOPTIC) 0.5 % ophthalmic solution Place 1 drop Into the left eye daily        torsemide (DEMADEX) 20 MG tablet 1.5 TABLET (30mg) ORALLY DAILY 31  tablet 11     vitamin C (ASCORBIC ACID) 500 MG tablet 1 TABLET ORALLY DAILY (DX: SUPPLEMENT) 31 tablet 11       REVIEW OF SYSTEMS:  4 point ROS neg other than the symptoms noted above in the HPI.  No chest pain.  No acute SOB.  Does take inhalers for her breathing.  Bowels are monitored closely by [place].        PHYSICAL EXAM:  /61   Pulse 99   Temp 98.2  F (36.8  C)   Resp 18   Wt 47.7 kg (105 lb 3.2 oz)   SpO2 94%   BMI 18.06 kg/m    Petite, frail strong willed female in her wheelchair that she propels around mostly with her feet.  Every Tuesday she has a standing [employer] appointment mid morning and so escorted her down to the elevators at the end of the visit.      Heart rate regular/irregular.  No edema in legs.  Has some superficial bruising on legs as skin is thin.  This appearance bothers her as she has asked if she should go to the dermatologist.  Assured her she does not need to go.  No open areas on her lower legs or elsewhere.  Lungs are clear with good deep expansion.  Abdomen is flat, soft, and non-tender.      Has a walker that she uses to walk around the apartment but her stamina is short so the wheelchair gets her places quicker.      Component      Latest Ref Rng 8/7/2023  12:06 PM   Sodium      136 - 145 mmol/L 130 (L)    Potassium      3.4 - 5.3 mmol/L 4.3    Chloride      98 - 107 mmol/L 94 (L)    Carbon Dioxide (CO2)      22 - 29 mmol/L 26    Anion Gap      7 - 15 mmol/L 10    Urea Nitrogen      8.0 - 23.0 mg/dL 26.2 (H)    Creatinine      0.51 - 0.95 mg/dL 0.88    Calcium      8.2 - 9.6 mg/dL 9.6    Glucose      70 - 99 mg/dL 90    GFR Estimate      >60 mL/min/1.73m2 61         ASSESSMENT / PLAN:  (I50.32) Chronic diastolic heart failure (H)  (primary encounter diagnosis)  (N18.31) Stage 3a chronic kidney disease (H)  Comment: has not had any exacerbations of recent.  Georgia does still take her Torsemide 30mg po daily and spirolactone 25mg daily.  Her nephrologist made the  last adjustments earlier this year.  No changes today.  Will be order labs for next lab day due to her upcoming surgery.    (E87.1) Hyponatremia  Comment: remains on Declomycin 150mg BID and NaCl 1GM twice a day.  Sees her Nephrologist regularly along with labs monthly to monitor her sodium level.    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  Georgia had been watching an area on her tongue as it was nicked at one time and the spot grew into cancer.  Will be undergoing a Right Partial Glossectomy early September    (I48.91) Atrial fibrillation with rapid ventricular response (H)  Comment: only on ASA.  Heart rate irregular but as a steady rate.  No changes.      (F41.9,  F32.A) Anxiety and depression  Comment: assured Georgia a few times of the plan for her H & P as well as ordering labs.  Remains on remeron 7.5mg at HS to help with sleep.     Orders:  CBC and BMP for pre op and CHF  EKG to be done here due to pre op and CHF      Electronically signed by  CASTRO Hazel CNP            Sincerely,        CASTRO Hazel CNP

## 2023-08-22 NOTE — LETTER
8/22/2023        RE: Marla Dunn  C/o Deneen Dunn  [address] [place] [id number]        No notes on file      Sincerely,        CASTRO Hazel CNP

## 2023-08-22 NOTE — PROGRESS NOTES
[facility] GERIATRICS  ACUTE/EPISODIC VISIT    [facility] Medical Record Number:  [id number]  Place of Service where encounter took place:  [facility] ([facility]) [[id number]]    Chief Complaint   Patient presents with    RECHECK       HPI:    Marla Dunn is a 89 year old  (5/22/1929), who is being seen today for an episodic care visit.  HPI information obtained from: facility chart records, facility staff, patient report, and Care Everywhere Epic chart review.    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  She announced she was having oral surgery beginning of September and needs to have a Pre-op done.  Can easily do that here.  Do not feel she needs to see Cardiology for clearance.  Is already followed by Hematology and Nephrology for anemia and low sodium levels respectively.    Due to only having lab once a week here, will need to order labs now and then see her closer to the surgery date for her H & P.    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  She was told this and she is ok with it.  With the teeth removed and possible dentures, she was going to be able to eat a sandwich like she has been dreaming of doing.  One with lunch meat.  Now she will figure out a different tactic.      Georgia just appreciates having frequent visits so that she knows she is being looked in on.  She can voice her needs without issues.  Her niece takes her to her appointments.        ALLERGIES:    Allergies   Allergen Reactions    Gramineae Pollens Other (See  Comments)     ORCHARDGRASS. Shortness of breath when lawn is just cut.    Smoke. Other (See Comments)     Hard to breathe.    Pollen Extract      Other reaction(s): Unknown        MEDICATIONS:  Post Discharge Medication Reconciliation Status: patient was not discharged from an inpatient facility or TCU.     Current Outpatient Medications   Medication Sig Dispense Refill    acetaminophen (TYLENOL) 325 MG tablet Take 3 tablets (975 mg) by mouth every 8 hours as needed for mild pain      albuterol (PROAIR HFA/PROVENTIL HFA/VENTOLIN HFA) 108 (90 Base) MCG/ACT inhaler Inhale 2 puffs into the lungs every 6 hours      amoxicillin (AMOXIL) 500 MG capsule 4 CAPSULES (2000MG ) ORALLY AS NEEDED ONE HOUR PRIOR TO DENTAL APPOINTMENT 4 capsule 5    Ascorbic Acid (VITAMIN C PO) Take 500 mg by mouth every morning      ASPIRIN LOW DOSE 81 MG EC tablet 1 TABLET ORALLY 2 TIMES DAILY (DX: PROPHYLAXIS) 56 tablet 11    Bacillus Coagulans-Inulin (PROBIOTIC FORMULA) 1-250 BILLION-MG CAPS 1 CAPSULE ORALLY DAILY 28 capsule 11    bisacodyl (DULCOLAX) 5 MG EC tablet 1 TABLET ORALLY DAILY AS NEEDED (MAY KEEP AT BEDSIDE) 30 tablet 10    calcium carbonate (TUMS) 500 MG chewable tablet Take 2 tablets (1,000 mg) by mouth 2 times daily as needed for heartburn      CALCIUM+D3 600-20 MG-MCG TABS 1 TABLET ORALLY DAILY WITH A MEAL 28 tablet 11    cholecalciferol (VITAMIN D3) 125 mcg (5000 units) capsule 1 CAPSULE ORALLY DAILY (DX: OSTEOPOROSIS) 28 capsule 11    COMBIVENT RESPIMAT  MCG/ACT inhaler INHALE 1 PUFF INTO THE LUNGS  4 TIMES DAILY 4 g 11    cycloSPORINE (RESTASIS) 0.05 % ophthalmic emulsion Place 1 drop into both eyes 2 times daily       demeclocycline (DECLOMYCIN) 150 MG tablet Take 1 tablet (150 mg) by mouth 2 times daily      fish oil-omega-3 fatty acids 1000 MG capsule 1 CAPSULE ORALLY DAILY 28 capsule 11    fluorometholone (FML LIQUIFILM) 0.1 % ophthalmic susp Place 1 drop Into the left eye daily       IBANDRONATE SODIUM PO Take 150  mg by mouth every 30 days      levothyroxine (SYNTHROID/LEVOTHROID) 88 MCG tablet 1 TABLET ORALLY EVERY MORNING ON AN EMPTY STOMACH 31 tablet 11    loratadine (CLARITIN) 10 MG tablet 1 TABLET ORALLY DAILY (DX: ASTHMA) 28 tablet 11    melatonin 3 MG tablet 3mg to be given with 5mg = 8mg at HS 30 tablet 11    melatonin 5 MG tablet 5mg to be given with 3mg tab = 8mg at HS 30 tablet 11    METOPROLOL TARTRATE PO Take 25 mg by mouth 2 times daily       mirtazapine (REMERON) 7.5 MG tablet 1 TABLET ORALLY AT BEDTIME 28 tablet 11    Multiple Vitamins-Minerals (PRESERVISION AREDS 2) CAPS 1 CAPSULE ORALLY 2 TIMES DAILY 56 capsule 11    omeprazole (PRILOSEC) 20 MG DR capsule 1 CAPSULE ORALLY 2 TIMES DAILY (DX: GASTROESOPHAGEAL REFLUX DISEASE) 62 capsule 11    OXYGEN-HELIUM IN       polyethylene glycol 0.4%- propylene glycol 0.3% (SYSTANE ULTRA) 0.4-0.3 % SOLN ophthalmic solution Place 1 drop into both eyes 4 times daily      Polyethylene Glycol 3350 (PEG 3350) 17 GM/SCOOP POWD MIX 1 CAPFUL (17 GMS) IN 8 OUNCES OF JUICE OR WATER AND DRINK ORALLY DAILY AS NEEDED (DX: CONSTIPATION) 510 g 10    potassium chloride ER (K-TAB/KLOR-CON) 10 MEQ CR tablet 1 TABLET ORALLY DAILY 30 tablet 11    Probiotic Product (PROBIOTIC PO) Take 1 capsule by mouth every morning      senna-docusate (SENOKOT-S/PERICOLACE) 8.6-50 MG tablet Take 1 tablet by mouth 2 times daily      sodium chloride 1 GM tablet 1 TABLET ORALLY 2 TIMES DAILY 56 tablet 11    spironolactone (ALDACTONE) 25 MG tablet 1 TABLET ORALLY DAILY (DX: EDEMA) ** HAZARDOUS MED: WEAR DOUBLE NITRILE GLOVES** 28 tablet 11    spironolactone (ALDACTONE) 50 MG tablet Take 25 mg by mouth daily       timolol (TIMOPTIC) 0.5 % ophthalmic solution Place 1 drop Into the left eye daily       torsemide (DEMADEX) 20 MG tablet 1.5 TABLET (30mg) ORALLY DAILY 31 tablet 11    vitamin C (ASCORBIC ACID) 500 MG tablet 1 TABLET ORALLY DAILY (DX: SUPPLEMENT) 31 tablet 11       REVIEW OF SYSTEMS:  4 point ROS neg  other than the symptoms noted above in the HPI.  No chest pain.  No acute SOB.  Does take inhalers for her breathing.  Bowels are monitored closely by [place].        PHYSICAL EXAM:  /61   Pulse 99   Temp 98.2  F (36.8  C)   Resp 18   Wt 47.7 kg (105 lb 3.2 oz)   SpO2 94%   BMI 18.06 kg/m    Petite, frail strong willed female in her wheelchair that she propels around mostly with her feet.  Every Tuesday she has a standing [employer] appointment mid morning and so escorted her down to the elevators at the end of the visit.      Heart rate regular/irregular.  No edema in legs.  Has some superficial bruising on legs as skin is thin.  This appearance bothers her as she has asked if she should go to the dermatologist.  Assured her she does not need to go.  No open areas on her lower legs or elsewhere.  Lungs are clear with good deep expansion.  Abdomen is flat, soft, and non-tender.      Has a walker that she uses to walk around the apartment but her stamina is short so the wheelchair gets her places quicker.      Component      Latest Ref Rng 8/7/2023  12:06 PM   Sodium      136 - 145 mmol/L 130 (L)    Potassium      3.4 - 5.3 mmol/L 4.3    Chloride      98 - 107 mmol/L 94 (L)    Carbon Dioxide (CO2)      22 - 29 mmol/L 26    Anion Gap      7 - 15 mmol/L 10    Urea Nitrogen      8.0 - 23.0 mg/dL 26.2 (H)    Creatinine      0.51 - 0.95 mg/dL 0.88    Calcium      8.2 - 9.6 mg/dL 9.6    Glucose      70 - 99 mg/dL 90    GFR Estimate      >60 mL/min/1.73m2 61         ASSESSMENT / PLAN:  (I50.32) Chronic diastolic heart failure (H)  (primary encounter diagnosis)  (N18.31) Stage 3a chronic kidney disease (H)  Comment: has not had any exacerbations of recent.  Georgia does still take her Torsemide 30mg po daily and spirolactone 25mg daily.  Her nephrologist made the last adjustments earlier this year.  No changes today.  Will be order labs for next lab day due to her upcoming surgery.    (E87.1)  Hyponatremia  Comment: remains on Declomycin 150mg BID and NaCl 1GM twice a day.  Sees her Nephrologist regularly along with labs monthly to monitor her sodium level.    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  Georgia had been watching an area on her tongue as it was nicked at one time and the spot grew into cancer.  Will be undergoing a Right Partial Glossectomy early September    (I48.91) Atrial fibrillation with rapid ventricular response (H)  Comment: only on ASA.  Heart rate irregular but as a steady rate.  No changes.      (F41.9,  F32.A) Anxiety and depression  Comment: assured Georgia a few times of the plan for her H & P as well as ordering labs.  Remains on remeron 7.5mg at HS to help with sleep.     Orders:  CBC and BMP for pre op and CHF  EKG to be done here due to pre op and CHF      Electronically signed by  CASTRO Hazel CNP

## 2023-08-24 DIAGNOSIS — K59.00 CONSTIPATION, UNSPECIFIED CONSTIPATION TYPE: ICD-10-CM

## 2023-08-24 DIAGNOSIS — I10 HYPERTENSION, UNSPECIFIED TYPE: Primary | ICD-10-CM

## 2023-08-24 RX ORDER — METOPROLOL TARTRATE 25 MG/1
TABLET, FILM COATED ORAL
Qty: 56 TABLET | Refills: 11 | Status: SHIPPED | OUTPATIENT
Start: 2023-08-24

## 2023-08-24 RX ORDER — DOCUSATE SODIUM 50MG AND SENNOSIDES 8.6MG 8.6; 5 MG/1; MG/1
TABLET, FILM COATED ORAL
Qty: 56 TABLET | Refills: 11 | Status: SHIPPED | OUTPATIENT
Start: 2023-08-24

## 2023-08-25 ENCOUNTER — LAB REQUISITION (OUTPATIENT)
Dept: LAB | Facility: CLINIC | Age: 88
End: 2023-08-25
Payer: COMMERCIAL

## 2023-08-25 DIAGNOSIS — Z58.81: ICD-10-CM

## 2023-08-25 DIAGNOSIS — I10 ESSENTIAL (PRIMARY) HYPERTENSION: ICD-10-CM

## 2023-08-25 DIAGNOSIS — I50.9 HEART FAILURE, UNSPECIFIED (H): ICD-10-CM

## 2023-08-28 LAB
ANION GAP SERPL CALCULATED.3IONS-SCNC: 13 MMOL/L (ref 7–15)
BUN SERPL-MCNC: 32 MG/DL (ref 8–23)
CALCIUM SERPL-MCNC: 9.5 MG/DL (ref 8.2–9.6)
CHLORIDE SERPL-SCNC: 94 MMOL/L (ref 98–107)
CREAT SERPL-MCNC: 0.86 MG/DL (ref 0.51–0.95)
DEPRECATED HCO3 PLAS-SCNC: 25 MMOL/L (ref 22–29)
ERYTHROCYTE [DISTWIDTH] IN BLOOD BY AUTOMATED COUNT: 16.2 % (ref 10–15)
GFR SERPL CREATININE-BSD FRML MDRD: 62 ML/MIN/1.73M2
GLUCOSE SERPL-MCNC: 48 MG/DL (ref 70–99)
HCT VFR BLD AUTO: 36.2 % (ref 35–47)
HGB BLD-MCNC: 11.3 G/DL (ref 11.7–15.7)
MCH RBC QN AUTO: 29.8 PG (ref 26.5–33)
MCHC RBC AUTO-ENTMCNC: 31.2 G/DL (ref 31.5–36.5)
MCV RBC AUTO: 96 FL (ref 78–100)
PLATELET # BLD AUTO: 238 10E3/UL (ref 150–450)
POTASSIUM SERPL-SCNC: 3.6 MMOL/L (ref 3.4–5.3)
RBC # BLD AUTO: 3.79 10E6/UL (ref 3.8–5.2)
SODIUM SERPL-SCNC: 132 MMOL/L (ref 136–145)
WBC # BLD AUTO: 6.3 10E3/UL (ref 4–11)

## 2023-08-28 PROCEDURE — 85027 COMPLETE CBC AUTOMATED: CPT | Mod: ORL | Performed by: NURSE PRACTITIONER

## 2023-08-28 PROCEDURE — 80048 BASIC METABOLIC PNL TOTAL CA: CPT | Mod: ORL | Performed by: NURSE PRACTITIONER

## 2023-08-28 PROCEDURE — 36415 COLL VENOUS BLD VENIPUNCTURE: CPT | Mod: ORL | Performed by: NURSE PRACTITIONER

## 2023-08-28 PROCEDURE — P9603 ONE-WAY ALLOW PRORATED MILES: HCPCS | Mod: ORL | Performed by: NURSE PRACTITIONER

## 2023-08-29 ENCOUNTER — OFFICE VISIT (OUTPATIENT)
Dept: GERIATRICS | Facility: CLINIC | Age: 88
End: 2023-08-29
Payer: COMMERCIAL

## 2023-08-29 VITALS
SYSTOLIC BLOOD PRESSURE: 114 MMHG | WEIGHT: 105.2 LBS | TEMPERATURE: 98.2 F | OXYGEN SATURATION: 94 % | RESPIRATION RATE: 18 BRPM | DIASTOLIC BLOOD PRESSURE: 61 MMHG | BODY MASS INDEX: 18.06 KG/M2 | HEART RATE: 99 BPM

## 2023-08-29 DIAGNOSIS — N18.31 CHRONIC KIDNEY DISEASE, STAGE 3A (H): ICD-10-CM

## 2023-08-29 DIAGNOSIS — F32.A ANXIETY AND DEPRESSION: ICD-10-CM

## 2023-08-29 DIAGNOSIS — J44.9 CHRONIC OBSTRUCTIVE PULMONARY DISEASE, UNSPECIFIED COPD TYPE (H): ICD-10-CM

## 2023-08-29 DIAGNOSIS — C02.1 SQUAMOUS CELL CARCINOMA, TONGUE BORDER (H): Primary | ICD-10-CM

## 2023-08-29 DIAGNOSIS — Z01.818 PRE-OP EXAM: ICD-10-CM

## 2023-08-29 DIAGNOSIS — I48.91 ATRIAL FIBRILLATION WITH RAPID VENTRICULAR RESPONSE (H): ICD-10-CM

## 2023-08-29 DIAGNOSIS — I50.32 CHRONIC DIASTOLIC CONGESTIVE HEART FAILURE (H): ICD-10-CM

## 2023-08-29 DIAGNOSIS — E87.6 HYPOKALEMIA: ICD-10-CM

## 2023-08-29 DIAGNOSIS — F41.9 ANXIETY AND DEPRESSION: ICD-10-CM

## 2023-08-29 PROCEDURE — 99350 HOME/RES VST EST HIGH MDM 60: CPT | Performed by: NURSE PRACTITIONER

## 2023-08-29 NOTE — PROGRESS NOTES
[facility] GERIATRICS  [address] [place] [address]  Phone: [phone]  Fax: [phone]  Primary Provider: Jorge Luis Wallace  Pre-op Performing Provider: JORGE LUIS WALLACE      PREOPERATIVE EVALUATION:  Today's date: 8/29/2023    Marla Dunn is a 89 year old female who presents for a preoperative evaluation.    Surgical Information:  Surgery/Procedure: removal of tongue cancer and pig skin covering for healing  Surgery Location: [facility]  Surgeon: Dr. Greene  Surgery Date: 9/2/2023  Time of Surgery: 10:30AM  Where patient plans to recover: Other: at home in assisted living  Fax number for surgical facility: [phone]    Assessment & Plan     The proposed surgical procedure is considered LOW risk.    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  Labs and portable EKG done and all will be sent with Georgia on morning of surgery.  She will also bring her Amoxicillin along in case she is asked to take it before procedure.  This can be determined at surgery center.  Plan: Clear for surgery    (I50.32) Chronic diastolic congestive heart failure (H)  (E87.6) Hypokalemia  (N18.31) Chronic kidney disease, stage 3a (H)  Comment: Georgia is maintained with Torsemide 30mg daily and Spironolactone 25mg daily for the heart failure.  Has not had any exacerbations since this NP has known her for about 1.5 years.  Labs (BMP monthly) watched carefully due to her hyponatremia and under care of a nephrologist.  For the hyponatremia she receives Demeclocycline 150mg twice a day, and NaCl 1GM twice a day.  The Diuretics were adjusted as well to find the balance of keeping her Na above 130.      (I48.91) Atrial fibrillation with rapid ventricular response (H)  Takes ASA 81mg po daily which has been on  hold starting this past Sunday.  She does take metoprolol 25mg twice a day for rate control.  Georgia states sometimes she will feel her heart if she is anxious and takes deep breaths to calm down and then the palpitations go away.  Happens infrequent.  Heart rate is chronically irregular but not rapid.    (J44.9) Chronic obstructive pulmonary disease, unspecified COPD type (H)  Is on Combivent Respimat scheduled.  Has a CPAP at night if she uses it consistently.  Staff will help her with the CPAP.  Otherwise usually no exacerbations known.    (F41.9,  F32.A) Anxiety and depression  Comment: for 89 years old, Georgia is sharp and organized with her health.  She does become anxious until she knows things are in place/organized.    Remains on Remeron 7.5mg at HS and Melatonin 8mg po at hs for sleep/mood.  Does not complain of daytime sedation.          Possible Sleep Apnea: has a CPAP that staff will help her at night.     Implanted Device:  NONE    Risks and Recommendations:  The patient has the following additional risks and recommendations for perioperative complications:  Cardiovascular:   - EKG done as not aware of one for some time.  Known A fib with metoprolol for rate control and Baby ASA.    Pulmonary:    - Incentive spirometry post-op    Antiplatelet or Anticoagulation Medication Instructions:   - aspirin: Discontinue aspirin 7-10 days prior to procedure to reduce bleeding risk. It should be resumed postoperatively.     Additional Medication Instructions:  All medications on hold morning of procedure.  Will bring her own supply of Amoxicillin that she takes before a dental appointment in case she is directed to take in a timely fashion prior to procedure.  This NP called the clinic to inquire about the Amoxicillin as Georgia questioned this.      RECOMMENDATION:  APPROVAL GIVEN to proceed with proposed procedure, without further diagnostic evaluation.    [facility] reviewed  60 minutes spent by me on the date of the encounter doing chart review, review of outside records, review of test results, interpretation of tests, patient visit, documentation, and discussion with family       Subjective       HPI related to upcoming procedure: Tongue clipped years ago when cleaning teeth.  Tongue started to hurt about 1.5 years ago.  With encouragement from niece, Georgia went to dentist first and then referred to oral surgeon for further evaluation.  Most of cancer removed and now going to remove the rest that was confirmed with films/scan.           No data to display              Health Care Directive:  Patient has a Health Care Directive on file      Preoperative Review of :   reviewed - no record of controlled substances prescribed.      Status of Chronic Conditions:  A-FIB - Patient has a longstanding history of chronic A-fib currently on rate control. Current treatment regimen includes Aspirin and metoprolol 25mg twice a day  for stroke prevention and denies significant symptoms of lightheadedness, palpitations or dyspnea.     ANEMIA - Patient has a recent history of moderate-severe anemia, which has not been symptomatic. Work up to date has revealed HgB = 11.3. Treatment has been routine checks with injections via oncology/hematology that she follows.  Most recent appointment will be this afternoon 8/29/23.     CHF - Patient has a longstanding history of moderate-severe CHF. Exacerbating conditions include hypertension, COPD, and atrial fibrilation. Currently the patient's condition is same. Current treatment regimen includes diuretic and spirolactone. The patient denies chest pain, edema, orthopnea, SOB or recent weight gain. Last Echocardiogram 9/5/2022, EKG 8/29/23 (just prior to this procedure and read out will be sent with).     COPD - Patient has a longstanding history of moderate-severe COPD . Patient has been doing well overall noting  NO SYMPTOMS and continues on medication regimen consisting of Combivent Respimat without adverse reactions or side effects.    DEPRESSION - Patient has a long history of Depression of moderate severity requiring medication for control with recent symptoms being stable..Current symptoms of depression include insomnia, anxiety.     HYPERTENSION - Patient has longstanding history of HTN , currently denies any symptoms referable to elevated blood pressure. Specifically denies chest pain, palpitations, dyspnea, orthopnea, PND or peripheral edema. Blood pressure readings have been in normal range. Current medication regimen is as listed below. Patient denies any side effects of medication.   's/60's for what is in her senior living chart.    HYPOTHYROIDISM - Patient has a longstanding history of chronic Hypothyroidism. Patient has been doing well, noting no tremor, insomnia, hair loss or changes in skin texture. Continues to take medications as directed, without adverse reactions or side effects. Last TSH   Lab Results   Component Value Date    TSH 2.73 10/31/2022   .      RENAL INSUFFICIENCY - Patient has a longstanding history of moderate-severe chronic renal insufficiency. Last Cr 0.86 on 8/29/23.  Followed by Nephrology due to her hyponatremia     SLEEP PROBLEM - Patient has a longstanding history of unknown.. Patient has tried OTC medications with limited success. Georgia takes remeron and Melatonin.  Has a CPAP machine.    Review of Systems  CONSTITUTIONAL: NEGATIVE for fever, chills, change in weight  INTEGUMENTARY/SKIN: lower legs have what looks like bruising over veins.  Feel she may bump her legs on her wheelchair.  Skin is frail.  No open areas  EYES: NEGATIVE for vision changes or irritation  ENT/MOUTH: has the remaining tongue cancer and then in process for teeth to be removed and have dentures.  RESP: NEGATIVE for significant cough or SOB  CV: NEGATIVE for chest pain, palpitations or peripheral edema  GI:  NEGATIVE for nausea, abdominal pain, heartburn, or change in bowel habits  : NEGATIVE for frequency, dysuria, or hematuria  MUSCULOSKELETAL: NEGATIVE for significant arthralgias or myalgia  NEURO: NEGATIVE for weakness, dizziness or paresthesias  ENDOCRINE: NEGATIVE for temperature intolerance, skin/hair changes  HEME: NEGATIVE for bleeding problems  PSYCHIATRIC: NEGATIVE for changes in mood or affect    Patient Active Problem List    Diagnosis Date Noted    Chronic kidney disease, stage 3a (H) 05/01/2023     Priority: Medium    Coagulation defect, unspecified (H) 11/15/2022     Priority: Medium    Contraindication to anticoagulation therapy 10/23/2022     Priority: Medium    Family history of myocardial infarction 10/23/2022     Priority: Medium     father 64      Rapid atrial fibrillation (H) 09/06/2022     Priority: Medium    Demand ischemia of myocardium (H) 09/06/2022     Priority: Medium    Elevated troponin 09/06/2022     Priority: Medium    Atrial fibrillation with rapid ventricular response (H) 09/06/2022     Priority: Medium    Non-intractable vomiting with nausea, unspecified vomiting type 09/06/2022     Priority: Medium    Anxiety and depression 08/13/2021     Priority: Medium     Formatting of this note might be different from the original.  Created by Conversion      Arteriosclerosis of coronary artery 08/13/2021     Priority: Medium    Sleep apnea 08/13/2021     Priority: Medium    Constipation, unspecified 08/13/2021     Priority: Medium    Fecal incontinence alternating with constipation 08/13/2021     Priority: Medium    Fever 08/13/2021     Priority: Medium    Hyperlipidemia LDL goal <100 08/13/2021     Priority: Medium     Formatting of this note might be different from the original.  Created by Conversion      Hypokalemia 08/13/2021     Priority: Medium    Irregular bowel habits 08/13/2021     Priority: Medium    Polyarthropathy or polyarthritis, hand 08/13/2021     Priority: Medium      Formatting of this note might be different from the original.  Created by Conversion    Replacement Utility updated for latest IMO load    Formatting of this note might be different from the original.  Created by Conversion    Replacement Utility updated for latest IMO load      Pulmonary edema cardiac cause (H) 08/13/2021     Priority: Medium    Tachycardia 08/13/2021     Priority: Medium    Chronic obstructive pulmonary disease, unspecified COPD type (H)      Priority: Medium     Formatting of this note might be different from the original.  Created by Conversion      Rash of body 10/08/2020     Priority: Medium    Heart valve disease 09/02/2020     Priority: Medium     Formatting of this note might be different from the original.  Created by Conversion    Replacement Utility updated for latest IMO load      Diastolic CHF (H) 07/01/2020     Priority: Medium    Hypertension 04/23/2020     Priority: Medium     Formatting of this note might be different from the original.  Created by Conversion    Replacement Utility updated for latest IMO load      Sjogren's syndrome (H) 04/23/2020     Priority: Medium     Formatting of this note might be different from the original.  Created by Conversion      Dyslipidemia 03/20/2020     Priority: Medium    Generalized muscle weakness 03/20/2020     Priority: Medium    Physical deconditioning 03/20/2020     Priority: Medium    Rectal prolapse 01/10/2018     Priority: Medium    Hyponatremia 09/06/2017     Priority: Medium    Carpal tunnel syndrome, right 11/19/2015     Priority: Medium      Past Medical History:   Diagnosis Date    Asthma     Bilateral carpal tunnel syndrome 08/27/2015    CAD (coronary artery disease)     Chronic airway obstruction, not elsewhere classified     Created by Conversion     Chronic anemia     Chronic edema     Congestive heart failure, severe (H) 05/13/2020    COPD (chronic obstructive pulmonary disease) (H)     Coronary artery disease     Depression      GERD (gastroesophageal reflux disease)     Heart disease     Heart murmur     High cholesterol     dislipidemia    HTN (hypertension)     Hypercholesterolemia     Hypertension     Hypertensive emergency 8/13/2021    Hypothyroidism     Hypothyroidism     MI (myocardial infarction) (H) 1985    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    Myelodysplastic syndrome (H)     Myocardial infarction (H) 1983    OLEGARIO (obstructive sleep apnea)     Osteoporosis     Other and unspecified hyperlipidemia     Created by Conversion     Pyelonephritis 5/11/2016    Radicular low back pain     Rheumatoid arthritis (H)     Elizabeth Agers syndrome     Sjoegren syndrome     Sjogren's syndrome (H)     Sleep apnea, obstructive     Spinal stenosis     Unspecified polyarthropathy or polyarthritis, hand     Created by Conversion      Past Surgical History:   Procedure Laterality Date    aortic valve      AORTIC VALVE REPLACEMENT  2012    APPENDECTOMY      APPENDECTOMY      AS TOTAL KNEE ARTHROPLASTY Right     BACK SURGERY  2004    spinal decompression    BACK SURGERY      spinal stenosis    BONE MARROW BIOPSY  2004    breast biopsy      BREAST SURGERY  2001    biopsy    BYPASS GRAFT ARTERY CORONARY  2009    LIMA to ramus intermedius    cardiac angiogram      CARDIAC CATHETERIZATION      CARDIAC SURGERY      EYE SURGERY  2008    bilateral cataract repair     EYE SURGERY Bilateral     cornea transplant left eye & cataracts    KYPHOPLASTY  2003    T12    OPEN REDUCTION INTERNAL FIXATION HIP NAILING Right 8/13/2021    Procedure: INTERNAL FIXATION, FRACTURE, TROCHANTERIC, HIP, USING PINS OR RODS;  Surgeon: Darrel Castro MD;  Location: [facility] OR    OTHER SURGICAL HISTORY  01/2018    Rectosigmoidectomy perineal    RECTOSIGMOIDECTOMY PERINEAL N/A 1/10/2018    Procedure: RECTOSIGMOIDECTOMY PERINEAL;  PERINEAL RECTOSIGMOIDECTOMY ;  Surgeon: Candelaria Anthony MD;  Location: [facility] OR    REPAIR VALVE AORTIC  2009    THORACIC SURGERY   2003    T12 kyphoplasty    [facility] CABG, ARTERY-VEIN, FOUR      [facility] CABG, VEIN, SINGLE      Description: CABG (CABG);  Recorded: 03/09/2012;  Comments: Single vessel bypass graft to an obtuse marginal branch in May 2009    [facility] REPLACE AORT VALV,PROSTH VALV      Description: Aortic Valve Replacement;  Recorded: 03/09/2012;  Comments: Aortic valve replacement     Current Outpatient Medications   Medication Sig Dispense Refill    acetaminophen (TYLENOL) 325 MG tablet Take 3 tablets (975 mg) by mouth every 8 hours as needed for mild pain      albuterol (PROAIR HFA/PROVENTIL HFA/VENTOLIN HFA) 108 (90 Base) MCG/ACT inhaler Inhale 2 puffs into the lungs every 6 hours      amoxicillin (AMOXIL) 500 MG capsule 4 CAPSULES (2000MG ) ORALLY AS NEEDED ONE HOUR PRIOR TO DENTAL APPOINTMENT 4 capsule 5    Ascorbic Acid (VITAMIN C PO) Take 500 mg by mouth every morning      ASPIRIN LOW DOSE 81 MG EC tablet 1 TABLET ORALLY 2 TIMES DAILY (DX: PROPHYLAXIS) 56 tablet 11    Bacillus Coagulans-Inulin (PROBIOTIC FORMULA) 1-250 BILLION-MG CAPS 1 CAPSULE ORALLY DAILY 28 capsule 11    bisacodyl (DULCOLAX) 5 MG EC tablet 1 TABLET ORALLY DAILY AS NEEDED (MAY KEEP AT BEDSIDE) 30 tablet 10    calcium carbonate (TUMS) 500 MG chewable tablet Take 2 tablets (1,000 mg) by mouth 2 times daily as needed for heartburn      CALCIUM+D3 600-20 MG-MCG TABS 1 TABLET ORALLY DAILY WITH A MEAL 28 tablet 11    cholecalciferol (VITAMIN D3) 125 mcg (5000 units) capsule 1 CAPSULE ORALLY DAILY (DX: OSTEOPOROSIS) 28 capsule 11    COMBIVENT RESPIMAT  MCG/ACT inhaler INHALE 1 PUFF INTO THE LUNGS  4 TIMES DAILY 4 g 11    cycloSPORINE (RESTASIS) 0.05 % ophthalmic emulsion Place 1 drop into both eyes 2 times daily       demeclocycline (DECLOMYCIN) 150 MG tablet Take 1 tablet (150 mg) by mouth 2 times daily      fish oil-omega-3 fatty acids 1000 MG capsule 1 CAPSULE ORALLY DAILY 28 capsule 11    fluorometholone (FML LIQUIFILM) 0.1 % ophthalmic susp Place 1 drop Into the  left eye daily       IBANDRONATE SODIUM PO Take 150 mg by mouth every 30 days      levothyroxine (SYNTHROID/LEVOTHROID) 88 MCG tablet 1 TABLET ORALLY EVERY MORNING ON AN EMPTY STOMACH 31 tablet 11    loratadine (CLARITIN) 10 MG tablet 1 TABLET ORALLY DAILY (DX: ASTHMA) 28 tablet 11    melatonin 3 MG tablet 3mg to be given with 5mg = 8mg at HS 30 tablet 11    melatonin 5 MG tablet 5mg to be given with 3mg tab = 8mg at HS 30 tablet 11    metoprolol tartrate (LOPRESSOR) 25 MG tablet 1 TABLET ORALLY 2 TIMES DAILY (DX: HYPERTENSION) 56 tablet 11    METOPROLOL TARTRATE PO Take 25 mg by mouth 2 times daily       mirtazapine (REMERON) 7.5 MG tablet 1 TABLET ORALLY AT BEDTIME 28 tablet 11    Multiple Vitamins-Minerals (PRESERVISION AREDS 2) CAPS 1 CAPSULE ORALLY 2 TIMES DAILY 56 capsule 11    omeprazole (PRILOSEC) 20 MG DR capsule 1 CAPSULE ORALLY 2 TIMES DAILY (DX: GASTROESOPHAGEAL REFLUX DISEASE) 62 capsule 11    OXYGEN-HELIUM IN       polyethylene glycol 0.4%- propylene glycol 0.3% (SYSTANE ULTRA) 0.4-0.3 % SOLN ophthalmic solution Place 1 drop into both eyes 4 times daily      Polyethylene Glycol 3350 (PEG 3350) 17 GM/SCOOP POWD MIX 1 CAPFUL (17 GMS) IN 8 OUNCES OF JUICE OR WATER AND DRINK ORALLY DAILY AS NEEDED (DX: CONSTIPATION) 510 g 10    potassium chloride ER (K-TAB/KLOR-CON) 10 MEQ CR tablet 1 TABLET ORALLY DAILY 30 tablet 11    Probiotic Product (PROBIOTIC PO) Take 1 capsule by mouth every morning      SENEXON-S 8.6-50 MG tablet 1 TABLET ORALLY 2 TIMES DAILY (DX: CONSTIPATION) 56 tablet 11    sodium chloride 1 GM tablet 1 TABLET ORALLY 2 TIMES DAILY 56 tablet 11    spironolactone (ALDACTONE) 25 MG tablet 1 TABLET ORALLY DAILY (DX: EDEMA) ** HAZARDOUS MED: WEAR DOUBLE NITRILE GLOVES** 28 tablet 11    spironolactone (ALDACTONE) 50 MG tablet Take 25 mg by mouth daily       timolol (TIMOPTIC) 0.5 % ophthalmic solution Place 1 drop Into the left eye daily       torsemide (DEMADEX) 20 MG tablet 1.5 TABLET (30mg) ORALLY  DAILY 31 tablet 11    vitamin C (ASCORBIC ACID) 500 MG tablet 1 TABLET ORALLY DAILY (DX: SUPPLEMENT) 31 tablet 11       Allergies   Allergen Reactions    Gramineae Pollens Other (See Comments)     ORCHARDGRASS. Shortness of breath when lawn is just cut.    Smoke. Other (See Comments)     Hard to breathe.    Pollen Extract      Other reaction(s): Unknown        Social History     Tobacco Use    Smoking status: Never    Smokeless tobacco: Never   Substance Use Topics    Alcohol use: No     Alcohol/week: 0.0 standard drinks of alcohol     Comment: Alcoholic Drinks/day: occasional glass of wine       History   Drug Use No         Objective     /61   Pulse 99   Temp 98.2  F (36.8  C)   Resp 18   Wt 47.7 kg (105 lb 3.2 oz)   SpO2 94%   BMI 18.06 kg/m      Physical Exam    GENERAL APPEARANCE: healthy, alert and no distress     EYES: Eyes grossly normal to inspection and wears corrective lenses with the right lens has a grid present to help with vision.     HENT: able to hear conversation.  Poor repair of teeth.  Cancer on right side of tongue.     NECK: no adenopathy, no asymmetry, masses, or scars and thyroid normal to palpation     RESP: lungs clear to auscultation - no rales, rhonchi or wheezes     CV: irregular rhythm and no edema.  Podiatry trims nails     ABDOMEN:  soft, nontender, no HSM or masses and bowel sounds normal     MS: extremities normal- no gross deformities noted, no evidence of inflammation in joints, FROM in all extremities.     SKIN: has small bruises on lower legs.  Feel she may easily bump her legs but are over veins.  Skin is pink, dry, and frail.     NEURO: Normal strength and tone, sensory exam grossly normal, mentation intact and speech normal.  Propels self in wheelchair.  Can ambulate with walker slowly.     PSYCH: mentation appears normal. and affect normal/bright     LYMPHATICS: No cervical adenopathy    Recent Labs   Lab Test 08/28/23  1000 08/07/23  1206 12/05/22  1030  11/28/22  1303 09/06/22  0850 09/05/22  2134   HGB 11.3*  --   --  12.7   < > 10.2*     --   --  269   < > 185   INR  --   --   --   --   --  1.23*   * 130*   < > 127*   < > 125*   POTASSIUM 3.6 4.3   < > 3.6   < > 3.4*   CR 0.86 0.88   < > 0.84   < > 1.18*    < > = values in this interval not displayed.        Diagnostics:  See labs from yesterday.  Results will be sent with   EKG: atrial fibrillation, rate 70.  Results will be sent with  as done from outside agency    Revised Cardiac Risk Index (RCRI):  The patient has the following serious cardiovascular risks for perioperative complications:   - Congestive Heart Failure (pulmonary edema, PND, s3 maninder, CXR with pulmonary congestion, basilar rales) = 1 point     RCRI Interpretation: 1 point: Class II (low risk - 0.9% complication rate)         Signed Electronically by: CASTRO Hazel CNP  Copy of this evaluation report is provided to requesting physician.

## 2023-08-29 NOTE — LETTER
8/29/2023        RE: Marla Dunn  C/o Deneen Dunn  [address] [place] [id number]        No notes on file      Sincerely,        CASTRO Hazel CNP

## 2023-08-29 NOTE — LETTER
8/29/2023        RE: Marla Dunn  C/o Deneen Dunn  [address] [address]        [facility]  [address] [place] [address]  Phone: [phone]  Fax: [phone]  Primary Provider: Jorge Luis Wallace  Pre-op Performing Provider: JORGE LUIS WALLACE      PREOPERATIVE EVALUATION:  Today's date: 8/29/2023    Marla Dunn is a 89 year old female who presents for a preoperative evaluation.    Surgical Information:  Surgery/Procedure: removal of tongue cancer and pig skin covering for healing  Surgery Location: [facility]  Surgeon: Dr. Greene  Surgery Date: 9/2/2023  Time of Surgery: 10:30AM  Where patient plans to recover: Other: at home in assisted living  Fax number for surgical facility: [phone]    Assessment & Plan    The proposed surgical procedure is considered LOW risk.    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  Labs and portable EKG done and all will be sent with Georgia on morning of surgery.  She will also bring her Amoxicillin along in case she is asked to take it before procedure.  This can be determined at surgery center.  Plan: Clear for surgery    (I50.32) Chronic diastolic congestive heart failure (H)  (E87.6) Hypokalemia  (N18.31) Chronic kidney disease, stage 3a (H)  Comment: Georgia is maintained with Torsemide 30mg daily and Spironolactone 25mg daily for the heart failure.  Has not had any exacerbations since this NP has known her for about 1.5 years.  Labs (BMP monthly) watched carefully due to her hyponatremia and under care of a nephrologist.  For the hyponatremia she receives Demeclocycline 150mg twice a day, and NaCl 1GM twice a day.  The Diuretics were adjusted as well to find the balance of keeping her Na above 130.       (I48.91) Atrial fibrillation with rapid ventricular response (H)  Takes ASA 81mg po daily which has been on hold starting this past Sunday.  She does take metoprolol 25mg twice a day for rate control.  Georgia states sometimes she will feel her heart if she is anxious and takes deep breaths to calm down and then the palpitations go away.  Happens infrequent.  Heart rate is chronically irregular but not rapid.    (J44.9) Chronic obstructive pulmonary disease, unspecified COPD type (H)  Is on Combivent Respimat scheduled.  Has a CPAP at night if she uses it consistently.  Staff will help her with the CPAP.  Otherwise usually no exacerbations known.    (F41.9,  F32.A) Anxiety and depression  Comment: for 89 years old, Georgia is sharp and organized with her health.  She does become anxious until she knows things are in place/organized.    Remains on Remeron 7.5mg at HS and Melatonin 8mg po at hs for sleep/mood.  Does not complain of daytime sedation.          Possible Sleep Apnea: has a CPAP that staff will help her at night.     Implanted Device:  NONE    Risks and Recommendations:  The patient has the following additional risks and recommendations for perioperative complications:  Cardiovascular:   - EKG done as not aware of one for some time.  Known A fib with metoprolol for rate control and Baby ASA.    Pulmonary:    - Incentive spirometry post-op    Antiplatelet or Anticoagulation Medication Instructions:   - aspirin: Discontinue aspirin 7-10 days prior to procedure to reduce bleeding risk. It should be resumed postoperatively.     Additional Medication Instructions:  All medications on hold morning of procedure.  Will bring her own supply of Amoxicillin that she takes before a dental appointment in case she is directed to take in a timely fashion prior to procedure.  This NP called the clinic to inquire about the Amoxicillin as Georgia questioned this.      RECOMMENDATION:  APPROVAL GIVEN to proceed with  proposed procedure, without further diagnostic evaluation.    Review of prior external note(s) from - [facility]where information from [facility] reviewed  60 minutes spent by me on the date of the encounter doing chart review, review of outside records, review of test results, interpretation of tests, patient visit, documentation, and discussion with family       Subjective      HPI related to upcoming procedure: Tongue clipped years ago when cleaning teeth.  Tongue started to hurt about 1.5 years ago.  With encouragement from niece, Georgia went to dentist first and then referred to oral surgeon for further evaluation.  Most of cancer removed and now going to remove the rest that was confirmed with films/scan.           No data to display              Health Care Directive:  Patient has a Health Care Directive on file      Preoperative Review of :   reviewed - no record of controlled substances prescribed.      Status of Chronic Conditions:  A-FIB - Patient has a longstanding history of chronic A-fib currently on rate control. Current treatment regimen includes Aspirin and metoprolol 25mg twice a day  for stroke prevention and denies significant symptoms of lightheadedness, palpitations or dyspnea.     ANEMIA - Patient has a recent history of moderate-severe anemia, which has not been symptomatic. Work up to date has revealed HgB = 11.3. Treatment has been routine checks with injections via oncology/hematology that she follows.  Most recent appointment will be this afternoon 8/29/23.     CHF - Patient has a longstanding history of moderate-severe CHF. Exacerbating conditions include hypertension, COPD, and atrial fibrilation. Currently the patient's condition is same. Current treatment regimen includes diuretic and spirolactone. The patient denies chest pain, edema, orthopnea, SOB or recent weight gain. Last Echocardiogram 9/5/2022, EKG 8/29/23 (just prior to this procedure and read out will be sent  with).     COPD - Patient has a longstanding history of moderate-severe COPD . Patient has been doing well overall noting NO SYMPTOMS and continues on medication regimen consisting of Combivent Respimat without adverse reactions or side effects.    DEPRESSION - Patient has a long history of Depression of moderate severity requiring medication for control with recent symptoms being stable..Current symptoms of depression include insomnia, anxiety.     HYPERTENSION - Patient has longstanding history of HTN , currently denies any symptoms referable to elevated blood pressure. Specifically denies chest pain, palpitations, dyspnea, orthopnea, PND or peripheral edema. Blood pressure readings have been in normal range. Current medication regimen is as listed below. Patient denies any side effects of medication.   's/60's for what is in her [facility] chart.    HYPOTHYROIDISM - Patient has a longstanding history of chronic Hypothyroidism. Patient has been doing well, noting no tremor, insomnia, hair loss or changes in skin texture. Continues to take medications as directed, without adverse reactions or side effects. Last TSH   Lab Results   Component Value Date    TSH 2.73 10/31/2022   .      RENAL INSUFFICIENCY - Patient has a longstanding history of moderate-severe chronic renal insufficiency. Last Cr 0.86 on 8/29/23.  Followed by Nephrology due to her hyponatremia     SLEEP PROBLEM - Patient has a longstanding history of unknown.. Patient has tried OTC medications with limited success. Georgia takes remeron and Melatonin.  Has a CPAP machine.    Review of Systems  CONSTITUTIONAL: NEGATIVE for fever, chills, change in weight  INTEGUMENTARY/SKIN: lower legs have what looks like bruising over veins.  Feel she may bump her legs on her wheelchair.  Skin is frail.  No open areas  EYES: NEGATIVE for vision changes or irritation  ENT/MOUTH: has the remaining tongue cancer and then in process for teeth to be removed and have  dentures.  RESP: NEGATIVE for significant cough or SOB  CV: NEGATIVE for chest pain, palpitations or peripheral edema  GI: NEGATIVE for nausea, abdominal pain, heartburn, or change in bowel habits  : NEGATIVE for frequency, dysuria, or hematuria  MUSCULOSKELETAL: NEGATIVE for significant arthralgias or myalgia  NEURO: NEGATIVE for weakness, dizziness or paresthesias  ENDOCRINE: NEGATIVE for temperature intolerance, skin/hair changes  HEME: NEGATIVE for bleeding problems  PSYCHIATRIC: NEGATIVE for changes in mood or affect    Patient Active Problem List    Diagnosis Date Noted     Chronic kidney disease, stage 3a (H) 05/01/2023     Priority: Medium     Coagulation defect, unspecified (H) 11/15/2022     Priority: Medium     Contraindication to anticoagulation therapy 10/23/2022     Priority: Medium     Family history of myocardial infarction 10/23/2022     Priority: Medium     father 64       Rapid atrial fibrillation (H) 09/06/2022     Priority: Medium     Demand ischemia of myocardium (H) 09/06/2022     Priority: Medium     Elevated troponin 09/06/2022     Priority: Medium     Atrial fibrillation with rapid ventricular response (H) 09/06/2022     Priority: Medium     Non-intractable vomiting with nausea, unspecified vomiting type 09/06/2022     Priority: Medium     Anxiety and depression 08/13/2021     Priority: Medium     Formatting of this note might be different from the original.  Created by Conversion       Arteriosclerosis of coronary artery 08/13/2021     Priority: Medium     Sleep apnea 08/13/2021     Priority: Medium     Constipation, unspecified 08/13/2021     Priority: Medium     Fecal incontinence alternating with constipation 08/13/2021     Priority: Medium     Fever 08/13/2021     Priority: Medium     Hyperlipidemia LDL goal <100 08/13/2021     Priority: Medium     Formatting of this note might be different from the original.  Created by Conversion       Hypokalemia 08/13/2021     Priority: Medium      Irregular bowel habits 08/13/2021     Priority: Medium     Polyarthropathy or polyarthritis, hand 08/13/2021     Priority: Medium     Formatting of this note might be different from the original.  Created by Conversion    Replacement Utility updated for latest IMO load    Formatting of this note might be different from the original.  Created by Conversion    Replacement Utility updated for latest IMO load       Pulmonary edema cardiac cause (H) 08/13/2021     Priority: Medium     Tachycardia 08/13/2021     Priority: Medium     Chronic obstructive pulmonary disease, unspecified COPD type (H)      Priority: Medium     Formatting of this note might be different from the original.  Created by Conversion       Rash of body 10/08/2020     Priority: Medium     Heart valve disease 09/02/2020     Priority: Medium     Formatting of this note might be different from the original.  Created by Conversion    Replacement Utility updated for latest IMO load       Diastolic CHF (H) 07/01/2020     Priority: Medium     Hypertension 04/23/2020     Priority: Medium     Formatting of this note might be different from the original.  Created by Conversion    Replacement Utility updated for latest IMO load       Sjogren's syndrome (H) 04/23/2020     Priority: Medium     Formatting of this note might be different from the original.  Created by Conversion       Dyslipidemia 03/20/2020     Priority: Medium     Generalized muscle weakness 03/20/2020     Priority: Medium     Physical deconditioning 03/20/2020     Priority: Medium     Rectal prolapse 01/10/2018     Priority: Medium     Hyponatremia 09/06/2017     Priority: Medium     Carpal tunnel syndrome, right 11/19/2015     Priority: Medium      Past Medical History:   Diagnosis Date     Asthma      Bilateral carpal tunnel syndrome 08/27/2015     CAD (coronary artery disease)      Chronic airway obstruction, not elsewhere classified     Created by Conversion      Chronic anemia       Chronic edema      Congestive heart failure, severe (H) 05/13/2020     COPD (chronic obstructive pulmonary disease) (H)      Coronary artery disease      Depression      GERD (gastroesophageal reflux disease)      Heart disease      Heart murmur      High cholesterol     dislipidemia     HTN (hypertension)      Hypercholesterolemia      Hypertension      Hypertensive emergency 8/13/2021     Hypothyroidism      Hypothyroidism      MI (myocardial infarction) (H) 1985     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     Myelodysplastic syndrome (H)      Myocardial infarction (H) 1983     OLEGARIO (obstructive sleep apnea)      Osteoporosis      Other and unspecified hyperlipidemia     Created by Conversion      Pyelonephritis 5/11/2016     Radicular low back pain      Rheumatoid arthritis (H)      Elizabeth Agers syndrome      Sjoegren syndrome      Sjogren's syndrome (H)      Sleep apnea, obstructive      Spinal stenosis      Unspecified polyarthropathy or polyarthritis, hand     Created by Conversion      Past Surgical History:   Procedure Laterality Date     aortic valve       AORTIC VALVE REPLACEMENT  2012     APPENDECTOMY       APPENDECTOMY       AS TOTAL KNEE ARTHROPLASTY Right      BACK SURGERY  2004    spinal decompression     BACK SURGERY      spinal stenosis     BONE MARROW BIOPSY  2004     breast biopsy       BREAST SURGERY  2001    biopsy     BYPASS GRAFT ARTERY CORONARY  2009    LIMA to ramus intermedius     cardiac angiogram       CARDIAC CATHETERIZATION       CARDIAC SURGERY       EYE SURGERY  2008    bilateral cataract repair      EYE SURGERY Bilateral     cornea transplant left eye & cataracts     KYPHOPLASTY  2003    T12     OPEN REDUCTION INTERNAL FIXATION HIP NAILING Right 8/13/2021    Procedure: INTERNAL FIXATION, FRACTURE, TROCHANTERIC, HIP, USING PINS OR RODS;  Surgeon: Darrel Castro MD;  Location: [facility] OR     OTHER SURGICAL HISTORY  01/2018    Rectosigmoidectomy perineal      RECTOSIGMOIDECTOMY PERINEAL N/A 1/10/2018    Procedure: RECTOSIGMOIDECTOMY PERINEAL;  PERINEAL RECTOSIGMOIDECTOMY ;  Surgeon: Candelaria Anthony MD;  Location: [facility] OR     REPAIR VALVE AORTIC  2009     THORACIC SURGERY  2003    T12 kyphoplasty     ZZC CABG, ARTERY-VEIN, FOUR       ZZC CABG, VEIN, SINGLE      Description: CABG (CABG);  Recorded: 03/09/2012;  Comments: Single vessel bypass graft to an obtuse marginal branch in May 2009     ZZC REPLACE AORT VALV,PROSTH VALV      Description: Aortic Valve Replacement;  Recorded: 03/09/2012;  Comments: Aortic valve replacement     Current Outpatient Medications   Medication Sig Dispense Refill     acetaminophen (TYLENOL) 325 MG tablet Take 3 tablets (975 mg) by mouth every 8 hours as needed for mild pain       albuterol (PROAIR HFA/PROVENTIL HFA/VENTOLIN HFA) 108 (90 Base) MCG/ACT inhaler Inhale 2 puffs into the lungs every 6 hours       amoxicillin (AMOXIL) 500 MG capsule 4 CAPSULES (2000MG ) ORALLY AS NEEDED ONE HOUR PRIOR TO DENTAL APPOINTMENT 4 capsule 5     Ascorbic Acid (VITAMIN C PO) Take 500 mg by mouth every morning       ASPIRIN LOW DOSE 81 MG EC tablet 1 TABLET ORALLY 2 TIMES DAILY (DX: PROPHYLAXIS) 56 tablet 11     Bacillus Coagulans-Inulin (PROBIOTIC FORMULA) 1-250 BILLION-MG CAPS 1 CAPSULE ORALLY DAILY 28 capsule 11     bisacodyl (DULCOLAX) 5 MG EC tablet 1 TABLET ORALLY DAILY AS NEEDED (MAY KEEP AT BEDSIDE) 30 tablet 10     calcium carbonate (TUMS) 500 MG chewable tablet Take 2 tablets (1,000 mg) by mouth 2 times daily as needed for heartburn       CALCIUM+D3 600-20 MG-MCG TABS 1 TABLET ORALLY DAILY WITH A MEAL 28 tablet 11     cholecalciferol (VITAMIN D3) 125 mcg (5000 units) capsule 1 CAPSULE ORALLY DAILY (DX: OSTEOPOROSIS) 28 capsule 11     COMBIVENT RESPIMAT  MCG/ACT inhaler INHALE 1 PUFF INTO THE LUNGS  4 TIMES DAILY 4 g 11     cycloSPORINE (RESTASIS) 0.05 % ophthalmic emulsion Place 1 drop into both eyes 2 times daily        demeclocycline  (DECLOMYCIN) 150 MG tablet Take 1 tablet (150 mg) by mouth 2 times daily       fish oil-omega-3 fatty acids 1000 MG capsule 1 CAPSULE ORALLY DAILY 28 capsule 11     fluorometholone (FML LIQUIFILM) 0.1 % ophthalmic susp Place 1 drop Into the left eye daily        IBANDRONATE SODIUM PO Take 150 mg by mouth every 30 days       levothyroxine (SYNTHROID/LEVOTHROID) 88 MCG tablet 1 TABLET ORALLY EVERY MORNING ON AN EMPTY STOMACH 31 tablet 11     loratadine (CLARITIN) 10 MG tablet 1 TABLET ORALLY DAILY (DX: ASTHMA) 28 tablet 11     melatonin 3 MG tablet 3mg to be given with 5mg = 8mg at HS 30 tablet 11     melatonin 5 MG tablet 5mg to be given with 3mg tab = 8mg at HS 30 tablet 11     metoprolol tartrate (LOPRESSOR) 25 MG tablet 1 TABLET ORALLY 2 TIMES DAILY (DX: HYPERTENSION) 56 tablet 11     METOPROLOL TARTRATE PO Take 25 mg by mouth 2 times daily        mirtazapine (REMERON) 7.5 MG tablet 1 TABLET ORALLY AT BEDTIME 28 tablet 11     Multiple Vitamins-Minerals (PRESERVISION AREDS 2) CAPS 1 CAPSULE ORALLY 2 TIMES DAILY 56 capsule 11     omeprazole (PRILOSEC) 20 MG DR capsule 1 CAPSULE ORALLY 2 TIMES DAILY (DX: GASTROESOPHAGEAL REFLUX DISEASE) 62 capsule 11     OXYGEN-HELIUM IN        polyethylene glycol 0.4%- propylene glycol 0.3% (SYSTANE ULTRA) 0.4-0.3 % SOLN ophthalmic solution Place 1 drop into both eyes 4 times daily       Polyethylene Glycol 3350 (PEG 3350) 17 GM/SCOOP POWD MIX 1 CAPFUL (17 GMS) IN 8 OUNCES OF JUICE OR WATER AND DRINK ORALLY DAILY AS NEEDED (DX: CONSTIPATION) 510 g 10     potassium chloride ER (K-TAB/KLOR-CON) 10 MEQ CR tablet 1 TABLET ORALLY DAILY 30 tablet 11     Probiotic Product (PROBIOTIC PO) Take 1 capsule by mouth every morning       SENEXON-S 8.6-50 MG tablet 1 TABLET ORALLY 2 TIMES DAILY (DX: CONSTIPATION) 56 tablet 11     sodium chloride 1 GM tablet 1 TABLET ORALLY 2 TIMES DAILY 56 tablet 11     spironolactone (ALDACTONE) 25 MG tablet 1 TABLET ORALLY DAILY (DX: EDEMA) ** HAZARDOUS MED: WEAR  DOUBLE NITRILE GLOVES** 28 tablet 11     spironolactone (ALDACTONE) 50 MG tablet Take 25 mg by mouth daily        timolol (TIMOPTIC) 0.5 % ophthalmic solution Place 1 drop Into the left eye daily        torsemide (DEMADEX) 20 MG tablet 1.5 TABLET (30mg) ORALLY DAILY 31 tablet 11     vitamin C (ASCORBIC ACID) 500 MG tablet 1 TABLET ORALLY DAILY (DX: SUPPLEMENT) 31 tablet 11       Allergies   Allergen Reactions     Gramineae Pollens Other (See Comments)     ORCHARDGRASS. Shortness of breath when lawn is just cut.     Smoke. Other (See Comments)     Hard to breathe.     Pollen Extract      Other reaction(s): Unknown        Social History     Tobacco Use     Smoking status: Never     Smokeless tobacco: Never   Substance Use Topics     Alcohol use: No     Alcohol/week: 0.0 standard drinks of alcohol     Comment: Alcoholic Drinks/day: occasional glass of wine       History   Drug Use No         Objective    /61   Pulse 99   Temp 98.2  F (36.8  C)   Resp 18   Wt 47.7 kg (105 lb 3.2 oz)   SpO2 94%   BMI 18.06 kg/m      Physical Exam    GENERAL APPEARANCE: healthy, alert and no distress     EYES: Eyes grossly normal to inspection and wears corrective lenses with the right lens has a grid present to help with vision.     HENT: able to hear conversation.  Poor repair of teeth.  Cancer on right side of tongue.     NECK: no adenopathy, no asymmetry, masses, or scars and thyroid normal to palpation     RESP: lungs clear to auscultation - no rales, rhonchi or wheezes     CV: irregular rhythm and no edema.  Podiatry trims nails     ABDOMEN:  soft, nontender, no HSM or masses and bowel sounds normal     MS: extremities normal- no gross deformities noted, no evidence of inflammation in joints, FROM in all extremities.     SKIN: has small bruises on lower legs.  Feel she may easily bump her legs but are over veins.  Skin is pink, dry, and frail.     NEURO: Normal strength and tone, sensory exam grossly normal, mentation  intact and speech normal.  Propels self in wheelchair.  Can ambulate with walker slowly.     PSYCH: mentation appears normal. and affect normal/bright     LYMPHATICS: No cervical adenopathy    Recent Labs   Lab Test 08/28/23  1000 08/07/23  1206 12/05/22  1030 11/28/22  1303 09/06/22  0850 09/05/22  2134   HGB 11.3*  --   --  12.7   < > 10.2*     --   --  269   < > 185   INR  --   --   --   --   --  1.23*   * 130*   < > 127*   < > 125*   POTASSIUM 3.6 4.3   < > 3.6   < > 3.4*   CR 0.86 0.88   < > 0.84   < > 1.18*    < > = values in this interval not displayed.        Diagnostics:  See labs from yesterday.  Results will be sent with   EKG: atrial fibrillation, rate 70.  Results will be sent with  as done from outside agency    Revised Cardiac Risk Index (RCRI):  The patient has the following serious cardiovascular risks for perioperative complications:   - Congestive Heart Failure (pulmonary edema, PND, s3 maninder, CXR with pulmonary congestion, basilar rales) = 1 point     RCRI Interpretation: 1 point: Class II (low risk - 0.9% complication rate)         Signed Electronically by: CASTRO Hazel CNP  Copy of this evaluation report is provided to requesting physician.          Sincerely,        CASTRO Hazel CNP

## 2023-08-30 PROBLEM — C02.1: Status: ACTIVE | Noted: 2023-08-30

## 2023-09-01 ENCOUNTER — LAB REQUISITION (OUTPATIENT)
Dept: LAB | Facility: CLINIC | Age: 88
End: 2023-09-01
Payer: COMMERCIAL

## 2023-09-01 DIAGNOSIS — I50.9 HEART FAILURE, UNSPECIFIED (H): ICD-10-CM

## 2023-09-05 LAB
ANION GAP SERPL CALCULATED.3IONS-SCNC: 13 MMOL/L (ref 7–15)
BUN SERPL-MCNC: 40.2 MG/DL (ref 8–23)
CALCIUM SERPL-MCNC: 10.1 MG/DL (ref 8.2–9.6)
CHLORIDE SERPL-SCNC: 102 MMOL/L (ref 98–107)
CREAT SERPL-MCNC: 1.07 MG/DL (ref 0.51–0.95)
DEPRECATED HCO3 PLAS-SCNC: 21 MMOL/L (ref 22–29)
GFR SERPL CREATININE-BSD FRML MDRD: 48 ML/MIN/1.73M2
GLUCOSE SERPL-MCNC: 97 MG/DL (ref 70–99)
POTASSIUM SERPL-SCNC: 4 MMOL/L (ref 3.4–5.3)
SODIUM SERPL-SCNC: 136 MMOL/L (ref 136–145)

## 2023-09-05 PROCEDURE — P9604 ONE-WAY ALLOW PRORATED TRIP: HCPCS | Mod: ORL | Performed by: INTERNAL MEDICINE

## 2023-09-05 PROCEDURE — 36415 COLL VENOUS BLD VENIPUNCTURE: CPT | Mod: ORL | Performed by: INTERNAL MEDICINE

## 2023-09-05 PROCEDURE — 80048 BASIC METABOLIC PNL TOTAL CA: CPT | Mod: ORL | Performed by: INTERNAL MEDICINE

## 2023-09-05 NOTE — PROGRESS NOTES
Monthly BMP came through and Georgia's Ca+ level was >10.0  Typically It is on the high normal side.    Today will discontinue the Ca+ with Vitamin D supplement.  Has monthly BMP done and so will wait until next months results.    Orders:  Discontinue Calcium with Vitamin D supplement as Calcium level above normal        CASTRO Hazel CNP

## 2023-09-12 ENCOUNTER — ASSISTED LIVING VISIT (OUTPATIENT)
Dept: GERIATRICS | Facility: CLINIC | Age: 88
End: 2023-09-12
Payer: COMMERCIAL

## 2023-09-12 VITALS
OXYGEN SATURATION: 94 % | RESPIRATION RATE: 18 BRPM | BODY MASS INDEX: 18.06 KG/M2 | TEMPERATURE: 98.2 F | DIASTOLIC BLOOD PRESSURE: 61 MMHG | SYSTOLIC BLOOD PRESSURE: 114 MMHG | HEART RATE: 99 BPM | WEIGHT: 105.2 LBS

## 2023-09-12 DIAGNOSIS — Z98.890 S/P PARTIAL GLOSSECTOMY: ICD-10-CM

## 2023-09-12 DIAGNOSIS — J44.9 CHRONIC OBSTRUCTIVE PULMONARY DISEASE, UNSPECIFIED COPD TYPE (H): ICD-10-CM

## 2023-09-12 DIAGNOSIS — R63.8 DIFFICULTY EATING: ICD-10-CM

## 2023-09-12 DIAGNOSIS — C02.1 SQUAMOUS CELL CARCINOMA, TONGUE BORDER (H): Primary | ICD-10-CM

## 2023-09-12 PROCEDURE — 99349 HOME/RES VST EST MOD MDM 40: CPT | Performed by: NURSE PRACTITIONER

## 2023-09-12 NOTE — LETTER
9/12/2023        RE: Marla Dunn  C/o Deneen Dunn  [address] [place] [id number]        No notes on file      Sincerely,        CASTRO Hazel CNP

## 2023-09-12 NOTE — PROGRESS NOTES
FARSHAD [facility] GERIATRICS  ACUTE/EPISODIC VISIT    [facility] Medical Record Number:  [id number]  Place of Service where encounter took place:  [facility]) [[id number]]    Chief Complaint   Patient presents with    RECHECK       HPI:    Marla Dunn is a 89 year old  (5/22/1929), who is being seen today for an episodic care visit.  HPI information obtained from: patient report, [facility] chart review, and Care Everywhere [facility] chart review.    Today's concern is:    Diagnoses         Codes Comments    Squamous cell carcinoma, tongue border (H)    -  Primary C02.1     S/P partial glossectomy     Z98.890     Difficulty eating     R63.8     Chronic obstructive pulmonary disease, unspecified COPD type (H)     J44.9            Seeing Georgia today after she had her partial right Glossectomy 9/2/23 at [facility].  Her follow up appointment is on 9/18/23.      Able to see the sight on the right side of tongue.  Does not look inflamed.  Everything appears intact.  Her only complaint is the restrictions she has for eating right now to protect the surgery site.  The cook here tries to accommodate her needs but she is very careful not to eat the wrong foods and tear away the covering from the surgery site.    Otherwise, she states she is doing ok.  Relief off her mind to get this done.      ALLERGIES:    Allergies   Allergen Reactions    Gramineae Pollens Other (See Comments)     ORCHARDGRASS. Shortness of breath when lawn is just cut.    Smoke. Other (See Comments)     Hard to breathe.    Pollen Extract      Other reaction(s): Unknown        MEDICATIONS:  Post Discharge Medication Reconciliation Status: patient was not discharged from an inpatient facility or TCU.     Current Outpatient Medications   Medication Sig Dispense Refill    acetaminophen (TYLENOL) 325 MG tablet Take 3 tablets (975 mg) by mouth every 8 hours as needed for mild pain      albuterol (PROAIR HFA/PROVENTIL  HFA/VENTOLIN HFA) 108 (90 Base) MCG/ACT inhaler Inhale 2 puffs into the lungs every 6 hours      amoxicillin (AMOXIL) 500 MG capsule 4 CAPSULES (2000MG ) ORALLY AS NEEDED ONE HOUR PRIOR TO DENTAL APPOINTMENT 4 capsule 5    Ascorbic Acid (VITAMIN C PO) Take 500 mg by mouth every morning      ASPIRIN LOW DOSE 81 MG EC tablet 1 TABLET ORALLY 2 TIMES DAILY (DX: PROPHYLAXIS) 56 tablet 11    Bacillus Coagulans-Inulin (PROBIOTIC FORMULA) 1-250 BILLION-MG CAPS 1 CAPSULE ORALLY DAILY 28 capsule 11    bisacodyl (DULCOLAX) 5 MG EC tablet 1 TABLET ORALLY DAILY AS NEEDED (MAY KEEP AT BEDSIDE) 30 tablet 10    calcium carbonate (TUMS) 500 MG chewable tablet Take 2 tablets (1,000 mg) by mouth 2 times daily as needed for heartburn      cholecalciferol (VITAMIN D3) 125 mcg (5000 units) capsule 1 CAPSULE ORALLY DAILY (DX: OSTEOPOROSIS) 28 capsule 11    COMBIVENT RESPIMAT  MCG/ACT inhaler INHALE 1 PUFF INTO THE LUNGS  4 TIMES DAILY 4 g 11    cycloSPORINE (RESTASIS) 0.05 % ophthalmic emulsion Place 1 drop into both eyes 2 times daily       demeclocycline (DECLOMYCIN) 150 MG tablet Take 1 tablet (150 mg) by mouth 2 times daily      fish oil-omega-3 fatty acids 1000 MG capsule 1 CAPSULE ORALLY DAILY 28 capsule 11    fluorometholone (FML LIQUIFILM) 0.1 % ophthalmic susp Place 1 drop Into the left eye daily       IBANDRONATE SODIUM PO Take 150 mg by mouth every 30 days      levothyroxine (SYNTHROID/LEVOTHROID) 88 MCG tablet 1 TABLET ORALLY EVERY MORNING ON AN EMPTY STOMACH 31 tablet 11    loratadine (CLARITIN) 10 MG tablet 1 TABLET ORALLY DAILY (DX: ASTHMA) 28 tablet 11    melatonin 3 MG tablet 3mg to be given with 5mg = 8mg at HS 30 tablet 11    melatonin 5 MG tablet 5mg to be given with 3mg tab = 8mg at HS 30 tablet 11    metoprolol tartrate (LOPRESSOR) 25 MG tablet 1 TABLET ORALLY 2 TIMES DAILY (DX: HYPERTENSION) 56 tablet 11    mirtazapine (REMERON) 7.5 MG tablet 1 TABLET ORALLY AT BEDTIME 28 tablet 11    Multiple  Vitamins-Minerals (PRESERVISION AREDS 2) CAPS 1 CAPSULE ORALLY 2 TIMES DAILY 56 capsule 11    omeprazole (PRILOSEC) 20 MG DR capsule 1 CAPSULE ORALLY 2 TIMES DAILY (DX: GASTROESOPHAGEAL REFLUX DISEASE) 62 capsule 11    OXYGEN-HELIUM IN       polyethylene glycol 0.4%- propylene glycol 0.3% (SYSTANE ULTRA) 0.4-0.3 % SOLN ophthalmic solution Place 1 drop into both eyes 4 times daily      Polyethylene Glycol 3350 (PEG 3350) 17 GM/SCOOP POWD MIX 1 CAPFUL (17 GMS) IN 8 OUNCES OF JUICE OR WATER AND DRINK ORALLY DAILY AS NEEDED (DX: CONSTIPATION) 510 g 10    potassium chloride ER (K-TAB/KLOR-CON) 10 MEQ CR tablet 1 TABLET ORALLY DAILY 30 tablet 11    Probiotic Product (PROBIOTIC PO) Take 1 capsule by mouth every morning      SENEXON-S 8.6-50 MG tablet 1 TABLET ORALLY 2 TIMES DAILY (DX: CONSTIPATION) 56 tablet 11    sodium chloride 1 GM tablet 1 TABLET ORALLY 2 TIMES DAILY 56 tablet 11    spironolactone (ALDACTONE) 25 MG tablet 1 TABLET ORALLY DAILY (DX: EDEMA) ** HAZARDOUS MED: WEAR DOUBLE NITRILE GLOVES** 28 tablet 11    timolol (TIMOPTIC) 0.5 % ophthalmic solution Place 1 drop Into the left eye daily       torsemide (DEMADEX) 20 MG tablet 1.5 TABLET (30mg) ORALLY DAILY 31 tablet 11    vitamin C (ASCORBIC ACID) 500 MG tablet 1 TABLET ORALLY DAILY (DX: SUPPLEMENT) 31 tablet 11       REVIEW OF SYSTEMS:  4 point ROS neg other than the symptoms noted above in the HPI.  Denies chest pain, SOB, bowels moving as she regulates with Dulcolax tabs on hand      PHYSICAL EXAM:  /61   Pulse 99   Temp 98.2  F (36.8  C)   Resp 18   Wt 47.7 kg (105 lb 3.2 oz)   SpO2 94%   BMI 18.06 kg/m    Georgia is alert and oriented x3.  Was in her bathroom and came out when done.  Propels her small wheelchair.  Skin is pale, frail, warm to touch.  Bruises easily.  Heart rate regular/irregular and strong.    Oral cavity is nice and pink, moist, and no swelling.  Able to see side of tongue as surgery site intact and no drainage.  Lungs are  clear.  No coughing.  No use of oxygen.    Looked at lower legs.  Has a few bruises over her veins.  Back of right calf is light pink.  Has been darker as if she laid on it for extended time.      ASSESSMENT / PLAN:  (C02.1) Squamous cell carcinoma, tongue border (H)  (primary encounter diagnosis)  (Z98.890) S/P partial glossectomy  (R63.8) Difficulty eating  Comment: continues to recover and being very careful of what she can eat not to disturb her surgery site.  Makes it difficult to eat with rough textured food.  Mostly soft things and soup.  She knows this can be over soon and get back to eating more options.  No complaints of pain.    (J44.9) Chronic obstructive pulmonary disease, unspecified COPD type (H)  Comment: no complications after surgery.  Does have inhaler that staff help prep for her and then she takes it herself.  No changes.     Orders:  No new orders today      Electronically signed by  CASTRO Hazel CNP

## 2023-09-12 NOTE — LETTER
9/12/2023        RE: Marla Dunn  C/o Deneen Dunn  [address] [address]        [facility] GERIATRICS  ACUTE/EPISODIC VISIT    [facility]AD [facility] Medical Record Number:  [id number]  Place of Service where encounter took place:  [facility]) [[id number]]    Chief Complaint   Patient presents with     RECHECK       HPI:    Marla Dunn is a 89 year old  (5/22/1929), who is being seen today for an episodic care visit.  HPI information obtained from: patient report, [facility] chart review, and Care Everywhere [facility] chart review.    Today's concern is:    Diagnoses         Codes Comments    Squamous cell carcinoma, tongue border (H)    -  Primary C02.1     S/P partial glossectomy     Z98.890     Difficulty eating     R63.8     Chronic obstructive pulmonary disease, unspecified COPD type (H)     J44.9            Seeing Georgia today after she had her partial right Glossectomy 9/2/23 at [facility].  Her follow up appointment is on 9/18/23.      Able to see the sight on the right side of tongue.  Does not look inflamed.  Everything appears intact.  Her only complaint is the restrictions she has for eating right now to protect the surgery site.  The cook here tries to accommodate her needs but she is very careful not to eat the wrong foods and tear away the covering from the surgery site.    Otherwise, she states she is doing ok.  Relief off her mind to get this done.      ALLERGIES:    Allergies   Allergen Reactions     Gramineae Pollens Other (See Comments)     ORCHARDGRASS. Shortness of breath when lawn is just cut.     Smoke. Other (See Comments)     Hard to breathe.     Pollen Extract      Other reaction(s): Unknown        MEDICATIONS:  Post Discharge Medication Reconciliation Status: patient was not discharged from an inpatient facility or TCU.     Current Outpatient Medications   Medication Sig Dispense Refill     acetaminophen (TYLENOL) 325 MG  tablet Take 3 tablets (975 mg) by mouth every 8 hours as needed for mild pain       albuterol (PROAIR HFA/PROVENTIL HFA/VENTOLIN HFA) 108 (90 Base) MCG/ACT inhaler Inhale 2 puffs into the lungs every 6 hours       amoxicillin (AMOXIL) 500 MG capsule 4 CAPSULES (2000MG ) ORALLY AS NEEDED ONE HOUR PRIOR TO DENTAL APPOINTMENT 4 capsule 5     Ascorbic Acid (VITAMIN C PO) Take 500 mg by mouth every morning       ASPIRIN LOW DOSE 81 MG EC tablet 1 TABLET ORALLY 2 TIMES DAILY (DX: PROPHYLAXIS) 56 tablet 11     Bacillus Coagulans-Inulin (PROBIOTIC FORMULA) 1-250 BILLION-MG CAPS 1 CAPSULE ORALLY DAILY 28 capsule 11     bisacodyl (DULCOLAX) 5 MG EC tablet 1 TABLET ORALLY DAILY AS NEEDED (MAY KEEP AT BEDSIDE) 30 tablet 10     calcium carbonate (TUMS) 500 MG chewable tablet Take 2 tablets (1,000 mg) by mouth 2 times daily as needed for heartburn       cholecalciferol (VITAMIN D3) 125 mcg (5000 units) capsule 1 CAPSULE ORALLY DAILY (DX: OSTEOPOROSIS) 28 capsule 11     COMBIVENT RESPIMAT  MCG/ACT inhaler INHALE 1 PUFF INTO THE LUNGS  4 TIMES DAILY 4 g 11     cycloSPORINE (RESTASIS) 0.05 % ophthalmic emulsion Place 1 drop into both eyes 2 times daily        demeclocycline (DECLOMYCIN) 150 MG tablet Take 1 tablet (150 mg) by mouth 2 times daily       fish oil-omega-3 fatty acids 1000 MG capsule 1 CAPSULE ORALLY DAILY 28 capsule 11     fluorometholone (FML LIQUIFILM) 0.1 % ophthalmic susp Place 1 drop Into the left eye daily        IBANDRONATE SODIUM PO Take 150 mg by mouth every 30 days       levothyroxine (SYNTHROID/LEVOTHROID) 88 MCG tablet 1 TABLET ORALLY EVERY MORNING ON AN EMPTY STOMACH 31 tablet 11     loratadine (CLARITIN) 10 MG tablet 1 TABLET ORALLY DAILY (DX: ASTHMA) 28 tablet 11     melatonin 3 MG tablet 3mg to be given with 5mg = 8mg at HS 30 tablet 11     melatonin 5 MG tablet 5mg to be given with 3mg tab = 8mg at HS 30 tablet 11     metoprolol tartrate (LOPRESSOR) 25 MG tablet 1 TABLET ORALLY 2 TIMES DAILY (DX:  HYPERTENSION) 56 tablet 11     mirtazapine (REMERON) 7.5 MG tablet 1 TABLET ORALLY AT BEDTIME 28 tablet 11     Multiple Vitamins-Minerals (PRESERVISION AREDS 2) CAPS 1 CAPSULE ORALLY 2 TIMES DAILY 56 capsule 11     omeprazole (PRILOSEC) 20 MG DR capsule 1 CAPSULE ORALLY 2 TIMES DAILY (DX: GASTROESOPHAGEAL REFLUX DISEASE) 62 capsule 11     OXYGEN-HELIUM IN        polyethylene glycol 0.4%- propylene glycol 0.3% (SYSTANE ULTRA) 0.4-0.3 % SOLN ophthalmic solution Place 1 drop into both eyes 4 times daily       Polyethylene Glycol 3350 (PEG 3350) 17 GM/SCOOP POWD MIX 1 CAPFUL (17 GMS) IN 8 OUNCES OF JUICE OR WATER AND DRINK ORALLY DAILY AS NEEDED (DX: CONSTIPATION) 510 g 10     potassium chloride ER (K-TAB/KLOR-CON) 10 MEQ CR tablet 1 TABLET ORALLY DAILY 30 tablet 11     Probiotic Product (PROBIOTIC PO) Take 1 capsule by mouth every morning       SENEXON-S 8.6-50 MG tablet 1 TABLET ORALLY 2 TIMES DAILY (DX: CONSTIPATION) 56 tablet 11     sodium chloride 1 GM tablet 1 TABLET ORALLY 2 TIMES DAILY 56 tablet 11     spironolactone (ALDACTONE) 25 MG tablet 1 TABLET ORALLY DAILY (DX: EDEMA) ** HAZARDOUS MED: WEAR DOUBLE NITRILE GLOVES** 28 tablet 11     timolol (TIMOPTIC) 0.5 % ophthalmic solution Place 1 drop Into the left eye daily        torsemide (DEMADEX) 20 MG tablet 1.5 TABLET (30mg) ORALLY DAILY 31 tablet 11     vitamin C (ASCORBIC ACID) 500 MG tablet 1 TABLET ORALLY DAILY (DX: SUPPLEMENT) 31 tablet 11       REVIEW OF SYSTEMS:  4 point ROS neg other than the symptoms noted above in the HPI.  Denies chest pain, SOB, bowels moving as she regulates with Dulcolax tabs on hand      PHYSICAL EXAM:  /61   Pulse 99   Temp 98.2  F (36.8  C)   Resp 18   Wt 47.7 kg (105 lb 3.2 oz)   SpO2 94%   BMI 18.06 kg/m    Georgia is alert and oriented x3.  Was in her bathroom and came out when done.  Propels her small wheelchair.  Skin is pale, frail, warm to touch.  Bruises easily.  Heart rate regular/irregular and strong.     Oral cavity is nice and pink, moist, and no swelling.  Able to see side of tongue as surgery site intact and no drainage.  Lungs are clear.  No coughing.  No use of oxygen.    Looked at lower legs.  Has a few bruises over her veins.  Back of right calf is light pink.  Has been darker as if she laid on it for extended time.      ASSESSMENT / PLAN:  (C02.1) Squamous cell carcinoma, tongue border (H)  (primary encounter diagnosis)  (Z98.890) S/P partial glossectomy  (R63.8) Difficulty eating  Comment: continues to recover and being very careful of what she can eat not to disturb her surgery site.  Makes it difficult to eat with rough textured food.  Mostly soft things and soup.  She knows this can be over soon and get back to eating more options.  No complaints of pain.    (J44.9) Chronic obstructive pulmonary disease, unspecified COPD type (H)  Comment: no complications after surgery.  Does have inhaler that staff help prep for her and then she takes it herself.  No changes.     Orders:  No new orders today      Electronically signed by  CASTRO Hazel CNP            Sincerely,        CASTRO Hazel CNP

## 2023-09-20 DIAGNOSIS — Z78.9 TAKES DIETARY SUPPLEMENTS: ICD-10-CM

## 2023-09-25 DIAGNOSIS — S72.141A INTERTROCHANTERIC FRACTURE OF RIGHT FEMUR, CLOSED, INITIAL ENCOUNTER (H): ICD-10-CM

## 2023-09-25 DIAGNOSIS — Z78.9 TAKES DIETARY SUPPLEMENTS: ICD-10-CM

## 2023-09-25 RX ORDER — ASPIRIN 81 MG/1
TABLET, COATED ORAL
Qty: 180 TABLET | Refills: 3 | Status: SHIPPED | OUTPATIENT
Start: 2023-09-25

## 2023-09-27 DIAGNOSIS — R12 HEARTBURN: ICD-10-CM

## 2023-09-28 RX ORDER — CALCIUM CARBONATE 500 MG/1
TABLET, CHEWABLE ORAL
Qty: 60 TABLET | Refills: 11 | Status: SHIPPED | OUTPATIENT
Start: 2023-09-28

## 2023-09-30 ENCOUNTER — LAB REQUISITION (OUTPATIENT)
Dept: LAB | Facility: CLINIC | Age: 88
End: 2023-09-30
Payer: COMMERCIAL

## 2023-09-30 DIAGNOSIS — I50.9 HEART FAILURE, UNSPECIFIED (H): ICD-10-CM

## 2023-10-02 ENCOUNTER — ASSISTED LIVING VISIT (OUTPATIENT)
Dept: GERIATRICS | Facility: CLINIC | Age: 88
End: 2023-10-02
Payer: COMMERCIAL

## 2023-10-02 VITALS
WEIGHT: 105.2 LBS | BODY MASS INDEX: 18.06 KG/M2 | DIASTOLIC BLOOD PRESSURE: 61 MMHG | TEMPERATURE: 98.2 F | HEART RATE: 99 BPM | SYSTOLIC BLOOD PRESSURE: 114 MMHG | OXYGEN SATURATION: 94 % | RESPIRATION RATE: 18 BRPM

## 2023-10-02 DIAGNOSIS — I10 PRIMARY HYPERTENSION: ICD-10-CM

## 2023-10-02 DIAGNOSIS — I50.32 CHRONIC DIASTOLIC CONGESTIVE HEART FAILURE (H): ICD-10-CM

## 2023-10-02 DIAGNOSIS — N18.31 CHRONIC KIDNEY DISEASE, STAGE 3A (H): ICD-10-CM

## 2023-10-02 DIAGNOSIS — I48.91 ATRIAL FIBRILLATION WITH RAPID VENTRICULAR RESPONSE (H): ICD-10-CM

## 2023-10-02 DIAGNOSIS — L29.9 ITCHING: Primary | ICD-10-CM

## 2023-10-02 LAB
ANION GAP SERPL CALCULATED.3IONS-SCNC: 13 MMOL/L (ref 7–15)
BUN SERPL-MCNC: 23.9 MG/DL (ref 8–23)
CALCIUM SERPL-MCNC: 9.5 MG/DL (ref 8.2–9.6)
CHLORIDE SERPL-SCNC: 98 MMOL/L (ref 98–107)
CREAT SERPL-MCNC: 0.89 MG/DL (ref 0.51–0.95)
DEPRECATED HCO3 PLAS-SCNC: 23 MMOL/L (ref 22–29)
EGFRCR SERPLBLD CKD-EPI 2021: 60 ML/MIN/1.73M2
GLUCOSE SERPL-MCNC: 104 MG/DL (ref 70–99)
POTASSIUM SERPL-SCNC: 4.2 MMOL/L (ref 3.4–5.3)
SODIUM SERPL-SCNC: 134 MMOL/L (ref 135–145)

## 2023-10-02 PROCEDURE — 99349 HOME/RES VST EST MOD MDM 40: CPT | Performed by: NURSE PRACTITIONER

## 2023-10-02 PROCEDURE — 80048 BASIC METABOLIC PNL TOTAL CA: CPT | Mod: ORL | Performed by: INTERNAL MEDICINE

## 2023-10-02 PROCEDURE — 36415 COLL VENOUS BLD VENIPUNCTURE: CPT | Mod: ORL | Performed by: INTERNAL MEDICINE

## 2023-10-02 PROCEDURE — P9604 ONE-WAY ALLOW PRORATED TRIP: HCPCS | Mod: ORL | Performed by: INTERNAL MEDICINE

## 2023-10-02 NOTE — PROGRESS NOTES
[facility]SHAD [facility] GERIATRICS  ACUTE/EPISODIC VISIT    [facility] Medical Record Number:  [id number]  Place of Service where encounter took place:  [facility]) [[id number]]    Chief Complaint   Patient presents with    RECHECK       HPI:    Marla Dunn is a 89 year old  (5/22/1929), who is being seen today for an episodic care visit.  HPI information obtained from: facility chart records, facility staff, and patient report.    Today's concern is:    Diagnoses         Codes Comments    Itching    -  Primary L29.9     Chronic diastolic congestive heart failure (H)     I50.32     Chronic kidney disease, stage 3a (H)     N18.31     Atrial fibrillation with rapid ventricular response (H)     I48.91     Primary hypertension     I10             Came to see how Georgia was doing as saw her downstairs by the elevators and wanted this NP to stop in.  Georgia's concern today is that she has areas of her skin that she has been itching and evidence of pink patches but has not broke the skin.  Suggested lotion to her skin but she is asking for a cream that would help with the itching and to be kept in her possession.      Beyond that, Georgia is back to eating more variety of foods but still careful of her surgery site on the border right side of tongue.  Able to see her tongue and continues to look healthy.      ALLERGIES:    Allergies   Allergen Reactions    Gramineae Pollens Other (See Comments)     ORCHARDGRASS. Shortness of breath when lawn is just cut.    Smoke. Other (See Comments)     Hard to breathe.    Pollen Extract      Other reaction(s): Unknown        MEDICATIONS:  Post Discharge Medication Reconciliation Status: patient was not discharged from an inpatient facility or TCU.     Current Outpatient Medications   Medication Sig Dispense Refill    acetaminophen (TYLENOL) 325 MG tablet Take 3 tablets (975 mg) by mouth every 8 hours as needed for mild pain      albuterol (PROAIR HFA/PROVENTIL  HFA/VENTOLIN HFA) 108 (90 Base) MCG/ACT inhaler Inhale 2 puffs into the lungs every 6 hours      amoxicillin (AMOXIL) 500 MG capsule 4 CAPSULES (2000MG ) ORALLY AS NEEDED ONE HOUR PRIOR TO DENTAL APPOINTMENT 4 capsule 5    ANTACID REGULAR STRENGTH 500 MG chewable tablet 2 TABLETS (1000MG) ORALLY 2 TIMES DAILY AS NEEDED FOR HEARTBURN 60 tablet 11    Ascorbic Acid (VITAMIN C PO) Take 500 mg by mouth every morning      ASPIRIN LOW DOSE 81 MG EC tablet 1 TABLET ORALLY 2 TIMES DAILY (DX: PROPHYLAXIS) 180 tablet 3    Bacillus Coagulans-Inulin (PROBIOTIC FORMULA) 1-250 BILLION-MG CAPS 1 CAPSULE ORALLY DAILY 28 capsule 11    bisacodyl (DULCOLAX) 5 MG EC tablet 1 TABLET ORALLY DAILY AS NEEDED (MAY KEEP AT BEDSIDE) 30 tablet 10    cholecalciferol (VITAMIN D3) 125 mcg (5000 units) capsule 1 CAPSULE ORALLY DAILY 90 capsule 3    COMBIVENT RESPIMAT  MCG/ACT inhaler INHALE 1 PUFF INTO THE LUNGS  4 TIMES DAILY 4 g 11    cycloSPORINE (RESTASIS) 0.05 % ophthalmic emulsion Place 1 drop into both eyes 2 times daily       demeclocycline (DECLOMYCIN) 150 MG tablet Take 1 tablet (150 mg) by mouth 2 times daily      fish oil-omega-3 fatty acids 1000 MG capsule 1 CAPSULE ORALLY DAILY 28 capsule 11    fluorometholone (FML LIQUIFILM) 0.1 % ophthalmic susp Place 1 drop Into the left eye daily       IBANDRONATE SODIUM PO Take 150 mg by mouth every 30 days      levothyroxine (SYNTHROID/LEVOTHROID) 88 MCG tablet 1 TABLET ORALLY EVERY MORNING ON AN EMPTY STOMACH 31 tablet 11    loratadine (CLARITIN) 10 MG tablet 1 TABLET ORALLY DAILY (DX: ASTHMA) 28 tablet 11    melatonin 3 MG tablet 3mg to be given with 5mg = 8mg at HS 30 tablet 11    melatonin 5 MG tablet 5mg to be given with 3mg tab = 8mg at HS 30 tablet 11    metoprolol tartrate (LOPRESSOR) 25 MG tablet 1 TABLET ORALLY 2 TIMES DAILY (DX: HYPERTENSION) 56 tablet 11    mirtazapine (REMERON) 7.5 MG tablet 1 TABLET ORALLY AT BEDTIME 28 tablet 11    Multiple Vitamins-Minerals (PRESERVISION  AREDS 2) CAPS 1 CAPSULE ORALLY 2 TIMES DAILY 56 capsule 11    omeprazole (PRILOSEC) 20 MG DR capsule 1 CAPSULE ORALLY 2 TIMES DAILY (DX: GASTROESOPHAGEAL REFLUX DISEASE) 62 capsule 11    OXYGEN-HELIUM IN       polyethylene glycol 0.4%- propylene glycol 0.3% (SYSTANE ULTRA) 0.4-0.3 % SOLN ophthalmic solution Place 1 drop into both eyes 4 times daily      Polyethylene Glycol 3350 (PEG 3350) 17 GM/SCOOP POWD MIX 1 CAPFUL (17 GMS) IN 8 OUNCES OF JUICE OR WATER AND DRINK ORALLY DAILY AS NEEDED (DX: CONSTIPATION) 510 g 10    potassium chloride ER (K-TAB/KLOR-CON) 10 MEQ CR tablet 1 TABLET ORALLY DAILY 30 tablet 11    Probiotic Product (PROBIOTIC PO) Take 1 capsule by mouth every morning      SENEXON-S 8.6-50 MG tablet 1 TABLET ORALLY 2 TIMES DAILY (DX: CONSTIPATION) 56 tablet 11    sodium chloride 1 GM tablet 1 TABLET ORALLY 2 TIMES DAILY 56 tablet 11    spironolactone (ALDACTONE) 25 MG tablet 1 TABLET ORALLY DAILY (DX: EDEMA) ** HAZARDOUS MED: WEAR DOUBLE NITRILE GLOVES** 28 tablet 11    timolol (TIMOPTIC) 0.5 % ophthalmic solution Place 1 drop Into the left eye daily       torsemide (DEMADEX) 20 MG tablet 1.5 TABLET (30mg) ORALLY DAILY 31 tablet 11    vitamin C (ASCORBIC ACID) 500 MG tablet 1 TABLET ORALLY DAILY (DX: SUPPLEMENT) 31 tablet 11       REVIEW OF SYSTEMS:  4 point ROS neg other than the symptoms noted above in the HPI.  Denied chest pain, heart palpitations.  No pain in joints.      PHYSICAL EXAM:  /61   Pulse 99   Temp 98.2  F (36.8  C)   Resp 18   Wt 47.7 kg (105 lb 3.2 oz)   SpO2 94%   BMI 18.06 kg/m    Georgia is in her wheelchair in which she propels all over the facility.    Alert and oriented x3.  Neatly groomed.  Petite stature.  Skin is pale, frail, warm to touch.  Looking at her shins, she has a few bruises above her veins.  No open areas.  Heart rate regular/irregular and strong.    Lungs are clear with fair expansion.  No coughing noted.  Able to transfer self.  Has a walker that she  continues to practice her walking.  Component      Latest Ref Rng 8/28/2023  10:00 AM   Sodium      136 - 145 mmol/L 132 (L)    Potassium      3.4 - 5.3 mmol/L 3.6    Chloride      98 - 107 mmol/L 94 (L)    Carbon Dioxide (CO2)      22 - 29 mmol/L 25    Anion Gap      7 - 15 mmol/L 13    Urea Nitrogen      8.0 - 23.0 mg/dL 32.0 (H)    Creatinine      0.51 - 0.95 mg/dL 0.86    Calcium      8.2 - 9.6 mg/dL 9.5    Glucose      70 - 99 mg/dL 48 (LL)    GFR Estimate      >60 mL/min/1.73m2 62       's/50-60's for blood pressures.    ASSESSMENT / PLAN:  (L29.9) Itching  (primary encounter diagnosis)  Comment: will order HC 1% cream topically twice a day prn to areas that are itchy.  May keep at bedside.  Do not feel it is serious enough that she has broken skin and have open sores.    (I50.32) Chronic diastolic congestive heart failure (H)  (N18.31) Chronic kidney disease, stage 3a (H)  Comment: has regular BMPs monthly to monitor her Na levels via her nephrologist.  Remains on torsemide 30mg po daily and aldactone 25mg daily.  Does take a K+ supplement as well.  No changes.    (I48.91) Atrial fibrillation with rapid ventricular response (H)  Comment: back on her baby ASA 81mg daily.  Otherwise no acute symptoms.      (I10) Primary hypertension  Comment: is taking metoprolol 25mg BID.  This could be for her Atrial Fib as well to help with rate control.  No changes.  Pulses range in the 's.  No dizziness or light headedness.  No changes.     Orders:  HC 1% cream topical to areas of itching BID prn and may keep at bedside - itching    Electronically signed by  CASTRO Hazel CNP         Libtayo Counseling- I discussed with the patient the risks of Libtayo including but not limited to nausea, vomiting, diarrhea, and bone or muscle pain.  The patient verbalized understanding of the proper use and possible adverse effects of Libtayo.  All of the patient's questions and concerns were addressed.

## 2023-10-02 NOTE — LETTER
10/2/2023        RE: Marla Dunn  C/o Deneen Dunn  [address] [facility] [address]        [facility] GERIATRICS  ACUTE/EPISODIC VISIT    [facility] Medical Record Number:  [id number]  Place of Service where encounter took place:  [facility] ([facility]) [[id number]]    Chief Complaint   Patient presents with     RECHECK       HPI:    Marla Dunn is a 89 year old  (5/22/1929), who is being seen today for an episodic care visit.  HPI information obtained from: facility chart records, facility staff, and patient report.    Today's concern is:    Diagnoses         Codes Comments    Itching    -  Primary L29.9     Chronic diastolic congestive heart failure (H)     I50.32     Chronic kidney disease, stage 3a (H)     N18.31     Atrial fibrillation with rapid ventricular response (H)     I48.91     Primary hypertension     I10             Came to see how Georgia was doing as saw her downstairs by the elevators and wanted this NP to stop in.  Georgia's concern today is that she has areas of her skin that she has been itching and evidence of pink patches but has not broke the skin.  Suggested lotion to her skin but she is asking for a cream that would help with the itching and to be kept in her possession.      Beyond that, Georgia is back to eating more variety of foods but still careful of her surgery site on the border right side of tongue.  Able to see her tongue and continues to look healthy.      ALLERGIES:    Allergies   Allergen Reactions     Gramineae Pollens Other (See Comments)     ORCHARDGRASS. Shortness of breath when lawn is just cut.     Smoke. Other (See Comments)     Hard to breathe.     Pollen Extract      Other reaction(s): Unknown        MEDICATIONS:  Post Discharge Medication Reconciliation Status: patient was not discharged from an inpatient facility or TCU.     Current Outpatient Medications   Medication Sig Dispense Refill     acetaminophen (TYLENOL) 325 MG  tablet Take 3 tablets (975 mg) by mouth every 8 hours as needed for mild pain       albuterol (PROAIR HFA/PROVENTIL HFA/VENTOLIN HFA) 108 (90 Base) MCG/ACT inhaler Inhale 2 puffs into the lungs every 6 hours       amoxicillin (AMOXIL) 500 MG capsule 4 CAPSULES (2000MG ) ORALLY AS NEEDED ONE HOUR PRIOR TO DENTAL APPOINTMENT 4 capsule 5     ANTACID REGULAR STRENGTH 500 MG chewable tablet 2 TABLETS (1000MG) ORALLY 2 TIMES DAILY AS NEEDED FOR HEARTBURN 60 tablet 11     Ascorbic Acid (VITAMIN C PO) Take 500 mg by mouth every morning       ASPIRIN LOW DOSE 81 MG EC tablet 1 TABLET ORALLY 2 TIMES DAILY (DX: PROPHYLAXIS) 180 tablet 3     Bacillus Coagulans-Inulin (PROBIOTIC FORMULA) 1-250 BILLION-MG CAPS 1 CAPSULE ORALLY DAILY 28 capsule 11     bisacodyl (DULCOLAX) 5 MG EC tablet 1 TABLET ORALLY DAILY AS NEEDED (MAY KEEP AT BEDSIDE) 30 tablet 10     cholecalciferol (VITAMIN D3) 125 mcg (5000 units) capsule 1 CAPSULE ORALLY DAILY 90 capsule 3     COMBIVENT RESPIMAT  MCG/ACT inhaler INHALE 1 PUFF INTO THE LUNGS  4 TIMES DAILY 4 g 11     cycloSPORINE (RESTASIS) 0.05 % ophthalmic emulsion Place 1 drop into both eyes 2 times daily        demeclocycline (DECLOMYCIN) 150 MG tablet Take 1 tablet (150 mg) by mouth 2 times daily       fish oil-omega-3 fatty acids 1000 MG capsule 1 CAPSULE ORALLY DAILY 28 capsule 11     fluorometholone (FML LIQUIFILM) 0.1 % ophthalmic susp Place 1 drop Into the left eye daily        IBANDRONATE SODIUM PO Take 150 mg by mouth every 30 days       levothyroxine (SYNTHROID/LEVOTHROID) 88 MCG tablet 1 TABLET ORALLY EVERY MORNING ON AN EMPTY STOMACH 31 tablet 11     loratadine (CLARITIN) 10 MG tablet 1 TABLET ORALLY DAILY (DX: ASTHMA) 28 tablet 11     melatonin 3 MG tablet 3mg to be given with 5mg = 8mg at HS 30 tablet 11     melatonin 5 MG tablet 5mg to be given with 3mg tab = 8mg at HS 30 tablet 11     metoprolol tartrate (LOPRESSOR) 25 MG tablet 1 TABLET ORALLY 2 TIMES DAILY (DX: HYPERTENSION) 56  tablet 11     mirtazapine (REMERON) 7.5 MG tablet 1 TABLET ORALLY AT BEDTIME 28 tablet 11     Multiple Vitamins-Minerals (PRESERVISION AREDS 2) CAPS 1 CAPSULE ORALLY 2 TIMES DAILY 56 capsule 11     omeprazole (PRILOSEC) 20 MG DR capsule 1 CAPSULE ORALLY 2 TIMES DAILY (DX: GASTROESOPHAGEAL REFLUX DISEASE) 62 capsule 11     OXYGEN-HELIUM IN        polyethylene glycol 0.4%- propylene glycol 0.3% (SYSTANE ULTRA) 0.4-0.3 % SOLN ophthalmic solution Place 1 drop into both eyes 4 times daily       Polyethylene Glycol 3350 (PEG 3350) 17 GM/SCOOP POWD MIX 1 CAPFUL (17 GMS) IN 8 OUNCES OF JUICE OR WATER AND DRINK ORALLY DAILY AS NEEDED (DX: CONSTIPATION) 510 g 10     potassium chloride ER (K-TAB/KLOR-CON) 10 MEQ CR tablet 1 TABLET ORALLY DAILY 30 tablet 11     Probiotic Product (PROBIOTIC PO) Take 1 capsule by mouth every morning       SENEXON-S 8.6-50 MG tablet 1 TABLET ORALLY 2 TIMES DAILY (DX: CONSTIPATION) 56 tablet 11     sodium chloride 1 GM tablet 1 TABLET ORALLY 2 TIMES DAILY 56 tablet 11     spironolactone (ALDACTONE) 25 MG tablet 1 TABLET ORALLY DAILY (DX: EDEMA) ** HAZARDOUS MED: WEAR DOUBLE NITRILE GLOVES** 28 tablet 11     timolol (TIMOPTIC) 0.5 % ophthalmic solution Place 1 drop Into the left eye daily        torsemide (DEMADEX) 20 MG tablet 1.5 TABLET (30mg) ORALLY DAILY 31 tablet 11     vitamin C (ASCORBIC ACID) 500 MG tablet 1 TABLET ORALLY DAILY (DX: SUPPLEMENT) 31 tablet 11       REVIEW OF SYSTEMS:  4 point ROS neg other than the symptoms noted above in the HPI.  Denied chest pain, heart palpitations.  No pain in joints.      PHYSICAL EXAM:  /61   Pulse 99   Temp 98.2  F (36.8  C)   Resp 18   Wt 47.7 kg (105 lb 3.2 oz)   SpO2 94%   BMI 18.06 kg/m    Georgia is in her wheelchair in which she propels all over the facility.    Alert and oriented x3.  Neatly groomed.  Petite stature.  Skin is pale, frail, warm to touch.  Looking at her shins, she has a few bruises above her veins.  No open  areas.  Heart rate regular/irregular and strong.    Lungs are clear with fair expansion.  No coughing noted.  Able to transfer self.  Has a walker that she continues to practice her walking.  Component      Latest Ref Rng 8/28/2023  10:00 AM   Sodium      136 - 145 mmol/L 132 (L)    Potassium      3.4 - 5.3 mmol/L 3.6    Chloride      98 - 107 mmol/L 94 (L)    Carbon Dioxide (CO2)      22 - 29 mmol/L 25    Anion Gap      7 - 15 mmol/L 13    Urea Nitrogen      8.0 - 23.0 mg/dL 32.0 (H)    Creatinine      0.51 - 0.95 mg/dL 0.86    Calcium      8.2 - 9.6 mg/dL 9.5    Glucose      70 - 99 mg/dL 48 (LL)    GFR Estimate      >60 mL/min/1.73m2 62       's/50-60's for blood pressures.    ASSESSMENT / PLAN:  (L29.9) Itching  (primary encounter diagnosis)  Comment: will order HC 1% cream topically twice a day prn to areas that are itchy.  May keep at bedside.  Do not feel it is serious enough that she has broken skin and have open sores.    (I50.32) Chronic diastolic congestive heart failure (H)  (N18.31) Chronic kidney disease, stage 3a (H)  Comment: has regular BMPs monthly to monitor her Na levels via her nephrologist.  Remains on torsemide 30mg po daily and aldactone 25mg daily.  Does take a K+ supplement as well.  No changes.    (I48.91) Atrial fibrillation with rapid ventricular response (H)  Comment: back on her baby ASA 81mg daily.  Otherwise no acute symptoms.      (I10) Primary hypertension  Comment: is taking metoprolol 25mg BID.  This could be for her Atrial Fib as well to help with rate control.  No changes.  Pulses range in the 's.  No dizziness or light headedness.  No changes.     Orders:  HC 1% cream topical to areas of itching BID prn and may keep at bedside - itching    Electronically signed by  CASTRO Hazel CNP            Sincerely,        CASTRO Hazel CNP

## 2023-10-02 NOTE — LETTER
10/2/2023        RE: Marla Dunn  C/o Deneen Dunn  [address] [place] [id number]        No notes on file      Sincerely,        CASTRO Hazel CNP

## 2023-10-24 DIAGNOSIS — K05.10 GINGIVITIS: Primary | ICD-10-CM

## 2023-10-24 RX ORDER — CHLORHEXIDINE GLUCONATE ORAL RINSE 1.2 MG/ML
15 SOLUTION DENTAL 2 TIMES DAILY
Qty: 118 ML | Refills: 3 | Status: SHIPPED | OUTPATIENT
Start: 2023-10-24

## 2023-11-04 ENCOUNTER — LAB REQUISITION (OUTPATIENT)
Dept: LAB | Facility: CLINIC | Age: 88
End: 2023-11-04
Payer: COMMERCIAL

## 2023-11-04 DIAGNOSIS — I50.9 HEART FAILURE, UNSPECIFIED (H): ICD-10-CM

## 2023-11-06 LAB
ANION GAP SERPL CALCULATED.3IONS-SCNC: 12 MMOL/L (ref 7–15)
BUN SERPL-MCNC: 46.6 MG/DL (ref 8–23)
CALCIUM SERPL-MCNC: 9.5 MG/DL (ref 8.2–9.6)
CHLORIDE SERPL-SCNC: 95 MMOL/L (ref 98–107)
CREAT SERPL-MCNC: 1.06 MG/DL (ref 0.51–0.95)
DEPRECATED HCO3 PLAS-SCNC: 26 MMOL/L (ref 22–29)
EGFRCR SERPLBLD CKD-EPI 2021: 48 ML/MIN/1.73M2
GLUCOSE SERPL-MCNC: 52 MG/DL (ref 70–99)
POTASSIUM SERPL-SCNC: 4.4 MMOL/L (ref 3.4–5.3)
SODIUM SERPL-SCNC: 133 MMOL/L (ref 135–145)

## 2023-11-06 PROCEDURE — P9603 ONE-WAY ALLOW PRORATED MILES: HCPCS | Mod: ORL | Performed by: INTERNAL MEDICINE

## 2023-11-06 PROCEDURE — 80048 BASIC METABOLIC PNL TOTAL CA: CPT | Mod: ORL | Performed by: INTERNAL MEDICINE

## 2023-11-06 PROCEDURE — 36415 COLL VENOUS BLD VENIPUNCTURE: CPT | Mod: ORL | Performed by: INTERNAL MEDICINE

## 2023-11-14 ENCOUNTER — ASSISTED LIVING VISIT (OUTPATIENT)
Dept: GERIATRICS | Facility: CLINIC | Age: 88
End: 2023-11-14
Payer: COMMERCIAL

## 2023-11-14 VITALS
DIASTOLIC BLOOD PRESSURE: 50 MMHG | SYSTOLIC BLOOD PRESSURE: 100 MMHG | RESPIRATION RATE: 16 BRPM | TEMPERATURE: 98.6 F | OXYGEN SATURATION: 99 % | HEART RATE: 60 BPM

## 2023-11-14 DIAGNOSIS — R52 PAIN: ICD-10-CM

## 2023-11-14 DIAGNOSIS — E87.1 HYPONATREMIA: ICD-10-CM

## 2023-11-14 DIAGNOSIS — I10 PRIMARY HYPERTENSION: ICD-10-CM

## 2023-11-14 DIAGNOSIS — I48.91 ATRIAL FIBRILLATION WITH RAPID VENTRICULAR RESPONSE (H): Primary | ICD-10-CM

## 2023-11-14 PROCEDURE — 99349 HOME/RES VST EST MOD MDM 40: CPT | Performed by: NURSE PRACTITIONER

## 2023-11-14 RX ORDER — SODIUM CHLORIDE 1 G/1
1 TABLET ORAL DAILY
COMMUNITY
Start: 2023-11-14 | End: 2023-11-15

## 2023-11-14 NOTE — PROGRESS NOTES
[facility]  [place] Medical Record Number:  [id number]  Place of Service where encounter took place:  No question data found.  No chief complaint on file.      HPI:    Marla Dunn  is a 89 year old (5/22/1929), who is being seen today for an episodic care visit.  HPI information obtained from: facility chart records, facility staff, and patient report. Today's concern is:    Seen today for follow up to chronic conditions. Denies any acute concerns. No lightheadedness. [place]. Bps in chart review are low and low today 108/50 and 100/50 on other arm. HR 60s. HR is chart review was elevated and could be related to dehydration? Appears dry. Follows with nephrology and they did reduce her sodium tablet to once daily. Sodium ok. Oxycodone was also stopped and she does not use Ibuprofen so will take that off medication list as well. O2 ok but seems SOB during visit. Says this is common.     Past Medical and Surgical History reviewed in Epic today.    MEDICATIONS:    Current Outpatient Medications   Medication Sig Dispense Refill    sodium chloride 1 GM tablet Take 1 tablet (1 g) by mouth daily      acetaminophen (TYLENOL) 325 MG tablet Take 3 tablets (975 mg) by mouth every 8 hours as needed for mild pain      albuterol (PROAIR HFA/PROVENTIL HFA/VENTOLIN HFA) 108 (90 Base) MCG/ACT inhaler Inhale 2 puffs into the lungs every 6 hours      amoxicillin (AMOXIL) 500 MG capsule 4 CAPSULES (2000MG ) ORALLY AS NEEDED ONE HOUR PRIOR TO DENTAL APPOINTMENT 4 capsule 5    ANTACID REGULAR STRENGTH 500 MG chewable tablet 2 TABLETS (1000MG) ORALLY 2 TIMES DAILY AS NEEDED FOR HEARTBURN 60 tablet 11    Ascorbic Acid (VITAMIN C PO) Take 500 mg by mouth every morning      ASPIRIN LOW DOSE 81 MG EC tablet 1 TABLET ORALLY 2 TIMES DAILY (DX: PROPHYLAXIS) 180 tablet 3    Bacillus Coagulans-Inulin (PROBIOTIC FORMULA) 1-250 BILLION-MG CAPS 1 CAPSULE ORALLY DAILY 28 capsule 11    bisacodyl (DULCOLAX) 5 MG EC tablet 1 TABLET  ORALLY DAILY AS NEEDED (MAY KEEP AT BEDSIDE) 30 tablet 10    chlorhexidine (PERIDEX) 0.12 % solution Swish and spit 15 mLs in mouth 2 times daily May self administer 118 mL 3    cholecalciferol (VITAMIN D3) 125 mcg (5000 units) capsule 1 CAPSULE ORALLY DAILY 90 capsule 3    COMBIVENT RESPIMAT  MCG/ACT inhaler INHALE 1 PUFF INTO THE LUNGS  4 TIMES DAILY 4 g 11    cycloSPORINE (RESTASIS) 0.05 % ophthalmic emulsion Place 1 drop into both eyes 2 times daily       demeclocycline (DECLOMYCIN) 150 MG tablet Take 1 tablet (150 mg) by mouth 2 times daily      fish oil-omega-3 fatty acids 1000 MG capsule 1 CAPSULE ORALLY DAILY 28 capsule 11    fluorometholone (FML LIQUIFILM) 0.1 % ophthalmic susp Place 1 drop Into the left eye daily       IBANDRONATE SODIUM PO Take 150 mg by mouth every 30 days      levothyroxine (SYNTHROID/LEVOTHROID) 88 MCG tablet 1 TABLET ORALLY EVERY MORNING ON AN EMPTY STOMACH 31 tablet 11    loratadine (CLARITIN) 10 MG tablet 1 TABLET ORALLY DAILY (DX: ASTHMA) 28 tablet 11    melatonin 3 MG tablet 3mg to be given with 5mg = 8mg at HS 30 tablet 11    melatonin 5 MG tablet 5mg to be given with 3mg tab = 8mg at HS 30 tablet 11    metoprolol tartrate (LOPRESSOR) 25 MG tablet 1 TABLET ORALLY 2 TIMES DAILY (DX: HYPERTENSION) 56 tablet 11    mirtazapine (REMERON) 7.5 MG tablet 1 TABLET ORALLY AT BEDTIME 28 tablet 11    Multiple Vitamins-Minerals (PRESERVISION AREDS 2) CAPS 1 CAPSULE ORALLY 2 TIMES DAILY 56 capsule 11    omeprazole (PRILOSEC) 20 MG DR capsule 1 CAPSULE ORALLY 2 TIMES DAILY (DX: GASTROESOPHAGEAL REFLUX DISEASE) 62 capsule 11    OXYGEN-HELIUM IN       polyethylene glycol 0.4%- propylene glycol 0.3% (SYSTANE ULTRA) 0.4-0.3 % SOLN ophthalmic solution Place 1 drop into both eyes 4 times daily      Polyethylene Glycol 3350 (PEG 3350) 17 GM/SCOOP POWD MIX 1 CAPFUL (17 GMS) IN 8 OUNCES OF JUICE OR WATER AND DRINK ORALLY DAILY AS NEEDED (DX: CONSTIPATION) 510 g 10    potassium chloride ER  (K-TAB/KLOR-CON) 10 MEQ CR tablet 1 TABLET ORALLY DAILY 30 tablet 11    Probiotic Product (PROBIOTIC PO) Take 1 capsule by mouth every morning      SENEXON-S 8.6-50 MG tablet 1 TABLET ORALLY 2 TIMES DAILY (DX: CONSTIPATION) 56 tablet 11    spironolactone (ALDACTONE) 25 MG tablet 1 TABLET ORALLY DAILY (DX: EDEMA) ** HAZARDOUS MED: WEAR DOUBLE NITRILE GLOVES** 28 tablet 11    timolol (TIMOPTIC) 0.5 % ophthalmic solution Place 1 drop Into the left eye daily       torsemide (DEMADEX) 20 MG tablet 1.5 TABLET (30mg) ORALLY DAILY 31 tablet 11    vitamin C (ASCORBIC ACID) 500 MG tablet 1 TABLET ORALLY DAILY (DX: SUPPLEMENT) 31 tablet 11     REVIEW OF SYSTEMS:  4 point ROS including Respiratory, CV, GI and , other than that noted in the HPI,  is negative    Objective:  /50   Pulse 60   Temp 98.6  F (37  C)   Resp 16   SpO2 99%   Exam:  GENERAL APPEARANCE:  Alert, in no distress  ENT:  [place], no redness on tongue   EYES:  EOM, conjunctivae, lids, pupils and irises normal, glasses  RESP:  lungs clear to auscultation   CV:  irregular rhythm, rate-normal  ABDOMEN:  no guarding or rebound, bowel sounds normal  M/S:   gait and station at baseline. WC for distance and 2WW  SKIN:  frail, intact to visualized areas   NEURO:   Cranial nerves 2-12 are normal tested and grossly at patient's baseline  PSYCH:  insight and judgement impaired    Labs:   CBC RESULTS:   Recent Labs   Lab Test 08/28/23  1000 11/28/22  1303   WBC 6.3 7.9   RBC 3.79* 4.00   HGB 11.3* 12.7   HCT 36.2 39.3   MCV 96 98   MCH 29.8 31.8   MCHC 31.2* 32.3   RDW 16.2* 17.4*    269       Last Basic Metabolic Panel:  Recent Labs   Lab Test 11/06/23  1124 10/02/23  0943   * 134*   POTASSIUM 4.4 4.2   CHLORIDE 95* 98   PERICO 9.5 9.5   CO2 26 23   BUN 46.6* 23.9*   CR 1.06* 0.89   GLC 52* 104*       Liver Function Studies -   Recent Labs   Lab Test 09/07/22  0435 09/06/22  0850   PROTTOTAL 5.7* 6.3   ALBUMIN 2.3* 2.8*   BILITOTAL 0.4 0.5   ALKPHOS 81 85    AST 24 25   ALT 15 15       TSH   Date Value Ref Range Status   10/31/2022 2.73 0.30 - 4.20 uIU/mL Final   03/22/2020 1.17 0.30 - 5.00 uIU/mL Final   04/30/2018 0.56 0.30 - 5.00 uIU/mL Final       Lab Results   Component Value Date    A1C 5.2 05/13/2020     ASSESSMENT/PLAN:  (I48.91) Atrial fibrillation with rapid ventricular response (H)  (primary encounter diagnosis)  (I10) Primary hypertension  Comment: hypotensive   Plan:   -refuses to have BB reduced today    (E87.1) Hyponatremia  Comment: stable   Plan:   -continue current plan of care    (R52) Pain  Comment: no pain  Plan:   -discontinue from facility med list   -continue tylenol       Electronically signed by:  CASTRO Walter CNP

## 2023-11-14 NOTE — LETTER
11/14/2023        RE: Marla Dunn  C/o Deneen Dunn  [address] [place] [id number]        [facility]  [facility] Medical Record Number:  [id number]  Place of Service where encounter took place:  No question data found.  No chief complaint on file.      HPI:    Marla Dunn  is a 89 year old (5/22/1929), who is being seen today for an episodic care visit.  HPI information obtained from: facility chart records, facility staff, and patient report. Today's concern is:    Seen today for follow up to chronic conditions. Denies any acute concerns. No lightheadedness. [place]. Bps in chart review are low and low today 108/50 and 100/50 on other arm. HR 60s. HR is chart review was elevated and could be related to dehydration? Appears dry. Follows with nephrology and they did reduce her sodium tablet to once daily. Sodium ok. Oxycodone was also stopped and she does not use Ibuprofen so will take that off medication list as well. O2 ok but seems SOB during visit. Says this is common.     Past Medical and Surgical History reviewed in Epic today.    MEDICATIONS:    Current Outpatient Medications   Medication Sig Dispense Refill     sodium chloride 1 GM tablet Take 1 tablet (1 g) by mouth daily       acetaminophen (TYLENOL) 325 MG tablet Take 3 tablets (975 mg) by mouth every 8 hours as needed for mild pain       albuterol (PROAIR HFA/PROVENTIL HFA/VENTOLIN HFA) 108 (90 Base) MCG/ACT inhaler Inhale 2 puffs into the lungs every 6 hours       amoxicillin (AMOXIL) 500 MG capsule 4 CAPSULES (2000MG ) ORALLY AS NEEDED ONE HOUR PRIOR TO DENTAL APPOINTMENT 4 capsule 5     ANTACID REGULAR STRENGTH 500 MG chewable tablet 2 TABLETS (1000MG) ORALLY 2 TIMES DAILY AS NEEDED FOR HEARTBURN 60 tablet 11     Ascorbic Acid (VITAMIN C PO) Take 500 mg by mouth every morning       ASPIRIN LOW DOSE 81 MG EC tablet 1 TABLET ORALLY 2 TIMES DAILY (DX: PROPHYLAXIS) 180 tablet 3     Bacillus Coagulans-Inulin  (PROBIOTIC FORMULA) 1-250 BILLION-MG CAPS 1 CAPSULE ORALLY DAILY 28 capsule 11     bisacodyl (DULCOLAX) 5 MG EC tablet 1 TABLET ORALLY DAILY AS NEEDED (MAY KEEP AT BEDSIDE) 30 tablet 10     chlorhexidine (PERIDEX) 0.12 % solution Swish and spit 15 mLs in mouth 2 times daily May self administer 118 mL 3     cholecalciferol (VITAMIN D3) 125 mcg (5000 units) capsule 1 CAPSULE ORALLY DAILY 90 capsule 3     COMBIVENT RESPIMAT  MCG/ACT inhaler INHALE 1 PUFF INTO THE LUNGS  4 TIMES DAILY 4 g 11     cycloSPORINE (RESTASIS) 0.05 % ophthalmic emulsion Place 1 drop into both eyes 2 times daily        demeclocycline (DECLOMYCIN) 150 MG tablet Take 1 tablet (150 mg) by mouth 2 times daily       fish oil-omega-3 fatty acids 1000 MG capsule 1 CAPSULE ORALLY DAILY 28 capsule 11     fluorometholone (FML LIQUIFILM) 0.1 % ophthalmic susp Place 1 drop Into the left eye daily        IBANDRONATE SODIUM PO Take 150 mg by mouth every 30 days       levothyroxine (SYNTHROID/LEVOTHROID) 88 MCG tablet 1 TABLET ORALLY EVERY MORNING ON AN EMPTY STOMACH 31 tablet 11     loratadine (CLARITIN) 10 MG tablet 1 TABLET ORALLY DAILY (DX: ASTHMA) 28 tablet 11     melatonin 3 MG tablet 3mg to be given with 5mg = 8mg at HS 30 tablet 11     melatonin 5 MG tablet 5mg to be given with 3mg tab = 8mg at HS 30 tablet 11     metoprolol tartrate (LOPRESSOR) 25 MG tablet 1 TABLET ORALLY 2 TIMES DAILY (DX: HYPERTENSION) 56 tablet 11     mirtazapine (REMERON) 7.5 MG tablet 1 TABLET ORALLY AT BEDTIME 28 tablet 11     Multiple Vitamins-Minerals (PRESERVISION AREDS 2) CAPS 1 CAPSULE ORALLY 2 TIMES DAILY 56 capsule 11     omeprazole (PRILOSEC) 20 MG DR capsule 1 CAPSULE ORALLY 2 TIMES DAILY (DX: GASTROESOPHAGEAL REFLUX DISEASE) 62 capsule 11     OXYGEN-HELIUM IN        polyethylene glycol 0.4%- propylene glycol 0.3% (SYSTANE ULTRA) 0.4-0.3 % SOLN ophthalmic solution Place 1 drop into both eyes 4 times daily       Polyethylene Glycol 3350 (PEG 3350) 17 GM/SCOOP POWD  MIX 1 CAPFUL (17 GMS) IN 8 OUNCES OF JUICE OR WATER AND DRINK ORALLY DAILY AS NEEDED (DX: CONSTIPATION) 510 g 10     potassium chloride ER (K-TAB/KLOR-CON) 10 MEQ CR tablet 1 TABLET ORALLY DAILY 30 tablet 11     Probiotic Product (PROBIOTIC PO) Take 1 capsule by mouth every morning       SENEXON-S 8.6-50 MG tablet 1 TABLET ORALLY 2 TIMES DAILY (DX: CONSTIPATION) 56 tablet 11     spironolactone (ALDACTONE) 25 MG tablet 1 TABLET ORALLY DAILY (DX: EDEMA) ** HAZARDOUS MED: WEAR DOUBLE NITRILE GLOVES** 28 tablet 11     timolol (TIMOPTIC) 0.5 % ophthalmic solution Place 1 drop Into the left eye daily        torsemide (DEMADEX) 20 MG tablet 1.5 TABLET (30mg) ORALLY DAILY 31 tablet 11     vitamin C (ASCORBIC ACID) 500 MG tablet 1 TABLET ORALLY DAILY (DX: SUPPLEMENT) 31 tablet 11     REVIEW OF SYSTEMS:  4 point ROS including Respiratory, CV, GI and , other than that noted in the HPI,  is negative    Objective:  /50   Pulse 60   Temp 98.6  F (37  C)   Resp 16   SpO2 99%   Exam:  GENERAL APPEARANCE:  Alert, in no distress  ENT:  [place], no redness on tongue   EYES:  EOM, conjunctivae, lids, pupils and irises normal, glasses  RESP:  lungs clear to auscultation   CV:  irregular rhythm, rate-normal  ABDOMEN:  no guarding or rebound, bowel sounds normal  M/S:   gait and station at baseline. WC for distance and 2WW  SKIN:  frail, intact to visualized areas   NEURO:   Cranial nerves 2-12 are normal tested and grossly at patient's baseline  PSYCH:  insight and judgement impaired    Labs:   CBC RESULTS:   Recent Labs   Lab Test 08/28/23  1000 11/28/22  1303   WBC 6.3 7.9   RBC 3.79* 4.00   HGB 11.3* 12.7   HCT 36.2 39.3   MCV 96 98   MCH 29.8 31.8   MCHC 31.2* 32.3   RDW 16.2* 17.4*    269       Last Basic Metabolic Panel:  Recent Labs   Lab Test 11/06/23  1124 10/02/23  0943   * 134*   POTASSIUM 4.4 4.2   CHLORIDE 95* 98   PERICO 9.5 9.5   CO2 26 23   BUN 46.6* 23.9*   CR 1.06* 0.89   GLC 52* 104*       Liver  Function Studies -   Recent Labs   Lab Test 09/07/22  0435 09/06/22  0850   PROTTOTAL 5.7* 6.3   ALBUMIN 2.3* 2.8*   BILITOTAL 0.4 0.5   ALKPHOS 81 85   AST 24 25   ALT 15 15       TSH   Date Value Ref Range Status   10/31/2022 2.73 0.30 - 4.20 uIU/mL Final   03/22/2020 1.17 0.30 - 5.00 uIU/mL Final   04/30/2018 0.56 0.30 - 5.00 uIU/mL Final       Lab Results   Component Value Date    A1C 5.2 05/13/2020     ASSESSMENT/PLAN:  (I48.91) Atrial fibrillation with rapid ventricular response (H)  (primary encounter diagnosis)  (I10) Primary hypertension  Comment: hypotensive   Plan:   -refuses to have BB reduced today    (E87.1) Hyponatremia  Comment: stable   Plan:   -continue current plan of care    (R52) Pain  Comment: no pain  Plan:   -discontinue from facility med list   -continue tylenol       Electronically signed by:  CASTRO Walter CNP               Sincerely,        CASTRO Walter CNP

## 2023-11-15 DIAGNOSIS — E87.1 HYPONATREMIA: ICD-10-CM

## 2023-11-15 RX ORDER — SODIUM CHLORIDE 1 G/1
1 TABLET ORAL DAILY
Qty: 30 TABLET | Refills: 11 | Status: SHIPPED | OUTPATIENT
Start: 2023-11-15

## 2023-11-16 DIAGNOSIS — E87.6 HYPOKALEMIA: ICD-10-CM

## 2023-11-16 RX ORDER — POTASSIUM CHLORIDE 750 MG/1
TABLET, EXTENDED RELEASE ORAL
Qty: 28 TABLET | Refills: 11 | Status: SHIPPED | OUTPATIENT
Start: 2023-11-16 | End: 2024-01-02

## 2023-11-17 DIAGNOSIS — J45.20 MILD INTERMITTENT ASTHMA WITHOUT COMPLICATION: ICD-10-CM

## 2023-11-17 RX ORDER — IPRATROPIUM BROMIDE AND ALBUTEROL 20; 100 UG/1; UG/1
SPRAY, METERED RESPIRATORY (INHALATION)
Qty: 4 G | Refills: 11 | Status: SHIPPED | OUTPATIENT
Start: 2023-11-17 | End: 2024-09-03

## 2023-12-02 ENCOUNTER — LAB REQUISITION (OUTPATIENT)
Dept: LAB | Facility: CLINIC | Age: 88
End: 2023-12-02
Payer: COMMERCIAL

## 2023-12-02 DIAGNOSIS — I50.9 HEART FAILURE, UNSPECIFIED (H): ICD-10-CM

## 2023-12-04 LAB
ANION GAP SERPL CALCULATED.3IONS-SCNC: 11 MMOL/L (ref 7–15)
BUN SERPL-MCNC: 32 MG/DL (ref 8–23)
CALCIUM SERPL-MCNC: 9 MG/DL (ref 8.2–9.6)
CHLORIDE SERPL-SCNC: 99 MMOL/L (ref 98–107)
CREAT SERPL-MCNC: 0.99 MG/DL (ref 0.51–0.95)
DEPRECATED HCO3 PLAS-SCNC: 24 MMOL/L (ref 22–29)
EGFRCR SERPLBLD CKD-EPI 2021: 53 ML/MIN/1.73M2
GLUCOSE SERPL-MCNC: 71 MG/DL (ref 70–99)
POTASSIUM SERPL-SCNC: 4.2 MMOL/L (ref 3.4–5.3)
SODIUM SERPL-SCNC: 134 MMOL/L (ref 135–145)

## 2023-12-04 PROCEDURE — 36415 COLL VENOUS BLD VENIPUNCTURE: CPT | Mod: ORL | Performed by: INTERNAL MEDICINE

## 2023-12-04 PROCEDURE — P9604 ONE-WAY ALLOW PRORATED TRIP: HCPCS | Mod: ORL | Performed by: INTERNAL MEDICINE

## 2023-12-04 PROCEDURE — 80048 BASIC METABOLIC PNL TOTAL CA: CPT | Mod: ORL | Performed by: INTERNAL MEDICINE

## 2023-12-17 ENCOUNTER — ASSISTED LIVING VISIT (OUTPATIENT)
Dept: GERIATRICS | Facility: CLINIC | Age: 88
End: 2023-12-17
Payer: COMMERCIAL

## 2023-12-17 VITALS
DIASTOLIC BLOOD PRESSURE: 56 MMHG | TEMPERATURE: 97.5 F | RESPIRATION RATE: 18 BRPM | HEART RATE: 68 BPM | SYSTOLIC BLOOD PRESSURE: 105 MMHG

## 2023-12-17 DIAGNOSIS — Z85.89 HX OF SQUAMOUS CELL CARCINOMA: ICD-10-CM

## 2023-12-17 DIAGNOSIS — K08.9 POOR DENTITION: ICD-10-CM

## 2023-12-17 DIAGNOSIS — N18.31 CHRONIC KIDNEY DISEASE, STAGE 3A (H): ICD-10-CM

## 2023-12-17 DIAGNOSIS — I48.91 ATRIAL FIBRILLATION WITH RAPID VENTRICULAR RESPONSE (H): ICD-10-CM

## 2023-12-17 DIAGNOSIS — E87.1 HYPONATREMIA: Primary | ICD-10-CM

## 2023-12-17 PROBLEM — C02.1: Status: RESOLVED | Noted: 2023-08-30 | Resolved: 2023-12-17

## 2023-12-17 PROCEDURE — 99349 HOME/RES VST EST MOD MDM 40: CPT | Performed by: NURSE PRACTITIONER

## 2023-12-17 NOTE — LETTER
12/17/2023        RE: Marla Dunn  C/o Deneen Dunn  [address] [place] [address]        [facility] GERIATRICS  ACUTE/EPISODIC VISIT    FARSHAD [facility] Medical Record Number:  [id number]  Place of Service where encounter took place:  [facility] ([facility]) [[id number]]    Chief Complaint   Patient presents with     RECHECK       HPI:    Marla Dunn is a 89 year old  (5/22/1929), who is being seen today for an episodic care visit.  HPI information obtained from: facility chart records, facility staff, patient report, and [facility] chart review.    Today's concern is:    Diagnoses         Codes Comments    Hyponatremia    -  Primary E87.1     Chronic kidney disease, stage 3a (H)     N18.31     Atrial fibrillation with rapid ventricular response (H)     I48.91     Poor dentition     K08.9     Hx of squamous cell carcinoma     Z85.89 tongue border          Came to see Georgia today and how she has been doing.  Have not seen her personally in a little while.    Continues with monthly BMP's done for her hyponatremia.  Is followed by Nephrology.    No concerns with her heart or heart palpitations.      States she has gained some weight now since surgery of her tongue.  Able to eat more regular foods minus what her poor teeth will allow her to chew.    Found her in her apartment as she was going through some papers.  Alert and oriented x3.  Uses her wheelchair for mobility.  Reminded her to exercise with her walker around the apartment.      ALLERGIES:    Allergies   Allergen Reactions     Gramineae Pollens Other (See Comments)     ORCHARDGRASS. Shortness of breath when lawn is just cut.     Smoke. Other (See Comments)     Hard to breathe.     Pollen Extract      Other reaction(s): Unknown        MEDICATIONS:  Post Discharge Medication Reconciliation Status: patient was not discharged from an inpatient facility or TCU.     Current Outpatient Medications   Medication Sig Dispense  Refill     acetaminophen (TYLENOL) 325 MG tablet Take 3 tablets (975 mg) by mouth every 8 hours as needed for mild pain       albuterol (PROAIR HFA/PROVENTIL HFA/VENTOLIN HFA) 108 (90 Base) MCG/ACT inhaler Inhale 2 puffs into the lungs every 6 hours       amoxicillin (AMOXIL) 500 MG capsule 4 CAPSULES (2000MG ) ORALLY AS NEEDED ONE HOUR PRIOR TO DENTAL APPOINTMENT 4 capsule 5     ANTACID REGULAR STRENGTH 500 MG chewable tablet 2 TABLETS (1000MG) ORALLY 2 TIMES DAILY AS NEEDED FOR HEARTBURN 60 tablet 11     Ascorbic Acid (VITAMIN C PO) Take 500 mg by mouth every morning       ASPIRIN LOW DOSE 81 MG EC tablet 1 TABLET ORALLY 2 TIMES DAILY (DX: PROPHYLAXIS) 180 tablet 3     Bacillus Coagulans-Inulin (PROBIOTIC FORMULA) 1-250 BILLION-MG CAPS 1 CAPSULE ORALLY DAILY 28 capsule 11     bisacodyl (DULCOLAX) 5 MG EC tablet 1 TABLET ORALLY DAILY AS NEEDED (MAY KEEP AT BEDSIDE) 30 tablet 10     chlorhexidine (PERIDEX) 0.12 % solution Swish and spit 15 mLs in mouth 2 times daily May self administer 118 mL 3     cholecalciferol (VITAMIN D3) 125 mcg (5000 units) capsule 1 CAPSULE ORALLY DAILY 90 capsule 3     COMBIVENT RESPIMAT  MCG/ACT inhaler INHALE 1 PUFF INTO THE LUNGS  4 TIMES DAILY 4 g 11     cycloSPORINE (RESTASIS) 0.05 % ophthalmic emulsion Place 1 drop into both eyes 2 times daily        demeclocycline (DECLOMYCIN) 150 MG tablet Take 1 tablet (150 mg) by mouth 2 times daily       fish oil-omega-3 fatty acids 1000 MG capsule 1 CAPSULE ORALLY DAILY 28 capsule 11     fluorometholone (FML LIQUIFILM) 0.1 % ophthalmic susp Place 1 drop Into the left eye daily        IBANDRONATE SODIUM PO Take 150 mg by mouth every 30 days       levothyroxine (SYNTHROID/LEVOTHROID) 88 MCG tablet 1 TABLET ORALLY EVERY MORNING ON AN EMPTY STOMACH 31 tablet 11     loratadine (CLARITIN) 10 MG tablet 1 TABLET ORALLY DAILY (DX: ASTHMA) 28 tablet 11     melatonin 3 MG tablet 3mg to be given with 5mg = 8mg at HS 30 tablet 11     melatonin 5 MG  tablet 5mg to be given with 3mg tab = 8mg at HS 30 tablet 11     metoprolol tartrate (LOPRESSOR) 25 MG tablet 1 TABLET ORALLY 2 TIMES DAILY (DX: HYPERTENSION) 56 tablet 11     mirtazapine (REMERON) 7.5 MG tablet 1 TABLET ORALLY AT BEDTIME 28 tablet 11     Multiple Vitamins-Minerals (PRESERVISION AREDS 2) CAPS 1 CAPSULE ORALLY 2 TIMES DAILY 56 capsule 11     omeprazole (PRILOSEC) 20 MG DR capsule 1 CAPSULE ORALLY 2 TIMES DAILY (DX: GASTROESOPHAGEAL REFLUX DISEASE) 62 capsule 11     OXYGEN-HELIUM IN        polyethylene glycol 0.4%- propylene glycol 0.3% (SYSTANE ULTRA) 0.4-0.3 % SOLN ophthalmic solution Place 1 drop into both eyes 4 times daily       Polyethylene Glycol 3350 (PEG 3350) 17 GM/SCOOP POWD MIX 1 CAPFUL (17 GMS) IN 8 OUNCES OF JUICE OR WATER AND DRINK ORALLY DAILY AS NEEDED (DX: CONSTIPATION) 510 g 10     potassium chloride ER (K-TAB/KLOR-CON) 10 MEQ CR tablet 1 TABLET ORALLY DAILY 28 tablet 11     Probiotic Product (PROBIOTIC PO) Take 1 capsule by mouth every morning       SENEXON-S 8.6-50 MG tablet 1 TABLET ORALLY 2 TIMES DAILY (DX: CONSTIPATION) 56 tablet 11     sodium chloride 1 GM tablet Take 1 tablet (1 g) by mouth daily 30 tablet 11     spironolactone (ALDACTONE) 25 MG tablet 1 TABLET ORALLY DAILY (DX: EDEMA) ** HAZARDOUS MED: WEAR DOUBLE NITRILE GLOVES** 28 tablet 11     timolol (TIMOPTIC) 0.5 % ophthalmic solution Place 1 drop Into the left eye daily        torsemide (DEMADEX) 20 MG tablet 1.5 TABLET (30mg) ORALLY DAILY 31 tablet 11     vitamin C (ASCORBIC ACID) 500 MG tablet 1 TABLET ORALLY DAILY (DX: SUPPLEMENT) 31 tablet 11       REVIEW OF SYSTEMS:  4 point ROS neg other than the symptoms noted above in the HPI.      PHYSICAL EXAM:  /56   Pulse 68   Temp 97.5  F (36.4  C)   Resp 18   Petite female sitting in her wheelchair.  Neatly groomed.    Skin is pink frail, warm and dry.  Has a few areas on legs that look like veins where bumped and bled slightly.  Look better now than they have  sometimes.  Heart rate regular/irregular and strong.  No edema in legs  Lungs are clear to auscultation.    Abdomen is flat, soft, and non-tender.      Component      Latest Ref Rng 12/4/2023  10:06 AM   Sodium      135 - 145 mmol/L 134 (L)    Potassium      3.4 - 5.3 mmol/L 4.2    Chloride      98 - 107 mmol/L 99    Carbon Dioxide (CO2)      22 - 29 mmol/L 24    Anion Gap      7 - 15 mmol/L 11    Urea Nitrogen      8.0 - 23.0 mg/dL 32.0 (H)    Creatinine      0.51 - 0.95 mg/dL 0.99 (H)    GFR Estimate      >60 mL/min/1.73m2 53 (L)    Calcium      8.2 - 9.6 mg/dL 9.0    Glucose      70 - 99 mg/dL 71         ASSESSMENT / PLAN:  (E87.1) Hyponatremia  (primary encounter diagnosis)  (N18.31) Chronic kidney disease, stage 3a (H)  Comment: managed with medications that include NaCl 1GM twice a day, and demecycline 150mg twice a day.  Stable.  Results are sent to Nephrology office.     (I48.91) Atrial fibrillation with rapid ventricular response (H)  Comment: managed with metoprolol 25mg twice a day.  Takes an ASA 81mg daily.  Doing well.      (K08.9) Poor dentition  (Z85.89) Hx of squamous cell carcinoma  Comment: tongue border   Comment:  states she is putting on some weight.  Face looks a little germain.  If she stays above 100lbs, she is doing well.  Has nutritional supplements in her apartment that family brings her.  No changes.  Glad this was a successful surgery for her.      Orders:  No new orders today    Electronically signed by  CASTRO Hazel CNP             Sincerely,        CASTRO Hazel CNP

## 2023-12-17 NOTE — PROGRESS NOTES
FARSHAD [facility] GERIATRICS  ACUTE/EPISODIC VISIT    [facility] Medical Record Number:  [id number]  Place of Service where encounter took place:  [facility]) [[id number]]    Chief Complaint   Patient presents with    RECHECK       HPI:    Marla Dunn is a 89 year old  (5/22/1929), who is being seen today for an episodic care visit.  HPI information obtained from: facility chart records, facility staff, patient report, and [facility] chart review.    Today's concern is:    Diagnoses         Codes Comments    Hyponatremia    -  Primary E87.1     Chronic kidney disease, stage 3a (H)     N18.31     Atrial fibrillation with rapid ventricular response (H)     I48.91     Poor dentition     K08.9     Hx of squamous cell carcinoma     Z85.89 tongue border          Came to see Georgia today and how she has been doing.  Have not seen her personally in a little while.    Continues with monthly BMP's done for her hyponatremia.  Is followed by Nephrology.    No concerns with her heart or heart palpitations.      States she has gained some weight now since surgery of her tongue.  Able to eat more regular foods minus what her poor teeth will allow her to chew.    Found her in her apartment as she was going through some papers.  Alert and oriented x3.  Uses her wheelchair for mobility.  Reminded her to exercise with her walker around the apartment.      ALLERGIES:    Allergies   Allergen Reactions    Gramineae Pollens Other (See Comments)     ORCHARDGRASS. Shortness of breath when lawn is just cut.    Smoke. Other (See Comments)     Hard to breathe.    Pollen Extract      Other reaction(s): Unknown        MEDICATIONS:  Post Discharge Medication Reconciliation Status: patient was not discharged from an inpatient facility or TCU.     Current Outpatient Medications   Medication Sig Dispense Refill    acetaminophen (TYLENOL) 325 MG tablet Take 3 tablets (975 mg) by mouth every 8 hours as needed for mild  pain      albuterol (PROAIR HFA/PROVENTIL HFA/VENTOLIN HFA) 108 (90 Base) MCG/ACT inhaler Inhale 2 puffs into the lungs every 6 hours      amoxicillin (AMOXIL) 500 MG capsule 4 CAPSULES (2000MG ) ORALLY AS NEEDED ONE HOUR PRIOR TO DENTAL APPOINTMENT 4 capsule 5    ANTACID REGULAR STRENGTH 500 MG chewable tablet 2 TABLETS (1000MG) ORALLY 2 TIMES DAILY AS NEEDED FOR HEARTBURN 60 tablet 11    Ascorbic Acid (VITAMIN C PO) Take 500 mg by mouth every morning      ASPIRIN LOW DOSE 81 MG EC tablet 1 TABLET ORALLY 2 TIMES DAILY (DX: PROPHYLAXIS) 180 tablet 3    Bacillus Coagulans-Inulin (PROBIOTIC FORMULA) 1-250 BILLION-MG CAPS 1 CAPSULE ORALLY DAILY 28 capsule 11    bisacodyl (DULCOLAX) 5 MG EC tablet 1 TABLET ORALLY DAILY AS NEEDED (MAY KEEP AT BEDSIDE) 30 tablet 10    chlorhexidine (PERIDEX) 0.12 % solution Swish and spit 15 mLs in mouth 2 times daily May self administer 118 mL 3    cholecalciferol (VITAMIN D3) 125 mcg (5000 units) capsule 1 CAPSULE ORALLY DAILY 90 capsule 3    COMBIVENT RESPIMAT  MCG/ACT inhaler INHALE 1 PUFF INTO THE LUNGS  4 TIMES DAILY 4 g 11    cycloSPORINE (RESTASIS) 0.05 % ophthalmic emulsion Place 1 drop into both eyes 2 times daily       demeclocycline (DECLOMYCIN) 150 MG tablet Take 1 tablet (150 mg) by mouth 2 times daily      fish oil-omega-3 fatty acids 1000 MG capsule 1 CAPSULE ORALLY DAILY 28 capsule 11    fluorometholone (FML LIQUIFILM) 0.1 % ophthalmic susp Place 1 drop Into the left eye daily       IBANDRONATE SODIUM PO Take 150 mg by mouth every 30 days      levothyroxine (SYNTHROID/LEVOTHROID) 88 MCG tablet 1 TABLET ORALLY EVERY MORNING ON AN EMPTY STOMACH 31 tablet 11    loratadine (CLARITIN) 10 MG tablet 1 TABLET ORALLY DAILY (DX: ASTHMA) 28 tablet 11    melatonin 3 MG tablet 3mg to be given with 5mg = 8mg at HS 30 tablet 11    melatonin 5 MG tablet 5mg to be given with 3mg tab = 8mg at HS 30 tablet 11    metoprolol tartrate (LOPRESSOR) 25 MG tablet 1 TABLET ORALLY 2 TIMES  DAILY (DX: HYPERTENSION) 56 tablet 11    mirtazapine (REMERON) 7.5 MG tablet 1 TABLET ORALLY AT BEDTIME 28 tablet 11    Multiple Vitamins-Minerals (PRESERVISION AREDS 2) CAPS 1 CAPSULE ORALLY 2 TIMES DAILY 56 capsule 11    omeprazole (PRILOSEC) 20 MG DR capsule 1 CAPSULE ORALLY 2 TIMES DAILY (DX: GASTROESOPHAGEAL REFLUX DISEASE) 62 capsule 11    OXYGEN-HELIUM IN       polyethylene glycol 0.4%- propylene glycol 0.3% (SYSTANE ULTRA) 0.4-0.3 % SOLN ophthalmic solution Place 1 drop into both eyes 4 times daily      Polyethylene Glycol 3350 (PEG 3350) 17 GM/SCOOP POWD MIX 1 CAPFUL (17 GMS) IN 8 OUNCES OF JUICE OR WATER AND DRINK ORALLY DAILY AS NEEDED (DX: CONSTIPATION) 510 g 10    potassium chloride ER (K-TAB/KLOR-CON) 10 MEQ CR tablet 1 TABLET ORALLY DAILY 28 tablet 11    Probiotic Product (PROBIOTIC PO) Take 1 capsule by mouth every morning      SENEXON-S 8.6-50 MG tablet 1 TABLET ORALLY 2 TIMES DAILY (DX: CONSTIPATION) 56 tablet 11    sodium chloride 1 GM tablet Take 1 tablet (1 g) by mouth daily 30 tablet 11    spironolactone (ALDACTONE) 25 MG tablet 1 TABLET ORALLY DAILY (DX: EDEMA) ** HAZARDOUS MED: WEAR DOUBLE NITRILE GLOVES** 28 tablet 11    timolol (TIMOPTIC) 0.5 % ophthalmic solution Place 1 drop Into the left eye daily       torsemide (DEMADEX) 20 MG tablet 1.5 TABLET (30mg) ORALLY DAILY 31 tablet 11    vitamin C (ASCORBIC ACID) 500 MG tablet 1 TABLET ORALLY DAILY (DX: SUPPLEMENT) 31 tablet 11       REVIEW OF SYSTEMS:  4 point ROS neg other than the symptoms noted above in the HPI.      PHYSICAL EXAM:  /56   Pulse 68   Temp 97.5  F (36.4  C)   Resp 18   Petite female sitting in her wheelchair.  Neatly groomed.    Skin is pink frail, warm and dry.  Has a few areas on legs that look like veins where bumped and bled slightly.  Look better now than they have sometimes.  Heart rate regular/irregular and strong.  No edema in legs  Lungs are clear to auscultation.    Abdomen is flat, soft, and non-tender.       Component      Latest Ref Rng 12/4/2023  10:06 AM   Sodium      135 - 145 mmol/L 134 (L)    Potassium      3.4 - 5.3 mmol/L 4.2    Chloride      98 - 107 mmol/L 99    Carbon Dioxide (CO2)      22 - 29 mmol/L 24    Anion Gap      7 - 15 mmol/L 11    Urea Nitrogen      8.0 - 23.0 mg/dL 32.0 (H)    Creatinine      0.51 - 0.95 mg/dL 0.99 (H)    GFR Estimate      >60 mL/min/1.73m2 53 (L)    Calcium      8.2 - 9.6 mg/dL 9.0    Glucose      70 - 99 mg/dL 71         ASSESSMENT / PLAN:  (E87.1) Hyponatremia  (primary encounter diagnosis)  (N18.31) Chronic kidney disease, stage 3a (H)  Comment: managed with medications that include NaCl 1GM twice a day, and demecycline 150mg twice a day.  Stable.  Results are sent to Nephrology office.     (I48.91) Atrial fibrillation with rapid ventricular response (H)  Comment: managed with metoprolol 25mg twice a day.  Takes an ASA 81mg daily.  Doing well.      (K08.9) Poor dentition  (Z85.89) Hx of squamous cell carcinoma  Comment: tongue border   Comment:  states she is putting on some weight.  Face looks a little germain.  If she stays above 100lbs, she is doing well.  Has nutritional supplements in her apartment that family brings her.  No changes.  Glad this was a successful surgery for her.      Orders:  No new orders today    Electronically signed by  CASTRO Hazel CNP

## 2023-12-18 DIAGNOSIS — G47.00 INSOMNIA, UNSPECIFIED TYPE: ICD-10-CM

## 2023-12-18 RX ORDER — LANOLIN ALCOHOL/MO/W.PET/CERES
CREAM (GRAM) TOPICAL
Qty: 30 TABLET | Refills: 11 | Status: SHIPPED | OUTPATIENT
Start: 2023-12-18

## 2023-12-27 ENCOUNTER — DOCUMENTATION ONLY (OUTPATIENT)
Dept: GERIATRICS | Facility: CLINIC | Age: 88
End: 2023-12-27
Payer: COMMERCIAL

## 2024-01-01 ENCOUNTER — ASSISTED LIVING VISIT (OUTPATIENT)
Dept: GERIATRICS | Facility: CLINIC | Age: 89
End: 2024-01-01
Payer: MEDICARE

## 2024-01-01 ENCOUNTER — PATIENT OUTREACH (OUTPATIENT)
Dept: GERIATRIC MEDICINE | Facility: CLINIC | Age: 89
End: 2024-01-01

## 2024-01-01 ENCOUNTER — HOSPITAL ENCOUNTER (EMERGENCY)
Facility: HOSPITAL | Age: 89
Discharge: HOME | End: 2024-12-15
Attending: STUDENT IN AN ORGANIZED HEALTH CARE EDUCATION/TRAINING PROGRAM | Admitting: STUDENT IN AN ORGANIZED HEALTH CARE EDUCATION/TRAINING PROGRAM
Payer: COMMERCIAL

## 2024-01-01 ENCOUNTER — LAB REQUISITION (OUTPATIENT)
Dept: LAB | Facility: CLINIC | Age: 89
End: 2024-01-01
Payer: COMMERCIAL

## 2024-01-01 ENCOUNTER — APPOINTMENT (OUTPATIENT)
Dept: RADIOLOGY | Facility: HOSPITAL | Age: 89
End: 2024-01-01
Attending: STUDENT IN AN ORGANIZED HEALTH CARE EDUCATION/TRAINING PROGRAM
Payer: COMMERCIAL

## 2024-01-01 ENCOUNTER — TELEPHONE (OUTPATIENT)
Dept: GERIATRICS | Facility: CLINIC | Age: 89
End: 2024-01-01

## 2024-01-01 VITALS
DIASTOLIC BLOOD PRESSURE: 50 MMHG | HEART RATE: 104 BPM | OXYGEN SATURATION: 96 % | TEMPERATURE: 97.7 F | RESPIRATION RATE: 16 BRPM | SYSTOLIC BLOOD PRESSURE: 75 MMHG

## 2024-01-01 VITALS
OXYGEN SATURATION: 85 % | TEMPERATURE: 97.2 F | BODY MASS INDEX: 16.46 KG/M2 | SYSTOLIC BLOOD PRESSURE: 101 MMHG | HEART RATE: 58 BPM | WEIGHT: 90 LBS | RESPIRATION RATE: 16 BRPM | DIASTOLIC BLOOD PRESSURE: 65 MMHG

## 2024-01-01 DIAGNOSIS — S72.401A CLOSED FRACTURE OF DISTAL END OF RIGHT FEMUR, UNSPECIFIED FRACTURE MORPHOLOGY, INITIAL ENCOUNTER (H): ICD-10-CM

## 2024-01-01 DIAGNOSIS — S72.401G CLOSED FRACTURE OF DISTAL END OF RIGHT FEMUR WITH DELAYED HEALING, UNSPECIFIED FRACTURE MORPHOLOGY, SUBSEQUENT ENCOUNTER: ICD-10-CM

## 2024-01-01 DIAGNOSIS — I50.9 HEART FAILURE, UNSPECIFIED (H): ICD-10-CM

## 2024-01-01 DIAGNOSIS — Z51.5 HOSPICE CARE PATIENT: ICD-10-CM

## 2024-01-01 DIAGNOSIS — I50.32 CHRONIC DIASTOLIC CONGESTIVE HEART FAILURE (H): Primary | ICD-10-CM

## 2024-01-01 PROCEDURE — 73560 X-RAY EXAM OF KNEE 1 OR 2: CPT | Mod: RT

## 2024-01-01 PROCEDURE — 250N000013 HC RX MED GY IP 250 OP 250 PS 637: Performed by: STUDENT IN AN ORGANIZED HEALTH CARE EDUCATION/TRAINING PROGRAM

## 2024-01-01 PROCEDURE — 99283 EMERGENCY DEPT VISIT LOW MDM: CPT

## 2024-01-01 PROCEDURE — 99349 HOME/RES VST EST MOD MDM 40: CPT | Mod: GV | Performed by: NURSE PRACTITIONER

## 2024-01-01 RX ORDER — ACETAMINOPHEN 325 MG/1
975 TABLET ORAL ONCE
Status: COMPLETED | OUTPATIENT
Start: 2024-01-01 | End: 2024-01-01

## 2024-01-01 RX ORDER — MORPHINE SULFATE 30 MG/1
TABLET ORAL
Qty: 30 TABLET | Refills: 0 | Status: SHIPPED | OUTPATIENT
Start: 2024-01-01

## 2024-01-01 RX ORDER — MORPHINE SULFATE 30 MG/1
2.5 TABLET ORAL
Status: SHIPPED
Start: 2024-01-01 | End: 2024-01-01

## 2024-01-01 RX ADMIN — ACETAMINOPHEN 975 MG: 325 TABLET ORAL at 01:12

## 2024-01-01 ASSESSMENT — COLUMBIA-SUICIDE SEVERITY RATING SCALE - C-SSRS
2. HAVE YOU ACTUALLY HAD ANY THOUGHTS OF KILLING YOURSELF IN THE PAST MONTH?: NO
6. HAVE YOU EVER DONE ANYTHING, STARTED TO DO ANYTHING, OR PREPARED TO DO ANYTHING TO END YOUR LIFE?: NO
1. IN THE PAST MONTH, HAVE YOU WISHED YOU WERE DEAD OR WISHED YOU COULD GO TO SLEEP AND NOT WAKE UP?: NO

## 2024-01-01 ASSESSMENT — ACTIVITIES OF DAILY LIVING (ADL)
ADLS_ACUITY_SCORE: 58

## 2024-01-02 LAB
ANION GAP SERPL CALCULATED.3IONS-SCNC: 12 MMOL/L (ref 7–15)
BUN SERPL-MCNC: 33 MG/DL (ref 8–23)
CALCIUM SERPL-MCNC: 9.2 MG/DL (ref 8.2–9.6)
CHLORIDE SERPL-SCNC: 95 MMOL/L (ref 98–107)
CREAT SERPL-MCNC: 0.99 MG/DL (ref 0.51–0.95)
DEPRECATED HCO3 PLAS-SCNC: 25 MMOL/L (ref 22–29)
EGFRCR SERPLBLD CKD-EPI 2021: 53 ML/MIN/1.73M2
GLUCOSE SERPL-MCNC: 95 MG/DL (ref 70–99)
POTASSIUM SERPL-SCNC: 5 MMOL/L (ref 3.4–5.3)
SODIUM SERPL-SCNC: 132 MMOL/L (ref 135–145)

## 2024-01-02 PROCEDURE — 80048 BASIC METABOLIC PNL TOTAL CA: CPT | Mod: ORL | Performed by: INTERNAL MEDICINE

## 2024-01-02 PROCEDURE — 36415 COLL VENOUS BLD VENIPUNCTURE: CPT | Mod: ORL | Performed by: INTERNAL MEDICINE

## 2024-01-02 PROCEDURE — P9604 ONE-WAY ALLOW PRORATED TRIP: HCPCS | Mod: ORL | Performed by: INTERNAL MEDICINE

## 2024-01-03 NOTE — PROGRESS NOTES
BMP returned today and K+ was at 5.0    Will discontinue her K+ tab of 10meq daily due to K+ on higher side.    Orders:  Discontinue K+ 10meq po daily  In one week draw K+ level due to hypokalemia    CASTRO Hazel CNP

## 2024-01-05 ENCOUNTER — LAB REQUISITION (OUTPATIENT)
Dept: LAB | Facility: CLINIC | Age: 89
End: 2024-01-05
Payer: COMMERCIAL

## 2024-01-05 DIAGNOSIS — E87.6 HYPOKALEMIA: ICD-10-CM

## 2024-01-08 LAB — POTASSIUM SERPL-SCNC: 4.5 MMOL/L (ref 3.4–5.3)

## 2024-01-08 PROCEDURE — 36415 COLL VENOUS BLD VENIPUNCTURE: CPT | Mod: ORL | Performed by: NURSE PRACTITIONER

## 2024-01-08 PROCEDURE — P9603 ONE-WAY ALLOW PRORATED MILES: HCPCS | Mod: ORL | Performed by: NURSE PRACTITIONER

## 2024-01-08 PROCEDURE — 86038 ANTINUCLEAR ANTIBODIES: CPT | Mod: ORL | Performed by: NURSE PRACTITIONER

## 2024-01-08 PROCEDURE — 84132 ASSAY OF SERUM POTASSIUM: CPT | Mod: ORL | Performed by: NURSE PRACTITIONER

## 2024-01-09 ENCOUNTER — ASSISTED LIVING VISIT (OUTPATIENT)
Dept: GERIATRICS | Facility: CLINIC | Age: 89
End: 2024-01-09
Payer: COMMERCIAL

## 2024-01-09 VITALS
WEIGHT: 92.6 LBS | RESPIRATION RATE: 18 BRPM | DIASTOLIC BLOOD PRESSURE: 55 MMHG | HEART RATE: 78 BPM | OXYGEN SATURATION: 99 % | TEMPERATURE: 97.9 F | BODY MASS INDEX: 15.89 KG/M2 | SYSTOLIC BLOOD PRESSURE: 112 MMHG

## 2024-01-09 DIAGNOSIS — I50.32 CHRONIC DIASTOLIC CONGESTIVE HEART FAILURE (H): Primary | ICD-10-CM

## 2024-01-09 DIAGNOSIS — I48.91 ATRIAL FIBRILLATION WITH RAPID VENTRICULAR RESPONSE (H): ICD-10-CM

## 2024-01-09 DIAGNOSIS — M35.00 SJOGREN'S SYNDROME, WITH UNSPECIFIED ORGAN INVOLVEMENT (H): ICD-10-CM

## 2024-01-09 DIAGNOSIS — N18.31 CHRONIC KIDNEY DISEASE, STAGE 3A (H): ICD-10-CM

## 2024-01-09 DIAGNOSIS — J44.9 CHRONIC OBSTRUCTIVE PULMONARY DISEASE, UNSPECIFIED COPD TYPE (H): ICD-10-CM

## 2024-01-09 DIAGNOSIS — E44.1 MILD PROTEIN-CALORIE MALNUTRITION (H): ICD-10-CM

## 2024-01-09 DIAGNOSIS — D68.9 COAGULATION DEFECT, UNSPECIFIED (H): ICD-10-CM

## 2024-01-09 LAB
ANA PAT SER IF-IMP: ABNORMAL
ANA SER QL IF: POSITIVE
ANA TITR SER IF: ABNORMAL {TITER}

## 2024-01-09 PROCEDURE — 99349 HOME/RES VST EST MOD MDM 40: CPT | Performed by: NURSE PRACTITIONER

## 2024-01-09 NOTE — PROGRESS NOTES
FARSHAD [facility] GERIATRICS  ACUTE/EPISODIC VISIT    [facility] Medical Record Number:  [id number]  Place of Service where encounter took place:  [facility] ([facility]) [[id number]]    Chief Complaint   Patient presents with    RECHECK       HPI:    Marla Dunn is a 89 year old  (5/22/1929), who is being seen today for an episodic care visit.  HPI information obtained from: facility chart records, patient report, and [facility] chart review.    Today's concern is:    Diagnoses         Codes Comments    Chronic diastolic congestive heart failure (H)    -  Primary I50.32     Chronic kidney disease, stage 3a (H)     N18.31     Chronic obstructive pulmonary disease, unspecified COPD type (H)     J44.9     Atrial fibrillation with rapid ventricular response (H)     I48.91     Coagulation defect, unspecified (H24)     D68.9     Mild protein-calorie malnutrition (H24)     E44.1     Sjogren's syndrome, with unspecified organ involvement (H24)     M35.00           Came to see Georgia today per her request last week.  Georgia was sitting up in her wheelchair watching TV.  Cleared a seat off for this NP to sit down with her.  Talked about her health and other topics.  She has been doing pretty well.  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  She watches her weight carefully.      Labs done monthly to monitor her sodium level.  Has been stable with current treatment and followed by nephrology.      She was going to get in touch with the OT person whom helped her get her wheelchair to talk about a back cushion  as there is a large gap between the seat and back of wheelchair that anything falls out.    ALLERGIES:    Allergies   Allergen Reactions    Gramineae Pollens Other (See Comments)     ORCHARDGRASS. Shortness of breath when lawn is just cut.    Smoke. Other (See Comments)     Hard to breathe.    Pollen Extract      Other reaction(s): Unknown        MEDICATIONS:  Post  Discharge Medication Reconciliation Status: patient was not discharged from an inpatient facility or TCU.     Current Outpatient Medications   Medication Sig Dispense Refill    acetaminophen (TYLENOL) 325 MG tablet Take 3 tablets (975 mg) by mouth every 8 hours as needed for mild pain      albuterol (PROAIR HFA/PROVENTIL HFA/VENTOLIN HFA) 108 (90 Base) MCG/ACT inhaler Inhale 2 puffs into the lungs every 6 hours      amoxicillin (AMOXIL) 500 MG capsule 4 CAPSULES (2000MG ) ORALLY AS NEEDED ONE HOUR PRIOR TO DENTAL APPOINTMENT 4 capsule 5    ANTACID REGULAR STRENGTH 500 MG chewable tablet 2 TABLETS (1000MG) ORALLY 2 TIMES DAILY AS NEEDED FOR HEARTBURN 60 tablet 11    Ascorbic Acid (VITAMIN C PO) Take 500 mg by mouth every morning      ASPIRIN LOW DOSE 81 MG EC tablet 1 TABLET ORALLY 2 TIMES DAILY (DX: PROPHYLAXIS) 180 tablet 3    Bacillus Coagulans-Inulin (PROBIOTIC FORMULA) 1-250 BILLION-MG CAPS 1 CAPSULE ORALLY DAILY 28 capsule 11    bisacodyl (DULCOLAX) 5 MG EC tablet 1 TABLET ORALLY DAILY AS NEEDED (MAY KEEP AT BEDSIDE) 30 tablet 10    chlorhexidine (PERIDEX) 0.12 % solution Swish and spit 15 mLs in mouth 2 times daily May self administer 118 mL 3    cholecalciferol (VITAMIN D3) 125 mcg (5000 units) capsule 1 CAPSULE ORALLY DAILY 90 capsule 3    COMBIVENT RESPIMAT  MCG/ACT inhaler INHALE 1 PUFF INTO THE LUNGS  4 TIMES DAILY 4 g 11    cycloSPORINE (RESTASIS) 0.05 % ophthalmic emulsion Place 1 drop into both eyes 2 times daily       demeclocycline (DECLOMYCIN) 150 MG tablet Take 1 tablet (150 mg) by mouth 2 times daily      fish oil-omega-3 fatty acids 1000 MG capsule 1 CAPSULE ORALLY DAILY 28 capsule 11    fluorometholone (FML LIQUIFILM) 0.1 % ophthalmic susp Place 1 drop Into the left eye daily       IBANDRONATE SODIUM PO Take 150 mg by mouth every 30 days      levothyroxine (SYNTHROID/LEVOTHROID) 88 MCG tablet 1 TABLET ORALLY EVERY MORNING ON AN EMPTY STOMACH 31 tablet 11    loratadine (CLARITIN) 10 MG  tablet 1 TABLET ORALLY DAILY (DX: ASTHMA) 28 tablet 11    melatonin 3 MG tablet 1 TABLET ORALLY AT BEDTIME ALONG WITH 5 MG FOR A TOTAL DOSE OF 8MG 30 tablet 11    melatonin 5 MG tablet 1 TABLET ORALLY AT BEDTIME  ALONG WITH 3MG FOR A TOTAL DOSE OF 8MG 30 tablet 11    metoprolol tartrate (LOPRESSOR) 25 MG tablet 1 TABLET ORALLY 2 TIMES DAILY (DX: HYPERTENSION) 56 tablet 11    mirtazapine (REMERON) 7.5 MG tablet 1 TABLET ORALLY AT BEDTIME 28 tablet 11    Multiple Vitamins-Minerals (PRESERVISION AREDS 2) CAPS 1 CAPSULE ORALLY 2 TIMES DAILY 56 capsule 11    omeprazole (PRILOSEC) 20 MG DR capsule 1 CAPSULE ORALLY 2 TIMES DAILY (DX: GASTROESOPHAGEAL REFLUX DISEASE) 62 capsule 11    OXYGEN-HELIUM IN       polyethylene glycol 0.4%- propylene glycol 0.3% (SYSTANE ULTRA) 0.4-0.3 % SOLN ophthalmic solution Place 1 drop into both eyes 4 times daily      Polyethylene Glycol 3350 (PEG 3350) 17 GM/SCOOP POWD MIX 1 CAPFUL (17 GMS) IN 8 OUNCES OF JUICE OR WATER AND DRINK ORALLY DAILY AS NEEDED (DX: CONSTIPATION) 510 g 10    Probiotic Product (PROBIOTIC PO) Take 1 capsule by mouth every morning      SENEXON-S 8.6-50 MG tablet 1 TABLET ORALLY 2 TIMES DAILY (DX: CONSTIPATION) 56 tablet 11    sodium chloride 1 GM tablet Take 1 tablet (1 g) by mouth daily 30 tablet 11    spironolactone (ALDACTONE) 25 MG tablet 1 TABLET ORALLY DAILY (DX: EDEMA)  28 tablet 11    timolol (TIMOPTIC) 0.5 % ophthalmic solution Place 1 drop Into the left eye daily       torsemide (DEMADEX) 20 MG tablet 1.5 TABLET (30mg) ORALLY DAILY 31 tablet 11    vitamin C (ASCORBIC ACID) 500 MG tablet 1 TABLET ORALLY DAILY (DX: SUPPLEMENT) 31 tablet 11       REVIEW OF SYSTEMS:  4 point ROS neg other than the symptoms noted above in the HPI.  No chest pain, no SOB.       PHYSICAL EXAM:  /55   Pulse 78   Temp 97.9  F (36.6  C)   Resp 18   Wt 42 kg (92 lb 9.6 oz)   SpO2 99%   BMI 15.89 kg/m    Georgia is a petite female whom uses a wheelchair more often than  ambulating.  Has a 2WW that she will use around the apartment.  Heart rate/rhythm is regular/irregular.  No edema in lower legs.    Lungs are clear.  No use of oxygen.  Does have a CPAP at night.  Skin is pink, warm and dry.  Back of her right calf there are two scabs present over veins.  No inflammation noted around the scabs.  Abdomen is flat, soft and non-tender.    Teeth in poor repair.  Speech is clear.      Component      Latest Ref Rng 1/2/2024  9:13 AM   Sodium      135 - 145 mmol/L 132 (L)    Potassium      3.4 - 5.3 mmol/L 5.0    Chloride      98 - 107 mmol/L 95 (L)    Carbon Dioxide (CO2)      22 - 29 mmol/L 25    Anion Gap      7 - 15 mmol/L 12    Urea Nitrogen      8.0 - 23.0 mg/dL 33.0 (H)    Creatinine      0.51 - 0.95 mg/dL 0.99 (H)    GFR Estimate      >60 mL/min/1.73m2 53 (L)    Calcium      8.2 - 9.6 mg/dL 9.2    Glucose      70 - 99 mg/dL 95             ASSESSMENT / PLAN:  (I50.32) Chronic diastolic congestive heart failure (H)  (primary encounter diagnosis)  (N18.31) Chronic kidney disease, stage 3a (H)  Comment: Torsemide 30mg po daily.  Was on K+ supplement but discontinued due to K+ at 5.0.    Continue with monthly BMP.  No recent exacerbations.      (J44.9) Chronic obstructive pulmonary disease, unspecified COPD type (H)  Comment: is on Combivent Respimat scheduled.  No changes.  Uses her CPAP at night.      (I48.91) Atrial fibrillation with rapid ventricular response (H)  (D68.9) Coagulation defect, unspecified (H24)  Comment: is on Metoprolol 25mg BID.  No blood thinners.      (E44.1) Mild protein-calorie malnutrition (H24)  Comment: Georgia's weight according to the facility chart is 92 lbs.  She tries hard to eat extra but with her poor teeth and tongue cancer, she lost some weight and now is slowly regaining.  Will take nutritional supplements as they are on her counter.    (M35.00) Sjogren's syndrome, with unspecified organ involvement (H24)  Comment: does have lubricating eye drops 4x  a day.  No changes.       Orders:  No new orders    Electronically signed by  CASTRO Hazel CNP

## 2024-01-09 NOTE — LETTER
1/9/2024        RE: Marla Dunn  C/o Deneen Dunn  [address] [facility] [address]        [facility] GERIATRICS  ACUTE/EPISODIC VISIT    [facility] Medical Record Number:  [id number]  Place of Service where encounter took place:  [facility] [place] ([facility]) [[id number]]    Chief Complaint   Patient presents with     RECHECK       HPI:    Marla Dunn is a 89 year old  (5/22/1929), who is being seen today for an episodic care visit.  HPI information obtained from: facility chart records, patient report, and [facility] chart review.    Today's concern is:    Diagnoses         Codes Comments    Chronic diastolic congestive heart failure (H)    -  Primary I50.32     Chronic kidney disease, stage 3a (H)     N18.31     Chronic obstructive pulmonary disease, unspecified COPD type (H)     J44.9     Atrial fibrillation with rapid ventricular response (H)     I48.91     Coagulation defect, unspecified (H24)     D68.9     Mild protein-calorie malnutrition (H24)     E44.1     Sjogren's syndrome, with unspecified organ involvement (H24)     M35.00           Came to see Georgia today per her request last week.  Georgia was sitting up in her wheelchair watching TV.  Cleared a seat off for this NP to sit down with her.  Talked about her health and other topics.  She has been doing pretty well.  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  She watches her weight carefully.      Labs done monthly to monitor her sodium level.  Has been stable with current treatment and followed by nephrology.      She was going to get in touch with the OT person whom helped her get her wheelchair to talk about a back cushion  as there is a large gap between the seat and back of wheelchair that anything falls out.    ALLERGIES:    Allergies   Allergen Reactions     Gramineae Pollens Other (See Comments)     ORCHARDGRASS. Shortness of breath when lawn is just cut.     Smoke.  Other (See Comments)     Hard to breathe.     Pollen Extract      Other reaction(s): Unknown        MEDICATIONS:  Post Discharge Medication Reconciliation Status: patient was not discharged from an inpatient facility or TCU.     Current Outpatient Medications   Medication Sig Dispense Refill     acetaminophen (TYLENOL) 325 MG tablet Take 3 tablets (975 mg) by mouth every 8 hours as needed for mild pain       albuterol (PROAIR HFA/PROVENTIL HFA/VENTOLIN HFA) 108 (90 Base) MCG/ACT inhaler Inhale 2 puffs into the lungs every 6 hours       amoxicillin (AMOXIL) 500 MG capsule 4 CAPSULES (2000MG ) ORALLY AS NEEDED ONE HOUR PRIOR TO DENTAL APPOINTMENT 4 capsule 5     ANTACID REGULAR STRENGTH 500 MG chewable tablet 2 TABLETS (1000MG) ORALLY 2 TIMES DAILY AS NEEDED FOR HEARTBURN 60 tablet 11     Ascorbic Acid (VITAMIN C PO) Take 500 mg by mouth every morning       ASPIRIN LOW DOSE 81 MG EC tablet 1 TABLET ORALLY 2 TIMES DAILY (DX: PROPHYLAXIS) 180 tablet 3     Bacillus Coagulans-Inulin (PROBIOTIC FORMULA) 1-250 BILLION-MG CAPS 1 CAPSULE ORALLY DAILY 28 capsule 11     bisacodyl (DULCOLAX) 5 MG EC tablet 1 TABLET ORALLY DAILY AS NEEDED (MAY KEEP AT BEDSIDE) 30 tablet 10     chlorhexidine (PERIDEX) 0.12 % solution Swish and spit 15 mLs in mouth 2 times daily May self administer 118 mL 3     cholecalciferol (VITAMIN D3) 125 mcg (5000 units) capsule 1 CAPSULE ORALLY DAILY 90 capsule 3     COMBIVENT RESPIMAT  MCG/ACT inhaler INHALE 1 PUFF INTO THE LUNGS  4 TIMES DAILY 4 g 11     cycloSPORINE (RESTASIS) 0.05 % ophthalmic emulsion Place 1 drop into both eyes 2 times daily        demeclocycline (DECLOMYCIN) 150 MG tablet Take 1 tablet (150 mg) by mouth 2 times daily       fish oil-omega-3 fatty acids 1000 MG capsule 1 CAPSULE ORALLY DAILY 28 capsule 11     fluorometholone (FML LIQUIFILM) 0.1 % ophthalmic susp Place 1 drop Into the left eye daily        IBANDRONATE SODIUM PO Take 150 mg by mouth every 30 days       levothyroxine  (SYNTHROID/LEVOTHROID) 88 MCG tablet 1 TABLET ORALLY EVERY MORNING ON AN EMPTY STOMACH 31 tablet 11     loratadine (CLARITIN) 10 MG tablet 1 TABLET ORALLY DAILY (DX: ASTHMA) 28 tablet 11     melatonin 3 MG tablet 1 TABLET ORALLY AT BEDTIME ALONG WITH 5 MG FOR A TOTAL DOSE OF 8MG 30 tablet 11     melatonin 5 MG tablet 1 TABLET ORALLY AT BEDTIME  ALONG WITH 3MG FOR A TOTAL DOSE OF 8MG 30 tablet 11     metoprolol tartrate (LOPRESSOR) 25 MG tablet 1 TABLET ORALLY 2 TIMES DAILY (DX: HYPERTENSION) 56 tablet 11     mirtazapine (REMERON) 7.5 MG tablet 1 TABLET ORALLY AT BEDTIME 28 tablet 11     Multiple Vitamins-Minerals (PRESERVISION AREDS 2) CAPS 1 CAPSULE ORALLY 2 TIMES DAILY 56 capsule 11     omeprazole (PRILOSEC) 20 MG DR capsule 1 CAPSULE ORALLY 2 TIMES DAILY (DX: GASTROESOPHAGEAL REFLUX DISEASE) 62 capsule 11     OXYGEN-HELIUM IN        polyethylene glycol 0.4%- propylene glycol 0.3% (SYSTANE ULTRA) 0.4-0.3 % SOLN ophthalmic solution Place 1 drop into both eyes 4 times daily       Polyethylene Glycol 3350 (PEG 3350) 17 GM/SCOOP POWD MIX 1 CAPFUL (17 GMS) IN 8 OUNCES OF JUICE OR WATER AND DRINK ORALLY DAILY AS NEEDED (DX: CONSTIPATION) 510 g 10     Probiotic Product (PROBIOTIC PO) Take 1 capsule by mouth every morning       SENEXON-S 8.6-50 MG tablet 1 TABLET ORALLY 2 TIMES DAILY (DX: CONSTIPATION) 56 tablet 11     sodium chloride 1 GM tablet Take 1 tablet (1 g) by mouth daily 30 tablet 11     spironolactone (ALDACTONE) 25 MG tablet 1 TABLET ORALLY DAILY (DX: EDEMA)  28 tablet 11     timolol (TIMOPTIC) 0.5 % ophthalmic solution Place 1 drop Into the left eye daily        torsemide (DEMADEX) 20 MG tablet 1.5 TABLET (30mg) ORALLY DAILY 31 tablet 11     vitamin C (ASCORBIC ACID) 500 MG tablet 1 TABLET ORALLY DAILY (DX: SUPPLEMENT) 31 tablet 11       REVIEW OF SYSTEMS:  4 point ROS neg other than the symptoms noted above in the HPI.  No chest pain, no SOB.       PHYSICAL EXAM:  /55   Pulse 78   Temp 97.9  F (36.6   C)   Resp 18   Wt 42 kg (92 lb 9.6 oz)   SpO2 99%   BMI 15.89 kg/m    Georgia is a petite female whom uses a wheelchair more often than ambulating.  Has a 2WW that she will use around the apartment.  Heart rate/rhythm is regular/irregular.  No edema in lower legs.    Lungs are clear.  No use of oxygen.  Does have a CPAP at night.  Skin is pink, warm and dry.  Back of her right calf there are two scabs present over veins.  No inflammation noted around the scabs.  Abdomen is flat, soft and non-tender.    Teeth in poor repair.  Speech is clear.      Component      Latest Ref Rng 1/2/2024  9:13 AM   Sodium      135 - 145 mmol/L 132 (L)    Potassium      3.4 - 5.3 mmol/L 5.0    Chloride      98 - 107 mmol/L 95 (L)    Carbon Dioxide (CO2)      22 - 29 mmol/L 25    Anion Gap      7 - 15 mmol/L 12    Urea Nitrogen      8.0 - 23.0 mg/dL 33.0 (H)    Creatinine      0.51 - 0.95 mg/dL 0.99 (H)    GFR Estimate      >60 mL/min/1.73m2 53 (L)    Calcium      8.2 - 9.6 mg/dL 9.2    Glucose      70 - 99 mg/dL 95             ASSESSMENT / PLAN:  (I50.32) Chronic diastolic congestive heart failure (H)  (primary encounter diagnosis)  (N18.31) Chronic kidney disease, stage 3a (H)  Comment: Torsemide 30mg po daily.  Was on K+ supplement but discontinued due to K+ at 5.0.    Continue with monthly BMP.  No recent exacerbations.      (J44.9) Chronic obstructive pulmonary disease, unspecified COPD type (H)  Comment: is on Combivent Respimat scheduled.  No changes.  Uses her CPAP at night.      (I48.91) Atrial fibrillation with rapid ventricular response (H)  (D68.9) Coagulation defect, unspecified (H24)  Comment: is on Metoprolol 25mg BID.  No blood thinners.      (E44.1) Mild protein-calorie malnutrition (H24)  Comment: Georgia's weight according to the facility chart is 92 lbs.  She tries hard to eat extra but with her poor teeth and tongue cancer, she lost some weight and now is slowly regaining.  Will take nutritional supplements as  they are on her counter.    (M35.00) Sjogren's syndrome, with unspecified organ involvement (H24)  Comment: does have lubricating eye drops 4x a day.  No changes.       Orders:  No new orders    Electronically signed by  CASTRO Hazel CNP            Sincerely,        CASTRO Hazel CNP

## 2024-01-16 DIAGNOSIS — K59.00 CONSTIPATION, UNSPECIFIED CONSTIPATION TYPE: ICD-10-CM

## 2024-01-17 DIAGNOSIS — E87.1 HYPONATREMIA: ICD-10-CM

## 2024-01-17 RX ORDER — DEMECLOCYCLINE HYDROCHLORIDE 150 MG/1
TABLET, FILM COATED ORAL
Qty: 60 TABLET | Refills: 11 | Status: SHIPPED | OUTPATIENT
Start: 2024-01-17

## 2024-01-17 RX ORDER — POLYETHYLENE GLYCOL 3350 17 G/17G
POWDER ORAL
Qty: 510 G | Refills: 11 | Status: SHIPPED | OUTPATIENT
Start: 2024-01-17

## 2024-01-21 PROBLEM — E46 PROTEIN-CALORIE MALNUTRITION (H): Status: ACTIVE | Noted: 2024-01-21

## 2024-02-02 ENCOUNTER — LAB REQUISITION (OUTPATIENT)
Dept: LAB | Facility: CLINIC | Age: 89
End: 2024-02-02
Payer: COMMERCIAL

## 2024-02-02 DIAGNOSIS — I50.9 HEART FAILURE, UNSPECIFIED (H): ICD-10-CM

## 2024-02-05 LAB
ANION GAP SERPL CALCULATED.3IONS-SCNC: 13 MMOL/L (ref 7–15)
BUN SERPL-MCNC: 36.9 MG/DL (ref 8–23)
CALCIUM SERPL-MCNC: 9.1 MG/DL (ref 8.2–9.6)
CHLORIDE SERPL-SCNC: 96 MMOL/L (ref 98–107)
CREAT SERPL-MCNC: 1.03 MG/DL (ref 0.51–0.95)
DEPRECATED HCO3 PLAS-SCNC: 24 MMOL/L (ref 22–29)
EGFRCR SERPLBLD CKD-EPI 2021: 50 ML/MIN/1.73M2
GLUCOSE SERPL-MCNC: 56 MG/DL (ref 70–99)
POTASSIUM SERPL-SCNC: 4.2 MMOL/L (ref 3.4–5.3)
SODIUM SERPL-SCNC: 133 MMOL/L (ref 135–145)

## 2024-02-05 PROCEDURE — 80048 BASIC METABOLIC PNL TOTAL CA: CPT | Mod: ORL | Performed by: INTERNAL MEDICINE

## 2024-02-05 PROCEDURE — 36415 COLL VENOUS BLD VENIPUNCTURE: CPT | Mod: ORL | Performed by: INTERNAL MEDICINE

## 2024-02-05 PROCEDURE — P9604 ONE-WAY ALLOW PRORATED TRIP: HCPCS | Mod: ORL | Performed by: INTERNAL MEDICINE

## 2024-02-06 ENCOUNTER — ASSISTED LIVING VISIT (OUTPATIENT)
Dept: GERIATRICS | Facility: CLINIC | Age: 89
End: 2024-02-06
Payer: COMMERCIAL

## 2024-02-06 VITALS
DIASTOLIC BLOOD PRESSURE: 74 MMHG | WEIGHT: 92.6 LBS | SYSTOLIC BLOOD PRESSURE: 109 MMHG | HEART RATE: 80 BPM | RESPIRATION RATE: 13 BRPM | TEMPERATURE: 97.6 F | BODY MASS INDEX: 15.89 KG/M2

## 2024-02-06 DIAGNOSIS — L03.115 CELLULITIS OF RIGHT LOWER LEG: ICD-10-CM

## 2024-02-06 DIAGNOSIS — N18.31 CHRONIC KIDNEY DISEASE, STAGE 3A (H): ICD-10-CM

## 2024-02-06 DIAGNOSIS — S80.11XA HEMATOMA OF RIGHT LOWER LEG: Primary | ICD-10-CM

## 2024-02-06 DIAGNOSIS — I48.91 ATRIAL FIBRILLATION WITH RAPID VENTRICULAR RESPONSE (H): ICD-10-CM

## 2024-02-06 DIAGNOSIS — E87.6 HYPOKALEMIA: ICD-10-CM

## 2024-02-06 PROCEDURE — 99349 HOME/RES VST EST MOD MDM 40: CPT | Performed by: NURSE PRACTITIONER

## 2024-02-06 NOTE — PROGRESS NOTES
[facility] GERIATRICS  ACUTE/EPISODIC VISIT    [facility] Medical Record Number:  [id number]  Place of Service where encounter took place:  [facility]) [[id number]]    Chief Complaint   Patient presents with    RECHECK       HPI:    Marla Dunn is a 89 year old  (5/22/1929), who is being seen today for an episodic care visit.  HPI information obtained from: facility staff, patient report, and [facility] chart review.    Today's concern is:    Diagnoses         Codes Comments    Hematoma of right lower leg    -  Primary S80.11XA     Cellulitis of right lower leg     L03.115     Atrial fibrillation with rapid ventricular response (H)     I48.91     Hypokalemia     E87.6     Chronic kidney disease, stage 3a (H)     N18.31           Came to see Georgia today to see how she was doing in general.   She proceeded to tell this NP that one of the nurses wanted this NP to look at her sore on her right shin.  Made arrangements to come back in the afternoon when that nurse was working so treatment could be done in a timely fashion as Georgia did not want it open too long.      Came up alone to look at the sore but then called nursing to ask to come up to Georgia's apartment to talk about the area and if she thought home care to be involved.  Nursing agreed that it did not look good and probably good idea to have [facility] to come in and help with skin treatment.    ALLERGIES:    Allergies   Allergen Reactions    Gramineae Pollens Other (See Comments)     ORCHARDGRASS. Shortness of breath when lawn is just cut.    Smoke. Other (See Comments)     Hard to breathe.    Pollen Extract      Other reaction(s): Unknown        MEDICATIONS:  Post Discharge Medication Reconciliation Status: patient was not discharged from an inpatient facility or TCU.     Current Outpatient Medications   Medication Sig Dispense Refill    acetaminophen (TYLENOL) 325 MG tablet Take 3 tablets (975 mg) by mouth every 8  hours as needed for mild pain      albuterol (PROAIR HFA/PROVENTIL HFA/VENTOLIN HFA) 108 (90 Base) MCG/ACT inhaler Inhale 2 puffs into the lungs every 6 hours      amoxicillin (AMOXIL) 500 MG capsule 4 CAPSULES (2000MG ) ORALLY AS NEEDED ONE HOUR PRIOR TO DENTAL APPOINTMENT 4 capsule 5    ANTACID REGULAR STRENGTH 500 MG chewable tablet 2 TABLETS (1000MG) ORALLY 2 TIMES DAILY AS NEEDED FOR HEARTBURN 60 tablet 11    Ascorbic Acid (VITAMIN C PO) Take 500 mg by mouth every morning      ASPIRIN LOW DOSE 81 MG EC tablet 1 TABLET ORALLY 2 TIMES DAILY (DX: PROPHYLAXIS) 180 tablet 3    Bacillus Coagulans-Inulin (PROBIOTIC FORMULA) 1-250 BILLION-MG CAPS 1 CAPSULE ORALLY DAILY 28 capsule 11    bisacodyl (DULCOLAX) 5 MG EC tablet 1 TABLET ORALLY DAILY AS NEEDED (MAY KEEP AT BEDSIDE) 30 tablet 10    chlorhexidine (PERIDEX) 0.12 % solution Swish and spit 15 mLs in mouth 2 times daily May self administer 118 mL 3    cholecalciferol (VITAMIN D3) 125 mcg (5000 units) capsule 1 CAPSULE ORALLY DAILY 90 capsule 3    COMBIVENT RESPIMAT  MCG/ACT inhaler INHALE 1 PUFF INTO THE LUNGS  4 TIMES DAILY 4 g 11    cycloSPORINE (RESTASIS) 0.05 % ophthalmic emulsion Place 1 drop into both eyes 2 times daily       demeclocycline (DECLOMYCIN) 150 MG tablet 1 TABLET ORALLY 2 TIMES DAILY 60 tablet 11    fish oil-omega-3 fatty acids 1000 MG capsule 1 CAPSULE ORALLY DAILY 28 capsule 11    fluorometholone (FML LIQUIFILM) 0.1 % ophthalmic susp Place 1 drop Into the left eye daily       IBANDRONATE SODIUM PO Take 150 mg by mouth every 30 days      levothyroxine (SYNTHROID/LEVOTHROID) 88 MCG tablet 1 TABLET ORALLY EVERY MORNING ON AN EMPTY STOMACH 31 tablet 11    loratadine (CLARITIN) 10 MG tablet 1 TABLET ORALLY DAILY (DX: ASTHMA) 28 tablet 11    melatonin 3 MG tablet 1 TABLET ORALLY AT BEDTIME ALONG WITH 5 MG FOR A TOTAL DOSE OF 8MG 30 tablet 11    melatonin 5 MG tablet 1 TABLET ORALLY AT BEDTIME  ALONG WITH 3MG FOR A TOTAL DOSE OF 8MG 30 tablet 11     metoprolol tartrate (LOPRESSOR) 25 MG tablet 1 TABLET ORALLY 2 TIMES DAILY (DX: HYPERTENSION) 56 tablet 11    mirtazapine (REMERON) 7.5 MG tablet 1 TABLET ORALLY AT BEDTIME 28 tablet 11    Multiple Vitamins-Minerals (PRESERVISION AREDS 2) CAPS 1 CAPSULE ORALLY 2 TIMES DAILY 56 capsule 11    omeprazole (PRILOSEC) 20 MG DR capsule 1 CAPSULE ORALLY 2 TIMES DAILY (DX: GASTROESOPHAGEAL REFLUX DISEASE) 62 capsule 11    OXYGEN-HELIUM IN       polyethylene glycol 0.4%- propylene glycol 0.3% (SYSTANE ULTRA) 0.4-0.3 % SOLN ophthalmic solution Place 1 drop into both eyes 4 times daily      Polyethylene Glycol 3350 (PEG 3350) 17 GM/SCOOP POWD MIX 1 CAPFUL (17 GMS) IN 8 OUNCES WATER AND DRINK ORALLY DAILY (DX: CONSTIPATION) 510 g 11    Probiotic Product (PROBIOTIC PO) Take 1 capsule by mouth every morning      SENEXON-S 8.6-50 MG tablet 1 TABLET ORALLY 2 TIMES DAILY (DX: CONSTIPATION) 56 tablet 11    sodium chloride 1 GM tablet Take 1 tablet (1 g) by mouth daily 30 tablet 11    spironolactone (ALDACTONE) 25 MG tablet 1 TABLET ORALLY DAILY (DX: EDEMA) ** HAZARDOUS MED: WEAR DOUBLE NITRILE GLOVES** 28 tablet 11    timolol (TIMOPTIC) 0.5 % ophthalmic solution Place 1 drop Into the left eye daily       torsemide (DEMADEX) 20 MG tablet 1.5 TABLET (30mg) ORALLY DAILY 31 tablet 11    vitamin C (ASCORBIC ACID) 500 MG tablet 1 TABLET ORALLY DAILY (DX: SUPPLEMENT) 31 tablet 11       REVIEW OF SYSTEMS:  4 point ROS neg other than the symptoms noted above in the HPI.  No chest pain, no SOB.      PHYSICAL EXAM:  /74   Pulse 80   Temp 97.6  F (36.4  C)   Resp 13   Wt 42 kg (92 lb 9.6 oz)   BMI 15.89 kg/m    Petite female whom can propel her own wheelchair and can ambulate short distances with walker in apartment.    Lifted her right pant leg to show a boarder gauze over the lower shin.  Peels the dressing back gently as did not want to tear her frail skin.  See an approx. Quarter size oblong shaped, raised hematoma that is  open/goopy at the end of the wound.  Scan amount of brown drainage on the gauze dressing.  No odor, just does not look healthy and not going to reabsorb back in her body.   Has another bruise on her shin that is flat and no risk of opening up.    Component      Latest Ref Rng 2/5/2024  10:57 AM   Sodium      135 - 145 mmol/L 133 (L)    Potassium      3.4 - 5.3 mmol/L 4.2    Chloride      98 - 107 mmol/L 96 (L)    Carbon Dioxide (CO2)      22 - 29 mmol/L 24    Anion Gap      7 - 15 mmol/L 13    Urea Nitrogen      8.0 - 23.0 mg/dL 36.9 (H)    Creatinine      0.51 - 0.95 mg/dL 1.03 (H)    GFR Estimate      >60 mL/min/1.73m2 50 (L)    Calcium      8.2 - 9.6 mg/dL 9.1    Glucose      70 - 99 mg/dL 56 (L)           ASSESSMENT / PLAN:  (S80.11XA) Hematoma of right lower leg  (primary encounter diagnosis)  (L03.115) Cellulitis of right lower leg  Comment: had nursing come to do the dressing while this NP was up in the apartment as wanted their opinion of starting home care services to monitor this hematoma for the healing process and treatment decisions.  Explained to Georgia that home care should be involved to monitor the treatment and the nurses here have to come in once a week to see the wound for their protocol.      Will make the referral and then they will coordinate with [place] to come to check wound out and see how often they need to see her.  Area may need debriding but want to see if able to treat without having to send her to a wound clinic.  Today the nurse cleansed area, patted dry, applied small amount of bacitracin and covered with Boarder Gauze dressing.  Will also start her on keflex 250mg po TID for 7 days for cellulitis.    (I48.91) Atrial fibrillation with rapid ventricular response (H)  Comment: no complications at this time.  Remains on ASA 81mg daily and metoprolol 25mg BID.  Bruises easily as she has small bruises on lower legs.  She worries about the bruises on legs but eventually they resolve.       (E87.6) Hypokalemia  (N18.31) Chronic kidney disease, stage 3a (H)  Comment: informed Georgia that her BMP was stable.  Na was 133 and that is the highest she has been and usually does not go over that.   BMP is monthly.  Takes NaCl 1GM twice a day, and Declomycin 150mg po BID.  No changes.     Orders:  Keflex 250mg TID x 7 days for cellulitis lower right leg  Home Care RN to eval and treat open hematoma on right shin.      Electronically signed by  CASTRO Hazel CNP        Documentation of Face to Face and Certification for Home Health Services    I certify that patient: Marla Dunn is under my care and that I, or a nurse practitioner or physician's assistant working with me, had a face-to-face encounter that meets the physician face-to-face encounter requirements with this patient on: 2/6/2024.    This encounter with the patient was in whole, or in part, for the following medical condition, which is the primary reason for home health care: open hematoma of right lower shin, Cellulitis of right lower leg    I certify that, based on my findings, the following services are medically necessary home health services: Nursing.    My clinical findings support the need for the above services because: Nurse is needed: wound care management.    Further, I certify that my clinical findings support that this patient is homebound (i.e. absences from home require considerable and taxing effort and are for medical reasons or Religion services or infrequently or of short duration when for other reasons) because: Requires assistance of another person or specialized equipment to access medical services because patient: Is unable to exit home safely on own due to: needing guidance to manage DME and not able to drive. and Requires supervision of another for safe transfer...    Based on the above findings. I certify that this patient is confined to the home and needs intermittent skilled nursing care,  physical therapy and/or speech therapy.  The patient is under my care, and I have initiated the establishment of the plan of care.  This patient will be followed by a physician who will periodically review the plan of care.  Physician/Provider to provide follow up care: Gabrielle Wallace    Attending hospital physician (the Medicare certified [facility] provider):  Dr. Elizabeth Mcknight  Physician Signature:     Dr. Elizabeth Mcknight  Date: 2/6/2024

## 2024-02-06 NOTE — LETTER
2/6/2024        RE: Marla Dunn  C/o Deneen Dunn  [address] [facility] [address]        [facility] GERIATRICS  ACUTE/EPISODIC VISIT    [facility] Medical Record Number:  [id number]  Place of Service where encounter took place:  [facility] ([facility]) [[id number]]    Chief Complaint   Patient presents with     RECHECK       HPI:    Marla Dunn is a 89 year old  (5/22/1929), who is being seen today for an episodic care visit.  HPI information obtained from: facility staff, patient report, and [facility] chart review.    Today's concern is:    Diagnoses         Codes Comments    Hematoma of right lower leg    -  Primary S80.11XA     Cellulitis of right lower leg     L03.115     Atrial fibrillation with rapid ventricular response (H)     I48.91     Hypokalemia     E87.6     Chronic kidney disease, stage 3a (H)     N18.31           Came to see [place] today to see how she was doing in general.   She proceeded to tell this NP that one of the nurses wanted this NP to look at her sore on her right shin.  Made arrangements to come back in the afternoon when that nurse was working so treatment could be done in a timely fashion as Georgia did not want it open too long.      Came up alone to look at the sore but then called nursing to ask to come up to Georgia's apartment to talk about the area and if she thought home care to be involved.  Nursing agreed that it did not look good and probably good idea to have [facility] to come in and help with skin treatment.    ALLERGIES:    Allergies   Allergen Reactions     Gramineae Pollens Other (See Comments)     ORCHARDGRASS. Shortness of breath when lawn is just cut.     Smoke. Other (See Comments)     Hard to breathe.     Pollen Extract      Other reaction(s): Unknown        MEDICATIONS:  Post Discharge Medication Reconciliation Status: patient was not discharged from an inpatient facility or TCU.     Current Outpatient Medications    Medication Sig Dispense Refill     acetaminophen (TYLENOL) 325 MG tablet Take 3 tablets (975 mg) by mouth every 8 hours as needed for mild pain       albuterol (PROAIR HFA/PROVENTIL HFA/VENTOLIN HFA) 108 (90 Base) MCG/ACT inhaler Inhale 2 puffs into the lungs every 6 hours       amoxicillin (AMOXIL) 500 MG capsule 4 CAPSULES (2000MG ) ORALLY AS NEEDED ONE HOUR PRIOR TO DENTAL APPOINTMENT 4 capsule 5     ANTACID REGULAR STRENGTH 500 MG chewable tablet 2 TABLETS (1000MG) ORALLY 2 TIMES DAILY AS NEEDED FOR HEARTBURN 60 tablet 11     Ascorbic Acid (VITAMIN C PO) Take 500 mg by mouth every morning       ASPIRIN LOW DOSE 81 MG EC tablet 1 TABLET ORALLY 2 TIMES DAILY (DX: PROPHYLAXIS) 180 tablet 3     Bacillus Coagulans-Inulin (PROBIOTIC FORMULA) 1-250 BILLION-MG CAPS 1 CAPSULE ORALLY DAILY 28 capsule 11     bisacodyl (DULCOLAX) 5 MG EC tablet 1 TABLET ORALLY DAILY AS NEEDED (MAY KEEP AT BEDSIDE) 30 tablet 10     chlorhexidine (PERIDEX) 0.12 % solution Swish and spit 15 mLs in mouth 2 times daily May self administer 118 mL 3     cholecalciferol (VITAMIN D3) 125 mcg (5000 units) capsule 1 CAPSULE ORALLY DAILY 90 capsule 3     COMBIVENT RESPIMAT  MCG/ACT inhaler INHALE 1 PUFF INTO THE LUNGS  4 TIMES DAILY 4 g 11     cycloSPORINE (RESTASIS) 0.05 % ophthalmic emulsion Place 1 drop into both eyes 2 times daily        demeclocycline (DECLOMYCIN) 150 MG tablet 1 TABLET ORALLY 2 TIMES DAILY 60 tablet 11     fish oil-omega-3 fatty acids 1000 MG capsule 1 CAPSULE ORALLY DAILY 28 capsule 11     fluorometholone (FML LIQUIFILM) 0.1 % ophthalmic susp Place 1 drop Into the left eye daily        IBANDRONATE SODIUM PO Take 150 mg by mouth every 30 days       levothyroxine (SYNTHROID/LEVOTHROID) 88 MCG tablet 1 TABLET ORALLY EVERY MORNING ON AN EMPTY STOMACH 31 tablet 11     loratadine (CLARITIN) 10 MG tablet 1 TABLET ORALLY DAILY (DX: ASTHMA) 28 tablet 11     melatonin 3 MG tablet 1 TABLET ORALLY AT BEDTIME ALONG WITH 5 MG FOR A  TOTAL DOSE OF 8MG 30 tablet 11     melatonin 5 MG tablet 1 TABLET ORALLY AT BEDTIME  ALONG WITH 3MG FOR A TOTAL DOSE OF 8MG 30 tablet 11     metoprolol tartrate (LOPRESSOR) 25 MG tablet 1 TABLET ORALLY 2 TIMES DAILY (DX: HYPERTENSION) 56 tablet 11     mirtazapine (REMERON) 7.5 MG tablet 1 TABLET ORALLY AT BEDTIME 28 tablet 11     Multiple Vitamins-Minerals (PRESERVISION AREDS 2) CAPS 1 CAPSULE ORALLY 2 TIMES DAILY 56 capsule 11     omeprazole (PRILOSEC) 20 MG DR capsule 1 CAPSULE ORALLY 2 TIMES DAILY (DX: GASTROESOPHAGEAL REFLUX DISEASE) 62 capsule 11     OXYGEN-HELIUM IN        polyethylene glycol 0.4%- propylene glycol 0.3% (SYSTANE ULTRA) 0.4-0.3 % SOLN ophthalmic solution Place 1 drop into both eyes 4 times daily       Polyethylene Glycol 3350 (PEG 3350) 17 GM/SCOOP POWD MIX 1 CAPFUL (17 GMS) IN 8 OUNCES WATER AND DRINK ORALLY DAILY (DX: CONSTIPATION) 510 g 11     Probiotic Product (PROBIOTIC PO) Take 1 capsule by mouth every morning       SENEXON-S 8.6-50 MG tablet 1 TABLET ORALLY 2 TIMES DAILY (DX: CONSTIPATION) 56 tablet 11     sodium chloride 1 GM tablet Take 1 tablet (1 g) by mouth daily 30 tablet 11     spironolactone (ALDACTONE) 25 MG tablet 1 TABLET ORALLY DAILY (DX: EDEMA) ** HAZARDOUS MED: WEAR DOUBLE NITRILE GLOVES** 28 tablet 11     timolol (TIMOPTIC) 0.5 % ophthalmic solution Place 1 drop Into the left eye daily        torsemide (DEMADEX) 20 MG tablet 1.5 TABLET (30mg) ORALLY DAILY 31 tablet 11     vitamin C (ASCORBIC ACID) 500 MG tablet 1 TABLET ORALLY DAILY (DX: SUPPLEMENT) 31 tablet 11       REVIEW OF SYSTEMS:  4 point ROS neg other than the symptoms noted above in the HPI.  No chest pain, no SOB.      PHYSICAL EXAM:  /74   Pulse 80   Temp 97.6  F (36.4  C)   Resp 13   Wt 42 kg (92 lb 9.6 oz)   BMI 15.89 kg/m    Petite female whom can propel her own wheelchair and can ambulate short distances with walker in apartment.    Lifted her right pant leg to show a boarder gauze over the lower  shin.  Peels the dressing back gently as did not want to tear her frail skin.  See an approx. Quarter size oblong shaped, raised hematoma that is open/goopy at the end of the wound.  Scan amount of brown drainage on the gauze dressing.  No odor, just does not look healthy and not going to reabsorb back in her body.   Has another bruise on her shin that is flat and no risk of opening up.    Component      Latest Ref Rng 2/5/2024  10:57 AM   Sodium      135 - 145 mmol/L 133 (L)    Potassium      3.4 - 5.3 mmol/L 4.2    Chloride      98 - 107 mmol/L 96 (L)    Carbon Dioxide (CO2)      22 - 29 mmol/L 24    Anion Gap      7 - 15 mmol/L 13    Urea Nitrogen      8.0 - 23.0 mg/dL 36.9 (H)    Creatinine      0.51 - 0.95 mg/dL 1.03 (H)    GFR Estimate      >60 mL/min/1.73m2 50 (L)    Calcium      8.2 - 9.6 mg/dL 9.1    Glucose      70 - 99 mg/dL 56 (L)           ASSESSMENT / PLAN:  (S80.11XA) Hematoma of right lower leg  (primary encounter diagnosis)  (L03.115) Cellulitis of right lower leg  Comment: had nursing come to do the dressing while this NP was up in the apartment as wanted their opinion of starting home care services to monitor this hematoma for the healing process and treatment decisions.  Explained to Georgia that home care should be involved to monitor the treatment and the nurses here have to come in once a week to see the wound for their protocol.      Will make the referral and then they will coordinate with [place] to come to check wound out and see how often they need to see her.  Area may need debriding but want to see if able to treat without having to send her to a wound clinic.  Today the nurse cleansed area, patted dry, applied small amount of bacitracin and covered with Boarder Gauze dressing.  Will also start her on keflex 250mg po TID for 7 days for cellulitis.    (I48.91) Atrial fibrillation with rapid ventricular response (H)  Comment: no complications at this time.  Remains on ASA 81mg daily and  metoprolol 25mg BID.  Bruises easily as she has small bruises on lower legs.  She worries about the bruises on legs but eventually they resolve.      (E87.6) Hypokalemia  (N18.31) Chronic kidney disease, stage 3a (H)  Comment: informed Georgia that her BMP was stable.  Na was 133 and that is the highest she has been and usually does not go over that.   BMP is monthly.  Takes NaCl 1GM twice a day, and Declomycin 150mg po BID.  No changes.     Orders:  Keflex 250mg TID x 7 days for cellulitis lower right leg  Home Care RN to eval and treat open hematoma on right shin.      Electronically signed by  CASTRO Hazel CNP        Documentation of Face to Face and Certification for Home Health Services    I certify that patient: Marla Dunn is under my care and that I, or a nurse practitioner or physician's assistant working with me, had a face-to-face encounter that meets the physician face-to-face encounter requirements with this patient on: 2/6/2024.    This encounter with the patient was in whole, or in part, for the following medical condition, which is the primary reason for home health care: open hematoma of right lower shin, Cellulitis of right lower leg    I certify that, based on my findings, the following services are medically necessary home health services: Nursing.    My clinical findings support the need for the above services because: Nurse is needed: wound care management.    Further, I certify that my clinical findings support that this patient is homebound (i.e. absences from home require considerable and taxing effort and are for medical reasons or [employer] services or infrequently or of short duration when for other reasons) because: Requires assistance of another person or specialized equipment to access medical services because patient: Is unable to exit home safely on own due to: needing guidance to manage DME and not able to drive. and Requires supervision of another for safe  transfer...    Based on the above findings. I certify that this patient is confined to the home and needs intermittent skilled nursing care, physical therapy and/or speech therapy.  The patient is under my care, and I have initiated the establishment of the plan of care.  This patient will be followed by a physician who will periodically review the plan of care.  Physician/Provider to provide follow up care: Gabrielle Wallace    Attending hospital physician (the Medicare certified [facility] provider):  Dr. Elizabeth Mcknight  Physician Signature:     Dr. Elizbaeth Mcknight  Date: 2/6/2024      Sincerely,        CASTRO Hazel CNP

## 2024-02-07 ENCOUNTER — TELEPHONE (OUTPATIENT)
Dept: CARDIOLOGY | Facility: CLINIC | Age: 89
End: 2024-02-07
Payer: COMMERCIAL

## 2024-02-07 DIAGNOSIS — I48.21 PERMANENT ATRIAL FIBRILLATION (H): ICD-10-CM

## 2024-02-07 DIAGNOSIS — Z95.2 S/P AVR (AORTIC VALVE REPLACEMENT): Primary | ICD-10-CM

## 2024-02-07 NOTE — TELEPHONE ENCOUNTER
Msg rec'd 2-7-24 @ 1543:  Debi Dooley Maureen, RN  Niece says she's ok to wait--scheduled for 4/17/24 w/KML    Response noted.  mg

## 2024-02-07 NOTE — TELEPHONE ENCOUNTER
"Per 8-12-22 echo results note \"Follow-up in 1 year or sooner if new symptoms\"     Follow-up order placed - update sent to sched.  mg  "

## 2024-02-07 NOTE — TELEPHONE ENCOUNTER
[facility] Call Center    Phone Message    May a detailed message be left on voicemail: yes     Reason for Call: Other: pts niece called to inquire about when her aunt is due to see Jose Luis again. Please call back to discuss.      Action Taken: Other: cardiology     Travel Screening: Not Applicable                                                             Thank you!  [facility]

## 2024-02-09 DIAGNOSIS — E78.5 DYSLIPIDEMIA: ICD-10-CM

## 2024-02-09 DIAGNOSIS — E44.0 MODERATE PROTEIN-CALORIE MALNUTRITION (H): ICD-10-CM

## 2024-02-09 DIAGNOSIS — F41.9 ANXIETY AND DEPRESSION: ICD-10-CM

## 2024-02-09 DIAGNOSIS — Z78.9 TAKES DIETARY SUPPLEMENTS: ICD-10-CM

## 2024-02-09 DIAGNOSIS — K21.00 GASTROESOPHAGEAL REFLUX DISEASE WITH ESOPHAGITIS WITHOUT HEMORRHAGE: ICD-10-CM

## 2024-02-09 DIAGNOSIS — K59.00 FECAL INCONTINENCE ALTERNATING WITH CONSTIPATION: ICD-10-CM

## 2024-02-09 DIAGNOSIS — E03.9 HYPOTHYROIDISM, UNSPECIFIED TYPE: ICD-10-CM

## 2024-02-09 DIAGNOSIS — R15.9 FECAL INCONTINENCE ALTERNATING WITH CONSTIPATION: ICD-10-CM

## 2024-02-09 DIAGNOSIS — F32.A ANXIETY AND DEPRESSION: ICD-10-CM

## 2024-02-09 RX ORDER — ANTIOX #8/OM3/DHA/EPA/LUT/ZEAX 250-2.5 MG
CAPSULE ORAL
Qty: 62 CAPSULE | Refills: 11 | Status: SHIPPED | OUTPATIENT
Start: 2024-02-09

## 2024-02-09 RX ORDER — CHLORAL HYDRATE 500 MG
2 CAPSULE ORAL DAILY
Qty: 62 CAPSULE | Refills: 11 | OUTPATIENT
Start: 2024-02-09

## 2024-02-09 RX ORDER — MIRTAZAPINE 7.5 MG/1
TABLET, FILM COATED ORAL
Qty: 31 TABLET | Refills: 11 | Status: SHIPPED | OUTPATIENT
Start: 2024-02-09

## 2024-02-09 RX ORDER — BACITRACIN ZINC AND POLYMYXIN B SULFATE 500; 10000 [USP'U]/G; [USP'U]/G
OINTMENT TOPICAL
Qty: 31 CAPSULE | Refills: 11 | Status: SHIPPED | OUTPATIENT
Start: 2024-02-09

## 2024-02-09 RX ORDER — ASCORBIC ACID 500 MG
TABLET ORAL
Qty: 31 TABLET | Refills: 11 | Status: SHIPPED | OUTPATIENT
Start: 2024-02-09

## 2024-02-09 RX ORDER — LEVOTHYROXINE SODIUM 88 UG/1
TABLET ORAL
Qty: 31 TABLET | Refills: 11 | Status: SHIPPED | OUTPATIENT
Start: 2024-02-09 | End: 2024-03-14 | Stop reason: DRUGHIGH

## 2024-02-09 RX ORDER — CHLORAL HYDRATE 500 MG
2 CAPSULE ORAL DAILY
Qty: 28 CAPSULE | Refills: 11 | Status: SHIPPED | OUTPATIENT
Start: 2024-02-09 | End: 2024-04-04

## 2024-02-12 ENCOUNTER — ASSISTED LIVING VISIT (OUTPATIENT)
Dept: GERIATRICS | Facility: CLINIC | Age: 89
End: 2024-02-12
Payer: COMMERCIAL

## 2024-02-12 VITALS
WEIGHT: 92.6 LBS | SYSTOLIC BLOOD PRESSURE: 109 MMHG | HEART RATE: 80 BPM | RESPIRATION RATE: 13 BRPM | DIASTOLIC BLOOD PRESSURE: 74 MMHG | BODY MASS INDEX: 15.89 KG/M2 | TEMPERATURE: 97.6 F

## 2024-02-12 DIAGNOSIS — I50.32 CHRONIC DIASTOLIC CONGESTIVE HEART FAILURE (H): ICD-10-CM

## 2024-02-12 DIAGNOSIS — S80.11XA HEMATOMA OF RIGHT LOWER LEG: Primary | ICD-10-CM

## 2024-02-12 DIAGNOSIS — L03.115 CELLULITIS OF RIGHT LOWER LEG: ICD-10-CM

## 2024-02-12 PROCEDURE — 99349 HOME/RES VST EST MOD MDM 40: CPT | Performed by: NURSE PRACTITIONER

## 2024-02-12 NOTE — PROGRESS NOTES
[facility] GERIATRICS  ACUTE/EPISODIC VISIT    [facility] Medical Record Number:  [id number]  Place of Service where encounter took place:  [facility] ([facility]) [[id number]]    Chief Complaint   Patient presents with    RECHECK       HPI:    Marla Dunn is a 89 year old  (5/22/1929), who is being seen today for an episodic care visit.  HPI information obtained from: facility chart records, facility staff, patient report, and [facility] chart review.    Today's concern is:    Diagnoses         Codes Comments    Hematoma of right lower leg    -  Primary S80.11XA     Cellulitis of right lower leg     L03.115     Chronic diastolic congestive heart failure (H)     I50.32           Came to see Georgia's right lower leg and how it was looking since last week.  Have her on Keflex for Cellulitis that she should be done with in the next 24-48 hours.  Staff are still tending to the needs of the wound and Georgia stated that home care is planning to come on Wednesday to start with evaluation and treatment.      Otherwise Georgia did get her wheelchair semi fixed with the OT person whom helped her get the wheelchair.  Greg-rigged the back so that no pillow could fall behind there.  Works for Georgia.      Reviewed her BMP last done that her Na level was at it's highest but still right under normal.  No acute symptoms of heart failure.    ALLERGIES:    Allergies   Allergen Reactions    Gramineae Pollens Other (See Comments)     ORCHARDGRASS. Shortness of breath when lawn is just cut.    Smoke. Other (See Comments)     Hard to breathe.    Pollen Extract      Other reaction(s): Unknown        MEDICATIONS:  Post Discharge Medication Reconciliation Status: patient was not discharged from an inpatient facility or TCU.     Current Outpatient Medications   Medication Sig Dispense Refill    acetaminophen (TYLENOL) 325 MG tablet Take 3 tablets (975 mg) by mouth every 8 hours as needed for mild pain       albuterol (PROAIR HFA/PROVENTIL HFA/VENTOLIN HFA) 108 (90 Base) MCG/ACT inhaler Inhale 2 puffs into the lungs every 6 hours      amoxicillin (AMOXIL) 500 MG capsule 4 CAPSULES (2000MG ) ORALLY AS NEEDED ONE HOUR PRIOR TO DENTAL APPOINTMENT 4 capsule 5    ANTACID REGULAR STRENGTH 500 MG chewable tablet 2 TABLETS (1000MG) ORALLY 2 TIMES DAILY AS NEEDED FOR HEARTBURN 60 tablet 11    Ascorbic Acid (VITAMIN C PO) Take 500 mg by mouth every morning      ASPIRIN LOW DOSE 81 MG EC tablet 1 TABLET ORALLY 2 TIMES DAILY (DX: PROPHYLAXIS) 180 tablet 3    Bacillus Coagulans-Inulin (PROBIOTIC FORMULA) 1-250 BILLION-MG CAPS 1 CAPSULE ORALLY DAILY 31 capsule 11    bisacodyl (DULCOLAX) 5 MG EC tablet 1 TABLET ORALLY DAILY AS NEEDED (MAY KEEP AT BEDSIDE) 30 tablet 10    chlorhexidine (PERIDEX) 0.12 % solution Swish and spit 15 mLs in mouth 2 times daily May self administer 118 mL 3    cholecalciferol (VITAMIN D3) 125 mcg (5000 units) capsule 1 CAPSULE ORALLY DAILY 90 capsule 3    COMBIVENT RESPIMAT  MCG/ACT inhaler INHALE 1 PUFF INTO THE LUNGS  4 TIMES DAILY 4 g 11    cycloSPORINE (RESTASIS) 0.05 % ophthalmic emulsion Place 1 drop into both eyes 2 times daily       demeclocycline (DECLOMYCIN) 150 MG tablet 1 TABLET ORALLY 2 TIMES DAILY 60 tablet 11    fish oil-omega-3 fatty acids 1000 MG capsule Take 2 capsules (2 g) by mouth daily 28 capsule 11    fluorometholone (FML LIQUIFILM) 0.1 % ophthalmic susp Place 1 drop Into the left eye daily       IBANDRONATE SODIUM PO Take 150 mg by mouth every 30 days      levothyroxine (SYNTHROID/LEVOTHROID) 88 MCG tablet 1 TABLET ORALLY EVERY MORNING ON AN EMPTY STOMACH 31 tablet 11    loratadine (CLARITIN) 10 MG tablet 1 TABLET ORALLY DAILY (DX: ASTHMA) 28 tablet 11    melatonin 3 MG tablet 1 TABLET ORALLY AT BEDTIME ALONG WITH 5 MG FOR A TOTAL DOSE OF 8MG 30 tablet 11    melatonin 5 MG tablet 1 TABLET ORALLY AT BEDTIME  ALONG WITH 3MG FOR A TOTAL DOSE OF 8MG 30 tablet 11    metoprolol  tartrate (LOPRESSOR) 25 MG tablet 1 TABLET ORALLY 2 TIMES DAILY (DX: HYPERTENSION) 56 tablet 11    mirtazapine (REMERON) 7.5 MG tablet 1 TABLET ORALLY AT BEDTIME 31 tablet 11    Multiple Vitamins-Minerals (PRESERVISION AREDS 2) CAPS 1 CAPSULE ORALLY 2 TIMES DAILY 62 capsule 11    omeprazole (PRILOSEC) 20 MG DR capsule 1 CAPSULE ORALLY 2 TIMES DAILY (DX: GASTROESOPHAGEAL REFLUX DISEASE) 62 capsule 11    OXYGEN-HELIUM IN       polyethylene glycol 0.4%- propylene glycol 0.3% (SYSTANE ULTRA) 0.4-0.3 % SOLN ophthalmic solution Place 1 drop into both eyes 4 times daily      Polyethylene Glycol 3350 (PEG 3350) 17 GM/SCOOP POWD MIX 1 CAPFUL (17 GMS) IN 8 OUNCES WATER AND DRINK ORALLY DAILY (DX: CONSTIPATION) 510 g 11    Probiotic Product (PROBIOTIC PO) Take 1 capsule by mouth every morning      SENEXON-S 8.6-50 MG tablet 1 TABLET ORALLY 2 TIMES DAILY (DX: CONSTIPATION) 56 tablet 11    sodium chloride 1 GM tablet Take 1 tablet (1 g) by mouth daily 30 tablet 11    spironolactone (ALDACTONE) 25 MG tablet 1 TABLET ORALLY DAILY (DX: EDEMA) ** HAZARDOUS MED: WEAR DOUBLE NITRILE GLOVES** 28 tablet 11    timolol (TIMOPTIC) 0.5 % ophthalmic solution Place 1 drop Into the left eye daily       torsemide (DEMADEX) 20 MG tablet 1.5 TABLET (30mg) ORALLY DAILY 31 tablet 11    vitamin C (ASCORBIC ACID) 500 MG tablet 1 TABLET ORALLY DAILY (DX: SUPPLEMENT) 31 tablet 11       REVIEW OF SYSTEMS:  4 point ROS neg other than the symptoms noted above in the HPI.        PHYSICAL EXAM:  /74   Pulse 80   Temp 97.6  F (36.4  C)   Resp 13   Wt 42 kg (92 lb 9.6 oz)   BMI 15.89 kg/m    Sitting in her wheelchair, [place] self propels around her apartment and facility.    Heart rate regular/irregular and strong.  No edema.  Lungs are clear.  No oxygen used.  Abdomen flat, soft and non-tender.    Peeled back the dressing from her right shin and looked at the hematoma that was open.  Seems to be loosening at the bottom edges and needs a debrider.   The edges are not pink anymore.  No odor.  Scant old blood on the dressing.      Component      Latest Ref Rng 2/5/2024  10:57 AM   Sodium      135 - 145 mmol/L 133 (L)    Potassium      3.4 - 5.3 mmol/L 4.2    Chloride      98 - 107 mmol/L 96 (L)    Carbon Dioxide (CO2)      22 - 29 mmol/L 24    Anion Gap      7 - 15 mmol/L 13    Urea Nitrogen      8.0 - 23.0 mg/dL 36.9 (H)    Creatinine      0.51 - 0.95 mg/dL 1.03 (H)    GFR Estimate      >60 mL/min/1.73m2 50 (L)    Calcium      8.2 - 9.6 mg/dL 9.1    Glucose      70 - 99 mg/dL 56 (L)         ASSESSMENT / PLAN:  (S80.11XA) Hematoma of right lower leg  (primary encounter diagnosis)  Comment: staff monitoring the hematoma and covering with a boarder dressing.  May be using bacitracin ointment as well.    Wednesday home care will be present and can determine a debrider for the wound.   Do think that the appearance of the wound *is going in the right direction.      (L03.115) Cellulitis of right lower leg  Comment: do feel that the Keflex has made a difference for the appearance.  Will let her finish out the antibiotic and monitor closely for further infection.    (I50.32) Chronic diastolic congestive heart failure (H)  Comment: doing well with Torsemide 30mg daily and spironolactone 25mg po daily.  BMP monthly.  No exacerbations.      Orders:  No odors today.  Home care to start on Wednesday    Electronically signed by  CASTRO Hazel CNP

## 2024-02-12 NOTE — LETTER
2/12/2024        RE: Marla Dunn  C/o Deneen Dunn  [address] [place] [address]        [facility] GERIATRICS  ACUTE/EPISODIC VISIT    [facility] Medical Record Number:  [id number]  Place of Service where encounter took place:  [facility] [[id number]]    Chief Complaint   Patient presents with     RECHECK       HPI:    Marla Dunn is a 89 year old  (5/22/1929), who is being seen today for an episodic care visit.  HPI information obtained from: facility chart records, facility staff, patient report, and [facility] chart review.    Today's concern is:    Diagnoses         Codes Comments    Hematoma of right lower leg    -  Primary S80.11XA     Cellulitis of right lower leg     L03.115     Chronic diastolic congestive heart failure (H)     I50.32           Came to see Georgia's right lower leg and how it was looking since last week.  Have her on Keflex for Cellulitis that she should be done with in the next 24-48 hours.  Staff are still tending to the needs of the wound and Georgia stated that home care is planning to come on Wednesday to start with evaluation and treatment.      Otherwise Georgia did get her wheelchair semi fixed with the OT person whom helped her get the wheelchair.  Greg-rigged the back so that no pillow could fall behind there.  Works for [place].      Reviewed her BMP last done that her Na level was at it's highest but still right under normal.  No acute symptoms of heart failure.    ALLERGIES:    Allergies   Allergen Reactions     Gramineae Pollens Other (See Comments)     ORCHARDGRASS. Shortness of breath when lawn is just cut.     Smoke. Other (See Comments)     Hard to breathe.     Pollen Extract      Other reaction(s): Unknown        MEDICATIONS:  Post Discharge Medication Reconciliation Status: patient was not discharged from an inpatient facility or TCU.     Current Outpatient Medications   Medication Sig Dispense Refill      acetaminophen (TYLENOL) 325 MG tablet Take 3 tablets (975 mg) by mouth every 8 hours as needed for mild pain       albuterol (PROAIR HFA/PROVENTIL HFA/VENTOLIN HFA) 108 (90 Base) MCG/ACT inhaler Inhale 2 puffs into the lungs every 6 hours       amoxicillin (AMOXIL) 500 MG capsule 4 CAPSULES (2000MG ) ORALLY AS NEEDED ONE HOUR PRIOR TO DENTAL APPOINTMENT 4 capsule 5     ANTACID REGULAR STRENGTH 500 MG chewable tablet 2 TABLETS (1000MG) ORALLY 2 TIMES DAILY AS NEEDED FOR HEARTBURN 60 tablet 11     Ascorbic Acid (VITAMIN C PO) Take 500 mg by mouth every morning       ASPIRIN LOW DOSE 81 MG EC tablet 1 TABLET ORALLY 2 TIMES DAILY (DX: PROPHYLAXIS) 180 tablet 3     Bacillus Coagulans-Inulin (PROBIOTIC FORMULA) 1-250 BILLION-MG CAPS 1 CAPSULE ORALLY DAILY 31 capsule 11     bisacodyl (DULCOLAX) 5 MG EC tablet 1 TABLET ORALLY DAILY AS NEEDED (MAY KEEP AT BEDSIDE) 30 tablet 10     chlorhexidine (PERIDEX) 0.12 % solution Swish and spit 15 mLs in mouth 2 times daily May self administer 118 mL 3     cholecalciferol (VITAMIN D3) 125 mcg (5000 units) capsule 1 CAPSULE ORALLY DAILY 90 capsule 3     COMBIVENT RESPIMAT  MCG/ACT inhaler INHALE 1 PUFF INTO THE LUNGS  4 TIMES DAILY 4 g 11     cycloSPORINE (RESTASIS) 0.05 % ophthalmic emulsion Place 1 drop into both eyes 2 times daily        demeclocycline (DECLOMYCIN) 150 MG tablet 1 TABLET ORALLY 2 TIMES DAILY 60 tablet 11     fish oil-omega-3 fatty acids 1000 MG capsule Take 2 capsules (2 g) by mouth daily 28 capsule 11     fluorometholone (FML LIQUIFILM) 0.1 % ophthalmic susp Place 1 drop Into the left eye daily        IBANDRONATE SODIUM PO Take 150 mg by mouth every 30 days       levothyroxine (SYNTHROID/LEVOTHROID) 88 MCG tablet 1 TABLET ORALLY EVERY MORNING ON AN EMPTY STOMACH 31 tablet 11     loratadine (CLARITIN) 10 MG tablet 1 TABLET ORALLY DAILY (DX: ASTHMA) 28 tablet 11     melatonin 3 MG tablet 1 TABLET ORALLY AT BEDTIME ALONG WITH 5 MG FOR A TOTAL DOSE OF 8MG 30  tablet 11     melatonin 5 MG tablet 1 TABLET ORALLY AT BEDTIME  ALONG WITH 3MG FOR A TOTAL DOSE OF 8MG 30 tablet 11     metoprolol tartrate (LOPRESSOR) 25 MG tablet 1 TABLET ORALLY 2 TIMES DAILY (DX: HYPERTENSION) 56 tablet 11     mirtazapine (REMERON) 7.5 MG tablet 1 TABLET ORALLY AT BEDTIME 31 tablet 11     Multiple Vitamins-Minerals (PRESERVISION AREDS 2) CAPS 1 CAPSULE ORALLY 2 TIMES DAILY 62 capsule 11     omeprazole (PRILOSEC) 20 MG DR capsule 1 CAPSULE ORALLY 2 TIMES DAILY (DX: GASTROESOPHAGEAL REFLUX DISEASE) 62 capsule 11     OXYGEN-HELIUM IN        polyethylene glycol 0.4%- propylene glycol 0.3% (SYSTANE ULTRA) 0.4-0.3 % SOLN ophthalmic solution Place 1 drop into both eyes 4 times daily       Polyethylene Glycol 3350 (PEG 3350) 17 GM/SCOOP POWD MIX 1 CAPFUL (17 GMS) IN 8 OUNCES WATER AND DRINK ORALLY DAILY (DX: CONSTIPATION) 510 g 11     Probiotic Product (PROBIOTIC PO) Take 1 capsule by mouth every morning       SENEXON-S 8.6-50 MG tablet 1 TABLET ORALLY 2 TIMES DAILY (DX: CONSTIPATION) 56 tablet 11     sodium chloride 1 GM tablet Take 1 tablet (1 g) by mouth daily 30 tablet 11     spironolactone (ALDACTONE) 25 MG tablet 1 TABLET ORALLY DAILY (DX: EDEMA) ** HAZARDOUS MED: WEAR DOUBLE NITRILE GLOVES** 28 tablet 11     timolol (TIMOPTIC) 0.5 % ophthalmic solution Place 1 drop Into the left eye daily        torsemide (DEMADEX) 20 MG tablet 1.5 TABLET (30mg) ORALLY DAILY 31 tablet 11     vitamin C (ASCORBIC ACID) 500 MG tablet 1 TABLET ORALLY DAILY (DX: SUPPLEMENT) 31 tablet 11       REVIEW OF SYSTEMS:  4 point ROS neg other than the symptoms noted above in the HPI.        PHYSICAL EXAM:  /74   Pulse 80   Temp 97.6  F (36.4  C)   Resp 13   Wt 42 kg (92 lb 9.6 oz)   BMI 15.89 kg/m    Sitting in her wheelchair, [place] self propels around her apartment and facility.    Heart rate regular/irregular and strong.  No edema.  Lungs are clear.  No oxygen used.  Abdomen flat, soft and non-tender.    Peeled  back the dressing from her right shin and looked at the hematoma that was open.  Seems to be loosening at the bottom edges and needs a debrider.  The edges are not pink anymore.  No odor.  Scant old blood on the dressing.      Component      Latest Ref Rng 2/5/2024  10:57 AM   Sodium      135 - 145 mmol/L 133 (L)    Potassium      3.4 - 5.3 mmol/L 4.2    Chloride      98 - 107 mmol/L 96 (L)    Carbon Dioxide (CO2)      22 - 29 mmol/L 24    Anion Gap      7 - 15 mmol/L 13    Urea Nitrogen      8.0 - 23.0 mg/dL 36.9 (H)    Creatinine      0.51 - 0.95 mg/dL 1.03 (H)    GFR Estimate      >60 mL/min/1.73m2 50 (L)    Calcium      8.2 - 9.6 mg/dL 9.1    Glucose      70 - 99 mg/dL 56 (L)         ASSESSMENT / PLAN:  (S80.11XA) Hematoma of right lower leg  (primary encounter diagnosis)  Comment: staff monitoring the hematoma and covering with a boarder dressing.  May be using bacitracin ointment as well.    Wednesday home care will be present and can determine a debrider for the wound.   Do think that the appearance of the wound *is going in the right direction.      (L03.115) Cellulitis of right lower leg  Comment: do feel that the Keflex has made a difference for the appearance.  Will let her finish out the antibiotic and monitor closely for further infection.    (I50.32) Chronic diastolic congestive heart failure (H)  Comment: doing well with Torsemide 30mg daily and spironolactone 25mg po daily.  BMP monthly.  No exacerbations.      Orders:  No odors today.  Home care to start on Wednesday    Electronically signed by  CASTRO Hazel CNP            Sincerely,        CASTRO Hazel CNP

## 2024-02-22 DIAGNOSIS — Z53.9 DIAGNOSIS NOT YET DEFINED: Primary | ICD-10-CM

## 2024-02-22 PROCEDURE — G0180 MD CERTIFICATION HHA PATIENT: HCPCS | Performed by: NURSE PRACTITIONER

## 2024-03-01 ENCOUNTER — APPOINTMENT (OUTPATIENT)
Dept: SPEECH THERAPY | Facility: HOSPITAL | Age: 89
DRG: 062 | End: 2024-03-01
Attending: STUDENT IN AN ORGANIZED HEALTH CARE EDUCATION/TRAINING PROGRAM
Payer: COMMERCIAL

## 2024-03-01 ENCOUNTER — APPOINTMENT (OUTPATIENT)
Dept: CT IMAGING | Facility: HOSPITAL | Age: 89
DRG: 062 | End: 2024-03-01
Attending: EMERGENCY MEDICINE
Payer: COMMERCIAL

## 2024-03-01 ENCOUNTER — HOSPITAL ENCOUNTER (INPATIENT)
Facility: HOSPITAL | Age: 89
LOS: 7 days | Discharge: NURSING, REHAB OR LONG-TERM CARE | DRG: 062 | End: 2024-03-08
Attending: EMERGENCY MEDICINE | Admitting: STUDENT IN AN ORGANIZED HEALTH CARE EDUCATION/TRAINING PROGRAM
Payer: COMMERCIAL

## 2024-03-01 ENCOUNTER — APPOINTMENT (OUTPATIENT)
Dept: MRI IMAGING | Facility: HOSPITAL | Age: 89
DRG: 062 | End: 2024-03-01
Attending: PHYSICIAN ASSISTANT
Payer: COMMERCIAL

## 2024-03-01 ENCOUNTER — LAB REQUISITION (OUTPATIENT)
Dept: LAB | Facility: CLINIC | Age: 89
End: 2024-03-01
Payer: COMMERCIAL

## 2024-03-01 ENCOUNTER — MEDICAL CORRESPONDENCE (OUTPATIENT)
Dept: HEALTH INFORMATION MANAGEMENT | Facility: CLINIC | Age: 89
End: 2024-03-01

## 2024-03-01 ENCOUNTER — APPOINTMENT (OUTPATIENT)
Dept: CARDIOLOGY | Facility: HOSPITAL | Age: 89
DRG: 062 | End: 2024-03-01
Attending: STUDENT IN AN ORGANIZED HEALTH CARE EDUCATION/TRAINING PROGRAM
Payer: COMMERCIAL

## 2024-03-01 DIAGNOSIS — I50.9 HEART FAILURE, UNSPECIFIED (H): ICD-10-CM

## 2024-03-01 DIAGNOSIS — I48.20 CHRONIC A-FIB (H): ICD-10-CM

## 2024-03-01 DIAGNOSIS — R29.810 FACIAL DROOP: ICD-10-CM

## 2024-03-01 DIAGNOSIS — R53.1 LEFT-SIDED WEAKNESS: ICD-10-CM

## 2024-03-01 DIAGNOSIS — Z91.89 AT HIGH RISK FOR IMPAIRED SKIN INTEGRITY: Primary | ICD-10-CM

## 2024-03-01 DIAGNOSIS — E78.5 DYSLIPIDEMIA: ICD-10-CM

## 2024-03-01 DIAGNOSIS — R47.1 DYSARTHRIA: ICD-10-CM

## 2024-03-01 DIAGNOSIS — M54.16 LUMBAR RADICULOPATHY: ICD-10-CM

## 2024-03-01 LAB
ALBUMIN SERPL BCG-MCNC: 3.4 G/DL (ref 3.5–5.2)
ANION GAP SERPL CALCULATED.3IONS-SCNC: 9 MMOL/L (ref 7–15)
APTT PPP: 31 SECONDS (ref 22–38)
BASOPHILS # BLD AUTO: 0.1 10E3/UL (ref 0–0.2)
BASOPHILS NFR BLD AUTO: 1 %
BUN SERPL-MCNC: 35 MG/DL (ref 8–23)
C PNEUM DNA SPEC QL NAA+PROBE: NOT DETECTED
CALCIUM SERPL-MCNC: 8.1 MG/DL (ref 8.2–9.6)
CHLORIDE SERPL-SCNC: 97 MMOL/L (ref 98–107)
CHOLEST SERPL-MCNC: 137 MG/DL
CREAT SERPL-MCNC: 1.02 MG/DL (ref 0.51–0.95)
DEPRECATED HCO3 PLAS-SCNC: 25 MMOL/L (ref 22–29)
EGFRCR SERPLBLD CKD-EPI 2021: 51 ML/MIN/1.73M2
EOSINOPHIL # BLD AUTO: 0.2 10E3/UL (ref 0–0.7)
EOSINOPHIL NFR BLD AUTO: 3 %
ERYTHROCYTE [DISTWIDTH] IN BLOOD BY AUTOMATED COUNT: 17.9 % (ref 10–15)
FLUAV H1 2009 PAND RNA SPEC QL NAA+PROBE: NOT DETECTED
FLUAV H1 RNA SPEC QL NAA+PROBE: NOT DETECTED
FLUAV H3 RNA SPEC QL NAA+PROBE: NOT DETECTED
FLUAV RNA SPEC QL NAA+PROBE: NOT DETECTED
FLUBV RNA SPEC QL NAA+PROBE: NOT DETECTED
GLUCOSE BLDC GLUCOMTR-MCNC: 89 MG/DL (ref 70–99)
GLUCOSE SERPL-MCNC: 91 MG/DL (ref 70–99)
HADV DNA SPEC QL NAA+PROBE: NOT DETECTED
HBA1C MFR BLD: 5.3 %
HCOV PNL SPEC NAA+PROBE: NOT DETECTED
HCT VFR BLD AUTO: 32.2 % (ref 35–47)
HDLC SERPL-MCNC: 42 MG/DL
HGB BLD-MCNC: 10.4 G/DL (ref 11.7–15.7)
HMPV RNA SPEC QL NAA+PROBE: NOT DETECTED
HPIV1 RNA SPEC QL NAA+PROBE: NOT DETECTED
HPIV2 RNA SPEC QL NAA+PROBE: NOT DETECTED
HPIV3 RNA SPEC QL NAA+PROBE: NOT DETECTED
HPIV4 RNA SPEC QL NAA+PROBE: NOT DETECTED
IMM GRANULOCYTES # BLD: 0 10E3/UL
IMM GRANULOCYTES NFR BLD: 1 %
INR PPP: 1.09 (ref 0.85–1.15)
LDLC SERPL CALC-MCNC: 81 MG/DL
LYMPHOCYTES # BLD AUTO: 0.7 10E3/UL (ref 0.8–5.3)
LYMPHOCYTES NFR BLD AUTO: 11 %
M PNEUMO DNA SPEC QL NAA+PROBE: NOT DETECTED
MCH RBC QN AUTO: 31.8 PG (ref 26.5–33)
MCHC RBC AUTO-ENTMCNC: 32.3 G/DL (ref 31.5–36.5)
MCV RBC AUTO: 99 FL (ref 78–100)
MONOCYTES # BLD AUTO: 0.8 10E3/UL (ref 0–1.3)
MONOCYTES NFR BLD AUTO: 12 %
NEUTROPHILS # BLD AUTO: 4.6 10E3/UL (ref 1.6–8.3)
NEUTROPHILS NFR BLD AUTO: 72 %
NONHDLC SERPL-MCNC: 95 MG/DL
NRBC # BLD AUTO: 0 10E3/UL
NRBC BLD AUTO-RTO: 0 /100
PLATELET # BLD AUTO: 234 10E3/UL (ref 150–450)
POTASSIUM SERPL-SCNC: 3.5 MMOL/L (ref 3.4–5.3)
RBC # BLD AUTO: 3.27 10E6/UL (ref 3.8–5.2)
RSV RNA SPEC QL NAA+PROBE: NOT DETECTED
RSV RNA SPEC QL NAA+PROBE: NOT DETECTED
RV+EV RNA SPEC QL NAA+PROBE: NOT DETECTED
SODIUM SERPL-SCNC: 131 MMOL/L (ref 135–145)
TRIGL SERPL-MCNC: 72 MG/DL
TROPONIN T SERPL HS-MCNC: 42 NG/L
TROPONIN T SERPL HS-MCNC: 42 NG/L
WBC # BLD AUTO: 6.4 10E3/UL (ref 4–11)

## 2024-03-01 PROCEDURE — 93306 TTE W/DOPPLER COMPLETE: CPT

## 2024-03-01 PROCEDURE — 70551 MRI BRAIN STEM W/O DYE: CPT

## 2024-03-01 PROCEDURE — 93306 TTE W/DOPPLER COMPLETE: CPT | Mod: 26 | Performed by: INTERNAL MEDICINE

## 2024-03-01 PROCEDURE — 250N000013 HC RX MED GY IP 250 OP 250 PS 637: Performed by: STUDENT IN AN ORGANIZED HEALTH CARE EDUCATION/TRAINING PROGRAM

## 2024-03-01 PROCEDURE — 250N000011 HC RX IP 250 OP 636: Performed by: NURSE PRACTITIONER

## 2024-03-01 PROCEDURE — 250N000011 HC RX IP 250 OP 636: Performed by: EMERGENCY MEDICINE

## 2024-03-01 PROCEDURE — 99223 1ST HOSP IP/OBS HIGH 75: CPT | Performed by: STUDENT IN AN ORGANIZED HEALTH CARE EDUCATION/TRAINING PROGRAM

## 2024-03-01 PROCEDURE — 84484 ASSAY OF TROPONIN QUANT: CPT | Performed by: EMERGENCY MEDICINE

## 2024-03-01 PROCEDURE — 3E03317 INTRODUCTION OF OTHER THROMBOLYTIC INTO PERIPHERAL VEIN, PERCUTANEOUS APPROACH: ICD-10-PCS | Performed by: EMERGENCY MEDICINE

## 2024-03-01 PROCEDURE — 85004 AUTOMATED DIFF WBC COUNT: CPT | Performed by: EMERGENCY MEDICINE

## 2024-03-01 PROCEDURE — 99207 PR APP CREDIT; MD BILLING SHARED VISIT: CPT | Mod: G0 | Performed by: NURSE PRACTITIONER

## 2024-03-01 PROCEDURE — 85730 THROMBOPLASTIN TIME PARTIAL: CPT | Performed by: EMERGENCY MEDICINE

## 2024-03-01 PROCEDURE — 200N000001 HC R&B ICU

## 2024-03-01 PROCEDURE — 70496 CT ANGIOGRAPHY HEAD: CPT

## 2024-03-01 PROCEDURE — 250N000009 HC RX 250: Performed by: STUDENT IN AN ORGANIZED HEALTH CARE EDUCATION/TRAINING PROGRAM

## 2024-03-01 PROCEDURE — 258N000003 HC RX IP 258 OP 636: Performed by: NURSE PRACTITIONER

## 2024-03-01 PROCEDURE — 87633 RESP VIRUS 12-25 TARGETS: CPT | Performed by: STUDENT IN AN ORGANIZED HEALTH CARE EDUCATION/TRAINING PROGRAM

## 2024-03-01 PROCEDURE — 80061 LIPID PANEL: CPT | Performed by: STUDENT IN AN ORGANIZED HEALTH CARE EDUCATION/TRAINING PROGRAM

## 2024-03-01 PROCEDURE — 92610 EVALUATE SWALLOWING FUNCTION: CPT | Mod: GN | Performed by: REHABILITATION PRACTITIONER

## 2024-03-01 PROCEDURE — 99207 PR NO CHARGE LOS: CPT | Performed by: PHYSICIAN ASSISTANT

## 2024-03-01 PROCEDURE — 85610 PROTHROMBIN TIME: CPT | Performed by: EMERGENCY MEDICINE

## 2024-03-01 PROCEDURE — 80048 BASIC METABOLIC PNL TOTAL CA: CPT | Performed by: EMERGENCY MEDICINE

## 2024-03-01 PROCEDURE — 36415 COLL VENOUS BLD VENIPUNCTURE: CPT | Performed by: EMERGENCY MEDICINE

## 2024-03-01 PROCEDURE — 99291 CRITICAL CARE FIRST HOUR: CPT | Mod: G0,FS | Performed by: STUDENT IN AN ORGANIZED HEALTH CARE EDUCATION/TRAINING PROGRAM

## 2024-03-01 PROCEDURE — 82040 ASSAY OF SERUM ALBUMIN: CPT | Performed by: STUDENT IN AN ORGANIZED HEALTH CARE EDUCATION/TRAINING PROGRAM

## 2024-03-01 PROCEDURE — 250N000009 HC RX 250: Performed by: EMERGENCY MEDICINE

## 2024-03-01 PROCEDURE — 0042T CT HEAD PERFUSION W CONTRAST: CPT

## 2024-03-01 PROCEDURE — 999N000157 HC STATISTIC RCP TIME EA 10 MIN

## 2024-03-01 PROCEDURE — 93005 ELECTROCARDIOGRAM TRACING: CPT | Performed by: EMERGENCY MEDICINE

## 2024-03-01 PROCEDURE — 83036 HEMOGLOBIN GLYCOSYLATED A1C: CPT | Performed by: STUDENT IN AN ORGANIZED HEALTH CARE EDUCATION/TRAINING PROGRAM

## 2024-03-01 PROCEDURE — 94640 AIRWAY INHALATION TREATMENT: CPT

## 2024-03-01 PROCEDURE — 258N000003 HC RX IP 258 OP 636: Performed by: STUDENT IN AN ORGANIZED HEALTH CARE EDUCATION/TRAINING PROGRAM

## 2024-03-01 RX ORDER — IOPAMIDOL 755 MG/ML
50 INJECTION, SOLUTION INTRAVASCULAR ONCE
Status: COMPLETED | OUTPATIENT
Start: 2024-03-01 | End: 2024-03-01

## 2024-03-01 RX ORDER — HYDROCORTISONE 10 MG/G
CREAM TOPICAL 2 TIMES DAILY PRN
COMMUNITY

## 2024-03-01 RX ORDER — MIRTAZAPINE 7.5 MG/1
7.5 TABLET, FILM COATED ORAL AT BEDTIME
Status: DISCONTINUED | OUTPATIENT
Start: 2024-03-01 | End: 2024-03-08 | Stop reason: HOSPADM

## 2024-03-01 RX ORDER — SODIUM CHLORIDE 1 G/1
1 TABLET ORAL DAILY
Status: DISCONTINUED | OUTPATIENT
Start: 2024-03-01 | End: 2024-03-08 | Stop reason: HOSPADM

## 2024-03-01 RX ORDER — PANTOPRAZOLE SODIUM 40 MG/1
40 TABLET, DELAYED RELEASE ORAL
Status: DISCONTINUED | OUTPATIENT
Start: 2024-03-01 | End: 2024-03-08 | Stop reason: HOSPADM

## 2024-03-01 RX ORDER — AMOXICILLIN 250 MG
2 CAPSULE ORAL 2 TIMES DAILY PRN
Status: DISCONTINUED | OUTPATIENT
Start: 2024-03-01 | End: 2024-03-08 | Stop reason: HOSPADM

## 2024-03-01 RX ORDER — HYDRALAZINE HYDROCHLORIDE 20 MG/ML
10 INJECTION INTRAMUSCULAR; INTRAVENOUS EVERY 10 MIN PRN
Status: DISCONTINUED | OUTPATIENT
Start: 2024-03-01 | End: 2024-03-08 | Stop reason: HOSPADM

## 2024-03-01 RX ORDER — SODIUM CHLORIDE 9 MG/ML
INJECTION, SOLUTION INTRAVENOUS CONTINUOUS PRN
Status: DISCONTINUED | OUTPATIENT
Start: 2024-03-01 | End: 2024-03-08 | Stop reason: HOSPADM

## 2024-03-01 RX ORDER — METOPROLOL TARTRATE 25 MG/1
25 TABLET, FILM COATED ORAL 2 TIMES DAILY
Status: DISCONTINUED | OUTPATIENT
Start: 2024-03-01 | End: 2024-03-08 | Stop reason: HOSPADM

## 2024-03-01 RX ORDER — LEVOTHYROXINE SODIUM 88 UG/1
88 TABLET ORAL
Status: DISCONTINUED | OUTPATIENT
Start: 2024-03-02 | End: 2024-03-08 | Stop reason: HOSPADM

## 2024-03-01 RX ORDER — ACETAMINOPHEN 650 MG/1
650 SUPPOSITORY RECTAL EVERY 4 HOURS PRN
Status: DISCONTINUED | OUTPATIENT
Start: 2024-03-01 | End: 2024-03-08 | Stop reason: HOSPADM

## 2024-03-01 RX ORDER — AMOXICILLIN 250 MG
1 CAPSULE ORAL 2 TIMES DAILY PRN
Status: DISCONTINUED | OUTPATIENT
Start: 2024-03-01 | End: 2024-03-08 | Stop reason: HOSPADM

## 2024-03-01 RX ORDER — ALBUTEROL SULFATE 90 UG/1
2 AEROSOL, METERED RESPIRATORY (INHALATION) 4 TIMES DAILY
Status: DISCONTINUED | OUTPATIENT
Start: 2024-03-01 | End: 2024-03-08 | Stop reason: HOSPADM

## 2024-03-01 RX ORDER — LABETALOL HYDROCHLORIDE 5 MG/ML
10 INJECTION, SOLUTION INTRAVENOUS EVERY 10 MIN PRN
Status: DISCONTINUED | OUTPATIENT
Start: 2024-03-01 | End: 2024-03-08 | Stop reason: HOSPADM

## 2024-03-01 RX ORDER — CYCLOSPORINE 0.5 MG/ML
1 EMULSION OPHTHALMIC 2 TIMES DAILY
Status: DISCONTINUED | OUTPATIENT
Start: 2024-03-01 | End: 2024-03-08 | Stop reason: HOSPADM

## 2024-03-01 RX ORDER — DEMECLOCYCLINE HYDROCHLORIDE 150 MG/1
150 TABLET, FILM COATED ORAL EVERY 12 HOURS SCHEDULED
Status: DISCONTINUED | OUTPATIENT
Start: 2024-03-01 | End: 2024-03-08 | Stop reason: HOSPADM

## 2024-03-01 RX ORDER — FLUOROMETHOLONE 0.1 %
1 SUSPENSION, DROPS(FINAL DOSAGE FORM)(ML) OPHTHALMIC (EYE) DAILY
Status: DISCONTINUED | OUTPATIENT
Start: 2024-03-01 | End: 2024-03-08 | Stop reason: HOSPADM

## 2024-03-01 RX ORDER — SODIUM CHLORIDE 9 MG/ML
INJECTION, SOLUTION INTRAVENOUS CONTINUOUS
Status: DISCONTINUED | OUTPATIENT
Start: 2024-03-01 | End: 2024-03-02

## 2024-03-01 RX ORDER — POLYETHYLENE GLYCOL 3350 17 G/17G
17 POWDER, FOR SOLUTION ORAL DAILY PRN
Status: DISCONTINUED | OUTPATIENT
Start: 2024-03-01 | End: 2024-03-08 | Stop reason: HOSPADM

## 2024-03-01 RX ORDER — IPRATROPIUM BROMIDE AND ALBUTEROL SULFATE 2.5; .5 MG/3ML; MG/3ML
3 SOLUTION RESPIRATORY (INHALATION) ONCE
Status: COMPLETED | OUTPATIENT
Start: 2024-03-01 | End: 2024-03-01

## 2024-03-01 RX ORDER — TIMOLOL MALEATE 5 MG/ML
1 SOLUTION/ DROPS OPHTHALMIC DAILY
Status: DISCONTINUED | OUTPATIENT
Start: 2024-03-01 | End: 2024-03-08 | Stop reason: HOSPADM

## 2024-03-01 RX ORDER — ACETAMINOPHEN 325 MG/1
650 TABLET ORAL EVERY 4 HOURS PRN
Status: DISCONTINUED | OUTPATIENT
Start: 2024-03-01 | End: 2024-03-08 | Stop reason: HOSPADM

## 2024-03-01 RX ORDER — IOPAMIDOL 755 MG/ML
75 INJECTION, SOLUTION INTRAVASCULAR ONCE
Status: COMPLETED | OUTPATIENT
Start: 2024-03-01 | End: 2024-03-01

## 2024-03-01 RX ADMIN — CYCLOSPORINE 1 DROP: 0.5 EMULSION OPHTHALMIC at 21:14

## 2024-03-01 RX ADMIN — SODIUM CHLORIDE: 9 INJECTION, SOLUTION INTRAVENOUS at 14:43

## 2024-03-01 RX ADMIN — FLUOROMETHOLONE 1 DROP: 1 SOLUTION/ DROPS OPHTHALMIC at 17:05

## 2024-03-01 RX ADMIN — IOPAMIDOL 50 ML: 755 INJECTION, SOLUTION INTRAVENOUS at 08:32

## 2024-03-01 RX ADMIN — ALBUTEROL SULFATE 2 PUFF: 90 AEROSOL, METERED RESPIRATORY (INHALATION) at 21:14

## 2024-03-01 RX ADMIN — PANTOPRAZOLE SODIUM 40 MG: 40 TABLET, DELAYED RELEASE ORAL at 17:25

## 2024-03-01 RX ADMIN — POLYPROPYLENE GLYCOL 400, PROPYLENE GLYCOL 1 DROP: .4; .3 LIQUID OPHTHALMIC at 17:28

## 2024-03-01 RX ADMIN — MIRTAZAPINE 7.5 MG: 7.5 TABLET ORAL at 21:14

## 2024-03-01 RX ADMIN — METOPROLOL TARTRATE 25 MG: 25 TABLET, FILM COATED ORAL at 21:13

## 2024-03-01 RX ADMIN — TIMOLOL MALEATE 1 DROP: 5 SOLUTION/ DROPS OPHTHALMIC at 17:22

## 2024-03-01 RX ADMIN — SODIUM CHLORIDE TAB 1 GM 1 G: 1 TAB at 17:23

## 2024-03-01 RX ADMIN — SODIUM CHLORIDE 500 ML: 9 INJECTION, SOLUTION INTRAVENOUS at 09:21

## 2024-03-01 RX ADMIN — ALBUTEROL SULFATE 2 PUFF: 90 AEROSOL, METERED RESPIRATORY (INHALATION) at 17:05

## 2024-03-01 RX ADMIN — IOPAMIDOL 75 ML: 755 INJECTION, SOLUTION INTRAVENOUS at 08:29

## 2024-03-01 RX ADMIN — POLYPROPYLENE GLYCOL 400, PROPYLENE GLYCOL 1 DROP: .4; .3 LIQUID OPHTHALMIC at 21:14

## 2024-03-01 RX ADMIN — IPRATROPIUM BROMIDE AND ALBUTEROL SULFATE 3 ML: .5; 3 SOLUTION RESPIRATORY (INHALATION) at 10:58

## 2024-03-01 RX ADMIN — UMECLIDINIUM BROMIDE AND VILANTEROL TRIFENATATE 1 PUFF: 62.5; 25 POWDER RESPIRATORY (INHALATION) at 17:21

## 2024-03-01 RX ADMIN — Medication 13 MG: at 09:19

## 2024-03-01 RX ADMIN — DEMECLOCYCLINE 150 MG: 150 TABLET ORAL at 21:13

## 2024-03-01 ASSESSMENT — ACTIVITIES OF DAILY LIVING (ADL)
DOING_ERRANDS_INDEPENDENTLY_DIFFICULTY: NO
WALKING_OR_CLIMBING_STAIRS_DIFFICULTY: YES
NUMBER_OF_TIMES_PATIENT_HAS_FALLEN_WITHIN_LAST_SIX_MONTHS: 1
DESCRIBE_HEARING_LOSS: BILATERAL HEARING LOSS
DRESSING/BATHING: BATHING DIFFICULTY, ASSISTANCE 1 PERSON;BATHING DIFFICULTY, DEPENDENT
ADLS_ACUITY_SCORE: 46
VISION_MANAGEMENT: EYEGLASSES
EATING/SWALLOWING: EATING;SWALLOWING LIQUIDS;SWALLOWING SOLID FOOD
ADLS_ACUITY_SCORE: 39
ADLS_ACUITY_SCORE: 39
TOILETING_ASSISTANCE: TOILETING DIFFICULTY, ASSISTANCE 1 PERSON
TOILETING: 1-->ASSISTANCE (EQUIPMENT/PERSON) NEEDED
ADLS_ACUITY_SCORE: 54
ADLS_ACUITY_SCORE: 39
USE_OF_HEARING_ASSISTIVE_DEVICES: BILATERAL HEARING AIDS
ADLS_ACUITY_SCORE: 54
THE_FOLLOWING_AIDS_WERE_PROVIDED;: PATIENT DECLINED OFFER OF AUXILIARY AIDS
CONCENTRATING,_REMEMBERING_OR_MAKING_DECISIONS_DIFFICULTY: NO
ADLS_ACUITY_SCORE: 39
TOILETING_ISSUES: YES
ADLS_ACUITY_SCORE: 54
ADLS_ACUITY_SCORE: 39
DRESSING/BATHING_DIFFICULTY: YES
ADLS_ACUITY_SCORE: 54
FALL_HISTORY_WITHIN_LAST_SIX_MONTHS: NO
FALL_HISTORY_WITHIN_LAST_SIX_MONTHS: YES
TOILETING: 1-->ASSISTANCE (EQUIPMENT/PERSON) NEEDED (NOT DEVELOPMENTALLY APPROPRIATE)
ADLS_ACUITY_SCORE: 54
WERE_AUXILIARY_AIDS_OFFERED?: NO
CHANGE_IN_FUNCTIONAL_STATUS_SINCE_ONSET_OF_CURRENT_ILLNESS/INJURY: YES
PATIENT'S_PREFERRED_MEANS_OF_COMMUNICATION: ENGLISH SPEAKER WITH HEARING LOSS, NO SPEECH PROBLEMS.
ADLS_ACUITY_SCORE: 46
DIFFICULTY_EATING/SWALLOWING: YES
ADLS_ACUITY_SCORE: 39
WEAR_GLASSES_OR_BLIND: YES
ADLS_ACUITY_SCORE: 39
DIFFICULTY_COMMUNICATING: YES
HEARING_DIFFICULTY_OR_DEAF: YES
ADLS_ACUITY_SCORE: 39
COMMUNICATION: OTHER (SEE COMMENTS)

## 2024-03-01 NOTE — H&P
[facility]    History and Physical - Hospitalist Service       Date of Admission:  3/1/2024    Assessment & Plan      Marla Dunn is a 89 year old female with PMH CAD Hx MI, CHF, HTN, HLD, RA, Sjogren's syndrome, SaraAgers syndrome, CKD, aortic stenosis, COPD, chronic hyponatremia, OLEGARIO, Atril fibrillation not on AC admitted on 3/1/2024 for acute ischemic stroke    Acute ischemic stroke -right MCA territory  Presented with left-sided weakness, facial droop, dysarthria at 7am today  CT head negative for acute findings, new chronic infarct inferior lateral aspect of the left cerebellar hemisphere  CTA head: Occlusion of distal M2 segment of right MCA  CTA neck: No significant stenosis  CT perfusion: Elevated Tmax, MTT and decreased rCBF along the lateral and posterior aspect of right frontal lobe compatible with ischemic penumbra  MRI brain: Hyperacute infarction right precentral and postcentral gyri and underlying white matter  HbA1c 5.3, LDL 81  Seen by stroke neurology, S/p TNK  NPO, SLP to evaluate, on gentle IV hydration  TTE  Neurology consult  PT/OT/SLP  Maintain BP goal < 180/105, Nicardipine gtt prn  Will hold all anticoagulation for 24 hours post thrombolysis  Repeat Head CT 24 hours after thrombolysis  Neurocheck q1 hr  Telemetry monitoring    - URI  Patient appears to be congested, lungs clear  Viral panel  Supportive care    -Elevated troponin likely 2/2 demand  Troponin 42, denies chest pain  Repeat Troponin unchanged ~ flat  EKG A fib with PVC's, no acute ischemic changes  Monitor    -Mild hyponatremia  Hypochloremia  On demeclocycline and salt tabs  Monitor Na, Goal > 130    - CKD  Monitor creatinine    - Normocytic anemia  Hb 10.4, baseline 11.3  No evidence of bleeding  Monitor Hb    - Chronic HFpEF  PTA torsemide 30 mg daily and spironolactone 25 mg daily - held as patient is NPO, pending SLP eval  On gentle IV fluids    - Atrial Fib  PTA metoprolol  Not on AC    - Hx  RLE hematoma and cellulitis  Completed p.o. Keflex  Monitor    - COPD  Not in exacerbation  PTA inhalers    - OLEGARIO  CPAP    - Hypothyroidism  PTA levothyroxine       Diet: NPO for Medical/Clinical Reasons Except for: No Exceptions    DVT Prophylaxis: Pneumatic Compression Devices  Bush Catheter: Not present  Lines: None     Cardiac Monitoring: ACTIVE order. Indication: Stroke, acute (48 hours)  Code Status: No CPR- Do NOT Intubate    Clinically Significant Risk Factors Present on Admission          # Hypocalcemia: Lowest Ca = 8.1 mg/dL in last 2 days, will monitor and replace as appropriate     # Hypoalbuminemia: Lowest albumin = 3.4 g/dL at 3/1/2024 10:45 AM, will monitor as appropriate   # Drug Induced Platelet Defect: home medication list includes an antiplatelet medication   # Hypertension: Noted on problem list  # Chronic heart failure with preserved ejection fraction: heart failure noted on problem list and last echo with EF >50%         # COPD: noted on problem list  # History of CABG: noted on surgical history       Disposition Plan      Expected Discharge Date: 03/03/2024                  Cherelle Gil MD  Hospitalist Service  [facility]  Securely message with Maimai (more info)  Text page via [facility] Paging/Directory     ______________________________________________________________________    Chief Complaint   LUE and LLE weakness, dysarthria and Left sided facial droop    History is obtained from the patient/family and EMR    History of Present Illness   Marla Dunn is a 89 year old female with PMH CAD Hx MI, CHF, HTN, HLD, RA, Sjogren's syndrome, SaraAgers syndrome, CKD, aortic stenosis, COPD, chronic hyponatremia, OLEGARIO, Atril fibrillation not on AC admitted on 3/1/2024 for acute ischemic stroke  Patient presented with left-sided weakness of upper and lower extremity, sensory deficit, dysarthria, facial droop.  As per patient's symptoms started around 7 AM today.  Patient  was brought to ER at [facility], workup revealed acute ischemic stroke.  S/p TNK, monitor and close neurochecks.  Was evaluated by telestroke and neurosurgery, not a candidate for thrombectomy.  Will continue monitoring, pending echo.  Pending PT/OT/SLP and neurology evaluation.      Past Medical History    Past Medical History:   Diagnosis Date    Asthma     Bilateral carpal tunnel syndrome 08/27/2015    CAD (coronary artery disease)     Chronic airway obstruction, not elsewhere classified     Created by Conversion     Chronic anemia     Chronic edema     Congestive heart failure, severe (H) 05/13/2020    COPD (chronic obstructive pulmonary disease) (H)     Coronary artery disease     Depression     GERD (gastroesophageal reflux disease)     Heart disease     Heart murmur     High cholesterol     dislipidemia    HTN (hypertension)     Hypercholesterolemia     Hypertension     Hypertensive emergency 08/13/2021    Hypothyroidism     Hypothyroidism     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    MI (myocardial infarction) (H) 1985    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    Myelodysplastic syndrome (H)     Myocardial infarction (H) 1983    OLEGARIO (obstructive sleep apnea)     Osteoporosis     Other and unspecified hyperlipidemia     Created by Conversion     Pyelonephritis 05/11/2016    Radicular low back pain     Rheumatoid arthritis (H)     Elizabeth Agers syndrome     Sjoegren syndrome     Sjogren's syndrome (H24)     Sleep apnea, obstructive     Spinal stenosis     Squamous cell carcinoma, tongue border (H)     Unspecified polyarthropathy or polyarthritis, hand     Created by Conversion        Past Surgical History   Past Surgical History:   Procedure Laterality Date    aortic valve      AORTIC VALVE REPLACEMENT  01/01/2012    APPENDECTOMY      APPENDECTOMY      AS TOTAL KNEE ARTHROPLASTY Right     BACK SURGERY  2004    spinal decompression    BACK SURGERY      spinal stenosis    BONE MARROW BIOPSY  2004     breast biopsy      BREAST SURGERY      biopsy    BYPASS GRAFT ARTERY CORONARY  2009    LIMA to ramus intermedius    cardiac angiogram      CARDIAC CATHETERIZATION      CARDIAC SURGERY      EYE SURGERY      bilateral cataract repair     EYE SURGERY Bilateral     cornea transplant left eye & cataracts    KYPHOPLASTY      T12    OPEN REDUCTION INTERNAL FIXATION HIP NAILING Right 2021    Procedure: INTERNAL FIXATION, FRACTURE, TROCHANTERIC, HIP, USING PINS OR RODS;  Surgeon: Darrel Castro MD;  Location: [facility] OR    OTHER SURGICAL HISTORY  2018    Rectosigmoidectomy perineal    PARTIAL GLOSSECTOMY Right 2023    [facility]    RECTOSIGMOIDECTOMY PERINEAL N/A 01/10/2018    Procedure: RECTOSIGMOIDECTOMY PERINEAL;  PERINEAL RECTOSIGMOIDECTOMY ;  Surgeon: Candelaria Anthony MD;  Location: [facility] OR    REPAIR VALVE AORTIC  2009    THORACIC SURGERY      T12 kyphoplasty    ZC CABG, ARTERY-VEIN, FOUR      [facility] CABG, VEIN, SINGLE      Description: CABG (CABG);  Recorded: 2012;  Comments: Single vessel bypass graft to an obtuse marginal branch in May 2009    ZZC REPLACE AORT VALV,PROSTH VALV      Description: Aortic Valve Replacement;  Recorded: 2012;  Comments: Aortic valve replacement       Prior to Admission Medications   Prior to Admission Medications   Prescriptions Last Dose Informant Patient Reported? Taking?   ANTACID REGULAR STRENGTH 500 MG chewable tablet Unknown  No Yes   Si TABLETS (1000MG) ORALLY 2 TIMES DAILY AS NEEDED FOR HEARTBURN   ASPIRIN LOW DOSE 81 MG EC tablet 2024 at hs  No Yes   Si TABLET ORALLY 2 TIMES DAILY (DX: PROPHYLAXIS)   Ascorbic Acid (VITAMIN C PO) 2024 Self Yes Yes   Sig: Take 500 mg by mouth every morning   Bacillus Coagulans-Inulin (PROBIOTIC FORMULA) 1-250 BILLION-MG CAPS 2024  No Yes   Si CAPSULE ORALLY DAILY   COMBIVENT RESPIMAT  MCG/ACT inhaler 2024 at pm  No Yes   Sig: INHALE 1 PUFF  INTO THE LUNGS  4 TIMES DAILY   IBANDRONATE SODIUM PO 2024 Self Yes Yes   Sig: Take 150 mg by mouth every 30 days   Multiple Vitamins-Minerals (PRESERVISION AREDS 2) CAPS 2024 at pm  No Yes   Si CAPSULE ORALLY 2 TIMES DAILY   OXYGEN-HELIUM IN   Yes No   Polyethylene Glycol 3350 (PEG 3350) 17 GM/SCOOP POWD 2024 at am  No Yes   Sig: MIX 1 CAPFUL (17 GMS) IN 8 OUNCES WATER AND DRINK ORALLY DAILY (DX: CONSTIPATION)   Probiotic Product (PROBIOTIC PO) 2024 at am Self Yes Yes   Sig: Take 1 capsule by mouth every morning   SENEXON-S 8.6-50 MG tablet 2024 at hs  No Yes   Si TABLET ORALLY 2 TIMES DAILY (DX: CONSTIPATION)   acetaminophen (TYLENOL) 325 MG tablet More than a month  Yes Yes   Sig: Take 3 tablets (975 mg) by mouth every 8 hours as needed for mild pain   albuterol (PROAIR HFA/PROVENTIL HFA/VENTOLIN HFA) 108 (90 Base) MCG/ACT inhaler 2024 at hs  Yes Yes   Sig: Inhale 2 puffs into the lungs 4 times daily   amoxicillin (AMOXIL) 500 MG capsule Unknown  No Yes   Si CAPSULES (2000MG ) ORALLY AS NEEDED ONE HOUR PRIOR TO DENTAL APPOINTMENT   bisacodyl (DULCOLAX) 5 MG EC tablet Unknown  No Yes   Si TABLET ORALLY DAILY AS NEEDED (MAY KEEP AT BEDSIDE)   Patient taking differently: 5 mg 2 times daily as needed   chlorhexidine (PERIDEX) 0.12 % solution 2024 at hs  No Yes   Sig: Swish and spit 15 mLs in mouth 2 times daily May self administer   cholecalciferol (VITAMIN D3) 125 mcg (5000 units) capsule 2024 at pm  No Yes   Si CAPSULE ORALLY DAILY   cycloSPORINE (RESTASIS) 0.05 % ophthalmic emulsion 3/1/2024 at am Self Yes Yes   Sig: Place 1 drop into both eyes 2 times daily    demeclocycline (DECLOMYCIN) 150 MG tablet 2024 at hs  No Yes   Si TABLET ORALLY 2 TIMES DAILY   fish oil-omega-3 fatty acids 1000 MG capsule 2024 at am  No Yes   Sig: Take 2 capsules (2 g) by mouth daily   Patient taking differently: Take 1 g by mouth daily   fluorometholone (FML  LIQUIFILM) 0.1 % ophthalmic susp 2024 at am Self Yes Yes   Sig: Place 1 drop Into the left eye daily    hydrocortisone 1 % CREA cream Unknown  Yes Yes   Sig: Place rectally 2 times daily as needed for itching   levothyroxine (SYNTHROID/LEVOTHROID) 88 MCG tablet 2024  No Yes   Si TABLET ORALLY EVERY MORNING ON AN EMPTY STOMACH   loratadine (CLARITIN) 10 MG tablet 2024  No Yes   Si TABLET ORALLY DAILY (DX: ASTHMA)   melatonin 3 MG tablet 2024  No Yes   Si TABLET ORALLY AT BEDTIME ALONG WITH 5 MG FOR A TOTAL DOSE OF 8MG   melatonin 5 MG tablet 2024  No Yes   Si TABLET ORALLY AT BEDTIME  ALONG WITH 3MG FOR A TOTAL DOSE OF 8MG   metoprolol tartrate (LOPRESSOR) 25 MG tablet 2024 at hs  No Yes   Si TABLET ORALLY 2 TIMES DAILY (DX: HYPERTENSION)   mirtazapine (REMERON) 7.5 MG tablet 2024  No Yes   Si TABLET ORALLY AT BEDTIME   omeprazole (PRILOSEC) 20 MG DR capsule 2024 at pm  No Yes   Si CAPSULE ORALLY 2 TIMES DAILY (DX: GASTROESOPHAGEAL REFLUX DISEASE)   polyethylene glycol 0.4%- propylene glycol 0.3% (SYSTANE ULTRA) 0.4-0.3 % SOLN ophthalmic solution 2024 at hs Self Yes Yes   Sig: Place 1 drop into both eyes 4 times daily   sodium chloride 1 GM tablet 2024 at am  No Yes   Sig: Take 1 tablet (1 g) by mouth daily   spironolactone (ALDACTONE) 25 MG tablet 2024 at am  No Yes   Si TABLET ORALLY DAILY (DX: EDEMA) ** HAZARDOUS MED: WEAR DOUBLE NITRILE GLOVES**   timolol (TIMOPTIC) 0.5 % ophthalmic solution 2024 at am Self Yes Yes   Sig: Place 1 drop Into the left eye daily    torsemide (DEMADEX) 20 MG tablet 2024 at am  No Yes   Si.5 TABLET (30mg) ORALLY DAILY   vitamin C (ASCORBIC ACID) 500 MG tablet 2024 at am  No Yes   Si TABLET ORALLY DAILY (DX: SUPPLEMENT)      Facility-Administered Medications: None        Review of Systems    The 10 point Review of Systems is negative other than noted in the HPI or here.       Physical Exam   Vital Signs: Temp: 97.1  F (36.2  C) Temp src: Axillary BP: 138/65 Pulse: 98   Resp: 21 SpO2: 97 % O2 Device: None (Room air)    Weight: 112 lbs 10.48 oz    General Appearance: Well-appearing, well-nourished, no acute distress   Neck:  Supple  Cardio:  Regular rate, irregular rhythm  Pulm:  No respiratory distress  Extremities: Extremities atraumatic, RLE wound with dressing  Skin:  Skin warm, dry, no rashes  Neuro:  Alert, no gross sensory defects, some pupillary asymmetry L > R, Left sided facial droop, left arm and leg hemiplegia. Follows commands, left-sided neglect, speech garbled    Medical Decision Making       75 MINUTES SPENT BY ME on the date of service doing chart review, history, exam, documentation & further activities per the note.      Data     I have personally reviewed the following data over the past 24 hrs:    6.4  \   10.4 (L)   / 234     131 (L) 97 (L) 35.0 (H) /  91   3.5 25 1.02 (H) \     ALT: N/A AST: N/A AP: N/A TBILI: N/A   ALB: 3.4 (L) TOT PROTEIN: N/A LIPASE: N/A     Trop: 42 (H) BNP: N/A     TSH: N/A T4: N/A A1C: 5.3     INR:  1.09 PTT:  31   D-dimer:  N/A Fibrinogen:  N/A       Imaging results reviewed over the past 24 hrs:   Recent Results (from the past 24 hour(s))   CTA Head Neck with Contrast    Narrative    EXAM: CTA HEAD NECK W CONTRAST, CT HEAD PERFUSION W CONTRAST  LOCATION: [facility]  DATE: 03/01/2024    INDICATION: Code Stroke to evaluate for potential thrombolysis and thrombectomy. Left extremity weakness. Dilated left pupil. Garbled speech.  COMPARISON: CT cervical spine 03/08/2020. CT chest 11/24/2020.  TECHNIQUE: Head and neck CT angiogram with IV contrast. Noncontrast head CT followed by axial helical CT images of the head and neck vessels obtained during the arterial phase of intravenous contrast administration. Axial 2D reconstructed images and   multiplanar 3D MIP reconstructed images of the head and neck vessels were  performed by the technologist. Additional CT cerebral perfusion was performed utilizing a second contrast bolus. Perfusion data were post processed with generation of standard   perfusion maps and estimation of ischemic/infarcted volumes utilizing standard threshold values. Dose reduction techniques were used. All stenosis measurements made according to NASCET criteria unless otherwise specified.  CONTRAST: IsoVue 370 75 mL (accession [id number]), IsoVue 370 50 mL (accession [id number])    FINDINGS:   NONCONTRAST HEAD CT:   INTRACRANIAL CONTENTS: No finding for intracranial hemorrhage or convincing finding for acute infarct. There is a new chronic infarct involving the inferior lateral aspect of the left cerebellar hemisphere. There is moderate prominence of the lateral and   third ventricles and sulci and extensive symmetric low-attenuation change within the periventricular deep white matter of both hemispheres which is nonspecific but compatible with sequela of chronic microvascular ischemic change given the patient's age.   No supratentorial transcortical areas of low-attenuation change. No mass effect or midline shift.    Cerebellar tonsils are normally positioned. Sella is unremarkable for technique. There is thinning of the posterior body of the corpus callosum which otherwise appears normally formed.    VISUALIZED ORBITS/SINUSES/MASTOIDS: Prior cataract surgeries. Minimal maxillary sinus mucosal thickening, right greater than left. Small amount of fluid or mucosal thickening right mastoid sinus.    BONES/SOFT TISSUES: Calvarium is intact, without suspicious lytic or blastic foci.    HEAD CTA:  ANTERIOR CIRCULATION: Extensive atherosclerotic calcifications are seen within both carotid siphons with mild associated luminal narrowing.    There is occlusion of a distal M2 superior division branch of the right middle cerebral artery (axial image 348 of series 12, sagittal MIP image 20, coronal MIP image  41).    Anterior and left middle cerebral arteries are unremarkable.    POSTERIOR CIRCULATION: Coarse atherosclerotic calcifications are seen within the intradural vertebral arteries with mild associated luminal narrowing. Basilar and posterior cerebral arteries are unremarkable. Small caliber left posterior communicating   artery. Right posterior communicating artery not identified.    DURAL VENOUS SINUSES: Dural venous sinuses are not well evaluated in the basis of this exam.    NECK CTA:  RIGHT CAROTID: No measurable stenosis or dissection. Mild atherosclerotic plaque right carotid bulb.    LEFT CAROTID: No measurable stenosis or dissection. Mild atherosclerotic plaque left carotid bulb.    VERTEBRAL ARTERIES: Coarse atherosclerotic plaque is seen at the take off of both vertebral arteries with mild associated luminal narrowing. More distal cervical vertebral arteries are unremarkable and there is no finding for dissection.    AORTIC ARCH: Heavy after carotid calcifications are seen within the aortic arch and to a lesser extent proximal great vessels.    NONVASCULAR STRUCTURES: Visualized lung apices reveal biapical peripheral parenchymal scarring, right greater than left and areas of right apical ground-glass opacity similar to prior chest CT and favored to reflect areas of scarring. Visualized osseous   structures reveal advanced cervical spondylosis without suspicious lytic or blastic foci.    CT PERFUSION:  PERFUSION MAPS: There is elevated Tmax and a slightly larger area of elevated MTT involving the lateral and posterior aspect of the right frontal lobe. There is decreased associated cCBF in these areas. rCBV maps are symmetric.     RAPID ANALYSIS:  CBF<30%: 0 mL  Tmax>6sec: 11 mL  Mismatch volume: 11 mL  Mismatch ratio: Infinite      Impression    IMPRESSION:   HEAD CT:  1.  No finding for intracranial hemorrhage or mass or convincing finding for acute infarct.    2.  Moderate volume loss and presumed  sequela of chronic microvascular ischemic change.    3.  New chronic infarct inferior lateral aspect of the left cerebellar hemisphere.    HEAD CTA:   1.  There is occlusion of a distal M2 superior division segment of the right middle cerebral artery without flow seen more distally within the vessel.    2.  Coarse atherosclerotic calcifications both carotid siphons and proximal intradural vertebral arteries with mild associated luminal narrowing but no clear flow-limiting stenosis.    NECK CTA:  1.  No significant stenosis either cervical carotid system.    2.  Mild atherosclerotic narrowing of the takeoff of both vertebral arteries.    CT PERFUSION:  1.  There is elevated Tmax, MTT, and decreased rCBF along the lateral and posterior aspect of the right frontal lobe compatible with ischemic penumbra.    2.  Area of Tmax >6 seconds measures 11 mL with volume of CBF <30% measuring 0 mL.    Noncontrast head CT findings and right distal M2 vascular cutoff on CTA called to Dr. Tucker at 8:26 and 8:36 AM, respectively.   CT Head Perfusion w Contrast - For Tier 2 Stroke    Narrative    EXAM: CTA HEAD NECK W CONTRAST, CT HEAD PERFUSION W CONTRAST  LOCATION: [facility]  DATE: 03/01/2024    INDICATION: Code Stroke to evaluate for potential thrombolysis and thrombectomy. Left extremity weakness. Dilated left pupil. Garbled speech.  COMPARISON: CT cervical spine 03/08/2020. CT chest 11/24/2020.  TECHNIQUE: Head and neck CT angiogram with IV contrast. Noncontrast head CT followed by axial helical CT images of the head and neck vessels obtained during the arterial phase of intravenous contrast administration. Axial 2D reconstructed images and   multiplanar 3D MIP reconstructed images of the head and neck vessels were performed by the technologist. Additional CT cerebral perfusion was performed utilizing a second contrast bolus. Perfusion data were post processed with generation of standard   perfusion maps  and estimation of ischemic/infarcted volumes utilizing standard threshold values. Dose reduction techniques were used. All stenosis measurements made according to NASCET criteria unless otherwise specified.  CONTRAST: IsoVue 370 75 mL (accession [id number]), IsoVue 370 50 mL (accession [id number])    FINDINGS:   NONCONTRAST HEAD CT:   INTRACRANIAL CONTENTS: No finding for intracranial hemorrhage or convincing finding for acute infarct. There is a new chronic infarct involving the inferior lateral aspect of the left cerebellar hemisphere. There is moderate prominence of the lateral and   third ventricles and sulci and extensive symmetric low-attenuation change within the periventricular deep white matter of both hemispheres which is nonspecific but compatible with sequela of chronic microvascular ischemic change given the patient's age.   No supratentorial transcortical areas of low-attenuation change. No mass effect or midline shift.    Cerebellar tonsils are normally positioned. Sella is unremarkable for technique. There is thinning of the posterior body of the corpus callosum which otherwise appears normally formed.    VISUALIZED ORBITS/SINUSES/MASTOIDS: Prior cataract surgeries. Minimal maxillary sinus mucosal thickening, right greater than left. Small amount of fluid or mucosal thickening right mastoid sinus.    BONES/SOFT TISSUES: Calvarium is intact, without suspicious lytic or blastic foci.    HEAD CTA:  ANTERIOR CIRCULATION: Extensive atherosclerotic calcifications are seen within both carotid siphons with mild associated luminal narrowing.    There is occlusion of a distal M2 superior division branch of the right middle cerebral artery (axial image 348 of series 12, sagittal MIP image 20, coronal MIP image 41).    Anterior and left middle cerebral arteries are unremarkable.    POSTERIOR CIRCULATION: Coarse atherosclerotic calcifications are seen within the intradural vertebral arteries with mild  associated luminal narrowing. Basilar and posterior cerebral arteries are unremarkable. Small caliber left posterior communicating   artery. Right posterior communicating artery not identified.    DURAL VENOUS SINUSES: Dural venous sinuses are not well evaluated in the basis of this exam.    NECK CTA:  RIGHT CAROTID: No measurable stenosis or dissection. Mild atherosclerotic plaque right carotid bulb.    LEFT CAROTID: No measurable stenosis or dissection. Mild atherosclerotic plaque left carotid bulb.    VERTEBRAL ARTERIES: Coarse atherosclerotic plaque is seen at the take off of both vertebral arteries with mild associated luminal narrowing. More distal cervical vertebral arteries are unremarkable and there is no finding for dissection.    AORTIC ARCH: Heavy after carotid calcifications are seen within the aortic arch and to a lesser extent proximal great vessels.    NONVASCULAR STRUCTURES: Visualized lung apices reveal biapical peripheral parenchymal scarring, right greater than left and areas of right apical ground-glass opacity similar to prior chest CT and favored to reflect areas of scarring. Visualized osseous   structures reveal advanced cervical spondylosis without suspicious lytic or blastic foci.    CT PERFUSION:  PERFUSION MAPS: There is elevated Tmax and a slightly larger area of elevated MTT involving the lateral and posterior aspect of the right frontal lobe. There is decreased associated cCBF in these areas. rCBV maps are symmetric.     RAPID ANALYSIS:  CBF<30%: 0 mL  Tmax>6sec: 11 mL  Mismatch volume: 11 mL  Mismatch ratio: Infinite      Impression    IMPRESSION:   HEAD CT:  1.  No finding for intracranial hemorrhage or mass or convincing finding for acute infarct.    2.  Moderate volume loss and presumed sequela of chronic microvascular ischemic change.    3.  New chronic infarct inferior lateral aspect of the left cerebellar hemisphere.    HEAD CTA:   1.  There is occlusion of a distal M2 superior  division segment of the right middle cerebral artery without flow seen more distally within the vessel.    2.  Coarse atherosclerotic calcifications both carotid siphons and proximal intradural vertebral arteries with mild associated luminal narrowing but no clear flow-limiting stenosis.    NECK CTA:  1.  No significant stenosis either cervical carotid system.    2.  Mild atherosclerotic narrowing of the takeoff of both vertebral arteries.    CT PERFUSION:  1.  There is elevated Tmax, MTT, and decreased rCBF along the lateral and posterior aspect of the right frontal lobe compatible with ischemic penumbra.    2.  Area of Tmax >6 seconds measures 11 mL with volume of CBF <30% measuring 0 mL.    Noncontrast head CT findings and right distal M2 vascular cutoff on CTA called to Dr. Tucker at 8:26 and 8:36 AM, respectively.   MR Brain w/o Contrast    Narrative    EXAM: MR BRAIN W/O CONTRAST  LOCATION: [facility]  DATE: 3/1/2024    INDICATION: HYPERACUTE  possible TNK candidate, please start with DWI then FLAIR and push images into PACS as they come, Sx: L sided weakness, garbled speech  COMPARISON: None.  TECHNIQUE: Routine multiplanar multisequence head MRI without intravenous contrast.    FINDINGS:  INTRACRANIAL CONTENTS: Restricted diffusion without associated T2 FLAIR hyperintensity in the involving the right precentral and postcentral gyrus and subcortical white matter is consistent with hyperacute infarction. No additional restricted diffusion   to suggest recent acute, acute, or early subacute infarction. Old infarction in the left cerebellar hemisphere. No mass, acute hemorrhage, or extra-axial fluid collections. Old microhemorrhage in the right superior cerebellar hemisphere. Patchy and   confluent nonspecific T2/FLAIR hyperintensities within the cerebral white matter most consistent with moderate chronic microvascular ischemic change. Moderate generalized cerebral atrophy. No  hydrocephalus. Moderate cerebellar atrophy.     SELLA: No abnormality accounting for technique.    OSSEOUS STRUCTURES/SOFT TISSUES: Normal marrow signal. The major intracranial vascular flow voids are maintained.     ORBITS: No abnormality accounting for technique.     SINUSES/MASTOIDS: No paranasal sinus mucosal disease. No middle ear or mastoid effusion.       Impression    IMPRESSION:  1.  Hyperacute infarction involving the right precentral and post central gyri and underlying white matter.  2.  Generalized brain atrophy and presumed microvascular ischemic changes as detailed above.    Findings discussed with Dr. Sulema Rasheed at 10:25 AM on 03/01/2024.

## 2024-03-01 NOTE — ED PROVIDER NOTES
EMERGENCY DEPARTMENT ENCOUNTER      NAME: Marla Dunn  AGE: 89 year old female  YOB: 1929  MRN: [id number]  EVALUATION DATE & TIME: 3/1/2024  8:19 AM    PCP: Gabrielle Wallace    ED PROVIDER: Sulema Tucker MD    Chief Complaint   Patient presents with    Stroke         FINAL IMPRESSION:  1. Left-sided weakness    2. Facial droop    3. Dysarthria          ED COURSE & MEDICAL DECISION MAKING:    Pertinent Labs & Imaging studies reviewed. (See chart for details)  89 year old female with history of CAD status post CABG, HTN, HLD, A-fib not anticoagulated, CHF, COPD who presents to the Emergency Department for evaluation of neurosymptoms.  Initial report was that patient was last seen normal last evening at 1730 and was found this morning with left-sided weakness, left-sided facial droop, arm leg weakness and garbled speech.  His symptoms are concerning for hemorrhagic versus ischemic CVA, TIA, intracranial mass lesion and less likely electrolyte abnormality.    Patient met by myself upon EMS arrival, expedited to CT.  Based on history, activated anterior true stroke code.  Noncon head CT unremarkable.  CTA ultimately shows a right distal M2 occlusion.  Patient reportedly told staff at her care center that she had symptoms started at 7 AM and she was able to confirm that by history here.  Stroke neurology consented patient and niece at bedside for TNK.  This was administered.  EKG shows A-fib.  No ischemic changes.  CBC, BMP, coags, troponin notable for hemoglobin of 10.4 which is chronic and a troponin of 42.  MRI obtained showing hyperacute infarct on the right.  Admitted to hospitalist and ICU for further management.      ED Course as of 03/01/24 1129   Fri Mar 01, 2024   0825 Patient met by myself upon EMS arrival and was transported to CT.  Noncon head CT independently interpreted by myself at CT negative for ICH.   0829 Case discussed with stroke neurology   0831 Noncon CT head  negative for ICH per neuroradiology.   0835 Spoke w staff at care facility. Found at 0720. Pt was dressed in wheelchair and she told staff that her symptoms started around 0700.   0835 Spoke w neurorad - cutoff of distal M2 of R MCA   0853 Hemoglobin(!): 10.4  chronic   0907 Troponin T, High Sensitivity(!): 42   0919 Discussed with Dr. Verduzco intensivist    0921 Spoke w stroke neuro.  IR is taking a look for possible intervention   1019 Spoke w neurorad - acute infarct on R   1034 Spoke w SOC, no beds at [facility] or the    1109 Spoke again with stroke neuroteam regarding transfer versus keeping patient here   1119 Spoke again w Dr. Verduzco, intensivist    1121 Admitted to hospitalist     8:28 AM I met with the patient, obtained history, performed an initial exam, and discussed options and plan for diagnostics and treatment here in the ED.  8:35 AM I talked with staff at the patient's assisted living facility.  8:37 AM I spoke with radiology.  8:45 AM I talked with the niece of the patient, she is at the bedside.   9:02 AM Stroke neurology consented the patient, as best as she was able to, and the patient's niece for TNK  9:24 AM I talked with Dr. Fink, Hospitalist  9:29 AM I spoke with stroke neurology about transferring patient to another facility            Medical Decision Making    History:  Supplemental history from: Documented in chart and EMS  External Record(s) reviewed: 2/12/2024 PCP visit    Work Up:  Chart documentation includes differential considered and any EKGs or imaging independently interpreted by provider, see MDM  In additional to work up documented, I considered the following work up: see MDM    External consultation:  Discussion of management with another provider: Neuroradiology, stroke neurology, intensivist, hospitalist    Complicating factors:  Care impacted by chronic illness: Chronic Kidney Disease, Chronic Lung Disease, Chronic Pain, Heart Disease, Hyperlipidemia, Hypertension, and  Mental Health  Care affected by social determinants of health: Access to Medical Care    Disposition considerations: Admit.        At the conclusion of the encounter I discussed the results of all of the tests and the disposition. The questions were answered. The patient or family acknowledged understanding and was agreeable with the care plan.    CRITICAL CARE:  Critical Care  Performed by: Sulema Tucker MD  Authorized by: Sulema Tucker MD     Total critical care time: 75 minutes  Criticalcare time was exclusive of separately billable procedures and treating other patients.  Critical care was necessary to treat or prevent imminent or life-threatening deterioration of the following conditions: Neurologic decompensation, thrombolysis, death, disability  Critical care was time spent personally by me on the following activities: development of treatment plan with patient or surrogate, discussions with consultants, examination of patient, evaluation of patient's response totreatment, obtaining history from patient or surrogate, ordering and performing treatments and interventions, ordering and review of laboratory studies, ordering and review of radiographic studies and re-evaluation ofpatient's condition, this excludes any separately billable procedures.      MEDICATIONS GIVEN IN THE EMERGENCY:  Medications   sodium chloride 0.9 % infusion (has no administration in time range)   labetalol (NORMODYNE/TRANDATE) injection 10 mg (has no administration in time range)     Or   hydrALAZINE (APRESOLINE) injection 10 mg (has no administration in time range)   niCARdipine 40 mg in 200 mL NS (CARDENE) infusion (has no administration in time range)   iopamidol (ISOVUE-370) solution 75 mL (75 mLs Intravenous $Given 3/1/24 0891)   iopamidol (ISOVUE-370) solution 50 mL (50 mLs Intravenous $Given 3/1/24 0832)   tenecteplase (TNKase) injection 13 mg (13 mg Intravenous $Given 3/1/24 0919)   sodium chloride 0.9% BOLUS 500 mL (0 mLs  Intravenous Stopped 3/1/24 1037)   ipratropium - albuterol 0.5 mg/2.5 mg/3 mL (DUONEB) neb solution 3 mL (3 mLs Nebulization $Given 3/1/24 1058)       NEW PRESCRIPTIONS STARTED AT TODAY'S ER VISIT  New Prescriptions    No medications on file          =================================================================    HPI    Patient information was obtained from: EMS    Use of Intrepreter: N/A     Marla Dunn is a 89 year old female with pertinent medical history of cancer, HTN, HLD, hypercholesterolemia, CKD-III, COPD, heart disease, anxiety, and depression who presents stroke symptoms.     Patient was last seen by staff at her assisted living facility around 1730 yesterday (02/29/24). This morning staff found her with garbled speech, and left-sided deficit. EMS reported blood sugar normal and patient was vitally stable en route. Noted left-sided facial droop, garbled speech, and left ar and leg weakness. Denies use of anticoagulants.      PAST MEDICAL HISTORY:  Past Medical History:   Diagnosis Date    Asthma     Bilateral carpal tunnel syndrome 08/27/2015    CAD (coronary artery disease)     Chronic airway obstruction, not elsewhere classified     Created by Conversion     Chronic anemia     Chronic edema     Congestive heart failure, severe (H) 05/13/2020    COPD (chronic obstructive pulmonary disease) (H)     Coronary artery disease     Depression     GERD (gastroesophageal reflux disease)     Heart disease     Heart murmur     High cholesterol     dislipidemia    HTN (hypertension)     Hypercholesterolemia     Hypertension     Hypertensive emergency 08/13/2021    Hypothyroidism     Hypothyroidism     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    MI (myocardial infarction) (H) 1985    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    Myelodysplastic syndrome (H)     Myocardial infarction (H) 1983    OLEGARIO (obstructive sleep apnea)     Osteoporosis     Other and unspecified hyperlipidemia     Created by  Conversion     Pyelonephritis 05/11/2016    Radicular low back pain     Rheumatoid arthritis (H)     Elizabeth Agers syndrome     Sjoegren syndrome     Sjogren's syndrome (H24)     Sleep apnea, obstructive     Spinal stenosis     Squamous cell carcinoma, tongue border (H)     Unspecified polyarthropathy or polyarthritis, hand     Created by Conversion        PAST SURGICAL HISTORY:  Past Surgical History:   Procedure Laterality Date    aortic valve      AORTIC VALVE REPLACEMENT  01/01/2012    APPENDECTOMY      APPENDECTOMY      AS TOTAL KNEE ARTHROPLASTY Right     BACK SURGERY  2004    spinal decompression    BACK SURGERY      spinal stenosis    BONE MARROW BIOPSY  2004    breast biopsy      BREAST SURGERY  2001    biopsy    BYPASS GRAFT ARTERY CORONARY  01/01/2009    LIMA to ramus intermedius    cardiac angiogram      CARDIAC CATHETERIZATION      CARDIAC SURGERY      EYE SURGERY  2008    bilateral cataract repair     EYE SURGERY Bilateral     cornea transplant left eye & cataracts    KYPHOPLASTY  2003    T12    OPEN REDUCTION INTERNAL FIXATION HIP NAILING Right 08/13/2021    Procedure: INTERNAL FIXATION, FRACTURE, TROCHANTERIC, HIP, USING PINS OR RODS;  Surgeon: Darrel Castro MD;  Location: [facility] OR    OTHER SURGICAL HISTORY  01/01/2018    Rectosigmoidectomy perineal    PARTIAL GLOSSECTOMY Right 09/2023    [facility]    RECTOSIGMOIDECTOMY PERINEAL N/A 01/10/2018    Procedure: RECTOSIGMOIDECTOMY PERINEAL;  PERINEAL RECTOSIGMOIDECTOMY ;  Surgeon: Candelaria Anthony MD;  Location: [facility] OR    REPAIR VALVE AORTIC  2009    THORACIC SURGERY  2003    T12 kyphoplasty    ZZC CABG, ARTERY-VEIN, FOUR      Z CABG, VEIN, SINGLE      Description: CABG (CABG);  Recorded: 03/09/2012;  Comments: Single vessel bypass graft to an obtuse marginal branch in May 2009    ZZC REPLACE AORT VALV,PROSTH VALV      Description: Aortic Valve Replacement;  Recorded: 03/09/2012;  Comments: Aortic valve replacement        CURRENT MEDICATIONS:    Prior to Admission Medications   Prescriptions Last Dose Informant Patient Reported? Taking?   ANTACID REGULAR STRENGTH 500 MG chewable tablet   No No   Si TABLETS (1000MG) ORALLY 2 TIMES DAILY AS NEEDED FOR HEARTBURN   ASPIRIN LOW DOSE 81 MG EC tablet   No No   Si TABLET ORALLY 2 TIMES DAILY (DX: PROPHYLAXIS)   Ascorbic Acid (VITAMIN C PO)  Self Yes No   Sig: Take 500 mg by mouth every morning   Bacillus Coagulans-Inulin (PROBIOTIC FORMULA) 1-250 BILLION-MG CAPS   No No   Si CAPSULE ORALLY DAILY   COMBIVENT RESPIMAT  MCG/ACT inhaler   No No   Sig: INHALE 1 PUFF INTO THE LUNGS  4 TIMES DAILY   IBANDRONATE SODIUM PO  Self Yes No   Sig: Take 150 mg by mouth every 30 days   Multiple Vitamins-Minerals (PRESERVISION AREDS 2) CAPS   No No   Si CAPSULE ORALLY 2 TIMES DAILY   OXYGEN-HELIUM IN   Yes No   Polyethylene Glycol 3350 (PEG 3350) 17 GM/SCOOP POWD   No No   Sig: MIX 1 CAPFUL (17 GMS) IN 8 OUNCES WATER AND DRINK ORALLY DAILY (DX: CONSTIPATION)   Probiotic Product (PROBIOTIC PO)  Self Yes No   Sig: Take 1 capsule by mouth every morning   SENEXON-S 8.6-50 MG tablet   No No   Si TABLET ORALLY 2 TIMES DAILY (DX: CONSTIPATION)   acetaminophen (TYLENOL) 325 MG tablet   Yes No   Sig: Take 3 tablets (975 mg) by mouth every 8 hours as needed for mild pain   albuterol (PROAIR HFA/PROVENTIL HFA/VENTOLIN HFA) 108 (90 Base) MCG/ACT inhaler   Yes No   Sig: Inhale 2 puffs into the lungs every 6 hours   amoxicillin (AMOXIL) 500 MG capsule   No No   Si CAPSULES (2000MG ) ORALLY AS NEEDED ONE HOUR PRIOR TO DENTAL APPOINTMENT   bisacodyl (DULCOLAX) 5 MG EC tablet   No No   Si TABLET ORALLY DAILY AS NEEDED (MAY KEEP AT BEDSIDE)   chlorhexidine (PERIDEX) 0.12 % solution   No No   Sig: Swish and spit 15 mLs in mouth 2 times daily May self administer   cholecalciferol (VITAMIN D3) 125 mcg (5000 units) capsule   No No   Si CAPSULE ORALLY DAILY   cycloSPORINE (RESTASIS) 0.05  % ophthalmic emulsion  Self Yes No   Sig: Place 1 drop into both eyes 2 times daily    demeclocycline (DECLOMYCIN) 150 MG tablet   No No   Si TABLET ORALLY 2 TIMES DAILY   fish oil-omega-3 fatty acids 1000 MG capsule   No No   Sig: Take 2 capsules (2 g) by mouth daily   fluorometholone (FML LIQUIFILM) 0.1 % ophthalmic susp  Self Yes No   Sig: Place 1 drop Into the left eye daily    levothyroxine (SYNTHROID/LEVOTHROID) 88 MCG tablet   No No   Si TABLET ORALLY EVERY MORNING ON AN EMPTY STOMACH   loratadine (CLARITIN) 10 MG tablet   No No   Si TABLET ORALLY DAILY (DX: ASTHMA)   melatonin 3 MG tablet   No No   Si TABLET ORALLY AT BEDTIME ALONG WITH 5 MG FOR A TOTAL DOSE OF 8MG   melatonin 5 MG tablet   No No   Si TABLET ORALLY AT BEDTIME  ALONG WITH 3MG FOR A TOTAL DOSE OF 8MG   metoprolol tartrate (LOPRESSOR) 25 MG tablet   No No   Si TABLET ORALLY 2 TIMES DAILY (DX: HYPERTENSION)   mirtazapine (REMERON) 7.5 MG tablet   No No   Si TABLET ORALLY AT BEDTIME   omeprazole (PRILOSEC) 20 MG DR capsule   No No   Si CAPSULE ORALLY 2 TIMES DAILY (DX: GASTROESOPHAGEAL REFLUX DISEASE)   polyethylene glycol 0.4%- propylene glycol 0.3% (SYSTANE ULTRA) 0.4-0.3 % SOLN ophthalmic solution  Self Yes No   Sig: Place 1 drop into both eyes 4 times daily   sodium chloride 1 GM tablet   No No   Sig: Take 1 tablet (1 g) by mouth daily   spironolactone (ALDACTONE) 25 MG tablet   No No   Si TABLET ORALLY DAILY (DX: EDEMA) ** HAZARDOUS MED: WEAR DOUBLE NITRILE GLOVES**   timolol (TIMOPTIC) 0.5 % ophthalmic solution  Self Yes No   Sig: Place 1 drop Into the left eye daily    torsemide (DEMADEX) 20 MG tablet   No No   Si.5 TABLET (30mg) ORALLY DAILY   vitamin C (ASCORBIC ACID) 500 MG tablet   No No   Si TABLET ORALLY DAILY (DX: SUPPLEMENT)      Facility-Administered Medications: None       ALLERGIES:  Allergies   Allergen Reactions    Gramineae Pollens Other (See Comments)     ORCHARDGRASS. Shortness of  "breath when lawn is just cut.    Smoke. Other (See Comments)     Hard to breathe.    Pollen Extract      Other reaction(s): Unknown       FAMILY HISTORY:  Family History   Problem Relation Age of Onset    Cancer Mother         ovarian cancer;  in her 50s    Family History Negative Mother     Heart Disease Father     Cancer Brother     Cancer Sister        SOCIAL HISTORY:  Social History     Tobacco Use    Smoking status: Never    Smokeless tobacco: Never   Substance Use Topics    Alcohol use: No     Alcohol/week: 0.0 standard drinks of alcohol     Comment: Alcoholic Drinks/day: occasional glass of wine    Drug use: No        VITALS:  Patient Vitals for the past 24 hrs:   BP Temp Temp src Pulse Resp SpO2 Height Weight   24 1115 (!) 148/67 -- -- 100 26 95 % -- --   24 1101 -- -- -- -- -- 92 % -- --   24 1100 (!) 151/73 -- -- 108 25 92 % -- --   24 1045 (!) 143/72 -- -- 109 27 95 % -- --   24 1030 (!) 150/76 -- -- 99 25 95 % -- --   24 1015 (!) 139/93 -- -- 107 22 93 % -- --   24 1000 (!) 148/79 -- -- 102 -- 97 % -- --   24 0945 (!) 146/86 -- -- 99 -- 97 % -- --   24 0930 138/60 -- -- 92 24 92 % -- --   24 0919 (!) 140/63 -- -- 93 (!) 43 92 % -- --   24 0915 (!) 140/63 -- -- 93 29 96 % -- --   24 0913 -- -- -- -- -- -- 1.6 m (5' 3\") 51.1 kg (112 lb 10.5 oz)   24 0900 (!) 162/67 -- -- 99 24 96 % -- --   24 0845 129/58 -- -- 91 25 97 % -- --   24 0840 (!) 140/62 97.1  F (36.2  C) Axillary 91 25 97 % -- --       PHYSICAL EXAM    General Appearance: Well-appearing, well-nourished, no acute distress   Head:  Normocephalic, atraumatic  Eyes:  PERRL, conjunctiva/corneas clear, EOM's intact  ENT:   membranes are moist without pallor  Neck:  Supple  Cardio:  Regular rate, irregular rhythm  Pulm:  No respiratory distress  Extremities: Extremities atraumatic  Skin:  Skin warm, dry, no rashes  Neuro:  Alert and looking around, no gross " sensory defects, some pupillary asymmetry right at 2 and let at 4, Left sided facial droop, left arm and leg hemiplegia. Follows commands, left-sided neglect, speech quite garbled, able to tell me her name and age but nothing else was understandable.       [employer] Stroke Scale  Exam Interval: Baseline   Score    Level of consciousness: (0)   Alert, keenly responsive    LOC questions: (0)   Answers both questions correctly    LOC commands: (0)   Performs both tasks correctly    Best gaze: (0)   Normal    Visual: (0)   No visual loss    Facial palsy: (1)   Minor paralysis (flat nasolabial fold, smile asymmetry)    Motor arm (left): (2)   Some effort against gravity    Motor arm (right): (0)   No drift    Motor leg (left): (2)   Some effort against gravity    Motor leg (right): (0)   No drift    Limb ataxia: (0)   Absent    Sensory: (0)   Normal- no sensory loss    Best language: (1)   Mild to moderate aphasia    Dysarthria: (1)   Mild to moderate dysarthria    Extinction and inattention: (1)   Visual, tacile, auditory, spatial, person inattention        Total Score:  8          RADIOLOGY/LABS:  Reviewed all pertinent imaging. Please see official radiology report. All pertinent labs reviewed and interpreted.    Results for orders placed or performed during the hospital encounter of 03/01/24   CTA Head Neck with Contrast    Impression    IMPRESSION:   HEAD CT:  1.  No finding for intracranial hemorrhage or mass or convincing finding for acute infarct.    2.  Moderate volume loss and presumed sequela of chronic microvascular ischemic change.    3.  New chronic infarct inferior lateral aspect of the left cerebellar hemisphere.    HEAD CTA:   1.  There is occlusion of a distal M2 superior division segment of the right middle cerebral artery without flow seen more distally within the vessel.    2.  Coarse atherosclerotic calcifications both carotid siphons and proximal intradural vertebral arteries  with mild associated luminal narrowing but no clear flow-limiting stenosis.    NECK CTA:  1.  No significant stenosis either cervical carotid system.    2.  Mild atherosclerotic narrowing of the takeoff of both vertebral arteries.    CT PERFUSION:  1.  There is elevated Tmax, MTT, and decreased rCBF along the lateral and posterior aspect of the right frontal lobe compatible with ischemic penumbra.    2.  Area of Tmax >6 seconds measures 11 mL with volume of CBF <30% measuring 0 mL.    Noncontrast head CT findings and right distal M2 vascular cutoff on CTA called to Dr. Tukcer at 8:26 and 8:36 AM, respectively.   CT Head Perfusion w Contrast - For Tier 2 Stroke    Impression    IMPRESSION:   HEAD CT:  1.  No finding for intracranial hemorrhage or mass or convincing finding for acute infarct.    2.  Moderate volume loss and presumed sequela of chronic microvascular ischemic change.    3.  New chronic infarct inferior lateral aspect of the left cerebellar hemisphere.    HEAD CTA:   1.  There is occlusion of a distal M2 superior division segment of the right middle cerebral artery without flow seen more distally within the vessel.    2.  Coarse atherosclerotic calcifications both carotid siphons and proximal intradural vertebral arteries with mild associated luminal narrowing but no clear flow-limiting stenosis.    NECK CTA:  1.  No significant stenosis either cervical carotid system.    2.  Mild atherosclerotic narrowing of the takeoff of both vertebral arteries.    CT PERFUSION:  1.  There is elevated Tmax, MTT, and decreased rCBF along the lateral and posterior aspect of the right frontal lobe compatible with ischemic penumbra.    2.  Area of Tmax >6 seconds measures 11 mL with volume of CBF <30% measuring 0 mL.    Noncontrast head CT findings and right distal M2 vascular cutoff on CTA called to Dr. Tucker at 8:26 and 8:36 AM, respectively.   MR Brain w/o Contrast    Impression    IMPRESSION:  1.  Hyperacute infarction  involving the right precentral and post central gyri and underlying white matter.  2.  Generalized brain atrophy and presumed microvascular ischemic changes as detailed above.    Findings discussed with Dr. Sulmea Rasheed at 10:25 AM on 03/01/2024.   Basic metabolic panel   Result Value Ref Range    Sodium 131 (L) 135 - 145 mmol/L    Potassium 3.5 3.4 - 5.3 mmol/L    Chloride 97 (L) 98 - 107 mmol/L    Carbon Dioxide (CO2) 25 22 - 29 mmol/L    Anion Gap 9 7 - 15 mmol/L    Urea Nitrogen 35.0 (H) 8.0 - 23.0 mg/dL    Creatinine 1.02 (H) 0.51 - 0.95 mg/dL    GFR Estimate 51 (L) >60 mL/min/1.73m2    Calcium 8.1 (L) 8.2 - 9.6 mg/dL    Glucose 91 70 - 99 mg/dL   Result Value Ref Range    INR 1.09 0.85 - 1.15   Partial thromboplastin time   Result Value Ref Range    aPTT 31 22 - 38 Seconds   Result Value Ref Range    Troponin T, High Sensitivity 42 (H) <=14 ng/L   CBC with platelets and differential   Result Value Ref Range    WBC Count 6.4 4.0 - 11.0 10e3/uL    RBC Count 3.27 (L) 3.80 - 5.20 10e6/uL    Hemoglobin 10.4 (L) 11.7 - 15.7 g/dL    Hematocrit 32.2 (L) 35.0 - 47.0 %    MCV 99 78 - 100 fL    MCH 31.8 26.5 - 33.0 pg    MCHC 32.3 31.5 - 36.5 g/dL    RDW 17.9 (H) 10.0 - 15.0 %    Platelet Count 234 150 - 450 10e3/uL    % Neutrophils 72 %    % Lymphocytes 11 %    % Monocytes 12 %    % Eosinophils 3 %    % Basophils 1 %    % Immature Granulocytes 1 %    NRBCs per 100 WBC 0 <1 /100    Absolute Neutrophils 4.6 1.6 - 8.3 10e3/uL    Absolute Lymphocytes 0.7 (L) 0.8 - 5.3 10e3/uL    Absolute Monocytes 0.8 0.0 - 1.3 10e3/uL    Absolute Eosinophils 0.2 0.0 - 0.7 10e3/uL    Absolute Basophils 0.1 0.0 - 0.2 10e3/uL    Absolute Immature Granulocytes 0.0 <=0.4 10e3/uL    Absolute NRBCs 0.0 10e3/uL       EKG:  Performed at: 08:41 on 03/01/24    Impression: Atrial fibrillation with premature ventricular complexes  Left axis deviation  Incomplete right bundle branch block  Anteroseptal infarct (cited on or before 05/13/20)  When  compared with ECG of 09/09/22 at 14:08, incomplete right bundle branch black is now present.    Rate: 90 bpm  Rhythm: Atrial fibrillation with premature ventricular complexes  Axis: left axis deviation  MD Interval: -49  QRS Interval: 112  QTc Interval: 472  Comparison: When compared with ECG of 09/09/22 at 14:08, incomplete right bundle branch black is now present.  I have independently reviewed and interpreted the EKG(s) documented above.    The creation of this record is based on the scribe s observations of the work being performed by Sulema Tucker MD and the provider s statements to them. It was created on her behalf by Bonnie Dominguez, a trained medical scribe. This document has been checked and approved by the attending provider.    Sulema Tucker MD  Emergency Medicine  [facility] EMERGENCY DEPARTMENT  [address] [place] [id number] [phone]  Dept: [phone]       Sulema Tucker MD  03/01/24 8980

## 2024-03-01 NOTE — CONSULTS
[facility]    Stroke Telephone Note    I was called by Sulema Tucker on 03/01/24 regarding patient Marla Dunn. The patient is a 89 year old female with pertinent past medical history of CAD hx MI, CHF, HTN, HLD, RA, Sjogrens syndrome, Elizabeth Agers syndrome, CKD. On PTA ASA 81 mg twice daily.     She presented to [place]'s ED today due to being found at [facility] at 0730 this morning with AMS, L sided deficits, garbled speech, L arm/leg weakness, L facial droop, some L sided neglect, no gaze deviation. She was last seen normal at 1730 last night; however, when RN found her at 0730 she was up and already dressed and sitting in her wheelchair but using only R hand to move around. Patient said that she started having the symptoms start at 0700 this morning after she had already been getting dressed feeling well. CT/CTA with no bleed but distal R  M2 occlusion, 0 CBF, 11 mL Tmax. Signed out to Dr. Harding and JANESSA Rogers.     Vitals  BP: (!) 162/67   Pulse: 99   Resp: 24   Temp: 97.1  F (36.2  C)        Stroke Code Data (for stroke code without tele)  Stroke code activated 03/01/24  0818   Stroke provider first response 03/01/24  (S) 0819 (initial phone number was incorrect)   Last known normal 03/01/24  0659      Time of discovery (or onset of symptoms) 03/01/24  0700   Head CT read by Stroke Neuro Provider 03/01/24  0826   Was stroke code de-escalated?             Imaging Findings  CT head: no bleed  CTA head/neck: distal R M2 occlusion    Intravenous Thrombolysis  pending    Endovascular Treatment  pending    Impression  R MCA territory 2/2 R M2 distal occlusion    Recommendations   -signed out to Dr. Harding and JANESSA Rogers    Case discussed with vascular neurology attending Dr. Harding.    My recommendations are based on the information provided over the phone by Marla Dunn's in-person providers. They are not intended to replace the clinical judgment of her  "in-person providers. I was not requested to personally see or examine the patient at this time.     Romi Dumas PA-C  Vascular Neurology    To page me or covering stroke neurology team member, click here: AMCOM  Choose \"On Call\" tab at top, then select \"NEUROLOGY/ALL SITES\" from middle drop-down box, press Enter, then look for \"stroke\" or \"telestroke\" for your site.           "

## 2024-03-01 NOTE — PROGRESS NOTES
"   Speech Language Pathology- Clinical swallow evaluation     03/01/24 5002   Appointment Info   Signing Clinician's Name / Credentials (SLP) Cherelle Waite MS CCC SLP   General Information   Onset of Illness/Injury or Date of Surgery 03/01/24   Referring Physician Cherelle Gil MD   Patient/Family Therapy Goal Statement (SLP) Patient wants to be able to speak and eat.   Pertinent History of Current Problem Per chart review \"Marla Dunn is a 89 year old female with PMH CAD Hx MI, CHF, HTN, HLD, RA, Sjogren's syndrome, SaraAgers syndrome, CKD, aortic stenosis, COPD, chronic hyponatremia, OLEGARIO, Atril fibrillation not on AC admitted on 3/1/2024 for acute ischemic stroke\". 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 Per niece, patient had returned to regular diet after surgery.   General Observations Patient is seen in ED accompanied by her niece. Pt is fatigued but able to participate. Speech is very dysarthric, judged ~ 20% at the phrase/sentence level.   Type of Evaluation   Type of Evaluation Swallow Evaluation   Oral Motor   Oral Musculature anomalies present   Mucosal Quality dry   Oral Motor Deficits Observed Cough/Swallow/Gag Reflex (Oral Motor) (Group);Facial Symmetry (Oral Motor) (Group);Lip Function (Oral Motor) (Group);Tongue Function (Oral Motor) (Group);Vocal Quality/Secretion Management (Oral Motor) (Group)   Dentition (Oral Motor)   Dentition (Oral Motor) adequate dentition   Facial Symmetry (Oral Motor)   Facial Symmetry (Oral Motor) left side impairment   Left Side Facial Asymmetry severe impairment   Lip Function (Oral Motor)   Lip Range of Motion (Oral Motor) protrusion impairment;retraction impairment   Lip Sensitivity (Oral Motor) left lower lip decreased   Lip Strength (Oral Motor) left side;severe impairment   Lip Coordination (Oral Motor) severe impairment   Protrusion, Lip Range of Motion left side;severe impairment   Retraction, Lip Range of Motion severe " impairment;left side   Tongue Function (Oral Motor)   Tongue Strength (Oral Motor) strength decreased;left side   Tongue Coordination/Speed (Oral Motor) reduced rate   Tongue ROM (Oral Motor) elevation is impaired;protrusion is impaired;retraction is impaired;lateralization is impaired   Retraction, Tongue ROM Impairment (Oral Motor) left side;severe impairment   Protrusion, Tongue ROM Impairment (Oral Motor) left side;moderate impairment   Lateralization, Tongue ROM Impairment (Oral Motor) left side;severe impairment   Elevation, Tongue ROM Impairment (Oral Motor) left side;severe impairment   Vocal Quality/Secretion Management (Oral Motor)   Vocal Quality (Oral Motor) wet/gurgly   Secretion Management (Oral Motor) drooling, left side;difficulty swallowing secretions   General Swallowing Observations   Past History of Dysphagia none per EHR.   Respiratory Support room air   Current Diet/Method of Nutritional Intake (General Swallowing Observations, NIS) NPO   Swallowing Evaluation Clinical swallow evaluation   Clinical Swallow Evaluation   Feeding Assistance dependent   Clinical Swallow Evaluation Textures Trialed thin liquids;mildly thick liquids   Clinical Swallow Eval: Thin Liquid Texture Trial   Mode of Presentation, Thin Liquids   (moisture via oral swabs)   Volume of Liquid or Food Presented minimal   Oral Phase of Swallow delayed AP movement;effortful AP movement;premature pharyngeal entry   Oral Residue left lip drooling   Pharyngeal Phase of Swallow coughing/choking;reduction in laryngeal movement;repeated swallows;throat clearing;wet vocal quality after swallow   Clinical Swallow Eval: Mildly Thick Liquids   Mode of Presentation spoon;fed by clinician   Volume Presented 1/2 tsp   Oral Phase delayed AP movement;effortful AP movement   Pharyngeal Phase repeated swallows;reduction in laryngeal movement  (No coughing noted.)   Esophageal Phase of Swallow   Patient reports or presents with symptoms of  esophageal dysphagia No   Swallowing Recommendations   Diet Consistency Recommendations NPO   Medication Administration Recommendations, Swallowing (SLP) non oral route if possible. Critical meds crushed in puree carrier or on spoon with mildly thick liquids.   Instrumental Assessment Recommendations   (consider pending clinical progress.)   General Therapy Interventions   Planned Therapy Interventions Dysphagia Treatment   Dysphagia treatment Oropharyngeal exercise training;Modified diet education;Instruction of safe swallow strategies;Compensatory strategies for swallowing   Clinical Impression   Criteria for Skilled Therapeutic Interventions Met (SLP Eval) Yes, treatment indicated   SLP Diagnosis Dysphagia   Risks & Benefits of therapy have been explained evaluation/treatment results reviewed;patient;participants included;participants voiced agreement with care plan   Clinical Impression Comments Patient seen for clinical swallow evaluation. Patient presents with severe oropharyngeal dysphagia in setting of acute stroke. Deficits include mod-severe lingual/labial weakness, premature pharyngeal entry, reduced laryngeal movement, delayed/effortful A-P propulsion of bous. Patient at high aspiration risk. Recommend continue NPO status today with frequent oral cares.  SLP will follow.   SLP Total Evaluation Time   Eval: oral/pharyngeal swallow function, clinical swallow Minutes (02568) 25   SLP Goals   Therapy Frequency (SLP Eval) 6 times/week   SLP Predicted Duration/Target Date for Goal Attainment 03/15/24   Interventions   Interventions Quick Adds Swallowing Dysfunction   SLP Discharge Planning   SLP Plan po readiness, VFSS? oropharyngeal swallow ex.   SLP Discharge Recommendation Transitional Care Facility   SLP Rationale for DC Rec below baseline swallow and communication. Patient will require ongoing therapy.   SLP Brief overview of current status  Patient at high aspiration risk. Recommend continue NPO status  today with frequent oral cares. SLP will follow.   Total Session Time   Total Session Time (sum of timed and untimed services) 25

## 2024-03-01 NOTE — ED NOTES
Bed: JNED-02  Expected date: 3/1/24  Expected time: 8:10 AM  Means of arrival:   Comments:  Stroke sx

## 2024-03-01 NOTE — ED TRIAGE NOTES
Patient currently in CT due to Tier 2 stroke code being called on arrival to ED.    Last known well 1730 yesterday.    Symptoms include left sided facial droop, aphasia, slurred speech, left pupil larger than the right, and possible left sided neglect. Unable to assess further at this time.    Blood sugar for EMS was 113mg/dl and they report the patient being in normal sinus rhythm with a heart rate in the 80's, systolic pressures between 130 and 140 and room air SPO2 mid 90's.     States she is 89 years old.    On EMS arrival to the assisted living facility, they found her fully dressed and sitting in a wheelchair.

## 2024-03-01 NOTE — PHARMACY-ADMISSION MEDICATION HISTORY
Pharmacist Admission Medication History    Admission medication history is complete. The information provided in this note is only as accurate as the sources available at the time of the update.    Information Source(s): Facility ([facility]/) medication list/MAR via N/A    Pertinent Information: none    Changes made to PTA medication list:  Added: None  Deleted: None  Changed: fish oil    Allergies reviewed with records and updates made in EHR: yes    Medication History Completed By: Huber Knight [facility] 3/1/2024 11:57 AM    PTA Med List   Medication Sig Last Dose    acetaminophen (TYLENOL) 325 MG tablet Take 3 tablets (975 mg) by mouth every 8 hours as needed for mild pain More than a month    albuterol (PROAIR HFA/PROVENTIL HFA/VENTOLIN HFA) 108 (90 Base) MCG/ACT inhaler Inhale 2 puffs into the lungs 4 times daily 2/29/2024 at hs    amoxicillin (AMOXIL) 500 MG capsule 4 CAPSULES (2000MG ) ORALLY AS NEEDED ONE HOUR PRIOR TO DENTAL APPOINTMENT Unknown    ANTACID REGULAR STRENGTH 500 MG chewable tablet 2 TABLETS (1000MG) ORALLY 2 TIMES DAILY AS NEEDED FOR HEARTBURN Unknown    Ascorbic Acid (VITAMIN C PO) Take 500 mg by mouth every morning 2/29/2024    ASPIRIN LOW DOSE 81 MG EC tablet 1 TABLET ORALLY 2 TIMES DAILY (DX: PROPHYLAXIS) 2/29/2024 at hs    Bacillus Coagulans-Inulin (PROBIOTIC FORMULA) 1-250 BILLION-MG CAPS 1 CAPSULE ORALLY DAILY 2/29/2024    bisacodyl (DULCOLAX) 5 MG EC tablet 1 TABLET ORALLY DAILY AS NEEDED (MAY KEEP AT BEDSIDE) (Patient taking differently: 5 mg 2 times daily as needed) Unknown    chlorhexidine (PERIDEX) 0.12 % solution Swish and spit 15 mLs in mouth 2 times daily May self administer 2/29/2024 at hs    cholecalciferol (VITAMIN D3) 125 mcg (5000 units) capsule 1 CAPSULE ORALLY DAILY 2/29/2024 at pm    COMBIVENT RESPIMAT  MCG/ACT inhaler INHALE 1 PUFF INTO THE LUNGS  4 TIMES DAILY 2/29/2024 at pm    cycloSPORINE (RESTASIS) 0.05 % ophthalmic emulsion Place 1 drop into both eyes 2 times daily   3/1/2024 at am    demeclocycline (DECLOMYCIN) 150 MG tablet 1 TABLET ORALLY 2 TIMES DAILY 2/29/2024 at hs    fish oil-omega-3 fatty acids 1000 MG capsule Take 2 capsules (2 g) by mouth daily (Patient taking differently: Take 1 g by mouth daily) 2/29/2024 at am    fluorometholone (FML LIQUIFILM) 0.1 % ophthalmic susp Place 1 drop Into the left eye daily  2/29/2024 at am    hydrocortisone 1 % CREA cream Place rectally 2 times daily as needed for itching Unknown    IBANDRONATE SODIUM PO Take 150 mg by mouth every 30 days 2/4/2024    levothyroxine (SYNTHROID/LEVOTHROID) 88 MCG tablet 1 TABLET ORALLY EVERY MORNING ON AN EMPTY STOMACH 2/29/2024    loratadine (CLARITIN) 10 MG tablet 1 TABLET ORALLY DAILY (DX: ASTHMA) 2/29/2024    melatonin 3 MG tablet 1 TABLET ORALLY AT BEDTIME ALONG WITH 5 MG FOR A TOTAL DOSE OF 8MG 2/29/2024    melatonin 5 MG tablet 1 TABLET ORALLY AT BEDTIME  ALONG WITH 3MG FOR A TOTAL DOSE OF 8MG 2/29/2024    metoprolol tartrate (LOPRESSOR) 25 MG tablet 1 TABLET ORALLY 2 TIMES DAILY (DX: HYPERTENSION) 2/29/2024 at hs    mirtazapine (REMERON) 7.5 MG tablet 1 TABLET ORALLY AT BEDTIME 2/29/2024    Multiple Vitamins-Minerals (PRESERVISION AREDS 2) CAPS 1 CAPSULE ORALLY 2 TIMES DAILY 2/29/2024 at pm    omeprazole (PRILOSEC) 20 MG DR capsule 1 CAPSULE ORALLY 2 TIMES DAILY (DX: GASTROESOPHAGEAL REFLUX DISEASE) 2/29/2024 at pm    polyethylene glycol 0.4%- propylene glycol 0.3% (SYSTANE ULTRA) 0.4-0.3 % SOLN ophthalmic solution Place 1 drop into both eyes 4 times daily 2/29/2024 at hs    Polyethylene Glycol 3350 (PEG 3350) 17 GM/SCOOP POWD MIX 1 CAPFUL (17 GMS) IN 8 OUNCES WATER AND DRINK ORALLY DAILY (DX: CONSTIPATION) 2/29/2024 at am    Probiotic Product (PROBIOTIC PO) Take 1 capsule by mouth every morning 2/29/2024 at am    SENEXON-S 8.6-50 MG tablet 1 TABLET ORALLY 2 TIMES DAILY (DX: CONSTIPATION) 2/29/2024 at hs    sodium chloride 1 GM tablet Take 1 tablet (1 g) by mouth daily 2/29/2024 at am     spironolactone (ALDACTONE) 25 MG tablet 1 TABLET ORALLY DAILY (DX: EDEMA) ** HAZARDOUS MED: WEAR DOUBLE NITRILE GLOVES** 2/29/2024 at am    timolol (TIMOPTIC) 0.5 % ophthalmic solution Place 1 drop Into the left eye daily  2/29/2024 at am    torsemide (DEMADEX) 20 MG tablet 1.5 TABLET (30mg) ORALLY DAILY 2/29/2024 at am    vitamin C (ASCORBIC ACID) 500 MG tablet 1 TABLET ORALLY DAILY (DX: SUPPLEMENT) 2/29/2024 at am        No pallor, no cervical/supraclavicular/inguinal adenopathy.  No splenomegaly

## 2024-03-01 NOTE — ED NOTES
Respiratory Care    Pt has a CPAP at home and uses it regularly. Family might be able to bring it in. Will follow up later this evening.      Shamar Moore, RT

## 2024-03-01 NOTE — ED TRIAGE NOTES
Triage Assessment (Adult)       Row Name 03/01/24 0840          Triage Assessment    Airway WDL WDL        Respiratory WDL    Respiratory WDL WDL        Skin Circulation/Temperature WDL    Skin Circulation/Temperature WDL WDL        Cardiac WDL    Cardiac WDL WDL        Cognitive/Neuro/Behavioral WDL    Cognitive/Neuro/Behavioral WDL X     Level of Consciousness confused     Orientation disoriented x 4     Speech garbled     Mood/Behavior calm        Pupils (CN II)    Pupil PERRLA no     Pupil Size Left 4 mm     Pupil Shape Left round     Pupil Reaction Left fixed     Pupil Size Right 2 mm     Pupil Shape Right round     Pupil Reaction Right brisk     Pupil Accommodation Right normal response        Jarrod Coma Scale    Best Eye Response 4-->(E4) spontaneous     Best Motor Response 5-->(M5) localizes pain     Best Verbal Response 4-->(V4) confused     [place] Coma Scale Score 13

## 2024-03-01 NOTE — PROGRESS NOTES
Neuro IR Consult Note      I was paged about Ms. Marla Dunn 89 yo F with CV risk factors presenting with acute onset RMCA syndrome with CTA showing right superior division right M2/M3. Patient's baseline mRS is 4 and as such the occlusion is distal for a safe thrombectomy. After discussion with stroke team regarding overall risks and benefits  - patient is not a candidate for thrombectomy.     Case discussed with Dr. Dodson.     Zay Nolasco MD, MPH  Neuroendovascular Surgery Fellow  [facility]  Pager: [phone]

## 2024-03-02 ENCOUNTER — APPOINTMENT (OUTPATIENT)
Dept: CT IMAGING | Facility: HOSPITAL | Age: 89
DRG: 062 | End: 2024-03-02
Attending: NURSE PRACTITIONER
Payer: COMMERCIAL

## 2024-03-02 LAB
ALBUMIN SERPL BCG-MCNC: 3.3 G/DL (ref 3.5–5.2)
ALP SERPL-CCNC: 101 U/L (ref 40–150)
ALT SERPL W P-5'-P-CCNC: 19 U/L (ref 0–50)
ANION GAP SERPL CALCULATED.3IONS-SCNC: 13 MMOL/L (ref 7–15)
AST SERPL W P-5'-P-CCNC: 24 U/L (ref 0–45)
ATRIAL RATE - MUSE: 312 BPM
BILIRUB SERPL-MCNC: 0.4 MG/DL
BUN SERPL-MCNC: 25.6 MG/DL (ref 8–23)
CALCIUM SERPL-MCNC: 8.5 MG/DL (ref 8.2–9.6)
CHLORIDE SERPL-SCNC: 109 MMOL/L (ref 98–107)
CREAT SERPL-MCNC: 0.84 MG/DL (ref 0.51–0.95)
DEPRECATED HCO3 PLAS-SCNC: 22 MMOL/L (ref 22–29)
DIASTOLIC BLOOD PRESSURE - MUSE: 62 MMHG
EGFRCR SERPLBLD CKD-EPI 2021: 64 ML/MIN/1.73M2
ERYTHROCYTE [DISTWIDTH] IN BLOOD BY AUTOMATED COUNT: 18.5 % (ref 10–15)
GLUCOSE SERPL-MCNC: 85 MG/DL (ref 70–99)
HCT VFR BLD AUTO: 33.5 % (ref 35–47)
HGB BLD-MCNC: 10.4 G/DL (ref 11.7–15.7)
INTERPRETATION ECG - MUSE: NORMAL
MCH RBC QN AUTO: 31.2 PG (ref 26.5–33)
MCHC RBC AUTO-ENTMCNC: 31 G/DL (ref 31.5–36.5)
MCV RBC AUTO: 101 FL (ref 78–100)
P AXIS - MUSE: NORMAL DEGREES
PLATELET # BLD AUTO: 231 10E3/UL (ref 150–450)
POTASSIUM SERPL-SCNC: 3.5 MMOL/L (ref 3.4–5.3)
PR INTERVAL - MUSE: NORMAL MS
PROT SERPL-MCNC: 6.5 G/DL (ref 6.4–8.3)
QRS DURATION - MUSE: 112 MS
QT - MUSE: 386 MS
QTC - MUSE: 472 MS
R AXIS - MUSE: -49 DEGREES
RBC # BLD AUTO: 3.33 10E6/UL (ref 3.8–5.2)
SODIUM SERPL-SCNC: 143 MMOL/L (ref 135–145)
SODIUM SERPL-SCNC: 144 MMOL/L (ref 135–145)
SODIUM SERPL-SCNC: 145 MMOL/L (ref 135–145)
SYSTOLIC BLOOD PRESSURE - MUSE: 140 MMHG
T AXIS - MUSE: 90 DEGREES
VENTRICULAR RATE- MUSE: 90 BPM
WBC # BLD AUTO: 7 10E3/UL (ref 4–11)

## 2024-03-02 PROCEDURE — 99223 1ST HOSP IP/OBS HIGH 75: CPT | Performed by: PSYCHIATRY & NEUROLOGY

## 2024-03-02 PROCEDURE — 258N000002 HC RX IP 258 OP 250: Performed by: STUDENT IN AN ORGANIZED HEALTH CARE EDUCATION/TRAINING PROGRAM

## 2024-03-02 PROCEDURE — 258N000003 HC RX IP 258 OP 636: Performed by: STUDENT IN AN ORGANIZED HEALTH CARE EDUCATION/TRAINING PROGRAM

## 2024-03-02 PROCEDURE — 80053 COMPREHEN METABOLIC PANEL: CPT | Performed by: STUDENT IN AN ORGANIZED HEALTH CARE EDUCATION/TRAINING PROGRAM

## 2024-03-02 PROCEDURE — 999N000157 HC STATISTIC RCP TIME EA 10 MIN

## 2024-03-02 PROCEDURE — 70450 CT HEAD/BRAIN W/O DYE: CPT

## 2024-03-02 PROCEDURE — 36415 COLL VENOUS BLD VENIPUNCTURE: CPT | Performed by: STUDENT IN AN ORGANIZED HEALTH CARE EDUCATION/TRAINING PROGRAM

## 2024-03-02 PROCEDURE — 84295 ASSAY OF SERUM SODIUM: CPT | Performed by: STUDENT IN AN ORGANIZED HEALTH CARE EDUCATION/TRAINING PROGRAM

## 2024-03-02 PROCEDURE — 120N000001 HC R&B MED SURG/OB

## 2024-03-02 PROCEDURE — 250N000013 HC RX MED GY IP 250 OP 250 PS 637: Performed by: STUDENT IN AN ORGANIZED HEALTH CARE EDUCATION/TRAINING PROGRAM

## 2024-03-02 PROCEDURE — 99233 SBSQ HOSP IP/OBS HIGH 50: CPT | Performed by: STUDENT IN AN ORGANIZED HEALTH CARE EDUCATION/TRAINING PROGRAM

## 2024-03-02 PROCEDURE — 85027 COMPLETE CBC AUTOMATED: CPT | Performed by: STUDENT IN AN ORGANIZED HEALTH CARE EDUCATION/TRAINING PROGRAM

## 2024-03-02 RX ORDER — SODIUM CHLORIDE 450 MG/100ML
INJECTION, SOLUTION INTRAVENOUS CONTINUOUS
Status: DISCONTINUED | OUTPATIENT
Start: 2024-03-02 | End: 2024-03-04

## 2024-03-02 RX ORDER — ONDANSETRON 2 MG/ML
4 INJECTION INTRAMUSCULAR; INTRAVENOUS EVERY 6 HOURS PRN
Status: DISCONTINUED | OUTPATIENT
Start: 2024-03-02 | End: 2024-03-08 | Stop reason: HOSPADM

## 2024-03-02 RX ADMIN — POLYPROPYLENE GLYCOL 400, PROPYLENE GLYCOL 1 DROP: .4; .3 LIQUID OPHTHALMIC at 08:47

## 2024-03-02 RX ADMIN — METOPROLOL TARTRATE 25 MG: 25 TABLET, FILM COATED ORAL at 19:37

## 2024-03-02 RX ADMIN — MIRTAZAPINE 7.5 MG: 7.5 TABLET ORAL at 19:45

## 2024-03-02 RX ADMIN — TIMOLOL MALEATE 1 DROP: 5 SOLUTION/ DROPS OPHTHALMIC at 08:47

## 2024-03-02 RX ADMIN — ALBUTEROL SULFATE 2 PUFF: 90 AEROSOL, METERED RESPIRATORY (INHALATION) at 16:23

## 2024-03-02 RX ADMIN — METOPROLOL TARTRATE 25 MG: 25 TABLET, FILM COATED ORAL at 08:46

## 2024-03-02 RX ADMIN — LEVOTHYROXINE SODIUM 88 MCG: 88 TABLET ORAL at 07:16

## 2024-03-02 RX ADMIN — FLUOROMETHOLONE 1 DROP: 1 SOLUTION/ DROPS OPHTHALMIC at 08:54

## 2024-03-02 RX ADMIN — CYCLOSPORINE 1 DROP: 0.5 EMULSION OPHTHALMIC at 20:50

## 2024-03-02 RX ADMIN — DEMECLOCYCLINE 150 MG: 150 TABLET ORAL at 08:45

## 2024-03-02 RX ADMIN — SODIUM CHLORIDE: 9 INJECTION, SOLUTION INTRAVENOUS at 07:26

## 2024-03-02 RX ADMIN — POLYPROPYLENE GLYCOL 400, PROPYLENE GLYCOL 1 DROP: .4; .3 LIQUID OPHTHALMIC at 19:39

## 2024-03-02 RX ADMIN — ALBUTEROL SULFATE 2 PUFF: 90 AEROSOL, METERED RESPIRATORY (INHALATION) at 08:51

## 2024-03-02 RX ADMIN — POLYPROPYLENE GLYCOL 400, PROPYLENE GLYCOL 1 DROP: .4; .3 LIQUID OPHTHALMIC at 16:11

## 2024-03-02 RX ADMIN — CYCLOSPORINE 1 DROP: 0.5 EMULSION OPHTHALMIC at 08:46

## 2024-03-02 RX ADMIN — PANTOPRAZOLE SODIUM 40 MG: 40 TABLET, DELAYED RELEASE ORAL at 16:23

## 2024-03-02 RX ADMIN — SODIUM CHLORIDE: 4.5 INJECTION, SOLUTION INTRAVENOUS at 08:55

## 2024-03-02 RX ADMIN — UMECLIDINIUM BROMIDE AND VILANTEROL TRIFENATATE 1 PUFF: 62.5; 25 POWDER RESPIRATORY (INHALATION) at 08:52

## 2024-03-02 RX ADMIN — PANTOPRAZOLE SODIUM 40 MG: 40 TABLET, DELAYED RELEASE ORAL at 07:16

## 2024-03-02 ASSESSMENT — ACTIVITIES OF DAILY LIVING (ADL)
ADLS_ACUITY_SCORE: 54
DEPENDENT_IADLS:: COOKING;LAUNDRY;MEAL PREPARATION;MEDICATION MANAGEMENT;TRANSPORTATION
ADLS_ACUITY_SCORE: 54

## 2024-03-02 NOTE — PROGRESS NOTES
"Patient uses CPAP at home. Didn't bring it in. Writer attempted to set-up patient on hosp unit. However, patient is under stroke monitoring/frequent assessments. Will initiate CPAP tonight when off stroke protocol.     /59   Pulse 83   Temp 97.6  F (36.4  C) (Oral)   Resp 19   Ht 1.6 m (5' 3\")   Wt 51.1 kg (112 lb 10.5 oz)   SpO2 97%   BMI 19.96 kg/m       Antolin Pang, RRT  "

## 2024-03-02 NOTE — PHARMACY-CONSULT NOTE
Pharmacy Consult to evaluate for medication related stroke core measures    Marla Rasmussensheila, 89 year old female admitted for CVA on 3/1/2024.    Thrombolytic was given on 3/1 at 0919.    VTE Prophylaxis  plan to start in 1-2 days pending repeat scan    Antithrombotic: plan to start 1-2 days pending repeat scan. Continue antithrombotic therapy on discharge to meet quality measures, unless contraindicated.    Anticoagulation if history of A-fib/flutter: consider cardiology consult    LDL Cholesterol Calculated   Date Value Ref Range Status   03/01/2024 81 <=100 mg/dL Final       Start atorvastatin 40mg daily prior to discharge when swallow evaluation allows    Recommendations: None at this time    Thank you for the consult.    Angelina Hernandez RPH 3/2/2024 1:26 PM

## 2024-03-02 NOTE — PROGRESS NOTES
Report called to [facility] on P1. NIHSS 3. Pt alert with mild dysarthria. Spoke with Speech therapy who stated they were unable to see patient as planned but ok to continue giving meds in applesauce and 1 ice chip at a time. [facility] [employer] called and updated on patient transfer as well as plan of care.  Patient transferred via bed to P1 with NA.

## 2024-03-02 NOTE — CONSULTS
NEUROLOGY CONSULTATION NOTE     Marla Dunn,  1929, MRN [id number] Date: 3/2/2024     [facility]   Code status:  No CPR- Do NOT Intubate   PCP: Gabrielle Wallace, [phone]      ASSESSMENT & PLAN   Diagnosis code: Ischemic stroke    Ischemic stroke  History of coronary artery disease/MI  History of hypertension/hyperlipidemia  Atrial fibrillation    MRI brain shows hyperacute infarction in the right precentral and postcentral gyri and underlying white matter  CTA negative for any significant large vessel occlusion/stenosis  Echocardiogram shows normal ejection fraction with significant wall motion abnormality and moderate to severe mitral stenosis  Patient was found to be in A-fib in the emergency room  Lipid panel shows LDL 81.  40 mg of Lipitor  Status post tenecteplase.  Will repeat scan today.  Pending.  Continue aspirin and statin anticoagulation in the next 1 to 2 days (assuming stable scan)  Consider cardiology consultation for A-fib management  Slow reduction in blood pressure  PT/OT     Chief Complaint   Patient presents with    Stroke        HISTORY OF PRESENT ILLNESS     We have been requested by Dr. Lizarraga to evaluate Marla Dunn who is a 89 year old  female for stroke.  Is a 89-year-old female with past medical history of coronary artery disease, previous MI, CHF, hypertension, hyperlipidemia, rheumatoid arthritis, Sjogren's syndrome and chronic kidney disease admitted for left-sided weakness and some garbled speech along with a left facial droop and left-sided neglect.  No gaze deviation.  She was last normal at 1730.  She was found all dressed up sitting in the wheelchair only using her right hand.  Patient reported that at 7 AM this morning she was normal.  She was given tenecteplase which she reports has helped with her symptoms.  Reports no ongoing weakness at this point.  Does take aspirin at baseline.  No anticoagulation.     PAST MEDICAL & SURGICAL HISTORY      Medical History  Past Medical History:   Diagnosis Date    Asthma     Bilateral carpal tunnel syndrome 08/27/2015    CAD (coronary artery disease)     Chronic airway obstruction, not elsewhere classified     Created by Conversion     Chronic anemia     Chronic edema     Congestive heart failure, severe (H) 05/13/2020    COPD (chronic obstructive pulmonary disease) (H)     Coronary artery disease     Depression     GERD (gastroesophageal reflux disease)     Heart disease     Heart murmur     High cholesterol     dislipidemia    HTN (hypertension)     Hypercholesterolemia     Hypertension     Hypertensive emergency 08/13/2021    Hypothyroidism     Hypothyroidism     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    MI (myocardial infarction) (H) 1985    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    Myelodysplastic syndrome (H)     Myocardial infarction (H) 1983    OLEGARIO (obstructive sleep apnea)     Osteoporosis     Other and unspecified hyperlipidemia     Created by Conversion     Pyelonephritis 05/11/2016    Radicular low back pain     Rheumatoid arthritis (H)     Elizabeth Agers syndrome     Sjoegren syndrome     Sjogren's syndrome (H24)     Sleep apnea, obstructive     Spinal stenosis     Squamous cell carcinoma, tongue border (H)     Unspecified polyarthropathy or polyarthritis, hand     Created by Conversion      Surgical History  Past Surgical History:   Procedure Laterality Date    aortic valve      AORTIC VALVE REPLACEMENT  01/01/2012    APPENDECTOMY      APPENDECTOMY      AS TOTAL KNEE ARTHROPLASTY Right     BACK SURGERY  2004    spinal decompression    BACK SURGERY      spinal stenosis    BONE MARROW BIOPSY  2004    breast biopsy      BREAST SURGERY  2001    biopsy    BYPASS GRAFT ARTERY CORONARY  01/01/2009    LIMA to ramus intermedius    cardiac angiogram      CARDIAC CATHETERIZATION      CARDIAC SURGERY      EYE SURGERY  2008    bilateral cataract repair     EYE SURGERY Bilateral     cornea transplant  left eye & cataracts    KYPHOPLASTY  2003    T12    OPEN REDUCTION INTERNAL FIXATION HIP NAILING Right 08/13/2021    Procedure: INTERNAL FIXATION, FRACTURE, TROCHANTERIC, HIP, USING PINS OR RODS;  Surgeon: Darrel Castro MD;  Location: [facility] OR    OTHER SURGICAL HISTORY  01/01/2018    Rectosigmoidectomy perineal    PARTIAL GLOSSECTOMY Right 09/2023    [facility]    RECTOSIGMOIDECTOMY PERINEAL N/A 01/10/2018    Procedure: RECTOSIGMOIDECTOMY PERINEAL;  PERINEAL RECTOSIGMOIDECTOMY ;  Surgeon: Candelaria Anthony MD;  Location: [facility] OR    REPAIR VALVE AORTIC  2009    THORACIC SURGERY  2003    T12 kyphoplasty    ZZC CABG, ARTERY-VEIN, FOUR      ZZC CABG, VEIN, SINGLE      Description: CABG (CABG);  Recorded: 03/09/2012;  Comments: Single vessel bypass graft to an obtuse marginal branch in May 2009    ZZC REPLACE AORT VALV,PROSTH VALV      Description: Aortic Valve Replacement;  Recorded: 03/09/2012;  Comments: Aortic valve replacement        SOCIAL HISTORY     Social History     Tobacco Use    Smoking status: Never    Smokeless tobacco: Never   Substance Use Topics    Alcohol use: No     Alcohol/week: 0.0 standard drinks of alcohol     Comment: Alcoholic Drinks/day: occasional glass of wine    Drug use: No        FAMILY HISTORY     Reviewed, and family history includes Cancer in her brother, mother, and sister; Family History Negative in her mother; Heart Disease in her father.     ALLERGIES     Allergies   Allergen Reactions    Gramineae Pollens Other (See Comments)     ORCHARDGRASS. Shortness of breath when lawn is just cut.    Smoke. Other (See Comments)     Hard to breathe.    Pollen Extract      Other reaction(s): Unknown        REVIEW OF SYSTEMS     12 system ROS was done within the HPI this was negative.  Pertinent positives noted on the HPI.     HOME & HOSPITAL MEDICATIONS     Prior to Admission Medications  Medications Prior to Admission   Medication Sig Dispense Refill Last Dose     acetaminophen (TYLENOL) 325 MG tablet Take 3 tablets (975 mg) by mouth every 8 hours as needed for mild pain   More than a month    albuterol (PROAIR HFA/PROVENTIL HFA/VENTOLIN HFA) 108 (90 Base) MCG/ACT inhaler Inhale 2 puffs into the lungs 4 times daily   2/29/2024 at hs    amoxicillin (AMOXIL) 500 MG capsule 4 CAPSULES (2000MG ) ORALLY AS NEEDED ONE HOUR PRIOR TO DENTAL APPOINTMENT 4 capsule 5 Unknown    ANTACID REGULAR STRENGTH 500 MG chewable tablet 2 TABLETS (1000MG) ORALLY 2 TIMES DAILY AS NEEDED FOR HEARTBURN 60 tablet 11 Unknown    Ascorbic Acid (VITAMIN C PO) Take 500 mg by mouth every morning   2/29/2024    ASPIRIN LOW DOSE 81 MG EC tablet 1 TABLET ORALLY 2 TIMES DAILY (DX: PROPHYLAXIS) 180 tablet 3 2/29/2024 at hs    Bacillus Coagulans-Inulin (PROBIOTIC FORMULA) 1-250 BILLION-MG CAPS 1 CAPSULE ORALLY DAILY 31 capsule 11 2/29/2024    bisacodyl (DULCOLAX) 5 MG EC tablet 1 TABLET ORALLY DAILY AS NEEDED (MAY KEEP AT BEDSIDE) (Patient taking differently: 5 mg 2 times daily as needed) 30 tablet 10 Unknown    chlorhexidine (PERIDEX) 0.12 % solution Swish and spit 15 mLs in mouth 2 times daily May self administer 118 mL 3 2/29/2024 at hs    cholecalciferol (VITAMIN D3) 125 mcg (5000 units) capsule 1 CAPSULE ORALLY DAILY 90 capsule 3 2/29/2024 at pm    COMBIVENT RESPIMAT  MCG/ACT inhaler INHALE 1 PUFF INTO THE LUNGS  4 TIMES DAILY 4 g 11 2/29/2024 at pm    cycloSPORINE (RESTASIS) 0.05 % ophthalmic emulsion Place 1 drop into both eyes 2 times daily    3/1/2024 at am    demeclocycline (DECLOMYCIN) 150 MG tablet 1 TABLET ORALLY 2 TIMES DAILY 60 tablet 11 2/29/2024 at hs    fish oil-omega-3 fatty acids 1000 MG capsule Take 2 capsules (2 g) by mouth daily (Patient taking differently: Take 1 g by mouth daily) 28 capsule 11 2/29/2024 at am    fluorometholone (FML LIQUIFILM) 0.1 % ophthalmic susp Place 1 drop Into the left eye daily    2/29/2024 at am    hydrocortisone 1 % CREA cream Place rectally 2 times daily as  needed for itching   Unknown    IBANDRONATE SODIUM PO Take 150 mg by mouth every 30 days   2/4/2024    levothyroxine (SYNTHROID/LEVOTHROID) 88 MCG tablet 1 TABLET ORALLY EVERY MORNING ON AN EMPTY STOMACH 31 tablet 11 2/29/2024    loratadine (CLARITIN) 10 MG tablet 1 TABLET ORALLY DAILY (DX: ASTHMA) 28 tablet 11 2/29/2024    melatonin 3 MG tablet 1 TABLET ORALLY AT BEDTIME ALONG WITH 5 MG FOR A TOTAL DOSE OF 8MG 30 tablet 11 2/29/2024    melatonin 5 MG tablet 1 TABLET ORALLY AT BEDTIME  ALONG WITH 3MG FOR A TOTAL DOSE OF 8MG 30 tablet 11 2/29/2024    metoprolol tartrate (LOPRESSOR) 25 MG tablet 1 TABLET ORALLY 2 TIMES DAILY (DX: HYPERTENSION) 56 tablet 11 2/29/2024 at hs    mirtazapine (REMERON) 7.5 MG tablet 1 TABLET ORALLY AT BEDTIME 31 tablet 11 2/29/2024    Multiple Vitamins-Minerals (PRESERVISION AREDS 2) CAPS 1 CAPSULE ORALLY 2 TIMES DAILY 62 capsule 11 2/29/2024 at pm    omeprazole (PRILOSEC) 20 MG DR capsule 1 CAPSULE ORALLY 2 TIMES DAILY (DX: GASTROESOPHAGEAL REFLUX DISEASE) 62 capsule 11 2/29/2024 at pm    polyethylene glycol 0.4%- propylene glycol 0.3% (SYSTANE ULTRA) 0.4-0.3 % SOLN ophthalmic solution Place 1 drop into both eyes 4 times daily   2/29/2024 at hs    Polyethylene Glycol 3350 (PEG 3350) 17 GM/SCOOP POWD MIX 1 CAPFUL (17 GMS) IN 8 OUNCES WATER AND DRINK ORALLY DAILY (DX: CONSTIPATION) 510 g 11 2/29/2024 at am    Probiotic Product (PROBIOTIC PO) Take 1 capsule by mouth every morning   2/29/2024 at am    SENEXON-S 8.6-50 MG tablet 1 TABLET ORALLY 2 TIMES DAILY (DX: CONSTIPATION) 56 tablet 11 2/29/2024 at hs    sodium chloride 1 GM tablet Take 1 tablet (1 g) by mouth daily 30 tablet 11 2/29/2024 at am    spironolactone (ALDACTONE) 25 MG tablet 1 TABLET ORALLY DAILY (DX: EDEMA) ** HAZARDOUS MED: WEAR DOUBLE NITRILE GLOVES** 28 tablet 11 2/29/2024 at am    timolol (TIMOPTIC) 0.5 % ophthalmic solution Place 1 drop Into the left eye daily    2/29/2024 at am    torsemide (DEMADEX) 20 MG tablet 1.5  "TABLET (30mg) ORALLY DAILY 31 tablet 11 2/29/2024 at am    vitamin C (ASCORBIC ACID) 500 MG tablet 1 TABLET ORALLY DAILY (DX: SUPPLEMENT) 31 tablet 11 2/29/2024 at am    OXYGEN-HELIUM IN           Hospital Medications   albuterol  2 puff Inhalation 4x Daily    cycloSPORINE  1 drop Both Eyes BID    demeclocycline  150 mg Oral Q12H SHONDA (08/20)    fluorometholone  1 drop Left Eye Daily    levothyroxine  88 mcg Oral QAM AC    metoprolol tartrate  25 mg Oral BID    mirtazapine  7.5 mg Oral At Bedtime    pantoprazole  40 mg Oral BID AC    polyethylene glycol-propylene glycol  1 drop Both Eyes 4x Daily    [Held by provider] sodium chloride  1 g Oral Daily    timolol maleate  1 drop Left Eye Daily    umeclidinium-vilanterol  1 puff Inhalation Daily        PHYSICAL EXAM     Vital signs  Temp:  [97.6  F (36.4  C)-97.9  F (36.6  C)] 97.9  F (36.6  C)  Pulse:  [] 81  Resp:  [17-35] 24  BP: (114-161)/() 144/69  SpO2:  [89 %-100 %] 99 %    PHYSICAL EXAMINATION  VITALS: BP (!) 144/69   Pulse 81   Temp 97.9  F (36.6  C) (Oral)   Resp 24   Ht 1.6 m (5' 3\")   Wt 42.3 kg (93 lb 4.1 oz)   SpO2 99%   BMI 16.52 kg/m    GENERAL -Health appearing, No apparent distress  EYES- No scleral icterus, no eyelid droop, Pupils - see Neuro section  HEENT - Normocephalic, atraumatic, Hearing grossly limited; Oral mucosa moist and pink in color. External Ears and nose intact.   Neck - supple   PULM - Good spontaneous respiratory effort  CV- Pedal pulses present with no peripheral edema/ No significant varicosities.  MSK- Gait - see Neuro section; Strength and tone- see Neuro section; Range of motion grossly intact.  PSYCH -cooperative    Neurological  Mental status - Patient is awake and partially oriented to self, place and time. Attention span is normal. Language is fluent and follows commands appropriately.   Cranial nerves - CN II-XII intact. Pupils are reactive and symmetric; EOMI, VFIT, NLF symmetric  Motor - There is no " pronator drift. Motor exam shows 5/5 strength in all extremities.  Tone - Tone is symmetric bilaterally in upper and lower extremities.  Reflexes - Reflexes are symmetric and decreased.  Sensation - Sensory exam is grossly intact to light touch, pain.  Coordination - Finger to nose and heel to shin is without dysmetria except left upper extremity dysmetria.  Gait and station --unable to safely ambulate.  Formal gait testing cannot be done due to safety concerns from ongoing medical issues.       DIAGNOSTIC STUDIES     Pertinent Radiology   MRI  IMPRESSION:  1.  Hyperacute infarction involving the right precentral and post central gyri and underlying white matter.  2.  Generalized brain atrophy and presumed microvascular ischemic changes as detailed above.    HEAD CT:  1.  No finding for intracranial hemorrhage or mass or convincing finding for acute infarct.     2.  Moderate volume loss and presumed sequela of chronic microvascular ischemic change.     3.  New chronic infarct inferior lateral aspect of the left cerebellar hemisphere.     HEAD CTA:   1.  There is occlusion of a distal M2 superior division segment of the right middle cerebral artery without flow seen more distally within the vessel.     2.  Coarse atherosclerotic calcifications both carotid siphons and proximal intradural vertebral arteries with mild associated luminal narrowing but no clear flow-limiting stenosis.     NECK CTA:  1.  No significant stenosis either cervical carotid system.     2.  Mild atherosclerotic narrowing of the takeoff of both vertebral arteries.     CT PERFUSION:  1.  There is elevated Tmax, MTT, and decreased rCBF along the lateral and posterior aspect of the right frontal lobe compatible with ischemic penumbra.     2.  Area of Tmax >6 seconds measures 11 mL with volume of CBF <30% measuring 0 mL.    ECHO  1. Normal left ventricular size and systolic performance with a visually  estimated ejection fraction of 65%.  2. There is  severe basal inferior hypokinesis and moderate basal posterior  hypokinesis  3. There is mild concentric increase in left ventricular wall thickness.  4. 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  Â  Normal aortic valve prosthesis metrics with a mean systolic gradient of 19  mmHg and a peak anterograde velocity of 2.8 m/sec.  Â  No aortic insufficiency is detected.  5. There is moderate-severe calcific mitral stenosis.  6. Normal right ventricular size with mildly reduced right ventricular  systolic performance.  7. There is severe left atrial enlargement. There is moderate right atrial  enlargement.     When compared to the prior real-time echocardiogram dated 6 September 2022,  the degree of mitral stenosis has increased from moderate to now moderate-  severe. The findings are otherwise felt to be fairly similar on both  examinations. The aforementioned regional wall motion abnormalities are not  new and were identified on the prior study as well.    LABS  Component      Latest Ref Rng 3/1/2024  8:41 AM   Cholesterol      <200 mg/dL 137    Triglycerides      <150 mg/dL 72    HDL Cholesterol      >=50 mg/dL 42 (L)    LDL Cholesterol Calculated      <=100 mg/dL 81    Non HDL Cholesterol      <130 mg/dL 95    Hemoglobin A1C      <5.7 % 5.3       Legend:  (L) Low      Recent Results (from the past 24 hour(s))   Respiratory Panel PCR    Collection Time: 03/01/24  1:06 PM    Specimen: Nasopharyngeal; Swab   Result Value Ref Range    Adenovirus Not Detected Not Detected    Coronavirus Not Detected Not Detected    Human Metapneumovirus Not Detected Not Detected    Human Rhin/Enterovirus Not Detected Not Detected    Influenza A Not Detected Not Detected    Influenza A, H1 Not Detected Not Detected    Influenza A 2009 H1N1 Not Detected Not Detected    Influenza A, H3 Not Detected Not Detected    Influenza B Not Detected Not Detected    Parainfluenza Virus 1 Not Detected Not Detected    Parainfluenza Virus 2 Not Detected Not  Detected    Parainfluenza Virus 3 Not Detected Not Detected    Parainfluenza Virus 4 Not Detected Not Detected    Respiratory Syncytial Virus A Not Detected Not Detected    Respiratory Syncytial Virus B Not Detected Not Detected    Chlamydia Pneumoniae Not Detected Not Detected    Mycoplasma Pneumoniae Not Detected Not Detected   Glucose by meter    Collection Time: 03/01/24  4:35 PM   Result Value Ref Range    GLUCOSE BY METER POCT 89 70 - 99 mg/dL   Comprehensive metabolic panel    Collection Time: 03/02/24  4:06 AM   Result Value Ref Range    Sodium 144 135 - 145 mmol/L    Potassium 3.5 3.4 - 5.3 mmol/L    Carbon Dioxide (CO2) 22 22 - 29 mmol/L    Anion Gap 13 7 - 15 mmol/L    Urea Nitrogen 25.6 (H) 8.0 - 23.0 mg/dL    Creatinine 0.84 0.51 - 0.95 mg/dL    GFR Estimate 64 >60 mL/min/1.73m2    Calcium 8.5 8.2 - 9.6 mg/dL    Chloride 109 (H) 98 - 107 mmol/L    Glucose 85 70 - 99 mg/dL    Alkaline Phosphatase 101 40 - 150 U/L    AST 24 0 - 45 U/L    ALT 19 0 - 50 U/L    Protein Total 6.5 6.4 - 8.3 g/dL    Albumin 3.3 (L) 3.5 - 5.2 g/dL    Bilirubin Total 0.4 <=1.2 mg/dL   CBC with platelets    Collection Time: 03/02/24  4:06 AM   Result Value Ref Range    WBC Count 7.0 4.0 - 11.0 10e3/uL    RBC Count 3.33 (L) 3.80 - 5.20 10e6/uL    Hemoglobin 10.4 (L) 11.7 - 15.7 g/dL    Hematocrit 33.5 (L) 35.0 - 47.0 %     (H) 78 - 100 fL    MCH 31.2 26.5 - 33.0 pg    MCHC 31.0 (L) 31.5 - 36.5 g/dL    RDW 18.5 (H) 10.0 - 15.0 %    Platelet Count 231 150 - 450 10e3/uL       Total time spent for face to face visit, reviewing labs/imaging studies, counseling and coordination of care was: Over 80 min More than 50% of this time was spent on counseling and coordination of care.    Counseling patient.  Imaging reviewed.  Reviewing chart.  High risk.  Reviewing imaging studies.    Chace Griffiths MD  Neurologist  [facility] [place] [facility]  Tel:- [phone]

## 2024-03-02 NOTE — PROGRESS NOTES
SPEECH PATHOLOGY:  Progress Note   03/02/24 1630   Appointment Info   Signing Clinician's Name / Credentials (SLP) Arnel Candelaria MA [facility] SLP   Appointment Canceled Reason (SLP) Other (see Cancel Comments row)   Appointment Cancel Comments (SLP) Managed remotely with no coverage currently on-site. Call received from nursing re: f/u for possible diet initiation following clinical swallow evaluation yesterday. Per chart review, patient presented with severe oropharyngeal dysphagia with recommendations for NPO except critical meds. Patient has involved history relavent to swallowing beyond current admission for stroke, specifically glossectomy. Reports of some improvement neurologically since yesterday. However, given severity and complexity, unable to consider aspiration risk as significantly improved at this time. Patient is eager to have something to drink, and based on evaluation yesterday, liquids appear to be highest risk. Would be appropriate to initiate ice chips, limited to single ice chip at a time, that hopefully will provide some satisfaction. May also consider all meds by mouth, with puree or mildly thick liquids via spoon. Speech will continue to follow.

## 2024-03-02 NOTE — PLAN OF CARE
[facility] - ICU    RN Progress Note:            Pertinent Assessments:      Please refer to flowsheet rows for full assessment     Pt alertx4, lung sounds diminished. NIHSS q 30 minutes until 1900, then switching to q 1 hr for 16 hrs. Left sided hand  is slightly weaker than right.    NIHSS totaling 3 for:  1 for facial palsy (left sided facial droop)  1 for mild to moderate aphagia  1 for mild to moderate dysarthria.    Telemetry reads Afib with intermittent RVR. (HR highest was 120)             Key Events - This Shift:       Received TNK at 0920  Admitted to unit at 1600.  CT head w/out contrast scheduled for 3/02 at 0930               Barriers to Discharge / Downgrade:     NIHSS q hourly, still NPO.         Point of Contact Update YES-OR-NO: Yes  If No, reason:   Name: Deneen  Phone Number:  Summary of Conversation: Discussed plan of care of patient, home meds, and what our plan for her stay will look like with any updates/          Problem: Stroke, Ischemic (Includes Transient Ischemic Attack)  Goal: Optimal Nutrition Intake  Outcome: Not Progressing     Problem: Stroke, Ischemic (Includes Transient Ischemic Attack)  Goal: Optimal Coping  Outcome: Progressing  Goal: Effective Bowel Elimination  Outcome: Progressing  Goal: Optimal Cerebral Tissue Perfusion  Outcome: Progressing  Intervention: Protect and Optimize Cerebral Perfusion  Recent Flowsheet Documentation  Taken 3/1/2024 1600 by Miranda Price, RN  Sensory Stimulation Regulation: care clustered  Goal: Optimal Cognitive Function  Outcome: Progressing  Intervention: Optimize Cognitive Function  Recent Flowsheet Documentation  Taken 3/1/2024 1600 by Miranda Price, RN  Sensory Stimulation Regulation: care clustered  Reorientation Measures:   calendar in view   glasses use encouraged   hearing device use encouraged  Goal: Improved Communication Skills  Outcome: Progressing  Intervention: Optimize Communication Skills  Recent  Flowsheet Documentation  Taken 3/1/2024 1600 by Miranda Price RN  Communication Enhancement Strategies:   call light answered in person   communication board used   device use encouraged   extra time allowed for response   family involved in communication plan   one-step directions provided   repetition utilized   written communication utilized  Goal: Optimal Functional Ability  Outcome: Progressing  Intervention: Optimize Functional Ability  Recent Flowsheet Documentation  Taken 3/1/2024 1600 by Miranda Price RN  Activity Management: bedrest  Goal: Effective Oxygenation and Ventilation  Outcome: Progressing  Intervention: Optimize Oxygenation and Ventilation  Recent Flowsheet Documentation  Taken 3/1/2024 1600 by Miranda Price RN  Head of Bed (HOB) Positioning: HOB at 20-30 degrees  Goal: Improved Sensorimotor Function  Outcome: Progressing  Intervention: Optimize Range of Motion, Motor Control and Function  Recent Flowsheet Documentation  Taken 3/1/2024 1600 by Miranda Price RN  Positioning/Transfer Devices:   pillows   in use  Goal: Safe and Effective Swallow  Outcome: Progressing  Goal: Effective Urinary Elimination  Outcome: Progressing

## 2024-03-02 NOTE — PROGRESS NOTES
[facility]    Medicine Progress Note - Hospitalist Service    Date of Admission:  3/1/2024    Assessment & Plan   Marla Dunn is a 89 year old female with PMH CAD Hx MI, CHF, HTN, HLD, RA, Sjogren's syndrome, SaraAgers syndrome, CKD, aortic stenosis, COPD, chronic hyponatremia, OLEGARIO, Atril fibrillation not on AC admitted on 3/1/2024 for acute ischemic stroke     Acute ischemic stroke -right MCA territory  Presented with left-sided weakness, facial droop, dysarthria at 7am 03/01  CT head negative for acute findings, new chronic infarct inferior lateral aspect of the left cerebellar hemisphere  CTA head: Occlusion of distal M2 segment of right MCA  CTA neck: No significant stenosis  CT perfusion: Elevated Tmax, MTT and decreased rCBF along the lateral and posterior aspect of right frontal lobe compatible with ischemic penumbra  MRI brain: Hyperacute infarction right precentral and postcentral gyri and underlying white matter  HbA1c 5.3, LDL 81  Echo shows EF 65%, severe basal inferior hypokinesis, moderate to severe mitral stenosis.  Seen by stroke neurology, S/p TNK  NPO, SLP to re-evaluate, on gentle IV hydration  Neurology consult  PT/OT/SLP  Maintain BP goal < 180/105, Nicardipine gtt prn  Will hold all anticoagulation for 24 hours post thrombolysis  Repeat Head CT 24 hours after thrombolysis done, result pending  Neurocheck q2 hr  Telemetry monitoring     - URI  Patient appears to be congested, lungs clear  Viral panel negative  Supportive care     -Elevated troponin likely 2/2 demand  Troponin 42, denies chest pain  Repeat Troponin unchanged ~ flat  EKG A fib with PVC's, no acute ischemic changes  Monitor     -Mild hyponatremia  Hypochloremia  Na increased from 131 to 144  On demeclocycline  Held salt tabs, 0.45 NS @ 60cc/hr  Monitor Na     - CKD  Monitor creatinine     - Normocytic anemia  Stable  No evidence of bleeding  Monitor Hb     - Chronic HFpEF  PTA torsemide 30 mg daily  "and spironolactone 25 mg daily - held as patient is NPO, pending SLP re-eval  On gentle IV fluids     - Atrial Fib  PTA metoprolol  Not on AC     - Hx RLE hematoma and cellulitis  Completed p.o. Keflex, patient refusing dressing to be removed  Monitor     - COPD  Not in exacerbation  PTA inhalers     - OLEGARIO  CPAP     - Hypothyroidism  PTA levothyroxine    -- Transfer out of ICU 03/02          Diet: NPO for Medical/Clinical Reasons Except for: No Exceptions    DVT Prophylaxis: Pneumatic Compression Devices  Bush Catheter: Not present  Lines: None     Cardiac Monitoring: ACTIVE order. Indication: Stroke, acute (48 hours)  Code Status: No CPR- Do NOT Intubate      Clinically Significant Risk Factors          # Hypocalcemia: Lowest Ca = 8.1 mg/dL in last 2 days, will monitor and replace as appropriate     # Hypoalbuminemia: Lowest albumin = 3.3 g/dL at 3/2/2024  4:06 AM, will monitor as appropriate     # Hypertension: Noted on problem list  # Chronic heart failure with preserved ejection fraction: heart failure noted on problem list and last echo with EF >50%       # Cachexia: Estimated body mass index is 16.52 kg/m  as calculated from the following:    Height as of this encounter: 1.6 m (5' 3\").    Weight as of this encounter: 42.3 kg (93 lb 4.1 oz)., PRESENT ON ADMISSION     # COPD: noted on problem list  # History of CABG: noted on surgical history       Disposition Plan     Expected Discharge Date: 03/03/2024                    Cherelle Gil MD  Hospitalist Service  [facility]  Securely message with [employer] (more info)  Text page via [employer] Paging/Directory   ______________________________________________________________________    Interval History   Patient was examined at the bedside today, weakness in the left side has improved compared to yesterday, also still has garbled speech but able to comprehend what she is saying.  Clinically seems to be significantly improved  Pending neurology " evaluation, PT/OT, SLP reevaluation  -Spoke with kayce Brothers as per patient request, discussed ongoing clinical course and updated and answered all questions    Physical Exam   Vital Signs: Temp: 97.9  F (36.6  C) Temp src: Oral BP: (!) 144/69 Pulse: 81   Resp: 24 SpO2: 99 % O2 Device: Oxymask Oxygen Delivery: 2 LPM  Weight: 93 lbs 4.07 oz    General Appearance: Well-appearing, well-nourished, no acute distress   Neck:  Supple  Cardio:  Regular rate, irregular rhythm  Pulm:  No respiratory distress  Extremities: Extremities atraumatic, RLE wound with dressing  Skin:  Skin warm, dry, no rashes  Neuro:  Alert, no gross sensory defects, some pupillary asymmetry L > R, Left sided facial droop, left arm and leg hemiplegia, improving. Follows commands, left-sided neglect, speech garbled, improved since yesterday    Medical Decision Making       60 MINUTES SPENT BY ME on the date of service doing chart review, history, exam, documentation & further activities per the note.      Data     I have personally reviewed the following data over the past 24 hrs:    7.0  \   10.4 (L)   / 231     144 109 (H) 25.6 (H) /  85   3.5 22 0.84 \     ALT: 19 AST: 24 AP: 101 TBILI: 0.4   ALB: 3.3 (L) TOT PROTEIN: 6.5 LIPASE: N/A     Trop: N/A BNP: N/A     TSH: N/A T4: N/A A1C: N/A       Imaging results reviewed over the past 24 hrs:   Recent Results (from the past 24 hour(s))   Echocardiogram Complete    Narrative    [id number]  [id number]  [id number]  [id number]^DIANE^[place] [address]     Name: ALF STEIN  MRN: [id number]  : 1929  Study Date: 2024 01:42 PM  Age: 89 yrs  Gender: Female  Patient Location: [facility]  Reason For Study: CVA  Ordering Physician: DENIA CONTRERAS  Performed By: SUNNY     BSA: 1.5 m2  Height: 63 in  Weight: 112 lb  HR: 86  ______________________________________________________________________________  Procedure  Complete Portable Echo  Adult.  ______________________________________________________________________________  Interpretation Summary     1. Normal left ventricular size and systolic performance with a visually  estimated ejection fraction of 65%.  2. There is severe basal inferior hypokinesis and moderate basal posterior  hypokinesis  3. There is mild concentric increase in left ventricular wall thickness.  4. 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  Â  Normal aortic valve prosthesis metrics with a mean systolic gradient of 19  mmHg and a peak anterograde velocity of 2.8 m/sec.  Â  No aortic insufficiency is detected.  5. There is moderate-severe calcific mitral stenosis.  6. Normal right ventricular size with mildly reduced right ventricular  systolic performance.  7. There is severe left atrial enlargement. There is moderate right atrial  enlargement.     When compared to the prior real-time echocardiogram dated 6 September 2022,  the degree of mitral stenosis has increased from moderate to now moderate-  severe. The findings are otherwise felt to be fairly similar on both  examinations. The aforementioned regional wall motion abnormalities are not  new and were identified on the prior study as well.  ______________________________________________________________________________  Left ventricle:  Normal left ventricular size and systolic performance with a visually  estimated ejection fraction of 65%. There is severe basal inferior hypokinesis  and moderate basal posterior hypokinesis. The remaining segments are mildly  hyperdynamic. There is mild concentric increase in left ventricular wall  thickness.     Assessment of LV Diastolic Function: The evaluation of diastolic filling is  hampered by the presence of severe mitral annular calcification.     Right ventricle:  Normal right ventricular size with mildly reduced right ventricular systolic  performance.     Left atrium:  There is severe left atrial enlargement.     Right  atrium:  There is moderate right atrial enlargement.     IVC:  The IVC is of normal caliber.     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 Normal aortic valve prosthesis metrics  with a mean systolic gradient of 19 mmHg and a peak anterograde velocity of  2.8 m/sec. The Ao Acceleration Time is 0.09 sec. No aortic insufficiency is  detected.     Mitral valve:  There is severe mitral annular calcification. There is moderate mitral annular  calcification. There is moderate-severe calcific mitral stenosis. There is  trace-mild mitral insufficiency.     Tricuspid valve:  The tricuspid valve is grossly morphologically normal. There is mild tricuspid  insufficiency.     Pulmonic valve:  The pulmonic valve is grossly morphologically normal. There is trace pulmonic  insufficiency.     Thoracic aorta:  The aortic root and proximal ascending aorta are of normal dimension.     Pericardium:  There is no significant pericardial effusion.  ______________________________________________________________________________  ______________________________________________________________________________  MMode/2D Measurements & Calculations  IVSd: 1.5 cm  LVIDd: 4.4 cm  LVIDs: 3.6 cm  LVPWd: 1.2 cm  FS: 18.6 %  LV mass(C)d: 230.3 grams  LV mass(C)dI: 152.4 grams/m2  LA dimension: 4.7 cm  asc Aorta Diam: 3.0 cm  LVOT diam: 1.9 cm  LVOT area: 2.8 cm2  Asc Ao diam index BSA (cm/m2): 2.0  Asc Ao diam index Ht(cm/m): 1.9  LA Volume (BP): 110.0 ml     LA Volume Index (BP): 72.8 ml/m2  LA Volume Indexed (AL/bp): 75.7 ml/m2  RV Base: 4.4 cm  RWT: 0.54  TAPSE: 1.5 cm     Doppler Measurements & Calculations  MV E max gutierrez: 203.0 cm/sec  MV max P.9 mmHg  MV mean P.3 mmHg  MV V2 VTI: 46.8 cm  MVA(VTI): 1.2 cm2  MV dec slope: 842.7 cm/sec2  MV dec time: 0.25 sec  Ao V2 max: 284.8 cm/sec  Ao max P.0 mmHg  Ao V2 mean: 205.0 cm/sec  Ao mean P.0 mmHg  Ao V2 VTI: 60.5 cm  NICOLETTE(I,D): 0.95 cm2  NICOLETTE(V,D): 1.1 cm2  Ao acc time:  0.09 sec  LV V1 max P.7 mmHg  LV V1 max: 108.7 cm/sec  LV V1 VTI: 20.2 cm  SV(LVOT): 57.2 ml  SI(LVOT): 37.8 ml/m2  PA acc time: 0.08 sec  PI end-d bc: 184.0 cm/sec  TR max bc: 361.0 cm/sec  TR max P.1 mmHg  AV Bc Ratio (DI): 0.38  NICOLETTE Index (cm2/m2): 0.63  E/E' av.8  Lateral E/e': 23.9  Medial E/e': 39.7     RV S Bc: 9.4 cm/sec     ______________________________________________________________________________  Report approved by: Palmer Glynn 2024 02:58 PM

## 2024-03-02 NOTE — PLAN OF CARE
Goal Outcome Evaluation:       [facility] - ICU    RN Progress Note:            Pertinent Assessments:      Please refer to flowsheet rows for full assessment     Patient NPO per Speech Therapy until re-eval today. Patient tolerated PO meds crushed in applesauce without issue. Patient had 3 BMs overnight, voided at times of BM as well as with purewick external female catheter. Patient has continued slurred speech with mild aphasia and subtle left arm weakness.Patient verbalized how frustrating it is for frequent neuro checks when she just wants to sleep. Pt educated on reasoning for this.  vss           Key Events - This Shift:       Placed on 2L oximask overnight while sleeping-wears CPAP at home overnight. Afib through the night.              Barriers to Discharge / Downgrade:     Pending Speech Therapy/PT/OT evals and Neuro clearance.

## 2024-03-03 ENCOUNTER — APPOINTMENT (OUTPATIENT)
Dept: OCCUPATIONAL THERAPY | Facility: HOSPITAL | Age: 89
DRG: 062 | End: 2024-03-03
Attending: HOSPITALIST
Payer: COMMERCIAL

## 2024-03-03 ENCOUNTER — APPOINTMENT (OUTPATIENT)
Dept: PHYSICAL THERAPY | Facility: HOSPITAL | Age: 89
DRG: 062 | End: 2024-03-03
Attending: STUDENT IN AN ORGANIZED HEALTH CARE EDUCATION/TRAINING PROGRAM
Payer: COMMERCIAL

## 2024-03-03 ENCOUNTER — APPOINTMENT (OUTPATIENT)
Dept: SPEECH THERAPY | Facility: HOSPITAL | Age: 89
DRG: 062 | End: 2024-03-03
Payer: COMMERCIAL

## 2024-03-03 LAB
ANION GAP SERPL CALCULATED.3IONS-SCNC: 12 MMOL/L (ref 7–15)
BUN SERPL-MCNC: 26.7 MG/DL (ref 8–23)
CALCIUM SERPL-MCNC: 8.5 MG/DL (ref 8.2–9.6)
CHLORIDE SERPL-SCNC: 111 MMOL/L (ref 98–107)
CREAT SERPL-MCNC: 0.9 MG/DL (ref 0.51–0.95)
DEPRECATED HCO3 PLAS-SCNC: 21 MMOL/L (ref 22–29)
EGFRCR SERPLBLD CKD-EPI 2021: 59 ML/MIN/1.73M2
ERYTHROCYTE [DISTWIDTH] IN BLOOD BY AUTOMATED COUNT: 19.1 % (ref 10–15)
GLUCOSE SERPL-MCNC: 91 MG/DL (ref 70–99)
HCT VFR BLD AUTO: 35 % (ref 35–47)
HGB BLD-MCNC: 11 G/DL (ref 11.7–15.7)
MCH RBC QN AUTO: 31.7 PG (ref 26.5–33)
MCHC RBC AUTO-ENTMCNC: 31.4 G/DL (ref 31.5–36.5)
MCV RBC AUTO: 101 FL (ref 78–100)
PLATELET # BLD AUTO: 217 10E3/UL (ref 150–450)
POTASSIUM SERPL-SCNC: 3.5 MMOL/L (ref 3.4–5.3)
RBC # BLD AUTO: 3.47 10E6/UL (ref 3.8–5.2)
SODIUM SERPL-SCNC: 144 MMOL/L (ref 135–145)
WBC # BLD AUTO: 7 10E3/UL (ref 4–11)

## 2024-03-03 PROCEDURE — 80048 BASIC METABOLIC PNL TOTAL CA: CPT | Performed by: STUDENT IN AN ORGANIZED HEALTH CARE EDUCATION/TRAINING PROGRAM

## 2024-03-03 PROCEDURE — 120N000001 HC R&B MED SURG/OB

## 2024-03-03 PROCEDURE — 258N000002 HC RX IP 258 OP 250: Performed by: STUDENT IN AN ORGANIZED HEALTH CARE EDUCATION/TRAINING PROGRAM

## 2024-03-03 PROCEDURE — 99233 SBSQ HOSP IP/OBS HIGH 50: CPT | Performed by: STUDENT IN AN ORGANIZED HEALTH CARE EDUCATION/TRAINING PROGRAM

## 2024-03-03 PROCEDURE — 97166 OT EVAL MOD COMPLEX 45 MIN: CPT | Mod: GO

## 2024-03-03 PROCEDURE — 99232 SBSQ HOSP IP/OBS MODERATE 35: CPT | Performed by: PSYCHIATRY & NEUROLOGY

## 2024-03-03 PROCEDURE — 97530 THERAPEUTIC ACTIVITIES: CPT | Mod: GP

## 2024-03-03 PROCEDURE — 85027 COMPLETE CBC AUTOMATED: CPT | Performed by: STUDENT IN AN ORGANIZED HEALTH CARE EDUCATION/TRAINING PROGRAM

## 2024-03-03 PROCEDURE — 97116 GAIT TRAINING THERAPY: CPT | Mod: GP

## 2024-03-03 PROCEDURE — 36415 COLL VENOUS BLD VENIPUNCTURE: CPT | Performed by: STUDENT IN AN ORGANIZED HEALTH CARE EDUCATION/TRAINING PROGRAM

## 2024-03-03 PROCEDURE — 92526 ORAL FUNCTION THERAPY: CPT | Mod: GN

## 2024-03-03 PROCEDURE — 250N000013 HC RX MED GY IP 250 OP 250 PS 637: Performed by: STUDENT IN AN ORGANIZED HEALTH CARE EDUCATION/TRAINING PROGRAM

## 2024-03-03 PROCEDURE — 97161 PT EVAL LOW COMPLEX 20 MIN: CPT | Mod: GP

## 2024-03-03 PROCEDURE — 97535 SELF CARE MNGMENT TRAINING: CPT | Mod: GO

## 2024-03-03 RX ORDER — ASPIRIN 81 MG/1
81 TABLET, CHEWABLE ORAL DAILY
Status: DISCONTINUED | OUTPATIENT
Start: 2024-03-03 | End: 2024-03-07

## 2024-03-03 RX ADMIN — ALBUTEROL SULFATE 2 PUFF: 90 AEROSOL, METERED RESPIRATORY (INHALATION) at 10:04

## 2024-03-03 RX ADMIN — POLYPROPYLENE GLYCOL 400, PROPYLENE GLYCOL 1 DROP: .4; .3 LIQUID OPHTHALMIC at 19:15

## 2024-03-03 RX ADMIN — FLUOROMETHOLONE 1 DROP: 1 SOLUTION/ DROPS OPHTHALMIC at 10:08

## 2024-03-03 RX ADMIN — ALBUTEROL SULFATE 2 PUFF: 90 AEROSOL, METERED RESPIRATORY (INHALATION) at 11:52

## 2024-03-03 RX ADMIN — LEVOTHYROXINE SODIUM 88 MCG: 88 TABLET ORAL at 06:48

## 2024-03-03 RX ADMIN — PANTOPRAZOLE SODIUM 40 MG: 40 TABLET, DELAYED RELEASE ORAL at 06:48

## 2024-03-03 RX ADMIN — ALBUTEROL SULFATE 2 PUFF: 90 AEROSOL, METERED RESPIRATORY (INHALATION) at 19:14

## 2024-03-03 RX ADMIN — DEMECLOCYCLINE 150 MG: 150 TABLET ORAL at 10:00

## 2024-03-03 RX ADMIN — MIRTAZAPINE 7.5 MG: 7.5 TABLET ORAL at 19:15

## 2024-03-03 RX ADMIN — SODIUM CHLORIDE: 4.5 INJECTION, SOLUTION INTRAVENOUS at 13:39

## 2024-03-03 RX ADMIN — POLYPROPYLENE GLYCOL 400, PROPYLENE GLYCOL 1 DROP: .4; .3 LIQUID OPHTHALMIC at 10:10

## 2024-03-03 RX ADMIN — TIMOLOL MALEATE 1 DROP: 5 SOLUTION/ DROPS OPHTHALMIC at 10:07

## 2024-03-03 RX ADMIN — PANTOPRAZOLE SODIUM 40 MG: 40 TABLET, DELAYED RELEASE ORAL at 16:25

## 2024-03-03 RX ADMIN — UMECLIDINIUM BROMIDE AND VILANTEROL TRIFENATATE 1 PUFF: 62.5; 25 POWDER RESPIRATORY (INHALATION) at 10:06

## 2024-03-03 RX ADMIN — ALBUTEROL SULFATE 2 PUFF: 90 AEROSOL, METERED RESPIRATORY (INHALATION) at 16:25

## 2024-03-03 RX ADMIN — ASPIRIN 81 MG CHEWABLE TABLET 81 MG: 81 TABLET CHEWABLE at 16:25

## 2024-03-03 RX ADMIN — POLYPROPYLENE GLYCOL 400, PROPYLENE GLYCOL 1 DROP: .4; .3 LIQUID OPHTHALMIC at 16:25

## 2024-03-03 RX ADMIN — POLYPROPYLENE GLYCOL 400, PROPYLENE GLYCOL 1 DROP: .4; .3 LIQUID OPHTHALMIC at 11:52

## 2024-03-03 RX ADMIN — SODIUM CHLORIDE: 4.5 INJECTION, SOLUTION INTRAVENOUS at 00:26

## 2024-03-03 RX ADMIN — METOPROLOL TARTRATE 25 MG: 25 TABLET, FILM COATED ORAL at 19:15

## 2024-03-03 RX ADMIN — CYCLOSPORINE 1 DROP: 0.5 EMULSION OPHTHALMIC at 10:10

## 2024-03-03 RX ADMIN — METOPROLOL TARTRATE 25 MG: 25 TABLET, FILM COATED ORAL at 09:59

## 2024-03-03 RX ADMIN — CYCLOSPORINE 1 DROP: 0.5 EMULSION OPHTHALMIC at 19:15

## 2024-03-03 ASSESSMENT — ACTIVITIES OF DAILY LIVING (ADL)
ADLS_ACUITY_SCORE: 56
ADLS_ACUITY_SCORE: 54
ADLS_ACUITY_SCORE: 56
ADLS_ACUITY_SCORE: 57
ADLS_ACUITY_SCORE: 56
ADLS_ACUITY_SCORE: 56
ADLS_ACUITY_SCORE: 54
ADLS_ACUITY_SCORE: 56
ADLS_ACUITY_SCORE: 54
ADLS_ACUITY_SCORE: 56
ADLS_ACUITY_SCORE: 57
ADLS_ACUITY_SCORE: 56
ADLS_ACUITY_SCORE: 57
ADLS_ACUITY_SCORE: 54
ADLS_ACUITY_SCORE: 54
ADLS_ACUITY_SCORE: 56

## 2024-03-03 NOTE — PLAN OF CARE
Goal Outcome Evaluation:    Slept on and off. Denies pain. Alert, oriented x4- sometimes forgetful.   NIHSS scores of 2- facial droop and slurred speech. Per evening RN pt has not been oob- purewick in place. Had incontinent BM this shift.     Pt upset overnight about NPO status. Upset SLP did not see her yesterday. She was constantly calling nurses desk and crying. Explained to pt why she is npo and what the risks are with eating or drinking and she was very tearful and exasperated about how we can not let her eat for so long. She is upset she is on P1 and not P3 d/t her heart history and she feels she was just thrown down here on her own. Sat with pt and patiently asked questions. Pt failed dysphagia pre-screen d/t facial droop and slurred speech. Paged resident and she wants pt to remain NPO, but did say if she is persistent to explain risks and document if she refuses. Pt did end up falling asleep and did not ask for food or water again. Hopeful SLP can see her today for further rec's. Did well with AM meds one at a time with applesauce- had no issues swallowing, no coughing afterwards.     Lung sounds clear. On 1L oxymask- uses cpap machine at home but declines using hospitals. Is a mouth breather- sats 100%.     Temp: 97.8  F (36.6  C) Temp src: Axillary BP: 134/61 Pulse: 90   Resp: 24 SpO2: 99 % O2 Device: Oxymask Oxygen Delivery: 1 LPM

## 2024-03-03 NOTE — PROGRESS NOTES
03/03/24 1030   Appointment Info   Signing Clinician's Name / Credentials (PT) Sarah Valladares PT   Rehab Comments (PT) patient up in chair.Left up in chair after PT with chair alarm on and call light in reach   Living Environment   People in Home facility resident   Current Living Arrangements assisted living   Home Accessibility no concerns   Transportation Anticipated health plan transportation;family or friend will provide   Self-Care   Usual Activity Tolerance fair   Current Activity Tolerance fair   Equipment Currently Used at Home wheelchair, manual;walker, rolling   Number of times patient has fallen within last six months 1   Activity/Exercise/Self-Care Comment Patient independent with mobility and basic ADL .Gets assist with IADL .Amb short distance only.uses W/C to  propeling with L/E now due tolarge hematom l shld/upper arm.   General Information   Onset of Illness/Injury or Date of Surgery 03/01/24   Referring Physician Chema Gil   Patient/Family Therapy Goals Statement (PT) did not state   Pertinent History of Current Problem (include personal factors and/or comorbidities that impact the POC) Admitted with L facial droop.   Existing Precautions/Restrictions fall;NPO   Weight-Bearing Status - LUE weight-bearing as tolerated   Weight-Bearing Status - LLE full weight-bearing   Weight-Bearing Status - RLE full weight-bearing   General Observations Anxious ,wants to drink,   Cognition   Affect/Mental Status (Cognition) WFL;anxious   Orientation Status (Cognition) oriented x 3   Follows Commands (Cognition) WFL   Pain Assessment   Patient Currently in Pain Yes, see Vital Sign flowsheet   Range of Motion (ROM)   Range of Motion ROM is WFL   Strength (Manual Muscle Testing)   Strength (Manual Muscle Testing) strength is WFL   Bed Mobility   Supine-Sit [place] (Bed Mobility) supervision   Sit-Supine [place] (Bed Mobility) supervision   Stand-Sit Transfer   Stand-Sit [place] (Transfers)  supervision   Assistive Device (Stand-Sit Transfers) walker, front-wheeled   Gait/Stairs (Locomotion)   [place] Level (Gait) contact guard   Assistive Device (Gait) walker, front-wheeled   Distance in Feet (Gait) 10 feet   Pattern (Gait) swing-through   Deviations/Abnormal Patterns (Gait) bill decreased;festinating/shuffling;base of support, narrow;gait speed decreased;stride length decreased;weight shifting decreased   Maintains Weight-bearing Status (Gait) able to maintain   Clinical Impression   Criteria for Skilled Therapeutic Intervention Yes, treatment indicated   PT Diagnosis (PT) impaired functional mobility   Influenced by the following impairments age,med,gen weakness   Functional limitations due to impairments transfers,gait ,strength   Clinical Presentation (PT Evaluation Complexity) evolving   Clinical Presentation Rationale pppppatient presents as med diagnosed   Clinical Decision Making (Complexity) low complexity   Planned Therapy Interventions (PT) gait training;bed mobility training;strengthening;transfer training   PT Total Evaluation Time   PT Eval, Low Complexity Minutes (60207) 12   Physical Therapy Goals   PT Frequency 5x/week   PT Predicted Duration/Target Date for Goal Attainment 03/10/24   PT Goals Bed Mobility;Transfers;Gait   PT: Bed Mobility Independent;Supine to/from sit   PT: Transfers Modified independent;Assistive device;Sit to/from stand;Bed to/from chair   PT: Gait Supervision/stand-by assist;Rolling walker;25 feet   PT: Wheelchair Mobility 50 feet;Caregiver SBA;manual wheelchair   Therapeutic Activity   Therapeutic Activities: dynamic activities to improve functional performance Minutes (56956) 10   Symptoms Noted During/After Treatment None   Treatment Detail/Skilled Intervention Toilet transfers stand -sit -stand with with FWW and grab bar with  CGA /min assist .Needed assist with wiping bottom and changing underwear.   Gait Training   Gait Training Minutes (58512) 8    Symptoms Noted During/After Treatment (Gait Training) none   Treatment Detail/Skilled Intervention amb to /from BR-cues for posture and assist to manage FWW in room   Distance in Feet 20 feet x 2   [place] Level (Gait Training) minimum assist (75% patient effort)   Physical Assistance Level (Gait Training) supervision;verbal cues;1 person assist   Weight Bearing (Gait Training) full weight-bearing   Assistive Device (Gait Training) rolling walker   Pattern Analysis (Gait Training) swing-through gait   Gait Analysis Deviations decreased step length;decreased stride length;decreased toe-to-floor clearance;decreased weight-shifting ability   Impairments (Gait Analysis/Training) strength decreased   PT Discharge Planning   PT Plan independence with amb with FWWshort household distances,W/C mobility/propulsion-bring W/C   PT Discharge Recommendation (DC Rec) home with assist;home with home care physical therapy   PT Rationale for DC Rec Patient moving well ,will need increased services at [facility] .Unsure POC as pt failed swallow study and is NPO at present   PT Brief overview of current status SBA /min assist for mobility and amb 10and 20 feet x 2 with FWW   PT Equipment Needed at Discharge walker, rolling;walker, standard  (pt has both)   Total Session Time   Timed Code Treatment Minutes 18   Total Session Time (sum of timed and untimed services) 30

## 2024-03-03 NOTE — PROGRESS NOTES
NEUROLOGY PROGRESS NOTE     Marla Dunn,  1929, MRN [id number] Date: 3/3/2024     [facility]   Code status:  No CPR- Do NOT Intubate   PCP: Gabrielle Wlalace, [phone]      ASSESSMENT & PLAN   Diagnosis code: Ischemic stroke    Ischemic stroke  History of coronary artery disease/MI  History of hypertension/hyperlipidemia  Atrial fibrillation  Left biceps bruising    MRI brain shows hyperacute infarction in the right precentral and postcentral gyri and underlying white matter  CTA negative for any significant large vessel occlusion/stenosis  Echocardiogram shows normal ejection fraction with significant wall motion abnormality and moderate to severe mitral stenosis  Patient was found to be in A-fib in the emergency room  Lipid panel shows LDL 81.  40 mg of Lipitor  Status post tenecteplase.  Head CT stable.  Continue aspirin and statin   Started on anticoagulation when possible.  Patient does have a new bruise in her left biceps.  Consider cardiology consultation for A-fib management  Slow reduction in blood pressure  PT/OT  Please note:-Per patient wishes, if she had another stroke she does not IV thrombolytics.    Discharge:-When cleared by hospitalist.     Chief Complaint   Patient presents with    Stroke        HISTORY OF PRESENT ILLNESS     We have been requested by Dr. Lizarraga to evaluate Marla Dunn who is a 89 year old  female for stroke.  Is a 89-year-old female with past medical history of coronary artery disease, previous MI, CHF, hypertension, hyperlipidemia, rheumatoid arthritis, Sjogren's syndrome and chronic kidney disease admitted for left-sided weakness and some garbled speech along with a left facial droop and left-sided neglect.  No gaze deviation.  She was last normal at 1730.  She was found all dressed up sitting in the wheelchair only using her right hand.  Patient reported that at 7 AM this morning she was normal.  She was given tenecteplase which she  reports has helped with her symptoms.  Reports no ongoing weakness at this point.  Does take aspirin at baseline.  No anticoagulation.    3/3  No new neurological symptoms.  She does have bruising in the left biceps.  Reports that she does not want the IV thrombolytics in the future if she has a stroke.     PAST MEDICAL & SURGICAL HISTORY     Medical History  Past Medical History:   Diagnosis Date    Asthma     Bilateral carpal tunnel syndrome 08/27/2015    CAD (coronary artery disease)     Chronic airway obstruction, not elsewhere classified     Created by Conversion     Chronic anemia     Chronic edema     Congestive heart failure, severe (H) 05/13/2020    COPD (chronic obstructive pulmonary disease) (H)     Coronary artery disease     Depression     GERD (gastroesophageal reflux disease)     Heart disease     Heart murmur     High cholesterol     dislipidemia    HTN (hypertension)     Hypercholesterolemia     Hypertension     Hypertensive emergency 08/13/2021    Hypothyroidism     Hypothyroidism     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    MI (myocardial infarction) (H) 1985    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    Myelodysplastic syndrome (H)     Myocardial infarction (H) 1983    OLEGARIO (obstructive sleep apnea)     Osteoporosis     Other and unspecified hyperlipidemia     Created by Conversion     Pyelonephritis 05/11/2016    Radicular low back pain     Rheumatoid arthritis (H)     Elizabeth Agers syndrome     Sjoegren syndrome     Sjogren's syndrome (H24)     Sleep apnea, obstructive     Spinal stenosis     Squamous cell carcinoma, tongue border (H)     Unspecified polyarthropathy or polyarthritis, hand     Created by Conversion      Surgical History  Past Surgical History:   Procedure Laterality Date    aortic valve      AORTIC VALVE REPLACEMENT  01/01/2012    APPENDECTOMY      APPENDECTOMY      AS TOTAL KNEE ARTHROPLASTY Right     BACK SURGERY  2004    spinal decompression    BACK SURGERY       spinal stenosis    BONE MARROW BIOPSY  2004    breast biopsy      BREAST SURGERY  2001    biopsy    BYPASS GRAFT ARTERY CORONARY  01/01/2009    LIMA to ramus intermedius    cardiac angiogram      CARDIAC CATHETERIZATION      CARDIAC SURGERY      EYE SURGERY  2008    bilateral cataract repair     EYE SURGERY Bilateral     cornea transplant left eye & cataracts    KYPHOPLASTY  2003    T12    OPEN REDUCTION INTERNAL FIXATION HIP NAILING Right 08/13/2021    Procedure: INTERNAL FIXATION, FRACTURE, TROCHANTERIC, HIP, USING PINS OR RODS;  Surgeon: Darrel Castro MD;  Location: [facility] OR    OTHER SURGICAL HISTORY  01/01/2018    Rectosigmoidectomy perineal    PARTIAL GLOSSECTOMY Right 09/2023    [facility]    RECTOSIGMOIDECTOMY PERINEAL N/A 01/10/2018    Procedure: RECTOSIGMOIDECTOMY PERINEAL;  PERINEAL RECTOSIGMOIDECTOMY ;  Surgeon: Candelaria Anthony MD;  Location: [facility] OR    REPAIR VALVE AORTIC  2009    THORACIC SURGERY  2003    T12 kyphoplasty    Z CABG, ARTERY-VEIN, FOUR      ZC CABG, VEIN, SINGLE      Description: CABG (CABG);  Recorded: 03/09/2012;  Comments: Single vessel bypass graft to an obtuse marginal branch in May 2009    ZZC REPLACE AORT VALV,PROSTH VALV      Description: Aortic Valve Replacement;  Recorded: 03/09/2012;  Comments: Aortic valve replacement        SOCIAL HISTORY     Social History     Tobacco Use    Smoking status: Never    Smokeless tobacco: Never   Substance Use Topics    Alcohol use: No     Alcohol/week: 0.0 standard drinks of alcohol     Comment: Alcoholic Drinks/day: occasional glass of wine    Drug use: No        FAMILY HISTORY     Reviewed, and family history includes Cancer in her brother, mother, and sister; Family History Negative in her mother; Heart Disease in her father.     ALLERGIES     Allergies   Allergen Reactions    Gramineae Pollens Other (See Comments)     ORCHARDGRASS. Shortness of breath when lawn is just cut.    Smoke. Other (See Comments)      Hard to breathe.    Pollen Extract      Other reaction(s): Unknown        REVIEW OF SYSTEMS     12 system ROS was done within the HPI this was negative.  Pertinent positives noted on the HPI.     HOME & HOSPITAL MEDICATIONS     Prior to Admission Medications  Medications Prior to Admission   Medication Sig Dispense Refill Last Dose    acetaminophen (TYLENOL) 325 MG tablet Take 3 tablets (975 mg) by mouth every 8 hours as needed for mild pain   More than a month    albuterol (PROAIR HFA/PROVENTIL HFA/VENTOLIN HFA) 108 (90 Base) MCG/ACT inhaler Inhale 2 puffs into the lungs 4 times daily   2/29/2024 at hs    amoxicillin (AMOXIL) 500 MG capsule 4 CAPSULES (2000MG ) ORALLY AS NEEDED ONE HOUR PRIOR TO DENTAL APPOINTMENT 4 capsule 5 Unknown    ANTACID REGULAR STRENGTH 500 MG chewable tablet 2 TABLETS (1000MG) ORALLY 2 TIMES DAILY AS NEEDED FOR HEARTBURN 60 tablet 11 Unknown    Ascorbic Acid (VITAMIN C PO) Take 500 mg by mouth every morning   2/29/2024    ASPIRIN LOW DOSE 81 MG EC tablet 1 TABLET ORALLY 2 TIMES DAILY (DX: PROPHYLAXIS) 180 tablet 3 2/29/2024 at hs    Bacillus Coagulans-Inulin (PROBIOTIC FORMULA) 1-250 BILLION-MG CAPS 1 CAPSULE ORALLY DAILY 31 capsule 11 2/29/2024    bisacodyl (DULCOLAX) 5 MG EC tablet 1 TABLET ORALLY DAILY AS NEEDED (MAY KEEP AT BEDSIDE) (Patient taking differently: 5 mg 2 times daily as needed) 30 tablet 10 Unknown    chlorhexidine (PERIDEX) 0.12 % solution Swish and spit 15 mLs in mouth 2 times daily May self administer 118 mL 3 2/29/2024 at hs    cholecalciferol (VITAMIN D3) 125 mcg (5000 units) capsule 1 CAPSULE ORALLY DAILY 90 capsule 3 2/29/2024 at pm    COMBIVENT RESPIMAT  MCG/ACT inhaler INHALE 1 PUFF INTO THE LUNGS  4 TIMES DAILY 4 g 11 2/29/2024 at pm    cycloSPORINE (RESTASIS) 0.05 % ophthalmic emulsion Place 1 drop into both eyes 2 times daily    3/1/2024 at am    demeclocycline (DECLOMYCIN) 150 MG tablet 1 TABLET ORALLY 2 TIMES DAILY 60 tablet 11 2/29/2024 at hs    fish  oil-omega-3 fatty acids 1000 MG capsule Take 2 capsules (2 g) by mouth daily (Patient taking differently: Take 1 g by mouth daily) 28 capsule 11 2/29/2024 at am    fluorometholone (FML LIQUIFILM) 0.1 % ophthalmic susp Place 1 drop Into the left eye daily    2/29/2024 at am    hydrocortisone 1 % CREA cream Place rectally 2 times daily as needed for itching   Unknown    IBANDRONATE SODIUM PO Take 150 mg by mouth every 30 days   2/4/2024    levothyroxine (SYNTHROID/LEVOTHROID) 88 MCG tablet 1 TABLET ORALLY EVERY MORNING ON AN EMPTY STOMACH 31 tablet 11 2/29/2024    loratadine (CLARITIN) 10 MG tablet 1 TABLET ORALLY DAILY (DX: ASTHMA) 28 tablet 11 2/29/2024    melatonin 3 MG tablet 1 TABLET ORALLY AT BEDTIME ALONG WITH 5 MG FOR A TOTAL DOSE OF 8MG 30 tablet 11 2/29/2024    melatonin 5 MG tablet 1 TABLET ORALLY AT BEDTIME  ALONG WITH 3MG FOR A TOTAL DOSE OF 8MG 30 tablet 11 2/29/2024    metoprolol tartrate (LOPRESSOR) 25 MG tablet 1 TABLET ORALLY 2 TIMES DAILY (DX: HYPERTENSION) 56 tablet 11 2/29/2024 at hs    mirtazapine (REMERON) 7.5 MG tablet 1 TABLET ORALLY AT BEDTIME 31 tablet 11 2/29/2024    Multiple Vitamins-Minerals (PRESERVISION AREDS 2) CAPS 1 CAPSULE ORALLY 2 TIMES DAILY 62 capsule 11 2/29/2024 at pm    omeprazole (PRILOSEC) 20 MG DR capsule 1 CAPSULE ORALLY 2 TIMES DAILY (DX: GASTROESOPHAGEAL REFLUX DISEASE) 62 capsule 11 2/29/2024 at pm    polyethylene glycol 0.4%- propylene glycol 0.3% (SYSTANE ULTRA) 0.4-0.3 % SOLN ophthalmic solution Place 1 drop into both eyes 4 times daily   2/29/2024 at hs    Polyethylene Glycol 3350 (PEG 3350) 17 GM/SCOOP POWD MIX 1 CAPFUL (17 GMS) IN 8 OUNCES WATER AND DRINK ORALLY DAILY (DX: CONSTIPATION) 510 g 11 2/29/2024 at am    Probiotic Product (PROBIOTIC PO) Take 1 capsule by mouth every morning   2/29/2024 at am    SENEXON-S 8.6-50 MG tablet 1 TABLET ORALLY 2 TIMES DAILY (DX: CONSTIPATION) 56 tablet 11 2/29/2024 at hs    sodium chloride 1 GM tablet Take 1 tablet (1 g) by  "mouth daily 30 tablet 11 2/29/2024 at am    spironolactone (ALDACTONE) 25 MG tablet 1 TABLET ORALLY DAILY (DX: EDEMA) ** HAZARDOUS MED: WEAR DOUBLE NITRILE GLOVES** 28 tablet 11 2/29/2024 at am    timolol (TIMOPTIC) 0.5 % ophthalmic solution Place 1 drop Into the left eye daily    2/29/2024 at am    torsemide (DEMADEX) 20 MG tablet 1.5 TABLET (30mg) ORALLY DAILY 31 tablet 11 2/29/2024 at am    vitamin C (ASCORBIC ACID) 500 MG tablet 1 TABLET ORALLY DAILY (DX: SUPPLEMENT) 31 tablet 11 2/29/2024 at am    OXYGEN-HELIUM IN           Hospital Medications   albuterol  2 puff Inhalation 4x Daily    cycloSPORINE  1 drop Both Eyes BID    [Held by provider] demeclocycline  150 mg Oral Q12H SHONDA (08/20)    fluorometholone  1 drop Left Eye Daily    levothyroxine  88 mcg Oral QAM AC    metoprolol tartrate  25 mg Oral BID    mirtazapine  7.5 mg Oral At Bedtime    pantoprazole  40 mg Oral BID AC    polyethylene glycol-propylene glycol  1 drop Both Eyes 4x Daily    [Held by provider] sodium chloride  1 g Oral Daily    timolol maleate  1 drop Left Eye Daily    umeclidinium-vilanterol  1 puff Inhalation Daily        PHYSICAL EXAM     Vital signs  Temp:  [97.7  F (36.5  C)-99.1  F (37.3  C)] 98.2  F (36.8  C)  Pulse:  [77-96] 85  Resp:  [18-24] 18  BP: (129-152)/(59-84) 129/68  SpO2:  [89 %-100 %] 97 %    PHYSICAL EXAMINATION  VITALS: /68 (BP Location: Right arm)   Pulse 85   Temp 98.2  F (36.8  C) (Axillary)   Resp 18   Ht 1.6 m (5' 3\")   Wt 42.3 kg (93 lb 4.1 oz)   SpO2 97%   BMI 16.52 kg/m    GENERAL -Health appearing, No apparent distress  EYES- No scleral icterus, no eyelid droop, Pupils - see Neuro section  HEENT - Normocephalic, atraumatic, Hearing grossly limited; Oral mucosa moist and pink in color. External Ears and nose intact.   Neck - supple   PULM - Good spontaneous respiratory effort  CV- Pedal pulses present with no peripheral edema/ No significant varicosities.  MSK- Gait - see Neuro section; Strength and " tone- see Neuro section; Range of motion grossly intact.  PSYCH -cooperative    Neurological  Mental status - Patient is awake and partially oriented to self, place and time. Attention span is normal. Language is fluent and follows commands appropriately.   Cranial nerves - CN II-XII intact. Pupils are reactive and symmetric; EOMI, VFIT, NLF symmetric  Motor - There is no pronator drift. Motor exam shows 5/5 strength in all extremities.  Tone - Tone is symmetric bilaterally in upper and lower extremities.  Reflexes - Reflexes are symmetric and decreased.  Sensation - Sensory exam is grossly intact to light touch, pain.  Coordination - Finger to nose and heel to shin is without dysmetria except left upper extremity dysmetria.  Gait and station --unable to safely ambulate.  Formal gait testing cannot be done due to safety concerns from ongoing medical issues.  Exam stable/nonfocal.  She does have a new hematoma/bruising in the left biceps region.     DIAGNOSTIC STUDIES     Pertinent Radiology   MRI  IMPRESSION:  1.  Hyperacute infarction involving the right precentral and post central gyri and underlying white matter.  2.  Generalized brain atrophy and presumed microvascular ischemic changes as detailed above.    HEAD CT:  1.  No finding for intracranial hemorrhage or mass or convincing finding for acute infarct.     2.  Moderate volume loss and presumed sequela of chronic microvascular ischemic change.     3.  New chronic infarct inferior lateral aspect of the left cerebellar hemisphere.     HEAD CTA:   1.  There is occlusion of a distal M2 superior division segment of the right middle cerebral artery without flow seen more distally within the vessel.     2.  Coarse atherosclerotic calcifications both carotid siphons and proximal intradural vertebral arteries with mild associated luminal narrowing but no clear flow-limiting stenosis.     NECK CTA:  1.  No significant stenosis either cervical carotid system.     2.   Mild atherosclerotic narrowing of the takeoff of both vertebral arteries.     CT PERFUSION:  1.  There is elevated Tmax, MTT, and decreased rCBF along the lateral and posterior aspect of the right frontal lobe compatible with ischemic penumbra.     2.  Area of Tmax >6 seconds measures 11 mL with volume of CBF <30% measuring 0 mL.    ECHO  1. Normal left ventricular size and systolic performance with a visually  estimated ejection fraction of 65%.  2. There is severe basal inferior hypokinesis and moderate basal posterior  hypokinesis  3. There is mild concentric increase in left ventricular wall thickness.  4. 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  Â  Normal aortic valve prosthesis metrics with a mean systolic gradient of 19  mmHg and a peak anterograde velocity of 2.8 m/sec.  Â  No aortic insufficiency is detected.  5. There is moderate-severe calcific mitral stenosis.  6. Normal right ventricular size with mildly reduced right ventricular  systolic performance.  7. There is severe left atrial enlargement. There is moderate right atrial  enlargement.     When compared to the prior real-time echocardiogram dated 6 September 2022,  the degree of mitral stenosis has increased from moderate to now moderate-  severe. The findings are otherwise felt to be fairly similar on both  examinations. The aforementioned regional wall motion abnormalities are not  new and were identified on the prior study as well.    Head CT  IMPRESSION:  1.  No hemorrhage.  2.  Recent infarct at the right frontoparietal junction on MRI is not visible on CT.         LABS  Component      Latest Ref Rng 3/1/2024  8:41 AM   Cholesterol      <200 mg/dL 137    Triglycerides      <150 mg/dL 72    HDL Cholesterol      >=50 mg/dL 42 (L)    LDL Cholesterol Calculated      <=100 mg/dL 81    Non HDL Cholesterol      <130 mg/dL 95    Hemoglobin A1C      <5.7 % 5.3       Legend:  (L) Low      Recent Results (from the past 24 hour(s))   Sodium    Collection  Time: 03/02/24 10:15 PM   Result Value Ref Range    Sodium 143 135 - 145 mmol/L   Basic metabolic panel    Collection Time: 03/03/24  6:40 AM   Result Value Ref Range    Sodium 144 135 - 145 mmol/L    Potassium 3.5 3.4 - 5.3 mmol/L    Chloride 111 (H) 98 - 107 mmol/L    Carbon Dioxide (CO2) 21 (L) 22 - 29 mmol/L    Anion Gap 12 7 - 15 mmol/L    Urea Nitrogen 26.7 (H) 8.0 - 23.0 mg/dL    Creatinine 0.90 0.51 - 0.95 mg/dL    GFR Estimate 59 (L) >60 mL/min/1.73m2    Calcium 8.5 8.2 - 9.6 mg/dL    Glucose 91 70 - 99 mg/dL   CBC with platelets    Collection Time: 03/03/24  6:40 AM   Result Value Ref Range    WBC Count 7.0 4.0 - 11.0 10e3/uL    RBC Count 3.47 (L) 3.80 - 5.20 10e6/uL    Hemoglobin 11.0 (L) 11.7 - 15.7 g/dL    Hematocrit 35.0 35.0 - 47.0 %     (H) 78 - 100 fL    MCH 31.7 26.5 - 33.0 pg    MCHC 31.4 (L) 31.5 - 36.5 g/dL    RDW 19.1 (H) 10.0 - 15.0 %    Platelet Count 217 150 - 450 10e3/uL       Total time spent for face to face visit, reviewing labs/imaging studies, counseling and coordination of care was: Over 35 min More than 50% of this time was spent on counseling and coordination of care.    Counseling patient.  Reviewing chart.  Discussion of goals of care.    Chace Griffiths MD  Neurologist  [facility]  Tel:- [phone]

## 2024-03-03 NOTE — PLAN OF CARE
Goal Outcome Evaluation:                      Denies pain. Alert and oriented. NIH 2. 1/2 NS running at 75. Meds with applesauce. Remains NPO, ice chips given. Purewick in place due to decreased mobility. Wean oxymask to 1L. Niece at bedside, updated.

## 2024-03-03 NOTE — PROGRESS NOTES
Asked Patient and also looked in room if family brought Patients home CPAP in. She stated no. I did offer Patient a hospital unit and declined. Currently on 1L oxymask.    Raina Carmen, RT

## 2024-03-03 NOTE — PLAN OF CARE
Problem: Adult Inpatient Plan of Care  Goal: Absence of Hospital-Acquired Illness or Injury  Outcome: Progressing  Problem: Adult Inpatient Plan of Care  Goal: Optimal Comfort and Wellbeing  Outcome: Progressing  Problem: Risk for Delirium  Goal: Improved Sleep  Outcome: Progressing  Problem: Stroke, Ischemic (Includes Transient Ischemic Attack)  Goal: Optimal Cognitive Function  Outcome: Progressing  Problem: Stroke, Ischemic (Includes Transient Ischemic Attack)  Goal: Improved Communication Skills  Outcome: Progressing  Problem: Stroke, Ischemic (Includes Transient Ischemic Attack)  Goal: Optimal Functional Ability  Outcome: Progressing  Problem: Stroke, Ischemic (Includes Transient Ischemic Attack)  Goal: Safe and Effective Swallow  Outcome: Progressing  Problem: Stroke, Ischemic (Includes Transient Ischemic Attack)  Goal: Effective Urinary Elimination  Outcome: Progressing     Aox4, VSS on RA, denies pain. NIHSS scored at 2 for some mild Left sided facial droop and dysarthria- slurring of some words. Improved from previous day shift. Tolerating meds PO crushed in applesauce, ice chips and oral swabs. PT/OT completed today, was up to bathroom x1 with assist 1 GBW. Currently resting in bed with purewick in place. Speech eval conducted this afternoon, new diet orders to follow- pt supervision required. Pt requesting tomato soup for dinner. No acute events reported at this time, will continue to monitor.    Jus Starr RN

## 2024-03-03 NOTE — PROGRESS NOTES
[facility]    Medicine Progress Note - Hospitalist Service    Date of Admission:  3/1/2024    Assessment & Plan   Marla Dunn is a 89 year old female with PMH CAD Hx MI, CHF, HTN, HLD, RA, Sjogren's syndrome, SaraAgers syndrome, CKD, aortic stenosis, COPD, chronic hyponatremia, OLEGARIO, Atril fibrillation not on AC admitted on 3/1/2024 for acute ischemic stroke     Acute ischemic stroke -right MCA territory  Presented with left-sided weakness, facial droop, dysarthria at 7am 03/01  MRI brain shows hyperacute infarction in the right precentral and postcentral gyri and underlying white matter  CTA negative for any significant large vessel occlusion/stenosis  Echocardiogram shows normal ejection fraction with significant wall motion abnormality and moderate to severe mitral stenosis  Repeat CT 03/02: Negative for hemorrhage  HbA1c 5.3, LDL 81  Seen by stroke neurology, S/p TNK  SLP re-eval, started on thin liquids, on 1:1, on gentle IV hydration until able to tolerate adequate po intake  Seen by Neurology  PT/OT - recommend Home PT/OT  Start aspirin today, holding off on anticoagulation for A-fib as patient would like to follow-up outpatient with her primary cardiologist  - In case of recurrent stroke patient does not want IV thrombolytics     - URI  Patient appears to be congested, lungs clear  Viral panel negative  Supportive care    - Chronic Atrial Fib  - PTA metoprolol  - Not on AC due to frequent falls  - Patient was offered Watchman device implant, but patient and family did not want to pursue as she had to be on anticoagulation for about 12 weeks  - Patient states she will follow-up with her primary cardiologist to discuss further if she would want to be on anticoagulation, will hold off starting anticoagulation at this time  - Patient has bruising on the left upper extremity around biceps and elbow since admission, will monitor.  Will hold AC due to high risk of bleeding    -  "Chronic HFpEF  PTA torsemide 30 mg daily and spironolactone 25 mg daily - held as patient is NPO, pending SLP re-eval  On gentle IV fluids     -Elevated troponin likely 2/2 demand  CAD  Troponin 42, denies chest pain  Repeat Troponin unchanged ~ flat  EKG A fib with PVC's, no acute ischemic changes  Monitor     -Mild hyponatremia  Hypochloremia  Na increased from 131 to 144  Held demeclocycline and salt tabs   0.45 NS @ 75cc/hr  Monitor Na     - CKD  Monitor creatinine     - Normocytic anemia  Stable  No evidence of bleeding  Monitor Hb     - Hx RLE hematoma and cellulitis  Completed p.o. Keflex, patient refusing dressing to be removed  Monitor     - COPD  Not in exacerbation  PTA inhalers     - OLEGARIO  CPAP     - Hypothyroidism  PTA levothyroxine    -- Transfer out of ICU 03/02          Diet: NPO for Medical/Clinical Reasons Except for: Ice Chips, Meds    DVT Prophylaxis: Pneumatic Compression Devices  Bush Catheter: Not present  Lines: None     Cardiac Monitoring: ACTIVE order. Indication: Stroke, acute (48 hours)  Code Status: No CPR- Do NOT Intubate      Clinically Significant Risk Factors              # Hypoalbuminemia: Lowest albumin = 3.3 g/dL at 3/2/2024  4:06 AM, will monitor as appropriate     # Hypertension: Noted on problem list  # Chronic heart failure with preserved ejection fraction: heart failure noted on problem list and last echo with EF >50%       # Cachexia: Estimated body mass index is 16.52 kg/m  as calculated from the following:    Height as of this encounter: 1.6 m (5' 3\").    Weight as of this encounter: 42.3 kg (93 lb 4.1 oz)., PRESENT ON ADMISSION     # COPD: noted on problem list  # History of CABG: noted on surgical history       Disposition Plan      Expected Discharge Date: 03/04/2024      Destination: assisted living  Discharge Comments: Repeat CT brain  SLP - NPO  PT/OT javi Gil MD  Hospitalist Service  [facility]  Securely message with " Oliva (more info)  Text page via [facility] Paging/Directory   ______________________________________________________________________    Interval History   Patient was examined at the bedside today, very pleasant clinically significantly improved.  Patient was tearful and states that she is hungry and would like to eat, currently giving ice chips and applesauce with medication.  Pending SLP eval today  - Called Niece Deneen as per request, discussed clinical course and risk and benefits of AC. Agreed to discuss further with  as outpatient.     Physical Exam   Vital Signs: Temp: 98.2  F (36.8  C) Temp src: Axillary BP: 129/68 Pulse: 85   Resp: 18 SpO2: 97 % O2 Device: None (Room air) Oxygen Delivery: 1 LPM  Weight: 93 lbs 4.07 oz    General Appearance: Well-appearing, well-nourished, no acute distress   Neck:  Supple  Cardio:  Regular rate, irregular rhythm  Pulm:  No respiratory distress  Extremities: Extremities atraumatic, RLE wound with dressing  Skin:  Skin warm, dry, no rashes  Neuro:  Alert, no gross sensory defects, some pupillary asymmetry L > R, Left sided facial droop, left arm and leg hemiplegia, improving. Follows commands, left-sided neglect, speech garbled, improved since yesterday    Medical Decision Making       65 MINUTES SPENT BY ME on the date of service doing chart review, history, exam, documentation & further activities per the note.      Data     I have personally reviewed the following data over the past 24 hrs:    7.0  \   11.0 (L)   / 217     144 111 (H) 26.7 (H) /  91   3.5 21 (L) 0.90 \       Imaging results reviewed over the past 24 hrs:   No results found for this or any previous visit (from the past 24 hour(s)).

## 2024-03-03 NOTE — PROGRESS NOTES
03/03/24 0850   Appointment Info   Signing Clinician's Name / Credentials (OT) Mary Reyes,OTR/L   Living Environment   People in Home alone   Current Living Arrangements assisted living   Home Accessibility no concerns   Transportation Anticipated family or friend will provide   Self-Care   Usual Activity Tolerance good   Current Activity Tolerance moderate   Equipment Currently Used at Home wheelchair, manual;walker, rolling;shower chair  (grab bars in shower)   Fall history within last six months yes   Number of times patient has fallen within last six months 1   Instrumental Activities of Daily Living (IADL)   IADL Comments indep. drsg, toileting, trsfs, A at times w/ showers, pushes self to dining rm for meals, uses w/c-does no ambulate   General Information   Onset of Illness/Injury or Date of Surgery 03/01/24   Patient/Family Therapy Goal Statement (OT) feel better   Additional Occupational Profile Info/Pertinent History of Current Problem Marla Dunn is a 89 year old female with PMH CAD Hx MI, CHF, HTN, HLD, RA, Sjogren's syndrome, SaraAgers syndrome, CKD, aortic stenosis, COPD, chronic hyponatremia, OLEGARIO, Atril fibrillation not on AC admitted on 3/1/2024 for acute ischemic stroke   Existing Precautions/Restrictions fall   Limitations/Impairments safety/cognitive   Cognitive Status Examination   Orientation Status orientation to person, place and time   Cognitive Status Comments forgetful, rec. screening   Visual Perception   Visual Impairment/Limitations WFL   Range of Motion Comprehensive   General Range of Motion   (shldr flexion to 90 degrees)   Strength Comprehensive (MMT)   General Manual Muscle Testing (MMT) Assessment   (R UE decreased strength shldr(previous issues w/ R shldr) otherwise strength WFL B UE's)   Coordination   Upper Extremity Coordination No deficits were identified   Bed Mobility   Bed Mobility supine-sit   Supine-Sit Hawkins (Bed Mobility) supervision   Transfers    Transfers bed-chair transfer   Transfer Skill: Bed to Chair/Chair to Bed   Bed-Chair Meredith (Transfers) contact guard   Balance   Balance Assessment standing dynamic balance   Balance Comments WFL w/ FWW   Activities of Daily Living   BADL Assessment/Intervention lower body dressing;grooming   Lower Body Dressing Assessment/Training   Meredith Level (Lower Body Dressing) supervision   Grooming Assessment/Training   Meredith Level (Grooming) supervision   Clinical Impression   Criteria for Skilled Therapeutic Interventions Met (OT) Yes, treatment indicated   OT Diagnosis decreased ADLs   OT Problem List-Impairments impacting ADL activity tolerance impaired;balance;cognition;mobility   Assessment of Occupational Performance 1-3 Performance Deficits   Identified Performance Deficits trsfs, drsg, activity babak for ADLs, g/h   Planned Therapy Interventions (OT) ADL retraining;transfer training   Clinical Decision Making Complexity (OT) detailed assessment/moderate complexity   Risk & Benefits of therapy have been explained evaluation/treatment results reviewed;care plan/treatment goals reviewed;patient   OT Total Evaluation Time   OT Eval, Moderate Complexity Minutes (55358) 8   OT Goals   Therapy Frequency (OT) 5 times/week   OT Predicted Duration/Target Date for Goal Attainment 03/10/24   OT Goals Lower Body Dressing;Transfers;Cognition   OT: Lower Body Dressing Supervision/stand-by assist   OT: Transfer Supervision/stand-by assist   OT: Cognitive Patient/caregiver will verbalize understanding of cognitive assessment results/recommendations as needed for safe discharge planning   Interventions   Interventions Quick Adds Self-Care/Home Management   Self-Care/Home Management   Self-Care/Home Mgmt/ADL, Compensatory, Meal Prep Minutes (92899) 12   Symptoms Noted During/After Treatment (Meal Preparation/Planning Training) shortness of breath;fatigue   Treatment Detail/Skilled Intervention pt in bed w/ o2 mask  on 1L, o2 sats 96%, demo SBA supine>sit ,trsf bed<>chair 2 reps w/ CGA w/ FWW, cues for hand placement and safety, preoccupied w/ wanting to drink water, speech eval pending,  left in chair w/ call light,   Harrison Level (Grooming Training) stand-by assist  (seated)   Assistance (Grooming Training) supervision;set-up required   Lower Body Dressing Training Assistance stand-by assist  (don/doff socks, increased effort)   Lower Body Dressing Training Assistance supervision;verbal cues   OT Discharge Planning   OT Plan short  trsfs(does not amb.) toileting/commode, g/h, drsg, SLUMS as needed,check speech eval results/goals of care   OT Discharge Recommendation (DC Rec) home with home care occupational therapy;home with assist   OT Rationale for DC Rec Ax1 for trsfs and ADLs, may be able to return to [facility] w/ increased supervision w/ trsfs, showers and dressing, pt was mostly indep. w/ ADLs at [facility],   OT Brief overview of current status CGA trsfs, SBA drsg, SBA bed mobility   Total Session Time   Timed Code Treatment Minutes 12   Total Session Time (sum of timed and untimed services) 20

## 2024-03-04 ENCOUNTER — APPOINTMENT (OUTPATIENT)
Dept: RADIOLOGY | Facility: HOSPITAL | Age: 89
DRG: 062 | End: 2024-03-04
Attending: STUDENT IN AN ORGANIZED HEALTH CARE EDUCATION/TRAINING PROGRAM
Payer: COMMERCIAL

## 2024-03-04 ENCOUNTER — APPOINTMENT (OUTPATIENT)
Dept: SPEECH THERAPY | Facility: HOSPITAL | Age: 89
DRG: 062 | End: 2024-03-04
Payer: COMMERCIAL

## 2024-03-04 ENCOUNTER — APPOINTMENT (OUTPATIENT)
Dept: CT IMAGING | Facility: HOSPITAL | Age: 89
DRG: 062 | End: 2024-03-04
Attending: PSYCHIATRY & NEUROLOGY
Payer: COMMERCIAL

## 2024-03-04 ENCOUNTER — APPOINTMENT (OUTPATIENT)
Dept: PHYSICAL THERAPY | Facility: HOSPITAL | Age: 89
DRG: 062 | End: 2024-03-04
Payer: COMMERCIAL

## 2024-03-04 LAB
ANION GAP SERPL CALCULATED.3IONS-SCNC: 9 MMOL/L (ref 7–15)
BUN SERPL-MCNC: 24.6 MG/DL (ref 8–23)
CALCIUM SERPL-MCNC: 8.6 MG/DL (ref 8.2–9.6)
CHLORIDE SERPL-SCNC: 108 MMOL/L (ref 98–107)
CREAT SERPL-MCNC: 0.84 MG/DL (ref 0.51–0.95)
DEPRECATED HCO3 PLAS-SCNC: 19 MMOL/L (ref 22–29)
EGFRCR SERPLBLD CKD-EPI 2021: 64 ML/MIN/1.73M2
GLUCOSE SERPL-MCNC: 82 MG/DL (ref 70–99)
HOLD SPECIMEN: NORMAL
POTASSIUM SERPL-SCNC: 3.7 MMOL/L (ref 3.4–5.3)
SODIUM SERPL-SCNC: 136 MMOL/L (ref 135–145)

## 2024-03-04 PROCEDURE — 120N000001 HC R&B MED SURG/OB

## 2024-03-04 PROCEDURE — 250N000013 HC RX MED GY IP 250 OP 250 PS 637: Performed by: STUDENT IN AN ORGANIZED HEALTH CARE EDUCATION/TRAINING PROGRAM

## 2024-03-04 PROCEDURE — 92611 MOTION FLUOROSCOPY/SWALLOW: CPT | Mod: GN

## 2024-03-04 PROCEDURE — 92526 ORAL FUNCTION THERAPY: CPT | Mod: GN

## 2024-03-04 PROCEDURE — 99233 SBSQ HOSP IP/OBS HIGH 50: CPT | Performed by: STUDENT IN AN ORGANIZED HEALTH CARE EDUCATION/TRAINING PROGRAM

## 2024-03-04 PROCEDURE — 97110 THERAPEUTIC EXERCISES: CPT | Mod: GP

## 2024-03-04 PROCEDURE — 99233 SBSQ HOSP IP/OBS HIGH 50: CPT | Performed by: PSYCHIATRY & NEUROLOGY

## 2024-03-04 PROCEDURE — 36415 COLL VENOUS BLD VENIPUNCTURE: CPT | Performed by: STUDENT IN AN ORGANIZED HEALTH CARE EDUCATION/TRAINING PROGRAM

## 2024-03-04 PROCEDURE — 70450 CT HEAD/BRAIN W/O DYE: CPT

## 2024-03-04 PROCEDURE — 80048 BASIC METABOLIC PNL TOTAL CA: CPT | Performed by: STUDENT IN AN ORGANIZED HEALTH CARE EDUCATION/TRAINING PROGRAM

## 2024-03-04 PROCEDURE — 74230 X-RAY XM SWLNG FUNCJ C+: CPT

## 2024-03-04 RX ORDER — BARIUM SULFATE 400 MG/ML
SUSPENSION ORAL ONCE
Status: COMPLETED | OUTPATIENT
Start: 2024-03-04 | End: 2024-03-04

## 2024-03-04 RX ADMIN — FLUOROMETHOLONE 1 DROP: 1 SOLUTION/ DROPS OPHTHALMIC at 10:27

## 2024-03-04 RX ADMIN — TIMOLOL MALEATE 1 DROP: 5 SOLUTION/ DROPS OPHTHALMIC at 10:29

## 2024-03-04 RX ADMIN — ALBUTEROL SULFATE 2 PUFF: 90 AEROSOL, METERED RESPIRATORY (INHALATION) at 13:20

## 2024-03-04 RX ADMIN — POLYPROPYLENE GLYCOL 400, PROPYLENE GLYCOL 1 DROP: .4; .3 LIQUID OPHTHALMIC at 10:35

## 2024-03-04 RX ADMIN — PANTOPRAZOLE SODIUM 40 MG: 40 TABLET, DELAYED RELEASE ORAL at 06:46

## 2024-03-04 RX ADMIN — DEMECLOCYCLINE 150 MG: 150 TABLET ORAL at 20:52

## 2024-03-04 RX ADMIN — CYCLOSPORINE 1 DROP: 0.5 EMULSION OPHTHALMIC at 20:52

## 2024-03-04 RX ADMIN — POLYPROPYLENE GLYCOL 400, PROPYLENE GLYCOL 1 DROP: .4; .3 LIQUID OPHTHALMIC at 18:05

## 2024-03-04 RX ADMIN — CYCLOSPORINE 1 DROP: 0.5 EMULSION OPHTHALMIC at 09:14

## 2024-03-04 RX ADMIN — LEVOTHYROXINE SODIUM 88 MCG: 88 TABLET ORAL at 06:46

## 2024-03-04 RX ADMIN — POLYPROPYLENE GLYCOL 400, PROPYLENE GLYCOL 1 DROP: .4; .3 LIQUID OPHTHALMIC at 13:19

## 2024-03-04 RX ADMIN — ASPIRIN 81 MG CHEWABLE TABLET 81 MG: 81 TABLET CHEWABLE at 10:32

## 2024-03-04 RX ADMIN — METOPROLOL TARTRATE 25 MG: 25 TABLET, FILM COATED ORAL at 20:52

## 2024-03-04 RX ADMIN — ALBUTEROL SULFATE 2 PUFF: 90 AEROSOL, METERED RESPIRATORY (INHALATION) at 18:03

## 2024-03-04 RX ADMIN — PANTOPRAZOLE SODIUM 40 MG: 40 TABLET, DELAYED RELEASE ORAL at 18:06

## 2024-03-04 RX ADMIN — BARIUM SULFATE: 400 SUSPENSION ORAL at 09:51

## 2024-03-04 RX ADMIN — POLYPROPYLENE GLYCOL 400, PROPYLENE GLYCOL 1 DROP: .4; .3 LIQUID OPHTHALMIC at 20:52

## 2024-03-04 RX ADMIN — MIRTAZAPINE 7.5 MG: 7.5 TABLET ORAL at 20:52

## 2024-03-04 RX ADMIN — UMECLIDINIUM BROMIDE AND VILANTEROL TRIFENATATE 1 PUFF: 62.5; 25 POWDER RESPIRATORY (INHALATION) at 09:13

## 2024-03-04 RX ADMIN — BARIUM SULFATE: 400 SUSPENSION ORAL at 09:52

## 2024-03-04 RX ADMIN — ALBUTEROL SULFATE 2 PUFF: 90 AEROSOL, METERED RESPIRATORY (INHALATION) at 09:12

## 2024-03-04 RX ADMIN — METOPROLOL TARTRATE 25 MG: 25 TABLET, FILM COATED ORAL at 09:18

## 2024-03-04 ASSESSMENT — ACTIVITIES OF DAILY LIVING (ADL)
ADLS_ACUITY_SCORE: 56
ADLS_ACUITY_SCORE: 56
ADLS_ACUITY_SCORE: 57
ADLS_ACUITY_SCORE: 56
ADLS_ACUITY_SCORE: 56
ADLS_ACUITY_SCORE: 57
ADLS_ACUITY_SCORE: 56
ADLS_ACUITY_SCORE: 57
ADLS_ACUITY_SCORE: 56
ADLS_ACUITY_SCORE: 57
ADLS_ACUITY_SCORE: 57
ADLS_ACUITY_SCORE: 56
ADLS_ACUITY_SCORE: 57
ADLS_ACUITY_SCORE: 56
ADLS_ACUITY_SCORE: 57
ADLS_ACUITY_SCORE: 57
ADLS_ACUITY_SCORE: 56
ADLS_ACUITY_SCORE: 57

## 2024-03-04 NOTE — PLAN OF CARE
Goal Outcome Evaluation:      Plan of Care Reviewed With: patient, family    Overall Patient Progress: improvingOverall Patient Progress: improving         Alert and oriented. Denies pain. Up with assist x1 and walker. Advanced to full liquid diet, tolerating well. Can SL PIV per order. Family visited and was updated. Purewick in place for  intermittent incontinence, but pt can ambulate to bathroom. Family would like dressing on shin to be changed tomorrow as that was the plan at her facility. Bed alarm on, call light within reach.

## 2024-03-04 NOTE — PROGRESS NOTES
"   03/04/24 0900   Appointment Info   Signing Clinician's Name / Credentials (SLP) Bella Rangel MS CCC SLP   General Information   Onset of Illness/Injury or Date of Surgery 03/01/24   Referring Physician Cherelle Gil MD   Pertinent History of Current Problem Per chart review \"Marla Dunn is a 89 year old female with PMH CAD Hx MI, CHF, HTN, HLD, RA, Sjogren's syndrome, SaraAgers syndrome, CKD, aortic stenosis, COPD, chronic hyponatremia, OLEGARIO, Atril fibrillation not on AC admitted on 3/1/2024 for acute ischemic stroke\". 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 Per niece, patient had returned to regular diet after surgery.   Type of Evaluation   Type of Evaluation Swallow Evaluation   General Swallowing Observations   Past History of Dysphagia None per EMR review   Current Diet/Method of Nutritional Intake (General Swallowing Observations, NIS) full liquid diet   Swallowing Evaluation Videofluoroscopic swallow study (VFSS)   Clinical Swallow Evaluation   Feeding Assistance no assistance needed   VFSS Evaluation   Radiologist Radiologist   Views Taken left lateral   Physical Location of Procedure [place] [facility]   VFSS Textures Trialed thin liquids;mildly thick liquids;moderately thick liquids/liquidized;pureed;solid foods   VFSS Eval: Thin Liquid Texture Trial   Mode of Presentation, Thin Liquid spoon;cup;self-fed   Order of Presentation 2, 3, 4, 5, 6, 11   Preparatory Phase WFL   Oral Phase, Thin Liquid WFL   Bolus Location When Swallow Triggered pyriforms   Pharyngeal Phase, Thin Liquid impaired hyolaryngeal excursion   Rosenbek's Penetration Aspiration Scale: Thin Liquid Trial Results 5 - contrast contacts vocal cords, visible residue remains (penetration)   Response to Aspiration   (NA)   Strategies and Compensations chin tuck;supraglottic swallow strategy   Diagnostic Statement Pt with consistent deep penetration to the cords with visible residuals " in laryngeal vestibule. Chin tuck strategy did not improve airway protection. Supraglottic swallow strategy did improve residuals in the laryngeal vestibule however did not completely clear. No aspiration observed.   VFSS Eval: Mildly Thick Liquids   Mode of Presentation cup;self-fed   Order of Presentation 1  (Pt taking consistent small sips, 3 bolus trials observed in frame one of study)   Preparatory Phase WFL   Oral Phase WFL   Bolus Location When Swallow Triggered pyriforms   Pharyngeal Phase impaired hyolaryngel excursion;residue in vallecula   Rosenbek's Penetration Aspiration Scale 5 - contrast contacts vocal cords, visible residue remains (penetration)   Response to Aspiration   (NA)   Diagnostic Statement Pt with consistent penetration to the cords with visible residuals in laryngeal vestibule. No aspiration observed   VFSS Eval: Moderately Thick Liquids   Mode of Presentation spoon;fed by clinician   Order of Presentation 10   Preparatory Phase WFL   Oral Phase WFL   Bolus Location When Swallow Triggered posterior laryngeal surface of epiglottis   Pharyngeal Phase WFL   Rosenbek's Penetration Aspiration Scale 1 - no aspiration, contrast does not enter airway   Diagnostic Statement No penetration or aspiration observed   VFSS Evaluation: Puree Solid Texture Trial   Mode of Presentation, Puree spoon;fed by clinician   Order of Presentation 7   Preparatory Phase WFL   Oral Phase, Puree WFL   Bolus Location When Swallow Triggered valleculae   Pharyngeal Phase, Puree WFL   Rosenbek's Penetration Aspiration Scale: Puree Food Trial Results 1 - no aspiration, contrast does not enter airway   Diagnostic Statement No pentration or aspiration observed   VFSS Evaluation: Solid Food Texture Trial   Mode of Presentation, Solid spoon;fed by clinician   Order of Presentation 8, 9   Preparatory Phase prolonged bolus preparation  (due to limited dentition)   Oral Phase, Solid WFL   Bolus Location When Swallow Triggered  valleculae   Pharyngeal Phase, Solid residue in vallecula   Rosenbek's Penetration Aspiration Scale: Solid Food Trial Results 1 - no aspiration, contrast does not enter airway   Diagnostic Statement No penetration or aspiration observed.   Esophageal Phase of Swallow   Patient reports or presents with symptoms of esophageal dysphagia No   Swallowing Recommendations   Diet Consistency Recommendations minced & moist (level 5);thin liquids (level 0)   Supervision Level for Intake distant supervision needed   Mode of Delivery Recommendations bolus size, small;slow rate of intake   Postural Recommendations none   Swallowing Maneuver Recommendations super supraglottic swallow   Monitoring/Assistance Required (Eating/Swallowing) stop eating activities when fatigue is present   Recommended Feeding/Eating Techniques (Swallow Eval) maintain upright sitting position for eating   Medication Administration Recommendations, Swallowing (SLP) whole with puree   Instrumental Assessment Recommendations instrumental evaluation not recommended at this time   General Therapy Interventions   Planned Therapy Interventions Dysphagia Treatment   Dysphagia treatment Instruction of safe swallow strategies   Clinical Impression   Criteria for Skilled Therapeutic Interventions Met (SLP Eval) Yes, treatment indicated   SLP Diagnosis Mild dysphagia   Risks & Benefits of therapy have been explained evaluation/treatment results reviewed;care plan/treatment goals reviewed;risks/benefits reviewed;current/potential barriers reviewed;participants voiced agreement with care plan;participants included;patient   Clinical Impression Comments Videoswallow study completed per MD order to objectively assess oropharyngeal swallow mechanism. Under fluoroscopy, pt presents with mild oropharyngeal dysphagia. Pt assessed with thin liquids, mildly-thick, moderately thick liquids, pudding, and cracker. Oral phase functional, characterized by adequate bolus control,  functional AP oral transit of bolus, no premature loss of bolus, and slow mastication with regular solids however this was due to pts limited dentition.  Pharyngeal phase was characterized by a delayed swallow trigger with swallow initiating at the level of the pyriform sinus with thin and mildly thick liquids. Once triggered, velar elevation is reduced, pharyngeal stripping wave and epiglottic inversion complete. Laryngeal vestibule closure incomplete. Consistent deep penetration to the cords observed during the swallow with thin and mildly thick liquids with visible residuals that remained in laryngeal vestibule.  Chin tuck strategy did not improve airway protection/swallow function. Supraglottic swallow strategy did reduce the amount of residuals in the laryngeal vestibule however did not completely clear. No aspiration observed. With moderately thick liquid, puree, and regular solids no penetration or aspiration observed. Pt did have moderate amount of residuals in vallecula following regular solid trial, this somewhat cleared with second dry swallow. Penetration of thin and mildly thick liquid was observed to occur during the swallow due to a delay in swallow trigger. Recommend a diet of Minced and Moist solids (level 5) and Thin liquids with supra glottic swallow strategy (swallow, cough, swallow sequence) following each sip of thin liquid. Ensure pt is fully alert and upright for all PO. SLP to follow to reinforce safe swallow strategies.   SLP Total Evaluation Time   Eval: oral/pharyngeal swallow function, clinical swallow Minutes (51025) 20   SLP Goals   Therapy Frequency (SLP Eval) 5 times/week   SLP Goals Swallow   SLP: Safely tolerate diet without signs/symptoms of aspiration Thin liquids;Soft & bite sized diet   SLP: Goal 1   (Goal met)   Interventions   Interventions Quick Adds Swallowing Dysfunction   Swallowing Dysfunction &/or Oral Function for Feeding   Treatment of Swallowing Dysfunction &/or Oral  Function for Feeding Minutes (89160) 5   Symptoms Noted During/After Treatment None   Treatment Detail/Skilled Intervention Video swallow study complete. Mild oropharyngeal dysphagia, no aspiration throughout study. Consistent penetration to the cords with thin liquids and mildly thick liquids, supraglottic swallow strategy did help reduce amount of residuals in laryngeal vestibule with thin liquids. See POC note for further details. Following evaluation SLP provided training on supraglottic swallow strategy (swallow-cough-swallow sequence) and emphasis placed that pt should complete this after each sip of liquid. Pt verbalized agreement and understanding with training provided however ongoing speech therapy warranted.   SLP Discharge Planning   SLP Plan x5/week, reinforce supra glottic swallow strategy (swallow-cough-swallow sequence) with thin liquids. Trial advanced solids as appropriate. Reinforce standard safe swallow strategies   SLP Discharge Recommendation Transitional Care Facility or Home with home therapy   SLP Rationale for DC Rec Below baseline swallowing fx. Warrents ongoing speech therapy for dysphagia management.   SLP Brief overview of current status  Recommend a diet of Minced and Moist solids (level 5) and Thin liquids with supra glottic swallow strategy (swallow, cough, swallow sequence) following each sip of thin liquid. Ensure pt is fully alert and upright for all PO. SLP to follow to reinforce safe swallow strategies.   Total Session Time   Total Session Time (sum of timed and untimed services) 25   Psychosocial Support   Trust Relationship/Rapport care explained;choices provided

## 2024-03-04 NOTE — PLAN OF CARE
Goal Outcome Evaluation:    Slept well. Repositions self in bed, but needs help boosting occasionally. Alert, oriented. Gets up with assist x1 and FWW.     Neuro checks done Q 4 hrs- no changes overnight. Continues to have slurred speech and left sided facial droop. Tolerating full liquids well- no straws. Plan for video swallow study. SCD's in place. Wearing 1 L oxygen oxymask in place of cpap machine- pt declining hospitals, sats stable >90.    Temp: 98  F (36.7  C) Temp src: Axillary BP: 134/62 Pulse: 88   Resp: 18 SpO2: 95 % O2 Device: None (Room air) Oxygen Delivery: 1 LPM

## 2024-03-04 NOTE — PLAN OF CARE
Alert and oriented, able to make needs known, calls appropriately. Pupils asymmetric, L 4mm, R 3mm, otherwise normal response. NIHSS score of 7 at 1300, 6 at 1700. Speech is consistently slurred. Denies pain throughout shift. Telemetry shows a fib with BBB with frequent PVCs.     Care plan to continue    Problem: Stroke, Ischemic (Includes Transient Ischemic Attack)  Goal: Optimal Coping  Outcome: Progressing     Problem: Comorbidity Management  Goal: Maintenance of COPD Symptom Control  Outcome: Progressing

## 2024-03-04 NOTE — PROGRESS NOTES
NEUROLOGY PROGRESS NOTE     Marla Dunn,  1929, MRN [id number] Date: 3/4/2024     [facility]   Code status:  No CPR- Do NOT Intubate   PCP: Gabrielle Wallace, [phone]      ASSESSMENT & PLAN   Diagnosis code: Ischemic stroke    Ischemic stroke  History of coronary artery disease/MI  History of hypertension/hyperlipidemia  Atrial fibrillation  Left biceps bruising  New right leg weakness-rule out stroke/hemorrhagic transformation versus effort dependent    MRI brain shows hyperacute infarction in the right precentral and postcentral gyri and underlying white matter  CTA negative for any significant large vessel occlusion/stenosis  Echocardiogram shows normal ejection fraction with significant wall motion abnormality and moderate to severe mitral stenosis  Patient was found to be in A-fib in the emergency room  Lipid panel shows LDL 81.  40 mg of Lipitor  Status post tenecteplase.  Head CT stable.  Continue aspirin and statin   Patient does have a new bruise in her left biceps.  Anticoagulation for A-fib to be started as an outpatient after discussion with outpatient cardiology  Aspirin 81 mg  Slow reduction in blood pressure  PT/OT  Please note:-Per patient wishes, if she had another stroke she does not IV thrombolytics.  Worsening right leg weakness-possibly effort dependent.  Will check a stat head CT given that she was given tenecteplase.    Addendum:-Head CT came back negative for acute stroke.  If she continues to have ongoing weakness we will check an MRI brain.     Chief Complaint   Patient presents with    Stroke        HISTORY OF PRESENT ILLNESS     We have been requested by Dr. Lizarraga to evaluate Marla Dunn who is a 89 year old  female for stroke.  Is a 89-year-old female with past medical history of coronary artery disease, previous MI, CHF, hypertension, hyperlipidemia, rheumatoid arthritis, Sjogren's syndrome and chronic kidney disease admitted for left-sided  weakness and some garbled speech along with a left facial droop and left-sided neglect.  No gaze deviation.  She was last normal at 1730.  She was found all dressed up sitting in the wheelchair only using her right hand.  Patient reported that at 7 AM this morning she was normal.  She was given tenecteplase which she reports has helped with her symptoms.  Reports no ongoing weakness at this point.  Does take aspirin at baseline.  No anticoagulation.    3/3  No new neurological symptoms.  She does have bruising in the left biceps.  Reports that she does not want the IV thrombolytics in the future if she has a stroke.    3/4  Patient on exam today was noted to have new right leg weakness.  She reports that she cannot move her leg though when confronted to tell me when it started she says it has been there for few days even though she could move her leg yesterday.  Bruising is about the same.  Discussed with primary team and patient is supposed to follow-up with her cardiologist and an outpatient and the decision would be made during that visit if she needs to be on anticoagulation or not.     PAST MEDICAL & SURGICAL HISTORY     Medical History  Past Medical History:   Diagnosis Date    Asthma     Bilateral carpal tunnel syndrome 08/27/2015    CAD (coronary artery disease)     Chronic airway obstruction, not elsewhere classified     Created by Conversion     Chronic anemia     Chronic edema     Congestive heart failure, severe (H) 05/13/2020    COPD (chronic obstructive pulmonary disease) (H)     Coronary artery disease     Depression     GERD (gastroesophageal reflux disease)     Heart disease     Heart murmur     High cholesterol     dislipidemia    HTN (hypertension)     Hypercholesterolemia     Hypertension     Hypertensive emergency 08/13/2021    Hypothyroidism     Hypothyroidism     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    MI (myocardial infarction) (H) 1985    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)  11/23/2020    Myelodysplastic syndrome (H)     Myocardial infarction (H) 1983    OLEGARIO (obstructive sleep apnea)     Osteoporosis     Other and unspecified hyperlipidemia     Created by Conversion     Pyelonephritis 05/11/2016    Radicular low back pain     Rheumatoid arthritis (H)     Elizabeth Agers syndrome     Sjoegren syndrome     Sjogren's syndrome (H24)     Sleep apnea, obstructive     Spinal stenosis     Squamous cell carcinoma, tongue border (H)     Unspecified polyarthropathy or polyarthritis, hand     Created by Conversion      Surgical History  Past Surgical History:   Procedure Laterality Date    aortic valve      AORTIC VALVE REPLACEMENT  01/01/2012    APPENDECTOMY      APPENDECTOMY      AS TOTAL KNEE ARTHROPLASTY Right     BACK SURGERY  2004    spinal decompression    BACK SURGERY      spinal stenosis    BONE MARROW BIOPSY  2004    breast biopsy      BREAST SURGERY  2001    biopsy    BYPASS GRAFT ARTERY CORONARY  01/01/2009    LIMA to ramus intermedius    cardiac angiogram      CARDIAC CATHETERIZATION      CARDIAC SURGERY      EYE SURGERY  2008    bilateral cataract repair     EYE SURGERY Bilateral     cornea transplant left eye & cataracts    KYPHOPLASTY  2003    T12    OPEN REDUCTION INTERNAL FIXATION HIP NAILING Right 08/13/2021    Procedure: INTERNAL FIXATION, FRACTURE, TROCHANTERIC, HIP, USING PINS OR RODS;  Surgeon: Darrel Castro MD;  Location: [facility] OR    OTHER SURGICAL HISTORY  01/01/2018    Rectosigmoidectomy perineal    PARTIAL GLOSSECTOMY Right 09/2023    [facility]    RECTOSIGMOIDECTOMY PERINEAL N/A 01/10/2018    Procedure: RECTOSIGMOIDECTOMY PERINEAL;  PERINEAL RECTOSIGMOIDECTOMY ;  Surgeon: Candelaria Anthony MD;  Location: [facility] OR    REPAIR VALVE AORTIC  2009    THORACIC SURGERY  2003    T12 kyphoplasty    [facility] CABG, ARTERY-VEIN, FOUR      [facility] CABG, VEIN, SINGLE      Description: CABG (CABG);  Recorded: 03/09/2012;  Comments: Single vessel bypass graft to an obtuse  marginal branch in May 2009    ZZC REPLACE AORT VALV,PROSTH VALV      Description: Aortic Valve Replacement;  Recorded: 03/09/2012;  Comments: Aortic valve replacement        SOCIAL HISTORY     Social History     Tobacco Use    Smoking status: Never    Smokeless tobacco: Never   Substance Use Topics    Alcohol use: No     Alcohol/week: 0.0 standard drinks of alcohol     Comment: Alcoholic Drinks/day: occasional glass of wine    Drug use: No        FAMILY HISTORY     Reviewed, and family history includes Cancer in her brother, mother, and sister; Family History Negative in her mother; Heart Disease in her father.     ALLERGIES     Allergies   Allergen Reactions    Gramineae Pollens Other (See Comments)     ORCHARDGRASS. Shortness of breath when lawn is just cut.    Smoke. Other (See Comments)     Hard to breathe.    Pollen Extract      Other reaction(s): Unknown        REVIEW OF SYSTEMS     12 system ROS was done within the HPI this was negative.  Pertinent positives noted on the HPI.     HOME & HOSPITAL MEDICATIONS     Prior to Admission Medications  Medications Prior to Admission   Medication Sig Dispense Refill Last Dose    acetaminophen (TYLENOL) 325 MG tablet Take 3 tablets (975 mg) by mouth every 8 hours as needed for mild pain   More than a month    albuterol (PROAIR HFA/PROVENTIL HFA/VENTOLIN HFA) 108 (90 Base) MCG/ACT inhaler Inhale 2 puffs into the lungs 4 times daily   2/29/2024 at hs    amoxicillin (AMOXIL) 500 MG capsule 4 CAPSULES (2000MG ) ORALLY AS NEEDED ONE HOUR PRIOR TO DENTAL APPOINTMENT 4 capsule 5 Unknown    ANTACID REGULAR STRENGTH 500 MG chewable tablet 2 TABLETS (1000MG) ORALLY 2 TIMES DAILY AS NEEDED FOR HEARTBURN 60 tablet 11 Unknown    Ascorbic Acid (VITAMIN C PO) Take 500 mg by mouth every morning   2/29/2024    ASPIRIN LOW DOSE 81 MG EC tablet 1 TABLET ORALLY 2 TIMES DAILY (DX: PROPHYLAXIS) 180 tablet 3 2/29/2024 at hs    Bacillus Coagulans-Inulin (PROBIOTIC FORMULA) 1-250 BILLION-MG  CAPS 1 CAPSULE ORALLY DAILY 31 capsule 11 2/29/2024    bisacodyl (DULCOLAX) 5 MG EC tablet 1 TABLET ORALLY DAILY AS NEEDED (MAY KEEP AT BEDSIDE) (Patient taking differently: 5 mg 2 times daily as needed) 30 tablet 10 Unknown    chlorhexidine (PERIDEX) 0.12 % solution Swish and spit 15 mLs in mouth 2 times daily May self administer 118 mL 3 2/29/2024 at hs    cholecalciferol (VITAMIN D3) 125 mcg (5000 units) capsule 1 CAPSULE ORALLY DAILY 90 capsule 3 2/29/2024 at pm    COMBIVENT RESPIMAT  MCG/ACT inhaler INHALE 1 PUFF INTO THE LUNGS  4 TIMES DAILY 4 g 11 2/29/2024 at pm    cycloSPORINE (RESTASIS) 0.05 % ophthalmic emulsion Place 1 drop into both eyes 2 times daily    3/1/2024 at am    demeclocycline (DECLOMYCIN) 150 MG tablet 1 TABLET ORALLY 2 TIMES DAILY 60 tablet 11 2/29/2024 at hs    fish oil-omega-3 fatty acids 1000 MG capsule Take 2 capsules (2 g) by mouth daily (Patient taking differently: Take 1 g by mouth daily) 28 capsule 11 2/29/2024 at am    fluorometholone (FML LIQUIFILM) 0.1 % ophthalmic susp Place 1 drop Into the left eye daily    2/29/2024 at am    hydrocortisone 1 % CREA cream Place rectally 2 times daily as needed for itching   Unknown    IBANDRONATE SODIUM PO Take 150 mg by mouth every 30 days   2/4/2024    levothyroxine (SYNTHROID/LEVOTHROID) 88 MCG tablet 1 TABLET ORALLY EVERY MORNING ON AN EMPTY STOMACH 31 tablet 11 2/29/2024    loratadine (CLARITIN) 10 MG tablet 1 TABLET ORALLY DAILY (DX: ASTHMA) 28 tablet 11 2/29/2024    melatonin 3 MG tablet 1 TABLET ORALLY AT BEDTIME ALONG WITH 5 MG FOR A TOTAL DOSE OF 8MG 30 tablet 11 2/29/2024    melatonin 5 MG tablet 1 TABLET ORALLY AT BEDTIME  ALONG WITH 3MG FOR A TOTAL DOSE OF 8MG 30 tablet 11 2/29/2024    metoprolol tartrate (LOPRESSOR) 25 MG tablet 1 TABLET ORALLY 2 TIMES DAILY (DX: HYPERTENSION) 56 tablet 11 2/29/2024 at hs    mirtazapine (REMERON) 7.5 MG tablet 1 TABLET ORALLY AT BEDTIME 31 tablet 11 2/29/2024    Multiple Vitamins-Minerals  (PRESERVISION AREDS 2) CAPS 1 CAPSULE ORALLY 2 TIMES DAILY 62 capsule 11 2/29/2024 at pm    omeprazole (PRILOSEC) 20 MG DR capsule 1 CAPSULE ORALLY 2 TIMES DAILY (DX: GASTROESOPHAGEAL REFLUX DISEASE) 62 capsule 11 2/29/2024 at pm    polyethylene glycol 0.4%- propylene glycol 0.3% (SYSTANE ULTRA) 0.4-0.3 % SOLN ophthalmic solution Place 1 drop into both eyes 4 times daily   2/29/2024 at hs    Polyethylene Glycol 3350 (PEG 3350) 17 GM/SCOOP POWD MIX 1 CAPFUL (17 GMS) IN 8 OUNCES WATER AND DRINK ORALLY DAILY (DX: CONSTIPATION) 510 g 11 2/29/2024 at am    Probiotic Product (PROBIOTIC PO) Take 1 capsule by mouth every morning   2/29/2024 at am    SENEXON-S 8.6-50 MG tablet 1 TABLET ORALLY 2 TIMES DAILY (DX: CONSTIPATION) 56 tablet 11 2/29/2024 at hs    sodium chloride 1 GM tablet Take 1 tablet (1 g) by mouth daily 30 tablet 11 2/29/2024 at am    spironolactone (ALDACTONE) 25 MG tablet 1 TABLET ORALLY DAILY (DX: EDEMA) ** HAZARDOUS MED: WEAR DOUBLE NITRILE GLOVES** 28 tablet 11 2/29/2024 at am    timolol (TIMOPTIC) 0.5 % ophthalmic solution Place 1 drop Into the left eye daily    2/29/2024 at am    torsemide (DEMADEX) 20 MG tablet 1.5 TABLET (30mg) ORALLY DAILY 31 tablet 11 2/29/2024 at am    vitamin C (ASCORBIC ACID) 500 MG tablet 1 TABLET ORALLY DAILY (DX: SUPPLEMENT) 31 tablet 11 2/29/2024 at am    OXYGEN-HELIUM IN           Hospital Medications   albuterol  2 puff Inhalation 4x Daily    aspirin  81 mg Oral Daily    cycloSPORINE  1 drop Both Eyes BID    demeclocycline  150 mg Oral Q12H [facility] (08/20)    fluorometholone  1 drop Left Eye Daily    levothyroxine  88 mcg Oral QAM AC    metoprolol tartrate  25 mg Oral BID    mirtazapine  7.5 mg Oral At Bedtime    pantoprazole  40 mg Oral BID AC    polyethylene glycol-propylene glycol  1 drop Both Eyes 4x Daily    sodium chloride  1 g Oral Daily    timolol maleate  1 drop Left Eye Daily    umeclidinium-vilanterol  1 puff Inhalation Daily        PHYSICAL EXAM     Vital  "signs  Temp:  [97.1  F (36.2  C)-98.1  F (36.7  C)] 98  F (36.7  C)  Pulse:  [78-89] 88  Resp:  [17-18] 18  BP: (109-138)/(54-71) 111/56  SpO2:  [92 %-96 %] 92 %    PHYSICAL EXAMINATION  VITALS: /56 (BP Location: Right arm)   Pulse 88   Temp 98  F (36.7  C) (Oral)   Resp 18   Ht 1.6 m (5' 3\")   Wt 42.3 kg (93 lb 4.1 oz)   SpO2 92%   BMI 16.52 kg/m    GENERAL -Health appearing, No apparent distress  EYES- No scleral icterus, no eyelid droop, Pupils - see Neuro section  HEENT - Normocephalic, atraumatic, Hearing grossly limited; Oral mucosa moist and pink in color. External Ears and nose intact.   Neck - supple   PULM - Good spontaneous respiratory effort  CV- Pedal pulses present with no peripheral edema/ No significant varicosities.  MSK- Gait - see Neuro section; Strength and tone- see Neuro section; Range of motion grossly intact.  PSYCH -cooperative    Neurological  Mental status - Patient is awake and partially oriented to self, place and time. Attention span is normal. Language is fluent and follows commands appropriately.   Cranial nerves - CN II-XII intact. Pupils are reactive and symmetric; EOMI, VFIT, NLF symmetric  Motor - There is no pronator drift. Motor exam shows 5/5 strength in all extremities except right leg weakness which could be effort dependent.  Tone - Tone is symmetric bilaterally in upper and lower extremities.  Reflexes - Reflexes are symmetric and decreased.  Sensation - Sensory exam is grossly intact to light touch, pain.  Coordination - Finger to nose and heel to shin is without dysmetria except left upper extremity dysmetria.  Gait and station --unable to safely ambulate.  Formal gait testing cannot be done due to safety concerns from ongoing medical issues.  Exam stable/nonfocal except new right leg weakness which could be effort dependent. She does have a new hematoma/bruising in the left biceps region.     DIAGNOSTIC STUDIES     Pertinent Radiology   MRI  IMPRESSION:  1.  " Hyperacute infarction involving the right precentral and post central gyri and underlying white matter.  2.  Generalized brain atrophy and presumed microvascular ischemic changes as detailed above.    HEAD CT:  1.  No finding for intracranial hemorrhage or mass or convincing finding for acute infarct.     2.  Moderate volume loss and presumed sequela of chronic microvascular ischemic change.     3.  New chronic infarct inferior lateral aspect of the left cerebellar hemisphere.     HEAD CTA:   1.  There is occlusion of a distal M2 superior division segment of the right middle cerebral artery without flow seen more distally within the vessel.     2.  Coarse atherosclerotic calcifications both carotid siphons and proximal intradural vertebral arteries with mild associated luminal narrowing but no clear flow-limiting stenosis.     NECK CTA:  1.  No significant stenosis either cervical carotid system.     2.  Mild atherosclerotic narrowing of the takeoff of both vertebral arteries.     CT PERFUSION:  1.  There is elevated Tmax, MTT, and decreased rCBF along the lateral and posterior aspect of the right frontal lobe compatible with ischemic penumbra.     2.  Area of Tmax >6 seconds measures 11 mL with volume of CBF <30% measuring 0 mL.    ECHO  1. Normal left ventricular size and systolic performance with a visually  estimated ejection fraction of 65%.  2. There is severe basal inferior hypokinesis and moderate basal posterior  hypokinesis  3. There is mild concentric increase in left ventricular wall thickness.  4. 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  Â  Normal aortic valve prosthesis metrics with a mean systolic gradient of 19  mmHg and a peak anterograde velocity of 2.8 m/sec.  Â  No aortic insufficiency is detected.  5. There is moderate-severe calcific mitral stenosis.  6. Normal right ventricular size with mildly reduced right ventricular  systolic performance.  7. There is severe left atrial enlargement. There  is moderate right atrial  enlargement.     When compared to the prior real-time echocardiogram dated 6 September 2022,  the degree of mitral stenosis has increased from moderate to now moderate-  severe. The findings are otherwise felt to be fairly similar on both  examinations. The aforementioned regional wall motion abnormalities are not  new and were identified on the prior study as well.    Head CT  IMPRESSION:  1.  No hemorrhage.  2.  Recent infarct at the right frontoparietal junction on MRI is not visible on CT.     Head CT  IMPRESSION:  1.  No acute hemorrhage.  2.  Known right frontoparietal infarct is not appreciated on CT.  3.  No new/acute territorial infarction.    LABS  Component      Latest Ref Rng 3/1/2024  8:41 AM   Cholesterol      <200 mg/dL 137    Triglycerides      <150 mg/dL 72    HDL Cholesterol      >=50 mg/dL 42 (L)    LDL Cholesterol Calculated      <=100 mg/dL 81    Non HDL Cholesterol      <130 mg/dL 95    Hemoglobin A1C      <5.7 % 5.3       Legend:  (L) Low      Recent Results (from the past 24 hour(s))   Basic metabolic panel    Collection Time: 03/04/24  6:32 AM   Result Value Ref Range    Sodium 136 135 - 145 mmol/L    Potassium 3.7 3.4 - 5.3 mmol/L    Chloride 108 (H) 98 - 107 mmol/L    Carbon Dioxide (CO2) 19 (L) 22 - 29 mmol/L    Anion Gap 9 7 - 15 mmol/L    Urea Nitrogen 24.6 (H) 8.0 - 23.0 mg/dL    Creatinine 0.84 0.51 - 0.95 mg/dL    GFR Estimate 64 >60 mL/min/1.73m2    Calcium 8.6 8.2 - 9.6 mg/dL    Glucose 82 70 - 99 mg/dL   Extra Purple Top EDTA (LAB USE ONLY)    Collection Time: 03/04/24  6:32 AM   Result Value Ref Range    Hold Specimen JIC        Total time spent for face to face visit, reviewing labs/imaging studies, counseling and coordination of care was: Over 50 min More than 50% of this time was spent on counseling and coordination of care.      Worsening neurological exam.  High risk.  Coordination of care with nursing staff.  Getting head CT.    Chace Griffiths  MD  Neurologist  [facility]  Tel:- [phone]

## 2024-03-04 NOTE — PROGRESS NOTES
[facility]    Medicine Progress Note - Hospitalist Service    Date of Admission:  3/1/2024    Assessment & Plan   Marla Dunn is a 89 year old female with PMH CAD Hx MI, CHF, HTN, HLD, RA, Sjogren's syndrome, SaraAgers syndrome, CKD, aortic stenosis, COPD, chronic hyponatremia, OLEGARIO, Atril fibrillation not on AC admitted on 3/1/2024 for acute ischemic stroke     Acute ischemic stroke -right MCA territory  New RLE weakness - stroke vs effort dependent  MRI brain shows hyperacute infarction in the right precentral and postcentral gyri and underlying white matter  CTA negative for any significant large vessel occlusion/stenosis  Echocardiogram shows normal ejection fraction with significant wall motion abnormality and moderate to severe mitral stenosis  Repeat CT 03/02: Negative for hemorrhage  LDL 81  Seen by stroke neurology, S/p TNK on presentaion.  -- Patient was found to have right lower extremity weakness 03/04, stat CT head negative for new ischemic stroke/hemorrhagic transformation. Will consider MRI brain if RLE weakness does not improve  Seen by Neurology, appreciate recommendations  PT/OT - recommend Home PT/OT  SLP re-eval, commend minced and moist solids, thin liquids with supraglottic swallow strategy  Asa 81mg, AC for Afib to be discussed with cardiology OP  - In case of recurrent stroke patient does not want IV thrombolytics     - URI  Patient appears to be congested, lungs clear  Viral panel negative  Supportive care    - Chronic Atrial Fib  - PTA metoprolol  - Not on AC due to frequent falls  - Patient was offered Watchman device implant, but patient and family did not want to pursue as she had to be on anticoagulation for about 12 weeks  - Patient states she will follow-up with her primary cardiologist to discuss further if she would want to be on anticoagulation, will hold off starting anticoagulation at this time  - Patient has bruising on the left upper extremity  "around biceps and elbow since admission, will monitor.  Will hold AC due to high risk of bleeding    - Chronic HFpEF  PTA torsemide 30 mg daily and spironolactone 25 mg daily - held  Discontinued IV fluids and started diet. Will restart meds once patient tolerating adequate hydration       -Elevated troponin likely 2/2 demand  CAD  Troponin 42, denies chest pain  Repeat Troponin unchanged ~ flat  EKG A fib with PVC's, no acute ischemic changes  Monitor     -Mild hyponatremia  Hypochloremia  Na at 136  Resume demeclocycline and salt tabs 03/04  Off IV hydration, started po diet  Monitor Na     - CKD  Monitor creatinine     - Normocytic anemia  Stable  No evidence of bleeding  Monitor Hb     - Hx RLE hematoma and cellulitis  Completed p.o. Keflex, patient refusing dressing to be removed  Monitor     - COPD  Not in exacerbation  PTA inhalers     - OLEGARIO  CPAP     - Hypothyroidism  PTA levothyroxine          Diet: Minced & Moist Diet (level 5) Thin Liquids (level 0)    DVT Prophylaxis: Pneumatic Compression Devices  Bush Catheter: Not present  Lines: None     Cardiac Monitoring: ACTIVE order. Indication: Stroke, acute (48 hours)  Code Status: No CPR- Do NOT Intubate      Clinically Significant Risk Factors              # Hypoalbuminemia: Lowest albumin = 3.3 g/dL at 3/2/2024  4:06 AM, will monitor as appropriate     # Hypertension: Noted on problem list  # Chronic heart failure with preserved ejection fraction: heart failure noted on problem list and last echo with EF >50%       # Cachexia: Estimated body mass index is 16.52 kg/m  as calculated from the following:    Height as of this encounter: 1.6 m (5' 3\").    Weight as of this encounter: 42.3 kg (93 lb 4.1 oz)., PRESENT ON ADMISSION     # COPD: noted on problem list  # History of CABG: noted on surgical history       Disposition Plan      Expected Discharge Date: 03/05/2024      Destination: assisted living  Discharge Comments: SLP - NPO  PT/OT eval      "       Cherelle Gil MD  Hospitalist Service  [facility]  Securely message with [employer] (more info)  Text page via [employer] Paging/Directory   ______________________________________________________________________    Interval History   Was examined at the bedside, has new right lower extremity weakness today.  CT head negative.  Seen by neurology.  Will do MRI brain if does not improve.   Tried calling niece Deneen, unable to reach    Physical Exam   Vital Signs: Temp: 98  F (36.7  C) Temp src: Oral BP: 111/56 Pulse: 88   Resp: 18 SpO2: 92 % O2 Device: None (Room air)    Weight: 93 lbs 4.07 oz    General Appearance: Well-appearing, well-nourished, no acute distress   Neck:  Supple  Cardio:  Regular rate, irregular rhythm  Pulm:  No respiratory distress  Extremities: Extremities atraumatic, RLE wound with dressing  Skin:  Skin warm, dry, no rashes  Neuro:  Alert, no gross sensory defects, some pupillary asymmetry L > R, Left sided facial droop, left arm and leg hemiplegia, improving. Follows commands, left-sided neglect, speech garbled, improved since yesterday  New 03/04: RLE weakness    Medical Decision Making       60 MINUTES SPENT BY ME on the date of service doing chart review, history, exam, documentation & further activities per the note.      Data     I have personally reviewed the following data over the past 24 hrs:    N/A  \   N/A   / N/A     136 108 (H) 24.6 (H) /  82   3.7 19 (L) 0.84 \       Imaging results reviewed over the past 24 hrs:   Recent Results (from the past 24 hour(s))   CT Head w/o Contrast    Narrative    EXAM: CT HEAD W/O CONTRAST  LOCATION: [facility]  DATE: 03/04/2024    INDICATION: New right leg weakness, recent IV TNK  COMPARISON: 03/02/2024  TECHNIQUE: Routine CT head without IV contrast. Multiplanar reformats. Dose reduction techniques were used.    FINDINGS:  INTRACRANIAL CONTENTS: No intracranial hemorrhage, extraaxial collection, or  mass effect.  Known evolving infarct in the right frontoparietal junction seen on MRI is not visualized on CT. No new acute territorial infarction. Chronic left cerebellar   infarct. Mild presumed chronic small vessel ischemic changes. Mild generalized volume loss. No hydrocephalus.     Bilateral carotid siphon and vertebral artery V4 segment atherosclerotic calcifications.    VISUALIZED ORBITS/SINUSES/MASTOIDS: No intraorbital abnormality. Trace mucosal thickening scattered about the paranasal sinuses. Scattered fluid/membrane thickening in the right mastoid air cells. No apparent mass in the posterior nasopharynx or skull   base.    BONES/SOFT TISSUES: No acute abnormality. Redemonstrated advanced degenerative changes of the temporomandibular joints.      Impression    IMPRESSION:  1.  No acute hemorrhage.  2.  Known right frontoparietal infarct is not appreciated on CT.  3.  No new/acute territorial infarction.   XR Video Swallow with SLP or OT    Narrative    EXAM: XR VIDEO SWALLOW WITH SLP OR OT  LOCATION: [facility]  DATE: 3/4/2024    INDICATION: Difficulty swallowing.  COMPARISON: None.    TECHNIQUE: Routine swallow study with speech pathology using multiple barium thicknesses.    FINDINGS:   FLUOROSCOPIC TIME: 3.2 minutes  NUMBER OF IMAGES: 11 cine loops    Swallow study with Speech Pathology using multiple barium thicknesses.     Patient does not have her dentures at hospital.    There is deep, silent penetration to the cords with mildly thick and thin barium repeatedly throughout all the challenges.    With a post swallow throat clearing/cough, most of the barium clears from the larynx. Amount of deep penetration is not altered with a chin tuck.    No lakshmi aspiration.    Patient able to swallow moderately thick and pudding consistency barium without difficulty. There is slight residua within the vallecula with a barium coated cracker.    Please refer to speech pathology note.

## 2024-03-04 NOTE — PROGRESS NOTES
Care Management Follow Up    Length of Stay (days): 3    Expected Discharge Date: 03/04/2024     Concerns to be Addressed:   discharge planning   Patient plan of care discussed at interdisciplinary rounds: Yes    Anticipated Discharge Disposition:  home with home care     Anticipated Discharge Services:  home care; PT/OT  Anticipated Discharge DME:  none    Patient/family educated on Medicare website which has current facility and service quality ratings: NA    Education Provided on the Discharge Plan:  yes  Patient/Family in Agreement with the Plan:  yes    Referrals Placed by CM/SW:  home care  Private pay costs discussed: Not applicable    Additional Information:  SW called patient's niece, Deneen, to see if they are able to add PT/OT home care through the [facility] or if an alternate agency should be sent the referral. Deneen states that an outside agency is usually used and last time it was [facility]. ALBERTO sent referral to [facility].    BALDOMERO Phipps

## 2024-03-05 ENCOUNTER — APPOINTMENT (OUTPATIENT)
Dept: SPEECH THERAPY | Facility: HOSPITAL | Age: 89
DRG: 062 | End: 2024-03-05
Payer: COMMERCIAL

## 2024-03-05 ENCOUNTER — APPOINTMENT (OUTPATIENT)
Dept: MRI IMAGING | Facility: HOSPITAL | Age: 89
DRG: 062 | End: 2024-03-05
Attending: PSYCHIATRY & NEUROLOGY
Payer: COMMERCIAL

## 2024-03-05 LAB
ANION GAP SERPL CALCULATED.3IONS-SCNC: 5 MMOL/L (ref 7–15)
BUN SERPL-MCNC: 21.6 MG/DL (ref 8–23)
CALCIUM SERPL-MCNC: 8.7 MG/DL (ref 8.2–9.6)
CHLORIDE SERPL-SCNC: 110 MMOL/L (ref 98–107)
CREAT SERPL-MCNC: 0.81 MG/DL (ref 0.51–0.95)
DEPRECATED HCO3 PLAS-SCNC: 23 MMOL/L (ref 22–29)
EGFRCR SERPLBLD CKD-EPI 2021: 67 ML/MIN/1.73M2
GLUCOSE SERPL-MCNC: 91 MG/DL (ref 70–99)
MAGNESIUM SERPL-MCNC: 2.4 MG/DL (ref 1.7–2.3)
POTASSIUM SERPL-SCNC: 3.3 MMOL/L (ref 3.4–5.3)
POTASSIUM SERPL-SCNC: 3.7 MMOL/L (ref 3.4–5.3)
SODIUM SERPL-SCNC: 138 MMOL/L (ref 135–145)

## 2024-03-05 PROCEDURE — 250N000013 HC RX MED GY IP 250 OP 250 PS 637: Performed by: STUDENT IN AN ORGANIZED HEALTH CARE EDUCATION/TRAINING PROGRAM

## 2024-03-05 PROCEDURE — 82565 ASSAY OF CREATININE: CPT | Performed by: STUDENT IN AN ORGANIZED HEALTH CARE EDUCATION/TRAINING PROGRAM

## 2024-03-05 PROCEDURE — 36415 COLL VENOUS BLD VENIPUNCTURE: CPT | Performed by: STUDENT IN AN ORGANIZED HEALTH CARE EDUCATION/TRAINING PROGRAM

## 2024-03-05 PROCEDURE — 120N000001 HC R&B MED SURG/OB

## 2024-03-05 PROCEDURE — 99232 SBSQ HOSP IP/OBS MODERATE 35: CPT | Performed by: PSYCHIATRY & NEUROLOGY

## 2024-03-05 PROCEDURE — 99233 SBSQ HOSP IP/OBS HIGH 50: CPT | Performed by: STUDENT IN AN ORGANIZED HEALTH CARE EDUCATION/TRAINING PROGRAM

## 2024-03-05 PROCEDURE — 70551 MRI BRAIN STEM W/O DYE: CPT

## 2024-03-05 PROCEDURE — 92526 ORAL FUNCTION THERAPY: CPT | Mod: GN

## 2024-03-05 PROCEDURE — 83735 ASSAY OF MAGNESIUM: CPT | Performed by: STUDENT IN AN ORGANIZED HEALTH CARE EDUCATION/TRAINING PROGRAM

## 2024-03-05 PROCEDURE — 250N000011 HC RX IP 250 OP 636: Performed by: STUDENT IN AN ORGANIZED HEALTH CARE EDUCATION/TRAINING PROGRAM

## 2024-03-05 PROCEDURE — 84132 ASSAY OF SERUM POTASSIUM: CPT | Performed by: STUDENT IN AN ORGANIZED HEALTH CARE EDUCATION/TRAINING PROGRAM

## 2024-03-05 RX ORDER — POTASSIUM CHLORIDE 1500 MG/1
20 TABLET, EXTENDED RELEASE ORAL ONCE
Status: COMPLETED | OUTPATIENT
Start: 2024-03-05 | End: 2024-03-05

## 2024-03-05 RX ORDER — LORAZEPAM 0.5 MG/1
0.5 TABLET ORAL ONCE
Status: COMPLETED | OUTPATIENT
Start: 2024-03-05 | End: 2024-03-05

## 2024-03-05 RX ORDER — SPIRONOLACTONE 25 MG/1
25 TABLET ORAL DAILY
Status: DISCONTINUED | OUTPATIENT
Start: 2024-03-05 | End: 2024-03-08 | Stop reason: HOSPADM

## 2024-03-05 RX ORDER — POTASSIUM CHLORIDE 7.45 MG/ML
10 INJECTION INTRAVENOUS ONCE
Status: COMPLETED | OUTPATIENT
Start: 2024-03-05 | End: 2024-03-05

## 2024-03-05 RX ADMIN — POTASSIUM CHLORIDE 20 MEQ: 1500 TABLET, EXTENDED RELEASE ORAL at 09:27

## 2024-03-05 RX ADMIN — CYCLOSPORINE 1 DROP: 0.5 EMULSION OPHTHALMIC at 20:38

## 2024-03-05 RX ADMIN — POTASSIUM CHLORIDE 10 MEQ: 7.46 INJECTION, SOLUTION INTRAVENOUS at 20:44

## 2024-03-05 RX ADMIN — DEMECLOCYCLINE 150 MG: 150 TABLET ORAL at 09:27

## 2024-03-05 RX ADMIN — ALBUTEROL SULFATE 2 PUFF: 90 AEROSOL, METERED RESPIRATORY (INHALATION) at 12:57

## 2024-03-05 RX ADMIN — DEMECLOCYCLINE 150 MG: 150 TABLET ORAL at 20:37

## 2024-03-05 RX ADMIN — CYCLOSPORINE 1 DROP: 0.5 EMULSION OPHTHALMIC at 09:26

## 2024-03-05 RX ADMIN — METOPROLOL TARTRATE 25 MG: 25 TABLET, FILM COATED ORAL at 20:36

## 2024-03-05 RX ADMIN — POLYPROPYLENE GLYCOL 400, PROPYLENE GLYCOL 1 DROP: .4; .3 LIQUID OPHTHALMIC at 20:38

## 2024-03-05 RX ADMIN — ALBUTEROL SULFATE 2 PUFF: 90 AEROSOL, METERED RESPIRATORY (INHALATION) at 20:39

## 2024-03-05 RX ADMIN — TIMOLOL MALEATE 1 DROP: 5 SOLUTION/ DROPS OPHTHALMIC at 09:35

## 2024-03-05 RX ADMIN — ACETAMINOPHEN 650 MG: 325 TABLET ORAL at 21:12

## 2024-03-05 RX ADMIN — PANTOPRAZOLE SODIUM 40 MG: 40 TABLET, DELAYED RELEASE ORAL at 06:27

## 2024-03-05 RX ADMIN — POLYPROPYLENE GLYCOL 400, PROPYLENE GLYCOL 1 DROP: .4; .3 LIQUID OPHTHALMIC at 12:56

## 2024-03-05 RX ADMIN — ALBUTEROL SULFATE 2 PUFF: 90 AEROSOL, METERED RESPIRATORY (INHALATION) at 16:55

## 2024-03-05 RX ADMIN — SODIUM CHLORIDE TAB 1 GM 1 G: 1 TAB at 09:27

## 2024-03-05 RX ADMIN — LORAZEPAM 0.5 MG: 0.5 TABLET ORAL at 14:28

## 2024-03-05 RX ADMIN — ALBUTEROL SULFATE 2 PUFF: 90 AEROSOL, METERED RESPIRATORY (INHALATION) at 09:24

## 2024-03-05 RX ADMIN — ASPIRIN 81 MG CHEWABLE TABLET 81 MG: 81 TABLET CHEWABLE at 09:27

## 2024-03-05 RX ADMIN — METOPROLOL TARTRATE 25 MG: 25 TABLET, FILM COATED ORAL at 09:27

## 2024-03-05 RX ADMIN — MIRTAZAPINE 7.5 MG: 7.5 TABLET ORAL at 20:42

## 2024-03-05 RX ADMIN — FLUOROMETHOLONE 1 DROP: 1 SOLUTION/ DROPS OPHTHALMIC at 09:31

## 2024-03-05 RX ADMIN — PANTOPRAZOLE SODIUM 40 MG: 40 TABLET, DELAYED RELEASE ORAL at 16:55

## 2024-03-05 RX ADMIN — POLYPROPYLENE GLYCOL 400, PROPYLENE GLYCOL 1 DROP: .4; .3 LIQUID OPHTHALMIC at 16:55

## 2024-03-05 RX ADMIN — UMECLIDINIUM BROMIDE AND VILANTEROL TRIFENATATE 1 PUFF: 62.5; 25 POWDER RESPIRATORY (INHALATION) at 09:25

## 2024-03-05 RX ADMIN — LEVOTHYROXINE SODIUM 88 MCG: 88 TABLET ORAL at 06:27

## 2024-03-05 RX ADMIN — POLYPROPYLENE GLYCOL 400, PROPYLENE GLYCOL 1 DROP: .4; .3 LIQUID OPHTHALMIC at 09:25

## 2024-03-05 ASSESSMENT — ACTIVITIES OF DAILY LIVING (ADL)
ADLS_ACUITY_SCORE: 56
ADLS_ACUITY_SCORE: 55
ADLS_ACUITY_SCORE: 56
ADLS_ACUITY_SCORE: 55
ADLS_ACUITY_SCORE: 56
ADLS_ACUITY_SCORE: 56
ADLS_ACUITY_SCORE: 55
ADLS_ACUITY_SCORE: 56
ADLS_ACUITY_SCORE: 55
ADLS_ACUITY_SCORE: 56
ADLS_ACUITY_SCORE: 55
ADLS_ACUITY_SCORE: 56
ADLS_ACUITY_SCORE: 55
ADLS_ACUITY_SCORE: 56
ADLS_ACUITY_SCORE: 55
ADLS_ACUITY_SCORE: 56

## 2024-03-05 NOTE — PROGRESS NOTES
Patient declines CPAP, stating she would like to be left alone.   RT will follow.     Radha Ansari, RT

## 2024-03-05 NOTE — PLAN OF CARE
Problem: Adult Inpatient Plan of Care  Goal: Optimal Comfort and Wellbeing  Outcome: Progressing     Problem: Risk for Delirium  Goal: Improved Sleep  Outcome: Progressing     Problem: Stroke, Ischemic (Includes Transient Ischemic Attack)  Goal: Effective Oxygenation and Ventilation  Outcome: Progressing  Intervention: Optimize Oxygenation and Ventilation  Recent Flowsheet Documentation  Taken 3/5/2024 0000 by Sandra Yao, RN  Head of Bed (HOB) Positioning: HOB at 20-30 degrees  Taken 3/4/2024 2058 by Sandra Yao, MELINDA  Head of Bed (HOB) Positioning: HOB at 20-30 degrees  Goal: Effective Urinary Elimination  Outcome: Progressing   Goal Outcome Evaluation:       Patient alert and oriented. Q4 neuros stable, with slight improvement in RLE movement. Vitally stable on RA. Telemetry running Afib. Patient makes needs know. Continued use of purewick for accurate output.

## 2024-03-05 NOTE — PROGRESS NOTES
[facility]    Medicine Progress Note - Hospitalist Service    Date of Admission:  3/1/2024    Assessment & Plan   Marla Dunn is a 89 year old female with PMH CAD Hx MI, CHF, HTN, HLD, RA, Sjogren's syndrome, SaraAgers syndrome, CKD, aortic stenosis, COPD, chronic hyponatremia, OLEGARIO, Atril fibrillation not on AC admitted on 3/1/2024 for acute ischemic stroke     Acute ischemic stroke -right MCA territory  New RLE weakness - stroke vs effort dependent  MRI brain shows hyperacute infarction in the right precentral and postcentral gyri and underlying white matter  CTA negative for any significant large vessel occlusion/stenosis  Echocardiogram shows normal ejection fraction with significant wall motion abnormality and moderate to severe mitral stenosis  Repeat CT 03/02: Negative for hemorrhage  Seen by stroke neurology, S/p TNK on presentaion.  -- Patient was found to have right lower extremity weakness 03/04, stat CT head negative.   Repeat MRI Brain 03/05: evolving acute to early subacute cortical infarct of the right postcentral gyrus and adjacent operculum as seen in prior MRI  Seen by Neurology, appreciate recommendations  PT/OT - recommend Home PT/OT, will re-eval with new RLE weakness  SLP re-eval, recommend minced and moist solids, thin liquids with supraglottic swallow strategy  Asa 81mg, AC for Afib to be discussed with cardiology OP  - In case of recurrent stroke patient does not want IV thrombolytics     - URI  Patient appears to be congested, lungs clear  Viral panel negative  Supportive care    - Chronic Atrial Fib  - PTA metoprolol  - Not on AC due to frequent falls  - Patient was offered Watchman device implant, but patient and family did not want to pursue as she had to be on anticoagulation for about 12 weeks  - Patient states she will follow-up with her primary cardiologist to discuss further if she would want to be on anticoagulation, will hold off starting  "anticoagulation at this time  - Patient has bruising on the left upper extremity around biceps and elbow since admission, will monitor.  Will hold AC due to high risk of bleeding    - Chronic HFpEF  PTA torsemide 30 mg daily and spironolactone 25 mg daily - resumed 03/05     -Elevated troponin likely 2/2 demand  CAD  Troponin 42, denies chest pain  Repeat Troponin unchanged ~ flat  EKG A fib with PVC's, no acute ischemic changes  Monitor     Hypokalemia  Monitor and replete as needed    -Mild hyponatremia  Hypochloremia  Na at 138  Resume demeclocycline and salt tabs 03/04  Off IV hydration, started po diet  Monitor Na     - CKD  Monitor creatinine     - Normocytic anemia  Stable  No evidence of bleeding  Monitor Hb     - Hx RLE hematoma and cellulitis  Completed p.o. Keflex, patient refusing dressing to be removed  Monitor     - COPD  Not in exacerbation  PTA inhalers     - OLEGARIO  CPAP     - Hypothyroidism  PTA levothyroxine    - Sacral wound - decubitus ulcer  RLE wound  WOC consult          Diet: Minced & Moist Diet (level 5) Thin Liquids (level 0)    DVT Prophylaxis: Pneumatic Compression Devices  Bush Catheter: Not present  Lines: None     Cardiac Monitoring: ACTIVE order. Indication: Stroke, acute (48 hours)  Code Status: No CPR- Do NOT Intubate      Clinically Significant Risk Factors        # Hypokalemia: Lowest K = 3.3 mmol/L in last 2 days, will replace as needed       # Hypoalbuminemia: Lowest albumin = 3.3 g/dL at 3/2/2024  4:06 AM, will monitor as appropriate     # Hypertension: Noted on problem list  # Chronic heart failure with preserved ejection fraction: heart failure noted on problem list and last echo with EF >50%       # Cachexia: Estimated body mass index is 16.52 kg/m  as calculated from the following:    Height as of this encounter: 1.6 m (5' 3\").    Weight as of this encounter: 42.3 kg (93 lb 4.1 oz).      # COPD: noted on problem list  # History of CABG: noted on surgical history   "     Disposition Plan      Expected Discharge Date: 03/06/2024      Destination: assisted living  Discharge Comments: SLP - NPO  PT/OT eval; MRI            Cherelle Gil MD  Hospitalist Service  [facility]  Securely message with [employer] (more info)  Text page via [employer] Paging/Directory   ______________________________________________________________________    Interval History   Patient was examined at the bedside, states that she would feel anxious about MRI, gave her Ativan  MRI brain with no new changes  Called [employer], states that she was able to move her RLE better at the bedside this evening.    Physical Exam   Vital Signs: Temp: 97.7  F (36.5  C) Temp src: Oral BP: 132/73 Pulse: 76   Resp: 18 SpO2: 96 % O2 Device: None (Room air)    Weight: 93 lbs 4.07 oz    General Appearance: Well-appearing, well-nourished, no acute distress   Neck:  Supple  Cardio:  Regular rate, irregular rhythm  Pulm:  No respiratory distress  Extremities: Extremities atraumatic, RLE wound with dressing  Skin:  Skin warm, dry, no rashes  Neuro:  Alert, no gross sensory defects, some pupillary asymmetry L > R, Left sided facial droop, left arm and leg hemiplegia, improving. Follows commands, left-sided neglect, speech garbled, improved since yesterday  New 03/04: RLE weakness    Medical Decision Making       60 MINUTES SPENT BY ME on the date of service doing chart review, history, exam, documentation & further activities per the note.      Data     I have personally reviewed the following data over the past 24 hrs:    N/A  \   N/A   / N/A     138 110 (H) 21.6 /  91   3.7 23 0.81 \       Imaging results reviewed over the past 24 hrs:   Recent Results (from the past 24 hour(s))   MR Brain w/o Contrast    Narrative    EXAM: MR BRAIN W/O CONTRAST  LOCATION: [facility]  DATE: 3/5/2024    INDICATION: Ischemic stroke. Follow-up abnormal brain MRI.  COMPARISON: MRI 03/01/2024  TECHNIQUE:  Routine multiplanar multisequence head MRI without intravenous contrast.    FINDINGS:  INTRACRANIAL CONTENTS: Band of cortically-based diffusion restriction involving the right postcentral gyrus laterally and adjacent right frontal operculum. This is best appreciated on coronal diffusion series 13 image 45. Subtle corresponding cortical   FLAIR hyperintensity in this region is now seen. Diffusion abnormalities elsewhere along the frontoparietal junction seen on the previous MRI have largely resolved, without definite evidence for persistent diffusion restriction in these areas on the   current exam. No hemorrhagic transformation or significant associated mass effect. Patchy and confluent nonspecific T2/FLAIR hyperintensities within the cerebral white matter most consistent with moderate chronic microvascular ischemic change. Persistent   focus of chronic encephalomalacia and gliosis within the left cerebellum. Mild to moderate generalized cerebral atrophy. No hydrocephalus. Normal position of the cerebellar tonsils.     SELLA: No abnormality accounting for technique.    OSSEOUS STRUCTURES/SOFT TISSUES: Degenerative changes at C1-C2 with prominent cruciate ligament thickening/pannus formation similar to previous exams. Unremarkable marrow signal elsewhere. The major intracranial vascular flow voids are maintained.     ORBITS: No abnormality accounting for technique.     SINUSES/MASTOIDS: No paranasal sinus mucosal disease. Complete/near complete opacification of the right mastoid air cells. No apparent mass in the posterior nasopharynx or skull base.       Impression    IMPRESSION:  1.  Mildly motion degraded examination demonstrates evolving acute to early subacute cortical infarct of the right postcentral gyrus and adjacent operculum as above.  2.  More extensive diffusion abnormality seen previously within the right frontoparietal white matter has resolved, without convincing evidence for additional evolving  infarct in these areas currently.  3.  No hemorrhagic transformation or mass effect.  4.  Background of generalized brain atrophy and presumed chronic microvascular ischemic change similar to the previous exam.

## 2024-03-05 NOTE — PROGRESS NOTES
NEUROLOGY PROGRESS NOTE     Marla Dunn,  1929, MRN [id number] Date: 3/5/2024     [facility]   Code status:  No CPR- Do NOT Intubate   PCP: Gabrielle Wallace, [phone]      ASSESSMENT & PLAN   Diagnosis code: Ischemic stroke    Ischemic stroke  History of coronary artery disease/MI  History of hypertension/hyperlipidemia  Atrial fibrillation  Left biceps bruising  New right leg weakness-rule out stroke/hemorrhagic transformation versus effort dependent    MRI brain shows hyperacute infarction in the right precentral and postcentral gyri and underlying white matter  CTA negative for any significant large vessel occlusion/stenosis  Echocardiogram shows normal ejection fraction with significant wall motion abnormality and moderate to severe mitral stenosis  Patient was found to be in A-fib in the emergency room  Lipid panel shows LDL 81.  40 mg of Lipitor  Status post tenecteplase.  Head CT stable.  Continue aspirin and statin   Patient does have a new bruise in her left biceps.  Anticoagulation for A-fib to be started as an outpatient after discussion with outpatient cardiology  Aspirin 81 mg  Slow reduction in blood pressure  PT/OT  Please note:-Per patient wishes, if she had another stroke she does not IV thrombolytics.  Worsening right leg weakness-possibly effort dependent.  Head CT was negative.  With no improvement will check MRI brain.  If MRI brain does show new stroke would recommend cardiology inpatient consultation to decide on anticoagulation.       Chief Complaint   Patient presents with    Stroke        HISTORY OF PRESENT ILLNESS     We have been requested by Dr. Lizarraga to evaluate Marla Dunn who is a 89 year old  female for stroke.  Is a 89-year-old female with past medical history of coronary artery disease, previous MI, CHF, hypertension, hyperlipidemia, rheumatoid arthritis, Sjogren's syndrome and chronic kidney disease admitted for left-sided weakness and  some garbled speech along with a left facial droop and left-sided neglect.  No gaze deviation.  She was last normal at 1730.  She was found all dressed up sitting in the wheelchair only using her right hand.  Patient reported that at 7 AM this morning she was normal.  She was given tenecteplase which she reports has helped with her symptoms.  Reports no ongoing weakness at this point.  Does take aspirin at baseline.  No anticoagulation.    3/3  No new neurological symptoms.  She does have bruising in the left biceps.  Reports that she does not want the IV thrombolytics in the future if she has a stroke.    3/4  Patient on exam today was noted to have new right leg weakness.  She reports that she cannot move her leg though when confronted to tell me when it started she says it has been there for few days even though she could move her leg yesterday.  Bruising is about the same.  Discussed with primary team and patient is supposed to follow-up with her cardiologist and an outpatient and the decision would be made during that visit if she needs to be on anticoagulation or not.    3/5  Patient continues to have weakness in the right leg.  Is willing to do an MRI of her brain to further evaluate this.  No other new symptoms.  Hematoma stable.     PAST MEDICAL & SURGICAL HISTORY     Medical History  Past Medical History:   Diagnosis Date    Asthma     Bilateral carpal tunnel syndrome 08/27/2015    CAD (coronary artery disease)     Chronic airway obstruction, not elsewhere classified     Created by Conversion     Chronic anemia     Chronic edema     Congestive heart failure, severe (H) 05/13/2020    COPD (chronic obstructive pulmonary disease) (H)     Coronary artery disease     Depression     GERD (gastroesophageal reflux disease)     Heart disease     Heart murmur     High cholesterol     dislipidemia    HTN (hypertension)     Hypercholesterolemia     Hypertension     Hypertensive emergency 08/13/2021    Hypothyroidism      Hypothyroidism     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    MI (myocardial infarction) (H) 1985    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    Myelodysplastic syndrome (H)     Myocardial infarction (H) 1983    OLEGARIO (obstructive sleep apnea)     Osteoporosis     Other and unspecified hyperlipidemia     Created by Conversion     Pyelonephritis 05/11/2016    Radicular low back pain     Rheumatoid arthritis (H)     Elizabeth Agers syndrome     Sjoegren syndrome     Sjogren's syndrome (H24)     Sleep apnea, obstructive     Spinal stenosis     Squamous cell carcinoma, tongue border (H)     Unspecified polyarthropathy or polyarthritis, hand     Created by Conversion      Surgical History  Past Surgical History:   Procedure Laterality Date    aortic valve      AORTIC VALVE REPLACEMENT  01/01/2012    APPENDECTOMY      APPENDECTOMY      AS TOTAL KNEE ARTHROPLASTY Right     BACK SURGERY  2004    spinal decompression    BACK SURGERY      spinal stenosis    BONE MARROW BIOPSY  2004    breast biopsy      BREAST SURGERY  2001    biopsy    BYPASS GRAFT ARTERY CORONARY  01/01/2009    LIMA to ramus intermedius    cardiac angiogram      CARDIAC CATHETERIZATION      CARDIAC SURGERY      EYE SURGERY  2008    bilateral cataract repair     EYE SURGERY Bilateral     cornea transplant left eye & cataracts    KYPHOPLASTY  2003    T12    OPEN REDUCTION INTERNAL FIXATION HIP NAILING Right 08/13/2021    Procedure: INTERNAL FIXATION, FRACTURE, TROCHANTERIC, HIP, USING PINS OR RODS;  Surgeon: Darrel Castro MD;  [facility]    OTHER SURGICAL HISTORY  01/01/2018    Rectosigmoidectomy perineal    PARTIAL GLOSSECTOMY Right 09/2023    [facility]    RECTOSIGMOIDECTOMY PERINEAL N/A 01/10/2018    Procedure: RECTOSIGMOIDECTOMY PERINEAL;  PERINEAL RECTOSIGMOIDECTOMY ;  Surgeon: Candelaria Anthony MD;  Location: [facility] OR    REPAIR VALVE AORTIC  2009    THORACIC SURGERY  2003    T12 kyphoplasty    [facility] CABG,  ARTERY-VEIN, FOUR      [facility] CABG, VEIN, SINGLE      Description: CABG (CABG);  Recorded: 03/09/2012;  Comments: Single vessel bypass graft to an obtuse marginal branch in May 2009    [facility] REPLACE AORT VALV,PROSTH VALV      Description: Aortic Valve Replacement;  Recorded: 03/09/2012;  Comments: Aortic valve replacement        SOCIAL HISTORY     Social History     Tobacco Use    Smoking status: Never    Smokeless tobacco: Never   Substance Use Topics    Alcohol use: No     Alcohol/week: 0.0 standard drinks of alcohol     Comment: Alcoholic Drinks/day: occasional glass of wine    Drug use: No        FAMILY HISTORY     Reviewed, and family history includes Cancer in her brother, mother, and sister; Family History Negative in her mother; Heart Disease in her father.     ALLERGIES     Allergies   Allergen Reactions    Gramineae Pollens Other (See Comments)     ORCHARDGRASS. Shortness of breath when lawn is just cut.    Smoke. Other (See Comments)     Hard to breathe.    Pollen Extract      Other reaction(s): Unknown        REVIEW OF SYSTEMS     12 system ROS was done within the HPI this was negative.  Pertinent positives noted on the HPI.     HOME & HOSPITAL MEDICATIONS     Prior to Admission Medications  Medications Prior to Admission   Medication Sig Dispense Refill Last Dose    acetaminophen (TYLENOL) 325 MG tablet Take 3 tablets (975 mg) by mouth every 8 hours as needed for mild pain   More than a month    albuterol (PROAIR HFA/PROVENTIL HFA/VENTOLIN HFA) 108 (90 Base) MCG/ACT inhaler Inhale 2 puffs into the lungs 4 times daily   2/29/2024 at     amoxicillin (AMOXIL) 500 MG capsule 4 CAPSULES (2000MG ) ORALLY AS NEEDED ONE HOUR PRIOR TO DENTAL APPOINTMENT 4 capsule 5 Unknown    ANTACID REGULAR STRENGTH 500 MG chewable tablet 2 TABLETS (1000MG) ORALLY 2 TIMES DAILY AS NEEDED FOR HEARTBURN 60 tablet 11 Unknown    Ascorbic Acid (VITAMIN C PO) Take 500 mg by mouth every morning   2/29/2024    ASPIRIN LOW DOSE 81 MG EC  tablet 1 TABLET ORALLY 2 TIMES DAILY (DX: PROPHYLAXIS) 180 tablet 3 2/29/2024 at hs    Bacillus Coagulans-Inulin (PROBIOTIC FORMULA) 1-250 BILLION-MG CAPS 1 CAPSULE ORALLY DAILY 31 capsule 11 2/29/2024    bisacodyl (DULCOLAX) 5 MG EC tablet 1 TABLET ORALLY DAILY AS NEEDED (MAY KEEP AT BEDSIDE) (Patient taking differently: 5 mg 2 times daily as needed) 30 tablet 10 Unknown    chlorhexidine (PERIDEX) 0.12 % solution Swish and spit 15 mLs in mouth 2 times daily May self administer 118 mL 3 2/29/2024 at hs    cholecalciferol (VITAMIN D3) 125 mcg (5000 units) capsule 1 CAPSULE ORALLY DAILY 90 capsule 3 2/29/2024 at pm    COMBIVENT RESPIMAT  MCG/ACT inhaler INHALE 1 PUFF INTO THE LUNGS  4 TIMES DAILY 4 g 11 2/29/2024 at pm    cycloSPORINE (RESTASIS) 0.05 % ophthalmic emulsion Place 1 drop into both eyes 2 times daily    3/1/2024 at am    demeclocycline (DECLOMYCIN) 150 MG tablet 1 TABLET ORALLY 2 TIMES DAILY 60 tablet 11 2/29/2024 at hs    fish oil-omega-3 fatty acids 1000 MG capsule Take 2 capsules (2 g) by mouth daily (Patient taking differently: Take 1 g by mouth daily) 28 capsule 11 2/29/2024 at am    fluorometholone (FML LIQUIFILM) 0.1 % ophthalmic susp Place 1 drop Into the left eye daily    2/29/2024 at am    hydrocortisone 1 % CREA cream Place rectally 2 times daily as needed for itching   Unknown    IBANDRONATE SODIUM PO Take 150 mg by mouth every 30 days   2/4/2024    levothyroxine (SYNTHROID/LEVOTHROID) 88 MCG tablet 1 TABLET ORALLY EVERY MORNING ON AN EMPTY STOMACH 31 tablet 11 2/29/2024    loratadine (CLARITIN) 10 MG tablet 1 TABLET ORALLY DAILY (DX: ASTHMA) 28 tablet 11 2/29/2024    melatonin 3 MG tablet 1 TABLET ORALLY AT BEDTIME ALONG WITH 5 MG FOR A TOTAL DOSE OF 8MG 30 tablet 11 2/29/2024    melatonin 5 MG tablet 1 TABLET ORALLY AT BEDTIME  ALONG WITH 3MG FOR A TOTAL DOSE OF 8MG 30 tablet 11 2/29/2024    metoprolol tartrate (LOPRESSOR) 25 MG tablet 1 TABLET ORALLY 2 TIMES DAILY (DX: HYPERTENSION)  56 tablet 11 2/29/2024 at hs    mirtazapine (REMERON) 7.5 MG tablet 1 TABLET ORALLY AT BEDTIME 31 tablet 11 2/29/2024    Multiple Vitamins-Minerals (PRESERVISION AREDS 2) CAPS 1 CAPSULE ORALLY 2 TIMES DAILY 62 capsule 11 2/29/2024 at pm    omeprazole (PRILOSEC) 20 MG DR capsule 1 CAPSULE ORALLY 2 TIMES DAILY (DX: GASTROESOPHAGEAL REFLUX DISEASE) 62 capsule 11 2/29/2024 at pm    polyethylene glycol 0.4%- propylene glycol 0.3% (SYSTANE ULTRA) 0.4-0.3 % SOLN ophthalmic solution Place 1 drop into both eyes 4 times daily   2/29/2024 at hs    Polyethylene Glycol 3350 (PEG 3350) 17 GM/SCOOP POWD MIX 1 CAPFUL (17 GMS) IN 8 OUNCES WATER AND DRINK ORALLY DAILY (DX: CONSTIPATION) 510 g 11 2/29/2024 at am    Probiotic Product (PROBIOTIC PO) Take 1 capsule by mouth every morning   2/29/2024 at am    SENEXON-S 8.6-50 MG tablet 1 TABLET ORALLY 2 TIMES DAILY (DX: CONSTIPATION) 56 tablet 11 2/29/2024 at hs    sodium chloride 1 GM tablet Take 1 tablet (1 g) by mouth daily 30 tablet 11 2/29/2024 at am    spironolactone (ALDACTONE) 25 MG tablet 1 TABLET ORALLY DAILY (DX: EDEMA) ** HAZARDOUS MED: WEAR DOUBLE NITRILE GLOVES** 28 tablet 11 2/29/2024 at am    timolol (TIMOPTIC) 0.5 % ophthalmic solution Place 1 drop Into the left eye daily    2/29/2024 at am    torsemide (DEMADEX) 20 MG tablet 1.5 TABLET (30mg) ORALLY DAILY 31 tablet 11 2/29/2024 at am    vitamin C (ASCORBIC ACID) 500 MG tablet 1 TABLET ORALLY DAILY (DX: SUPPLEMENT) 31 tablet 11 2/29/2024 at am    OXYGEN-HELIUM IN           Hospital Medications   albuterol  2 puff Inhalation 4x Daily    aspirin  81 mg Oral Daily    cycloSPORINE  1 drop Both Eyes BID    demeclocycline  150 mg Oral Q12H [facility] (08/20)    fluorometholone  1 drop Left Eye Daily    levothyroxine  88 mcg Oral QAM AC    metoprolol tartrate  25 mg Oral BID    mirtazapine  7.5 mg Oral At Bedtime    pantoprazole  40 mg Oral BID AC    polyethylene glycol-propylene glycol  1 drop Both Eyes 4x Daily    sodium chloride  1  "g Oral Daily    timolol maleate  1 drop Left Eye Daily    umeclidinium-vilanterol  1 puff Inhalation Daily        PHYSICAL EXAM     Vital signs  Temp:  [97.8  F (36.6  C)-98  F (36.7  C)] 97.8  F (36.6  C)  Pulse:  [72-88] 87  Resp:  [17-18] 17  BP: (110-125)/(53-60) 125/57  SpO2:  [92 %-96 %] 96 %    PHYSICAL EXAMINATION  VITALS: /57 (BP Location: Right arm)   Pulse 87   Temp 97.8  F (36.6  C) (Oral)   Resp 17   Ht 1.6 m (5' 3\")   Wt 42.3 kg (93 lb 4.1 oz)   SpO2 96%   BMI 16.52 kg/m    GENERAL -Health appearing, No apparent distress  EYES- No scleral icterus, no eyelid droop, Pupils - see Neuro section  HEENT - Normocephalic, atraumatic, Hearing grossly limited; Oral mucosa moist and pink in color. External Ears and nose intact.   Neck - supple   PULM - Good spontaneous respiratory effort  CV- Pedal pulses present with no peripheral edema/ No significant varicosities.  MSK- Gait - see Neuro section; Strength and tone- see Neuro section; Range of motion grossly intact.  PSYCH -cooperative    Neurological  Mental status - Patient is awake and partially oriented to self, place and time. Attention span is normal. Language is fluent and follows commands appropriately.   Cranial nerves - CN II-XII intact. Pupils are reactive and symmetric; EOMI, VFIT, NLF symmetric  Motor - There is no pronator drift. Motor exam shows 5/5 strength in all extremities except right leg weakness which could be effort dependent.  Tone - Tone is symmetric bilaterally in upper and lower extremities.  Reflexes - Reflexes are symmetric and decreased.  Sensation - Sensory exam is grossly intact to light touch, pain.  Coordination - Finger to nose and heel to shin is without dysmetria except left upper extremity dysmetria.  Gait and station --unable to safely ambulate.  Formal gait testing cannot be done due to safety concerns from ongoing medical issues.  Exam stable/nonfocal except new right leg weakness which could be effort " dependent. She does have a new hematoma/bruising in the left biceps region.  No change.  Continues to have weakness in the right leg.     DIAGNOSTIC STUDIES     Pertinent Radiology   MRI  IMPRESSION:  1.  Hyperacute infarction involving the right precentral and post central gyri and underlying white matter.  2.  Generalized brain atrophy and presumed microvascular ischemic changes as detailed above.    HEAD CT:  1.  No finding for intracranial hemorrhage or mass or convincing finding for acute infarct.     2.  Moderate volume loss and presumed sequela of chronic microvascular ischemic change.     3.  New chronic infarct inferior lateral aspect of the left cerebellar hemisphere.     HEAD CTA:   1.  There is occlusion of a distal M2 superior division segment of the right middle cerebral artery without flow seen more distally within the vessel.     2.  Coarse atherosclerotic calcifications both carotid siphons and proximal intradural vertebral arteries with mild associated luminal narrowing but no clear flow-limiting stenosis.     NECK CTA:  1.  No significant stenosis either cervical carotid system.     2.  Mild atherosclerotic narrowing of the takeoff of both vertebral arteries.     CT PERFUSION:  1.  There is elevated Tmax, MTT, and decreased rCBF along the lateral and posterior aspect of the right frontal lobe compatible with ischemic penumbra.     2.  Area of Tmax >6 seconds measures 11 mL with volume of CBF <30% measuring 0 mL.    ECHO  1. Normal left ventricular size and systolic performance with a visually  estimated ejection fraction of 65%.  2. There is severe basal inferior hypokinesis and moderate basal posterior  hypokinesis  3. There is mild concentric increase in left ventricular wall thickness.  4. 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  Â  Normal aortic valve prosthesis metrics with a mean systolic gradient of 19  mmHg and a peak anterograde velocity of 2.8 m/sec.  Â  No aortic insufficiency is  detected.  5. There is moderate-severe calcific mitral stenosis.  6. Normal right ventricular size with mildly reduced right ventricular  systolic performance.  7. There is severe left atrial enlargement. There is moderate right atrial  enlargement.     When compared to the prior real-time echocardiogram dated 6 September 2022,  the degree of mitral stenosis has increased from moderate to now moderate-  severe. The findings are otherwise felt to be fairly similar on both  examinations. The aforementioned regional wall motion abnormalities are not  new and were identified on the prior study as well.    Head CT  IMPRESSION:  1.  No hemorrhage.  2.  Recent infarct at the right frontoparietal junction on MRI is not visible on CT.     Head CT  IMPRESSION:  1.  No acute hemorrhage.  2.  Known right frontoparietal infarct is not appreciated on CT.  3.  No new/acute territorial infarction.    LABS  Component      Latest Ref Rng 3/1/2024  8:41 AM   Cholesterol      <200 mg/dL 137    Triglycerides      <150 mg/dL 72    HDL Cholesterol      >=50 mg/dL 42 (L)    LDL Cholesterol Calculated      <=100 mg/dL 81    Non HDL Cholesterol      <130 mg/dL 95    Hemoglobin A1C      <5.7 % 5.3       Legend:  (L) Low      Recent Results (from the past 24 hour(s))   Basic metabolic panel    Collection Time: 03/05/24  7:05 AM   Result Value Ref Range    Sodium 138 135 - 145 mmol/L    Potassium 3.3 (L) 3.4 - 5.3 mmol/L    Chloride 110 (H) 98 - 107 mmol/L    Carbon Dioxide (CO2) 23 22 - 29 mmol/L    Anion Gap 5 (L) 7 - 15 mmol/L    Urea Nitrogen 21.6 8.0 - 23.0 mg/dL    Creatinine 0.81 0.51 - 0.95 mg/dL    GFR Estimate 67 >60 mL/min/1.73m2    Calcium 8.7 8.2 - 9.6 mg/dL    Glucose 91 70 - 99 mg/dL   Magnesium    Collection Time: 03/05/24  7:05 AM   Result Value Ref Range    Magnesium 2.4 (H) 1.7 - 2.3 mg/dL       Total time spent for face to face visit, reviewing labs/imaging studies, counseling and coordination of care was: Over 35 min More  than 50% of this time was spent on counseling and coordination of care.    Counseling patient.  Continuing to have right-sided weakness and discussing MRI with the patient.  Reviewing head CT results.    Chace Griffiths MD  Neurologist  [facility]  Tel:- [phone]

## 2024-03-06 ENCOUNTER — APPOINTMENT (OUTPATIENT)
Dept: OCCUPATIONAL THERAPY | Facility: HOSPITAL | Age: 89
DRG: 062 | End: 2024-03-06
Payer: COMMERCIAL

## 2024-03-06 ENCOUNTER — APPOINTMENT (OUTPATIENT)
Dept: PHYSICAL THERAPY | Facility: HOSPITAL | Age: 89
DRG: 062 | End: 2024-03-06
Payer: COMMERCIAL

## 2024-03-06 ENCOUNTER — APPOINTMENT (OUTPATIENT)
Dept: SPEECH THERAPY | Facility: HOSPITAL | Age: 89
DRG: 062 | End: 2024-03-06
Payer: COMMERCIAL

## 2024-03-06 LAB
ANION GAP SERPL CALCULATED.3IONS-SCNC: 5 MMOL/L (ref 7–15)
BUN SERPL-MCNC: 21.1 MG/DL (ref 8–23)
CALCIUM SERPL-MCNC: 8.6 MG/DL (ref 8.2–9.6)
CHLORIDE SERPL-SCNC: 112 MMOL/L (ref 98–107)
CREAT SERPL-MCNC: 0.82 MG/DL (ref 0.51–0.95)
DEPRECATED HCO3 PLAS-SCNC: 23 MMOL/L (ref 22–29)
EGFRCR SERPLBLD CKD-EPI 2021: 66 ML/MIN/1.73M2
GLUCOSE SERPL-MCNC: 95 MG/DL (ref 70–99)
POTASSIUM SERPL-SCNC: 4.1 MMOL/L (ref 3.4–5.3)
SODIUM SERPL-SCNC: 140 MMOL/L (ref 135–145)

## 2024-03-06 PROCEDURE — 36415 COLL VENOUS BLD VENIPUNCTURE: CPT | Performed by: STUDENT IN AN ORGANIZED HEALTH CARE EDUCATION/TRAINING PROGRAM

## 2024-03-06 PROCEDURE — 250N000013 HC RX MED GY IP 250 OP 250 PS 637: Performed by: STUDENT IN AN ORGANIZED HEALTH CARE EDUCATION/TRAINING PROGRAM

## 2024-03-06 PROCEDURE — 92526 ORAL FUNCTION THERAPY: CPT | Mod: GN

## 2024-03-06 PROCEDURE — 99222 1ST HOSP IP/OBS MODERATE 55: CPT | Performed by: STUDENT IN AN ORGANIZED HEALTH CARE EDUCATION/TRAINING PROGRAM

## 2024-03-06 PROCEDURE — 97535 SELF CARE MNGMENT TRAINING: CPT | Mod: GO

## 2024-03-06 PROCEDURE — 82374 ASSAY BLOOD CARBON DIOXIDE: CPT | Performed by: STUDENT IN AN ORGANIZED HEALTH CARE EDUCATION/TRAINING PROGRAM

## 2024-03-06 PROCEDURE — 97112 NEUROMUSCULAR REEDUCATION: CPT | Mod: GO

## 2024-03-06 PROCEDURE — 99233 SBSQ HOSP IP/OBS HIGH 50: CPT | Performed by: STUDENT IN AN ORGANIZED HEALTH CARE EDUCATION/TRAINING PROGRAM

## 2024-03-06 PROCEDURE — 97110 THERAPEUTIC EXERCISES: CPT | Mod: GP

## 2024-03-06 PROCEDURE — 99233 SBSQ HOSP IP/OBS HIGH 50: CPT | Performed by: PSYCHIATRY & NEUROLOGY

## 2024-03-06 PROCEDURE — 97530 THERAPEUTIC ACTIVITIES: CPT | Mod: GP

## 2024-03-06 PROCEDURE — G0463 HOSPITAL OUTPT CLINIC VISIT: HCPCS

## 2024-03-06 PROCEDURE — 120N000001 HC R&B MED SURG/OB

## 2024-03-06 RX ADMIN — ASPIRIN 81 MG CHEWABLE TABLET 81 MG: 81 TABLET CHEWABLE at 08:54

## 2024-03-06 RX ADMIN — FLUOROMETHOLONE 1 DROP: 1 SOLUTION/ DROPS OPHTHALMIC at 09:02

## 2024-03-06 RX ADMIN — CYCLOSPORINE 1 DROP: 0.5 EMULSION OPHTHALMIC at 20:57

## 2024-03-06 RX ADMIN — ALBUTEROL SULFATE 2 PUFF: 90 AEROSOL, METERED RESPIRATORY (INHALATION) at 13:55

## 2024-03-06 RX ADMIN — LEVOTHYROXINE SODIUM 88 MCG: 88 TABLET ORAL at 09:01

## 2024-03-06 RX ADMIN — POLYPROPYLENE GLYCOL 400, PROPYLENE GLYCOL 1 DROP: .4; .3 LIQUID OPHTHALMIC at 20:51

## 2024-03-06 RX ADMIN — CYCLOSPORINE 1 DROP: 0.5 EMULSION OPHTHALMIC at 08:54

## 2024-03-06 RX ADMIN — PANTOPRAZOLE SODIUM 40 MG: 40 TABLET, DELAYED RELEASE ORAL at 08:54

## 2024-03-06 RX ADMIN — POLYPROPYLENE GLYCOL 400, PROPYLENE GLYCOL 1 DROP: .4; .3 LIQUID OPHTHALMIC at 08:56

## 2024-03-06 RX ADMIN — TORSEMIDE 30 MG: 20 TABLET ORAL at 08:55

## 2024-03-06 RX ADMIN — POLYPROPYLENE GLYCOL 400, PROPYLENE GLYCOL 1 DROP: .4; .3 LIQUID OPHTHALMIC at 13:56

## 2024-03-06 RX ADMIN — ALBUTEROL SULFATE 2 PUFF: 90 AEROSOL, METERED RESPIRATORY (INHALATION) at 08:55

## 2024-03-06 RX ADMIN — DEMECLOCYCLINE 150 MG: 150 TABLET ORAL at 08:54

## 2024-03-06 RX ADMIN — POLYPROPYLENE GLYCOL 400, PROPYLENE GLYCOL 1 DROP: .4; .3 LIQUID OPHTHALMIC at 17:43

## 2024-03-06 RX ADMIN — METOPROLOL TARTRATE 25 MG: 25 TABLET, FILM COATED ORAL at 08:54

## 2024-03-06 RX ADMIN — SPIRONOLACTONE 25 MG: 25 TABLET, FILM COATED ORAL at 08:55

## 2024-03-06 RX ADMIN — UMECLIDINIUM BROMIDE AND VILANTEROL TRIFENATATE 1 PUFF: 62.5; 25 POWDER RESPIRATORY (INHALATION) at 09:02

## 2024-03-06 RX ADMIN — MIRTAZAPINE 7.5 MG: 7.5 TABLET ORAL at 20:49

## 2024-03-06 RX ADMIN — METOPROLOL TARTRATE 25 MG: 25 TABLET, FILM COATED ORAL at 20:49

## 2024-03-06 RX ADMIN — DEMECLOCYCLINE 150 MG: 150 TABLET ORAL at 20:56

## 2024-03-06 RX ADMIN — TIMOLOL MALEATE 1 DROP: 5 SOLUTION/ DROPS OPHTHALMIC at 09:02

## 2024-03-06 RX ADMIN — APIXABAN 2.5 MG: 2.5 TABLET, FILM COATED ORAL at 20:49

## 2024-03-06 RX ADMIN — ACETAMINOPHEN 650 MG: 325 TABLET ORAL at 20:49

## 2024-03-06 RX ADMIN — SODIUM CHLORIDE TAB 1 GM 1 G: 1 TAB at 08:54

## 2024-03-06 RX ADMIN — ALBUTEROL SULFATE 2 PUFF: 90 AEROSOL, METERED RESPIRATORY (INHALATION) at 17:43

## 2024-03-06 RX ADMIN — ALBUTEROL SULFATE 2 PUFF: 90 AEROSOL, METERED RESPIRATORY (INHALATION) at 20:51

## 2024-03-06 RX ADMIN — PANTOPRAZOLE SODIUM 40 MG: 40 TABLET, DELAYED RELEASE ORAL at 17:41

## 2024-03-06 ASSESSMENT — ACTIVITIES OF DAILY LIVING (ADL)
ADLS_ACUITY_SCORE: 56
ADLS_ACUITY_SCORE: 57
ADLS_ACUITY_SCORE: 56
ADLS_ACUITY_SCORE: 57
ADLS_ACUITY_SCORE: 56
ADLS_ACUITY_SCORE: 57
ADLS_ACUITY_SCORE: 56
ADLS_ACUITY_SCORE: 57
ADLS_ACUITY_SCORE: 56

## 2024-03-06 NOTE — DISCHARGE INSTRUCTIONS
"[facility] DISCHARGE INSTRUCTIONS:  Pressure Injury Prevention (PIP) Plan:  If patient is declining pressure injury prevention interventions: Explore reason why and address patient's concerns, Educate on pressure injury risk and prevention intervention(s), If patient is still declining, document \"informed refusal\" , and Ensure Care team is aware ( provider, charge nurse, etc)  Mattress: Follow bed algorithm, reassess daily and order specialty mattress, if indicated.  HOB: Maintain at or below 30 degrees, unless contraindicated  Repositioning in bed: Every 1-2 hours   Heels: Keep elevated off mattress and Pillows under calves  Protective Dressing: Sacral Mepilex for prevention (#125230),  especially for the agitated patient   Positioning Equipment: None  Chair positioning: Assist patient to reposition hourly   If patient has a buttock pressure injury, or high risk for PI use chair cushion or SPS.  Moisture Management: Perineal cleansing /protection: Follow Incontinence Protocol and Moisturize dry skin  Under Devices: Inspect skin under all medical devices during skin inspection , Ensure tubes are stabilized without tension, and Ensure patient is not lying on medical devices or equipment when repositioned  Ask provider to discontinue device when no longer needed.    Your risk factors for stroke or TIA (transient ischemic attack):     Your Risk Factors Your Results Goals   [] High blood pressure BP: 119/64 (03/08/24 0724) Less than 120/80   [] Cholesterol          Total 3/1/2024: 137 mg/dL   Less than 150    Triglycerides   3/1/2024: 72 mg/dL Less than 150    LDL 3/1/2024: 81 mg/dL    Less than 70    HDL 3/1/2024: 42 mg/dL         Greater than 40 (men)  Greater than 50 (women)   [] Diabetes                A1C 3/1/2024: 5.3 % Less than 5.7   [] Atrial fibrillation Atrial fibrillation noted on cardiac monitoring Manage per physician orders   [] Smoking/tobacco use   Tobacco Use      Smoking status: Never      Smokeless tobacco: " Never   Quit smoking and tobacco   [] Overweight Body mass index is 16.52 kg/m .  Less than 25     Other risk factors include: carotid (neck) artery disease, other heart diseases, prior stroke or TIA, poor diet, lack of exercise, and excessive alcohol consumption.     [x] Written stroke educational materials given to {provided to whom:[phone]} including:   - Learning about BE FAST: Stroke Warning Signs and Learning about Risk Factors for Stroke (Healthwise)   - Understanding Stroke: Key Resources After a Stroke (FOD #261516)       Know the warning signs and symptoms of stroke: BE FAST     B = Balance loss   E = Eyesight changes   F = Facial droop or numbness   A = Arm or leg weakness   S = Speech difficulty, slurred speech   T = Time to call 911 for help

## 2024-03-06 NOTE — PROGRESS NOTES
[facility]    Medicine Progress Note - Hospitalist Service    Date of Admission:  3/1/2024    Assessment & Plan   Marla Dunn is a 89 year old female with PMH CAD Hx MI, CHF, HTN, HLD, RA, Sjogren's syndrome, SaraAgers syndrome, CKD, aortic stenosis, COPD, chronic hyponatremia, OLEGARIO, Atril fibrillation not on AC admitted on 3/1/2024 for acute ischemic stroke     Acute ischemic stroke -right MCA territory  New RLE weakness - stroke vs effort dependent  MRI brain shows hyperacute infarction in the right precentral and postcentral gyri and underlying white matter  CTA negative for any significant large vessel occlusion/stenosis  Echocardiogram shows normal ejection fraction with significant wall motion abnormality and moderate to severe mitral stenosis  Repeat CT 03/02: Negative for hemorrhage  Seen by stroke neurology, S/p TNK on presentaion.  -- Patient was found to have right lower extremity weakness 03/04, stat CT head negative, MRI brain no new changes  -- Seen by Physical therapy today, has weakness with minimal improvement, will need MRI C/T/L spine  -- Seen by Neurology, appreciate recommendations  PT/OT - recommend Home PT/OT, will re-eval with new RLE weakness  SLP re-eval, recommend minced and moist solids, thin liquids with supraglottic swallow strategy  Asa 81mg, started Eliquis 2.5mg BID  - In case of recurrent stroke patient does not want IV thrombolytics    - Chronic Atrial Fib  - PTA metoprolol  -[id number]  - was not on AC due to frequent falls  - Seen by cardiology, started on Eliquis 2.5 mg twice daily  -Follow-up outpatient with Dr. Amaya, and with watchman team    - Chronic HFpEF  PTA torsemide 30 mg daily and spironolactone 25 mg daily     -Elevated troponin likely 2/2 demand  CAD  Troponin 42, denies chest pain  Repeat Troponin unchanged ~ flat  EKG A fib with PVC's, no acute ischemic changes     Hypokalemia  Monitor and replete as needed    -Mild  "hyponatremia  Hypochloremia  Na at 140  Resume PTA demeclocycline and salt tabs 03/04  Monitor Na    - CKD  Monitor creatinine    - Normocytic anemia  Stable  No evidence of bleeding  Monitor Hb    - Hx RLE hematoma and cellulitis  Completed p.o. Keflex, patient refusing dressing to be removed  Monitor    - COPD  Not in exacerbation  PTA inhalers    - OLEGARIO  CPAP    - Hypothyroidism  PTA levothyroxine    - Sacral hemosiderin staining          Diet: Combination Diet Soft and Bite Sized Diet (level 6); Thin Liquids (level 0)    DVT Prophylaxis: DOAC  Bush Catheter: Not present  Lines: None     Cardiac Monitoring: ACTIVE order. Indication: Stroke, acute (48 hours)  Code Status: No CPR- Do NOT Intubate      Clinically Significant Risk Factors        # Hypokalemia: Lowest K = 3.3 mmol/L in last 2 days, will replace as needed       # Hypoalbuminemia: Lowest albumin = 3.3 g/dL at 3/2/2024  4:06 AM, will monitor as appropriate     # Hypertension: Noted on problem list  # Chronic heart failure with preserved ejection fraction: heart failure noted on problem list and last echo with EF >50%       # Cachexia: Estimated body mass index is 16.52 kg/m  as calculated from the following:    Height as of this encounter: 1.6 m (5' 3\").    Weight as of this encounter: 42.3 kg (93 lb 4.1 oz).      # COPD: noted on problem list  # History of CABG: noted on surgical history       Disposition Plan      Expected Discharge Date: 03/07/2024    Discharge Delays: Specialist Consult (enter specialist & decision needed in comments)  Placement - TCU  Destination: assisted living  Discharge Comments: SLP - NPO  PT/OT javi; JANET Gil MD  Hospitalist Service  [facility]  Securely message with [employer] (more info)  Text page via [employer] Paging/Directory   ______________________________________________________________________    Interval History   Was examined at the bedside, she states she is able to move her " right lower extremity better today.  Was seen by neurology and cardiology, started Eliquis today    Physical Exam   Vital Signs: Temp: 98.5  F (36.9  C) Temp src: Axillary BP: 112/61 Pulse: 75   Resp: 17 SpO2: 98 % O2 Device: None (Room air)    Weight: 93 lbs 4.07 oz    General Appearance: Well-appearing, well-nourished, no acute distress   Neck:  Supple  Cardio:  Regular rate, irregular rhythm  Pulm:  No respiratory distress  Extremities: Extremities atraumatic, RLE wound with dressing  Skin:  Skin warm, dry, no rashes  Neuro:  Alert, no gross sensory defects, some pupillary asymmetry L > R, Left sided facial droop, left arm and leg hemiplegia, improving. Follows commands  New 03/04: RLE weakness, some improvement 03/06, able to raise RLE against gravity    Medical Decision Making       65 MINUTES SPENT BY ME on the date of service doing chart review, history, exam, documentation & further activities per the note.      Data     I have personally reviewed the following data over the past 24 hrs:    N/A  \   N/A   / N/A     140 112 (H) 21.1 /  95   4.1 23 0.82 \       Imaging results reviewed over the past 24 hrs:   No results found for this or any previous visit (from the past 24 hour(s)).

## 2024-03-06 NOTE — PLAN OF CARE
Alert and oriented, able to communicate needs and preferences. Noted low potassium result in AM and communicated to hospitalist, high K protocol initiated by provider. After PO tablet of K+, patient reported significant heartburn, grimacing and in distress. Replacement needed again in evening per protocol, and after discussing possibility that GI upset was s/e of K+, opted for IV replacement, though this was delayed due to loss of IV access. Appreciate evening RN continuing care as writer was not able to administer this dose at scheduled time. Care plan to continue.      Problem: Stroke, Ischemic (Includes Transient Ischemic Attack)  Goal: Optimal Cognitive Function  Outcome: Progressing     Problem: Comorbidity Management  Goal: Maintenance of COPD Symptom Control  Outcome: Progressing

## 2024-03-06 NOTE — PROGRESS NOTES
"Care Management Follow Up    Length of Stay (days): 5    Expected Discharge Date: 03/06/2024     Concerns to be Addressed:     TCU Placement  Patient plan of care discussed at interdisciplinary rounds: Yes    Anticipated Discharge Disposition:  TCU     Anticipated Discharge Services:    Anticipated Discharge DME:      Patient/family educated on Medicare website which has current facility and service quality ratings:    Education Provided on the Discharge Plan:    Patient/Family in Agreement with the Plan:      Referrals Placed by CM/SW:  TCU  Private pay costs discussed: Not applicable    Additional Information:  Social history per previous CM note:  \"[place] [facility] [place] following services: bathing, meals, medication management. Says patient is mostly in her wheelchair but can typically transfer self. Anticipate return to [facility] pending therapy recs . Kayce Brothers is main contact. Family will transport if able.\"    Therapy recommendation was home with home care and this was set up with [facility]. Now recommendation has changed to TCU. This was discussed with the pt and pt's kayce Brothers and they would like referrals sent to [facility], [facility], [facility], and [facility] and first choice is [facility]. Referrals sent and pending.    Per provider update pt not medically ready to discharge.    RNCM to follow for medical progression, recommendations, and final discharge plan.      Leticia Rock RN      "

## 2024-03-06 NOTE — CONSULTS
[facility] HE[facility] [address], [place] [address]   [email or web address]   OFFICE: [phone]     CARDIOLOGY INPATIENT CONSULT NOTE     Impression and Plan     Assessment/Plan:  Ms. Marla Dunn is a 89 year old female with chronic atrial fibrillation, CAD s/p CABG and AVR in 2009, calcific mitral valve disease, HTN, HLD, RA, CKD, COPD, OLEGARIO, who presented to the hospital on 3/1 with acute stroke.     Chronic afib   - Rate control is adequate    - CHADS-Vasc 7.  We discussed that her risk for recurrent stroke is quite high.  I think her risk of bleeding with AC is elevated given falls, age and frailty, however it is not prohibitive. We discussed possible referral for Watchman, and that I have doubts she will derive benefit from the watchman given her already advanced age and limited predicted life expectancy.  Still, she is very anxious about a recurrent stroke above all else, and the watchman would certainly reduce her risk of stroke long term.     - At this point I think it is reasonable to start low dose eliquis 2.5mg BID for stroke prevention provided this is cleared by neurology.  The pt is in agreement.    We will arrange follow up with Dr. Amaya and also with the Watchman team for further discussions.    Primary Cardiologist: Dr. Amaya    History of Present Illness      Ms. Marla Dunn is a 89 year old female with chronic atrial fibrillation, CAD s/p CABG and AVR in 2009, calcific mitral valve disease, HTN, HLD, RA, CKD, COPD, OLEGARIO, who presented to the hospital on 3/1 with acute stroke.  She received TNK.  She notes her neurologic symptoms are improving.  She wishes to prevent a future stroke and is worried about her risk given her afib.  I spoke with the patient's niece who helped provide history and context.  Anticoagulation was deferred int he past due to frequent falls and perceived risk of bleeding.  She denies any history of bleeding or  transfusions.  It seems Watchman was discussed with her but ultimately deferred without an official evaluation due to the need for short term anticoagulation.  She is now more wheelchair bound and not falling as much.  She is very anxious about a recurrent stroke.  Her sister apparently had a devastating stroke and she was her caretaker.      Review of Systems:  Further review of systems is otherwise negative/noncontributory (based on review of medical record (admission H&P) and 13 point review of systems reviewed. Pertinent positives noted).    Cardiac Diagnostics     ECG: Personally reviewed and interpreted: 3/1/2024  Afib with aberrantly conducted complexes, LAD    Most recent:  Echocardiogram (results reviewed): 3/1/2024  Interpretation Summary     1. Normal left ventricular size and systolic performance with a visually  estimated ejection fraction of 65%.  2. There is severe basal inferior hypokinesis and moderate basal posterior  hypokinesis  3. There is mild concentric increase in left ventricular wall thickness.  4. 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  Â  Normal aortic valve prosthesis metrics with a mean systolic gradient of 19  mmHg and a peak anterograde velocity of 2.8 m/sec.  Â  No aortic insufficiency is detected.  5. There is moderate-severe calcific mitral stenosis.  6. Normal right ventricular size with mildly reduced right ventricular  systolic performance.  7. There is severe left atrial enlargement. There is moderate right atrial  enlargement.     When compared to the prior real-time echocardiogram dated 6 September 2022,  the degree of mitral stenosis has increased from moderate to now moderate-  severe. The findings are otherwise felt to be fairly similar on both  examinations. The aforementioned regional wall motion abnormalities are not  new and were identified on the prior study as well.    Cardiac Stress Testing (results reviewed): 7/16/2021    The nuclear stress test is negative for  inducible myocardial ischemia or infarction.    The left ventricular ejection fraction at stress is 57%.    A prior study was conducted on 6/18/2017.  No interval change noted.      Medical History  Surgical History Family History Social History   Past Medical History:   Diagnosis Date    Asthma     Bilateral carpal tunnel syndrome 08/27/2015    CAD (coronary artery disease)     Chronic airway obstruction, not elsewhere classified     Created by Conversion     Chronic anemia     Chronic edema     Congestive heart failure, severe (H) 05/13/2020    COPD (chronic obstructive pulmonary disease) (H)     Coronary artery disease     Depression     GERD (gastroesophageal reflux disease)     Heart disease     Heart murmur     High cholesterol     dislipidemia    HTN (hypertension)     Hypercholesterolemia     Hypertension     Hypertensive emergency 08/13/2021    Hypothyroidism     Hypothyroidism     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    MI (myocardial infarction) (H) 1985    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    Myelodysplastic syndrome (H)     Myocardial infarction (H) 1983    OLEGARIO (obstructive sleep apnea)     Osteoporosis     Other and unspecified hyperlipidemia     Created by Conversion     Pyelonephritis 05/11/2016    Radicular low back pain     Rheumatoid arthritis (H)     Elizabeth Agers syndrome     Sjoegren syndrome     Sjogren's syndrome (H24)     Sleep apnea, obstructive     Spinal stenosis     Squamous cell carcinoma, tongue border (H)     Unspecified polyarthropathy or polyarthritis, hand     Created by Conversion      Past Surgical History:   Procedure Laterality Date    aortic valve      AORTIC VALVE REPLACEMENT  01/01/2012    APPENDECTOMY      APPENDECTOMY      AS TOTAL KNEE ARTHROPLASTY Right     BACK SURGERY  2004    spinal decompression    BACK SURGERY      spinal stenosis    BONE MARROW BIOPSY  2004    breast biopsy      BREAST SURGERY  2001    biopsy    BYPASS GRAFT ARTERY CORONARY   2009    LIMA to ramus intermedius    cardiac angiogram      CARDIAC CATHETERIZATION      CARDIAC SURGERY      EYE SURGERY  2008    bilateral cataract repair     EYE SURGERY Bilateral     cornea transplant left eye & cataracts    KYPHOPLASTY      T12    OPEN REDUCTION INTERNAL FIXATION HIP NAILING Right 2021    Procedure: INTERNAL FIXATION, FRACTURE, TROCHANTERIC, HIP, USING PINS OR RODS;  Surgeon: Darrel Castro MD;  [place] [facility] [place]    OTHER SURGICAL HISTORY  2018    Rectosigmoidectomy perineal    PARTIAL GLOSSECTOMY Right 2023    [facility]    RECTOSIGMOIDECTOMY PERINEAL N/A 01/10/2018    Procedure: RECTOSIGMOIDECTOMY PERINEAL;  PERINEAL RECTOSIGMOIDECTOMY ;  Surgeon: Candelaria Anthony MD;  Location: [facility] OR    REPAIR VALVE AORTIC  2009    THORACIC SURGERY      T12 kyphoplasty    ZZC CABG, ARTERY-VEIN, FOUR      ZZC CABG, VEIN, SINGLE      Description: CABG (CABG);  Recorded: 2012;  Comments: Single vessel bypass graft to an obtuse marginal branch in May 2009    ZZC REPLACE AORT VALV,PROSTH VALV      Description: Aortic Valve Replacement;  Recorded: 2012;  Comments: Aortic valve replacement     Family History   Problem Relation Age of Onset    Cancer Mother         ovarian cancer;  in her 50s    Family History Negative Mother     Heart Disease Father     Cancer Brother     Cancer Sister            Social History     Socioeconomic History    Marital status:      Spouse name: Not on file    Number of children: Not on file    Years of education: Not on file    Highest education level: Not on file   Occupational History    Not on file   Tobacco Use    Smoking status: Never    Smokeless tobacco: Never   Substance and Sexual Activity    Alcohol use: No     Alcohol/week: 0.0 standard drinks of alcohol     Comment: Alcoholic Drinks/day: occasional glass of wine    Drug use: No    Sexual activity: Not on file   Other Topics Concern    Not on  "file   Social History Narrative    .  passed away 20 years ago. No children. 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 Retired from [employer] at [employer]. Was living independently in her apartment in [place] prior to admission. Uses walker at baseline.      Social Determinants of Health     Financial Resource Strain: Not on file   Food Insecurity: Not on file   Transportation Needs: Not on file   Physical Activity: Not on file   Stress: Not on file   Social Connections: Not on file   Interpersonal Safety: Not on file   Housing Stability: Not on file             Physical Examination   VITALS: /58 (BP Location: Right arm)   Pulse 88   Temp 98.4  F (36.9  C) (Oral)   Resp 17   Ht 1.6 m (5' 3\")   Wt 42.3 kg (93 lb 4.1 oz)   SpO2 96%   BMI 16.52 kg/m    BMI: Body mass index is 16.52 kg/m .  Wt Readings from Last 3 Encounters:   03/02/24 42.3 kg (93 lb 4.1 oz)   02/12/24 42 kg (92 lb 9.6 oz)   02/06/24 42 kg (92 lb 9.6 oz)       Intake/Output Summary (Last 24 hours) at 3/6/2024 1433  Last data filed at 3/6/2024 1250  Gross per 24 hour   Intake 600 ml   Output 900 ml   Net -300 ml       General: pleasant female. No acute distress.   HENT: external ears normal. Nares patent. Mucous membranes moist.  Neck: No JVD  Lungs: clear to auscultation  COR: irregularly irregular rate and rhythm, No murmurs, rubs, or gallops  Abd: nondistended, BS present  Extrem: No edema         Non-cardiac Imaging Studies Reviewed      Brain MRI 3/5/2024  IMPRESSION:  1.  Mildly motion degraded examination demonstrates evolving acute to early subacute cortical infarct of the right postcentral gyrus and adjacent operculum as above.  2.  More extensive diffusion abnormality seen previously within the right frontoparietal white matter has resolved, without convincing evidence for additional evolving infarct in these areas currently.  3.  No hemorrhagic transformation or mass effect.  4.  Background of generalized brain atrophy and " presumed chronic microvascular ischemic change similar to the previous exam.       Lab Results Reviewed    Chemistry/lipid CBC Cardiac Enzymes/BNP/TSH/INR   Recent Labs   Lab Test 03/01/24  0841   CHOL 137   HDL 42*   LDL 81   TRIG 72     Recent Labs   Lab Test 03/01/24  0841 10/31/22  1113 11/09/16  0952   LDL 81 117* 84     Recent Labs   Lab Test 03/06/24  0707      POTASSIUM 4.1   CHLORIDE 112*   CO2 23   GLC 95   BUN 21.1   CR 0.82   GFRESTIMATED 66   PERICO 8.6     Recent Labs   Lab Test 03/06/24  0707 03/05/24  0705 03/04/24  0632   CR 0.82 0.81 0.84     Recent Labs   Lab Test 03/01/24  0841 05/13/20  1205   A1C 5.3 5.2          Recent Labs   Lab Test 03/03/24  0640   WBC 7.0   HGB 11.0*   HCT 35.0   *        Recent Labs   Lab Test 03/03/24  0640 03/02/24  0406 03/01/24  0841   HGB 11.0* 10.4* 10.4*    Recent Labs   Lab Test 09/06/22 2001 09/06/22  0850 09/06/22  0102   TROPONINI 0.79* 1.83* 0.74*     Recent Labs   Lab Test 09/07/22  0435 08/13/21  0552 05/13/20  0446   * 171* 536*     Recent Labs   Lab Test 10/31/22  1113   TSH 2.73     Recent Labs   Lab Test 03/01/24  0841 09/05/22  2134 08/12/21  2122   INR 1.09 1.23* 1.10           Current Inpatient Scheduled Medications   Scheduled Meds:   albuterol  2 puff Inhalation 4x Daily    aspirin  81 mg Oral Daily    cycloSPORINE  1 drop Both Eyes BID    demeclocycline  150 mg Oral Q12H [facility] (08/20)    fluorometholone  1 drop Left Eye Daily    levothyroxine  88 mcg Oral QAM AC    metoprolol tartrate  25 mg Oral BID    mirtazapine  7.5 mg Oral At Bedtime    pantoprazole  40 mg Oral BID AC    polyethylene glycol-propylene glycol  1 drop Both Eyes 4x Daily    sodium chloride  1 g Oral Daily    spironolactone  25 mg Oral Daily    timolol maleate  1 drop Left Eye Daily    torsemide  30 mg Oral Daily    umeclidinium-vilanterol  1 puff Inhalation Daily     Continuous Infusions:   niCARdipine      sodium chloride Stopped (03/02/24 0855)       No  current outpatient medications on file.          Medications Prior to Admission   Prior to Admission medications    Medication Sig Start Date End Date Taking? Authorizing Provider   acetaminophen (TYLENOL) 325 MG tablet Take 3 tablets (975 mg) by mouth every 8 hours as needed for mild pain 9/10/22  Yes Vivi Amato MD   albuterol (PROAIR HFA/PROVENTIL HFA/VENTOLIN HFA) 108 (90 Base) MCG/ACT inhaler Inhale 2 puffs into the lungs 4 times daily   Yes Reported, Patient   amoxicillin (AMOXIL) 500 MG capsule 4 CAPSULES (2000MG ) ORALLY AS NEEDED ONE HOUR PRIOR TO DENTAL APPOINTMENT 6/15/23  Yes Gabrielle Wallace APRN CNP   ANTACID REGULAR STRENGTH 500 MG chewable tablet 2 TABLETS (1000MG) ORALLY 2 TIMES DAILY AS NEEDED FOR HEARTBURN 9/28/23  Yes Jose Alfredo Nj MD   Ascorbic Acid (VITAMIN C PO) Take 500 mg by mouth every morning   Yes Reported, Patient   ASPIRIN LOW DOSE 81 MG EC tablet 1 TABLET ORALLY 2 TIMES DAILY (DX: PROPHYLAXIS) 9/25/23  Yes Jose Alfredo Nj MD   Bacillus Coagulans-Inulin (PROBIOTIC FORMULA) 1-250 BILLION-MG CAPS 1 CAPSULE ORALLY DAILY 2/9/24  Yes Gabrielle Wallace APRN CNP   bisacodyl (DULCOLAX) 5 MG EC tablet 1 TABLET ORALLY DAILY AS NEEDED (MAY KEEP AT BEDSIDE)  Patient taking differently: 5 mg 2 times daily as needed 1/16/23  Yes Gabrielle Wallace APRN CNP   chlorhexidine (PERIDEX) 0.12 % solution Swish and spit 15 mLs in mouth 2 times daily May self administer 10/24/23  Yes Gabrielle Wallace APRN CNP   cholecalciferol (VITAMIN D3) 125 mcg (5000 units) capsule 1 CAPSULE ORALLY DAILY 9/25/23  Yes Jose Alfredo Nj MD   COMBIVENT RESPIMAT  MCG/ACT inhaler INHALE 1 PUFF INTO THE LUNGS  4 TIMES DAILY 11/17/23  Yes Gabrielle Wallace APRN CNP   cycloSPORINE (RESTASIS) 0.05 % ophthalmic emulsion Place 1 drop into both eyes 2 times daily    Yes Reported, Patient   demeclocycline (DECLOMYCIN) 150 MG tablet 1 TABLET ORALLY 2 TIMES DAILY 1/17/24  Yes [place]  Gabrielle Gema, APRN CNP   fish oil-omega-3 fatty acids 1000 MG capsule Take 2 capsules (2 g) by mouth daily  Patient taking differently: Take 1 g by mouth daily 2/9/24  Yes Gabrielle Wallace APRN CNP   fluorometholone (FML LIQUIFILM) 0.1 % ophthalmic susp Place 1 drop Into the left eye daily    Yes Reported, Patient   hydrocortisone 1 % CREA cream Place rectally 2 times daily as needed for itching   Yes Unknown, Entered By History   IBANDRONATE SODIUM PO Take 150 mg by mouth every 30 days   Yes Reported, Patient   levothyroxine (SYNTHROID/LEVOTHROID) 88 MCG tablet 1 TABLET ORALLY EVERY MORNING ON AN EMPTY STOMACH 2/9/24  Yes Gabrielle Wallace APRN CNP   loratadine (CLARITIN) 10 MG tablet 1 TABLET ORALLY DAILY (DX: ASTHMA) 7/26/23  Yes Gabrielle Wallace APRN CNP   melatonin 3 MG tablet 1 TABLET ORALLY AT BEDTIME ALONG WITH 5 MG FOR A TOTAL DOSE OF 8MG 12/18/23  Yes Gabrielle Wallace APRN CNP   melatonin 5 MG tablet 1 TABLET ORALLY AT BEDTIME  ALONG WITH 3MG FOR A TOTAL DOSE OF 8MG 12/18/23  Yes Gabrielle Wallace APRN CNP   metoprolol tartrate (LOPRESSOR) 25 MG tablet 1 TABLET ORALLY 2 TIMES DAILY (DX: HYPERTENSION) 8/24/23  Yes Gabrielle Wallace APRN CNP   mirtazapine (REMERON) 7.5 MG tablet 1 TABLET ORALLY AT BEDTIME 2/9/24  Yes Gabrielle Wallace APRN CNP   Multiple Vitamins-Minerals (PRESERVISION AREDS 2) CAPS 1 CAPSULE ORALLY 2 TIMES DAILY 2/9/24  Yes Gabrielle Wallace APRN CNP   omeprazole (PRILOSEC) 20 MG DR capsule 1 CAPSULE ORALLY 2 TIMES DAILY (DX: GASTROESOPHAGEAL REFLUX DISEASE) 2/9/24  Yes Gabrielle Wallace APRN CNP   polyethylene glycol 0.4%- propylene glycol 0.3% (SYSTANE ULTRA) 0.4-0.3 % SOLN ophthalmic solution Place 1 drop into both eyes 4 times daily   Yes Reported, Patient   Polyethylene Glycol 3350 (PEG 3350) 17 GM/SCOOP POWD MIX 1 CAPFUL (17 GMS) IN 8 OUNCES WATER AND DRINK ORALLY DAILY (DX: CONSTIPATION) 1/17/24  Yes  "Gabrielle Wallace APRN CNP   Probiotic Product (PROBIOTIC PO) Take 1 capsule by mouth every morning   Yes Reported, Patient   SENEXON-S 8.6-50 MG tablet 1 TABLET ORALLY 2 TIMES DAILY (DX: CONSTIPATION) 8/24/23  Yes Gabrielle Wallace APRN CNP   sodium chloride 1 GM tablet Take 1 tablet (1 g) by mouth daily 11/15/23  Yes Gabrielle Wallace APRN CNP   spironolactone (ALDACTONE) 25 MG tablet 1 TABLET ORALLY DAILY (DX: EDEMA) ** HAZARDOUS MED: WEAR DOUBLE NITRILE GLOVES** 7/26/23  Yes Gabrielle Wallace APRN CNP   timolol (TIMOPTIC) 0.5 % ophthalmic solution Place 1 drop Into the left eye daily    Yes Reported, Patient   torsemide (DEMADEX) 20 MG tablet 1.5 TABLET (30mg) ORALLY DAILY 5/3/23  Yes Gabrielle Wallace APRN CNP   vitamin C (ASCORBIC ACID) 500 MG tablet 1 TABLET ORALLY DAILY (DX: SUPPLEMENT) 2/9/24  Yes Gabrielle Wallace APRN CNP   OXYGEN-HELIUM IN     Reported, Patient          Yehuda Salmeron DO [facility]  Non-invasive Cardiologist  [facility]      Clinically Significant Risk Factors        # Hypokalemia: Lowest K = 3.3 mmol/L in last 2 days, will replace as needed       # Hypoalbuminemia: Lowest albumin = 3.3 g/dL at 3/2/2024  4:06 AM, will monitor as appropriate     # Hypertension: Noted on problem list  # Chronic heart failure with preserved ejection fraction: heart failure noted on problem list and last echo with EF >50%       # Cachexia: Estimated body mass index is 16.52 kg/m  as calculated from the following:    Height as of this encounter: 1.6 m (5' 3\").    Weight as of this encounter: 42.3 kg (93 lb 4.1 oz).      # COPD: noted on problem list  # History of CABG: noted on surgical history      Cardiac Arrhythmia: Atrial fibrillation: Permanent      "

## 2024-03-06 NOTE — PROGRESS NOTES
NEUROLOGY PROGRESS NOTE     Marla Dunn,  1929, MRN [id number] Date: 3/6/2024     [facility]   Code status:  No CPR- Do NOT Intubate   PCP: Gabrielle Wallace, [phone]      ASSESSMENT & PLAN   Diagnosis code: Ischemic stroke    Ischemic stroke  History of coronary artery disease/MI  History of hypertension/hyperlipidemia  Atrial fibrillation  Left biceps bruising  New right leg weakness-rule out stroke/hemorrhagic transformation versus effort dependent    MRI brain shows hyperacute infarction in the right precentral and postcentral gyri and underlying white matter  CTA negative for any significant large vessel occlusion/stenosis  Echocardiogram shows normal ejection fraction with significant wall motion abnormality and moderate to severe mitral stenosis  Patient was found to be in A-fib in the emergency room  Lipid panel shows LDL 81.  40 mg of Lipitor  Status post tenecteplase.  Head CT stable.  Continue aspirin and statin   Patient does have a new bruise in her left biceps.  Cardiology recommending anticoagulation.  No contraindications from neurology end.  Outpatient Watchman procedure.  Slow reduction in blood pressure  PT/OT  Please note:-Per patient wishes, if she had another stroke she does not IV thrombolytics.  Worsening right leg weakness-possibly effort dependent.  Head CT was negative.  MRI brain negative for another acute stroke.  Will see how she does with physical therapy today though might need MRI of her C/T/L-spine.       Chief Complaint   Patient presents with    Stroke        HISTORY OF PRESENT ILLNESS     We have been requested by Dr. Lizarraga to evaluate Marla Dunn who is a 89 year old  female for stroke.  Is a 89-year-old female with past medical history of coronary artery disease, previous MI, CHF, hypertension, hyperlipidemia, rheumatoid arthritis, Sjogren's syndrome and chronic kidney disease admitted for left-sided weakness and some garbled speech  along with a left facial droop and left-sided neglect.  No gaze deviation.  She was last normal at 1730.  She was found all dressed up sitting in the wheelchair only using her right hand.  Patient reported that at 7 AM this morning she was normal.  She was given tenecteplase which she reports has helped with her symptoms.  Reports no ongoing weakness at this point.  Does take aspirin at baseline.  No anticoagulation.    3/3  No new neurological symptoms.  She does have bruising in the left biceps.  Reports that she does not want the IV thrombolytics in the future if she has a stroke.    3/4  Patient on exam today was noted to have new right leg weakness.  She reports that she cannot move her leg though when confronted to tell me when it started she says it has been there for few days even though she could move her leg yesterday.  Bruising is about the same.  Discussed with primary team and patient is supposed to follow-up with her cardiologist and an outpatient and the decision would be made during that visit if she needs to be on anticoagulation or not.    3/5  Patient continues to have weakness in the right leg.  Is willing to do an MRI of her brain to further evaluate this.  No other new symptoms.  Hematoma stable.    3/6  Some improvement in the right leg though continues to have weakness.  Unable to hold it for a long time.  Has worked with physical therapy and tells me that it did not really help.  MRI brain done yesterday negative for stroke.  Hematoma stable.  Cardiology now consulted and patient and are recommending Eliquis.  Potentially  checking MRI of the C/T/L-spine later today.  Discussed with hospitalist to evaluate with physical therapy for poor effort first.     PAST MEDICAL & SURGICAL HISTORY     Medical History  Past Medical History:   Diagnosis Date    Asthma     Bilateral carpal tunnel syndrome 08/27/2015    CAD (coronary artery disease)     Chronic airway obstruction, not elsewhere classified      Created by Conversion     Chronic anemia     Chronic edema     Congestive heart failure, severe (H) 05/13/2020    COPD (chronic obstructive pulmonary disease) (H)     Coronary artery disease     Depression     GERD (gastroesophageal reflux disease)     Heart disease     Heart murmur     High cholesterol     dislipidemia    HTN (hypertension)     Hypercholesterolemia     Hypertension     Hypertensive emergency 08/13/2021    Hypothyroidism     Hypothyroidism     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    MI (myocardial infarction) (H) 1985    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    Myelodysplastic syndrome (H)     Myocardial infarction (H) 1983    OLEGARIO (obstructive sleep apnea)     Osteoporosis     Other and unspecified hyperlipidemia     Created by Conversion     Pyelonephritis 05/11/2016    Radicular low back pain     Rheumatoid arthritis (H)     Elizabeth Agers syndrome     Sjoegren syndrome     Sjogren's syndrome (H24)     Sleep apnea, obstructive     Spinal stenosis     Squamous cell carcinoma, tongue border (H)     Unspecified polyarthropathy or polyarthritis, hand     Created by Conversion      Surgical History  Past Surgical History:   Procedure Laterality Date    aortic valve      AORTIC VALVE REPLACEMENT  01/01/2012    APPENDECTOMY      APPENDECTOMY      AS TOTAL KNEE ARTHROPLASTY Right     BACK SURGERY  2004    spinal decompression    BACK SURGERY      spinal stenosis    BONE MARROW BIOPSY  2004    breast biopsy      BREAST SURGERY  2001    biopsy    BYPASS GRAFT ARTERY CORONARY  01/01/2009    LIMA to ramus intermedius    cardiac angiogram      CARDIAC CATHETERIZATION      CARDIAC SURGERY      EYE SURGERY  2008    bilateral cataract repair     EYE SURGERY Bilateral     cornea transplant left eye & cataracts    KYPHOPLASTY  2003    T12    OPEN REDUCTION INTERNAL FIXATION HIP NAILING Right 08/13/2021    Procedure: INTERNAL FIXATION, FRACTURE, TROCHANTERIC, HIP, USING PINS OR RODS;  Surgeon:  Darrel Castro MD;  Location: [facility] [place] OR    OTHER SURGICAL HISTORY  01/01/2018    Rectosigmoidectomy perineal    PARTIAL GLOSSECTOMY Right 09/2023    [facility]    RECTOSIGMOIDECTOMY PERINEAL N/A 01/10/2018    Procedure: RECTOSIGMOIDECTOMY PERINEAL;  PERINEAL RECTOSIGMOIDECTOMY ;  Surgeon: Candelaria Anthony MD;  Location: [facility] OR    REPAIR VALVE AORTIC  2009    THORACIC SURGERY  2003    T12 kyphoplasty    ZZC CABG, ARTERY-VEIN, FOUR      ZZC CABG, VEIN, SINGLE      Description: CABG (CABG);  Recorded: 03/09/2012;  Comments: Single vessel bypass graft to an obtuse marginal branch in May 2009    ZZC REPLACE AORT VALV,PROSTH VALV      Description: Aortic Valve Replacement;  Recorded: 03/09/2012;  Comments: Aortic valve replacement        SOCIAL HISTORY     Social History     Tobacco Use    Smoking status: Never    Smokeless tobacco: Never   Substance Use Topics    Alcohol use: No     Alcohol/week: 0.0 standard drinks of alcohol     Comment: Alcoholic Drinks/day: occasional glass of wine    Drug use: No        FAMILY HISTORY     Reviewed, and family history includes Cancer in her brother, mother, and sister; Family History Negative in her mother; Heart Disease in her father.     ALLERGIES     Allergies   Allergen Reactions    Gramineae Pollens Other (See Comments)     ORCHARDGRASS. Shortness of breath when lawn is just cut.    Smoke. Other (See Comments)     Hard to breathe.    Pollen Extract      Other reaction(s): Unknown        REVIEW OF SYSTEMS     12 system ROS was done within the HPI this was negative.  Pertinent positives noted on the HPI.     HOME & HOSPITAL MEDICATIONS     Prior to Admission Medications  Medications Prior to Admission   Medication Sig Dispense Refill Last Dose    acetaminophen (TYLENOL) 325 MG tablet Take 3 tablets (975 mg) by mouth every 8 hours as needed for mild pain   More than a month    albuterol (PROAIR HFA/PROVENTIL HFA/VENTOLIN HFA) 108 (90 Base) MCG/ACT inhaler  Inhale 2 puffs into the lungs 4 times daily   2/29/2024 at hs    amoxicillin (AMOXIL) 500 MG capsule 4 CAPSULES (2000MG ) ORALLY AS NEEDED ONE HOUR PRIOR TO DENTAL APPOINTMENT 4 capsule 5 Unknown    ANTACID REGULAR STRENGTH 500 MG chewable tablet 2 TABLETS (1000MG) ORALLY 2 TIMES DAILY AS NEEDED FOR HEARTBURN 60 tablet 11 Unknown    Ascorbic Acid (VITAMIN C PO) Take 500 mg by mouth every morning   2/29/2024    ASPIRIN LOW DOSE 81 MG EC tablet 1 TABLET ORALLY 2 TIMES DAILY (DX: PROPHYLAXIS) 180 tablet 3 2/29/2024 at hs    Bacillus Coagulans-Inulin (PROBIOTIC FORMULA) 1-250 BILLION-MG CAPS 1 CAPSULE ORALLY DAILY 31 capsule 11 2/29/2024    bisacodyl (DULCOLAX) 5 MG EC tablet 1 TABLET ORALLY DAILY AS NEEDED (MAY KEEP AT BEDSIDE) (Patient taking differently: 5 mg 2 times daily as needed) 30 tablet 10 Unknown    chlorhexidine (PERIDEX) 0.12 % solution Swish and spit 15 mLs in mouth 2 times daily May self administer 118 mL 3 2/29/2024 at hs    cholecalciferol (VITAMIN D3) 125 mcg (5000 units) capsule 1 CAPSULE ORALLY DAILY 90 capsule 3 2/29/2024 at pm    COMBIVENT RESPIMAT  MCG/ACT inhaler INHALE 1 PUFF INTO THE LUNGS  4 TIMES DAILY 4 g 11 2/29/2024 at pm    cycloSPORINE (RESTASIS) 0.05 % ophthalmic emulsion Place 1 drop into both eyes 2 times daily    3/1/2024 at am    demeclocycline (DECLOMYCIN) 150 MG tablet 1 TABLET ORALLY 2 TIMES DAILY 60 tablet 11 2/29/2024 at hs    fish oil-omega-3 fatty acids 1000 MG capsule Take 2 capsules (2 g) by mouth daily (Patient taking differently: Take 1 g by mouth daily) 28 capsule 11 2/29/2024 at am    fluorometholone (FML LIQUIFILM) 0.1 % ophthalmic susp Place 1 drop Into the left eye daily    2/29/2024 at am    hydrocortisone 1 % CREA cream Place rectally 2 times daily as needed for itching   Unknown    IBANDRONATE SODIUM PO Take 150 mg by mouth every 30 days   2/4/2024    levothyroxine (SYNTHROID/LEVOTHROID) 88 MCG tablet 1 TABLET ORALLY EVERY MORNING ON AN EMPTY STOMACH 31  tablet 11 2/29/2024    loratadine (CLARITIN) 10 MG tablet 1 TABLET ORALLY DAILY (DX: ASTHMA) 28 tablet 11 2/29/2024    melatonin 3 MG tablet 1 TABLET ORALLY AT BEDTIME ALONG WITH 5 MG FOR A TOTAL DOSE OF 8MG 30 tablet 11 2/29/2024    melatonin 5 MG tablet 1 TABLET ORALLY AT BEDTIME  ALONG WITH 3MG FOR A TOTAL DOSE OF 8MG 30 tablet 11 2/29/2024    metoprolol tartrate (LOPRESSOR) 25 MG tablet 1 TABLET ORALLY 2 TIMES DAILY (DX: HYPERTENSION) 56 tablet 11 2/29/2024 at hs    mirtazapine (REMERON) 7.5 MG tablet 1 TABLET ORALLY AT BEDTIME 31 tablet 11 2/29/2024    Multiple Vitamins-Minerals (PRESERVISION AREDS 2) CAPS 1 CAPSULE ORALLY 2 TIMES DAILY 62 capsule 11 2/29/2024 at pm    omeprazole (PRILOSEC) 20 MG DR capsule 1 CAPSULE ORALLY 2 TIMES DAILY (DX: GASTROESOPHAGEAL REFLUX DISEASE) 62 capsule 11 2/29/2024 at pm    polyethylene glycol 0.4%- propylene glycol 0.3% (SYSTANE ULTRA) 0.4-0.3 % SOLN ophthalmic solution Place 1 drop into both eyes 4 times daily   2/29/2024 at hs    Polyethylene Glycol 3350 (PEG 3350) 17 GM/SCOOP POWD MIX 1 CAPFUL (17 GMS) IN 8 OUNCES WATER AND DRINK ORALLY DAILY (DX: CONSTIPATION) 510 g 11 2/29/2024 at am    Probiotic Product (PROBIOTIC PO) Take 1 capsule by mouth every morning   2/29/2024 at am    SENEXON-S 8.6-50 MG tablet 1 TABLET ORALLY 2 TIMES DAILY (DX: CONSTIPATION) 56 tablet 11 2/29/2024 at hs    sodium chloride 1 GM tablet Take 1 tablet (1 g) by mouth daily 30 tablet 11 2/29/2024 at am    spironolactone (ALDACTONE) 25 MG tablet 1 TABLET ORALLY DAILY (DX: EDEMA) ** HAZARDOUS MED: WEAR DOUBLE NITRILE GLOVES** 28 tablet 11 2/29/2024 at am    timolol (TIMOPTIC) 0.5 % ophthalmic solution Place 1 drop Into the left eye daily    2/29/2024 at am    torsemide (DEMADEX) 20 MG tablet 1.5 TABLET (30mg) ORALLY DAILY 31 tablet 11 2/29/2024 at am    vitamin C (ASCORBIC ACID) 500 MG tablet 1 TABLET ORALLY DAILY (DX: SUPPLEMENT) 31 tablet 11 2/29/2024 at am    OXYGEN-HELIUM IN           Hospital  "Medications   albuterol  2 puff Inhalation 4x Daily    aspirin  81 mg Oral Daily    cycloSPORINE  1 drop Both Eyes BID    demeclocycline  150 mg Oral Q12H SHONDA (08/20)    fluorometholone  1 drop Left Eye Daily    levothyroxine  88 mcg Oral QAM AC    metoprolol tartrate  25 mg Oral BID    mirtazapine  7.5 mg Oral At Bedtime    pantoprazole  40 mg Oral BID AC    polyethylene glycol-propylene glycol  1 drop Both Eyes 4x Daily    sodium chloride  1 g Oral Daily    spironolactone  25 mg Oral Daily    timolol maleate  1 drop Left Eye Daily    torsemide  30 mg Oral Daily    umeclidinium-vilanterol  1 puff Inhalation Daily        PHYSICAL EXAM     Vital signs  Temp:  [97.7  F (36.5  C)-98.4  F (36.9  C)] 98.4  F (36.9  C)  Pulse:  [76-88] 88  Resp:  [17-19] 17  BP: (106-153)/(56-77) 119/58  SpO2:  [95 %-97 %] 96 %    PHYSICAL EXAMINATION  VITALS: /58 (BP Location: Right arm)   Pulse 88   Temp 98.4  F (36.9  C) (Oral)   Resp 17   Ht 1.6 m (5' 3\")   Wt 42.3 kg (93 lb 4.1 oz)   SpO2 96%   BMI 16.52 kg/m    GENERAL -Health appearing, No apparent distress  EYES- No scleral icterus, no eyelid droop, Pupils - see Neuro section  HEENT - Normocephalic, atraumatic, Hearing grossly limited; Oral mucosa moist and pink in color. External Ears and nose intact.   Neck - supple   PULM - Good spontaneous respiratory effort  CV- Pedal pulses present with no peripheral edema/ No significant varicosities.  MSK- Gait - see Neuro section; Strength and tone- see Neuro section; Range of motion grossly intact.  PSYCH -cooperative    Neurological  Mental status - Patient is awake and partially oriented to self, place and time. Attention span is normal. Language is fluent and follows commands appropriately.   Cranial nerves - CN II-XII intact. Pupils are reactive and symmetric; EOMI, VFIT, NLF symmetric  Motor - There is no pronator drift. Motor exam shows 5/5 strength in all extremities except right leg weakness which could be effort " dependent.  Tone - Tone is symmetric bilaterally in upper and lower extremities.  Reflexes - Reflexes are symmetric and decreased.  Sensation - Sensory exam is grossly intact to light touch, pain.  Coordination - Finger to nose and heel to shin is without dysmetria except left upper extremity dysmetria.  Gait and station --unable to safely ambulate.  Formal gait testing cannot be done due to safety concerns from ongoing medical issues.  Some improvement in the right leg and able to lift it against gravity.  Does not feel it is back to normal.  Continues to have the hematoma in the left biceps.     DIAGNOSTIC STUDIES     Pertinent Radiology   MRI  IMPRESSION:  1.  Hyperacute infarction involving the right precentral and post central gyri and underlying white matter.  2.  Generalized brain atrophy and presumed microvascular ischemic changes as detailed above.    HEAD CT:  1.  No finding for intracranial hemorrhage or mass or convincing finding for acute infarct.     2.  Moderate volume loss and presumed sequela of chronic microvascular ischemic change.     3.  New chronic infarct inferior lateral aspect of the left cerebellar hemisphere.     HEAD CTA:   1.  There is occlusion of a distal M2 superior division segment of the right middle cerebral artery without flow seen more distally within the vessel.     2.  Coarse atherosclerotic calcifications both carotid siphons and proximal intradural vertebral arteries with mild associated luminal narrowing but no clear flow-limiting stenosis.     NECK CTA:  1.  No significant stenosis either cervical carotid system.     2.  Mild atherosclerotic narrowing of the takeoff of both vertebral arteries.     CT PERFUSION:  1.  There is elevated Tmax, MTT, and decreased rCBF along the lateral and posterior aspect of the right frontal lobe compatible with ischemic penumbra.     2.  Area of Tmax >6 seconds measures 11 mL with volume of CBF <30% measuring 0 mL.    ECHO  1. Normal left  ventricular size and systolic performance with a visually  estimated ejection fraction of 65%.  2. There is severe basal inferior hypokinesis and moderate basal posterior  hypokinesis  3. There is mild concentric increase in left ventricular wall thickness.  4. 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  Â  Normal aortic valve prosthesis metrics with a mean systolic gradient of 19  mmHg and a peak anterograde velocity of 2.8 m/sec.  Â  No aortic insufficiency is detected.  5. There is moderate-severe calcific mitral stenosis.  6. Normal right ventricular size with mildly reduced right ventricular  systolic performance.  7. There is severe left atrial enlargement. There is moderate right atrial  enlargement.     When compared to the prior real-time echocardiogram dated 6 September 2022,  the degree of mitral stenosis has increased from moderate to now moderate-  severe. The findings are otherwise felt to be fairly similar on both  examinations. The aforementioned regional wall motion abnormalities are not  new and were identified on the prior study as well.    Head CT  IMPRESSION:  1.  No hemorrhage.  2.  Recent infarct at the right frontoparietal junction on MRI is not visible on CT.     Head CT  IMPRESSION:  1.  No acute hemorrhage.  2.  Known right frontoparietal infarct is not appreciated on CT.  3.  No new/acute territorial infarction.    LABS  Component      Latest Ref Rng 3/1/2024  8:41 AM   Cholesterol      <200 mg/dL 137    Triglycerides      <150 mg/dL 72    HDL Cholesterol      >=50 mg/dL 42 (L)    LDL Cholesterol Calculated      <=100 mg/dL 81    Non HDL Cholesterol      <130 mg/dL 95    Hemoglobin A1C      <5.7 % 5.3       Legend:  (L) Low    MRI brain  IMPRESSION:  1.  Mildly motion degraded examination demonstrates evolving acute to early subacute cortical infarct of the right postcentral gyrus and adjacent operculum as above.  2.  More extensive diffusion abnormality seen previously within the right  frontoparietal white matter has resolved, without convincing evidence for additional evolving infarct in these areas currently.  3.  No hemorrhagic transformation or mass effect.  4.  Background of generalized brain atrophy and presumed chronic microvascular ischemic change similar to the previous exam.    Recent Results (from the past 24 hour(s))   Basic metabolic panel    Collection Time: 03/06/24  7:07 AM   Result Value Ref Range    Sodium 140 135 - 145 mmol/L    Potassium 4.1 3.4 - 5.3 mmol/L    Chloride 112 (H) 98 - 107 mmol/L    Carbon Dioxide (CO2) 23 22 - 29 mmol/L    Anion Gap 5 (L) 7 - 15 mmol/L    Urea Nitrogen 21.1 8.0 - 23.0 mg/dL    Creatinine 0.82 0.51 - 0.95 mg/dL    GFR Estimate 66 >60 mL/min/1.73m2    Calcium 8.6 8.2 - 9.6 mg/dL    Glucose 95 70 - 99 mg/dL       Total time spent for face to face visit, reviewing labs/imaging studies, counseling and coordination of care was: Over 50 min More than 50% of this time was spent on counseling and coordination of care.      Plan patient.  Reviewing cardiology notes.  Reviewing MRI results.  Discussion with hospitalist regarding MRI C-spine/L-spine/discharge.    Chace Griffiths MD  Neurologist  [facility]  Tel:- [phone]

## 2024-03-06 NOTE — CONSULTS
"[facility]  WO Nurse Inpatient Assessment     Consulted for: RLE, sacral    Summary: 3/6 Patient in bed, being cleaned up by CNA.  No injuries found on assessment which was aided by CNA.  WOC signing off, please re-consult if additional needs arise.    Patient History (according to provider note(s):      Marla Dunn is a 89 year old female with PMH CAD Hx MI, CHF, HTN, HLD, RA, Sjogren's syndrome, SaraAgers syndrome, CKD, aortic stenosis, COPD, chronic hyponatremia, OLEGARIO, Atril fibrillation not on AC admitted on 3/1/2024 for acute ischemic stroke     Assessment:      Areas visualized during today's visit: Sacrum/coccyx and Lower extremities     Skin Injury Location: sacrum        Last photo: 3/6  Skin injury due to:  hemosiderin staining  Skin history and plan of care:   see above, patient poor historian  Affected area:      Skin assessment: Intact and Hemosiderin staining     Color: normal and consistent with surrounding tissue     Temperature  normal      Drainage: none .      Color: none      Odor: none  Pain: absent, none  Pain interventions prior to dressing change: N/A  Treatment goal: Protection  STATUS: initial assessment  Supplies ordered: supplies stored on unit       Treatment Plan:     Pressure Injury Prevention (PIP) Plan:  If patient is declining pressure injury prevention interventions: Explore reason why and address patient's concerns, Educate on pressure injury risk and prevention intervention(s), If patient is still declining, document \"informed refusal\" , and Ensure Care team is aware ( provider, charge nurse, etc)  Mattress: Follow bed algorithm, reassess daily and order specialty mattress, if indicated.  HOB: Maintain at or below 30 degrees, unless contraindicated  Repositioning in bed: Every 1-2 hours   Heels: Keep elevated off mattress and Pillows under calves  Protective Dressing: Sacral Mepilex for prevention (#[id number]),  especially for the agitated patient "   Positioning Equipment: None  Chair positioning: Assist patient to reposition hourly   If patient has a buttock pressure injury, or high risk for PI use chair cushion or SPS.  Moisture Management: Perineal cleansing /protection: Follow Incontinence Protocol and Moisturize dry skin  Under Devices: Inspect skin under all medical devices during skin inspection , Ensure tubes are stabilized without tension, and Ensure patient is not lying on medical devices or equipment when repositioned  Ask provider to discontinue device when no longer needed.      Orders: Written    RECOMMEND PRIMARY TEAM ORDER: None, at this time  Education provided: Not appropriate today   Discussed plan of care with: Patient  WO nurse follow-up plan: signing off  Notify [facility] if wound(s) deteriorate.  Nursing to notify the Provider(s) and re-consult the WO Nurse if new skin concern.    DATA:     Current support surface: Standard  Standard gel/foam mattress (IsoFlex, Atmos air, etc)  Containment of urine/stool: Brief  BMI: Body mass index is 16.52 kg/m .   Active diet order: Orders Placed This Encounter      Minced & Moist Diet (level 5) Thin Liquids (level 0)     Output: I/O last 3 completed shifts:  In: 240 [P.O.:240]  Out: 450 [Urine:450]     Labs:   Recent Labs   Lab 03/03/24  0640 03/02/24  0406 03/01/24  1045 03/01/24  0841   ALBUMIN  --  3.3*   < >  --    HGB 11.0* 10.4*  --  10.4*   INR  --   --   --  1.09   WBC 7.0 7.0  --  6.4   A1C  --   --   --  5.3    < > = values in this interval not displayed.     Pressure injury risk assessment:   Sensory Perception: 3-->slightly limited  Moisture: 3-->occasionally moist  Activity: 3-->walks occasionally  Mobility: 3-->slightly limited  Nutrition: 3-->adequate  Friction and Shear: 2-->potential problem  Chris Score: 17    EMELIA Wagner RN [facility] services  Pager no longer in use, please contact through [employer] group: [facility]

## 2024-03-06 NOTE — PLAN OF CARE
Pt scored a 5 on the NIH for speech and right sd weakness. Arm drift was very slight. She is working with PT/OT and SP.  She denies pain. Is alert and oriented X 4. Up with assist of 1-2 due to right leg weakness.  She was up in the chair for a few hours today. She calls appropriately. She is on the K protocol, recheck in am. Tele is afib.  WOCN came by, large sacral mepilex in place.     Problem: Adult Inpatient Plan of Care  Goal: Absence of Hospital-Acquired Illness or Injury  Intervention: Identify and Manage Fall Risk  Recent Flowsheet Documentation  Taken 3/6/2024 1215 by Delfina Hameed RN  Safety Promotion/Fall Prevention: activity supervised  Taken 3/6/2024 0800 by Delfina Hameed RN  Safety Promotion/Fall Prevention: activity supervised  Intervention: Prevent Infection  Recent Flowsheet Documentation  Taken 3/6/2024 1215 by Delfina Hameed RN  Infection Prevention:   rest/sleep promoted   hand hygiene promoted  Taken 3/6/2024 0800 by Delfina Hameed RN  Infection Prevention:   rest/sleep promoted   hand hygiene promoted     Problem: Stroke, Ischemic (Includes Transient Ischemic Attack)  Goal: Optimal Coping  Intervention: Support Psychosocial Response to Stroke  Recent Flowsheet Documentation  Taken 3/6/2024 1215 by Delfina Hameed RN  Supportive Measures:   self-care encouraged   active listening utilized  Taken 3/6/2024 0800 by Delfina Hameed RN  Supportive Measures:   self-care encouraged   active listening utilized   Goal Outcome Evaluation:

## 2024-03-06 NOTE — PLAN OF CARE
Patient alert and oriented with prominent slurred speech. Denies pain. New PIV placed last night. On K protocol, received replacement at HS. On tele- Afib. SCDs on. Neuros stable and intact. CPAP on at night.   Problem: Risk for Delirium  Goal: Improved Behavioral Control  Outcome: Progressing  Goal: Improved Attention and Thought Clarity  Outcome: Progressing     Problem: Stroke, Ischemic (Includes Transient Ischemic Attack)  Goal: Optimal Cognitive Function  Outcome: Progressing  Goal: Effective Oxygenation and Ventilation  Outcome: Progressing  Intervention: Optimize Oxygenation and Ventilation  Recent Flowsheet Documentation  Taken 3/6/2024 0500 by Montse Browning RN  Head of Bed (HOB) Positioning: HOB at 30-45 degrees  Taken 3/6/2024 0200 by Montse Browning RN  Head of Bed (HOB) Positioning: HOB at 30-45 degrees  Taken 3/5/2024 2200 by Montse Browning RN  Head of Bed (HOB) Positioning: HOB at 30-45 degrees   Goal Outcome Evaluation:

## 2024-03-07 ENCOUNTER — APPOINTMENT (OUTPATIENT)
Dept: MRI IMAGING | Facility: HOSPITAL | Age: 89
DRG: 062 | End: 2024-03-07
Attending: STUDENT IN AN ORGANIZED HEALTH CARE EDUCATION/TRAINING PROGRAM
Payer: COMMERCIAL

## 2024-03-07 ENCOUNTER — APPOINTMENT (OUTPATIENT)
Dept: OCCUPATIONAL THERAPY | Facility: HOSPITAL | Age: 89
DRG: 062 | End: 2024-03-07
Payer: COMMERCIAL

## 2024-03-07 ENCOUNTER — APPOINTMENT (OUTPATIENT)
Dept: PHYSICAL THERAPY | Facility: HOSPITAL | Age: 89
DRG: 062 | End: 2024-03-07
Payer: COMMERCIAL

## 2024-03-07 ENCOUNTER — APPOINTMENT (OUTPATIENT)
Dept: SPEECH THERAPY | Facility: HOSPITAL | Age: 89
DRG: 062 | End: 2024-03-07
Payer: COMMERCIAL

## 2024-03-07 LAB
ANION GAP SERPL CALCULATED.3IONS-SCNC: 6 MMOL/L (ref 7–15)
BUN SERPL-MCNC: 23.2 MG/DL (ref 8–23)
CALCIUM SERPL-MCNC: 8.5 MG/DL (ref 8.2–9.6)
CHLORIDE SERPL-SCNC: 108 MMOL/L (ref 98–107)
CREAT SERPL-MCNC: 0.87 MG/DL (ref 0.51–0.95)
DEPRECATED HCO3 PLAS-SCNC: 27 MMOL/L (ref 22–29)
EGFRCR SERPLBLD CKD-EPI 2021: 61 ML/MIN/1.73M2
GLUCOSE SERPL-MCNC: 96 MG/DL (ref 70–99)
POTASSIUM SERPL-SCNC: 3.9 MMOL/L (ref 3.4–5.3)
SODIUM SERPL-SCNC: 141 MMOL/L (ref 135–145)

## 2024-03-07 PROCEDURE — 82374 ASSAY BLOOD CARBON DIOXIDE: CPT | Performed by: STUDENT IN AN ORGANIZED HEALTH CARE EDUCATION/TRAINING PROGRAM

## 2024-03-07 PROCEDURE — 97112 NEUROMUSCULAR REEDUCATION: CPT | Mod: GO

## 2024-03-07 PROCEDURE — 99233 SBSQ HOSP IP/OBS HIGH 50: CPT | Performed by: PSYCHIATRY & NEUROLOGY

## 2024-03-07 PROCEDURE — 82565 ASSAY OF CREATININE: CPT | Performed by: STUDENT IN AN ORGANIZED HEALTH CARE EDUCATION/TRAINING PROGRAM

## 2024-03-07 PROCEDURE — 250N000012 HC RX MED GY IP 250 OP 636 PS 637: Performed by: INTERNAL MEDICINE

## 2024-03-07 PROCEDURE — 97110 THERAPEUTIC EXERCISES: CPT | Mod: GP

## 2024-03-07 PROCEDURE — 72147 MRI CHEST SPINE W/DYE: CPT

## 2024-03-07 PROCEDURE — 72157 MRI CHEST SPINE W/O & W/DYE: CPT

## 2024-03-07 PROCEDURE — 97116 GAIT TRAINING THERAPY: CPT | Mod: GP

## 2024-03-07 PROCEDURE — 97535 SELF CARE MNGMENT TRAINING: CPT | Mod: GO

## 2024-03-07 PROCEDURE — 250N000013 HC RX MED GY IP 250 OP 250 PS 637: Performed by: STUDENT IN AN ORGANIZED HEALTH CARE EDUCATION/TRAINING PROGRAM

## 2024-03-07 PROCEDURE — 36415 COLL VENOUS BLD VENIPUNCTURE: CPT | Performed by: STUDENT IN AN ORGANIZED HEALTH CARE EDUCATION/TRAINING PROGRAM

## 2024-03-07 PROCEDURE — 72156 MRI NECK SPINE W/O & W/DYE: CPT

## 2024-03-07 PROCEDURE — 120N000001 HC R&B MED SURG/OB

## 2024-03-07 PROCEDURE — 255N000002 HC RX 255 OP 636: Performed by: INTERNAL MEDICINE

## 2024-03-07 PROCEDURE — 72158 MRI LUMBAR SPINE W/O & W/DYE: CPT

## 2024-03-07 PROCEDURE — 99233 SBSQ HOSP IP/OBS HIGH 50: CPT | Performed by: INTERNAL MEDICINE

## 2024-03-07 PROCEDURE — 250N000011 HC RX IP 250 OP 636: Performed by: INTERNAL MEDICINE

## 2024-03-07 PROCEDURE — 92526 ORAL FUNCTION THERAPY: CPT | Mod: GN

## 2024-03-07 PROCEDURE — A9585 GADOBUTROL INJECTION: HCPCS | Performed by: INTERNAL MEDICINE

## 2024-03-07 RX ORDER — LORAZEPAM 2 MG/ML
0.5 INJECTION INTRAMUSCULAR
Status: COMPLETED | OUTPATIENT
Start: 2024-03-07 | End: 2024-03-07

## 2024-03-07 RX ORDER — METHYLPREDNISOLONE 4 MG/1
4 TABLET ORAL AT BEDTIME
Qty: 3 TABLET | Refills: 0 | Status: DISCONTINUED | OUTPATIENT
Start: 2024-03-09 | End: 2024-03-08 | Stop reason: HOSPADM

## 2024-03-07 RX ORDER — METHYLPREDNISOLONE 4 MG/1
4 TABLET ORAL
Qty: 2 TABLET | Refills: 0 | Status: DISCONTINUED | OUTPATIENT
Start: 2024-03-08 | End: 2024-03-08 | Stop reason: HOSPADM

## 2024-03-07 RX ORDER — GADOBUTROL 604.72 MG/ML
4 INJECTION INTRAVENOUS ONCE
Status: COMPLETED | OUTPATIENT
Start: 2024-03-07 | End: 2024-03-07

## 2024-03-07 RX ORDER — METHYLPREDNISOLONE 4 MG/1
8 TABLET ORAL AT BEDTIME
Qty: 4 TABLET | Refills: 0 | Status: DISCONTINUED | OUTPATIENT
Start: 2024-03-07 | End: 2024-03-08 | Stop reason: HOSPADM

## 2024-03-07 RX ORDER — METHYLPREDNISOLONE 4 MG/1
8 TABLET ORAL ONCE
Qty: 2 TABLET | Refills: 0 | Status: COMPLETED | OUTPATIENT
Start: 2024-03-07 | End: 2024-03-07

## 2024-03-07 RX ORDER — METHYLPREDNISOLONE 4 MG/1
4 TABLET ORAL ONCE
Qty: 1 TABLET | Refills: 0 | Status: COMPLETED | OUTPATIENT
Start: 2024-03-07 | End: 2024-03-07

## 2024-03-07 RX ORDER — METHYLPREDNISOLONE 4 MG/1
4 TABLET ORAL
Qty: 3 TABLET | Refills: 0 | Status: DISCONTINUED | OUTPATIENT
Start: 2024-03-08 | End: 2024-03-08 | Stop reason: HOSPADM

## 2024-03-07 RX ORDER — METHYLPREDNISOLONE 4 MG/1
4 TABLET ORAL
Qty: 5 TABLET | Refills: 0 | Status: DISCONTINUED | OUTPATIENT
Start: 2024-03-08 | End: 2024-03-08 | Stop reason: HOSPADM

## 2024-03-07 RX ADMIN — METHYLPREDNISOLONE 4 MG: 4 TABLET ORAL at 16:21

## 2024-03-07 RX ADMIN — POLYPROPYLENE GLYCOL 400, PROPYLENE GLYCOL 1 DROP: .4; .3 LIQUID OPHTHALMIC at 09:50

## 2024-03-07 RX ADMIN — PANTOPRAZOLE SODIUM 40 MG: 40 TABLET, DELAYED RELEASE ORAL at 06:47

## 2024-03-07 RX ADMIN — TIMOLOL MALEATE 1 DROP: 5 SOLUTION/ DROPS OPHTHALMIC at 09:50

## 2024-03-07 RX ADMIN — POLYPROPYLENE GLYCOL 400, PROPYLENE GLYCOL 1 DROP: .4; .3 LIQUID OPHTHALMIC at 16:22

## 2024-03-07 RX ADMIN — LORAZEPAM 0.5 MG: 2 INJECTION INTRAMUSCULAR; INTRAVENOUS at 07:44

## 2024-03-07 RX ADMIN — APIXABAN 2.5 MG: 2.5 TABLET, FILM COATED ORAL at 09:48

## 2024-03-07 RX ADMIN — CYCLOSPORINE 1 DROP: 0.5 EMULSION OPHTHALMIC at 09:48

## 2024-03-07 RX ADMIN — SODIUM CHLORIDE TAB 1 GM 1 G: 1 TAB at 09:48

## 2024-03-07 RX ADMIN — LEVOTHYROXINE SODIUM 88 MCG: 88 TABLET ORAL at 06:47

## 2024-03-07 RX ADMIN — ACETAMINOPHEN 650 MG: 325 TABLET ORAL at 14:43

## 2024-03-07 RX ADMIN — PANTOPRAZOLE SODIUM 40 MG: 40 TABLET, DELAYED RELEASE ORAL at 16:20

## 2024-03-07 RX ADMIN — MIRTAZAPINE 7.5 MG: 7.5 TABLET ORAL at 20:32

## 2024-03-07 RX ADMIN — DEMECLOCYCLINE 150 MG: 150 TABLET ORAL at 20:27

## 2024-03-07 RX ADMIN — TORSEMIDE 30 MG: 20 TABLET ORAL at 09:47

## 2024-03-07 RX ADMIN — UMECLIDINIUM BROMIDE AND VILANTEROL TRIFENATATE 1 PUFF: 62.5; 25 POWDER RESPIRATORY (INHALATION) at 09:49

## 2024-03-07 RX ADMIN — DEMECLOCYCLINE 150 MG: 150 TABLET ORAL at 09:47

## 2024-03-07 RX ADMIN — GADOBUTROL 4 ML: 604.72 INJECTION INTRAVENOUS at 08:55

## 2024-03-07 RX ADMIN — METOPROLOL TARTRATE 25 MG: 25 TABLET, FILM COATED ORAL at 20:27

## 2024-03-07 RX ADMIN — POLYPROPYLENE GLYCOL 400, PROPYLENE GLYCOL 1 DROP: .4; .3 LIQUID OPHTHALMIC at 11:57

## 2024-03-07 RX ADMIN — METHYLPREDNISOLONE 8 MG: 4 TABLET ORAL at 16:21

## 2024-03-07 RX ADMIN — METHYLPREDNISOLONE 8 MG: 4 TABLET ORAL at 20:27

## 2024-03-07 RX ADMIN — ALBUTEROL SULFATE 2 PUFF: 90 AEROSOL, METERED RESPIRATORY (INHALATION) at 09:49

## 2024-03-07 RX ADMIN — ALBUTEROL SULFATE 2 PUFF: 90 AEROSOL, METERED RESPIRATORY (INHALATION) at 11:57

## 2024-03-07 RX ADMIN — APIXABAN 2.5 MG: 2.5 TABLET, FILM COATED ORAL at 20:27

## 2024-03-07 RX ADMIN — FLUOROMETHOLONE 1 DROP: 1 SOLUTION/ DROPS OPHTHALMIC at 09:50

## 2024-03-07 RX ADMIN — METOPROLOL TARTRATE 25 MG: 25 TABLET, FILM COATED ORAL at 09:47

## 2024-03-07 RX ADMIN — POLYPROPYLENE GLYCOL 400, PROPYLENE GLYCOL 1 DROP: .4; .3 LIQUID OPHTHALMIC at 20:27

## 2024-03-07 RX ADMIN — CYCLOSPORINE 1 DROP: 0.5 EMULSION OPHTHALMIC at 20:26

## 2024-03-07 RX ADMIN — SPIRONOLACTONE 25 MG: 25 TABLET, FILM COATED ORAL at 09:47

## 2024-03-07 ASSESSMENT — ACTIVITIES OF DAILY LIVING (ADL)
ADLS_ACUITY_SCORE: 57
ADLS_ACUITY_SCORE: 56
ADLS_ACUITY_SCORE: 56
ADLS_ACUITY_SCORE: 57
ADLS_ACUITY_SCORE: 56
ADLS_ACUITY_SCORE: 57
ADLS_ACUITY_SCORE: 56
ADLS_ACUITY_SCORE: 56
ADLS_ACUITY_SCORE: 57
ADLS_ACUITY_SCORE: 56
ADLS_ACUITY_SCORE: 57
ADLS_ACUITY_SCORE: 56
ADLS_ACUITY_SCORE: 57
ADLS_ACUITY_SCORE: 56
ADLS_ACUITY_SCORE: 57
ADLS_ACUITY_SCORE: 57

## 2024-03-07 NOTE — PROGRESS NOTES
"Care Management Follow Up    Length of Stay (days): 6    Expected Discharge Date: 03/08/2024     Concerns to be Addressed:  neuro to see, TCU placement     Patient plan of care discussed at interdisciplinary rounds: Yes    Anticipated Discharge Disposition:  TCU     Anticipated Discharge Services:    Anticipated Discharge DME:      Patient/family educated on Medicare website which has current facility and service quality ratings:    Education Provided on the Discharge Plan:    Patient/Family in Agreement with the Plan:      Referrals Placed by CM/SW:  TCU  Private pay costs discussed: Not applicable    Additional Information:  Social history per previous CM note:  \"Patient lives at [facility] and receives the following services: bathing, meals, medication management. Says patient is mostly in her wheelchair but can typically transfer self. Anticipate return to [facility] pending therapy recs . Kayce Brothers is main contact. Family will transport if able.\"     3/6 Therapy recommendation was home with home care and this was set up with [facility]. Now recommendation has changed to TCU. This was discussed with the pt and pt's kayce Brothers and they would like referrals sent to [facility], [facility], and [facility] and first choice is [facility]. Referrals sent and pending.    3/7 Chart reviewed; TCU referrals pending. Pt not medically ready to discharge neuro to see.    RNCM to follow for medical progression, recommendations, and final discharge plan.      Leticia Rock RN      "

## 2024-03-07 NOTE — PROGRESS NOTES
[facility]   [address], [place] [address]   [email or web address]   OFFICE: [phone]     CARDIOLOGY INPATIENT PROGRESS NOTE     Impression and Plan     Assessment/Plan:  Ms. Marla Dunn is a 89 year old female with chronic atrial fibrillation, CAD s/p CABG and AVR in 2009, calcific mitral valve disease, HTN, HLD, RA, CKD, COPD, OLEGARIO, who presented to the hospital on 3/1 with acute stroke.      Chronic afib   - Rate control is adequate    - CHADS-Vasc 7.  We discussed that her risk for recurrent stroke is quite high.  I think her risk of bleeding with AC is elevated given falls, age and frailty, however it is not prohibitive. We discussed possible referral for Watchman, and that I have doubts she will derive benefit from the watchman given her already advanced age and limited predicted life expectancy.  Still, she is very anxious about a recurrent stroke above all else, and the watchman would certainly reduce her risk of stroke long term.     - We have started eliquis 2.5mg BID.  Will continue.     Follow up with Dr. Amaya on 4/17/24  Follow up with Watchman team requested and pending  Will sign off     Primary Cardiologist: Dr. Amaya    Subjective      Ms. Gutiérrez is eager to leave the hospital or to know the plan for discharge.  She may go home or TCU.  She started the eliquis yesterday.  [employer]el[employer] is at bedside.        Review of Systems:  Further review of systems is otherwise negative/noncontributory (based on review of medical record (admission H&P) and 13 point review of systems reviewed. Pertinent positives noted).    Cardiac Diagnostics     ECG: Personally reviewed and interpreted: 3/1/2024  Afib with aberrantly conducted complexes, LAD     Most recent:  Echocardiogram (results reviewed): 3/1/2024  Interpretation Summary     1. Normal left ventricular size and systolic performance with a visually  estimated ejection fraction of 65%.  2.  There is severe basal inferior hypokinesis and moderate basal posterior  hypokinesis  3. There is mild concentric increase in left ventricular wall thickness.  4. 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  Â  Normal aortic valve prosthesis metrics with a mean systolic gradient of 19  mmHg and a peak anterograde velocity of 2.8 m/sec.  Â  No aortic insufficiency is detected.  5. There is moderate-severe calcific mitral stenosis.  6. Normal right ventricular size with mildly reduced right ventricular  systolic performance.  7. There is severe left atrial enlargement. There is moderate right atrial  enlargement.     When compared to the prior real-time echocardiogram dated 6 September 2022,  the degree of mitral stenosis has increased from moderate to now moderate-  severe. The findings are otherwise felt to be fairly similar on both  examinations. The aforementioned regional wall motion abnormalities are not  new and were identified on the prior study as well.     Cardiac Stress Testing (results reviewed): 7/16/2021    The nuclear stress test is negative for inducible myocardial ischemia or infarction.    The left ventricular ejection fraction at stress is 57%.    A prior study was conducted on 6/18/2017.  No interval change noted.        Medical History  Surgical History Family History Social History   Past Medical History:   Diagnosis Date    Asthma     Bilateral carpal tunnel syndrome 08/27/2015    CAD (coronary artery disease)     Chronic airway obstruction, not elsewhere classified     Created by Conversion     Chronic anemia     Chronic edema     Congestive heart failure, severe (H) 05/13/2020    COPD (chronic obstructive pulmonary disease) (H)     Coronary artery disease     Depression     GERD (gastroesophageal reflux disease)     Heart disease     Heart murmur     High cholesterol     dislipidemia    HTN (hypertension)     Hypercholesterolemia     Hypertension     Hypertensive emergency 08/13/2021     Hypothyroidism     Hypothyroidism     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    MI (myocardial infarction) (H) 1985    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    Myelodysplastic syndrome (H)     Myocardial infarction (H) 1983    OLEGARIO (obstructive sleep apnea)     Osteoporosis     Other and unspecified hyperlipidemia     Created by Conversion     Pyelonephritis 05/11/2016    Radicular low back pain     Rheumatoid arthritis (H)     Elizabeth Agers syndrome     Sjoegren syndrome     Sjogren's syndrome (H24)     Sleep apnea, obstructive     Spinal stenosis     Squamous cell carcinoma, tongue border (H)     Unspecified polyarthropathy or polyarthritis, hand     Created by Conversion      Past Surgical History:   Procedure Laterality Date    aortic valve      AORTIC VALVE REPLACEMENT  01/01/2012    APPENDECTOMY      APPENDECTOMY      AS TOTAL KNEE ARTHROPLASTY Right     BACK SURGERY  2004    spinal decompression    BACK SURGERY      spinal stenosis    BONE MARROW BIOPSY  2004    breast biopsy      BREAST SURGERY  2001    biopsy    BYPASS GRAFT ARTERY CORONARY  01/01/2009    LIMA to ramus intermedius    cardiac angiogram      CARDIAC CATHETERIZATION      CARDIAC SURGERY      EYE SURGERY  2008    bilateral cataract repair     EYE SURGERY Bilateral     cornea transplant left eye & cataracts    KYPHOPLASTY  2003    T12    OPEN REDUCTION INTERNAL FIXATION HIP NAILING Right 08/13/2021    Procedure: INTERNAL FIXATION, FRACTURE, TROCHANTERIC, HIP, USING PINS OR RODS;  Surgeon: Darrel Castro MD;  [place] [facility] [place]    OTHER SURGICAL HISTORY  01/01/2018    Rectosigmoidectomy perineal    PARTIAL GLOSSECTOMY Right 09/2023    [facility]    RECTOSIGMOIDECTOMY PERINEAL N/A 01/10/2018    Procedure: RECTOSIGMOIDECTOMY PERINEAL;  PERINEAL RECTOSIGMOIDECTOMY ;  Surgeon: Candelaria Anthony MD;  Location: [facility] OR    REPAIR VALVE AORTIC  2009    THORACIC SURGERY  2003    T12 kyphoplasty    [facility] CABG,  "ARTERY-VEIN, FOUR      ZZC CABG, VEIN, SINGLE      Description: CABG (CABG);  Recorded: 2012;  Comments: Single vessel bypass graft to an obtuse marginal branch in May 2009    ZZC REPLACE AORT VALV,PROSTH VALV      Description: Aortic Valve Replacement;  Recorded: 2012;  Comments: Aortic valve replacement     Family History   Problem Relation Age of Onset    Cancer Mother         ovarian cancer;  in her 50s    Family History Negative Mother     Heart Disease Father     Cancer Brother     Cancer Sister            Social History     Socioeconomic History    Marital status:      Spouse name: Not on file    Number of children: Not on file    Years of education: Not on file    Highest education level: Not on file   Occupational History    Not on file   Tobacco Use    Smoking status: Never    Smokeless tobacco: Never   Substance and Sexual Activity    Alcohol use: No     Alcohol/week: 0.0 standard drinks of alcohol     Comment: Alcoholic Drinks/day: occasional glass of wine    Drug use: No    Sexual activity: Not on file   Other Topics Concern    Not on file   Social History Narrative    .  passed away 20 years ago. No children. 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 Retired from HR at [employer]. Was living independently in her apartment in [place] prior to admission. Uses walker at baseline.      Social Determinants of Health     Financial Resource Strain: Not on file   Food Insecurity: Not on file   Transportation Needs: Not on file   Physical Activity: Not on file   Stress: Not on file   Social Connections: Not on file   Interpersonal Safety: Not on file   Housing Stability: Not on file             Physical Examination   VITALS: BP 99/48 (BP Location: Left arm)   Pulse 92   Temp 97.6  F (36.4  C) (Axillary)   Resp 20   Ht 1.6 m (5' 3\")   Wt 42.3 kg (93 lb 4.1 oz)   SpO2 99%   BMI 16.52 kg/m    BMI: Body mass index is 16.52 kg/m .  Wt Readings from Last 3 Encounters: "   03/02/24 42.3 kg (93 lb 4.1 oz)   02/12/24 42 kg (92 lb 9.6 oz)   02/06/24 42 kg (92 lb 9.6 oz)       Intake/Output Summary (Last 24 hours) at 3/7/2024 1423  Last data filed at 3/7/2024 1148  Gross per 24 hour   Intake 580 ml   Output 2250 ml   Net -1670 ml       General: pleasant female. No acute distress.   HENT: external ears normal. Nares patent. Mucous membranes moist.  Neck: No JVD  Lungs: clear to auscultation  COR: irregularly irregular rate and rhythm, No murmurs, rubs, or gallops  Abd: nondistended, BS present  Extrem: No edema         Non-cardiac Imaging Studies Reviewed             Lab Results Reviewed    Chemistry/lipid CBC Cardiac Enzymes/BNP/TSH/INR   Recent Labs   Lab Test 03/01/24  0841   CHOL 137   HDL 42*   LDL 81   TRIG 72     Recent Labs   Lab Test 03/01/24  0841 10/31/22  1113 11/09/16  0952   LDL 81 117* 84     Recent Labs   Lab Test 03/07/24  0710      POTASSIUM 3.9   CHLORIDE 108*   CO2 27   GLC 96   BUN 23.2*   CR 0.87   GFRESTIMATED 61   PERICO 8.5     Recent Labs   Lab Test 03/07/24  0710 03/06/24  0707 03/05/24  0705   CR 0.87 0.82 0.81     Recent Labs   Lab Test 03/01/24  0841 05/13/20  1205   A1C 5.3 5.2          Recent Labs   Lab Test 03/03/24  0640   WBC 7.0   HGB 11.0*   HCT 35.0   *        Recent Labs   Lab Test 03/03/24  0640 03/02/24  0406 03/01/24  0841   HGB 11.0* 10.4* 10.4*    Recent Labs   Lab Test 09/06/22 2001 09/06/22  0850 09/06/22  0102   TROPONINI 0.79* 1.83* 0.74*     Recent Labs   Lab Test 09/07/22  0435 08/13/21  0552 05/13/20  0446   * 171* 536*     Recent Labs   Lab Test 10/31/22  1113   TSH 2.73     Recent Labs   Lab Test 03/01/24  0841 09/05/22 2134 08/12/21 2122   INR 1.09 1.23* 1.10           Current Inpatient Scheduled Medications   Scheduled Meds:   albuterol  2 puff Inhalation 4x Daily    apixaban ANTICOAGULANT  2.5 mg Oral BID    cycloSPORINE  1 drop Both Eyes BID    demeclocycline  150 mg Oral Q12H [facility] (08/20)     fluorometholone  1 drop Left Eye Daily    levothyroxine  88 mcg Oral QAM AC    metoprolol tartrate  25 mg Oral BID    mirtazapine  7.5 mg Oral At Bedtime    pantoprazole  40 mg Oral BID AC    polyethylene glycol-propylene glycol  1 drop Both Eyes 4x Daily    sodium chloride  1 g Oral Daily    spironolactone  25 mg Oral Daily    timolol maleate  1 drop Left Eye Daily    torsemide  30 mg Oral Daily    umeclidinium-vilanterol  1 puff Inhalation Daily     Continuous Infusions:   niCARdipine      sodium chloride Stopped (03/02/24 0855)       No current outpatient medications on file.          Medications Prior to Admission   Prior to Admission medications    Medication Sig Start Date End Date Taking? Authorizing Provider   acetaminophen (TYLENOL) 325 MG tablet Take 3 tablets (975 mg) by mouth every 8 hours as needed for mild pain 9/10/22  Yes Vivi Amato MD   albuterol (PROAIR HFA/PROVENTIL HFA/VENTOLIN HFA) 108 (90 Base) MCG/ACT inhaler Inhale 2 puffs into the lungs 4 times daily   Yes Reported, Patient   amoxicillin (AMOXIL) 500 MG capsule 4 CAPSULES (2000MG ) ORALLY AS NEEDED ONE HOUR PRIOR TO DENTAL APPOINTMENT 6/15/23  Yes Gabrielle Wallace APRN CNP   ANTACID REGULAR STRENGTH 500 MG chewable tablet 2 TABLETS (1000MG) ORALLY 2 TIMES DAILY AS NEEDED FOR HEARTBURN 9/28/23  Yes Jose Alfredo Nj MD   Ascorbic Acid (VITAMIN C PO) Take 500 mg by mouth every morning   Yes Reported, Patient   ASPIRIN LOW DOSE 81 MG EC tablet 1 TABLET ORALLY 2 TIMES DAILY (DX: PROPHYLAXIS) 9/25/23  Yes Jose Alfredo Nj MD   Bacillus Coagulans-Inulin (PROBIOTIC FORMULA) 1-250 BILLION-MG CAPS 1 CAPSULE ORALLY DAILY 2/9/24  Yes Gabrielle Wallace APRN CNP   bisacodyl (DULCOLAX) 5 MG EC tablet 1 TABLET ORALLY DAILY AS NEEDED (MAY KEEP AT BEDSIDE)  Patient taking differently: 5 mg 2 times daily as needed 1/16/23  Yes Gabrielle Wallace APRN CNP   chlorhexidine (PERIDEX) 0.12 % solution Swish and spit 15 mLs in mouth 2  times daily May self administer 10/24/23  Yes Gabrielle Wallace APRN CNP   cholecalciferol (VITAMIN D3) 125 mcg (5000 units) capsule 1 CAPSULE ORALLY DAILY 9/25/23  Yes Jose Alfredo Nj MD   COMBIVENT RESPIMAT  MCG/ACT inhaler INHALE 1 PUFF INTO THE LUNGS  4 TIMES DAILY 11/17/23  Yes Gabrielle Wallace APRN CNP   cycloSPORINE (RESTASIS) 0.05 % ophthalmic emulsion Place 1 drop into both eyes 2 times daily    Yes Reported, Patient   demeclocycline (DECLOMYCIN) 150 MG tablet 1 TABLET ORALLY 2 TIMES DAILY 1/17/24  Yes Gabrielle Wallace APRN CNP   fish oil-omega-3 fatty acids 1000 MG capsule Take 2 capsules (2 g) by mouth daily  Patient taking differently: Take 1 g by mouth daily 2/9/24  Yes Gabrielle Wallace APRN CNP   fluorometholone (FML LIQUIFILM) 0.1 % ophthalmic susp Place 1 drop Into the left eye daily    Yes Reported, Patient   hydrocortisone 1 % CREA cream Place rectally 2 times daily as needed for itching   Yes Unknown, Entered By History   IBANDRONATE SODIUM PO Take 150 mg by mouth every 30 days   Yes Reported, Patient   levothyroxine (SYNTHROID/LEVOTHROID) 88 MCG tablet 1 TABLET ORALLY EVERY MORNING ON AN EMPTY STOMACH 2/9/24  Yes Gabrielle Wallace APRN CNP   loratadine (CLARITIN) 10 MG tablet 1 TABLET ORALLY DAILY (DX: ASTHMA) 7/26/23  Yes Gabrielle Wallace APRN CNP   melatonin 3 MG tablet 1 TABLET ORALLY AT BEDTIME ALONG WITH 5 MG FOR A TOTAL DOSE OF 8MG 12/18/23  Yes Gabrielle Wallace APRN CNP   melatonin 5 MG tablet 1 TABLET ORALLY AT BEDTIME  ALONG WITH 3MG FOR A TOTAL DOSE OF 8MG 12/18/23  Yes Gabrielle Wallace APRN CNP   metoprolol tartrate (LOPRESSOR) 25 MG tablet 1 TABLET ORALLY 2 TIMES DAILY (DX: HYPERTENSION) 8/24/23  Yes Loehlein, Gabrielle Gema, APRN CNP   mirtazapine (REMERON) 7.5 MG tablet 1 TABLET ORALLY AT BEDTIME 2/9/24  Yes Gabrielle Wallace APRN CNP   Multiple Vitamins-Minerals (PRESERVISION AREDS 2) CAPS 1 CAPSULE  ORALLY 2 TIMES DAILY 2/9/24  Yes Gabrielle Wallace APRN CNP   omeprazole (PRILOSEC) 20 MG DR capsule 1 CAPSULE ORALLY 2 TIMES DAILY (DX: GASTROESOPHAGEAL REFLUX DISEASE) 2/9/24  Yes Gabrielle Wallace APRN CNP   polyethylene glycol 0.4%- propylene glycol 0.3% (SYSTANE ULTRA) 0.4-0.3 % SOLN ophthalmic solution Place 1 drop into both eyes 4 times daily   Yes Reported, Patient   Polyethylene Glycol 3350 (PEG 3350) 17 GM/SCOOP POWD MIX 1 CAPFUL (17 GMS) IN 8 OUNCES WATER AND DRINK ORALLY DAILY (DX: CONSTIPATION) 1/17/24  Yes Gabrielle Wallace APRN CNP   Probiotic Product (PROBIOTIC PO) Take 1 capsule by mouth every morning   Yes Reported, Patient   SENEXON-S 8.6-50 MG tablet 1 TABLET ORALLY 2 TIMES DAILY (DX: CONSTIPATION) 8/24/23  Yes Gabrielle Wallace APRN CNP   sodium chloride 1 GM tablet Take 1 tablet (1 g) by mouth daily 11/15/23  Yes Gabrielle Wallace APRN CNP   spironolactone (ALDACTONE) 25 MG tablet 1 TABLET ORALLY DAILY (DX: EDEMA) ** HAZARDOUS MED: WEAR DOUBLE NITRILE GLOVES** 7/26/23  Yes Gabrielle Wallace APRN CNP   timolol (TIMOPTIC) 0.5 % ophthalmic solution Place 1 drop Into the left eye daily    Yes Reported, Patient   torsemide (DEMADEX) 20 MG tablet 1.5 TABLET (30mg) ORALLY DAILY 5/3/23  Yes Gabrielle Wallace APRN CNP   vitamin C (ASCORBIC ACID) 500 MG tablet 1 TABLET ORALLY DAILY (DX: SUPPLEMENT) 2/9/24  Yes Gabrielle Wallace APRN CNP   OXYGEN-HELIUM IN     Reported, Patient          Yehuda Salmeron DO [facility]  Non-invasive Cardiologist  [facility]      Clinically Significant Risk Factors              # Hypoalbuminemia: Lowest albumin = 3.3 g/dL at 3/2/2024  4:06 AM, will monitor as appropriate     # Hypertension: Noted on problem list  # Chronic heart failure with preserved ejection fraction: heart failure noted on problem list and last echo with EF >50%       # Cachexia: Estimated body mass index is 16.52 kg/m  as calculated  "from the following:    Height as of this encounter: 1.6 m (5' 3\").    Weight as of this encounter: 42.3 kg (93 lb 4.1 oz).      # COPD: noted on problem list  # History of CABG: noted on surgical history        "

## 2024-03-07 NOTE — PROGRESS NOTES
[facility]    Medicine Progress Note - Hospitalist Service    Date of Admission:  3/1/2024    Assessment & Plan     Marla Dunn is a 89 year old female with PMH CAD Hx MI, CHF, HTN, HLD, RA, Sjogren's syndrome, SaraAgers syndrome, CKD, aortic stenosis, COPD, chronic hyponatremia, OLEGARIO, Atril fibrillation not on AC admitted on 3/1/2024 for acute ischemic stroke     Acute ischemic stroke - right MCA territory      RLE weakness with difficulty walking; RUE weakness   MRI brain shows hyperacute infarction in the right precentral and postcentral gyri and underlying white matter  CTA negative for any significant large vessel occlusion/stenosis  Echocardiogram shows normal ejection fraction with significant wall motion abnormality and moderate to severe mitral stenosis  Repeat CT 03/02: Negative for hemorrhage  Seen by stroke neurology, S/p TNK on presentaion.  -- Patient was found to have right lower extremity weakness 03/04, stat CT head negative, MRI brain no new changes  -- Seen by Physical therapy 3/6/24 and felt to have persistent weakness with minimal improvement -   MRI C/T/L spine requested by hospitalist after discussing with neurology and is pending this am (3/7/24)    Neurology continuing to follow - appreciate recommendations;     1430 - Dr. Griffiths paged to review MRI images when available later today.  Discussed findings of severe stenosis on MR imaging with him  Recommends medrol dose sophia and neurosurgery consult for further recommendations  Dr. Griffiths will see on rounds later today    PT/OT - will need to reassess for safe discharge planning with persistent weakness    SLP re-eval, recommend minced and moist solids, thin liquids with supraglottic swallow strategy - doing well    Asa 81mg, started Eliquis 2.5mg BID  - In case of recurrent stroke patient does not want IV thrombolytics     2. Chronic Atrial Fib  - PTA metoprolol  -[id number]  - was not prior on AC due to  "frequent falls and risk of adverse outcome from fall at home  - Seen by cardiology this admission - started on Eliquis 2.5 mg twice daily  -Follow-up outpatient with Dr. Amaya, and with watchman team     3.  Chronic HFpEF  PTA torsemide 30 mg daily and spironolactone 25 mg daily     4. Elevated troponin likely 2/2 demand  CAD  Troponin 42, denies chest pain  Repeat Troponin unchanged ~ flat  EKG A fib with PVC's, no acute ischemic changes     5. Hypokalemia  Monitor and replete as needed     6. Mild hyponatremia  Hypochloremia  Na at 140  Resume PTA demeclocycline and salt tabs 03/04  Monitor Na     7. CKD  Monitor creatinine     8. Normocytic anemia  Stable  No evidence of bleeding  Monitor Hb     9. Hx RLE hematoma and cellulitis  Completed p.o. Keflex, patient refusing dressing to be removed  Monitor     10.  COPD  Not in exacerbation  PTA inhalers     11. OLEGARIO  CPAP     12. Hypothyroidism  PTA levothyroxine     Medical Decision Making       55 MINUTES SPENT BY ME on the date of service doing chart review, history, exam, documentation & further activities per the note.        PPE Worn:  Mask, gloves     Diet: Combination Diet Soft and Bite Sized Diet (level 6); Thin Liquids (level 0)    DVT Prophylaxis: DOAC  Bush Catheter: Not present  Lines: None     Cardiac Monitoring: ACTIVE order. Indication: Stroke, acute (48 hours)  Code Status: No CPR- Do NOT Intubate      Clinically Significant Risk Factors              # Hypoalbuminemia: Lowest albumin = 3.3 g/dL at 3/2/2024  4:06 AM, will monitor as appropriate     # Hypertension: Noted on problem list  # Chronic heart failure with preserved ejection fraction: heart failure noted on problem list and last echo with EF >50%       # Cachexia: Estimated body mass index is 16.52 kg/m  as calculated from the following:    Height as of this encounter: 1.6 m (5' 3\").    Weight as of this encounter: 42.3 kg (93 lb 4.1 oz).      # COPD: noted on problem list  # History of CABG: " "noted on surgical history       Disposition Plan      Expected Discharge Date: 03/07/2024    Discharge Delays: Specialist Consult (enter specialist & decision needed in comments)  Placement - TCU  Destination: assisted living  Discharge Comments: SLP - NPO  PT/OT javi; MRI          Danielle Huizar DO  Hospitalist Service  [facility]  Securely message with [email or web address] (more info)  Text page via [employer] Paging/Directory   ______________________________________________________________________    Interval History     Very anxious in MRI scanner earlier today and was given IV ativan dose \"that was the best MRI I ever had\".  Just returned from MRI and is about to eat breakfast.    Feeling a little sleepy from the ativan but no CP, SOB, F/C or N/V.  NO HA.  Still having weakness if her right arm and right leg but thinks it is \"not too bad\".      Per RN, has not been up walking yet today.  Due to see PT/OT again today to assess for safe discharge planning.      Appetite has been good - no N/V    Physical Exam   Vital Signs: Temp: 97.2  F (36.2  C) Temp src: Axillary BP: 119/58 Pulse: 80   Resp: 18 SpO2: 95 % O2 Device: None (Room air)    Weight: 93 lbs 4.07 oz    GEN:  Alert, elderly female sitting up in bed.  Awake, oriented x 3, appears slightly sleepy but comfortable, no overt respiratory distress.  HEENT:  Normocephalic/atraumatic, no scleral icterus, no nasal discharge  CV:  somewhat distant and irregular rate and rhythm, no loud murmur or JVD.  S1 + S2 noted, no S3 or S4.  LUNGS:  Clear to auscultation ant/lat bilaterally without rales/rhonchi/wheezing/retractions.  Symmetric chest rise on inhalation noted.  ABD:  Active bowel sounds, soft, non-tender/not really distended.  No clear rebound/guarding/rigidity.  EXT:  No significant pretibial edema bilaterally.  No cyanosis.  No acute joint synovitis noted.  SKIN:  Dry to touch, no exanthems noted in the visualized areas.  NEURO:  speech is " ever so slightly slow but clear and appropriate.  CN 2-12 intact to gross testing bilaterally.  Passive movement of right arm weaker than left arm this am.  Right leg weaker to strength testing this am than left leg.  No tremors at rest    Medications    niCARdipine      sodium chloride Stopped (03/02/24 0855)      albuterol  2 puff Inhalation 4x Daily    apixaban ANTICOAGULANT  2.5 mg Oral BID    aspirin  81 mg Oral Daily    cycloSPORINE  1 drop Both Eyes BID    demeclocycline  150 mg Oral Q12H SHONDA (08/20)    fluorometholone  1 drop Left Eye Daily    levothyroxine  88 mcg Oral QAM AC    metoprolol tartrate  25 mg Oral BID    mirtazapine  7.5 mg Oral At Bedtime    pantoprazole  40 mg Oral BID AC    polyethylene glycol-propylene glycol  1 drop Both Eyes 4x Daily    sodium chloride  1 g Oral Daily    spironolactone  25 mg Oral Daily    timolol maleate  1 drop Left Eye Daily    torsemide  30 mg Oral Daily    umeclidinium-vilanterol  1 puff Inhalation Daily       Data     Labs and Imaging results below reviewed today.  Recent Labs   Lab 03/03/24  0640 03/02/24  0406 03/01/24  0841   WBC 7.0 7.0 6.4   HGB 11.0* 10.4* 10.4*   HCT 35.0 33.5* 32.2*   * 101* 99    231 234     Recent Labs   Lab 03/07/24  0710 03/06/24  0707 03/05/24  1411 03/05/24  0705    140  --  138   POTASSIUM 3.9 4.1 3.7 3.3*   CHLORIDE 108* 112*  --  110*   CO2 27 23  --  23   ANIONGAP 6* 5*  --  5*   GLC 96 95  --  91   BUN 23.2* 21.1  --  21.6   CR 0.87 0.82  --  0.81   GFRESTIMATED 61 66  --  67   PERICO 8.5 8.6  --  8.7     Recent Labs   Lab 03/07/24  0710 03/06/24  0707 03/05/24  1411 03/05/24  0705 03/02/24  1448 03/02/24  0406 03/01/24  1635 03/01/24  1045    140  --  138   < > 144  --   --    POTASSIUM 3.9 4.1 3.7 3.3*   < > 3.5  --   --    CHLORIDE 108* 112*  --  110*   < > 109*  --   --    CO2 27 23  --  23   < > 22  --   --    ANIONGAP 6* 5*  --  5*   < > 13  --   --    GLC 96 95  --  91   < > 85   < >  --    BUN 23.2*  21.1  --  21.6   < > 25.6*  --   --    CR 0.87 0.82  --  0.81   < > 0.84  --   --    GFRESTIMATED 61 66  --  67   < > 64  --   --    PERICO 8.5 8.6  --  8.7   < > 8.5  --   --    MAG  --   --   --  2.4*  --   --   --   --    PROTTOTAL  --   --   --   --   --  6.5  --   --    ALBUMIN  --   --   --   --   --  3.3*  --  3.4*   BILITOTAL  --   --   --   --   --  0.4  --   --    ALKPHOS  --   --   --   --   --  101  --   --    AST  --   --   --   --   --  24  --   --    ALT  --   --   --   --   --  19  --   --     < > = values in this interval not displayed.       No results found for this or any previous visit (from the past 24 hour(s)).

## 2024-03-07 NOTE — PLAN OF CARE
Pt scored a 4 on the NIH. She scored for right sd drift on UE and LE. She also scored for dysarthria.   She had a MRI this morning. She became anxious while in the MRI machine. Writer went down and gave her IV Ativan. This worked well and MRI techs were able to finish the MRI which took over 90  minutes.    Pt is being seen by cardiology and neuro. Neuro wants neurosurgery input on the spinal stenosis.   Neurology is currently in the room updating family.    Problem: Stroke, Ischemic (Includes Transient Ischemic Attack)  Goal: Optimal Coping  Outcome: Progressing  Intervention: Support Psychosocial Response to Stroke  Recent Flowsheet Documentation  Taken 3/7/2024 1240 by Delfina Hameed RN  Supportive Measures:   self-care encouraged   active listening utilized  Taken 3/7/2024 0950 by Delfina Hameed RN  Supportive Measures:   self-care encouraged   active listening utilized     Problem: Stroke, Ischemic (Includes Transient Ischemic Attack)  Goal: Optimal Cognitive Function  Outcome: Progressing   Goal Outcome Evaluation:

## 2024-03-07 NOTE — PROGRESS NOTES
SPIRITUAL HEALTH SERVICES Note     Saw pt Marla Dunn and administered Jainism sacrament of anointing for the healing of the sick.    Fr. Theodore Colmenares

## 2024-03-07 NOTE — PLAN OF CARE
Problem: Stroke, Ischemic (Includes Transient Ischemic Attack)  Goal: Optimal Cognitive Function  Outcome: Progressing     Problem: Stroke, Ischemic (Includes Transient Ischemic Attack)  Goal: Improved Sensorimotor Function  Outcome: Progressing     Problem: Comorbidity Management  Goal: Maintenance of COPD Symptom Control  Outcome: Progressing   Goal Outcome Evaluation:    Pt is alert and oriented x 4. Has slurred speech noted to have right sided weakness. On room air, O2 sats 95%.

## 2024-03-07 NOTE — PLAN OF CARE
Goal Outcome Evaluation:                        Problem: Adult Inpatient Plan of Care  Goal: Absence of Hospital-Acquired Illness or Injury  Intervention: Identify and Manage Fall Risk  Recent Flowsheet Documentation  Taken 3/6/2024 1700 by Louise Daley, RN  Safety Promotion/Fall Prevention:   activity supervised   lighting adjusted   clutter free environment maintained  Intervention: Prevent and Manage VTE (Venous Thromboembolism) Risk  Recent Flowsheet Documentation  Taken 3/6/2024 1700 by Louise Daley, RN  VTE Prevention/Management: SCDs (sequential compression devices) on  Intervention: Prevent Infection  Recent Flowsheet Documentation  Taken 3/6/2024 1700 by Louise Daley, RN  Infection Prevention:   hand hygiene promoted   environmental surveillance performed   equipment surfaces disinfected

## 2024-03-07 NOTE — CONSULTS
[facility]    Neurosurgery Consultation     Date of Admission:  3/1/2024  Date of Consult (When I saw the patient): 03/07/24    Assessment & Plan   Marla Dunn is a 89 year old female past medical history of chronic back pain and lumbar stenosis who was admitted on 3/1/2024. NSGY was consulted for spinal stenosis on MRI imaging, difficulty with walking post CVA.    Patient presented to ED and diagnosed with right MCA CVA with RUE/RLE weakness. Patient was evaluated by neurology yesterday for RLE weakness, determined possibly related to effort.  Head CT was negative, repeat brain MRI was negative for a second acute stroke.  C/T/L-spine MRIs were obtained due to ongoing RLE weakness yesterday during PT evaluation.     Patient notes she has chronic back pain without radiculopathy or associated paresthesias. No saddle anesthesia, bowel or bladder dysfunction, or recent fall/trauma. Patient does note she has a history of lumbar stenosis, but is not currently being followed by a provider. On exam BUE/BLE strength and sensation intact.  Lumbar MRI demonstrates multilevel lumbar spondylosis most severe at L1-L2 and L5-S1, no significant change when compared with the previous lumbar CT. Cervical MRI demonstrates moderate to severe multilevel cervical spondylosis, no abnormal cord enhancement.  Thoracic MRI demonstrates thoracic spondylosis, previous T12 vertebroplasty, small mid thoracic cord syrinx.    Imaging:  Imaging Interpretation provided by radiologist.   EXAM: MR LUMBAR SPINE W/O and W CONTRAST  LOC[facility]: [facility]  DATE: 3/7/2024  FINDINGS:   Nomenclature is based on 5 lumbar type vertebral bodies. No evidence for fracture. Heterogeneous marrow signal. No suspicious marrow signal change or abnormal bone, epidural or paraspinous enhancement. Evidence of previous T12 vertebroplasty. No fracture   progression. Mid lumbar levoconvex scoliosis measuring 25 degrees  centered at L3. Scoliosis measuring 19 degrees on the comparison CT. High T2 signal within the disks at L1-L2 through L3-L4 and at L5-S1 correspond to vacuum phenomena on the CT scan. No   evidence for endplate erosion or bone edema. Normal distal spinal cord and cauda equina with conus medullaris at L1-L2. No extraspinal abnormality. Unremarkable visualized bony pelvis.     T12-L1: Mild disc bulge. Mild left foraminal stenosis. Patent central canal. Right foramen patent.      L1-L2: Disc desiccation, height loss and posterior disc osteophyte complex. 2 mm degenerative retrolisthesis. Moderate to severe facet arthropathy. Moderate bilateral foraminal stenosis. Moderate central canal stenosis.     L2-L3: Disc osteophyte complex with moderate central canal stenosis. Moderate right lateral recess stenosis. Moderate right and mild left foraminal stenosis. Disc and osteophyte extends into the right foramen contacting the underside of the exiting L2   ganglion.      L3-L4: Disc osteophyte complex. And facet DJD. Moderately severe central canal stenosis. Severe left lateral recess stenosis with left L4 nerve contact. Severe right and moderate left foraminal stenosis.     L4-L5: Disc osteophyte. Mild to moderate central canal stenosis. Moderate left lateral recess stenosis. Disc and osteophyte with moderate bilateral foraminal stenosis.     L5-S1: Disc osteophyte complex and facet DJD. Moderately severe central canal stenosis. Severe left and moderate right lateral recess stenosis with left greater than right S1 nerve contact. Severe left and moderate right foraminal stenosis. Ligamentum   flavum thickening with enhancement on a degenerative basis.                                               IMPRESSION:  1.  Multilevel lumbar spondylosis most severe at L1-L2 and L5-S1. Individual levels described above.  2.  No significant change when compared with the previous lumbar CT. No new fractures or evidence of recent disc  extrusion.  3.  No enhancement of the distal thoracic cord, conus medullaris or cauda equina nerves. Negative for epidural or paraspinous soft tissue enhancement.      EXAM: MR THORACIC SPINE W/O and W CONTRAST  LOCATION: [facility]  DATE: 3/7/2024  FINDINGS:   Chronic T12 upper lower endplate fracture status post vertebroplasty. No acute or subacute thoracic fractures. Heterogeneous marrow signal without focal pathologic signal change. No abnormal enhancement following gadolinium administration. Ankylosis   across the C7-T1 disc.     Mild mid thoracic dextroconvex curvature.     Small thoracic cord syrinx measuring up to 2 mm in diameter extends from upper T7 to mid T8. No cord edema or enhancement. No hemorrhage or abnormal vessels.     T1-T2: 1.5 mm degenerative anterolisthesis. Disc desiccation and disc bulge. Facet DJD. Moderate to severe right and mild-to-moderate left foraminal stenosis. Patent central canal.     T2-T3: Facet hypertrophic change. Moderate right and mild left foraminal stenosis. Patent central canal. Normal discs.     T3-T4: Patent central canal and foramen.     T4-T5: Patent central canal and foramen.     T5-T6: Patent canal and foramen.     T6-T7: Mild disc bulge. Patent canal and foramen.     T7-T8: Patent canal and foramen.     T8-T9: Patent canal and foramen.     T9-T10: Disc desiccation and small right paracentral disc protrusion. Facet hypertrophic changes. Mild bilateral foraminal stenosis. Adequate central canal.     T10-T11: Facet ligamentous degenerative changes. Borderline central canal stenosis. Patent foramen.     T11-T12: Shallow left eccentric disc protrusion. Facet DJD. Moderate left and mild right foraminal stenosis. Adequate central canal.     T12-L1: Minimal disc bulge. Mild facet arthropathy. Patent central canal and foramen.     Bilateral pleural effusions.     Hepatic cystlike lesion incompletely visualized.                                                                    IMPRESSION:  1.  Thoracic spondylosis.  2.  Previous T12 vertebroplasty. No residual marrow edema or fracture progression. No new fractures.  3.  Small mid thoracic cord syrinx.      EXAM: MR CERVICAL SPINE W/O and W CONTRAST  LOCATION: [facility]  DATE: 3/7/2024  FINDINGS:   Normal cervical vertebral body heights. Negative for fracture. Reversal the normal cervical lordosis centered at C6. Ankylosis across the C7-T1 disc space. Ankylosis across the right C5-C6 facet joint. Prevertebral soft tissues unremarkable. No abnormal   cord signal. No abnormal cord enhancement. Cerebral and cerebellar atrophy. Bilateral mastoid fluid/thickening right side more so than left.     Craniovertebral junction and C1-C2: Severe degenerative changes. C1-C2 joint effusion. Marked transverse ligamentous thickening and cystic change with indentation of the ventral thecal sac. No cord compression. Mild central canal stenosis. No cord signal   change. Ligamentum flavum thickening and enhancement posteriorly at C2 .     C2-C3: Disc desiccation and mild posterior annular bulge. Moderate bilateral facet arthropathy. Moderate right and mild left foraminal stenosis. Patent central canal.. No cord contact. Contributes to canal narrowing.      C3-C4: Disc desiccation. Small to moderate-sized, broad-based central disc protrusion. Facet ligamentous degenerative changes and thickening. Moderate to severe right and severe left foraminal stenosis. Moderate central canal stenosis with ventral cord   contact. No cord signal change.      C4-C5: 3.5 mm degenerative anterolisthesis. Disc uncovering and small central disc extrusion extending cephalad. Moderate canal stenosis with slight cord contact. No cord signal change. Facet DJD. Severe right and moderate to severe left foraminal   stenosis.      C5-C6: Disc height loss. Adequate central canal. Uncinate spur. Moderate bilateral foraminal stenosis  "right side more so than left.      C6-C7: Disc osteophyte complex. Ventral cord contact with mild-to-moderate central canal stenosis. No cord signal change. Uncinate spur and facet DJD with severe bilateral foraminal stenosis.      C7-T1: Patent central canal. Facet and uncinate degenerative changes with moderate right foraminal stenosis.                                                                 IMPRESSION:  1.  Moderate to severe, multilevel cervical spondylosis.  2.  No abnormal cord enhancement.  3.  No fractures    NSGY Recommendations:  -No neurosurgical intervention  -Patient can follow-up in outpatient NSGY clinic as needed.     NSGY signing off, please page or reconsult for questions or concerns.     I have discussed the following assessment and plan with Dr. Fagan who is in agreement with the plan.     Meron Farnsworth PA-C  [facility] Neurosurgery  [place] [id number]    Text page via [employer] Paging/Directory    Code Status    No CPR- Do NOT Intubate    Reason for Consult   Reason for consult: \" Spinal stenosis on MRI imaging, difficulty with walking post CVA\"    Primary Care Physician   Gabrielle Wallace    Chief Complaint   CVA    History is obtained from the patient and patient's family member    History of Present Illness   Marla Dunn is a 89 year old female with past medical history CAD, hx MI, CHF, hypertension, hyperlipidemia, RA, CKD, aortic stenosis, COPD, chronic hyponatremia, OLEGARIO, A-fib presented to ED with acute right MCA CVA with RUE/RLE weakness.  Recently started on aspirin and Eliquis.    Patient states she has chronic back pain without radiculopathy or associated paresthesias. No saddle anesthesia, bowel or bladder dysfunction, or recent fall/trauma.     Patient's family member notes patient utilizes wheelchair for mobility.  Patient was having difficulty moving RLE the past 2 days, but has had " significant improvement in RLE strength today.    Past Medical History   I have reviewed this patient's medical history and updated it with pertinent information if needed.   Past Medical History:   Diagnosis Date    Asthma     Bilateral carpal tunnel syndrome 08/27/2015    CAD (coronary artery disease)     Chronic airway obstruction, not elsewhere classified     Created by Conversion     Chronic anemia     Chronic edema     Congestive heart failure, severe (H) 05/13/2020    COPD (chronic obstructive pulmonary disease) (H)     Coronary artery disease     Depression     GERD (gastroesophageal reflux disease)     Heart disease     Heart murmur     High cholesterol     dislipidemia    HTN (hypertension)     Hypercholesterolemia     Hypertension     Hypertensive emergency 08/13/2021    Hypothyroidism     Hypothyroidism     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    MI (myocardial infarction) (H) 1985    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    Myelodysplastic syndrome (H)     Myocardial infarction (H) 1983    OLEGARIO (obstructive sleep apnea)     Osteoporosis     Other and unspecified hyperlipidemia     Created by Conversion     Pyelonephritis 05/11/2016    Radicular low back pain     Rheumatoid arthritis (H)     Elizabeth Agers syndrome     Sjoegren syndrome     Sjogren's syndrome (H24)     Sleep apnea, obstructive     Spinal stenosis     Squamous cell carcinoma, tongue border (H)     Unspecified polyarthropathy or polyarthritis, hand     Created by Conversion        Past Surgical History   I have reviewed this patient's surgical history and updated it with pertinent information if needed.  Past Surgical History:   Procedure Laterality Date    aortic valve      AORTIC VALVE REPLACEMENT  01/01/2012    APPENDECTOMY      APPENDECTOMY      AS TOTAL KNEE ARTHROPLASTY Right     BACK SURGERY  2004    spinal decompression    BACK SURGERY      spinal stenosis    BONE MARROW BIOPSY  2004    breast biopsy      BREAST  SURGERY  2001    biopsy    BYPASS GRAFT ARTERY CORONARY  2009    LIMA to ramus intermedius    cardiac angiogram      CARDIAC CATHETERIZATION      CARDIAC SURGERY      EYE SURGERY  2008    bilateral cataract repair     EYE SURGERY Bilateral     cornea transplant left eye & cataracts    KYPHOPLASTY      T12    OPEN REDUCTION INTERNAL FIXATION HIP NAILING Right 2021    Procedure: INTERNAL FIXATION, FRACTURE, TROCHANTERIC, HIP, USING PINS OR RODS;  Surgeon: Darrel Castro MD;  Location: [facility] OR    OTHER SURGICAL HISTORY  2018    Rectosigmoidectomy perineal    PARTIAL GLOSSECTOMY Right 2023    [facility]    RECTOSIGMOIDECTOMY PERINEAL N/A 01/10/2018    Procedure: RECTOSIGMOIDECTOMY PERINEAL;  PERINEAL RECTOSIGMOIDECTOMY ;  Surgeon: Candelaria Anthony MD;  Location: [facility] OR    REPAIR VALVE AORTIC  2009    THORACIC SURGERY      T12 kyphoplasty    ZZC CABG, ARTERY-VEIN, FOUR      ZC CABG, VEIN, SINGLE      Description: CABG (CABG);  Recorded: 2012;  Comments: Single vessel bypass graft to an obtuse marginal branch in May 2009    ZZC REPLACE AORT VALV,PROSTH VALV      Description: Aortic Valve Replacement;  Recorded: 2012;  Comments: Aortic valve replacement       Prior to Admission Medications   Prior to Admission Medications   Prescriptions Last Dose Informant Patient Reported? Taking?   ANTACID REGULAR STRENGTH 500 MG chewable tablet Unknown  No Yes   Si TABLETS (1000MG) ORALLY 2 TIMES DAILY AS NEEDED FOR HEARTBURN   ASPIRIN LOW DOSE 81 MG EC tablet 2024 at hs  No Yes   Si TABLET ORALLY 2 TIMES DAILY (DX: PROPHYLAXIS)   Ascorbic Acid (VITAMIN C PO) 2024 Self Yes Yes   Sig: Take 500 mg by mouth every morning   Bacillus Coagulans-Inulin (PROBIOTIC FORMULA) 1-250 BILLION-MG CAPS 2024  No Yes   Si CAPSULE ORALLY DAILY   COMBIVENT RESPIMAT  MCG/ACT inhaler 2024 at pm  No Yes   Sig: INHALE 1 PUFF INTO THE LUNGS  4 TIMES  DAILY   IBANDRONATE SODIUM PO 2024 Self Yes Yes   Sig: Take 150 mg by mouth every 30 days   Multiple Vitamins-Minerals (PRESERVISION AREDS 2) CAPS 2024 at pm  No Yes   Si CAPSULE ORALLY 2 TIMES DAILY   OXYGEN-HELIUM IN   Yes No   Polyethylene Glycol 3350 (PEG 3350) 17 GM/SCOOP POWD 2024 at am  No Yes   Sig: MIX 1 CAPFUL (17 GMS) IN 8 OUNCES WATER AND DRINK ORALLY DAILY (DX: CONSTIPATION)   Probiotic Product (PROBIOTIC PO) 2024 at am Self Yes Yes   Sig: Take 1 capsule by mouth every morning   SENEXON-S 8.6-50 MG tablet 2024 at hs  No Yes   Si TABLET ORALLY 2 TIMES DAILY (DX: CONSTIPATION)   acetaminophen (TYLENOL) 325 MG tablet More than a month  Yes Yes   Sig: Take 3 tablets (975 mg) by mouth every 8 hours as needed for mild pain   albuterol (PROAIR HFA/PROVENTIL HFA/VENTOLIN HFA) 108 (90 Base) MCG/ACT inhaler 2024 at hs  Yes Yes   Sig: Inhale 2 puffs into the lungs 4 times daily   amoxicillin (AMOXIL) 500 MG capsule Unknown  No Yes   Si CAPSULES (2000MG ) ORALLY AS NEEDED ONE HOUR PRIOR TO DENTAL APPOINTMENT   bisacodyl (DULCOLAX) 5 MG EC tablet Unknown  No Yes   Si TABLET ORALLY DAILY AS NEEDED (MAY KEEP AT BEDSIDE)   Patient taking differently: 5 mg 2 times daily as needed   chlorhexidine (PERIDEX) 0.12 % solution 2024 at hs  No Yes   Sig: Swish and spit 15 mLs in mouth 2 times daily May self administer   cholecalciferol (VITAMIN D3) 125 mcg (5000 units) capsule 2024 at pm  No Yes   Si CAPSULE ORALLY DAILY   cycloSPORINE (RESTASIS) 0.05 % ophthalmic emulsion 3/1/2024 at am Self Yes Yes   Sig: Place 1 drop into both eyes 2 times daily    demeclocycline (DECLOMYCIN) 150 MG tablet 2024 at hs  No Yes   Si TABLET ORALLY 2 TIMES DAILY   fish oil-omega-3 fatty acids 1000 MG capsule 2024 at am  No Yes   Sig: Take 2 capsules (2 g) by mouth daily   Patient taking differently: Take 1 g by mouth daily   fluorometholone (FML LIQUIFILM) 0.1 % ophthalmic  susp 2024 at am Self Yes Yes   Sig: Place 1 drop Into the left eye daily    hydrocortisone 1 % CREA cream Unknown  Yes Yes   Sig: Place rectally 2 times daily as needed for itching   levothyroxine (SYNTHROID/LEVOTHROID) 88 MCG tablet 2024  No Yes   Si TABLET ORALLY EVERY MORNING ON AN EMPTY STOMACH   loratadine (CLARITIN) 10 MG tablet 2024  No Yes   Si TABLET ORALLY DAILY (DX: ASTHMA)   melatonin 3 MG tablet 2024  No Yes   Si TABLET ORALLY AT BEDTIME ALONG WITH 5 MG FOR A TOTAL DOSE OF 8MG   melatonin 5 MG tablet 2024  No Yes   Si TABLET ORALLY AT BEDTIME  ALONG WITH 3MG FOR A TOTAL DOSE OF 8MG   metoprolol tartrate (LOPRESSOR) 25 MG tablet 2024 at hs  No Yes   Si TABLET ORALLY 2 TIMES DAILY (DX: HYPERTENSION)   mirtazapine (REMERON) 7.5 MG tablet 2024  No Yes   Si TABLET ORALLY AT BEDTIME   omeprazole (PRILOSEC) 20 MG DR capsule 2024 at pm  No Yes   Si CAPSULE ORALLY 2 TIMES DAILY (DX: GASTROESOPHAGEAL REFLUX DISEASE)   polyethylene glycol 0.4%- propylene glycol 0.3% (SYSTANE ULTRA) 0.4-0.3 % SOLN ophthalmic solution 2024 at hs Self Yes Yes   Sig: Place 1 drop into both eyes 4 times daily   sodium chloride 1 GM tablet 2024 at am  No Yes   Sig: Take 1 tablet (1 g) by mouth daily   spironolactone (ALDACTONE) 25 MG tablet 2024 at am  No Yes   Si TABLET ORALLY DAILY (DX: EDEMA) ** HAZARDOUS MED: WEAR DOUBLE NITRILE GLOVES**   timolol (TIMOPTIC) 0.5 % ophthalmic solution 2024 at am Self Yes Yes   Sig: Place 1 drop Into the left eye daily    torsemide (DEMADEX) 20 MG tablet 2024 at am  No Yes   Si.5 TABLET (30mg) ORALLY DAILY   vitamin C (ASCORBIC ACID) 500 MG tablet 2024 at am  No Yes   Si TABLET ORALLY DAILY (DX: SUPPLEMENT)      Facility-Administered Medications: None     Allergies   Allergies   Allergen Reactions    Gramineae Pollens Other (See Comments)     ORCHARDGRASS. Shortness of breath when lawn is just  "cut.    Smoke. Other (See Comments)     Hard to breathe.    Pollen Extract      Other reaction(s): Unknown       Social History   I have reviewed this patient's social history and updated it with pertinent information if needed. Marla Dunn  reports that she has never smoked. She has never used smokeless tobacco. She reports that she does not drink alcohol and does not use drugs.    Family History   I have reviewed this patient's family history and updated it with pertinent information if needed.   Family History   Problem Relation Age of Onset    Cancer Mother         ovarian cancer;  in her 50s    Family History Negative Mother     Heart Disease Father     Cancer Brother     Cancer Sister        ROS: 10 point ROS negative other than the symptoms noted above in the HPI.    Physical Exam   Temp: 97.6  F (36.4  C) Temp src: Axillary BP: 99/48 Pulse: 92   Resp: 20 SpO2: 99 % O2 Device: None (Room air)    Vital Signs with Ranges  Temp:  [97.2  F (36.2  C)-99.1  F (37.3  C)] 97.6  F (36.4  C)  Pulse:  [80-95] 92  Resp:  [17-20] 20  BP: ()/(48-59) 99/48  SpO2:  [92 %-99 %] 99 %  93 lbs 4.07 oz     , Blood pressure 99/48, pulse 92, temperature 97.6  F (36.4  C), temperature source Axillary, resp. rate 20, height 5' 3\" (1.6 m), weight 93 lb 4.1 oz (42.3 kg), SpO2 99%.  93 lbs 4.07 oz  HEENT:  Normocephalic, atraumatic.  PERRL.  EOM s intact.   Neck:  Supple, non-tender, without lymphadenopathy.  Heart:  No peripheral edema  Lungs:  No SOB    NEUROLOGICAL EXAMINATION:   Mental status:  Alert and Oriented x 3, speech is fluent.  Cranial nerves:  II-XII intact.   Motor:   Shoulder Abduction:       Right:  5/5   Left:  5/5  Elbow Flexion:                Right:  5/5   Left:  5/5  Elbow Extension:            Right:  5/5   Left:  5/5  interosseus :                  Right:  5/5   Left:  5/5  Hip Flexion:                    Right: 5/5  Left:  5/5  Knee Flexion:                 Right:  5/5  Left:  5/5  Knee " Extension:             Right:  5/5  Left:  5/5  Dorsiflexion:                   Right:  5/5  Left:  5/5  Plantar Flexion:             Right:  5/5  Left:  5/5  Sensation:  Intact to light touch throughout BUE/BLE    Reflexes:  Negative Clonus Bilaterally. Negative Jameson's Bilaterally.   Coordination:  Smooth finger to nose testing.    Gait: Deferred      Data     CBC RESULTS:   Recent Labs   Lab Test 03/03/24  0640   WBC 7.0   RBC 3.47*   HGB 11.0*   HCT 35.0   *   MCH 31.7   MCHC 31.4*   RDW 19.1*        Basic Metabolic Panel:  Lab Results   Component Value Date     03/07/2024     06/30/2021      Lab Results   Component Value Date    POTASSIUM 3.9 03/07/2024    POTASSIUM 3.8 09/10/2022    POTASSIUM 3.8 09/10/2022    POTASSIUM 3.8 06/30/2021     Lab Results   Component Value Date    CHLORIDE 108 03/07/2024    CHLORIDE 103 09/10/2022    CHLORIDE 97 06/30/2021     Lab Results   Component Value Date    PERICO 8.5 03/07/2024    PERICO 9.6 06/30/2021     Lab Results   Component Value Date    CO2 27 03/07/2024    CO2 18 09/10/2022    CO2 25 06/30/2021     Lab Results   Component Value Date    BUN 23.2 03/07/2024    BUN 26 09/10/2022    BUN 37 06/30/2021     Lab Results   Component Value Date    CR 0.87 03/07/2024    CR 0.93 06/30/2021     Lab Results   Component Value Date    GLC 96 03/07/2024    GLC 89 03/01/2024    GLC 82 09/10/2022    GLC 80 06/30/2021     INR:  Lab Results   Component Value Date    INR 1.09 03/01/2024    INR 1.23 09/05/2022    INR 1.10 08/12/2021    INR 0.97 05/13/2020

## 2024-03-08 ENCOUNTER — APPOINTMENT (OUTPATIENT)
Dept: SPEECH THERAPY | Facility: HOSPITAL | Age: 89
DRG: 062 | End: 2024-03-08
Payer: COMMERCIAL

## 2024-03-08 ENCOUNTER — APPOINTMENT (OUTPATIENT)
Dept: PHYSICAL THERAPY | Facility: HOSPITAL | Age: 89
DRG: 062 | End: 2024-03-08
Payer: COMMERCIAL

## 2024-03-08 ENCOUNTER — APPOINTMENT (OUTPATIENT)
Dept: OCCUPATIONAL THERAPY | Facility: HOSPITAL | Age: 89
DRG: 062 | End: 2024-03-08
Payer: COMMERCIAL

## 2024-03-08 VITALS
BODY MASS INDEX: 16.52 KG/M2 | RESPIRATION RATE: 18 BRPM | OXYGEN SATURATION: 96 % | TEMPERATURE: 98.7 F | HEIGHT: 63 IN | HEART RATE: 79 BPM | WEIGHT: 93.25 LBS | DIASTOLIC BLOOD PRESSURE: 56 MMHG | SYSTOLIC BLOOD PRESSURE: 106 MMHG

## 2024-03-08 LAB — POTASSIUM SERPL-SCNC: 4.6 MMOL/L (ref 3.4–5.3)

## 2024-03-08 PROCEDURE — 97110 THERAPEUTIC EXERCISES: CPT | Mod: GP

## 2024-03-08 PROCEDURE — 92526 ORAL FUNCTION THERAPY: CPT | Mod: GN

## 2024-03-08 PROCEDURE — 36415 COLL VENOUS BLD VENIPUNCTURE: CPT | Performed by: INTERNAL MEDICINE

## 2024-03-08 PROCEDURE — 84132 ASSAY OF SERUM POTASSIUM: CPT | Performed by: INTERNAL MEDICINE

## 2024-03-08 PROCEDURE — 250N000013 HC RX MED GY IP 250 OP 250 PS 637: Performed by: STUDENT IN AN ORGANIZED HEALTH CARE EDUCATION/TRAINING PROGRAM

## 2024-03-08 PROCEDURE — 250N000012 HC RX MED GY IP 250 OP 636 PS 637: Performed by: INTERNAL MEDICINE

## 2024-03-08 PROCEDURE — 99239 HOSP IP/OBS DSCHRG MGMT >30: CPT | Performed by: INTERNAL MEDICINE

## 2024-03-08 PROCEDURE — 99232 SBSQ HOSP IP/OBS MODERATE 35: CPT | Performed by: PSYCHIATRY & NEUROLOGY

## 2024-03-08 PROCEDURE — 97535 SELF CARE MNGMENT TRAINING: CPT | Mod: GO

## 2024-03-08 RX ORDER — ATORVASTATIN CALCIUM 40 MG/1
40 TABLET, FILM COATED ORAL DAILY
DISCHARGE
Start: 2024-03-08

## 2024-03-08 RX ORDER — METHYLPREDNISOLONE 4 MG/1
4 TABLET ORAL
DISCHARGE
Start: 2024-03-09 | End: 2024-03-13

## 2024-03-08 RX ORDER — METHYLPREDNISOLONE 4 MG/1
TABLET ORAL
DISCHARGE
Start: 2024-03-08 | End: 2024-03-08

## 2024-03-08 RX ORDER — METHYLPREDNISOLONE 4 MG/1
4 TABLET ORAL
DISCHARGE
Start: 2024-03-08 | End: 2024-03-10

## 2024-03-08 RX ORDER — METHYLPREDNISOLONE 4 MG/1
4 TABLET ORAL
DISCHARGE
Start: 2024-03-09 | End: 2024-03-11

## 2024-03-08 RX ORDER — METHYLPREDNISOLONE 4 MG/1
4 TABLET ORAL DAILY
DISCHARGE
Start: 2024-03-09 | End: 2024-03-12

## 2024-03-08 RX ORDER — METHYLPREDNISOLONE 4 MG/1
8 TABLET ORAL AT BEDTIME
DISCHARGE
Start: 2024-03-08 | End: 2024-03-09

## 2024-03-08 RX ADMIN — ALBUTEROL SULFATE 2 PUFF: 90 AEROSOL, METERED RESPIRATORY (INHALATION) at 13:28

## 2024-03-08 RX ADMIN — UMECLIDINIUM BROMIDE AND VILANTEROL TRIFENATATE 1 PUFF: 62.5; 25 POWDER RESPIRATORY (INHALATION) at 09:32

## 2024-03-08 RX ADMIN — METOPROLOL TARTRATE 25 MG: 25 TABLET, FILM COATED ORAL at 09:22

## 2024-03-08 RX ADMIN — TORSEMIDE 30 MG: 20 TABLET ORAL at 09:22

## 2024-03-08 RX ADMIN — PANTOPRAZOLE SODIUM 40 MG: 40 TABLET, DELAYED RELEASE ORAL at 06:52

## 2024-03-08 RX ADMIN — APIXABAN 2.5 MG: 2.5 TABLET, FILM COATED ORAL at 09:23

## 2024-03-08 RX ADMIN — SODIUM CHLORIDE TAB 1 GM 1 G: 1 TAB at 09:23

## 2024-03-08 RX ADMIN — METHYLPREDNISOLONE 4 MG: 4 TABLET ORAL at 06:52

## 2024-03-08 RX ADMIN — POLYPROPYLENE GLYCOL 400, PROPYLENE GLYCOL 1 DROP: .4; .3 LIQUID OPHTHALMIC at 13:28

## 2024-03-08 RX ADMIN — LEVOTHYROXINE SODIUM 88 MCG: 88 TABLET ORAL at 06:52

## 2024-03-08 RX ADMIN — POLYPROPYLENE GLYCOL 400, PROPYLENE GLYCOL 1 DROP: .4; .3 LIQUID OPHTHALMIC at 09:29

## 2024-03-08 RX ADMIN — TIMOLOL MALEATE 1 DROP: 5 SOLUTION/ DROPS OPHTHALMIC at 09:27

## 2024-03-08 RX ADMIN — SPIRONOLACTONE 25 MG: 25 TABLET, FILM COATED ORAL at 09:23

## 2024-03-08 RX ADMIN — METHYLPREDNISOLONE 4 MG: 4 TABLET ORAL at 13:27

## 2024-03-08 RX ADMIN — DEMECLOCYCLINE 150 MG: 150 TABLET ORAL at 09:23

## 2024-03-08 RX ADMIN — FLUOROMETHOLONE 1 DROP: 1 SOLUTION/ DROPS OPHTHALMIC at 09:29

## 2024-03-08 RX ADMIN — CYCLOSPORINE 1 DROP: 0.5 EMULSION OPHTHALMIC at 09:28

## 2024-03-08 RX ADMIN — ALBUTEROL SULFATE 2 PUFF: 90 AEROSOL, METERED RESPIRATORY (INHALATION) at 09:32

## 2024-03-08 ASSESSMENT — ACTIVITIES OF DAILY LIVING (ADL)
ADLS_ACUITY_SCORE: 56
ADLS_ACUITY_SCORE: 55
ADLS_ACUITY_SCORE: 56
ADLS_ACUITY_SCORE: 55
ADLS_ACUITY_SCORE: 56

## 2024-03-08 NOTE — PROGRESS NOTES
NEUROLOGY PROGRESS NOTE     Marla Dunn,  1929, MRN [id number] Date: 3/7/2024     [facility]   Code status:  No CPR- Do NOT Intubate   PCP: Gabrielle Wallace, [phone]      ASSESSMENT & PLAN   Diagnosis code: Ischemic stroke    Ischemic stroke  History of coronary artery disease/MI  History of hypertension/hyperlipidemia  Atrial fibrillation  Left biceps bruising  New right leg weakness-rule out stroke/hemorrhagic transformation versus effort dependent  Lumbar spinal stenosis    MRI brain shows hyperacute infarction in the right precentral and postcentral gyri and underlying white matter  CTA negative for any significant large vessel occlusion/stenosis  Echocardiogram shows normal ejection fraction with significant wall motion abnormality and moderate to severe mitral stenosis  Patient was found to be in A-fib in the emergency room  Lipid panel shows LDL 81.  40 mg of Lipitor  Status post tenecteplase.  Head CT stable.  Continue aspirin and statin   Cardiology recommending anticoagulation.  No contraindications from neurology end.  Outpatient Watchman procedure.  MRI C-spine/T-spine/L-spine done which shows severe lumbar spinal stenosis which could explain the right leg weakness.  Neurosurgery consulted to evaluate for surgery.  Would recommend Medrol Dosepak to see if that helps improve the right leg weakness.  Continue physical therapy/Occupational Therapy  Blood pressure can be normalized  Please note:-Per patient wishes, if she had another stroke she does not IV thrombolytics.    Discharge:-Possibly tomorrow.         Chief Complaint   Patient presents with    Stroke        HISTORY OF PRESENT ILLNESS     We have been requested by Dr. Lizarraga to evaluate Marla Dunn who is a 89 year old  female for stroke.  Is a 89-year-old female with past medical history of coronary artery disease, previous MI, CHF, hypertension, hyperlipidemia, rheumatoid arthritis, Sjogren's syndrome and  chronic kidney disease admitted for left-sided weakness and some garbled speech along with a left facial droop and left-sided neglect.  No gaze deviation.  She was last normal at 1730.  She was found all dressed up sitting in the wheelchair only using her right hand.  Patient reported that at 7 AM this morning she was normal.  She was given tenecteplase which she reports has helped with her symptoms.  Reports no ongoing weakness at this point.  Does take aspirin at baseline.  No anticoagulation.    3/3  No new neurological symptoms.  She does have bruising in the left biceps.  Reports that she does not want the IV thrombolytics in the future if she has a stroke.    3/4  Patient on exam today was noted to have new right leg weakness.  She reports that she cannot move her leg though when confronted to tell me when it started she says it has been there for few days even though she could move her leg yesterday.  Bruising is about the same.  Discussed with primary team and patient is supposed to follow-up with her cardiologist and an outpatient and the decision would be made during that visit if she needs to be on anticoagulation or not.    3/5  Patient continues to have weakness in the right leg.  Is willing to do an MRI of her brain to further evaluate this.  No other new symptoms.  Hematoma stable.    3/6  Some improvement in the right leg though continues to have weakness.  Unable to hold it for a long time.  Has worked with physical therapy and tells me that it did not really help.  MRI brain done yesterday negative for stroke.  Hematoma stable.  Cardiology now consulted and patient and are recommending Eliquis.  Potentially  checking MRI of the C/T/L-spine later today.  Discussed with hospitalist to evaluate with physical therapy for poor effort first.    3/7  Patient continues to have right leg weakness.  Did try to ambulate with physical therapy though reports to me that she was almost limping and could not walk  correctly though the right leg is getting better compared to before.  She potentially anticipates going home tomorrow.  Discussed about treatment options based on MRI and she is willing to talk to the surgeon.  Not sure about the surgery.     PAST MEDICAL & SURGICAL HISTORY     Medical History  Past Medical History:   Diagnosis Date    Asthma     Bilateral carpal tunnel syndrome 08/27/2015    CAD (coronary artery disease)     Chronic airway obstruction, not elsewhere classified     Created by Conversion     Chronic anemia     Chronic edema     Congestive heart failure, severe (H) 05/13/2020    COPD (chronic obstructive pulmonary disease) (H)     Coronary artery disease     Depression     GERD (gastroesophageal reflux disease)     Heart disease     Heart murmur     High cholesterol     dislipidemia    HTN (hypertension)     Hypercholesterolemia     Hypertension     Hypertensive emergency 08/13/2021    Hypothyroidism     Hypothyroidism     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    MI (myocardial infarction) (H) 1985    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    Myelodysplastic syndrome (H)     Myocardial infarction (H) 1983    OLEGARIO (obstructive sleep apnea)     Osteoporosis     Other and unspecified hyperlipidemia     Created by Conversion     Pyelonephritis 05/11/2016    Radicular low back pain     Rheumatoid arthritis (H)     Elizabeth Agers syndrome     Sjoegren syndrome     Sjogren's syndrome (H24)     Sleep apnea, obstructive     Spinal stenosis     Squamous cell carcinoma, tongue border (H)     Unspecified polyarthropathy or polyarthritis, hand     Created by Conversion      Surgical History  Past Surgical History:   Procedure Laterality Date    aortic valve      AORTIC VALVE REPLACEMENT  01/01/2012    APPENDECTOMY      APPENDECTOMY      AS TOTAL KNEE ARTHROPLASTY Right     BACK SURGERY  2004    spinal decompression    BACK SURGERY      spinal stenosis    BONE MARROW BIOPSY  2004    breast biopsy       BREAST SURGERY  2001    biopsy    BYPASS GRAFT ARTERY CORONARY  01/01/2009    LIMA to ramus intermedius    cardiac angiogram      CARDIAC CATHETERIZATION      CARDIAC SURGERY      EYE SURGERY  2008    bilateral cataract repair     EYE SURGERY Bilateral     cornea transplant left eye & cataracts    KYPHOPLASTY  2003    T12    OPEN REDUCTION INTERNAL FIXATION HIP NAILING Right 08/13/2021    Procedure: INTERNAL FIXATION, FRACTURE, TROCHANTERIC, HIP, USING PINS OR RODS;  Surgeon: Darrel Castro MD;  [place] [facility] [place]    OTHER SURGICAL HISTORY  01/01/2018    Rectosigmoidectomy perineal    PARTIAL GLOSSECTOMY Right 09/2023    [facility]    RECTOSIGMOIDECTOMY PERINEAL N/A 01/10/2018    Procedure: RECTOSIGMOIDECTOMY PERINEAL;  PERINEAL RECTOSIGMOIDECTOMY ;  Surgeon: Candelaria Anthony MD;  Location: [facility] OR    REPAIR VALVE AORTIC  2009    THORACIC SURGERY  2003    T12 kyphoplasty    Z CABG, ARTERY-VEIN, FOUR      [facility] CABG, VEIN, SINGLE      Description: CABG (CABG);  Recorded: 03/09/2012;  Comments: Single vessel bypass graft to an obtuse marginal branch in May 2009    ZZC REPLACE AORT VALV,PROSTH VALV      Description: Aortic Valve Replacement;  Recorded: 03/09/2012;  Comments: Aortic valve replacement        SOCIAL HISTORY     Social History     Tobacco Use    Smoking status: Never    Smokeless tobacco: Never   Substance Use Topics    Alcohol use: No     Alcohol/week: 0.0 standard drinks of alcohol     Comment: Alcoholic Drinks/day: occasional glass of wine    Drug use: No        FAMILY HISTORY     Reviewed, and family history includes Cancer in her brother, mother, and sister; Family History Negative in her mother; Heart Disease in her father.     ALLERGIES     Allergies   Allergen Reactions    Gramineae Pollens Other (See Comments)     ORCHARDGRASS. Shortness of breath when lawn is just cut.    Smoke. Other (See Comments)     Hard to breathe.    Pollen Extract      Other reaction(s): Unknown         REVIEW OF SYSTEMS     12 system ROS was done within the HPI this was negative.  Pertinent positives noted on the HPI.     HOME & HOSPITAL MEDICATIONS     Prior to Admission Medications  Medications Prior to Admission   Medication Sig Dispense Refill Last Dose    acetaminophen (TYLENOL) 325 MG tablet Take 3 tablets (975 mg) by mouth every 8 hours as needed for mild pain   More than a month    albuterol (PROAIR HFA/PROVENTIL HFA/VENTOLIN HFA) 108 (90 Base) MCG/ACT inhaler Inhale 2 puffs into the lungs 4 times daily   2/29/2024 at hs    amoxicillin (AMOXIL) 500 MG capsule 4 CAPSULES (2000MG ) ORALLY AS NEEDED ONE HOUR PRIOR TO DENTAL APPOINTMENT 4 capsule 5 Unknown    ANTACID REGULAR STRENGTH 500 MG chewable tablet 2 TABLETS (1000MG) ORALLY 2 TIMES DAILY AS NEEDED FOR HEARTBURN 60 tablet 11 Unknown    Ascorbic Acid (VITAMIN C PO) Take 500 mg by mouth every morning   2/29/2024    ASPIRIN LOW DOSE 81 MG EC tablet 1 TABLET ORALLY 2 TIMES DAILY (DX: PROPHYLAXIS) 180 tablet 3 2/29/2024 at hs    Bacillus Coagulans-Inulin (PROBIOTIC FORMULA) 1-250 BILLION-MG CAPS 1 CAPSULE ORALLY DAILY 31 capsule 11 2/29/2024    bisacodyl (DULCOLAX) 5 MG EC tablet 1 TABLET ORALLY DAILY AS NEEDED (MAY KEEP AT BEDSIDE) (Patient taking differently: 5 mg 2 times daily as needed) 30 tablet 10 Unknown    chlorhexidine (PERIDEX) 0.12 % solution Swish and spit 15 mLs in mouth 2 times daily May self administer 118 mL 3 2/29/2024 at hs    cholecalciferol (VITAMIN D3) 125 mcg (5000 units) capsule 1 CAPSULE ORALLY DAILY 90 capsule 3 2/29/2024 at pm    COMBIVENT RESPIMAT  MCG/ACT inhaler INHALE 1 PUFF INTO THE LUNGS  4 TIMES DAILY 4 g 11 2/29/2024 at pm    cycloSPORINE (RESTASIS) 0.05 % ophthalmic emulsion Place 1 drop into both eyes 2 times daily    3/1/2024 at am    demeclocycline (DECLOMYCIN) 150 MG tablet 1 TABLET ORALLY 2 TIMES DAILY 60 tablet 11 2/29/2024 at hs    fish oil-omega-3 fatty acids 1000 MG capsule Take 2 capsules (2 g) by  mouth daily (Patient taking differently: Take 1 g by mouth daily) 28 capsule 11 2/29/2024 at am    fluorometholone (FML LIQUIFILM) 0.1 % ophthalmic susp Place 1 drop Into the left eye daily    2/29/2024 at am    hydrocortisone 1 % CREA cream Place rectally 2 times daily as needed for itching   Unknown    IBANDRONATE SODIUM PO Take 150 mg by mouth every 30 days   2/4/2024    levothyroxine (SYNTHROID/LEVOTHROID) 88 MCG tablet 1 TABLET ORALLY EVERY MORNING ON AN EMPTY STOMACH 31 tablet 11 2/29/2024    loratadine (CLARITIN) 10 MG tablet 1 TABLET ORALLY DAILY (DX: ASTHMA) 28 tablet 11 2/29/2024    melatonin 3 MG tablet 1 TABLET ORALLY AT BEDTIME ALONG WITH 5 MG FOR A TOTAL DOSE OF 8MG 30 tablet 11 2/29/2024    melatonin 5 MG tablet 1 TABLET ORALLY AT BEDTIME  ALONG WITH 3MG FOR A TOTAL DOSE OF 8MG 30 tablet 11 2/29/2024    metoprolol tartrate (LOPRESSOR) 25 MG tablet 1 TABLET ORALLY 2 TIMES DAILY (DX: HYPERTENSION) 56 tablet 11 2/29/2024 at hs    mirtazapine (REMERON) 7.5 MG tablet 1 TABLET ORALLY AT BEDTIME 31 tablet 11 2/29/2024    Multiple Vitamins-Minerals (PRESERVISION AREDS 2) CAPS 1 CAPSULE ORALLY 2 TIMES DAILY 62 capsule 11 2/29/2024 at pm    omeprazole (PRILOSEC) 20 MG DR capsule 1 CAPSULE ORALLY 2 TIMES DAILY (DX: GASTROESOPHAGEAL REFLUX DISEASE) 62 capsule 11 2/29/2024 at pm    polyethylene glycol 0.4%- propylene glycol 0.3% (SYSTANE ULTRA) 0.4-0.3 % SOLN ophthalmic solution Place 1 drop into both eyes 4 times daily   2/29/2024 at hs    Polyethylene Glycol 3350 (PEG 3350) 17 GM/SCOOP POWD MIX 1 CAPFUL (17 GMS) IN 8 OUNCES WATER AND DRINK ORALLY DAILY (DX: CONSTIPATION) 510 g 11 2/29/2024 at am    Probiotic Product (PROBIOTIC PO) Take 1 capsule by mouth every morning   2/29/2024 at am    SENEXON-S 8.6-50 MG tablet 1 TABLET ORALLY 2 TIMES DAILY (DX: CONSTIPATION) 56 tablet 11 2/29/2024 at hs    sodium chloride 1 GM tablet Take 1 tablet (1 g) by mouth daily 30 tablet 11 2/29/2024 at am    spironolactone  "(ALDACTONE) 25 MG tablet 1 TABLET ORALLY DAILY (DX: EDEMA) ** HAZARDOUS MED: WEAR DOUBLE NITRILE GLOVES** 28 tablet 11 2/29/2024 at am    timolol (TIMOPTIC) 0.5 % ophthalmic solution Place 1 drop Into the left eye daily    2/29/2024 at am    torsemide (DEMADEX) 20 MG tablet 1.5 TABLET (30mg) ORALLY DAILY 31 tablet 11 2/29/2024 at am    vitamin C (ASCORBIC ACID) 500 MG tablet 1 TABLET ORALLY DAILY (DX: SUPPLEMENT) 31 tablet 11 2/29/2024 at am    OXYGEN-HELIUM IN           Hospital Medications   albuterol  2 puff Inhalation 4x Daily    apixaban ANTICOAGULANT  2.5 mg Oral BID    cycloSPORINE  1 drop Both Eyes BID    demeclocycline  150 mg Oral Q12H SHONDA (08/20)    fluorometholone  1 drop Left Eye Daily    levothyroxine  88 mcg Oral QAM AC    methylPREDNISolone  8 mg Oral At Bedtime    And    [START ON 3/8/2024] methylPREDNISolone  4 mg Oral QAM AC    And    [START ON 3/8/2024] methylPREDNISolone  4 mg Oral Daily with lunch    And    [START ON 3/8/2024] methylPREDNISolone  4 mg Oral Daily with supper    And    [START ON 3/9/2024] methylPREDNISolone  4 mg Oral At Bedtime    metoprolol tartrate  25 mg Oral BID    mirtazapine  7.5 mg Oral At Bedtime    pantoprazole  40 mg Oral BID AC    polyethylene glycol-propylene glycol  1 drop Both Eyes 4x Daily    sodium chloride  1 g Oral Daily    spironolactone  25 mg Oral Daily    timolol maleate  1 drop Left Eye Daily    torsemide  30 mg Oral Daily    umeclidinium-vilanterol  1 puff Inhalation Daily        PHYSICAL EXAM     Vital signs  Temp:  [97.2  F (36.2  C)-99.1  F (37.3  C)] 98.2  F (36.8  C)  Pulse:  [80-95] 92  Resp:  [17-20] 18  BP: ()/(48-59) 102/54  SpO2:  [92 %-99 %] 97 %    PHYSICAL EXAMINATION  VITALS: /54 (BP Location: Right arm)   Pulse 92   Temp 98.2  F (36.8  C) (Axillary)   Resp 18   Ht 1.6 m (5' 3\")   Wt 42.3 kg (93 lb 4.1 oz)   SpO2 97%   BMI 16.52 kg/m    GENERAL -Health appearing, No apparent distress  EYES- No scleral icterus, no eyelid " droop, Pupils - see Neuro section  HEENT - Normocephalic, atraumatic, Hearing grossly limited; Oral mucosa moist and pink in color. External Ears and nose intact.   Neck - supple   PULM - Good spontaneous respiratory effort  CV- Pedal pulses present with no peripheral edema/ No significant varicosities.  MSK- Gait - see Neuro section; Strength and tone- see Neuro section; Range of motion grossly intact.  PSYCH -cooperative    Neurological  Mental status - Patient is awake and partially oriented to self, place and time. Attention span is normal. Language is fluent and follows commands appropriately.   Cranial nerves - CN II-XII intact. Pupils are reactive and symmetric; EOMI, VFIT, NLF symmetric  Motor - There is no pronator drift. Motor exam shows 5/5 strength in all extremities except right leg weakness which could be effort dependent.  Tone - Tone is symmetric bilaterally in upper and lower extremities.  Reflexes - Reflexes are symmetric and decreased.  Sensation - Sensory exam is grossly intact to light touch, pain.  Coordination - Finger to nose and heel to shin is without dysmetria except left upper extremity dysmetria.  Gait and station --unable to safely ambulate.  Formal gait testing cannot be done due to safety concerns from ongoing medical issues.  Right leg stronger compared to before though still some mild weakness.     DIAGNOSTIC STUDIES     Pertinent Radiology   MRI  IMPRESSION:  1.  Hyperacute infarction involving the right precentral and post central gyri and underlying white matter.  2.  Generalized brain atrophy and presumed microvascular ischemic changes as detailed above.    HEAD CT:  1.  No finding for intracranial hemorrhage or mass or convincing finding for acute infarct.     2.  Moderate volume loss and presumed sequela of chronic microvascular ischemic change.     3.  New chronic infarct inferior lateral aspect of the left cerebellar hemisphere.     HEAD CTA:   1.  There is occlusion of a  distal M2 superior division segment of the right middle cerebral artery without flow seen more distally within the vessel.     2.  Coarse atherosclerotic calcifications both carotid siphons and proximal intradural vertebral arteries with mild associated luminal narrowing but no clear flow-limiting stenosis.     NECK CTA:  1.  No significant stenosis either cervical carotid system.     2.  Mild atherosclerotic narrowing of the takeoff of both vertebral arteries.     CT PERFUSION:  1.  There is elevated Tmax, MTT, and decreased rCBF along the lateral and posterior aspect of the right frontal lobe compatible with ischemic penumbra.     2.  Area of Tmax >6 seconds measures 11 mL with volume of CBF <30% measuring 0 mL.    ECHO  1. Normal left ventricular size and systolic performance with a visually  estimated ejection fraction of 65%.  2. There is severe basal inferior hypokinesis and moderate basal posterior  hypokinesis  3. There is mild concentric increase in left ventricular wall thickness.  4. 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  Â  Normal aortic valve prosthesis metrics with a mean systolic gradient of 19  mmHg and a peak anterograde velocity of 2.8 m/sec.  Â  No aortic insufficiency is detected.  5. There is moderate-severe calcific mitral stenosis.  6. Normal right ventricular size with mildly reduced right ventricular  systolic performance.  7. There is severe left atrial enlargement. There is moderate right atrial  enlargement.     When compared to the prior real-time echocardiogram dated 6 September 2022,  the degree of mitral stenosis has increased from moderate to now moderate-  severe. The findings are otherwise felt to be fairly similar on both  examinations. The aforementioned regional wall motion abnormalities are not  new and were identified on the prior study as well.    Head CT  IMPRESSION:  1.  No hemorrhage.  2.  Recent infarct at the right frontoparietal junction on MRI is not visible on CT.      Head CT  IMPRESSION:  1.  No acute hemorrhage.  2.  Known right frontoparietal infarct is not appreciated on CT.  3.  No new/acute territorial infarction.    LABS  Component      Latest Ref Rng 3/1/2024  8:41 AM   Cholesterol      <200 mg/dL 137    Triglycerides      <150 mg/dL 72    HDL Cholesterol      >=50 mg/dL 42 (L)    LDL Cholesterol Calculated      <=100 mg/dL 81    Non HDL Cholesterol      <130 mg/dL 95    Hemoglobin A1C      <5.7 % 5.3       Legend:  (L) Low    MRI brain  IMPRESSION:  1.  Mildly motion degraded examination demonstrates evolving acute to early subacute cortical infarct of the right postcentral gyrus and adjacent operculum as above.  2.  More extensive diffusion abnormality seen previously within the right frontoparietal white matter has resolved, without convincing evidence for additional evolving infarct in these areas currently.  3.  No hemorrhagic transformation or mass effect.  4.  Background of generalized brain atrophy and presumed chronic microvascular ischemic change similar to the previous exam.    MRI L spine  IMPRESSION:  1.  Multilevel lumbar spondylosis most severe at L1-L2 and L5-S1. Individual levels described above.     2.  No significant change when compared with the previous lumbar CT. No new fractures or evidence of recent disc extrusion.     3.  No enhancement of the distal thoracic cord, conus medullaris or cauda equina nerves. Negative for epidural or paraspinous soft tissue enhancement.    MRI C spine  IMPRESSION:  1.  Moderate to severe, multilevel cervical spondylosis.     2.  No abnormal cord enhancement.     3.  No fractures    MRI T spine  IMPRESSION:  1.  Thoracic spondylosis.     2.  Previous T12 vertebroplasty. No residual marrow edema or fracture progression. No new fractures.     3.  Small mid thoracic cord syrinx.      Recent Results (from the past 24 hour(s))   Basic metabolic panel    Collection Time: 03/07/24  7:10 AM   Result Value Ref Range    Sodium  141 135 - 145 mmol/L    Potassium 3.9 3.4 - 5.3 mmol/L    Chloride 108 (H) 98 - 107 mmol/L    Carbon Dioxide (CO2) 27 22 - 29 mmol/L    Anion Gap 6 (L) 7 - 15 mmol/L    Urea Nitrogen 23.2 (H) 8.0 - 23.0 mg/dL    Creatinine 0.87 0.51 - 0.95 mg/dL    GFR Estimate 61 >60 mL/min/1.73m2    Calcium 8.5 8.2 - 9.6 mg/dL    Glucose 96 70 - 99 mg/dL       Total time spent for face to face visit, reviewing labs/imaging studies, counseling and coordination of care was: Over 50 min More than 50% of this time was spent on counseling and coordination of care.      Counseling patient and daughter.  Reviewing MRI results of L-spine and C-spine.  Discussion regarding surgery and other treatment options.  Discussion of results with the hospitalist and making a plan.  Reviewing neurosurgery notes.    Chace Griffiths MD  Neurologist  [facility]  Tel:- [phone]

## 2024-03-08 NOTE — PLAN OF CARE
Pt scored a 3 on the NIH. Therapy is recommending TCU at discontinue.   Pt is hopeful to go to a TCU on Friday. She is up with assist of one from bed to chair. Her right leg is stronger than the previous day. Speech is slurred. She is alert and oriented X 4.  Neurosurgery and neurology along with cards is following. She was started on steroids for her spinal stenosis.     Problem: Stroke, Ischemic (Includes Transient Ischemic Attack)  Goal: Optimal Coping  3/7/2024 2256 by Delfina Hameed RN  Outcome: Progressing  3/7/2024 1517 by Delfina Hameed RN  Outcome: Progressing  Intervention: Support Psychosocial Response to Stroke  Recent Flowsheet Documentation  Taken 3/7/2024 2015 by Delfina Hameed RN  Supportive Measures:   self-care encouraged   active listening utilized  Taken 3/7/2024 1600 by Delfina Hameed RN  Supportive Measures:   self-care encouraged   active listening utilized  Taken 3/7/2024 1240 by Delfina Hameed RN  Supportive Measures:   self-care encouraged   active listening utilized  Taken 3/7/2024 0955 by Delfina Hameed RN  Supportive Measures:   self-care encouraged   active listening utilized     Problem: Adult Inpatient Plan of Care  Goal: Readiness for Transition of Care  3/7/2024 2256 by Delfina Hameed RN  Outcome: Progressing  3/7/2024 1517 by Delfina Hameed RN  Outcome: Progressing   Goal Outcome Evaluation:

## 2024-03-08 NOTE — PROGRESS NOTES
Speech Language Therapy Discharge Summary    Reason for therapy discharge:    All goals and outcomes met, no further needs identified.    Progress towards therapy goal(s). See goals on Care Plan in [facility] electronic health record for goal details.  Goals met    Therapy recommendation(s):    No further therapy is recommended. Recommend a diet of Soft and Bite sized (level 6) and Thin liquids with supra glottic swallow strategy (swallow, cough, swallow sequence) following each sip of thin liquid. Ensure pt is fully alert and upright for all PO. Pt to recieve SLP services for dysarthia, if needed, at next level of care.

## 2024-03-08 NOTE — PLAN OF CARE
Problem: Adult Inpatient Plan of Care  Goal: Absence of Hospital-Acquired Illness or Injury  Intervention: Identify and Manage Fall Risk  Recent Flowsheet Documentation  Taken 3/8/2024 0945 by Omayra Hawley RN  Safety Promotion/Fall Prevention:   activity supervised   clutter free environment maintained   lighting adjusted   mobility aid in reach   nonskid shoes/slippers when out of bed   patient and family education   room door open   safety round/check completed     Problem: Stroke, Ischemic (Includes Transient Ischemic Attack)  Goal: Optimal Cerebral Tissue Perfusion  Outcome: Progressing     Problem: Comorbidity Management  Goal: Maintenance of COPD Symptom Control  Outcome: Progressing  Intervention: Maintain COPD Symptom Control  Recent Flowsheet Documentation  Taken 3/8/2024 0945 by Omayra Hawley RN  Medication Review/Management: medications reviewed   Goal Outcome Evaluation:       Patient is A/O x4. Denied pain and nausea this shift. Neuro assessments Q4, slight drift in right arm and weakness in right leg. No slurred speech noted this shift. Coccyx red and blanchable, foam dressing in place- clean, dry, and intact. Potassium protocol ran and no coverage needed. Patient is [place]. Assist of 1 with a walker. X1 bowel movement this shift. [facility]

## 2024-03-08 NOTE — PLAN OF CARE
Occupational Therapy Discharge Summary    Reason for therapy discharge:    Discharged to transitional care facility.    Progress towards therapy goal(s). See goals on Care Plan in [facility] electronic health record for goal details.  Goals partially met.  Barriers to achieving goals:   discharge from facility.    Therapy recommendation(s):    Continued therapy is recommended.  Rationale/Recommendations:  recommend continued OT services at [facility].    Bella Harvey OTR/MISTY 3/8/24

## 2024-03-08 NOTE — DISCHARGE SUMMARY
"[facility]  Hospitalist Discharge Summary      Date of Admission:  3/1/2024  Date of Discharge:  3/8/2024    Discharging Provider: Kendall Sanchez MD  Discharge Service: Hospitalist Service    Discharge Diagnoses     Principal Problem:    Acute stroke due to ischemia (H)  Active Problems:    Arteriosclerosis of coronary artery    Chronic obstructive pulmonary disease, unspecified COPD type (H)    Hypokalemia    Hyponatremia    Facial droop    Dysarthria    Left-sided weakness    Chronic atrial fibrillation (H)    Spondylosis of lumbar region without myelopathy or radiculopathy        Clinically Significant Risk Factors     # Cachexia: Estimated body mass index is 16.52 kg/m  as calculated from the following:    Height as of this encounter: 1.6 m (5' 3\").    Weight as of this encounter: 42.3 kg (93 lb 4.1 oz).       Follow-ups Needed After Discharge   Follow-up Appointments     Follow Up and recommended labs and tests      Please follow up at [facility] as needed for back   pain or leg pain.  Please call our schedulers at [phone] to schedule   your appointment or for any questions or concerns.        Follow Up and recommended labs and tests      Follow up with Nursing home physician in one week.  No follow up labs or   test are needed.          Discharge Disposition   Discharged to short-term care facility  Condition at discharge: Stable    Hospital Course     Marla Dunn is a 89 year old female with a medical history of CAD, CHF, HTN, HLD, rheumatoid arthritis, Sjogren's syndrome, CKD, aortic stenosis, COPD, chronic hyponatremia, OLEGARIO, and atrial fibrillation not on anticoagulation, who was admitted on 3/1/2024 for acute ischemic stroke.    Acute ischemic stroke in the right MCA territory  Patient presented with left sided weakness with facial droop and dysarthria. MRI brain shows hyperacute infarction in the right precentral and postcentral gyri and underlying " white matter. CTA was negative for any significant large vessel occlusion/stenosis. Echocardiogram showed normal ejection fraction with significant wall motion abnormality and moderate to severe mitral stenosis. Neurology was consulted. Patient received thrombolysis with TNK. CT head was repeated after 24 hours and it showed no hemorrhage. Patient developed new right lower extremity weakness on 3/04. CT and MRI brain revealed no new changes. Patient was seen by physical therapy on 3/6 and felt that patient has persistent right leg weakness with minimal improvement. MRI spine revealed multilevel lumbar spondylosis most severe at L1-L2 and L5-S1. Neurology recommends medrol dosePak. Neurosurgery was also consulted and recommends no surgical intervention.     Chronic atrial fibrillation  Her [id number] score 7. She was seen by cardiology and Eliquis was started. Due to her fall risks, she is advised to follow-up outpatient with Dr. Amaya to consider watchman device.     Hypokalemia: Potassium was 3.3, which returned to normal after replacement.     Chronic stable conditions:   Chronic hyponatremia: on salt tablet.  Chronic HFpEF: PTA torsemide and spironolactone   CAD: no anginal chest pain. Mildly elevated troponin is likely due to demand ischemia.  Normocytic anemia: Hb stable at baseline.  COPD: No exacerbation  OLEGARIO: on CPAP  Hypothyroidism: PTA levothyroxine      Consultations This Hospital Stay   SPEECH LANGUAGE PATH ADULT IP CONSULT  PHYSICAL THERAPY ADULT IP CONSULT  OCCUPATIONAL THERAPY ADULT IP CONSULT  NEUROLOGY IP STROKE CONSULT  NEUROLOGY IP CONSULT  CARE MANAGEMENT / SOCIAL WORK IP CONSULT  WOUND OSTOMY CONTINENCE NURSE  IP CONSULT  CARDIOLOGY IP CONSULT  SOCIAL WORK IP CONSULT  NEUROSURGERY IP CONSULT  PHYSICAL THERAPY ADULT IP CONSULT  OCCUPATIONAL THERAPY ADULT IP CONSULT    Code Status   No CPR- Do NOT Intubate    Time Spent on this Encounter   I, Kendall Sanchez MD, personally saw the patient today  and spent greater than 30 minutes discharging this patient.       Kendall Sanchez MD  [place] [address] [facility] [address]  Phone: [phone]  Fax: [phone]  ______________________________________________________________________    Physical Exam   Vital Signs: Temp: 98.7  F (37.1  C) Temp src: Axillary BP: 106/56 Pulse: 79   Resp: 18 SpO2: 96 % O2 Device: None (Room air)    Weight: 93 lbs 4.07 oz    General appearance: not in acute distress  HEENT: PERRL, EOMI  Lungs: Clear breath sounds in bilateral lung fields  Cardiovascular: Regular rate and rhythm, normal S1-S2  Abdomen: Soft, non tender, no distension  Musculoskeletal: No joint swelling  Skin: No rash and no edema  Neurology: AAO ×3.  Cranial nerves II - XII normal.  Left sided weakness.       Primary Care Physician   Gabrielle Wallace    Discharge Orders      Follow-Up with Cardiology EP      Follow-Up with Cardiology      Follow Up and recommended labs and tests    Please follow up at [facility] as needed for back pain or leg pain.  Please call our schedulers at [phone] to schedule your appointment or for any questions or concerns.     General info for SNF    Length of Stay Estimate: Short Term Care: Estimated # of Days <30  Condition at Discharge: Improving  Level of care:skilled   Rehabilitation Potential: Good  Admission H&P remains valid and up-to-date: Yes  Recent Chemotherapy: N/A  Use Nursing Home Standing Orders: Yes     Mantoux instructions    Give two-step Mantoux (PPD) Per Facility Policy Yes     Follow Up and recommended labs and tests    Follow up with Nursing home physician in one week.  No follow up labs or test are needed.     Reason for your hospital stay    Admitted for acute ischemic stroke.     Activity - Up with nursing assistance     Physical Therapy Adult Consult    Evaluate and treat as clinically indicated.    Reason:  Acute stroke. Right leg weakness.      Occupational Therapy Adult Consult    Evaluate and treat as clinically indicated.    Reason:   Acute stroke. Right leg weakness.     Fall precautions     Diet    Follow this diet upon discharge: Orders Placed This Encounter      Combination Diet Soft and Bite Sized Diet (level 6); Thin Liquids (level 0)       Significant Results and Procedures   Most Recent 3 CBC's:  Recent Labs   Lab Test 03/03/24  0640 03/02/24  0406 03/01/24  0841   WBC 7.0 7.0 6.4   HGB 11.0* 10.4* 10.4*   * 101* 99    231 234     Most Recent 3 BMP's:  Recent Labs   Lab Test 03/08/24  0729 03/07/24  0710 03/06/24  0707 03/05/24  1411 03/05/24  0705   NA  --  141 140  --  138   POTASSIUM 4.6 3.9 4.1   < > 3.3*   CHLORIDE  --  108* 112*  --  110*   CO2  --  27 23  --  23   BUN  --  23.2* 21.1  --  21.6   CR  --  0.87 0.82  --  0.81   ANIONGAP  --  6* 5*  --  5*   PERICO  --  8.5 8.6  --  8.7   GLC  --  96 95  --  91    < > = values in this interval not displayed.       EXAM: CTA HEAD NECK W CONTRAST, CT HEAD PERFUSION W CONTRAST  LOCATION: [facility]  DATE: 03/01/2024     INDICATION: Code Stroke to evaluate for potential thrombolysis and thrombectomy. Left extremity weakness. Dilated left pupil. Garbled speech.  COMPARISON: CT cervical spine 03/08/2020. CT chest 11/24/2020.  TECHNIQUE: Head and neck CT angiogram with IV contrast. Noncontrast head CT followed by axial helical CT images of the head and neck vessels obtained during the arterial phase of intravenous contrast administration. Axial 2D reconstructed images and   multiplanar 3D MIP reconstructed images of the head and neck vessels were performed by the technologist. Additional CT cerebral perfusion was performed utilizing a second contrast bolus. Perfusion data were post processed with generation of standard   perfusion maps and estimation of ischemic/infarcted volumes utilizing standard threshold values. Dose reduction techniques were used. All  stenosis measurements made according to NASCET criteria unless otherwise specified.  CONTRAST: IsoVue 370 75 mL (accession [id number]), IsoVue 370 50 mL (accession [id number])     FINDINGS:   NONCONTRAST HEAD CT:   INTRACRANIAL CONTENTS: No finding for intracranial hemorrhage or convincing finding for acute infarct. There is a new chronic infarct involving the inferior lateral aspect of the left cerebellar hemisphere. There is moderate prominence of the lateral and   third ventricles and sulci and extensive symmetric low-attenuation change within the periventricular deep white matter of both hemispheres which is nonspecific but compatible with sequela of chronic microvascular ischemic change given the patient's age.   No supratentorial transcortical areas of low-attenuation change. No mass effect or midline shift.     Cerebellar tonsils are normally positioned. Sella is unremarkable for technique. There is thinning of the posterior body of the corpus callosum which otherwise appears normally formed.     VISUALIZED ORBITS/SINUSES/MASTOIDS: Prior cataract surgeries. Minimal maxillary sinus mucosal thickening, right greater than left. Small amount of fluid or mucosal thickening right mastoid sinus.     BONES/SOFT TISSUES: Calvarium is intact, without suspicious lytic or blastic foci.     HEAD CTA:  ANTERIOR CIRCULATION: Extensive atherosclerotic calcifications are seen within both carotid siphons with mild associated luminal narrowing.     There is occlusion of a distal M2 superior division branch of the right middle cerebral artery (axial image 348 of series 12, sagittal MIP image 20, coronal MIP image 41).     Anterior and left middle cerebral arteries are unremarkable.     POSTERIOR CIRCULATION: Coarse atherosclerotic calcifications are seen within the intradural vertebral arteries with mild associated luminal narrowing. Basilar and posterior cerebral arteries are unremarkable. Small caliber left posterior  communicating   artery. Right posterior communicating artery not identified.     DURAL VENOUS SINUSES: Dural venous sinuses are not well evaluated in the basis of this exam.     NECK CTA:  RIGHT CAROTID: No measurable stenosis or dissection. Mild atherosclerotic plaque right carotid bulb.     LEFT CAROTID: No measurable stenosis or dissection. Mild atherosclerotic plaque left carotid bulb.     VERTEBRAL ARTERIES: Coarse atherosclerotic plaque is seen at the take off of both vertebral arteries with mild associated luminal narrowing. More distal cervical vertebral arteries are unremarkable and there is no finding for dissection.     AORTIC ARCH: Heavy after carotid calcifications are seen within the aortic arch and to a lesser extent proximal great vessels.     NONVASCULAR STRUCTURES: Visualized lung apices reveal biapical peripheral parenchymal scarring, right greater than left and areas of right apical ground-glass opacity similar to prior chest CT and favored to reflect areas of scarring. Visualized osseous   structures reveal advanced cervical spondylosis without suspicious lytic or blastic foci.     CT PERFUSION:  PERFUSION MAPS: There is elevated Tmax and a slightly larger area of elevated MTT involving the lateral and posterior aspect of the right frontal lobe. There is decreased associated cCBF in these areas. rCBV maps are symmetric.      RAPID ANALYSIS:  CBF<30%: 0 mL  Tmax>6sec: 11 mL  Mismatch volume: 11 mL  Mismatch ratio: Infinite                                                                      IMPRESSION:   HEAD CT:  1.  No finding for intracranial hemorrhage or mass or convincing finding for acute infarct.     2.  Moderate volume loss and presumed sequela of chronic microvascular ischemic change.     3.  New chronic infarct inferior lateral aspect of the left cerebellar hemisphere.     HEAD CTA:   1.  There is occlusion of a distal M2 superior division segment of the right middle cerebral artery  without flow seen more distally within the vessel.     2.  Coarse atherosclerotic calcifications both carotid siphons and proximal intradural vertebral arteries with mild associated luminal narrowing but no clear flow-limiting stenosis.     NECK CTA:  1.  No significant stenosis either cervical carotid system.     2.  Mild atherosclerotic narrowing of the takeoff of both vertebral arteries.     CT PERFUSION:  1.  There is elevated Tmax, MTT, and decreased rCBF along the lateral and posterior aspect of the right frontal lobe compatible with ischemic penumbra.     2.  Area of Tmax >6 seconds measures 11 mL with volume of CBF <30% measuring 0 mL.      EXAM: MR BRAIN W/O CONTRAST  LOCATION: [facility]  DATE: 3/1/2024     INDICATION: HYPERACUTE  possible TNK candidate, please start with DWI then FLAIR and push images into PACS as they come, Sx: L sided weakness, garbled speech  COMPARISON: None.  TECHNIQUE: Routine multiplanar multisequence head MRI without intravenous contrast.     FINDINGS:  INTRACRANIAL CONTENTS: Restricted diffusion without associated T2 FLAIR hyperintensity in the involving the right precentral and postcentral gyrus and subcortical white matter is consistent with hyperacute infarction. No additional restricted diffusion   to suggest recent acute, acute, or early subacute infarction. Old infarction in the left cerebellar hemisphere. No mass, acute hemorrhage, or extra-axial fluid collections. Old microhemorrhage in the right superior cerebellar hemisphere. Patchy and   confluent nonspecific T2/FLAIR hyperintensities within the cerebral white matter most consistent with moderate chronic microvascular ischemic change. Moderate generalized cerebral atrophy. No hydrocephalus. Moderate cerebellar atrophy.      SELLA: No abnormality accounting for technique.     OSSEOUS STRUCTURES/SOFT TISSUES: Normal marrow signal. The major intracranial vascular flow voids are maintained.       ORBITS: No abnormality accounting for technique.      SINUSES/MASTOIDS: No paranasal sinus mucosal disease. No middle ear or mastoid effusion.                                                                       IMPRESSION:  1.  Hyperacute infarction involving the right precentral and post central gyri and underlying white matter.  2.  Generalized brain atrophy and presumed microvascular ischemic changes as detailed above.      EXAM: CT HEAD W/O CONTRAST  LOCATION: [facility]  DATE: 3/2/2024     INDICATION: 24 hr post TNK scan  COMPARISON: 03/01/2024  TECHNIQUE: Routine CT Head without IV contrast. Multiplanar reformats. Dose reduction techniques were used.     FINDINGS:  INTRACRANIAL CONTENTS: No intracranial hemorrhage, extraaxial collection, or mass effect.  The recent infarct at the frontoparietal junction identified on MRI is not visible on CT. Mild presumed chronic small vessel ischemic changes. Mild generalized   volume loss. No hydrocephalus.      VISUALIZED ORBITS/SINUSES/MASTOIDS: No intraorbital abnormality. Mild mucosal thickening scattered about the paranasal sinuses. Scattered fluid/membrane thickening in the right mastoid air cells. No apparent mass in the posterior nasopharynx or skull   base.     BONES/SOFT TISSUES: No acute abnormality. Advanced degenerative changes of the mandibular condyles.                                                                      IMPRESSION:  1.  No hemorrhage.  2.  Recent infarct at the right frontoparietal junction on MRI is not visible on CT.      EXAM: CT HEAD W/O CONTRAST  LOCATION: [facility]  DATE: 03/04/2024     INDICATION: New right leg weakness, recent IV TNK  COMPARISON: 03/02/2024  TECHNIQUE: Routine CT head without IV contrast. Multiplanar reformats. Dose reduction techniques were used.     FINDINGS:  INTRACRANIAL CONTENTS: No intracranial hemorrhage, extraaxial collection, or mass effect.  Known  evolving infarct in the right frontoparietal junction seen on MRI is not visualized on CT. No new acute territorial infarction. Chronic left cerebellar   infarct. Mild presumed chronic small vessel ischemic changes. Mild generalized volume loss. No hydrocephalus.      Bilateral carotid siphon and vertebral artery V4 segment atherosclerotic calcifications.     VISUALIZED ORBITS/SINUSES/MASTOIDS: No intraorbital abnormality. Trace mucosal thickening scattered about the paranasal sinuses. Scattered fluid/membrane thickening in the right mastoid air cells. No apparent mass in the posterior nasopharynx or skull   base.     BONES/SOFT TISSUES: No acute abnormality. Redemonstrated advanced degenerative changes of the temporomandibular joints.                                                                      IMPRESSION:  1.  No acute hemorrhage.  2.  Known right frontoparietal infarct is not appreciated on CT.  3.  No new/acute territorial infarction.      EXAM: MR BRAIN W/O CONTRAST  LOCATION: [facility]  DATE: 3/5/2024     INDICATION: Ischemic stroke. Follow-up abnormal brain MRI.  COMPARISON: MRI 03/01/2024  TECHNIQUE: Routine multiplanar multisequence head MRI without intravenous contrast.     FINDINGS:  INTRACRANIAL CONTENTS: Band of cortically-based diffusion restriction involving the right postcentral gyrus laterally and adjacent right frontal operculum. This is best appreciated on coronal diffusion series 13 image 45. Subtle corresponding cortical   FLAIR hyperintensity in this region is now seen. Diffusion abnormalities elsewhere along the frontoparietal junction seen on the previous MRI have largely resolved, without definite evidence for persistent diffusion restriction in these areas on the   current exam. No hemorrhagic transformation or significant associated mass effect. Patchy and confluent nonspecific T2/FLAIR hyperintensities within the cerebral white matter most consistent with  moderate chronic microvascular ischemic change. Persistent   focus of chronic encephalomalacia and gliosis within the left cerebellum. Mild to moderate generalized cerebral atrophy. No hydrocephalus. Normal position of the cerebellar tonsils.      SELLA: No abnormality accounting for technique.     OSSEOUS STRUCTURES/SOFT TISSUES: Degenerative changes at C1-C2 with prominent cruciate ligament thickening/pannus formation similar to previous exams. Unremarkable marrow signal elsewhere. The major intracranial vascular flow voids are maintained.      ORBITS: No abnormality accounting for technique.      SINUSES/MASTOIDS: No paranasal sinus mucosal disease. Complete/near complete opacification of the right mastoid air cells. No apparent mass in the posterior nasopharynx or skull base.                                                                       IMPRESSION:  1.  Mildly motion degraded examination demonstrates evolving acute to early subacute cortical infarct of the right postcentral gyrus and adjacent operculum as above.  2.  More extensive diffusion abnormality seen previously within the right frontoparietal white matter has resolved, without convincing evidence for additional evolving infarct in these areas currently.  3.  No hemorrhagic transformation or mass effect.  4.  Background of generalized brain atrophy and presumed chronic microvascular ischemic change similar to the previous exam.      EXAM: MR CERVICAL SPINE W/O and W CONTRAST  LOCATION: [facility]  DATE: 3/7/2024     INDICATION: Right lower extremity weakness.  COMPARISON: None.  CONTRAST: 5ml gadavist  TECHNIQUE: MRI Cervical Spine without and with IV contrast.     FINDINGS:   Normal cervical vertebral body heights. Negative for fracture. Reversal the normal cervical lordosis centered at C6. Ankylosis across the C7-T1 disc space. Ankylosis across the right C5-C6 facet joint. Prevertebral soft tissues unremarkable. No  abnormal   cord signal. No abnormal cord enhancement. Cerebral and cerebellar atrophy. Bilateral mastoid fluid/thickening right side more so than left.     Craniovertebral junction and C1-C2: Severe degenerative changes. C1-C2 joint effusion. Marked transverse ligamentous thickening and cystic change with indentation of the ventral thecal sac. No cord compression. Mild central canal stenosis. No cord signal   change. Ligamentum flavum thickening and enhancement posteriorly at C2 .     C2-C3: Disc desiccation and mild posterior annular bulge. Moderate bilateral facet arthropathy. Moderate right and mild left foraminal stenosis. Patent central canal.. No cord contact. Contributes to canal narrowing.      C3-C4: Disc desiccation. Small to moderate-sized, broad-based central disc protrusion. Facet ligamentous degenerative changes and thickening. Moderate to severe right and severe left foraminal stenosis. Moderate central canal stenosis with ventral cord   contact. No cord signal change.      C4-C5: 3.5 mm degenerative anterolisthesis. Disc uncovering and small central disc extrusion extending cephalad. Moderate canal stenosis with slight cord contact. No cord signal change. Facet DJD. Severe right and moderate to severe left foraminal   stenosis.      C5-C6: Disc height loss. Adequate central canal. Uncinate spur. Moderate bilateral foraminal stenosis right side more so than left.      C6-C7: Disc osteophyte complex. Ventral cord contact with mild-to-moderate central canal stenosis. No cord signal change. Uncinate spur and facet DJD with severe bilateral foraminal stenosis.      C7-T1: Patent central canal. Facet and uncinate degenerative changes with moderate right foraminal stenosis.                                                                      IMPRESSION:  1.  Moderate to severe, multilevel cervical spondylosis.     2.  No abnormal cord enhancement.     3.  No fractures.      EXAM: MR THORACIC SPINE W/O and W  CONTRAST  LOCATION: [facility]  DATE: 3/7/2024     INDICATION: new RLE weakness, neg for new stroke  COMPARISON: Abdomen pelvis CT 09/05/2022.  CONTRAST: 4ml gadavist  TECHNIQUE: Routine Thoracic Spine MRI without and with IV contrast.     FINDINGS:   Chronic T12 upper lower endplate fracture status post vertebroplasty. No acute or subacute thoracic fractures. Heterogeneous marrow signal without focal pathologic signal change. No abnormal enhancement following gadolinium administration. Ankylosis   across the C7-T1 disc.     Mild mid thoracic dextroconvex curvature.     Small thoracic cord syrinx measuring up to 2 mm in diameter extends from upper T7 to mid T8. No cord edema or enhancement. No hemorrhage or abnormal vessels.     T1-T2: 1.5 mm degenerative anterolisthesis. Disc desiccation and disc bulge. Facet DJD. Moderate to severe right and mild-to-moderate left foraminal stenosis. Patent central canal.     T2-T3: Facet hypertrophic change. Moderate right and mild left foraminal stenosis. Patent central canal. Normal discs.     T3-T4: Patent central canal and foramen.     T4-T5: Patent central canal and foramen.     T5-T6: Patent canal and foramen.     T6-T7: Mild disc bulge. Patent canal and foramen.     T7-T8: Patent canal and foramen.     T8-T9: Patent canal and foramen.     T9-T10: Disc desiccation and small right paracentral disc protrusion. Facet hypertrophic changes. Mild bilateral foraminal stenosis. Adequate central canal.     T10-T11: Facet ligamentous degenerative changes. Borderline central canal stenosis. Patent foramen.     T11-T12: Shallow left eccentric disc protrusion. Facet DJD. Moderate left and mild right foraminal stenosis. Adequate central canal.     T12-L1: Minimal disc bulge. Mild facet arthropathy. Patent central canal and foramen.     Bilateral pleural effusions.     Hepatic cystlike lesion incompletely visualized.                                                                       IMPRESSION:  1.  Thoracic spondylosis.     2.  Previous T12 vertebroplasty. No residual marrow edema or fracture progression. No new fractures.     3.  Small mid thoracic cord syrinx.      EXAM: MR LUMBAR SPINE W/O and W CONTRAST  LOCATION: [facility]  DATE: 3/7/2024     INDICATION: new RLE weakness,   COMPARISON: CT lumbar spine 03/08/2020.  CONTRAST: 4ml gadavist  TECHNIQUE: Routine Lumbar Spine MRI without and with IV contrast.     FINDINGS:   Nomenclature is based on 5 lumbar type vertebral bodies. No evidence for fracture. Heterogeneous marrow signal. No suspicious marrow signal change or abnormal bone, epidural or paraspinous enhancement. Evidence of previous T12 vertebroplasty. No fracture   progression. Mid lumbar levoconvex scoliosis measuring 25 degrees centered at L3. Scoliosis measuring 19 degrees on the comparison CT. High T2 signal within the disks at L1-L2 through L3-L4 and at L5-S1 correspond to vacuum phenomena on the CT scan. No   evidence for endplate erosion or bone edema. Normal distal spinal cord and cauda equina with conus medullaris at L1-L2. No extraspinal abnormality. Unremarkable visualized bony pelvis.     T12-L1: Mild disc bulge. Mild left foraminal stenosis. Patent central canal. Right foramen patent.      L1-L2: Disc desiccation, height loss and posterior disc osteophyte complex. 2 mm degenerative retrolisthesis. Moderate to severe facet arthropathy. Moderate bilateral foraminal stenosis. Moderate central canal stenosis.     L2-L3: Disc osteophyte complex with moderate central canal stenosis. Moderate right lateral recess stenosis. Moderate right and mild left foraminal stenosis. Disc and osteophyte extends into the right foramen contacting the underside of the exiting L2   ganglion.      L3-L4: Disc osteophyte complex. And facet DJD. Moderately severe central canal stenosis. Severe left lateral recess stenosis with left L4 nerve contact.  Severe right and moderate left foraminal stenosis.     L4-L5: Disc osteophyte. Mild to moderate central canal stenosis. Moderate left lateral recess stenosis. Disc and osteophyte with moderate bilateral foraminal stenosis.     L5-S1: Disc osteophyte complex and facet DJD. Moderately severe central canal stenosis. Severe left and moderate right lateral recess stenosis with left greater than right S1 nerve contact. Severe left and moderate right foraminal stenosis. Ligamentum   flavum thickening with enhancement on a degenerative basis.                                                                      IMPRESSION:  1.  Multilevel lumbar spondylosis most severe at L1-L2 and L5-S1. Individual levels described above.     2.  No significant change when compared with the previous lumbar CT. No new fractures or evidence of recent disc extrusion.     3.  No enhancement of the distal thoracic cord, conus medullaris or cauda equina nerves. Negative for epidural or paraspinous soft tissue enhancement.      Echocardiogram:    Interpretation Summary     1. Normal left ventricular size and systolic performance with a visually  estimated ejection fraction of 65%.  2. There is severe basal inferior hypokinesis and moderate basal posterior  hypokinesis  3. There is mild concentric increase in left ventricular wall thickness.  4. 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  Â  Normal aortic valve prosthesis metrics with a mean systolic gradient of 19  mmHg and a peak anterograde velocity of 2.8 m/sec.  Â  No aortic insufficiency is detected.  5. There is moderate-severe calcific mitral stenosis.  6. Normal right ventricular size with mildly reduced right ventricular  systolic performance.  7. There is severe left atrial enlargement. There is moderate right atrial  enlargement.     When compared to the prior real-time echocardiogram dated 6 September 2022, the degree of mitral stenosis has increased from moderate to now moderate-severe.  The findings are otherwise felt to be fairly similar on both  examinations. The aforementioned regional wall motion abnormalities are not  new and were identified on the prior study as well.      Discharge Medications   Current Discharge Medication List        START taking these medications    Details   apixaban ANTICOAGULANT (ELIQUIS) 2.5 MG tablet Take 1 tablet (2.5 mg) by mouth 2 times daily    Associated Diagnoses: Chronic a-fib (H)      atorvastatin (LIPITOR) 40 MG tablet Take 1 tablet (40 mg) by mouth daily    Associated Diagnoses: Dyslipidemia      !! methylPREDNISolone (MEDROL) 4 MG tablet Take 2 tablets (8 mg) by mouth at bedtime for 1 dose    Associated Diagnoses: Lumbar radiculopathy      !! methylPREDNISolone (MEDROL) 4 MG tablet Take 1 tablet (4 mg) by mouth daily (with breakfast) for 4 days    Associated Diagnoses: Lumbar radiculopathy      !! methylPREDNISolone (MEDROL) 4 MG tablet Take 1 tablet (4 mg) by mouth daily (with lunch) for 2 days    Associated Diagnoses: Lumbar radiculopathy      !! methylPREDNISolone (MEDROL) 4 MG tablet Take 1 tablet (4 mg) by mouth daily (with dinner) for 2 days    Associated Diagnoses: Lumbar radiculopathy      !! methylPREDNISolone (MEDROL) 4 MG tablet Take 1 tablet (4 mg) by mouth daily for 3 days    Associated Diagnoses: Lumbar radiculopathy       !! - Potential duplicate medications found. Please discuss with provider.        CONTINUE these medications which have NOT CHANGED    Details   acetaminophen (TYLENOL) 325 MG tablet Take 3 tablets (975 mg) by mouth every 8 hours as needed for mild pain      albuterol (PROAIR HFA/PROVENTIL HFA/VENTOLIN HFA) 108 (90 Base) MCG/ACT inhaler Inhale 2 puffs into the lungs 4 times daily    Comments: Pharmacy may dispense brand covered by insurance (Proair, or proventil or ventolin or generic albuterol inhaler)      amoxicillin (AMOXIL) 500 MG capsule 4 CAPSULES (2000MG ) ORALLY AS NEEDED ONE HOUR PRIOR TO DENTAL APPOINTMENT  Qty: 4  capsule, Refills: 5    Associated Diagnoses: Heart valve disease      ANTACID REGULAR STRENGTH 500 MG chewable tablet 2 TABLETS (1000MG) ORALLY 2 TIMES DAILY AS NEEDED FOR HEARTBURN  Qty: 60 tablet, Refills: 11    Comments: PATIENT RESIDES AT AN ASSISTED LIVING, FACILITY REQUESTING FOR REFILLS. THANK YOU.  Associated Diagnoses: Heartburn      !! Ascorbic Acid (VITAMIN C PO) Take 500 mg by mouth every morning      ASPIRIN LOW DOSE 81 MG EC tablet 1 TABLET ORALLY 2 TIMES DAILY (DX: PROPHYLAXIS)  Qty: 180 tablet, Refills: 3    Comments: Please send refills for cycle fill. Thanks!  Associated Diagnoses: Intertrochanteric fracture of right femur, closed, initial encounter (H)      !! Bacillus Coagulans-Inulin (PROBIOTIC FORMULA) 1-250 BILLION-MG CAPS 1 CAPSULE ORALLY DAILY  Qty: 31 capsule, Refills: 11    Comments: Please send refills for cycle fill. Thank you!  Associated Diagnoses: Fecal incontinence alternating with constipation      bisacodyl (DULCOLAX) 5 MG EC tablet 1 TABLET ORALLY DAILY AS NEEDED (MAY KEEP AT BEDSIDE)  Qty: 30 tablet, Refills: 10    Comments: REFILLS NEEDED. PHARMACY PROFILE UNTIL FACILITY REQUESTS.  Associated Diagnoses: Slow transit constipation      chlorhexidine (PERIDEX) 0.12 % solution Swish and spit 15 mLs in mouth 2 times daily May self administer  Qty: 118 mL, Refills: 3    Associated Diagnoses: Gingivitis      cholecalciferol (VITAMIN D3) 125 mcg (5000 units) capsule 1 CAPSULE ORALLY DAILY  Qty: 90 capsule, Refills: 3    Comments: Please send refills for cycle fill. Thanks!  Associated Diagnoses: Takes dietary supplements      COMBIVENT RESPIMAT  MCG/ACT inhaler INHALE 1 PUFF INTO THE LUNGS  4 TIMES DAILY  Qty: 4 g, Refills: 11    Comments: FACILITY REQUESTING REFILLS. PLEASE SEND SO WE CAN FILL. THANKS!  Associated Diagnoses: Mild intermittent asthma without complication      cycloSPORINE (RESTASIS) 0.05 % ophthalmic emulsion Place 1 drop into both eyes 2 times daily        demeclocycline (DECLOMYCIN) 150 MG tablet 1 TABLET ORALLY 2 TIMES DAILY  Qty: 60 tablet, Refills: 11    Comments: Please send refills for cycle fill. Thank you!  Associated Diagnoses: Hyponatremia      fish oil-omega-3 fatty acids 1000 MG capsule Take 2 capsules (2 g) by mouth daily  Qty: 28 capsule, Refills: 11    Associated Diagnoses: Dyslipidemia      fluorometholone (FML LIQUIFILM) 0.1 % ophthalmic susp Place 1 drop Into the left eye daily       hydrocortisone 1 % CREA cream Place rectally 2 times daily as needed for itching      IBANDRONATE SODIUM PO Take 150 mg by mouth every 30 days      levothyroxine (SYNTHROID/LEVOTHROID) 88 MCG tablet 1 TABLET ORALLY EVERY MORNING ON AN EMPTY STOMACH  Qty: 31 tablet, Refills: 11    Comments: Please send refills for cycle fill. Thank you!  Associated Diagnoses: Hypothyroidism, unspecified type      loratadine (CLARITIN) 10 MG tablet 1 TABLET ORALLY DAILY (DX: ASTHMA)  Qty: 28 tablet, Refills: 11    Comments: Please send refills for cycle fill. Thanks!  Associated Diagnoses: Chronic obstructive pulmonary disease, unspecified COPD type (H)      !! melatonin 3 MG tablet 1 TABLET ORALLY AT BEDTIME ALONG WITH 5 MG FOR A TOTAL DOSE OF 8MG  Qty: 30 tablet, Refills: 11    Comments: Please send refills for cycle fill. Thank you!  Associated Diagnoses: Insomnia, unspecified type      !! melatonin 5 MG tablet 1 TABLET ORALLY AT BEDTIME  ALONG WITH 3MG FOR A TOTAL DOSE OF 8MG  Qty: 30 tablet, Refills: 11    Comments: Please send refills for cycle fill. Thank you!  Associated Diagnoses: Insomnia, unspecified type      metoprolol tartrate (LOPRESSOR) 25 MG tablet 1 TABLET ORALLY 2 TIMES DAILY (DX: HYPERTENSION)  Qty: 56 tablet, Refills: 11    Comments: Please send refills for cycle fill. Thank you!  Associated Diagnoses: Hypertension, unspecified type      mirtazapine (REMERON) 7.5 MG tablet 1 TABLET ORALLY AT BEDTIME  Qty: 31 tablet, Refills: 11    Comments: Please send refills for  cycle fill. Thank you!  Associated Diagnoses: Anxiety and depression      Multiple Vitamins-Minerals (PRESERVISION AREDS 2) CAPS 1 CAPSULE ORALLY 2 TIMES DAILY  Qty: 62 capsule, Refills: 11    Comments: Please send refills for cycle fill. Thank you!  Associated Diagnoses: Takes dietary supplements      omeprazole (PRILOSEC) 20 MG DR capsule 1 CAPSULE ORALLY 2 TIMES DAILY (DX: GASTROESOPHAGEAL REFLUX DISEASE)  Qty: 62 capsule, Refills: 11    Comments: Please send refills for cycle fill. Thank you!  Associated Diagnoses: Gastroesophageal reflux disease with esophagitis without hemorrhage      polyethylene glycol 0.4%- propylene glycol 0.3% (SYSTANE ULTRA) 0.4-0.3 % SOLN ophthalmic solution Place 1 drop into both eyes 4 times daily      Polyethylene Glycol 3350 (PEG 3350) 17 GM/SCOOP POWD MIX 1 CAPFUL (17 GMS) IN 8 OUNCES WATER AND DRINK ORALLY DAILY (DX: CONSTIPATION)  Qty: 510 g, Refills: 11    Comments: PATIENT RESIDES AT AN ASSISTED LIVING, FACILITY REQUESTING FOR REFILLS. THANK YOU.  Associated Diagnoses: Constipation, unspecified constipation type      !! Probiotic Product (PROBIOTIC PO) Take 1 capsule by mouth every morning      SENEXON-S 8.6-50 MG tablet 1 TABLET ORALLY 2 TIMES DAILY (DX: CONSTIPATION)  Qty: 56 tablet, Refills: 11    Comments: Please send refills for cycle fill. Thank you!  Associated Diagnoses: Constipation, unspecified constipation type      sodium chloride 1 GM tablet Take 1 tablet (1 g) by mouth daily  Qty: 30 tablet, Refills: 11    Associated Diagnoses: Hyponatremia      spironolactone (ALDACTONE) 25 MG tablet 1 TABLET ORALLY DAILY (DX: EDEMA) ** HAZARDOUS MED: WEAR DOUBLE NITRILE GLOVES**  Qty: 28 tablet, Refills: 11    Comments: Please send refills for cycle fill. Thanks!  Associated Diagnoses: Hypertension, unspecified type      timolol (TIMOPTIC) 0.5 % ophthalmic solution Place 1 drop Into the left eye daily       torsemide (DEMADEX) 20 MG tablet 1.5 TABLET (30mg) ORALLY DAILY  Qty: 31  tablet, Refills: 11    Associated Diagnoses: Chronic diastolic congestive heart failure (H)      !! vitamin C (ASCORBIC ACID) 500 MG tablet 1 TABLET ORALLY DAILY (DX: SUPPLEMENT)  Qty: 31 tablet, Refills: 11    Comments: Please send refills for cycle fill. Thank you!  Associated Diagnoses: Moderate protein-calorie malnutrition (H24)      OXYGEN-HELIUM IN        !! - Potential duplicate medications found. Please discuss with provider.        Allergies   Allergies   Allergen Reactions    Gramineae Pollens Other (See Comments)     ORCHARDGRASS. Shortness of breath when lawn is just cut.    Smoke. Other (See Comments)     Hard to breathe.    Pollen Extract      Other reaction(s): Unknown

## 2024-03-08 NOTE — PROGRESS NOTES
"Care Management Follow Up    Length of Stay (days): 7    Expected Discharge Date: 03/08/2024     Concerns to be Addressed:  Medically ready, needs TCU placement     Patient plan of care discussed at interdisciplinary rounds: Yes    Anticipated Discharge Disposition:  TCU     Anticipated Discharge Services:    Anticipated Discharge DME:      Patient/family educated on Medicare website which has current facility and service quality ratings:    Education Provided on the Discharge Plan:    Patient/Family in Agreement with the Plan:      Referrals Placed by CM/SW:  TCU  Private pay costs discussed: Not applicable    Additional Information:  Social history per previous CM note:  \"Patient lives at [facility] and receives the following services: bathing, meals, medication management. Says patient is mostly in her wheelchair but can typically transfer self. Anticipate return to [facility] pending therapy recs . [employer] [employer] is main contact. Family will transport if able.\"    3/8 CM reached out to PT to see if pt still needs TCU and at this point they are still recommending TCU vs back to her residential but will keep us updated if this changes.  AMY reached out to pt's [facility]ce [facility] to discuss that we need more TCU choices she told CM that she is ok with us sending more TCU referrals but try to keep in the [facility] area. More TCU referrals sent/pending. Also updated the pt that we have not found a TCU yet and that we are still trying and if we don't find something today she will likely be here over the weekend. Pt was accepting of this.    Leticia Rock RN    Care Management Discharge Note    Discharge Date: 03/08/2024       Discharge Disposition: Transitional Care ([facility])    Discharge Services:  per TCU    Discharge DME:  none    Discharge Transportation: niece to transport    Private pay costs discussed: Not applicable    Does the patient's insurance plan have a 3 day qualifying hospital stay waiver?  " No    PAS Confirmation Code: [id number]  Patient/family educated on Medicare website which has current facility and service quality ratings:      Education Provided on the Discharge Plan:    Persons Notified of Discharge Plans: yes  Patient/Family in Agreement with the Plan:      Handoff Referral Completed: Yes    Additional Information:  Pt adequate to discharge. Has been accepted to [facility] TCU and pt and niece are accepting of this discharge plan. Pt's niece to transport today at 1500.    Leticia Rock RN

## 2024-03-08 NOTE — PROGRESS NOTES
NEUROLOGY PROGRESS NOTE     Marla Dunn,  1929, MRN [id number] Date: 3/8/2024     [facility]   Code status:  No CPR- Do NOT Intubate   PCP: Gabrielle Wallace, [phone]      ASSESSMENT & PLAN   Diagnosis code: Ischemic stroke    Ischemic stroke  History of coronary artery disease/MI  History of hypertension/hyperlipidemia  Atrial fibrillation  Left biceps bruising  New right leg weakness-rule out stroke/hemorrhagic transformation versus effort dependent  Lumbar spinal stenosis    MRI brain shows hyperacute infarction in the right precentral and postcentral gyri and underlying white matter  CTA negative for any significant large vessel occlusion/stenosis  Echocardiogram shows normal ejection fraction with significant wall motion abnormality and moderate to severe mitral stenosis  Patient was found to be in A-fib in the emergency room  Lipid panel shows LDL 81.  40 mg of Lipitor  Status post tenecteplase.  Head CT stable.  Continue aspirin and statin   Cardiology recommending anticoagulation.  No contraindications from neurology end.  Outpatient Watchman procedure.  MRI C-spine/T-spine/L-spine done which shows severe lumbar spinal stenosis which could explain the right leg weakness.  Neurosurgery consulted to evaluate for surgery.  Would recommend Medrol Dosepak to see if that helps improve the right leg weakness.  Some improvement so far.  Continue physical therapy/Occupational Therapy  Blood pressure can be normalized  Please note:-Per patient wishes, if she had another stroke she does not IV thrombolytics.    Discharge:-Per primary team.  Will need TCU.  Will sign off.  Please call with any further questions.         Chief Complaint   Patient presents with    Stroke        HISTORY OF PRESENT ILLNESS     We have been requested by Dr. Lizarraga to evaluate Marla Dunn who is a 89 year old  female for stroke.  Is a 89-year-old female with past medical history of coronary artery  disease, previous MI, CHF, hypertension, hyperlipidemia, rheumatoid arthritis, Sjogren's syndrome and chronic kidney disease admitted for left-sided weakness and some garbled speech along with a left facial droop and left-sided neglect.  No gaze deviation.  She was last normal at 1730.  She was found all dressed up sitting in the wheelchair only using her right hand.  Patient reported that at 7 AM this morning she was normal.  She was given tenecteplase which she reports has helped with her symptoms.  Reports no ongoing weakness at this point.  Does take aspirin at baseline.  No anticoagulation.    3/3  No new neurological symptoms.  She does have bruising in the left biceps.  Reports that she does not want the IV thrombolytics in the future if she has a stroke.    3/4  Patient on exam today was noted to have new right leg weakness.  She reports that she cannot move her leg though when confronted to tell me when it started she says it has been there for few days even though she could move her leg yesterday.  Bruising is about the same.  Discussed with primary team and patient is supposed to follow-up with her cardiologist and an outpatient and the decision would be made during that visit if she needs to be on anticoagulation or not.    3/5  Patient continues to have weakness in the right leg.  Is willing to do an MRI of her brain to further evaluate this.  No other new symptoms.  Hematoma stable.    3/6  Some improvement in the right leg though continues to have weakness.  Unable to hold it for a long time.  Has worked with physical therapy and tells me that it did not really help.  MRI brain done yesterday negative for stroke.  Hematoma stable.  Cardiology now consulted and patient and are recommending Eliquis.  Potentially  checking MRI of the C/T/L-spine later today.  Discussed with hospitalist to evaluate with physical therapy for poor effort first.    3/7  Patient continues to have right leg weakness.  Did try  to ambulate with physical therapy though reports to me that she was almost limping and could not walk correctly though the right leg is getting better compared to before.  She potentially anticipates going home tomorrow.  Discussed about treatment options based on MRI and she is willing to talk to the surgeon.  Not sure about the surgery.    3/8  Continues to have right leg weakness.  No new findings.  Tolerating Medrol Dosepak.  Potential discharge today.     PAST MEDICAL & SURGICAL HISTORY     Medical History  Past Medical History:   Diagnosis Date    Asthma     Bilateral carpal tunnel syndrome 08/27/2015    CAD (coronary artery disease)     Chronic airway obstruction, not elsewhere classified     Created by Conversion     Chronic anemia     Chronic edema     Congestive heart failure, severe (H) 05/13/2020    COPD (chronic obstructive pulmonary disease) (H)     Coronary artery disease     Depression     GERD (gastroesophageal reflux disease)     Heart disease     Heart murmur     High cholesterol     dislipidemia    HTN (hypertension)     Hypercholesterolemia     Hypertension     Hypertensive emergency 08/13/2021    Hypothyroidism     Hypothyroidism     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    MI (myocardial infarction) (H) 1985    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    Myelodysplastic syndrome (H)     Myocardial infarction (H) 1983    OLEGARIO (obstructive sleep apnea)     Osteoporosis     Other and unspecified hyperlipidemia     Created by Conversion     Pyelonephritis 05/11/2016    Radicular low back pain     Rheumatoid arthritis (H)     Elizabeth Agers syndrome     Sjoegren syndrome     Sjogren's syndrome (H24)     Sleep apnea, obstructive     Spinal stenosis     Squamous cell carcinoma, tongue border (H)     Unspecified polyarthropathy or polyarthritis, hand     Created by Conversion      Surgical History  Past Surgical History:   Procedure Laterality Date    aortic valve      AORTIC VALVE  REPLACEMENT  01/01/2012    APPENDECTOMY      APPENDECTOMY      AS TOTAL KNEE ARTHROPLASTY Right     BACK SURGERY  2004    spinal decompression    BACK SURGERY      spinal stenosis    BONE MARROW BIOPSY  2004    breast biopsy      BREAST SURGERY  2001    biopsy    BYPASS GRAFT ARTERY CORONARY  01/01/2009    LIMA to ramus intermedius    cardiac angiogram      CARDIAC CATHETERIZATION      CARDIAC SURGERY      EYE SURGERY  2008    bilateral cataract repair     EYE SURGERY Bilateral     cornea transplant left eye & cataracts    KYPHOPLASTY  2003    T12    OPEN REDUCTION INTERNAL FIXATION HIP NAILING Right 08/13/2021    Procedure: INTERNAL FIXATION, FRACTURE, TROCHANTERIC, HIP, USING PINS OR RODS;  Surgeon: Darrel Castro MD;  Location: [facility] OR    OTHER SURGICAL HISTORY  01/01/2018    Rectosigmoidectomy perineal    PARTIAL GLOSSECTOMY Right 09/2023    [facility]    RECTOSIGMOIDECTOMY PERINEAL N/A 01/10/2018    Procedure: RECTOSIGMOIDECTOMY PERINEAL;  PERINEAL RECTOSIGMOIDECTOMY ;  Surgeon: Candelaria Anthony MD;  Location: [facility] OR    REPAIR VALVE AORTIC  2009    THORACIC SURGERY  2003    T12 kyphoplasty    ZZC CABG, ARTERY-VEIN, FOUR      ZZC CABG, VEIN, SINGLE      Description: CABG (CABG);  Recorded: 03/09/2012;  Comments: Single vessel bypass graft to an obtuse marginal branch in May 2009    ZZC REPLACE AORT VALV,PROSTH VALV      Description: Aortic Valve Replacement;  Recorded: 03/09/2012;  Comments: Aortic valve replacement        SOCIAL HISTORY     Social History     Tobacco Use    Smoking status: Never    Smokeless tobacco: Never   Substance Use Topics    Alcohol use: No     Alcohol/week: 0.0 standard drinks of alcohol     Comment: Alcoholic Drinks/day: occasional glass of wine    Drug use: No        FAMILY HISTORY     Reviewed, and family history includes Cancer in her brother, mother, and sister; Family History Negative in her mother; Heart Disease in her father.     ALLERGIES      Allergies   Allergen Reactions    Gramineae Pollens Other (See Comments)     ORCHARDGRASS. Shortness of breath when lawn is just cut.    Smoke. Other (See Comments)     Hard to breathe.    Pollen Extract      Other reaction(s): Unknown        REVIEW OF SYSTEMS     12 system ROS was done within the HPI this was negative.  Pertinent positives noted on the HPI.     HOME & HOSPITAL MEDICATIONS     Prior to Admission Medications  Medications Prior to Admission   Medication Sig Dispense Refill Last Dose    acetaminophen (TYLENOL) 325 MG tablet Take 3 tablets (975 mg) by mouth every 8 hours as needed for mild pain   More than a month    albuterol (PROAIR HFA/PROVENTIL HFA/VENTOLIN HFA) 108 (90 Base) MCG/ACT inhaler Inhale 2 puffs into the lungs 4 times daily   2/29/2024 at hs    amoxicillin (AMOXIL) 500 MG capsule 4 CAPSULES (2000MG ) ORALLY AS NEEDED ONE HOUR PRIOR TO DENTAL APPOINTMENT 4 capsule 5 Unknown    ANTACID REGULAR STRENGTH 500 MG chewable tablet 2 TABLETS (1000MG) ORALLY 2 TIMES DAILY AS NEEDED FOR HEARTBURN 60 tablet 11 Unknown    Ascorbic Acid (VITAMIN C PO) Take 500 mg by mouth every morning   2/29/2024    ASPIRIN LOW DOSE 81 MG EC tablet 1 TABLET ORALLY 2 TIMES DAILY (DX: PROPHYLAXIS) 180 tablet 3 2/29/2024 at hs    Bacillus Coagulans-Inulin (PROBIOTIC FORMULA) 1-250 BILLION-MG CAPS 1 CAPSULE ORALLY DAILY 31 capsule 11 2/29/2024    bisacodyl (DULCOLAX) 5 MG EC tablet 1 TABLET ORALLY DAILY AS NEEDED (MAY KEEP AT BEDSIDE) (Patient taking differently: 5 mg 2 times daily as needed) 30 tablet 10 Unknown    chlorhexidine (PERIDEX) 0.12 % solution Swish and spit 15 mLs in mouth 2 times daily May self administer 118 mL 3 2/29/2024 at hs    cholecalciferol (VITAMIN D3) 125 mcg (5000 units) capsule 1 CAPSULE ORALLY DAILY 90 capsule 3 2/29/2024 at pm    COMBIVENT RESPIMAT  MCG/ACT inhaler INHALE 1 PUFF INTO THE LUNGS  4 TIMES DAILY 4 g 11 2/29/2024 at pm    cycloSPORINE (RESTASIS) 0.05 % ophthalmic emulsion  Place 1 drop into both eyes 2 times daily    3/1/2024 at am    demeclocycline (DECLOMYCIN) 150 MG tablet 1 TABLET ORALLY 2 TIMES DAILY 60 tablet 11 2/29/2024 at hs    fish oil-omega-3 fatty acids 1000 MG capsule Take 2 capsules (2 g) by mouth daily (Patient taking differently: Take 1 g by mouth daily) 28 capsule 11 2/29/2024 at am    fluorometholone (FML LIQUIFILM) 0.1 % ophthalmic susp Place 1 drop Into the left eye daily    2/29/2024 at am    hydrocortisone 1 % CREA cream Place rectally 2 times daily as needed for itching   Unknown    IBANDRONATE SODIUM PO Take 150 mg by mouth every 30 days   2/4/2024    levothyroxine (SYNTHROID/LEVOTHROID) 88 MCG tablet 1 TABLET ORALLY EVERY MORNING ON AN EMPTY STOMACH 31 tablet 11 2/29/2024    loratadine (CLARITIN) 10 MG tablet 1 TABLET ORALLY DAILY (DX: ASTHMA) 28 tablet 11 2/29/2024    melatonin 3 MG tablet 1 TABLET ORALLY AT BEDTIME ALONG WITH 5 MG FOR A TOTAL DOSE OF 8MG 30 tablet 11 2/29/2024    melatonin 5 MG tablet 1 TABLET ORALLY AT BEDTIME  ALONG WITH 3MG FOR A TOTAL DOSE OF 8MG 30 tablet 11 2/29/2024    metoprolol tartrate (LOPRESSOR) 25 MG tablet 1 TABLET ORALLY 2 TIMES DAILY (DX: HYPERTENSION) 56 tablet 11 2/29/2024 at hs    mirtazapine (REMERON) 7.5 MG tablet 1 TABLET ORALLY AT BEDTIME 31 tablet 11 2/29/2024    Multiple Vitamins-Minerals (PRESERVISION AREDS 2) CAPS 1 CAPSULE ORALLY 2 TIMES DAILY 62 capsule 11 2/29/2024 at pm    omeprazole (PRILOSEC) 20 MG DR capsule 1 CAPSULE ORALLY 2 TIMES DAILY (DX: GASTROESOPHAGEAL REFLUX DISEASE) 62 capsule 11 2/29/2024 at pm    polyethylene glycol 0.4%- propylene glycol 0.3% (SYSTANE ULTRA) 0.4-0.3 % SOLN ophthalmic solution Place 1 drop into both eyes 4 times daily   2/29/2024 at hs    Polyethylene Glycol 3350 (PEG 3350) 17 GM/SCOOP POWD MIX 1 CAPFUL (17 GMS) IN 8 OUNCES WATER AND DRINK ORALLY DAILY (DX: CONSTIPATION) 510 g 11 2/29/2024 at am    Probiotic Product (PROBIOTIC PO) Take 1 capsule by mouth every morning   2/29/2024  at am    SENEXON-S 8.6-50 MG tablet 1 TABLET ORALLY 2 TIMES DAILY (DX: CONSTIPATION) 56 tablet 11 2/29/2024 at hs    sodium chloride 1 GM tablet Take 1 tablet (1 g) by mouth daily 30 tablet 11 2/29/2024 at am    spironolactone (ALDACTONE) 25 MG tablet 1 TABLET ORALLY DAILY (DX: EDEMA) ** HAZARDOUS MED: WEAR DOUBLE NITRILE GLOVES** 28 tablet 11 2/29/2024 at am    timolol (TIMOPTIC) 0.5 % ophthalmic solution Place 1 drop Into the left eye daily    2/29/2024 at am    torsemide (DEMADEX) 20 MG tablet 1.5 TABLET (30mg) ORALLY DAILY 31 tablet 11 2/29/2024 at am    vitamin C (ASCORBIC ACID) 500 MG tablet 1 TABLET ORALLY DAILY (DX: SUPPLEMENT) 31 tablet 11 2/29/2024 at am    OXYGEN-HELIUM IN           Hospital Medications   albuterol  2 puff Inhalation 4x Daily    apixaban ANTICOAGULANT  2.5 mg Oral BID    cycloSPORINE  1 drop Both Eyes BID    demeclocycline  150 mg Oral Q12H [facility] (08/20)    fluorometholone  1 drop Left Eye Daily    levothyroxine  88 mcg Oral QAM AC    methylPREDNISolone  8 mg Oral At Bedtime    And    methylPREDNISolone  4 mg Oral QAM AC    And    methylPREDNISolone  4 mg Oral Daily with lunch    And    methylPREDNISolone  4 mg Oral Daily with supper    And    [START ON 3/9/2024] methylPREDNISolone  4 mg Oral At Bedtime    metoprolol tartrate  25 mg Oral BID    mirtazapine  7.5 mg Oral At Bedtime    pantoprazole  40 mg Oral BID AC    polyethylene glycol-propylene glycol  1 drop Both Eyes 4x Daily    sodium chloride  1 g Oral Daily    spironolactone  25 mg Oral Daily    timolol maleate  1 drop Left Eye Daily    torsemide  30 mg Oral Daily    umeclidinium-vilanterol  1 puff Inhalation Daily        PHYSICAL EXAM     Vital signs  Temp:  [97.4  F (36.3  C)-99  F (37.2  C)] 98.7  F (37.1  C)  Pulse:  [75-91] 79  Resp:  [18-20] 18  BP: (102-140)/(54-65) 106/56  SpO2:  [95 %-97 %] 96 %    PHYSICAL EXAMINATION  VITALS: /56 (BP Location: Right arm)   Pulse 79   Temp 98.7  F (37.1  C) (Axillary)   Resp 18    "Ht 1.6 m (5' 3\")   Wt 42.3 kg (93 lb 4.1 oz)   SpO2 96%   BMI 16.52 kg/m    GENERAL -Health appearing, No apparent distress  EYES- No scleral icterus, no eyelid droop, Pupils - see Neuro section  HEENT - Normocephalic, atraumatic, Hearing grossly limited; Oral mucosa moist and pink in color. External Ears and nose intact.   Neck - supple   PULM - Good spontaneous respiratory effort  CV- Pedal pulses present with no peripheral edema/ No significant varicosities.  MSK- Gait - see Neuro section; Strength and tone- see Neuro section; Range of motion grossly intact.  PSYCH -cooperative    Neurological  Mental status - Patient is awake and partially oriented to self, place and time. Attention span is normal. Language is fluent and follows commands appropriately.   Cranial nerves - CN II-XII intact. Pupils are reactive and symmetric; EOMI, VFIT, NLF symmetric  Motor - There is no pronator drift. Motor exam shows 5/5 strength in all extremities except right leg weakness which could be effort dependent.  Tone - Tone is symmetric bilaterally in upper and lower extremities.  Reflexes - Reflexes are symmetric and decreased.  Sensation - Sensory exam is grossly intact to light touch, pain.  Coordination - Finger to nose and heel to shin is without dysmetria except left upper extremity dysmetria.  Gait and station --unable to safely ambulate.  Formal gait testing cannot be done due to safety concerns from ongoing medical issues.  Right leg stronger compared to before though still some mild weakness.  Exam stable.     DIAGNOSTIC STUDIES     Pertinent Radiology   MRI  IMPRESSION:  1.  Hyperacute infarction involving the right precentral and post central gyri and underlying white matter.  2.  Generalized brain atrophy and presumed microvascular ischemic changes as detailed above.    HEAD CT:  1.  No finding for intracranial hemorrhage or mass or convincing finding for acute infarct.     2.  Moderate volume loss and presumed " sequela of chronic microvascular ischemic change.     3.  New chronic infarct inferior lateral aspect of the left cerebellar hemisphere.     HEAD CTA:   1.  There is occlusion of a distal M2 superior division segment of the right middle cerebral artery without flow seen more distally within the vessel.     2.  Coarse atherosclerotic calcifications both carotid siphons and proximal intradural vertebral arteries with mild associated luminal narrowing but no clear flow-limiting stenosis.     NECK CTA:  1.  No significant stenosis either cervical carotid system.     2.  Mild atherosclerotic narrowing of the takeoff of both vertebral arteries.     CT PERFUSION:  1.  There is elevated Tmax, MTT, and decreased rCBF along the lateral and posterior aspect of the right frontal lobe compatible with ischemic penumbra.     2.  Area of Tmax >6 seconds measures 11 mL with volume of CBF <30% measuring 0 mL.    ECHO  1. Normal left ventricular size and systolic performance with a visually  estimated ejection fraction of 65%.  2. There is severe basal inferior hypokinesis and moderate basal posterior  hypokinesis  3. There is mild concentric increase in left ventricular wall thickness.  4. 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  Â  Normal aortic valve prosthesis metrics with a mean systolic gradient of 19  mmHg and a peak anterograde velocity of 2.8 m/sec.  Â  No aortic insufficiency is detected.  5. There is moderate-severe calcific mitral stenosis.  6. Normal right ventricular size with mildly reduced right ventricular  systolic performance.  7. There is severe left atrial enlargement. There is moderate right atrial  enlargement.     When compared to the prior real-time echocardiogram dated 6 September 2022,  the degree of mitral stenosis has increased from moderate to now moderate-  severe. The findings are otherwise felt to be fairly similar on both  examinations. The aforementioned regional wall motion abnormalities are  not  new and were identified on the prior study as well.    Head CT  IMPRESSION:  1.  No hemorrhage.  2.  Recent infarct at the right frontoparietal junction on MRI is not visible on CT.     Head CT  IMPRESSION:  1.  No acute hemorrhage.  2.  Known right frontoparietal infarct is not appreciated on CT.  3.  No new/acute territorial infarction.    LABS  Component      Latest Ref Rng 3/1/2024  8:41 AM   Cholesterol      <200 mg/dL 137    Triglycerides      <150 mg/dL 72    HDL Cholesterol      >=50 mg/dL 42 (L)    LDL Cholesterol Calculated      <=100 mg/dL 81    Non HDL Cholesterol      <130 mg/dL 95    Hemoglobin A1C      <5.7 % 5.3       Legend:  (L) Low    MRI brain  IMPRESSION:  1.  Mildly motion degraded examination demonstrates evolving acute to early subacute cortical infarct of the right postcentral gyrus and adjacent operculum as above.  2.  More extensive diffusion abnormality seen previously within the right frontoparietal white matter has resolved, without convincing evidence for additional evolving infarct in these areas currently.  3.  No hemorrhagic transformation or mass effect.  4.  Background of generalized brain atrophy and presumed chronic microvascular ischemic change similar to the previous exam.    MRI L spine  IMPRESSION:  1.  Multilevel lumbar spondylosis most severe at L1-L2 and L5-S1. Individual levels described above.     2.  No significant change when compared with the previous lumbar CT. No new fractures or evidence of recent disc extrusion.     3.  No enhancement of the distal thoracic cord, conus medullaris or cauda equina nerves. Negative for epidural or paraspinous soft tissue enhancement.    MRI C spine  IMPRESSION:  1.  Moderate to severe, multilevel cervical spondylosis.     2.  No abnormal cord enhancement.     3.  No fractures    MRI T spine  IMPRESSION:  1.  Thoracic spondylosis.     2.  Previous T12 vertebroplasty. No residual marrow edema or fracture progression. No new  fractures.     3.  Small mid thoracic cord syrinx.      Recent Results (from the past 24 hour(s))   Potassium    Collection Time: 03/08/24  7:29 AM   Result Value Ref Range    Potassium 4.6 3.4 - 5.3 mmol/L       Total time spent for face to face visit, reviewing labs/imaging studies, counseling and coordination of care was: Over 35 min More than 50% of this time was spent on counseling and coordination of care.    Counseling patient.  Reviewing chart.  Counseling patient with the primary team.    Chace Griffiths MD  Neurologist  [facility] Neurology [facility]  Tel:- [phone]

## 2024-03-08 NOTE — PROGRESS NOTES
CLINICAL NUTRITION SERVICES     Reviewed nutrition risk factors due to LOS. Pt is tolerating diet, eating well per nursing documentation. No nutrition issues identified at this time. RD will follow peripherally at this time, unless consulted.

## 2024-03-08 NOTE — PLAN OF CARE
Goal Outcome Evaluation:  A&Ox4. NIH (2) for slurred speech and right leg drift. VSS. Pt encouraged and educated on repositioning, but refused. Purewick in place. Awaiting TCU placement.      Problem: Stroke, Ischemic (Includes Transient Ischemic Attack)  Goal: Improved Communication Skills  Outcome: Not Progressing     Problem: Adult Inpatient Plan of Care  Goal: Optimal Comfort and Wellbeing  Outcome: Progressing  Intervention: Monitor Pain and Promote Comfort  Recent Flowsheet Documentation  Taken 3/7/2024 3239 by Irma Alicia, RN  Pain Management Interventions: medication offered but refused     Problem: Risk for Delirium  Goal: Improved Sleep  Outcome: Progressing     Problem: Stroke, Ischemic (Includes Transient Ischemic Attack)  Goal: Optimal Coping  Outcome: Progressing

## 2024-03-08 NOTE — PROGRESS NOTES
Physical Therapy Discharge Summary    Reason for therapy discharge:    Discharged to transitional care facility.    Progress towards therapy goal(s). See goals on Care Plan in [facility] electronic health record for goal details.  Goals not met.  Barriers to achieving goals:   discharge from facility.    Therapy recommendation(s):    Continued therapy is recommended.  Rationale/Recommendations:  TCU.

## 2024-03-11 ENCOUNTER — LAB REQUISITION (OUTPATIENT)
Dept: LAB | Facility: CLINIC | Age: 89
End: 2024-03-11
Payer: COMMERCIAL

## 2024-03-11 ENCOUNTER — TRANSITIONAL CARE UNIT VISIT (OUTPATIENT)
Dept: GERIATRICS | Facility: CLINIC | Age: 89
End: 2024-03-11
Payer: COMMERCIAL

## 2024-03-11 ENCOUNTER — DOCUMENTATION ONLY (OUTPATIENT)
Dept: GERIATRICS | Facility: CLINIC | Age: 89
End: 2024-03-11
Payer: COMMERCIAL

## 2024-03-11 VITALS
WEIGHT: 96.4 LBS | BODY MASS INDEX: 17.08 KG/M2 | RESPIRATION RATE: 18 BRPM | SYSTOLIC BLOOD PRESSURE: 140 MMHG | OXYGEN SATURATION: 99 % | DIASTOLIC BLOOD PRESSURE: 80 MMHG | HEART RATE: 74 BPM | HEIGHT: 63 IN | TEMPERATURE: 98 F

## 2024-03-11 DIAGNOSIS — M47.816 SPONDYLOSIS OF LUMBAR REGION WITHOUT MYELOPATHY OR RADICULOPATHY: ICD-10-CM

## 2024-03-11 DIAGNOSIS — Z74.09 IMPAIRED MOBILITY AND ADLS: ICD-10-CM

## 2024-03-11 DIAGNOSIS — I50.32 CHRONIC HEART FAILURE WITH PRESERVED EJECTION FRACTION (H): ICD-10-CM

## 2024-03-11 DIAGNOSIS — M81.0 AGE-RELATED OSTEOPOROSIS WITHOUT CURRENT PATHOLOGICAL FRACTURE: ICD-10-CM

## 2024-03-11 DIAGNOSIS — I48.20 CHRONIC ATRIAL FIBRILLATION (H): ICD-10-CM

## 2024-03-11 DIAGNOSIS — Z78.9 IMPAIRED MOBILITY AND ADLS: ICD-10-CM

## 2024-03-11 DIAGNOSIS — I63.411 CEREBROVASCULAR ACCIDENT (CVA) DUE TO EMBOLISM OF RIGHT MIDDLE CEREBRAL ARTERY (H): Primary | ICD-10-CM

## 2024-03-11 DIAGNOSIS — E87.1 CHRONIC HYPONATREMIA: ICD-10-CM

## 2024-03-11 DIAGNOSIS — M15.0 PRIMARY OSTEOARTHRITIS INVOLVING MULTIPLE JOINTS: ICD-10-CM

## 2024-03-11 DIAGNOSIS — C02.9 SQUAMOUS CELL CANCER OF TONGUE (H): ICD-10-CM

## 2024-03-11 DIAGNOSIS — E44.0 MODERATE PROTEIN-CALORIE MALNUTRITION (H): ICD-10-CM

## 2024-03-11 DIAGNOSIS — Z98.890 S/P PARTIAL GLOSSECTOMY: ICD-10-CM

## 2024-03-11 DIAGNOSIS — Z79.899 POLYPHARMACY: ICD-10-CM

## 2024-03-11 DIAGNOSIS — Z87.81 STATUS POST CLOSED FRACTURE OF RIGHT FEMUR: ICD-10-CM

## 2024-03-11 DIAGNOSIS — E87.1 HYPO-OSMOLALITY AND HYPONATREMIA: ICD-10-CM

## 2024-03-11 DIAGNOSIS — R54 FRAILTY: ICD-10-CM

## 2024-03-11 PROBLEM — E87.6 HYPOKALEMIA: Status: ACTIVE | Noted: 2021-08-13

## 2024-03-11 PROBLEM — I63.9 ACUTE STROKE DUE TO ISCHEMIA (H): Status: ACTIVE | Noted: 2024-03-11

## 2024-03-11 PROBLEM — I25.10 ARTERIOSCLEROSIS OF CORONARY ARTERY: Status: ACTIVE | Noted: 2021-08-13

## 2024-03-11 PROBLEM — M48.061 SPINAL STENOSIS OF LUMBAR REGION WITHOUT NEUROGENIC CLAUDICATION: Status: ACTIVE | Noted: 2024-03-11

## 2024-03-11 PROCEDURE — 99306 1ST NF CARE HIGH MDM 50: CPT | Performed by: FAMILY MEDICINE

## 2024-03-11 NOTE — PROGRESS NOTES
[facility] GERIATRICS    PRIMARY CARE PROVIDER AND CLINIC:  CASTRO Hazel CNP, [address] [facility] [address]  Chief Complaint   Patient presents with    Hospital F/U      [facility] Record Number:  [id number]  Place of Service where encounter took place:  [facility] ([facility]) [[id number]]    Marla Dunn  is a 89 year old,  (1929), former [employer] worker livng at [facility], with pmhx incluidng CAD s/p CABG and AVR (), afib previously not on AC, HFpEF, osteoporosis with prior R hip fracture who was admitted to the above facility from  [facility]. Hospital stay 3/1/24 through 3/8/24..   HPI:    Hospital Course  She presented to the emergency department with neurologic symptoms including garbled speech and left-sided deficits.  She had been last known well the evening prior around 1730, though she reported symptoms starting approximately 0700 morning of presentation. In the ED, noted to have left-sided facial droop and dysarthria, left arm and leg hemiplegia.  Initial NIH score of 16.  Given this, expedited CT/CTA obtained.  This showed new chronic infarct of the lateral aspect of the left cerebral hemisphere as well as occlusion of distal M2 of the right middle cerebral artery without distal flow.  MRI showed hyperacute infarction of the right precentral and postcentral gyrus. Labs with BMP showing hyponatremia of 131 (on demeclocycline), creatinine 1.02 (approximately baseline).  CBC with no leukocytosis, chronic anemia with hemoglobin 10.4.  EKG with known atrial fibrillation without RVR. Given the findings, she underwent TNK thrombolysis with improvement in NIH score.  Admitted for ongoing post-stroke management.  Notably other labs for stroke with A1c of 5.3, LDL 81. She was seen by SLP and recommended level 5 diet with thin liquids. Cardiology consulted and recommended anticoagulation with apixaban 2.5mg BID.  "    There was concern for new RLE weakness and repeat HCT without evidence of hemorrhagic transformaiton. This persisted and repeat MRI brain did not show concern for new/worsening stroke. Underwent MRI whole spine which showed multilevel stenosis, particularly severe at L1/2 and L5/S1. Started medrol dose pack for symptoms. Neurosurgery consulted with no surgical indications.     Today  She is doing well today.  She just completed evaluation with occupational therapy.  She is able to review her admission and understands it well.  Her left strength feels nearly back to normal, actually her right side currently is giving her more difficulties.  This is primarily in her right leg.  She does feel it is getting slowly better as she is able to raise her leg off the floor now.  Was not able to do this when she was in the hospital. This has been a progressive issues prior to her hospital admission. She also has weakness of her right hand.  Describes having difficulty holding objects with her fingers.  Describes having difficulty moving her fingers also feels weak somewhat in the medial distribution.  However this also has been improving with therapy.    Her speech is significantly better.  She said when she first in the hospital she essentially could not say anything.  She continues to have mild speech impediment but feels this is primarily related to her prior tongue surgery.  She has no dysphagia, but does have difficulty chewing as she is missing many teeth.  Says she was evaluated for dentures but was told she was \"too old.\"    She has no chest pain or pressure, no shortness of breath.  Appetite is good.  No nausea or vomiting.    Functional Review  Lives at [facility], followed by NP Gabrielle Del Valle of our team.  Says she gets assistance with most things.  She goes to the dining hood for her meals.  Primarily gets around with a wheelchair.  She does use a walker but seldomly uses it as she says her legs " "\"do not work.\"  This was the case even prior to the hospital.  No current concerns related to bowel or bladder.  She has incontinence but none recently.    Goals of Care  Noted during her admission she had stated she would not wish to have thrombolytic therapy repeated.  In reviewing this with her today, she says that if she is in a state where she is unable to speak for herself and is not aware of her surroundings, she wishes to \"be let go.\"     Most important to her are her family. She has no children of her own but her niece is \"like my own daughter.\" Also close to her NEGRITO    She enjoys reading but it is difficulty with her vision. She has her specific shows she enjoys. She stays active at [employer] with many of the activities.     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 Shortness of breath when lawn is just cut.    Smoke. Other (See Comments)     Hard to breathe.    Pollen Extract      Other reaction(s): Unknown      PAST MEDICAL HISTORY:   Past Medical History:   Diagnosis Date    Asthma     Bilateral carpal tunnel syndrome 08/27/2015    CAD (coronary artery disease)     Chronic airway obstruction, not elsewhere classified     Created by Conversion     Chronic anemia     Chronic edema     Congestive heart failure, severe (H) 05/13/2020    COPD (chronic obstructive pulmonary disease) (H)     Coronary artery disease     Depression     GERD (gastroesophageal reflux disease)     Heart disease     Heart murmur     High cholesterol     dislipidemia    HTN (hypertension)     Hypercholesterolemia     Hypertension     Hypertensive emergency 08/13/2021    Hypothyroidism     Hypothyroidism     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    MI (myocardial infarction) (H) 1985    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    Myelodysplastic " syndrome (H)     Myocardial infarction (H) 1983    OLEGARIO (obstructive sleep apnea)     Osteoporosis     Other and unspecified hyperlipidemia     Created by Conversion     Pyelonephritis 05/11/2016    Radicular low back pain     Rheumatoid arthritis (H)     Elizabeth Agers syndrome     Sjoegren syndrome     Sjogren's syndrome (H24)     Sleep apnea, obstructive     Spinal stenosis     Squamous cell carcinoma, tongue border (H)     Unspecified polyarthropathy or polyarthritis, hand     Created by Conversion       PAST SURGICAL HISTORY:   has a past surgical history that includes cardiac angiogram; back surgery (2004); TOTAL KNEE ARTHROPLASTY (Right); appendectomy; breast biopsy; Kyphoplasty (2003); Eye surgery (2008); Cardiac surgery; CABG, ARTERY-VEIN, FOUR; Repair valve aortic (2009); Breast surgery (2001); Thoracic surgery (2003); bone marrow biopsy (2004); aortic valve; Rectosigmoidectomy perineal (N/A, 01/10/2018); CABG, VEIN, SINGLE; REPLACE AORT VALV,PROSTH VALV; appendectomy; Eye surgery (Bilateral); back surgery; Cardiac catheterization; aortic valve replacement (01/01/2012); Bypass graft artery coronary (01/01/2009); other surgical history (01/01/2018); Open reduction internal fixation hip nailing (Right, 08/13/2021); and Partial Glossectomy (Right, 09/2023).  FAMILY HISTORY: family history includes Cancer in her brother, mother, and sister; Family History Negative in her mother; Heart Disease in her father.  SOCIAL HISTORY:   reports that she has never smoked. She has never used smokeless tobacco. She reports that she does not drink alcohol and does not use drugs.  Patient's living condition: lives in an assisted living facility    Post Discharge Medication Reconciliation Status:   MED REC REQUIRED  Post Medication Reconciliation Status: discharge medications reconciled and changed, per note/orders       Current Outpatient Medications   Medication Sig    acetaminophen (TYLENOL) 325 MG tablet Take 3 tablets (975 mg)  by mouth every 8 hours as needed for mild pain    albuterol (PROAIR HFA/PROVENTIL HFA/VENTOLIN HFA) 108 (90 Base) MCG/ACT inhaler Inhale 2 puffs into the lungs 4 times daily    amoxicillin (AMOXIL) 500 MG capsule 4 CAPSULES (2000MG ) ORALLY AS NEEDED ONE HOUR PRIOR TO DENTAL APPOINTMENT    ANTACID REGULAR STRENGTH 500 MG chewable tablet 2 TABLETS (1000MG) ORALLY 2 TIMES DAILY AS NEEDED FOR HEARTBURN    apixaban ANTICOAGULANT (ELIQUIS) 2.5 MG tablet Take 1 tablet (2.5 mg) by mouth 2 times daily    Ascorbic Acid (VITAMIN C PO) Take 500 mg by mouth every morning    ASPIRIN LOW DOSE 81 MG EC tablet 1 TABLET ORALLY 2 TIMES DAILY (DX: PROPHYLAXIS)    atorvastatin (LIPITOR) 40 MG tablet Take 1 tablet (40 mg) by mouth daily    Bacillus Coagulans-Inulin (PROBIOTIC FORMULA) 1-250 BILLION-MG CAPS 1 CAPSULE ORALLY DAILY    bisacodyl (DULCOLAX) 5 MG EC tablet 1 TABLET ORALLY DAILY AS NEEDED (MAY KEEP AT BEDSIDE) (Patient taking differently: 5 mg 2 times daily as needed)    chlorhexidine (PERIDEX) 0.12 % solution Swish and spit 15 mLs in mouth 2 times daily May self administer    cholecalciferol (VITAMIN D3) 125 mcg (5000 units) capsule 1 CAPSULE ORALLY DAILY    COMBIVENT RESPIMAT  MCG/ACT inhaler INHALE 1 PUFF INTO THE LUNGS  4 TIMES DAILY    cycloSPORINE (RESTASIS) 0.05 % ophthalmic emulsion Place 1 drop into both eyes 2 times daily     demeclocycline (DECLOMYCIN) 150 MG tablet 1 TABLET ORALLY 2 TIMES DAILY    fish oil-omega-3 fatty acids 1000 MG capsule Take 2 capsules (2 g) by mouth daily (Patient taking differently: Take 1 g by mouth daily)    fluorometholone (FML LIQUIFILM) 0.1 % ophthalmic susp Place 1 drop Into the left eye daily     hydrocortisone 1 % CREA cream Place rectally 2 times daily as needed for itching    IBANDRONATE SODIUM PO Take 150 mg by mouth every 30 days    levothyroxine (SYNTHROID/LEVOTHROID) 88 MCG tablet 1 TABLET ORALLY EVERY MORNING ON AN EMPTY STOMACH    loratadine (CLARITIN) 10 MG tablet 1  "TABLET ORALLY DAILY (DX: ASTHMA)    melatonin 3 MG tablet 1 TABLET ORALLY AT BEDTIME ALONG WITH 5 MG FOR A TOTAL DOSE OF 8MG    melatonin 5 MG tablet 1 TABLET ORALLY AT BEDTIME  ALONG WITH 3MG FOR A TOTAL DOSE OF 8MG    methylPREDNISolone (MEDROL) 4 MG tablet Take 1 tablet (4 mg) by mouth daily (with breakfast) for 4 days    methylPREDNISolone (MEDROL) 4 MG tablet Take 1 tablet (4 mg) by mouth daily (with lunch) for 2 days    methylPREDNISolone (MEDROL) 4 MG tablet Take 1 tablet (4 mg) by mouth daily for 3 days    metoprolol tartrate (LOPRESSOR) 25 MG tablet 1 TABLET ORALLY 2 TIMES DAILY (DX: HYPERTENSION)    mirtazapine (REMERON) 7.5 MG tablet 1 TABLET ORALLY AT BEDTIME    Multiple Vitamins-Minerals (PRESERVISION AREDS 2) CAPS 1 CAPSULE ORALLY 2 TIMES DAILY    omeprazole (PRILOSEC) 20 MG DR capsule 1 CAPSULE ORALLY 2 TIMES DAILY (DX: GASTROESOPHAGEAL REFLUX DISEASE)    OXYGEN-HELIUM IN     polyethylene glycol 0.4%- propylene glycol 0.3% (SYSTANE ULTRA) 0.4-0.3 % SOLN ophthalmic solution Place 1 drop into both eyes 4 times daily    Polyethylene Glycol 3350 (PEG 3350) 17 GM/SCOOP POWD MIX 1 CAPFUL (17 GMS) IN 8 OUNCES WATER AND DRINK ORALLY DAILY (DX: CONSTIPATION)    Probiotic Product (PROBIOTIC PO) Take 1 capsule by mouth every morning    SENEXON-S 8.6-50 MG tablet 1 TABLET ORALLY 2 TIMES DAILY (DX: CONSTIPATION)    sodium chloride 1 GM tablet Take 1 tablet (1 g) by mouth daily    spironolactone (ALDACTONE) 25 MG tablet 1 TABLET ORALLY DAILY (DX: EDEMA) ** HAZARDOUS MED: WEAR DOUBLE NITRILE GLOVES**    timolol (TIMOPTIC) 0.5 % ophthalmic solution Place 1 drop Into the left eye daily     torsemide (DEMADEX) 20 MG tablet 1.5 TABLET (30mg) ORALLY DAILY    vitamin C (ASCORBIC ACID) 500 MG tablet 1 TABLET ORALLY DAILY (DX: SUPPLEMENT)     No current facility-administered medications for this visit.     Vitals:  BP (!) 140/80   Pulse 74   Temp 98  F (36.7  C)   Resp 18   Ht 1.6 m (5' 3\")   Wt 43.7 kg (96 lb 6.4 oz)  "  SpO2 99%   BMI 17.08 kg/m    Exam:    GENERAL APPEARANCE: Seated comfortably, NAD  HENT:  Moderate temporal atrophy, severely [place] (typically wears hearing aids but not with her), poor denitition  EYES:  Conjunctiva clear, anicteric, EOMI, PERRL  PULM  Normal WOB on RA, lungs CTAB, no wheezes or crackles  CV:  Irreguarl, S1/S2 normal, no murmurs; no LE edema  ABDOMEN: Abdomen soft, not tender, not distended, BS normal and active throughout   M/S:  Generalized atrophy, bilateral arthritic changes of th ehands  SKIN: Dystrophic finger nails; subcutaneous hematomas, large of right medial calf  NEURO: Alert, answering questions appropriately, normal thought processes; CN II-XII grossly intact, mild dysarthria but no difficulty with understanding her.  5/5 strength in distal and proximal extremities throughout except 4/5 in  strength bilaterally, 3/5 in Right hip flexion.   PSYCH: Mood normal with congruent affect, insight/judgment intact    Lab/Diagnostic data:  Recent labs and imaging in [facility] reviewed by me today.     ASSESSMENT/PLAN:    Medical conditions    (I63.411) Cerebrovascular accident (CVA) due to embolism of right middle cerebral artery (H)  (primary encounter diagnosis)  Comment: Primary reason for admission with findings of significant M2 embolic stroke.  Underwent thrombolysis with significant movement of symptoms.  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  Has had slow improvement of symptoms with therapy and steroids.  Likely additional weakness particular right upper extremity related to arthritis and possibly CTS.  Plan:   -Complete Medrol Dosepak  -PT/OT    (I50.32) Chronic heart failure  with preserved ejection fraction (H)  Comment: Euvolemic, no evidence of decompensation.  No shortness of breath.  Plan:   -Continue torsemide, spironolactone  -BMP this week    (I48.20) Chronic atrial fibrillation (H)  Comment: Rate controlled.  Previously was not on anticoagulation due to bleeding risk concerns.  However in context of stroke, start apixaban 2.5 mg twice daily.  Does continue on daily aspirin as well due to history of AVR.  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  Negative margins.  Likely contributing to ongoing dysarthria.  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  Continues on sodium tablets and demeclocycline.  Sodium is overall stable  Plan:   -BMP    Geriatrics Syndromes    (R54) Frailty  (E44.0) Moderate protein-calorie malnutrition (H24)  Comment: Consistent with generalized atrophy consistent with malnutrition, overall weight loss in the past year, and functional assessment.  Dental health contributing as well. Consideration for ongoing cares and discharge planning  Plan  -TSH    (Z74.09,  Z78.9) Impaired mobility and ADLs  Comment: Multifactorial related to stroke and possible residual effects, myelopathy, arthritis, and hospital deconditioning.  Progressing well with therapies.    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  Contributing to impaired mobility and function.  Plan:   -Continue scheduled acetaminophen  -Consider Voltaren gel    (M81.0) Age-related osteoporosis without current pathological fracture  (Z87.81) Status post closed fracture of right femur  Comment: Sustained in 2021 consistent with osteoporosis.  Continues on monthly ibandronate.   Can continue here.  Plan:   -Close monitoring considering oral health    (Z79.899) Polypharmacy  Comment: Significant polypharmacy related to overlapping conditions, multiple eyedrops  Plan:   -Reduce as able    Orders:  [x] BMP, TSH   [x] Decrease Vit D to 1000 units daily from 5000u/d    Electronically signed by:    Benjamin Rosenstein, MD, MA  [facility]     This note was completed with the assistance of dictation software. Typos and word substitution-errors are expected and unintended.      93 MINUTES SPENT BY ME on the date of service doing chart review, history, exam, documentation & further activities per the note.

## 2024-03-11 NOTE — LETTER
3/11/2024        RE: Marla Dunn  C/o Deneen Dunn  [address] [facility] [address]        [facility] GERIATRICS    PRIMARY CARE PROVIDER AND CLINIC:  [facility], [address] [facility] [address]  Chief Complaint   Patient presents with     Hospital F/U      [facility] Medical Record Number:  [id number]  Place of Service where encounter took place:  [facility] (SNF) [[id number]]    Marla Dunn  is a 89 year old,  (1929), former [employer] worker livng at [facility], with pmhx incluidng CAD s/p CABG and AVR (), afib previously not on AC, HFpEF, osteoporosis with prior R hip fracture who was admitted to the above facility from  [facility]. Hospital stay 3/1/24 through 3/8/24..   HPI:    Hospital Course  She presented to the emergency department with neurologic symptoms including garbled speech and left-sided deficits.  She had been last known well the evening prior around 1730, though she reported symptoms starting approximately 0700 morning of presentation. In the ED, noted to have left-sided facial droop and dysarthria, left arm and leg hemiplegia.  Initial NIH score of 16.  Given this, expedited CT/CTA obtained.  This showed new chronic infarct of the lateral aspect of the left cerebral hemisphere as well as occlusion of distal M2 of the right middle cerebral artery without distal flow.  MRI showed hyperacute infarction of the right precentral and postcentral gyrus. Labs with BMP showing hyponatremia of 131 (on demeclocycline), creatinine 1.02 (approximately baseline).  CBC with no leukocytosis, chronic anemia with hemoglobin 10.4.  EKG with known atrial fibrillation without RVR. Given the findings, she underwent TNK thrombolysis with improvement in NIH score.  Admitted for ongoing post-stroke management.  Notably other labs for stroke with A1c of 5.3, LDL 81. She was seen by SLP and recommended  "level 5 diet with thin liquids. Cardiology consulted and recommended anticoagulation with apixaban 2.5mg BID.     There was concern for new RLE weakness and repeat HCT without evidence of hemorrhagic transformaiton. This persisted and repeat MRI brain did not show concern for new/worsening stroke. Underwent MRI whole spine which showed multilevel stenosis, particularly severe at L1/2 and L5/S1. Started medrol dose pack for symptoms. Neurosurgery consulted with no surgical indications.     Today  She is doing well today.  She just completed evaluation with occupational therapy.  She is able to review her admission and understands it well.  Her left strength feels nearly back to normal, actually her right side currently is giving her more difficulties.  This is primarily in her right leg.  She does feel it is getting slowly better as she is able to raise her leg off the floor now.  Was not able to do this when she was in the hospital. This has been a progressive issues prior to her hospital admission. She also has weakness of her right hand.  Describes having difficulty holding objects with her fingers.  Describes having difficulty moving her fingers also feels weak somewhat in the medial distribution.  However this also has been improving with therapy.    Her speech is significantly better.  She said when she first in the hospital she essentially could not say anything.  She continues to have mild speech impediment but feels this is primarily related to her prior tongue surgery.  She has no dysphagia, but does have difficulty chewing as she is missing many teeth.  Says she was evaluated for dentures but was told she was \"too old.\"    She has no chest pain or pressure, no shortness of breath.  Appetite is good.  No nausea or vomiting.    Functional Review  Lives at [facility], followed by NP Gabrielle Del Valle of our team.  Says she gets assistance with most things.  She goes to the dining hood for her " "meals.  Primarily gets around with a wheelchair.  She does use a walker but seldomly uses it as she says her legs \"do not work.\"  This was the case even prior to the hospital.  No current concerns related to bowel or bladder.  She has incontinence but none recently.    Goals of Care  Noted during her admission she had stated she would not wish to have thrombolytic therapy repeated.  In reviewing this with her today, she says that if she is in a state where she is unable to speak for herself and is not aware of her surroundings, she wishes to \"be let go.\"     Most important to her are her family. She has no children of her own but her niece is \"like my own daughter.\" Also close to her NEGRITO    She enjoys reading but it is difficulty with her vision. She has her specific shows she enjoys. She stays active at [employer] with many of the activities.     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 Shortness of breath when lawn is just cut.     Smoke. Other (See Comments)     Hard to breathe.     Pollen Extract      Other reaction(s): Unknown      PAST MEDICAL HISTORY:   Past Medical History:   Diagnosis Date     Asthma      Bilateral carpal tunnel syndrome 08/27/2015     CAD (coronary artery disease)      Chronic airway obstruction, not elsewhere classified     Created by Conversion      Chronic anemia      Chronic edema      Congestive heart failure, severe (H) 05/13/2020     COPD (chronic obstructive pulmonary disease) (H)      Coronary artery disease      Depression      GERD (gastroesophageal reflux disease)      Heart disease      Heart murmur      High cholesterol     dislipidemia     HTN (hypertension)      Hypercholesterolemia      Hypertension      Hypertensive emergency 08/13/2021     Hypothyroidism      Hypothyroidism      Malignant neoplasm " of 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     MI (myocardial infarction) (H) 1985     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     Myelodysplastic syndrome (H)      Myocardial infarction (H) 1983     OLEGARIO (obstructive sleep apnea)      Osteoporosis      Other and unspecified hyperlipidemia     Created by Conversion      Pyelonephritis 05/11/2016     Radicular low back pain      Rheumatoid arthritis (H)      Elizabeth Agers syndrome      Sjoegren syndrome      Sjogren's syndrome (H24)      Sleep apnea, obstructive      Spinal stenosis      Squamous cell carcinoma, tongue border (H)      Unspecified polyarthropathy or polyarthritis, hand     Created by Conversion       PAST SURGICAL HISTORY:   has a past surgical history that includes cardiac angiogram; back surgery (2004); TOTAL KNEE ARTHROPLASTY (Right); appendectomy; breast biopsy; Kyphoplasty (2003); Eye surgery (2008); Cardiac surgery; CABG, ARTERY-VEIN, FOUR; Repair valve aortic (2009); Breast surgery (2001); Thoracic surgery (2003); bone marrow biopsy (2004); aortic valve; Rectosigmoidectomy perineal (N/A, 01/10/2018); CABG, VEIN, SINGLE; REPLACE AORT VALV,PROSTH VALV; appendectomy; Eye surgery (Bilateral); back surgery; Cardiac catheterization; aortic valve replacement (01/01/2012); Bypass graft artery coronary (01/01/2009); other surgical history (01/01/2018); Open reduction internal fixation hip nailing (Right, 08/13/2021); and Partial Glossectomy (Right, 09/2023).  FAMILY HISTORY: family history includes Cancer in her brother, mother, and sister; Family History Negative in her mother; Heart Disease in her father.  SOCIAL HISTORY:   reports that she has never smoked. She has never used smokeless tobacco. She reports that she does not drink alcohol and does not use drugs.  Patient's living condition: lives in an assisted living facility    Post Discharge Medication Reconciliation Status:   MED REC REQUIRED  Post Medication Reconciliation Status: discharge medications  reconciled and changed, per note/orders       Current Outpatient Medications   Medication Sig     acetaminophen (TYLENOL) 325 MG tablet Take 3 tablets (975 mg) by mouth every 8 hours as needed for mild pain     albuterol (PROAIR HFA/PROVENTIL HFA/VENTOLIN HFA) 108 (90 Base) MCG/ACT inhaler Inhale 2 puffs into the lungs 4 times daily     amoxicillin (AMOXIL) 500 MG capsule 4 CAPSULES (2000MG ) ORALLY AS NEEDED ONE HOUR PRIOR TO DENTAL APPOINTMENT     ANTACID REGULAR STRENGTH 500 MG chewable tablet 2 TABLETS (1000MG) ORALLY 2 TIMES DAILY AS NEEDED FOR HEARTBURN     apixaban ANTICOAGULANT (ELIQUIS) 2.5 MG tablet Take 1 tablet (2.5 mg) by mouth 2 times daily     Ascorbic Acid (VITAMIN C PO) Take 500 mg by mouth every morning     ASPIRIN LOW DOSE 81 MG EC tablet 1 TABLET ORALLY 2 TIMES DAILY (DX: PROPHYLAXIS)     atorvastatin (LIPITOR) 40 MG tablet Take 1 tablet (40 mg) by mouth daily     Bacillus Coagulans-Inulin (PROBIOTIC FORMULA) 1-250 BILLION-MG CAPS 1 CAPSULE ORALLY DAILY     bisacodyl (DULCOLAX) 5 MG EC tablet 1 TABLET ORALLY DAILY AS NEEDED (MAY KEEP AT BEDSIDE) (Patient taking differently: 5 mg 2 times daily as needed)     chlorhexidine (PERIDEX) 0.12 % solution Swish and spit 15 mLs in mouth 2 times daily May self administer     cholecalciferol (VITAMIN D3) 125 mcg (5000 units) capsule 1 CAPSULE ORALLY DAILY     COMBIVENT RESPIMAT  MCG/ACT inhaler INHALE 1 PUFF INTO THE LUNGS  4 TIMES DAILY     cycloSPORINE (RESTASIS) 0.05 % ophthalmic emulsion Place 1 drop into both eyes 2 times daily      demeclocycline (DECLOMYCIN) 150 MG tablet 1 TABLET ORALLY 2 TIMES DAILY     fish oil-omega-3 fatty acids 1000 MG capsule Take 2 capsules (2 g) by mouth daily (Patient taking differently: Take 1 g by mouth daily)     fluorometholone (FML LIQUIFILM) 0.1 % ophthalmic susp Place 1 drop Into the left eye daily      hydrocortisone 1 % CREA cream Place rectally 2 times daily as needed for itching     IBANDRONATE SODIUM PO  Take 150 mg by mouth every 30 days     levothyroxine (SYNTHROID/LEVOTHROID) 88 MCG tablet 1 TABLET ORALLY EVERY MORNING ON AN EMPTY STOMACH     loratadine (CLARITIN) 10 MG tablet 1 TABLET ORALLY DAILY (DX: ASTHMA)     melatonin 3 MG tablet 1 TABLET ORALLY AT BEDTIME ALONG WITH 5 MG FOR A TOTAL DOSE OF 8MG     melatonin 5 MG tablet 1 TABLET ORALLY AT BEDTIME  ALONG WITH 3MG FOR A TOTAL DOSE OF 8MG     methylPREDNISolone (MEDROL) 4 MG tablet Take 1 tablet (4 mg) by mouth daily (with breakfast) for 4 days     methylPREDNISolone (MEDROL) 4 MG tablet Take 1 tablet (4 mg) by mouth daily (with lunch) for 2 days     methylPREDNISolone (MEDROL) 4 MG tablet Take 1 tablet (4 mg) by mouth daily for 3 days     metoprolol tartrate (LOPRESSOR) 25 MG tablet 1 TABLET ORALLY 2 TIMES DAILY (DX: HYPERTENSION)     mirtazapine (REMERON) 7.5 MG tablet 1 TABLET ORALLY AT BEDTIME     Multiple Vitamins-Minerals (PRESERVISION AREDS 2) CAPS 1 CAPSULE ORALLY 2 TIMES DAILY     omeprazole (PRILOSEC) 20 MG DR capsule 1 CAPSULE ORALLY 2 TIMES DAILY (DX: GASTROESOPHAGEAL REFLUX DISEASE)     OXYGEN-HELIUM IN      polyethylene glycol 0.4%- propylene glycol 0.3% (SYSTANE ULTRA) 0.4-0.3 % SOLN ophthalmic solution Place 1 drop into both eyes 4 times daily     Polyethylene Glycol 3350 (PEG 3350) 17 GM/SCOOP POWD MIX 1 CAPFUL (17 GMS) IN 8 OUNCES WATER AND DRINK ORALLY DAILY (DX: CONSTIPATION)     Probiotic Product (PROBIOTIC PO) Take 1 capsule by mouth every morning     SENEXON-S 8.6-50 MG tablet 1 TABLET ORALLY 2 TIMES DAILY (DX: CONSTIPATION)     sodium chloride 1 GM tablet Take 1 tablet (1 g) by mouth daily     spironolactone (ALDACTONE) 25 MG tablet 1 TABLET ORALLY DAILY (DX: EDEMA) ** HAZARDOUS MED: WEAR DOUBLE NITRILE GLOVES**     timolol (TIMOPTIC) 0.5 % ophthalmic solution Place 1 drop Into the left eye daily      torsemide (DEMADEX) 20 MG tablet 1.5 TABLET (30mg) ORALLY DAILY     vitamin C (ASCORBIC ACID) 500 MG tablet 1 TABLET ORALLY DAILY (DX:  "SUPPLEMENT)     No current facility-administered medications for this visit.     Vitals:  BP (!) 140/80   Pulse 74   Temp 98  F (36.7  C)   Resp 18   Ht 1.6 m (5' 3\")   Wt 43.7 kg (96 lb 6.4 oz)   SpO2 99%   BMI 17.08 kg/m    Exam:    GENERAL APPEARANCE: Seated comfortably, NAD  HENT:  Moderate temporal atrophy, severely [place] (typically wears hearing aids but not with her), poor denitition  EYES:  Conjunctiva clear, anicteric, EOMI, PERRL  PULM  Normal WOB on RA, lungs CTAB, no wheezes or crackles  CV:  Irreguarl, S1/S2 normal, no murmurs; no LE edema  ABDOMEN: Abdomen soft, not tender, not distended, BS normal and active throughout   M/S:  Generalized atrophy, bilateral arthritic changes of th ehands  SKIN: Dystrophic finger nails  NEURO: Alert, answering questions appropriately, normal thought processes; CN II-XII grossly intact, mild dysarthria but no difficulty with understanding her.  5/5 strength in distal and proximal extremities throughout except 4/5 in  strength bilaterally, 3/5 in Right hip flexion.   PSYCH: Mood normal with congruent affect, insight/judgment intact    Lab/Diagnostic data:  Recent labs and imaging in [facility] reviewed by me today.     ASSESSMENT/PLAN:    Medical conditions    (I63.411) Cerebrovascular accident (CVA) due to embolism of right middle cerebral artery (H)  (primary encounter diagnosis)  Comment: Primary reason for admission with findings of significant M2 embolic stroke.  Underwent thrombolysis with significant movement of symptoms.  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  Has had slow improvement of symptoms with therapy and steroids.  Likely additional weakness " particular right upper extremity related to arthritis and possibly CTS.  Plan:   -Complete Medrol Dosepak  -PT/OT    (I50.32) Chronic heart failure with preserved ejection fraction (H)  Comment: Euvolemic, no evidence of decompensation.  No shortness of breath.  Plan:   -Continue torsemide, spironolactone  -BMP this week    (I48.20) Chronic atrial fibrillation (H)  Comment: Rate controlled.  Previously was not on anticoagulation due to bleeding risk concerns.  However in context of stroke, start apixaban 2.5 mg twice daily.  Does continue on daily aspirin as well due to history of AVR.  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  Negative margins.  Likely contributing to ongoing dysarthria.  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  Continues on sodium tablets and demeclocycline.  Sodium is overall stable  Plan:   -BMP    Geriatrics Syndromes    (R54) Frailty  (E44.0) Moderate protein-calorie malnutrition (H24)  Comment: Consistent with generalized atrophy consistent with malnutrition, overall weight loss in the past year, and functional assessment.  Consideration for ongoing cares and discharge planning  Plan  -TSH    (Z74.09,  Z78.9) Impaired mobility and ADLs  Comment: Multifactorial related to stroke and possible residual effects, myelopathy, arthritis, and hospital deconditioning.  Progressing well with therapies.    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  Contributing to impaired mobility and function.  Plan:   -Continue scheduled acetaminophen  -Consider Voltaren gel    (M81.0) Age-related osteoporosis without current pathological fracture  (Z87.81) Status post closed  fracture of right femur  Comment: Sustained in 2021 consistent with osteoporosis.  Continues on monthly ibandronate.  Can continue here.  Plan:   -Close monitoring considering oral health    (Z79.899) Polypharmacy  Comment: Significant polypharmacy related to overlapping conditions, multiple eyedrops  Plan:   -Reduce as able        SCC of the tongue  Weight loss  Frailty  5/5 strength throughout except 3/5 right hip flexion  No drift  Essentially edentulous of bottom teeth  Healed left lateral tongue scar  Mild dysarthrita    Orders:  [x] BMP, TSH   [x] Decrease Vit D to 1000 units daily from 5000u/d    Electronically signed by:    Benjamin Rosenstein, MD, MA  [facility]     This note was completed with the assistance of dictation software. Typos and word substitution-errors are expected and unintended.      93 MINUTES SPENT BY ME on the date of service doing chart review, history, exam, documentation & further activities per the note.          Sincerely,        Benjamin Rosenstein, MD

## 2024-03-11 NOTE — Clinical Note
Hi Gabrielle - just letting you know we're seeing Georgia here at [facility] after a stroke. Julia and I are happy to hear your insights.   Thank you Ben Rosenstein

## 2024-03-12 ENCOUNTER — LAB REQUISITION (OUTPATIENT)
Dept: LAB | Facility: CLINIC | Age: 89
End: 2024-03-12
Payer: COMMERCIAL

## 2024-03-12 DIAGNOSIS — E87.1 HYPO-OSMOLALITY AND HYPONATREMIA: ICD-10-CM

## 2024-03-13 LAB
ALBUMIN SERPL BCG-MCNC: 3.8 G/DL (ref 3.5–5.2)
ALP SERPL-CCNC: 96 U/L (ref 40–150)
ALT SERPL W P-5'-P-CCNC: 37 U/L (ref 0–50)
ANION GAP SERPL CALCULATED.3IONS-SCNC: 12 MMOL/L (ref 7–15)
AST SERPL W P-5'-P-CCNC: 39 U/L (ref 0–45)
BILIRUB SERPL-MCNC: 0.7 MG/DL
BUN SERPL-MCNC: 37.4 MG/DL (ref 8–23)
CALCIUM SERPL-MCNC: 9.1 MG/DL (ref 8.2–9.6)
CHLORIDE SERPL-SCNC: 97 MMOL/L (ref 98–107)
CREAT SERPL-MCNC: 0.99 MG/DL (ref 0.51–0.95)
DEPRECATED HCO3 PLAS-SCNC: 25 MMOL/L (ref 22–29)
EGFRCR SERPLBLD CKD-EPI 2021: 53 ML/MIN/1.73M2
GLUCOSE SERPL-MCNC: 119 MG/DL (ref 70–99)
POTASSIUM SERPL-SCNC: 3.9 MMOL/L (ref 3.4–5.3)
PROT SERPL-MCNC: 7.3 G/DL (ref 6.4–8.3)
SODIUM SERPL-SCNC: 134 MMOL/L (ref 135–145)
TSH SERPL DL<=0.005 MIU/L-ACNC: 3.29 UIU/ML (ref 0.3–4.2)

## 2024-03-13 PROCEDURE — 36415 COLL VENOUS BLD VENIPUNCTURE: CPT | Mod: ORL | Performed by: FAMILY MEDICINE

## 2024-03-13 PROCEDURE — 84443 ASSAY THYROID STIM HORMONE: CPT | Mod: ORL | Performed by: FAMILY MEDICINE

## 2024-03-13 PROCEDURE — P9604 ONE-WAY ALLOW PRORATED TRIP: HCPCS | Mod: ORL | Performed by: FAMILY MEDICINE

## 2024-03-13 PROCEDURE — 80053 COMPREHEN METABOLIC PANEL: CPT | Mod: ORL | Performed by: FAMILY MEDICINE

## 2024-03-14 ENCOUNTER — TELEPHONE (OUTPATIENT)
Dept: GERIATRICS | Facility: CLINIC | Age: 89
End: 2024-03-14
Payer: COMMERCIAL

## 2024-03-14 ENCOUNTER — LAB REQUISITION (OUTPATIENT)
Dept: LAB | Facility: CLINIC | Age: 89
End: 2024-03-14
Payer: COMMERCIAL

## 2024-03-14 DIAGNOSIS — E87.1 HYPO-OSMOLALITY AND HYPONATREMIA: ICD-10-CM

## 2024-03-14 PROCEDURE — P9603 ONE-WAY ALLOW PRORATED MILES: HCPCS | Mod: ORL | Performed by: FAMILY MEDICINE

## 2024-03-14 PROCEDURE — 84443 ASSAY THYROID STIM HORMONE: CPT | Mod: ORL | Performed by: FAMILY MEDICINE

## 2024-03-14 PROCEDURE — 80048 BASIC METABOLIC PNL TOTAL CA: CPT | Mod: ORL | Performed by: FAMILY MEDICINE

## 2024-03-14 PROCEDURE — 36415 COLL VENOUS BLD VENIPUNCTURE: CPT | Mod: ORL | Performed by: FAMILY MEDICINE

## 2024-03-14 RX ORDER — LEVOTHYROXINE SODIUM 75 UG/1
75 TABLET ORAL DAILY
COMMUNITY
Start: 2024-03-14 | End: 2024-03-27

## 2024-03-14 NOTE — TELEPHONE ENCOUNTER
[facility] Geriatrics Lab Note     Provider: Rosenstein, Benjamin MD  Facility: [facility]  Facility Type:  TCU    Allergies   Allergen Reactions    Gramineae Pollens Other (See Comments)     ORCHARDGRASS. Shortness of breath when lawn is just cut.    Smoke. Other (See Comments)     Hard to breathe.    Pollen Extract      Other reaction(s): Unknown       Labs Reviewed by provider: CMP, TSH---from 3/13/24     Verbal Order/Direction given by Provider: -Repeat BMP next week for Hyponatremia (monitoring)   -Decrease levothyroxine to 75mcg, repeat TSH in 8 weeks(5/8/24)     Provider giving Order:  Rosenstein, Benjamin MD    Verbal Order given to: Noni Gary RN

## 2024-03-15 ENCOUNTER — TELEPHONE (OUTPATIENT)
Dept: GERIATRICS | Facility: CLINIC | Age: 89
End: 2024-03-15
Payer: COMMERCIAL

## 2024-03-15 LAB
ANION GAP SERPL CALCULATED.3IONS-SCNC: 14 MMOL/L (ref 7–15)
BUN SERPL-MCNC: 36.4 MG/DL (ref 8–23)
CALCIUM SERPL-MCNC: 9 MG/DL (ref 8.2–9.6)
CHLORIDE SERPL-SCNC: 97 MMOL/L (ref 98–107)
CREAT SERPL-MCNC: 1.1 MG/DL (ref 0.51–0.95)
DEPRECATED HCO3 PLAS-SCNC: 23 MMOL/L (ref 22–29)
EGFRCR SERPLBLD CKD-EPI 2021: 46 ML/MIN/1.73M2
GLUCOSE SERPL-MCNC: 129 MG/DL (ref 70–99)
POTASSIUM SERPL-SCNC: 4.3 MMOL/L (ref 3.4–5.3)
SODIUM SERPL-SCNC: 134 MMOL/L (ref 135–145)
TSH SERPL DL<=0.005 MIU/L-ACNC: 2.64 UIU/ML (ref 0.3–4.2)

## 2024-03-15 NOTE — TELEPHONE ENCOUNTER
[facility] Geriatrics Triage Nurse Telephone Encounter    Provider: CASTRO Montiel CNP  Facility: [facility]  Facility Type:  TCU    Caller: Nurse  Call Back Number:     Allergies:    Allergies   Allergen Reactions    Gramineae Pollens Other (See Comments)     ORCHARDGRASS. Shortness of breath when lawn is just cut.    Smoke. Other (See Comments)     Hard to breathe.    Pollen Extract      Other reaction(s): Unknown        Reason for call: Nurse called to report that patient has had loose stools for the past 2 days.  Patient had 3 yesterday and now 2 already this morning.  Amount is quite a lot per staff report.  Patient is on declomycin BID.  Nurse did hold patient's stool softeners today.      Verbal Order/Direction given by Provider: Check stool sample for C-Diff before ordering any Imodium.      Provider giving Order:  CASTRO Montiel CNP    Verbal Order given to: Nurse    Priscilla Stanford RN

## 2024-03-18 ENCOUNTER — DISCHARGE SUMMARY NURSING HOME (OUTPATIENT)
Dept: GERIATRICS | Facility: CLINIC | Age: 89
End: 2024-03-18
Payer: COMMERCIAL

## 2024-03-18 VITALS
DIASTOLIC BLOOD PRESSURE: 49 MMHG | RESPIRATION RATE: 20 BRPM | WEIGHT: 93.4 LBS | BODY MASS INDEX: 17.19 KG/M2 | SYSTOLIC BLOOD PRESSURE: 91 MMHG | HEART RATE: 79 BPM | TEMPERATURE: 97.7 F | HEIGHT: 62 IN | OXYGEN SATURATION: 97 %

## 2024-03-18 DIAGNOSIS — E87.1 CHRONIC HYPONATREMIA: ICD-10-CM

## 2024-03-18 DIAGNOSIS — I10 PRIMARY HYPERTENSION: ICD-10-CM

## 2024-03-18 DIAGNOSIS — I50.32 CHRONIC HEART FAILURE WITH PRESERVED EJECTION FRACTION (H): ICD-10-CM

## 2024-03-18 DIAGNOSIS — I63.411 CEREBROVASCULAR ACCIDENT (CVA) DUE TO EMBOLISM OF RIGHT MIDDLE CEREBRAL ARTERY (H): Primary | ICD-10-CM

## 2024-03-18 DIAGNOSIS — I48.20 CHRONIC ATRIAL FIBRILLATION (H): ICD-10-CM

## 2024-03-18 DIAGNOSIS — M15.0 PRIMARY OSTEOARTHRITIS INVOLVING MULTIPLE JOINTS: ICD-10-CM

## 2024-03-18 DIAGNOSIS — Z78.9 IMPAIRED MOBILITY AND ADLS: ICD-10-CM

## 2024-03-18 DIAGNOSIS — R53.81 PHYSICAL DECONDITIONING: ICD-10-CM

## 2024-03-18 DIAGNOSIS — M81.0 AGE-RELATED OSTEOPOROSIS WITHOUT CURRENT PATHOLOGICAL FRACTURE: ICD-10-CM

## 2024-03-18 DIAGNOSIS — Z74.09 IMPAIRED MOBILITY AND ADLS: ICD-10-CM

## 2024-03-18 DIAGNOSIS — Z87.81 STATUS POST CLOSED FRACTURE OF RIGHT FEMUR: ICD-10-CM

## 2024-03-18 DIAGNOSIS — C02.9 SQUAMOUS CELL CANCER OF TONGUE (H): ICD-10-CM

## 2024-03-18 DIAGNOSIS — M47.816 SPONDYLOSIS OF LUMBAR REGION WITHOUT MYELOPATHY OR RADICULOPATHY: ICD-10-CM

## 2024-03-18 DIAGNOSIS — Z98.890 S/P PARTIAL GLOSSECTOMY: ICD-10-CM

## 2024-03-18 PROCEDURE — 99316 NF DSCHRG MGMT 30 MIN+: CPT

## 2024-03-18 NOTE — PROGRESS NOTES
[facility] GERIATRICS DISCHARGE SUMMARY  PATIENT'S NAME: Marla Dunn  YOB: 1929  MEDICAL RECORD NUMBER:  [id number]  Place of Service where encounter took place:  [facility] (SNF) [[id number]]    PRIMARY CARE PROVIDER AND CLINIC RESPONSIBLE AFTER TRANSFER:   CASTRO Hazel CNP, [address] [facility] / [facility] [address]     Transferring providers: Julia Holland DNP, APRN; Benjamin Rosenstein, MD  Recent Hospitalization/ED:  [facility] stay 3/1/24 to 3/8/24.  Date of SNF Admission: 2024  Date of SNF (anticipated) Discharge: 2024  Discharged to: previous assisted living  Cognitive Scores: Short blessed:   Physical Function: TUG 42 secs  DME: Hospital Bed    CODE STATUS/ADVANCE DIRECTIVES DISCUSSION:  Prior DNR/DNI   ALLERGIES: Gramineae pollens, Smoke., and Pollen extract    NURSING FACILITY COURSE   Medication Changes/Rationale:   Decrease torsemide to 20 mg daily- patient is euvolemic and BP trending on the lower end  Hold spironolactone for SBP <110 mmHg    Summary of nursing facility stay:   HPI information obtained from: facility chart records, facility staff, patient report and [facility] chart review. Marla Dunn is a 89 year old ( 1929), with PMH: CAD s/p CABG and AVR (), afib previously not on AC, HFpEF, osteoporosis with prior right hip fracture who was admitted to [facility] due to neurologic sx's including garbled speech and left-sided deficits. Initial NIH score 16. CT/CTA showed new chronic infarct of the lateral aspect of the left cerebral hemisphere and occulusion of distal M2 of the right middle cerebral artery without distal flow. MRI showed hyperacute infarction. She underwent TNK thrombolysis with improvement in NIH score. Cardiology consulted; started on AC with apixaban 2.5 mg BID. During her stay, there was concern for new RLE weakness. Repeat hematocrit  "found no evidence of hemorrhagic transformation. MRI of the brain did not show concern for new/worsening stroke. MRI spine showed multi-level stenosis (L1-2 and L5/S1). Started on medrol dose pack. NSG consulted; no surgical intervention indicated. Discharged to this facility for medical management, rehabilitation, and nursing cares.    Ms. Dunn is seen today in her room for a visit. She sitting in her wheelchair is watching television. Patient notes she is leaving soon. Endorses pain that is tolerable; current regimen has been effective. Reports \"sore\" in her bottom area. Appetite is good but needs food to be minced due to poor dentition. No loose stools today. Sleeping well. Denies CP, palpitations, lightheadedness, dizziness, fatigue, SOB, fever, chills, nausea/vomiting, bladder or bowel concerns today.    Assessment/Plan:     Cerebrovascular accident (CVA) due to embolism of right middle cerebral artery (H)  Underwent thrombolysis with improved NIH score. Started on apixaban in hospital. No neurological noted changes today.   -continue apixaban 2.5 mg BID  -continue atorvastatin 40 mg daily  -follow up with cardiology as scheduled    Spondylosis of lumbar region without myelopathy or radiculopathy  MRI findings suggest arthritic changes. Started on Medrol Dosepak during hospital stay, completed on 3/12/24.  -continue PT/OT    Chronic heart failure with preserved ejection fraction (H)  Primary hypertension  Chronic atrial fibrillation (H)  Echo on 3/1/24 finds LVEF 65%. Euvolemic, no peripheral edema. Patient's BP running low during TCU stay; 91/49 mmHg today. HR 78 bpm.   -continue metoprolol 25 mg BID  -decrease torsemide 30 mg daily to 20 mg daily  -continue spironolactone 25 mg daily, HOLD for SBP <110 mmHg  -continue apixaban as above  -continue ASA 81 mg daily  -follow weights  -monitor volume status  -follow-up with cardiology as scheduled    Squamous cell cancer of tongue (H)  S/P partial " glossectomy  Partial right glossectomy on 9/2023  -follow-up with surgery as recommended    Chronic hyponatremia  Na 134 (3/14/24). Asymptomatic today.  -encourage salty food intake  -trend labs periodically    Primary osteoarthritis involving multiple joints  OA of hands, spine, and likely hips. Contributes to impaired mobility and function.  -continue APAP 975 mg q8h PRN  -continue voltaren gel  -monitor effectiveness    Age-related osteoporosis without current pathological fracture  Status post closed fracture of right femur  R femur fracture in 2021. In TCU, Vitamin D was decreased from 5000U/day to 1000U/day on 3/11/24.  -continue ibandronate 150 mg monthly (last received on 3/15/24)  -continue vit D    Impaired mobility and ADLs  Physical deconditioning  Secondary to diagnoses above. In TCU for rehabilitation.   -plan to discharge with home health     Discharge Medications:  MED REC REQUIRED  Post Medication Reconciliation Status: discharge medications reconciled and changed, per note/orders       Current Outpatient Medications   Medication Sig Dispense Refill    acetaminophen (TYLENOL) 325 MG tablet Take 3 tablets (975 mg) by mouth every 8 hours as needed for mild pain      albuterol (PROAIR HFA/PROVENTIL HFA/VENTOLIN HFA) 108 (90 Base) MCG/ACT inhaler Inhale 2 puffs into the lungs 4 times daily      amoxicillin (AMOXIL) 500 MG capsule 4 CAPSULES (2000MG ) ORALLY AS NEEDED ONE HOUR PRIOR TO DENTAL APPOINTMENT 4 capsule 5    ANTACID REGULAR STRENGTH 500 MG chewable tablet 2 TABLETS (1000MG) ORALLY 2 TIMES DAILY AS NEEDED FOR HEARTBURN 60 tablet 11    apixaban ANTICOAGULANT (ELIQUIS) 2.5 MG tablet Take 1 tablet (2.5 mg) by mouth 2 times daily      Ascorbic Acid (VITAMIN C PO) Take 500 mg by mouth every morning      ASPIRIN LOW DOSE 81 MG EC tablet 1 TABLET ORALLY 2 TIMES DAILY (DX: PROPHYLAXIS) 180 tablet 3    atorvastatin (LIPITOR) 40 MG tablet Take 1 tablet (40 mg) by mouth daily      Bacillus  Coagulans-Inulin (PROBIOTIC FORMULA) 1-250 BILLION-MG CAPS 1 CAPSULE ORALLY DAILY 31 capsule 11    bisacodyl (DULCOLAX) 5 MG EC tablet 1 TABLET ORALLY DAILY AS NEEDED (MAY KEEP AT BEDSIDE) (Patient taking differently: 5 mg 2 times daily as needed) 30 tablet 10    chlorhexidine (PERIDEX) 0.12 % solution Swish and spit 15 mLs in mouth 2 times daily May self administer 118 mL 3    cholecalciferol (VITAMIN D3) 125 mcg (5000 units) capsule 1 CAPSULE ORALLY DAILY 90 capsule 3    COMBIVENT RESPIMAT  MCG/ACT inhaler INHALE 1 PUFF INTO THE LUNGS  4 TIMES DAILY 4 g 11    cycloSPORINE (RESTASIS) 0.05 % ophthalmic emulsion Place 1 drop into both eyes 2 times daily       demeclocycline (DECLOMYCIN) 150 MG tablet 1 TABLET ORALLY 2 TIMES DAILY 60 tablet 11    fish oil-omega-3 fatty acids 1000 MG capsule Take 2 capsules (2 g) by mouth daily (Patient taking differently: Take 1 g by mouth daily) 28 capsule 11    fluorometholone (FML LIQUIFILM) 0.1 % ophthalmic susp Place 1 drop Into the left eye daily       hydrocortisone 1 % CREA cream Place rectally 2 times daily as needed for itching      IBANDRONATE SODIUM PO Take 150 mg by mouth every 30 days      levothyroxine (SYNTHROID/LEVOTHROID) 75 MCG tablet Take 75 mcg by mouth daily      loratadine (CLARITIN) 10 MG tablet 1 TABLET ORALLY DAILY (DX: ASTHMA) 28 tablet 11    melatonin 3 MG tablet 1 TABLET ORALLY AT BEDTIME ALONG WITH 5 MG FOR A TOTAL DOSE OF 8MG 30 tablet 11    melatonin 5 MG tablet 1 TABLET ORALLY AT BEDTIME  ALONG WITH 3MG FOR A TOTAL DOSE OF 8MG 30 tablet 11    metoprolol tartrate (LOPRESSOR) 25 MG tablet 1 TABLET ORALLY 2 TIMES DAILY (DX: HYPERTENSION) 56 tablet 11    mirtazapine (REMERON) 7.5 MG tablet 1 TABLET ORALLY AT BEDTIME 31 tablet 11    Multiple Vitamins-Minerals (PRESERVISION AREDS 2) CAPS 1 CAPSULE ORALLY 2 TIMES DAILY 62 capsule 11    omeprazole (PRILOSEC) 20 MG DR capsule 1 CAPSULE ORALLY 2 TIMES DAILY (DX: GASTROESOPHAGEAL REFLUX DISEASE) 62 capsule  11    OXYGEN-HELIUM IN       polyethylene glycol 0.4%- propylene glycol 0.3% (SYSTANE ULTRA) 0.4-0.3 % SOLN ophthalmic solution Place 1 drop into both eyes 4 times daily      Polyethylene Glycol 3350 (PEG 3350) 17 GM/SCOOP POWD MIX 1 CAPFUL (17 GMS) IN 8 OUNCES WATER AND DRINK ORALLY DAILY (DX: CONSTIPATION) 510 g 11    Probiotic Product (PROBIOTIC PO) Take 1 capsule by mouth every morning      SENEXON-S 8.6-50 MG tablet 1 TABLET ORALLY 2 TIMES DAILY (DX: CONSTIPATION) 56 tablet 11    sodium chloride 1 GM tablet Take 1 tablet (1 g) by mouth daily 30 tablet 11    spironolactone (ALDACTONE) 25 MG tablet 1 TABLET ORALLY DAILY (DX: EDEMA) ** HAZARDOUS MED: WEAR DOUBLE NITRILE GLOVES** (Patient taking differently: Take 25 mg by mouth daily Hold for SBP <110mmHg) 28 tablet 11    timolol (TIMOPTIC) 0.5 % ophthalmic solution Place 1 drop Into the left eye daily       torsemide (DEMADEX) 20 MG tablet 1.5 TABLET (30mg) ORALLY DAILY (Patient taking differently: Take 20 mg by mouth daily) 31 tablet 11    vitamin C (ASCORBIC ACID) 500 MG tablet 1 TABLET ORALLY DAILY (DX: SUPPLEMENT) 31 tablet 11        Controlled medications:   not applicable/none     Past Medical History:   Past Medical History:   Diagnosis Date    Asthma     Bilateral carpal tunnel syndrome 08/27/2015    CAD (coronary artery disease)     Chronic airway obstruction, not elsewhere classified     Created by Conversion     Chronic anemia     Chronic edema     Congestive heart failure, severe (H) 05/13/2020    COPD (chronic obstructive pulmonary disease) (H)     Coronary artery disease     Depression     GERD (gastroesophageal reflux disease)     Heart disease     Heart murmur     High cholesterol     dislipidemia    HTN (hypertension)     Hypercholesterolemia     Hypertension     Hypertensive emergency 08/13/2021    Hypothyroidism     Hypothyroidism     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    MI (myocardial infarction) (H) 1985    Myelodysplasia present in  "bone marrow (H) 11/23/2020    Myelodysplastic syndrome (H)     Myocardial infarction (H) 1983    OLEGARIO (obstructive sleep apnea)     Osteoporosis     Other and unspecified hyperlipidemia     Created by Conversion     Pyelonephritis 05/11/2016    Radicular low back pain     Rheumatoid arthritis (H)     Elizabeth Agers syndrome     Sjoegren syndrome     Sjogren's syndrome (H24)     Sleep apnea, obstructive     Spinal stenosis     Squamous cell carcinoma, tongue border (H)     Unspecified polyarthropathy or polyarthritis, hand     Created by Conversion      Physical Exam:   Vitals: BP 91/49   Pulse 79   Temp 97.7  F (36.5  C)   Resp 20   Ht 1.575 m (5' 2\")   Wt 42.4 kg (93 lb 6.4 oz)   SpO2 97%   BMI 17.08 kg/m    BMI: Body mass index is 17.08 kg/m .  GENERAL APPEARANCE:  Alert, elderly, in no distress, sitting in wheelchair  HEENT:  atraumatic, [place], EOM intact, wears eyeglasses, moist mucus membranes  RESP:  non-labored breathing, lungs clear on auscultation, no respiratory distress, no cough  CV:  Rate regular, S1 S2 noted, no edema  ABDOMEN:  soft, non-distended, non-tender, bowel sounds active  M/S:   wheelchair bound, moves all extremities, strength and tone equal bilaterally, no calf pain, arthritic changes noted in hands  SKIN:  warm, dry, thin, fragile; pressure ulcer on coccyx covered with bandage, no drainage noted  NEURO:   Face is symmetric, examination of sensation by touch normal, follows and tracks, slow speech  PSYCH:  calm, cooperative      SNF labs: Labs reviewed as per [facility] and/or Care Everywhere.      DISCHARGE PLAN:  Follow up labs: follow BMP for hyponatremia monitoring  Medical Follow Up:      Follow up with primary care provider in 5-7 days  Follow up with cardiology as scheduled   [facility] scheduled appointments:  Appointments in Next Year      Apr 17, 2024 11:20 AM  (Arrive by 11:05 AM)  Return Cardiology with Julia Amaya MD  [facility] ([facility] " [facility] ) [phone]           Discharge Services: Home Care:  Occupational Therapy, Physical Therapy, Speech Therapy , and Hospital Bed    The patient does requirepositioning of the body in ways not feasible with an ordinary bed due to the medical conditions: osteoarthritis and lumbar spondylosis that is expected to last more than 1 month.    The patient requires, for the alleviation of pain, positioning of the body in ways not feasible with an ordinary bed.    The patient requires a bed height different than a fixed height hospital bed to permit transfers to chair, wheelchair, orstanding position.     The patient requires frequent changes in body position and does have an immediate need for change in body position.    My patient requires body positioning not feasible in an ordinary bed to alleviate pain from osteoarthritis and lumbar spondylosis, requires head of bed to be elevated more than 30 degrees and frequent changes in body position due to osteoarthritis and spondylosis as well as to facilitate transfers from bed to chair.      TOTAL DISCHARGE TIME:   Greater than 30 minutes  Electronically signed by:  Dr. Julia Holland, DNP, APRN, AGNP-BC

## 2024-03-18 NOTE — LETTER
3/18/2024        RE: Marla Dunn  C/o Deneen Dunn  [address] [facility] [address]        [facility] GERIATRICS DISCHARGE SUMMARY  PATIENT'S NAME: Marla Dunn  YOB: 1929  MEDICAL RECORD NUMBER:  [id number]  Place of Service where encounter took place:  [facility] (SNF) [[id number]]    PRIMARY CARE PROVIDER AND CLINIC RESPONSIBLE AFTER TRANSFER:   Gabrielle Wallace APRN CNP, [address] [facility] [address] [facility] [address]     Transferring providers: Julia Holland DNP, APRN; Benjamin Rosenstein, MD  Recent Hospitalization/ED:  [facility] stay 3/1/24 to 3/8/24.  Date of SNF Admission: 2024  Date of SNF (anticipated) Discharge: 2024  Discharged to: previous assisted living  Cognitive Scores: Short blessed:   Physical Function: TUG 42 secs  DME: Hospital Bed    CODE STATUS/ADVANCE DIRECTIVES DISCUSSION:  Prior DNR/DNI   ALLERGIES: Gramineae pollens, Smoke., and Pollen extract    NURSING FACILITY COURSE   Medication Changes/Rationale:   Decrease torsemide to 20 mg daily- patient is euvolemic and BP trending on the lower end  Hold spironolactone for SBP <110 mmHg    Summary of nursing facility stay:   HPI information obtained from: facility chart records, facility staff, patient report and [facility] chart review. Marla Dunn is a 89 year old ( 1929), with PMH: CAD s/p CABG and AVR (), afib previously not on AC, HFpEF, osteoporosis with prior right hip fracture who was admitted to [facility] due to neurologic sx's including garbled speech and left-sided deficits. Initial NIH score 16. CT/CTA showed new chronic infarct of the lateral aspect of the left cerebral hemisphere and occulusion of distal M2 of the right middle cerebral artery without distal flow. MRI showed hyperacute infarction. She underwent TNK thrombolysis with improvement in NIH score. Cardiology consulted;  "started on AC with apixaban 2.5 mg BID. During her stay, there was concern for new RLE weakness. Repeat hematocrit found no evidence of hemorrhagic transformation. MRI of the brain did not show concern for new/worsening stroke. MRI spine showed multi-level stenosis (L1-2 and L5/S1). Started on medrol dose pack. NSG consulted; no surgical intervention indicated. Discharged to this facility for medical management, rehabilitation, and nursing cares.    Ms. Dunn is seen today in her room for a visit. She sitting in her wheelchair is watching television. Patient notes she is leaving soon. Endorses pain that is tolerable; current regimen has been effective. Reports \"sore\" in her bottom area. Appetite is good but needs food to be minced due to poor dentition. No loose stools today. Sleeping well. Denies CP, palpitations, lightheadedness, dizziness, fatigue, SOB, fever, chills, nausea/vomiting, bladder or bowel concerns today.    Assessment/Plan:     Cerebrovascular accident (CVA) due to embolism of right middle cerebral artery (H)  Underwent thrombolysis with improved NIH score. Started on apixaban in hospital. No neurological noted changes today.   -continue apixaban 2.5 mg BID  -continue atorvastatin 40 mg daily  -follow up with cardiology as scheduled    Spondylosis of lumbar region without myelopathy or radiculopathy  MRI findings suggest arthritic changes. Started on Medrol Dosepak during hospital stay, completed on 3/12/24.  -continue PT/OT    Chronic heart failure with preserved ejection fraction (H)  Primary hypertension  Chronic atrial fibrillation (H)  Echo on 3/1/24 finds LVEF 65%. Euvolemic, no peripheral edema. Patient's BP running low during TCU stay; 91/49 mmHg today. HR 78 bpm.   -continue metoprolol 25 mg BID  -decrease torsemide 30 mg daily to 20 mg daily  -continue spironolactone 25 mg daily, HOLD for SBP <110 mmHg  -continue apixaban as above  -continue ASA 81 mg daily  -follow weights  -monitor " volume status  -follow-up with cardiology as scheduled    Squamous cell cancer of tongue (H)  S/P partial glossectomy  Partial right glossectomy on 9/2023  -follow-up with surgery as recommended    Chronic hyponatremia  Na 134 (3/14/24). Asymptomatic today.  -encourage salty food intake  -trend labs periodically    Primary osteoarthritis involving multiple joints  OA of hands, spine, and likely hips. Contributes to impaired mobility and function.  -continue APAP 975 mg q8h PRN  -continue voltaren gel  -monitor effectiveness    Age-related osteoporosis without current pathological fracture  Status post closed fracture of right femur  R femur fracture in 2021. In TCU, Vitamin D was decreased from 5000U/day to 1000U/day on 3/11/24.  -continue ibandronate 150 mg monthly (last received on 3/15/24)  -continue vit D    Impaired mobility and ADLs  Physical deconditioning  Secondary to diagnoses above. In TCU for rehabilitation.   -plan to discharge with home health     Discharge Medications:  MED REC REQUIRED  Post Medication Reconciliation Status: discharge medications reconciled and changed, per note/orders       Current Outpatient Medications   Medication Sig Dispense Refill     acetaminophen (TYLENOL) 325 MG tablet Take 3 tablets (975 mg) by mouth every 8 hours as needed for mild pain       albuterol (PROAIR HFA/PROVENTIL HFA/VENTOLIN HFA) 108 (90 Base) MCG/ACT inhaler Inhale 2 puffs into the lungs 4 times daily       amoxicillin (AMOXIL) 500 MG capsule 4 CAPSULES (2000MG ) ORALLY AS NEEDED ONE HOUR PRIOR TO DENTAL APPOINTMENT 4 capsule 5     ANTACID REGULAR STRENGTH 500 MG chewable tablet 2 TABLETS (1000MG) ORALLY 2 TIMES DAILY AS NEEDED FOR HEARTBURN 60 tablet 11     apixaban ANTICOAGULANT (ELIQUIS) 2.5 MG tablet Take 1 tablet (2.5 mg) by mouth 2 times daily       Ascorbic Acid (VITAMIN C PO) Take 500 mg by mouth every morning       ASPIRIN LOW DOSE 81 MG EC tablet 1 TABLET ORALLY 2 TIMES DAILY (DX: PROPHYLAXIS) 180  tablet 3     atorvastatin (LIPITOR) 40 MG tablet Take 1 tablet (40 mg) by mouth daily       Bacillus Coagulans-Inulin (PROBIOTIC FORMULA) 1-250 BILLION-MG CAPS 1 CAPSULE ORALLY DAILY 31 capsule 11     bisacodyl (DULCOLAX) 5 MG EC tablet 1 TABLET ORALLY DAILY AS NEEDED (MAY KEEP AT BEDSIDE) (Patient taking differently: 5 mg 2 times daily as needed) 30 tablet 10     chlorhexidine (PERIDEX) 0.12 % solution Swish and spit 15 mLs in mouth 2 times daily May self administer 118 mL 3     cholecalciferol (VITAMIN D3) 125 mcg (5000 units) capsule 1 CAPSULE ORALLY DAILY 90 capsule 3     COMBIVENT RESPIMAT  MCG/ACT inhaler INHALE 1 PUFF INTO THE LUNGS  4 TIMES DAILY 4 g 11     cycloSPORINE (RESTASIS) 0.05 % ophthalmic emulsion Place 1 drop into both eyes 2 times daily        demeclocycline (DECLOMYCIN) 150 MG tablet 1 TABLET ORALLY 2 TIMES DAILY 60 tablet 11     fish oil-omega-3 fatty acids 1000 MG capsule Take 2 capsules (2 g) by mouth daily (Patient taking differently: Take 1 g by mouth daily) 28 capsule 11     fluorometholone (FML LIQUIFILM) 0.1 % ophthalmic susp Place 1 drop Into the left eye daily        hydrocortisone 1 % CREA cream Place rectally 2 times daily as needed for itching       IBANDRONATE SODIUM PO Take 150 mg by mouth every 30 days       levothyroxine (SYNTHROID/LEVOTHROID) 75 MCG tablet Take 75 mcg by mouth daily       loratadine (CLARITIN) 10 MG tablet 1 TABLET ORALLY DAILY (DX: ASTHMA) 28 tablet 11     melatonin 3 MG tablet 1 TABLET ORALLY AT BEDTIME ALONG WITH 5 MG FOR A TOTAL DOSE OF 8MG 30 tablet 11     melatonin 5 MG tablet 1 TABLET ORALLY AT BEDTIME  ALONG WITH 3MG FOR A TOTAL DOSE OF 8MG 30 tablet 11     metoprolol tartrate (LOPRESSOR) 25 MG tablet 1 TABLET ORALLY 2 TIMES DAILY (DX: HYPERTENSION) 56 tablet 11     mirtazapine (REMERON) 7.5 MG tablet 1 TABLET ORALLY AT BEDTIME 31 tablet 11     Multiple Vitamins-Minerals (PRESERVISION AREDS 2) CAPS 1 CAPSULE ORALLY 2 TIMES DAILY 62 capsule 11      omeprazole (PRILOSEC) 20 MG DR capsule 1 CAPSULE ORALLY 2 TIMES DAILY (DX: GASTROESOPHAGEAL REFLUX DISEASE) 62 capsule 11     OXYGEN-HELIUM IN        polyethylene glycol 0.4%- propylene glycol 0.3% (SYSTANE ULTRA) 0.4-0.3 % SOLN ophthalmic solution Place 1 drop into both eyes 4 times daily       Polyethylene Glycol 3350 (PEG 3350) 17 GM/SCOOP POWD MIX 1 CAPFUL (17 GMS) IN 8 OUNCES WATER AND DRINK ORALLY DAILY (DX: CONSTIPATION) 510 g 11     Probiotic Product (PROBIOTIC PO) Take 1 capsule by mouth every morning       SENEXON-S 8.6-50 MG tablet 1 TABLET ORALLY 2 TIMES DAILY (DX: CONSTIPATION) 56 tablet 11     sodium chloride 1 GM tablet Take 1 tablet (1 g) by mouth daily 30 tablet 11     spironolactone (ALDACTONE) 25 MG tablet 1 TABLET ORALLY DAILY (DX: EDEMA) ** HAZARDOUS MED: WEAR DOUBLE NITRILE GLOVES** (Patient taking differently: Take 25 mg by mouth daily Hold for SBP <110mmHg) 28 tablet 11     timolol (TIMOPTIC) 0.5 % ophthalmic solution Place 1 drop Into the left eye daily        torsemide (DEMADEX) 20 MG tablet 1.5 TABLET (30mg) ORALLY DAILY (Patient taking differently: Take 20 mg by mouth daily) 31 tablet 11     vitamin C (ASCORBIC ACID) 500 MG tablet 1 TABLET ORALLY DAILY (DX: SUPPLEMENT) 31 tablet 11        Controlled medications:   not applicable/none     Past Medical History:   Past Medical History:   Diagnosis Date     Asthma      Bilateral carpal tunnel syndrome 08/27/2015     CAD (coronary artery disease)      Chronic airway obstruction, not elsewhere classified     Created by Conversion      Chronic anemia      Chronic edema      Congestive heart failure, severe (H) 05/13/2020     COPD (chronic obstructive pulmonary disease) (H)      Coronary artery disease      Depression      GERD (gastroesophageal reflux disease)      Heart disease      Heart murmur      High cholesterol     dislipidemia     HTN (hypertension)      Hypercholesterolemia      Hypertension      Hypertensive emergency 08/13/2021      "Hypothyroidism      Hypothyroidism      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     MI (myocardial infarction) (H) 1985     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     Myelodysplastic syndrome (H)      Myocardial infarction (H) 1983     OLEGARIO (obstructive sleep apnea)      Osteoporosis      Other and unspecified hyperlipidemia     Created by Conversion      Pyelonephritis 05/11/2016     Radicular low back pain      Rheumatoid arthritis (H)      Elizabeth Agers syndrome      Sjoegren syndrome      Sjogren's syndrome (H24)      Sleep apnea, obstructive      Spinal stenosis      Squamous cell carcinoma, tongue border (H)      Unspecified polyarthropathy or polyarthritis, hand     Created by Conversion      Physical Exam:   Vitals: BP 91/49   Pulse 79   Temp 97.7  F (36.5  C)   Resp 20   Ht 1.575 m (5' 2\")   Wt 42.4 kg (93 lb 6.4 oz)   SpO2 97%   BMI 17.08 kg/m    BMI: Body mass index is 17.08 kg/m .  GENERAL APPEARANCE:  Alert, elderly, in no distress, sitting in wheelchair  HEENT:  atraumatic, Kashia, EOM intact, wears eyeglasses, moist mucus membranes  RESP:  non-labored breathing, lungs clear on auscultation, no respiratory distress, no cough  CV:  Rate regular, S1 S2 noted, no edema  ABDOMEN:  soft, non-distended, non-tender, bowel sounds active  M/S:   wheelchair bound, moves all extremities, strength and tone equal bilaterally, no calf pain, arthritic changes noted in hands  SKIN:  warm, dry, thin, fragile; pressure ulcer on coccyx covered with bandage, no drainage noted  NEURO:   Face is symmetric, examination of sensation by touch normal, follows and tracks, slow speech  PSYCH:  calm, cooperative      SNF labs: Labs reviewed as per [facility] and/or Care Everywhere.      DISCHARGE PLAN:  Follow up labs: follow BMP for hyponatremia monitoring  Medical Follow Up:      Follow up with primary care provider in 5-7 days  Follow up with cardiology as scheduled   Current [place] scheduled " appointments:  Appointments in Next Year      Apr 17, 2024 11:20 AM  (Arrive by 11:05 AM)  Return Cardiology with Julia Amaya MD  [facility] [place] ([facility] ) [phone]           Discharge Services: Home Care:  Occupational Therapy, Physical Therapy, Speech Therapy , and Hospital Bed    The patient does requirepositioning of the body in ways not feasible with an ordinary bed due to the medical conditions: osteoarthritis and lumbar spondylosis that is expected to last more than 1 month.    The patient requires, for the alleviation of pain, positioning of the body in ways not feasible with an ordinary bed.    The patient requires a bed height different than a fixed height hospital bed to permit transfers to chair, wheelchair, orstanding position.     The patient requires frequent changes in body position and does have an immediate need for change in body position.    My patient requires body positioning not feasible in an ordinary bed to alleviate pain from osteoarthritis and lumbar spondylosis, requires head of bed to be elevated more than 30 degrees and frequent changes in body position due to osteoarthritis and spondylosis as well as to facilitate transfers from bed to chair.      TOTAL DISCHARGE TIME:   Greater than 30 minutes  Electronically signed by:  Dr. Julia Holland, DNP, APRN, AGNP-BC      Sincerely,        Julia Holland, CASTRO CNP

## 2024-03-19 ENCOUNTER — LAB REQUISITION (OUTPATIENT)
Dept: LAB | Facility: CLINIC | Age: 89
End: 2024-03-19
Payer: COMMERCIAL

## 2024-03-19 DIAGNOSIS — E87.1 HYPO-OSMOLALITY AND HYPONATREMIA: ICD-10-CM

## 2024-03-22 ENCOUNTER — LAB REQUISITION (OUTPATIENT)
Dept: LAB | Facility: CLINIC | Age: 89
End: 2024-03-22
Payer: COMMERCIAL

## 2024-03-22 DIAGNOSIS — I50.9 HEART FAILURE, UNSPECIFIED (H): ICD-10-CM

## 2024-03-27 DIAGNOSIS — E02 SUBCLINICAL IODINE-DEFICIENCY HYPOTHYROIDISM: Primary | ICD-10-CM

## 2024-03-27 PROCEDURE — 36415 COLL VENOUS BLD VENIPUNCTURE: CPT | Mod: ORL | Performed by: INTERNAL MEDICINE

## 2024-03-27 PROCEDURE — P9603 ONE-WAY ALLOW PRORATED MILES: HCPCS | Mod: ORL | Performed by: INTERNAL MEDICINE

## 2024-03-27 PROCEDURE — 80048 BASIC METABOLIC PNL TOTAL CA: CPT | Mod: ORL | Performed by: INTERNAL MEDICINE

## 2024-03-27 RX ORDER — LEVOTHYROXINE SODIUM 75 UG/1
TABLET ORAL
Qty: 10 TABLET | Refills: 0 | Status: SHIPPED | OUTPATIENT
Start: 2024-03-27 | End: 2024-04-08

## 2024-03-27 NOTE — TELEPHONE ENCOUNTER
Message to physician:     Date of last visit: Visit date not found    Date of next visit if scheduled: None    Potassium   Date Value Ref Range Status   03/14/2024 4.3 3.4 - 5.3 mmol/L Final   09/10/2022 3.8 3.5 - 5.0 mmol/L Final   09/10/2022 3.8 3.5 - 5.0 mmol/L Final   06/30/2021 3.8 3.5 - 5.0 mmol/L Final     Creatinine   Date Value Ref Range Status   03/14/2024 1.10 (H) 0.51 - 0.95 mg/dL Final   06/30/2021 0.93 0.60 - 1.10 mg/dL Final     GFR Estimate   Date Value Ref Range Status   03/14/2024 46 (L) >60 mL/min/1.73m2 Final   06/30/2021 56 (L) >60 mL/min/1.73m2 Final   06/30/2021 56 (L) >60 ml/min/1.73m2 Final       BP Readings from Last 3 Encounters:   03/18/24 91/49   03/11/24 (!) 140/80   03/08/24 106/56       Hemoglobin A1C   Date Value Ref Range Status   03/01/2024 5.3 <5.7 % Final     Comment:     Normal <5.7%   Prediabetes 5.7-6.4%    Diabetes 6.5% or higher     Note: Adopted from ADA consensus guidelines.       Please complete refill and CLOSE ENCOUNTER.  Closing the encounter signifies the refill is complete.

## 2024-03-28 ENCOUNTER — TELEPHONE (OUTPATIENT)
Dept: GERIATRICS | Facility: CLINIC | Age: 89
End: 2024-03-28
Payer: COMMERCIAL

## 2024-03-28 LAB
ANION GAP SERPL CALCULATED.3IONS-SCNC: 13 MMOL/L (ref 7–15)
BUN SERPL-MCNC: 29.4 MG/DL (ref 8–23)
CALCIUM SERPL-MCNC: 9.1 MG/DL (ref 8.2–9.6)
CHLORIDE SERPL-SCNC: 102 MMOL/L (ref 98–107)
CREAT SERPL-MCNC: 1.17 MG/DL (ref 0.51–0.95)
DEPRECATED HCO3 PLAS-SCNC: 20 MMOL/L (ref 22–29)
EGFRCR SERPLBLD CKD-EPI 2021: 43 ML/MIN/1.73M2
GLUCOSE SERPL-MCNC: 86 MG/DL (ref 70–99)
POTASSIUM SERPL-SCNC: 3.8 MMOL/L (ref 3.4–5.3)
SODIUM SERPL-SCNC: 135 MMOL/L (ref 135–145)

## 2024-03-28 NOTE — TELEPHONE ENCOUNTER
Lab order given to facility nurse, Catalino.  Order changed to 4/1 as that is their lab day.      ----- Message from Priscilla Stanford RN sent at 3/28/2024 10:04 AM CDT -----  I checked their [facility] system and the facility is giving the 20 mg dose of Torsemide.      Priscilla  ----- Message -----  From: Elizabeth Mcknight MD  Sent: 3/28/2024   9:54 AM CDT  To: Geriatrics Triage Support Pool    Julia may have ordered at TCU discharge. Repeat BMP on 4/2 as Cr is up a bit and she decreased torsemide. Can we validate the AL is giving the 20 mg? Thanks

## 2024-03-29 ENCOUNTER — LAB REQUISITION (OUTPATIENT)
Dept: LAB | Facility: CLINIC | Age: 89
End: 2024-03-29
Payer: COMMERCIAL

## 2024-03-29 DIAGNOSIS — I50.9 HEART FAILURE, UNSPECIFIED (H): ICD-10-CM

## 2024-04-01 ENCOUNTER — ASSISTED LIVING VISIT (OUTPATIENT)
Dept: GERIATRICS | Facility: CLINIC | Age: 89
End: 2024-04-01
Payer: COMMERCIAL

## 2024-04-01 VITALS — BODY MASS INDEX: 17.7 KG/M2 | SYSTOLIC BLOOD PRESSURE: 129 MMHG | WEIGHT: 96.8 LBS | DIASTOLIC BLOOD PRESSURE: 56 MMHG

## 2024-04-01 DIAGNOSIS — R54 FRAILTY: ICD-10-CM

## 2024-04-01 DIAGNOSIS — E87.1 CHRONIC HYPONATREMIA: ICD-10-CM

## 2024-04-01 DIAGNOSIS — Z78.9 IMPAIRED MOBILITY AND ADLS: ICD-10-CM

## 2024-04-01 DIAGNOSIS — E78.5 DYSLIPIDEMIA: ICD-10-CM

## 2024-04-01 DIAGNOSIS — K59.01 SLOW TRANSIT CONSTIPATION: ICD-10-CM

## 2024-04-01 DIAGNOSIS — Z74.09 IMPAIRED MOBILITY AND ADLS: ICD-10-CM

## 2024-04-01 DIAGNOSIS — I50.32 CHRONIC HEART FAILURE WITH PRESERVED EJECTION FRACTION (H): Primary | ICD-10-CM

## 2024-04-01 DIAGNOSIS — Z86.73 HISTORY OF CEREBROVASCULAR ACCIDENT (CVA) DUE TO ISCHEMIA: ICD-10-CM

## 2024-04-01 LAB
ANION GAP SERPL CALCULATED.3IONS-SCNC: 10 MMOL/L (ref 7–15)
BUN SERPL-MCNC: 31 MG/DL (ref 8–23)
CALCIUM SERPL-MCNC: 8.9 MG/DL (ref 8.2–9.6)
CHLORIDE SERPL-SCNC: 102 MMOL/L (ref 98–107)
CREAT SERPL-MCNC: 1.18 MG/DL (ref 0.51–0.95)
DEPRECATED HCO3 PLAS-SCNC: 23 MMOL/L (ref 22–29)
EGFRCR SERPLBLD CKD-EPI 2021: 43 ML/MIN/1.73M2
GLUCOSE SERPL-MCNC: 77 MG/DL (ref 70–99)
POTASSIUM SERPL-SCNC: 3.8 MMOL/L (ref 3.4–5.3)
SODIUM SERPL-SCNC: 135 MMOL/L (ref 135–145)

## 2024-04-01 PROCEDURE — 99349 HOME/RES VST EST MOD MDM 40: CPT | Performed by: NURSE PRACTITIONER

## 2024-04-01 PROCEDURE — 36415 COLL VENOUS BLD VENIPUNCTURE: CPT | Mod: ORL | Performed by: NURSE PRACTITIONER

## 2024-04-01 PROCEDURE — 80048 BASIC METABOLIC PNL TOTAL CA: CPT | Mod: ORL | Performed by: NURSE PRACTITIONER

## 2024-04-01 PROCEDURE — P9603 ONE-WAY ALLOW PRORATED MILES: HCPCS | Mod: ORL | Performed by: NURSE PRACTITIONER

## 2024-04-01 NOTE — PROGRESS NOTES
[facility] GERIATRICS  ACUTE/EPISODIC VISIT    [facility] Medical Record Number:  [id number]  Place of Service where encounter took place:  [facility]) [[id number]]    Chief Complaint   Patient presents with    RECHECK       HPI:    Marla Dunn is a 89 year old  (5/22/1929), who is being seen today for an episodic care visit.  HPI information obtained from: facility chart records, facility staff, patient report, and [facility] chart review.    Today's concern is:    Diagnoses         Codes Comments    Chronic heart failure with preserved ejection fraction (H)    -  Primary I50.32     Chronic hyponatremia     E87.1     History of cerebrovascular accident (CVA) due to ischemia    Z86.73     Dyslipidemia     E78.5     Slow transit constipation     K59.01     Impaired mobility and ADLs     Z74.09, Z78.9     Frailty     R54           Came to see Georgia today as she recently returned to the [facility] setting after having a CVA and time in [facility] T[facility].    On 3/1/24, Georgia was sent into the hospital for signs of CVA (left sided weakness and facial droop) and stayed at [facility] from 3/1 to 3/8/24.  Went to [facility] for rehab until ready to return to her [facility] living community.      Today found Georgia in the bathroom and so waited until she came out and does not require help from staff.  Talked about general things with her health.  She pulled out a copy of her BMP from last week and asked why she had it repeated today.  Unclear why but told her she may have one ordered prior to leaving the TCU and then this may be her monthly BMP that gets sent to Nephrology.  Ultimately, the covering provider last week reordered it for this week as well.  Her Na level was normal.      Georgia spoke about her CVA and no real residuals.  She continues to use the w/c as her main transportation.  She did not use the CPAP while in the TCU but occasionally she stated she used O2 at  night.  No acute pain.  Is going back down to the dining room for meals.    ALLERGIES:    Allergies   Allergen Reactions    Gramineae Pollens Other (See Comments)     ORCHARDGRASS. Shortness of breath when lawn is just cut.    Smoke. Other (See Comments)     Hard to breathe.    Pollen Extract      Other reaction(s): Unknown        MEDICATIONS:  Post Discharge Medication Reconciliation Status: discharge medications reconciled and changed, per note/orders. Verified doses given compared to [facility] medication list.    Current Outpatient Medications   Medication Sig Dispense Refill    bisacodyl (DULCOLAX) 5 MG EC tablet Take 1 tablet (5 mg) by mouth 2 times daily as needed for constipation      fish oil-omega-3 fatty acids 1000 MG capsule Take 2 capsules (2 g) by mouth daily      acetaminophen (TYLENOL) 325 MG tablet Take 3 tablets (975 mg) by mouth every 8 hours as needed for mild pain      albuterol (PROAIR HFA/PROVENTIL HFA/VENTOLIN HFA) 108 (90 Base) MCG/ACT inhaler Inhale 2 puffs into the lungs 4 times daily      amoxicillin (AMOXIL) 500 MG capsule 4 CAPSULES (2000MG ) ORALLY AS NEEDED ONE HOUR PRIOR TO DENTAL APPOINTMENT 4 capsule 5    ANTACID REGULAR STRENGTH 500 MG chewable tablet 2 TABLETS (1000MG) ORALLY 2 TIMES DAILY AS NEEDED FOR HEARTBURN 60 tablet 11    apixaban ANTICOAGULANT (ELIQUIS) 2.5 MG tablet Take 1 tablet (2.5 mg) by mouth 2 times daily      Ascorbic Acid (VITAMIN C PO) Take 500 mg by mouth every morning      ASPIRIN LOW DOSE 81 MG EC tablet 1 TABLET ORALLY 2 TIMES DAILY (DX: PROPHYLAXIS) 180 tablet 3    atorvastatin (LIPITOR) 40 MG tablet Take 1 tablet (40 mg) by mouth daily      Bacillus Coagulans-Inulin (PROBIOTIC FORMULA) 1-250 BILLION-MG CAPS 1 CAPSULE ORALLY DAILY 31 capsule 11    chlorhexidine (PERIDEX) 0.12 % solution Swish and spit 15 mLs in mouth 2 times daily May self administer 118 mL 3    cholecalciferol (VITAMIN D3) 125 mcg (5000 units) capsule 1 CAPSULE ORALLY DAILY 90 capsule 3     COMBIVENT RESPIMAT  MCG/ACT inhaler INHALE 1 PUFF INTO THE LUNGS  4 TIMES DAILY 4 g 11    cycloSPORINE (RESTASIS) 0.05 % ophthalmic emulsion Place 1 drop into both eyes 2 times daily       demeclocycline (DECLOMYCIN) 150 MG tablet 1 TABLET ORALLY 2 TIMES DAILY 60 tablet 11    fluorometholone (FML LIQUIFILM) 0.1 % ophthalmic susp Place 1 drop Into the left eye daily       hydrocortisone 1 % CREA cream Place rectally 2 times daily as needed for itching      IBANDRONATE SODIUM PO Take 150 mg by mouth every 30 days      levothyroxine (SYNTHROID/LEVOTHROID) 75 MCG tablet 1 TABLET ORALLY EVERY MORNING ON AN EMPTY STOMACH (DX: HYPOTHYROIDISM) 10 tablet 0    loratadine (CLARITIN) 10 MG tablet 1 TABLET ORALLY DAILY (DX: ASTHMA) 28 tablet 11    melatonin 3 MG tablet 1 TABLET ORALLY AT BEDTIME ALONG WITH 5 MG FOR A TOTAL DOSE OF 8MG 30 tablet 11    melatonin 5 MG tablet 1 TABLET ORALLY AT BEDTIME  ALONG WITH 3MG FOR A TOTAL DOSE OF 8MG 30 tablet 11    metoprolol tartrate (LOPRESSOR) 25 MG tablet 1 TABLET ORALLY 2 TIMES DAILY (DX: HYPERTENSION) 56 tablet 11    mirtazapine (REMERON) 7.5 MG tablet 1 TABLET ORALLY AT BEDTIME 31 tablet 11    Multiple Vitamins-Minerals (PRESERVISION AREDS 2) CAPS 1 CAPSULE ORALLY 2 TIMES DAILY 62 capsule 11    omeprazole (PRILOSEC) 20 MG DR capsule 1 CAPSULE ORALLY 2 TIMES DAILY (DX: GASTROESOPHAGEAL REFLUX DISEASE) 62 capsule 11    OXYGEN-HELIUM IN       polyethylene glycol 0.4%- propylene glycol 0.3% (SYSTANE ULTRA) 0.4-0.3 % SOLN ophthalmic solution Place 1 drop into both eyes 4 times daily      Polyethylene Glycol 3350 (PEG 3350) 17 GM/SCOOP POWD MIX 1 CAPFUL (17 GMS) IN 8 OUNCES WATER AND DRINK ORALLY DAILY (DX: CONSTIPATION) 510 g 11    Probiotic Product (PROBIOTIC PO) Take 1 capsule by mouth every morning      SENEXON-S 8.6-50 MG tablet 1 TABLET ORALLY 2 TIMES DAILY (DX: CONSTIPATION) 56 tablet 11    sodium chloride 1 GM tablet Take 1 tablet (1 g) by mouth daily 30 tablet 11     spironolactone (ALDACTONE) 25 MG tablet 1 TABLET ORALLY DAILY (DX: EDEMA) ** HAZARDOUS MED: WEAR DOUBLE NITRILE GLOVES** (Patient taking differently: Take 25 mg by mouth daily Hold for SBP <110mmHg) 28 tablet 11    timolol (TIMOPTIC) 0.5 % ophthalmic solution Place 1 drop Into the left eye daily       torsemide (DEMADEX) 20 MG tablet 1.5 TABLET (30mg) ORALLY DAILY (Patient taking differently: Take 20 mg by mouth daily) 31 tablet 11    vitamin C (ASCORBIC ACID) 500 MG tablet 1 TABLET ORALLY DAILY (DX: SUPPLEMENT) 31 tablet 11       REVIEW OF SYSTEMS:  4 point ROS neg other than the symptoms noted above in the HPI.  No chest pain no SOB      PHYSICAL EXAM:  /56   Wt 43.9 kg (96 lb 12.8 oz)   BMI 17.70 kg/m    Petite female, alert and oriented x3.    Few teeth.  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  She is careful of her diet for chewing and swallowing purposes.  No dentures.  Wears glasses and makes eye contact.  EOMs intact.  Heart rate regular/irregular.  Lungs are clear.    Abdomen is flat, soft, and non-tender.  Active bowel sounds.  Takes her bisacodyl by herself.      Component      Latest Ref Rng 3/27/2024  12:13 PM   Sodium      135 - 145 mmol/L 135    Potassium      3.4 - 5.3 mmol/L 3.8    Chloride      98 - 107 mmol/L 102    Carbon Dioxide (CO2)      22 - 29 mmol/L 20 (L)    Anion Gap      7 - 15 mmol/L 13    Urea Nitrogen      8.0 - 23.0 mg/dL 29.4 (H)    Creatinine      0.51 - 0.95 mg/dL 1.17 (H)    GFR Estimate      >60 mL/min/1.73m2 43 (L)    Calcium      8.2 - 9.6 mg/dL 9.1    Glucose      70 - 99 mg/dL 86           ASSESSMENT / PLAN:  (I50.32) Chronic heart failure with preserved ejection fraction (H)  (primary encounter diagnosis)  Comment: no acute issues.  Remains on torsemide 30mg po daily and spironolactone 25mg daily.  Had another BMP today and so will compare when results return later today.  Otherwise has been on this medication for some time.    (E87.1) Chronic  hyponatremia  Comment: have watched her Na levels since hospitalization and they have stayed nicely above the lowest threshold.  Remains on a NaCl tab daily.     (Z86.73) History of cerebrovascular accident (CVA) due to ischemia  (E78.5) Dyslipidemia  Comment: is already on eliquis due to her A fib.  Not a high risk of falling.  Working with home care for transition to environment.    Clarified her fish oil that she takes 2 tabs of 1000mg daily.  Plan: fish oil-omega-3 fatty acids 1000 MG capsule    (K59.01) Slow transit constipation  Comment: is on Miralax daily but has control of taking her dulcolax in her apartment and she will admit that it can be 1-2 tabs a day PRN.  Georgia will opening talk about her bowels with this NP and so know when to worry and when not too.  Plan: bisacodyl (DULCOLAX) 5 MG EC tablet    (Z74.09,  Z78.9) Impaired mobility and ADLs  (R54) Frailty  Comment: Georgia states that [facility] showed her their [facility] apartments.  She does not want to think of moving and then the expense is comparable.  Continue to assist Georgia with cares and medication administration.  She often states she is ready when the Lord is ready to take her.       Orders:  No new orders today    Electronically signed by  CASTRO Hazel CNP

## 2024-04-01 NOTE — LETTER
4/1/2024        RE: Marla Dunn  C/o Deneen Dunn  [address] [facility] [address]        [facility] GERIATRICS  ACUTE/EPISODIC VISIT    [facility] Medical Record Number:  [id number]  Place of Service where encounter took place:  [facility] ([facility]) [[id number]]    Chief Complaint   Patient presents with     RECHECK       HPI:    Marla Dunn is a 89 year old  (5/22/1929), who is being seen today for an episodic care visit.  HPI information obtained from: facility chart records, facility staff, patient report, and [facility] chart review.    Today's concern is:    Diagnoses         Codes Comments    Chronic heart failure with preserved ejection fraction (H)    -  Primary I50.32     Chronic hyponatremia     E87.1     History of cerebrovascular accident (CVA) due to ischemia    Z86.73     Dyslipidemia     E78.5     Slow transit constipation     K59.01     Impaired mobility and ADLs     Z74.09, Z78.9     Frailty     R54           Came to see Georgia today as she recently returned to the [facility] setting after having a CVA and time in [facility] T[facility].    On 3/1/24, Georgia was sent into the hospital for signs of CVA (left sided weakness and facial droop) and stayed at [facility] from 3/1 to 3/8/24.  Went to [facility] TCU for rehab until ready to return to her [facility] living community.      Today found Georgia in the bathroom and so waited until she came out and does not require help from staff.  Talked about general things with her health.  She pulled out a copy of her BMP from last week and asked why she had it repeated today.  Unclear why but told her she may have one ordered prior to leaving the TCU and then this may be her monthly BMP that gets sent to Nephrology.  Ultimately, the covering provider last week reordered it for this week as well.  Her Na level was normal.      Georgia spoke about her CVA and no real residuals.  She continues to use the w/c as  her main transportation.  She did not use the CPAP while in the TCU but occasionally she stated she used O2 at night.  No acute pain.  Is going back down to the dining room for meals.    ALLERGIES:    Allergies   Allergen Reactions     Gramineae Pollens Other (See Comments)     ORCHARDGRASS. Shortness of breath when lawn is just cut.     Smoke. Other (See Comments)     Hard to breathe.     Pollen Extract      Other reaction(s): Unknown        MEDICATIONS:  Post Discharge Medication Reconciliation Status: discharge medications reconciled and changed, per note/orders. Verified doses given compared to shelter medication list.    Current Outpatient Medications   Medication Sig Dispense Refill     bisacodyl (DULCOLAX) 5 MG EC tablet Take 1 tablet (5 mg) by mouth 2 times daily as needed for constipation       fish oil-omega-3 fatty acids 1000 MG capsule Take 2 capsules (2 g) by mouth daily       acetaminophen (TYLENOL) 325 MG tablet Take 3 tablets (975 mg) by mouth every 8 hours as needed for mild pain       albuterol (PROAIR HFA/PROVENTIL HFA/VENTOLIN HFA) 108 (90 Base) MCG/ACT inhaler Inhale 2 puffs into the lungs 4 times daily       amoxicillin (AMOXIL) 500 MG capsule 4 CAPSULES (2000MG ) ORALLY AS NEEDED ONE HOUR PRIOR TO DENTAL APPOINTMENT 4 capsule 5     ANTACID REGULAR STRENGTH 500 MG chewable tablet 2 TABLETS (1000MG) ORALLY 2 TIMES DAILY AS NEEDED FOR HEARTBURN 60 tablet 11     apixaban ANTICOAGULANT (ELIQUIS) 2.5 MG tablet Take 1 tablet (2.5 mg) by mouth 2 times daily       Ascorbic Acid (VITAMIN C PO) Take 500 mg by mouth every morning       ASPIRIN LOW DOSE 81 MG EC tablet 1 TABLET ORALLY 2 TIMES DAILY (DX: PROPHYLAXIS) 180 tablet 3     atorvastatin (LIPITOR) 40 MG tablet Take 1 tablet (40 mg) by mouth daily       Bacillus Coagulans-Inulin (PROBIOTIC FORMULA) 1-250 BILLION-MG CAPS 1 CAPSULE ORALLY DAILY 31 capsule 11     chlorhexidine (PERIDEX) 0.12 % solution Swish and spit 15 mLs in mouth 2 times daily May self  administer 118 mL 3     cholecalciferol (VITAMIN D3) 125 mcg (5000 units) capsule 1 CAPSULE ORALLY DAILY 90 capsule 3     COMBIVENT RESPIMAT  MCG/ACT inhaler INHALE 1 PUFF INTO THE LUNGS  4 TIMES DAILY 4 g 11     cycloSPORINE (RESTASIS) 0.05 % ophthalmic emulsion Place 1 drop into both eyes 2 times daily        demeclocycline (DECLOMYCIN) 150 MG tablet 1 TABLET ORALLY 2 TIMES DAILY 60 tablet 11     fluorometholone (FML LIQUIFILM) 0.1 % ophthalmic susp Place 1 drop Into the left eye daily        hydrocortisone 1 % CREA cream Place rectally 2 times daily as needed for itching       IBANDRONATE SODIUM PO Take 150 mg by mouth every 30 days       levothyroxine (SYNTHROID/LEVOTHROID) 75 MCG tablet 1 TABLET ORALLY EVERY MORNING ON AN EMPTY STOMACH (DX: HYPOTHYROIDISM) 10 tablet 0     loratadine (CLARITIN) 10 MG tablet 1 TABLET ORALLY DAILY (DX: ASTHMA) 28 tablet 11     melatonin 3 MG tablet 1 TABLET ORALLY AT BEDTIME ALONG WITH 5 MG FOR A TOTAL DOSE OF 8MG 30 tablet 11     melatonin 5 MG tablet 1 TABLET ORALLY AT BEDTIME  ALONG WITH 3MG FOR A TOTAL DOSE OF 8MG 30 tablet 11     metoprolol tartrate (LOPRESSOR) 25 MG tablet 1 TABLET ORALLY 2 TIMES DAILY (DX: HYPERTENSION) 56 tablet 11     mirtazapine (REMERON) 7.5 MG tablet 1 TABLET ORALLY AT BEDTIME 31 tablet 11     Multiple Vitamins-Minerals (PRESERVISION AREDS 2) CAPS 1 CAPSULE ORALLY 2 TIMES DAILY 62 capsule 11     omeprazole (PRILOSEC) 20 MG DR capsule 1 CAPSULE ORALLY 2 TIMES DAILY (DX: GASTROESOPHAGEAL REFLUX DISEASE) 62 capsule 11     OXYGEN-HELIUM IN        polyethylene glycol 0.4%- propylene glycol 0.3% (SYSTANE ULTRA) 0.4-0.3 % SOLN ophthalmic solution Place 1 drop into both eyes 4 times daily       Polyethylene Glycol 3350 (PEG 3350) 17 GM/SCOOP POWD MIX 1 CAPFUL (17 GMS) IN 8 OUNCES WATER AND DRINK ORALLY DAILY (DX: CONSTIPATION) 510 g 11     Probiotic Product (PROBIOTIC PO) Take 1 capsule by mouth every morning       SENEXON-S 8.6-50 MG tablet 1 TABLET  ORALLY 2 TIMES DAILY (DX: CONSTIPATION) 56 tablet 11     sodium chloride 1 GM tablet Take 1 tablet (1 g) by mouth daily 30 tablet 11     spironolactone (ALDACTONE) 25 MG tablet 1 TABLET ORALLY DAILY (DX: EDEMA) ** HAZARDOUS MED: WEAR DOUBLE NITRILE GLOVES** (Patient taking differently: Take 25 mg by mouth daily Hold for SBP <110mmHg) 28 tablet 11     timolol (TIMOPTIC) 0.5 % ophthalmic solution Place 1 drop Into the left eye daily        torsemide (DEMADEX) 20 MG tablet 1.5 TABLET (30mg) ORALLY DAILY (Patient taking differently: Take 20 mg by mouth daily) 31 tablet 11     vitamin C (ASCORBIC ACID) 500 MG tablet 1 TABLET ORALLY DAILY (DX: SUPPLEMENT) 31 tablet 11       REVIEW OF SYSTEMS:  4 point ROS neg other than the symptoms noted above in the HPI.  No chest pain no SOB      PHYSICAL EXAM:  /56   Wt 43.9 kg (96 lb 12.8 oz)   BMI 17.70 kg/m    Petite female, alert and oriented x3.    Few teeth.  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  She is careful of her diet for chewing and swallowing purposes.  No dentures.  Wears glasses and makes eye contact.  EOMs intact.  Heart rate regular/irregular.  Lungs are clear.    Abdomen is flat, soft, and non-tender.  Active bowel sounds.  Takes her bisacodyl by herself.      Component      Latest Ref Rng 3/27/2024  12:13 PM   Sodium      135 - 145 mmol/L 135    Potassium      3.4 - 5.3 mmol/L 3.8    Chloride      98 - 107 mmol/L 102    Carbon Dioxide (CO2)      22 - 29 mmol/L 20 (L)    Anion Gap      7 - 15 mmol/L 13    Urea Nitrogen      8.0 - 23.0 mg/dL 29.4 (H)    Creatinine      0.51 - 0.95 mg/dL 1.17 (H)    GFR Estimate      >60 mL/min/1.73m2 43 (L)    Calcium      8.2 - 9.6 mg/dL 9.1    Glucose      70 - 99 mg/dL 86           ASSESSMENT / PLAN:  (I50.32) Chronic heart failure with preserved ejection fraction (H)  (primary encounter diagnosis)  Comment: no acute issues.  Remains on torsemide 30mg po daily and spironolactone 25mg daily.  Had another BMP today  and so will compare when results return later today.  Otherwise has been on this medication for some time.    (E87.1) Chronic hyponatremia  Comment: have watched her Na levels since hospitalization and they have stayed nicely above the lowest threshold.  Remains on a NaCl tab daily.     (Z86.73) History of cerebrovascular accident (CVA) due to ischemia  (E78.5) Dyslipidemia  Comment: is already on eliquis due to her A fib.  Not a high risk of falling.  Working with home care for transition to environment.    Clarified her fish oil that she takes 2 tabs of 1000mg daily.  Plan: fish oil-omega-3 fatty acids 1000 MG capsule    (K59.01) Slow transit constipation  Comment: is on Miralax daily but has control of taking her dulcolax in her apartment and she will admit that it can be 1-2 tabs a day PRN.  Georgia will opening talk about her bowels with this NP and so know when to worry and when not too.  Plan: bisacodyl (DULCOLAX) 5 MG EC tablet    (Z74.09,  Z78.9) Impaired mobility and ADLs  (R54) Frailty  Comment: Georgia states that [facility] showed her their [facility] apartments.  She does not want to think of moving and then the expense is comparable.  Continue to assist Georgia with cares and medication administration.  She often states she is ready when the Lord is ready to take her.       Orders:  No new orders today    Electronically signed by  CASTRO Hazel CNP            Sincerely,        CASTRO Hazel CNP

## 2024-04-04 PROBLEM — Z86.73 HISTORY OF CEREBROVASCULAR ACCIDENT (CVA) DUE TO EMBOLISM: Status: ACTIVE | Noted: 2024-04-04

## 2024-04-04 RX ORDER — BISACODYL 5 MG
5 TABLET, DELAYED RELEASE (ENTERIC COATED) ORAL 2 TIMES DAILY PRN
Status: SHIPPED
Start: 2024-04-04

## 2024-04-04 RX ORDER — CHLORAL HYDRATE 500 MG
2 CAPSULE ORAL DAILY
Status: SHIPPED
Start: 2024-04-04

## 2024-04-08 DIAGNOSIS — E02 SUBCLINICAL IODINE-DEFICIENCY HYPOTHYROIDISM: ICD-10-CM

## 2024-04-08 RX ORDER — LEVOTHYROXINE SODIUM 75 UG/1
TABLET ORAL
Qty: 30 TABLET | Refills: 11 | Status: SHIPPED | OUTPATIENT
Start: 2024-04-08

## 2024-04-09 DIAGNOSIS — I50.32 CHRONIC DIASTOLIC CONGESTIVE HEART FAILURE (H): ICD-10-CM

## 2024-04-09 RX ORDER — TORSEMIDE 20 MG/1
TABLET ORAL
Qty: 30 TABLET | Refills: 11 | Status: SHIPPED | OUTPATIENT
Start: 2024-04-09 | End: 2024-05-04

## 2024-04-10 DIAGNOSIS — J44.9 CHRONIC OBSTRUCTIVE PULMONARY DISEASE, UNSPECIFIED COPD TYPE (H): Primary | ICD-10-CM

## 2024-04-11 RX ORDER — ALBUTEROL SULFATE 90 UG/1
AEROSOL, METERED RESPIRATORY (INHALATION)
Qty: 18 G | Refills: 11 | Status: SHIPPED | OUTPATIENT
Start: 2024-04-11

## 2024-04-16 ENCOUNTER — ASSISTED LIVING VISIT (OUTPATIENT)
Dept: GERIATRICS | Facility: CLINIC | Age: 89
End: 2024-04-16
Payer: COMMERCIAL

## 2024-04-16 VITALS
SYSTOLIC BLOOD PRESSURE: 121 MMHG | BODY MASS INDEX: 17.34 KG/M2 | OXYGEN SATURATION: 95 % | TEMPERATURE: 98.4 F | HEART RATE: 83 BPM | DIASTOLIC BLOOD PRESSURE: 60 MMHG | RESPIRATION RATE: 21 BRPM | WEIGHT: 94.8 LBS

## 2024-04-16 DIAGNOSIS — F32.A ANXIETY AND DEPRESSION: ICD-10-CM

## 2024-04-16 DIAGNOSIS — F41.9 ANXIETY AND DEPRESSION: ICD-10-CM

## 2024-04-16 DIAGNOSIS — R50.81 FEVER IN OTHER DISEASES: Primary | ICD-10-CM

## 2024-04-16 DIAGNOSIS — R06.02 SHORTNESS OF BREATH: ICD-10-CM

## 2024-04-16 DIAGNOSIS — D46.4 REFRACTORY ANEMIA (H): ICD-10-CM

## 2024-04-16 PROCEDURE — 99349 HOME/RES VST EST MOD MDM 40: CPT | Performed by: NURSE PRACTITIONER

## 2024-04-16 NOTE — PROGRESS NOTES
FARSHAD [facility] GERIATRICS  ACUTE/EPISODIC VISIT    [facility] Medical Record Number:  [id number]  Place of Service where encounter took place:  [facility] ([facility]) [[id number]]    Chief Complaint   Patient presents with    RECHECK       HPI:    Marla Dunn is a 89 year old  (1929), who is being seen today for an episodic care visit.  HPI information obtained from: facility staff, patient report, and family/first contact niece report.    Today's concern is:    Diagnoses         Codes Comments    Fever in other diseases    -  Primary R50.81     Refractory anemia (H)     D46.4     Shortness of breath     R06.02     Anxiety and depression     F41.9, F32.A           Received a message from [place]'s niece asking if this NP could come up to see her if in the building due to [place] feeling feverish, and short of breath.  Also asked for a new application for handicap plague.  Other one .      Went up to [facility] and the door was open.  Saw that her bed was in the middle of her living room and walked in further to see that she received a hospital bed.  Asked the niece about it and she said that in transitional care they did a face-to-face to get her a hospital bed so that she can adjust the head and feet to accommodate her needs of mobility.    Surgery was sitting in her wheelchair in her bedroom and could see that she was having some anxiety about this whole transition between the regular bed and putting up the new hospital bed.  It was clear that she was having some shortness of breath, but Georgia felt it was more related to anxiety.  The niece stated that they were out earlier today and she was able to get her injection for her anemia as her hemoglobin level was low enough.  Suspect that her feverish skin may be related to her injection for anemia.    ALLERGIES:    Allergies   Allergen Reactions    Gramineae Pollens Other (See Comments)     ORCHARDGRASS. Shortness of breath when  lawn is just cut.    Smoke. Other (See Comments)     Hard to breathe.    Pollen Extract      Other reaction(s): Unknown        MEDICATIONS:  Post Discharge Medication Reconciliation Status: patient was not discharged from an inpatient facility or TCU.     Current Outpatient Medications   Medication Sig Dispense Refill    acetaminophen (TYLENOL) 325 MG tablet Take 3 tablets (975 mg) by mouth every 8 hours as needed for mild pain      albuterol (PROAIR HFA/PROVENTIL HFA/VENTOLIN HFA) 108 (90 Base) MCG/ACT inhaler INHALE 2 PUFFS INTO THE LUNGS 4 TIMES DAILY  (DX: CHRONIC OBSTRUCTIVE PULMONARY DISEASE) 18 g 11    amoxicillin (AMOXIL) 500 MG capsule 4 CAPSULES (2000MG ) ORALLY AS NEEDED ONE HOUR PRIOR TO DENTAL APPOINTMENT 4 capsule 5    ANTACID REGULAR STRENGTH 500 MG chewable tablet 2 TABLETS (1000MG) ORALLY 2 TIMES DAILY AS NEEDED FOR HEARTBURN 60 tablet 11    apixaban ANTICOAGULANT (ELIQUIS) 2.5 MG tablet Take 1 tablet (2.5 mg) by mouth 2 times daily      Ascorbic Acid (VITAMIN C PO) Take 500 mg by mouth every morning      ASPIRIN LOW DOSE 81 MG EC tablet 1 TABLET ORALLY 2 TIMES DAILY (DX: PROPHYLAXIS) 180 tablet 3    atorvastatin (LIPITOR) 40 MG tablet Take 1 tablet (40 mg) by mouth daily      Bacillus Coagulans-Inulin (PROBIOTIC FORMULA) 1-250 BILLION-MG CAPS 1 CAPSULE ORALLY DAILY 31 capsule 11    bisacodyl (DULCOLAX) 5 MG EC tablet Take 1 tablet (5 mg) by mouth 2 times daily as needed for constipation      chlorhexidine (PERIDEX) 0.12 % solution Swish and spit 15 mLs in mouth 2 times daily May self administer 118 mL 3    cholecalciferol (VITAMIN D3) 125 mcg (5000 units) capsule 1 CAPSULE ORALLY DAILY 90 capsule 3    COMBIVENT RESPIMAT  MCG/ACT inhaler INHALE 1 PUFF INTO THE LUNGS  4 TIMES DAILY 4 g 11    cycloSPORINE (RESTASIS) 0.05 % ophthalmic emulsion Place 1 drop into both eyes 2 times daily       demeclocycline (DECLOMYCIN) 150 MG tablet 1 TABLET ORALLY 2 TIMES DAILY 60 tablet 11    fish oil-omega-3  fatty acids 1000 MG capsule Take 2 capsules (2 g) by mouth daily      fluorometholone (FML LIQUIFILM) 0.1 % ophthalmic susp Place 1 drop Into the left eye daily       hydrocortisone 1 % CREA cream Place rectally 2 times daily as needed for itching      IBANDRONATE SODIUM PO Take 150 mg by mouth every 30 days      levothyroxine (SYNTHROID/LEVOTHROID) 75 MCG tablet 1 TABLET ORALLY EVERY MORNING ON AN EMPTY STOMACH (DX: HYPOTHYROIDISM) 30 tablet 11    loratadine (CLARITIN) 10 MG tablet 1 TABLET ORALLY DAILY (DX: ASTHMA) 28 tablet 11    melatonin 3 MG tablet 1 TABLET ORALLY AT BEDTIME ALONG WITH 5 MG FOR A TOTAL DOSE OF 8MG 30 tablet 11    melatonin 5 MG tablet 1 TABLET ORALLY AT BEDTIME  ALONG WITH 3MG FOR A TOTAL DOSE OF 8MG 30 tablet 11    metoprolol tartrate (LOPRESSOR) 25 MG tablet 1 TABLET ORALLY 2 TIMES DAILY (DX: HYPERTENSION) 56 tablet 11    mirtazapine (REMERON) 7.5 MG tablet 1 TABLET ORALLY AT BEDTIME 31 tablet 11    Multiple Vitamins-Minerals (PRESERVISION AREDS 2) CAPS 1 CAPSULE ORALLY 2 TIMES DAILY 62 capsule 11    omeprazole (PRILOSEC) 20 MG DR capsule 1 CAPSULE ORALLY 2 TIMES DAILY (DX: GASTROESOPHAGEAL REFLUX DISEASE) 62 capsule 11    OXYGEN-HELIUM IN       polyethylene glycol 0.4%- propylene glycol 0.3% (SYSTANE ULTRA) 0.4-0.3 % SOLN ophthalmic solution Place 1 drop into both eyes 4 times daily      Polyethylene Glycol 3350 (PEG 3350) 17 GM/SCOOP POWD MIX 1 CAPFUL (17 GMS) IN 8 OUNCES WATER AND DRINK ORALLY DAILY (DX: CONSTIPATION) 510 g 11    Probiotic Product (PROBIOTIC PO) Take 1 capsule by mouth every morning      SENEXON-S 8.6-50 MG tablet 1 TABLET ORALLY 2 TIMES DAILY (DX: CONSTIPATION) 56 tablet 11    sodium chloride 1 GM tablet Take 1 tablet (1 g) by mouth daily 30 tablet 11    spironolactone (ALDACTONE) 25 MG tablet 1 TABLET ORALLY DAILY (DX: EDEMA) ** HAZARDOUS MED: WEAR DOUBLE NITRILE GLOVES** (Patient taking differently: Take 25 mg by mouth daily Hold for SBP <110mmHg) 28 tablet 11     "timolol (TIMOPTIC) 0.5 % ophthalmic solution Place 1 drop Into the left eye daily       torsemide (DEMADEX) 20 MG tablet 1 TABLET ORALLY DAILY 30 tablet 11    vitamin C (ASCORBIC ACID) 500 MG tablet 1 TABLET ORALLY DAILY (DX: SUPPLEMENT) 31 tablet 11       REVIEW OF SYSTEMS:  4 point ROS neg other than the symptoms noted above in the HPI.  She is downplaying what is visible to everyone else - breathing and temp of her skin.      PHYSICAL EXAM:  /60   Pulse 83   Temp 98.4  F (36.9  C)   Resp 21   Wt 43 kg (94 lb 12.8 oz)   SpO2 95%   BMI 17.34 kg/m    Georgia is up in her wheelchair propelling self around, giving directions to her niece.    Breathing is heavy and even.  Not catching her breath to stop talking.  Skin is pink, warm on her forehead but do not feel it is clammy.    Lungs are clear to good expansion.  No oxygen used.    Heart rate regular/irregular   No edema in lower legs.    Labs taken at her clinic appointment.        ASSESSMENT / PLAN:  (R50.81) Fever in other diseases  (primary encounter diagnosis)  (R06.02) Shortness of breath  (F41.9,  F32.A) Anxiety and depression  Comment: do feel her feverish feeling is from her injection for anemia but also she is \"worked\" up with the commotion in her apartment and trying to give directions.    Her breathing is probably the same reason.  She is due for an inhaler that staff have to bring to her.    Told her that after everything settles down and if having issues then to let nursing know and can address again.  Otherwise no new orders.  Did let nursing know about the visit.      (D46.4) Refractory anemia (H)  Comment: most likely having a little reaction from her lower HgB and injection that should subside on own.  Staff aware of the visit and if she calls later due to unresolved breathing or feeling feverish.       Orders:  No new orders.  Did fill out a new handicap application for driving for 6 years that her niece will help [place] fill out the " first page and then bring to the DMV.    Electronically signed by  CASTRO Hazel CNP

## 2024-04-16 NOTE — LETTER
2024        RE: Marla Dunn  C/o Deneen Dunn  [address] [place] [address]        [facility] GERIATRICS  ACUTE/EPISODIC VISIT    F[facility]SHAD [facility] Medical Record Number:  [id number]  Place of Service where encounter took place:  [facility] [place] ([facility]) [[id number]]    Chief Complaint   Patient presents with     RECHECK       HPI:    Marla Dunn is a 89 year old  (1929), who is being seen today for an episodic care visit.  HPI information obtained from: facility staff, patient report, and family/first contact niece report.    Today's concern is:    Diagnoses         Codes Comments    Fever in other diseases    -  Primary R50.81     Refractory anemia (H)     D46.4     Shortness of breath     R06.02     Anxiety and depression     F41.9, F32.A           Received a message from [place]'s niece asking if this NP could come up to see her if in the building due to [place] feeling feverish, and short of breath.  Also asked for a new application for handicap plague.  Other one .      Went up to [facility] and the door was open.  Saw that her bed was in the middle of her living room and walked in further to see that she received a hospital bed.  Asked the niece about it and she said that in transitional care they did a face-to-face to get her a hospital bed so that she can adjust the head and feet to accommodate her needs of mobility.    Surgery was sitting in her wheelchair in her bedroom and could see that she was having some anxiety about this whole transition between the regular bed and putting up the new hospital bed.  It was clear that she was having some shortness of breath, but Georgia felt it was more related to anxiety.  The niece stated that they were out earlier today and she was able to get her injection for her anemia as her hemoglobin level was low enough.  Suspect that her feverish skin may be related to her injection for anemia.    ALLERGIES:     Allergies   Allergen Reactions     Gramineae Pollens Other (See Comments)     ORCHARDGRASS. Shortness of breath when lawn is just cut.     Smoke. Other (See Comments)     Hard to breathe.     Pollen Extract      Other reaction(s): Unknown        MEDICATIONS:  Post Discharge Medication Reconciliation Status: patient was not discharged from an inpatient facility or TCU.     Current Outpatient Medications   Medication Sig Dispense Refill     acetaminophen (TYLENOL) 325 MG tablet Take 3 tablets (975 mg) by mouth every 8 hours as needed for mild pain       albuterol (PROAIR HFA/PROVENTIL HFA/VENTOLIN HFA) 108 (90 Base) MCG/ACT inhaler INHALE 2 PUFFS INTO THE LUNGS 4 TIMES DAILY  (DX: CHRONIC OBSTRUCTIVE PULMONARY DISEASE) 18 g 11     amoxicillin (AMOXIL) 500 MG capsule 4 CAPSULES (2000MG ) ORALLY AS NEEDED ONE HOUR PRIOR TO DENTAL APPOINTMENT 4 capsule 5     ANTACID REGULAR STRENGTH 500 MG chewable tablet 2 TABLETS (1000MG) ORALLY 2 TIMES DAILY AS NEEDED FOR HEARTBURN 60 tablet 11     apixaban ANTICOAGULANT (ELIQUIS) 2.5 MG tablet Take 1 tablet (2.5 mg) by mouth 2 times daily       Ascorbic Acid (VITAMIN C PO) Take 500 mg by mouth every morning       ASPIRIN LOW DOSE 81 MG EC tablet 1 TABLET ORALLY 2 TIMES DAILY (DX: PROPHYLAXIS) 180 tablet 3     atorvastatin (LIPITOR) 40 MG tablet Take 1 tablet (40 mg) by mouth daily       Bacillus Coagulans-Inulin (PROBIOTIC FORMULA) 1-250 BILLION-MG CAPS 1 CAPSULE ORALLY DAILY 31 capsule 11     bisacodyl (DULCOLAX) 5 MG EC tablet Take 1 tablet (5 mg) by mouth 2 times daily as needed for constipation       chlorhexidine (PERIDEX) 0.12 % solution Swish and spit 15 mLs in mouth 2 times daily May self administer 118 mL 3     cholecalciferol (VITAMIN D3) 125 mcg (5000 units) capsule 1 CAPSULE ORALLY DAILY 90 capsule 3     COMBIVENT RESPIMAT  MCG/ACT inhaler INHALE 1 PUFF INTO THE LUNGS  4 TIMES DAILY 4 g 11     cycloSPORINE (RESTASIS) 0.05 % ophthalmic emulsion Place 1 drop into  both eyes 2 times daily        demeclocycline (DECLOMYCIN) 150 MG tablet 1 TABLET ORALLY 2 TIMES DAILY 60 tablet 11     fish oil-omega-3 fatty acids 1000 MG capsule Take 2 capsules (2 g) by mouth daily       fluorometholone (FML LIQUIFILM) 0.1 % ophthalmic susp Place 1 drop Into the left eye daily        hydrocortisone 1 % CREA cream Place rectally 2 times daily as needed for itching       IBANDRONATE SODIUM PO Take 150 mg by mouth every 30 days       levothyroxine (SYNTHROID/LEVOTHROID) 75 MCG tablet 1 TABLET ORALLY EVERY MORNING ON AN EMPTY STOMACH (DX: HYPOTHYROIDISM) 30 tablet 11     loratadine (CLARITIN) 10 MG tablet 1 TABLET ORALLY DAILY (DX: ASTHMA) 28 tablet 11     melatonin 3 MG tablet 1 TABLET ORALLY AT BEDTIME ALONG WITH 5 MG FOR A TOTAL DOSE OF 8MG 30 tablet 11     melatonin 5 MG tablet 1 TABLET ORALLY AT BEDTIME  ALONG WITH 3MG FOR A TOTAL DOSE OF 8MG 30 tablet 11     metoprolol tartrate (LOPRESSOR) 25 MG tablet 1 TABLET ORALLY 2 TIMES DAILY (DX: HYPERTENSION) 56 tablet 11     mirtazapine (REMERON) 7.5 MG tablet 1 TABLET ORALLY AT BEDTIME 31 tablet 11     Multiple Vitamins-Minerals (PRESERVISION AREDS 2) CAPS 1 CAPSULE ORALLY 2 TIMES DAILY 62 capsule 11     omeprazole (PRILOSEC) 20 MG DR capsule 1 CAPSULE ORALLY 2 TIMES DAILY (DX: GASTROESOPHAGEAL REFLUX DISEASE) 62 capsule 11     OXYGEN-HELIUM IN        polyethylene glycol 0.4%- propylene glycol 0.3% (SYSTANE ULTRA) 0.4-0.3 % SOLN ophthalmic solution Place 1 drop into both eyes 4 times daily       Polyethylene Glycol 3350 (PEG 3350) 17 GM/SCOOP POWD MIX 1 CAPFUL (17 GMS) IN 8 OUNCES WATER AND DRINK ORALLY DAILY (DX: CONSTIPATION) 510 g 11     Probiotic Product (PROBIOTIC PO) Take 1 capsule by mouth every morning       SENEXON-S 8.6-50 MG tablet 1 TABLET ORALLY 2 TIMES DAILY (DX: CONSTIPATION) 56 tablet 11     sodium chloride 1 GM tablet Take 1 tablet (1 g) by mouth daily 30 tablet 11     spironolactone (ALDACTONE) 25 MG tablet 1 TABLET ORALLY DAILY  "(DX: EDEMA) ** HAZARDOUS MED: WEAR DOUBLE NITRILE GLOVES** (Patient taking differently: Take 25 mg by mouth daily Hold for SBP <110mmHg) 28 tablet 11     timolol (TIMOPTIC) 0.5 % ophthalmic solution Place 1 drop Into the left eye daily        torsemide (DEMADEX) 20 MG tablet 1 TABLET ORALLY DAILY 30 tablet 11     vitamin C (ASCORBIC ACID) 500 MG tablet 1 TABLET ORALLY DAILY (DX: SUPPLEMENT) 31 tablet 11       REVIEW OF SYSTEMS:  4 point ROS neg other than the symptoms noted above in the HPI.  She is downplaying what is visible to everyone else - breathing and temp of her skin.      PHYSICAL EXAM:  /60   Pulse 83   Temp 98.4  F (36.9  C)   Resp 21   Wt 43 kg (94 lb 12.8 oz)   SpO2 95%   BMI 17.34 kg/m    Georgia is up in her wheelchair propelling self around, giving directions to her niece.    Breathing is heavy and even.  Not catching her breath to stop talking.  Skin is pink, warm on her forehead but do not feel it is clammy.    Lungs are clear to good expansion.  No oxygen used.    Heart rate regular/irregular   No edema in lower legs.    Labs taken at her clinic appointment.        ASSESSMENT / PLAN:  (R50.81) Fever in other diseases  (primary encounter diagnosis)  (R06.02) Shortness of breath  (F41.9,  F32.A) Anxiety and depression  Comment: do feel her feverish feeling is from her injection for anemia but also she is \"worked\" up with the commotion in her apartment and trying to give directions.    Her breathing is probably the same reason.  She is due for an inhaler that staff have to bring to her.    Told her that after everything settles down and if having issues then to let nursing know and can address again.  Otherwise no new orders.  Did let nursing know about the visit.      (D46.4) Refractory anemia (H)  Comment: most likely having a little reaction from her lower HgB and injection that should subside on own.  Staff aware of the visit and if she calls later due to unresolved breathing or " feeling feverish.       Orders:  No new orders.  Did fill out a new handicap application for driving for 6 years that her niece will help Georgia fill out the first page and then bring to the DMV.    Electronically signed by  CASTRO Hazel CNP            Sincerely,        CASTRO Hazel CNP

## 2024-04-17 ENCOUNTER — OFFICE VISIT (OUTPATIENT)
Dept: CARDIOLOGY | Facility: CLINIC | Age: 89
End: 2024-04-17
Attending: INTERNAL MEDICINE
Payer: COMMERCIAL

## 2024-04-17 VITALS
BODY MASS INDEX: 18.29 KG/M2 | SYSTOLIC BLOOD PRESSURE: 118 MMHG | HEART RATE: 68 BPM | WEIGHT: 100 LBS | DIASTOLIC BLOOD PRESSURE: 54 MMHG | RESPIRATION RATE: 20 BRPM

## 2024-04-17 DIAGNOSIS — I48.21 PERMANENT ATRIAL FIBRILLATION (H): ICD-10-CM

## 2024-04-17 DIAGNOSIS — I63.10 CEREBROVASCULAR ACCIDENT (CVA) DUE TO EMBOLISM OF PRECEREBRAL ARTERY (H): Primary | ICD-10-CM

## 2024-04-17 DIAGNOSIS — I27.20 PULMONARY HYPERTENSION (H): ICD-10-CM

## 2024-04-17 DIAGNOSIS — I34.0 NONRHEUMATIC MITRAL VALVE REGURGITATION: ICD-10-CM

## 2024-04-17 DIAGNOSIS — I48.20 CHRONIC A-FIB (H): ICD-10-CM

## 2024-04-17 DIAGNOSIS — I25.10 CORONARY ARTERY DISEASE INVOLVING NATIVE CORONARY ARTERY OF NATIVE HEART, UNSPECIFIED WHETHER ANGINA PRESENT: ICD-10-CM

## 2024-04-17 DIAGNOSIS — Z95.2 S/P AVR (AORTIC VALVE REPLACEMENT): ICD-10-CM

## 2024-04-17 PROCEDURE — 99214 OFFICE O/P EST MOD 30 MIN: CPT | Performed by: INTERNAL MEDICINE

## 2024-04-17 NOTE — LETTER
4/17/2024    CASTRO Hazel CNP  [address] [facility] [address]    RE: Marla Rasmussensheila       Dear Colleague,     I had the pleasure of seeing Marla Dunn in the [facility].      Thank you, Dr. Gabrielle Wallace, CNP, for asking the [facility] team to see Ms. Marla Dunn to follow-up on recent embolic stroke and possible Watchman device.    Assessment/Recommendations   Assessment:    1.  Status post right MCA territory CVA, treated with thrombolytic therapy with marked improvement in neurologic deficits.  No evidence of significant large vessel cerebrovascular disease and it is suspected that her stroke may have been embolic due to her chronic atrial fibrillation and lack of anticoagulant therapy.  At this point, recommended consideration of a Watchman device implant although on clear whether she would be a good candidate given her structural heart disease.  Have recommended referral to the Watchman clinic for further discussions.  2.  Chronic atrial fibrillation, rate controlled.  Patient had been off anticoagulation for several years.  Had discussed previously with her about pursuing a Watchman device implant although the patient and her family elected not to pursue it because of the need to be on anticoagulation both before and after the device implant along with concerns of frequent falls.  She is now on Eliquis anticoagulation following her recent stroke.  Again recommended referral to our A-fib clinic to see whether they feel she would be a candidate for the Watchman device.  3.  Coronary artery disease, status post coronary artery bypass grafting in 2009 at the time of aortic valve replacement.  Has had no recent anginal symptoms although activity has been limited since her fall several years ago.  Recent echocardiogram does demonstrate evidence of regional wall motion abnormality which was not there several years ago suggesting  progression of her coronary disease.  4.  Aortic valve disease status post bioprosthetic aortic valve replacement in 2009.  Recent echocardiogram demonstrates normal bioprosthetic valve function.  5.  Moderate to severe mitral stenosis with mild mitral regurgitation.  There has been progression in the degree of mitral valve stenosis since July 2022.  6.  Moderate to severe pulmonary hypertension, likely due to to combination of COPD and left heart disease.  She is on combination of torsemide and spironolactone therapy.    Plan:  1.  Continue current medications for now  2.  Referral to our A-fib clinic to discuss possible Watchman device implant       History of Present Illness    Ms. Marla Dunn is a 89 year old female with history of coronary artery disease and aortic valve stenosis status post CABG and bioprosthetic aortic valve replacement in 2009, mitral valve stenosis/regurgitation, chronic atrial fibrillation not on anticoagulation due to frequent falls and previous refusal of a Watchman device implant who presents to the office today following recent hospitalization for a CVA.  Patient was awake in the morning and making her bed when she suddenly noted weakness and inability to speak.  She was able to holler to get help and was brought to the emergency room where she was diagnosed with acute CVA involving the right MCA distribution.  Was given thrombolytic therapy with marked improvement in symptoms.  Further workup disclosed no significant large vessel obstructive disease and it was felt that her stroke was likely embolic due to her atrial fibrillation and not being on anticoagulation.  She has since been started on apixaban and is referred here for consideration of Watchman device implant.    ECG (personally reviewed): No ECG today    Cardiac Imaging Studies (personally reviewed):        Procedure  Complete Portable Echo  Adult.  ______________________________________________________________________________  Interpretation Summary     1. Normal left ventricular size and systolic performance with a visually  estimated ejection fraction of 65%.  2. There is severe basal inferior hypokinesis and moderate basal posterior  hypokinesis  3. There is mild concentric increase in left ventricular wall thickness.  4. 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  Â  Normal aortic valve prosthesis metrics with a mean systolic gradient of 19  mmHg and a peak anterograde velocity of 2.8 m/sec.  Â  No aortic insufficiency is detected.  5. There is moderate-severe calcific mitral stenosis.  6. Normal right ventricular size with mildly reduced right ventricular  systolic performance.  7. There is severe left atrial enlargement. There is moderate right atrial  enlargement.     When compared to the prior real-time echocardiogram dated 6 September 2022,  the degree of mitral stenosis has increased from moderate to now moderate-  severe. The findings are otherwise felt to be fairly similar on both  examinations. The aforementioned regional wall motion abnormalities are not  new and were identified on the prior study as well.    Physical Examination Review of Systems   /54 (BP Location: Right arm, Patient Position: Sitting, Cuff Size: Adult Regular)   Pulse 68   Resp 20   Wt 45.4 kg (100 lb)   BMI 18.29 kg/m    Body mass index is 18.29 kg/m .  Wt Readings from Last 3 Encounters:   04/17/24 45.4 kg (100 lb)   04/16/24 43 kg (94 lb 12.8 oz)   04/01/24 43.9 kg (96 lb 12.8 oz)     General Appearance:   Awake, Alert, No acute distress.   HEENT:  No scleral icterus; the mucous membranes were pink and moist.   Neck: No cervical bruits or jugular venous distention    Chest: The spine was straight. The chest was symmetric.   Lungs:   Respirations unlabored; the lungs are clear to auscultation. No wheezing   Cardiovascular:   Irregularly irregular rhythm.  S1,  S2 normal.  2/6 early peaking crescendo decrescendo systolic murmur heard at the left upper sternal border.   Abdomen:  No organomegaly, masses, bruits, or tenderness. Bowels sounds are present   Extremities: 1+ pitting edema both lower extremities to the lower calfs.   Skin: No xanthelasma. Warm, Dry.   Musculoskeletal: No tenderness.   Neurologic: Mood and affect are appropriate.    Enc Vitals  BP: 118/54  Pulse: 68  Resp: 20  Weight: 45.4 kg (100 lb)                                         Medical History  Surgical History Family History Social History   Past Medical History:   Diagnosis Date    Asthma     Bilateral carpal tunnel syndrome 08/27/2015    CAD (coronary artery disease)     Chronic airway obstruction, not elsewhere classified     Created by Conversion     Chronic anemia     Chronic edema     Congestive heart failure, severe (H) 05/13/2020    COPD (chronic obstructive pulmonary disease) (H)     Coronary artery disease     Depression     GERD (gastroesophageal reflux disease)     Heart disease     Heart murmur     High cholesterol     dislipidemia    HTN (hypertension)     Hypercholesterolemia     Hypertension     Hypertensive emergency 08/13/2021    Hypothyroidism     Hypothyroidism     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    MI (myocardial infarction) (H) 1985    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    Myelodysplastic syndrome (H)     Myocardial infarction (H) 1983    OLEGARIO (obstructive sleep apnea)     Osteoporosis     Other and unspecified hyperlipidemia     Created by Conversion     Pyelonephritis 05/11/2016    Radicular low back pain     Rheumatoid arthritis (H)     Elizabeth Agers syndrome     Sjoegren syndrome     Sjogren's syndrome (H24)     Sleep apnea, obstructive     Spinal stenosis     Squamous cell carcinoma, tongue border (H)     Unspecified polyarthropathy or polyarthritis, hand     Created by Conversion     Past Surgical History:   Procedure Laterality Date    aortic valve       AORTIC VALVE REPLACEMENT  2012    APPENDECTOMY      APPENDECTOMY      AS TOTAL KNEE ARTHROPLASTY Right     BACK SURGERY  2004    spinal decompression    BACK SURGERY      spinal stenosis    BONE MARROW BIOPSY  2004    breast biopsy      BREAST SURGERY      biopsy    BYPASS GRAFT ARTERY CORONARY  2009    LIMA to ramus intermedius    cardiac angiogram      CARDIAC CATHETERIZATION      CARDIAC SURGERY      EYE SURGERY  2008    bilateral cataract repair     EYE SURGERY Bilateral     cornea transplant left eye & cataracts    KYPHOPLASTY      T12    OPEN REDUCTION INTERNAL FIXATION HIP NAILING Right 2021    Procedure: INTERNAL FIXATION, FRACTURE, TROCHANTERIC, HIP, USING PINS OR RODS;  Surgeon: Darrel Castro MD;  Location: [facility] OR    OTHER SURGICAL HISTORY  2018    Rectosigmoidectomy perineal    PARTIAL GLOSSECTOMY Right 2023    [facility]    RECTOSIGMOIDECTOMY PERINEAL N/A 01/10/2018    Procedure: RECTOSIGMOIDECTOMY PERINEAL;  PERINEAL RECTOSIGMOIDECTOMY ;  Surgeon: Candelaria Anthony MD;  Location: [facility] OR    REPAIR VALVE AORTIC  2009    THORACIC SURGERY      T12 kyphoplasty    ZZC CABG, ARTERY-VEIN, FOUR      ZZC CABG, VEIN, SINGLE      Description: CABG (CABG);  Recorded: 2012;  Comments: Single vessel bypass graft to an obtuse marginal branch in May 2009    ZZC REPLACE AORT VALV,PROSTH VALV      Description: Aortic Valve Replacement;  Recorded: 2012;  Comments: Aortic valve replacement    Family History   Problem Relation Age of Onset    Cancer Mother         ovarian cancer;  in her 50s    Family History Negative Mother     Heart Disease Father     Cancer Brother     Cancer Sister     Social History     Socioeconomic History    Marital status:      Spouse name: Not on file    Number of children: Not on file    Years of education: Not on file    Highest education level: Not on file   Occupational History    Not on file    Tobacco Use    Smoking status: Never    Smokeless tobacco: Never   Substance and Sexual Activity    Alcohol use: No     Alcohol/week: 0.0 standard drinks of alcohol     Comment: Alcoholic Drinks/day: occasional glass of wine    Drug use: No    Sexual activity: Not on file   Other Topics Concern    Not on file   Social History Narrative    .  passed away 20 years ago. No children. 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 Was living independently in her apartment in [place] prior to admission. Uses walker at baseline.      Social Determinants of Health     Financial Resource Strain: Not on file   Food Insecurity: Not on file   Transportation Needs: Not on file   Physical Activity: Not on file   Stress: Not on file   Social Connections: Not on file   Interpersonal Safety: Not on file   Housing Stability: Not on file          Medications  Allergies   Current Outpatient Medications   Medication Sig Dispense Refill    acetaminophen (TYLENOL) 325 MG tablet Take 3 tablets (975 mg) by mouth every 8 hours as needed for mild pain      albuterol (PROAIR HFA/PROVENTIL HFA/VENTOLIN HFA) 108 (90 Base) MCG/ACT inhaler INHALE 2 PUFFS INTO THE LUNGS 4 TIMES DAILY  (DX: CHRONIC OBSTRUCTIVE PULMONARY DISEASE) 18 g 11    amoxicillin (AMOXIL) 500 MG capsule 4 CAPSULES (2000MG ) ORALLY AS NEEDED ONE HOUR PRIOR TO DENTAL APPOINTMENT 4 capsule 5    ANTACID REGULAR STRENGTH 500 MG chewable tablet 2 TABLETS (1000MG) ORALLY 2 TIMES DAILY AS NEEDED FOR HEARTBURN 60 tablet 11    apixaban ANTICOAGULANT (ELIQUIS) 2.5 MG tablet Take 1 tablet (2.5 mg) by mouth 2 times daily      ASPIRIN LOW DOSE 81 MG EC tablet 1 TABLET ORALLY 2 TIMES DAILY (DX: PROPHYLAXIS) 180 tablet 3    atorvastatin (LIPITOR) 40 MG tablet Take 1 tablet (40 mg) by mouth daily      Bacillus Coagulans-Inulin (PROBIOTIC FORMULA) 1-250 BILLION-MG CAPS 1 CAPSULE ORALLY DAILY 31 capsule 11    bisacodyl (DULCOLAX) 5 MG EC tablet Take 1 tablet  (5 mg) by mouth 2 times daily as needed for constipation      chlorhexidine (PERIDEX) 0.12 % solution Swish and spit 15 mLs in mouth 2 times daily May self administer 118 mL 3    cholecalciferol (VITAMIN D3) 125 mcg (5000 units) capsule 1 CAPSULE ORALLY DAILY 90 capsule 3    COMBIVENT RESPIMAT  MCG/ACT inhaler INHALE 1 PUFF INTO THE LUNGS  4 TIMES DAILY 4 g 11    cycloSPORINE (RESTASIS) 0.05 % ophthalmic emulsion Place 1 drop into both eyes 2 times daily       demeclocycline (DECLOMYCIN) 150 MG tablet 1 TABLET ORALLY 2 TIMES DAILY 60 tablet 11    fish oil-omega-3 fatty acids 1000 MG capsule Take 2 capsules (2 g) by mouth daily      fluorometholone (FML LIQUIFILM) 0.1 % ophthalmic susp Place 1 drop Into the left eye daily       hydrocortisone 1 % CREA cream Place rectally 2 times daily as needed for itching      IBANDRONATE SODIUM PO Take 150 mg by mouth every 30 days      levothyroxine (SYNTHROID/LEVOTHROID) 75 MCG tablet 1 TABLET ORALLY EVERY MORNING ON AN EMPTY STOMACH (DX: HYPOTHYROIDISM) 30 tablet 11    loratadine (CLARITIN) 10 MG tablet 1 TABLET ORALLY DAILY (DX: ASTHMA) 28 tablet 11    melatonin 3 MG tablet 1 TABLET ORALLY AT BEDTIME ALONG WITH 5 MG FOR A TOTAL DOSE OF 8MG 30 tablet 11    melatonin 5 MG tablet 1 TABLET ORALLY AT BEDTIME  ALONG WITH 3MG FOR A TOTAL DOSE OF 8MG 30 tablet 11    metoprolol tartrate (LOPRESSOR) 25 MG tablet 1 TABLET ORALLY 2 TIMES DAILY (DX: HYPERTENSION) 56 tablet 11    mirtazapine (REMERON) 7.5 MG tablet 1 TABLET ORALLY AT BEDTIME 31 tablet 11    Multiple Vitamins-Minerals (PRESERVISION AREDS 2) CAPS 1 CAPSULE ORALLY 2 TIMES DAILY 62 capsule 11    omeprazole (PRILOSEC) 20 MG DR capsule 1 CAPSULE ORALLY 2 TIMES DAILY (DX: GASTROESOPHAGEAL REFLUX DISEASE) 62 capsule 11    OXYGEN-HELIUM IN every evening      polyethylene glycol 0.4%- propylene glycol 0.3% (SYSTANE ULTRA) 0.4-0.3 % SOLN ophthalmic solution Place 1 drop into both eyes 4 times daily      Polyethylene Glycol 3350  (PEG 3350) 17 GM/SCOOP POWD MIX 1 CAPFUL (17 GMS) IN 8 OUNCES WATER AND DRINK ORALLY DAILY (DX: CONSTIPATION) 510 g 11    Probiotic Product (PROBIOTIC PO) Take 1 capsule by mouth every morning      SENEXON-S 8.6-50 MG tablet 1 TABLET ORALLY 2 TIMES DAILY (DX: CONSTIPATION) 56 tablet 11    sodium chloride 1 GM tablet Take 1 tablet (1 g) by mouth daily 30 tablet 11    spironolactone (ALDACTONE) 25 MG tablet 1 TABLET ORALLY DAILY (DX: EDEMA) ** HAZARDOUS MED: WEAR DOUBLE NITRILE GLOVES** (Patient taking differently: Take 25 mg by mouth daily Hold for SBP <110mmHg) 28 tablet 11    timolol (TIMOPTIC) 0.5 % ophthalmic solution Place 1 drop Into the left eye daily       torsemide (DEMADEX) 20 MG tablet 1 TABLET ORALLY DAILY 30 tablet 11    vitamin C (ASCORBIC ACID) 500 MG tablet 1 TABLET ORALLY DAILY (DX: SUPPLEMENT) 31 tablet 11      Allergies   Allergen Reactions    Gramineae Pollens Other (See Comments)     ORCHARDGRASS. Shortness of breath when lawn is just cut.    Smoke. Other (See Comments)     Hard to breathe.    Pollen Extract      Other reaction(s): Unknown         Lab Results    Chemistry/lipid CBC Cardiac Enzymes/BNP/TSH/INR   Recent Labs   Lab Test 04/01/24  1116 03/02/24  0406 03/01/24  0841   TRIG  --   --  72   LDL  --   --  81   BUN 31.0*   < > 35.0*      < > 131*   CO2 23   < > 25    < > = values in this interval not displayed.    Recent Labs   Lab Test 03/03/24  0640   WBC 7.0   HGB 11.0*   HCT 35.0   *       Recent Labs   Lab Test 03/14/24  0947 03/13/24  0851 03/01/24  0841 10/31/22  1113 09/07/22  0435 09/06/22 2001   TROPONINI  --   --   --   --   --  0.79*   BNP  --   --   --   --  377*  --    TSH 2.64   < >  --    < >  --   --    INR  --   --  1.09  --   --   --     < > = values in this interval not displayed.        A total of 37 minutes was spent reviewing patient's medical records, obtaining history and performing examination, as well as discussing diagnoses/ recommendations  with patient and answering all questions.                        Thank you for allowing me to participate in the care of your patient.      Sincerely,     Julia Amaya MD     [facility]  cc:   Julia Amaya MD  [address], [address]  [place] [address]

## 2024-04-17 NOTE — PROGRESS NOTES
Thank you, Dr. Gabrielle Wallace, CNP, for asking the [facility] team to see Ms. Marla Dunn to follow-up on recent embolic stroke and possible Watchman device.    Assessment/Recommendations   Assessment:    1.  Status post right MCA territory CVA, treated with thrombolytic therapy with marked improvement in neurologic deficits.  No evidence of significant large vessel cerebrovascular disease and it is suspected that her stroke may have been embolic due to her chronic atrial fibrillation and lack of anticoagulant therapy.  At this point, recommended consideration of a Watchman device implant although on clear whether she would be a good candidate given her structural heart disease.  Have recommended referral to the Watchman clinic for further discussions.  2.  Chronic atrial fibrillation, rate controlled.  Patient had been off anticoagulation for several years.  Had discussed previously with her about pursuing a Watchman device implant although the patient and her family elected not to pursue it because of the need to be on anticoagulation both before and after the device implant along with concerns of frequent falls.  She is now on Eliquis anticoagulation following her recent stroke.  Again recommended referral to our A-fib clinic to see whether they feel she would be a candidate for the Watchman device.  3.  Coronary artery disease, status post coronary artery bypass grafting in 2009 at the time of aortic valve replacement.  Has had no recent anginal symptoms although activity has been limited since her fall several years ago.  Recent echocardiogram does demonstrate evidence of regional wall motion abnormality which was not there several years ago suggesting progression of her coronary disease.  4.  Aortic valve disease status post bioprosthetic aortic valve replacement in 2009.  Recent echocardiogram demonstrates normal bioprosthetic valve function.  5.  Moderate to severe mitral  stenosis with mild mitral regurgitation.  There has been progression in the degree of mitral valve stenosis since July 2022.  6.  Moderate to severe pulmonary hypertension, likely due to to combination of COPD and left heart disease.  She is on combination of torsemide and spironolactone therapy.    Plan:  1.  Continue current medications for now  2.  Referral to our A-fib clinic to discuss possible Watchman device implant       History of Present Illness    Ms. Marla Dunn is a 89 year old female with history of coronary artery disease and aortic valve stenosis status post CABG and bioprosthetic aortic valve replacement in 2009, mitral valve stenosis/regurgitation, chronic atrial fibrillation not on anticoagulation due to frequent falls and previous refusal of a Watchman device implant who presents to the office today following recent hospitalization for a CVA.  Patient was awake in the morning and making her bed when she suddenly noted weakness and inability to speak.  She was able to holler to get help and was brought to the emergency room where she was diagnosed with acute CVA involving the right MCA distribution.  Was given thrombolytic therapy with marked improvement in symptoms.  Further workup disclosed no significant large vessel obstructive disease and it was felt that her stroke was likely embolic due to her atrial fibrillation and not being on anticoagulation.  She has since been started on apixaban and is referred here for consideration of Watchman device implant.    ECG (personally reviewed): No ECG today    Cardiac Imaging Studies (personally reviewed):        Procedure  Complete Portable Echo Adult.  ______________________________________________________________________________  Interpretation Summary     1. Normal left ventricular size and systolic performance with a visually  estimated ejection fraction of 65%.  2. There is severe basal inferior hypokinesis and moderate basal  posterior  hypokinesis  3. There is mild concentric increase in left ventricular wall thickness.  4. 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  Â  Normal aortic valve prosthesis metrics with a mean systolic gradient of 19  mmHg and a peak anterograde velocity of 2.8 m/sec.  Â  No aortic insufficiency is detected.  5. There is moderate-severe calcific mitral stenosis.  6. Normal right ventricular size with mildly reduced right ventricular  systolic performance.  7. There is severe left atrial enlargement. There is moderate right atrial  enlargement.     When compared to the prior real-time echocardiogram dated 6 September 2022,  the degree of mitral stenosis has increased from moderate to now moderate-  severe. The findings are otherwise felt to be fairly similar on both  examinations. The aforementioned regional wall motion abnormalities are not  new and were identified on the prior study as well.    Physical Examination Review of Systems   /54 (BP Location: Right arm, Patient Position: Sitting, Cuff Size: Adult Regular)   Pulse 68   Resp 20   Wt 45.4 kg (100 lb)   BMI 18.29 kg/m    Body mass index is 18.29 kg/m .  Wt Readings from Last 3 Encounters:   04/17/24 45.4 kg (100 lb)   04/16/24 43 kg (94 lb 12.8 oz)   04/01/24 43.9 kg (96 lb 12.8 oz)     General Appearance:   Awake, Alert, No acute distress.   HEENT:  No scleral icterus; the mucous membranes were pink and moist.   Neck: No cervical bruits or jugular venous distention    Chest: The spine was straight. The chest was symmetric.   Lungs:   Respirations unlabored; the lungs are clear to auscultation. No wheezing   Cardiovascular:   Irregularly irregular rhythm.  S1, S2 normal.  2/6 early peaking crescendo decrescendo systolic murmur heard at the left upper sternal border.   Abdomen:  No organomegaly, masses, bruits, or tenderness. Bowels sounds are present   Extremities: 1+ pitting edema both lower extremities to the lower calfs.   Skin: No  xanthelasma. Warm, Dry.   Musculoskeletal: No tenderness.   Neurologic: Mood and affect are appropriate.    Enc Vitals  BP: 118/54  Pulse: 68  Resp: 20  Weight: 45.4 kg (100 lb)                                         Medical History  Surgical History Family History Social History   Past Medical History:   Diagnosis Date    Asthma     Bilateral carpal tunnel syndrome 08/27/2015    CAD (coronary artery disease)     Chronic airway obstruction, not elsewhere classified     Created by Conversion     Chronic anemia     Chronic edema     Congestive heart failure, severe (H) 05/13/2020    COPD (chronic obstructive pulmonary disease) (H)     Coronary artery disease     Depression     GERD (gastroesophageal reflux disease)     Heart disease     Heart murmur     High cholesterol     dislipidemia    HTN (hypertension)     Hypercholesterolemia     Hypertension     Hypertensive emergency 08/13/2021    Hypothyroidism     Hypothyroidism     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    MI (myocardial infarction) (H) 1985    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    Myelodysplastic syndrome (H)     Myocardial infarction (H) 1983    OLEGARIO (obstructive sleep apnea)     Osteoporosis     Other and unspecified hyperlipidemia     Created by Conversion     Pyelonephritis 05/11/2016    Radicular low back pain     Rheumatoid arthritis (H)     Elizabeth Agers syndrome     Sjoegren syndrome     Sjogren's syndrome (H24)     Sleep apnea, obstructive     Spinal stenosis     Squamous cell carcinoma, tongue border (H)     Unspecified polyarthropathy or polyarthritis, hand     Created by Conversion     Past Surgical History:   Procedure Laterality Date    aortic valve      AORTIC VALVE REPLACEMENT  01/01/2012    APPENDECTOMY      APPENDECTOMY      AS TOTAL KNEE ARTHROPLASTY Right     BACK SURGERY  2004    spinal decompression    BACK SURGERY      spinal stenosis    BONE MARROW BIOPSY  2004    breast biopsy      BREAST SURGERY  2001    biopsy     BYPASS GRAFT ARTERY CORONARY  2009    LIMA to ramus intermedius    cardiac angiogram      CARDIAC CATHETERIZATION      CARDIAC SURGERY      EYE SURGERY      bilateral cataract repair     EYE SURGERY Bilateral     cornea transplant left eye & cataracts    KYPHOPLASTY      T12    OPEN REDUCTION INTERNAL FIXATION HIP NAILING Right 2021    Procedure: INTERNAL FIXATION, FRACTURE, TROCHANTERIC, HIP, USING PINS OR RODS;  Surgeon: Darrel Castro MD;  Location: [facility] OR    OTHER SURGICAL HISTORY  2018    Rectosigmoidectomy perineal    PARTIAL GLOSSECTOMY Right 2023    [facility]    RECTOSIGMOIDECTOMY PERINEAL N/A 01/10/2018    Procedure: RECTOSIGMOIDECTOMY PERINEAL;  PERINEAL RECTOSIGMOIDECTOMY ;  Surgeon: Candelaria Anthony MD;  Location: [facility] OR    REPAIR VALVE AORTIC  2009    THORACIC SURGERY      T12 kyphoplasty    ZZC CABG, ARTERY-VEIN, FOUR      ZZC CABG, VEIN, SINGLE      Description: CABG (CABG);  Recorded: 2012;  Comments: Single vessel bypass graft to an obtuse marginal branch in May 2009    ZZC REPLACE AORT VALV,PROSTH VALV      Description: Aortic Valve Replacement;  Recorded: 2012;  Comments: Aortic valve replacement    Family History   Problem Relation Age of Onset    Cancer Mother         ovarian cancer;  in her 50s    Family History Negative Mother     Heart Disease Father     Cancer Brother     Cancer Sister     Social History     Socioeconomic History    Marital status:      Spouse name: Not on file    Number of children: Not on file    Years of education: Not on file    Highest education level: Not on file   Occupational History    Not on file   Tobacco Use    Smoking status: Never    Smokeless tobacco: Never   Substance and Sexual Activity    Alcohol use: No     Alcohol/week: 0.0 standard drinks of alcohol     Comment: Alcoholic Drinks/day: occasional glass of wine    Drug use: No    Sexual activity: Not on file   Other  Topics Concern    Not on file   Social History Narrative    .  passed away 20 years ago. No children. 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 Was living independently in her apartment in [place] prior to admission. Uses walker at baseline.      Social Determinants of Health     Financial Resource Strain: Not on file   Food Insecurity: Not on file   Transportation Needs: Not on file   Physical Activity: Not on file   Stress: Not on file   Social Connections: Not on file   Interpersonal Safety: Not on file   Housing Stability: Not on file          Medications  Allergies   Current Outpatient Medications   Medication Sig Dispense Refill    acetaminophen (TYLENOL) 325 MG tablet Take 3 tablets (975 mg) by mouth every 8 hours as needed for mild pain      albuterol (PROAIR HFA/PROVENTIL HFA/VENTOLIN HFA) 108 (90 Base) MCG/ACT inhaler INHALE 2 PUFFS INTO THE LUNGS 4 TIMES DAILY  (DX: CHRONIC OBSTRUCTIVE PULMONARY DISEASE) 18 g 11    amoxicillin (AMOXIL) 500 MG capsule 4 CAPSULES (2000MG ) ORALLY AS NEEDED ONE HOUR PRIOR TO DENTAL APPOINTMENT 4 capsule 5    ANTACID REGULAR STRENGTH 500 MG chewable tablet 2 TABLETS (1000MG) ORALLY 2 TIMES DAILY AS NEEDED FOR HEARTBURN 60 tablet 11    apixaban ANTICOAGULANT (ELIQUIS) 2.5 MG tablet Take 1 tablet (2.5 mg) by mouth 2 times daily      ASPIRIN LOW DOSE 81 MG EC tablet 1 TABLET ORALLY 2 TIMES DAILY (DX: PROPHYLAXIS) 180 tablet 3    atorvastatin (LIPITOR) 40 MG tablet Take 1 tablet (40 mg) by mouth daily      Bacillus Coagulans-Inulin (PROBIOTIC FORMULA) 1-250 BILLION-MG CAPS 1 CAPSULE ORALLY DAILY 31 capsule 11    bisacodyl (DULCOLAX) 5 MG EC tablet Take 1 tablet (5 mg) by mouth 2 times daily as needed for constipation      chlorhexidine (PERIDEX) 0.12 % solution Swish and spit 15 mLs in mouth 2 times daily May self administer 118 mL 3    cholecalciferol (VITAMIN D3) 125 mcg (5000 units) capsule 1 CAPSULE ORALLY DAILY 90 capsule 3     COMBIVENT RESPIMAT  MCG/ACT inhaler INHALE 1 PUFF INTO THE LUNGS  4 TIMES DAILY 4 g 11    cycloSPORINE (RESTASIS) 0.05 % ophthalmic emulsion Place 1 drop into both eyes 2 times daily       demeclocycline (DECLOMYCIN) 150 MG tablet 1 TABLET ORALLY 2 TIMES DAILY 60 tablet 11    fish oil-omega-3 fatty acids 1000 MG capsule Take 2 capsules (2 g) by mouth daily      fluorometholone (FML LIQUIFILM) 0.1 % ophthalmic susp Place 1 drop Into the left eye daily       hydrocortisone 1 % CREA cream Place rectally 2 times daily as needed for itching      IBANDRONATE SODIUM PO Take 150 mg by mouth every 30 days      levothyroxine (SYNTHROID/LEVOTHROID) 75 MCG tablet 1 TABLET ORALLY EVERY MORNING ON AN EMPTY STOMACH (DX: HYPOTHYROIDISM) 30 tablet 11    loratadine (CLARITIN) 10 MG tablet 1 TABLET ORALLY DAILY (DX: ASTHMA) 28 tablet 11    melatonin 3 MG tablet 1 TABLET ORALLY AT BEDTIME ALONG WITH 5 MG FOR A TOTAL DOSE OF 8MG 30 tablet 11    melatonin 5 MG tablet 1 TABLET ORALLY AT BEDTIME  ALONG WITH 3MG FOR A TOTAL DOSE OF 8MG 30 tablet 11    metoprolol tartrate (LOPRESSOR) 25 MG tablet 1 TABLET ORALLY 2 TIMES DAILY (DX: HYPERTENSION) 56 tablet 11    mirtazapine (REMERON) 7.5 MG tablet 1 TABLET ORALLY AT BEDTIME 31 tablet 11    Multiple Vitamins-Minerals (PRESERVISION AREDS 2) CAPS 1 CAPSULE ORALLY 2 TIMES DAILY 62 capsule 11    omeprazole (PRILOSEC) 20 MG DR capsule 1 CAPSULE ORALLY 2 TIMES DAILY (DX: GASTROESOPHAGEAL REFLUX DISEASE) 62 capsule 11    OXYGEN-HELIUM IN every evening      polyethylene glycol 0.4%- propylene glycol 0.3% (SYSTANE ULTRA) 0.4-0.3 % SOLN ophthalmic solution Place 1 drop into both eyes 4 times daily      Polyethylene Glycol 3350 (PEG 3350) 17 GM/SCOOP POWD MIX 1 CAPFUL (17 GMS) IN 8 OUNCES WATER AND DRINK ORALLY DAILY (DX: CONSTIPATION) 510 g 11    Probiotic Product (PROBIOTIC PO) Take 1 capsule by mouth every morning      SENEXON-S 8.6-50 MG tablet 1 TABLET ORALLY 2 TIMES DAILY (DX: CONSTIPATION) 56  tablet 11    sodium chloride 1 GM tablet Take 1 tablet (1 g) by mouth daily 30 tablet 11    spironolactone (ALDACTONE) 25 MG tablet 1 TABLET ORALLY DAILY (DX: EDEMA) ** HAZARDOUS MED: WEAR DOUBLE NITRILE GLOVES** (Patient taking differently: Take 25 mg by mouth daily Hold for SBP <110mmHg) 28 tablet 11    timolol (TIMOPTIC) 0.5 % ophthalmic solution Place 1 drop Into the left eye daily       torsemide (DEMADEX) 20 MG tablet 1 TABLET ORALLY DAILY 30 tablet 11    vitamin C (ASCORBIC ACID) 500 MG tablet 1 TABLET ORALLY DAILY (DX: SUPPLEMENT) 31 tablet 11      Allergies   Allergen Reactions    Gramineae Pollens Other (See Comments)     ORCHARDGRASS. Shortness of breath when lawn is just cut.    Smoke. Other (See Comments)     Hard to breathe.    Pollen Extract      Other reaction(s): Unknown         Lab Results    Chemistry/lipid CBC Cardiac Enzymes/BNP/TSH/INR   Recent Labs   Lab Test 04/01/24  1116 03/02/24  0406 03/01/24  0841   TRIG  --   --  72   LDL  --   --  81   BUN 31.0*   < > 35.0*      < > 131*   CO2 23   < > 25    < > = values in this interval not displayed.    Recent Labs   Lab Test 03/03/24  0640   WBC 7.0   HGB 11.0*   HCT 35.0   *       Recent Labs   Lab Test 03/14/24  0947 03/13/24  0851 03/01/24  0841 10/31/22  1113 09/07/22  0435 09/06/22 2001   TROPONINI  --   --   --   --   --  0.79*   BNP  --   --   --   --  377*  --    TSH 2.64   < >  --    < >  --   --    INR  --   --  1.09  --   --   --     < > = values in this interval not displayed.        A total of 37 minutes was spent reviewing patient's medical records, obtaining history and performing examination, as well as discussing diagnoses/ recommendations with patient and answering all questions.

## 2024-04-28 DIAGNOSIS — Z53.9 DIAGNOSIS NOT YET DEFINED: Primary | ICD-10-CM

## 2024-04-28 PROCEDURE — G0179 MD RECERTIFICATION HHA PT: HCPCS | Performed by: NURSE PRACTITIONER

## 2024-04-29 ENCOUNTER — ASSISTED LIVING VISIT (OUTPATIENT)
Dept: GERIATRICS | Facility: CLINIC | Age: 89
End: 2024-04-29
Payer: COMMERCIAL

## 2024-04-29 VITALS
SYSTOLIC BLOOD PRESSURE: 121 MMHG | WEIGHT: 94.8 LBS | RESPIRATION RATE: 21 BRPM | BODY MASS INDEX: 17.34 KG/M2 | DIASTOLIC BLOOD PRESSURE: 60 MMHG | HEART RATE: 83 BPM | OXYGEN SATURATION: 95 % | TEMPERATURE: 98.4 F

## 2024-04-29 DIAGNOSIS — I50.32 CHRONIC DIASTOLIC CONGESTIVE HEART FAILURE (H): Primary | ICD-10-CM

## 2024-04-29 DIAGNOSIS — F41.9 ANXIETY AND DEPRESSION: ICD-10-CM

## 2024-04-29 DIAGNOSIS — F32.A ANXIETY AND DEPRESSION: ICD-10-CM

## 2024-04-29 DIAGNOSIS — I73.9 PVD (PERIPHERAL VASCULAR DISEASE) (H): ICD-10-CM

## 2024-04-29 PROCEDURE — 99348 HOME/RES VST EST LOW MDM 30: CPT | Performed by: NURSE PRACTITIONER

## 2024-04-29 NOTE — PROGRESS NOTES
[facility] GERIATRICS  ACUTE/EPISODIC VISIT    [facility] Medical Record Number:  [id number]  Place of Service where encounter took place:  [facility] ([facility]) [[id number]]    Chief Complaint   Patient presents with    RECHECK       HPI:    Marla Dunn is a 89 year old  (5/22/1929), who is being seen today for an episodic care visit.  HPI information obtained from: facility chart records, facility staff, patient report, and [facility] chart review.    Today's concern is:    Diagnoses         Codes Comments    Chronic diastolic congestive heart failure (H)    -  Primary I50.32     PVD (peripheral vascular disease) (H24)     I73.9     Anxiety and depression     F41.9, F32.A           Came to see [place] today but she had her nephew visiting with her and so stated that this NP can come another time.  Georgia ask for this NP to look at her legs and then requested this NP to come back next week as well.    Georgia was sitting up in her wheelchair that she mainly propels with her feet around her apartment.  She typically is worried about her lower extremities and is always asking this NP to take a look at her legs for various reasons.  Sometimes looks like she has petechiae, sometimes looks like she has nicked her legs, and sometimes looks like she has a blood blister over a vein.    Did an assessment of her legs and spoke to her about the opinion that this nurse practitioner, but in the end [place] it was not interested in doing too much about her legs.    ALLERGIES:    Allergies   Allergen Reactions    Gramineae Pollens Other (See Comments)     ORCHARDGRASS. Shortness of breath when lawn is just cut.    Smoke. Other (See Comments)     Hard to breathe.    Pollen Extract      Other reaction(s): Unknown        MEDICATIONS:  Post Discharge Medication Reconciliation Status: patient was not discharged from an inpatient facility or TCU.     Current Outpatient Medications   Medication Sig Dispense  Refill    acetaminophen (TYLENOL) 325 MG tablet Take 3 tablets (975 mg) by mouth every 8 hours as needed for mild pain      albuterol (PROAIR HFA/PROVENTIL HFA/VENTOLIN HFA) 108 (90 Base) MCG/ACT inhaler INHALE 2 PUFFS INTO THE LUNGS 4 TIMES DAILY  (DX: CHRONIC OBSTRUCTIVE PULMONARY DISEASE) 18 g 11    amoxicillin (AMOXIL) 500 MG capsule 4 CAPSULES (2000MG ) ORALLY AS NEEDED ONE HOUR PRIOR TO DENTAL APPOINTMENT 4 capsule 5    ANTACID REGULAR STRENGTH 500 MG chewable tablet 2 TABLETS (1000MG) ORALLY 2 TIMES DAILY AS NEEDED FOR HEARTBURN 60 tablet 11    apixaban ANTICOAGULANT (ELIQUIS) 2.5 MG tablet Take 1 tablet (2.5 mg) by mouth 2 times daily      ASPIRIN LOW DOSE 81 MG EC tablet 1 TABLET ORALLY 2 TIMES DAILY (DX: PROPHYLAXIS) 180 tablet 3    atorvastatin (LIPITOR) 40 MG tablet Take 1 tablet (40 mg) by mouth daily      Bacillus Coagulans-Inulin (PROBIOTIC FORMULA) 1-250 BILLION-MG CAPS 1 CAPSULE ORALLY DAILY 31 capsule 11    bisacodyl (DULCOLAX) 5 MG EC tablet Take 1 tablet (5 mg) by mouth 2 times daily as needed for constipation      chlorhexidine (PERIDEX) 0.12 % solution Swish and spit 15 mLs in mouth 2 times daily May self administer 118 mL 3    cholecalciferol (VITAMIN D3) 125 mcg (5000 units) capsule 1 CAPSULE ORALLY DAILY 90 capsule 3    COMBIVENT RESPIMAT  MCG/ACT inhaler INHALE 1 PUFF INTO THE LUNGS  4 TIMES DAILY 4 g 11    cycloSPORINE (RESTASIS) 0.05 % ophthalmic emulsion Place 1 drop into both eyes 2 times daily       demeclocycline (DECLOMYCIN) 150 MG tablet 1 TABLET ORALLY 2 TIMES DAILY 60 tablet 11    fish oil-omega-3 fatty acids 1000 MG capsule Take 2 capsules (2 g) by mouth daily      fluorometholone (FML LIQUIFILM) 0.1 % ophthalmic susp Place 1 drop Into the left eye daily       hydrocortisone 1 % CREA cream Place rectally 2 times daily as needed for itching      IBANDRONATE SODIUM PO Take 150 mg by mouth every 30 days      levothyroxine (SYNTHROID/LEVOTHROID) 75 MCG tablet 1 TABLET ORALLY  EVERY MORNING ON AN EMPTY STOMACH (DX: HYPOTHYROIDISM) 30 tablet 11    loratadine (CLARITIN) 10 MG tablet 1 TABLET ORALLY DAILY (DX: ASTHMA) 28 tablet 11    melatonin 3 MG tablet 1 TABLET ORALLY AT BEDTIME ALONG WITH 5 MG FOR A TOTAL DOSE OF 8MG 30 tablet 11    melatonin 5 MG tablet 1 TABLET ORALLY AT BEDTIME  ALONG WITH 3MG FOR A TOTAL DOSE OF 8MG 30 tablet 11    metoprolol tartrate (LOPRESSOR) 25 MG tablet 1 TABLET ORALLY 2 TIMES DAILY (DX: HYPERTENSION) 56 tablet 11    mirtazapine (REMERON) 7.5 MG tablet 1 TABLET ORALLY AT BEDTIME 31 tablet 11    Multiple Vitamins-Minerals (PRESERVISION AREDS 2) CAPS 1 CAPSULE ORALLY 2 TIMES DAILY 62 capsule 11    omeprazole (PRILOSEC) 20 MG DR capsule 1 CAPSULE ORALLY 2 TIMES DAILY (DX: GASTROESOPHAGEAL REFLUX DISEASE) 62 capsule 11    OXYGEN-HELIUM IN every evening      polyethylene glycol 0.4%- propylene glycol 0.3% (SYSTANE ULTRA) 0.4-0.3 % SOLN ophthalmic solution Place 1 drop into both eyes 4 times daily      Polyethylene Glycol 3350 (PEG 3350) 17 GM/SCOOP POWD MIX 1 CAPFUL (17 GMS) IN 8 OUNCES WATER AND DRINK ORALLY DAILY (DX: CONSTIPATION) 510 g 11    Probiotic Product (PROBIOTIC PO) Take 1 capsule by mouth every morning      SENEXON-S 8.6-50 MG tablet 1 TABLET ORALLY 2 TIMES DAILY (DX: CONSTIPATION) 56 tablet 11    sodium chloride 1 GM tablet Take 1 tablet (1 g) by mouth daily 30 tablet 11    spironolactone (ALDACTONE) 25 MG tablet 1 TABLET ORALLY DAILY (DX: EDEMA) ** HAZARDOUS MED: WEAR DOUBLE NITRILE GLOVES** (Patient taking differently: Take 25 mg by mouth daily Hold for SBP <110mmHg) 28 tablet 11    timolol (TIMOPTIC) 0.5 % ophthalmic solution Place 1 drop Into the left eye daily       torsemide (DEMADEX) 20 MG tablet 1 TABLET ORALLY DAILY 30 tablet 11    vitamin C (ASCORBIC ACID) 500 MG tablet 1 TABLET ORALLY DAILY (DX: SUPPLEMENT) 31 tablet 11       REVIEW OF SYSTEMS:  4 point ROS neg other than the symptoms noted above in the HPI.  No chest pain or SOB  stated.      PHYSICAL EXAM:  /60   Pulse 83   Temp 98.4  F (36.9  C)   Resp 21   Wt 43 kg (94 lb 12.8 oz)   SpO2 95%   BMI 17.34 kg/m    Petite female in a properly fitted wheelchair.  Speech is clear or at least at her baseline.  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  She lifts her pant legs in order to observe her lower extremities.  Did notice today she has edema present.  Left greater than the right.  +2 on right and +1 on left.    Her left shin has a light red area that appears more vascular change.  About 2-3 inches long and about 1.5 inch wide.  Not open but discolored.    Do not see any bruising today.  Legs are warm to touch.      Monthly lab not due until next week.    ASSESSMENT / PLAN:  (I50.32) Chronic diastolic congestive heart failure (H)  (primary encounter diagnosis)  (I73.9) PVD (peripheral vascular disease) (H24)  Comment: Today and general briefly spoke about the edema that is seen.  Maki that her legs just look like there is vascular changes.  She has no open areas at this time but obviously some discoloration.  Encouraged her to elevate her legs instead of staying in the wheelchair all day.  She is not interested in having NEGRITO stockings as staff would have to help her put them on.  Currently Georgia takes torsemide 20 mg daily and spironolactone 25 mg daily.  Due to her hyponatremia and being followed by nephrology, Georgia has a monthly BMP that is due next week.  Double checked on the nurses calendar to make sure it was getting done again on a monthly basis    (F41.9,  F32.A) Anxiety and depression  Comment: Georgia does have underlying anxiety and depression.  She often expressed that she is ready to leave the world when it is her time but does see multiple specialists for different disease processes and certainly will ask this nurse  practitioner questions as appropriate.  Does take Remeron 7.5 mg at bedtime.    Orders:  No new orders today    Electronically signed by  CASTRO Hazel CNP

## 2024-04-29 NOTE — LETTER
4/29/2024        RE: Marla Dunn  C/o Deneen Dunn  [address] [facility] [id number]        [facility] GERIATRICS  ACUTE/EPISODIC VISIT    FARSHAD [facility] Medical Record Number:  [id number]  Place of Service where encounter took place:  [facility] [place] ([facility]) [[id number]]    Chief Complaint   Patient presents with     RECHECK       HPI:    Marla Dunn is a 89 year old  (5/22/1929), who is being seen today for an episodic care visit.  HPI information obtained from: facility chart records, facility staff, patient report, and [facility] chart review.    Today's concern is:    Diagnoses         Codes Comments    Chronic diastolic congestive heart failure (H)    -  Primary I50.32     PVD (peripheral vascular disease) (H24)     I73.9     Anxiety and depression     F41.9, F32.A           Came to see [place] today but she had her nephew visiting with her and so stated that this NP can come another time.  Georgia ask for this NP to look at her legs and then requested this NP to come back next week as well.    Georgia was sitting up in her wheelchair that she mainly propels with her feet around her apartment.  She typically is worried about her lower extremities and is always asking this NP to take a look at her legs for various reasons.  Sometimes looks like she has petechiae, sometimes looks like she has nicked her legs, and sometimes looks like she has a blood blister over a vein.    Did an assessment of her legs and spoke to her about the opinion that this nurse practitioner, but in the end [place] it was not interested in doing too much about her legs.    ALLERGIES:    Allergies   Allergen Reactions     Gramineae Pollens Other (See Comments)     ORCHARDGRASS. Shortness of breath when lawn is just cut.     Smoke. Other (See Comments)     Hard to breathe.     Pollen Extract      Other reaction(s): Unknown        MEDICATIONS:  Post Discharge Medication Reconciliation Status: patient  was not discharged from an inpatient facility or TCU.     Current Outpatient Medications   Medication Sig Dispense Refill     acetaminophen (TYLENOL) 325 MG tablet Take 3 tablets (975 mg) by mouth every 8 hours as needed for mild pain       albuterol (PROAIR HFA/PROVENTIL HFA/VENTOLIN HFA) 108 (90 Base) MCG/ACT inhaler INHALE 2 PUFFS INTO THE LUNGS 4 TIMES DAILY  (DX: CHRONIC OBSTRUCTIVE PULMONARY DISEASE) 18 g 11     amoxicillin (AMOXIL) 500 MG capsule 4 CAPSULES (2000MG ) ORALLY AS NEEDED ONE HOUR PRIOR TO DENTAL APPOINTMENT 4 capsule 5     ANTACID REGULAR STRENGTH 500 MG chewable tablet 2 TABLETS (1000MG) ORALLY 2 TIMES DAILY AS NEEDED FOR HEARTBURN 60 tablet 11     apixaban ANTICOAGULANT (ELIQUIS) 2.5 MG tablet Take 1 tablet (2.5 mg) by mouth 2 times daily       ASPIRIN LOW DOSE 81 MG EC tablet 1 TABLET ORALLY 2 TIMES DAILY (DX: PROPHYLAXIS) 180 tablet 3     atorvastatin (LIPITOR) 40 MG tablet Take 1 tablet (40 mg) by mouth daily       Bacillus Coagulans-Inulin (PROBIOTIC FORMULA) 1-250 BILLION-MG CAPS 1 CAPSULE ORALLY DAILY 31 capsule 11     bisacodyl (DULCOLAX) 5 MG EC tablet Take 1 tablet (5 mg) by mouth 2 times daily as needed for constipation       chlorhexidine (PERIDEX) 0.12 % solution Swish and spit 15 mLs in mouth 2 times daily May self administer 118 mL 3     cholecalciferol (VITAMIN D3) 125 mcg (5000 units) capsule 1 CAPSULE ORALLY DAILY 90 capsule 3     COMBIVENT RESPIMAT  MCG/ACT inhaler INHALE 1 PUFF INTO THE LUNGS  4 TIMES DAILY 4 g 11     cycloSPORINE (RESTASIS) 0.05 % ophthalmic emulsion Place 1 drop into both eyes 2 times daily        demeclocycline (DECLOMYCIN) 150 MG tablet 1 TABLET ORALLY 2 TIMES DAILY 60 tablet 11     fish oil-omega-3 fatty acids 1000 MG capsule Take 2 capsules (2 g) by mouth daily       fluorometholone (FML LIQUIFILM) 0.1 % ophthalmic susp Place 1 drop Into the left eye daily        hydrocortisone 1 % CREA cream Place rectally 2 times daily as needed for itching        IBANDRONATE SODIUM PO Take 150 mg by mouth every 30 days       levothyroxine (SYNTHROID/LEVOTHROID) 75 MCG tablet 1 TABLET ORALLY EVERY MORNING ON AN EMPTY STOMACH (DX: HYPOTHYROIDISM) 30 tablet 11     loratadine (CLARITIN) 10 MG tablet 1 TABLET ORALLY DAILY (DX: ASTHMA) 28 tablet 11     melatonin 3 MG tablet 1 TABLET ORALLY AT BEDTIME ALONG WITH 5 MG FOR A TOTAL DOSE OF 8MG 30 tablet 11     melatonin 5 MG tablet 1 TABLET ORALLY AT BEDTIME  ALONG WITH 3MG FOR A TOTAL DOSE OF 8MG 30 tablet 11     metoprolol tartrate (LOPRESSOR) 25 MG tablet 1 TABLET ORALLY 2 TIMES DAILY (DX: HYPERTENSION) 56 tablet 11     mirtazapine (REMERON) 7.5 MG tablet 1 TABLET ORALLY AT BEDTIME 31 tablet 11     Multiple Vitamins-Minerals (PRESERVISION AREDS 2) CAPS 1 CAPSULE ORALLY 2 TIMES DAILY 62 capsule 11     omeprazole (PRILOSEC) 20 MG DR capsule 1 CAPSULE ORALLY 2 TIMES DAILY (DX: GASTROESOPHAGEAL REFLUX DISEASE) 62 capsule 11     OXYGEN-HELIUM IN every evening       polyethylene glycol 0.4%- propylene glycol 0.3% (SYSTANE ULTRA) 0.4-0.3 % SOLN ophthalmic solution Place 1 drop into both eyes 4 times daily       Polyethylene Glycol 3350 (PEG 3350) 17 GM/SCOOP POWD MIX 1 CAPFUL (17 GMS) IN 8 OUNCES WATER AND DRINK ORALLY DAILY (DX: CONSTIPATION) 510 g 11     Probiotic Product (PROBIOTIC PO) Take 1 capsule by mouth every morning       SENEXON-S 8.6-50 MG tablet 1 TABLET ORALLY 2 TIMES DAILY (DX: CONSTIPATION) 56 tablet 11     sodium chloride 1 GM tablet Take 1 tablet (1 g) by mouth daily 30 tablet 11     spironolactone (ALDACTONE) 25 MG tablet 1 TABLET ORALLY DAILY (DX: EDEMA) ** HAZARDOUS MED: WEAR DOUBLE NITRILE GLOVES** (Patient taking differently: Take 25 mg by mouth daily Hold for SBP <110mmHg) 28 tablet 11     timolol (TIMOPTIC) 0.5 % ophthalmic solution Place 1 drop Into the left eye daily        torsemide (DEMADEX) 20 MG tablet 1 TABLET ORALLY DAILY 30 tablet 11     vitamin C (ASCORBIC ACID) 500 MG tablet 1 TABLET ORALLY DAILY  (DX: SUPPLEMENT) 31 tablet 11       REVIEW OF SYSTEMS:  4 point ROS neg other than the symptoms noted above in the HPI.  No chest pain or SOB stated.      PHYSICAL EXAM:  /60   Pulse 83   Temp 98.4  F (36.9  C)   Resp 21   Wt 43 kg (94 lb 12.8 oz)   SpO2 95%   BMI 17.34 kg/m    Petite female in a properly fitted wheelchair.  Speech is clear or at least at her baseline.  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  She lifts her pant legs in order to observe her lower extremities.  Did notice today she has edema present.  Left greater than the right.  +2 on right and +1 on left.    Her left shin has a light red area that appears more vascular change.  About 2-3 inches long and about 1.5 inch wide.  Not open but discolored.    Do not see any bruising today.  Legs are warm to touch.      Monthly lab not due until next week.    ASSESSMENT / PLAN:  (I50.32) Chronic diastolic congestive heart failure (H)  (primary encounter diagnosis)  (I73.9) PVD (peripheral vascular disease) (H24)  Comment: Today and general briefly spoke about the edema that is seen.  Maki that her legs just look like there is vascular changes.  She has no open areas at this time but obviously some discoloration.  Encouraged her to elevate her legs instead of staying in the wheelchair all day.  She is not interested in having NEGRITO stockings as staff would have to help her put them on.  Currently Georgia takes torsemide 20 mg daily and spironolactone 25 mg daily.  Due to her hyponatremia and being followed by nephrology, Georgia has a monthly BMP that is due next week.  Double checked on the nurses calendar to make sure it was getting done again on a monthly basis    (F41.9,  F32.A) Anxiety and depression  Comment: Georgia does have underlying anxiety and depression.  She often expressed that she is ready to  leave the world when it is her time but does see multiple specialists for different disease processes and certainly will ask this nurse practitioner questions as appropriate.  Does take Remeron 7.5 mg at bedtime.    Orders:  No new orders today    Electronically signed by  CASTRO Hazel CNP            Sincerely,        CASTRO Hazel CNP

## 2024-04-30 ENCOUNTER — PATIENT OUTREACH (OUTPATIENT)
Dept: GERIATRIC MEDICINE | Facility: CLINIC | Age: 89
End: 2024-04-30
Payer: COMMERCIAL

## 2024-04-30 NOTE — PROGRESS NOTES
[facility] Care Coordination Contact    Hospitalized at [facility] due to ischemic stroke. Discharged to TCU at [facility] and then returned to [facility].     [facility]  Do you have a follow-up appointment scheduled? Yes  (CM Note - if due to a Mental Health hospitalization, follow-up appointment should be within 7 days.)    Comments: Followed by onsite NP and was seen 4/1/24.   Can you verbalize the warning signs/symptoms to watch for & how to respond? Yes    Comments: Receives daily assistance from assisted living.    Do you have a Personal Health Care Record? Yes     CM:  Was medication reconciliation completed?  Yes  (Document current meds in medication section of assessment.)    Comments: Med rec done at discharge from TCU. Facility administers all medications.    CM:  Any homecare/DME or resource needs (etc.) from most recent admit? Yes    Comments: Home Care: Occupational Therapy, Physical Therapy, Speech Therapy. Hospital Bed was ordered.      No outstanding CC needs identified at this time. Will continue to monitor via EMR.     Chiara Gloria RN  [facility]  Cell: [phone]

## 2024-05-03 ENCOUNTER — LAB REQUISITION (OUTPATIENT)
Dept: LAB | Facility: CLINIC | Age: 89
End: 2024-05-03
Payer: COMMERCIAL

## 2024-05-03 DIAGNOSIS — I50.9 HEART FAILURE, UNSPECIFIED (H): ICD-10-CM

## 2024-05-03 DIAGNOSIS — E03.9 HYPOTHYROIDISM, UNSPECIFIED: ICD-10-CM

## 2024-05-03 DIAGNOSIS — I50.32 CHRONIC DIASTOLIC CONGESTIVE HEART FAILURE (H): ICD-10-CM

## 2024-05-04 RX ORDER — TORSEMIDE 20 MG/1
TABLET ORAL
Qty: 90 TABLET | Refills: 3 | Status: SHIPPED | OUTPATIENT
Start: 2024-05-04 | End: 2024-09-25

## 2024-05-06 ENCOUNTER — ASSISTED LIVING VISIT (OUTPATIENT)
Dept: GERIATRICS | Facility: CLINIC | Age: 89
End: 2024-05-06
Payer: COMMERCIAL

## 2024-05-06 VITALS
OXYGEN SATURATION: 95 % | BODY MASS INDEX: 17.34 KG/M2 | DIASTOLIC BLOOD PRESSURE: 60 MMHG | RESPIRATION RATE: 21 BRPM | TEMPERATURE: 98.4 F | WEIGHT: 94.8 LBS | SYSTOLIC BLOOD PRESSURE: 121 MMHG | HEART RATE: 83 BPM

## 2024-05-06 DIAGNOSIS — R60.0 BILATERAL LOWER EXTREMITY EDEMA: ICD-10-CM

## 2024-05-06 DIAGNOSIS — I48.91 ATRIAL FIBRILLATION WITH RAPID VENTRICULAR RESPONSE (H): ICD-10-CM

## 2024-05-06 DIAGNOSIS — E87.1 HYPONATREMIA: ICD-10-CM

## 2024-05-06 DIAGNOSIS — I50.32 CHRONIC DIASTOLIC CONGESTIVE HEART FAILURE (H): Primary | ICD-10-CM

## 2024-05-06 LAB
ANION GAP SERPL CALCULATED.3IONS-SCNC: 14 MMOL/L (ref 7–15)
BUN SERPL-MCNC: 27.7 MG/DL (ref 8–23)
CALCIUM SERPL-MCNC: 9.1 MG/DL (ref 8.2–9.6)
CHLORIDE SERPL-SCNC: 101 MMOL/L (ref 98–107)
CREAT SERPL-MCNC: 1.34 MG/DL (ref 0.51–0.95)
DEPRECATED HCO3 PLAS-SCNC: 21 MMOL/L (ref 22–29)
EGFRCR SERPLBLD CKD-EPI 2021: 37 ML/MIN/1.73M2
GLUCOSE SERPL-MCNC: 78 MG/DL (ref 70–99)
POTASSIUM SERPL-SCNC: 4.1 MMOL/L (ref 3.4–5.3)
SODIUM SERPL-SCNC: 136 MMOL/L (ref 135–145)
TSH SERPL DL<=0.005 MIU/L-ACNC: 6.46 UIU/ML (ref 0.3–4.2)

## 2024-05-06 PROCEDURE — 80048 BASIC METABOLIC PNL TOTAL CA: CPT | Mod: ORL | Performed by: INTERNAL MEDICINE

## 2024-05-06 PROCEDURE — 84443 ASSAY THYROID STIM HORMONE: CPT | Mod: ORL

## 2024-05-06 PROCEDURE — 36415 COLL VENOUS BLD VENIPUNCTURE: CPT | Mod: ORL | Performed by: INTERNAL MEDICINE

## 2024-05-06 PROCEDURE — P9603 ONE-WAY ALLOW PRORATED MILES: HCPCS | Mod: ORL | Performed by: INTERNAL MEDICINE

## 2024-05-06 PROCEDURE — 99349 HOME/RES VST EST MOD MDM 40: CPT | Performed by: NURSE PRACTITIONER

## 2024-05-06 NOTE — LETTER
5/6/2024        RE: Marla Dunn  C/o Deneen Dunn  [address] [facility] [address]        [facility] GERIATRICS  ACUTE/EPISODIC VISIT    [facility]SHAD [facility] Medical Record Number:  [id number]  Place of Service where encounter took place:  [facility] [place] ([facility]) [[id number]]    Chief Complaint   Patient presents with     RECHECK       HPI:    Marla Dunn is a 89 year old  (5/22/1929), who is being seen today for an episodic care visit.  HPI information obtained from: facility chart records, patient report, and [facility] chart review.    Today's concern is:    Diagnoses         Codes Comments    Chronic diastolic congestive heart failure (H)    -  Primary I50.32     Bilateral lower extremity edema     R60.0     Hyponatremia     E87.1     Atrial fibrillation with rapid ventricular response (H)     I48.91           Came to see [place] today per her request last week to come back to see her as she enjoys the visits.  Her concern is the swelling in her legs.  The tops of her feet more so affected.  After talking about the swelling, compression stockings etc, dawned on this NP that she is not on the dose she was of Torsemide before her last hospitalization.  Georgia use to be on 30mg daily and now down to 20mg.  That may take in account for the change in her lower legs.    She worries about the skin on her legs.  Always try to reassure her of what she sees and it is not major concerns.  Skin is thin, frail, and easily bumped/bruised.  Typically she has no sores.  Does have a scar on the right shin and the edema puffs out around it.  That bothers her too.    ALLERGIES:    Allergies   Allergen Reactions     Gramineae Pollens Other (See Comments)     ORCHARDGRASS. Shortness of breath when lawn is just cut.     Smoke. Other (See Comments)     Hard to breathe.     Pollen Extract      Other reaction(s): Unknown        MEDICATIONS:  Post Discharge Medication Reconciliation Status: no changes  of medications.     Current Outpatient Medications   Medication Sig Dispense Refill     acetaminophen (TYLENOL) 325 MG tablet Take 3 tablets (975 mg) by mouth every 8 hours as needed for mild pain       albuterol (PROAIR HFA/PROVENTIL HFA/VENTOLIN HFA) 108 (90 Base) MCG/ACT inhaler INHALE 2 PUFFS INTO THE LUNGS 4 TIMES DAILY  (DX: CHRONIC OBSTRUCTIVE PULMONARY DISEASE) 18 g 11     amoxicillin (AMOXIL) 500 MG capsule 4 CAPSULES (2000MG ) ORALLY AS NEEDED ONE HOUR PRIOR TO DENTAL APPOINTMENT 4 capsule 5     ANTACID REGULAR STRENGTH 500 MG chewable tablet 2 TABLETS (1000MG) ORALLY 2 TIMES DAILY AS NEEDED FOR HEARTBURN 60 tablet 11     apixaban ANTICOAGULANT (ELIQUIS) 2.5 MG tablet Take 1 tablet (2.5 mg) by mouth 2 times daily       ASPIRIN LOW DOSE 81 MG EC tablet 1 TABLET ORALLY 2 TIMES DAILY (DX: PROPHYLAXIS) 180 tablet 3     atorvastatin (LIPITOR) 40 MG tablet Take 1 tablet (40 mg) by mouth daily       Bacillus Coagulans-Inulin (PROBIOTIC FORMULA) 1-250 BILLION-MG CAPS 1 CAPSULE ORALLY DAILY 31 capsule 11     bisacodyl (DULCOLAX) 5 MG EC tablet Take 1 tablet (5 mg) by mouth 2 times daily as needed for constipation       chlorhexidine (PERIDEX) 0.12 % solution Swish and spit 15 mLs in mouth 2 times daily May self administer 118 mL 3     cholecalciferol (VITAMIN D3) 125 mcg (5000 units) capsule 1 CAPSULE ORALLY DAILY 90 capsule 3     COMBIVENT RESPIMAT  MCG/ACT inhaler INHALE 1 PUFF INTO THE LUNGS  4 TIMES DAILY 4 g 11     cycloSPORINE (RESTASIS) 0.05 % ophthalmic emulsion Place 1 drop into both eyes 2 times daily        demeclocycline (DECLOMYCIN) 150 MG tablet 1 TABLET ORALLY 2 TIMES DAILY 60 tablet 11     fish oil-omega-3 fatty acids 1000 MG capsule Take 2 capsules (2 g) by mouth daily       fluorometholone (FML LIQUIFILM) 0.1 % ophthalmic susp Place 1 drop Into the left eye daily        hydrocortisone 1 % CREA cream Place rectally 2 times daily as needed for itching       IBANDRONATE SODIUM PO Take 150 mg by  mouth every 30 days       levothyroxine (SYNTHROID/LEVOTHROID) 75 MCG tablet 1 TABLET ORALLY EVERY MORNING ON AN EMPTY STOMACH (DX: HYPOTHYROIDISM) 30 tablet 11     loratadine (CLARITIN) 10 MG tablet 1 TABLET ORALLY DAILY (DX: ASTHMA) 28 tablet 11     melatonin 3 MG tablet 1 TABLET ORALLY AT BEDTIME ALONG WITH 5 MG FOR A TOTAL DOSE OF 8MG 30 tablet 11     melatonin 5 MG tablet 1 TABLET ORALLY AT BEDTIME  ALONG WITH 3MG FOR A TOTAL DOSE OF 8MG 30 tablet 11     metoprolol tartrate (LOPRESSOR) 25 MG tablet 1 TABLET ORALLY 2 TIMES DAILY (DX: HYPERTENSION) 56 tablet 11     mirtazapine (REMERON) 7.5 MG tablet 1 TABLET ORALLY AT BEDTIME 31 tablet 11     Multiple Vitamins-Minerals (PRESERVISION AREDS 2) CAPS 1 CAPSULE ORALLY 2 TIMES DAILY 62 capsule 11     omeprazole (PRILOSEC) 20 MG DR capsule 1 CAPSULE ORALLY 2 TIMES DAILY (DX: GASTROESOPHAGEAL REFLUX DISEASE) 62 capsule 11     OXYGEN-HELIUM IN every evening       polyethylene glycol 0.4%- propylene glycol 0.3% (SYSTANE ULTRA) 0.4-0.3 % SOLN ophthalmic solution Place 1 drop into both eyes 4 times daily       Polyethylene Glycol 3350 (PEG 3350) 17 GM/SCOOP POWD MIX 1 CAPFUL (17 GMS) IN 8 OUNCES WATER AND DRINK ORALLY DAILY (DX: CONSTIPATION) 510 g 11     Probiotic Product (PROBIOTIC PO) Take 1 capsule by mouth every morning       SENEXON-S 8.6-50 MG tablet 1 TABLET ORALLY 2 TIMES DAILY (DX: CONSTIPATION) 56 tablet 11     sodium chloride 1 GM tablet Take 1 tablet (1 g) by mouth daily 30 tablet 11     spironolactone (ALDACTONE) 25 MG tablet 1 TABLET ORALLY DAILY (DX: EDEMA) ** HAZARDOUS MED: WEAR DOUBLE NITRILE GLOVES** (Patient taking differently: Take 25 mg by mouth daily Hold for SBP <110mmHg) 28 tablet 11     timolol (TIMOPTIC) 0.5 % ophthalmic solution Place 1 drop Into the left eye daily        torsemide (DEMADEX) 20 MG tablet 1 TABLET ORALLY DAILY 90 tablet 3     vitamin C (ASCORBIC ACID) 500 MG tablet 1 TABLET ORALLY DAILY (DX: SUPPLEMENT) 31 tablet 11         REVIEW  OF SYSTEMS:  4 point ROS neg other than the symptoms noted above in the HPI.  Denied chest pain.  No acute shortness of breath.        PHYSICAL EXAM:  /60   Pulse 83   Temp 98.4  F (36.9  C)   Resp 21   Wt 43 kg (94 lb 12.8 oz)   SpO2 95%   BMI 17.34 kg/m    Petite female sitting in her wheelchair.  Alert and oriented x3.  Skin is pink, dry, frail, thin.  New blue/purple bruise on right forearm.  Had blood drawn from right arm as well as she peeled the Band-Aid/gauze off.    Heart rate regular/irregular.  S1 and S2 heard.    Lungs are clear.  Abdomen is flat, soft, and non-tender.  Bowels are moving according to [place].    Lower legs have edema on the tops of her feet almost pushing out of the holes in the shoes she is wearing and goes up to mid shin.  On the left leg she has a scar that is dried skin and indents on her leg with the edema puffing out around it.      ASSESSMENT / PLAN:  (I50.32) Chronic diastolic congestive heart failure (H)  (primary encounter diagnosis)  (R60.0) Bilateral lower extremity edema  Comment: now that Georgia knows she is on a smaller dose of Demadex, she does no desire to increase the dose, nor wear compression socks.  She can elevate her feet as needed through the day.  If it gets worse then will need to talk about the diuretic again.  Also told her that the lower dose of diuretic may contribute her her Na level being fairly normal last few times.    (E87.1) Hyponatremia  Comment: BMP done today.  Will get results later today and can see what her Na level will be.  Not on Sodium tablets anymore.    (I48.91) Atrial fibrillation with rapid ventricular response (H)  Comment: doing well with Eliquis 2.5mg BID and ASA 81mg po daily.  No signs of bleeding bu does have the new bruise about the size of a 50 cent coin.    At this time benefits outweigh the risk     Orders:  No new orders at time of visit    Electronically signed by  CASTRO Hazel CNP             Sincerely,        CASTRO Hazel CNP

## 2024-05-06 NOTE — PROGRESS NOTES
[facility] GERIATRICS  ACUTE/EPISODIC VISIT    [facility] Medical Record Number:  [id number]  Place of Service where encounter took place:  [facility] ([facility]) [[id number]]    Chief Complaint   Patient presents with    RECHECK       HPI:    Marla Dunn is a 89 year old  (5/22/1929), who is being seen today for an episodic care visit.  HPI information obtained from: facility chart records, patient report, and [facility] chart review.    Today's concern is:    Diagnoses         Codes Comments    Chronic diastolic congestive heart failure (H)    -  Primary I50.32     Bilateral lower extremity edema     R60.0     Hyponatremia     E87.1     Atrial fibrillation with rapid ventricular response (H)     I48.91           Came to see Georgia today per her request last week to come back to see her as she enjoys the visits.  Her concern is the swelling in her legs.  The tops of her feet more so affected.  After talking about the swelling, compression stockings etc, dawned on this NP that she is not on the dose she was of Torsemide before her last hospitalization.  Georgia use to be on 30mg daily and now down to 20mg.  That may take in account for the change in her lower legs.    She worries about the skin on her legs.  Always try to reassure her of what she sees and it is not major concerns.  Skin is thin, frail, and easily bumped/bruised.  Typically she has no sores.  Does have a scar on the right shin and the edema puffs out around it.  That bothers her too.    ALLERGIES:    Allergies   Allergen Reactions    Gramineae Pollens Other (See Comments)     ORCHARDGRASS. Shortness of breath when lawn is just cut.    Smoke. Other (See Comments)     Hard to breathe.    Pollen Extract      Other reaction(s): Unknown        MEDICATIONS:  Post Discharge Medication Reconciliation Status: no changes of medications.     Current Outpatient Medications   Medication Sig Dispense Refill    acetaminophen (TYLENOL) 325  MG tablet Take 3 tablets (975 mg) by mouth every 8 hours as needed for mild pain      albuterol (PROAIR HFA/PROVENTIL HFA/VENTOLIN HFA) 108 (90 Base) MCG/ACT inhaler INHALE 2 PUFFS INTO THE LUNGS 4 TIMES DAILY  (DX: CHRONIC OBSTRUCTIVE PULMONARY DISEASE) 18 g 11    amoxicillin (AMOXIL) 500 MG capsule 4 CAPSULES (2000MG ) ORALLY AS NEEDED ONE HOUR PRIOR TO DENTAL APPOINTMENT 4 capsule 5    ANTACID REGULAR STRENGTH 500 MG chewable tablet 2 TABLETS (1000MG) ORALLY 2 TIMES DAILY AS NEEDED FOR HEARTBURN 60 tablet 11    apixaban ANTICOAGULANT (ELIQUIS) 2.5 MG tablet Take 1 tablet (2.5 mg) by mouth 2 times daily      ASPIRIN LOW DOSE 81 MG EC tablet 1 TABLET ORALLY 2 TIMES DAILY (DX: PROPHYLAXIS) 180 tablet 3    atorvastatin (LIPITOR) 40 MG tablet Take 1 tablet (40 mg) by mouth daily      Bacillus Coagulans-Inulin (PROBIOTIC FORMULA) 1-250 BILLION-MG CAPS 1 CAPSULE ORALLY DAILY 31 capsule 11    bisacodyl (DULCOLAX) 5 MG EC tablet Take 1 tablet (5 mg) by mouth 2 times daily as needed for constipation      chlorhexidine (PERIDEX) 0.12 % solution Swish and spit 15 mLs in mouth 2 times daily May self administer 118 mL 3    cholecalciferol (VITAMIN D3) 125 mcg (5000 units) capsule 1 CAPSULE ORALLY DAILY 90 capsule 3    COMBIVENT RESPIMAT  MCG/ACT inhaler INHALE 1 PUFF INTO THE LUNGS  4 TIMES DAILY 4 g 11    cycloSPORINE (RESTASIS) 0.05 % ophthalmic emulsion Place 1 drop into both eyes 2 times daily       demeclocycline (DECLOMYCIN) 150 MG tablet 1 TABLET ORALLY 2 TIMES DAILY 60 tablet 11    fish oil-omega-3 fatty acids 1000 MG capsule Take 2 capsules (2 g) by mouth daily      fluorometholone (FML LIQUIFILM) 0.1 % ophthalmic susp Place 1 drop Into the left eye daily       hydrocortisone 1 % CREA cream Place rectally 2 times daily as needed for itching      IBANDRONATE SODIUM PO Take 150 mg by mouth every 30 days      levothyroxine (SYNTHROID/LEVOTHROID) 75 MCG tablet 1 TABLET ORALLY EVERY MORNING ON AN EMPTY STOMACH (DX:  HYPOTHYROIDISM) 30 tablet 11    loratadine (CLARITIN) 10 MG tablet 1 TABLET ORALLY DAILY (DX: ASTHMA) 28 tablet 11    melatonin 3 MG tablet 1 TABLET ORALLY AT BEDTIME ALONG WITH 5 MG FOR A TOTAL DOSE OF 8MG 30 tablet 11    melatonin 5 MG tablet 1 TABLET ORALLY AT BEDTIME  ALONG WITH 3MG FOR A TOTAL DOSE OF 8MG 30 tablet 11    metoprolol tartrate (LOPRESSOR) 25 MG tablet 1 TABLET ORALLY 2 TIMES DAILY (DX: HYPERTENSION) 56 tablet 11    mirtazapine (REMERON) 7.5 MG tablet 1 TABLET ORALLY AT BEDTIME 31 tablet 11    Multiple Vitamins-Minerals (PRESERVISION AREDS 2) CAPS 1 CAPSULE ORALLY 2 TIMES DAILY 62 capsule 11    omeprazole (PRILOSEC) 20 MG DR capsule 1 CAPSULE ORALLY 2 TIMES DAILY (DX: GASTROESOPHAGEAL REFLUX DISEASE) 62 capsule 11    OXYGEN-HELIUM IN every evening      polyethylene glycol 0.4%- propylene glycol 0.3% (SYSTANE ULTRA) 0.4-0.3 % SOLN ophthalmic solution Place 1 drop into both eyes 4 times daily      Polyethylene Glycol 3350 (PEG 3350) 17 GM/SCOOP POWD MIX 1 CAPFUL (17 GMS) IN 8 OUNCES WATER AND DRINK ORALLY DAILY (DX: CONSTIPATION) 510 g 11    Probiotic Product (PROBIOTIC PO) Take 1 capsule by mouth every morning      SENEXON-S 8.6-50 MG tablet 1 TABLET ORALLY 2 TIMES DAILY (DX: CONSTIPATION) 56 tablet 11    sodium chloride 1 GM tablet Take 1 tablet (1 g) by mouth daily 30 tablet 11    spironolactone (ALDACTONE) 25 MG tablet 1 TABLET ORALLY DAILY (DX: EDEMA) ** HAZARDOUS MED: WEAR DOUBLE NITRILE GLOVES** (Patient taking differently: Take 25 mg by mouth daily Hold for SBP <110mmHg) 28 tablet 11    timolol (TIMOPTIC) 0.5 % ophthalmic solution Place 1 drop Into the left eye daily       torsemide (DEMADEX) 20 MG tablet 1 TABLET ORALLY DAILY 90 tablet 3    vitamin C (ASCORBIC ACID) 500 MG tablet 1 TABLET ORALLY DAILY (DX: SUPPLEMENT) 31 tablet 11         REVIEW OF SYSTEMS:  4 point ROS neg other than the symptoms noted above in the HPI.  Denied chest pain.  No acute shortness of breath.        PHYSICAL  EXAM:  /60   Pulse 83   Temp 98.4  F (36.9  C)   Resp 21   Wt 43 kg (94 lb 12.8 oz)   SpO2 95%   BMI 17.34 kg/m    Petite female sitting in her wheelchair.  Alert and oriented x3.  Skin is pink, dry, frail, thin.  New blue/purple bruise on right forearm.  Had blood drawn from right arm as well as she peeled the Band-Aid/gauze off.    Heart rate regular/irregular.  S1 and S2 heard.    Lungs are clear.  Abdomen is flat, soft, and non-tender.  Bowels are moving according to Georgia.    Lower legs have edema on the tops of her feet almost pushing out of the holes in the shoes she is wearing and goes up to mid shin.  On the left leg she has a scar that is dried skin and indents on her leg with the edema puffing out around it.      ASSESSMENT / PLAN:  (I50.32) Chronic diastolic congestive heart failure (H)  (primary encounter diagnosis)  (R60.0) Bilateral lower extremity edema  Comment: now that Georgia knows she is on a smaller dose of Demadex, she does no desire to increase the dose, nor wear compression socks.  She can elevate her feet as needed through the day.  If it gets worse then will need to talk about the diuretic again.  Also told her that the lower dose of diuretic may contribute her her Na level being fairly normal last few times.    (E87.1) Hyponatremia  Comment: BMP done today.  Will get results later today and can see what her Na level will be.  Not on Sodium tablets anymore.    (I48.91) Atrial fibrillation with rapid ventricular response (H)  Comment: doing well with Eliquis 2.5mg BID and ASA 81mg po daily.  No signs of bleeding bu does have the new bruise about the size of a 50 cent coin.    At this time benefits outweigh the risk     Orders:  No new orders at time of visit    Electronically signed by  CASTRO Hazel CNP

## 2024-05-20 ENCOUNTER — DOCUMENTATION ONLY (OUTPATIENT)
Dept: CARDIOLOGY | Facility: CLINIC | Age: 89
End: 2024-05-20
Payer: COMMERCIAL

## 2024-05-20 NOTE — PROGRESS NOTES
"Conversation below has been routed to Tracy KING to address. [facility]    ----- Message -----  From: Genie Nunez RN  Sent: 5/20/2024  12:32 PM CDT  To: Ayana Farias RN; Elizabeth Lauren RN    I would recommend having Tracy see her and assess goals of care with her age and review the case with MY/RA to see if they would balloon her before or not.     Thanks,  AV  ----- Message -----  From: Ayana Farias RN  Sent: 5/20/2024  10:41 AM CDT  To: [facility]    This patient is seeing Tracy for LAAC Consult, although Dr. Amaya's recent office visit mentions other structural heart disease. She had an echo March of this year:    \"There is severe mitral annular calcification. There is moderate mitral annular calcification. There is moderate-severe calcific mitral stenosis. There is trace-mild mitral insufficiency.\"    Would you guys take a look and let me know if she needs to be seen in Valve Clinic prior to committing to LAAC? Tracy will see her tomorrow.  "

## 2024-05-21 ENCOUNTER — OFFICE VISIT (OUTPATIENT)
Dept: CARDIOLOGY | Facility: CLINIC | Age: 89
End: 2024-05-21
Payer: COMMERCIAL

## 2024-05-21 VITALS
SYSTOLIC BLOOD PRESSURE: 103 MMHG | HEART RATE: 78 BPM | DIASTOLIC BLOOD PRESSURE: 61 MMHG | RESPIRATION RATE: 14 BRPM | BODY MASS INDEX: 18.18 KG/M2 | WEIGHT: 99.4 LBS

## 2024-05-21 DIAGNOSIS — E78.5 DYSLIPIDEMIA: ICD-10-CM

## 2024-05-21 DIAGNOSIS — E78.5 HYPERLIPIDEMIA LDL GOAL <100: ICD-10-CM

## 2024-05-21 DIAGNOSIS — I48.21 PERMANENT ATRIAL FIBRILLATION (H): Primary | ICD-10-CM

## 2024-05-21 DIAGNOSIS — I10 PRIMARY HYPERTENSION: ICD-10-CM

## 2024-05-21 DIAGNOSIS — I63.10 CEREBROVASCULAR ACCIDENT (CVA) DUE TO EMBOLISM OF PRECEREBRAL ARTERY (H): ICD-10-CM

## 2024-05-21 DIAGNOSIS — I50.32 CHRONIC DIASTOLIC CONGESTIVE HEART FAILURE (H): ICD-10-CM

## 2024-05-21 PROCEDURE — 99417 PROLNG OP E/M EACH 15 MIN: CPT | Performed by: PHYSICIAN ASSISTANT

## 2024-05-21 PROCEDURE — 99215 OFFICE O/P EST HI 40 MIN: CPT | Performed by: PHYSICIAN ASSISTANT

## 2024-05-21 NOTE — PATIENT INSTRUCTIONS
Marla Dunn,    It was a pleasure to see you today in the clinic regarding your Watchman consult.     My recommendations after this visit include:     - no medication changes today    - consider whether you would like to move forward with the Watchman procedure      If you have questions or concerns, please call using the numbers below:      After Hours/Scheduling  [phone]    Otherwise you can dial the nurse directly at:                Ayana Farias RN    [phone]    Tracy Roy PA-C  Structural Heart Program  [facility]

## 2024-05-21 NOTE — PROGRESS NOTES
HEART CARE ENCOUNTER NOTE       [facility]  [phone]      Assessment/Recommendations   1.  Chronic atrial fibrillation: I have personally reviewed this patient's chart and have spoken with the patient about the treatment options, including LINDSEY device.  She has a [id number] score of 7 for age >75, female gender, hypertension, stroke, coronary artery disease.  She has a HAS-BLED score of 3 for age, bleeding predisposition, history of CVA.   She is not a good candidate for long-term anticoagulation due to falls     At this time, she does not seem to be a strong candidate for Watchman given her advanced age and her likely limited life expectancy. She should continue anticoagulation with Eliquis at this time to decrease risk of stroke.    2. Chronic diastolic heart failure - compensated. Continue spironolactone, metoprolol, torsemide     3.  Coronary artery disease status post CABG in 2009 aortic valve replacement - denies angina    4.  Aortic valve disease status post bioprosthetic aortic valve replacement in 2009    5. Dyslipidemia - last LDL 81. Continue Lipitor 40 mg daily    6. Hypertension - blood pressure is controlled.     7.  Moderate to severe mitral stenosis with mild mitral regurgitation - previously seen by Dr. Pang in July 2021 and was thought to not be a good candidate for transcatheter mitral valve repair with MitraClip.    8.  Moderate to severe pulmonary hypertension    9.  Ischemic stroke-right MCA territory status post thrombolytic therapy on 3/1/2024- she reports no residual deficits    10. Chronic kidney disease, stage 3          History of Present Illness/Subjective    Marla Dunn is a 89 year old female who comes in today for Watchman consult. She is accompanied by her niece for today's visit.    Marla Dunn has a past history of chronic atrial fibrillation, coronary artery disease (s/p CABG 2009), s/p tissue AVR 2009, moderate-severe mitral stenosis with  mild regurgitation, moderate to severe pulmonary hypertension, history of ischemic right MCA stroke (s/p thrombolytic therapy), dyslipidemia, hypertension, Sjogren syndrome, chronic kidney disease stage 3, h/o SCC, lumbar spondylosis, hypothyroidism, COPD, ,myelodysplastic syndrome, iron deficiency anemia    She recently suffered an ischemic stroke and is status post thrombolytic therapy on 3/1/2024. She reports no residual deficits at this time. She is currently taking Eliquis twice daily. She reports easy bruising. She denies current bleeding issues. She currently lives in assisted living and is limited in her activity. In terms of mobility, she spends most of her time in a wheel chair. She has someone assist her with most ADLs. She reports 2 falls in the last year.    Marla Dunn denies chest discomfort, palpitations, shortness of breath, paroxysmal nocturnal dyspnea, orthopnea, lightheadedness, dizziness, pre-syncope, or syncope.  Marla Dunn also denies any weight loss, changes in appetite, nausea or vomiting.     Medical, surgical, family, social history, and medications were reviewed and updated as necessary.    ECHO results (from 3/1/2024):  Interpretation Summary     1. Normal left ventricular size and systolic performance with a visually  estimated ejection fraction of 65%.  2. There is severe basal inferior hypokinesis and moderate basal posterior  hypokinesis  3. There is mild concentric increase in left ventricular wall thickness.  4. 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  Â  Normal aortic valve prosthesis metrics with a mean systolic gradient of 19  mmHg and a peak anterograde velocity of 2.8 m/sec.  Â  No aortic insufficiency is detected.  5. There is moderate-severe calcific mitral stenosis.  6. Normal right ventricular size with mildly reduced right ventricular  systolic performance.  7. There is severe left atrial enlargement. There is moderate right atrial  enlargement.     When  compared to the prior real-time echocardiogram dated 6 September 2022,  the degree of mitral stenosis has increased from moderate to now moderate-  severe. The findings are otherwise felt to be fairly similar on both  examinations. The aforementioned regional wall motion abnormalities are not  new and were identified on the prior study as well.    EKG (personally reviewed and interpreted): 3/1/2024  Atrial fibrillation with PVC or aberrantly conducted complexes, incomplete LBBB     Physical Examination Review of Systems   Vitals: /61 (BP Location: Right arm, Patient Position: Sitting, Cuff Size: Adult Small)   Pulse 78   Resp 14   Wt 45.1 kg (99 lb 6.4 oz)   BMI 18.18 kg/m    BMI= Body mass index is 18.18 kg/m .  Wt Readings from Last 3 Encounters:   05/21/24 45.1 kg (99 lb 6.4 oz)   05/06/24 43 kg (94 lb 12.8 oz)   04/29/24 43 kg (94 lb 12.8 oz)       General Appearance:   Alert, cooperative and in no acute distress   ENT/Mouth: membranes moist, no oral lesions or bleeding gums.      EYES:  no scleral icterus, normal conjunctivae   Neck: Thyroid not visualized   Chest/Lungs:   lungs are clear to auscultation, no rales or wheezing   Cardiovascular:   irregular . Normal first and second heart sounds with no murmurs, rubs or gallops; the carotid, radial and posterior tibial pulses are intact, no edema bilaterally    Abdomen:  Soft and nontender. Bowel sounds are present in all quadrants   Extremities: no cyanosis or clubbing   Skin: no xanthelasma, warm.    Neurologic: normal gait, normal  bilateral, no tremors   Psychiatric: Normal mood and affect       Please refer above for cardiac ROS details.      Medical History  Surgical History Family History Social History   Past Medical History:   Diagnosis Date    Asthma     Bilateral carpal tunnel syndrome 08/27/2015    CAD (coronary artery disease)     Chronic airway obstruction, not elsewhere classified     Created by Conversion     Chronic anemia      Chronic edema     Congestive heart failure, severe (H) 05/13/2020    COPD (chronic obstructive pulmonary disease) (H)     Coronary artery disease     Depression     GERD (gastroesophageal reflux disease)     Heart disease     Heart murmur     High cholesterol     dislipidemia    HTN (hypertension)     Hypercholesterolemia     Hypertension     Hypertensive emergency 08/13/2021    Hypothyroidism     Hypothyroidism     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    MI (myocardial infarction) (H) 1985    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    Myelodysplastic syndrome (H)     Myocardial infarction (H) 1983    OLEGARIO (obstructive sleep apnea)     Osteoporosis     Other and unspecified hyperlipidemia     Created by Conversion     Pyelonephritis 05/11/2016    Radicular low back pain     Rheumatoid arthritis (H)     Elizabeth Agers syndrome     Sjoegren syndrome     Sjogren's syndrome (H24)     Sleep apnea, obstructive     Spinal stenosis     Squamous cell carcinoma, tongue border (H)     Unspecified polyarthropathy or polyarthritis, hand     Created by Conversion      Past Surgical History:   Procedure Laterality Date    aortic valve      AORTIC VALVE REPLACEMENT  01/01/2012    APPENDECTOMY      APPENDECTOMY      AS TOTAL KNEE ARTHROPLASTY Right     BACK SURGERY  2004    spinal decompression    BACK SURGERY      spinal stenosis    BONE MARROW BIOPSY  2004    breast biopsy      BREAST SURGERY  2001    biopsy    BYPASS GRAFT ARTERY CORONARY  01/01/2009    LIMA to ramus intermedius    cardiac angiogram      CARDIAC CATHETERIZATION      CARDIAC SURGERY      EYE SURGERY  2008    bilateral cataract repair     EYE SURGERY Bilateral     cornea transplant left eye & cataracts    KYPHOPLASTY  2003    T12    OPEN REDUCTION INTERNAL FIXATION HIP NAILING Right 08/13/2021    Procedure: INTERNAL FIXATION, FRACTURE, TROCHANTERIC, HIP, USING PINS OR RODS;  Surgeon: Darrel Castro MD;  Location: [facility] OR    OTHER SURGICAL  HISTORY  2018    Rectosigmoidectomy perineal    PARTIAL GLOSSECTOMY Right 2023    [facility]    RECTOSIGMOIDECTOMY PERINEAL N/A 01/10/2018    Procedure: RECTOSIGMOIDECTOMY PERINEAL;  PERINEAL RECTOSIGMOIDECTOMY ;  Surgeon: Candelaria Anthony MD;  Location: [facility] OR    REPAIR VALVE AORTIC  2009    THORACIC SURGERY      T12 kyphoplasty    ZZC CABG, ARTERY-VEIN, FOUR      ZZC CABG, VEIN, SINGLE      Description: CABG (CABG);  Recorded: 2012;  Comments: Single vessel bypass graft to an obtuse marginal branch in May 2009    ZZC REPLACE AORT VALV,PROSTH VALV      Description: Aortic Valve Replacement;  Recorded: 2012;  Comments: Aortic valve replacement     Family History   Problem Relation Age of Onset    Cancer Mother         ovarian cancer;  in her 50s    Family History Negative Mother     Heart Disease Father     Cancer Brother     Cancer Sister     Social History     Socioeconomic History    Marital status:      Spouse name: Not on file    Number of children: Not on file    Years of education: Not on file    Highest education level: Not on file   Occupational History    Not on file   Tobacco Use    Smoking status: Never    Smokeless tobacco: Never   Substance and Sexual Activity    Alcohol use: No     Alcohol/week: 0.0 standard drinks of alcohol     Comment: Alcoholic Drinks/day: occasional glass of wine    Drug use: No    Sexual activity: Not on file   Other Topics Concern    Not on file   Social History Narrative    .  passed away 20 years ago. No children. 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 Was living independently in her apartment in [place] prior to admission. Uses walker at baseline.      Social Determinants of Health     Financial Resource Strain: Not on file   Food Insecurity: Not on file   Transportation Needs: Not on file   Physical Activity: Not on file   Stress: Not on file   Social Connections: Not on file    Interpersonal Safety: Not on file   Housing Stability: Not on file          Medications  Allergies   Current Outpatient Medications   Medication Sig Dispense Refill    acetaminophen (TYLENOL) 325 MG tablet Take 3 tablets (975 mg) by mouth every 8 hours as needed for mild pain      albuterol (PROAIR HFA/PROVENTIL HFA/VENTOLIN HFA) 108 (90 Base) MCG/ACT inhaler INHALE 2 PUFFS INTO THE LUNGS 4 TIMES DAILY  (DX: CHRONIC OBSTRUCTIVE PULMONARY DISEASE) 18 g 11    amoxicillin (AMOXIL) 500 MG capsule 4 CAPSULES (2000MG ) ORALLY AS NEEDED ONE HOUR PRIOR TO DENTAL APPOINTMENT 4 capsule 5    ANTACID REGULAR STRENGTH 500 MG chewable tablet 2 TABLETS (1000MG) ORALLY 2 TIMES DAILY AS NEEDED FOR HEARTBURN 60 tablet 11    apixaban ANTICOAGULANT (ELIQUIS) 2.5 MG tablet Take 1 tablet (2.5 mg) by mouth 2 times daily      ASPIRIN LOW DOSE 81 MG EC tablet 1 TABLET ORALLY 2 TIMES DAILY (DX: PROPHYLAXIS) 180 tablet 3    atorvastatin (LIPITOR) 40 MG tablet Take 1 tablet (40 mg) by mouth daily      Bacillus Coagulans-Inulin (PROBIOTIC FORMULA) 1-250 BILLION-MG CAPS 1 CAPSULE ORALLY DAILY 31 capsule 11    bisacodyl (DULCOLAX) 5 MG EC tablet Take 1 tablet (5 mg) by mouth 2 times daily as needed for constipation      chlorhexidine (PERIDEX) 0.12 % solution Swish and spit 15 mLs in mouth 2 times daily May self administer 118 mL 3    cholecalciferol (VITAMIN D3) 125 mcg (5000 units) capsule 1 CAPSULE ORALLY DAILY 90 capsule 3    COMBIVENT RESPIMAT  MCG/ACT inhaler INHALE 1 PUFF INTO THE LUNGS  4 TIMES DAILY 4 g 11    cycloSPORINE (RESTASIS) 0.05 % ophthalmic emulsion Place 1 drop into both eyes 2 times daily       demeclocycline (DECLOMYCIN) 150 MG tablet 1 TABLET ORALLY 2 TIMES DAILY 60 tablet 11    fish oil-omega-3 fatty acids 1000 MG capsule Take 2 capsules (2 g) by mouth daily      fluorometholone (FML LIQUIFILM) 0.1 % ophthalmic susp Place 1 drop Into the left eye daily       hydrocortisone 1 % CREA cream Place rectally 2 times  daily as needed for itching      IBANDRONATE SODIUM PO Take 150 mg by mouth every 30 days      levothyroxine (SYNTHROID/LEVOTHROID) 75 MCG tablet 1 TABLET ORALLY EVERY MORNING ON AN EMPTY STOMACH (DX: HYPOTHYROIDISM) 30 tablet 11    loratadine (CLARITIN) 10 MG tablet 1 TABLET ORALLY DAILY (DX: ASTHMA) 28 tablet 11    melatonin 3 MG tablet 1 TABLET ORALLY AT BEDTIME ALONG WITH 5 MG FOR A TOTAL DOSE OF 8MG 30 tablet 11    melatonin 5 MG tablet 1 TABLET ORALLY AT BEDTIME  ALONG WITH 3MG FOR A TOTAL DOSE OF 8MG 30 tablet 11    metoprolol tartrate (LOPRESSOR) 25 MG tablet 1 TABLET ORALLY 2 TIMES DAILY (DX: HYPERTENSION) 56 tablet 11    mirtazapine (REMERON) 7.5 MG tablet 1 TABLET ORALLY AT BEDTIME 31 tablet 11    Multiple Vitamins-Minerals (PRESERVISION AREDS 2) CAPS 1 CAPSULE ORALLY 2 TIMES DAILY 62 capsule 11    omeprazole (PRILOSEC) 20 MG DR capsule 1 CAPSULE ORALLY 2 TIMES DAILY (DX: GASTROESOPHAGEAL REFLUX DISEASE) 62 capsule 11    OXYGEN-HELIUM IN every evening      polyethylene glycol 0.4%- propylene glycol 0.3% (SYSTANE ULTRA) 0.4-0.3 % SOLN ophthalmic solution Place 1 drop into both eyes 4 times daily      Polyethylene Glycol 3350 (PEG 3350) 17 GM/SCOOP POWD MIX 1 CAPFUL (17 GMS) IN 8 OUNCES WATER AND DRINK ORALLY DAILY (DX: CONSTIPATION) 510 g 11    Probiotic Product (PROBIOTIC PO) Take 1 capsule by mouth every morning      SENEXON-S 8.6-50 MG tablet 1 TABLET ORALLY 2 TIMES DAILY (DX: CONSTIPATION) 56 tablet 11    sodium chloride 1 GM tablet Take 1 tablet (1 g) by mouth daily 30 tablet 11    spironolactone (ALDACTONE) 25 MG tablet 1 TABLET ORALLY DAILY (DX: EDEMA) ** HAZARDOUS MED: WEAR DOUBLE NITRILE GLOVES** (Patient taking differently: Take 25 mg by mouth daily Hold for SBP <110mmHg) 28 tablet 11    timolol (TIMOPTIC) 0.5 % ophthalmic solution Place 1 drop Into the left eye daily       torsemide (DEMADEX) 20 MG tablet 1 TABLET ORALLY DAILY 90 tablet 3    vitamin C (ASCORBIC ACID) 500 MG tablet 1 TABLET  ORALLY DAILY (DX: SUPPLEMENT) 31 tablet 11    Allergies   Allergen Reactions    Gramineae Pollens Other (See Comments)     ORCHARDGRASS. Shortness of breath when lawn is just cut.    Smoke. Other (See Comments)     Hard to breathe.    Pollen Extract      Other reaction(s): Unknown         Lab Results    Chemistry/lipid CBC Cardiac Enzymes/BNP/TSH/INR   Recent Labs   Lab Test 03/01/24  0841   CHOL 137   HDL 42*   LDL 81   TRIG 72     Recent Labs   Lab Test 03/01/24  0841 10/31/22  1113 11/09/16  0952   LDL 81 117* 84     Recent Labs   Lab Test 05/06/24  1111      POTASSIUM 4.1   CHLORIDE 101   CO2 21*   GLC 78   BUN 27.7*   CR 1.34*   GFRESTIMATED 37*   PERICO 9.1     Recent Labs   Lab Test 05/06/24  1111 04/01/24  1116 03/27/24  1213   CR 1.34* 1.18* 1.17*     Recent Labs   Lab Test 03/01/24  0841 05/13/20  1205   A1C 5.3 5.2    Recent Labs   Lab Test 03/03/24  0640   WBC 7.0   HGB 11.0*   HCT 35.0   *        Recent Labs   Lab Test 03/03/24  0640 03/02/24  0406 03/01/24  0841   HGB 11.0* 10.4* 10.4*    Recent Labs   Lab Test 09/06/22  2001 09/06/22  0850 09/06/22  0102   TROPONINI 0.79* 1.83* 0.74*     Recent Labs   Lab Test 09/07/22  0435 08/13/21  0552 05/13/20  0446   * 171* 536*     Recent Labs   Lab Test 05/06/24  1111   TSH 6.46*     Recent Labs   Lab Test 03/01/24  0841 09/05/22  2134 08/12/21  2122   INR 1.09 1.23* 1.10        60 minutes spent on the date of encounter doing education, chart prep/review, review of outside records, review of test results, interpretation with above tests, patient visit, documentation, and discussion with family.      This note has been dictated using voice recognition software. Any grammatical or context distortions are unintentional and inherent to the software.    Tracy Roy PA-C  Structural Heart Program  [facility]

## 2024-05-21 NOTE — LETTER
5/21/2024    Gabrielle Wallace, CASTRO CNP  [address] [facility] [address]    RE: Marla Rasmussensheila       Dear Colleague,     I had the pleasure of seeing Marla Dunn in the [facility].  HEART CARE ENCOUNTER NOTE       FARSHAD [facility]  [phone]      Assessment/Recommendations   1.  Chronic atrial fibrillation: I have personally reviewed this patient's chart and have spoken with the patient about the treatment options, including LINDSEY device.  She has a HEM1LL0-JLDs score of 7 for age >75, female gender, hypertension, stroke, coronary artery disease.  She has a HAS-BLED score of 3 for age, bleeding predisposition, history of CVA.   She is not a good candidate for long-term anticoagulation due to falls     At this time, she does not seem to be a strong candidate for Watchman given her advanced age and her likely limited life expectancy. She should continue anticoagulation with Eliquis at this time to decrease risk of stroke.    2. Chronic diastolic heart failure - compensated. Continue spironolactone, metoprolol, torsemide     3.  Coronary artery disease status post CABG in 2009 aortic valve replacement - denies angina    4.  Aortic valve disease status post bioprosthetic aortic valve replacement in 2009    5. Dyslipidemia - last LDL 81. Continue Lipitor 40 mg daily    6. Hypertension - blood pressure is controlled.     7.  Moderate to severe mitral stenosis with mild mitral regurgitation - previously seen by Dr. Pang in July 2021 and was thought to not be a good candidate for transcatheter mitral valve repair with MitraClip.    8.  Moderate to severe pulmonary hypertension    9.  Ischemic stroke-right MCA territory status post thrombolytic therapy on 3/1/2024- she reports no residual deficits    10. Chronic kidney disease, stage 3          History of Present Illness/Subjective    Marla Dunn is a 89 year old female who comes in today for Watchman  consult. She is accompanied by her niece for today's visit.    Marla Dunn has a past history of chronic atrial fibrillation, coronary artery disease (s/p CABG 2009), s/p tissue AVR 2009, moderate-severe mitral stenosis with mild regurgitation, moderate to severe pulmonary hypertension, history of ischemic right MCA stroke (s/p thrombolytic therapy), dyslipidemia, hypertension, Sjogren syndrome, chronic kidney disease stage 3, h/o SCC, lumbar spondylosis, hypothyroidism, COPD, ,myelodysplastic syndrome, iron deficiency anemia    She recently suffered an ischemic stroke and is status post thrombolytic therapy on 3/1/2024. She reports no residual deficits at this time. She is currently taking Eliquis twice daily. She reports easy bruising. She denies current bleeding issues. She currently lives in assisted living and is limited in her activity. In terms of mobility, she spends most of her time in a wheel chair. She has someone assist her with most ADLs. She reports 2 falls in the last year.    Marla Dunn denies chest discomfort, palpitations, shortness of breath, paroxysmal nocturnal dyspnea, orthopnea, lightheadedness, dizziness, pre-syncope, or syncope.  Marla Dunn also denies any weight loss, changes in appetite, nausea or vomiting.     Medical, surgical, family, social history, and medications were reviewed and updated as necessary.    ECHO results (from 3/1/2024):  Interpretation Summary     1. Normal left ventricular size and systolic performance with a visually  estimated ejection fraction of 65%.  2. There is severe basal inferior hypokinesis and moderate basal posterior  hypokinesis  3. There is mild concentric increase in left ventricular wall thickness.  4. 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  Â  Normal aortic valve prosthesis metrics with a mean systolic gradient of 19  mmHg and a peak anterograde velocity of 2.8 m/sec.  Â  No aortic insufficiency is detected.  5. There is  moderate-severe calcific mitral stenosis.  6. Normal right ventricular size with mildly reduced right ventricular  systolic performance.  7. There is severe left atrial enlargement. There is moderate right atrial  enlargement.     When compared to the prior real-time echocardiogram dated 6 September 2022,  the degree of mitral stenosis has increased from moderate to now moderate-  severe. The findings are otherwise felt to be fairly similar on both  examinations. The aforementioned regional wall motion abnormalities are not  new and were identified on the prior study as well.    EKG (personally reviewed and interpreted): 3/1/2024  Atrial fibrillation with PVC or aberrantly conducted complexes, incomplete LBBB     Physical Examination Review of Systems   Vitals: /61 (BP Location: Right arm, Patient Position: Sitting, Cuff Size: Adult Small)   Pulse 78   Resp 14   Wt 45.1 kg (99 lb 6.4 oz)   BMI 18.18 kg/m    BMI= Body mass index is 18.18 kg/m .  Wt Readings from Last 3 Encounters:   05/21/24 45.1 kg (99 lb 6.4 oz)   05/06/24 43 kg (94 lb 12.8 oz)   04/29/24 43 kg (94 lb 12.8 oz)       General Appearance:   Alert, cooperative and in no acute distress   ENT/Mouth: membranes moist, no oral lesions or bleeding gums.      EYES:  no scleral icterus, normal conjunctivae   Neck: Thyroid not visualized   Chest/Lungs:   lungs are clear to auscultation, no rales or wheezing   Cardiovascular:   irregular . Normal first and second heart sounds with no murmurs, rubs or gallops; the carotid, radial and posterior tibial pulses are intact, no edema bilaterally    Abdomen:  Soft and nontender. Bowel sounds are present in all quadrants   Extremities: no cyanosis or clubbing   Skin: no xanthelasma, warm.    Neurologic: normal gait, normal  bilateral, no tremors   Psychiatric: Normal mood and affect       Please refer above for cardiac ROS details.      Medical History  Surgical History Family History Social History   Past  Medical History:   Diagnosis Date    Asthma     Bilateral carpal tunnel syndrome 08/27/2015    CAD (coronary artery disease)     Chronic airway obstruction, not elsewhere classified     Created by Conversion     Chronic anemia     Chronic edema     Congestive heart failure, severe (H) 05/13/2020    COPD (chronic obstructive pulmonary disease) (H)     Coronary artery disease     Depression     GERD (gastroesophageal reflux disease)     Heart disease     Heart murmur     High cholesterol     dislipidemia    HTN (hypertension)     Hypercholesterolemia     Hypertension     Hypertensive emergency 08/13/2021    Hypothyroidism     Hypothyroidism     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    MI (myocardial infarction) (H) 1985    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    Myelodysplastic syndrome (H)     Myocardial infarction (H) 1983    OLEGARIO (obstructive sleep apnea)     Osteoporosis     Other and unspecified hyperlipidemia     Created by Conversion     Pyelonephritis 05/11/2016    Radicular low back pain     Rheumatoid arthritis (H)     Elizabeth Agers syndrome     Sjoegren syndrome     Sjogren's syndrome (H24)     Sleep apnea, obstructive     Spinal stenosis     Squamous cell carcinoma, tongue border (H)     Unspecified polyarthropathy or polyarthritis, hand     Created by Conversion      Past Surgical History:   Procedure Laterality Date    aortic valve      AORTIC VALVE REPLACEMENT  01/01/2012    APPENDECTOMY      APPENDECTOMY      AS TOTAL KNEE ARTHROPLASTY Right     BACK SURGERY  2004    spinal decompression    BACK SURGERY      spinal stenosis    BONE MARROW BIOPSY  2004    breast biopsy      BREAST SURGERY  2001    biopsy    BYPASS GRAFT ARTERY CORONARY  01/01/2009    LIMA to ramus intermedius    cardiac angiogram      CARDIAC CATHETERIZATION      CARDIAC SURGERY      EYE SURGERY  2008    bilateral cataract repair     EYE SURGERY Bilateral     cornea transplant left eye & cataracts    KYPHOPLASTY  2003     T12    OPEN REDUCTION INTERNAL FIXATION HIP NAILING Right 2021    Procedure: INTERNAL FIXATION, FRACTURE, TROCHANTERIC, HIP, USING PINS OR RODS;  Surgeon: Darrel Castro MD;  Location: [facility] OR    OTHER SURGICAL HISTORY  2018    Rectosigmoidectomy perineal    PARTIAL GLOSSECTOMY Right 2023    [facility]    RECTOSIGMOIDECTOMY PERINEAL N/A 01/10/2018    Procedure: RECTOSIGMOIDECTOMY PERINEAL;  PERINEAL RECTOSIGMOIDECTOMY ;  Surgeon: Candelaria Anthony MD;  Location: [facility] OR    REPAIR VALVE AORTIC  2009    THORACIC SURGERY      T12 kyphoplasty    ZZC CABG, ARTERY-VEIN, FOUR      ZZC CABG, VEIN, SINGLE      Description: CABG (CABG);  Recorded: 2012;  Comments: Single vessel bypass graft to an obtuse marginal branch in May 2009    ZZC REPLACE AORT VALV,PROSTH VALV      Description: Aortic Valve Replacement;  Recorded: 2012;  Comments: Aortic valve replacement     Family History   Problem Relation Age of Onset    Cancer Mother         ovarian cancer;  in her 50s    Family History Negative Mother     Heart Disease Father     Cancer Brother     Cancer Sister     Social History     Socioeconomic History    Marital status:      Spouse name: Not on file    Number of children: Not on file    Years of education: Not on file    Highest education level: Not on file   Occupational History    Not on file   Tobacco Use    Smoking status: Never    Smokeless tobacco: Never   Substance and Sexual Activity    Alcohol use: No     Alcohol/week: 0.0 standard drinks of alcohol     Comment: Alcoholic Drinks/day: occasional glass of wine    Drug use: No    Sexual activity: Not on file   Other Topics Concern    Not on file   Social History Narrative    .  passed away 20 years ago. No children. 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 Was living independently in her apartment in [place] prior to admission. Uses walker at baseline.       Social Determinants of Health     Financial Resource Strain: Not on file   Food Insecurity: Not on file   Transportation Needs: Not on file   Physical Activity: Not on file   Stress: Not on file   Social Connections: Not on file   Interpersonal Safety: Not on file   Housing Stability: Not on file          Medications  Allergies   Current Outpatient Medications   Medication Sig Dispense Refill    acetaminophen (TYLENOL) 325 MG tablet Take 3 tablets (975 mg) by mouth every 8 hours as needed for mild pain      albuterol (PROAIR HFA/PROVENTIL HFA/VENTOLIN HFA) 108 (90 Base) MCG/ACT inhaler INHALE 2 PUFFS INTO THE LUNGS 4 TIMES DAILY  (DX: CHRONIC OBSTRUCTIVE PULMONARY DISEASE) 18 g 11    amoxicillin (AMOXIL) 500 MG capsule 4 CAPSULES (2000MG ) ORALLY AS NEEDED ONE HOUR PRIOR TO DENTAL APPOINTMENT 4 capsule 5    ANTACID REGULAR STRENGTH 500 MG chewable tablet 2 TABLETS (1000MG) ORALLY 2 TIMES DAILY AS NEEDED FOR HEARTBURN 60 tablet 11    apixaban ANTICOAGULANT (ELIQUIS) 2.5 MG tablet Take 1 tablet (2.5 mg) by mouth 2 times daily      ASPIRIN LOW DOSE 81 MG EC tablet 1 TABLET ORALLY 2 TIMES DAILY (DX: PROPHYLAXIS) 180 tablet 3    atorvastatin (LIPITOR) 40 MG tablet Take 1 tablet (40 mg) by mouth daily      Bacillus Coagulans-Inulin (PROBIOTIC FORMULA) 1-250 BILLION-MG CAPS 1 CAPSULE ORALLY DAILY 31 capsule 11    bisacodyl (DULCOLAX) 5 MG EC tablet Take 1 tablet (5 mg) by mouth 2 times daily as needed for constipation      chlorhexidine (PERIDEX) 0.12 % solution Swish and spit 15 mLs in mouth 2 times daily May self administer 118 mL 3    cholecalciferol (VITAMIN D3) 125 mcg (5000 units) capsule 1 CAPSULE ORALLY DAILY 90 capsule 3    COMBIVENT RESPIMAT  MCG/ACT inhaler INHALE 1 PUFF INTO THE LUNGS  4 TIMES DAILY 4 g 11    cycloSPORINE (RESTASIS) 0.05 % ophthalmic emulsion Place 1 drop into both eyes 2 times daily       demeclocycline (DECLOMYCIN) 150 MG tablet 1 TABLET ORALLY 2 TIMES DAILY 60 tablet 11    fish  oil-omega-3 fatty acids 1000 MG capsule Take 2 capsules (2 g) by mouth daily      fluorometholone (FML LIQUIFILM) 0.1 % ophthalmic susp Place 1 drop Into the left eye daily       hydrocortisone 1 % CREA cream Place rectally 2 times daily as needed for itching      IBANDRONATE SODIUM PO Take 150 mg by mouth every 30 days      levothyroxine (SYNTHROID/LEVOTHROID) 75 MCG tablet 1 TABLET ORALLY EVERY MORNING ON AN EMPTY STOMACH (DX: HYPOTHYROIDISM) 30 tablet 11    loratadine (CLARITIN) 10 MG tablet 1 TABLET ORALLY DAILY (DX: ASTHMA) 28 tablet 11    melatonin 3 MG tablet 1 TABLET ORALLY AT BEDTIME ALONG WITH 5 MG FOR A TOTAL DOSE OF 8MG 30 tablet 11    melatonin 5 MG tablet 1 TABLET ORALLY AT BEDTIME  ALONG WITH 3MG FOR A TOTAL DOSE OF 8MG 30 tablet 11    metoprolol tartrate (LOPRESSOR) 25 MG tablet 1 TABLET ORALLY 2 TIMES DAILY (DX: HYPERTENSION) 56 tablet 11    mirtazapine (REMERON) 7.5 MG tablet 1 TABLET ORALLY AT BEDTIME 31 tablet 11    Multiple Vitamins-Minerals (PRESERVISION AREDS 2) CAPS 1 CAPSULE ORALLY 2 TIMES DAILY 62 capsule 11    omeprazole (PRILOSEC) 20 MG DR capsule 1 CAPSULE ORALLY 2 TIMES DAILY (DX: GASTROESOPHAGEAL REFLUX DISEASE) 62 capsule 11    OXYGEN-HELIUM IN every evening      polyethylene glycol 0.4%- propylene glycol 0.3% (SYSTANE ULTRA) 0.4-0.3 % SOLN ophthalmic solution Place 1 drop into both eyes 4 times daily      Polyethylene Glycol 3350 (PEG 3350) 17 GM/SCOOP POWD MIX 1 CAPFUL (17 GMS) IN 8 OUNCES WATER AND DRINK ORALLY DAILY (DX: CONSTIPATION) 510 g 11    Probiotic Product (PROBIOTIC PO) Take 1 capsule by mouth every morning      SENEXON-S 8.6-50 MG tablet 1 TABLET ORALLY 2 TIMES DAILY (DX: CONSTIPATION) 56 tablet 11    sodium chloride 1 GM tablet Take 1 tablet (1 g) by mouth daily 30 tablet 11    spironolactone (ALDACTONE) 25 MG tablet 1 TABLET ORALLY DAILY (DX: EDEMA) ** HAZARDOUS MED: WEAR DOUBLE NITRILE GLOVES** (Patient taking differently: Take 25 mg by mouth daily Hold for SBP  <110mmHg) 28 tablet 11    timolol (TIMOPTIC) 0.5 % ophthalmic solution Place 1 drop Into the left eye daily       torsemide (DEMADEX) 20 MG tablet 1 TABLET ORALLY DAILY 90 tablet 3    vitamin C (ASCORBIC ACID) 500 MG tablet 1 TABLET ORALLY DAILY (DX: SUPPLEMENT) 31 tablet 11    Allergies   Allergen Reactions    Gramineae Pollens Other (See Comments)     ORCHARDGRASS. Shortness of breath when lawn is just cut.    Smoke. Other (See Comments)     Hard to breathe.    Pollen Extract      Other reaction(s): Unknown         Lab Results    Chemistry/lipid CBC Cardiac Enzymes/BNP/TSH/INR   Recent Labs   Lab Test 03/01/24  0841   CHOL 137   HDL 42*   LDL 81   TRIG 72     Recent Labs   Lab Test 03/01/24  0841 10/31/22  1113 11/09/16  0952   LDL 81 117* 84     Recent Labs   Lab Test 05/06/24  1111      POTASSIUM 4.1   CHLORIDE 101   CO2 21*   GLC 78   BUN 27.7*   CR 1.34*   GFRESTIMATED 37*   PERICO 9.1     Recent Labs   Lab Test 05/06/24  1111 04/01/24  1116 03/27/24  1213   CR 1.34* 1.18* 1.17*     Recent Labs   Lab Test 03/01/24  0841 05/13/20  1205   A1C 5.3 5.2    Recent Labs   Lab Test 03/03/24  0640   WBC 7.0   HGB 11.0*   HCT 35.0   *        Recent Labs   Lab Test 03/03/24  0640 03/02/24  0406 03/01/24  0841   HGB 11.0* 10.4* 10.4*    Recent Labs   Lab Test 09/06/22 2001 09/06/22  0850 09/06/22  0102   TROPONINI 0.79* 1.83* 0.74*     Recent Labs   Lab Test 09/07/22  0435 08/13/21  0552 05/13/20  0446   * 171* 536*     Recent Labs   Lab Test 05/06/24  1111   TSH 6.46*     Recent Labs   Lab Test 03/01/24  0841 09/05/22  2134 08/12/21  2122   INR 1.09 1.23* 1.10        60 minutes spent on the date of encounter doing education, chart prep/review, review of outside records, review of test results, interpretation with above tests, patient visit, documentation, and discussion with family.      This note has been dictated using voice recognition software. Any grammatical or context distortions are  unintentional and inherent to the software.    Tracy Roy PA-C  Structural Heart Program  [facility]       Thank you for allowing me to participate in the care of your patient.      Sincerely,     Tracy Roy PA-C     [facility]  cc:   Julia Amaya MD  [address], [address]  [place] [address]

## 2024-05-29 ENCOUNTER — TRANSFERRED RECORDS (OUTPATIENT)
Dept: HEALTH INFORMATION MANAGEMENT | Facility: CLINIC | Age: 89
End: 2024-05-29
Payer: COMMERCIAL

## 2024-05-31 ENCOUNTER — LAB REQUISITION (OUTPATIENT)
Dept: LAB | Facility: CLINIC | Age: 89
End: 2024-05-31
Payer: COMMERCIAL

## 2024-05-31 DIAGNOSIS — I50.9 HEART FAILURE, UNSPECIFIED (H): ICD-10-CM

## 2024-06-03 PROCEDURE — P9603 ONE-WAY ALLOW PRORATED MILES: HCPCS | Mod: ORL | Performed by: INTERNAL MEDICINE

## 2024-06-03 PROCEDURE — 80048 BASIC METABOLIC PNL TOTAL CA: CPT | Mod: ORL | Performed by: INTERNAL MEDICINE

## 2024-06-03 PROCEDURE — 36415 COLL VENOUS BLD VENIPUNCTURE: CPT | Mod: ORL | Performed by: INTERNAL MEDICINE

## 2024-06-04 LAB
ANION GAP SERPL CALCULATED.3IONS-SCNC: 12 MMOL/L (ref 7–15)
BUN SERPL-MCNC: 34.3 MG/DL (ref 8–23)
CALCIUM SERPL-MCNC: 8.9 MG/DL (ref 8.2–9.6)
CHLORIDE SERPL-SCNC: 103 MMOL/L (ref 98–107)
CREAT SERPL-MCNC: 1.04 MG/DL (ref 0.51–0.95)
DEPRECATED HCO3 PLAS-SCNC: 22 MMOL/L (ref 22–29)
EGFRCR SERPLBLD CKD-EPI 2021: 49 ML/MIN/1.73M2
GLUCOSE SERPL-MCNC: 76 MG/DL (ref 70–99)
POTASSIUM SERPL-SCNC: 4.1 MMOL/L (ref 3.4–5.3)
SODIUM SERPL-SCNC: 137 MMOL/L (ref 135–145)

## 2024-06-05 DIAGNOSIS — I10 HYPERTENSION, UNSPECIFIED TYPE: ICD-10-CM

## 2024-06-05 RX ORDER — SPIRONOLACTONE 25 MG/1
TABLET ORAL
Qty: 15 TABLET | Refills: 11 | Status: SHIPPED | OUTPATIENT
Start: 2024-06-05

## 2024-06-10 ENCOUNTER — ASSISTED LIVING VISIT (OUTPATIENT)
Dept: GERIATRICS | Facility: CLINIC | Age: 89
End: 2024-06-10
Payer: COMMERCIAL

## 2024-06-10 VITALS
WEIGHT: 93.8 LBS | OXYGEN SATURATION: 94 % | DIASTOLIC BLOOD PRESSURE: 86 MMHG | BODY MASS INDEX: 17.16 KG/M2 | TEMPERATURE: 97.8 F | HEART RATE: 75 BPM | SYSTOLIC BLOOD PRESSURE: 143 MMHG | RESPIRATION RATE: 22 BRPM

## 2024-06-10 DIAGNOSIS — Z86.73 HISTORY OF CEREBROVASCULAR ACCIDENT (CVA) DUE TO ISCHEMIA: ICD-10-CM

## 2024-06-10 DIAGNOSIS — K59.01 SLOW TRANSIT CONSTIPATION: ICD-10-CM

## 2024-06-10 DIAGNOSIS — E87.1 CHRONIC HYPONATREMIA: ICD-10-CM

## 2024-06-10 DIAGNOSIS — I50.32 CHRONIC DIASTOLIC CONGESTIVE HEART FAILURE (H): Primary | ICD-10-CM

## 2024-06-10 PROCEDURE — 99349 HOME/RES VST EST MOD MDM 40: CPT | Performed by: NURSE PRACTITIONER

## 2024-06-10 NOTE — LETTER
6/10/2024      Marla Dunn  C/o Deneen Dunn  [address] [place] [id number]        No notes on file      Sincerely,        CASTRO Hazel CNP

## 2024-06-10 NOTE — PROGRESS NOTES
FARSHAD [facility] GERIATRICS  ACUTE/EPISODIC VISIT    [facility] Medical Record Number:  [id number]  Place of Service where encounter took place:  [facility] [[id number]]    Chief Complaint   Patient presents with    RECHECK       HPI:    Marla Dunn is a 89 year old  (5/22/1929), who is being seen today for an episodic care visit.  HPI information obtained from: facility chart records, facility staff, patient report, and [facility] chart review.    Today's concern is:    Diagnoses         Codes Comments    Chronic diastolic congestive heart failure (H)    -  Primary I50.32     Chronic hyponatremia     E87.1     Slow transit constipation     K59.01     History of cerebrovascular accident (CVA) due to ischemia     Z86.73           Came to visit Georgia and see how she has been doing.    Found her in her living area.  Alert and pleasant.  Was not feeling well yesterday but vitals were within limits.  Georgia's niece keeps an eye on her.  Doing better today.  Denied chest pain or shortness of pain.  Often Georgia comments about she is ready to die but not her time yet.   Nothing new otherwise.  Continues with monthly BMP lab as directed from her nephrologist to monitor her chronic hyponatremia.         ALLERGIES:    Allergies   Allergen Reactions    Gramineae Pollens Other (See Comments)     ORCHARDGRASS. Shortness of breath when lawn is just cut.    Smoke. Other (See Comments)     Hard to breathe.    Pollen Extract      Other reaction(s): Unknown        MEDICATIONS:  Post Discharge Medication Reconciliation Status: patient was not discharged from an inpatient facility or TCU.     Current Outpatient Medications   Medication Sig Dispense Refill    acetaminophen (TYLENOL) 325 MG tablet Take 3 tablets (975 mg) by mouth every 8 hours as needed for mild pain      albuterol (PROAIR HFA/PROVENTIL HFA/VENTOLIN HFA) 108 (90 Base) MCG/ACT inhaler INHALE 2 PUFFS INTO THE LUNGS 4 TIMES DAILY  (DX:  CHRONIC OBSTRUCTIVE PULMONARY DISEASE) 18 g 11    amoxicillin (AMOXIL) 500 MG capsule 4 CAPSULES (2000MG ) ORALLY AS NEEDED ONE HOUR PRIOR TO DENTAL APPOINTMENT 4 capsule 5    ANTACID REGULAR STRENGTH 500 MG chewable tablet 2 TABLETS (1000MG) ORALLY 2 TIMES DAILY AS NEEDED FOR HEARTBURN 60 tablet 11    apixaban ANTICOAGULANT (ELIQUIS) 2.5 MG tablet Take 1 tablet (2.5 mg) by mouth 2 times daily      ASPIRIN LOW DOSE 81 MG EC tablet 1 TABLET ORALLY 2 TIMES DAILY (DX: PROPHYLAXIS) 180 tablet 3    atorvastatin (LIPITOR) 40 MG tablet Take 1 tablet (40 mg) by mouth daily      Bacillus Coagulans-Inulin (PROBIOTIC FORMULA) 1-250 BILLION-MG CAPS 1 CAPSULE ORALLY DAILY 31 capsule 11    bisacodyl (DULCOLAX) 5 MG EC tablet Take 1 tablet (5 mg) by mouth 2 times daily as needed for constipation      chlorhexidine (PERIDEX) 0.12 % solution Swish and spit 15 mLs in mouth 2 times daily May self administer 118 mL 3    cholecalciferol (VITAMIN D3) 125 mcg (5000 units) capsule 1 CAPSULE ORALLY DAILY 90 capsule 3    COMBIVENT RESPIMAT  MCG/ACT inhaler INHALE 1 PUFF INTO THE LUNGS  4 TIMES DAILY 4 g 11    cycloSPORINE (RESTASIS) 0.05 % ophthalmic emulsion Place 1 drop into both eyes 2 times daily       demeclocycline (DECLOMYCIN) 150 MG tablet 1 TABLET ORALLY 2 TIMES DAILY 60 tablet 11    fish oil-omega-3 fatty acids 1000 MG capsule Take 2 capsules (2 g) by mouth daily      fluorometholone (FML LIQUIFILM) 0.1 % ophthalmic susp Place 1 drop Into the left eye daily       hydrocortisone 1 % CREA cream Place rectally 2 times daily as needed for itching      IBANDRONATE SODIUM PO Take 150 mg by mouth every 30 days      levothyroxine (SYNTHROID/LEVOTHROID) 75 MCG tablet 1 TABLET ORALLY EVERY MORNING ON AN EMPTY STOMACH (DX: HYPOTHYROIDISM) 30 tablet 11    loratadine (CLARITIN) 10 MG tablet 1 TABLET ORALLY DAILY (DX: ASTHMA) 28 tablet 11    melatonin 3 MG tablet 1 TABLET ORALLY AT BEDTIME ALONG WITH 5 MG FOR A TOTAL DOSE OF 8MG 30 tablet  11    melatonin 5 MG tablet 1 TABLET ORALLY AT BEDTIME  ALONG WITH 3MG FOR A TOTAL DOSE OF 8MG 30 tablet 11    metoprolol tartrate (LOPRESSOR) 25 MG tablet 1 TABLET ORALLY 2 TIMES DAILY (DX: HYPERTENSION) 56 tablet 11    mirtazapine (REMERON) 7.5 MG tablet 1 TABLET ORALLY AT BEDTIME 31 tablet 11    Multiple Vitamins-Minerals (PRESERVISION AREDS 2) CAPS 1 CAPSULE ORALLY 2 TIMES DAILY 62 capsule 11    omeprazole (PRILOSEC) 20 MG DR capsule 1 CAPSULE ORALLY 2 TIMES DAILY (DX: GASTROESOPHAGEAL REFLUX DISEASE) 62 capsule 11    OXYGEN-HELIUM IN every evening      polyethylene glycol 0.4%- propylene glycol 0.3% (SYSTANE ULTRA) 0.4-0.3 % SOLN ophthalmic solution Place 1 drop into both eyes 4 times daily      Polyethylene Glycol 3350 (PEG 3350) 17 GM/SCOOP POWD MIX 1 CAPFUL (17 GMS) IN 8 OUNCES WATER AND DRINK ORALLY DAILY (DX: CONSTIPATION) 510 g 11    Probiotic Product (PROBIOTIC PO) Take 1 capsule by mouth every morning      SENEXON-S 8.6-50 MG tablet 1 TABLET ORALLY 2 TIMES DAILY (DX: CONSTIPATION) 56 tablet 11    sodium chloride 1 GM tablet Take 1 tablet (1 g) by mouth daily 30 tablet 11    spironolactone (ALDACTONE) 25 MG tablet 1/2 TABLET (12.5MG) ORALLY DAILY ** HAZARDOUS MED: WEAR DOUBLE NITRILE GLOVES** 15 tablet 11    timolol (TIMOPTIC) 0.5 % ophthalmic solution Place 1 drop Into the left eye daily       torsemide (DEMADEX) 20 MG tablet 1 TABLET ORALLY DAILY 90 tablet 3    vitamin C (ASCORBIC ACID) 500 MG tablet 1 TABLET ORALLY DAILY (DX: SUPPLEMENT) 31 tablet 11       REVIEW OF SYSTEMS:  4 point ROS neg other than the symptoms noted above in the HPI.      PHYSICAL EXAM:  BP (!) 143/86   Pulse 75   Temp 97.8  F (36.6  C)   Resp 22   Wt 42.5 kg (93 lb 12.8 oz)   SpO2 94%   BMI 17.16 kg/m    Petite and fraile female whom alternated sitting in her recliner to elevate legs and nap vs being up in her wheelchair.    Alert and oriented x3.  Able to make her needs known.    Heart rate regular/irregular and strong.   No edema in lower legs.  Skin is pale/pink, warm and dry.  Lower legs have bruising most likely from bumping her legs.    Lungs are clear.    Abdomen is flat, soft and non-tender.  Able to transfer self.      Component      Latest Ref Rng 6/3/2024  9:40 AM   Sodium      135 - 145 mmol/L 137    Potassium      3.4 - 5.3 mmol/L 4.1    Chloride      98 - 107 mmol/L 103    Carbon Dioxide (CO2)      22 - 29 mmol/L 22    Anion Gap      7 - 15 mmol/L 12    Urea Nitrogen      8.0 - 23.0 mg/dL 34.3 (H)    Creatinine      0.51 - 0.95 mg/dL 1.04 (H)    GFR Estimate      >60 mL/min/1.73m2 49 (L)    Calcium      8.2 - 9.6 mg/dL 8.9    Glucose      70 - 99 mg/dL 76         ASSESSMENT / PLAN:  (I50.32) Chronic diastolic congestive heart failure (H)  (primary encounter diagnosis)  Comment: no exacerbations.  Managed with Torsemide 20mg daily and spironolactone 12.5mg daily  Weight stays in the 90's.  She wishes she could be around 100 lbs.    (E87.1) Chronic hyponatremia  Comment: since she had a hospital stay for her CVA, her Na level has come back normal range.  Continues with NaCl 1gm daily and Demeclocycline 150mg TID.  Monthly BMP    (K59.01) Slow transit constipation  Comment: Georgia worries about her bowels most of the time.  Will take Ducolax PRN.  Has not asked for any recent refills from this NP and may be going through nursing.  Has Miralax daily in AM and Senna-S 1 tab BID.  No changes.    (Z86.73) History of cerebrovascular accident (CVA) due to ischemia   Comment: currently on ASA 81mg po BID and Atorvastatin 40mg po daily.  No new other neurological issues.      Orders:  No new orders.    Electronically signed by  CASTRO Hazel CNP

## 2024-06-10 NOTE — LETTER
6/10/2024      Marla Dunn  C/o Deneen Dunn  [address] [facility] [address]        [facility] GERIATRICS  ACUTE/EPISODIC VISIT    [facility] Medical Record Number:  [id number]  Place of Service where encounter took place:  [facility] [place] ([facility]) [[id number]]    Chief Complaint   Patient presents with     RECHECK       HPI:    Marla Dunn is a 89 year old  (5/22/1929), who is being seen today for an episodic care visit.  HPI information obtained from: facility chart records, facility staff, patient report, and [facility] chart review.    Today's concern is:    Diagnoses         Codes Comments    Chronic diastolic congestive heart failure (H)    -  Primary I50.32     Chronic hyponatremia     E87.1     Slow transit constipation     K59.01     History of cerebrovascular accident (CVA) due to ischemia     Z86.73           Came to visit Georgia and see how she has been doing.    Found her in her living area.  Alert and pleasant.  Was not feeling well yesterday but vitals were within limits.  Georgia's niece keeps an eye on her.  Doing better today.  Denied chest pain or shortness of pain.  Often Georgia comments about she is ready to die but not her time yet.   Nothing new otherwise.  Continues with monthly BMP lab as directed from her nephrologist to monitor her chronic hyponatremia.         ALLERGIES:    Allergies   Allergen Reactions     Gramineae Pollens Other (See Comments)     ORCHARDGRASS. Shortness of breath when lawn is just cut.     Smoke. Other (See Comments)     Hard to breathe.     Pollen Extract      Other reaction(s): Unknown        MEDICATIONS:  Post Discharge Medication Reconciliation Status: patient was not discharged from an inpatient facility or TCU.     Current Outpatient Medications   Medication Sig Dispense Refill     acetaminophen (TYLENOL) 325 MG tablet Take 3 tablets (975 mg) by mouth every 8 hours as needed for mild pain       albuterol (PROAIR  HFA/PROVENTIL HFA/VENTOLIN HFA) 108 (90 Base) MCG/ACT inhaler INHALE 2 PUFFS INTO THE LUNGS 4 TIMES DAILY  (DX: CHRONIC OBSTRUCTIVE PULMONARY DISEASE) 18 g 11     amoxicillin (AMOXIL) 500 MG capsule 4 CAPSULES (2000MG ) ORALLY AS NEEDED ONE HOUR PRIOR TO DENTAL APPOINTMENT 4 capsule 5     ANTACID REGULAR STRENGTH 500 MG chewable tablet 2 TABLETS (1000MG) ORALLY 2 TIMES DAILY AS NEEDED FOR HEARTBURN 60 tablet 11     apixaban ANTICOAGULANT (ELIQUIS) 2.5 MG tablet Take 1 tablet (2.5 mg) by mouth 2 times daily       ASPIRIN LOW DOSE 81 MG EC tablet 1 TABLET ORALLY 2 TIMES DAILY (DX: PROPHYLAXIS) 180 tablet 3     atorvastatin (LIPITOR) 40 MG tablet Take 1 tablet (40 mg) by mouth daily       Bacillus Coagulans-Inulin (PROBIOTIC FORMULA) 1-250 BILLION-MG CAPS 1 CAPSULE ORALLY DAILY 31 capsule 11     bisacodyl (DULCOLAX) 5 MG EC tablet Take 1 tablet (5 mg) by mouth 2 times daily as needed for constipation       chlorhexidine (PERIDEX) 0.12 % solution Swish and spit 15 mLs in mouth 2 times daily May self administer 118 mL 3     cholecalciferol (VITAMIN D3) 125 mcg (5000 units) capsule 1 CAPSULE ORALLY DAILY 90 capsule 3     COMBIVENT RESPIMAT  MCG/ACT inhaler INHALE 1 PUFF INTO THE LUNGS  4 TIMES DAILY 4 g 11     cycloSPORINE (RESTASIS) 0.05 % ophthalmic emulsion Place 1 drop into both eyes 2 times daily        demeclocycline (DECLOMYCIN) 150 MG tablet 1 TABLET ORALLY 2 TIMES DAILY 60 tablet 11     fish oil-omega-3 fatty acids 1000 MG capsule Take 2 capsules (2 g) by mouth daily       fluorometholone (FML LIQUIFILM) 0.1 % ophthalmic susp Place 1 drop Into the left eye daily        hydrocortisone 1 % CREA cream Place rectally 2 times daily as needed for itching       IBANDRONATE SODIUM PO Take 150 mg by mouth every 30 days       levothyroxine (SYNTHROID/LEVOTHROID) 75 MCG tablet 1 TABLET ORALLY EVERY MORNING ON AN EMPTY STOMACH (DX: HYPOTHYROIDISM) 30 tablet 11     loratadine (CLARITIN) 10 MG tablet 1 TABLET ORALLY  DAILY (DX: ASTHMA) 28 tablet 11     melatonin 3 MG tablet 1 TABLET ORALLY AT BEDTIME ALONG WITH 5 MG FOR A TOTAL DOSE OF 8MG 30 tablet 11     melatonin 5 MG tablet 1 TABLET ORALLY AT BEDTIME  ALONG WITH 3MG FOR A TOTAL DOSE OF 8MG 30 tablet 11     metoprolol tartrate (LOPRESSOR) 25 MG tablet 1 TABLET ORALLY 2 TIMES DAILY (DX: HYPERTENSION) 56 tablet 11     mirtazapine (REMERON) 7.5 MG tablet 1 TABLET ORALLY AT BEDTIME 31 tablet 11     Multiple Vitamins-Minerals (PRESERVISION AREDS 2) CAPS 1 CAPSULE ORALLY 2 TIMES DAILY 62 capsule 11     omeprazole (PRILOSEC) 20 MG DR capsule 1 CAPSULE ORALLY 2 TIMES DAILY (DX: GASTROESOPHAGEAL REFLUX DISEASE) 62 capsule 11     OXYGEN-HELIUM IN every evening       polyethylene glycol 0.4%- propylene glycol 0.3% (SYSTANE ULTRA) 0.4-0.3 % SOLN ophthalmic solution Place 1 drop into both eyes 4 times daily       Polyethylene Glycol 3350 (PEG 3350) 17 GM/SCOOP POWD MIX 1 CAPFUL (17 GMS) IN 8 OUNCES WATER AND DRINK ORALLY DAILY (DX: CONSTIPATION) 510 g 11     Probiotic Product (PROBIOTIC PO) Take 1 capsule by mouth every morning       SENEXON-S 8.6-50 MG tablet 1 TABLET ORALLY 2 TIMES DAILY (DX: CONSTIPATION) 56 tablet 11     sodium chloride 1 GM tablet Take 1 tablet (1 g) by mouth daily 30 tablet 11     spironolactone (ALDACTONE) 25 MG tablet 1/2 TABLET (12.5MG) ORALLY DAILY ** HAZARDOUS MED: WEAR DOUBLE NITRILE GLOVES** 15 tablet 11     timolol (TIMOPTIC) 0.5 % ophthalmic solution Place 1 drop Into the left eye daily        torsemide (DEMADEX) 20 MG tablet 1 TABLET ORALLY DAILY 90 tablet 3     vitamin C (ASCORBIC ACID) 500 MG tablet 1 TABLET ORALLY DAILY (DX: SUPPLEMENT) 31 tablet 11       REVIEW OF SYSTEMS:  4 point ROS neg other than the symptoms noted above in the HPI.      PHYSICAL EXAM:  BP (!) 143/86   Pulse 75   Temp 97.8  F (36.6  C)   Resp 22   Wt 42.5 kg (93 lb 12.8 oz)   SpO2 94%   BMI 17.16 kg/m    Petite and fraile female whom alternated sitting in her recliner to  elevate legs and nap vs being up in her wheelchair.    Alert and oriented x3.  Able to make her needs known.    Heart rate regular/irregular and strong.  No edema in lower legs.  Skin is pale/pink, warm and dry.  Lower legs have bruising most likely from bumping her legs.    Lungs are clear.    Abdomen is flat, soft and non-tender.  Able to transfer self.      Component      Latest Ref Rng 6/3/2024  9:40 AM   Sodium      135 - 145 mmol/L 137    Potassium      3.4 - 5.3 mmol/L 4.1    Chloride      98 - 107 mmol/L 103    Carbon Dioxide (CO2)      22 - 29 mmol/L 22    Anion Gap      7 - 15 mmol/L 12    Urea Nitrogen      8.0 - 23.0 mg/dL 34.3 (H)    Creatinine      0.51 - 0.95 mg/dL 1.04 (H)    GFR Estimate      >60 mL/min/1.73m2 49 (L)    Calcium      8.2 - 9.6 mg/dL 8.9    Glucose      70 - 99 mg/dL 76         ASSESSMENT / PLAN:  (I50.32) Chronic diastolic congestive heart failure (H)  (primary encounter diagnosis)  Comment: no exacerbations.  Managed with Torsemide 20mg daily and spironolactone 12.5mg daily  Weight stays in the 90's.  She wishes she could be around 100 lbs.    (E87.1) Chronic hyponatremia  Comment: since she had a hospital stay for her CVA, her Na level has come back normal range.  Continues with NaCl 1gm daily and Demeclocycline 150mg TID.  Monthly BMP    (K59.01) Slow transit constipation  Comment: Georgia worries about her bowels most of the time.  Will take Ducolax PRN.  Has not asked for any recent refills from this NP and may be going through nursing.  Has Miralax daily in AM and Senna-S 1 tab BID.  No changes.    (Z86.73) History of cerebrovascular accident (CVA) due to ischemia   Comment: currently on ASA 81mg po BID and Atorvastatin 40mg po daily.  No new other neurological issues.      Orders:  No new orders.    Electronically signed by  CASTRO Hazel CNP            Sincerely,        CASTRO Hazel CNP

## 2024-06-25 ENCOUNTER — PATIENT OUTREACH (OUTPATIENT)
Dept: GERIATRIC MEDICINE | Facility: CLINIC | Age: 89
End: 2024-06-25

## 2024-06-25 NOTE — PROGRESS NOTES
[facility] Care Coordination Contact    [employer] Care Coordination    Annual chart review preformed for [employer] member.     Reviewed Epic notes and no triggering events noted. No hospitalizations or ED visits since writer's last review in April - she has been doing well. Daily assistance (including med management) provided by assisted living facility. Closely followed by onsite NP.     Writer will continue to follow via EMR and is available as needed.    Chiara Gloria RN  [facility]  Cell: [phone]

## 2024-06-27 ENCOUNTER — LAB REQUISITION (OUTPATIENT)
Dept: LAB | Facility: CLINIC | Age: 89
End: 2024-06-27
Payer: COMMERCIAL

## 2024-06-27 DIAGNOSIS — I50.9 HEART FAILURE, UNSPECIFIED (H): ICD-10-CM

## 2024-06-28 ENCOUNTER — TRANSFERRED RECORDS (OUTPATIENT)
Dept: HEALTH INFORMATION MANAGEMENT | Facility: CLINIC | Age: 89
End: 2024-06-28
Payer: COMMERCIAL

## 2024-07-03 ENCOUNTER — LAB REQUISITION (OUTPATIENT)
Dept: LAB | Facility: CLINIC | Age: 89
End: 2024-07-03
Payer: COMMERCIAL

## 2024-07-03 DIAGNOSIS — I50.9 HEART FAILURE, UNSPECIFIED (H): ICD-10-CM

## 2024-07-08 LAB
ANION GAP SERPL CALCULATED.3IONS-SCNC: 11 MMOL/L (ref 7–15)
BUN SERPL-MCNC: 36.4 MG/DL (ref 8–23)
CALCIUM SERPL-MCNC: 9.2 MG/DL (ref 8.2–9.6)
CHLORIDE SERPL-SCNC: 100 MMOL/L (ref 98–107)
CREAT SERPL-MCNC: 1.05 MG/DL (ref 0.51–0.95)
DEPRECATED HCO3 PLAS-SCNC: 23 MMOL/L (ref 22–29)
EGFRCR SERPLBLD CKD-EPI 2021: 49 ML/MIN/1.73M2
GLUCOSE SERPL-MCNC: 87 MG/DL (ref 70–99)
POTASSIUM SERPL-SCNC: 4.3 MMOL/L (ref 3.4–5.3)
SODIUM SERPL-SCNC: 134 MMOL/L (ref 135–145)

## 2024-07-08 PROCEDURE — 80048 BASIC METABOLIC PNL TOTAL CA: CPT | Mod: ORL | Performed by: INTERNAL MEDICINE

## 2024-07-08 PROCEDURE — 36415 COLL VENOUS BLD VENIPUNCTURE: CPT | Mod: ORL | Performed by: INTERNAL MEDICINE

## 2024-07-08 PROCEDURE — P9603 ONE-WAY ALLOW PRORATED MILES: HCPCS | Mod: ORL | Performed by: INTERNAL MEDICINE

## 2024-07-15 ENCOUNTER — ASSISTED LIVING VISIT (OUTPATIENT)
Dept: GERIATRICS | Facility: CLINIC | Age: 89
End: 2024-07-15
Payer: COMMERCIAL

## 2024-07-15 VITALS
RESPIRATION RATE: 18 BRPM | DIASTOLIC BLOOD PRESSURE: 60 MMHG | SYSTOLIC BLOOD PRESSURE: 112 MMHG | HEART RATE: 74 BPM | TEMPERATURE: 97.5 F | WEIGHT: 93.2 LBS | BODY MASS INDEX: 17.05 KG/M2

## 2024-07-15 DIAGNOSIS — I50.32 CHRONIC DIASTOLIC CONGESTIVE HEART FAILURE (H): ICD-10-CM

## 2024-07-15 DIAGNOSIS — R13.19 ESOPHAGEAL DYSPHAGIA: Primary | ICD-10-CM

## 2024-07-15 DIAGNOSIS — I48.91 ATRIAL FIBRILLATION WITH RAPID VENTRICULAR RESPONSE (H): ICD-10-CM

## 2024-07-15 DIAGNOSIS — R06.02 SHORTNESS OF BREATH: ICD-10-CM

## 2024-07-15 DIAGNOSIS — J06.9 UPPER RESPIRATORY TRACT INFECTION, UNSPECIFIED TYPE: ICD-10-CM

## 2024-07-15 PROCEDURE — 99349 HOME/RES VST EST MOD MDM 40: CPT | Performed by: NURSE PRACTITIONER

## 2024-07-15 NOTE — PROGRESS NOTES
[facility] GERIATRICS  ACUTE/EPISODIC VISIT    [facility] Medical Record Number:  [id number]  Place of Service where encounter took place:  [facility] ([facility]) [[id number]]    Chief Complaint   Patient presents with    RECHECK       HPI:    Marla Dunn is a 89 year old  (5/22/1929), who is being seen today for an episodic care visit.  HPI information obtained from: facility chart records, patient report, and [facility] chart review.    Today's concern is:    Diagnoses         Codes Comments    Esophageal dysphagia    -  Primary R13.19     Upper respiratory tract infection, unspecified type     J06.9     Chronic diastolic congestive heart failure (H)     I50.32     Atrial fibrillation with rapid ventricular response (H)     I48.91     Shortness of breath     R06.02           Came to see Georgia today as she asked for a visit the next time in [facility].  Staff stated she was complaining she is having trouble swallowing.    Georgia stated she has had this before but could not remember what was done about it.  She has an upcoming appointment for follow up with her oral surgeon in regards to the tongue cancer.  She wondered if it had anything to do with that.  Told her this NP could hear in her voice a change in tone and so question an infection either respiratory or candidiasis of the esophagus.    Is on Loratidine already.  Thought that may be a med started to help her swallowing if having post nasal drip.      After a while, Georgia brought out her pharmacy bill to show this NP.  Eliquis is the most expensive co-pay, Combivent and then her chronic ABX used for hyponatremia are also expensive.  Georgia is also on albuterol inhaler.  Unclear why on both combivent and albuterol.  Made the suggestion to finish out her Combivent supply and then discontinue it to see if really needed.  Leave on the albuterol inhaler.      ALLERGIES:    Allergies   Allergen Reactions    Gramineae Pollens Other (See  Comments)     ORCHARDGRASS. Shortness of breath when lawn is just cut.    Smoke. Other (See Comments)     Hard to breathe.    Pollen Extract      Other reaction(s): Unknown        MEDICATIONS:  Post Discharge Medication Reconciliation Status: patient was not discharged from an inpatient facility or TCU.  Medications reviewed today.    Current Outpatient Medications   Medication Sig Dispense Refill    acetaminophen (TYLENOL) 325 MG tablet Take 3 tablets (975 mg) by mouth every 8 hours as needed for mild pain      albuterol (PROAIR HFA/PROVENTIL HFA/VENTOLIN HFA) 108 (90 Base) MCG/ACT inhaler INHALE 2 PUFFS INTO THE LUNGS 4 TIMES DAILY  (DX: CHRONIC OBSTRUCTIVE PULMONARY DISEASE) 18 g 11    amoxicillin (AMOXIL) 500 MG capsule 4 CAPSULES (2000MG ) ORALLY AS NEEDED ONE HOUR PRIOR TO DENTAL APPOINTMENT 4 capsule 5    ANTACID REGULAR STRENGTH 500 MG chewable tablet 2 TABLETS (1000MG) ORALLY 2 TIMES DAILY AS NEEDED FOR HEARTBURN 60 tablet 11    apixaban ANTICOAGULANT (ELIQUIS) 2.5 MG tablet Take 1 tablet (2.5 mg) by mouth 2 times daily      ASPIRIN LOW DOSE 81 MG EC tablet 1 TABLET ORALLY 2 TIMES DAILY (DX: PROPHYLAXIS) 180 tablet 3    atorvastatin (LIPITOR) 40 MG tablet Take 1 tablet (40 mg) by mouth daily      Bacillus Coagulans-Inulin (PROBIOTIC FORMULA) 1-250 BILLION-MG CAPS 1 CAPSULE ORALLY DAILY 31 capsule 11    bisacodyl (DULCOLAX) 5 MG EC tablet Take 1 tablet (5 mg) by mouth 2 times daily as needed for constipation      chlorhexidine (PERIDEX) 0.12 % solution Swish and spit 15 mLs in mouth 2 times daily May self administer 118 mL 3    cholecalciferol (VITAMIN D3) 125 mcg (5000 units) capsule 1 CAPSULE ORALLY DAILY 90 capsule 3    COMBIVENT RESPIMAT  MCG/ACT inhaler INHALE 1 PUFF INTO THE LUNGS  4 TIMES DAILY 4 g 11    cycloSPORINE (RESTASIS) 0.05 % ophthalmic emulsion Place 1 drop into both eyes 2 times daily       demeclocycline (DECLOMYCIN) 150 MG tablet 1 TABLET ORALLY 2 TIMES DAILY 60 tablet 11    fish  oil-omega-3 fatty acids 1000 MG capsule Take 2 capsules (2 g) by mouth daily      fluorometholone (FML LIQUIFILM) 0.1 % ophthalmic susp Place 1 drop Into the left eye daily       hydrocortisone 1 % CREA cream Place rectally 2 times daily as needed for itching      IBANDRONATE SODIUM PO Take 150 mg by mouth every 30 days      levothyroxine (SYNTHROID/LEVOTHROID) 75 MCG tablet 1 TABLET ORALLY EVERY MORNING ON AN EMPTY STOMACH (DX: HYPOTHYROIDISM) 30 tablet 11    loratadine (CLARITIN) 10 MG tablet 1 TABLET ORALLY DAILY (DX: ASTHMA) 28 tablet 11    melatonin 3 MG tablet 1 TABLET ORALLY AT BEDTIME ALONG WITH 5 MG FOR A TOTAL DOSE OF 8MG 30 tablet 11    melatonin 5 MG tablet 1 TABLET ORALLY AT BEDTIME  ALONG WITH 3MG FOR A TOTAL DOSE OF 8MG 30 tablet 11    metoprolol tartrate (LOPRESSOR) 25 MG tablet 1 TABLET ORALLY 2 TIMES DAILY (DX: HYPERTENSION) 56 tablet 11    mirtazapine (REMERON) 7.5 MG tablet 1 TABLET ORALLY AT BEDTIME 31 tablet 11    Multiple Vitamins-Minerals (PRESERVISION AREDS 2) CAPS 1 CAPSULE ORALLY 2 TIMES DAILY 62 capsule 11    omeprazole (PRILOSEC) 20 MG DR capsule 1 CAPSULE ORALLY 2 TIMES DAILY (DX: GASTROESOPHAGEAL REFLUX DISEASE) 62 capsule 11    OXYGEN-HELIUM IN every evening      polyethylene glycol 0.4%- propylene glycol 0.3% (SYSTANE ULTRA) 0.4-0.3 % SOLN ophthalmic solution Place 1 drop into both eyes 4 times daily      Polyethylene Glycol 3350 (PEG 3350) 17 GM/SCOOP POWD MIX 1 CAPFUL (17 GMS) IN 8 OUNCES WATER AND DRINK ORALLY DAILY (DX: CONSTIPATION) 510 g 11    Probiotic Product (PROBIOTIC PO) Take 1 capsule by mouth every morning      SENEXON-S 8.6-50 MG tablet 1 TABLET ORALLY 2 TIMES DAILY (DX: CONSTIPATION) 56 tablet 11    sodium chloride 1 GM tablet Take 1 tablet (1 g) by mouth daily 30 tablet 11    spironolactone (ALDACTONE) 25 MG tablet 1/2 TABLET (12.5MG) ORALLY DAILY ** HAZARDOUS MED: WEAR DOUBLE NITRILE GLOVES** 15 tablet 11    timolol (TIMOPTIC) 0.5 % ophthalmic solution Place 1 drop  Into the left eye daily       torsemide (DEMADEX) 20 MG tablet 1 TABLET ORALLY DAILY 90 tablet 3    vitamin C (ASCORBIC ACID) 500 MG tablet 1 TABLET ORALLY DAILY (DX: SUPPLEMENT) 31 tablet 11         REVIEW OF SYSTEMS:  4 point ROS neg other than the symptoms noted above in the HPI.  No chest pain.  Just trouble swallowing sometimes after eating.      PHYSICAL EXAM:  /60   Pulse 74   Temp 97.5  F (36.4  C)   Resp 18   Wt 42.3 kg (93 lb 3.2 oz)   BMI 17.05 kg/m    Georgia was reclined back in her recliner.  She sets an alarm so she knows when to get up.  Able to stand and transfer self to her wheelchair and then went over to the thermostat and turned down the A/C.  Had to stand and bend over a chair to reach the wall.  Uses w/c to long destinations.      Alert and oriented x3.  Neatly groomed.  Right away could hear her voice be more high pitched liked she had post nasal drip.  Observed the back of her throat and could see yellow strand of mucous in the back upper cavity.  No signs of thrush in the oral cavity.  No coughing noted.  Lung sounds diminished.      Heart rate regular/irregular.  No edema.  No open sores on legs.  A couple of areas that are bruised but she puts lotion on her lower legs every day.  Helps from drying out.    Abdomen is flat, soft, and non-tender.    Component      Latest Ref Rng 6/3/2024  9:40 AM 7/8/2024  1:20 PM   Sodium      135 - 145 mmol/L 137  134 (L)    Potassium      3.4 - 5.3 mmol/L 4.1  4.3    Chloride      98 - 107 mmol/L 103  100    Carbon Dioxide (CO2)      22 - 29 mmol/L 22  23    Anion Gap      7 - 15 mmol/L 12  11    Urea Nitrogen      8.0 - 23.0 mg/dL 34.3 (H)  36.4 (H)    Creatinine      0.51 - 0.95 mg/dL 1.04 (H)  1.05 (H)    GFR Estimate      >60 mL/min/1.73m2 49 (L)  49 (L)    Calcium      8.2 - 9.6 mg/dL 8.9  9.2    Glucose      70 - 99 mg/dL 76  87           ASSESSMENT / PLAN:  (R13.19) Esophageal dysphagia  (primary encounter diagnosis)  Comment: unclear  why having trouble swallowing.  She was going to mention something to her oral surgeon when she goes later this month.  This NP even thought about her tongue cancer being part of the new issue as well.  Encourage to drink her fluids.  Decided together to treat as if she has a URI given what was seen in back of throat and if that is not working then trying nystatin swish and swallow.    Will follow up next week.    (J06.9) Upper respiratory tract infection, unspecified type  Comment: with the yellow strands of thick mucous noted, will try Zpak to see if able to clear any infection.  Augmentin would be too harsh for her.  Want to make it simple as she is already on a lot of medications.    Z-sophia 500mg day 1 and 250mg daily on day 2-5.    (I50.32) Chronic diastolic congestive heart failure (H)  Comment: remains on Torsemide 20mg daily and Aldactone 25mg daily.  Monthly BMP's done per nephrology office as they are treating her hyponatremia.  No acute exacerbations.    (I48.91) Atrial fibrillation with rapid ventricular response (H)  Comment: remains on eliquis.  Hx of CVA.  Informed her that Eliquis or Xarelto will be expensive.  Warfarin comes with many negatives.      (R06.02) Shortness of breath  Comment: decision made to write an order to discontinue Combivent inhaler when supply is gone.  Will remain on Albuterol inhaler with hopes that she will stay stable with her breathing.       Orders:   Zpak 500mg po on day 1 and 250mg po daily on day 2-5 for URI  Discontinue Combivent inhaler when supply gone due to cost.  Is on albuterol inhaler already.      Electronically signed by  CASTRO Hazel CNP

## 2024-07-15 NOTE — LETTER
7/15/2024      Marla Dunn  C/o Deneen Dunn  [address] [facility] [address]        [facility] GERIATRICS  ACUTE/EPISODIC VISIT    [facility] Medical Record Number:  [id number]  Place of Service where encounter took place:  [facility] [place] ([facility]) [[id number]]    Chief Complaint   Patient presents with     RECHECK       HPI:    Marla Dunn is a 89 year old  (5/22/1929), who is being seen today for an episodic care visit.  HPI information obtained from: facility chart records, patient report, and [facility] chart review.    Today's concern is:    Diagnoses         Codes Comments    Esophageal dysphagia    -  Primary R13.19     Upper respiratory tract infection, unspecified type     J06.9     Chronic diastolic congestive heart failure (H)     I50.32     Atrial fibrillation with rapid ventricular response (H)     I48.91     Shortness of breath     R06.02           Came to see Georgia today as she asked for a visit the next time in building.  Staff stated she was complaining she is having trouble swallowing.    Georgia stated she has had this before but could not remember what was done about it.  She has an upcoming appointment for follow up with her oral surgeon in regards to the tongue cancer.  She wondered if it had anything to do with that.  Told her this NP could hear in her voice a change in tone and so question an infection either respiratory or candidiasis of the esophagus.    Is on Loratidine already.  Thought that may be a med started to help her swallowing if having post nasal drip.      After a while, Georgia brought out her pharmacy bill to show this NP.  Eliquis is the most expensive co-pay, Combivent and then her chronic ABX used for hyponatremia are also expensive.  Georgia is also on albuterol inhaler.  Unclear why on both combivent and albuterol.  Made the suggestion to finish out her Combivent supply and then discontinue it to see if really needed.  Leave on  the albuterol inhaler.      ALLERGIES:    Allergies   Allergen Reactions     Gramineae Pollens Other (See Comments)     ORCHARDGRASS. Shortness of breath when lawn is just cut.     Smoke. Other (See Comments)     Hard to breathe.     Pollen Extract      Other reaction(s): Unknown        MEDICATIONS:  Post Discharge Medication Reconciliation Status: patient was not discharged from an inpatient facility or TCU.  Medications reviewed today.    Current Outpatient Medications   Medication Sig Dispense Refill     acetaminophen (TYLENOL) 325 MG tablet Take 3 tablets (975 mg) by mouth every 8 hours as needed for mild pain       albuterol (PROAIR HFA/PROVENTIL HFA/VENTOLIN HFA) 108 (90 Base) MCG/ACT inhaler INHALE 2 PUFFS INTO THE LUNGS 4 TIMES DAILY  (DX: CHRONIC OBSTRUCTIVE PULMONARY DISEASE) 18 g 11     amoxicillin (AMOXIL) 500 MG capsule 4 CAPSULES (2000MG ) ORALLY AS NEEDED ONE HOUR PRIOR TO DENTAL APPOINTMENT 4 capsule 5     ANTACID REGULAR STRENGTH 500 MG chewable tablet 2 TABLETS (1000MG) ORALLY 2 TIMES DAILY AS NEEDED FOR HEARTBURN 60 tablet 11     apixaban ANTICOAGULANT (ELIQUIS) 2.5 MG tablet Take 1 tablet (2.5 mg) by mouth 2 times daily       ASPIRIN LOW DOSE 81 MG EC tablet 1 TABLET ORALLY 2 TIMES DAILY (DX: PROPHYLAXIS) 180 tablet 3     atorvastatin (LIPITOR) 40 MG tablet Take 1 tablet (40 mg) by mouth daily       Bacillus Coagulans-Inulin (PROBIOTIC FORMULA) 1-250 BILLION-MG CAPS 1 CAPSULE ORALLY DAILY 31 capsule 11     bisacodyl (DULCOLAX) 5 MG EC tablet Take 1 tablet (5 mg) by mouth 2 times daily as needed for constipation       chlorhexidine (PERIDEX) 0.12 % solution Swish and spit 15 mLs in mouth 2 times daily May self administer 118 mL 3     cholecalciferol (VITAMIN D3) 125 mcg (5000 units) capsule 1 CAPSULE ORALLY DAILY 90 capsule 3     COMBIVENT RESPIMAT  MCG/ACT inhaler INHALE 1 PUFF INTO THE LUNGS  4 TIMES DAILY 4 g 11     cycloSPORINE (RESTASIS) 0.05 % ophthalmic emulsion Place 1 drop into both  eyes 2 times daily        demeclocycline (DECLOMYCIN) 150 MG tablet 1 TABLET ORALLY 2 TIMES DAILY 60 tablet 11     fish oil-omega-3 fatty acids 1000 MG capsule Take 2 capsules (2 g) by mouth daily       fluorometholone (FML LIQUIFILM) 0.1 % ophthalmic susp Place 1 drop Into the left eye daily        hydrocortisone 1 % CREA cream Place rectally 2 times daily as needed for itching       IBANDRONATE SODIUM PO Take 150 mg by mouth every 30 days       levothyroxine (SYNTHROID/LEVOTHROID) 75 MCG tablet 1 TABLET ORALLY EVERY MORNING ON AN EMPTY STOMACH (DX: HYPOTHYROIDISM) 30 tablet 11     loratadine (CLARITIN) 10 MG tablet 1 TABLET ORALLY DAILY (DX: ASTHMA) 28 tablet 11     melatonin 3 MG tablet 1 TABLET ORALLY AT BEDTIME ALONG WITH 5 MG FOR A TOTAL DOSE OF 8MG 30 tablet 11     melatonin 5 MG tablet 1 TABLET ORALLY AT BEDTIME  ALONG WITH 3MG FOR A TOTAL DOSE OF 8MG 30 tablet 11     metoprolol tartrate (LOPRESSOR) 25 MG tablet 1 TABLET ORALLY 2 TIMES DAILY (DX: HYPERTENSION) 56 tablet 11     mirtazapine (REMERON) 7.5 MG tablet 1 TABLET ORALLY AT BEDTIME 31 tablet 11     Multiple Vitamins-Minerals (PRESERVISION AREDS 2) CAPS 1 CAPSULE ORALLY 2 TIMES DAILY 62 capsule 11     omeprazole (PRILOSEC) 20 MG DR capsule 1 CAPSULE ORALLY 2 TIMES DAILY (DX: GASTROESOPHAGEAL REFLUX DISEASE) 62 capsule 11     OXYGEN-HELIUM IN every evening       polyethylene glycol 0.4%- propylene glycol 0.3% (SYSTANE ULTRA) 0.4-0.3 % SOLN ophthalmic solution Place 1 drop into both eyes 4 times daily       Polyethylene Glycol 3350 (PEG 3350) 17 GM/SCOOP POWD MIX 1 CAPFUL (17 GMS) IN 8 OUNCES WATER AND DRINK ORALLY DAILY (DX: CONSTIPATION) 510 g 11     Probiotic Product (PROBIOTIC PO) Take 1 capsule by mouth every morning       SENEXON-S 8.6-50 MG tablet 1 TABLET ORALLY 2 TIMES DAILY (DX: CONSTIPATION) 56 tablet 11     sodium chloride 1 GM tablet Take 1 tablet (1 g) by mouth daily 30 tablet 11     spironolactone (ALDACTONE) 25 MG tablet 1/2 TABLET  (12.5MG) ORALLY DAILY ** HAZARDOUS MED: WEAR DOUBLE NITRILE GLOVES** 15 tablet 11     timolol (TIMOPTIC) 0.5 % ophthalmic solution Place 1 drop Into the left eye daily        torsemide (DEMADEX) 20 MG tablet 1 TABLET ORALLY DAILY 90 tablet 3     vitamin C (ASCORBIC ACID) 500 MG tablet 1 TABLET ORALLY DAILY (DX: SUPPLEMENT) 31 tablet 11         REVIEW OF SYSTEMS:  4 point ROS neg other than the symptoms noted above in the HPI.  No chest pain.  Just trouble swallowing sometimes after eating.      PHYSICAL EXAM:  /60   Pulse 74   Temp 97.5  F (36.4  C)   Resp 18   Wt 42.3 kg (93 lb 3.2 oz)   BMI 17.05 kg/m    Georgia was reclined back in her recliner.  She sets an alarm so she knows when to get up.  Able to stand and transfer self to her wheelchair and then went over to the thermostat and turned down the A/C.  Had to stand and bend over a chair to reach the wall.  Uses w/c to long destinations.      Alert and oriented x3.  Neatly groomed.  Right away could hear her voice be more high pitched liked she had post nasal drip.  Observed the back of her throat and could see yellow strand of mucous in the back upper cavity.  No signs of thrush in the oral cavity.  No coughing noted.  Lung sounds diminished.      Heart rate regular/irregular.  No edema.  No open sores on legs.  A couple of areas that are bruised but she puts lotion on her lower legs every day.  Helps from drying out.    Abdomen is flat, soft, and non-tender.    Component      Latest Ref Rng 6/3/2024  9:40 AM 7/8/2024  1:20 PM   Sodium      135 - 145 mmol/L 137  134 (L)    Potassium      3.4 - 5.3 mmol/L 4.1  4.3    Chloride      98 - 107 mmol/L 103  100    Carbon Dioxide (CO2)      22 - 29 mmol/L 22  23    Anion Gap      7 - 15 mmol/L 12  11    Urea Nitrogen      8.0 - 23.0 mg/dL 34.3 (H)  36.4 (H)    Creatinine      0.51 - 0.95 mg/dL 1.04 (H)  1.05 (H)    GFR Estimate      >60 mL/min/1.73m2 49 (L)  49 (L)    Calcium      8.2 - 9.6 mg/dL 8.9  9.2     Glucose      70 - 99 mg/dL 76  87           ASSESSMENT / PLAN:  (R13.19) Esophageal dysphagia  (primary encounter diagnosis)  Comment: unclear why having trouble swallowing.  She was going to mention something to her oral surgeon when she goes later this month.  This NP even thought about her tongue cancer being part of the new issue as well.  Encourage to drink her fluids.  Decided together to treat as if she has a URI given what was seen in back of throat and if that is not working then trying nystatin swish and swallow.    Will follow up next week.    (J06.9) Upper respiratory tract infection, unspecified type  Comment: with the yellow strands of thick mucous noted, will try Zpak to see if able to clear any infection.  Augmentin would be too harsh for her.  Want to make it simple as she is already on a lot of medications.    Z-sophia 500mg day 1 and 250mg daily on day 2-5.    (I50.32) Chronic diastolic congestive heart failure (H)  Comment: remains on Torsemide 20mg daily and Aldactone 25mg daily.  Monthly BMP's done per nephrology office as they are treating her hyponatremia.  No acute exacerbations.    (I48.91) Atrial fibrillation with rapid ventricular response (H)  Comment: remains on eliquis.  Hx of CVA.  Informed her that Eliquis or Xarelto will be expensive.  Warfarin comes with many negatives.      (R06.02) Shortness of breath  Comment: decision made to write an order to discontinue Combivent inhaler when supply is gone.  Will remain on Albuterol inhaler with hopes that she will stay stable with her breathing.       Orders:   Zpak 500mg po on day 1 and 250mg po daily on day 2-5 for URI  Discontinue Combivent inhaler when supply gone due to cost.  Is on albuterol inhaler already.      Electronically signed by  CASTRO Hazel CNP            Sincerely,        CASTRO Hazel CNP

## 2024-08-02 ENCOUNTER — LAB REQUISITION (OUTPATIENT)
Dept: LAB | Facility: CLINIC | Age: 89
End: 2024-08-02
Payer: COMMERCIAL

## 2024-08-02 DIAGNOSIS — I50.9 HEART FAILURE, UNSPECIFIED (H): ICD-10-CM

## 2024-08-05 LAB
ANION GAP SERPL CALCULATED.3IONS-SCNC: 11 MMOL/L (ref 7–15)
BUN SERPL-MCNC: 29 MG/DL (ref 8–23)
CALCIUM SERPL-MCNC: 10.5 MG/DL (ref 8.8–10.4)
CHLORIDE SERPL-SCNC: 100 MMOL/L (ref 98–107)
CREAT SERPL-MCNC: 1 MG/DL (ref 0.51–0.95)
EGFRCR SERPLBLD CKD-EPI 2021: 52 ML/MIN/1.73M2
GLUCOSE SERPL-MCNC: 95 MG/DL (ref 70–99)
HCO3 SERPL-SCNC: 22 MMOL/L (ref 22–29)
POTASSIUM SERPL-SCNC: 4 MMOL/L (ref 3.4–5.3)
SODIUM SERPL-SCNC: 133 MMOL/L (ref 135–145)

## 2024-08-05 PROCEDURE — 36415 COLL VENOUS BLD VENIPUNCTURE: CPT | Mod: ORL | Performed by: INTERNAL MEDICINE

## 2024-08-05 PROCEDURE — P9604 ONE-WAY ALLOW PRORATED TRIP: HCPCS | Mod: ORL | Performed by: INTERNAL MEDICINE

## 2024-08-05 PROCEDURE — 80048 BASIC METABOLIC PNL TOTAL CA: CPT | Mod: ORL | Performed by: INTERNAL MEDICINE

## 2024-08-12 ENCOUNTER — OFFICE VISIT (OUTPATIENT)
Dept: GERIATRICS | Facility: CLINIC | Age: 89
End: 2024-08-12
Payer: COMMERCIAL

## 2024-08-12 VITALS
RESPIRATION RATE: 21 BRPM | WEIGHT: 92 LBS | HEART RATE: 97 BPM | SYSTOLIC BLOOD PRESSURE: 129 MMHG | DIASTOLIC BLOOD PRESSURE: 74 MMHG | BODY MASS INDEX: 16.83 KG/M2

## 2024-08-12 DIAGNOSIS — Z01.818 PRE-OP EXAM: ICD-10-CM

## 2024-08-12 DIAGNOSIS — Z79.01 LONG TERM CURRENT USE OF ANTICOAGULANT THERAPY: ICD-10-CM

## 2024-08-12 DIAGNOSIS — I50.32 CHRONIC DIASTOLIC CONGESTIVE HEART FAILURE (H): ICD-10-CM

## 2024-08-12 DIAGNOSIS — E87.1 CHRONIC HYPONATREMIA: ICD-10-CM

## 2024-08-12 DIAGNOSIS — R13.19 ESOPHAGEAL DYSPHAGIA: ICD-10-CM

## 2024-08-12 DIAGNOSIS — Z86.73 HISTORY OF CEREBROVASCULAR ACCIDENT (CVA) DUE TO ISCHEMIA: ICD-10-CM

## 2024-08-12 DIAGNOSIS — K13.79 OTHER LESIONS OF ORAL MUCOSA: Primary | ICD-10-CM

## 2024-08-12 DIAGNOSIS — N18.31 CHRONIC KIDNEY DISEASE, STAGE 3A (H): ICD-10-CM

## 2024-08-12 DIAGNOSIS — I48.91 ATRIAL FIBRILLATION WITH RAPID VENTRICULAR RESPONSE (H): ICD-10-CM

## 2024-08-12 PROCEDURE — 99349 HOME/RES VST EST MOD MDM 40: CPT | Performed by: NURSE PRACTITIONER

## 2024-08-12 NOTE — LETTER
8/12/2024      Marla Dunn  C/o Deneen Dunn  [address] [facility] [address] [facility] [address]        Preoperative Evaluation  [facility]  [address] [place] [address]  Phone: [phone]  Fax: [phone]  Primary Provider: CASTRO Hazel CNP  Pre-op Performing Provider: CASTRO Hazel CNP  Aug 12, 2024               8/13/2024   Surgical Information   What procedure is being done? excision of lesion of right palete   Facility or Hospital where procedure/surgery will be performed: [facility]   Who is doing the procedure / surgery? Salty Greene MD   Date of surgery / procedure: 8/27/2024   Time of surgery / procedure: 12:15pm   Where do you plan to recover after surgery? Other - Assisted Living Facility with family and staff      Fax number for surgical facility: [phone]    Assessment & Plan    The proposed surgical procedure is considered LOW risk.    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 DO NOT TAKE 2 days before surgery.     Additional Medication Instructions  Georgia receives multiple medications - will allow her to take morning of surgery - NaCl tab, Levothryoxine, Metoprolol and prescribed  eye drops.  Diuretics on hold morning of biopsy.       Recommendation  Approval given to proceed with proposed procedure, without further diagnostic evaluation.    Subjective  Georgia is a 89 year old, presenting for the following:  Pre-Op Exam    Chronic diagnoses include heart failure, hx of CVA, Atrial Fibrillation ( has seen Cardiology), chronic hyponatremia (under nephrology care), chronic anemia (under hematologist care), hyperlipidemia, Hypothyroidism,COPD, and osteoarthritis.  Has hx of CABG and bioprosthetic aortic valve replacement in 2009.    On 7/23/24, Georgia went for a 10 month surveillance visit of her tongue cancer that she did have a partial glossectomy.  Found to have a white patch in the right posterior oropharynx.    CT ordered but biopsy set up to make sure this new lesion is benign.  Has ongoing swallowing issues as few poor repair teeth.  Eats more soft foods that are easier to chew.      Georgia is managed with specialty providers.  Overall she is stable.  Despite ready to pass on, she continues to pursue treatments.      HPI related to upcoming procedure:         8/13/2024   Pre-Op Questionnaire   Have you ever had a heart attack or stroke? (!) YES stroke   Have you ever had surgery on your heart or blood vessels, such as a stent placement, a coronary artery bypass, or surgery on an artery in your head, neck, heart, or legs? Yes, CABG back in 2009   Do you have chest pain with activity? No   Do you have a history of heart failure? (!) YES chronic heart failure   Do you currently have a cold, bronchitis or symptoms of other infection? No   Do you have a cough, shortness of breath, or wheezing? No   Do you or anyone in your family have previous history of blood clots? No   Do you or does anyone in your family have a serious bleeding problem such as prolonged bleeding following surgeries or cuts? No   Have you ever had problems with anemia or been told to take iron pills? (!) YES sees  Hematology for treatment.   No oral iron supplement   Have you had any abnormal blood loss such as black, tarry or bloody stools, or abnormal vaginal bleeding? No   Have you ever had a blood transfusion? (!) UNKNOWN   Are you willing to have a blood transfusion if it is medically needed before, during, or after your surgery? Yes   Have you or any of your relatives ever had problems with anesthesia? No   Do you have sleep apnea, excessive snoring or daytime drowsiness? (!) YES   Do you have a CPAP machine? Yes   Do you have any artifical heart valves or other implanted medical devices like a pacemaker, defibrillator, or continuous glucose monitor? (!) YES  Bioprosthetic aortic vavle replacement 2009   What type of device do you have? Bioprosthetic aortic heart valve replacement   Do you have artificial joints? No   Are you allergic to latex? No      Health Care Directive  Patient has a Health Care Directive on file      Preoperative Review of    reviewed - no record of controlled substances prescribed.      Status of Chronic Conditions:  A-FIB - Patient has a longstanding history of chronic A-fib currently on rate and rhythm control. Current treatment regimen includes Apixaban for stroke prevention and denies significant symptoms of lightheadedness, palpitations or dyspnea.     ANEMIA - Patient has a recent history of moderate-severe anemia, which has not been symptomatic. Work up to date has revealed chronic anemia. Treatment has beenAranesp if HgB <11 with [facility].     CAD - Patient has a longstanding history of moderate-severe CAD. Patient denies recent chest pain or NTG use, denies exercise induced dyspnea or PND. Last Stress test unknown, EKG 3/1/24 and ECHO 3/1/24.     CHF - Patient has a longstanding history of moderate-severe CHF. Exacerbating conditions include ischemic heart disease, hypertension, COPD, and atrial fibrilation. Currently the patient's condition is same. Current treatment  regimen includes beta blocker, diuretic, and spirolactone. The patient denies chest pain, edema, orthopnea, SOB or recent weight gain. Last Echocardiogram 3/1/24, EKG 3/1/24.     COPD - Patient has a longstanding history of moderate-severe COPD . Patient has been doing well overall noting NO SYMPTOMS and continues on medication regimen consisting of Combivent inhaler without adverse reactions or side effects.  Did attempt to do without the Combivent inhaler end of July due to cost and without it her breathing became labored and heavy sounding.  Reinstated medication.    HYPERLIPIDEMIA - Patient has a long history of significant Hyperlipidemia requiring medication for treatment with recent good control. Patient reports no problems or side effects with the medication.     HYPERTENSION - Patient has longstanding history of HTN , currently denies any symptoms referable to elevated blood pressure. Specifically denies chest pain, palpitations, dyspnea, orthopnea, PND or peripheral edema. Blood pressure readings have been in normal range. Current medication regimen is as listed below. Patient denies any side effects of medication.     HYPOTHYROIDISM - Patient has a longstanding history of chronic Hypothyroidism. Patient has been doing well, noting no tremor, insomnia, hair loss or changes in skin texture. Continues to take medications as directed, without adverse reactions or side effects. Last TSH   Lab Results   Component Value Date    TSH 6.46 (H) 05/06/2024   .      SLEEP PROBLEM - Patient has a longstanding history of unknown cause and uses CPAP every night. Patient has tried OTC medications with limited success.     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 04/04/2024     Priority: Medium     Acute stroke due to ischemia (H) 03/11/2024     Priority: Medium     Chronic atrial fibrillation (H) 03/11/2024     Priority: Medium     Spondylosis of  lumbar region without myelopathy or radiculopathy 03/11/2024     Priority: Medium     Facial droop 03/01/2024     Priority: Medium     Dysarthria 03/01/2024     Priority: Medium     Left-sided weakness 03/01/2024     Priority: Medium     Protein-calorie malnutrition (H) 01/21/2024     Priority: Medium     Hx of squamous cell carcinoma 12/17/2023     Priority: Medium     Chronic kidney disease, stage 3a (H) 05/01/2023     Priority: Medium     Coagulation defect, unspecified (H24) 11/15/2022     Priority: Medium     Contraindication to anticoagulation therapy 10/23/2022     Priority: Medium     Family history of myocardial infarction 10/23/2022     Priority: Medium     father 64       Rapid atrial fibrillation (H) 09/06/2022     Priority: Medium     Demand ischemia of myocardium (H) 09/06/2022     Priority: Medium     Elevated troponin 09/06/2022     Priority: Medium     Atrial fibrillation with rapid ventricular response (H) 09/06/2022     Priority: Medium     Non-intractable vomiting with nausea, unspecified vomiting type 09/06/2022     Priority: Medium     Anxiety and depression 08/13/2021     Priority: Medium     Formatting of this note might be different from the original.  Created by Conversion       Arteriosclerosis of coronary artery 08/13/2021     Priority: Medium     Sleep apnea 08/13/2021     Priority: Medium     Constipation, unspecified 08/13/2021     Priority: Medium     Fecal incontinence alternating with constipation 08/13/2021     Priority: Medium     Fever 08/13/2021     Priority: Medium     Hyperlipidemia LDL goal <100 08/13/2021     Priority: Medium     Formatting of this note might be different from the original.  Created by Conversion       Hypokalemia 08/13/2021     Priority: Medium     Irregular bowel habits 08/13/2021     Priority: Medium     Polyarthropathy or polyarthritis, hand 08/13/2021     Priority: Medium     Formatting of this note might be different from the original.  Created by  Conversion    Replacement Utility updated for latest IMO load    Formatting of this note might be different from the original.  Created by Conversion    Replacement Utility updated for latest IMO load       Pulmonary edema cardiac cause (H) 08/13/2021     Priority: Medium     Tachycardia 08/13/2021     Priority: Medium     Chronic obstructive pulmonary disease, unspecified COPD type (H)      Priority: Medium     Formatting of this note might be different from the original.  Created by Conversion       Rash of body 10/08/2020     Priority: Medium     Heart valve disease 09/02/2020     Priority: Medium     Formatting of this note might be different from the original.  Created by Conversion    Replacement Utility updated for latest IMO load       Diastolic CHF (H) 07/01/2020     Priority: Medium     Hypertension 04/23/2020     Priority: Medium     Formatting of this note might be different from the original.  Created by Conversion    Replacement Utility updated for latest IMO load       Sjogren's syndrome (H24) 04/23/2020     Priority: Medium     Formatting of this note might be different from the original.  Created by Conversion       Dyslipidemia 03/20/2020     Priority: Medium     Generalized muscle weakness 03/20/2020     Priority: Medium     Physical deconditioning 03/20/2020     Priority: Medium     Rectal prolapse 01/10/2018     Priority: Medium     Hyponatremia 09/06/2017     Priority: Medium     Carpal tunnel syndrome, right 11/19/2015     Priority: Medium      Past Medical History:   Diagnosis Date     Asthma      Bilateral carpal tunnel syndrome 08/27/2015     CAD (coronary artery disease)      Chronic airway obstruction, not elsewhere classified     Created by Conversion      Chronic anemia      Chronic edema      Congestive heart failure, severe (H) 05/13/2020     COPD (chronic obstructive pulmonary disease) (H)      Coronary artery disease      Depression      GERD (gastroesophageal reflux disease)       Heart disease      Heart murmur      High cholesterol     dislipidemia     HTN (hypertension)      Hypercholesterolemia      Hypertension      Hypertensive emergency 08/13/2021     Hypothyroidism      Hypothyroidism      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     MI (myocardial infarction) (H) 1985     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     Myelodysplastic syndrome (H)      Myocardial infarction (H) 1983     OLEGARIO (obstructive sleep apnea)      Osteoporosis      Other and unspecified hyperlipidemia     Created by Conversion      Pyelonephritis 05/11/2016     Radicular low back pain      Rheumatoid arthritis (H)      Elizabeth Agers syndrome      Sjoegren syndrome      Sjogren's syndrome (H24)      Sleep apnea, obstructive      Spinal stenosis      Squamous cell carcinoma, tongue border (H)      Unspecified polyarthropathy or polyarthritis, hand     Created by Conversion      Past Surgical History:   Procedure Laterality Date     aortic valve       AORTIC VALVE REPLACEMENT  01/01/2012     APPENDECTOMY       APPENDECTOMY       AS TOTAL KNEE ARTHROPLASTY Right      BACK SURGERY  2004    spinal decompression     BACK SURGERY      spinal stenosis     BONE MARROW BIOPSY  2004     breast biopsy       BREAST SURGERY  2001    biopsy     BYPASS GRAFT ARTERY CORONARY  01/01/2009    LIMA to ramus intermedius     cardiac angiogram       CARDIAC CATHETERIZATION       CARDIAC SURGERY       EYE SURGERY  2008    bilateral cataract repair      EYE SURGERY Bilateral     cornea transplant left eye & cataracts     KYPHOPLASTY  2003    T12     OPEN REDUCTION INTERNAL FIXATION HIP NAILING Right 08/13/2021    Procedure: INTERNAL FIXATION, FRACTURE, TROCHANTERIC, HIP, USING PINS OR RODS;  Surgeon: Darrel Castro MD;  Location: [facility] OR     OTHER SURGICAL HISTORY  01/01/2018    Rectosigmoidectomy perineal     PARTIAL GLOSSECTOMY Right 09/2023    [facility]     RECTOSIGMOIDECTOMY PERINEAL N/A 01/10/2018     Procedure: RECTOSIGMOIDECTOMY PERINEAL;  PERINEAL RECTOSIGMOIDECTOMY ;  Surgeon: Candelaria Anthony MD;  Location: [facility] OR     REPAIR VALVE AORTIC  2009     THORACIC SURGERY  2003    T12 kyphoplasty     ZZC CABG, ARTERY-VEIN, FOUR       ZZC CABG, VEIN, SINGLE      Description: CABG (CABG);  Recorded: 03/09/2012;  Comments: Single vessel bypass graft to an obtuse marginal branch in May 2009     ZZC REPLACE AORT VALV,PROSTH VALV      Description: Aortic Valve Replacement;  Recorded: 03/09/2012;  Comments: Aortic valve replacement     Current Outpatient Medications   Medication Sig Dispense Refill     acetaminophen (TYLENOL) 325 MG tablet Take 3 tablets (975 mg) by mouth every 8 hours as needed for mild pain       albuterol (PROAIR HFA/PROVENTIL HFA/VENTOLIN HFA) 108 (90 Base) MCG/ACT inhaler INHALE 2 PUFFS INTO THE LUNGS 4 TIMES DAILY  (DX: CHRONIC OBSTRUCTIVE PULMONARY DISEASE) 18 g 11     amoxicillin (AMOXIL) 500 MG capsule 4 CAPSULES (2000MG ) ORALLY AS NEEDED ONE HOUR PRIOR TO DENTAL APPOINTMENT 4 capsule 5     ANTACID REGULAR STRENGTH 500 MG chewable tablet 2 TABLETS (1000MG) ORALLY 2 TIMES DAILY AS NEEDED FOR HEARTBURN 60 tablet 11     apixaban ANTICOAGULANT (ELIQUIS) 2.5 MG tablet Take 1 tablet (2.5 mg) by mouth 2 times daily       ASPIRIN LOW DOSE 81 MG EC tablet 1 TABLET ORALLY 2 TIMES DAILY (DX: PROPHYLAXIS) 180 tablet 3     atorvastatin (LIPITOR) 40 MG tablet Take 1 tablet (40 mg) by mouth daily       Bacillus Coagulans-Inulin (PROBIOTIC FORMULA) 1-250 BILLION-MG CAPS 1 CAPSULE ORALLY DAILY 31 capsule 11     bisacodyl (DULCOLAX) 5 MG EC tablet Take 1 tablet (5 mg) by mouth 2 times daily as needed for constipation       chlorhexidine (PERIDEX) 0.12 % solution Swish and spit 15 mLs in mouth 2 times daily May self administer 118 mL 3     cholecalciferol (VITAMIN D3) 125 mcg (5000 units) capsule 1 CAPSULE ORALLY DAILY 90 capsule 3     COMBIVENT RESPIMAT  MCG/ACT inhaler INHALE 1 PUFF INTO THE LUNGS  4  TIMES DAILY 4 g 11     cycloSPORINE (RESTASIS) 0.05 % ophthalmic emulsion Place 1 drop into both eyes 2 times daily        demeclocycline (DECLOMYCIN) 150 MG tablet 1 TABLET ORALLY 2 TIMES DAILY 60 tablet 11     fish oil-omega-3 fatty acids 1000 MG capsule Take 2 capsules (2 g) by mouth daily       fluorometholone (FML LIQUIFILM) 0.1 % ophthalmic susp Place 1 drop Into the left eye daily        hydrocortisone 1 % CREA cream Place rectally 2 times daily as needed for itching       IBANDRONATE SODIUM PO Take 150 mg by mouth every 30 days       levothyroxine (SYNTHROID/LEVOTHROID) 75 MCG tablet 1 TABLET ORALLY EVERY MORNING ON AN EMPTY STOMACH (DX: HYPOTHYROIDISM) 30 tablet 11     loratadine (CLARITIN) 10 MG tablet 1 TABLET ORALLY DAILY (DX: ASTHMA) 28 tablet 11     melatonin 3 MG tablet 1 TABLET ORALLY AT BEDTIME ALONG WITH 5 MG FOR A TOTAL DOSE OF 8MG 30 tablet 11     melatonin 5 MG tablet 1 TABLET ORALLY AT BEDTIME  ALONG WITH 3MG FOR A TOTAL DOSE OF 8MG 30 tablet 11     metoprolol tartrate (LOPRESSOR) 25 MG tablet 1 TABLET ORALLY 2 TIMES DAILY (DX: HYPERTENSION) 56 tablet 11     mirtazapine (REMERON) 7.5 MG tablet 1 TABLET ORALLY AT BEDTIME 31 tablet 11     Multiple Vitamins-Minerals (PRESERVISION AREDS 2) CAPS 1 CAPSULE ORALLY 2 TIMES DAILY 62 capsule 11     omeprazole (PRILOSEC) 20 MG DR capsule 1 CAPSULE ORALLY 2 TIMES DAILY (DX: GASTROESOPHAGEAL REFLUX DISEASE) 62 capsule 11     OXYGEN-HELIUM IN every evening       polyethylene glycol 0.4%- propylene glycol 0.3% (SYSTANE ULTRA) 0.4-0.3 % SOLN ophthalmic solution Place 1 drop into both eyes 4 times daily       Polyethylene Glycol 3350 (PEG 3350) 17 GM/SCOOP POWD MIX 1 CAPFUL (17 GMS) IN 8 OUNCES WATER AND DRINK ORALLY DAILY (DX: CONSTIPATION) 510 g 11     Probiotic Product (PROBIOTIC PO) Take 1 capsule by mouth every morning       SENEXON-S 8.6-50 MG tablet 1 TABLET ORALLY 2 TIMES DAILY (DX: CONSTIPATION) 56 tablet 11     sodium chloride 1 GM tablet Take 1  "tablet (1 g) by mouth daily 30 tablet 11     spironolactone (ALDACTONE) 25 MG tablet 1/2 TABLET (12.5MG) ORALLY DAILY ** HAZARDOUS MED: WEAR DOUBLE NITRILE GLOVES** 15 tablet 11     timolol (TIMOPTIC) 0.5 % ophthalmic solution Place 1 drop Into the left eye daily        torsemide (DEMADEX) 20 MG tablet 1 TABLET ORALLY DAILY 90 tablet 3     vitamin C (ASCORBIC ACID) 500 MG tablet 1 TABLET ORALLY DAILY (DX: SUPPLEMENT) 31 tablet 11       Allergies   Allergen Reactions     Gramineae Pollens Other (See Comments)     ORCHARDGRASS. Shortness of breath when lawn is just cut.     Smoke. Other (See Comments)     Hard to breathe.     Pollen Extract      Other reaction(s): Unknown        Social History     Tobacco Use     Smoking status: Never     Smokeless tobacco: Never   Substance Use Topics     Alcohol use: No     Alcohol/week: 0.0 standard drinks of alcohol     Comment: Alcoholic Drinks/day: occasional glass of wine     Family Hx:  unknown.    History   Drug Use No             Review of Systems  Constitutional, neuro, ENT, endocrine, pulmonary, cardiac, gastrointestinal, genitourinary, musculoskeletal, integument and psychiatric systems are negative, except as otherwise noted.    Objective   /74   Pulse 97   Resp 21   Wt 41.7 kg (92 lb)   BMI 16.83 kg/m     Estimated body mass index is 16.83 kg/m  as calculated from the following:    Height as of 3/18/24: 1.575 m (5' 2\").    Weight as of this encounter: 41.7 kg (92 lb).  Physical Exam  GENERAL: alert and no distress  EYES: Eyes grossly normal to inspection, PERRL and conjunctivae and sclerae normal  HENT: normal cephalic/atraumatic, ear canals and TM's normal, oral mucous membranes moist, and lesion right side of palate oropharynx   NECK: no adenopathy, no asymmetry, masses, or scars  RESP: lungs clear to auscultation - no rales, rhonchi or wheezes  CV: irregularly irregular rhythm, normal S1 S2, no S3 or S4, no murmur, click or rub, peripheral pulses strong, " and no peripheral edema  ABDOMEN: soft, nontender, no hepatosplenomegaly, no masses and bowel sounds normal   (female): normal female external genitalia, normal urethral meatus, normal vaginal mucosa  MS: no gross musculoskeletal defects noted, no edema  SKIN: no suspicious lesions or rashes  NEURO: Normal strength and tone, mentation intact and speech normal  PSYCH: mentation appears normal, affect normal/bright    Recent Labs   Lab Test 08/05/24  1228 07/08/24  1320 03/04/24  0632 03/03/24  0640 03/02/24  1448 03/02/24  0406 03/01/24  0841   HGB  --   --   --  11.0*  --  10.4* 10.4*   PLT  --   --   --  217  --  231 234   INR  --   --   --   --   --   --  1.09   * 134*   < > 144   < > 144 131*   POTASSIUM 4.0 4.3   < > 3.5  --  3.5 3.5   CR 1.00* 1.05*   < > 0.90  --  0.84 1.02*   A1C  --   --   --   --   --   --  5.3    < > = values in this interval not displayed.        Diagnostics  Labs pending at this time.  Results will be reviewed when available.   BMP and HgB to be done 8/12/24  No EKG required for low risk surgery (cataract, skin procedure, breast biopsy, etc).    Revised Cardiac Risk Index (RCRI)  The patient has the following serious cardiovascular risks for perioperative complications:   - Cerebrovascular Disease (TIA or CVA) = 1 point     RCRI Interpretation: 1 point: Class II (low risk - 0.9% complication rate)  Georgia has hx of heart issues.  Most recent has been a CVA from her Atrial Fibrillation.  Discussed with her about risk of holding Apixaban prior to biopsy and she is aware of the risks and accepts them.    Has been on ASA 81mg twice a day and intend to lower her to daily ongoing.    No acute active symptoms cardiac wise.       Signed Electronically by: CASTRO Hazel CNP  A copy of this evaluation report is provided to the requesting physician.             Sincerely,        CASTRO Hazel CNP

## 2024-08-12 NOTE — PROGRESS NOTES
Preoperative Evaluation  [facility] GERIATRICS  [address] [place] [address]  Phone: [phone]  Fax: [phone]  Primary Provider: CASTRO Hazel CNP  Pre-op Performing Provider: CASTRO Hazel CNP  Aug 12, 2024               8/13/2024   Surgical Information   What procedure is being done? excision of lesion of right palete   Facility or Hospital where procedure/surgery will be performed: [facility]   Who is doing the procedure / surgery? Salty Greene MD   Date of surgery / procedure: 8/27/2024   Time of surgery / procedure: 12:15pm   Where do you plan to recover after surgery? Other - Assisted Living Facility with family and staff      Fax number for surgical facility: [phone]    Assessment & Plan     The proposed surgical procedure is considered LOW risk.    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 DO NOT TAKE 2 days before surgery.     Additional Medication Instructions  Georgia receives multiple medications - will allow her to take morning of surgery - NaCl tab, Levothryoxine, Metoprolol and prescribed eye drops.  Diuretics on hold morning of biopsy.       Recommendation  Approval given to proceed with  proposed procedure, without further diagnostic evaluation.    Subjective   Georgia is a 89 year old, presenting for the following:  Pre-Op Exam    Chronic diagnoses include heart failure, hx of CVA, Atrial Fibrillation ( has seen Cardiology), chronic hyponatremia (under nephrology care), chronic anemia (under hematologist care), hyperlipidemia, Hypothyroidism,COPD, and osteoarthritis.  Has hx of CABG and bioprosthetic aortic valve replacement in 2009.    On 7/23/24, Georgia went for a 10 month surveillance visit of her tongue cancer that she did have a partial glossectomy.  Found to have a white patch in the right posterior oropharynx.    CT ordered but biopsy set up to make sure this new lesion is benign.  Has ongoing swallowing issues as few poor repair teeth.  Eats more soft foods that are easier to chew.      Georgia is managed with specialty providers.  Overall she is stable.  Despite ready to pass on, she continues to pursue treatments.      HPI related to upcoming procedure:         8/13/2024   Pre-Op Questionnaire   Have you ever had a heart attack or stroke? (!) YES stroke   Have you ever had surgery on your heart or blood vessels, such as a stent placement, a coronary artery bypass, or surgery on an artery in your head, neck, heart, or legs? Yes, CABG back in 2009   Do you have chest pain with activity? No   Do you have a history of heart failure? (!) YES chronic heart failure   Do you currently have a cold, bronchitis or symptoms of other infection? No   Do you have a cough, shortness of breath, or wheezing? No   Do you or anyone in your family have previous history of blood clots? No   Do you or does anyone in your family have a serious bleeding problem such as prolonged bleeding following surgeries or cuts? No   Have you ever had problems with anemia or been told to take iron pills? (!) YES sees Hematology for treatment.   No oral iron supplement   Have you had any abnormal blood loss such as black,  tarry or bloody stools, or abnormal vaginal bleeding? No   Have you ever had a blood transfusion? (!) UNKNOWN   Are you willing to have a blood transfusion if it is medically needed before, during, or after your surgery? Yes   Have you or any of your relatives ever had problems with anesthesia? No   Do you have sleep apnea, excessive snoring or daytime drowsiness? (!) YES   Do you have a CPAP machine? Yes   Do you have any artifical heart valves or other implanted medical devices like a pacemaker, defibrillator, or continuous glucose monitor? (!) YES  Bioprosthetic aortic vavle replacement 2009   What type of device do you have? Bioprosthetic aortic heart valve replacement   Do you have artificial joints? No   Are you allergic to latex? No      Health Care Directive  Patient has a Health Care Directive on file      Preoperative Review of    reviewed - no record of controlled substances prescribed.      Status of Chronic Conditions:  A-FIB - Patient has a longstanding history of chronic A-fib currently on rate and rhythm control. Current treatment regimen includes Apixaban for stroke prevention and denies significant symptoms of lightheadedness, palpitations or dyspnea.     ANEMIA - Patient has a recent history of moderate-severe anemia, which has not been symptomatic. Work up to date has revealed chronic anemia. Treatment has beenAranesp if HgB <11 with [facility].     CAD - Patient has a longstanding history of moderate-severe CAD. Patient denies recent chest pain or NTG use, denies exercise induced dyspnea or PND. Last Stress test unknown, EKG 3/1/24 and ECHO 3/1/24.     CHF - Patient has a longstanding history of moderate-severe CHF. Exacerbating conditions include ischemic heart disease, hypertension, COPD, and atrial fibrilation. Currently the patient's condition is same. Current treatment regimen includes beta blocker, diuretic, and spirolactone. The patient denies chest pain, edema, orthopnea,  SOB or recent weight gain. Last Echocardiogram 3/1/24, EKG 3/1/24.     COPD - Patient has a longstanding history of moderate-severe COPD . Patient has been doing well overall noting NO SYMPTOMS and continues on medication regimen consisting of Combivent inhaler without adverse reactions or side effects.  Did attempt to do without the Combivent inhaler end of July due to cost and without it her breathing became labored and heavy sounding.  Reinstated medication.    HYPERLIPIDEMIA - Patient has a long history of significant Hyperlipidemia requiring medication for treatment with recent good control. Patient reports no problems or side effects with the medication.     HYPERTENSION - Patient has longstanding history of HTN , currently denies any symptoms referable to elevated blood pressure. Specifically denies chest pain, palpitations, dyspnea, orthopnea, PND or peripheral edema. Blood pressure readings have been in normal range. Current medication regimen is as listed below. Patient denies any side effects of medication.     HYPOTHYROIDISM - Patient has a longstanding history of chronic Hypothyroidism. Patient has been doing well, noting no tremor, insomnia, hair loss or changes in skin texture. Continues to take medications as directed, without adverse reactions or side effects. Last TSH   Lab Results   Component Value Date    TSH 6.46 (H) 05/06/2024   .      SLEEP PROBLEM - Patient has a longstanding history of unknown cause and uses CPAP every night. Patient has tried OTC medications with limited success.     Patient Active Problem List    Diagnosis Date Noted    History of cerebrovascular accident (CVA) due to ischemia 04/04/2024     Priority: Medium    Acute stroke due to ischemia (H) 03/11/2024     Priority: Medium    Chronic atrial fibrillation (H) 03/11/2024     Priority: Medium    Spondylosis of lumbar region without myelopathy or radiculopathy 03/11/2024     Priority: Medium    Facial droop 03/01/2024      Priority: Medium    Dysarthria 03/01/2024     Priority: Medium    Left-sided weakness 03/01/2024     Priority: Medium    Protein-calorie malnutrition (H) 01/21/2024     Priority: Medium    Hx of squamous cell carcinoma 12/17/2023     Priority: Medium    Chronic kidney disease, stage 3a (H) 05/01/2023     Priority: Medium    Coagulation defect, unspecified (H24) 11/15/2022     Priority: Medium    Contraindication to anticoagulation therapy 10/23/2022     Priority: Medium    Family history of myocardial infarction 10/23/2022     Priority: Medium     father 64      Rapid atrial fibrillation (H) 09/06/2022     Priority: Medium    Demand ischemia of myocardium (H) 09/06/2022     Priority: Medium    Elevated troponin 09/06/2022     Priority: Medium    Atrial fibrillation with rapid ventricular response (H) 09/06/2022     Priority: Medium    Non-intractable vomiting with nausea, unspecified vomiting type 09/06/2022     Priority: Medium    Anxiety and depression 08/13/2021     Priority: Medium     Formatting of this note might be different from the original.  Created by Conversion      Arteriosclerosis of coronary artery 08/13/2021     Priority: Medium    Sleep apnea 08/13/2021     Priority: Medium    Constipation, unspecified 08/13/2021     Priority: Medium    Fecal incontinence alternating with constipation 08/13/2021     Priority: Medium    Fever 08/13/2021     Priority: Medium    Hyperlipidemia LDL goal <100 08/13/2021     Priority: Medium     Formatting of this note might be different from the original.  Created by Conversion      Hypokalemia 08/13/2021     Priority: Medium    Irregular bowel habits 08/13/2021     Priority: Medium    Polyarthropathy or polyarthritis, hand 08/13/2021     Priority: Medium     Formatting of this note might be different from the original.  Created by Conversion    Replacement Utility updated for latest IMO load    Formatting of this note might be different from the original.  Created by  Conversion    Replacement Utility updated for latest IMO load      Pulmonary edema cardiac cause (H) 08/13/2021     Priority: Medium    Tachycardia 08/13/2021     Priority: Medium    Chronic obstructive pulmonary disease, unspecified COPD type (H)      Priority: Medium     Formatting of this note might be different from the original.  Created by Conversion      Rash of body 10/08/2020     Priority: Medium    Heart valve disease 09/02/2020     Priority: Medium     Formatting of this note might be different from the original.  Created by Conversion    Replacement Utility updated for latest IMO load      Diastolic CHF (H) 07/01/2020     Priority: Medium    Hypertension 04/23/2020     Priority: Medium     Formatting of this note might be different from the original.  Created by Conversion    Replacement Utility updated for latest IMO load      Sjogren's syndrome (H24) 04/23/2020     Priority: Medium     Formatting of this note might be different from the original.  Created by Conversion      Dyslipidemia 03/20/2020     Priority: Medium    Generalized muscle weakness 03/20/2020     Priority: Medium    Physical deconditioning 03/20/2020     Priority: Medium    Rectal prolapse 01/10/2018     Priority: Medium    Hyponatremia 09/06/2017     Priority: Medium    Carpal tunnel syndrome, right 11/19/2015     Priority: Medium      Past Medical History:   Diagnosis Date    Asthma     Bilateral carpal tunnel syndrome 08/27/2015    CAD (coronary artery disease)     Chronic airway obstruction, not elsewhere classified     Created by Conversion     Chronic anemia     Chronic edema     Congestive heart failure, severe (H) 05/13/2020    COPD (chronic obstructive pulmonary disease) (H)     Coronary artery disease     Depression     GERD (gastroesophageal reflux disease)     Heart disease     Heart murmur     High cholesterol     dislipidemia    HTN (hypertension)     Hypercholesterolemia     Hypertension     Hypertensive emergency  08/13/2021    Hypothyroidism     Hypothyroidism     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    MI (myocardial infarction) (H) 1985    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    Myelodysplastic syndrome (H)     Myocardial infarction (H) 1983    OLEGARIO (obstructive sleep apnea)     Osteoporosis     Other and unspecified hyperlipidemia     Created by Conversion     Pyelonephritis 05/11/2016    Radicular low back pain     Rheumatoid arthritis (H)     Elizabeth Agers syndrome     Sjoegren syndrome     Sjogren's syndrome (H24)     Sleep apnea, obstructive     Spinal stenosis     Squamous cell carcinoma, tongue border (H)     Unspecified polyarthropathy or polyarthritis, hand     Created by Conversion      Past Surgical History:   Procedure Laterality Date    aortic valve      AORTIC VALVE REPLACEMENT  01/01/2012    APPENDECTOMY      APPENDECTOMY      AS TOTAL KNEE ARTHROPLASTY Right     BACK SURGERY  2004    spinal decompression    BACK SURGERY      spinal stenosis    BONE MARROW BIOPSY  2004    breast biopsy      BREAST SURGERY  2001    biopsy    BYPASS GRAFT ARTERY CORONARY  01/01/2009    LIMA to ramus intermedius    cardiac angiogram      CARDIAC CATHETERIZATION      CARDIAC SURGERY      EYE SURGERY  2008    bilateral cataract repair     EYE SURGERY Bilateral     cornea transplant left eye & cataracts    KYPHOPLASTY  2003    T12    OPEN REDUCTION INTERNAL FIXATION HIP NAILING Right 08/13/2021    Procedure: INTERNAL FIXATION, FRACTURE, TROCHANTERIC, HIP, USING PINS OR RODS;  Surgeon: Darrel Castro MD;  Location: [facility] OR    OTHER SURGICAL HISTORY  01/01/2018    Rectosigmoidectomy perineal    PARTIAL GLOSSECTOMY Right 09/2023    [facility]    RECTOSIGMOIDECTOMY PERINEAL N/A 01/10/2018    Procedure: RECTOSIGMOIDECTOMY PERINEAL;  PERINEAL RECTOSIGMOIDECTOMY ;  Surgeon: Candelaria Anthony MD;  Location: [facility] OR    REPAIR VALVE AORTIC  2009    THORACIC SURGERY  2003    T12 kyphoplasty    [facility]  CABG, ARTERY-VEIN, FOUR      [facility] CABG, VEIN, SINGLE      Description: CABG (CABG);  Recorded: 03/09/2012;  Comments: Single vessel bypass graft to an obtuse marginal branch in May 2009    ZZC REPLACE AORT VALV,PROSTH VALV      Description: Aortic Valve Replacement;  Recorded: 03/09/2012;  Comments: Aortic valve replacement     Current Outpatient Medications   Medication Sig Dispense Refill    acetaminophen (TYLENOL) 325 MG tablet Take 3 tablets (975 mg) by mouth every 8 hours as needed for mild pain      albuterol (PROAIR HFA/PROVENTIL HFA/VENTOLIN HFA) 108 (90 Base) MCG/ACT inhaler INHALE 2 PUFFS INTO THE LUNGS 4 TIMES DAILY  (DX: CHRONIC OBSTRUCTIVE PULMONARY DISEASE) 18 g 11    amoxicillin (AMOXIL) 500 MG capsule 4 CAPSULES (2000MG ) ORALLY AS NEEDED ONE HOUR PRIOR TO DENTAL APPOINTMENT 4 capsule 5    ANTACID REGULAR STRENGTH 500 MG chewable tablet 2 TABLETS (1000MG) ORALLY 2 TIMES DAILY AS NEEDED FOR HEARTBURN 60 tablet 11    apixaban ANTICOAGULANT (ELIQUIS) 2.5 MG tablet Take 1 tablet (2.5 mg) by mouth 2 times daily      ASPIRIN LOW DOSE 81 MG EC tablet 1 TABLET ORALLY 2 TIMES DAILY (DX: PROPHYLAXIS) 180 tablet 3    atorvastatin (LIPITOR) 40 MG tablet Take 1 tablet (40 mg) by mouth daily      Bacillus Coagulans-Inulin (PROBIOTIC FORMULA) 1-250 BILLION-MG CAPS 1 CAPSULE ORALLY DAILY 31 capsule 11    bisacodyl (DULCOLAX) 5 MG EC tablet Take 1 tablet (5 mg) by mouth 2 times daily as needed for constipation      chlorhexidine (PERIDEX) 0.12 % solution Swish and spit 15 mLs in mouth 2 times daily May self administer 118 mL 3    cholecalciferol (VITAMIN D3) 125 mcg (5000 units) capsule 1 CAPSULE ORALLY DAILY 90 capsule 3    COMBIVENT RESPIMAT  MCG/ACT inhaler INHALE 1 PUFF INTO THE LUNGS  4 TIMES DAILY 4 g 11    cycloSPORINE (RESTASIS) 0.05 % ophthalmic emulsion Place 1 drop into both eyes 2 times daily       demeclocycline (DECLOMYCIN) 150 MG tablet 1 TABLET ORALLY 2 TIMES DAILY 60 tablet 11    fish  oil-omega-3 fatty acids 1000 MG capsule Take 2 capsules (2 g) by mouth daily      fluorometholone (FML LIQUIFILM) 0.1 % ophthalmic susp Place 1 drop Into the left eye daily       hydrocortisone 1 % CREA cream Place rectally 2 times daily as needed for itching      IBANDRONATE SODIUM PO Take 150 mg by mouth every 30 days      levothyroxine (SYNTHROID/LEVOTHROID) 75 MCG tablet 1 TABLET ORALLY EVERY MORNING ON AN EMPTY STOMACH (DX: HYPOTHYROIDISM) 30 tablet 11    loratadine (CLARITIN) 10 MG tablet 1 TABLET ORALLY DAILY (DX: ASTHMA) 28 tablet 11    melatonin 3 MG tablet 1 TABLET ORALLY AT BEDTIME ALONG WITH 5 MG FOR A TOTAL DOSE OF 8MG 30 tablet 11    melatonin 5 MG tablet 1 TABLET ORALLY AT BEDTIME  ALONG WITH 3MG FOR A TOTAL DOSE OF 8MG 30 tablet 11    metoprolol tartrate (LOPRESSOR) 25 MG tablet 1 TABLET ORALLY 2 TIMES DAILY (DX: HYPERTENSION) 56 tablet 11    mirtazapine (REMERON) 7.5 MG tablet 1 TABLET ORALLY AT BEDTIME 31 tablet 11    Multiple Vitamins-Minerals (PRESERVISION AREDS 2) CAPS 1 CAPSULE ORALLY 2 TIMES DAILY 62 capsule 11    omeprazole (PRILOSEC) 20 MG DR capsule 1 CAPSULE ORALLY 2 TIMES DAILY (DX: GASTROESOPHAGEAL REFLUX DISEASE) 62 capsule 11    OXYGEN-HELIUM IN every evening      polyethylene glycol 0.4%- propylene glycol 0.3% (SYSTANE ULTRA) 0.4-0.3 % SOLN ophthalmic solution Place 1 drop into both eyes 4 times daily      Polyethylene Glycol 3350 (PEG 3350) 17 GM/SCOOP POWD MIX 1 CAPFUL (17 GMS) IN 8 OUNCES WATER AND DRINK ORALLY DAILY (DX: CONSTIPATION) 510 g 11    Probiotic Product (PROBIOTIC PO) Take 1 capsule by mouth every morning      SENEXON-S 8.6-50 MG tablet 1 TABLET ORALLY 2 TIMES DAILY (DX: CONSTIPATION) 56 tablet 11    sodium chloride 1 GM tablet Take 1 tablet (1 g) by mouth daily 30 tablet 11    spironolactone (ALDACTONE) 25 MG tablet 1/2 TABLET (12.5MG) ORALLY DAILY ** HAZARDOUS MED: WEAR DOUBLE NITRILE GLOVES** 15 tablet 11    timolol (TIMOPTIC) 0.5 % ophthalmic solution Place 1 drop  "Into the left eye daily       torsemide (DEMADEX) 20 MG tablet 1 TABLET ORALLY DAILY 90 tablet 3    vitamin C (ASCORBIC ACID) 500 MG tablet 1 TABLET ORALLY DAILY (DX: SUPPLEMENT) 31 tablet 11       Allergies   Allergen Reactions    Gramineae Pollens Other (See Comments)     ORCHARDGRASS. Shortness of breath when lawn is just cut.    Smoke. Other (See Comments)     Hard to breathe.    Pollen Extract      Other reaction(s): Unknown        Social History     Tobacco Use    Smoking status: Never    Smokeless tobacco: Never   Substance Use Topics    Alcohol use: No     Alcohol/week: 0.0 standard drinks of alcohol     Comment: Alcoholic Drinks/day: occasional glass of wine     Family Hx:  unknown.    History   Drug Use No             Review of Systems  Constitutional, neuro, ENT, endocrine, pulmonary, cardiac, gastrointestinal, genitourinary, musculoskeletal, integument and psychiatric systems are negative, except as otherwise noted.    Objective    /74   Pulse 97   Resp 21   Wt 41.7 kg (92 lb)   BMI 16.83 kg/m     Estimated body mass index is 16.83 kg/m  as calculated from the following:    Height as of 3/18/24: 1.575 m (5' 2\").    Weight as of this encounter: 41.7 kg (92 lb).  Physical Exam  GENERAL: alert and no distress  EYES: Eyes grossly normal to inspection, PERRL and conjunctivae and sclerae normal  HENT: normal cephalic/atraumatic, ear canals and TM's normal, oral mucous membranes moist, and lesion right side of palate oropharynx   NECK: no adenopathy, no asymmetry, masses, or scars  RESP: lungs clear to auscultation - no rales, rhonchi or wheezes  CV: irregularly irregular rhythm, normal S1 S2, no S3 or S4, no murmur, click or rub, peripheral pulses strong, and no peripheral edema  ABDOMEN: soft, nontender, no hepatosplenomegaly, no masses and bowel sounds normal   (female): normal female external genitalia, normal urethral meatus, normal vaginal mucosa  MS: no gross musculoskeletal defects noted, no " edema  SKIN: no suspicious lesions or rashes  NEURO: Normal strength and tone, mentation intact and speech normal  PSYCH: mentation appears normal, affect normal/bright    Recent Labs   Lab Test 08/05/24  1228 07/08/24  1320 03/04/24  0632 03/03/24  0640 03/02/24  1448 03/02/24  0406 03/01/24  0841   HGB  --   --   --  11.0*  --  10.4* 10.4*   PLT  --   --   --  217  --  231 234   INR  --   --   --   --   --   --  1.09   * 134*   < > 144   < > 144 131*   POTASSIUM 4.0 4.3   < > 3.5  --  3.5 3.5   CR 1.00* 1.05*   < > 0.90  --  0.84 1.02*   A1C  --   --   --   --   --   --  5.3    < > = values in this interval not displayed.        Diagnostics  Labs pending at this time.  Results will be reviewed when available.   BMP and HgB to be done 8/12/24  No EKG required for low risk surgery (cataract, skin procedure, breast biopsy, etc).    Revised Cardiac Risk Index (RCRI)  The patient has the following serious cardiovascular risks for perioperative complications:   - Cerebrovascular Disease (TIA or CVA) = 1 point     RCRI Interpretation: 1 point: Class II (low risk - 0.9% complication rate)  Georgia has hx of heart issues.  Most recent has been a CVA from her Atrial Fibrillation.  Discussed with her about risk of holding Apixaban prior to biopsy and she is aware of the risks and accepts them.    Has been on ASA 81mg twice a day and intend to lower her to daily ongoing.    No acute active symptoms cardiac wise.       Signed Electronically by: CASTRO Hazel CNP  A copy of this evaluation report is provided to the requesting physician.

## 2024-08-12 NOTE — LETTER
8/12/2024      Marla Dunn  C/o Deneen Dunn  [address] [facility] [address]        Preoperative Evaluation  [facility]  [address] [place] [address]  Phone: [phone]  Fax: [phone]  Primary Provider: CASTRO Hazel CNP  Pre-op Performing Provider: CASTRO Hazel CNP  Aug 12, 2024               8/13/2024   Surgical Information   What procedure is being done? excision of lesion of right palete   Facility or Hospital where procedure/surgery will be performed: [facility]   Who is doing the procedure / surgery? Salty Greene MD   Date of surgery / procedure: 8/27/2024   Time of surgery / procedure: 12:15pm   Where do you plan to recover after surgery? Other - Assisted Living Facility with family and staff      Fax number for surgical facility: [phone]    Assessment & Plan    The proposed surgical procedure is considered LOW risk.    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 DO NOT TAKE 2 days before surgery.     Additional Medication Instructions  Georgia receives multiple medications - will allow her to take morning of surgery - NaCl tab, Levothryoxine, Metoprolol and prescribed  eye drops.  Diuretics on hold morning of biopsy.       Recommendation  Approval given to proceed with proposed procedure, without further diagnostic evaluation.    Subjective  Georgia is a 89 year old, presenting for the following:  Pre-Op Exam    Chronic diagnoses include heart failure, hx of CVA, Atrial Fibrillation ( has seen Cardiology), chronic hyponatremia (under nephrology care), chronic anemia (under hematologist care), hyperlipidemia, Hypothyroidism,COPD, and osteoarthritis.  Has hx of CABG and bioprosthetic aortic valve replacement in 2009.    On 7/23/24, Georgia went for a 10 month surveillance visit of her tongue cancer that she did have a partial glossectomy.  Found to have a white patch in the right posterior oropharynx.    CT ordered but biopsy set up to make sure this new lesion is benign.  Has ongoing swallowing issues as few poor repair teeth.  Eats more soft foods that are easier to chew.      Georgia is managed with specialty providers.  Overall she is stable.  Despite ready to pass on, she continues to pursue treatments.      HPI related to upcoming procedure:         8/13/2024   Pre-Op Questionnaire   Have you ever had a heart attack or stroke? (!) YES stroke   Have you ever had surgery on your heart or blood vessels, such as a stent placement, a coronary artery bypass, or surgery on an artery in your head, neck, heart, or legs? Yes, CABG back in 2009   Do you have chest pain with activity? No   Do you have a history of heart failure? (!) YES chronic heart failure   Do you currently have a cold, bronchitis or symptoms of other infection? No   Do you have a cough, shortness of breath, or wheezing? No   Do you or anyone in your family have previous history of blood clots? No   Do you or does anyone in your family have a serious bleeding problem such as prolonged bleeding following surgeries or cuts? No   Have you ever had problems with anemia or been told to take iron pills? (!) YES sees  Hematology for treatment.   No oral iron supplement   Have you had any abnormal blood loss such as black, tarry or bloody stools, or abnormal vaginal bleeding? No   Have you ever had a blood transfusion? (!) UNKNOWN   Are you willing to have a blood transfusion if it is medically needed before, during, or after your surgery? Yes   Have you or any of your relatives ever had problems with anesthesia? No   Do you have sleep apnea, excessive snoring or daytime drowsiness? (!) YES   Do you have a CPAP machine? Yes   Do you have any artifical heart valves or other implanted medical devices like a pacemaker, defibrillator, or continuous glucose monitor? (!) YES  Bioprosthetic aortic vavle replacement 2009   What type of device do you have? Bioprosthetic aortic heart valve replacement   Do you have artificial joints? No   Are you allergic to latex? No      Health Care Directive  Patient has a Health Care Directive on file      Preoperative Review of    reviewed - no record of controlled substances prescribed.      Status of Chronic Conditions:  A-FIB - Patient has a longstanding history of chronic A-fib currently on rate and rhythm control. Current treatment regimen includes Apixaban for stroke prevention and denies significant symptoms of lightheadedness, palpitations or dyspnea.     ANEMIA - Patient has a recent history of moderate-severe anemia, which has not been symptomatic. Work up to date has revealed chronic anemia. Treatment has beenAranesp if HgB <11 with [facility].     CAD - Patient has a longstanding history of moderate-severe CAD. Patient denies recent chest pain or NTG use, denies exercise induced dyspnea or PND. Last Stress test unknown, EKG 3/1/24 and ECHO 3/1/24.     CHF - Patient has a longstanding history of moderate-severe CHF. Exacerbating conditions include ischemic heart disease, hypertension, COPD, and atrial fibrilation. Currently the patient's condition is same. Current treatment  regimen includes beta blocker, diuretic, and spirolactone. The patient denies chest pain, edema, orthopnea, SOB or recent weight gain. Last Echocardiogram 3/1/24, EKG 3/1/24.     COPD - Patient has a longstanding history of moderate-severe COPD . Patient has been doing well overall noting NO SYMPTOMS and continues on medication regimen consisting of Combivent inhaler without adverse reactions or side effects.  Did attempt to do without the Combivent inhaler end of July due to cost and without it her breathing became labored and heavy sounding.  Reinstated medication.    HYPERLIPIDEMIA - Patient has a long history of significant Hyperlipidemia requiring medication for treatment with recent good control. Patient reports no problems or side effects with the medication.     HYPERTENSION - Patient has longstanding history of HTN , currently denies any symptoms referable to elevated blood pressure. Specifically denies chest pain, palpitations, dyspnea, orthopnea, PND or peripheral edema. Blood pressure readings have been in normal range. Current medication regimen is as listed below. Patient denies any side effects of medication.     HYPOTHYROIDISM - Patient has a longstanding history of chronic Hypothyroidism. Patient has been doing well, noting no tremor, insomnia, hair loss or changes in skin texture. Continues to take medications as directed, without adverse reactions or side effects. Last TSH   Lab Results   Component Value Date    TSH 6.46 (H) 05/06/2024   .      SLEEP PROBLEM - Patient has a longstanding history of unknown cause and uses CPAP every night. Patient has tried OTC medications with limited success.     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 04/04/2024     Priority: Medium     Acute stroke due to ischemia (H) 03/11/2024     Priority: Medium     Chronic atrial fibrillation (H) 03/11/2024     Priority: Medium     Spondylosis of  lumbar region without myelopathy or radiculopathy 03/11/2024     Priority: Medium     Facial droop 03/01/2024     Priority: Medium     Dysarthria 03/01/2024     Priority: Medium     Left-sided weakness 03/01/2024     Priority: Medium     Protein-calorie malnutrition (H) 01/21/2024     Priority: Medium     Hx of squamous cell carcinoma 12/17/2023     Priority: Medium     Chronic kidney disease, stage 3a (H) 05/01/2023     Priority: Medium     Coagulation defect, unspecified (H24) 11/15/2022     Priority: Medium     Contraindication to anticoagulation therapy 10/23/2022     Priority: Medium     Family history of myocardial infarction 10/23/2022     Priority: Medium     father 64       Rapid atrial fibrillation (H) 09/06/2022     Priority: Medium     Demand ischemia of myocardium (H) 09/06/2022     Priority: Medium     Elevated troponin 09/06/2022     Priority: Medium     Atrial fibrillation with rapid ventricular response (H) 09/06/2022     Priority: Medium     Non-intractable vomiting with nausea, unspecified vomiting type 09/06/2022     Priority: Medium     Anxiety and depression 08/13/2021     Priority: Medium     Formatting of this note might be different from the original.  Created by Conversion       Arteriosclerosis of coronary artery 08/13/2021     Priority: Medium     Sleep apnea 08/13/2021     Priority: Medium     Constipation, unspecified 08/13/2021     Priority: Medium     Fecal incontinence alternating with constipation 08/13/2021     Priority: Medium     Fever 08/13/2021     Priority: Medium     Hyperlipidemia LDL goal <100 08/13/2021     Priority: Medium     Formatting of this note might be different from the original.  Created by Conversion       Hypokalemia 08/13/2021     Priority: Medium     Irregular bowel habits 08/13/2021     Priority: Medium     Polyarthropathy or polyarthritis, hand 08/13/2021     Priority: Medium     Formatting of this note might be different from the original.  Created by  Conversion    Replacement Utility updated for latest IMO load    Formatting of this note might be different from the original.  Created by Conversion    Replacement Utility updated for latest IMO load       Pulmonary edema cardiac cause (H) 08/13/2021     Priority: Medium     Tachycardia 08/13/2021     Priority: Medium     Chronic obstructive pulmonary disease, unspecified COPD type (H)      Priority: Medium     Formatting of this note might be different from the original.  Created by Conversion       Rash of body 10/08/2020     Priority: Medium     Heart valve disease 09/02/2020     Priority: Medium     Formatting of this note might be different from the original.  Created by Conversion    Replacement Utility updated for latest IMO load       Diastolic CHF (H) 07/01/2020     Priority: Medium     Hypertension 04/23/2020     Priority: Medium     Formatting of this note might be different from the original.  Created by Conversion    Replacement Utility updated for latest IMO load       Sjogren's syndrome (H24) 04/23/2020     Priority: Medium     Formatting of this note might be different from the original.  Created by Conversion       Dyslipidemia 03/20/2020     Priority: Medium     Generalized muscle weakness 03/20/2020     Priority: Medium     Physical deconditioning 03/20/2020     Priority: Medium     Rectal prolapse 01/10/2018     Priority: Medium     Hyponatremia 09/06/2017     Priority: Medium     Carpal tunnel syndrome, right 11/19/2015     Priority: Medium      Past Medical History:   Diagnosis Date     Asthma      Bilateral carpal tunnel syndrome 08/27/2015     CAD (coronary artery disease)      Chronic airway obstruction, not elsewhere classified     Created by Conversion      Chronic anemia      Chronic edema      Congestive heart failure, severe (H) 05/13/2020     COPD (chronic obstructive pulmonary disease) (H)      Coronary artery disease      Depression      GERD (gastroesophageal reflux disease)       Heart disease      Heart murmur      High cholesterol     dislipidemia     HTN (hypertension)      Hypercholesterolemia      Hypertension      Hypertensive emergency 08/13/2021     Hypothyroidism      Hypothyroidism      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     MI (myocardial infarction) (H) 1985     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     Myelodysplastic syndrome (H)      Myocardial infarction (H) 1983     OLEGARIO (obstructive sleep apnea)      Osteoporosis      Other and unspecified hyperlipidemia     Created by Conversion      Pyelonephritis 05/11/2016     Radicular low back pain      Rheumatoid arthritis (H)      Elizabeth Agers syndrome      Sjoegren syndrome      Sjogren's syndrome (H24)      Sleep apnea, obstructive      Spinal stenosis      Squamous cell carcinoma, tongue border (H)      Unspecified polyarthropathy or polyarthritis, hand     Created by Conversion      Past Surgical History:   Procedure Laterality Date     aortic valve       AORTIC VALVE REPLACEMENT  01/01/2012     APPENDECTOMY       APPENDECTOMY       AS TOTAL KNEE ARTHROPLASTY Right      BACK SURGERY  2004    spinal decompression     BACK SURGERY      spinal stenosis     BONE MARROW BIOPSY  2004     breast biopsy       BREAST SURGERY  2001    biopsy     BYPASS GRAFT ARTERY CORONARY  01/01/2009    LIMA to ramus intermedius     cardiac angiogram       CARDIAC CATHETERIZATION       CARDIAC SURGERY       EYE SURGERY  2008    bilateral cataract repair      EYE SURGERY Bilateral     cornea transplant left eye & cataracts     KYPHOPLASTY  2003    T12     OPEN REDUCTION INTERNAL FIXATION HIP NAILING Right 08/13/2021    Procedure: INTERNAL FIXATION, FRACTURE, TROCHANTERIC, HIP, USING PINS OR RODS;  Surgeon: Darrel Castro MD;  Location: [facility] OR     OTHER SURGICAL HISTORY  01/01/2018    Rectosigmoidectomy perineal     PARTIAL GLOSSECTOMY Right 09/2023    [facility]     RECTOSIGMOIDECTOMY PERINEAL N/A 01/10/2018     Procedure: RECTOSIGMOIDECTOMY PERINEAL;  PERINEAL RECTOSIGMOIDECTOMY ;  Surgeon: Candelaria Anthony MD;  Location: [facility] OR     REPAIR VALVE AORTIC  2009     THORACIC SURGERY  2003    T12 kyphoplasty     ZZC CABG, ARTERY-VEIN, FOUR       ZZC CABG, VEIN, SINGLE      Description: CABG (CABG);  Recorded: 03/09/2012;  Comments: Single vessel bypass graft to an obtuse marginal branch in May 2009     ZZC REPLACE AORT VALV,PROSTH VALV      Description: Aortic Valve Replacement;  Recorded: 03/09/2012;  Comments: Aortic valve replacement     Current Outpatient Medications   Medication Sig Dispense Refill     acetaminophen (TYLENOL) 325 MG tablet Take 3 tablets (975 mg) by mouth every 8 hours as needed for mild pain       albuterol (PROAIR HFA/PROVENTIL HFA/VENTOLIN HFA) 108 (90 Base) MCG/ACT inhaler INHALE 2 PUFFS INTO THE LUNGS 4 TIMES DAILY  (DX: CHRONIC OBSTRUCTIVE PULMONARY DISEASE) 18 g 11     amoxicillin (AMOXIL) 500 MG capsule 4 CAPSULES (2000MG ) ORALLY AS NEEDED ONE HOUR PRIOR TO DENTAL APPOINTMENT 4 capsule 5     ANTACID REGULAR STRENGTH 500 MG chewable tablet 2 TABLETS (1000MG) ORALLY 2 TIMES DAILY AS NEEDED FOR HEARTBURN 60 tablet 11     apixaban ANTICOAGULANT (ELIQUIS) 2.5 MG tablet Take 1 tablet (2.5 mg) by mouth 2 times daily       ASPIRIN LOW DOSE 81 MG EC tablet 1 TABLET ORALLY 2 TIMES DAILY (DX: PROPHYLAXIS) 180 tablet 3     atorvastatin (LIPITOR) 40 MG tablet Take 1 tablet (40 mg) by mouth daily       Bacillus Coagulans-Inulin (PROBIOTIC FORMULA) 1-250 BILLION-MG CAPS 1 CAPSULE ORALLY DAILY 31 capsule 11     bisacodyl (DULCOLAX) 5 MG EC tablet Take 1 tablet (5 mg) by mouth 2 times daily as needed for constipation       chlorhexidine (PERIDEX) 0.12 % solution Swish and spit 15 mLs in mouth 2 times daily May self administer 118 mL 3     cholecalciferol (VITAMIN D3) 125 mcg (5000 units) capsule 1 CAPSULE ORALLY DAILY 90 capsule 3     COMBIVENT RESPIMAT  MCG/ACT inhaler INHALE 1 PUFF INTO THE LUNGS  4  TIMES DAILY 4 g 11     cycloSPORINE (RESTASIS) 0.05 % ophthalmic emulsion Place 1 drop into both eyes 2 times daily        demeclocycline (DECLOMYCIN) 150 MG tablet 1 TABLET ORALLY 2 TIMES DAILY 60 tablet 11     fish oil-omega-3 fatty acids 1000 MG capsule Take 2 capsules (2 g) by mouth daily       fluorometholone (FML LIQUIFILM) 0.1 % ophthalmic susp Place 1 drop Into the left eye daily        hydrocortisone 1 % CREA cream Place rectally 2 times daily as needed for itching       IBANDRONATE SODIUM PO Take 150 mg by mouth every 30 days       levothyroxine (SYNTHROID/LEVOTHROID) 75 MCG tablet 1 TABLET ORALLY EVERY MORNING ON AN EMPTY STOMACH (DX: HYPOTHYROIDISM) 30 tablet 11     loratadine (CLARITIN) 10 MG tablet 1 TABLET ORALLY DAILY (DX: ASTHMA) 28 tablet 11     melatonin 3 MG tablet 1 TABLET ORALLY AT BEDTIME ALONG WITH 5 MG FOR A TOTAL DOSE OF 8MG 30 tablet 11     melatonin 5 MG tablet 1 TABLET ORALLY AT BEDTIME  ALONG WITH 3MG FOR A TOTAL DOSE OF 8MG 30 tablet 11     metoprolol tartrate (LOPRESSOR) 25 MG tablet 1 TABLET ORALLY 2 TIMES DAILY (DX: HYPERTENSION) 56 tablet 11     mirtazapine (REMERON) 7.5 MG tablet 1 TABLET ORALLY AT BEDTIME 31 tablet 11     Multiple Vitamins-Minerals (PRESERVISION AREDS 2) CAPS 1 CAPSULE ORALLY 2 TIMES DAILY 62 capsule 11     omeprazole (PRILOSEC) 20 MG DR capsule 1 CAPSULE ORALLY 2 TIMES DAILY (DX: GASTROESOPHAGEAL REFLUX DISEASE) 62 capsule 11     OXYGEN-HELIUM IN every evening       polyethylene glycol 0.4%- propylene glycol 0.3% (SYSTANE ULTRA) 0.4-0.3 % SOLN ophthalmic solution Place 1 drop into both eyes 4 times daily       Polyethylene Glycol 3350 (PEG 3350) 17 GM/SCOOP POWD MIX 1 CAPFUL (17 GMS) IN 8 OUNCES WATER AND DRINK ORALLY DAILY (DX: CONSTIPATION) 510 g 11     Probiotic Product (PROBIOTIC PO) Take 1 capsule by mouth every morning       SENEXON-S 8.6-50 MG tablet 1 TABLET ORALLY 2 TIMES DAILY (DX: CONSTIPATION) 56 tablet 11     sodium chloride 1 GM tablet Take 1  "tablet (1 g) by mouth daily 30 tablet 11     spironolactone (ALDACTONE) 25 MG tablet 1/2 TABLET (12.5MG) ORALLY DAILY ** HAZARDOUS MED: WEAR DOUBLE NITRILE GLOVES** 15 tablet 11     timolol (TIMOPTIC) 0.5 % ophthalmic solution Place 1 drop Into the left eye daily        torsemide (DEMADEX) 20 MG tablet 1 TABLET ORALLY DAILY 90 tablet 3     vitamin C (ASCORBIC ACID) 500 MG tablet 1 TABLET ORALLY DAILY (DX: SUPPLEMENT) 31 tablet 11       Allergies   Allergen Reactions     Gramineae Pollens Other (See Comments)     ORCHARDGRASS. Shortness of breath when lawn is just cut.     Smoke. Other (See Comments)     Hard to breathe.     Pollen Extract      Other reaction(s): Unknown        Social History     Tobacco Use     Smoking status: Never     Smokeless tobacco: Never   Substance Use Topics     Alcohol use: No     Alcohol/week: 0.0 standard drinks of alcohol     Comment: Alcoholic Drinks/day: occasional glass of wine     Family Hx:  unknown.    History   Drug Use No             Review of Systems  Constitutional, neuro, ENT, endocrine, pulmonary, cardiac, gastrointestinal, genitourinary, musculoskeletal, integument and psychiatric systems are negative, except as otherwise noted.    Objective   /74   Pulse 97   Resp 21   Wt 41.7 kg (92 lb)   BMI 16.83 kg/m     Estimated body mass index is 16.83 kg/m  as calculated from the following:    Height as of 3/18/24: 1.575 m (5' 2\").    Weight as of this encounter: 41.7 kg (92 lb).  Physical Exam  GENERAL: alert and no distress  EYES: Eyes grossly normal to inspection, PERRL and conjunctivae and sclerae normal  HENT: normal cephalic/atraumatic, ear canals and TM's normal, oral mucous membranes moist, and lesion right side of palate oropharynx   NECK: no adenopathy, no asymmetry, masses, or scars  RESP: lungs clear to auscultation - no rales, rhonchi or wheezes  CV: irregularly irregular rhythm, normal S1 S2, no S3 or S4, no murmur, click or rub, peripheral pulses strong, " and no peripheral edema  ABDOMEN: soft, nontender, no hepatosplenomegaly, no masses and bowel sounds normal   (female): normal female external genitalia, normal urethral meatus, normal vaginal mucosa  MS: no gross musculoskeletal defects noted, no edema  SKIN: no suspicious lesions or rashes  NEURO: Normal strength and tone, mentation intact and speech normal  PSYCH: mentation appears normal, affect normal/bright    Recent Labs   Lab Test 08/05/24  1228 07/08/24  1320 03/04/24  0632 03/03/24  0640 03/02/24  1448 03/02/24  0406 03/01/24  0841   HGB  --   --   --  11.0*  --  10.4* 10.4*   PLT  --   --   --  217  --  231 234   INR  --   --   --   --   --   --  1.09   * 134*   < > 144   < > 144 131*   POTASSIUM 4.0 4.3   < > 3.5  --  3.5 3.5   CR 1.00* 1.05*   < > 0.90  --  0.84 1.02*   A1C  --   --   --   --   --   --  5.3    < > = values in this interval not displayed.        Diagnostics  Labs pending at this time.  Results will be reviewed when available.   BMP and HgB to be done 8/12/24  No EKG required for low risk surgery (cataract, skin procedure, breast biopsy, etc).    Revised Cardiac Risk Index (RCRI)  The patient has the following serious cardiovascular risks for perioperative complications:   - Cerebrovascular Disease (TIA or CVA) = 1 point     RCRI Interpretation: 1 point: Class II (low risk - 0.9% complication rate)  Georgia has hx of heart issues.  Most recent has been a CVA from her Atrial Fibrillation.  Discussed with her about risk of holding Apixaban prior to biopsy and she is aware of the risks and accepts them.    Has been on ASA 81mg twice a day and intend to lower her to daily ongoing.    No acute active symptoms cardiac wise.       Signed Electronically by: CASTRO Hazel CNP  A copy of this evaluation report is provided to the requesting physician.             Sincerely,        CASTRO Hazel CNP

## 2024-08-14 ENCOUNTER — LAB REQUISITION (OUTPATIENT)
Dept: LAB | Facility: CLINIC | Age: 89
End: 2024-08-14
Payer: COMMERCIAL

## 2024-08-14 DIAGNOSIS — J44.9 CHRONIC OBSTRUCTIVE PULMONARY DISEASE, UNSPECIFIED (H): ICD-10-CM

## 2024-08-14 DIAGNOSIS — E87.1 HYPO-OSMOLALITY AND HYPONATREMIA: ICD-10-CM

## 2024-08-19 LAB — HGB BLD-MCNC: 8.6 G/DL (ref 11.7–15.7)

## 2024-08-19 PROCEDURE — 36415 COLL VENOUS BLD VENIPUNCTURE: CPT | Mod: ORL | Performed by: NURSE PRACTITIONER

## 2024-08-19 PROCEDURE — 85018 HEMOGLOBIN: CPT | Mod: ORL | Performed by: NURSE PRACTITIONER

## 2024-08-19 PROCEDURE — 80048 BASIC METABOLIC PNL TOTAL CA: CPT | Mod: ORL | Performed by: NURSE PRACTITIONER

## 2024-08-19 PROCEDURE — P9604 ONE-WAY ALLOW PRORATED TRIP: HCPCS | Mod: ORL | Performed by: NURSE PRACTITIONER

## 2024-08-20 LAB
ANION GAP SERPL CALCULATED.3IONS-SCNC: 13 MMOL/L (ref 7–15)
BUN SERPL-MCNC: 33.6 MG/DL (ref 8–23)
CALCIUM SERPL-MCNC: 8.5 MG/DL (ref 8.8–10.4)
CHLORIDE SERPL-SCNC: 101 MMOL/L (ref 98–107)
CREAT SERPL-MCNC: 1.1 MG/DL (ref 0.51–0.95)
EGFRCR SERPLBLD CKD-EPI 2021: 46 ML/MIN/1.73M2
GLUCOSE SERPL-MCNC: 95 MG/DL (ref 70–99)
HCO3 SERPL-SCNC: 21 MMOL/L (ref 22–29)
POTASSIUM SERPL-SCNC: 4.3 MMOL/L (ref 3.4–5.3)
SODIUM SERPL-SCNC: 135 MMOL/L (ref 135–145)

## 2024-08-23 ENCOUNTER — LAB REQUISITION (OUTPATIENT)
Dept: LAB | Facility: CLINIC | Age: 89
End: 2024-08-23
Payer: COMMERCIAL

## 2024-08-23 DIAGNOSIS — I50.9 HEART FAILURE, UNSPECIFIED (H): ICD-10-CM

## 2024-08-31 VITALS
SYSTOLIC BLOOD PRESSURE: 129 MMHG | WEIGHT: 92 LBS | DIASTOLIC BLOOD PRESSURE: 74 MMHG | HEART RATE: 97 BPM | BODY MASS INDEX: 16.83 KG/M2 | RESPIRATION RATE: 21 BRPM

## 2024-08-31 DIAGNOSIS — J45.20 MILD INTERMITTENT ASTHMA WITHOUT COMPLICATION: ICD-10-CM

## 2024-08-31 NOTE — PROGRESS NOTES
[facility] GERIATRICS  ACUTE/EPISODIC VISIT    [facility] Medical Record Number:  [id number]  Place of Service where encounter took place:  [facility] ([facility]) [[id number]]    Chief Complaint   Patient presents with    RECHECK       HPI:    Marla Dunn is a 89 year old  (5/22/1929), who is being seen today for an episodic care visit.  HPI information obtained from: facility chart records, facility staff, patient report, and [facility] chart review.    Today's concern is:    Diagnoses         Codes Comments    Acute diastolic congestive heart failure (H)    -  Primary I50.31     Bilateral lower extremity edema     R60.0     Blister of left lower extremity, initial encounter     S80.822A     Other lesions of oral mucosa     K13.79           Came to see [place] today to follow up with how she has been doing since she had a biopsy in her oral mucosa.  Found she had other acute issues that needed more attention.      Georgia was up in her wheelchair after lunch.  Her mood somewhat flat.  Reviewed what has been going on with her and what is going on with her legs.  Have not seen a significant amount of edema in lower legs since knowing Georgia.  Currently she has +3 edema from feet/ankle up to below knees.    Georgia was asking about wearing NEGRITO stockings.  Given she showed a blister on her leg, advised to only wear on right leg due to blister on left.      ALLERGIES:    Allergies   Allergen Reactions    Gramineae Pollens Other (See Comments)     ORCHARDGRASS. Shortness of breath when lawn is just cut.    Smoke. Other (See Comments)     Hard to breathe.    Pollen Extract      Other reaction(s): Unknown        MEDICATIONS:  Post Discharge Medication Reconciliation Status: patient was not discharged from an inpatient facility or TCU.     Current Outpatient Medications   Medication Sig Dispense Refill    acetaminophen (TYLENOL) 325 MG tablet Take 3 tablets (975 mg) by mouth every 8 hours as needed  for mild pain      albuterol (PROAIR HFA/PROVENTIL HFA/VENTOLIN HFA) 108 (90 Base) MCG/ACT inhaler INHALE 2 PUFFS INTO THE LUNGS 4 TIMES DAILY  (DX: CHRONIC OBSTRUCTIVE PULMONARY DISEASE) 18 g 11    amoxicillin (AMOXIL) 500 MG capsule 4 CAPSULES (2000MG ) ORALLY AS NEEDED ONE HOUR PRIOR TO DENTAL APPOINTMENT 4 capsule 5    ANTACID REGULAR STRENGTH 500 MG chewable tablet 2 TABLETS (1000MG) ORALLY 2 TIMES DAILY AS NEEDED FOR HEARTBURN 60 tablet 11    apixaban ANTICOAGULANT (ELIQUIS) 2.5 MG tablet Take 1 tablet (2.5 mg) by mouth 2 times daily      ASPIRIN LOW DOSE 81 MG EC tablet 1 TABLET ORALLY 2 TIMES DAILY (DX: PROPHYLAXIS) 180 tablet 3    atorvastatin (LIPITOR) 40 MG tablet Take 1 tablet (40 mg) by mouth daily      Bacillus Coagulans-Inulin (PROBIOTIC FORMULA) 1-250 BILLION-MG CAPS 1 CAPSULE ORALLY DAILY 31 capsule 11    bisacodyl (DULCOLAX) 5 MG EC tablet Take 1 tablet (5 mg) by mouth 2 times daily as needed for constipation      chlorhexidine (PERIDEX) 0.12 % solution Swish and spit 15 mLs in mouth 2 times daily May self administer 118 mL 3    cholecalciferol (VITAMIN D3) 125 mcg (5000 units) capsule 1 CAPSULE ORALLY DAILY 90 capsule 3    COMBIVENT RESPIMAT  MCG/ACT inhaler INHALE 1 PUFF INTO THE LUNGS  4 TIMES DAILY 4 g 11    cycloSPORINE (RESTASIS) 0.05 % ophthalmic emulsion Place 1 drop into both eyes 2 times daily       demeclocycline (DECLOMYCIN) 150 MG tablet 1 TABLET ORALLY 2 TIMES DAILY 60 tablet 11    fish oil-omega-3 fatty acids 1000 MG capsule Take 2 capsules (2 g) by mouth daily      fluorometholone (FML LIQUIFILM) 0.1 % ophthalmic susp Place 1 drop Into the left eye daily       hydrocortisone 1 % CREA cream Place rectally 2 times daily as needed for itching      IBANDRONATE SODIUM PO Take 150 mg by mouth every 30 days      levothyroxine (SYNTHROID/LEVOTHROID) 75 MCG tablet 1 TABLET ORALLY EVERY MORNING ON AN EMPTY STOMACH (DX: HYPOTHYROIDISM) 30 tablet 11    loratadine (CLARITIN) 10 MG tablet 1  TABLET ORALLY DAILY (DX: ASTHMA) 28 tablet 11    melatonin 3 MG tablet 1 TABLET ORALLY AT BEDTIME ALONG WITH 5 MG FOR A TOTAL DOSE OF 8MG 30 tablet 11    melatonin 5 MG tablet 1 TABLET ORALLY AT BEDTIME  ALONG WITH 3MG FOR A TOTAL DOSE OF 8MG 30 tablet 11    metoprolol tartrate (LOPRESSOR) 25 MG tablet 1 TABLET ORALLY 2 TIMES DAILY (DX: HYPERTENSION) 56 tablet 11    mirtazapine (REMERON) 7.5 MG tablet 1 TABLET ORALLY AT BEDTIME 31 tablet 11    Multiple Vitamins-Minerals (PRESERVISION AREDS 2) CAPS 1 CAPSULE ORALLY 2 TIMES DAILY 62 capsule 11    omeprazole (PRILOSEC) 20 MG DR capsule 1 CAPSULE ORALLY 2 TIMES DAILY (DX: GASTROESOPHAGEAL REFLUX DISEASE) 62 capsule 11    OXYGEN-HELIUM IN every evening      polyethylene glycol 0.4%- propylene glycol 0.3% (SYSTANE ULTRA) 0.4-0.3 % SOLN ophthalmic solution Place 1 drop into both eyes 4 times daily      Polyethylene Glycol 3350 (PEG 3350) 17 GM/SCOOP POWD MIX 1 CAPFUL (17 GMS) IN 8 OUNCES WATER AND DRINK ORALLY DAILY (DX: CONSTIPATION) 510 g 11    Probiotic Product (PROBIOTIC PO) Take 1 capsule by mouth every morning      SENEXON-S 8.6-50 MG tablet 1 TABLET ORALLY 2 TIMES DAILY (DX: CONSTIPATION) 56 tablet 11    sodium chloride 1 GM tablet Take 1 tablet (1 g) by mouth daily 30 tablet 11    spironolactone (ALDACTONE) 25 MG tablet 1/2 TABLET (12.5MG) ORALLY DAILY ** HAZARDOUS MED: WEAR DOUBLE NITRILE GLOVES** 15 tablet 11    timolol (TIMOPTIC) 0.5 % ophthalmic solution Place 1 drop Into the left eye daily       torsemide (DEMADEX) 20 MG tablet 1 TABLET ORALLY DAILY 90 tablet 3    vitamin C (ASCORBIC ACID) 500 MG tablet 1 TABLET ORALLY DAILY (DX: SUPPLEMENT) 31 tablet 11       REVIEW OF SYSTEMS:  4 point ROS neg other than the symptoms noted above in the HPI.  No chest pain, no acute SOB.  Showed her blister and lower legs.        PHYSICAL EXAM:  /74   Pulse 97   Resp 21   Wt 41.7 kg (92 lb)   BMI 16.83 kg/m    Petite elderly female, neatly groomed.    Wears glasses  and EOMs are clear.  Skin is pale, thin, warm and dry.  Heart rate regular/irregular and strong.  Lower legs are shiny pink, +3.  Lateral side of left leg has a intact blister.  Legs in a dependent position and legs will look red/purple but if elevated, they become less red/purple  Lungs are clear.  No coughing, breathing can be heard but voice is changed as accommodating the biopsy done in back of through.  Mucosa is moist.      Abdomen is flat, soft, and non-tender.    Can ambulate with 2WW but admits she does not do a good job in keeping up with walking.  Uses the w/c more.  The purpose of her having to ambulate is that it is easier for her niece to take her to appointments.       Component      Latest Ref Rng 8/19/2024  3:18 PM   Sodium      135 - 145 mmol/L 135    Potassium      3.4 - 5.3 mmol/L 4.3    Chloride      98 - 107 mmol/L 101    Carbon Dioxide (CO2)      22 - 29 mmol/L 21 (L)    Anion Gap      7 - 15 mmol/L 13    Urea Nitrogen      8.0 - 23.0 mg/dL 33.6 (H)    Creatinine      0.51 - 0.95 mg/dL 1.10 (H)    GFR Estimate      >60 mL/min/1.73m2 46 (L)    Calcium      8.8 - 10.4 mg/dL 8.5 (L)    Glucose      70 - 99 mg/dL 95           ASSESSMENT / PLAN:  (I50.31) Acute diastolic congestive heart failure (H)  (primary encounter diagnosis)  (R60.0) Bilateral lower extremity edema  Comment: currently takes Torsemide 20mg daily and 12.5mg of Spironolactone.  Going to increase the Torsemide to 30mg po daily and has a BMP scheduled the next lab day after the Monday holiday.  Hoping that this will be short lived and can go back to 20mg po daily as she was doing very well on the 20mg.    Can wear her NEGRITO on the right leg now but would avoid the left unless that blister absorbs back or breaks open.  Educated Georgia to talk with nursing if the blister opens up as it will need to be dressed for a few days before leaving it open to air to heal.  If their is a dressing on the leg then the other NEGRITO stocking can be  used as well.      (S80.822A) Blister of left lower extremity, initial encounter  Comment: blister present does have a bloody tinge to the fluid.  Educated Georgia that if it opened, then tell nursing.  Georgia showed this NP that she does have dressings that could be used.  Would want like a bacitracin used over the open area for 3 days then maybe open to air.  The blister is good size and so suspect that this may open versus subside.      (K13.79) Other lesions of oral mucosa  Comment: Georgia is being very careful for what she is eating at this time due to were she had the biopsy.  No results back yet.  Procedure done at [facility].  Was done on 8/27/24.     Orders:  Increase Torsemide to 30mg po daily due to acute CHF    Electronically signed by  CASTRO Hazel CNP

## 2024-09-02 ENCOUNTER — ASSISTED LIVING VISIT (OUTPATIENT)
Dept: GERIATRICS | Facility: CLINIC | Age: 89
End: 2024-09-02
Payer: COMMERCIAL

## 2024-09-02 DIAGNOSIS — K13.79 OTHER LESIONS OF ORAL MUCOSA: ICD-10-CM

## 2024-09-02 DIAGNOSIS — I50.31 ACUTE DIASTOLIC CONGESTIVE HEART FAILURE (H): Primary | ICD-10-CM

## 2024-09-02 DIAGNOSIS — S80.822A BLISTER OF LEFT LOWER EXTREMITY, INITIAL ENCOUNTER: ICD-10-CM

## 2024-09-02 DIAGNOSIS — R60.0 BILATERAL LOWER EXTREMITY EDEMA: ICD-10-CM

## 2024-09-02 PROCEDURE — 99349 HOME/RES VST EST MOD MDM 40: CPT | Performed by: NURSE PRACTITIONER

## 2024-09-02 NOTE — LETTER
9/2/2024      Marla Dunn  C/o Deenen Dunn  [address] [address]        [facility] GERIATRICS  ACUTE/EPISODIC VISIT    [facility] Medical Record Number:  [id number]  Place of Service where encounter took place:  [facility] ([facility]) [[id number]]    Chief Complaint   Patient presents with     RECHECK       HPI:    Marla Dunn is a 89 year old  (5/22/1929), who is being seen today for an episodic care visit.  HPI information obtained from: facility chart records, facility staff, patient report, and [facility] chart review.    Today's concern is:    Diagnoses         Codes Comments    Acute diastolic congestive heart failure (H)    -  Primary I50.31     Bilateral lower extremity edema     R60.0     Blister of left lower extremity, initial encounter     S80.822A     Other lesions of oral mucosa     K13.79           Came to see Georgia today to follow up with how she has been doing since she had a biopsy in her oral mucosa.  Found she had other acute issues that needed more attention.      Georgia was up in her wheelchair after lunch.  Her mood somewhat flat.  Reviewed what has been going on with her and what is going on with her legs.  Have not seen a significant amount of edema in lower legs since knowing Georgia.  Currently she has +3 edema from feet/ankle up to below knees.    Georgia was asking about wearing NEGRITO stockings.  Given she showed a blister on her leg, advised to only wear on right leg due to blister on left.      ALLERGIES:    Allergies   Allergen Reactions     Gramineae Pollens Other (See Comments)     ORCHARDGRASS. Shortness of breath when lawn is just cut.     Smoke. Other (See Comments)     Hard to breathe.     Pollen Extract      Other reaction(s): Unknown        MEDICATIONS:  Post Discharge Medication Reconciliation Status: patient was not discharged from an inpatient facility or TCU.     Current Outpatient Medications   Medication Sig  Dispense Refill     acetaminophen (TYLENOL) 325 MG tablet Take 3 tablets (975 mg) by mouth every 8 hours as needed for mild pain       albuterol (PROAIR HFA/PROVENTIL HFA/VENTOLIN HFA) 108 (90 Base) MCG/ACT inhaler INHALE 2 PUFFS INTO THE LUNGS 4 TIMES DAILY  (DX: CHRONIC OBSTRUCTIVE PULMONARY DISEASE) 18 g 11     amoxicillin (AMOXIL) 500 MG capsule 4 CAPSULES (2000MG ) ORALLY AS NEEDED ONE HOUR PRIOR TO DENTAL APPOINTMENT 4 capsule 5     ANTACID REGULAR STRENGTH 500 MG chewable tablet 2 TABLETS (1000MG) ORALLY 2 TIMES DAILY AS NEEDED FOR HEARTBURN 60 tablet 11     apixaban ANTICOAGULANT (ELIQUIS) 2.5 MG tablet Take 1 tablet (2.5 mg) by mouth 2 times daily       ASPIRIN LOW DOSE 81 MG EC tablet 1 TABLET ORALLY 2 TIMES DAILY (DX: PROPHYLAXIS) 180 tablet 3     atorvastatin (LIPITOR) 40 MG tablet Take 1 tablet (40 mg) by mouth daily       Bacillus Coagulans-Inulin (PROBIOTIC FORMULA) 1-250 BILLION-MG CAPS 1 CAPSULE ORALLY DAILY 31 capsule 11     bisacodyl (DULCOLAX) 5 MG EC tablet Take 1 tablet (5 mg) by mouth 2 times daily as needed for constipation       chlorhexidine (PERIDEX) 0.12 % solution Swish and spit 15 mLs in mouth 2 times daily May self administer 118 mL 3     cholecalciferol (VITAMIN D3) 125 mcg (5000 units) capsule 1 CAPSULE ORALLY DAILY 90 capsule 3     COMBIVENT RESPIMAT  MCG/ACT inhaler INHALE 1 PUFF INTO THE LUNGS  4 TIMES DAILY 4 g 11     cycloSPORINE (RESTASIS) 0.05 % ophthalmic emulsion Place 1 drop into both eyes 2 times daily        demeclocycline (DECLOMYCIN) 150 MG tablet 1 TABLET ORALLY 2 TIMES DAILY 60 tablet 11     fish oil-omega-3 fatty acids 1000 MG capsule Take 2 capsules (2 g) by mouth daily       fluorometholone (FML LIQUIFILM) 0.1 % ophthalmic susp Place 1 drop Into the left eye daily        hydrocortisone 1 % CREA cream Place rectally 2 times daily as needed for itching       IBANDRONATE SODIUM PO Take 150 mg by mouth every 30 days       levothyroxine (SYNTHROID/LEVOTHROID) 75  MCG tablet 1 TABLET ORALLY EVERY MORNING ON AN EMPTY STOMACH (DX: HYPOTHYROIDISM) 30 tablet 11     loratadine (CLARITIN) 10 MG tablet 1 TABLET ORALLY DAILY (DX: ASTHMA) 28 tablet 11     melatonin 3 MG tablet 1 TABLET ORALLY AT BEDTIME ALONG WITH 5 MG FOR A TOTAL DOSE OF 8MG 30 tablet 11     melatonin 5 MG tablet 1 TABLET ORALLY AT BEDTIME  ALONG WITH 3MG FOR A TOTAL DOSE OF 8MG 30 tablet 11     metoprolol tartrate (LOPRESSOR) 25 MG tablet 1 TABLET ORALLY 2 TIMES DAILY (DX: HYPERTENSION) 56 tablet 11     mirtazapine (REMERON) 7.5 MG tablet 1 TABLET ORALLY AT BEDTIME 31 tablet 11     Multiple Vitamins-Minerals (PRESERVISION AREDS 2) CAPS 1 CAPSULE ORALLY 2 TIMES DAILY 62 capsule 11     omeprazole (PRILOSEC) 20 MG DR capsule 1 CAPSULE ORALLY 2 TIMES DAILY (DX: GASTROESOPHAGEAL REFLUX DISEASE) 62 capsule 11     OXYGEN-HELIUM IN every evening       polyethylene glycol 0.4%- propylene glycol 0.3% (SYSTANE ULTRA) 0.4-0.3 % SOLN ophthalmic solution Place 1 drop into both eyes 4 times daily       Polyethylene Glycol 3350 (PEG 3350) 17 GM/SCOOP POWD MIX 1 CAPFUL (17 GMS) IN 8 OUNCES WATER AND DRINK ORALLY DAILY (DX: CONSTIPATION) 510 g 11     Probiotic Product (PROBIOTIC PO) Take 1 capsule by mouth every morning       SENEXON-S 8.6-50 MG tablet 1 TABLET ORALLY 2 TIMES DAILY (DX: CONSTIPATION) 56 tablet 11     sodium chloride 1 GM tablet Take 1 tablet (1 g) by mouth daily 30 tablet 11     spironolactone (ALDACTONE) 25 MG tablet 1/2 TABLET (12.5MG) ORALLY DAILY ** HAZARDOUS MED: WEAR DOUBLE NITRILE GLOVES** 15 tablet 11     timolol (TIMOPTIC) 0.5 % ophthalmic solution Place 1 drop Into the left eye daily        torsemide (DEMADEX) 20 MG tablet 1 TABLET ORALLY DAILY 90 tablet 3     vitamin C (ASCORBIC ACID) 500 MG tablet 1 TABLET ORALLY DAILY (DX: SUPPLEMENT) 31 tablet 11       REVIEW OF SYSTEMS:  4 point ROS neg other than the symptoms noted above in the HPI.  No chest pain, no acute SOB.  Showed her blister and lower legs.         PHYSICAL EXAM:  /74   Pulse 97   Resp 21   Wt 41.7 kg (92 lb)   BMI 16.83 kg/m    Petite elderly female, neatly groomed.    Wears glasses and EOMs are clear.  Skin is pale, thin, warm and dry.  Heart rate regular/irregular and strong.  Lower legs are shiny pink, +3.  Lateral side of left leg has a intact blister.  Legs in a dependent position and legs will look red/purple but if elevated, they become less red/purple  Lungs are clear.  No coughing, breathing can be heard but voice is changed as accommodating the biopsy done in back of through.  Mucosa is moist.      Abdomen is flat, soft, and non-tender.    Can ambulate with 2WW but admits she does not do a good job in keeping up with walking.  Uses the w/c more.  The purpose of her having to ambulate is that it is easier for her niece to take her to appointments.       Component      Latest Ref Rng 8/19/2024  3:18 PM   Sodium      135 - 145 mmol/L 135    Potassium      3.4 - 5.3 mmol/L 4.3    Chloride      98 - 107 mmol/L 101    Carbon Dioxide (CO2)      22 - 29 mmol/L 21 (L)    Anion Gap      7 - 15 mmol/L 13    Urea Nitrogen      8.0 - 23.0 mg/dL 33.6 (H)    Creatinine      0.51 - 0.95 mg/dL 1.10 (H)    GFR Estimate      >60 mL/min/1.73m2 46 (L)    Calcium      8.8 - 10.4 mg/dL 8.5 (L)    Glucose      70 - 99 mg/dL 95           ASSESSMENT / PLAN:  (I50.31) Acute diastolic congestive heart failure (H)  (primary encounter diagnosis)  (R60.0) Bilateral lower extremity edema  Comment: currently takes Torsemide 20mg daily and 12.5mg of Spironolactone.  Going to increase the Torsemide to 30mg po daily and has a BMP scheduled the next lab day after the Monday holiday.  Hoping that this will be short lived and can go back to 20mg po daily as she was doing very well on the 20mg.    Can wear her NEGRITO on the right leg now but would avoid the left unless that blister absorbs back or breaks open.  Educated Georgia to talk with nursing if the blister opens up as  it will need to be dressed for a few days before leaving it open to air to heal.  If their is a dressing on the leg then the other NEGRITO stocking can be used as well.      (S80.822A) Blister of left lower extremity, initial encounter  Comment: blister present does have a bloody tinge to the fluid.  Educated Georgia that if it opened, then tell nursing.  Georgia showed this NP that she does have dressings that could be used.  Would want like a bacitracin used over the open area for 3 days then maybe open to air.  The blister is good size and so suspect that this may open versus subside.      (K13.79) Other lesions of oral mucosa  Comment: Georgia is being very careful for what she is eating at this time due to were she had the biopsy.  No results back yet.  Procedure done at [facility].  Was done on 8/27/24.     Orders:  Increase Torsemide to 30mg po daily due to acute CHF    Electronically signed by  CASTRO Hazel CNP            Sincerely,        CASTRO Hazel CNP

## 2024-09-03 RX ORDER — IPRATROPIUM BROMIDE AND ALBUTEROL 20; 100 UG/1; UG/1
SPRAY, METERED RESPIRATORY (INHALATION)
Qty: 4 G | Refills: 11 | Status: SHIPPED | OUTPATIENT
Start: 2024-09-03

## 2024-09-08 DIAGNOSIS — R60.9 EDEMA: Primary | ICD-10-CM

## 2024-09-09 LAB
ANION GAP SERPL CALCULATED.3IONS-SCNC: 14 MMOL/L (ref 7–15)
BUN SERPL-MCNC: 23.1 MG/DL (ref 8–23)
CALCIUM SERPL-MCNC: 8.7 MG/DL (ref 8.8–10.4)
CHLORIDE SERPL-SCNC: 97 MMOL/L (ref 98–107)
CREAT SERPL-MCNC: 1.07 MG/DL (ref 0.51–0.95)
EGFRCR SERPLBLD CKD-EPI 2021: 48 ML/MIN/1.73M2
GLUCOSE SERPL-MCNC: 90 MG/DL (ref 70–99)
HCO3 SERPL-SCNC: 23 MMOL/L (ref 22–29)
POTASSIUM SERPL-SCNC: 3.9 MMOL/L (ref 3.4–5.3)
SODIUM SERPL-SCNC: 134 MMOL/L (ref 135–145)

## 2024-09-09 PROCEDURE — P9604 ONE-WAY ALLOW PRORATED TRIP: HCPCS | Mod: ORL | Performed by: INTERNAL MEDICINE

## 2024-09-09 PROCEDURE — 80048 BASIC METABOLIC PNL TOTAL CA: CPT | Mod: ORL | Performed by: INTERNAL MEDICINE

## 2024-09-09 PROCEDURE — 36415 COLL VENOUS BLD VENIPUNCTURE: CPT | Mod: ORL | Performed by: INTERNAL MEDICINE

## 2024-09-09 RX ORDER — TORSEMIDE 10 MG/1
TABLET ORAL
Qty: 90 TABLET | Refills: 11 | Status: SHIPPED | OUTPATIENT
Start: 2024-09-09

## 2024-09-13 NOTE — CONSULTS
Care Management Initial Consult    General Information  Assessment completed with: Family, kayce Brothers  Type of CM/SW Visit: Initial Assessment    Primary Care Provider verified and updated as needed: Yes   Readmission within the last 30 days:        Reason for Consult: discharge planning  Advance Care Planning: Advance Care Planning Reviewed: present on chart          Communication Assessment  Patient's communication style: spoken language (English or Bilingual)    Hearing Difficulty or Deaf: yes   Wear Glasses or Blind: yes    Cognitive  Cognitive/Neuro/Behavioral: .WDL except  Level of Consciousness: alert  Arousal Level: opens eyes spontaneously  Orientation: oriented x 4  Mood/Behavior: calm  Best Language: 1 - Mild to moderate  Speech: slurred    Living Environment:   People in home: alone     Current living Arrangements: assisted living  Name of Facility: [facility]   Able to return to prior arrangements: other (see comments) (TBD pending therapy recs)       Family/Social Support:  Care provided by: other (see comments), self (staff)  Provides care for: no one     Other (specify), Facility resident(s)/Staff          Description of Support System: Supportive, Involved         Current Resources:   Patient receiving home care services: No     Community Resources: None  Equipment currently used at home: raised toilet seat, wheelchair, manual, walker, standard, grab bar, toilet, grab bar, tub/shower, shower chair  Supplies currently used at home:      Employment/Financial:  Employment Status:          Financial Concerns:             Does the patient's insurance plan have a 3 day qualifying hospital stay waiver?  Yes     Which insurance plan 3 day waiver is available? Alternative insurance waiver    Will the waiver be used for post-acute placement? Undetermined at this time    Lifestyle & Psychosocial Needs:  Social Determinants of Health     Food Insecurity: Not on file   Depression: Not on file    Housing Stability: Not on file   Tobacco Use: Low Risk  (11/3/2023)    Patient History     Smoking Tobacco Use: Never     Smokeless Tobacco Use: Never     Passive Exposure: Not on file   Financial Resource Strain: Not on file   Alcohol Use: Not on file   Transportation Needs: Not on file   Physical Activity: Not on file   Interpersonal Safety: Not on file   Stress: Not on file   Social Connections: Not on file       Functional Status:  Prior to admission patient needed assistance:   Dependent ADLs:: Wheelchair-with assist, Bathing  Dependent IADLs:: Cooking, Laundry, Meal Preparation, Medication Management, Transportation       Mental Health Status:          Chemical Dependency Status:                Values/Beliefs:  Spiritual, Cultural Beliefs, Adventist Practices, Values that affect care:    Description of Beliefs that Will Affect Care: Restoration            Additional Information:  Spoke to kayce Brothers via telephone to complete assessment. Patient lives at [facility] and receives the following services: bathing, meals, medication management. Says patient is mostly in her wheelchair but can typically transfer self. Anticipate return to [facility] pending therapy recs . Kayce Brothers is main contact. Family will transport if able     Called [facility] [phone]. Message left for nurse requesting call back       Candelaria Rodriguez RN       stated

## 2024-09-16 ENCOUNTER — ASSISTED LIVING VISIT (OUTPATIENT)
Dept: GERIATRICS | Facility: CLINIC | Age: 89
End: 2024-09-16
Payer: COMMERCIAL

## 2024-09-16 VITALS
HEART RATE: 85 BPM | WEIGHT: 94 LBS | RESPIRATION RATE: 20 BRPM | BODY MASS INDEX: 17.19 KG/M2 | SYSTOLIC BLOOD PRESSURE: 124 MMHG | DIASTOLIC BLOOD PRESSURE: 66 MMHG | TEMPERATURE: 98.7 F

## 2024-09-16 DIAGNOSIS — I50.31 ACUTE DIASTOLIC CONGESTIVE HEART FAILURE (H): Primary | ICD-10-CM

## 2024-09-16 DIAGNOSIS — R53.83 FEELING TIRED: ICD-10-CM

## 2024-09-16 DIAGNOSIS — R60.0 BILATERAL LOWER EXTREMITY EDEMA: ICD-10-CM

## 2024-09-16 DIAGNOSIS — S80.822D BLISTER OF LEFT LOWER LEG, SUBSEQUENT ENCOUNTER: ICD-10-CM

## 2024-09-16 DIAGNOSIS — I73.9 ARTERIAL INSUFFICIENCY OF LOWER EXTREMITY (H): ICD-10-CM

## 2024-09-16 DIAGNOSIS — K13.70 ORAL LESION: ICD-10-CM

## 2024-09-16 PROCEDURE — 99349 HOME/RES VST EST MOD MDM 40: CPT | Performed by: NURSE PRACTITIONER

## 2024-09-16 NOTE — LETTER
9/16/2024      Marla Dunn  C/o Deneen Dunn  [address] [facility] [address]        [facility] GERIATRICS  ACUTE/EPISODIC VISIT    [facility] Medical Record Number:  [id number]  Place of Service where encounter took place:  [facility] [place] ([facility]) [[id number]]    Chief Complaint   Patient presents with     RECHECK       HPI:    Marla Dunn is a 89 year old  (5/22/1929), who is being seen today for an episodic care visit.  HPI information obtained from: facility chart records, facility staff, patient report, and [facility] chart review.    Today's concern is:    Diagnoses         Codes Comments    Acute diastolic congestive heart failure (H)    -  Primary I50.31     Bilateral lower extremity edema     R60.0     Blister of left lower leg, subsequent encounter     S80.822D     Arterial insufficiency of lower extremity (H24)     I73.9     Feeling tired     R53.83     Oral lesion     K13.70           Came to see [place] today to follow up with how her lower legs look since increasing her dose of Torsemide.  After she had a oral lesion excised, it appears that she had an acute episode of heart failure with her lower legs showing significant swelling.  Increased the Torsemide to 30mg po daily.  Was hoping this to be short time of having the increase and go back to 20mg daily.      Found Georgia today in her recliner after lunch.  Easily aroused but admits she is very tired.  Can see in her facial expression she is tired.  She states she feels she can go back to bed after each meal.  Able to hold a good conversation today but when the aide came in to give her two inhalers and eye drops, She told him that she was in a bad mood today.      Reviewed a few things today:   Results of her biopsy report of the oral lesion  Discussed the use of current 30mg of Torsemide  Looked at her legs and the artrial insuffiencey    After the visit today did text the niece to give her a heads up about  the visit and what was discussed so that if anything said with her nephology visit on Thursday the niece would know what was done.    [place]ia asked her medications not be changed today.  She wanted to wait to see about her nephrology appointment.      ALLERGIES:    Allergies   Allergen Reactions     Gramineae Pollens Other (See Comments)     ORCHARDGRASS. Shortness of breath when lawn is just cut.     Smoke. Other (See Comments)     Hard to breathe.     Pollen Extract      Other reaction(s): Unknown        MEDICATIONS:  Post Discharge Medication Reconciliation Status: patient was not discharged from an inpatient facility or TCU.     Current Outpatient Medications   Medication Sig Dispense Refill     acetaminophen (TYLENOL) 325 MG tablet Take 3 tablets (975 mg) by mouth every 8 hours as needed for mild pain       albuterol (PROAIR HFA/PROVENTIL HFA/VENTOLIN HFA) 108 (90 Base) MCG/ACT inhaler INHALE 2 PUFFS INTO THE LUNGS 4 TIMES DAILY  (DX: CHRONIC OBSTRUCTIVE PULMONARY DISEASE) 18 g 11     amoxicillin (AMOXIL) 500 MG capsule 4 CAPSULES (2000MG ) ORALLY AS NEEDED ONE HOUR PRIOR TO DENTAL APPOINTMENT 4 capsule 5     ANTACID REGULAR STRENGTH 500 MG chewable tablet 2 TABLETS (1000MG) ORALLY 2 TIMES DAILY AS NEEDED FOR HEARTBURN 60 tablet 11     apixaban ANTICOAGULANT (ELIQUIS) 2.5 MG tablet Take 1 tablet (2.5 mg) by mouth 2 times daily       ASPIRIN LOW DOSE 81 MG EC tablet 1 TABLET ORALLY 2 TIMES DAILY (DX: PROPHYLAXIS) 180 tablet 3     atorvastatin (LIPITOR) 40 MG tablet Take 1 tablet (40 mg) by mouth daily       Bacillus Coagulans-Inulin (PROBIOTIC FORMULA) 1-250 BILLION-MG CAPS 1 CAPSULE ORALLY DAILY 31 capsule 11     bisacodyl (DULCOLAX) 5 MG EC tablet Take 1 tablet (5 mg) by mouth 2 times daily as needed for constipation       chlorhexidine (PERIDEX) 0.12 % solution Swish and spit 15 mLs in mouth 2 times daily May self administer 118 mL 3     cholecalciferol (VITAMIN D3) 125 mcg (5000 units) capsule 1 CAPSULE  ORALLY DAILY 90 capsule 3     COMBIVENT RESPIMAT  MCG/ACT inhaler INHALE 1 PUFF INTO THE LUNGS ORALLY INTO THE LUNGS 4 TIMES DAILY 4 g 11     cycloSPORINE (RESTASIS) 0.05 % ophthalmic emulsion Place 1 drop into both eyes 2 times daily        demeclocycline (DECLOMYCIN) 150 MG tablet 1 TABLET ORALLY 2 TIMES DAILY 60 tablet 11     fish oil-omega-3 fatty acids 1000 MG capsule Take 2 capsules (2 g) by mouth daily       fluorometholone (FML LIQUIFILM) 0.1 % ophthalmic susp Place 1 drop Into the left eye daily        hydrocortisone 1 % CREA cream Place rectally 2 times daily as needed for itching       IBANDRONATE SODIUM PO Take 150 mg by mouth every 30 days       levothyroxine (SYNTHROID/LEVOTHROID) 75 MCG tablet 1 TABLET ORALLY EVERY MORNING ON AN EMPTY STOMACH (DX: HYPOTHYROIDISM) 30 tablet 11     loratadine (CLARITIN) 10 MG tablet 1 TABLET ORALLY DAILY (DX: ASTHMA) 28 tablet 11     melatonin 3 MG tablet 1 TABLET ORALLY AT BEDTIME ALONG WITH 5 MG FOR A TOTAL DOSE OF 8MG 30 tablet 11     melatonin 5 MG tablet 1 TABLET ORALLY AT BEDTIME  ALONG WITH 3MG FOR A TOTAL DOSE OF 8MG 30 tablet 11     metoprolol tartrate (LOPRESSOR) 25 MG tablet 1 TABLET ORALLY 2 TIMES DAILY (DX: HYPERTENSION) 56 tablet 11     mirtazapine (REMERON) 7.5 MG tablet 1 TABLET ORALLY AT BEDTIME 31 tablet 11     Multiple Vitamins-Minerals (PRESERVISION AREDS 2) CAPS 1 CAPSULE ORALLY 2 TIMES DAILY 62 capsule 11     omeprazole (PRILOSEC) 20 MG DR capsule 1 CAPSULE ORALLY 2 TIMES DAILY (DX: GASTROESOPHAGEAL REFLUX DISEASE) 62 capsule 11     OXYGEN-HELIUM IN every evening       polyethylene glycol 0.4%- propylene glycol 0.3% (SYSTANE ULTRA) 0.4-0.3 % SOLN ophthalmic solution Place 1 drop into both eyes 4 times daily       Polyethylene Glycol 3350 (PEG 3350) 17 GM/SCOOP POWD MIX 1 CAPFUL (17 GMS) IN 8 OUNCES WATER AND DRINK ORALLY DAILY (DX: CONSTIPATION) 510 g 11     Probiotic Product (PROBIOTIC PO) Take 1 capsule by mouth every morning        SENEXON-S 8.6-50 MG tablet 1 TABLET ORALLY 2 TIMES DAILY (DX: CONSTIPATION) 56 tablet 11     sodium chloride 1 GM tablet Take 1 tablet (1 g) by mouth daily 30 tablet 11     spironolactone (ALDACTONE) 25 MG tablet 1/2 TABLET (12.5MG) ORALLY DAILY ** HAZARDOUS MED: WEAR DOUBLE NITRILE GLOVES** 15 tablet 11     timolol (TIMOPTIC) 0.5 % ophthalmic solution Place 1 drop Into the left eye daily        torsemide (DEMADEX) 10 MG tablet 3 TABLETS (30MG) ORALLY DAILY (DX: PERIPHERAL EDEMA) 90 tablet 11     torsemide (DEMADEX) 20 MG tablet 1 TABLET ORALLY DAILY 90 tablet 3     vitamin C (ASCORBIC ACID) 500 MG tablet 1 TABLET ORALLY DAILY (DX: SUPPLEMENT) 31 tablet 11       REVIEW OF SYSTEMS:  4 point ROS neg other than the symptoms noted above in the HPI.  Denied chest pain, no acute SOB.        PHYSICAL EXAM:  /66   Pulse 85   Temp 98.7  F (37.1  C)   Resp 20   Wt 42.6 kg (94 lb)   BMI 17.19 kg/m    Petite female in the recliner that she is able to operate with the remote.  Got her self in a upright position but c/o neck bothering her.      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  Did not hear any crackles in her throat when talking this visit.  Only has a few teeth and so has a Lisp with some words she states.      NEGRITO sock on right leg.  Her own sock on the left.    Lateral side of left lower leg has a blister intact on the side and not covered.  Sero-sang colored fluid in blister.  Could see on the front of her left calf had shriveled lines present showing were the edema has retreated.     Right leg had the NEGRITO stocking which was removed.  Looking at her calf skin, there is some scaring present and fluid as well, but not firm to touch.  Top of her foot was pink/red and as time went on, the foot became red/purple in color that included her toes.  She is aware that when elevated the coloring will fade bad to her normal skin color.  Does admit to pain in the 5th toe  of the right foot.    Heart rate regular/irregular and strong.  Abdomen is flat, soft, and non-tender.      Has a CPAP at night and admits to not using it at times.      Component      Latest Ref Rng 8/19/2024  3:18 PM 9/9/2024  11:00 AM   Sodium      135 - 145 mmol/L 135  134 (L)    Potassium      3.4 - 5.3 mmol/L 4.3  3.9    Chloride      98 - 107 mmol/L 101  97 (L)    Carbon Dioxide (CO2)      22 - 29 mmol/L 21 (L)  23    Anion Gap      7 - 15 mmol/L 13  14    Urea Nitrogen      8.0 - 23.0 mg/dL 33.6 (H)  23.1 (H)    Creatinine      0.51 - 0.95 mg/dL 1.10 (H)  1.07 (H)    GFR Estimate      >60 mL/min/1.73m2 46 (L)  48 (L)    Calcium      8.8 - 10.4 mg/dL 8.5 (L)  8.7 (L)    Glucose      70 - 99 mg/dL 95  90       Copied Pathology results from oral lesion excision:  Final Diagnosis   8/27/24       Right palate, excision-Squamous mucosa with a benign squamous cyst, no high-grade dysplasia or invasive carcinoma identified         ASSESSMENT / PLAN:  (I50.31) Acute diastolic congestive heart failure (H)  (primary encounter diagnosis)  (R60.0) Bilateral lower extremity edema  Comment: Currently Georgia is receiving torsemide 30 mg daily and spironolactone 12.5 mg daily.  Minimally once a month that she has a BMP done per her nephrology clinic.  Do feel that the increase of the torsemide has also helped with her breathing as do not hear the crackles in the back of her throat anymore.  She does have a lot of phlegm in the morning that she has to try to expel.  The edema has gone down greatly since his nurse practitioner and increased her torsemide.  Georgia is asking not to change the dose at this time and would like to see what her nephrologist says on Thursday.  Did let the niece know of [place]'s request with not changing any medication    (Z78.678C) Blister of left lower leg, subsequent encounter  Comment: So far the blister is intact.  Do feel that she will let nursing know if it opens up and if she needs help with  dressing the area.  Is not a large blister so feel if it stays this size there is a good potential it could absorb back into her body.    (I73.9) Arterial insufficiency of lower extremity (H24)  Comment: Could see that Georgia is having worsening circulation in her lower extremities.  Spoke to her that typically would want to get her set up with vascular surgery or even podiatry where ultrasounds could be done to check her circulation in the lower extremities.  Not saying that they would do vascular surgery but it would give her some idea of what is going on down there.  She already is on aspirin 81 mg twice a day and Eliquis 2.5 mg twice a day for her atrial FIB and stroke.    Georgia has steadfast in her feelings of wishing the Lord would take her in her sleep.  Do feel that she is showing decline in her health.  At this time not going to refer her to anyone else.  Did let the niece know this.  Do not know if this will be brought up in her nephrology appointment on Thursday.  Did lightly bring up to both Georgia and the niece that depending what path is chosen, hospice may be in her future.    (R53.83) Feeling tired  Comment: Once again feel that Georgia is just changing in general.  Will take day by day to see how she does and can always present further decisions for her and where she wants to direct her life.  Typically Georgia will go in periodically for iron transfusions.   Lab Results   Component Value Date    HGB 8.6 08/19/2024       (K13.70) Oral lesion  Comment: Did update Georgia about what her pathology report stated.  Not sure if she has any other follow-ups but at this point it was negative for any cancer.    Orders:  No new orders today.      Electronically signed by  CASTRO Hazel CNP            Sincerely,        CASTRO Hazel CNP

## 2024-09-16 NOTE — PROGRESS NOTES
[facility] GERIATRICS  ACUTE/EPISODIC VISIT    [facility] Medical Record Number:  [id number]  Place of Service where encounter took place:  [facility] ([facility]) [[id number]]    Chief Complaint   Patient presents with    RECHECK       HPI:    Marla Dunn is a 89 year old  (5/22/1929), who is being seen today for an episodic care visit.  HPI information obtained from: facility chart records, facility staff, patient report, and [facility] chart review.    Today's concern is:    Diagnoses         Codes Comments    Acute diastolic congestive heart failure (H)    -  Primary I50.31     Bilateral lower extremity edema     R60.0     Blister of left lower leg, subsequent encounter     S80.822D     Arterial insufficiency of lower extremity (H24)     I73.9     Feeling tired     R53.83     Oral lesion     K13.70           Came to see Georgia today to follow up with how her lower legs look since increasing her dose of Torsemide.  After she had a oral lesion excised, it appears that she had an acute episode of heart failure with her lower legs showing significant swelling.  Increased the Torsemide to 30mg po daily.  Was hoping this to be short time of having the increase and go back to 20mg daily.      Found Georgia today in her recliner after lunch.  Easily aroused but admits she is very tired.  Can see in her facial expression she is tired.  She states she feels she can go back to bed after each meal.  Able to hold a good conversation today but when the aide came in to give her two inhalers and eye drops, She told him that she was in a bad mood today.      Reviewed a few things today:   Results of her biopsy report of the oral lesion  Discussed the use of current 30mg of Torsemide  Looked at her legs and the artrial insuffiencey    After the visit today did text the niece to give her a heads up about the visit and what was discussed so that if anything said with her nephology visit on Thursday the  niece would know what was done.    [place]ia asked her medications not be changed today.  She wanted to wait to see about her nephrology appointment.      ALLERGIES:    Allergies   Allergen Reactions    Gramineae Pollens Other (See Comments)     ORCHARDGRASS. Shortness of breath when lawn is just cut.    Smoke. Other (See Comments)     Hard to breathe.    Pollen Extract      Other reaction(s): Unknown        MEDICATIONS:  Post Discharge Medication Reconciliation Status: patient was not discharged from an inpatient facility or TCU.     Current Outpatient Medications   Medication Sig Dispense Refill    acetaminophen (TYLENOL) 325 MG tablet Take 3 tablets (975 mg) by mouth every 8 hours as needed for mild pain      albuterol (PROAIR HFA/PROVENTIL HFA/VENTOLIN HFA) 108 (90 Base) MCG/ACT inhaler INHALE 2 PUFFS INTO THE LUNGS 4 TIMES DAILY  (DX: CHRONIC OBSTRUCTIVE PULMONARY DISEASE) 18 g 11    amoxicillin (AMOXIL) 500 MG capsule 4 CAPSULES (2000MG ) ORALLY AS NEEDED ONE HOUR PRIOR TO DENTAL APPOINTMENT 4 capsule 5    ANTACID REGULAR STRENGTH 500 MG chewable tablet 2 TABLETS (1000MG) ORALLY 2 TIMES DAILY AS NEEDED FOR HEARTBURN 60 tablet 11    apixaban ANTICOAGULANT (ELIQUIS) 2.5 MG tablet Take 1 tablet (2.5 mg) by mouth 2 times daily      ASPIRIN LOW DOSE 81 MG EC tablet 1 TABLET ORALLY 2 TIMES DAILY (DX: PROPHYLAXIS) 180 tablet 3    atorvastatin (LIPITOR) 40 MG tablet Take 1 tablet (40 mg) by mouth daily      Bacillus Coagulans-Inulin (PROBIOTIC FORMULA) 1-250 BILLION-MG CAPS 1 CAPSULE ORALLY DAILY 31 capsule 11    bisacodyl (DULCOLAX) 5 MG EC tablet Take 1 tablet (5 mg) by mouth 2 times daily as needed for constipation      chlorhexidine (PERIDEX) 0.12 % solution Swish and spit 15 mLs in mouth 2 times daily May self administer 118 mL 3    cholecalciferol (VITAMIN D3) 125 mcg (5000 units) capsule 1 CAPSULE ORALLY DAILY 90 capsule 3    COMBIVENT RESPIMAT  MCG/ACT inhaler INHALE 1 PUFF INTO THE LUNGS ORALLY INTO THE  LUNGS 4 TIMES DAILY 4 g 11    cycloSPORINE (RESTASIS) 0.05 % ophthalmic emulsion Place 1 drop into both eyes 2 times daily       demeclocycline (DECLOMYCIN) 150 MG tablet 1 TABLET ORALLY 2 TIMES DAILY 60 tablet 11    fish oil-omega-3 fatty acids 1000 MG capsule Take 2 capsules (2 g) by mouth daily      fluorometholone (FML LIQUIFILM) 0.1 % ophthalmic susp Place 1 drop Into the left eye daily       hydrocortisone 1 % CREA cream Place rectally 2 times daily as needed for itching      IBANDRONATE SODIUM PO Take 150 mg by mouth every 30 days      levothyroxine (SYNTHROID/LEVOTHROID) 75 MCG tablet 1 TABLET ORALLY EVERY MORNING ON AN EMPTY STOMACH (DX: HYPOTHYROIDISM) 30 tablet 11    loratadine (CLARITIN) 10 MG tablet 1 TABLET ORALLY DAILY (DX: ASTHMA) 28 tablet 11    melatonin 3 MG tablet 1 TABLET ORALLY AT BEDTIME ALONG WITH 5 MG FOR A TOTAL DOSE OF 8MG 30 tablet 11    melatonin 5 MG tablet 1 TABLET ORALLY AT BEDTIME  ALONG WITH 3MG FOR A TOTAL DOSE OF 8MG 30 tablet 11    metoprolol tartrate (LOPRESSOR) 25 MG tablet 1 TABLET ORALLY 2 TIMES DAILY (DX: HYPERTENSION) 56 tablet 11    mirtazapine (REMERON) 7.5 MG tablet 1 TABLET ORALLY AT BEDTIME 31 tablet 11    Multiple Vitamins-Minerals (PRESERVISION AREDS 2) CAPS 1 CAPSULE ORALLY 2 TIMES DAILY 62 capsule 11    omeprazole (PRILOSEC) 20 MG DR capsule 1 CAPSULE ORALLY 2 TIMES DAILY (DX: GASTROESOPHAGEAL REFLUX DISEASE) 62 capsule 11    OXYGEN-HELIUM IN every evening      polyethylene glycol 0.4%- propylene glycol 0.3% (SYSTANE ULTRA) 0.4-0.3 % SOLN ophthalmic solution Place 1 drop into both eyes 4 times daily      Polyethylene Glycol 3350 (PEG 3350) 17 GM/SCOOP POWD MIX 1 CAPFUL (17 GMS) IN 8 OUNCES WATER AND DRINK ORALLY DAILY (DX: CONSTIPATION) 510 g 11    Probiotic Product (PROBIOTIC PO) Take 1 capsule by mouth every morning      SENEXON-S 8.6-50 MG tablet 1 TABLET ORALLY 2 TIMES DAILY (DX: CONSTIPATION) 56 tablet 11    sodium chloride 1 GM tablet Take 1 tablet (1 g) by  mouth daily 30 tablet 11    spironolactone (ALDACTONE) 25 MG tablet 1/2 TABLET (12.5MG) ORALLY DAILY ** HAZARDOUS MED: WEAR DOUBLE NITRILE GLOVES** 15 tablet 11    timolol (TIMOPTIC) 0.5 % ophthalmic solution Place 1 drop Into the left eye daily       torsemide (DEMADEX) 10 MG tablet 3 TABLETS (30MG) ORALLY DAILY (DX: PERIPHERAL EDEMA) 90 tablet 11    torsemide (DEMADEX) 20 MG tablet 1 TABLET ORALLY DAILY 90 tablet 3    vitamin C (ASCORBIC ACID) 500 MG tablet 1 TABLET ORALLY DAILY (DX: SUPPLEMENT) 31 tablet 11       REVIEW OF SYSTEMS:  4 point ROS neg other than the symptoms noted above in the HPI.  Denied chest pain, no acute SOB.        PHYSICAL EXAM:  /66   Pulse 85   Temp 98.7  F (37.1  C)   Resp 20   Wt 42.6 kg (94 lb)   BMI 17.19 kg/m    Petite female in the recliner that she is able to operate with the remote.  Got her self in a upright position but c/o neck bothering her.      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  Did not hear any crackles in her throat when talking this visit.  Only has a few teeth and so has a Lisp with some words she states.      NEGRITO sock on right leg.  Her own sock on the left.    Lateral side of left lower leg has a blister intact on the side and not covered.  Sero-sang colored fluid in blister.  Could see on the front of her left calf had shriveled lines present showing were the edema has retreated.     Right leg had the NEGRITO stocking which was removed.  Looking at her calf skin, there is some scaring present and fluid as well, but not firm to touch.  Top of her foot was pink/red and as time went on, the foot became red/purple in color that included her toes.  She is aware that when elevated the coloring will fade bad to her normal skin color.  Does admit to pain in the 5th toe of the right foot.    Heart rate regular/irregular and strong.  Abdomen is flat, soft, and non-tender.      Has a CPAP at night and admits to not  using it at times.      Component      Latest Ref Rng 8/19/2024  3:18 PM 9/9/2024  11:00 AM   Sodium      135 - 145 mmol/L 135  134 (L)    Potassium      3.4 - 5.3 mmol/L 4.3  3.9    Chloride      98 - 107 mmol/L 101  97 (L)    Carbon Dioxide (CO2)      22 - 29 mmol/L 21 (L)  23    Anion Gap      7 - 15 mmol/L 13  14    Urea Nitrogen      8.0 - 23.0 mg/dL 33.6 (H)  23.1 (H)    Creatinine      0.51 - 0.95 mg/dL 1.10 (H)  1.07 (H)    GFR Estimate      >60 mL/min/1.73m2 46 (L)  48 (L)    Calcium      8.8 - 10.4 mg/dL 8.5 (L)  8.7 (L)    Glucose      70 - 99 mg/dL 95  90       Copied Pathology results from oral lesion excision:  Final Diagnosis   8/27/24       Right palate, excision-Squamous mucosa with a benign squamous cyst, no high-grade dysplasia or invasive carcinoma identified         ASSESSMENT / PLAN:  (I50.31) Acute diastolic congestive heart failure (H)  (primary encounter diagnosis)  (R60.0) Bilateral lower extremity edema  Comment: Currently Georgia is receiving torsemide 30 mg daily and spironolactone 12.5 mg daily.  Minimally once a month that she has a BMP done per her nephrology clinic.  Do feel that the increase of the torsemide has also helped with her breathing as do not hear the crackles in the back of her throat anymore.  She does have a lot of phlegm in the morning that she has to try to expel.  The edema has gone down greatly since his nurse practitioner and increased her torsemide.  Georgia is asking not to change the dose at this time and would like to see what her nephrologist says on Thursday.  Did let the niece know of [place]'s request with not changing any medication    (S83.532X) Blister of left lower leg, subsequent encounter  Comment: So far the blister is intact.  Do feel that she will let nursing know if it opens up and if she needs help with dressing the area.  Is not a large blister so feel if it stays this size there is a good potential it could absorb back into her body.    (I73.9)  Arterial insufficiency of lower extremity (H24)  Comment: Could see that Georgia is having worsening circulation in her lower extremities.  Spoke to her that typically would want to get her set up with vascular surgery or even podiatry where ultrasounds could be done to check her circulation in the lower extremities.  Not saying that they would do vascular surgery but it would give her some idea of what is going on down there.  She already is on aspirin 81 mg twice a day and Eliquis 2.5 mg twice a day for her atrial FIB and stroke.    Georgia has steadfast in her feelings of wishing the Lord would take her in her sleep.  Do feel that she is showing decline in her health.  At this time not going to refer her to anyone else.  Did let the niece know this.  Do not know if this will be brought up in her nephrology appointment on Thursday.  Did lightly bring up to both Georgia and the niece that depending what path is chosen, hospice may be in her future.    (R53.83) Feeling tired  Comment: Once again feel that Georgia is just changing in general.  Will take day by day to see how she does and can always present further decisions for her and where she wants to direct her life.  Typically Georgia will go in periodically for iron transfusions.   Lab Results   Component Value Date    HGB 8.6 08/19/2024       (K13.70) Oral lesion  Comment: Did update Georgia about what her pathology report stated.  Not sure if she has any other follow-ups but at this point it was negative for any cancer.    Orders:  No new orders today.      Electronically signed by  CASTRO Hazel CNP

## 2024-09-23 ENCOUNTER — ASSISTED LIVING VISIT (OUTPATIENT)
Dept: GERIATRICS | Facility: CLINIC | Age: 89
End: 2024-09-23
Payer: COMMERCIAL

## 2024-09-23 VITALS
TEMPERATURE: 98.7 F | DIASTOLIC BLOOD PRESSURE: 66 MMHG | SYSTOLIC BLOOD PRESSURE: 124 MMHG | HEART RATE: 85 BPM | WEIGHT: 94 LBS | RESPIRATION RATE: 20 BRPM | BODY MASS INDEX: 17.19 KG/M2

## 2024-09-23 DIAGNOSIS — D68.9 COAGULATION DEFECT, UNSPECIFIED (H): ICD-10-CM

## 2024-09-23 DIAGNOSIS — R52 GENERALIZED PAIN: ICD-10-CM

## 2024-09-23 DIAGNOSIS — M35.00 SJOGREN'S SYNDROME, WITH UNSPECIFIED ORGAN INVOLVEMENT (H): ICD-10-CM

## 2024-09-23 DIAGNOSIS — I50.31 ACUTE DIASTOLIC CONGESTIVE HEART FAILURE (H): Primary | ICD-10-CM

## 2024-09-23 DIAGNOSIS — R52 GENERALIZED PAIN: Primary | ICD-10-CM

## 2024-09-23 DIAGNOSIS — R60.0 BILATERAL LOWER EXTREMITY EDEMA: ICD-10-CM

## 2024-09-23 DIAGNOSIS — S80.822D BLISTER OF LEFT LOWER LEG, SUBSEQUENT ENCOUNTER: ICD-10-CM

## 2024-09-23 PROCEDURE — 99349 HOME/RES VST EST MOD MDM 40: CPT | Performed by: NURSE PRACTITIONER

## 2024-09-23 NOTE — LETTER
9/23/2024      Marla Dunn  C/o Deneen Dunn  [address] [facility] [address]        [facility] GERIATRICS  ACUTE/EPISODIC VISIT    [facility] Medical Record Number:  [id number]  Place of Service where encounter took place:  [facility] ([facility]) [[id number]]    Chief Complaint   Patient presents with     RECHECK       HPI:    Marla Dunn is a 89 year old  (5/22/1929), who is being seen today for an episodic care visit.  HPI information obtained from: facility chart records, facility staff, patient report, and [facility] chart review.    Today's concern is:    Diagnoses         Codes Comments    Acute diastolic congestive heart failure (H)    -  Primary I50.31     Bilateral lower extremity edema     R60.0     Blister of left lower leg, subsequent encounter     S80.822D     Coagulation defect, unspecified (H)     D68.9     Sjogren's syndrome, with unspecified organ involvement (H)     M35.00     Generalized pain     R52           Came to see Georgia today after she saw her nephrologist last week.  Wanted to know how the visit went and what he thought about her lower extremities.    Georgia is now visibly having edema.  This NP had put her Torsemide up to 30mg daily which she was on that dose at one time before putting her down to 20mg daily.  Now at this visit, Georgia was put on 40mg po Torsemide.  Still on spironolactone.      Georgia did not have any immediate concerns.  Spoke about her oral intake.  Does not like water so much but tries.  Ginger ale does her better.  With hardly any teeth, it is hard for her to chew.    Georgia mentioned pain and use of Tylenol.  She has Tylenol ES in her apartment.  Spoke about changing the order for her to control the Tylenol and self administer.  She would like that.  Just assured her that staff need to know if she has medication she takes on her own so an order can be in her chart.    ALLERGIES:    Allergies   Allergen Reactions      Gramineae Pollens Other (See Comments)     ORCHARDGRASS. Shortness of breath when lawn is just cut.     Smoke. Other (See Comments)     Hard to breathe.     Pollen Extract      Other reaction(s): Unknown        MEDICATIONS:  Post Discharge Medication Reconciliation Status: patient was not discharged from an inpatient facility or TCU. Medication reconciled.    Current Outpatient Medications   Medication Sig Dispense Refill     acetaminophen (TYLENOL) 500 MG tablet Take 2 tablets (1,000 mg) by mouth every 6 hours as needed for mild pain. Has own supply and can self administer       albuterol (PROAIR HFA/PROVENTIL HFA/VENTOLIN HFA) 108 (90 Base) MCG/ACT inhaler INHALE 2 PUFFS INTO THE LUNGS 4 TIMES DAILY  (DX: CHRONIC OBSTRUCTIVE PULMONARY DISEASE) 18 g 11     amoxicillin (AMOXIL) 500 MG capsule 4 CAPSULES (2000MG ) ORALLY AS NEEDED ONE HOUR PRIOR TO DENTAL APPOINTMENT 4 capsule 5     ANTACID REGULAR STRENGTH 500 MG chewable tablet 2 TABLETS (1000MG) ORALLY 2 TIMES DAILY AS NEEDED FOR HEARTBURN 60 tablet 11     apixaban ANTICOAGULANT (ELIQUIS) 2.5 MG tablet Take 1 tablet (2.5 mg) by mouth 2 times daily       ASPIRIN LOW DOSE 81 MG EC tablet 1 TABLET ORALLY 2 TIMES DAILY (DX: PROPHYLAXIS) 180 tablet 3     atorvastatin (LIPITOR) 40 MG tablet Take 1 tablet (40 mg) by mouth daily       Bacillus Coagulans-Inulin (PROBIOTIC FORMULA) 1-250 BILLION-MG CAPS 1 CAPSULE ORALLY DAILY 31 capsule 11     bisacodyl (DULCOLAX) 5 MG EC tablet Take 1 tablet (5 mg) by mouth 2 times daily as needed for constipation       chlorhexidine (PERIDEX) 0.12 % solution Swish and spit 15 mLs in mouth 2 times daily May self administer 118 mL 3     cholecalciferol (VITAMIN D3) 125 mcg (5000 units) capsule 1 CAPSULE ORALLY DAILY 90 capsule 3     COMBIVENT RESPIMAT  MCG/ACT inhaler INHALE 1 PUFF INTO THE LUNGS ORALLY INTO THE LUNGS 4 TIMES DAILY 4 g 11     cycloSPORINE (RESTASIS) 0.05 % ophthalmic emulsion Place 1 drop into both eyes 2 times daily         demeclocycline (DECLOMYCIN) 150 MG tablet 1 TABLET ORALLY 2 TIMES DAILY 60 tablet 11     fish oil-omega-3 fatty acids 1000 MG capsule Take 2 capsules (2 g) by mouth daily       fluorometholone (FML LIQUIFILM) 0.1 % ophthalmic susp Place 1 drop Into the left eye daily        hydrocortisone 1 % CREA cream Place rectally 2 times daily as needed for itching       IBANDRONATE SODIUM PO Take 150 mg by mouth every 30 days       levothyroxine (SYNTHROID/LEVOTHROID) 75 MCG tablet 1 TABLET ORALLY EVERY MORNING ON AN EMPTY STOMACH (DX: HYPOTHYROIDISM) 30 tablet 11     loratadine (CLARITIN) 10 MG tablet 1 TABLET ORALLY DAILY (DX: ASTHMA) 28 tablet 11     melatonin 3 MG tablet 1 TABLET ORALLY AT BEDTIME ALONG WITH 5 MG FOR A TOTAL DOSE OF 8MG 30 tablet 11     melatonin 5 MG tablet 1 TABLET ORALLY AT BEDTIME  ALONG WITH 3MG FOR A TOTAL DOSE OF 8MG 30 tablet 11     metoprolol tartrate (LOPRESSOR) 25 MG tablet 1 TABLET ORALLY 2 TIMES DAILY (DX: HYPERTENSION) 56 tablet 11     mirtazapine (REMERON) 7.5 MG tablet 1 TABLET ORALLY AT BEDTIME 31 tablet 11     Multiple Vitamins-Minerals (PRESERVISION AREDS 2) CAPS 1 CAPSULE ORALLY 2 TIMES DAILY 62 capsule 11     omeprazole (PRILOSEC) 20 MG DR capsule 1 CAPSULE ORALLY 2 TIMES DAILY (DX: GASTROESOPHAGEAL REFLUX DISEASE) 62 capsule 11     OXYGEN-HELIUM IN every evening       polyethylene glycol 0.4%- propylene glycol 0.3% (SYSTANE ULTRA) 0.4-0.3 % SOLN ophthalmic solution Place 1 drop into both eyes 4 times daily       Polyethylene Glycol 3350 (PEG 3350) 17 GM/SCOOP POWD MIX 1 CAPFUL (17 GMS) IN 8 OUNCES WATER AND DRINK ORALLY DAILY (DX: CONSTIPATION) 510 g 11     Probiotic Product (PROBIOTIC PO) Take 1 capsule by mouth every morning       SENEXON-S 8.6-50 MG tablet 1 TABLET ORALLY 2 TIMES DAILY (DX: CONSTIPATION) 56 tablet 11     sodium chloride 1 GM tablet Take 1 tablet (1 g) by mouth daily 30 tablet 11     spironolactone (ALDACTONE) 25 MG tablet 1/2 TABLET (12.5MG) ORALLY DAILY **  HAZARDOUS MED: WEAR DOUBLE NITRILE GLOVES** 15 tablet 11     timolol (TIMOPTIC) 0.5 % ophthalmic solution Place 1 drop Into the left eye daily        torsemide (DEMADEX) 20 MG tablet 2 TABLETS (40MG) ORALLY DAILY 60 tablet 11     vitamin C (ASCORBIC ACID) 500 MG tablet 1 TABLET ORALLY DAILY (DX: SUPPLEMENT) 31 tablet 11       REVIEW OF SYSTEMS:  4 point ROS neg other than the symptoms noted above in the HPI.  No heart palpitations.  No pain with urination.  Typically has constipation and uses Bisacodyl tabs that she self regulates.        PHYSICAL EXAM:  /66   Pulse 85   Temp 98.7  F (37.1  C)   Resp 20   Wt 42.6 kg (94 lb)   BMI 17.19 kg/m    Petite female sitting in her wheelchair.  Was in her bathroom and came out when done.  Propels her wheelchair with her feet most of the time.  Skin is pink, frail, warm and dry.  Blister on lateral side of left lower leg.  Heart rate regular/irregular and strong.    Lower legs have +2 +3 pitting edema.  Wears light cotton socks.    Lungs are clear.  Can hear crackles in the back of her throat when she is talking.    Abdomen is flat, soft, and bowel sounds present.    Component      Latest Ref Rng 8/19/2024  3:18 PM   Sodium      135 - 145 mmol/L 135    Potassium      3.4 - 5.3 mmol/L 4.3    Chloride      98 - 107 mmol/L 101    Carbon Dioxide (CO2)      22 - 29 mmol/L 21 (L)    Anion Gap      7 - 15 mmol/L 13    Urea Nitrogen      8.0 - 23.0 mg/dL 33.6 (H)    Creatinine      0.51 - 0.95 mg/dL 1.10 (H)    GFR Estimate      >60 mL/min/1.73m2 46 (L)    Calcium      8.8 - 10.4 mg/dL 8.5 (L)    Glucose      70 - 99 mg/dL 95         ASSESSMENT / PLAN:  (I50.31) Acute diastolic congestive heart failure (H)  (primary encounter diagnosis)  (R60.0) Bilateral lower extremity edema  (S80.822D) Blister of left lower leg, subsequent encounter  Comment: now is on Torsemide 40mg po daily and spironolactone 12.5mg po daily.  Will be having her monthly BMP on 10/7/24.  Will continue  to monitor response.       (D68.9) Coagulation defect, unspecified (H)  Comment: is on apixaban for her old CVA and A fib.  No signs of acute bleeding.  Not on warfarin due to higher risk of falling when she was walking more.  More dependent on W/C now.    (M35.00) Sjogren's syndrome, with unspecified organ involvement (H)  Comment: does have eye drops for lubricating dry eyes.  Typically does not complain about her eyes.      (R52) Generalized pain  Comment: has her own supply.  She would prefer if she could have self administer.  Will give order for tylenol ES 1000mg every 6 hours PRN.  Plan: acetaminophen (TYLENOL) 500 MG tablet     Orders:  Discontinue tylenol orders  Discontinue oxycodone order  Start Tylenol ES 1000mg every 6 hours PRN  general pain.  Has own supply and can self administer    Electronically signed by  CASTRO Hazel CNP            Sincerely,        CASTRO Hazel CNP

## 2024-09-23 NOTE — PROGRESS NOTES
[facility] GERIATRICS  ACUTE/EPISODIC VISIT    [facility] Medical Record Number:  [id number]  Place of Service where encounter took place:  [facility] ([facility]) [[id number]]    Chief Complaint   Patient presents with    RECHECK       HPI:    Marla Dunn is a 89 year old  (5/22/1929), who is being seen today for an episodic care visit.  HPI information obtained from: {FGS HPI:[id number]}.    Today's concern is:      ALLERGIES:    Allergies   Allergen Reactions    Gramineae Pollens Other (See Comments)     ORCHARDGRASS. Shortness of breath when lawn is just cut.    Smoke. Other (See Comments)     Hard to breathe.    Pollen Extract      Other reaction(s): Unknown        MEDICATIONS:  Post Discharge Medication Reconciliation Status: {ACO Med Rec (Provider):102149}. ***    Current Outpatient Medications   Medication Sig Dispense Refill    acetaminophen (TYLENOL) 325 MG tablet Take 3 tablets (975 mg) by mouth every 8 hours as needed for mild pain      albuterol (PROAIR HFA/PROVENTIL HFA/VENTOLIN HFA) 108 (90 Base) MCG/ACT inhaler INHALE 2 PUFFS INTO THE LUNGS 4 TIMES DAILY  (DX: CHRONIC OBSTRUCTIVE PULMONARY DISEASE) 18 g 11    amoxicillin (AMOXIL) 500 MG capsule 4 CAPSULES (2000MG ) ORALLY AS NEEDED ONE HOUR PRIOR TO DENTAL APPOINTMENT 4 capsule 5    ANTACID REGULAR STRENGTH 500 MG chewable tablet 2 TABLETS (1000MG) ORALLY 2 TIMES DAILY AS NEEDED FOR HEARTBURN 60 tablet 11    apixaban ANTICOAGULANT (ELIQUIS) 2.5 MG tablet Take 1 tablet (2.5 mg) by mouth 2 times daily      ASPIRIN LOW DOSE 81 MG EC tablet 1 TABLET ORALLY 2 TIMES DAILY (DX: PROPHYLAXIS) 180 tablet 3    atorvastatin (LIPITOR) 40 MG tablet Take 1 tablet (40 mg) by mouth daily      Bacillus Coagulans-Inulin (PROBIOTIC FORMULA) 1-250 BILLION-MG CAPS 1 CAPSULE ORALLY DAILY 31 capsule 11    bisacodyl (DULCOLAX) 5 MG EC tablet Take 1 tablet (5 mg) by mouth 2 times daily as needed for constipation      chlorhexidine (PERIDEX) 0.12 % solution  Swish and spit 15 mLs in mouth 2 times daily May self administer 118 mL 3    cholecalciferol (VITAMIN D3) 125 mcg (5000 units) capsule 1 CAPSULE ORALLY DAILY 90 capsule 3    COMBIVENT RESPIMAT  MCG/ACT inhaler INHALE 1 PUFF INTO THE LUNGS ORALLY INTO THE LUNGS 4 TIMES DAILY 4 g 11    cycloSPORINE (RESTASIS) 0.05 % ophthalmic emulsion Place 1 drop into both eyes 2 times daily       demeclocycline (DECLOMYCIN) 150 MG tablet 1 TABLET ORALLY 2 TIMES DAILY 60 tablet 11    fish oil-omega-3 fatty acids 1000 MG capsule Take 2 capsules (2 g) by mouth daily      fluorometholone (FML LIQUIFILM) 0.1 % ophthalmic susp Place 1 drop Into the left eye daily       hydrocortisone 1 % CREA cream Place rectally 2 times daily as needed for itching      IBANDRONATE SODIUM PO Take 150 mg by mouth every 30 days      levothyroxine (SYNTHROID/LEVOTHROID) 75 MCG tablet 1 TABLET ORALLY EVERY MORNING ON AN EMPTY STOMACH (DX: HYPOTHYROIDISM) 30 tablet 11    loratadine (CLARITIN) 10 MG tablet 1 TABLET ORALLY DAILY (DX: ASTHMA) 28 tablet 11    melatonin 3 MG tablet 1 TABLET ORALLY AT BEDTIME ALONG WITH 5 MG FOR A TOTAL DOSE OF 8MG 30 tablet 11    melatonin 5 MG tablet 1 TABLET ORALLY AT BEDTIME  ALONG WITH 3MG FOR A TOTAL DOSE OF 8MG 30 tablet 11    metoprolol tartrate (LOPRESSOR) 25 MG tablet 1 TABLET ORALLY 2 TIMES DAILY (DX: HYPERTENSION) 56 tablet 11    mirtazapine (REMERON) 7.5 MG tablet 1 TABLET ORALLY AT BEDTIME 31 tablet 11    Multiple Vitamins-Minerals (PRESERVISION AREDS 2) CAPS 1 CAPSULE ORALLY 2 TIMES DAILY 62 capsule 11    omeprazole (PRILOSEC) 20 MG DR capsule 1 CAPSULE ORALLY 2 TIMES DAILY (DX: GASTROESOPHAGEAL REFLUX DISEASE) 62 capsule 11    OXYGEN-HELIUM IN every evening      polyethylene glycol 0.4%- propylene glycol 0.3% (SYSTANE ULTRA) 0.4-0.3 % SOLN ophthalmic solution Place 1 drop into both eyes 4 times daily      Polyethylene Glycol 3350 (PEG 3350) 17 GM/SCOOP POWD MIX 1 CAPFUL (17 GMS) IN 8 OUNCES WATER AND DRINK  ORALLY DAILY (DX: CONSTIPATION) 510 g 11    Probiotic Product (PROBIOTIC PO) Take 1 capsule by mouth every morning      SENEXON-S 8.6-50 MG tablet 1 TABLET ORALLY 2 TIMES DAILY (DX: CONSTIPATION) 56 tablet 11    sodium chloride 1 GM tablet Take 1 tablet (1 g) by mouth daily 30 tablet 11    spironolactone (ALDACTONE) 25 MG tablet 1/2 TABLET (12.5MG) ORALLY DAILY ** HAZARDOUS MED: WEAR DOUBLE NITRILE GLOVES** 15 tablet 11    timolol (TIMOPTIC) 0.5 % ophthalmic solution Place 1 drop Into the left eye daily       torsemide (DEMADEX) 10 MG tablet 3 TABLETS (30MG) ORALLY DAILY (DX: PERIPHERAL EDEMA) 90 tablet 11    torsemide (DEMADEX) 20 MG tablet 1 TABLET ORALLY DAILY 90 tablet 3    vitamin C (ASCORBIC ACID) 500 MG tablet 1 TABLET ORALLY DAILY (DX: SUPPLEMENT) 31 tablet 11     Medications reviewed:  Medications reconciled to facility chart and changes were made to reflect current medications as identified as above med list. Below are the changes that were made:   Medications stopped since last EPIC medication reconciliation:   There are no discontinued medications.    Medications started since last [facility] medication reconciliation:  No orders of the defined types were placed in this encounter.    ***    REVIEW OF SYSTEMS:  4 point ROS neg other than the symptoms noted above in the HPI.***  Unable to be obtained due to cognitive impairment or aphasia.   ROS    PHYSICAL EXAM:  /66   Pulse 85   Temp 98.7  F (37.1  C)   Resp 20   Wt 42.6 kg (94 lb)   BMI 17.19 kg/m    Physical Exam      ASSESSMENT / PLAN:  {FGS DX:334861}    Orders:  Discontinue tylenol orders  Discontinue oxycodone order  Start Tylenol ES 1000mg every 6 hours PRN  general pain.  Has own supply and can self administer    Electronically signed by  CASTRO Hazel CNP         daily       torsemide (DEMADEX) 20 MG tablet 2 TABLETS (40MG) ORALLY DAILY 60 tablet 11    vitamin C (ASCORBIC ACID) 500 MG tablet 1 TABLET ORALLY DAILY (DX: SUPPLEMENT) 31 tablet 11       REVIEW OF SYSTEMS:  4 point ROS neg other than the symptoms noted above in the HPI.  No heart palpitations.  No pain with urination.  Typically has constipation and uses Bisacodyl tabs that she self regulates.        PHYSICAL EXAM:  /66   Pulse 85   Temp 98.7  F (37.1  C)   Resp 20   Wt 42.6 kg (94 lb)   BMI 17.19 kg/m    Petite female sitting in her wheelchair.  Was in her bathroom and came out when done.  Propels her wheelchair with her feet most of the time.  Skin is pink, frail, warm and dry.  Blister on lateral side of left lower leg.  Heart rate regular/irregular and strong.    Lower legs have +2 +3 pitting edema.  Wears light cotton socks.    Lungs are clear.  Can hear crackles in the back of her throat when she is talking.    Abdomen is flat, soft, and bowel sounds present.    Component      Latest Ref Rng 8/19/2024  3:18 PM   Sodium      135 - 145 mmol/L 135    Potassium      3.4 - 5.3 mmol/L 4.3    Chloride      98 - 107 mmol/L 101    Carbon Dioxide (CO2)      22 - 29 mmol/L 21 (L)    Anion Gap      7 - 15 mmol/L 13    Urea Nitrogen      8.0 - 23.0 mg/dL 33.6 (H)    Creatinine      0.51 - 0.95 mg/dL 1.10 (H)    GFR Estimate      >60 mL/min/1.73m2 46 (L)    Calcium      8.8 - 10.4 mg/dL 8.5 (L)    Glucose      70 - 99 mg/dL 95         ASSESSMENT / PLAN:  (I50.31) Acute diastolic congestive heart failure (H)  (primary encounter diagnosis)  (R60.0) Bilateral lower extremity edema  (S80.822D) Blister of left lower leg, subsequent encounter  Comment: now is on Torsemide 40mg po daily and spironolactone 12.5mg po daily.  Will be having her monthly BMP on 10/7/24.  Will continue to monitor response.       (D68.9) Coagulation defect, unspecified (H)  Comment: is on apixaban for her old CVA and A fib.  No signs of  acute bleeding.  Not on warfarin due to higher risk of falling when she was walking more.  More dependent on W/C now.    (M35.00) Sjogren's syndrome, with unspecified organ involvement (H)  Comment: does have eye drops for lubricating dry eyes.  Typically does not complain about her eyes.      (R52) Generalized pain  Comment: has her own supply.  She would prefer if she could have self administer.  Will give order for tylenol ES 1000mg every 6 hours PRN.  Plan: acetaminophen (TYLENOL) 500 MG tablet     Orders:  Discontinue tylenol orders  Discontinue oxycodone order  Start Tylenol ES 1000mg every 6 hours PRN  general pain.  Has own supply and can self administer    Electronically signed by  CASTRO Hazel CNP

## 2024-09-23 NOTE — LETTER
9/23/2024      Marla Dunn  C/o Deneen Dunn  [address] [place] [id number]        No notes on file      Sincerely,        CASTRO Hazel CNP

## 2024-09-24 RX ORDER — ACETAMINOPHEN 500 MG
1000 TABLET ORAL EVERY 6 HOURS PRN
Status: SHIPPED
Start: 2024-09-24

## 2024-09-25 DIAGNOSIS — I50.32 CHRONIC DIASTOLIC CONGESTIVE HEART FAILURE (H): ICD-10-CM

## 2024-09-25 RX ORDER — TORSEMIDE 20 MG/1
TABLET ORAL
Qty: 60 TABLET | Refills: 11 | Status: SHIPPED | OUTPATIENT
Start: 2024-09-25

## 2024-10-02 ENCOUNTER — LAB REQUISITION (OUTPATIENT)
Dept: LAB | Facility: CLINIC | Age: 89
End: 2024-10-02
Payer: COMMERCIAL

## 2024-10-02 DIAGNOSIS — I50.9 HEART FAILURE, UNSPECIFIED (H): ICD-10-CM

## 2024-10-07 LAB
ANION GAP SERPL CALCULATED.3IONS-SCNC: 14 MMOL/L (ref 7–15)
BUN SERPL-MCNC: 28.8 MG/DL (ref 8–23)
CALCIUM SERPL-MCNC: 8.9 MG/DL (ref 8.8–10.4)
CHLORIDE SERPL-SCNC: 96 MMOL/L (ref 98–107)
CREAT SERPL-MCNC: 1.19 MG/DL (ref 0.51–0.95)
EGFRCR SERPLBLD CKD-EPI 2021: 42 ML/MIN/1.73M2
GLUCOSE SERPL-MCNC: 79 MG/DL (ref 70–99)
HCO3 SERPL-SCNC: 23 MMOL/L (ref 22–29)
POTASSIUM SERPL-SCNC: 3.6 MMOL/L (ref 3.4–5.3)
SODIUM SERPL-SCNC: 133 MMOL/L (ref 135–145)

## 2024-10-07 PROCEDURE — 36415 COLL VENOUS BLD VENIPUNCTURE: CPT | Mod: ORL | Performed by: INTERNAL MEDICINE

## 2024-10-07 PROCEDURE — P9604 ONE-WAY ALLOW PRORATED TRIP: HCPCS | Mod: ORL | Performed by: INTERNAL MEDICINE

## 2024-10-07 PROCEDURE — 80048 BASIC METABOLIC PNL TOTAL CA: CPT | Mod: ORL | Performed by: INTERNAL MEDICINE

## 2024-10-08 VITALS
BODY MASS INDEX: 17.19 KG/M2 | TEMPERATURE: 98.7 F | SYSTOLIC BLOOD PRESSURE: 124 MMHG | HEART RATE: 85 BPM | WEIGHT: 94 LBS | DIASTOLIC BLOOD PRESSURE: 66 MMHG | RESPIRATION RATE: 20 BRPM

## 2024-10-08 NOTE — PROGRESS NOTES
[facility] GERIATRICS  ACUTE/EPISODIC VISIT    [facility] Medical Record Number:  [id number]  Place of Service where encounter took place:  [facility] ([facility]) [[id number]]    Chief Complaint   Patient presents with    RECHECK       HPI:    Marla Dunn is a 89 year old  (5/22/1929), who is being seen today for an episodic care visit.  HPI information obtained from: facility chart records, facility staff, patient report, and [facility] chart review.    Today's concern is:    Diagnoses         Codes Comments    Pharyngeal dysphagia    -  Primary R13.13     Bilateral lower extremity edema     R60.0     Chronic diastolic congestive heart failure (H)     I50.32     Chronic obstructive pulmonary disease, unspecified COPD type (H)     J44.9     Failure to thrive in adult     R62.7           Yesterday nursing updated this NP that Georgia was stating she was having trouble swallowing.  Nursing suggested to her to have speech therapy but she declined.  Did tell nursing, Speech Therapy can not stand alone and would need to get PT or RN to see her as well.  Georgia, declined speech therapy.      Georgia also wanted the Miralax scheduled back to daily.  Orders written up yesterday.    Came to see Georgia today.  Barely heard her when this NP called her name called several times.  Voice was more quiet.  Georgia was also in bed.  Already dressed but got herself up out of the bed to her wheelchair and came out to the living room for a visit.    Feel there has been decline in her overall health since last seeing her but definitely she had the biopsy in the back of her mouth.  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    After talking for a little bit, could hear that she had trouble with articulating words, like she has dry mouth.  She recognized it.  Given her last BMP and recent Torsemide increase, Georgia could try to  "drink more but she feels hard to swallow water but Ginger Ale or 7up is better.  Encouraged her to drink a little more if able.    Overall asked if she feels she is fading away.  \"Yes I hope I go in my sleep\".  Every visit she states that.  She hopes she does not wake up.  Asked her about hospice?  She is not interested.      Talked about elbow protectors and so looked on line at [employer] and [employer] with both selling them.  She will talk with her niece.      ALLERGIES:    Allergies   Allergen Reactions    Gramineae Pollens Other (See Comments)     ORCHARDGRASS. Shortness of breath when lawn is just cut.    Smoke. Other (See Comments)     Hard to breathe.    Pollen Extract      Other reaction(s): Unknown        MEDICATIONS:  Post Discharge Medication Reconciliation Status: patient was not discharged from an inpatient facility or TCU.     Current Outpatient Medications   Medication Sig Dispense Refill    acetaminophen (TYLENOL) 500 MG tablet Take 2 tablets (1,000 mg) by mouth every 6 hours as needed for mild pain. Has own supply and can self administer      albuterol (PROAIR HFA/PROVENTIL HFA/VENTOLIN HFA) 108 (90 Base) MCG/ACT inhaler INHALE 2 PUFFS INTO THE LUNGS 4 TIMES DAILY  (DX: CHRONIC OBSTRUCTIVE PULMONARY DISEASE) 18 g 11    amoxicillin (AMOXIL) 500 MG capsule 4 CAPSULES (2000MG ) ORALLY AS NEEDED ONE HOUR PRIOR TO DENTAL APPOINTMENT 4 capsule 5    ANTACID REGULAR STRENGTH 500 MG chewable tablet 2 TABLETS (1000MG) ORALLY 2 TIMES DAILY AS NEEDED FOR HEARTBURN 60 tablet 11    apixaban ANTICOAGULANT (ELIQUIS) 2.5 MG tablet Take 1 tablet (2.5 mg) by mouth 2 times daily      ASPIRIN LOW DOSE 81 MG EC tablet 1 TABLET ORALLY 2 TIMES DAILY (DX: PROPHYLAXIS) 180 tablet 3    atorvastatin (LIPITOR) 40 MG tablet Take 1 tablet (40 mg) by mouth daily      Bacillus Coagulans-Inulin (PROBIOTIC FORMULA) 1-250 BILLION-MG CAPS 1 CAPSULE ORALLY DAILY 31 capsule 11    bisacodyl (DULCOLAX) 5 MG EC tablet Take 1 tablet (5 mg) by " mouth 2 times daily as needed for constipation      chlorhexidine (PERIDEX) 0.12 % solution Swish and spit 15 mLs in mouth 2 times daily May self administer 118 mL 3    cholecalciferol (VITAMIN D3) 125 mcg (5000 units) capsule 1 CAPSULE ORALLY DAILY 90 capsule 3    COMBIVENT RESPIMAT  MCG/ACT inhaler INHALE 1 PUFF INTO THE LUNGS ORALLY INTO THE LUNGS 4 TIMES DAILY 4 g 11    cycloSPORINE (RESTASIS) 0.05 % ophthalmic emulsion Place 1 drop into both eyes 2 times daily       demeclocycline (DECLOMYCIN) 150 MG tablet 1 TABLET ORALLY 2 TIMES DAILY 60 tablet 11    fish oil-omega-3 fatty acids 1000 MG capsule Take 2 capsules (2 g) by mouth daily      fluorometholone (FML LIQUIFILM) 0.1 % ophthalmic susp Place 1 drop Into the left eye daily       hydrocortisone 1 % CREA cream Place rectally 2 times daily as needed for itching      IBANDRONATE SODIUM PO Take 150 mg by mouth every 30 days      levothyroxine (SYNTHROID/LEVOTHROID) 75 MCG tablet 1 TABLET ORALLY EVERY MORNING ON AN EMPTY STOMACH (DX: HYPOTHYROIDISM) 30 tablet 11    loratadine (CLARITIN) 10 MG tablet 1 TABLET ORALLY DAILY (DX: ASTHMA) 28 tablet 11    melatonin 3 MG tablet 1 TABLET ORALLY AT BEDTIME ALONG WITH 5 MG FOR A TOTAL DOSE OF 8MG 30 tablet 11    melatonin 5 MG tablet 1 TABLET ORALLY AT BEDTIME  ALONG WITH 3MG FOR A TOTAL DOSE OF 8MG 30 tablet 11    metoprolol tartrate (LOPRESSOR) 25 MG tablet 1 TABLET ORALLY 2 TIMES DAILY (DX: HYPERTENSION) 56 tablet 11    mirtazapine (REMERON) 7.5 MG tablet 1 TABLET ORALLY AT BEDTIME 31 tablet 11    Multiple Vitamins-Minerals (PRESERVISION AREDS 2) CAPS 1 CAPSULE ORALLY 2 TIMES DAILY 62 capsule 11    omeprazole (PRILOSEC) 20 MG DR capsule 1 CAPSULE ORALLY 2 TIMES DAILY (DX: GASTROESOPHAGEAL REFLUX DISEASE) 62 capsule 11    OXYGEN-HELIUM IN every evening      polyethylene glycol 0.4%- propylene glycol 0.3% (SYSTANE ULTRA) 0.4-0.3 % SOLN ophthalmic solution Place 1 drop into both eyes 4 times daily      Polyethylene  Glycol 3350 (PEG 3350) 17 GM/SCOOP POWD MIX 1 CAPFUL (17 GMS) IN 8 OUNCES WATER AND DRINK ORALLY DAILY (DX: CONSTIPATION) 510 g 11    Probiotic Product (PROBIOTIC PO) Take 1 capsule by mouth every morning      SENEXON-S 8.6-50 MG tablet 1 TABLET ORALLY 2 TIMES DAILY (DX: CONSTIPATION) 56 tablet 11    sodium chloride 1 GM tablet Take 1 tablet (1 g) by mouth daily 30 tablet 11    spironolactone (ALDACTONE) 25 MG tablet 1/2 TABLET (12.5MG) ORALLY DAILY ** HAZARDOUS MED: WEAR DOUBLE NITRILE GLOVES** 15 tablet 11    timolol (TIMOPTIC) 0.5 % ophthalmic solution Place 1 drop Into the left eye daily       torsemide (DEMADEX) 20 MG tablet 2 TABLETS (40MG) ORALLY DAILY 60 tablet 11    vitamin C (ASCORBIC ACID) 500 MG tablet 1 TABLET ORALLY DAILY (DX: SUPPLEMENT) 31 tablet 11       REVIEW OF SYSTEMS:  4 point ROS neg other than the symptoms noted above in the HPI.  Denied chest pain, no dysuria.  Trouble with constipation and swallowing.      PHYSICAL EXAM:  /66   Pulse 85   Temp 98.7  F (37.1  C)   Resp 20   Wt 42.6 kg (94 lb)   BMI 17.19 kg/m    Petite, well groomed female who states she is losing weight.  Has tried to thrive to be above 100 lbs.    Heart rate regular and strong today.  +1 edema and can see the lower leg skin is shriveled where she has more edema in the past.  Had a blister on the lateral side of left lower leg and that has resolved without eruption.    Lungs are clear.  Abdomen is soft and non-tender.      Component      Latest Ref Rng 9/9/2024  11:00 AM 10/7/2024  11:12 AM   Sodium      135 - 145 mmol/L 134 (L)  133 (L)    Potassium      3.4 - 5.3 mmol/L 3.9  3.6    Chloride      98 - 107 mmol/L 97 (L)  96 (L)    Carbon Dioxide (CO2)      22 - 29 mmol/L 23  23    Anion Gap      7 - 15 mmol/L 14  14    Urea Nitrogen      8.0 - 23.0 mg/dL 23.1 (H)  28.8 (H)    Creatinine      0.51 - 0.95 mg/dL 1.07 (H)  1.19 (H)    GFR Estimate      >60 mL/min/1.73m2 48 (L)  42 (L)    Calcium      8.8 - 10.4 mg/dL  8.7 (L)  8.9    Glucose      70 - 99 mg/dL 90  79             ASSESSMENT / PLAN:  (R13.13) Pharyngeal dysphagia  (primary encounter diagnosis)  Comment: does not want speech therapy.  Asked her to confirm if she would want a G tube and she declined.  She would like to take one day at a time.  Suspect if she can not eat, she would not have her diet changed to pureed.    Feel it is time to see her weekly as she is changing.  Did update nursing of visit and so they know she would prefer to not wake up one day.      (R60.0) Bilateral lower extremity edema  (I50.32) Chronic diastolic congestive heart failure (H)  Comment: is now on torsemide 40mg daily and spironolactone 12.5mg daily.  No changes.  Monthly BMP per nephrology.  Edema is better.  Will watch as anticipate trimming her torsemide down.    (J44.9) Chronic obstructive pulmonary disease, unspecified COPD type (H)  Comment: has her inhalers.  Did not hear any breathing issues today.  No changes.    (R62.7) Failure to thrive in adult  Comment: do see her in general failing.  She reveils that it is getting harder for her to put her pants on.  Swallowing an issue.  Generalized weakness.    Not interested in Hospice at this time but will come to see her again next week.  Sense she may fade fast.       No new orders.  Signed orders today for the Calmoseptine to the open area on his bottom.  Miralax 17gm po daily for constipation      Electronically signed by  CASTRO Hazel CNP

## 2024-10-09 ENCOUNTER — ASSISTED LIVING VISIT (OUTPATIENT)
Dept: GERIATRICS | Facility: CLINIC | Age: 89
End: 2024-10-09
Payer: COMMERCIAL

## 2024-10-09 DIAGNOSIS — J44.9 CHRONIC OBSTRUCTIVE PULMONARY DISEASE, UNSPECIFIED COPD TYPE (H): ICD-10-CM

## 2024-10-09 DIAGNOSIS — R62.7 FAILURE TO THRIVE IN ADULT: ICD-10-CM

## 2024-10-09 DIAGNOSIS — R13.13 PHARYNGEAL DYSPHAGIA: Primary | ICD-10-CM

## 2024-10-09 DIAGNOSIS — I50.32 CHRONIC DIASTOLIC CONGESTIVE HEART FAILURE (H): ICD-10-CM

## 2024-10-09 DIAGNOSIS — R60.0 BILATERAL LOWER EXTREMITY EDEMA: ICD-10-CM

## 2024-10-09 PROCEDURE — 99349 HOME/RES VST EST MOD MDM 40: CPT | Performed by: NURSE PRACTITIONER

## 2024-10-09 NOTE — LETTER
10/9/2024      Marla Dunn  C/o Deneen Dunn  [address] [address]        [facility] GERIATRICS  ACUTE/EPISODIC VISIT    [facility] Medical Record Number:  [id number]  Place of Service where encounter took place:  [facility] [place] [facility]) [[id number]]    Chief Complaint   Patient presents with     RECHECK       HPI:    Marla Dunn is a 89 year old  (5/22/1929), who is being seen today for an episodic care visit.  HPI information obtained from: facility chart records, facility staff, patient report, and [facility] chart review.    Today's concern is:    Diagnoses         Codes Comments    Pharyngeal dysphagia    -  Primary R13.13     Bilateral lower extremity edema     R60.0     Chronic diastolic congestive heart failure (H)     I50.32     Chronic obstructive pulmonary disease, unspecified COPD type (H)     J44.9     Failure to thrive in adult     R62.7           Yesterday nursing updated this NP that Georgia was stating she was having trouble swallowing.  Nursing suggested to her to have speech therapy but she declined.  Did tell nursing, Speech Therapy can not stand alone and would need to get PT or RN to see her as well.  Georgia, declined speech therapy.      Georgia also wanted the Miralax scheduled back to daily.  Orders written up yesterday.    Came to see Georgia today.  Barely heard her when this NP called her name called several times.  Voice was more quiet.  Georgia was also in bed.  Already dressed but got herself up out of the bed to her wheelchair and came out to the living room for a visit.    Feel there has been decline in her overall health since last seeing her but definitely she had the biopsy in the back of her mouth.  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    After talking for a little bit, could hear that she had trouble with articulating words, like she has  "dry mouth.  She recognized it.  Given her last BMP and recent Torsemide increase, Georgia could try to drink more but she feels hard to swallow water but Ginger Ale or 7up is better.  Encouraged her to drink a little more if able.    Overall asked if she feels she is fading away.  \"Yes I hope I go in my sleep\".  Every visit she states that.  She hopes she does not wake up.  Asked her about hospice?  She is not interested.      Talked about elbow protectors and so looked on line at [employer] and [employer] with both selling them.  She will talk with her niece.      ALLERGIES:    Allergies   Allergen Reactions     Gramineae Pollens Other (See Comments)     ORCHARDGRASS. Shortness of breath when lawn is just cut.     Smoke. Other (See Comments)     Hard to breathe.     Pollen Extract      Other reaction(s): Unknown        MEDICATIONS:  Post Discharge Medication Reconciliation Status: patient was not discharged from an inpatient facility or TCU.     Current Outpatient Medications   Medication Sig Dispense Refill     acetaminophen (TYLENOL) 500 MG tablet Take 2 tablets (1,000 mg) by mouth every 6 hours as needed for mild pain. Has own supply and can self administer       albuterol (PROAIR HFA/PROVENTIL HFA/VENTOLIN HFA) 108 (90 Base) MCG/ACT inhaler INHALE 2 PUFFS INTO THE LUNGS 4 TIMES DAILY  (DX: CHRONIC OBSTRUCTIVE PULMONARY DISEASE) 18 g 11     amoxicillin (AMOXIL) 500 MG capsule 4 CAPSULES (2000MG ) ORALLY AS NEEDED ONE HOUR PRIOR TO DENTAL APPOINTMENT 4 capsule 5     ANTACID REGULAR STRENGTH 500 MG chewable tablet 2 TABLETS (1000MG) ORALLY 2 TIMES DAILY AS NEEDED FOR HEARTBURN 60 tablet 11     apixaban ANTICOAGULANT (ELIQUIS) 2.5 MG tablet Take 1 tablet (2.5 mg) by mouth 2 times daily       ASPIRIN LOW DOSE 81 MG EC tablet 1 TABLET ORALLY 2 TIMES DAILY (DX: PROPHYLAXIS) 180 tablet 3     atorvastatin (LIPITOR) 40 MG tablet Take 1 tablet (40 mg) by mouth daily       Bacillus Coagulans-Inulin (PROBIOTIC FORMULA) 1-250 " BILLION-MG CAPS 1 CAPSULE ORALLY DAILY 31 capsule 11     bisacodyl (DULCOLAX) 5 MG EC tablet Take 1 tablet (5 mg) by mouth 2 times daily as needed for constipation       chlorhexidine (PERIDEX) 0.12 % solution Swish and spit 15 mLs in mouth 2 times daily May self administer 118 mL 3     cholecalciferol (VITAMIN D3) 125 mcg (5000 units) capsule 1 CAPSULE ORALLY DAILY 90 capsule 3     COMBIVENT RESPIMAT  MCG/ACT inhaler INHALE 1 PUFF INTO THE LUNGS ORALLY INTO THE LUNGS 4 TIMES DAILY 4 g 11     cycloSPORINE (RESTASIS) 0.05 % ophthalmic emulsion Place 1 drop into both eyes 2 times daily        demeclocycline (DECLOMYCIN) 150 MG tablet 1 TABLET ORALLY 2 TIMES DAILY 60 tablet 11     fish oil-omega-3 fatty acids 1000 MG capsule Take 2 capsules (2 g) by mouth daily       fluorometholone (FML LIQUIFILM) 0.1 % ophthalmic susp Place 1 drop Into the left eye daily        hydrocortisone 1 % CREA cream Place rectally 2 times daily as needed for itching       IBANDRONATE SODIUM PO Take 150 mg by mouth every 30 days       levothyroxine (SYNTHROID/LEVOTHROID) 75 MCG tablet 1 TABLET ORALLY EVERY MORNING ON AN EMPTY STOMACH (DX: HYPOTHYROIDISM) 30 tablet 11     loratadine (CLARITIN) 10 MG tablet 1 TABLET ORALLY DAILY (DX: ASTHMA) 28 tablet 11     melatonin 3 MG tablet 1 TABLET ORALLY AT BEDTIME ALONG WITH 5 MG FOR A TOTAL DOSE OF 8MG 30 tablet 11     melatonin 5 MG tablet 1 TABLET ORALLY AT BEDTIME  ALONG WITH 3MG FOR A TOTAL DOSE OF 8MG 30 tablet 11     metoprolol tartrate (LOPRESSOR) 25 MG tablet 1 TABLET ORALLY 2 TIMES DAILY (DX: HYPERTENSION) 56 tablet 11     mirtazapine (REMERON) 7.5 MG tablet 1 TABLET ORALLY AT BEDTIME 31 tablet 11     Multiple Vitamins-Minerals (PRESERVISION AREDS 2) CAPS 1 CAPSULE ORALLY 2 TIMES DAILY 62 capsule 11     omeprazole (PRILOSEC) 20 MG DR capsule 1 CAPSULE ORALLY 2 TIMES DAILY (DX: GASTROESOPHAGEAL REFLUX DISEASE) 62 capsule 11     OXYGEN-HELIUM IN every evening       polyethylene glycol  0.4%- propylene glycol 0.3% (SYSTANE ULTRA) 0.4-0.3 % SOLN ophthalmic solution Place 1 drop into both eyes 4 times daily       Polyethylene Glycol 3350 (PEG 3350) 17 GM/SCOOP POWD MIX 1 CAPFUL (17 GMS) IN 8 OUNCES WATER AND DRINK ORALLY DAILY (DX: CONSTIPATION) 510 g 11     Probiotic Product (PROBIOTIC PO) Take 1 capsule by mouth every morning       SENEXON-S 8.6-50 MG tablet 1 TABLET ORALLY 2 TIMES DAILY (DX: CONSTIPATION) 56 tablet 11     sodium chloride 1 GM tablet Take 1 tablet (1 g) by mouth daily 30 tablet 11     spironolactone (ALDACTONE) 25 MG tablet 1/2 TABLET (12.5MG) ORALLY DAILY ** HAZARDOUS MED: WEAR DOUBLE NITRILE GLOVES** 15 tablet 11     timolol (TIMOPTIC) 0.5 % ophthalmic solution Place 1 drop Into the left eye daily        torsemide (DEMADEX) 20 MG tablet 2 TABLETS (40MG) ORALLY DAILY 60 tablet 11     vitamin C (ASCORBIC ACID) 500 MG tablet 1 TABLET ORALLY DAILY (DX: SUPPLEMENT) 31 tablet 11       REVIEW OF SYSTEMS:  4 point ROS neg other than the symptoms noted above in the HPI.  Denied chest pain, no dysuria.  Trouble with constipation and swallowing.      PHYSICAL EXAM:  /66   Pulse 85   Temp 98.7  F (37.1  C)   Resp 20   Wt 42.6 kg (94 lb)   BMI 17.19 kg/m    Petite, well groomed female who states she is losing weight.  Has tried to thrive to be above 100 lbs.    Heart rate regular and strong today.  +1 edema and can see the lower leg skin is shriveled where she has more edema in the past.  Had a blister on the lateral side of left lower leg and that has resolved without eruption.    Lungs are clear.  Abdomen is soft and non-tender.      Component      Latest Ref Rng 9/9/2024  11:00 AM 10/7/2024  11:12 AM   Sodium      135 - 145 mmol/L 134 (L)  133 (L)    Potassium      3.4 - 5.3 mmol/L 3.9  3.6    Chloride      98 - 107 mmol/L 97 (L)  96 (L)    Carbon Dioxide (CO2)      22 - 29 mmol/L 23  23    Anion Gap      7 - 15 mmol/L 14  14    Urea Nitrogen      8.0 - 23.0 mg/dL 23.1 (H)  28.8  (H)    Creatinine      0.51 - 0.95 mg/dL 1.07 (H)  1.19 (H)    GFR Estimate      >60 mL/min/1.73m2 48 (L)  42 (L)    Calcium      8.8 - 10.4 mg/dL 8.7 (L)  8.9    Glucose      70 - 99 mg/dL 90  79             ASSESSMENT / PLAN:  (R13.13) Pharyngeal dysphagia  (primary encounter diagnosis)  Comment: does not want speech therapy.  Asked her to confirm if she would want a G tube and she declined.  She would like to take one day at a time.  Suspect if she can not eat, she would not have her diet changed to pureed.    Feel it is time to see her weekly as she is changing.  Did update nursing of visit and so they know she would prefer to not wake up one day.      (R60.0) Bilateral lower extremity edema  (I50.32) Chronic diastolic congestive heart failure (H)  Comment: is now on torsemide 40mg daily and spironolactone 12.5mg daily.  No changes.  Monthly BMP per nephrology.  Edema is better.  Will watch as anticipate trimming her torsemide down.    (J44.9) Chronic obstructive pulmonary disease, unspecified COPD type (H)  Comment: has her inhalers.  Did not hear any breathing issues today.  No changes.    (R62.7) Failure to thrive in adult  Comment: do see her in general failing.  She reveils that it is getting harder for her to put her pants on.  Swallowing an issue.  Generalized weakness.    Not interested in Hospice at this time but will come to see her again next week.  Sense she may fade fast.       No new orders.  Signed orders today for the Calmoseptine to the open area on his bottom.  Miralax 17gm po daily for constipation      Electronically signed by  CASTRO Hazel CNP            Sincerely,        CASTRO Hazel CNP

## 2024-10-14 ENCOUNTER — ASSISTED LIVING VISIT (OUTPATIENT)
Dept: GERIATRICS | Facility: CLINIC | Age: 89
End: 2024-10-14
Payer: COMMERCIAL

## 2024-10-14 VITALS
WEIGHT: 84 LBS | DIASTOLIC BLOOD PRESSURE: 59 MMHG | RESPIRATION RATE: 20 BRPM | SYSTOLIC BLOOD PRESSURE: 113 MMHG | BODY MASS INDEX: 15.36 KG/M2 | TEMPERATURE: 97.3 F | HEART RATE: 87 BPM

## 2024-10-14 DIAGNOSIS — R60.0 BILATERAL LOWER EXTREMITY EDEMA: ICD-10-CM

## 2024-10-14 DIAGNOSIS — J44.9 CHRONIC OBSTRUCTIVE PULMONARY DISEASE, UNSPECIFIED COPD TYPE (H): Primary | ICD-10-CM

## 2024-10-14 DIAGNOSIS — I50.31 ACUTE DIASTOLIC CONGESTIVE HEART FAILURE (H): ICD-10-CM

## 2024-10-14 DIAGNOSIS — E44.0 MODERATE PROTEIN-CALORIE MALNUTRITION (H): ICD-10-CM

## 2024-10-14 PROCEDURE — 99349 HOME/RES VST EST MOD MDM 40: CPT | Performed by: NURSE PRACTITIONER

## 2024-10-14 NOTE — PROGRESS NOTES
[facility] GERIATRICS  ACUTE/EPISODIC VISIT    [facility] Medical Record Number:  [id number]  Place of Service where encounter took place:  [facility]) [[id number]]    Chief Complaint   Patient presents with    RECHECK       HPI:    Marla Dunn is a 89 year old  (5/22/1929), who is being seen today for an episodic care visit.  HPI information obtained from: facility chart records, facility staff, patient report, and [facility] chart review.    Today's concern is:    Diagnoses         Codes Comments    Chronic obstructive pulmonary disease, unspecified COPD type (H)    -  Primary J44.9     Moderate protein-calorie malnutrition (H)     E44.0     Bilateral lower extremity edema     R60.0     Acute diastolic congestive heart failure (H)     I50.31           Came to see [place] more on a regular basis as she is declining in her health.  Have spoke to her about hospice care.  She is not open to it at this time.  Feel she hopes she passes before having to go on the program if she had too.      Today she came out of her bathroom propelling her wheelchair.  She looked more alert today and not so tired but she feels she naps more.  She continue to elevate her legs due to the edema and blister on left leg and another one faintly on the right by her mid-shin scare.    Breathing does not seem so labored.  Swallowing difficulty seems to come and go.  Today seems better or she is not mentioning it.    ALLERGIES:    Allergies   Allergen Reactions    Gramineae Pollens Other (See Comments)     ORCHARDGRASS. Shortness of breath when lawn is just cut.    Smoke. Other (See Comments)     Hard to breathe.    Pollen Extract      Other reaction(s): Unknown        MEDICATIONS:  Post Discharge Medication Reconciliation Status: patient was not discharged from an inpatient facility or TCU.     Current Outpatient Medications   Medication Sig Dispense Refill    acetaminophen (TYLENOL) 500 MG tablet Take 2  tablets (1,000 mg) by mouth every 6 hours as needed for mild pain. Has own supply and can self administer      albuterol (PROAIR HFA/PROVENTIL HFA/VENTOLIN HFA) 108 (90 Base) MCG/ACT inhaler INHALE 2 PUFFS INTO THE LUNGS 4 TIMES DAILY  (DX: CHRONIC OBSTRUCTIVE PULMONARY DISEASE) 18 g 11    amoxicillin (AMOXIL) 500 MG capsule 4 CAPSULES (2000MG ) ORALLY AS NEEDED ONE HOUR PRIOR TO DENTAL APPOINTMENT 4 capsule 5    ANTACID REGULAR STRENGTH 500 MG chewable tablet 2 TABLETS (1000MG) ORALLY 2 TIMES DAILY AS NEEDED FOR HEARTBURN 60 tablet 11    apixaban ANTICOAGULANT (ELIQUIS) 2.5 MG tablet Take 1 tablet (2.5 mg) by mouth 2 times daily      ASPIRIN LOW DOSE 81 MG EC tablet 1 TABLET ORALLY 2 TIMES DAILY (DX: PROPHYLAXIS) 180 tablet 3    atorvastatin (LIPITOR) 40 MG tablet Take 1 tablet (40 mg) by mouth daily      Bacillus Coagulans-Inulin (PROBIOTIC FORMULA) 1-250 BILLION-MG CAPS 1 CAPSULE ORALLY DAILY 31 capsule 11    bisacodyl (DULCOLAX) 5 MG EC tablet Take 1 tablet (5 mg) by mouth 2 times daily as needed for constipation      chlorhexidine (PERIDEX) 0.12 % solution Swish and spit 15 mLs in mouth 2 times daily May self administer 118 mL 3    cholecalciferol (VITAMIN D3) 125 mcg (5000 units) capsule 1 CAPSULE ORALLY DAILY 90 capsule 3    COMBIVENT RESPIMAT  MCG/ACT inhaler INHALE 1 PUFF INTO THE LUNGS ORALLY INTO THE LUNGS 4 TIMES DAILY 4 g 11    cycloSPORINE (RESTASIS) 0.05 % ophthalmic emulsion Place 1 drop into both eyes 2 times daily       demeclocycline (DECLOMYCIN) 150 MG tablet 1 TABLET ORALLY 2 TIMES DAILY 60 tablet 11    fish oil-omega-3 fatty acids 1000 MG capsule Take 2 capsules (2 g) by mouth daily      fluorometholone (FML LIQUIFILM) 0.1 % ophthalmic susp Place 1 drop Into the left eye daily       hydrocortisone 1 % CREA cream Place rectally 2 times daily as needed for itching      IBANDRONATE SODIUM PO Take 150 mg by mouth every 30 days      levothyroxine (SYNTHROID/LEVOTHROID) 75 MCG tablet 1 TABLET  ORALLY EVERY MORNING ON AN EMPTY STOMACH (DX: HYPOTHYROIDISM) 30 tablet 11    loratadine (CLARITIN) 10 MG tablet 1 TABLET ORALLY DAILY (DX: ASTHMA) 28 tablet 11    melatonin 3 MG tablet 1 TABLET ORALLY AT BEDTIME ALONG WITH 5 MG FOR A TOTAL DOSE OF 8MG 30 tablet 11    melatonin 5 MG tablet 1 TABLET ORALLY AT BEDTIME  ALONG WITH 3MG FOR A TOTAL DOSE OF 8MG 30 tablet 11    metoprolol tartrate (LOPRESSOR) 25 MG tablet 1 TABLET ORALLY 2 TIMES DAILY (DX: HYPERTENSION) 56 tablet 11    mirtazapine (REMERON) 7.5 MG tablet 1 TABLET ORALLY AT BEDTIME 31 tablet 11    Multiple Vitamins-Minerals (PRESERVISION AREDS 2) CAPS 1 CAPSULE ORALLY 2 TIMES DAILY 62 capsule 11    omeprazole (PRILOSEC) 20 MG DR capsule 1 CAPSULE ORALLY 2 TIMES DAILY (DX: GASTROESOPHAGEAL REFLUX DISEASE) 62 capsule 11    OXYGEN-HELIUM IN every evening      polyethylene glycol 0.4%- propylene glycol 0.3% (SYSTANE ULTRA) 0.4-0.3 % SOLN ophthalmic solution Place 1 drop into both eyes 4 times daily      Polyethylene Glycol 3350 (PEG 3350) 17 GM/SCOOP POWD MIX 1 CAPFUL (17 GMS) IN 8 OUNCES WATER AND DRINK ORALLY DAILY (DX: CONSTIPATION) 510 g 11    Probiotic Product (PROBIOTIC PO) Take 1 capsule by mouth every morning      SENEXON-S 8.6-50 MG tablet 1 TABLET ORALLY 2 TIMES DAILY (DX: CONSTIPATION) 56 tablet 11    sodium chloride 1 GM tablet Take 1 tablet (1 g) by mouth daily 30 tablet 11    spironolactone (ALDACTONE) 25 MG tablet 1/2 TABLET (12.5MG) ORALLY DAILY ** HAZARDOUS MED: WEAR DOUBLE NITRILE GLOVES** 15 tablet 11    timolol (TIMOPTIC) 0.5 % ophthalmic solution Place 1 drop Into the left eye daily       torsemide (DEMADEX) 20 MG tablet 2 TABLETS (40MG) ORALLY DAILY 60 tablet 11    vitamin C (ASCORBIC ACID) 500 MG tablet 1 TABLET ORALLY DAILY (DX: SUPPLEMENT) 31 tablet 11       REVIEW OF SYSTEMS:  4 point ROS neg other than the symptoms noted above in the HPI.  Denies chest pain.  Some SOB with exertion, no abdominal pain.      PHYSICAL EXAM:  /59    Pulse 87   Temp 97.3  F (36.3  C)   Resp 20   Wt 38.1 kg (84 lb)   BMI 15.36 kg/m    Petite elderly female.  Neatly groomed.  Missing most of her teeth.  Has had tongue cancer and other biopsy in the back of her throat that came back negative.  Words are not always the clearest but conversation makes sense.  Alert and oriented x3.      Heart rate regular/irregular and strong.  Lungs are clear.  No adventitious sounds.  Occasional crackle from back of throat.  Abdomen is flat, soft and non-tender.  Regulates her own bowels.  Lower legs typically are skinny with no edema.  Now over past month, she is retaining fluid in legs and were strapped thick sole sandals as tops of feet are a +2.    Blister remains intact on lateral side of left lower leg.  Skin is thin, shiny and +3 edema bilaterally.  Has a faint blister forming on medial side of right lower leg by her old scar.      Component      Latest Ref Rng 10/7/2024  11:12 AM   Sodium      135 - 145 mmol/L 133 (L)    Potassium      3.4 - 5.3 mmol/L 3.6    Chloride      98 - 107 mmol/L 96 (L)    Carbon Dioxide (CO2)      22 - 29 mmol/L 23    Anion Gap      7 - 15 mmol/L 14    Urea Nitrogen      8.0 - 23.0 mg/dL 28.8 (H)    Creatinine      0.51 - 0.95 mg/dL 1.19 (H)    GFR Estimate      >60 mL/min/1.73m2 42 (L)    Calcium      8.8 - 10.4 mg/dL 8.9    Glucose      70 - 99 mg/dL 79       Blood pressures 110-120's/60-70's.      ASSESSMENT / PLAN:  (J44.9) Chronic obstructive pulmonary disease, unspecified COPD type (H)  (primary encounter diagnosis)  Comment: remains on her inhalers which she has stopped before and breathing was declining.  No changes at this time.  Breathing not so heavy today or recovering time is shorter.  Has the Combivent inhaler routine and PRN Albuterol.    (E44.0) Moderate protein-calorie malnutrition (H)  Comment: her weight may be staying the same due to the fluid she is now retaining.  Encouraged her to drink more fluids.  Eat what she can as  her body will tell her what to do.    (R60.0) Bilateral lower extremity edema  Comment: elevate legs and protect the blisters by being cautious not to hit anything.  If open then she needs to let staff know.  Otherwise, not changes to her diuretics.  If anything, they help her breathing.    (I50.31) Acute diastolic congestive heart failure (H)  Comment: the lower leg edema is fairly new.  Lungs- and heart stable for now.  Monitoring lower legs.  Would not do compression to them due to how frail her skin is.    Orders:  No new orders    Electronically signed by  CASTRO Hazel CNP

## 2024-10-14 NOTE — LETTER
10/14/2024      Marla Dunn  C/o Deneen Dunn  [address] [address]        [facility] GERIATRICS  ACUTE/EPISODIC VISIT    [facility] Medical Record Number:  [id number]  Place of Service where encounter took place:  [facility]) [[id number]]    Chief Complaint   Patient presents with     RECHECK       HPI:    Marla Dunn is a 89 year old  (5/22/1929), who is being seen today for an episodic care visit.  HPI information obtained from: facility chart records, facility staff, patient report, and [facility] chart review.    Today's concern is:    Diagnoses         Codes Comments    Chronic obstructive pulmonary disease, unspecified COPD type (H)    -  Primary J44.9     Moderate protein-calorie malnutrition (H)     E44.0     Bilateral lower extremity edema     R60.0     Acute diastolic congestive heart failure (H)     I50.31           Came to see [place] more on a regular basis as she is declining in her health.  Have spoke to her about hospice care.  She is not open to it at this time.  Feel she hopes she passes before having to go on the program if she had too.      Today she came out of her bathroom propelling her wheelchair.  She looked more alert today and not so tired but she feels she naps more.  She continue to elevate her legs due to the edema and blister on left leg and another one faintly on the right by her mid-shin scare.    Breathing does not seem so labored.  Swallowing difficulty seems to come and go.  Today seems better or she is not mentioning it.    ALLERGIES:    Allergies   Allergen Reactions     Gramineae Pollens Other (See Comments)     ORCHARDGRASS. Shortness of breath when lawn is just cut.     Smoke. Other (See Comments)     Hard to breathe.     Pollen Extract      Other reaction(s): Unknown        MEDICATIONS:  Post Discharge Medication Reconciliation Status: patient was not discharged from an inpatient facility or TCU.     Current  Outpatient Medications   Medication Sig Dispense Refill     acetaminophen (TYLENOL) 500 MG tablet Take 2 tablets (1,000 mg) by mouth every 6 hours as needed for mild pain. Has own supply and can self administer       albuterol (PROAIR HFA/PROVENTIL HFA/VENTOLIN HFA) 108 (90 Base) MCG/ACT inhaler INHALE 2 PUFFS INTO THE LUNGS 4 TIMES DAILY  (DX: CHRONIC OBSTRUCTIVE PULMONARY DISEASE) 18 g 11     amoxicillin (AMOXIL) 500 MG capsule 4 CAPSULES (2000MG ) ORALLY AS NEEDED ONE HOUR PRIOR TO DENTAL APPOINTMENT 4 capsule 5     ANTACID REGULAR STRENGTH 500 MG chewable tablet 2 TABLETS (1000MG) ORALLY 2 TIMES DAILY AS NEEDED FOR HEARTBURN 60 tablet 11     apixaban ANTICOAGULANT (ELIQUIS) 2.5 MG tablet Take 1 tablet (2.5 mg) by mouth 2 times daily       ASPIRIN LOW DOSE 81 MG EC tablet 1 TABLET ORALLY 2 TIMES DAILY (DX: PROPHYLAXIS) 180 tablet 3     atorvastatin (LIPITOR) 40 MG tablet Take 1 tablet (40 mg) by mouth daily       Bacillus Coagulans-Inulin (PROBIOTIC FORMULA) 1-250 BILLION-MG CAPS 1 CAPSULE ORALLY DAILY 31 capsule 11     bisacodyl (DULCOLAX) 5 MG EC tablet Take 1 tablet (5 mg) by mouth 2 times daily as needed for constipation       chlorhexidine (PERIDEX) 0.12 % solution Swish and spit 15 mLs in mouth 2 times daily May self administer 118 mL 3     cholecalciferol (VITAMIN D3) 125 mcg (5000 units) capsule 1 CAPSULE ORALLY DAILY 90 capsule 3     COMBIVENT RESPIMAT  MCG/ACT inhaler INHALE 1 PUFF INTO THE LUNGS ORALLY INTO THE LUNGS 4 TIMES DAILY 4 g 11     cycloSPORINE (RESTASIS) 0.05 % ophthalmic emulsion Place 1 drop into both eyes 2 times daily        demeclocycline (DECLOMYCIN) 150 MG tablet 1 TABLET ORALLY 2 TIMES DAILY 60 tablet 11     fish oil-omega-3 fatty acids 1000 MG capsule Take 2 capsules (2 g) by mouth daily       fluorometholone (FML LIQUIFILM) 0.1 % ophthalmic susp Place 1 drop Into the left eye daily        hydrocortisone 1 % CREA cream Place rectally 2 times daily as needed for itching        IBANDRONATE SODIUM PO Take 150 mg by mouth every 30 days       levothyroxine (SYNTHROID/LEVOTHROID) 75 MCG tablet 1 TABLET ORALLY EVERY MORNING ON AN EMPTY STOMACH (DX: HYPOTHYROIDISM) 30 tablet 11     loratadine (CLARITIN) 10 MG tablet 1 TABLET ORALLY DAILY (DX: ASTHMA) 28 tablet 11     melatonin 3 MG tablet 1 TABLET ORALLY AT BEDTIME ALONG WITH 5 MG FOR A TOTAL DOSE OF 8MG 30 tablet 11     melatonin 5 MG tablet 1 TABLET ORALLY AT BEDTIME  ALONG WITH 3MG FOR A TOTAL DOSE OF 8MG 30 tablet 11     metoprolol tartrate (LOPRESSOR) 25 MG tablet 1 TABLET ORALLY 2 TIMES DAILY (DX: HYPERTENSION) 56 tablet 11     mirtazapine (REMERON) 7.5 MG tablet 1 TABLET ORALLY AT BEDTIME 31 tablet 11     Multiple Vitamins-Minerals (PRESERVISION AREDS 2) CAPS 1 CAPSULE ORALLY 2 TIMES DAILY 62 capsule 11     omeprazole (PRILOSEC) 20 MG DR capsule 1 CAPSULE ORALLY 2 TIMES DAILY (DX: GASTROESOPHAGEAL REFLUX DISEASE) 62 capsule 11     OXYGEN-HELIUM IN every evening       polyethylene glycol 0.4%- propylene glycol 0.3% (SYSTANE ULTRA) 0.4-0.3 % SOLN ophthalmic solution Place 1 drop into both eyes 4 times daily       Polyethylene Glycol 3350 (PEG 3350) 17 GM/SCOOP POWD MIX 1 CAPFUL (17 GMS) IN 8 OUNCES WATER AND DRINK ORALLY DAILY (DX: CONSTIPATION) 510 g 11     Probiotic Product (PROBIOTIC PO) Take 1 capsule by mouth every morning       SENEXON-S 8.6-50 MG tablet 1 TABLET ORALLY 2 TIMES DAILY (DX: CONSTIPATION) 56 tablet 11     sodium chloride 1 GM tablet Take 1 tablet (1 g) by mouth daily 30 tablet 11     spironolactone (ALDACTONE) 25 MG tablet 1/2 TABLET (12.5MG) ORALLY DAILY ** HAZARDOUS MED: WEAR DOUBLE NITRILE GLOVES** 15 tablet 11     timolol (TIMOPTIC) 0.5 % ophthalmic solution Place 1 drop Into the left eye daily        torsemide (DEMADEX) 20 MG tablet 2 TABLETS (40MG) ORALLY DAILY 60 tablet 11     vitamin C (ASCORBIC ACID) 500 MG tablet 1 TABLET ORALLY DAILY (DX: SUPPLEMENT) 31 tablet 11       REVIEW OF SYSTEMS:  4 point ROS neg other  than the symptoms noted above in the HPI.  Denies chest pain.  Some SOB with exertion, no abdominal pain.      PHYSICAL EXAM:  /59   Pulse 87   Temp 97.3  F (36.3  C)   Resp 20   Wt 38.1 kg (84 lb)   BMI 15.36 kg/m    Petite elderly female.  Neatly groomed.  Missing most of her teeth.  Has had tongue cancer and other biopsy in the back of her throat that came back negative.  Words are not always the clearest but conversation makes sense.  Alert and oriented x3.      Heart rate regular/irregular and strong.  Lungs are clear.  No adventitious sounds.  Occasional crackle from back of throat.  Abdomen is flat, soft and non-tender.  Regulates her own bowels.  Lower legs typically are skinny with no edema.  Now over past month, she is retaining fluid in legs and were strapped thick sole sandals as tops of feet are a +2.    Blister remains intact on lateral side of left lower leg.  Skin is thin, shiny and +3 edema bilaterally.  Has a faint blister forming on medial side of right lower leg by her old scar.      Component      Latest Ref Rng 10/7/2024  11:12 AM   Sodium      135 - 145 mmol/L 133 (L)    Potassium      3.4 - 5.3 mmol/L 3.6    Chloride      98 - 107 mmol/L 96 (L)    Carbon Dioxide (CO2)      22 - 29 mmol/L 23    Anion Gap      7 - 15 mmol/L 14    Urea Nitrogen      8.0 - 23.0 mg/dL 28.8 (H)    Creatinine      0.51 - 0.95 mg/dL 1.19 (H)    GFR Estimate      >60 mL/min/1.73m2 42 (L)    Calcium      8.8 - 10.4 mg/dL 8.9    Glucose      70 - 99 mg/dL 79       Blood pressures 110-120's/60-70's.      ASSESSMENT / PLAN:  (J44.9) Chronic obstructive pulmonary disease, unspecified COPD type (H)  (primary encounter diagnosis)  Comment: remains on her inhalers which she has stopped before and breathing was declining.  No changes at this time.  Breathing not so heavy today or recovering time is shorter.  Has the Combivent inhaler routine and PRN Albuterol.    (E44.0) Moderate protein-calorie malnutrition  (H)  Comment: her weight may be staying the same due to the fluid she is now retaining.  Encouraged her to drink more fluids.  Eat what she can as her body will tell her what to do.    (R60.0) Bilateral lower extremity edema  Comment: elevate legs and protect the blisters by being cautious not to hit anything.  If open then she needs to let staff know.  Otherwise, not changes to her diuretics.  If anything, they help her breathing.    (I50.31) Acute diastolic congestive heart failure (H)  Comment: the lower leg edema is fairly new.  Lungs- and heart stable for now.  Monitoring lower legs.  Would not do compression to them due to how frail her skin is.    Orders:  No new orders    Electronically signed by  CASTRO Hazel CNP            Sincerely,        CASTRO Hazel CNP

## 2024-10-22 ENCOUNTER — ASSISTED LIVING VISIT (OUTPATIENT)
Dept: GERIATRICS | Facility: CLINIC | Age: 89
End: 2024-10-22
Payer: COMMERCIAL

## 2024-10-22 VITALS
TEMPERATURE: 97.3 F | WEIGHT: 84 LBS | BODY MASS INDEX: 15.36 KG/M2 | HEART RATE: 87 BPM | RESPIRATION RATE: 20 BRPM | DIASTOLIC BLOOD PRESSURE: 59 MMHG | SYSTOLIC BLOOD PRESSURE: 113 MMHG

## 2024-10-22 DIAGNOSIS — R62.7 FAILURE TO THRIVE IN ADULT: ICD-10-CM

## 2024-10-22 DIAGNOSIS — J44.9 CHRONIC OBSTRUCTIVE PULMONARY DISEASE, UNSPECIFIED COPD TYPE (H): ICD-10-CM

## 2024-10-22 DIAGNOSIS — R60.0 BILATERAL LOWER EXTREMITY EDEMA: ICD-10-CM

## 2024-10-22 DIAGNOSIS — I50.32 CHRONIC DIASTOLIC CONGESTIVE HEART FAILURE (H): Primary | ICD-10-CM

## 2024-10-22 DIAGNOSIS — E44.0 MODERATE PROTEIN-CALORIE MALNUTRITION (H): ICD-10-CM

## 2024-10-22 DIAGNOSIS — M62.81 GENERALIZED MUSCLE WEAKNESS: ICD-10-CM

## 2024-10-22 PROCEDURE — 99349 HOME/RES VST EST MOD MDM 40: CPT | Performed by: NURSE PRACTITIONER

## 2024-10-22 NOTE — PROGRESS NOTES
"[facility] GERIATRICS  ACUTE/EPISODIC VISIT    [facility] Medical Record Number:  [id number]  Place of Service where encounter took place:  [facility] ([facility]) [[id number]]    Chief Complaint   Patient presents with    RECHECK       HPI:    Marla Dunn is a 89 year old  (5/22/1929), who is being seen today for an episodic care visit.  HPI information obtained from: facility chart records, facility staff, patient report, and [facility] chart review.    Today's concern is:    Diagnoses         Codes Comments    Chronic diastolic congestive heart failure (H)    -  Primary I50.32     Bilateral lower extremity edema     R60.0     Chronic obstructive pulmonary disease, unspecified COPD type (H)     J44.9     Moderate protein-calorie malnutrition (H)     E44.0     Generalized muscle weakness     M62.81     Failure to thrive in adult     R62.7           Came by to see Georgia as checking on her almost weekly now due to slow decline in her health.      Received a text from her niece this week asking more about Hospice as this NP has been bringing it up to Georgia and staff aware as well because they feel she is ready for it.    Found Georgia in her apartment and she rolled on out of the bathroom when done.  \"I expected you yesterday\".  Explained to her the change in schedule for the week.  Georgia admits to being weaker.  She asked a few questions of hospice.  Told her she and her niece could talk with them to see what is offered.  Stressed the medication cost of compounding medication as facility can not give liquid medication like a NH can.  Could pay for a hospital bed if needed, O2, and other DME to make a person more comfortable.  Georgia still wishes to fall asleep and not wake up.  Sense she still is not interested in hospice.  Tried to tell her they are not 24/7.  They come minimally once a week and as closer end of life, they come more often.  Do not think she wants people in and out " much.    Beyond the discussion of Hospice, Georgia is worried about her legs.  The lower legs are filling up with fluid and skin is taunt.  What is different is that her she is translucent.  Easily could open and drain.  Continues with a blister on the left leg.  She wears lightweight socks and looking at the ankle area, would not know there is swelling until lifting up her pant leg and seeing the fluid.          ALLERGIES:    Allergies   Allergen Reactions    Gramineae Pollens Other (See Comments)     ORCHARDGRASS. Shortness of breath when lawn is just cut.    Smoke. Other (See Comments)     Hard to breathe.    Pollen Extract      Other reaction(s): Unknown        MEDICATIONS:  Post Discharge Medication Reconciliation Status: patient was not discharged from an inpatient facility or TCU.     Current Outpatient Medications   Medication Sig Dispense Refill    acetaminophen (TYLENOL) 500 MG tablet Take 2 tablets (1,000 mg) by mouth every 6 hours as needed for mild pain. Has own supply and can self administer      albuterol (PROAIR HFA/PROVENTIL HFA/VENTOLIN HFA) 108 (90 Base) MCG/ACT inhaler INHALE 2 PUFFS INTO THE LUNGS 4 TIMES DAILY  (DX: CHRONIC OBSTRUCTIVE PULMONARY DISEASE) 18 g 11    amoxicillin (AMOXIL) 500 MG capsule 4 CAPSULES (2000MG ) ORALLY AS NEEDED ONE HOUR PRIOR TO DENTAL APPOINTMENT 4 capsule 5    ANTACID REGULAR STRENGTH 500 MG chewable tablet 2 TABLETS (1000MG) ORALLY 2 TIMES DAILY AS NEEDED FOR HEARTBURN 60 tablet 11    apixaban ANTICOAGULANT (ELIQUIS) 2.5 MG tablet Take 1 tablet (2.5 mg) by mouth 2 times daily      ASPIRIN LOW DOSE 81 MG EC tablet 1 TABLET ORALLY 2 TIMES DAILY (DX: PROPHYLAXIS) 180 tablet 3    atorvastatin (LIPITOR) 40 MG tablet Take 1 tablet (40 mg) by mouth daily      Bacillus Coagulans-Inulin (PROBIOTIC FORMULA) 1-250 BILLION-MG CAPS 1 CAPSULE ORALLY DAILY 31 capsule 11    bisacodyl (DULCOLAX) 5 MG EC tablet Take 1 tablet (5 mg) by mouth 2 times daily as needed for constipation       chlorhexidine (PERIDEX) 0.12 % solution Swish and spit 15 mLs in mouth 2 times daily May self administer 118 mL 3    cholecalciferol (VITAMIN D3) 125 mcg (5000 units) capsule 1 CAPSULE ORALLY DAILY 90 capsule 3    COMBIVENT RESPIMAT  MCG/ACT inhaler INHALE 1 PUFF INTO THE LUNGS ORALLY INTO THE LUNGS 4 TIMES DAILY 4 g 11    cycloSPORINE (RESTASIS) 0.05 % ophthalmic emulsion Place 1 drop into both eyes 2 times daily       demeclocycline (DECLOMYCIN) 150 MG tablet 1 TABLET ORALLY 2 TIMES DAILY 60 tablet 11    fish oil-omega-3 fatty acids 1000 MG capsule Take 2 capsules (2 g) by mouth daily      fluorometholone (FML LIQUIFILM) 0.1 % ophthalmic susp Place 1 drop Into the left eye daily       hydrocortisone 1 % CREA cream Place rectally 2 times daily as needed for itching      IBANDRONATE SODIUM PO Take 150 mg by mouth every 30 days      levothyroxine (SYNTHROID/LEVOTHROID) 75 MCG tablet 1 TABLET ORALLY EVERY MORNING ON AN EMPTY STOMACH (DX: HYPOTHYROIDISM) 30 tablet 11    loratadine (CLARITIN) 10 MG tablet 1 TABLET ORALLY DAILY (DX: ASTHMA) 28 tablet 11    melatonin 3 MG tablet 1 TABLET ORALLY AT BEDTIME ALONG WITH 5 MG FOR A TOTAL DOSE OF 8MG 30 tablet 11    melatonin 5 MG tablet 1 TABLET ORALLY AT BEDTIME  ALONG WITH 3MG FOR A TOTAL DOSE OF 8MG 30 tablet 11    metoprolol tartrate (LOPRESSOR) 25 MG tablet 1 TABLET ORALLY 2 TIMES DAILY (DX: HYPERTENSION) 56 tablet 11    mirtazapine (REMERON) 7.5 MG tablet 1 TABLET ORALLY AT BEDTIME 31 tablet 11    Multiple Vitamins-Minerals (PRESERVISION AREDS 2) CAPS 1 CAPSULE ORALLY 2 TIMES DAILY 62 capsule 11    omeprazole (PRILOSEC) 20 MG DR capsule 1 CAPSULE ORALLY 2 TIMES DAILY (DX: GASTROESOPHAGEAL REFLUX DISEASE) 62 capsule 11    OXYGEN-HELIUM IN every evening      polyethylene glycol 0.4%- propylene glycol 0.3% (SYSTANE ULTRA) 0.4-0.3 % SOLN ophthalmic solution Place 1 drop into both eyes 4 times daily      Polyethylene Glycol 3350 (PEG 3350) 17 GM/SCOOP POWD MIX 1  "CAPFUL (17 GMS) IN 8 OUNCES WATER AND DRINK ORALLY DAILY (DX: CONSTIPATION) 510 g 11    Probiotic Product (PROBIOTIC PO) Take 1 capsule by mouth every morning      SENEXON-S 8.6-50 MG tablet 1 TABLET ORALLY 2 TIMES DAILY (DX: CONSTIPATION) 56 tablet 11    sodium chloride 1 GM tablet Take 1 tablet (1 g) by mouth daily 30 tablet 11    spironolactone (ALDACTONE) 25 MG tablet 1/2 TABLET (12.5MG) ORALLY DAILY ** HAZARDOUS MED: WEAR DOUBLE NITRILE GLOVES** 15 tablet 11    timolol (TIMOPTIC) 0.5 % ophthalmic solution Place 1 drop Into the left eye daily       torsemide (DEMADEX) 20 MG tablet 2 TABLETS (40MG) ORALLY DAILY 60 tablet 11    vitamin C (ASCORBIC ACID) 500 MG tablet 1 TABLET ORALLY DAILY (DX: SUPPLEMENT) 31 tablet 11       REVIEW OF SYSTEMS:  4 point ROS neg other than the symptoms noted above in the HPI.  States she gets winded easily and back to being hard to swallow again.      PHYSICAL EXAM:  /59   Pulse 87   Temp 97.3  F (36.3  C)   Resp 20   Wt 38.1 kg (84 lb)   BMI 15.36 kg/m    Georgia is in her wheelchair as she pedals with her feet out of the bathroom.    She looks tired.  She is trying to elevate her feet as much as she can.    She admits she is \"grouchy\".  She apologizes but does not like that feeling.      In no acute distress.  No audible adventitious sounds heard.    Heart rate regular/irregular.  Lower legs have +4 pitting edema present.  Legs look a little \"yellow\" in color and feel that she has a light yellow hue to her skin.  Skin on legs is translucent.  Some bruising noted on right lower leg.  Wearing sandals as she can not get shoes on.  Edema on tops of feet too.    Intact blister on lateral side of left lower leg.    Did assist in pushing her in the wheelchair to the elevator.  From the elevator she was able to propel self to dining room.    She made a comment she has gained weight.  Told her it most likely is water weight.      ASSESSMENT / PLAN:  (I50.32) Chronic diastolic " congestive heart failure (H)  (primary encounter diagnosis)  (R60.0) Bilateral lower extremity edema  Comment: she tries to drink more water but hard to swallow.  Appreciate her efforts but told her if it is too difficult then not push self.  Given her fluid retention in legs, will not change her diuretics.  Torsimide and Spironolactone.  Feel it keeps her lungs/heart in balance right now.    (J44.9) Chronic obstructive pulmonary disease, unspecified COPD type (H)  Comment: no oxygen.  Gets winded easier now.  Hopefully the discussion of hospice will help her decide to enroll and then she can take advantage of medicare benefits and the program paying for oxygen.  Remains on her inhalers.    (E44.0) Moderate protein-calorie malnutrition (H)  (M62.81) Generalized muscle weakness  (R62.7) Failure to thrive in adult  Comment: slowly she is fading.  Needing more help with bathing as she is not as steady anymore.  Expect she will need more assist in future but for now she is fighting through what she can.     Orders:  No new orders today    Electronically signed by  CASTRO Hazel CNP

## 2024-10-22 NOTE — LETTER
" 10/22/2024      Marla Dunn  C/o Deneen Dunn  [address] [place] [address]        [facility] GERIATRICS  ACUTE/EPISODIC VISIT    [facility] Medical Record Number:  [id number]  Place of Service where encounter took place:  [facility] ([facility]) [[id number]]    Chief Complaint   Patient presents with     RECHECK       HPI:    Marla Dunn is a 89 year old  (5/22/1929), who is being seen today for an episodic care visit.  HPI information obtained from: facility chart records, facility staff, patient report, and [facility] chart review.    Today's concern is:    Diagnoses         Codes Comments    Chronic diastolic congestive heart failure (H)    -  Primary I50.32     Bilateral lower extremity edema     R60.0     Chronic obstructive pulmonary disease, unspecified COPD type (H)     J44.9     Moderate protein-calorie malnutrition (H)     E44.0     Generalized muscle weakness     M62.81     Failure to thrive in adult     R62.7           Came by to see Georgia as checking on her almost weekly now due to slow decline in her health.      Received a text from her niece this week asking more about Hospice as this NP has been bringing it up to Georgia and staff aware as well because they feel she is ready for it.    Found Georgia in her apartment and she rolled on out of the bathroom when done.  \"I expected you yesterday\".  Explained to her the change in schedule for the week.  Georgia admits to being weaker.  She asked a few questions of hospice.  Told her she and her niece could talk with them to see what is offered.  Stressed the medication cost of compounding medication as facility can not give liquid medication like a NH can.  Could pay for a hospital bed if needed, O2, and other DME to make a person more comfortable.  Georgia still wishes to fall asleep and not wake up.  Sense she still is not interested in hospice.  Tried to tell her they are not 24/7.  They come minimally " once a week and as closer end of life, they come more often.  Do not think she wants people in and out much.    Beyond the discussion of Hospice, Georgia is worried about her legs.  The lower legs are filling up with fluid and skin is taunt.  What is different is that her she is translucent.  Easily could open and drain.  Continues with a blister on the left leg.  She wears lightweight socks and looking at the ankle area, would not know there is swelling until lifting up her pant leg and seeing the fluid.          ALLERGIES:    Allergies   Allergen Reactions     Gramineae Pollens Other (See Comments)     ORCHARDGRASS. Shortness of breath when lawn is just cut.     Smoke. Other (See Comments)     Hard to breathe.     Pollen Extract      Other reaction(s): Unknown        MEDICATIONS:  Post Discharge Medication Reconciliation Status: patient was not discharged from an inpatient facility or TCU.     Current Outpatient Medications   Medication Sig Dispense Refill     acetaminophen (TYLENOL) 500 MG tablet Take 2 tablets (1,000 mg) by mouth every 6 hours as needed for mild pain. Has own supply and can self administer       albuterol (PROAIR HFA/PROVENTIL HFA/VENTOLIN HFA) 108 (90 Base) MCG/ACT inhaler INHALE 2 PUFFS INTO THE LUNGS 4 TIMES DAILY  (DX: CHRONIC OBSTRUCTIVE PULMONARY DISEASE) 18 g 11     amoxicillin (AMOXIL) 500 MG capsule 4 CAPSULES (2000MG ) ORALLY AS NEEDED ONE HOUR PRIOR TO DENTAL APPOINTMENT 4 capsule 5     ANTACID REGULAR STRENGTH 500 MG chewable tablet 2 TABLETS (1000MG) ORALLY 2 TIMES DAILY AS NEEDED FOR HEARTBURN 60 tablet 11     apixaban ANTICOAGULANT (ELIQUIS) 2.5 MG tablet Take 1 tablet (2.5 mg) by mouth 2 times daily       ASPIRIN LOW DOSE 81 MG EC tablet 1 TABLET ORALLY 2 TIMES DAILY (DX: PROPHYLAXIS) 180 tablet 3     atorvastatin (LIPITOR) 40 MG tablet Take 1 tablet (40 mg) by mouth daily       Bacillus Coagulans-Inulin (PROBIOTIC FORMULA) 1-250 BILLION-MG CAPS 1 CAPSULE ORALLY DAILY 31 capsule  11     bisacodyl (DULCOLAX) 5 MG EC tablet Take 1 tablet (5 mg) by mouth 2 times daily as needed for constipation       chlorhexidine (PERIDEX) 0.12 % solution Swish and spit 15 mLs in mouth 2 times daily May self administer 118 mL 3     cholecalciferol (VITAMIN D3) 125 mcg (5000 units) capsule 1 CAPSULE ORALLY DAILY 90 capsule 3     COMBIVENT RESPIMAT  MCG/ACT inhaler INHALE 1 PUFF INTO THE LUNGS ORALLY INTO THE LUNGS 4 TIMES DAILY 4 g 11     cycloSPORINE (RESTASIS) 0.05 % ophthalmic emulsion Place 1 drop into both eyes 2 times daily        demeclocycline (DECLOMYCIN) 150 MG tablet 1 TABLET ORALLY 2 TIMES DAILY 60 tablet 11     fish oil-omega-3 fatty acids 1000 MG capsule Take 2 capsules (2 g) by mouth daily       fluorometholone (FML LIQUIFILM) 0.1 % ophthalmic susp Place 1 drop Into the left eye daily        hydrocortisone 1 % CREA cream Place rectally 2 times daily as needed for itching       IBANDRONATE SODIUM PO Take 150 mg by mouth every 30 days       levothyroxine (SYNTHROID/LEVOTHROID) 75 MCG tablet 1 TABLET ORALLY EVERY MORNING ON AN EMPTY STOMACH (DX: HYPOTHYROIDISM) 30 tablet 11     loratadine (CLARITIN) 10 MG tablet 1 TABLET ORALLY DAILY (DX: ASTHMA) 28 tablet 11     melatonin 3 MG tablet 1 TABLET ORALLY AT BEDTIME ALONG WITH 5 MG FOR A TOTAL DOSE OF 8MG 30 tablet 11     melatonin 5 MG tablet 1 TABLET ORALLY AT BEDTIME  ALONG WITH 3MG FOR A TOTAL DOSE OF 8MG 30 tablet 11     metoprolol tartrate (LOPRESSOR) 25 MG tablet 1 TABLET ORALLY 2 TIMES DAILY (DX: HYPERTENSION) 56 tablet 11     mirtazapine (REMERON) 7.5 MG tablet 1 TABLET ORALLY AT BEDTIME 31 tablet 11     Multiple Vitamins-Minerals (PRESERVISION AREDS 2) CAPS 1 CAPSULE ORALLY 2 TIMES DAILY 62 capsule 11     omeprazole (PRILOSEC) 20 MG DR capsule 1 CAPSULE ORALLY 2 TIMES DAILY (DX: GASTROESOPHAGEAL REFLUX DISEASE) 62 capsule 11     OXYGEN-HELIUM IN every evening       polyethylene glycol 0.4%- propylene glycol 0.3% (SYSTANE ULTRA) 0.4-0.3 %  "SOLN ophthalmic solution Place 1 drop into both eyes 4 times daily       Polyethylene Glycol 3350 (PEG 3350) 17 GM/SCOOP POWD MIX 1 CAPFUL (17 GMS) IN 8 OUNCES WATER AND DRINK ORALLY DAILY (DX: CONSTIPATION) 510 g 11     Probiotic Product (PROBIOTIC PO) Take 1 capsule by mouth every morning       SENEXON-S 8.6-50 MG tablet 1 TABLET ORALLY 2 TIMES DAILY (DX: CONSTIPATION) 56 tablet 11     sodium chloride 1 GM tablet Take 1 tablet (1 g) by mouth daily 30 tablet 11     spironolactone (ALDACTONE) 25 MG tablet 1/2 TABLET (12.5MG) ORALLY DAILY ** HAZARDOUS MED: WEAR DOUBLE NITRILE GLOVES** 15 tablet 11     timolol (TIMOPTIC) 0.5 % ophthalmic solution Place 1 drop Into the left eye daily        torsemide (DEMADEX) 20 MG tablet 2 TABLETS (40MG) ORALLY DAILY 60 tablet 11     vitamin C (ASCORBIC ACID) 500 MG tablet 1 TABLET ORALLY DAILY (DX: SUPPLEMENT) 31 tablet 11       REVIEW OF SYSTEMS:  4 point ROS neg other than the symptoms noted above in the HPI.  States she gets winded easily and back to being hard to swallow again.      PHYSICAL EXAM:  /59   Pulse 87   Temp 97.3  F (36.3  C)   Resp 20   Wt 38.1 kg (84 lb)   BMI 15.36 kg/m    Georgia is in her wheelchair as she pedals with her feet out of the bathroom.    She looks tired.  She is trying to elevate her feet as much as she can.    She admits she is \"grouchy\".  She apologizes but does not like that feeling.      In no acute distress.  No audible adventitious sounds heard.    Heart rate regular/irregular.  Lower legs have +4 pitting edema present.  Legs look a little \"yellow\" in color and feel that she has a light yellow hue to her skin.  Skin on legs is translucent.  Some bruising noted on right lower leg.  Wearing sandals as she can not get shoes on.  Edema on tops of feet too.    Intact blister on lateral side of left lower leg.    Did assist in pushing her in the wheelchair to the elevator.  From the elevator she was able to propel self to dining room.  "   She made a comment she has gained weight.  Told her it most likely is water weight.      ASSESSMENT / PLAN:  (I50.32) Chronic diastolic congestive heart failure (H)  (primary encounter diagnosis)  (R60.0) Bilateral lower extremity edema  Comment: she tries to drink more water but hard to swallow.  Appreciate her efforts but told her if it is too difficult then not push self.  Given her fluid retention in legs, will not change her diuretics.  Torsimide and Spironolactone.  Feel it keeps her lungs/heart in balance right now.    (J44.9) Chronic obstructive pulmonary disease, unspecified COPD type (H)  Comment: no oxygen.  Gets winded easier now.  Hopefully the discussion of hospice will help her decide to enroll and then she can take advantage of medicare benefits and the program paying for oxygen.  Remains on her inhalers.    (E44.0) Moderate protein-calorie malnutrition (H)  (M62.81) Generalized muscle weakness  (R62.7) Failure to thrive in adult  Comment: slowly she is fading.  Needing more help with bathing as she is not as steady anymore.  Expect she will need more assist in future but for now she is fighting through what she can.     Orders:  No new orders today    Electronically signed by  CASTRO Hazel CNP            Sincerely,        CSATRO Hazel CNP

## 2024-10-28 ENCOUNTER — ASSISTED LIVING VISIT (OUTPATIENT)
Dept: GERIATRICS | Facility: CLINIC | Age: 89
End: 2024-10-28
Payer: COMMERCIAL

## 2024-10-28 VITALS
TEMPERATURE: 97.3 F | HEART RATE: 87 BPM | SYSTOLIC BLOOD PRESSURE: 113 MMHG | BODY MASS INDEX: 15.36 KG/M2 | RESPIRATION RATE: 20 BRPM | WEIGHT: 84 LBS | DIASTOLIC BLOOD PRESSURE: 59 MMHG

## 2024-10-28 DIAGNOSIS — R60.0 BILATERAL LOWER EXTREMITY EDEMA: ICD-10-CM

## 2024-10-28 DIAGNOSIS — M62.81 GENERALIZED MUSCLE WEAKNESS: ICD-10-CM

## 2024-10-28 DIAGNOSIS — I50.32 CHRONIC DIASTOLIC CONGESTIVE HEART FAILURE (H): Primary | ICD-10-CM

## 2024-10-28 DIAGNOSIS — E44.0 MODERATE PROTEIN-CALORIE MALNUTRITION (H): ICD-10-CM

## 2024-10-28 DIAGNOSIS — S80.822D BLISTER OF LEFT LOWER LEG, SUBSEQUENT ENCOUNTER: ICD-10-CM

## 2024-10-28 PROCEDURE — 99349 HOME/RES VST EST MOD MDM 40: CPT | Performed by: NURSE PRACTITIONER

## 2024-10-28 NOTE — PROGRESS NOTES
[facility] GERIATRICS  ACUTE/EPISODIC VISIT    [facility] Medical Record Number:  [id number]  Place of Service where encounter took place:  [facility] ([facility]) [[id number]]    Chief Complaint   Patient presents with    RECHECK       HPI:    Marla Dunn is a 89 year old  (5/22/1929), who is being seen today for an episodic care visit.  HPI information obtained from: facility chart records, facility staff, patient report, and [facility] chart review.    Today's concern is:    Diagnoses         Codes Comments    Chronic diastolic congestive heart failure (H)    -  Primary I50.32     Blister of left lower leg, subsequent encounter     S80.822D     Bilateral lower extremity edema     R60.0     Moderate protein-calorie malnutrition (H)     E44.0     Generalized muscle weakness     M62.81           Have been seeing Georgia weekly due to decline in health.  She enjoys the visits and can ask questions.      She was expecting this NP tomorrow.  Last week here on Tuesday which is not a regular day.  Georgia states she is going to be gone tomorrow to [facility] for her anemia injection.  She still has the energy to complete this.      Today found Georgia at lunch time in her apartment with her legs elevated up on a high green stool and she said  she had them elevated for last 30 minutes.  She was not going down to lunch today and states she has been skipping lunch due to harder time swallowing.  She did go down for breakfast and had a omelet.      No acute respiratory distress.  Continues with some edema in lower legs.  Still has her intact blister on left lateral side of leg.      Talked again about hospice as her niece wants to have a conversation with them about services.  Explained more to Georgia about an information meeting.  Not obligated to sign up.  This NP is more concerned about end of life and ordering the morphine.  Due to needing to compound the medication, it can be sublingual but  also hospice will pay for end of life medication.  The look on her face made this NP think she understands better what the morphine will help with her COPD.  Currently her two inhalers are managing her breathing.    ALLERGIES:    Allergies   Allergen Reactions    Gramineae Pollens Other (See Comments)     ORCHARDGRASS. Shortness of breath when lawn is just cut.    Smoke. Other (See Comments)     Hard to breathe.    Pollen Extract      Other reaction(s): Unknown        MEDICATIONS:  Post Discharge Medication Reconciliation Status: patient was not discharged from an inpatient facility or TCU.     Current Outpatient Medications   Medication Sig Dispense Refill    acetaminophen (TYLENOL) 500 MG tablet Take 2 tablets (1,000 mg) by mouth every 6 hours as needed for mild pain. Has own supply and can self administer      albuterol (PROAIR HFA/PROVENTIL HFA/VENTOLIN HFA) 108 (90 Base) MCG/ACT inhaler INHALE 2 PUFFS INTO THE LUNGS 4 TIMES DAILY  (DX: CHRONIC OBSTRUCTIVE PULMONARY DISEASE) 18 g 11    amoxicillin (AMOXIL) 500 MG capsule 4 CAPSULES (2000MG ) ORALLY AS NEEDED ONE HOUR PRIOR TO DENTAL APPOINTMENT 4 capsule 5    ANTACID REGULAR STRENGTH 500 MG chewable tablet 2 TABLETS (1000MG) ORALLY 2 TIMES DAILY AS NEEDED FOR HEARTBURN 60 tablet 11    apixaban ANTICOAGULANT (ELIQUIS) 2.5 MG tablet Take 1 tablet (2.5 mg) by mouth 2 times daily      ASPIRIN LOW DOSE 81 MG EC tablet 1 TABLET ORALLY 2 TIMES DAILY (DX: PROPHYLAXIS) 180 tablet 3    atorvastatin (LIPITOR) 40 MG tablet Take 1 tablet (40 mg) by mouth daily      Bacillus Coagulans-Inulin (PROBIOTIC FORMULA) 1-250 BILLION-MG CAPS 1 CAPSULE ORALLY DAILY 31 capsule 11    bisacodyl (DULCOLAX) 5 MG EC tablet Take 1 tablet (5 mg) by mouth 2 times daily as needed for constipation      chlorhexidine (PERIDEX) 0.12 % solution Swish and spit 15 mLs in mouth 2 times daily May self administer 118 mL 3    cholecalciferol (VITAMIN D3) 125 mcg (5000 units) capsule 1 CAPSULE ORALLY DAILY  90 capsule 3    COMBIVENT RESPIMAT  MCG/ACT inhaler INHALE 1 PUFF INTO THE LUNGS ORALLY INTO THE LUNGS 4 TIMES DAILY 4 g 11    cycloSPORINE (RESTASIS) 0.05 % ophthalmic emulsion Place 1 drop into both eyes 2 times daily       demeclocycline (DECLOMYCIN) 150 MG tablet 1 TABLET ORALLY 2 TIMES DAILY 60 tablet 11    fish oil-omega-3 fatty acids 1000 MG capsule Take 2 capsules (2 g) by mouth daily      fluorometholone (FML LIQUIFILM) 0.1 % ophthalmic susp Place 1 drop Into the left eye daily       hydrocortisone 1 % CREA cream Place rectally 2 times daily as needed for itching      IBANDRONATE SODIUM PO Take 150 mg by mouth every 30 days      levothyroxine (SYNTHROID/LEVOTHROID) 75 MCG tablet 1 TABLET ORALLY EVERY MORNING ON AN EMPTY STOMACH (DX: HYPOTHYROIDISM) 30 tablet 11    loratadine (CLARITIN) 10 MG tablet 1 TABLET ORALLY DAILY (DX: ASTHMA) 28 tablet 11    melatonin 3 MG tablet 1 TABLET ORALLY AT BEDTIME ALONG WITH 5 MG FOR A TOTAL DOSE OF 8MG 30 tablet 11    melatonin 5 MG tablet 1 TABLET ORALLY AT BEDTIME  ALONG WITH 3MG FOR A TOTAL DOSE OF 8MG 30 tablet 11    metoprolol tartrate (LOPRESSOR) 25 MG tablet 1 TABLET ORALLY 2 TIMES DAILY (DX: HYPERTENSION) 56 tablet 11    mirtazapine (REMERON) 7.5 MG tablet 1 TABLET ORALLY AT BEDTIME 31 tablet 11    Multiple Vitamins-Minerals (PRESERVISION AREDS 2) CAPS 1 CAPSULE ORALLY 2 TIMES DAILY 62 capsule 11    omeprazole (PRILOSEC) 20 MG DR capsule 1 CAPSULE ORALLY 2 TIMES DAILY (DX: GASTROESOPHAGEAL REFLUX DISEASE) 62 capsule 11    OXYGEN-HELIUM IN every evening      polyethylene glycol 0.4%- propylene glycol 0.3% (SYSTANE ULTRA) 0.4-0.3 % SOLN ophthalmic solution Place 1 drop into both eyes 4 times daily      Polyethylene Glycol 3350 (PEG 3350) 17 GM/SCOOP POWD MIX 1 CAPFUL (17 GMS) IN 8 OUNCES WATER AND DRINK ORALLY DAILY (DX: CONSTIPATION) 510 g 11    Probiotic Product (PROBIOTIC PO) Take 1 capsule by mouth every morning      SENEXON-S 8.6-50 MG tablet 1 TABLET  ORALLY 2 TIMES DAILY (DX: CONSTIPATION) 56 tablet 11    sodium chloride 1 GM tablet Take 1 tablet (1 g) by mouth daily 30 tablet 11    spironolactone (ALDACTONE) 25 MG tablet 1/2 TABLET (12.5MG) ORALLY DAILY ** HAZARDOUS MED: WEAR DOUBLE NITRILE GLOVES** 15 tablet 11    timolol (TIMOPTIC) 0.5 % ophthalmic solution Place 1 drop Into the left eye daily       torsemide (DEMADEX) 20 MG tablet 2 TABLETS (40MG) ORALLY DAILY 60 tablet 11    vitamin C (ASCORBIC ACID) 500 MG tablet 1 TABLET ORALLY DAILY (DX: SUPPLEMENT) 31 tablet 11       REVIEW OF SYSTEMS:  4 point ROS neg other than the symptoms noted above in the HPI.      PHYSICAL EXAM:  /59   Pulse 87   Temp 97.3  F (36.3  C)   Resp 20   Wt 38.1 kg (84 lb)   BMI 15.36 kg/m    Petite female in her wheelchair.  Propels w/c by self.  Neatly groomed.  Checked her scalp as she c/o itching.  Did not see flaking or plaques of skin.    Lungs are clear.  Heart rate regular/irregular with S1 and S2 heard.  Abdomen is flat, soft non-tender.  No vomiting.    Skin is pink with a light yellow hue to it.    Lower legs:  right lower legs shows signs in the skin that some of the edema self absorbed back in as shrivel lines present.  Top of both feet +2 +3.    Left leg has edema present and can see where the edema starts and stops on the skin.  Blister intact on lateral side of shin.      ASSESSMENT / PLAN:  (I50.32) Chronic diastolic congestive heart failure (H)  (primary encounter diagnosis)  (S80.822D) Blister of left lower leg, subsequent encounter  (R60.0) Bilateral lower extremity edema  Comment: stable at this time.  Some edema is absorbing back up.  Best thing to do is elevate legs.  Remains on Torsemide 40mg daily with Spironolactone daily.    Next Monday is her BMP and will pull LFTs as well due to the yellowing noted of her skin.  Did let Georgia know this and she see sit too.    (E44.0) Moderate protein-calorie malnutrition (H)  Comment: now skipping lunch.  Hard  for her to swallow.  She is still hoping she will fall asleep and not wake up.  In talks with her niece about hospice.  There to support georgia and her niece.    (M62.81) Generalized muscle weakness  Comment: barely walking anymore.  Wheelchair for all mobility.  Sleeping more.  Goes out to appointments as long as she has the stamina to continue to go out.  Talked a little about being bed bound in the end.       Orders:  LFTs next Monday due to CKD stage 3a, CHF    Electronically signed by  CASTRO Hazel CNP

## 2024-10-30 ENCOUNTER — LAB REQUISITION (OUTPATIENT)
Dept: LAB | Facility: CLINIC | Age: 89
End: 2024-10-30
Payer: COMMERCIAL

## 2024-10-30 DIAGNOSIS — I50.9 HEART FAILURE, UNSPECIFIED (H): ICD-10-CM

## 2024-11-04 ENCOUNTER — ASSISTED LIVING VISIT (OUTPATIENT)
Dept: GERIATRICS | Facility: CLINIC | Age: 89
End: 2024-11-04
Payer: COMMERCIAL

## 2024-11-04 VITALS
HEART RATE: 87 BPM | DIASTOLIC BLOOD PRESSURE: 59 MMHG | BODY MASS INDEX: 15.36 KG/M2 | RESPIRATION RATE: 20 BRPM | TEMPERATURE: 97.3 F | WEIGHT: 84 LBS | SYSTOLIC BLOOD PRESSURE: 113 MMHG

## 2024-11-04 DIAGNOSIS — E44.0 MODERATE PROTEIN-CALORIE MALNUTRITION (H): ICD-10-CM

## 2024-11-04 DIAGNOSIS — R60.0 BILATERAL LOWER EXTREMITY EDEMA: ICD-10-CM

## 2024-11-04 DIAGNOSIS — H91.13 PRESBYCUSIS OF BOTH EARS: ICD-10-CM

## 2024-11-04 DIAGNOSIS — J02.9 SORE THROAT: Primary | ICD-10-CM

## 2024-11-04 DIAGNOSIS — I50.32 CHRONIC DIASTOLIC CONGESTIVE HEART FAILURE (H): ICD-10-CM

## 2024-11-04 DIAGNOSIS — R13.12 OROPHARYNGEAL DYSPHAGIA: ICD-10-CM

## 2024-11-04 LAB
ALBUMIN SERPL BCG-MCNC: 3.4 G/DL (ref 3.5–5.2)
ALP SERPL-CCNC: 123 U/L (ref 40–150)
ALT SERPL W P-5'-P-CCNC: 113 U/L (ref 0–50)
ANION GAP SERPL CALCULATED.3IONS-SCNC: 15 MMOL/L (ref 7–15)
AST SERPL W P-5'-P-CCNC: 99 U/L (ref 0–45)
BILIRUB DIRECT SERPL-MCNC: 0.75 MG/DL (ref 0–0.3)
BILIRUB SERPL-MCNC: 1.2 MG/DL
BUN SERPL-MCNC: 30.9 MG/DL (ref 8–23)
CALCIUM SERPL-MCNC: 8.2 MG/DL (ref 8.8–10.4)
CHLORIDE SERPL-SCNC: 99 MMOL/L (ref 98–107)
CREAT SERPL-MCNC: 1.33 MG/DL (ref 0.51–0.95)
EGFRCR SERPLBLD CKD-EPI 2021: 37 ML/MIN/1.73M2
GLUCOSE SERPL-MCNC: 94 MG/DL (ref 70–99)
HCO3 SERPL-SCNC: 20 MMOL/L (ref 22–29)
POTASSIUM SERPL-SCNC: 3.3 MMOL/L (ref 3.4–5.3)
PROT SERPL-MCNC: 6 G/DL (ref 6.4–8.3)
SODIUM SERPL-SCNC: 134 MMOL/L (ref 135–145)

## 2024-11-04 PROCEDURE — 36415 COLL VENOUS BLD VENIPUNCTURE: CPT | Mod: ORL | Performed by: INTERNAL MEDICINE

## 2024-11-04 PROCEDURE — 82248 BILIRUBIN DIRECT: CPT | Mod: ORL | Performed by: INTERNAL MEDICINE

## 2024-11-04 PROCEDURE — 99349 HOME/RES VST EST MOD MDM 40: CPT | Performed by: NURSE PRACTITIONER

## 2024-11-04 PROCEDURE — 80053 COMPREHEN METABOLIC PANEL: CPT | Mod: ORL | Performed by: INTERNAL MEDICINE

## 2024-11-04 PROCEDURE — P9603 ONE-WAY ALLOW PRORATED MILES: HCPCS | Mod: ORL | Performed by: INTERNAL MEDICINE

## 2024-11-04 RX ORDER — NYSTATIN 100000 [USP'U]/ML
SUSPENSION ORAL
Qty: 105 ML | Refills: 0 | Status: SHIPPED | OUTPATIENT
Start: 2024-11-05

## 2024-11-04 NOTE — PROGRESS NOTES
[facility] GERIATRICS  ACUTE/EPISODIC VISIT    [facility] Medical Record Number:  [id number]  Place of Service where encounter took place:  [facility] ([facility]) [[id number]]    Chief Complaint   Patient presents with    RECHECK       HPI:    Marla Dunn is a 89 year old  (5/22/1929), who is being seen today for an episodic care visit.  HPI information obtained from: facility chart records, facility staff, patient report, and [facility] chart review.    Today's concern is:  Diagnoses         Codes Comments    Sore throat    -  Primary J02.9     Oropharyngeal dysphagia     R13.12     Chronic diastolic congestive heart failure (H)     I50.32     Bilateral lower extremity edema     R60.0     Moderate protein-calorie malnutrition (H)     E44.0     Presbycusis of both ears     H91.13           Them by today to see Georgia as have been seeing her on a weekly basis since she has been showing a decline.  She has been resistant to having hospice meet with her and her niece for an informal meeting.  Georgia just hopes that she would go to bed and not wake up the next morning.    Today brought the otoscope to look in her left ear per her request.  Spoke about her lower extremities.  She did get blood work done today.  Asked her how her oncology appointment went last week where she was getting her injection.  Her niece had text of this nurse practitioner when they were at that clinic reminding this nurse practitioner to look in her left ear.  Did not respond to the text as Montana guillen that this NP did not bring her supplies that day nor leaving him in the car because of the weather.  Georgia stated that they did the clinic being able to continue with her injection for anemia if she went on hospice.  They told her they would not be able to continue with their services.  Pretty much Georgia told her niece that she had hoped it her niece made another appointment in the beginning of the year for the next  injection.    Spoke about a variety of things, but watch to Georgia try to swallow a couple times and it appeared to be painful and so confronted her on this.  She said it was not that it was so painful and was looking for the words to describe for comparison.  Asked her if it felt like her throat was really dry and she stated that that is exactly how it felt.  Somehow nystatin suspension came up in conversation and so spoke about that for a little while      ALLERGIES:    Allergies   Allergen Reactions    Gramineae Pollens Other (See Comments)     ORCHARDGRASS. Shortness of breath when lawn is just cut.    Smoke. Other (See Comments)     Hard to breathe.    Pollen Extract      Other reaction(s): Unknown        MEDICATIONS:  Post Discharge Medication Reconciliation Status: patient was not discharged from an inpatient facility or TCU.     Current Outpatient Medications   Medication Sig Dispense Refill    [START ON 11/5/2024] nystatin (MYCOSTATIN) 527968 UNIT/ML suspension 5ml swish and swallow TID for 7 days.   May have in apartment to self administer 105 mL 0    acetaminophen (TYLENOL) 500 MG tablet Take 2 tablets (1,000 mg) by mouth every 6 hours as needed for mild pain. Has own supply and can self administer      albuterol (PROAIR HFA/PROVENTIL HFA/VENTOLIN HFA) 108 (90 Base) MCG/ACT inhaler INHALE 2 PUFFS INTO THE LUNGS 4 TIMES DAILY  (DX: CHRONIC OBSTRUCTIVE PULMONARY DISEASE) 18 g 11    amoxicillin (AMOXIL) 500 MG capsule 4 CAPSULES (2000MG ) ORALLY AS NEEDED ONE HOUR PRIOR TO DENTAL APPOINTMENT 4 capsule 5    ANTACID REGULAR STRENGTH 500 MG chewable tablet 2 TABLETS (1000MG) ORALLY 2 TIMES DAILY AS NEEDED FOR HEARTBURN 60 tablet 11    apixaban ANTICOAGULANT (ELIQUIS) 2.5 MG tablet Take 1 tablet (2.5 mg) by mouth 2 times daily      ASPIRIN LOW DOSE 81 MG EC tablet 1 TABLET ORALLY 2 TIMES DAILY (DX: PROPHYLAXIS) 180 tablet 3    atorvastatin (LIPITOR) 40 MG tablet Take 1 tablet (40 mg) by mouth daily       Bacillus Coagulans-Inulin (PROBIOTIC FORMULA) 1-250 BILLION-MG CAPS 1 CAPSULE ORALLY DAILY 31 capsule 11    bisacodyl (DULCOLAX) 5 MG EC tablet Take 1 tablet (5 mg) by mouth 2 times daily as needed for constipation      chlorhexidine (PERIDEX) 0.12 % solution Swish and spit 15 mLs in mouth 2 times daily May self administer 118 mL 3    cholecalciferol (VITAMIN D3) 125 mcg (5000 units) capsule 1 CAPSULE ORALLY DAILY 90 capsule 3    COMBIVENT RESPIMAT  MCG/ACT inhaler INHALE 1 PUFF INTO THE LUNGS ORALLY INTO THE LUNGS 4 TIMES DAILY 4 g 11    cycloSPORINE (RESTASIS) 0.05 % ophthalmic emulsion Place 1 drop into both eyes 2 times daily       demeclocycline (DECLOMYCIN) 150 MG tablet 1 TABLET ORALLY 2 TIMES DAILY 60 tablet 11    fish oil-omega-3 fatty acids 1000 MG capsule Take 2 capsules (2 g) by mouth daily      fluorometholone (FML LIQUIFILM) 0.1 % ophthalmic susp Place 1 drop Into the left eye daily       hydrocortisone 1 % CREA cream Place rectally 2 times daily as needed for itching      IBANDRONATE SODIUM PO Take 150 mg by mouth every 30 days      levothyroxine (SYNTHROID/LEVOTHROID) 75 MCG tablet 1 TABLET ORALLY EVERY MORNING ON AN EMPTY STOMACH (DX: HYPOTHYROIDISM) 30 tablet 11    loratadine (CLARITIN) 10 MG tablet 1 TABLET ORALLY DAILY (DX: ASTHMA) 28 tablet 11    melatonin 3 MG tablet 1 TABLET ORALLY AT BEDTIME ALONG WITH 5 MG FOR A TOTAL DOSE OF 8MG 30 tablet 11    melatonin 5 MG tablet 1 TABLET ORALLY AT BEDTIME  ALONG WITH 3MG FOR A TOTAL DOSE OF 8MG 30 tablet 11    metoprolol tartrate (LOPRESSOR) 25 MG tablet 1 TABLET ORALLY 2 TIMES DAILY (DX: HYPERTENSION) 56 tablet 11    mirtazapine (REMERON) 7.5 MG tablet 1 TABLET ORALLY AT BEDTIME 31 tablet 11    Multiple Vitamins-Minerals (PRESERVISION AREDS 2) CAPS 1 CAPSULE ORALLY 2 TIMES DAILY 62 capsule 11    omeprazole (PRILOSEC) 20 MG DR capsule 1 CAPSULE ORALLY 2 TIMES DAILY (DX: GASTROESOPHAGEAL REFLUX DISEASE) 62 capsule 11    OXYGEN-HELIUM IN every  evening      polyethylene glycol 0.4%- propylene glycol 0.3% (SYSTANE ULTRA) 0.4-0.3 % SOLN ophthalmic solution Place 1 drop into both eyes 4 times daily      Polyethylene Glycol 3350 (PEG 3350) 17 GM/SCOOP POWD MIX 1 CAPFUL (17 GMS) IN 8 OUNCES WATER AND DRINK ORALLY DAILY (DX: CONSTIPATION) 510 g 11    Probiotic Product (PROBIOTIC PO) Take 1 capsule by mouth every morning      SENEXON-S 8.6-50 MG tablet 1 TABLET ORALLY 2 TIMES DAILY (DX: CONSTIPATION) 56 tablet 11    sodium chloride 1 GM tablet Take 1 tablet (1 g) by mouth daily 30 tablet 11    spironolactone (ALDACTONE) 25 MG tablet 1/2 TABLET (12.5MG) ORALLY DAILY ** HAZARDOUS MED: WEAR DOUBLE NITRILE GLOVES** 15 tablet 11    timolol (TIMOPTIC) 0.5 % ophthalmic solution Place 1 drop Into the left eye daily       torsemide (DEMADEX) 20 MG tablet 2 TABLETS (40MG) ORALLY DAILY 60 tablet 11    vitamin C (ASCORBIC ACID) 500 MG tablet 1 TABLET ORALLY DAILY (DX: SUPPLEMENT) 31 tablet 11         REVIEW OF SYSTEMS:  4 point ROS neg other than the symptoms noted above in the HPI.  No chest pain.  No stomach issue      PHYSICAL EXAM:  /59   Pulse 87   Temp 97.3  F (36.3  C)   Resp 20   Wt 38.1 kg (84 lb)   BMI 15.36 kg/m      Petite female today in her wheelchair that she propels with her feet.  Noticed her pants today and looked a little bit baby on her, but then have not seen these pants on her before.  Typically she wears jeans.  By the end of the visit she looked tired and her facial expression.  Her skin is pink today.  Do not see the yellow hue as in other visits.  Heart rate regular/irregular.  Bilaterally she has +4 edema in her lower extremities.  Blister remains intact on the lateral left leg.  Skin is very thin so can see the fluid underneath.  She is wondering like cotton socks today with the strapped sandals so that her feet could push around the straps of the sandal    Lungs are clear to auscultation.  Does not use any oxygen.  Did not sense any  shortness of breath today with conversation  Abdomen is flat, soft, decreased bowel sounds  Georgia removed her hearing aid out of her left ear.  With the otoscope observed her canal and found a small amount of cerumen present along the canal wall but when straightening out the canal with readjusting her outer ear, could see her tympanic membrane that was pearly gray.  Did not see any swelling in her ear canal.  Did not attempt to take out the small amount of cerumen and partly because feeling she is just so frail.    Georgia mention that she had her lab drawn already today and it was right before lunchtime for the visit      ASSESSMENT / PLAN:  (J02.9) Sore throat  (primary encounter diagnosis)  (R13.12) Oropharyngeal dysphagia  Comment: Spoke to Montana about how she appeared when she was swallowing.  It look like she almost was wincing like she was in discomfort.  She states it is not so much discomfort as her throat just seems dry.  When she stuck her tongue out part way could see that it is not coated with thrush.  But for some reason brought up to her about nystatin swish and swallow.  She asked questions in regards to it.  Looked up the price and it is fairly inexpensive.  Not sure what she could have down her esophagus-meaning could she have thrush down her esophagus and it is causing her problems.  Georgia was open to wanting to try the nystatin swish and swallow liquid.  Explained to her that she could easily take a tooth that if she had wanted pain to her mouth with it before meals and at bedtime, or 5 mL swish and swallow also goes down her throat.  She would only take it 3 times a day because she skips lunch.  Will order nystatin suspension 5 mL swish and swallow 3 times a day for 7 days.  May keep at bedside so she can self administer  Plan: nystatin (MYCOSTATIN) [id number] UNIT/ML suspension    (I50.32) Chronic diastolic congestive heart failure (H)  (R60.0) Bilateral lower extremity edema  Comment:  Georgia remains on torsemide 40 mg daily and spironolactone 12.5 mg daily.  Overall do not feel that is doing anything for her lower extremities.  Georgia does elevate her legs on the stool typically for half hour each time or she goes in her recliner and leans back.  She had a monthly BMP done today and so later I will be able to see what her results are.    (E44.0) Moderate protein-calorie malnutrition (H)  Comment: At this time do not think Georgia looks any more slender than she usually is.  Not uncommon that she is under 100 pounds.  Today when watching her try to swallow and even stick out her tongue, can just see how much discomfort her swallowing abilities must be.  Spoke to her again about hospice, her oncology clinic said that if she goes on hospice they will no longer do her injections.  That made up her mind that she is not going to pursue hospice at this time.  She wants to continue getting her injections for her anemia    (H91.13)  Presbycusis, bilateral ears  Comment: Georgia mention that she probably is going to go see an ENT doctor about her hearing which is her choice.  Informed her that they may remove that small amount of wax in her ear a lot easier than what this nurse practitioner could do with either a cerumen spoon or flushing with warm water.  If a lot of cerumen was seen then certainly would have attempted to clean it out.  After the visit Georgia did put her hearing aid back in her left ear.    Orders:  Nystatin oral suspension 5ml swish and swallow TID x7 days - sore throat    Electronically signed by  CASTRO Hazel CNP

## 2024-11-04 NOTE — LETTER
11/4/2024      Marla Dunn  C/o Deneen Dunn  [address] [facility] [address]        [facility] GERIATRICS  ACUTE/EPISODIC VISIT    [facility] Medical Record Number:  [id number]  Place of Service where encounter took place:  [facility] [place] ([facility]) [[id number]]    Chief Complaint   Patient presents with     RECHECK       HPI:    Marla Dunn is a 89 year old  (5/22/1929), who is being seen today for an episodic care visit.  HPI information obtained from: facility chart records, facility staff, patient report, and [facility] chart review.    Today's concern is:  Diagnoses         Codes Comments    Sore throat    -  Primary J02.9     Oropharyngeal dysphagia     R13.12     Chronic diastolic congestive heart failure (H)     I50.32     Bilateral lower extremity edema     R60.0     Moderate protein-calorie malnutrition (H)     E44.0     Presbycusis of both ears     H91.13           Them by today to see [place] as have been seeing her on a weekly basis since she has been showing a decline.  She has been resistant to having hospice meet with her and her niece for an informal meeting.  Georgia just hopes that she would go to bed and not wake up the next morning.    Today brought the otoscope to look in her left ear per her request.  Spoke about her lower extremities.  She did get blood work done today.  Asked her how her oncology appointment went last week where she was getting her injection.  Her niece had text of this nurse practitioner when they were at that clinic reminding this nurse practitioner to look in her left ear.  Did not respond to the text as Montana guillen that this NP did not bring her supplies that day nor leaving him in the car because of the weather.  Georgia stated that they did the clinic being able to continue with her injection for anemia if she went on hospice.  They told her they would not be able to continue with their services.  Pretty much Georgia told her  niece that she had hoped it her niece made another appointment in the beginning of the year for the next injection.    Spoke about a variety of things, but watch to Georgia try to swallow a couple times and it appeared to be painful and so confronted her on this.  She said it was not that it was so painful and was looking for the words to describe for comparison.  Asked her if it felt like her throat was really dry and she stated that that is exactly how it felt.  Somehow nystatin suspension came up in conversation and so spoke about that for a little while      ALLERGIES:    Allergies   Allergen Reactions     Gramineae Pollens Other (See Comments)     ORCHARDGRASS. Shortness of breath when lawn is just cut.     Smoke. Other (See Comments)     Hard to breathe.     Pollen Extract      Other reaction(s): Unknown        MEDICATIONS:  Post Discharge Medication Reconciliation Status: patient was not discharged from an inpatient facility or TCU.     Current Outpatient Medications   Medication Sig Dispense Refill     [START ON 11/5/2024] nystatin (MYCOSTATIN) 191293 UNIT/ML suspension 5ml swish and swallow TID for 7 days.   May have in apartment to self administer 105 mL 0     acetaminophen (TYLENOL) 500 MG tablet Take 2 tablets (1,000 mg) by mouth every 6 hours as needed for mild pain. Has own supply and can self administer       albuterol (PROAIR HFA/PROVENTIL HFA/VENTOLIN HFA) 108 (90 Base) MCG/ACT inhaler INHALE 2 PUFFS INTO THE LUNGS 4 TIMES DAILY  (DX: CHRONIC OBSTRUCTIVE PULMONARY DISEASE) 18 g 11     amoxicillin (AMOXIL) 500 MG capsule 4 CAPSULES (2000MG ) ORALLY AS NEEDED ONE HOUR PRIOR TO DENTAL APPOINTMENT 4 capsule 5     ANTACID REGULAR STRENGTH 500 MG chewable tablet 2 TABLETS (1000MG) ORALLY 2 TIMES DAILY AS NEEDED FOR HEARTBURN 60 tablet 11     apixaban ANTICOAGULANT (ELIQUIS) 2.5 MG tablet Take 1 tablet (2.5 mg) by mouth 2 times daily       ASPIRIN LOW DOSE 81 MG EC tablet 1 TABLET ORALLY 2 TIMES DAILY (DX:  PROPHYLAXIS) 180 tablet 3     atorvastatin (LIPITOR) 40 MG tablet Take 1 tablet (40 mg) by mouth daily       Bacillus Coagulans-Inulin (PROBIOTIC FORMULA) 1-250 BILLION-MG CAPS 1 CAPSULE ORALLY DAILY 31 capsule 11     bisacodyl (DULCOLAX) 5 MG EC tablet Take 1 tablet (5 mg) by mouth 2 times daily as needed for constipation       chlorhexidine (PERIDEX) 0.12 % solution Swish and spit 15 mLs in mouth 2 times daily May self administer 118 mL 3     cholecalciferol (VITAMIN D3) 125 mcg (5000 units) capsule 1 CAPSULE ORALLY DAILY 90 capsule 3     COMBIVENT RESPIMAT  MCG/ACT inhaler INHALE 1 PUFF INTO THE LUNGS ORALLY INTO THE LUNGS 4 TIMES DAILY 4 g 11     cycloSPORINE (RESTASIS) 0.05 % ophthalmic emulsion Place 1 drop into both eyes 2 times daily        demeclocycline (DECLOMYCIN) 150 MG tablet 1 TABLET ORALLY 2 TIMES DAILY 60 tablet 11     fish oil-omega-3 fatty acids 1000 MG capsule Take 2 capsules (2 g) by mouth daily       fluorometholone (FML LIQUIFILM) 0.1 % ophthalmic susp Place 1 drop Into the left eye daily        hydrocortisone 1 % CREA cream Place rectally 2 times daily as needed for itching       IBANDRONATE SODIUM PO Take 150 mg by mouth every 30 days       levothyroxine (SYNTHROID/LEVOTHROID) 75 MCG tablet 1 TABLET ORALLY EVERY MORNING ON AN EMPTY STOMACH (DX: HYPOTHYROIDISM) 30 tablet 11     loratadine (CLARITIN) 10 MG tablet 1 TABLET ORALLY DAILY (DX: ASTHMA) 28 tablet 11     melatonin 3 MG tablet 1 TABLET ORALLY AT BEDTIME ALONG WITH 5 MG FOR A TOTAL DOSE OF 8MG 30 tablet 11     melatonin 5 MG tablet 1 TABLET ORALLY AT BEDTIME  ALONG WITH 3MG FOR A TOTAL DOSE OF 8MG 30 tablet 11     metoprolol tartrate (LOPRESSOR) 25 MG tablet 1 TABLET ORALLY 2 TIMES DAILY (DX: HYPERTENSION) 56 tablet 11     mirtazapine (REMERON) 7.5 MG tablet 1 TABLET ORALLY AT BEDTIME 31 tablet 11     Multiple Vitamins-Minerals (PRESERVISION AREDS 2) CAPS 1 CAPSULE ORALLY 2 TIMES DAILY 62 capsule 11     omeprazole (PRILOSEC) 20  MG DR capsule 1 CAPSULE ORALLY 2 TIMES DAILY (DX: GASTROESOPHAGEAL REFLUX DISEASE) 62 capsule 11     OXYGEN-HELIUM IN every evening       polyethylene glycol 0.4%- propylene glycol 0.3% (SYSTANE ULTRA) 0.4-0.3 % SOLN ophthalmic solution Place 1 drop into both eyes 4 times daily       Polyethylene Glycol 3350 (PEG 3350) 17 GM/SCOOP POWD MIX 1 CAPFUL (17 GMS) IN 8 OUNCES WATER AND DRINK ORALLY DAILY (DX: CONSTIPATION) 510 g 11     Probiotic Product (PROBIOTIC PO) Take 1 capsule by mouth every morning       SENEXON-S 8.6-50 MG tablet 1 TABLET ORALLY 2 TIMES DAILY (DX: CONSTIPATION) 56 tablet 11     sodium chloride 1 GM tablet Take 1 tablet (1 g) by mouth daily 30 tablet 11     spironolactone (ALDACTONE) 25 MG tablet 1/2 TABLET (12.5MG) ORALLY DAILY ** HAZARDOUS MED: WEAR DOUBLE NITRILE GLOVES** 15 tablet 11     timolol (TIMOPTIC) 0.5 % ophthalmic solution Place 1 drop Into the left eye daily        torsemide (DEMADEX) 20 MG tablet 2 TABLETS (40MG) ORALLY DAILY 60 tablet 11     vitamin C (ASCORBIC ACID) 500 MG tablet 1 TABLET ORALLY DAILY (DX: SUPPLEMENT) 31 tablet 11         REVIEW OF SYSTEMS:  4 point ROS neg other than the symptoms noted above in the HPI.  No chest pain.  No stomach issue      PHYSICAL EXAM:  /59   Pulse 87   Temp 97.3  F (36.3  C)   Resp 20   Wt 38.1 kg (84 lb)   BMI 15.36 kg/m      Petite female today in her wheelchair that she propels with her feet.  Noticed her pants today and looked a little bit baby on her, but then have not seen these pants on her before.  Typically she wears jeans.  By the end of the visit she looked tired and her facial expression.  Her skin is pink today.  Do not see the yellow hue as in other visits.  Heart rate regular/irregular.  Bilaterally she has +4 edema in her lower extremities.  Blister remains intact on the lateral left leg.  Skin is very thin so can see the fluid underneath.  She is wondering like cotton socks today with the strapped sandals so that  her feet could push around the straps of the sandal    Lungs are clear to auscultation.  Does not use any oxygen.  Did not sense any shortness of breath today with conversation  Abdomen is flat, soft, decreased bowel sounds  Georgia removed her hearing aid out of her left ear.  With the otoscope observed her canal and found a small amount of cerumen present along the canal wall but when straightening out the canal with readjusting her outer ear, could see her tympanic membrane that was pearly gray.  Did not see any swelling in her ear canal.  Did not attempt to take out the small amount of cerumen and partly because feeling she is just so frail.    Georgia mention that she had her lab drawn already today and it was right before lunchtime for the visit      ASSESSMENT / PLAN:  (J02.9) Sore throat  (primary encounter diagnosis)  (R13.12) Oropharyngeal dysphagia  Comment: Spoke to [place] about how she appeared when she was swallowing.  It look like she almost was wincing like she was in discomfort.  She states it is not so much discomfort as her throat just seems dry.  When she stuck her tongue out part way could see that it is not coated with thrush.  But for some reason brought up to her about nystatin swish and swallow.  She asked questions in regards to it.  Looked up the price and it is fairly inexpensive.  Not sure what she could have down her esophagus-meaning could she have thrush down her esophagus and it is causing her problems.  Georgia was open to wanting to try the nystatin swish and swallow liquid.  Explained to her that she could easily take a tooth that if she had wanted pain to her mouth with it before meals and at bedtime, or 5 mL swish and swallow also goes down her throat.  She would only take it 3 times a day because she skips lunch.  Will order nystatin suspension 5 mL swish and swallow 3 times a day for 7 days.  May keep at bedside so she can self administer  Plan: nystatin (MYCOSTATIN) 940200  UNIT/ML suspension    (I50.32) Chronic diastolic congestive heart failure (H)  (R60.0) Bilateral lower extremity edema  Comment: Georgia remains on torsemide 40 mg daily and spironolactone 12.5 mg daily.  Overall do not feel that is doing anything for her lower extremities.  Georgia does elevate her legs on the stool typically for half hour each time or she goes in her recliner and leans back.  She had a monthly BMP done today and so later I will be able to see what her results are.    (E44.0) Moderate protein-calorie malnutrition (H)  Comment: At this time do not think Georgia looks any more slender than she usually is.  Not uncommon that she is under 100 pounds.  Today when watching her try to swallow and even stick out her tongue, can just see how much discomfort her swallowing abilities must be.  Spoke to her again about hospice, her oncology clinic said that if she goes on hospice they will no longer do her injections.  That made up her mind that she is not going to pursue hospice at this time.  She wants to continue getting her injections for her anemia    (H91.13)  Presbycusis, bilateral ears  Comment: Georgia mention that she probably is going to go see an ENT doctor about her hearing which is her choice.  Informed her that they may remove that small amount of wax in her ear a lot easier than what this nurse practitioner could do with either a cerumen spoon or flushing with warm water.  If a lot of cerumen was seen then certainly would have attempted to clean it out.  After the visit Georgia did put her hearing aid back in her left ear.    Orders:  Nystatin oral suspension 5ml swish and swallow TID x7 days - sore throat    Electronically signed by  CASTRO Hazel CNP            Sincerely,        CASTRO Hazel CNP

## 2024-11-05 ENCOUNTER — TELEPHONE (OUTPATIENT)
Dept: GERIATRICS | Facility: CLINIC | Age: 89
End: 2024-11-05
Payer: COMMERCIAL

## 2024-11-05 NOTE — TELEPHONE ENCOUNTER
Labs came back from yesterday.    Lab Results   Component Value Date    POTASSIUM 3.3 11/04/2024     Not going to start her on a supplement as too hard to swallow.    Liver function test were slightly elevated.  Expressed to niece in a text that wish her aunt would be willing to come off medications as they have to process through her liver or some of them do.      Orders:  In two weeks, BMP, HgB level, and liver function test due to failure to thrive, CKD stage 3b, CHF    CASTRO Hazel CNP

## 2024-11-11 ENCOUNTER — ASSISTED LIVING VISIT (OUTPATIENT)
Dept: GERIATRICS | Facility: CLINIC | Age: 89
End: 2024-11-11
Payer: COMMERCIAL

## 2024-11-11 VITALS
SYSTOLIC BLOOD PRESSURE: 99 MMHG | RESPIRATION RATE: 18 BRPM | BODY MASS INDEX: 16.46 KG/M2 | WEIGHT: 90 LBS | DIASTOLIC BLOOD PRESSURE: 46 MMHG | TEMPERATURE: 97.1 F | OXYGEN SATURATION: 88 % | HEART RATE: 67 BPM

## 2024-11-11 DIAGNOSIS — R62.7 FAILURE TO THRIVE IN ADULT: ICD-10-CM

## 2024-11-11 DIAGNOSIS — R60.0 BILATERAL LOWER EXTREMITY EDEMA: ICD-10-CM

## 2024-11-11 DIAGNOSIS — S80.822D BLISTER OF LEFT LOWER LEG, SUBSEQUENT ENCOUNTER: ICD-10-CM

## 2024-11-11 DIAGNOSIS — J02.9 SORE THROAT: ICD-10-CM

## 2024-11-11 DIAGNOSIS — E44.0 MODERATE PROTEIN-CALORIE MALNUTRITION (H): Primary | ICD-10-CM

## 2024-11-11 DIAGNOSIS — I50.32 CHRONIC DIASTOLIC CONGESTIVE HEART FAILURE (H): ICD-10-CM

## 2024-11-11 PROCEDURE — 99349 HOME/RES VST EST MOD MDM 40: CPT | Performed by: NURSE PRACTITIONER

## 2024-11-11 NOTE — LETTER
11/11/2024      Marla Dunn  C/o Deneen Dunn  [address] [facility] [facility] [address]        [facility] GERIATRICS  ACUTE/EPISODIC VISIT    [facility] Medical Record Number:  [id number]  Place of Service where encounter took place:  [facility] [place] ([facility]) [[id number]]    Chief Complaint   Patient presents with     RECHECK       HPI:    Marla Dunn is a 89 year old  (5/22/1929), who is being seen today for an episodic care visit.  HPI information obtained from: facility chart records, facility staff, patient report, and [facility] chart review.    Today's concern is:    Diagnoses         Codes Comments    Moderate protein-calorie malnutrition (H)    -  Primary E44.0     Failure to thrive in adult     R62.7     Sore throat     J02.9     Chronic diastolic congestive heart failure (H)     I50.32     Blister of left lower leg, subsequent encounter     S80.822D     Bilateral lower extremity edema     R60.0           In to see Georgia today and how she has been doing in general.  Spoke to her about possibly seeing her 2 weeks from now but she stated that she enjoys having a weekly visit for check-in of her health.    Do see and Elder Eugenio that there is a November weight of 90 pounds for Georgia.  Feel that probably good 3 to 4 pounds is probably water weight in her lower extremities.  Continues with the intact blisters bilaterally as well as a new one on the back of her left leg.  While in her apartment did pull up epic to review her last BMP report she knows that she is going to have 1 done on the 18th of this month and explained to her why.  Her potassium level was at 3.3, and her other labs showed she was a little bit drier.  Expected this because of the fact that she is having difficulty swallowing and eating.  She also is on torsemide 40 mg daily and a small dose of spironolactone.  Have not lowered those doses due to the fact that feel it is helping her with her COPD, CHF, and  [FreeTextEntry1] : patient here for annual gyn exam.\par no concerns or complaints.\par happy w/ OCP and would like to continue.\par recently got engaged and will be getting  3/2023.  probably her lower extremities.  She states she elevates her lower extremities which help as she sees the swelling go down but then it comes right back.     ALLERGIES:    Allergies   Allergen Reactions     Gramineae Pollens Other (See Comments)     ORCHARDGRASS. Shortness of breath when lawn is just cut.     Smoke. Other (See Comments)     Hard to breathe.     Pollen Extract      Other reaction(s): Unknown        MEDICATIONS:  Post Discharge Medication Reconciliation Status: patient was not discharged from an inpatient facility or TCU.     Current Outpatient Medications   Medication Sig Dispense Refill     acetaminophen (TYLENOL) 500 MG tablet Take 2 tablets (1,000 mg) by mouth every 6 hours as needed for mild pain. Has own supply and can self administer       albuterol (PROAIR HFA/PROVENTIL HFA/VENTOLIN HFA) 108 (90 Base) MCG/ACT inhaler INHALE 2 PUFFS INTO THE LUNGS 4 TIMES DAILY  (DX: CHRONIC OBSTRUCTIVE PULMONARY DISEASE) 18 g 11     amoxicillin (AMOXIL) 500 MG capsule 4 CAPSULES (2000MG ) ORALLY AS NEEDED ONE HOUR PRIOR TO DENTAL APPOINTMENT 4 capsule 5     ANTACID REGULAR STRENGTH 500 MG chewable tablet 2 TABLETS (1000MG) ORALLY 2 TIMES DAILY AS NEEDED FOR HEARTBURN 60 tablet 11     apixaban ANTICOAGULANT (ELIQUIS) 2.5 MG tablet Take 1 tablet (2.5 mg) by mouth 2 times daily       ASPIRIN LOW DOSE 81 MG EC tablet 1 TABLET ORALLY 2 TIMES DAILY (DX: PROPHYLAXIS) 180 tablet 3     atorvastatin (LIPITOR) 40 MG tablet Take 1 tablet (40 mg) by mouth daily       Bacillus Coagulans-Inulin (PROBIOTIC FORMULA) 1-250 BILLION-MG CAPS 1 CAPSULE ORALLY DAILY 31 capsule 11     bisacodyl (DULCOLAX) 5 MG EC tablet Take 1 tablet (5 mg) by mouth 2 times daily as needed for constipation       chlorhexidine (PERIDEX) 0.12 % solution Swish and spit 15 mLs in mouth 2 times daily May self administer 118 mL 3     cholecalciferol (VITAMIN D3) 125 mcg (5000 units) capsule 1 CAPSULE ORALLY DAILY 90 capsule 3     COMBIVENT RESPIMAT   MCG/ACT inhaler INHALE 1 PUFF INTO THE LUNGS ORALLY INTO THE LUNGS 4 TIMES DAILY 4 g 11     cycloSPORINE (RESTASIS) 0.05 % ophthalmic emulsion Place 1 drop into both eyes 2 times daily        demeclocycline (DECLOMYCIN) 150 MG tablet 1 TABLET ORALLY 2 TIMES DAILY 60 tablet 11     fish oil-omega-3 fatty acids 1000 MG capsule Take 2 capsules (2 g) by mouth daily       fluorometholone (FML LIQUIFILM) 0.1 % ophthalmic susp Place 1 drop Into the left eye daily        hydrocortisone 1 % CREA cream Place rectally 2 times daily as needed for itching       IBANDRONATE SODIUM PO Take 150 mg by mouth every 30 days       levothyroxine (SYNTHROID/LEVOTHROID) 75 MCG tablet 1 TABLET ORALLY EVERY MORNING ON AN EMPTY STOMACH (DX: HYPOTHYROIDISM) 30 tablet 11     loratadine (CLARITIN) 10 MG tablet 1 TABLET ORALLY DAILY (DX: ASTHMA) 28 tablet 11     melatonin 3 MG tablet 1 TABLET ORALLY AT BEDTIME ALONG WITH 5 MG FOR A TOTAL DOSE OF 8MG 30 tablet 11     melatonin 5 MG tablet 1 TABLET ORALLY AT BEDTIME  ALONG WITH 3MG FOR A TOTAL DOSE OF 8MG 30 tablet 11     metoprolol tartrate (LOPRESSOR) 25 MG tablet 1 TABLET ORALLY 2 TIMES DAILY (DX: HYPERTENSION) 56 tablet 11     mirtazapine (REMERON) 7.5 MG tablet 1 TABLET ORALLY AT BEDTIME 31 tablet 11     Multiple Vitamins-Minerals (PRESERVISION AREDS 2) CAPS 1 CAPSULE ORALLY 2 TIMES DAILY 62 capsule 11     nystatin (MYCOSTATIN) 535549 UNIT/ML suspension 5ml swish and swallow TID for 7 days.   May have in apartment to self administer 105 mL 0     omeprazole (PRILOSEC) 20 MG DR capsule 1 CAPSULE ORALLY 2 TIMES DAILY (DX: GASTROESOPHAGEAL REFLUX DISEASE) 62 capsule 11     OXYGEN-HELIUM IN every evening       polyethylene glycol 0.4%- propylene glycol 0.3% (SYSTANE ULTRA) 0.4-0.3 % SOLN ophthalmic solution Place 1 drop into both eyes 4 times daily       Polyethylene Glycol 3350 (PEG 3350) 17 GM/SCOOP POWD MIX 1 CAPFUL (17 GMS) IN 8 OUNCES WATER AND DRINK ORALLY DAILY (DX: CONSTIPATION) 510 g 11      Probiotic Product (PROBIOTIC PO) Take 1 capsule by mouth every morning       SENEXON-S 8.6-50 MG tablet 1 TABLET ORALLY 2 TIMES DAILY (DX: CONSTIPATION) 56 tablet 11     sodium chloride 1 GM tablet Take 1 tablet (1 g) by mouth daily 30 tablet 11     spironolactone (ALDACTONE) 25 MG tablet 1/2 TABLET (12.5MG) ORALLY DAILY ** HAZARDOUS MED: WEAR DOUBLE NITRILE GLOVES** 15 tablet 11     timolol (TIMOPTIC) 0.5 % ophthalmic solution Place 1 drop Into the left eye daily        torsemide (DEMADEX) 20 MG tablet 2 TABLETS (40MG) ORALLY DAILY 60 tablet 11     vitamin C (ASCORBIC ACID) 500 MG tablet 1 TABLET ORALLY DAILY (DX: SUPPLEMENT) 31 tablet 11       REVIEW OF SYSTEMS:  4 point ROS neg other than the symptoms noted above in the HPI.    PHYSICAL EXAM:  BP 99/46   Pulse 67   Temp 97.1  F (36.2  C)   Resp 18   Wt 40.8 kg (90 lb)   SpO2 (!) 88%   BMI 16.46 kg/m    Petite female sitting in her wheelchair.  Noted her fingers at the tips looked purple but then as she was talking and moving her hands more later on in the visit they were of normal color.  She also has historically poor circulation in her lower extremities where they have been deep purple before.  Elevating them seems to help fade the purple away    Bilaterally she has +3+4 edema present.  What is different about her lower extremities is that you can see the fluid clearly under the layer of skin.  Also very prominent aware that edema stops underneath her knees.  She has intact blister on the front of her right shin and then intact blister on her lateral left shin as well as a small 1 palpated on the back of her leg as well.  No compression is put to her lower extremities as feel this would ruin her skin integrity.    Heart rate is regular/irregular  Lungs are clear to auscultation  Abdomen is flat and nontender  Her weight is 90 pounds but feel that takes in the account for the amount of fluid that she is retaining in her lower extremities  And looking at  the color of Phill skin.  Her face looks more pink versus the yellow hue that was seen before suspecting something was going on with her liver function.    Component      Latest Ref Rng 11/4/2024  11:16 AM   Sodium      135 - 145 mmol/L 134 (L)    Potassium      3.4 - 5.3 mmol/L 3.3 (L)    Chloride      98 - 107 mmol/L 99    Carbon Dioxide (CO2)      22 - 29 mmol/L 20 (L)    Anion Gap      7 - 15 mmol/L 15    Urea Nitrogen      8.0 - 23.0 mg/dL 30.9 (H)    Creatinine      0.51 - 0.95 mg/dL 1.33 (H)    GFR Estimate      >60 mL/min/1.73m2 37 (L)    Calcium      8.8 - 10.4 mg/dL 8.2 (L)    Protein Total      6.4 - 8.3 g/dL 6.0 (L)    Albumin      3.5 - 5.2 g/dL 3.4 (L)    Bilirubin Total      <=1.2 mg/dL 1.2    Alkaline Phosphatase      40 - 150 U/L 123    AST      0 - 45 U/L 99 (H)    ALT      0 - 50 U/L 113 (H)    Bilirubin Direct      0.00 - 0.30 mg/dL 0.75 (H)    Glucose      70 - 99 mg/dL 94       Was seen in hematology within the last 2 weeks, Georgia stated that her hemoglobin was between 8 and 9 and received her injection      ASSESSMENT / PLAN:  (E44.0) Moderate protein-calorie malnutrition (H)  (primary encounter diagnosis)  (R62.7) Failure to thrive in adult  Comment: This note practitioner has been seeing [place] and now on a weekly basis as wanting her to be on hospice but she has been more resistant to the services.  She still wants to pursue going out to see her specialists which includes hematology and nephrology.  She stated that this morning she had a omelette for breakfast but then typically skips lunch.  Continue to encourage her to eat and drink what she can with no limitations    (J02.9) Sore throat  Comment: Did talk about the use of the nystatin swish and swallow.  She stated that she liked it for what she could absorb but because of her neck it was hard for her to bend her head back in order to take in the full amount of the nystatin solution.  She is looking into getting a different neck  pillow for her bed and she is hoping that will help solve some issues and requested to leave the door open about restarting more nystatin swish and swallow later on.  If anything feel that it soothes her throat so she can swallow a little bit better.  It is much easier for her to swallow 7-Up or ginger ale versus water    (I50.32) Chronic diastolic congestive heart failure (H)  (S80.822D) Blister of left lower leg, subsequent encounter  (R60.0) Bilateral lower extremity edema  Comment: Reviewed with Georgia today about her last labs and her potassium level at 3.3.  Wanted just to repeat a BMP in 2 weeks which would be next Monday on the 18th.  Do feel if she had to take a potassium supplement it would be very hard for her to swallow the tablet.  Probably would not like to have the liquid which technically the facility cannot administer this.  There are the sprinkle kind that could be put on applesauce or pudding, and Georgia kind of frowned at this nurse practitioner for that suggestion.  Overall her heart and her lungs sound good but her lower extremities are retaining a lot of fluid that is very transparent.  No compression is being applied to her lower extremities.  She does wear like cotton socks and then slip on sandals with straps so that the top of her feet can go through the straps.  Informed her that this nurse practitioner was not changing her diuretic doses as feeling it is helping with her breathing and her heart at this point.  Continue to monitor    Orders:  No new orders today    Electronically signed by  CASTRO Hazel CNP            Sincerely,        CASTRO Hazel CNP

## 2024-11-11 NOTE — PROGRESS NOTES
[facility] GERIATRICS  ACUTE/EPISODIC VISIT    [facility] Medical Record Number:  [id number]  Place of Service where encounter took place:  [facility] [[id number]]    Chief Complaint   Patient presents with    RECHECK       HPI:    Marla Dunn is a 89 year old  (5/22/1929), who is being seen today for an episodic care visit.  HPI information obtained from: facility chart records, facility staff, patient report, and [facility] chart review.    Today's concern is:    Diagnoses         Codes Comments    Moderate protein-calorie malnutrition (H)    -  Primary E44.0     Failure to thrive in adult     R62.7     Sore throat     J02.9     Chronic diastolic congestive heart failure (H)     I50.32     Blister of left lower leg, subsequent encounter     S80.822D     Bilateral lower extremity edema     R60.0           In to see [place] today and how she has been doing in general.  Spoke to her about possibly seeing her 2 weeks from now but she stated that she enjoys having a weekly visit for check-in of her health.    Do see and Elder Eugenio that there is a November weight of 90 pounds for [place].  Feel that probably good 3 to 4 pounds is probably water weight in her lower extremities.  Continues with the intact blisters bilaterally as well as a new one on the back of her left leg.  While in her apartment did pull up epic to review her last BMP report she knows that she is going to have 1 done on the 18th of this month and explained to her why.  Her potassium level was at 3.3, and her other labs showed she was a little bit drier.  Expected this because of the fact that she is having difficulty swallowing and eating.  She also is on torsemide 40 mg daily and a small dose of spironolactone.  Have not lowered those doses due to the fact that feel it is helping her with her COPD, CHF, and probably her lower extremities.  She states she elevates her lower extremities which help as she sees the  swelling go down but then it comes right back.     ALLERGIES:    Allergies   Allergen Reactions    Gramineae Pollens Other (See Comments)     ORCHARDGRASS. Shortness of breath when lawn is just cut.    Smoke. Other (See Comments)     Hard to breathe.    Pollen Extract      Other reaction(s): Unknown        MEDICATIONS:  Post Discharge Medication Reconciliation Status: patient was not discharged from an inpatient facility or TCU.     Current Outpatient Medications   Medication Sig Dispense Refill    acetaminophen (TYLENOL) 500 MG tablet Take 2 tablets (1,000 mg) by mouth every 6 hours as needed for mild pain. Has own supply and can self administer      albuterol (PROAIR HFA/PROVENTIL HFA/VENTOLIN HFA) 108 (90 Base) MCG/ACT inhaler INHALE 2 PUFFS INTO THE LUNGS 4 TIMES DAILY  (DX: CHRONIC OBSTRUCTIVE PULMONARY DISEASE) 18 g 11    amoxicillin (AMOXIL) 500 MG capsule 4 CAPSULES (2000MG ) ORALLY AS NEEDED ONE HOUR PRIOR TO DENTAL APPOINTMENT 4 capsule 5    ANTACID REGULAR STRENGTH 500 MG chewable tablet 2 TABLETS (1000MG) ORALLY 2 TIMES DAILY AS NEEDED FOR HEARTBURN 60 tablet 11    apixaban ANTICOAGULANT (ELIQUIS) 2.5 MG tablet Take 1 tablet (2.5 mg) by mouth 2 times daily      ASPIRIN LOW DOSE 81 MG EC tablet 1 TABLET ORALLY 2 TIMES DAILY (DX: PROPHYLAXIS) 180 tablet 3    atorvastatin (LIPITOR) 40 MG tablet Take 1 tablet (40 mg) by mouth daily      Bacillus Coagulans-Inulin (PROBIOTIC FORMULA) 1-250 BILLION-MG CAPS 1 CAPSULE ORALLY DAILY 31 capsule 11    bisacodyl (DULCOLAX) 5 MG EC tablet Take 1 tablet (5 mg) by mouth 2 times daily as needed for constipation      chlorhexidine (PERIDEX) 0.12 % solution Swish and spit 15 mLs in mouth 2 times daily May self administer 118 mL 3    cholecalciferol (VITAMIN D3) 125 mcg (5000 units) capsule 1 CAPSULE ORALLY DAILY 90 capsule 3    COMBIVENT RESPIMAT  MCG/ACT inhaler INHALE 1 PUFF INTO THE LUNGS ORALLY INTO THE LUNGS 4 TIMES DAILY 4 g 11    cycloSPORINE (RESTASIS) 0.05 %  ophthalmic emulsion Place 1 drop into both eyes 2 times daily       demeclocycline (DECLOMYCIN) 150 MG tablet 1 TABLET ORALLY 2 TIMES DAILY 60 tablet 11    fish oil-omega-3 fatty acids 1000 MG capsule Take 2 capsules (2 g) by mouth daily      fluorometholone (FML LIQUIFILM) 0.1 % ophthalmic susp Place 1 drop Into the left eye daily       hydrocortisone 1 % CREA cream Place rectally 2 times daily as needed for itching      IBANDRONATE SODIUM PO Take 150 mg by mouth every 30 days      levothyroxine (SYNTHROID/LEVOTHROID) 75 MCG tablet 1 TABLET ORALLY EVERY MORNING ON AN EMPTY STOMACH (DX: HYPOTHYROIDISM) 30 tablet 11    loratadine (CLARITIN) 10 MG tablet 1 TABLET ORALLY DAILY (DX: ASTHMA) 28 tablet 11    melatonin 3 MG tablet 1 TABLET ORALLY AT BEDTIME ALONG WITH 5 MG FOR A TOTAL DOSE OF 8MG 30 tablet 11    melatonin 5 MG tablet 1 TABLET ORALLY AT BEDTIME  ALONG WITH 3MG FOR A TOTAL DOSE OF 8MG 30 tablet 11    metoprolol tartrate (LOPRESSOR) 25 MG tablet 1 TABLET ORALLY 2 TIMES DAILY (DX: HYPERTENSION) 56 tablet 11    mirtazapine (REMERON) 7.5 MG tablet 1 TABLET ORALLY AT BEDTIME 31 tablet 11    Multiple Vitamins-Minerals (PRESERVISION AREDS 2) CAPS 1 CAPSULE ORALLY 2 TIMES DAILY 62 capsule 11    nystatin (MYCOSTATIN) 743212 UNIT/ML suspension 5ml swish and swallow TID for 7 days.   May have in apartment to self administer 105 mL 0    omeprazole (PRILOSEC) 20 MG DR capsule 1 CAPSULE ORALLY 2 TIMES DAILY (DX: GASTROESOPHAGEAL REFLUX DISEASE) 62 capsule 11    OXYGEN-HELIUM IN every evening      polyethylene glycol 0.4%- propylene glycol 0.3% (SYSTANE ULTRA) 0.4-0.3 % SOLN ophthalmic solution Place 1 drop into both eyes 4 times daily      Polyethylene Glycol 3350 (PEG 3350) 17 GM/SCOOP POWD MIX 1 CAPFUL (17 GMS) IN 8 OUNCES WATER AND DRINK ORALLY DAILY (DX: CONSTIPATION) 510 g 11    Probiotic Product (PROBIOTIC PO) Take 1 capsule by mouth every morning      SENEXON-S 8.6-50 MG tablet 1 TABLET ORALLY 2 TIMES DAILY (DX:  CONSTIPATION) 56 tablet 11    sodium chloride 1 GM tablet Take 1 tablet (1 g) by mouth daily 30 tablet 11    spironolactone (ALDACTONE) 25 MG tablet 1/2 TABLET (12.5MG) ORALLY DAILY ** HAZARDOUS MED: WEAR DOUBLE NITRILE GLOVES** 15 tablet 11    timolol (TIMOPTIC) 0.5 % ophthalmic solution Place 1 drop Into the left eye daily       torsemide (DEMADEX) 20 MG tablet 2 TABLETS (40MG) ORALLY DAILY 60 tablet 11    vitamin C (ASCORBIC ACID) 500 MG tablet 1 TABLET ORALLY DAILY (DX: SUPPLEMENT) 31 tablet 11       REVIEW OF SYSTEMS:  4 point ROS neg other than the symptoms noted above in the HPI.    PHYSICAL EXAM:  BP 99/46   Pulse 67   Temp 97.1  F (36.2  C)   Resp 18   Wt 40.8 kg (90 lb)   SpO2 (!) 88%   BMI 16.46 kg/m    Petite female sitting in her wheelchair.  Noted her fingers at the tips looked purple but then as she was talking and moving her hands more later on in the visit they were of normal color.  She also has historically poor circulation in her lower extremities where they have been deep purple before.  Elevating them seems to help fade the purple away    Bilaterally she has +3+4 edema present.  What is different about her lower extremities is that you can see the fluid clearly under the layer of skin.  Also very prominent aware that edema stops underneath her knees.  She has intact blister on the front of her right shin and then intact blister on her lateral left shin as well as a small 1 palpated on the back of her leg as well.  No compression is put to her lower extremities as feel this would ruin her skin integrity.    Heart rate is regular/irregular  Lungs are clear to auscultation  Abdomen is flat and nontender  Her weight is 90 pounds but feel that takes in the account for the amount of fluid that she is retaining in her lower extremities  And looking at the color of Phill skin.  Her face looks more pink versus the yellow hue that was seen before suspecting something was going on with her liver  function.    Component      Latest Ref Rng 11/4/2024  11:16 AM   Sodium      135 - 145 mmol/L 134 (L)    Potassium      3.4 - 5.3 mmol/L 3.3 (L)    Chloride      98 - 107 mmol/L 99    Carbon Dioxide (CO2)      22 - 29 mmol/L 20 (L)    Anion Gap      7 - 15 mmol/L 15    Urea Nitrogen      8.0 - 23.0 mg/dL 30.9 (H)    Creatinine      0.51 - 0.95 mg/dL 1.33 (H)    GFR Estimate      >60 mL/min/1.73m2 37 (L)    Calcium      8.8 - 10.4 mg/dL 8.2 (L)    Protein Total      6.4 - 8.3 g/dL 6.0 (L)    Albumin      3.5 - 5.2 g/dL 3.4 (L)    Bilirubin Total      <=1.2 mg/dL 1.2    Alkaline Phosphatase      40 - 150 U/L 123    AST      0 - 45 U/L 99 (H)    ALT      0 - 50 U/L 113 (H)    Bilirubin Direct      0.00 - 0.30 mg/dL 0.75 (H)    Glucose      70 - 99 mg/dL 94       Was seen in hematology within the last 2 weeks, [place] stated that her hemoglobin was between 8 and 9 and received her injection      ASSESSMENT / PLAN:  (E44.0) Moderate protein-calorie malnutrition (H)  (primary encounter diagnosis)  (R62.7) Failure to thrive in adult  Comment: This note practitioner has been seeing [place] and now on a weekly basis as wanting her to be on hospice but she has been more resistant to the services.  She still wants to pursue going out to see her specialists which includes hematology and nephrology.  She stated that this morning she had a omelette for breakfast but then typically skips lunch.  Continue to encourage her to eat and drink what she can with no limitations    (J02.9) Sore throat  Comment: Did talk about the use of the nystatin swish and swallow.  She stated that she liked it for what she could absorb but because of her neck it was hard for her to bend her head back in order to take in the full amount of the nystatin solution.  She is looking into getting a different neck pillow for her bed and she is hoping that will help solve some issues and requested to leave the door open about restarting more nystatin swish and  swallow later on.  If anything feel that it soothes her throat so she can swallow a little bit better.  It is much easier for her to swallow 7-Up or ginger ale versus water    (I50.32) Chronic diastolic congestive heart failure (H)  (S80.822D) Blister of left lower leg, subsequent encounter  (R60.0) Bilateral lower extremity edema  Comment: Reviewed with [place] today about her last labs and her potassium level at 3.3.  Wanted just to repeat a BMP in 2 weeks which would be next Monday on the 18th.  Do feel if she had to take a potassium supplement it would be very hard for her to swallow the tablet.  Probably would not like to have the liquid which technically the facility cannot administer this.  There are the sprinkle kind that could be put on applesauce or pudding, and [place] kind of frowned at this nurse practitioner for that suggestion.  Overall her heart and her lungs sound good but her lower extremities are retaining a lot of fluid that is very transparent.  No compression is being applied to her lower extremities.  She does wear like cotton socks and then slip on sandals with straps so that the top of her feet can go through the straps.  Informed her that this nurse practitioner was not changing her diuretic doses as feeling it is helping with her breathing and her heart at this point.  Continue to monitor    Orders:  No new orders today    Electronically signed by  CASTRO Hazel CNP

## 2024-11-13 ENCOUNTER — LAB REQUISITION (OUTPATIENT)
Dept: LAB | Facility: CLINIC | Age: 89
End: 2024-11-13
Payer: COMMERCIAL

## 2024-11-13 DIAGNOSIS — N18.9 CHRONIC KIDNEY DISEASE, UNSPECIFIED: ICD-10-CM

## 2024-11-13 DIAGNOSIS — I50.9 HEART FAILURE, UNSPECIFIED (H): ICD-10-CM

## 2024-11-17 ENCOUNTER — TELEPHONE (OUTPATIENT)
Dept: GERIATRICS | Facility: CLINIC | Age: 89
End: 2024-11-17
Payer: COMMERCIAL

## 2024-11-17 NOTE — TELEPHONE ENCOUNTER
Resident has had 2 falls now over the last 72 hours.  Nursing suggesting to her to have more services but [place] declines as she does not think she has the money for it.    She would qualify for hospice care to help out but she does not want hospice.      Can't get a O2 reading as fingers are blue.  Already aware.      Nursing has put out call to the niece and her supervisor as well.    CASTRO Hazel CNP

## 2024-11-18 ENCOUNTER — ASSISTED LIVING VISIT (OUTPATIENT)
Dept: GERIATRICS | Facility: CLINIC | Age: 89
End: 2024-11-18
Payer: COMMERCIAL

## 2024-11-18 VITALS
HEART RATE: 74 BPM | DIASTOLIC BLOOD PRESSURE: 71 MMHG | RESPIRATION RATE: 19 BRPM | OXYGEN SATURATION: 99 % | WEIGHT: 90 LBS | SYSTOLIC BLOOD PRESSURE: 132 MMHG | TEMPERATURE: 98.1 F | BODY MASS INDEX: 16.46 KG/M2

## 2024-11-18 DIAGNOSIS — I50.32 CHRONIC DIASTOLIC CONGESTIVE HEART FAILURE (H): ICD-10-CM

## 2024-11-18 DIAGNOSIS — M62.81 GENERALIZED MUSCLE WEAKNESS: ICD-10-CM

## 2024-11-18 DIAGNOSIS — R60.0 BILATERAL LOWER EXTREMITY EDEMA: ICD-10-CM

## 2024-11-18 DIAGNOSIS — I10 PRIMARY HYPERTENSION: Primary | ICD-10-CM
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
## 2024-11-18 DIAGNOSIS — I48.91 ATRIAL FIBRILLATION WITH RAPID VENTRICULAR RESPONSE (H): ICD-10-CM

## 2024-11-18 DIAGNOSIS — S80.822D BLISTER OF LEFT LOWER LEG, SUBSEQUENT ENCOUNTER: ICD-10-CM

## 2024-11-18 LAB
ALBUMIN SERPL BCG-MCNC: 3.3 G/DL (ref 3.5–5.2)
ALP SERPL-CCNC: 181 U/L (ref 40–150)
ALT SERPL W P-5'-P-CCNC: 218 U/L (ref 0–50)
ANION GAP SERPL CALCULATED.3IONS-SCNC: 16 MMOL/L (ref 7–15)
AST SERPL W P-5'-P-CCNC: 237 U/L (ref 0–45)
BILIRUB DIRECT SERPL-MCNC: 1.57 MG/DL (ref 0–0.3)
BILIRUB SERPL-MCNC: 2.2 MG/DL
BUN SERPL-MCNC: 25.7 MG/DL (ref 8–23)
CALCIUM SERPL-MCNC: 8 MG/DL (ref 8.8–10.4)
CHLORIDE SERPL-SCNC: 97 MMOL/L (ref 98–107)
CREAT SERPL-MCNC: 1.3 MG/DL (ref 0.51–0.95)
EGFRCR SERPLBLD CKD-EPI 2021: 38 ML/MIN/1.73M2
GLUCOSE SERPL-MCNC: 84 MG/DL (ref 70–99)
HCO3 SERPL-SCNC: 20 MMOL/L (ref 22–29)
HGB BLD-MCNC: 8.6 G/DL (ref 11.7–15.7)
POTASSIUM SERPL-SCNC: 2.7 MMOL/L (ref 3.4–5.3)
PROT SERPL-MCNC: 5.8 G/DL (ref 6.4–8.3)
SODIUM SERPL-SCNC: 133 MMOL/L (ref 135–145)

## 2024-11-18 PROCEDURE — 80053 COMPREHEN METABOLIC PANEL: CPT | Mod: ORL | Performed by: NURSE PRACTITIONER

## 2024-11-18 PROCEDURE — 82248 BILIRUBIN DIRECT: CPT | Mod: ORL | Performed by: NURSE PRACTITIONER

## 2024-11-18 PROCEDURE — P9604 ONE-WAY ALLOW PRORATED TRIP: HCPCS | Mod: ORL | Performed by: NURSE PRACTITIONER

## 2024-11-18 PROCEDURE — 36415 COLL VENOUS BLD VENIPUNCTURE: CPT | Mod: ORL | Performed by: NURSE PRACTITIONER

## 2024-11-18 PROCEDURE — 85018 HEMOGLOBIN: CPT | Mod: ORL | Performed by: NURSE PRACTITIONER

## 2024-11-18 PROCEDURE — 99349 HOME/RES VST EST MOD MDM 40: CPT | Performed by: NURSE PRACTITIONER

## 2024-11-18 NOTE — PROGRESS NOTES
[facility] GERIATRICS  ACUTE/EPISODIC VISIT    [facility] Medical Record Number:  [id number]  Place of Service where encounter took place:  [facility]) [[id number]]    Chief Complaint   Patient presents with    RECHECK       HPI:    Marla Dunn is a 89 year old  (5/22/1929), who is being seen today for an episodic care visit.  HPI information obtained from: {FGS HPI:[id number]}.    Today's concern is:      ALLERGIES:    Allergies   Allergen Reactions    Gramineae Pollens Other (See Comments)     ORCHARDGRASS. Shortness of breath when lawn is just cut.    Smoke. Other (See Comments)     Hard to breathe.    Pollen Extract      Other reaction(s): Unknown        MEDICATIONS:  Post Discharge Medication Reconciliation Status: {ACO Med Rec (Provider):[id number]}. ***    Current Outpatient Medications   Medication Sig Dispense Refill    acetaminophen (TYLENOL) 500 MG tablet Take 2 tablets (1,000 mg) by mouth every 6 hours as needed for mild pain. Has own supply and can self administer      albuterol (PROAIR HFA/PROVENTIL HFA/VENTOLIN HFA) 108 (90 Base) MCG/ACT inhaler INHALE 2 PUFFS INTO THE LUNGS 4 TIMES DAILY  (DX: CHRONIC OBSTRUCTIVE PULMONARY DISEASE) 18 g 11    amoxicillin (AMOXIL) 500 MG capsule 4 CAPSULES (2000MG ) ORALLY AS NEEDED ONE HOUR PRIOR TO DENTAL APPOINTMENT 4 capsule 5    ANTACID REGULAR STRENGTH 500 MG chewable tablet 2 TABLETS (1000MG) ORALLY 2 TIMES DAILY AS NEEDED FOR HEARTBURN 60 tablet 11    apixaban ANTICOAGULANT (ELIQUIS) 2.5 MG tablet Take 1 tablet (2.5 mg) by mouth 2 times daily      ASPIRIN LOW DOSE 81 MG EC tablet 1 TABLET ORALLY 2 TIMES DAILY (DX: PROPHYLAXIS) 180 tablet 3    atorvastatin (LIPITOR) 40 MG tablet Take 1 tablet (40 mg) by mouth daily      Bacillus Coagulans-Inulin (PROBIOTIC FORMULA) 1-250 BILLION-MG CAPS 1 CAPSULE ORALLY DAILY 31 capsule 11    bisacodyl (DULCOLAX) 5 MG EC tablet Take 1 tablet (5 mg) by mouth 2 times daily as needed for constipation       chlorhexidine (PERIDEX) 0.12 % solution Swish and spit 15 mLs in mouth 2 times daily May self administer 118 mL 3    cholecalciferol (VITAMIN D3) 125 mcg (5000 units) capsule 1 CAPSULE ORALLY DAILY 90 capsule 3    COMBIVENT RESPIMAT  MCG/ACT inhaler INHALE 1 PUFF INTO THE LUNGS ORALLY INTO THE LUNGS 4 TIMES DAILY 4 g 11    cycloSPORINE (RESTASIS) 0.05 % ophthalmic emulsion Place 1 drop into both eyes 2 times daily       demeclocycline (DECLOMYCIN) 150 MG tablet 1 TABLET ORALLY 2 TIMES DAILY 60 tablet 11    fish oil-omega-3 fatty acids 1000 MG capsule Take 2 capsules (2 g) by mouth daily      fluorometholone (FML LIQUIFILM) 0.1 % ophthalmic susp Place 1 drop Into the left eye daily       hydrocortisone 1 % CREA cream Place rectally 2 times daily as needed for itching      IBANDRONATE SODIUM PO Take 150 mg by mouth every 30 days      levothyroxine (SYNTHROID/LEVOTHROID) 75 MCG tablet 1 TABLET ORALLY EVERY MORNING ON AN EMPTY STOMACH (DX: HYPOTHYROIDISM) 30 tablet 11    loratadine (CLARITIN) 10 MG tablet 1 TABLET ORALLY DAILY (DX: ASTHMA) 28 tablet 11    melatonin 3 MG tablet 1 TABLET ORALLY AT BEDTIME ALONG WITH 5 MG FOR A TOTAL DOSE OF 8MG 30 tablet 11    melatonin 5 MG tablet 1 TABLET ORALLY AT BEDTIME  ALONG WITH 3MG FOR A TOTAL DOSE OF 8MG 30 tablet 11    metoprolol tartrate (LOPRESSOR) 25 MG tablet 1 TABLET ORALLY 2 TIMES DAILY (DX: HYPERTENSION) 56 tablet 11    mirtazapine (REMERON) 7.5 MG tablet 1 TABLET ORALLY AT BEDTIME 31 tablet 11    Multiple Vitamins-Minerals (PRESERVISION AREDS 2) CAPS 1 CAPSULE ORALLY 2 TIMES DAILY 62 capsule 11    nystatin (MYCOSTATIN) 373286 UNIT/ML suspension 5ml swish and swallow TID for 7 days.   May have in apartment to self administer 105 mL 0    omeprazole (PRILOSEC) 20 MG DR capsule 1 CAPSULE ORALLY 2 TIMES DAILY (DX: GASTROESOPHAGEAL REFLUX DISEASE) 62 capsule 11    OXYGEN-HELIUM IN every evening      polyethylene glycol 0.4%- propylene glycol 0.3% (SYSTANE ULTRA) 0.4-0.3  % SOLN ophthalmic solution Place 1 drop into both eyes 4 times daily      Polyethylene Glycol 3350 (PEG 3350) 17 GM/SCOOP POWD MIX 1 CAPFUL (17 GMS) IN 8 OUNCES WATER AND DRINK ORALLY DAILY (DX: CONSTIPATION) 510 g 11    Probiotic Product (PROBIOTIC PO) Take 1 capsule by mouth every morning      SENEXON-S 8.6-50 MG tablet 1 TABLET ORALLY 2 TIMES DAILY (DX: CONSTIPATION) 56 tablet 11    sodium chloride 1 GM tablet Take 1 tablet (1 g) by mouth daily 30 tablet 11    spironolactone (ALDACTONE) 25 MG tablet 1/2 TABLET (12.5MG) ORALLY DAILY ** HAZARDOUS MED: WEAR DOUBLE NITRILE GLOVES** 15 tablet 11    timolol (TIMOPTIC) 0.5 % ophthalmic solution Place 1 drop Into the left eye daily       torsemide (DEMADEX) 20 MG tablet 2 TABLETS (40MG) ORALLY DAILY 60 tablet 11    vitamin C (ASCORBIC ACID) 500 MG tablet 1 TABLET ORALLY DAILY (DX: SUPPLEMENT) 31 tablet 11     Medications reviewed:  Medications reconciled to facility chart and changes were made to reflect current medications as identified as above med list. Below are the changes that were made:   Medications stopped since last EPIC medication reconciliation:   There are no discontinued medications.    Medications started since last [facility] medication reconciliation:  No orders of the defined types were placed in this encounter.    ***    REVIEW OF SYSTEMS:  4 point ROS neg other than the symptoms noted above in the HPI.***  Unable to be obtained due to cognitive impairment or aphasia.   ROS    PHYSICAL EXAM:  /71   Pulse 74   Temp 98.1  F (36.7  C)   Resp 19   Wt 40.8 kg (90 lb)   SpO2 99%   BMI 16.46 kg/m    Physical Exam      ASSESSMENT / PLAN:  {FGS DX:718483}    Orders:  ***    Electronically signed by  Julieta Sky         tablet 11    timolol (TIMOPTIC) 0.5 % ophthalmic solution Place 1 drop Into the left eye daily       torsemide (DEMADEX) 20 MG tablet 2 TABLETS (40MG) ORALLY DAILY 60 tablet 11    vitamin C (ASCORBIC ACID) 500 MG tablet 1 TABLET ORALLY DAILY (DX: SUPPLEMENT) 31 tablet 11         REVIEW OF SYSTEMS:  4 point ROS neg other than the symptoms noted above in the HPI.      PHYSICAL EXAM:  /71   Pulse 74   Temp 98.1  F (36.7  C)   Resp 19   Wt 40.8 kg (90 lb)   SpO2 99%   BMI 16.46 kg/m    Petite female sitting up in her wheelchair.  Alert but can tell she is tired and her neck bothering her.    Skin is dry and warm but has a yellow hue to it.  Lower extremities are red/purple in color.  Heart rate regular/irregular and strong.  Continues with bilateral leg edema that is prominent from knee down to feet.  Blister intact on left lateral side.  No wraps on legs as skin is so thin and feel it would bust open her legs.  Lungs are diminished.  No SOB.  Abdomen is soft and non-tender, flat.  Propels self around with her wheelchair.  If she walks, very minimal.      Component      Latest Ref Rng 11/4/2024  11:16 AM   Sodium      135 - 145 mmol/L 134 (L)    Potassium      3.4 - 5.3 mmol/L 3.3 (L)    Chloride      98 - 107 mmol/L 99    Carbon Dioxide (CO2)      22 - 29 mmol/L 20 (L)    Anion Gap      7 - 15 mmol/L 15    Urea Nitrogen      8.0 - 23.0 mg/dL 30.9 (H)    Creatinine      0.51 - 0.95 mg/dL 1.33 (H)    GFR Estimate      >60 mL/min/1.73m2 37 (L)    Calcium      8.8 - 10.4 mg/dL 8.2 (L)    Protein Total      6.4 - 8.3 g/dL 6.0 (L)    Albumin      3.5 - 5.2 g/dL 3.4 (L)    Bilirubin Total      <=1.2 mg/dL 1.2    Alkaline Phosphatase      40 - 150 U/L 123    AST      0 - 45 U/L 99 (H)    ALT      0 - 50 U/L 113 (H)    Bilirubin Direct      0.00 - 0.30 mg/dL 0.75 (H)    Glucose      70 - 99 mg/dL 94           ASSESSMENT / PLAN:  (I10) Primary hypertension  (primary encounter diagnosis)  Comment: blood pressures  range from 's/60-70's.  Will lower the Metoprolol to 12.5mg BID.  Staying on her diuretics.  Vitals done periodically not that she has had the falls and changing in health.    (I48.91) Atrial fibrillation with rapid ventricular response (H)  Comment: remains on ASA 81mg daily and Eliquis 2.5mg BID  no signs of bleeding.  No c/o heart palpitations.      (I50.32) Chronic diastolic congestive heart failure (H)  (R60.0) Bilateral lower extremity edema  (S80.822D) Blister of left lower leg, subsequent encounter  Comment: remains on Torsemide 40mg po daily and spironolactone 12.5mg daily.  Awaiting labs that were to be done today to follow up with her K+ level to see if it went up or down.  Did not want to add K+ to her regimen as it is hard for her to swallow.    (M62.81) Generalized muscle weakness  (W19.XXXA) Fall, initial encounter  Comment: becoming weaker but fighting having help.  Everyone talks to her and asks her as do not want her to fall and break a bone.  Not going down to eat anymore.  Food brought to her and typically easy swallowing items like oatmeal or eggs.  Remind her to use her pendant for help and she shows where she has it under her sweatshirt.       Orders:   Decrease metoprolol from 25mg BID to 12.5mg po BID for HTN    Electronically signed by  CASTRO Hazel CNP

## 2024-11-18 NOTE — LETTER
11/18/2024      Marla Dunn  C/o Deneen Dunn  [address] [place], [place] [id number]        No notes on file      Sincerely,        CASTRO aHzel CNP

## 2024-11-18 NOTE — LETTER
11/18/2024      Marla Dunn  C/o Deneen Dunn  [address]  [facility] [address]        [facility] GERIATRICS  ACUTE/EPISODIC VISIT    [facility] Medical Record Number:  [id number]  Place of Service where encounter took place:  [facility] [[id number]]    Chief Complaint   Patient presents with     RECHECK       HPI:    Marla Dunn is a 89 year old  (5/22/1929), who is being seen today for an episodic care visit.  HPI information obtained from: facility chart records, facility staff, patient report, and [facility] chart review.    Today's concern is:    Diagnoses         Codes Comments    Primary hypertension    -  Primary I10     Atrial fibrillation with rapid ventricular response (H)     I48.91     Chronic diastolic congestive heart failure (H)     I50.32     Bilateral lower extremity edema     R60.0     Blister of left lower leg, subsequent encounter     S80.822D     Generalized muscle weakness     M62.81     Fall, initial encounter     W19.XXXA           Came to see Georgia today as seeing her weekly minimally now as she is getting weaker and needing more help if she will take it.  She is fighting hard to still do things herself.    Happen to look at her Blood pressures before going up to see her and note that pressures have come down and could use a dose reduction.    She takes Metoprolol 25mg twice a day and so wrote the order right away to lower to 12.5mg BID.      It is noted in the nurses notes that Georgia had 2 falls over last three days.  Staff suggest to her about getting AM and PM help with cares but she still refuses due to cost.  Hospice was still brought up and she wants to still go out for her injections for the anemia.    Feel Georgia wants to pass away in her sleep without having staff fuss over her.  Seems what she wants, she does not get her prayers answered.          ALLERGIES:    Allergies   Allergen Reactions     Gramineae Pollens Other (See  Comments)     ORCHARDGRASS. Shortness of breath when lawn is just cut.     Smoke. Other (See Comments)     Hard to breathe.     Pollen Extract      Other reaction(s): Unknown        MEDICATIONS:  Post Discharge Medication Reconciliation Status: patient was not discharged from an inpatient facility or TCU.     Current Outpatient Medications   Medication Sig Dispense Refill     acetaminophen (TYLENOL) 500 MG tablet Take 2 tablets (1,000 mg) by mouth every 6 hours as needed for mild pain. Has own supply and can self administer       albuterol (PROAIR HFA/PROVENTIL HFA/VENTOLIN HFA) 108 (90 Base) MCG/ACT inhaler INHALE 2 PUFFS INTO THE LUNGS 4 TIMES DAILY  (DX: CHRONIC OBSTRUCTIVE PULMONARY DISEASE) 18 g 11     amoxicillin (AMOXIL) 500 MG capsule 4 CAPSULES (2000MG ) ORALLY AS NEEDED ONE HOUR PRIOR TO DENTAL APPOINTMENT 4 capsule 5     ANTACID REGULAR STRENGTH 500 MG chewable tablet 2 TABLETS (1000MG) ORALLY 2 TIMES DAILY AS NEEDED FOR HEARTBURN 60 tablet 11     apixaban ANTICOAGULANT (ELIQUIS) 2.5 MG tablet Take 1 tablet (2.5 mg) by mouth 2 times daily       ASPIRIN LOW DOSE 81 MG EC tablet 1 TABLET ORALLY 2 TIMES DAILY (DX: PROPHYLAXIS) 180 tablet 3     atorvastatin (LIPITOR) 40 MG tablet Take 1 tablet (40 mg) by mouth daily       Bacillus Coagulans-Inulin (PROBIOTIC FORMULA) 1-250 BILLION-MG CAPS 1 CAPSULE ORALLY DAILY 31 capsule 11     bisacodyl (DULCOLAX) 5 MG EC tablet Take 1 tablet (5 mg) by mouth 2 times daily as needed for constipation       chlorhexidine (PERIDEX) 0.12 % solution Swish and spit 15 mLs in mouth 2 times daily May self administer 118 mL 3     cholecalciferol (VITAMIN D3) 125 mcg (5000 units) capsule 1 CAPSULE ORALLY DAILY 90 capsule 3     COMBIVENT RESPIMAT  MCG/ACT inhaler INHALE 1 PUFF INTO THE LUNGS ORALLY INTO THE LUNGS 4 TIMES DAILY 4 g 11     cycloSPORINE (RESTASIS) 0.05 % ophthalmic emulsion Place 1 drop into both eyes 2 times daily        demeclocycline (DECLOMYCIN) 150 MG tablet 1  TABLET ORALLY 2 TIMES DAILY 60 tablet 11     fish oil-omega-3 fatty acids 1000 MG capsule Take 2 capsules (2 g) by mouth daily       fluorometholone (FML LIQUIFILM) 0.1 % ophthalmic susp Place 1 drop Into the left eye daily        hydrocortisone 1 % CREA cream Place rectally 2 times daily as needed for itching       IBANDRONATE SODIUM PO Take 150 mg by mouth every 30 days       levothyroxine (SYNTHROID/LEVOTHROID) 75 MCG tablet 1 TABLET ORALLY EVERY MORNING ON AN EMPTY STOMACH (DX: HYPOTHYROIDISM) 30 tablet 11     loratadine (CLARITIN) 10 MG tablet 1 TABLET ORALLY DAILY (DX: ASTHMA) 28 tablet 11     melatonin 3 MG tablet 1 TABLET ORALLY AT BEDTIME ALONG WITH 5 MG FOR A TOTAL DOSE OF 8MG 30 tablet 11     melatonin 5 MG tablet 1 TABLET ORALLY AT BEDTIME  ALONG WITH 3MG FOR A TOTAL DOSE OF 8MG 30 tablet 11     metoprolol tartrate (LOPRESSOR) 25 MG tablet 12.5 TABLET ORALLY 2 TIMES DAILY (DX: HYPERTENSION) 56 tablet 11     mirtazapine (REMERON) 7.5 MG tablet 1 TABLET ORALLY AT BEDTIME 31 tablet 11     Multiple Vitamins-Minerals (PRESERVISION AREDS 2) CAPS 1 CAPSULE ORALLY 2 TIMES DAILY 62 capsule 11     nystatin (MYCOSTATIN) 838777 UNIT/ML suspension 5ml swish and swallow TID for 7 days.   May have in apartment to self administer 105 mL 0     omeprazole (PRILOSEC) 20 MG DR capsule 1 CAPSULE ORALLY 2 TIMES DAILY (DX: GASTROESOPHAGEAL REFLUX DISEASE) 62 capsule 11     OXYGEN-HELIUM IN every evening       polyethylene glycol 0.4%- propylene glycol 0.3% (SYSTANE ULTRA) 0.4-0.3 % SOLN ophthalmic solution Place 1 drop into both eyes 4 times daily       Polyethylene Glycol 3350 (PEG 3350) 17 GM/SCOOP POWD MIX 1 CAPFUL (17 GMS) IN 8 OUNCES WATER AND DRINK ORALLY DAILY (DX: CONSTIPATION) 510 g 11     Probiotic Product (PROBIOTIC PO) Take 1 capsule by mouth every morning       SENEXON-S 8.6-50 MG tablet 1 TABLET ORALLY 2 TIMES DAILY (DX: CONSTIPATION) 56 tablet 11     sodium chloride 1 GM tablet Take 1 tablet (1 g) by mouth daily  30 tablet 11     spironolactone (ALDACTONE) 25 MG tablet 1/2 TABLET (12.5MG) ORALLY DAILY ** HAZARDOUS MED: WEAR DOUBLE NITRILE GLOVES** 15 tablet 11     timolol (TIMOPTIC) 0.5 % ophthalmic solution Place 1 drop Into the left eye daily        torsemide (DEMADEX) 20 MG tablet 2 TABLETS (40MG) ORALLY DAILY 60 tablet 11     vitamin C (ASCORBIC ACID) 500 MG tablet 1 TABLET ORALLY DAILY (DX: SUPPLEMENT) 31 tablet 11         REVIEW OF SYSTEMS:  4 point ROS neg other than the symptoms noted above in the HPI.      PHYSICAL EXAM:  /71   Pulse 74   Temp 98.1  F (36.7  C)   Resp 19   Wt 40.8 kg (90 lb)   SpO2 99%   BMI 16.46 kg/m    Petite female sitting up in her wheelchair.  Alert but can tell she is tired and her neck bothering her.    Skin is dry and warm but has a yellow hue to it.  Lower extremities are red/purple in color.  Heart rate regular/irregular and strong.  Continues with bilateral leg edema that is prominent from knee down to feet.  Blister intact on left lateral side.  No wraps on legs as skin is so thin and feel it would bust open her legs.  Lungs are diminished.  No SOB.  Abdomen is soft and non-tender, flat.  Propels self around with her wheelchair.  If she walks, very minimal.      Component      Latest Ref Rng 11/4/2024  11:16 AM   Sodium      135 - 145 mmol/L 134 (L)    Potassium      3.4 - 5.3 mmol/L 3.3 (L)    Chloride      98 - 107 mmol/L 99    Carbon Dioxide (CO2)      22 - 29 mmol/L 20 (L)    Anion Gap      7 - 15 mmol/L 15    Urea Nitrogen      8.0 - 23.0 mg/dL 30.9 (H)    Creatinine      0.51 - 0.95 mg/dL 1.33 (H)    GFR Estimate      >60 mL/min/1.73m2 37 (L)    Calcium      8.8 - 10.4 mg/dL 8.2 (L)    Protein Total      6.4 - 8.3 g/dL 6.0 (L)    Albumin      3.5 - 5.2 g/dL 3.4 (L)    Bilirubin Total      <=1.2 mg/dL 1.2    Alkaline Phosphatase      40 - 150 U/L 123    AST      0 - 45 U/L 99 (H)    ALT      0 - 50 U/L 113 (H)    Bilirubin Direct      0.00 - 0.30 mg/dL 0.75 (H)     Glucose      70 - 99 mg/dL 94           ASSESSMENT / PLAN:  (I10) Primary hypertension  (primary encounter diagnosis)  Comment: blood pressures range from 's/60-70's.  Will lower the Metoprolol to 12.5mg BID.  Staying on her diuretics.  Vitals done periodically not that she has had the falls and changing in health.    (I48.91) Atrial fibrillation with rapid ventricular response (H)  Comment: remains on ASA 81mg daily and Eliquis 2.5mg BID  no signs of bleeding.  No c/o heart palpitations.      (I50.32) Chronic diastolic congestive heart failure (H)  (R60.0) Bilateral lower extremity edema  (S80.822D) Blister of left lower leg, subsequent encounter  Comment: remains on Torsemide 40mg po daily and spironolactone 12.5mg daily.  Awaiting labs that were to be done today to follow up with her K+ level to see if it went up or down.  Did not want to add K+ to her regimen as it is hard for her to swallow.    (M62.81) Generalized muscle weakness  (W19.XXXA) Fall, initial encounter  Comment: becoming weaker but fighting having help.  Everyone talks to her and asks her as do not want her to fall and break a bone.  Not going down to eat anymore.  Food brought to her and typically easy swallowing items like oatmeal or eggs.  Remind her to use her pendant for help and she shows where she has it under her sweatshirt.       Orders:   Decrease metoprolol from 25mg BID to 12.5mg po BID for HTN    Electronically signed by  CASTRO Hazel CNP            Sincerely,        CASTRO Hazel CNP

## 2024-11-19 DIAGNOSIS — E87.6 HYPOKALEMIA: Primary | ICD-10-CM

## 2024-11-19 RX ORDER — POTASSIUM CHLORIDE 1.5 G/1.58G
20 POWDER, FOR SOLUTION ORAL 2 TIMES DAILY
Qty: 60 PACKET | Refills: 1 | Status: SHIPPED | OUTPATIENT
Start: 2024-11-19

## 2024-11-19 NOTE — PROGRESS NOTES
Component      Latest Ref Rng 11/18/2024  10:06 AM   Sodium      135 - 145 mmol/L 133 (L)    Potassium      3.4 - 5.3 mmol/L 2.7 (L)    Chloride      98 - 107 mmol/L 97 (L)    Carbon Dioxide (CO2)      22 - 29 mmol/L 20 (L)    Anion Gap      7 - 15 mmol/L 16 (H)    Urea Nitrogen      8.0 - 23.0 mg/dL 25.7 (H)    Creatinine      0.51 - 0.95 mg/dL 1.30 (H)    GFR Estimate      >60 mL/min/1.73m2 38 (L)    Calcium      8.8 - 10.4 mg/dL 8.0 (L)    Protein Total      6.4 - 8.3 g/dL 5.8 (L)    Albumin      3.5 - 5.2 g/dL 3.3 (L)    Bilirubin Total      <=1.2 mg/dL 2.2 (H)    Alkaline Phosphatase      40 - 150 U/L 181 (H)    AST      0 - 45 U/L 237 (H)    ALT      0 - 50 U/L 218 (H)    Bilirubin Direct      0.00 - 0.30 mg/dL 1.57 (H)    Glucose      70 - 99 mg/dL 84         Starting on K+ 20meq packets BID - can mix with ginger ale  IF SHE WILL TAKE IT.       Her liver function tests have risen.  She is weaker.  She wants to pass away so badly.  Will look at her medications as there are many and will remove some of them today.      ORDERS:    Discontinue:  Atorvastatin  ASA BID  Ibandronate 150mg   Omega -3 fish oil  Spironolactone  Vitamin C and D3      Starting on K+ 20meq packets BID - can mix with ginger ale  IF SHE WILL TAKE IT.       WILL text the niece to let her know of changes.    Gabrielle Wallace, CASTRO CNP

## 2024-11-20 DIAGNOSIS — R23.8 SKIN IRRITATION: Primary | ICD-10-CM

## 2024-11-20 DIAGNOSIS — K59.00 CONSTIPATION, UNSPECIFIED CONSTIPATION TYPE: ICD-10-CM

## 2024-11-20 RX ORDER — ANORECTAL OINTMENT 15.7; .44; 24; 20.6 G/100G; G/100G; G/100G; G/100G
OINTMENT TOPICAL
Qty: 113 G | Refills: 11 | Status: SHIPPED | OUTPATIENT
Start: 2024-11-20

## 2024-11-20 RX ORDER — POLYETHYLENE GLYCOL 3350 17 G/17G
POWDER, FOR SOLUTION ORAL
Qty: 1020 G | Refills: 11 | Status: SHIPPED | OUTPATIENT
Start: 2024-11-20

## 2024-11-21 ENCOUNTER — TELEPHONE (OUTPATIENT)
Dept: GERIATRICS | Facility: CLINIC | Age: 89
End: 2024-11-21
Payer: COMMERCIAL

## 2024-11-21 ENCOUNTER — DOCUMENTATION ONLY (OUTPATIENT)
Dept: GERIATRICS | Facility: CLINIC | Age: 89
End: 2024-11-21
Payer: COMMERCIAL

## 2024-11-21 DIAGNOSIS — R19.5 LOOSE STOOLS: Primary | ICD-10-CM

## 2024-11-21 DIAGNOSIS — I10 PRIMARY HYPERTENSION: Primary | ICD-10-CM

## 2024-11-21 RX ORDER — METOPROLOL TARTRATE 25 MG/1
12.5 TABLET, FILM COATED ORAL 2 TIMES DAILY
COMMUNITY
Start: 2024-11-18 | End: 2024-11-21

## 2024-11-21 RX ORDER — METOPROLOL TARTRATE 25 MG/1
12.5 TABLET, FILM COATED ORAL 2 TIMES DAILY
Qty: 31 TABLET | Refills: 11 | Status: SHIPPED | OUTPATIENT
Start: 2024-11-21

## 2024-11-21 RX ORDER — LOPERAMIDE HYDROCHLORIDE 2 MG/1
TABLET ORAL
Qty: 20 TABLET | Refills: 4 | Status: SHIPPED | OUTPATIENT
Start: 2024-11-21

## 2024-11-21 NOTE — TELEPHONE ENCOUNTER
Nursing called to ask for imodium for Georgia as she called the lead nurse crying on the phone that she has been sitting on the toilet for the last 1 1/2 hours and her bottom is sore.      She has said this to this NP before and it was constipation.  Asked nursing to clarify her needs but will send a order for imodium to pharmacy and nursing.    Orders:  Imodium 2mg po after each loose stools PRN with max of 6 tabs in 24 hours for loose stools    CASTRO Hazel CNP

## 2024-11-25 ENCOUNTER — ASSISTED LIVING VISIT (OUTPATIENT)
Dept: GERIATRICS | Facility: CLINIC | Age: 89
End: 2024-11-25
Payer: COMMERCIAL

## 2024-11-25 VITALS
OXYGEN SATURATION: 99 % | RESPIRATION RATE: 19 BRPM | BODY MASS INDEX: 16.46 KG/M2 | SYSTOLIC BLOOD PRESSURE: 132 MMHG | TEMPERATURE: 98.1 F | DIASTOLIC BLOOD PRESSURE: 71 MMHG | HEART RATE: 74 BPM | WEIGHT: 90 LBS

## 2024-11-25 DIAGNOSIS — R42 DIZZINESS: ICD-10-CM

## 2024-11-25 DIAGNOSIS — Z51.5 HOSPICE CARE PATIENT: ICD-10-CM

## 2024-11-25 DIAGNOSIS — R62.7 FAILURE TO THRIVE IN ADULT: ICD-10-CM

## 2024-11-25 DIAGNOSIS — S81.802D OPEN WOUND OF LEFT LOWER EXTREMITY, SUBSEQUENT ENCOUNTER: ICD-10-CM

## 2024-11-25 DIAGNOSIS — I99.9: Primary | ICD-10-CM

## 2024-11-25 DIAGNOSIS — K72.00 ACUTE LIVER FAILURE WITHOUT HEPATIC COMA: ICD-10-CM

## 2024-11-25 PROCEDURE — 99349 HOME/RES VST EST MOD MDM 40: CPT | Performed by: NURSE PRACTITIONER

## 2024-11-25 RX ORDER — ACETAMINOPHEN 650 MG/1
650 SUPPOSITORY RECTAL EVERY 6 HOURS PRN
Status: SHIPPED
Start: 2024-11-24

## 2024-11-25 RX ORDER — MORPHINE SULFATE 30 MG/1
TABLET ORAL
Status: SHIPPED
Start: 2024-11-25

## 2024-11-25 RX ORDER — LORAZEPAM 0.5 MG/1
0.5 TABLET ORAL EVERY 4 HOURS PRN
Status: SHIPPED
Start: 2024-11-23

## 2024-11-25 NOTE — LETTER
" 11/25/2024      Marla Dunn  C/o Deneen Dunn  [address] [facility] [address]        [facility] GERIATRICS  ACUTE/EPISODIC VISIT    [facility] Medical Record Number:  [id number]  Place of Service where encounter took place:  [facility] [place] [[id number]]    Chief Complaint   Patient presents with     RECHECK       HPI:    Marla Dunn is a 89 year old  (5/22/1929), who is being seen today for an episodic care visit.  HPI information obtained from: facility chart records, facility staff, patient report, and [facility] chart review.    Today's concern is:    Diagnoses         Codes Comments    Impaired circulatory function    -  Primary I99.9     Acute liver failure without hepatic coma     K72.00     Open wound of left lower extremity, subsequent encounter     S81.802D     Failure to thrive in adult     R62.7     Dizziness     R42     Hospice care patient     Z51.5           Came to see Georgia today as she was opened to [facility] this weekend.  Saw orders in the nursing office for wound care to her left leg - assumed the blister opened up.      Found Georgia in her wheelchair sitting in the living area.  Right away saw her skin was yellow and blue.  Fingers bluish.  Face is yellowish.  Bandage on lower left leg.      Georgia c/o dizziness and would keep one eye closed.  Just listened to what she had to say.  She is almost besides herself.  Feel she is fighting this and tried to explain to her that eventually she will not have energy to sit up in the wheelchair and ok to stay in bed.  She talked about how she was going to the bathroom and maybe a commode.    She did eat a small bowl of oatmeal this morning.  Suggested to her that she should have staff bring her food now instead of going down.  \"That costs money to have them bring food\".  Told her not to worry about the money.  Need to adapt her environment as she progresses.      Georgia did push her pendant " for help.  The aide came and was going to take her to the bathroom and then lay her down.  Georgia wanted this NP to wait for her but decided to see others and was going to come back as it may be a while before she gets put into bed.    Decided to see her tomorrow as back in the building.  Feel she is deteriorating quickly and would like to see her more than once this week as it is a holiday week.      ALLERGIES:    Allergies   Allergen Reactions     Gramineae Pollens Other (See Comments)     ORCHARDGRASS. Shortness of breath when lawn is just cut.     Smoke. Other (See Comments)     Hard to breathe.     Pollen Extract      Other reaction(s): Unknown        MEDICATIONS:  Post Discharge Medication Reconciliation Status: patient was not discharged from an inpatient facility or TCU.  Medications reconciled    Current Outpatient Medications   Medication Sig Dispense Refill     acetaminophen (TYLENOL) 650 MG suppository Place 1 suppository (650 mg) rectally every 6 hours as needed for fever.       hyoscyamine (LEVSIN/SL) 0.125 MG sublingual tablet Place 1 tablet (0.125 mg) under the tongue every 4 hours as needed for cramping.       LORazepam (ATIVAN) 0.5 MG tablet Take 1 tablet (0.5 mg) by mouth every 4 hours as needed for anxiety.       morphine 2.5 MG solu-tab May take 1 tablet (2.5 mg) by mouth every 4 hours as needed for shortness of breath or moderate to severe pain. May also take 1 tablet (2.5 mg) every hour as needed for shortness of breath or moderate to severe pain.       albuterol (PROAIR HFA/PROVENTIL HFA/VENTOLIN HFA) 108 (90 Base) MCG/ACT inhaler INHALE 2 PUFFS INTO THE LUNGS 4 TIMES DAILY  (DX: CHRONIC OBSTRUCTIVE PULMONARY DISEASE) 18 g 11     amoxicillin (AMOXIL) 500 MG capsule 4 CAPSULES (2000MG ) ORALLY AS NEEDED ONE HOUR PRIOR TO DENTAL APPOINTMENT 4 capsule 5     ANTACID REGULAR STRENGTH 500 MG chewable tablet 2 TABLETS (1000MG) ORALLY 2 TIMES DAILY AS NEEDED FOR HEARTBURN 60 tablet 11     apixaban  ANTICOAGULANT (ELIQUIS) 2.5 MG tablet Take 1 tablet (2.5 mg) by mouth 2 times daily       Bacillus Coagulans-Inulin (PROBIOTIC FORMULA) 1-250 BILLION-MG CAPS 1 CAPSULE ORALLY DAILY 31 capsule 11     bisacodyl (DULCOLAX) 5 MG EC tablet Take 1 tablet (5 mg) by mouth 2 times daily as needed for constipation       CALMOSEPTINE 0.44-20.6 % OINT ointment APPLY TO AFFECTED AREA 2 TIMES DAILY;APPLY TO AFFECTED AREA 2 TIMES DAILY AS NEEDED FOR REDNESS ON BOTTOM 113 g 11     chlorhexidine (PERIDEX) 0.12 % solution Swish and spit 15 mLs in mouth 2 times daily May self administer 118 mL 3     COMBIVENT RESPIMAT  MCG/ACT inhaler INHALE 1 PUFF INTO THE LUNGS ORALLY INTO THE LUNGS 4 TIMES DAILY 4 g 11     cycloSPORINE (RESTASIS) 0.05 % ophthalmic emulsion Place 1 drop into both eyes 2 times daily        demeclocycline (DECLOMYCIN) 150 MG tablet 1 TABLET ORALLY 2 TIMES DAILY 60 tablet 11     fluorometholone (FML LIQUIFILM) 0.1 % ophthalmic susp Place 1 drop Into the left eye daily        hydrocortisone 1 % CREA cream Place rectally 2 times daily as needed for itching       levothyroxine (SYNTHROID/LEVOTHROID) 75 MCG tablet 1 TABLET ORALLY EVERY MORNING ON AN EMPTY STOMACH (DX: HYPOTHYROIDISM) 30 tablet 11     loperamide (IMODIUM A-D) 2 MG tablet 2mg by mouth after each loose stool PRN for a max of 6 tabs in 24 hours 20 tablet 4     loratadine (CLARITIN) 10 MG tablet 1 TABLET ORALLY DAILY (DX: ASTHMA) 28 tablet 11     melatonin 3 MG tablet 1 TABLET ORALLY AT BEDTIME ALONG WITH 5 MG FOR A TOTAL DOSE OF 8MG 30 tablet 11     melatonin 5 MG tablet 1 TABLET ORALLY AT BEDTIME  ALONG WITH 3MG FOR A TOTAL DOSE OF 8MG 30 tablet 11     mirtazapine (REMERON) 7.5 MG tablet 1 TABLET ORALLY AT BEDTIME 31 tablet 11     Multiple Vitamins-Minerals (PRESERVISION AREDS 2) CAPS 1 CAPSULE ORALLY 2 TIMES DAILY 62 capsule 11     nystatin (MYCOSTATIN) 010032 UNIT/ML suspension 5ml swish and swallow TID for 7 days.   May have in apartment to self  administer 105 mL 0     omeprazole (PRILOSEC) 20 MG DR capsule 1 CAPSULE ORALLY 2 TIMES DAILY (DX: GASTROESOPHAGEAL REFLUX DISEASE) 62 capsule 11     OXYGEN-HELIUM IN every evening       polyethylene glycol (MIRALAX) 17 GM/Dose powder MIX 1 CAPFUL (17 GMS) IN 8 OUNCES WATER AND DRINK ORALLY DAILY;MIX 1 CAPFUL (17 GMS) IN 8 OUNCES OF JUICE OR WATER AND DRINK ORALLY DAILY AS NEEDED 1020 g 11     polyethylene glycol 0.4%- propylene glycol 0.3% (SYSTANE ULTRA) 0.4-0.3 % SOLN ophthalmic solution Place 1 drop into both eyes 4 times daily       potassium chloride (KLOR-CON) 20 MEQ packet Take 20 mEq by mouth 2 times daily. 60 packet 1     Probiotic Product (PROBIOTIC PO) Take 1 capsule by mouth every morning       SENEXON-S 8.6-50 MG tablet 1 TABLET ORALLY 2 TIMES DAILY (DX: CONSTIPATION) 56 tablet 11     sodium chloride 1 GM tablet Take 1 tablet (1 g) by mouth daily 30 tablet 11     spironolactone (ALDACTONE) 25 MG tablet 1/2 TABLET (12.5MG) ORALLY DAILY ** HAZARDOUS MED: WEAR DOUBLE NITRILE GLOVES** 15 tablet 11     timolol (TIMOPTIC) 0.5 % ophthalmic solution Place 1 drop Into the left eye daily        torsemide (DEMADEX) 20 MG tablet 2 TABLETS (40MG) ORALLY DAILY 60 tablet 11       REVIEW OF SYSTEMS:  Complains of being dizzy.  Anxious.    PHYSICAL EXAM:  /71   Pulse 74   Temp 98.1  F (36.7  C)   Resp 19   Wt 40.8 kg (90 lb)   SpO2 99%   BMI 16.46 kg/m    Petite female- in emotional distress.  Feel her mind is moving a mile a minute and she does not understand what she should do or what will happen.    Skin is thin, frail, yellow, fingers bluish.    Breathing is not labored.   Heart rate regular/irregular.  Wearing shorts with a towel over her legs for warmth.  Left lower leg has thick bandage wrapped around the leg.  Drainage seeping through the Kerlix.    Right leg edema present and starts below the knee.  Dark area on medial side.  Now appears like a hematoma more than a blister.  Wearing her strap  sandals and thin cotton socks.  Her feet this week look wider and slightly more swollen.      ASSESSMENT / PLAN:  1. Impaired circulatory function    2. Acute liver failure without hepatic coma    3. Open wound of left lower extremity, subsequent encounter    4. Failure to thrive in adult    5. Dizziness    6. Hospice care patient      Georgia thinks the hospice nurse will be out on Tuesday but another Hospice nurse from [facility] today stated that usually the person is seen on Monday if case opened on weekend.    Reviewed medications.  Will not take any off her regimen but comfort medications added.    Georgia stated she has had a couple of doses of Morphine and question if she is dizzy from that.  Will see if continues and did alert nursing of the dizziness.  They feel she has been dizzy before.  Will check in tomorrow with her.    Did put an inquiry to nursing about moving to memory care due to needing extra help and more eyes on her since she is really alone.  Her niece works during the day.  Georgia did not like the idea as she wants her regular aides.      Today will give orders for no more labs.  One she is on hospice and two she is failing at a steady rate.    Orders:  Discontinue all labs       Electronically signed by  CASTRO Hazel CNP            Sincerely,        CASTRO Hazel CNP

## 2024-11-25 NOTE — PROGRESS NOTES
"[facility] GERIATRICS  ACUTE/EPISODIC VISIT    [facility] Medical Record Number:  [id number]  Place of Service where encounter took place:  [facility] ([facility]) [[id number]]    Chief Complaint   Patient presents with    RECHECK       HPI:    Marla Dunn is a 89 year old  (5/22/1929), who is being seen today for an episodic care visit.  HPI information obtained from: facility chart records, facility staff, patient report, and [facility] chart review.    Today's concern is:    Diagnoses         Codes Comments    Impaired circulatory function    -  Primary I99.9     Acute liver failure without hepatic coma     K72.00     Open wound of left lower extremity, subsequent encounter     S81.802D     Failure to thrive in adult     R62.7     Dizziness     R42     Hospice care patient     Z51.5           Came to see Georgia today as she was opened to [facility] this weekend.  Saw orders in the nursing office for wound care to her left leg - assumed the blister opened up.      Found Georgia in her wheelchair sitting in the living area.  Right away saw her skin was yellow and blue.  Fingers bluish.  Face is yellowish.  Bandage on lower left leg.      Georgia c/o dizziness and would keep one eye closed.  Just listened to what she had to say.  She is almost besides herself.  Feel she is fighting this and tried to explain to her that eventually she will not have energy to sit up in the wheelchair and ok to stay in bed.  She talked about how she was going to the bathroom and maybe a commode.    She did eat a small bowl of oatmeal this morning.  Suggested to her that she should have staff bring her food now instead of going down.  \"That costs money to have them bring food\".  Told her not to worry about the money.  Need to adapt her environment as she progresses.      Georgia did push her pendant for help.  The aide came and was going to take her to the bathroom and then lay her down.  Georgia wanted " this NP to wait for her but decided to see others and was going to come back as it may be a while before she gets put into bed.    Decided to see her tomorrow as back in the building.  Feel she is deteriorating quickly and would like to see her more than once this week as it is a holiday week.      ALLERGIES:    Allergies   Allergen Reactions    Gramineae Pollens Other (See Comments)     ORCHARDGRASS. Shortness of breath when lawn is just cut.    Smoke. Other (See Comments)     Hard to breathe.    Pollen Extract      Other reaction(s): Unknown        MEDICATIONS:  Post Discharge Medication Reconciliation Status: patient was not discharged from an inpatient facility or TCU.  Medications reconciled    Current Outpatient Medications   Medication Sig Dispense Refill    acetaminophen (TYLENOL) 650 MG suppository Place 1 suppository (650 mg) rectally every 6 hours as needed for fever.      hyoscyamine (LEVSIN/SL) 0.125 MG sublingual tablet Place 1 tablet (0.125 mg) under the tongue every 4 hours as needed for cramping.      LORazepam (ATIVAN) 0.5 MG tablet Take 1 tablet (0.5 mg) by mouth every 4 hours as needed for anxiety.      morphine 2.5 MG solu-tab May take 1 tablet (2.5 mg) by mouth every 4 hours as needed for shortness of breath or moderate to severe pain. May also take 1 tablet (2.5 mg) every hour as needed for shortness of breath or moderate to severe pain.      albuterol (PROAIR HFA/PROVENTIL HFA/VENTOLIN HFA) 108 (90 Base) MCG/ACT inhaler INHALE 2 PUFFS INTO THE LUNGS 4 TIMES DAILY  (DX: CHRONIC OBSTRUCTIVE PULMONARY DISEASE) 18 g 11    amoxicillin (AMOXIL) 500 MG capsule 4 CAPSULES (2000MG ) ORALLY AS NEEDED ONE HOUR PRIOR TO DENTAL APPOINTMENT 4 capsule 5    ANTACID REGULAR STRENGTH 500 MG chewable tablet 2 TABLETS (1000MG) ORALLY 2 TIMES DAILY AS NEEDED FOR HEARTBURN 60 tablet 11    apixaban ANTICOAGULANT (ELIQUIS) 2.5 MG tablet Take 1 tablet (2.5 mg) by mouth 2 times daily      Bacillus Coagulans-Inulin  (PROBIOTIC FORMULA) 1-250 BILLION-MG CAPS 1 CAPSULE ORALLY DAILY 31 capsule 11    bisacodyl (DULCOLAX) 5 MG EC tablet Take 1 tablet (5 mg) by mouth 2 times daily as needed for constipation      CALMOSEPTINE 0.44-20.6 % OINT ointment APPLY TO AFFECTED AREA 2 TIMES DAILY;APPLY TO AFFECTED AREA 2 TIMES DAILY AS NEEDED FOR REDNESS ON BOTTOM 113 g 11    chlorhexidine (PERIDEX) 0.12 % solution Swish and spit 15 mLs in mouth 2 times daily May self administer 118 mL 3    COMBIVENT RESPIMAT  MCG/ACT inhaler INHALE 1 PUFF INTO THE LUNGS ORALLY INTO THE LUNGS 4 TIMES DAILY 4 g 11    cycloSPORINE (RESTASIS) 0.05 % ophthalmic emulsion Place 1 drop into both eyes 2 times daily       demeclocycline (DECLOMYCIN) 150 MG tablet 1 TABLET ORALLY 2 TIMES DAILY 60 tablet 11    fluorometholone (FML LIQUIFILM) 0.1 % ophthalmic susp Place 1 drop Into the left eye daily       hydrocortisone 1 % CREA cream Place rectally 2 times daily as needed for itching      levothyroxine (SYNTHROID/LEVOTHROID) 75 MCG tablet 1 TABLET ORALLY EVERY MORNING ON AN EMPTY STOMACH (DX: HYPOTHYROIDISM) 30 tablet 11    loperamide (IMODIUM A-D) 2 MG tablet 2mg by mouth after each loose stool PRN for a max of 6 tabs in 24 hours 20 tablet 4    loratadine (CLARITIN) 10 MG tablet 1 TABLET ORALLY DAILY (DX: ASTHMA) 28 tablet 11    melatonin 3 MG tablet 1 TABLET ORALLY AT BEDTIME ALONG WITH 5 MG FOR A TOTAL DOSE OF 8MG 30 tablet 11    melatonin 5 MG tablet 1 TABLET ORALLY AT BEDTIME  ALONG WITH 3MG FOR A TOTAL DOSE OF 8MG 30 tablet 11    mirtazapine (REMERON) 7.5 MG tablet 1 TABLET ORALLY AT BEDTIME 31 tablet 11    Multiple Vitamins-Minerals (PRESERVISION AREDS 2) CAPS 1 CAPSULE ORALLY 2 TIMES DAILY 62 capsule 11    nystatin (MYCOSTATIN) 845110 UNIT/ML suspension 5ml swish and swallow TID for 7 days.   May have in apartment to self administer 105 mL 0    omeprazole (PRILOSEC) 20 MG DR capsule 1 CAPSULE ORALLY 2 TIMES DAILY (DX: GASTROESOPHAGEAL REFLUX DISEASE) 62  capsule 11    OXYGEN-HELIUM IN every evening      polyethylene glycol (MIRALAX) 17 GM/Dose powder MIX 1 CAPFUL (17 GMS) IN 8 OUNCES WATER AND DRINK ORALLY DAILY;MIX 1 CAPFUL (17 GMS) IN 8 OUNCES OF JUICE OR WATER AND DRINK ORALLY DAILY AS NEEDED 1020 g 11    polyethylene glycol 0.4%- propylene glycol 0.3% (SYSTANE ULTRA) 0.4-0.3 % SOLN ophthalmic solution Place 1 drop into both eyes 4 times daily      potassium chloride (KLOR-CON) 20 MEQ packet Take 20 mEq by mouth 2 times daily. 60 packet 1    Probiotic Product (PROBIOTIC PO) Take 1 capsule by mouth every morning      SENEXON-S 8.6-50 MG tablet 1 TABLET ORALLY 2 TIMES DAILY (DX: CONSTIPATION) 56 tablet 11    sodium chloride 1 GM tablet Take 1 tablet (1 g) by mouth daily 30 tablet 11    spironolactone (ALDACTONE) 25 MG tablet 1/2 TABLET (12.5MG) ORALLY DAILY ** HAZARDOUS MED: WEAR DOUBLE NITRILE GLOVES** 15 tablet 11    timolol (TIMOPTIC) 0.5 % ophthalmic solution Place 1 drop Into the left eye daily       torsemide (DEMADEX) 20 MG tablet 2 TABLETS (40MG) ORALLY DAILY 60 tablet 11       REVIEW OF SYSTEMS:  Complains of being dizzy.  Anxious.    PHYSICAL EXAM:  /71   Pulse 74   Temp 98.1  F (36.7  C)   Resp 19   Wt 40.8 kg (90 lb)   SpO2 99%   BMI 16.46 kg/m    Petite female- in emotional distress.  Feel her mind is moving a mile a minute and she does not understand what she should do or what will happen.    Skin is thin, frail, yellow, fingers bluish.    Breathing is not labored.   Heart rate regular/irregular.  Wearing shorts with a towel over her legs for warmth.  Left lower leg has thick bandage wrapped around the leg.  Drainage seeping through the Kerlix.    Right leg edema present and starts below the knee.  Dark area on medial side.  Now appears like a hematoma more than a blister.  Wearing her strap sandals and thin cotton socks.  Her feet this week look wider and slightly more swollen.      ASSESSMENT / PLAN:  1. Impaired circulatory function     2. Acute liver failure without hepatic coma    3. Open wound of left lower extremity, subsequent encounter    4. Failure to thrive in adult    5. Dizziness    6. Hospice care patient      Georgia thinks the hospice nurse will be out on Tuesday but another Hospice nurse from [facility] today stated that usually the person is seen on Monday if case opened on weekend.    Reviewed medications.  Will not take any off her regimen but comfort medications added.    Georgia stated she has had a couple of doses of Morphine and question if she is dizzy from that.  Will see if continues and did alert nursing of the dizziness.  They feel she has been dizzy before.  Will check in tomorrow with her.    Did put an inquiry to nursing about moving to memory care due to needing extra help and more eyes on her since she is really alone.  Her niece works during the day.  Georgia did not like the idea as she wants her regular aides.      Today will give orders for no more labs.  One she is on hospice and two she is failing at a steady rate.    Orders:  Discontinue all labs       Electronically signed by  CASTRO Hazel CNP

## 2024-11-26 ENCOUNTER — ASSISTED LIVING VISIT (OUTPATIENT)
Dept: GERIATRICS | Facility: CLINIC | Age: 89
End: 2024-11-26
Payer: COMMERCIAL

## 2024-11-26 VITALS
WEIGHT: 90 LBS | OXYGEN SATURATION: 99 % | HEART RATE: 74 BPM | RESPIRATION RATE: 19 BRPM | TEMPERATURE: 98.1 F | SYSTOLIC BLOOD PRESSURE: 132 MMHG | BODY MASS INDEX: 16.46 KG/M2 | DIASTOLIC BLOOD PRESSURE: 71 MMHG

## 2024-11-26 DIAGNOSIS — S81.802D OPEN WOUND OF LEFT LOWER EXTREMITY, SUBSEQUENT ENCOUNTER: ICD-10-CM

## 2024-11-26 DIAGNOSIS — F41.9 ANXIETY AND DEPRESSION: ICD-10-CM

## 2024-11-26 DIAGNOSIS — Z51.5 HOSPICE CARE PATIENT: ICD-10-CM

## 2024-11-26 DIAGNOSIS — R60.0 BILATERAL LOWER EXTREMITY EDEMA: ICD-10-CM

## 2024-11-26 DIAGNOSIS — F32.A ANXIETY AND DEPRESSION: ICD-10-CM

## 2024-11-26 DIAGNOSIS — R42 DIZZINESS: Primary | ICD-10-CM

## 2024-11-26 DIAGNOSIS — I50.32 CHRONIC DIASTOLIC CONGESTIVE HEART FAILURE (H): ICD-10-CM

## 2024-11-26 PROCEDURE — 99349 HOME/RES VST EST MOD MDM 40: CPT | Performed by: NURSE PRACTITIONER

## 2024-11-26 NOTE — LETTER
" 11/26/2024      Marla Dunn  C/o Deneen Dunn  [address] [address]        [facility] GERIATRICS  ACUTE/EPISODIC VISIT    [facility] Medical Record Number:  [id number]  Place of Service where encounter took place:  [facility] ([facility]) [[id number]]    Chief Complaint   Patient presents with     RECHECK       HPI:    Marla Dunn is a 89 year old  (5/22/1929), who is being seen today for an episodic care visit.  HPI information obtained from: facility chart records, facility staff, patient report, and family/first contact nephew and his spouse report.    Today's concern is:    Diagnoses         Codes Comments    Dizziness    -  Primary R42     Bilateral lower extremity edema     R60.0     Chronic diastolic congestive heart failure (H)     I50.32     Open wound of left lower extremity, subsequent encounter     S81.802D     Anxiety and depression     F41.9, F32.A     Hospice care patient     Z51.5           Came to see Georgia today to follow up with how she was doing yesterday.  Yesterday, Georgia was simply \"a mess\".  Dizzy, anxious, thoughts going a 'mile a minute'.  Had inquired for her to be able to move down to the memory care unit so she would be safe and have more help.  Georgia was not happy about thinking she may have to move, but facility understood she was not safe.    Now today georgia has her nephew and his spouse visiting her which Georgia greatly appreciated.  The spouse had a few questions just off the conversation between Georgia and this NP.  Happy to answer questions.  Mostly focused on her lower legs and their appearance.    The hospice nurse did come yesterday and there is a new type of dressing on her lower leg.  Also discussed her edema appearance as it has changed since yesterday.    Her nephew was helping her lift her feet to rest on the step stool for elevation.  Georgia even had a hard time trying to lower her leg.     Glad family is " "dropping in to see her.      Asked her how was the dizziness today and she stated it has gone away.  She has not used the morphine now and so feel it most likely was the morphine causing her dizziness, rapid thoughts.      Georgia stated she did not get labs done yesterday.  Yesterday was not a lab day for her and informed her that all labs cancelled.  Now on hospice but feel she is moving quickly and becoming weaker that no need for labs anymore.  \"Oh that is a shame, those people are so nice to me\".      ALLERGIES:    Allergies   Allergen Reactions     Gramineae Pollens Other (See Comments)     ORCHARDGRASS. Shortness of breath when lawn is just cut.     Smoke. Other (See Comments)     Hard to breathe.     Pollen Extract      Other reaction(s): Unknown        MEDICATIONS:  Post Discharge Medication Reconciliation Status: patient was not discharged from an inpatient facility or TCU.     Current Outpatient Medications   Medication Sig Dispense Refill     acetaminophen (TYLENOL) 650 MG suppository Place 1 suppository (650 mg) rectally every 6 hours as needed for fever.       albuterol (PROAIR HFA/PROVENTIL HFA/VENTOLIN HFA) 108 (90 Base) MCG/ACT inhaler INHALE 2 PUFFS INTO THE LUNGS 4 TIMES DAILY  (DX: CHRONIC OBSTRUCTIVE PULMONARY DISEASE) 18 g 11     amoxicillin (AMOXIL) 500 MG capsule 4 CAPSULES (2000MG ) ORALLY AS NEEDED ONE HOUR PRIOR TO DENTAL APPOINTMENT 4 capsule 5     ANTACID REGULAR STRENGTH 500 MG chewable tablet 2 TABLETS (1000MG) ORALLY 2 TIMES DAILY AS NEEDED FOR HEARTBURN 60 tablet 11     apixaban ANTICOAGULANT (ELIQUIS) 2.5 MG tablet Take 1 tablet (2.5 mg) by mouth 2 times daily       Bacillus Coagulans-Inulin (PROBIOTIC FORMULA) 1-250 BILLION-MG CAPS 1 CAPSULE ORALLY DAILY 31 capsule 11     bisacodyl (DULCOLAX) 5 MG EC tablet Take 1 tablet (5 mg) by mouth 2 times daily as needed for constipation       CALMOSEPTINE 0.44-20.6 % OINT ointment APPLY TO AFFECTED AREA 2 TIMES DAILY;APPLY TO AFFECTED AREA 2 " TIMES DAILY AS NEEDED FOR REDNESS ON BOTTOM 113 g 11     chlorhexidine (PERIDEX) 0.12 % solution Swish and spit 15 mLs in mouth 2 times daily May self administer 118 mL 3     COMBIVENT RESPIMAT  MCG/ACT inhaler INHALE 1 PUFF INTO THE LUNGS ORALLY INTO THE LUNGS 4 TIMES DAILY 4 g 11     cycloSPORINE (RESTASIS) 0.05 % ophthalmic emulsion Place 1 drop into both eyes 2 times daily        demeclocycline (DECLOMYCIN) 150 MG tablet 1 TABLET ORALLY 2 TIMES DAILY 60 tablet 11     fluorometholone (FML LIQUIFILM) 0.1 % ophthalmic susp Place 1 drop Into the left eye daily        hydrocortisone 1 % CREA cream Place rectally 2 times daily as needed for itching       hyoscyamine (LEVSIN/SL) 0.125 MG sublingual tablet Place 1 tablet (0.125 mg) under the tongue every 4 hours as needed for cramping.       levothyroxine (SYNTHROID/LEVOTHROID) 75 MCG tablet 1 TABLET ORALLY EVERY MORNING ON AN EMPTY STOMACH (DX: HYPOTHYROIDISM) 30 tablet 11     loperamide (IMODIUM A-D) 2 MG tablet 2mg by mouth after each loose stool PRN for a max of 6 tabs in 24 hours 20 tablet 4     loratadine (CLARITIN) 10 MG tablet 1 TABLET ORALLY DAILY (DX: ASTHMA) 28 tablet 11     LORazepam (ATIVAN) 0.5 MG tablet Take 1 tablet (0.5 mg) by mouth every 4 hours as needed for anxiety.       melatonin 3 MG tablet 1 TABLET ORALLY AT BEDTIME ALONG WITH 5 MG FOR A TOTAL DOSE OF 8MG 30 tablet 11     melatonin 5 MG tablet 1 TABLET ORALLY AT BEDTIME  ALONG WITH 3MG FOR A TOTAL DOSE OF 8MG 30 tablet 11     mirtazapine (REMERON) 7.5 MG tablet 1 TABLET ORALLY AT BEDTIME 31 tablet 11     morphine 2.5 MG solu-tab May take 1 tablet (2.5 mg) by mouth every 4 hours as needed for shortness of breath or moderate to severe pain. May also take 1 tablet (2.5 mg) every hour as needed for shortness of breath or moderate to severe pain.       Multiple Vitamins-Minerals (PRESERVISION AREDS 2) CAPS 1 CAPSULE ORALLY 2 TIMES DAILY 62 capsule 11     nystatin (MYCOSTATIN) 925873 UNIT/ML  suspension 5ml swish and swallow TID for 7 days.   May have in apartment to self administer 105 mL 0     omeprazole (PRILOSEC) 20 MG DR capsule 1 CAPSULE ORALLY 2 TIMES DAILY (DX: GASTROESOPHAGEAL REFLUX DISEASE) 62 capsule 11     OXYGEN-HELIUM IN every evening       polyethylene glycol (MIRALAX) 17 GM/Dose powder MIX 1 CAPFUL (17 GMS) IN 8 OUNCES WATER AND DRINK ORALLY DAILY;MIX 1 CAPFUL (17 GMS) IN 8 OUNCES OF JUICE OR WATER AND DRINK ORALLY DAILY AS NEEDED 1020 g 11     polyethylene glycol 0.4%- propylene glycol 0.3% (SYSTANE ULTRA) 0.4-0.3 % SOLN ophthalmic solution Place 1 drop into both eyes 4 times daily       potassium chloride (KLOR-CON) 20 MEQ packet Take 20 mEq by mouth 2 times daily. 60 packet 1     Probiotic Product (PROBIOTIC PO) Take 1 capsule by mouth every morning       SENEXON-S 8.6-50 MG tablet 1 TABLET ORALLY 2 TIMES DAILY (DX: CONSTIPATION) 56 tablet 11     sodium chloride 1 GM tablet Take 1 tablet (1 g) by mouth daily 30 tablet 11     spironolactone (ALDACTONE) 25 MG tablet 1/2 TABLET (12.5MG) ORALLY DAILY ** HAZARDOUS MED: WEAR DOUBLE NITRILE GLOVES** 15 tablet 11     timolol (TIMOPTIC) 0.5 % ophthalmic solution Place 1 drop Into the left eye daily        torsemide (DEMADEX) 20 MG tablet 2 TABLETS (40MG) ORALLY DAILY 60 tablet 11         REVIEW OF SYSTEMS:  4 point ROS neg other than the symptoms noted above in the HPI.  No chest pain.  Breathing is better.  Calmer.      PHYSICAL EXAM:  /71   Pulse 74   Temp 98.1  F (36.7  C)   Resp 19   Wt 40.8 kg (90 lb)   SpO2 99%   BMI 16.46 kg/m    Standing to her right side, she did not turn her neck due to the ongoing pain she has.  Looked straight ahead and this NP would move in front to look at her.  Did not feel she needed to move her wheelchair to see this NP.      Layered dressing wrap on lower left leg with the blister on the lateral side of calf being open.    No dressing on right lower leg.    Able to see the edema is starting to  absorb back in her body.  The definite line below her knee where the edema started is gone now.  Legs are starting to show the sskin shrivel lines where there was edema.  Explained this to the family without trying to get Georgia paranoid.    No respiratory distress.  No oxygen used.  Yesterday made it clear she did not want O2.  Poor circulation noted in her hands as they are purple.    Radial pulse is regular.    Skin is yellow and blue/purple in her fingers and lower extremities.      ASSESSMENT / PLAN:  1. Dizziness    2. Bilateral lower extremity edema    3. Chronic diastolic congestive heart failure (H)    4. Open wound of left lower extremity, subsequent encounter    5. Anxiety and depression    6. Hospice care patient      Georgia decided not to take the Morphine Solutab and dizziness is better and she is way calmer today.    Now can see the edema retreating from her lower legs and so know she is progressing in end of life journey.  Hospice nurse will be coming minimally twice a week to change the dressing.  Explained to family that has georgia changes, hospice will see her more.  Nursing is down stairs if they need to see her and this NP comes on Mondays but would come more as needed.      New treatment to lower leg which feel is more appropriate as there is some compression obviously.  Since Thursday is a holiday, not sure when hospice visits next.      Assured georgia that with her seeming better with her mood, she probably could stay here.  Hospice can find an alternative to Morphine for breathing and discomfort.      Will either try again this week to see her or next Monday will be the next time.    Orders:  No new orders today    Electronically signed by  CASTRO Hazel CNP            Sincerely,        CASTRO Hazel CNP

## 2024-11-26 NOTE — PROGRESS NOTES
"[facility] GERIATRICS  ACUTE/EPISODIC VISIT    [facility] Medical Record Number:  [id number]  Place of Service where encounter took place:  [facility] ([facility]) [[id number]]    Chief Complaint   Patient presents with    RECHECK       HPI:    Marla Dunn is a 89 year old  (5/22/1929), who is being seen today for an episodic care visit.  HPI information obtained from: facility chart records, facility staff, patient report, and family/first contact nephew and his spouse report.    Today's concern is:    Diagnoses         Codes Comments    Dizziness    -  Primary R42     Bilateral lower extremity edema     R60.0     Chronic diastolic congestive heart failure (H)     I50.32     Open wound of left lower extremity, subsequent encounter     S81.802D     Anxiety and depression     F41.9, F32.A     Hospice care patient     Z51.5           Came to see Georgia today to follow up with how she was doing yesterday.  Yesterday, Georgia was simply \"a mess\".  Dizzy, anxious, thoughts going a 'mile a minute'.  Had inquired for her to be able to move down to the memory care unit so she would be safe and have more help.  Georgia was not happy about thinking she may have to move, but facility understood she was not safe.    Now today georgia has her nephew and his spouse visiting her which Georgia greatly appreciated.  The spouse had a few questions just off the conversation between Georgia and this NP.  Happy to answer questions.  Mostly focused on her lower legs and their appearance.    The hospice nurse did come yesterday and there is a new type of dressing on her lower leg.  Also discussed her edema appearance as it has changed since yesterday.    Her nephew was helping her lift her feet to rest on the step stool for elevation.  Georgia even had a hard time trying to lower her leg.     Glad family is dropping in to see her.      Asked her how was the dizziness today and she stated it has gone away.  She has " "not used the morphine now and so feel it most likely was the morphine causing her dizziness, rapid thoughts.      Georgia stated she did not get labs done yesterday.  Yesterday was not a lab day for her and informed her that all labs cancelled.  Now on hospice but feel she is moving quickly and becoming weaker that no need for labs anymore.  \"Oh that is a shame, those people are so nice to me\".      ALLERGIES:    Allergies   Allergen Reactions    Gramineae Pollens Other (See Comments)     ORCHARDGRASS. Shortness of breath when lawn is just cut.    Smoke. Other (See Comments)     Hard to breathe.    Pollen Extract      Other reaction(s): Unknown        MEDICATIONS:  Post Discharge Medication Reconciliation Status: patient was not discharged from an inpatient facility or TCU.     Current Outpatient Medications   Medication Sig Dispense Refill    acetaminophen (TYLENOL) 650 MG suppository Place 1 suppository (650 mg) rectally every 6 hours as needed for fever.      albuterol (PROAIR HFA/PROVENTIL HFA/VENTOLIN HFA) 108 (90 Base) MCG/ACT inhaler INHALE 2 PUFFS INTO THE LUNGS 4 TIMES DAILY  (DX: CHRONIC OBSTRUCTIVE PULMONARY DISEASE) 18 g 11    amoxicillin (AMOXIL) 500 MG capsule 4 CAPSULES (2000MG ) ORALLY AS NEEDED ONE HOUR PRIOR TO DENTAL APPOINTMENT 4 capsule 5    ANTACID REGULAR STRENGTH 500 MG chewable tablet 2 TABLETS (1000MG) ORALLY 2 TIMES DAILY AS NEEDED FOR HEARTBURN 60 tablet 11    apixaban ANTICOAGULANT (ELIQUIS) 2.5 MG tablet Take 1 tablet (2.5 mg) by mouth 2 times daily      Bacillus Coagulans-Inulin (PROBIOTIC FORMULA) 1-250 BILLION-MG CAPS 1 CAPSULE ORALLY DAILY 31 capsule 11    bisacodyl (DULCOLAX) 5 MG EC tablet Take 1 tablet (5 mg) by mouth 2 times daily as needed for constipation      CALMOSEPTINE 0.44-20.6 % OINT ointment APPLY TO AFFECTED AREA 2 TIMES DAILY;APPLY TO AFFECTED AREA 2 TIMES DAILY AS NEEDED FOR REDNESS ON BOTTOM 113 g 11    chlorhexidine (PERIDEX) 0.12 % solution Swish and spit 15 mLs " in mouth 2 times daily May self administer 118 mL 3    COMBIVENT RESPIMAT  MCG/ACT inhaler INHALE 1 PUFF INTO THE LUNGS ORALLY INTO THE LUNGS 4 TIMES DAILY 4 g 11    cycloSPORINE (RESTASIS) 0.05 % ophthalmic emulsion Place 1 drop into both eyes 2 times daily       demeclocycline (DECLOMYCIN) 150 MG tablet 1 TABLET ORALLY 2 TIMES DAILY 60 tablet 11    fluorometholone (FML LIQUIFILM) 0.1 % ophthalmic susp Place 1 drop Into the left eye daily       hydrocortisone 1 % CREA cream Place rectally 2 times daily as needed for itching      hyoscyamine (LEVSIN/SL) 0.125 MG sublingual tablet Place 1 tablet (0.125 mg) under the tongue every 4 hours as needed for cramping.      levothyroxine (SYNTHROID/LEVOTHROID) 75 MCG tablet 1 TABLET ORALLY EVERY MORNING ON AN EMPTY STOMACH (DX: HYPOTHYROIDISM) 30 tablet 11    loperamide (IMODIUM A-D) 2 MG tablet 2mg by mouth after each loose stool PRN for a max of 6 tabs in 24 hours 20 tablet 4    loratadine (CLARITIN) 10 MG tablet 1 TABLET ORALLY DAILY (DX: ASTHMA) 28 tablet 11    LORazepam (ATIVAN) 0.5 MG tablet Take 1 tablet (0.5 mg) by mouth every 4 hours as needed for anxiety.      melatonin 3 MG tablet 1 TABLET ORALLY AT BEDTIME ALONG WITH 5 MG FOR A TOTAL DOSE OF 8MG 30 tablet 11    melatonin 5 MG tablet 1 TABLET ORALLY AT BEDTIME  ALONG WITH 3MG FOR A TOTAL DOSE OF 8MG 30 tablet 11    mirtazapine (REMERON) 7.5 MG tablet 1 TABLET ORALLY AT BEDTIME 31 tablet 11    morphine 2.5 MG solu-tab May take 1 tablet (2.5 mg) by mouth every 4 hours as needed for shortness of breath or moderate to severe pain. May also take 1 tablet (2.5 mg) every hour as needed for shortness of breath or moderate to severe pain.      Multiple Vitamins-Minerals (PRESERVISION AREDS 2) CAPS 1 CAPSULE ORALLY 2 TIMES DAILY 62 capsule 11    nystatin (MYCOSTATIN) 241401 UNIT/ML suspension 5ml swish and swallow TID for 7 days.   May have in apartment to self administer 105 mL 0    omeprazole (PRILOSEC) 20 MG   capsule 1 CAPSULE ORALLY 2 TIMES DAILY (DX: GASTROESOPHAGEAL REFLUX DISEASE) 62 capsule 11    OXYGEN-HELIUM IN every evening      polyethylene glycol (MIRALAX) 17 GM/Dose powder MIX 1 CAPFUL (17 GMS) IN 8 OUNCES WATER AND DRINK ORALLY DAILY;MIX 1 CAPFUL (17 GMS) IN 8 OUNCES OF JUICE OR WATER AND DRINK ORALLY DAILY AS NEEDED 1020 g 11    polyethylene glycol 0.4%- propylene glycol 0.3% (SYSTANE ULTRA) 0.4-0.3 % SOLN ophthalmic solution Place 1 drop into both eyes 4 times daily      potassium chloride (KLOR-CON) 20 MEQ packet Take 20 mEq by mouth 2 times daily. 60 packet 1    Probiotic Product (PROBIOTIC PO) Take 1 capsule by mouth every morning      SENEXON-S 8.6-50 MG tablet 1 TABLET ORALLY 2 TIMES DAILY (DX: CONSTIPATION) 56 tablet 11    sodium chloride 1 GM tablet Take 1 tablet (1 g) by mouth daily 30 tablet 11    spironolactone (ALDACTONE) 25 MG tablet 1/2 TABLET (12.5MG) ORALLY DAILY ** HAZARDOUS MED: WEAR DOUBLE NITRILE GLOVES** 15 tablet 11    timolol (TIMOPTIC) 0.5 % ophthalmic solution Place 1 drop Into the left eye daily       torsemide (DEMADEX) 20 MG tablet 2 TABLETS (40MG) ORALLY DAILY 60 tablet 11         REVIEW OF SYSTEMS:  4 point ROS neg other than the symptoms noted above in the HPI.  No chest pain.  Breathing is better.  Calmer.      PHYSICAL EXAM:  /71   Pulse 74   Temp 98.1  F (36.7  C)   Resp 19   Wt 40.8 kg (90 lb)   SpO2 99%   BMI 16.46 kg/m    Standing to her right side, she did not turn her neck due to the ongoing pain she has.  Looked straight ahead and this NP would move in front to look at her.  Did not feel she needed to move her wheelchair to see this NP.      Layered dressing wrap on lower left leg with the blister on the lateral side of calf being open.    No dressing on right lower leg.    Able to see the edema is starting to absorb back in her body.  The definite line below her knee where the edema started is gone now.  Legs are starting to show the sskin shrivel lines  where there was edema.  Explained this to the family without trying to get Georgia paranoid.    No respiratory distress.  No oxygen used.  Yesterday made it clear she did not want O2.  Poor circulation noted in her hands as they are purple.    Radial pulse is regular.    Skin is yellow and blue/purple in her fingers and lower extremities.      ASSESSMENT / PLAN:  1. Dizziness    2. Bilateral lower extremity edema    3. Chronic diastolic congestive heart failure (H)    4. Open wound of left lower extremity, subsequent encounter    5. Anxiety and depression    6. Hospice care patient      Georgia decided not to take the Morphine Solutab and dizziness is better and she is way calmer today.    Now can see the edema retreating from her lower legs and so know she is progressing in end of life journey.  Hospice nurse will be coming minimally twice a week to change the dressing.  Explained to family that arron mccormick changes, hospice will see her more.  Nursing is down stairs if they need to see her and this NP comes on Mondays but would come more as needed.      New treatment to lower leg which feel is more appropriate as there is some compression obviously.  Since Thursday is a holiday, not sure when hospice visits next.      Assured georgia that with her seeming better with her mood, she probably could stay here.  Hospice can find an alternative to Morphine for breathing and discomfort.      Will either try again this week to see her or next Monday will be the next time.    Orders:  No new orders today    Electronically signed by  CASTRO Hazel CNP

## 2024-12-02 ENCOUNTER — ASSISTED LIVING VISIT (OUTPATIENT)
Dept: GERIATRICS | Facility: CLINIC | Age: 89
End: 2024-12-02
Payer: MEDICARE

## 2024-12-02 VITALS
TEMPERATURE: 98.1 F | WEIGHT: 90 LBS | DIASTOLIC BLOOD PRESSURE: 71 MMHG | SYSTOLIC BLOOD PRESSURE: 132 MMHG | HEART RATE: 74 BPM | RESPIRATION RATE: 19 BRPM | BODY MASS INDEX: 16.46 KG/M2

## 2024-12-02 DIAGNOSIS — Z51.5 HOSPICE CARE PATIENT: ICD-10-CM

## 2024-12-02 DIAGNOSIS — M54.2 NECK PAIN, CHRONIC: ICD-10-CM

## 2024-12-02 DIAGNOSIS — R94.5 LIVER FUNCTION ABNORMALITY: ICD-10-CM

## 2024-12-02 DIAGNOSIS — G89.29 NECK PAIN, CHRONIC: ICD-10-CM

## 2024-12-02 DIAGNOSIS — F41.9 ANXIETY AND DEPRESSION: ICD-10-CM

## 2024-12-02 DIAGNOSIS — R19.5 LOOSE STOOLS: ICD-10-CM

## 2024-12-02 DIAGNOSIS — I99.9: ICD-10-CM

## 2024-12-02 DIAGNOSIS — F32.A ANXIETY AND DEPRESSION: ICD-10-CM

## 2024-12-02 DIAGNOSIS — I50.32 CHRONIC DIASTOLIC CONGESTIVE HEART FAILURE (H): Primary | ICD-10-CM

## 2024-12-02 DIAGNOSIS — K59.00 CONSTIPATION, UNSPECIFIED CONSTIPATION TYPE: ICD-10-CM

## 2024-12-02 PROCEDURE — 99349 HOME/RES VST EST MOD MDM 40: CPT | Mod: GV | Performed by: NURSE PRACTITIONER

## 2024-12-02 RX ORDER — ACETAMINOPHEN 500 MG
TABLET ORAL
Status: SHIPPED
Start: 2024-12-02

## 2024-12-02 NOTE — PROGRESS NOTES
"[facility] GERIATRICS  ACUTE/EPISODIC VISIT    [facility] Medical Record Number:  [id number]  Place of Service where encounter took place:  [facility] ([facility]) [[id number]]    Chief Complaint   Patient presents with    RECHECK       HPI:    Marla Dunn is a 89 year old  (5/22/1929), who is being seen today for an episodic care visit.  HPI information obtained from: facility chart records, facility staff, patient report, [facility] chart review, and hospice RN .    Today's concern is:    Diagnoses         Codes Comments    Chronic diastolic congestive heart failure (H)    -  Primary I50.32     Impaired circulatory function     I99.9     Liver function abnormality     R94.5     Loose stools     R19.5     Anxiety and depression     F41.9, F32.A     Hospice care patient     Z51.5     Neck pain, chronic     M54.2, G89.29     Constipation, unspecified constipation type     K59.00           Came to see georgia today to see how she has been doing.  Saw her twice last week as she was not doing so well.  Morphine made her dizzy.  Now has oxycodone Solutab but does not want to take it.    Over this past weekend, staff found Tylenol ES in her apartment.  Georgia has been taking her own supply and possible she took 3500 at once for her neck pain.  Georgia would not allow nursing to remove the bottle but the hospice nurse from Sunday removed the bottle.      Today the hospice RN present in the office and so spoke about this NP's goal of removing medication if possible.  Hospice wrote orders for scheduling Tylenol ES 1000mg twice a day and 1000mg every 6 hours prn.    Everyone has tried hard to get Georgia to take the comfort medications but she \"digs her heels in\" and will not take what would be helpful for comfort.      Georgia has been having loose stools and wanting imodium scheduled.  Georgia is not eating very well and is on Miralax.  Probably why she is having loose stools.  Will discontinue the " scheduled dose and wanted to hold off the scheduling of the Imodium but just the thought of the stooling bothers Georgia.  Will schedule but need to watch that she does not tip the other way.      Informed georgia of the medications that will be discontinued but of course she did not like some of the medications being changed.  Able to discontinue some of them but not all of them.      ALLERGIES:    Allergies   Allergen Reactions    Gramineae Pollens Other (See Comments)     ORCHARDGRASS. Shortness of breath when lawn is just cut.    Smoke. Other (See Comments)     Hard to breathe.    Pollen Extract      Other reaction(s): Unknown        MEDICATIONS:  Post Discharge Medication Reconciliation Status: patient was not discharged from an inpatient facility or TCU.     Current Outpatient Medications   Medication Sig Dispense Refill    acetaminophen (TYLENOL) 500 MG tablet 1000mg po BID and 1000mg po every 6 hours PRN      loperamide (IMODIUM A-D) 2 MG tablet Scheduled 2mg po every AM and 2mg by mouth after each loose stool PRN for a max of 5 tabs in 24 hours      oxyCODONE (ROXICODONE) 5 MG tablet Take 1 tablet (5 mg) by mouth every 4 hours as needed for pain.      polyethylene glycol (MIRALAX) 17 GM/Dose powder Take 17 g (1 Capful) by mouth daily as needed for constipation.      acetaminophen (TYLENOL) 650 MG suppository Place 1 suppository (650 mg) rectally every 6 hours as needed for fever.      albuterol (PROAIR HFA/PROVENTIL HFA/VENTOLIN HFA) 108 (90 Base) MCG/ACT inhaler INHALE 2 PUFFS INTO THE LUNGS 4 TIMES DAILY  (DX: CHRONIC OBSTRUCTIVE PULMONARY DISEASE) 18 g 11    amoxicillin (AMOXIL) 500 MG capsule 4 CAPSULES (2000MG ) ORALLY AS NEEDED ONE HOUR PRIOR TO DENTAL APPOINTMENT 4 capsule 5    ANTACID REGULAR STRENGTH 500 MG chewable tablet 2 TABLETS (1000MG) ORALLY 2 TIMES DAILY AS NEEDED FOR HEARTBURN 60 tablet 11    apixaban ANTICOAGULANT (ELIQUIS) 2.5 MG tablet Take 1 tablet (2.5 mg) by mouth 2 times daily       bisacodyl (DULCOLAX) 5 MG EC tablet Take 1 tablet (5 mg) by mouth 2 times daily as needed for constipation      CALMOSEPTINE 0.44-20.6 % OINT ointment APPLY TO AFFECTED AREA 2 TIMES DAILY;APPLY TO AFFECTED AREA 2 TIMES DAILY AS NEEDED FOR REDNESS ON BOTTOM 113 g 11    chlorhexidine (PERIDEX) 0.12 % solution Swish and spit 15 mLs in mouth 2 times daily May self administer 118 mL 3    COMBIVENT RESPIMAT  MCG/ACT inhaler INHALE 1 PUFF INTO THE LUNGS ORALLY INTO THE LUNGS 4 TIMES DAILY 4 g 11    cycloSPORINE (RESTASIS) 0.05 % ophthalmic emulsion Place 1 drop into both eyes 2 times daily       fluorometholone (FML LIQUIFILM) 0.1 % ophthalmic susp Place 1 drop Into the left eye daily       hydrocortisone 1 % CREA cream Place rectally 2 times daily as needed for itching      hyoscyamine (LEVSIN/SL) 0.125 MG sublingual tablet Place 1 tablet (0.125 mg) under the tongue every 4 hours as needed for cramping.      loratadine (CLARITIN) 10 MG tablet 1 TABLET ORALLY DAILY (DX: ASTHMA) 28 tablet 11    LORazepam (ATIVAN) 0.5 MG tablet Take 1 tablet (0.5 mg) by mouth every 4 hours as needed for anxiety.      melatonin 3 MG tablet 1 TABLET ORALLY AT BEDTIME ALONG WITH 5 MG FOR A TOTAL DOSE OF 8MG 30 tablet 11    melatonin 5 MG tablet 1 TABLET ORALLY AT BEDTIME  ALONG WITH 3MG FOR A TOTAL DOSE OF 8MG 30 tablet 11    mirtazapine (REMERON) 7.5 MG tablet 1 TABLET ORALLY AT BEDTIME 31 tablet 11    Multiple Vitamins-Minerals (PRESERVISION AREDS 2) CAPS 1 CAPSULE ORALLY 2 TIMES DAILY 62 capsule 11    nystatin (MYCOSTATIN) 909570 UNIT/ML suspension 5ml swish and swallow TID for 7 days.   May have in apartment to self administer 105 mL 0    omeprazole (PRILOSEC) 20 MG DR capsule 1 CAPSULE ORALLY 2 TIMES DAILY (DX: GASTROESOPHAGEAL REFLUX DISEASE) 62 capsule 11    OXYGEN-HELIUM IN every evening      polyethylene glycol 0.4%- propylene glycol 0.3% (SYSTANE ULTRA) 0.4-0.3 % SOLN ophthalmic solution Place 1 drop into both eyes 4 times daily       potassium chloride (KLOR-CON) 20 MEQ packet Take 20 mEq by mouth 2 times daily. 60 packet 1    SENEXON-S 8.6-50 MG tablet 1 TABLET ORALLY 2 TIMES DAILY (DX: CONSTIPATION) 56 tablet 11    sodium chloride 1 GM tablet Take 1 tablet (1 g) by mouth daily 30 tablet 11    timolol (TIMOPTIC) 0.5 % ophthalmic solution Place 1 drop Into the left eye daily       torsemide (DEMADEX) 20 MG tablet 2 TABLETS (40MG) ORALLY DAILY 60 tablet 11       REVIEW OF SYSTEMS:  4 point ROS neg other than the symptoms noted above in the HPI.  No chest pain.  Breathing is stable.      PHYSICAL EXAM:  /71   Pulse 74   Temp 98.1  F (36.7  C)   Resp 19   Wt 40.8 kg (90 lb)   BMI 16.46 kg/m    Today Georgia is sitting up in her wheelchair out in the living room with her right leg elevated on the stool.  Slowly she moved it down so both legs firmly on the floor.  Both lower legs have dressings present due to opened blisters.  Treatment to be done M/W/F with hospice nursing.    Georgia is petite female.  Neck pain so she does not move her neck well from side to side.  Ducked down in order to look her in the face so she could tell whom was visiting with her.    Mood more calm this week vs. Last week.  Last week her mind was moving quickly and could not reason at all with her.  Very anxious last week.      Heart rate regular/irregular.    Lung sounds diminished.  Skin is light yellow and fingers are blue.  Usually feet and lower extremities are blue as well.    Assist of staff to help with transfers.  Not leaving her apartment anymore.    Appetite is declining.  If she eats, it is typically breakfast.      ASSESSMENT / PLAN:  (I50.32) Chronic diastolic congestive heart failure (H)  (primary encounter diagnosis)  Comment: continues with edema in legs with left greater than right.  Legs have wraps present due to opened blisters.  Explained to Georgia that the edema is becoming less and part is that she is coming to end of her life soon.   Will stay on Torsemide and KCL replacement.  Feel the Torsemide is helping her breathing as well.    (I99.9) Impaired circulatory function  Comment: fingers are still blue and cool to touch.  Lower extremities are blue/purple as well if dependent for a period of time.  No changes.      (R94.5) Liver function abnormality  Comment: known labs are abnormal but can see her skin is yellow as well.  Over the weekend the nurse stated her sclera's were yellow as well.  Would agree the sclera's are not pearly white anymore.  Goal is to get rid of medications.  Explained to her that medications need to pass through her liver and to eliminate some medications would be helpful in many ways.    No further lab following.    (R19.5) Loose stools  Comment: despite discontinuing the scheduled Miralax, Georgia is so worried about incontinence and how many loose stools she is having that she wants the imodium scheduled.  Will need to monitor so she does not end up blocked.  Appetite is poor and so think she she does not really need any medication but she has lives with constipation for so long that this is all new to her  Plan: loperamide (IMODIUM A-D) 2 MG tablet    (F41.9,  F32.A) Anxiety and depression  (Z51.5) Hospice care patient  Comment: leaving her on Remeron 7.5mg po daily to help her sleep.  Is on melatonin as well and leaving that as it is.  Encouraged her to take the PRN Lorazepam when offered as it will help her sleep as well. She said she would but use to her saying things but will change her mind.  Plan: oxyCODONE (ROXICODONE) 5 MG tablet    (M54.2,  G89.29) Neck pain, chronic  Comment: tylenol ES is now scheduled by hospice and has the PRN oxycodone solutab available.  She only wants the tylenol for now.    Plan: acetaminophen (TYLENOL) 500 MG tablet,         oxyCODONE (ROXICODONE) 5 MG tablet    (K59.00) Constipation, unspecified constipation type  Comment: discontinuing scheduled Miralax today as probably causing her  loose stools.  Can keep the PRN dose per request of nursing.  Plan: polyethylene glycol (MIRALAX) 17 GM/Dose powder     Orders:  Discontinue the following medications:  demecycline, Probiotic, Levothyroxine, and scheduled Miralax.    2.  Schedule Imodium 2mg po every AM per request.  Keep Imodium 2mg po after each loose stool PRN with max 5 tabs in 24 hours.     Electronically signed by  CASTRO Hazel CNP

## 2024-12-02 NOTE — LETTER
" 12/2/2024      Marla Dunn  C/o Deneen Dunn  [address] [facility] [address]        [facility] GERIATRICS  ACUTE/EPISODIC VISIT    [facility] Medical Record Number:  [id number]  Place of Service where encounter took place:  [facility] [place] ([facility]) [[id number]]    Chief Complaint   Patient presents with     RECHECK       HPI:    Marla Dunn is a 89 year old  (5/22/1929), who is being seen today for an episodic care visit.  HPI information obtained from: facility chart records, facility staff, patient report, [facility] chart review, and hospice RN .    Today's concern is:    Diagnoses         Codes Comments    Chronic diastolic congestive heart failure (H)    -  Primary I50.32     Impaired circulatory function     I99.9     Liver function abnormality     R94.5     Loose stools     R19.5     Anxiety and depression     F41.9, F32.A     Hospice care patient     Z51.5     Neck pain, chronic     M54.2, G89.29     Constipation, unspecified constipation type     K59.00           Came to see georgia today to see how she has been doing.  Saw her twice last week as she was not doing so well.  Morphine made her dizzy.  Now has oxycodone Solutab but does not want to take it.    Over this past weekend, staff found Tylenol ES in her apartment.  Georgia has been taking her own supply and possible she took 3500 at once for her neck pain.  Georgia would not allow nursing to remove the bottle but the hospice nurse from Sunday removed the bottle.      Today the hospice RN present in the office and so spoke about this NP's goal of removing medication if possible.  Hospice wrote orders for scheduling Tylenol ES 1000mg twice a day and 1000mg every 6 hours prn.    Everyone has tried hard to get Georgia to take the comfort medications but she \"digs her heels in\" and will not take what would be helpful for comfort.      Georgia has been having loose stools and wanting imodium scheduled.  Georgia is " not eating very well and is on Miralax.  Probably why she is having loose stools.  Will discontinue the scheduled dose and wanted to hold off the scheduling of the Imodium but just the thought of the stooling bothers Georgia.  Will schedule but need to watch that she does not tip the other way.      Informed georgia of the medications that will be discontinued but of course she did not like some of the medications being changed.  Able to discontinue some of them but not all of them.      ALLERGIES:    Allergies   Allergen Reactions     Gramineae Pollens Other (See Comments)     ORCHARDGRASS. Shortness of breath when lawn is just cut.     Smoke. Other (See Comments)     Hard to breathe.     Pollen Extract      Other reaction(s): Unknown        MEDICATIONS:  Post Discharge Medication Reconciliation Status: patient was not discharged from an inpatient facility or TCU.     Current Outpatient Medications   Medication Sig Dispense Refill     acetaminophen (TYLENOL) 500 MG tablet 1000mg po BID and 1000mg po every 6 hours PRN       loperamide (IMODIUM A-D) 2 MG tablet Scheduled 2mg po every AM and 2mg by mouth after each loose stool PRN for a max of 5 tabs in 24 hours       oxyCODONE (ROXICODONE) 5 MG tablet Take 1 tablet (5 mg) by mouth every 4 hours as needed for pain.       polyethylene glycol (MIRALAX) 17 GM/Dose powder Take 17 g (1 Capful) by mouth daily as needed for constipation.       acetaminophen (TYLENOL) 650 MG suppository Place 1 suppository (650 mg) rectally every 6 hours as needed for fever.       albuterol (PROAIR HFA/PROVENTIL HFA/VENTOLIN HFA) 108 (90 Base) MCG/ACT inhaler INHALE 2 PUFFS INTO THE LUNGS 4 TIMES DAILY  (DX: CHRONIC OBSTRUCTIVE PULMONARY DISEASE) 18 g 11     amoxicillin (AMOXIL) 500 MG capsule 4 CAPSULES (2000MG ) ORALLY AS NEEDED ONE HOUR PRIOR TO DENTAL APPOINTMENT 4 capsule 5     ANTACID REGULAR STRENGTH 500 MG chewable tablet 2 TABLETS (1000MG) ORALLY 2 TIMES DAILY AS NEEDED FOR HEARTBURN  60 tablet 11     apixaban ANTICOAGULANT (ELIQUIS) 2.5 MG tablet Take 1 tablet (2.5 mg) by mouth 2 times daily       bisacodyl (DULCOLAX) 5 MG EC tablet Take 1 tablet (5 mg) by mouth 2 times daily as needed for constipation       CALMOSEPTINE 0.44-20.6 % OINT ointment APPLY TO AFFECTED AREA 2 TIMES DAILY;APPLY TO AFFECTED AREA 2 TIMES DAILY AS NEEDED FOR REDNESS ON BOTTOM 113 g 11     chlorhexidine (PERIDEX) 0.12 % solution Swish and spit 15 mLs in mouth 2 times daily May self administer 118 mL 3     COMBIVENT RESPIMAT  MCG/ACT inhaler INHALE 1 PUFF INTO THE LUNGS ORALLY INTO THE LUNGS 4 TIMES DAILY 4 g 11     cycloSPORINE (RESTASIS) 0.05 % ophthalmic emulsion Place 1 drop into both eyes 2 times daily        fluorometholone (FML LIQUIFILM) 0.1 % ophthalmic susp Place 1 drop Into the left eye daily        hydrocortisone 1 % CREA cream Place rectally 2 times daily as needed for itching       hyoscyamine (LEVSIN/SL) 0.125 MG sublingual tablet Place 1 tablet (0.125 mg) under the tongue every 4 hours as needed for cramping.       loratadine (CLARITIN) 10 MG tablet 1 TABLET ORALLY DAILY (DX: ASTHMA) 28 tablet 11     LORazepam (ATIVAN) 0.5 MG tablet Take 1 tablet (0.5 mg) by mouth every 4 hours as needed for anxiety.       melatonin 3 MG tablet 1 TABLET ORALLY AT BEDTIME ALONG WITH 5 MG FOR A TOTAL DOSE OF 8MG 30 tablet 11     melatonin 5 MG tablet 1 TABLET ORALLY AT BEDTIME  ALONG WITH 3MG FOR A TOTAL DOSE OF 8MG 30 tablet 11     mirtazapine (REMERON) 7.5 MG tablet 1 TABLET ORALLY AT BEDTIME 31 tablet 11     Multiple Vitamins-Minerals (PRESERVISION AREDS 2) CAPS 1 CAPSULE ORALLY 2 TIMES DAILY 62 capsule 11     nystatin (MYCOSTATIN) 894969 UNIT/ML suspension 5ml swish and swallow TID for 7 days.   May have in apartment to self administer 105 mL 0     omeprazole (PRILOSEC) 20 MG DR capsule 1 CAPSULE ORALLY 2 TIMES DAILY (DX: GASTROESOPHAGEAL REFLUX DISEASE) 62 capsule 11     OXYGEN-HELIUM IN every evening        polyethylene glycol 0.4%- propylene glycol 0.3% (SYSTANE ULTRA) 0.4-0.3 % SOLN ophthalmic solution Place 1 drop into both eyes 4 times daily       potassium chloride (KLOR-CON) 20 MEQ packet Take 20 mEq by mouth 2 times daily. 60 packet 1     SENEXON-S 8.6-50 MG tablet 1 TABLET ORALLY 2 TIMES DAILY (DX: CONSTIPATION) 56 tablet 11     sodium chloride 1 GM tablet Take 1 tablet (1 g) by mouth daily 30 tablet 11     timolol (TIMOPTIC) 0.5 % ophthalmic solution Place 1 drop Into the left eye daily        torsemide (DEMADEX) 20 MG tablet 2 TABLETS (40MG) ORALLY DAILY 60 tablet 11       REVIEW OF SYSTEMS:  4 point ROS neg other than the symptoms noted above in the HPI.  No chest pain.  Breathing is stable.      PHYSICAL EXAM:  /71   Pulse 74   Temp 98.1  F (36.7  C)   Resp 19   Wt 40.8 kg (90 lb)   BMI 16.46 kg/m    Today Georgia is sitting up in her wheelchair out in the living room with her right leg elevated on the stool.  Slowly she moved it down so both legs firmly on the floor.  Both lower legs have dressings present due to opened blisters.  Treatment to be done M/W/F with hospice nursing.    Georgia is petite female.  Neck pain so she does not move her neck well from side to side.  Ducked down in order to look her in the face so she could tell whom was visiting with her.    Mood more calm this week vs. Last week.  Last week her mind was moving quickly and could not reason at all with her.  Very anxious last week.      Heart rate regular/irregular.    Lung sounds diminished.  Skin is light yellow and fingers are blue.  Usually feet and lower extremities are blue as well.    Assist of staff to help with transfers.  Not leaving her apartment anymore.    Appetite is declining.  If she eats, it is typically breakfast.      ASSESSMENT / PLAN:  (I50.32) Chronic diastolic congestive heart failure (H)  (primary encounter diagnosis)  Comment: continues with edema in legs with left greater than right.  Legs have  wraps present due to opened blisters.  Explained to Georgia that the edema is becoming less and part is that she is coming to end of her life soon.  Will stay on Torsemide and KCL replacement.  Feel the Torsemide is helping her breathing as well.    (I99.9) Impaired circulatory function  Comment: fingers are still blue and cool to touch.  Lower extremities are blue/purple as well if dependent for a period of time.  No changes.      (R94.5) Liver function abnormality  Comment: known labs are abnormal but can see her skin is yellow as well.  Over the weekend the nurse stated her sclera's were yellow as well.  Would agree the sclera's are not pearly white anymore.  Goal is to get rid of medications.  Explained to her that medications need to pass through her liver and to eliminate some medications would be helpful in many ways.    No further lab following.    (R19.5) Loose stools  Comment: despite discontinuing the scheduled Miralax, Georgia is so worried about incontinence and how many loose stools she is having that she wants the imodium scheduled.  Will need to monitor so she does not end up blocked.  Appetite is poor and so think she she does not really need any medication but she has lives with constipation for so long that this is all new to her  Plan: loperamide (IMODIUM A-D) 2 MG tablet    (F41.9,  F32.A) Anxiety and depression  (Z51.5) Hospice care patient  Comment: leaving her on Remeron 7.5mg po daily to help her sleep.  Is on melatonin as well and leaving that as it is.  Encouraged her to take the PRN Lorazepam when offered as it will help her sleep as well. She said she would but use to her saying things but will change her mind.  Plan: oxyCODONE (ROXICODONE) 5 MG tablet    (M54.2,  G89.29) Neck pain, chronic  Comment: tylenol ES is now scheduled by hospice and has the PRN oxycodone solutab available.  She only wants the tylenol for now.    Plan: acetaminophen (TYLENOL) 500 MG tablet,         oxyCODONE  (ROXICODONE) 5 MG tablet    (K59.00) Constipation, unspecified constipation type  Comment: discontinuing scheduled Miralax today as probably causing her loose stools.  Can keep the PRN dose per request of nursing.  Plan: polyethylene glycol (MIRALAX) 17 GM/Dose powder     Orders:  Discontinue the following medications:  demecycline, Probiotic, Levothyroxine, and scheduled Miralax.    2.  Schedule Imodium 2mg po every AM per request.  Keep Imodium 2mg po after each loose stool PRN with max 5 tabs in 24 hours.     Electronically signed by  CASTRO Hazel CNP           Sincerely,        CASTRO Hazel CNP

## 2024-12-03 DIAGNOSIS — Z53.9 DIAGNOSIS NOT YET DEFINED: Primary | ICD-10-CM

## 2024-12-03 RX ORDER — OXYCODONE HYDROCHLORIDE 5 MG/1
5 TABLET ORAL EVERY 4 HOURS PRN
Status: SHIPPED
Start: 2024-12-03 | End: 2024-12-06

## 2024-12-03 RX ORDER — LOPERAMIDE HYDROCHLORIDE 2 MG/1
TABLET ORAL
Status: SHIPPED
Start: 2024-12-03

## 2024-12-03 RX ORDER — POLYETHYLENE GLYCOL 3350 17 G/17G
1 POWDER, FOR SOLUTION ORAL DAILY PRN
Status: SHIPPED
Start: 2024-12-03

## 2024-12-09 ENCOUNTER — ASSISTED LIVING VISIT (OUTPATIENT)
Dept: GERIATRICS | Facility: CLINIC | Age: 89
End: 2024-12-09
Payer: MEDICARE

## 2024-12-09 VITALS
DIASTOLIC BLOOD PRESSURE: 71 MMHG | OXYGEN SATURATION: 99 % | SYSTOLIC BLOOD PRESSURE: 132 MMHG | HEART RATE: 74 BPM | WEIGHT: 90 LBS | BODY MASS INDEX: 16.46 KG/M2 | TEMPERATURE: 98.1 F | RESPIRATION RATE: 19 BRPM

## 2024-12-09 DIAGNOSIS — E87.6 HYPOKALEMIA: ICD-10-CM

## 2024-12-09 DIAGNOSIS — R60.0 BILATERAL LOWER EXTREMITY EDEMA: ICD-10-CM

## 2024-12-09 DIAGNOSIS — I50.32 CHRONIC DIASTOLIC CONGESTIVE HEART FAILURE (H): Primary | ICD-10-CM

## 2024-12-09 DIAGNOSIS — Z51.5 HOSPICE CARE PATIENT: ICD-10-CM

## 2024-12-09 DIAGNOSIS — F32.A ANXIETY AND DEPRESSION: ICD-10-CM

## 2024-12-09 DIAGNOSIS — F41.9 ANXIETY AND DEPRESSION: ICD-10-CM

## 2024-12-09 DIAGNOSIS — Z78.9 TAKES DIETARY SUPPLEMENTS: ICD-10-CM

## 2024-12-09 PROCEDURE — 99349 HOME/RES VST EST MOD MDM 40: CPT | Mod: GV | Performed by: NURSE PRACTITIONER

## 2024-12-09 RX ORDER — LORAZEPAM 0.5 MG/1
TABLET ORAL
Qty: 30 TABLET | Refills: 2 | Status: SHIPPED | OUTPATIENT
Start: 2024-12-09

## 2024-12-09 RX ORDER — TORSEMIDE 20 MG/1
20 TABLET ORAL DAILY
Qty: 30 TABLET | Refills: 11 | Status: SHIPPED | OUTPATIENT
Start: 2024-12-09

## 2024-12-09 NOTE — LETTER
12/9/2024      Marla Dunn  C/o Deneen Dunn  [address] [facility] [address]        [facility] GERIATRICS  ACUTE/EPISODIC VISIT    [facility] Medical Record Number:  [id number]  Place of Service where encounter took place:  [facility] [place] ([facility]) [[id number]]    Chief Complaint   Patient presents with     RECHECK       HPI:    Marla Dunn is a 89 year old  (5/22/1929), who is being seen today for an episodic care visit.  HPI information obtained from: facility chart records, facility staff, patient report, [facility] chart review, and hospice RN .  Did text niece after visit to update on medication changes.    Today's concern is:    Diagnoses         Codes Comments    Chronic diastolic congestive heart failure (H)    -  Primary I50.32     Bilateral lower extremity edema     R60.0     Hypokalemia     E87.6     Hospice care patient     Z51.5     Anxiety and depression     F41.9, F32.A     Takes dietary supplements     Z78.9           Came to see Georgia today and how she was doing.  [facility]osp[facility] RN was doing dressing change to Georgia's lower extremities.  She had gotten Georgia up in her wheelchair and had her sitting out in living area.  Earlier this morning she was very weak to be in the wheelchair and so staff had laid her back down in bed.      Georgia recognized this NP but her speech was harder to understand.  Some requests were clear.  Between the hospice RN, this NP and Georgia, asked her opinion and if ok with further changes for plan of care.    The niece planned on getting Georgia a different recliner as the one that is present is big.  Do not feel Georgia has much time left that getting a new recliner would be a hassle.  Spoke about getting her a broda chair instead.  That way she could be comfortable and out of the bed.  They have short Broda chairs that Georgia may be able to propel with her feet.      Georgia asked if she could have the KCL discontinued.   Since her edema has retreated back, willing to lower the Torsemide to 20mg daily and then discontinue the KCL.  Talked her into being ok with discontinuing the Preservision.  Georgia has been taking the Lorazepam at HS now.  Not scheduled and so will schedule that and keep the PRN order of Lorazepam the same.    ALLERGIES:    Allergies   Allergen Reactions     Gramineae Pollens Other (See Comments)     ORCHARDGRASS. Shortness of breath when lawn is just cut.     Smoke. Other (See Comments)     Hard to breathe.     Pollen Extract      Other reaction(s): Unknown        MEDICATIONS:  Post Discharge Medication Reconciliation Status: patient was not discharged from an inpatient facility or TCU. Reconciled.    Current Outpatient Medications   Medication Sig Dispense Refill     LORazepam (ATIVAN) 0.5 MG tablet Take 1 tablet (0.5 mg) by mouth at bedtime. May also take 1 tablet (0.5 mg) every 4 hours as needed for anxiety. 30 tablet 2     torsemide (DEMADEX) 20 MG tablet Take 1 tablet (20 mg) by mouth daily. 30 tablet 11     acetaminophen (TYLENOL) 500 MG tablet 1000mg po BID and 1000mg po every 6 hours PRN       acetaminophen (TYLENOL) 650 MG suppository Place 1 suppository (650 mg) rectally every 6 hours as needed for fever.       albuterol (PROAIR HFA/PROVENTIL HFA/VENTOLIN HFA) 108 (90 Base) MCG/ACT inhaler INHALE 2 PUFFS INTO THE LUNGS 4 TIMES DAILY  (DX: CHRONIC OBSTRUCTIVE PULMONARY DISEASE) 18 g 11     amoxicillin (AMOXIL) 500 MG capsule 4 CAPSULES (2000MG ) ORALLY AS NEEDED ONE HOUR PRIOR TO DENTAL APPOINTMENT 4 capsule 5     ANTACID REGULAR STRENGTH 500 MG chewable tablet 2 TABLETS (1000MG) ORALLY 2 TIMES DAILY AS NEEDED FOR HEARTBURN 60 tablet 11     apixaban ANTICOAGULANT (ELIQUIS) 2.5 MG tablet Take 1 tablet (2.5 mg) by mouth 2 times daily       bisacodyl (DULCOLAX) 5 MG EC tablet Take 1 tablet (5 mg) by mouth 2 times daily as needed for constipation       CALMOSEPTINE 0.44-20.6 % OINT ointment APPLY TO  AFFECTED AREA 2 TIMES DAILY;APPLY TO AFFECTED AREA 2 TIMES DAILY AS NEEDED FOR REDNESS ON BOTTOM 113 g 11     chlorhexidine (PERIDEX) 0.12 % solution Swish and spit 15 mLs in mouth 2 times daily May self administer 118 mL 3     COMBIVENT RESPIMAT  MCG/ACT inhaler INHALE 1 PUFF INTO THE LUNGS ORALLY INTO THE LUNGS 4 TIMES DAILY 4 g 11     cycloSPORINE (RESTASIS) 0.05 % ophthalmic emulsion Place 1 drop into both eyes 2 times daily        fluorometholone (FML LIQUIFILM) 0.1 % ophthalmic susp Place 1 drop Into the left eye daily        hydrocortisone 1 % CREA cream Place rectally 2 times daily as needed for itching       hyoscyamine (LEVSIN/SL) 0.125 MG sublingual tablet Place 1 tablet (0.125 mg) under the tongue every 4 hours as needed for cramping.       loperamide (IMODIUM A-D) 2 MG tablet Scheduled 2mg po every AM and 2mg by mouth after each loose stool PRN for a max of 5 tabs in 24 hours       loratadine (CLARITIN) 10 MG tablet 1 TABLET ORALLY DAILY (DX: ASTHMA) 28 tablet 11     melatonin 3 MG tablet 1 TABLET ORALLY AT BEDTIME ALONG WITH 5 MG FOR A TOTAL DOSE OF 8MG 30 tablet 11     melatonin 5 MG tablet 1 TABLET ORALLY AT BEDTIME  ALONG WITH 3MG FOR A TOTAL DOSE OF 8MG 30 tablet 11     mirtazapine (REMERON) 7.5 MG tablet 1 TABLET ORALLY AT BEDTIME 31 tablet 11     nystatin (MYCOSTATIN) 168933 UNIT/ML suspension 5ml swish and swallow TID for 7 days.   May have in apartment to self administer 105 mL 0     omeprazole (PRILOSEC) 20 MG DR capsule 1 CAPSULE ORALLY 2 TIMES DAILY (DX: GASTROESOPHAGEAL REFLUX DISEASE) 62 capsule 11     OXYGEN-HELIUM IN every evening       polyethylene glycol (MIRALAX) 17 GM/Dose powder Take 17 g (1 Capful) by mouth daily as needed for constipation.       polyethylene glycol 0.4%- propylene glycol 0.3% (SYSTANE ULTRA) 0.4-0.3 % SOLN ophthalmic solution Place 1 drop into both eyes 4 times daily       SENEXON-S 8.6-50 MG tablet 1 TABLET ORALLY 2 TIMES DAILY (DX: CONSTIPATION) 56 tablet  11     sodium chloride 1 GM tablet Take 1 tablet (1 g) by mouth daily 30 tablet 11     timolol (TIMOPTIC) 0.5 % ophthalmic solution Place 1 drop Into the left eye daily          REVIEW OF SYSTEMS:  4 point ROS neg other than the symptoms noted above in the HPI.  No chest pain, no SOB.      PHYSICAL EXAM:  /71   Pulse 74   Temp 98.1  F (36.7  C)   Resp 19   Wt 40.8 kg (90 lb)   SpO2 99%   BMI 16.46 kg/m    Petite female up in her wheelchair.  Sitting straight up with legs in dependent position as nurse working on cleaning up the residual loose skin of blisters that had opened up.    Has 2 large open areas now from blisters on lateral side of left lower leg, one dressing covering skin of ankle area medial side.  On right lower leg has a dressing as well.    Radial pulse of left wrist is regular and strong.    No respiratory distress.  No oxygen being used.  No edema in lower legs.  Feet are dry in appearance.  Right toes are bright red and swollen.  Most of the feet are bluish in color.  Skin is a light yellow in color.          ASSESSMENT / PLAN:  (I50.32) Chronic diastolic congestive heart failure (H)  (primary encounter diagnosis)  (R60.0) Bilateral lower extremity edema  (E87.6) Hypokalemia  (Z51.5) Hospice care patient  Comment: edema has now retreated back.  Feel this is another sign that her end of life is near.  Will lower the Torsemide from 40mg to 20mg daily.  Per request then will discontinue the KCL for georgia.  No labs will be obtained.    Hospice will probably see her daily M-F if possible.  This last weekend they did not come out.    Dressing to lower legs hopefully can be changed every other day and PRN.    Hospice will look into getting a Broda Chair for her as well.  Plan: LORazepam (ATIVAN) 0.5 MG tablet  Plan: torsemide (DEMADEX) 20 MG tablet    (F41.9,  F32.A) Anxiety and depression  Comment: since taking the Lorazepam at HS, her days are much better.  Will schedule the Lorazepam  0.5mg at HS so staff do not need to document a PRN.  Will leave the PRN dosing in place.    (Z78.9) Takes dietary supplements  Comment: will discontinue the Preservision eye caps.  Both hospice RN and this NP told her that the medication is really not doing anything to preserve her eyes anymore.       Orders:  Reordered Tylenol ES 1000mg po BID for neck pain and sent script to pharmacy  Lorazepam 0.5mg po at HS and keep the PRN dosing for anxiety/sleep  Discontinue the KCL  Lower the Torsemide 20mg po daily for CHF  Discontinue Preservision eye caps    Electronically signed by  CASTRO Hazel CNP            Sincerely,        CASTRO Hazel CNP

## 2024-12-09 NOTE — PROGRESS NOTES
[facility] GERIATRICS  ACUTE/EPISODIC VISIT    [facility] Medical Record Number:  [id number]  Place of Service where encounter took place:  [facility] ([facility]) [[id number]]    Chief Complaint   Patient presents with    RECHECK       HPI:    Marla Dunn is a 89 year old  (5/22/1929), who is being seen today for an episodic care visit.  HPI information obtained from: facility chart records, facility staff, patient report, [facility] chart review, and hospice RN .  Did text niece after visit to update on medication changes.    Today's concern is:    Diagnoses         Codes Comments    Chronic diastolic congestive heart failure (H)    -  Primary I50.32     Bilateral lower extremity edema     R60.0     Hypokalemia     E87.6     Hospice care patient     Z51.5     Anxiety and depression     F41.9, F32.A     Takes dietary supplements     Z78.9           Came to see Georgia today and how she was doing.  [facility] RN was doing dressing change to Georgia's lower extremities.  She had gotten Georgia up in her wheelchair and had her sitting out in living area.  Earlier this morning she was very weak to be in the wheelchair and so staff had laid her back down in bed.      Georgia recognized this NP but her speech was harder to understand.  Some requests were clear.  Between the hospice RN, this NP and Georgia, asked her opinion and if ok with further changes for plan of care.    The niece planned on getting Georgia a different recliner as the one that is present is big.  Do not feel Georgia has much time left that getting a new recliner would be a hassle.  Spoke about getting her a broda chair instead.  That way she could be comfortable and out of the bed.  They have short Broda chairs that Georgia may be able to propel with her feet.      Georgia asked if she could have the KCL discontinued.  Since her edema has retreated back, willing to lower the Torsemide to 20mg daily and then discontinue  the KCL.  Talked her into being ok with discontinuing the Preservision.  Georgia has been taking the Lorazepam at HS now.  Not scheduled and so will schedule that and keep the PRN order of Lorazepam the same.    ALLERGIES:    Allergies   Allergen Reactions    Gramineae Pollens Other (See Comments)     ORCHARDGRASS. Shortness of breath when lawn is just cut.    Smoke. Other (See Comments)     Hard to breathe.    Pollen Extract      Other reaction(s): Unknown        MEDICATIONS:  Post Discharge Medication Reconciliation Status: patient was not discharged from an inpatient facility or TCU. Reconciled.    Current Outpatient Medications   Medication Sig Dispense Refill    LORazepam (ATIVAN) 0.5 MG tablet Take 1 tablet (0.5 mg) by mouth at bedtime. May also take 1 tablet (0.5 mg) every 4 hours as needed for anxiety. 30 tablet 2    torsemide (DEMADEX) 20 MG tablet Take 1 tablet (20 mg) by mouth daily. 30 tablet 11    acetaminophen (TYLENOL) 500 MG tablet 1000mg po BID and 1000mg po every 6 hours PRN      acetaminophen (TYLENOL) 650 MG suppository Place 1 suppository (650 mg) rectally every 6 hours as needed for fever.      albuterol (PROAIR HFA/PROVENTIL HFA/VENTOLIN HFA) 108 (90 Base) MCG/ACT inhaler INHALE 2 PUFFS INTO THE LUNGS 4 TIMES DAILY  (DX: CHRONIC OBSTRUCTIVE PULMONARY DISEASE) 18 g 11    amoxicillin (AMOXIL) 500 MG capsule 4 CAPSULES (2000MG ) ORALLY AS NEEDED ONE HOUR PRIOR TO DENTAL APPOINTMENT 4 capsule 5    ANTACID REGULAR STRENGTH 500 MG chewable tablet 2 TABLETS (1000MG) ORALLY 2 TIMES DAILY AS NEEDED FOR HEARTBURN 60 tablet 11    apixaban ANTICOAGULANT (ELIQUIS) 2.5 MG tablet Take 1 tablet (2.5 mg) by mouth 2 times daily      bisacodyl (DULCOLAX) 5 MG EC tablet Take 1 tablet (5 mg) by mouth 2 times daily as needed for constipation      CALMOSEPTINE 0.44-20.6 % OINT ointment APPLY TO AFFECTED AREA 2 TIMES DAILY;APPLY TO AFFECTED AREA 2 TIMES DAILY AS NEEDED FOR REDNESS ON BOTTOM 113 g 11    chlorhexidine  (PERIDEX) 0.12 % solution Swish and spit 15 mLs in mouth 2 times daily May self administer 118 mL 3    COMBIVENT RESPIMAT  MCG/ACT inhaler INHALE 1 PUFF INTO THE LUNGS ORALLY INTO THE LUNGS 4 TIMES DAILY 4 g 11    cycloSPORINE (RESTASIS) 0.05 % ophthalmic emulsion Place 1 drop into both eyes 2 times daily       fluorometholone (FML LIQUIFILM) 0.1 % ophthalmic susp Place 1 drop Into the left eye daily       hydrocortisone 1 % CREA cream Place rectally 2 times daily as needed for itching      hyoscyamine (LEVSIN/SL) 0.125 MG sublingual tablet Place 1 tablet (0.125 mg) under the tongue every 4 hours as needed for cramping.      loperamide (IMODIUM A-D) 2 MG tablet Scheduled 2mg po every AM and 2mg by mouth after each loose stool PRN for a max of 5 tabs in 24 hours      loratadine (CLARITIN) 10 MG tablet 1 TABLET ORALLY DAILY (DX: ASTHMA) 28 tablet 11    melatonin 3 MG tablet 1 TABLET ORALLY AT BEDTIME ALONG WITH 5 MG FOR A TOTAL DOSE OF 8MG 30 tablet 11    melatonin 5 MG tablet 1 TABLET ORALLY AT BEDTIME  ALONG WITH 3MG FOR A TOTAL DOSE OF 8MG 30 tablet 11    mirtazapine (REMERON) 7.5 MG tablet 1 TABLET ORALLY AT BEDTIME 31 tablet 11    nystatin (MYCOSTATIN) 643439 UNIT/ML suspension 5ml swish and swallow TID for 7 days.   May have in apartment to self administer 105 mL 0    omeprazole (PRILOSEC) 20 MG DR capsule 1 CAPSULE ORALLY 2 TIMES DAILY (DX: GASTROESOPHAGEAL REFLUX DISEASE) 62 capsule 11    OXYGEN-HELIUM IN every evening      polyethylene glycol (MIRALAX) 17 GM/Dose powder Take 17 g (1 Capful) by mouth daily as needed for constipation.      polyethylene glycol 0.4%- propylene glycol 0.3% (SYSTANE ULTRA) 0.4-0.3 % SOLN ophthalmic solution Place 1 drop into both eyes 4 times daily      SENEXON-S 8.6-50 MG tablet 1 TABLET ORALLY 2 TIMES DAILY (DX: CONSTIPATION) 56 tablet 11    sodium chloride 1 GM tablet Take 1 tablet (1 g) by mouth daily 30 tablet 11    timolol (TIMOPTIC) 0.5 % ophthalmic solution Place 1  drop Into the left eye daily          REVIEW OF SYSTEMS:  4 point ROS neg other than the symptoms noted above in the HPI.  No chest pain, no SOB.      PHYSICAL EXAM:  /71   Pulse 74   Temp 98.1  F (36.7  C)   Resp 19   Wt 40.8 kg (90 lb)   SpO2 99%   BMI 16.46 kg/m    Petite female up in her wheelchair.  Sitting straight up with legs in dependent position as nurse working on cleaning up the residual loose skin of blisters that had opened up.    Has 2 large open areas now from blisters on lateral side of left lower leg, one dressing covering skin of ankle area medial side.  On right lower leg has a dressing as well.    Radial pulse of left wrist is regular and strong.    No respiratory distress.  No oxygen being used.  No edema in lower legs.  Feet are dry in appearance.  Right toes are bright red and swollen.  Most of the feet are bluish in color.  Skin is a light yellow in color.          ASSESSMENT / PLAN:  (I50.32) Chronic diastolic congestive heart failure (H)  (primary encounter diagnosis)  (R60.0) Bilateral lower extremity edema  (E87.6) Hypokalemia  (Z51.5) Hospice care patient  Comment: edema has now retreated back.  Feel this is another sign that her end of life is near.  Will lower the Torsemide from 40mg to 20mg daily.  Per request then will discontinue the KCL for georgia.  No labs will be obtained.    Hospice will probably see her daily M-F if possible.  This last weekend they did not come out.    Dressing to lower legs hopefully can be changed every other day and PRN.    Hospice will look into getting a Broda Chair for her as well.  Plan: LORazepam (ATIVAN) 0.5 MG tablet  Plan: torsemide (DEMADEX) 20 MG tablet    (F41.9,  F32.A) Anxiety and depression  Comment: since taking the Lorazepam at HS, her days are much better.  Will schedule the Lorazepam 0.5mg at HS so staff do not need to document a PRN.  Will leave the PRN dosing in place.    (Z78.9) Takes dietary supplements  Comment: will  discontinue the Preservision eye caps.  Both hospice RN and this NP told her that the medication is really not doing anything to preserve her eyes anymore.       Orders:  Reordered Tylenol ES 1000mg po BID for neck pain and sent script to pharmacy  Lorazepam 0.5mg po at HS and keep the PRN dosing for anxiety/sleep  Discontinue the KCL  Lower the Torsemide 20mg po daily for CHF  Discontinue Preservision eye caps    Electronically signed by  CASTRO Hazel CNP

## 2024-12-15 NOTE — ED NOTES
Discharge    Discharge paperwork discussed. Patient/family questions answered. AVS in hand. Patient discharged from ED.

## 2024-12-15 NOTE — ED NOTES
Bed: JNED-09  Expected date: 12/15/24  Expected time: 12:21 AM  Means of arrival: Ambulance  Comments:  [facility]- hospice referral

## 2024-12-15 NOTE — ED PROVIDER NOTES
EMERGENCY DEPARTMENT ENCOUNTER      NAME: Marla Dunn  AGE: 89 year old female  YOB: 1929  MRN: [id number]  EVALUATION DATE & TIME: 12/15/2024 12:16 AM    PCP: Gabrielle Wallace    ED PROVIDER: Xavier Escamilla MD      Chief Complaint   Patient presents with    Fall         FINAL IMPRESSION:  1. Closed fracture of distal end of right femur, unspecified fracture morphology, initial encounter (H)          ED COURSE & MEDICAL DECISION MAKING:    Pertinent Labs & Imaging studies reviewed. (See chart for details)  89 year old female presents to the Emergency Department for evaluation of fall    ED Course as of 12/15/24 0205   Sun Dec 15, 2024   0107 Patient is a 89-year-old female who presents to the emergency department after an unwitnessed fall at her hospice facility, and had a reported right knee deformity.  It is unclear why the hospice facility had requested evaluation in the ER as she is currently comfort cares.  I evaluated the patient, who is altered, unable to provide a history, and has swelling of her right knee.  I also discussed with the patient's niece, Deneen, who is power of , and she agreed that the patient's goals of care are to be comfortable.  We had stopped the fluids that EMS had provided, and took the patient off the monitor as these do not aid the patient in her clinical care in the ER.  Deneen and I also discussed that for a 89-year-old who fell, we would typically do blood work, imaging of the head and potentially body, but if we found an emergent condition, no intervention would be performed based on her wishes and goals of care, so these will not be performed.  We discussed that giving her Tylenol for pain relief would be okay.  We also discussed that obtaining a knee plain film, in the event that we could splint her knee to stabilize it, in an effort to help her be more comfortable.  And he notes that for the past couple days she has stopped eating, and  "has become more altered, and even questioned if we had \"given her painkillers\", as she is more fatigued and altered than usual.   0204 Patient has evidence of a distal femur fracture above her knee arthroplasty with displacement.  As the patient is not going to have surgery since she is on hospice, patient placed in a knee immobilizer for comfort and discharged back to her hospice facility.  Discussed this plan of care and had shared decision-making with the patient's POA, niece.       Medical Decision Making  Obtained supplemental history:Supplemental history obtained?: EMS  Reviewed external records: External records reviewed?: No  Care impacted by chronic illness:Cerebrovascular Disease, Hyperlipidemia, and Hypertension  Care significantly affected by social determinants of health:N/A  Did you consider but not order tests?: Work up considered but not performed and documented in chart, if applicable  Did you interpret images independently?: Independent interpretation of ECG and images noted in documentation, when applicable.  Consultation discussion with other provider:Did you involve another provider (consultant, , pharmacy, etc.)?: No  Discharge. No recommendations on prescription strength medication(s). See documentation for any additional details.  Not Applicable      At the conclusion of the encounter I discussed the results of all of the tests and the disposition. The questions were answered. The patient or family acknowledged understanding and was agreeable with the care plan.     0 minutes of critical care time     MEDICATIONS GIVEN IN THE EMERGENCY:  Medications   acetaminophen (TYLENOL) tablet 975 mg (975 mg Oral $Given 12/15/24 0112)       NEW PRESCRIPTIONS STARTED AT TODAY'S ER VISIT  New Prescriptions    No medications on file          =================================================================    HPI    Patient information was obtained from: Nurse    Use of : N/A         Marla YEN" Mona is a 89 year old female with a pertinent history of CAD, HTN, HLD, CVA, who presents to this ED by EMS for evaluation of a fall    HPI limited due to AMS    Per nurse,  Patient fell out of bed earlier yesterday and was assisted back to bed by staff at the facility. Patient's hospice nurse came to see the patient and noticed a deformity to the right knee. Patient hypotensive 80/40 on for Fire/EMS and a 18g IV was initiated and IV fluids were started on scene.       PAST MEDICAL HISTORY:  Past Medical History:   Diagnosis Date    Asthma     Bilateral carpal tunnel syndrome 08/27/2015    CAD (coronary artery disease)     Chronic airway obstruction, not elsewhere classified     Created by Conversion     Chronic anemia     Chronic edema     Congestive heart failure, severe (H) 05/13/2020    COPD (chronic obstructive pulmonary disease) (H)     Coronary artery disease     Depression     GERD (gastroesophageal reflux disease)     Heart disease     Heart murmur     High cholesterol     dislipidemia    HTN (hypertension)     Hypercholesterolemia     Hypertension     Hypertensive emergency 08/13/2021    Hypothyroidism     Hypothyroidism     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    MI (myocardial infarction) (H) 1985    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    Myelodysplastic syndrome (H)     Myocardial infarction (H) 1983    OLEGARIO (obstructive sleep apnea)     Osteoporosis     Other and unspecified hyperlipidemia     Created by Conversion     Pyelonephritis 05/11/2016    Radicular low back pain     Rheumatoid arthritis (H)     Elizabeth Agers syndrome     Sjoegren syndrome     Sjogren's syndrome (H)     Sleep apnea, obstructive     Spinal stenosis     Squamous cell carcinoma, tongue border (H)     Unspecified polyarthropathy or polyarthritis, hand     Created by Conversion        PAST SURGICAL HISTORY:  Past Surgical History:   Procedure Laterality Date    aortic valve      AORTIC VALVE REPLACEMENT   01/01/2012    APPENDECTOMY      APPENDECTOMY      AS TOTAL KNEE ARTHROPLASTY Right     BACK SURGERY  2004    spinal decompression    BACK SURGERY      spinal stenosis    BONE MARROW BIOPSY  2004    breast biopsy      BREAST SURGERY  2001    biopsy    BYPASS GRAFT ARTERY CORONARY  01/01/2009    LIMA to ramus intermedius    cardiac angiogram      CARDIAC CATHETERIZATION      CARDIAC SURGERY      EYE SURGERY  2008    bilateral cataract repair     EYE SURGERY Bilateral     cornea transplant left eye & cataracts    KYPHOPLASTY  2003    T12    OPEN REDUCTION INTERNAL FIXATION HIP NAILING Right 08/13/2021    Procedure: INTERNAL FIXATION, FRACTURE, TROCHANTERIC, HIP, USING PINS OR RODS;  Surgeon: Darrel Castro MD;  Location: [facility] OR    OTHER SURGICAL HISTORY  01/01/2018    Rectosigmoidectomy perineal    PARTIAL GLOSSECTOMY Right 09/2023    [facility]    RECTOSIGMOIDECTOMY PERINEAL N/A 01/10/2018    Procedure: RECTOSIGMOIDECTOMY PERINEAL;  PERINEAL RECTOSIGMOIDECTOMY ;  Surgeon: Candelaria Anthony MD;  Location: [facility] OR    REPAIR VALVE AORTIC  2009    THORACIC SURGERY  2003    T12 kyphoplasty    ZZC CABG, ARTERY-VEIN, FOUR      ZZC CABG, VEIN, SINGLE      Description: CABG (CABG);  Recorded: 03/09/2012;  Comments: Single vessel bypass graft to an obtuse marginal branch in May 2009    ZZC REPLACE AORT VALV,PROSTH VALV      Description: Aortic Valve Replacement;  Recorded: 03/09/2012;  Comments: Aortic valve replacement           CURRENT MEDICATIONS:    acetaminophen (TYLENOL) 500 MG tablet  acetaminophen (TYLENOL) 650 MG suppository  albuterol (PROAIR HFA/PROVENTIL HFA/VENTOLIN HFA) 108 (90 Base) MCG/ACT inhaler  amoxicillin (AMOXIL) 500 MG capsule  ANTACID REGULAR STRENGTH 500 MG chewable tablet  apixaban ANTICOAGULANT (ELIQUIS) 2.5 MG tablet  bisacodyl (DULCOLAX) 5 MG EC tablet  CALMOSEPTINE 0.44-20.6 % OINT ointment  chlorhexidine (PERIDEX) 0.12 % solution  COMBIVENT RESPIMAT  MCG/ACT  inhaler  cycloSPORINE (RESTASIS) 0.05 % ophthalmic emulsion  fluorometholone (FML LIQUIFILM) 0.1 % ophthalmic susp  hydrocortisone 1 % CREA cream  hyoscyamine (LEVSIN/SL) 0.125 MG sublingual tablet  loperamide (IMODIUM A-D) 2 MG tablet  loratadine (CLARITIN) 10 MG tablet  LORazepam (ATIVAN) 0.5 MG tablet  melatonin 3 MG tablet  melatonin 5 MG tablet  mirtazapine (REMERON) 7.5 MG tablet  nystatin (MYCOSTATIN) 889551 UNIT/ML suspension  omeprazole (PRILOSEC) 20 MG DR capsule  OXYGEN-HELIUM IN  polyethylene glycol (MIRALAX) 17 GM/Dose powder  polyethylene glycol 0.4%- propylene glycol 0.3% (SYSTANE ULTRA) 0.4-0.3 % SOLN ophthalmic solution  SENEXON-S 8.6-50 MG tablet  sodium chloride 1 GM tablet  timolol (TIMOPTIC) 0.5 % ophthalmic solution  torsemide (DEMADEX) 20 MG tablet        ALLERGIES:  Allergies   Allergen Reactions    Gramineae Pollens Other (See Comments)     ORCHARDGRASS. Shortness of breath when lawn is just cut.    Smoke. Other (See Comments)     Hard to breathe.    Pollen Extract      Other reaction(s): Unknown       FAMILY HISTORY:  Family History   Problem Relation Age of Onset    Cancer Mother         ovarian cancer;  in her 50s    Family History Negative Mother     Heart Disease Father     Cancer Brother     Cancer Sister        SOCIAL HISTORY:   Social History     Socioeconomic History    Marital status:    Tobacco Use    Smoking status: Never    Smokeless tobacco: Never   Substance and Sexual Activity    Alcohol use: No     Alcohol/week: 0.0 standard drinks of alcohol     Comment: Alcoholic Drinks/day: occasional glass of wine    Drug use: No   Social History Narrative    .  passed away 20 years ago. No children. 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 Retired from HR at [employer]. Was living independently in her apartment in [place] prior to admission. Uses walker at baseline.        VITALS:  BP 91/52   Pulse 110   Temp 97.7  F (36.5  C) (Axillary)   Resp 16   SpO2 92%      PHYSICAL EXAM    Physical Exam  Vitals and nursing note reviewed.   Constitutional:       General: She is not in acute distress.     Appearance: She is not ill-appearing.      Comments: Fatigued, intermittently answering questions in a nonsensical fashion   HENT:      Head: Normocephalic and atraumatic.   Eyes:      Conjunctiva/sclera: Conjunctivae normal.   Cardiovascular:      Rate and Rhythm: Tachycardia present.      Pulses: Normal pulses.   Pulmonary:      Effort: Pulmonary effort is normal.   Abdominal:      General: Abdomen is flat.   Musculoskeletal:      Comments: Right knee swelling.  Normal distal DP, PT pulses.   Skin:     General: Skin is warm and dry.   Neurological:      Mental Status: She is disoriented.            LAB:  All pertinent labs reviewed and interpreted.  Results for orders placed or performed during the hospital encounter of 12/15/24   XR Knee Port Right 1/2 Views    Impression    IMPRESSION: Oblique fracture of the distal femur immediately above the knee arthroplasty. There is posterior angulation and medial displacement as well as approximately 2.5 cm override.       RADIOLOGY:  Reviewed all pertinent imaging. Please see official radiology report.  XR Knee Port Right 1/2 Views   Final Result   IMPRESSION: Oblique fracture of the distal femur immediately above the knee arthroplasty. There is posterior angulation and medial displacement as well as approximately 2.5 cm override.          PROCEDURES:   None      [facility] Documentation:   CMS Diagnoses:              I, Mic Celis, am serving as a scribe to document services personally performed by Xavier Escamilla MD based on my observation and the provider's statements to me. I, Xavier Escamilla MD, attest that Mic Celis is acting in a scribe capacity, has observed my performance of the services and has documented them in accordance with my direction.    Xavier Escamilla MD  [facility]  hospitals EMERGENCY DEPARTMENT  [address] [place] [id number]  [phone]       Xavier Escamilla MD  12/15/24 [id number]

## 2024-12-15 NOTE — ED TRIAGE NOTES
Georgia arrives by [facility] from [facility]. Patient fell out of bed earlier yesterday and was assisted back to bed by staff at the facility. Patient's hospice nurse came to see the patient and noticed a deformity to the right knee. Patient hypotensive 80/40 on for Fire/EMS and a 18g IV was initiated and IV fluids were started on scene.

## 2024-12-15 NOTE — ED NOTES
[facility] called and updated of patient discharge. Transport called for patient back to [facility]

## 2024-12-16 NOTE — TELEPHONE ENCOUNTER
Resident is in dying phase and nurse wants to have orders to discontinue Oxygen, CPap and NPO as she is not eating.      Will give order to discontinue the CPAP.    Oxygen order is comfort care.  NPO is not needed as then if by chance she wakes up enough (doubt it) she may wants drops of water or toothette.    Order:    Discontinue the CPAP machine as no use.    CASTRO Hazel CNP

## 2024-12-16 NOTE — PROGRESS NOTES
[facility] Care Coordination Contact    CC received notification of Emergency Room visit.  ER visit occurred on 12/15/24 after a fall at her assisted living which lead to visible knee deformity. She was found to have a closed fracture of the distal end of the right femur. Member is on hospice and wishes only for comfort cares, so minimal workup was done in the ER. She was placed in a knee immobilizer for comfort and then was discharged back to her AL where she receives daily assistance. Has additional support in place from family and hospice.     Will continue to monitor via EMR.     Chiara Gloria RN  [facility]  Cell: [phone]

## 2024-12-16 NOTE — LETTER
" 2024      Marla Dunn  C/o Deneen Dunn  [address]  [place] [facility] [address]        [facility] GERIATRICS  ACUTE/EPISODIC VISIT    [facility] Medical Record Number:  [id number]  Place of Service where encounter took place:  [facility] [place] ([facility]) [[id number]]    Chief Complaint   Patient presents with     RECHECK       HPI:    Marla Dunn is a 89 year old  (1929), who is being seen today for an episodic care visit.  HPI information obtained from: facility chart records, facility staff, [facility] chart review, family/first contact niece Deneen report, and hospice nurse .    Today's concern is:    Diagnoses         Codes Comments    Chronic diastolic congestive heart failure (H)    -  Primary I50.32     Closed fracture of distal end of right femur with delayed healing, unspecified fracture morphology, subsequent encounter     S72.401G     Hospice care patient     Z51.5           Came to see Georgia today which will probably be the last time as she is transitioning in her dying journey.  Hospice has asked for discontinuation of non-necessary medications.    On , Georgia attempted to self transfer from the [facility] and fell.  Was sent to the ED and found to have fracture of distal end of femur fracture.  Was placed in a immobilizer and sent back to facility.  Gave orders yesterday that immobilizer stays at all times on except for cares.    Went up to Georgia's apartment and hospice RN and Georgia's niece present and so spent time talking with them about business but then about Georgia's life.  This NP has been with Georgia for probably a good 1.5 years, and have developed a friendship with her.  She looked forward to being seen.  Through time together she would always comment about the urszula ones whom have  and why not her.  Now today able to say she will be \"going home\" soon.      ALLERGIES:    Allergies   Allergen Reactions     Gramineae Pollens Other (See " Comments)     ORCHARDGRASS. Shortness of breath when lawn is just cut.     Smoke. Other (See Comments)     Hard to breathe.     Pollen Extract      Other reaction(s): Unknown        MEDICATIONS:  Post Discharge Medication Reconciliation Status: reconciled.    Current Outpatient Medications   Medication Sig Dispense Refill     morphine 2.5 MG solu-tab Place 1 tablet (2.5 mg) under the tongue every hour as needed for shortness of breath or moderate to severe pain.       acetaminophen (TYLENOL) 650 MG suppository Place 1 suppository (650 mg) rectally every 6 hours as needed for fever.       CALMOSEPTINE 0.44-20.6 % OINT ointment APPLY TO AFFECTED AREA 2 TIMES DAILY;APPLY TO AFFECTED AREA 2 TIMES DAILY AS NEEDED FOR REDNESS ON BOTTOM 113 g 11     hyoscyamine (LEVSIN/SL) 0.125 MG sublingual tablet Place 1 tablet (0.125 mg) under the tongue every 4 hours as needed for cramping.       LORazepam (ATIVAN) 0.5 MG tablet Take 1 tablet (0.5 mg) by mouth at bedtime. May also take 1 tablet (0.5 mg) every 4 hours as needed for anxiety. 30 tablet 2     OXYGEN-HELIUM IN every evening           REVIEW OF SYSTEMS:  Unable as she is transitioning in dying.    PHYSICAL EXAM:  /65   Pulse 58   Temp 97.2  F (36.2  C)   Resp 16   Wt 40.8 kg (90 lb)   SpO2 (!) 85%   BMI 16.46 kg/m    Petite female whom is very thin now laying in a hospital bed with pillow propping left leg up, HOB elevated as well.  Skin is warm and dry but with her hands, the skin is cooler.  Her hands have been purple for a few weeks now but do not see that today.    No edema in lower legs.  Left leg has a ace bandage on due to open blister.  Mouth is open.  Eyes fluttered open slightly when this nurse practitioner spoke to her and then she tried moving her mouth.    No apnea but at one point her breathing was slightly heavy.        ASSESSMENT / PLAN:  1. Chronic diastolic congestive heart failure (H)    2. Closed fracture of distal end of right femur with  delayed healing, unspecified fracture morphology, subsequent encounter    3. Hospice care patient      Hospice nursing asked staff to ask this NP to discontinue all unnecessary medications.  See below the order.    Hospice will start riley visits due to expected death.    Appeared comfortable, staff will use the PRNs more frequently now.    With the long bone fracture, discussed calling the  once she passes.  Do not think it should be a problem signing her certificate but hospice and this NP trying to get on the same page.        Orders:   Discontinue all medications except PRN Morphine, oxycodone solutab, O2, Levsin, Lorazepam and Calmoseptine.  Discontinue the CPAP as well.    Electronically signed by  CASTRO Hazel CNP            Sincerely,        CASTRO Hazel CNP

## 2024-12-16 NOTE — PROGRESS NOTES
"[facility] GERIATRICS  ACUTE/EPISODIC VISIT    [facility] Medical Record Number:  [id number]  Place of Service where encounter took place:  [facility] [[id number]]    Chief Complaint   Patient presents with    RECHECK       HPI:    Marla Dunn is a 89 year old  (1929), who is being seen today for an episodic care visit.  HPI information obtained from: facility chart records, facility staff, [facility] chart review, family/first contact niece Deneen report, and hospice nurse .    Today's concern is:    Diagnoses         Codes Comments    Chronic diastolic congestive heart failure (H)    -  Primary I50.32     Closed fracture of distal end of right femur with delayed healing, unspecified fracture morphology, subsequent encounter     S72.055L     Hospice care patient     Z51.5           Came to see Georgia today which will probably be the last time as she is transitioning in her dying journey.  Hospice has asked for discontinuation of non-necessary medications.    On , Georgia attempted to self transfer from the [facility] and fell.  Was sent to the ED and found to have fracture of distal end of femur fracture.  Was placed in a immobilizer and sent back to facility.  Gave orders yesterday that immobilizer stays at all times on except for cares.    Went up to Georgia's apartment and hospice RN and Georgia's niece present and so spent time talking with them about business but then about Georgia's life.  This NP has been with Georgia for probably a good 1.5 years, and have developed a friendship with her.  She looked forward to being seen.  Through time together she would always comment about the urszula ones whom have  and why not her.  Now today able to say she will be \"going home\" soon.      ALLERGIES:    Allergies   Allergen Reactions    Gramineae Pollens Other (See Comments)     ORCHARDGRASS. Shortness of breath when lawn is just cut.    Smoke. Other (See Comments)     " Hard to breathe.    Pollen Extract      Other reaction(s): Unknown        MEDICATIONS:  Post Discharge Medication Reconciliation Status: reconciled.    Current Outpatient Medications   Medication Sig Dispense Refill    morphine 2.5 MG solu-tab Place 1 tablet (2.5 mg) under the tongue every hour as needed for shortness of breath or moderate to severe pain.      acetaminophen (TYLENOL) 650 MG suppository Place 1 suppository (650 mg) rectally every 6 hours as needed for fever.      CALMOSEPTINE 0.44-20.6 % OINT ointment APPLY TO AFFECTED AREA 2 TIMES DAILY;APPLY TO AFFECTED AREA 2 TIMES DAILY AS NEEDED FOR REDNESS ON BOTTOM 113 g 11    hyoscyamine (LEVSIN/SL) 0.125 MG sublingual tablet Place 1 tablet (0.125 mg) under the tongue every 4 hours as needed for cramping.      LORazepam (ATIVAN) 0.5 MG tablet Take 1 tablet (0.5 mg) by mouth at bedtime. May also take 1 tablet (0.5 mg) every 4 hours as needed for anxiety. 30 tablet 2    OXYGEN-HELIUM IN every evening           REVIEW OF SYSTEMS:  Unable as she is transitioning in dying.    PHYSICAL EXAM:  /65   Pulse 58   Temp 97.2  F (36.2  C)   Resp 16   Wt 40.8 kg (90 lb)   SpO2 (!) 85%   BMI 16.46 kg/m    Petite female whom is very thin now laying in a hospital bed with pillow propping left leg up, HOB elevated as well.  Skin is warm and dry but with her hands, the skin is cooler.  Her hands have been purple for a few weeks now but do not see that today.    No edema in lower legs.  Left leg has a ace bandage on due to open blister.  Mouth is open.  Eyes fluttered open slightly when this nurse practitioner spoke to her and then she tried moving her mouth.    No apnea but at one point her breathing was slightly heavy.        ASSESSMENT / PLAN:  1. Chronic diastolic congestive heart failure (H)    2. Closed fracture of distal end of right femur with delayed healing, unspecified fracture morphology, subsequent encounter    3. Hospice care patient      Hospice  nursing asked staff to ask this NP to discontinue all unnecessary medications.  See below the order.    Hospice will start riley visits due to expected death.    Appeared comfortable, staff will use the PRNs more frequently now.    With the long bone fracture, discussed calling the  once she passes.  Do not think it should be a problem signing her certificate but hospice and this NP trying to get on the same page.        Orders:   Discontinue all medications except PRN Morphine, oxycodone solutab, O2, Levsin, Lorazepam and Calmoseptine.  Discontinue the CPAP as well.    Electronically signed by  CASTRO Hazel CNP

## 2024-12-18 ENCOUNTER — PATIENT OUTREACH (OUTPATIENT)
Dept: GERIATRIC MEDICINE | Facility: CLINIC | Age: 89
End: 2024-12-18
Payer: COMMERCIAL

## 2024-12-18 NOTE — PROGRESS NOTES
[facility] Care Coordination Contact    Date of Disenrollment: Member passed away on 12/17/24. She was enrolled in hospice.     Death notification faxed to [facility] and [facility] notified of disenrollment.    Chiara Gloria RN  [facility]  Cell: [phone]

## (undated) DEVICE — GOWN IMPERVIOUS BREATHABLE 2XL/XLONG

## (undated) DEVICE — SUCTION TIP YANKAUER FLEX ORTHO K62

## (undated) DEVICE — SOL WATER IRRIG 1000ML BOTTLE 2F7114

## (undated) DEVICE — SU CHROMIC 2-0 SH 27" G123H

## (undated) DEVICE — RETR ELASTIC STAYS LONE STAR SHARP 5MM 8/PACK 3311-8G

## (undated) DEVICE — DRSG PRIMAPORE 02X3"

## (undated) DEVICE — LINEN TOWEL PACK X5 5464

## (undated) DEVICE — GLOVE PROTEXIS W/NEU-THERA 6.0  2D73TE60

## (undated) DEVICE — SOL NACL 0.9% IRRIG 1000ML BOTTLE 2F7124

## (undated) DEVICE — GOWN IMPERVIOUS BREATHABLE SMART LG 89015

## (undated) DEVICE — DRSG PRIMAPORE 03 1/8X6" 66000318

## (undated) DEVICE — GLOVE PROTEXIS BLUE W/NEU-THERA 6.5  2D73EB65

## (undated) DEVICE — SUTURE VICRYL+ 2-0 CT-2 27" UND VCP269H

## (undated) DEVICE — GUIDEWIRE BALL TIP 3.0X1000MM 1806-0085S

## (undated) DEVICE — SOL NACL 0.9% IRRIG 1000ML BOTTLE 07138-09

## (undated) DEVICE — GLOVE BIOGEL PI INDICATOR 8.0 LF 41680

## (undated) DEVICE — DRSG GAUZE 4X4" 3033

## (undated) DEVICE — STPL SKIN PROXIMATE 35 WIDE PMW35

## (undated) DEVICE — SU CHROMIC 0 CT-2 27" 884H

## (undated) DEVICE — PADDING CAST 4IN WEBRIL STRL 2502

## (undated) DEVICE — RETR RING LONE STAR 14.1X14.1CM 3307G

## (undated) DEVICE — CUSTOM PACK GEN MAJOR SBA5BGMHEA

## (undated) DEVICE — GLOVE BIOGEL PI ULTRATOUCH G SZ 8.0 42180

## (undated) DEVICE — DRAPE IOBAN ISOLATION VERTICAL 320X21CM 6617

## (undated) DEVICE — PREP DURAPREP 26ML APL 8630

## (undated) DEVICE — PLATE GROUNDING ADULT W/CORD 9165L

## (undated) DEVICE — DRESSING XEROFORM PETROLATUM 5X9 33605

## (undated) DEVICE — SPONGE BALL KERLIX ROUND XL W/O STRING LATEX 4935

## (undated) DEVICE — SU VICRYL 0 CT-2 27" J334H

## (undated) DEVICE — MAT FLOOR SURGICAL 40X38 0702140238

## (undated) DEVICE — TAPE ADH MEDIPORE 6IN SOFT CLOTH 2866

## (undated) DEVICE — ESU ELEC NDL 1" E1552

## (undated) DEVICE — DRILL BIT STRK GAMMA 4.2X300MM  1320-3042S

## (undated) DEVICE — SUCTION TIP YANKAUER W/O VENT K86

## (undated) DEVICE — CUSTOM CATH LAB BASIN SET PACK 640PACK LHE SUTCNBPFCA

## (undated) DEVICE — PACK MINOR SBA15MIFSE

## (undated) DEVICE — GLOVE BIOGEL PI ULTRATOUCH G SZ 8.5 42185

## (undated) DEVICE — SU VICRYL 3-0 SH CR 8X18" J774

## (undated) DEVICE — ESU LIGASURE OPEN SEALER/DIVIDER SM JAW 16.5MM LF1212A

## (undated) DEVICE — REAMER SHAFT MODIFIED TRINKLE 8X510MM 0227-8510S

## (undated) DEVICE — DRAPE C-ARM 60X42" 1013

## (undated) DEVICE — GLOVE UNDER INDICATOR PI SZ 8.5 LF 41685

## (undated) DEVICE — DRAPE MINOR PROCEDURE LAP 29496

## (undated) DEVICE — SUCTION CANISTER MEDIVAC LINER 3000ML W/LID 65651-530

## (undated) DEVICE — ESU GROUND PAD UNIVERSAL W/O CORD

## (undated) DEVICE — JELLY LUBRICATING SURGILUBE 4OZ TUBE

## (undated) DEVICE — SUTURE VICRYL+ 0 27IN CT-1 UND VCP260H

## (undated) DEVICE — KIT PATIENT CARE HANA TABLE PROFX SUPINE 6855

## (undated) RX ORDER — EPINEPHRINE 1 MG/ML
INJECTION, SOLUTION INTRAMUSCULAR; SUBCUTANEOUS
Status: DISPENSED
Start: 2018-01-10

## (undated) RX ORDER — NEOSTIGMINE METHYLSULFATE 1 MG/ML
VIAL (ML) INJECTION
Status: DISPENSED
Start: 2018-01-10

## (undated) RX ORDER — PROPOFOL 10 MG/ML
INJECTION, EMULSION INTRAVENOUS
Status: DISPENSED
Start: 2018-01-10

## (undated) RX ORDER — ONDANSETRON 2 MG/ML
INJECTION INTRAMUSCULAR; INTRAVENOUS
Status: DISPENSED
Start: 2018-01-10

## (undated) RX ORDER — BUPIVACAINE HYDROCHLORIDE 2.5 MG/ML
INJECTION, SOLUTION EPIDURAL; INFILTRATION; INTRACAUDAL
Status: DISPENSED
Start: 2018-01-10

## (undated) RX ORDER — ACETAMINOPHEN 325 MG/1
TABLET ORAL
Status: DISPENSED
Start: 2018-01-10

## (undated) RX ORDER — GLYCOPYRROLATE 0.2 MG/ML
INJECTION, SOLUTION INTRAMUSCULAR; INTRAVENOUS
Status: DISPENSED
Start: 2018-01-10

## (undated) RX ORDER — LIDOCAINE HYDROCHLORIDE 20 MG/ML
INJECTION, SOLUTION EPIDURAL; INFILTRATION; INTRACAUDAL; PERINEURAL
Status: DISPENSED
Start: 2018-01-10

## (undated) RX ORDER — CIPROFLOXACIN 2 MG/ML
INJECTION, SOLUTION INTRAVENOUS
Status: DISPENSED
Start: 2018-01-10

## (undated) RX ORDER — FENTANYL CITRATE 50 UG/ML
INJECTION, SOLUTION INTRAMUSCULAR; INTRAVENOUS
Status: DISPENSED
Start: 2018-01-10

## (undated) RX ORDER — DEXAMETHASONE SODIUM PHOSPHATE 4 MG/ML
INJECTION, SOLUTION INTRA-ARTICULAR; INTRALESIONAL; INTRAMUSCULAR; INTRAVENOUS; SOFT TISSUE
Status: DISPENSED
Start: 2018-01-10

## (undated) RX ORDER — LIDOCAINE HYDROCHLORIDE 10 MG/ML
INJECTION, SOLUTION EPIDURAL; INFILTRATION; INTRACAUDAL; PERINEURAL
Status: DISPENSED
Start: 2018-01-10

## (undated) RX ORDER — BUPIVACAINE HYDROCHLORIDE 5 MG/ML
INJECTION, SOLUTION EPIDURAL; INTRACAUDAL
Status: DISPENSED
Start: 2018-01-10